# Patient Record
Sex: FEMALE | Race: WHITE | NOT HISPANIC OR LATINO | Employment: OTHER | URBAN - METROPOLITAN AREA
[De-identification: names, ages, dates, MRNs, and addresses within clinical notes are randomized per-mention and may not be internally consistent; named-entity substitution may affect disease eponyms.]

---

## 2021-11-17 ENCOUNTER — HOSPITAL ENCOUNTER (INPATIENT)
Facility: CLINIC | Age: 46
LOS: 9 days | Discharge: HOME HEALTH | DRG: 074 | End: 2021-11-26
Attending: EMERGENCY MEDICINE | Admitting: PSYCHIATRY & NEUROLOGY
Payer: COMMERCIAL

## 2021-11-17 ENCOUNTER — APPOINTMENT (OUTPATIENT)
Dept: GENERAL RADIOLOGY | Facility: CLINIC | Age: 46
DRG: 074 | End: 2021-11-17
Payer: COMMERCIAL

## 2021-11-17 ENCOUNTER — APPOINTMENT (OUTPATIENT)
Dept: MRI IMAGING | Facility: CLINIC | Age: 46
DRG: 074 | End: 2021-11-17
Attending: STUDENT IN AN ORGANIZED HEALTH CARE EDUCATION/TRAINING PROGRAM
Payer: COMMERCIAL

## 2021-11-17 DIAGNOSIS — Z11.52 ENCOUNTER FOR SCREENING LABORATORY TESTING FOR SEVERE ACUTE RESPIRATORY SYNDROME CORONAVIRUS 2 (SARS-COV-2): ICD-10-CM

## 2021-11-17 DIAGNOSIS — R11.0 NAUSEA: Primary | ICD-10-CM

## 2021-11-17 DIAGNOSIS — G89.29 OTHER CHRONIC PAIN: ICD-10-CM

## 2021-11-17 DIAGNOSIS — M62.81 GENERALIZED MUSCLE WEAKNESS: ICD-10-CM

## 2021-11-17 DIAGNOSIS — G57.71 COMPLEX REGIONAL PAIN SYNDROME TYPE 2 OF RIGHT LOWER EXTREMITY: ICD-10-CM

## 2021-11-17 DIAGNOSIS — K59.00 CONSTIPATION, UNSPECIFIED CONSTIPATION TYPE: ICD-10-CM

## 2021-11-17 DIAGNOSIS — R27.8 SENSORY ATAXIA: ICD-10-CM

## 2021-11-17 DIAGNOSIS — M62.838 MUSCLE SPASM: ICD-10-CM

## 2021-11-17 LAB
ANION GAP SERPL CALCULATED.3IONS-SCNC: 4 MMOL/L (ref 3–14)
ATRIAL RATE - MUSE: 89 BPM
BASOPHILS # BLD AUTO: 0 10E3/UL (ref 0–0.2)
BASOPHILS NFR BLD AUTO: 1 %
BUN SERPL-MCNC: 9 MG/DL (ref 7–30)
CALCIUM SERPL-MCNC: 10 MG/DL (ref 8.5–10.1)
CHLORIDE BLD-SCNC: 106 MMOL/L (ref 94–109)
CO2 SERPL-SCNC: 28 MMOL/L (ref 20–32)
CREAT SERPL-MCNC: 0.79 MG/DL (ref 0.52–1.04)
DIASTOLIC BLOOD PRESSURE - MUSE: NORMAL MMHG
EOSINOPHIL # BLD AUTO: 0 10E3/UL (ref 0–0.7)
EOSINOPHIL NFR BLD AUTO: 1 %
ERYTHROCYTE [DISTWIDTH] IN BLOOD BY AUTOMATED COUNT: 12.5 % (ref 10–15)
GFR SERPL CREATININE-BSD FRML MDRD: 89 ML/MIN/1.73M2
GLUCOSE BLD-MCNC: 114 MG/DL (ref 70–99)
HCT VFR BLD AUTO: 49.9 % (ref 35–47)
HGB BLD-MCNC: 16.9 G/DL (ref 11.7–15.7)
HOLD SPECIMEN: NORMAL
HOLD SPECIMEN: NORMAL
IMM GRANULOCYTES # BLD: 0 10E3/UL
IMM GRANULOCYTES NFR BLD: 0 %
INTERPRETATION ECG - MUSE: NORMAL
LYMPHOCYTES # BLD AUTO: 1.3 10E3/UL (ref 0.8–5.3)
LYMPHOCYTES NFR BLD AUTO: 20 %
MAGNESIUM SERPL-MCNC: 2.3 MG/DL (ref 1.6–2.3)
MCH RBC QN AUTO: 31.5 PG (ref 26.5–33)
MCHC RBC AUTO-ENTMCNC: 33.9 G/DL (ref 31.5–36.5)
MCV RBC AUTO: 93 FL (ref 78–100)
MONOCYTES # BLD AUTO: 0.3 10E3/UL (ref 0–1.3)
MONOCYTES NFR BLD AUTO: 5 %
NEUTROPHILS # BLD AUTO: 4.8 10E3/UL (ref 1.6–8.3)
NEUTROPHILS NFR BLD AUTO: 73 %
NRBC # BLD AUTO: 0 10E3/UL
NRBC BLD AUTO-RTO: 0 /100
P AXIS - MUSE: 39 DEGREES
PLATELET # BLD AUTO: 271 10E3/UL (ref 150–450)
POTASSIUM BLD-SCNC: 4.1 MMOL/L (ref 3.4–5.3)
PR INTERVAL - MUSE: 128 MS
QRS DURATION - MUSE: 74 MS
QT - MUSE: 342 MS
QTC - MUSE: 416 MS
R AXIS - MUSE: 8 DEGREES
RBC # BLD AUTO: 5.36 10E6/UL (ref 3.8–5.2)
SARS-COV-2 RNA RESP QL NAA+PROBE: NEGATIVE
SODIUM SERPL-SCNC: 138 MMOL/L (ref 133–144)
SYSTOLIC BLOOD PRESSURE - MUSE: NORMAL MMHG
T AXIS - MUSE: 55 DEGREES
TROPONIN I SERPL-MCNC: <0.015 UG/L (ref 0–0.04)
VENTRICULAR RATE- MUSE: 89 BPM
WBC # BLD AUTO: 6.5 10E3/UL (ref 4–11)

## 2021-11-17 PROCEDURE — 84484 ASSAY OF TROPONIN QUANT: CPT | Performed by: EMERGENCY MEDICINE

## 2021-11-17 PROCEDURE — 86235 NUCLEAR ANTIGEN ANTIBODY: CPT | Performed by: STUDENT IN AN ORGANIZED HEALTH CARE EDUCATION/TRAINING PROGRAM

## 2021-11-17 PROCEDURE — 36415 COLL VENOUS BLD VENIPUNCTURE: CPT | Performed by: EMERGENCY MEDICINE

## 2021-11-17 PROCEDURE — 93005 ELECTROCARDIOGRAM TRACING: CPT

## 2021-11-17 PROCEDURE — 72158 MRI LUMBAR SPINE W/O & W/DYE: CPT | Mod: 26 | Performed by: RADIOLOGY

## 2021-11-17 PROCEDURE — 84165 PROTEIN E-PHORESIS SERUM: CPT | Mod: 26 | Performed by: PATHOLOGY

## 2021-11-17 PROCEDURE — 36415 COLL VENOUS BLD VENIPUNCTURE: CPT | Performed by: STUDENT IN AN ORGANIZED HEALTH CARE EDUCATION/TRAINING PROGRAM

## 2021-11-17 PROCEDURE — 80048 BASIC METABOLIC PNL TOTAL CA: CPT | Performed by: EMERGENCY MEDICINE

## 2021-11-17 PROCEDURE — 93010 ELECTROCARDIOGRAM REPORT: CPT | Performed by: INTERNAL MEDICINE

## 2021-11-17 PROCEDURE — 93005 ELECTROCARDIOGRAM TRACING: CPT | Performed by: EMERGENCY MEDICINE

## 2021-11-17 PROCEDURE — 83735 ASSAY OF MAGNESIUM: CPT | Performed by: STUDENT IN AN ORGANIZED HEALTH CARE EDUCATION/TRAINING PROGRAM

## 2021-11-17 PROCEDURE — 85027 COMPLETE CBC AUTOMATED: CPT | Performed by: EMERGENCY MEDICINE

## 2021-11-17 PROCEDURE — 83516 IMMUNOASSAY NONANTIBODY: CPT | Performed by: STUDENT IN AN ORGANIZED HEALTH CARE EDUCATION/TRAINING PROGRAM

## 2021-11-17 PROCEDURE — U0003 INFECTIOUS AGENT DETECTION BY NUCLEIC ACID (DNA OR RNA); SEVERE ACUTE RESPIRATORY SYNDROME CORONAVIRUS 2 (SARS-COV-2) (CORONAVIRUS DISEASE [COVID-19]), AMPLIFIED PROBE TECHNIQUE, MAKING USE OF HIGH THROUGHPUT TECHNOLOGIES AS DESCRIBED BY CMS-2020-01-R: HCPCS | Performed by: EMERGENCY MEDICINE

## 2021-11-17 PROCEDURE — C9803 HOPD COVID-19 SPEC COLLECT: HCPCS | Performed by: EMERGENCY MEDICINE

## 2021-11-17 PROCEDURE — 99223 1ST HOSP IP/OBS HIGH 75: CPT | Mod: GC | Performed by: PSYCHIATRY & NEUROLOGY

## 2021-11-17 PROCEDURE — 255N000002 HC RX 255 OP 636: Performed by: PSYCHIATRY & NEUROLOGY

## 2021-11-17 PROCEDURE — 99284 EMERGENCY DEPT VISIT MOD MDM: CPT | Mod: 25 | Performed by: EMERGENCY MEDICINE

## 2021-11-17 PROCEDURE — 250N000013 HC RX MED GY IP 250 OP 250 PS 637: Performed by: STUDENT IN AN ORGANIZED HEALTH CARE EDUCATION/TRAINING PROGRAM

## 2021-11-17 PROCEDURE — 99285 EMERGENCY DEPT VISIT HI MDM: CPT | Mod: 25 | Performed by: EMERGENCY MEDICINE

## 2021-11-17 PROCEDURE — 84165 PROTEIN E-PHORESIS SERUM: CPT | Mod: TC | Performed by: STUDENT IN AN ORGANIZED HEALTH CARE EDUCATION/TRAINING PROGRAM

## 2021-11-17 PROCEDURE — 86038 ANTINUCLEAR ANTIBODIES: CPT | Performed by: STUDENT IN AN ORGANIZED HEALTH CARE EDUCATION/TRAINING PROGRAM

## 2021-11-17 PROCEDURE — 120N000002 HC R&B MED SURG/OB UMMC

## 2021-11-17 PROCEDURE — 93010 ELECTROCARDIOGRAM REPORT: CPT | Performed by: EMERGENCY MEDICINE

## 2021-11-17 PROCEDURE — 72158 MRI LUMBAR SPINE W/O & W/DYE: CPT

## 2021-11-17 PROCEDURE — 250N000013 HC RX MED GY IP 250 OP 250 PS 637: Performed by: EMERGENCY MEDICINE

## 2021-11-17 PROCEDURE — A9585 GADOBUTROL INJECTION: HCPCS | Performed by: PSYCHIATRY & NEUROLOGY

## 2021-11-17 PROCEDURE — 84155 ASSAY OF PROTEIN SERUM: CPT | Performed by: STUDENT IN AN ORGANIZED HEALTH CARE EDUCATION/TRAINING PROGRAM

## 2021-11-17 PROCEDURE — 70200 X-RAY EXAM OF EYE SOCKETS: CPT | Mod: 26 | Performed by: RADIOLOGY

## 2021-11-17 PROCEDURE — 70200 X-RAY EXAM OF EYE SOCKETS: CPT

## 2021-11-17 RX ORDER — CYCLOBENZAPRINE HCL 5 MG
10 TABLET ORAL ONCE
Status: COMPLETED | OUTPATIENT
Start: 2021-11-17 | End: 2021-11-17

## 2021-11-17 RX ORDER — CYCLOBENZAPRINE HCL 10 MG
10 TABLET ORAL 3 TIMES DAILY
Status: DISCONTINUED | OUTPATIENT
Start: 2021-11-17 | End: 2021-11-26 | Stop reason: HOSPADM

## 2021-11-17 RX ORDER — SENNOSIDES 8.6 MG
8.6 TABLET ORAL 2 TIMES DAILY PRN
Status: DISCONTINUED | OUTPATIENT
Start: 2021-11-17 | End: 2021-11-26 | Stop reason: HOSPADM

## 2021-11-17 RX ORDER — LANOLIN ALCOHOL/MO/W.PET/CERES
3 CREAM (GRAM) TOPICAL
Status: DISCONTINUED | OUTPATIENT
Start: 2021-11-17 | End: 2021-11-26 | Stop reason: HOSPADM

## 2021-11-17 RX ORDER — ACETAMINOPHEN 325 MG/1
975 TABLET ORAL EVERY 8 HOURS
Status: DISCONTINUED | OUTPATIENT
Start: 2021-11-17 | End: 2021-11-26 | Stop reason: HOSPADM

## 2021-11-17 RX ORDER — OXYCODONE HYDROCHLORIDE 5 MG/1
5 TABLET ORAL 2 TIMES DAILY PRN
Status: DISCONTINUED | OUTPATIENT
Start: 2021-11-17 | End: 2021-11-18

## 2021-11-17 RX ORDER — GADOBUTROL 604.72 MG/ML
7.5 INJECTION INTRAVENOUS ONCE
Status: COMPLETED | OUTPATIENT
Start: 2021-11-17 | End: 2021-11-17

## 2021-11-17 RX ORDER — NALOXONE HYDROCHLORIDE 0.4 MG/ML
0.2 INJECTION, SOLUTION INTRAMUSCULAR; INTRAVENOUS; SUBCUTANEOUS
Status: DISCONTINUED | OUTPATIENT
Start: 2021-11-17 | End: 2021-11-24

## 2021-11-17 RX ORDER — LORAZEPAM 1 MG/1
1 TABLET ORAL ONCE
Status: COMPLETED | OUTPATIENT
Start: 2021-11-17 | End: 2021-11-17

## 2021-11-17 RX ORDER — NALOXONE HYDROCHLORIDE 0.4 MG/ML
0.4 INJECTION, SOLUTION INTRAMUSCULAR; INTRAVENOUS; SUBCUTANEOUS
Status: DISCONTINUED | OUTPATIENT
Start: 2021-11-17 | End: 2021-11-24

## 2021-11-17 RX ORDER — LIDOCAINE 40 MG/G
CREAM TOPICAL
Status: DISCONTINUED | OUTPATIENT
Start: 2021-11-17 | End: 2021-11-24

## 2021-11-17 RX ADMIN — GADOBUTROL 7.5 ML: 604.72 INJECTION INTRAVENOUS at 21:28

## 2021-11-17 RX ADMIN — OXYCODONE HYDROCHLORIDE 5 MG: 5 TABLET ORAL at 21:57

## 2021-11-17 RX ADMIN — OXYCODONE HYDROCHLORIDE 2.5 MG: 5 TABLET ORAL at 15:22

## 2021-11-17 RX ADMIN — CYCLOBENZAPRINE 10 MG: 10 TABLET, FILM COATED ORAL at 20:07

## 2021-11-17 RX ADMIN — LORAZEPAM 1 MG: 1 TABLET ORAL at 20:07

## 2021-11-17 RX ADMIN — CYCLOBENZAPRINE HYDROCHLORIDE 10 MG: 5 TABLET, FILM COATED ORAL at 15:22

## 2021-11-17 RX ADMIN — ACETAMINOPHEN 975 MG: 325 TABLET, FILM COATED ORAL at 20:07

## 2021-11-17 ASSESSMENT — MIFFLIN-ST. JEOR: SCORE: 1383.07

## 2021-11-17 ASSESSMENT — ENCOUNTER SYMPTOMS
UNEXPECTED WEIGHT CHANGE: 1
APPETITE CHANGE: 1
NUMBNESS: 1
FATIGUE: 1
WEAKNESS: 1
LIGHT-HEADEDNESS: 1
DIZZINESS: 1
HEADACHES: 0

## 2021-11-17 ASSESSMENT — ACTIVITIES OF DAILY LIVING (ADL)
ADLS_ACUITY_SCORE: 9
ADLS_ACUITY_SCORE: 8
ADLS_ACUITY_SCORE: 13
ADLS_ACUITY_SCORE: 9
ADLS_ACUITY_SCORE: 13

## 2021-11-17 ASSESSMENT — VISUAL ACUITY: OU: NORMAL ACUITY

## 2021-11-17 NOTE — ED TRIAGE NOTES
Pt arrives with  from home. Was referred by her IM doctor to see neurology, which would take 6 weeks to get an appointment. Pt has been experiencing a sudden decompensation of extremity strength. Pt begun noticing symptoms around 10/18 which include dizziness, falling, numbness in distal extremities, lips, cheeks.     Hx of CRPS; pt was diagnosed with Covid in 03/2020, received the Moderna vaccination 3/20/21 and 4/17/2021.    Gloria Matos RN on 11/17/2021 at 7:35 AM

## 2021-11-17 NOTE — LETTER
[facility] [phone]    Name: Peggy Jessica  : 1975  MRN #: [id number]  Primary Care Provider: Anny Marie PA-C       Needs PT , going today. Please call me asap thanks!!

## 2021-11-17 NOTE — LETTER
Transition Communication Hand-off for Care Transitions to Next Level of Care Provider    Name: Peggy Jessica  : 1975  MRN #: [id number]  Primary Care Provider: Anny Marie PA-C     Primary Clinic: [address]  [place] [address]     Reason for Hospitalization:  Generalized muscle weakness [M62.81]  Admit Date/Time: 2021 11:38 AM  Discharge Date:   2021  Payor Source: Payor: MEDICA / Plan: MEDICA ESSENTIAL / Product Type: Indemnity /              Reason for Communication Hand-off Referral:   Notification of the discharge plan    Discharge Plan:       Care Management Discharge Note     Discharge Date: 2021        Discharge Disposition:  [facility] ([phone])     Discharge Services:  Short term TCU placement at [facility]     Discharge DME:  Not applicable     Discharge Transportation: Pt has requested that her  provide transport.  SW spoke with pt's floor nurse (Jaylene) who reports that pt is appropriate to be transported by car with family.   time will be established when prior auth from insurance has been obtained.     Private pay costs discussed: Not applicable     PAS Confirmation Code:  [id number]  Patient/family educated on Medicare website which has current facility and service quality ratings: yes (SW provided copy of Medicare Care Compare)     Education Provided on the Discharge Plan:  yes  Persons Notified of Discharge Plans: Pt, Dr. Sheets and 6A nursing (pt states that pt will indep update her   Patient/Family in Agreement with the Plan: yes     Handoff Referral Completed: Yes     Additional Information:  - Dr. Sheets has confirmed readiness for discharge  - Admissions (Isabel) at [facility] has confirmed acceptance for admit pending receipt of prior auth from insurance.  Per Isabel, prior auth request has been submitted  - SW will fax pt's discharge orders to [facility] when they become  available.     STEVE Adams  Social Work, 6A  Phone:  [phone]  Pager:  [phone]  11/23/2021

## 2021-11-17 NOTE — CONSULTS
[facility]  Neurology Consult Note    Patient Name:  Peggy Jessica    MRN:  [id number]   :  1975  Date of Service:  2021  Primary care provider:  Anny Marie A      History of Present Illness:   Peggy Jessica is a 46 year old woman with PMH of CRPS (right leg from stress fracture and chest from bilateral mastectomy - BRCA +) with several systemic changes to sensation and subjective autonomic dysfunction.  She presents to the ED today due to 2 to 3 weeks of progressive sensory changes mostly in the BLEs and slight involvement of the hands that have resulted in some clumsiness.  She reports some falls as well.  Neurology was consulted due to the acute on chronic concerns.    She reports having vague discrepancies in sensation ever since being diagnosed with CRPS in the right leg from stress fracture years ago.  This was mostly related to the RLE at that time.  It has since also involved her chest and upper thorax in the setting of bilateral mastectomy, this was done about 2020.  Since that time she has noticed some sensory changes, mostly sensory loss, but also burning sensation that is vaguely in the area of these CRPS locations, but can affect other limbs as well.    Today she mostly subjective concerns of numbness and tingling in her feet and BLEs up to just proximal of the knees.  It also involves her hands to the wrists.  She thinks that this is significantly changed in the last 2 to 3 weeks.  She thinks her balance has been significantly affected and she has had about 3-4 falls each day for the last couple weeks, but admits she catches herself during these events.  She attributes these almost to feeling imbalanced, and she thinks is slightly worse in the dark.  She has also felt quite clumsy with her hands, she thinks she is dropping things more often.  She has some lightheadedness when bending over to grab things that she is  dropped, but this specific sensation has not caused the fall.  Additionally, she does endorse some sensations of her hands of her fingers turning white, and then moments later they turn red and flush.  This is also happening in her knuckles of her hands.  She thinks this has been more prominent recently.    This is in contrast to September and early October when she was helping work the [employer] and she was able to carry roughly 20 pound objects from her car into the festival area.  She was doing this without complication at that time.    It should be noted that she has seen providers at [facility] for this concern, and she has actually undergone autonomic testing.  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  In the setting of this she was taking an anticholinergic medication to suppress what effect, so the only organic component to this testing was mild autonomic neuropathy, although due to the mild nature of this it is not entirely specific to an organic dysfunction.    Of note she is having poor sleep in the last 2 to 3 months.  She is also had a significantly reduced appetite over this time and only eating a few snacks throughout the day and maybe one full meal every day or two.  She has lost about 70 pounds unintentionally per her report.      ROS: A 10-point ROS was performed as per HPI.    PMH:  No past medical history on file.  No past surgical history on file.    Allergies:  No Known Allergies    Medications:    No current facility-administered medications for this encounter.  No current outpatient medications on file.    Social History:  Social History     Tobacco Use     Smoking status: Not on file     Smokeless tobacco: Not on file   Substance Use Topics     Alcohol use: Not on file       Family History:    No family history on file.      Physical Examination  Vitals: BP  (!) 127/97   Pulse 112   Temp 98.1  F (36.7  C) (Oral)   Resp 20   SpO2 97%     General: Awake, alert, interactive; Sitting in bed, NAD, cooperative  HEENT: Normocephalic, atraumatic, nares patent, poor dentition  Resp: No increased work of breathing  Cardio: RRR, S1/S2 appropriate  Abdomen: Soft, non-distended, non-tender  Extremities: Warm and well perfused, peripheral pulses present, no edema  Skin: Not jaundiced, no rash, rare scattered ecchymoses  Psych: Slightly anxious  Neuro:   Mental status: Awake, attentive, interactive; Recalls remote and recent history with accuracy; simple calculations intact  Speech/Language: Fluent speech without paraphasic errors; No dysarthria; naming, repetition, comprehension intact  Cranial nerves: Visual fields intact to confrontation, Eyes conjugate, Pupils 4mm and brisk, EOMI w/ normal and smooth pursuit, face expression symmetric, facial sensation intact and symmetric, hearing intact to conversation, shoulder shrug strong, palate rise symmetric, tongue/uvula midline.  Motor:   Bulk: Appropriate  Tone: Appropriate with some occasional paratonia  Spontaneous movements: No myoclonus or asterixis; otherwise appropriate at rest  Power: Pronator drift absent. *Impersistence noted throughout formal motor exam   Right Left   Arm abduction 5/5 5/5   Elbow Flex 5/5 5/5   Elbow Extension 5/5 5/5   Wrist Extension 5/5 5/5     5/5 5/5   Hip Flexion 5-/5 5-/5   Knee Flexion 5-/5 5-/5   Knee Extension 5/5 5/5   Dorsiflexion  5/5 5/5   Plantarflexion 5/5 5/5   Reflexes: 2+ and symmetric biceps, triceps, brachioradialis; 2 in patellar, and achilles. No crossed adductors or spread. Toes down-going  Sensory: Light touch mildly decreased in the ankles bilaterally, with slight discrepancy in the Right limbs; PP has patchy and inconsistent findings in the BLE's; Proprioception intact in all limbs, including with eyes closed; Two-point discrimination intact in the BLE's   Coordination: FNF  intact bilaterally without dysmetria; Normal heel-shin test bilaterally  Finger tapping with normal amplitude and frequency; Rolling hands normal rate and coordinated  Station/Gait: Declined gait testing due to subjective ataxia; Romberg - swaying in several directions without fall or leaning on her environment      IMAGING:  No acute neuroimaging has been done      IMPRESSION/PLAN:  Peggy Jessica is a 46 year old woman with PMH of CRPS (right leg from stress fracture and chest from bilateral mastectomy - BRCA +) with several systemic changes to sensation and subjective autonomic dysfunction.  She presents to the ED today due to 2 to 3 weeks of progressive sensory changes mostly in the BLEs and slight involvement of the hands that have resulted in some clumsiness.  She reports some falls as well.      She is having acute on chronic sensory changes that have caused imbalance and clumsiness with her feet and her hands subjectively.  She has got a previous significant neuropathy work-up both of lab studies and of autonomic testing.  These have mostly been negative to date outside of possible mild autonomic dysfunction on testing, although this is not entirely specific to an organic etiology.  She received an EMG in 2008 that was reportedly unremarkable, but the raw data for that is not currently available.  Per objective examination is significant for motor impersistence, patchy and vague pinprick changes to BLEs, mild discrepancy of light touch in the right hemibody (light touch discrepancy was in left hemibody during examination at [facility]), and her Romberg test was essentially normal (mild swaying in all directions but without fall and continuously corrected).  Due to the changing of her symptoms and serum components that have been quite unremarkable, with recent autonomic testing, an EMG is likely indicated as an outpatient.    #1 Complex regional pain syndrome  #2 Subjective sensory changes causing imbalance  and clumsiness, acute on chronic  #3 Rule out sensory ganglionopathy  - Admit to Neurology service  - MRI L-spine w/wo contrast to evaluate for nerve root enhancement  - Serum studies   - PAULINE, SSA, SSB, B6, Paraneoplastic panel (PAVAL), GQ1b, GD1b, Ganglioside Ab  - EMG tomorrow AM  - LP probably indicated tomorrow      Patient discussed with Attending Dr. Henrique Bro MD  PGY-3 Neurology Resident  Securely message with the Vocera Web Console (learn more here)  11/17/2021

## 2021-11-17 NOTE — ED NOTES
MRI called and stated patient has a history of metal in the eye and that there would need to be XRAYS to confirm that the metal has been removed.

## 2021-11-17 NOTE — ED PROVIDER NOTES
"    [place] EMERGENCY DEPARTMENT ([facility])  11/17/21  History     Chief Complaint   Patient presents with     Generalized Weakness     The history is provided by the patient and medical records.     Peggy Jessica is a 46 year old female with a past medical history significant for pyelonephritis c/b CHRISTOPHER, grand mal seizures, and complex regional pain syndrome (right leg from stress fracture and chest from mastectomy - BRCA +) who presents to the Emergency Department for evaluation of generalized weakness.  Patient reports that the complex regional pain syndrome started in her right foot after walking on an undiagnosed stress fracture for 6.5 months. She also has a history of prophylactic bilateral mastectomy since which there has been a number of sequelae and complications including suspected allergy to internal sutures, and spreading of her complex regional pain syndrome to this area.  She states that sometimes she is unable to wear clothes due to pain from CRPS. Patient was seen by Dr. Jhaveri of Internal Medicine on 11/15 at [facility] and was informed that she would not be able to schedule a neurology appointment at [facility] until the end of February.  She reports progressive worsening of numbness/tingling in her feet, hands, and face over the past 2 months.  She reports bilateral lower extremity numbness up to her knees which has been causing her difficulty walking and frequent falls, increasing over the past two weeks.  She is unable to stand for very long or walk as much as she used to.  Prior to October 3 she was walking 5 miles per day.  She reports 3-4 falls per day due to fatigue and leg weakness and feeling like her legs \"stop working and give out\".  She also feels that she is having difficulty balancing.  She reports a new sensation of ice cold water running down her skin in her bilateral upper and lower extremities.  Patient notes a couple episodes of syncope but states that her falls are " "largely due to leg weakness.  She denies unusual headaches.  Patient also notes rapid decrease in her  strength.  She can no longer knit,  her birds, or  her 12 pound dog.  She feels that her leg weakness is worse than her hand weakness.  She describes a feeling of \"television static\" in her bilateral lower extremities.  Patient states that she received a flu vaccine before her symptoms began and received her COVID vaccine in April.  Patient states that she has lost 70-80 pounds since March.  She does not have an appetite but does attempt to eat some bread and fruit each day.  She states that she does empty a large jug of water 3-4 times per day as she is very thirsty. She endorses worsening dizziness which she describes primarily as room spinning with some lightheadedness.  This is exacerbated when standing from a sitting position, although she will feel this while just sitting as well.  She denies new diet or supplements.  Patient is on PING management which includes Flexeril, oxycodone, and cannabis.    Per review of the patient's medical record, patient was seen at a [facility] internal medicine acute care clinic on 11/15/2021.  Patient was very anxious at her visit about rapidly worsening symptoms over the past 2 weeks including weight loss, leg weakness, frequent falls, and worsening pain/numbness in her extremities.  She was noted to have progressive neurologic deficit and a need for ongoing neurology evaluation was noted.  Patient was received a call from the clinic yesterday and was advised to be seen in the ED. she was informed that an appointment in the neurology clinic would not be available for at least 6 weeks.    I have reviewed the Medications, Allergies, Past Medical and Surgical History, and Social History in the Epic system.    Review of Systems   Constitutional: Positive for appetite change, fatigue and unexpected weight change.   Musculoskeletal: Positive for gait " problem.   Neurological: Positive for dizziness, syncope, weakness, light-headedness and numbness. Negative for headaches.        Positive for paresthesias in bilateral upper and lower extremities  Positive for frequent falls   All other systems reviewed and are negative.    A complete review of systems was performed with pertinent positives and negatives noted in the HPI, and all other systems negative.    Physical Exam   BP: (!) 127/97  Pulse: 112  Temp: 98.1  F (36.7  C)  Resp: 20  SpO2: 97 %      Physical Exam  General: awake, alert, NAD  Head: normal cephalic  HEENT: pupils equal, conjugate gaze intact  Neck: Supple  CV: regular rate and rhythm without murmur  Lungs: clear to auscultation  Abd: soft, non-tender, no guarding, no peritoneal signs  EXT: lower extremities without swelling or edema  Neuro: awake, answers questions appropriately.  Patient appears to have diffuse 4 out of 5 strength of her bilateral lower extremities, appears to have some weekend  strength bilaterally.  Patient has somewhat of a flat-footed gait and appears off balance.        ED Course     At 12:01 PM the patient was seen and examined by Nabil Figueroa MD in Room [facility].        Procedures              EKG Interpretation:      Interpreted by Nabil Figueroa MD  Time reviewed: 1119  Symptoms at time of EKG: Diffuse weakness, dizziness  Rhythm: normal sinus   Rate: normal  Axis: normal  Ectopy: none  Conduction: normal  ST Segments/ T Waves: No ST-T wave changes  Q Waves: none  Comparison to prior: No old EKG available    Clinical Impression: normal EKG        The medical record was reviewed and interpreted.  Current labs reviewed and interpreted.  Previous labs reviewed and interpreted.     Results for orders placed or performed during the hospital encounter of 11/17/21 (from the past 24 hour(s))   [facility] Draw    Narrative    The following orders were created for panel order [facility] Draw.  Procedure                                Abnormality         Status                     ---------                               -----------         ------                     Extra Blue Top Tube[[id number]]                              Final result               Extra Red Top Tube[[id number]]                               Final result                 Please view results for these tests on the individual orders.   CBC with platelets differential    Narrative    The following orders were created for panel order CBC with platelets differential.  Procedure                               Abnormality         Status                     ---------                               -----------         ------                     CBC with platelets and d...[[id number]]  Abnormal            Final result                 Please view results for these tests on the individual orders.   Basic metabolic panel   Result Value Ref Range    Sodium 138 133 - 144 mmol/L    Potassium 4.1 3.4 - 5.3 mmol/L    Chloride 106 94 - 109 mmol/L    Carbon Dioxide (CO2) 28 20 - 32 mmol/L    Anion Gap 4 3 - 14 mmol/L    Urea Nitrogen 9 7 - 30 mg/dL    Creatinine 0.79 0.52 - 1.04 mg/dL    Calcium 10.0 8.5 - 10.1 mg/dL    Glucose 114 (H) 70 - 99 mg/dL    GFR Estimate 89 >60 mL/min/1.73m2   Extra Blue Top Tube   Result Value Ref Range    Hold Specimen JIC    Extra Red Top Tube   Result Value Ref Range    Hold Specimen JIC    CBC with platelets and differential   Result Value Ref Range    WBC Count 6.5 4.0 - 11.0 10e3/uL    RBC Count 5.36 (H) 3.80 - 5.20 10e6/uL    Hemoglobin 16.9 (H) 11.7 - 15.7 g/dL    Hematocrit 49.9 (H) 35.0 - 47.0 %    MCV 93 78 - 100 fL    MCH 31.5 26.5 - 33.0 pg    MCHC 33.9 31.5 - 36.5 g/dL    RDW 12.5 10.0 - 15.0 %    Platelet Count 271 150 - 450 10e3/uL    % Neutrophils 73 %    % Lymphocytes 20 %    % Monocytes 5 %    % Eosinophils 1 %    % Basophils 1 %    % Immature Granulocytes 0 %    NRBCs per 100 WBC 0 <1 /100    Absolute Neutrophils 4.8 1.6 - 8.3 10e3/uL    Absolute  Lymphocytes 1.3 0.8 - 5.3 10e3/uL    Absolute Monocytes 0.3 0.0 - 1.3 10e3/uL    Absolute Eosinophils 0.0 0.0 - 0.7 10e3/uL    Absolute Basophils 0.0 0.0 - 0.2 10e3/uL    Absolute Immature Granulocytes 0.0 <=0.0 10e3/uL    Absolute NRBCs 0.0 10e3/uL   Troponin I   Result Value Ref Range    Troponin I <0.015 0.000 - 0.045 ug/L   EKG 12 lead   Result Value Ref Range    Systolic Blood Pressure  mmHg    Diastolic Blood Pressure  mmHg    Ventricular Rate 89 BPM    Atrial Rate 89 BPM    WY Interval 128 ms    QRS Duration 74 ms     ms    QTc 416 ms    P Axis 39 degrees    R AXIS 8 degrees    T Axis 55 degrees    Interpretation ECG Sinus rhythm  Normal ECG        Medications   LORazepam (ATIVAN) tablet 1 mg (has no administration in time range)   cyclobenzaprine (FLEXERIL) tablet 10 mg (10 mg Oral Given 11/17/21 1522)   oxyCODONE IR (ROXICODONE) half-tab 2.5 mg (2.5 mg Oral Given 11/17/21 1522)             Assessments & Plan (with Medical Decision Making)   Peggy is a 46-year female who presents with subacute neurologic symptoms.  Patient's medical record was reviewed.  Here she is nontoxic-appearing, mildly tachycardic, has some diffuse weakness on physical exam with an ataxic gait.    She endorses progressive weakness of her lower and now upper extremities and now with frequent falls.  Here is tachycardic.  Differential would include things such as some type of peripheral neuropathy, some type of a sending process such as Guillain-Barré, some type of polyneuropathy.     EKG troponin basic labs were obtained the patient describes more vertiginous than a presyncopal sensation.    Patient's EKG showed no sign of acute ischemia, no signs of underlying arrhythmia. Troponin negative, hemoglobin electrolytes normal; electrolytes are otherwise unremarkable aside from some mild elevated glucose.    Patient was seen and evaluated by neurology.  They recommended admission for further evaluation.  They did not have any  additional lab request from me stated they would order them as an inpatient.  Patient will get a Covid swab prior to admission.    Neurology ordered an MRI. Patient be admitted to the neurology service for further evaluation.    I have reviewed the nursing notes.    I have reviewed the findings, diagnosis, plan and need for follow up with the patient.    New Prescriptions    No medications on file       Final diagnoses:   Generalized muscle weakness       IItzel am serving as a trained medical scribe to document services personally performed by Nabil Figueroa MD, based on the provider's statements to me.      I, Nabil Figueroa MD, was physically present and have reviewed and verified the accuracy of this note documented by Itzel Boswell.     Nabil Figueroa MD  11/17/2021   [facility] EMERGENCY DEPARTMENT     Nabil Figueroa MD  11/17/21 [id number]

## 2021-11-17 NOTE — ED NOTES
"Patient asking to use the bathroom. Encouraged to provided a urine sample just in case. \"I think I would have noticed if it was a UTI!\" Patient very short and rude with staff. Sample provided and sent to lab.   "

## 2021-11-18 ENCOUNTER — APPOINTMENT (OUTPATIENT)
Dept: PHYSICAL THERAPY | Facility: CLINIC | Age: 46
DRG: 074 | End: 2021-11-18
Attending: STUDENT IN AN ORGANIZED HEALTH CARE EDUCATION/TRAINING PROGRAM
Payer: COMMERCIAL

## 2021-11-18 LAB
ALBUMIN SERPL-MCNC: 3.3 G/DL (ref 3.4–5)
ALP SERPL-CCNC: 80 U/L (ref 40–150)
ALT SERPL W P-5'-P-CCNC: 23 U/L (ref 0–50)
ANA PAT SER IF-IMP: ABNORMAL
ANA SER QL IF: POSITIVE
ANA TITR SER IF: ABNORMAL {TITER}
APPEARANCE CSF: CLEAR
AST SERPL W P-5'-P-CCNC: 11 U/L (ref 0–45)
ATRIAL RATE - MUSE: 100 BPM
BILIRUB DIRECT SERPL-MCNC: 0.1 MG/DL (ref 0–0.2)
BILIRUB SERPL-MCNC: 0.4 MG/DL (ref 0.2–1.3)
COLOR CSF: COLORLESS
DIASTOLIC BLOOD PRESSURE - MUSE: NORMAL MMHG
ENA SS-A AB SER IA-ACNC: <0.5 U/ML
ENA SS-A AB SER IA-ACNC: NEGATIVE
ENA SS-B IGG SER IA-ACNC: <0.6 U/ML
ENA SS-B IGG SER IA-ACNC: NEGATIVE
ERYTHROCYTE [DISTWIDTH] IN BLOOD BY AUTOMATED COUNT: 12.4 % (ref 10–15)
FERRITIN SERPL-MCNC: 211 NG/ML (ref 8–252)
GLUCOSE CSF-MCNC: 73 MG/DL (ref 40–70)
HCT VFR BLD AUTO: 43.2 % (ref 35–47)
HGB BLD-MCNC: 14.6 G/DL (ref 11.7–15.7)
HSV1 DNA CSF QL NAA+PROBE: NOT DETECTED
HSV2 DNA CSF QL NAA+PROBE: NOT DETECTED
INTERPRETATION ECG - MUSE: NORMAL
IRON SATN MFR SERPL: 21 % (ref 15–46)
IRON SERPL-MCNC: 76 UG/DL (ref 35–180)
MCH RBC QN AUTO: 31.4 PG (ref 26.5–33)
MCHC RBC AUTO-ENTMCNC: 33.8 G/DL (ref 31.5–36.5)
MCV RBC AUTO: 93 FL (ref 78–100)
P AXIS - MUSE: 34 DEGREES
PLATELET # BLD AUTO: 214 10E3/UL (ref 150–450)
PR INTERVAL - MUSE: 142 MS
PROT CSF-MCNC: 105 MG/DL (ref 15–60)
PROT SERPL-MCNC: 7.4 G/DL (ref 6.8–8.8)
QRS DURATION - MUSE: 72 MS
QT - MUSE: 338 MS
QTC - MUSE: 436 MS
R AXIS - MUSE: -16 DEGREES
RBC # BLD AUTO: 4.65 10E6/UL (ref 3.8–5.2)
RBC # CSF MANUAL: 6 /UL (ref 0–2)
SYSTOLIC BLOOD PRESSURE - MUSE: NORMAL MMHG
T AXIS - MUSE: 0 DEGREES
TIBC SERPL-MCNC: 361 UG/DL (ref 240–430)
TOTAL PROTEIN SERUM FOR ELP: 7.6 G/DL (ref 6.8–8.8)
TRANSFERRIN SERPL-MCNC: 270 MG/DL (ref 210–360)
TUBE # CSF: 4
VENTRICULAR RATE- MUSE: 100 BPM
WBC # BLD AUTO: 5.7 10E3/UL (ref 4–11)
WBC # CSF MANUAL: 0 /UL (ref 0–5)

## 2021-11-18 PROCEDURE — 250N000011 HC RX IP 250 OP 636: Performed by: STUDENT IN AN ORGANIZED HEALTH CARE EDUCATION/TRAINING PROGRAM

## 2021-11-18 PROCEDURE — 84999 UNLISTED CHEMISTRY PROCEDURE: CPT | Performed by: STUDENT IN AN ORGANIZED HEALTH CARE EDUCATION/TRAINING PROGRAM

## 2021-11-18 PROCEDURE — 86255 FLUORESCENT ANTIBODY SCREEN: CPT | Performed by: STUDENT IN AN ORGANIZED HEALTH CARE EDUCATION/TRAINING PROGRAM

## 2021-11-18 PROCEDURE — 250N000013 HC RX MED GY IP 250 OP 250 PS 637: Performed by: PSYCHIATRY & NEUROLOGY

## 2021-11-18 PROCEDURE — 30233S1 TRANSFUSION OF NONAUTOLOGOUS GLOBULIN INTO PERIPHERAL VEIN, PERCUTANEOUS APPROACH: ICD-10-PCS | Performed by: PSYCHIATRY & NEUROLOGY

## 2021-11-18 PROCEDURE — 120N000002 HC R&B MED SURG/OB UMMC

## 2021-11-18 PROCEDURE — 99233 SBSQ HOSP IP/OBS HIGH 50: CPT | Mod: GC | Performed by: PSYCHIATRY & NEUROLOGY

## 2021-11-18 PROCEDURE — 87529 HSV DNA AMP PROBE: CPT | Performed by: STUDENT IN AN ORGANIZED HEALTH CARE EDUCATION/TRAINING PROGRAM

## 2021-11-18 PROCEDURE — 87015 SPECIMEN INFECT AGNT CONCNTJ: CPT | Performed by: STUDENT IN AN ORGANIZED HEALTH CARE EDUCATION/TRAINING PROGRAM

## 2021-11-18 PROCEDURE — 97162 PT EVAL MOD COMPLEX 30 MIN: CPT | Mod: GP

## 2021-11-18 PROCEDURE — 82945 GLUCOSE OTHER FLUID: CPT | Performed by: STUDENT IN AN ORGANIZED HEALTH CARE EDUCATION/TRAINING PROGRAM

## 2021-11-18 PROCEDURE — 009U3ZX DRAINAGE OF SPINAL CANAL, PERCUTANEOUS APPROACH, DIAGNOSTIC: ICD-10-PCS | Performed by: PSYCHIATRY & NEUROLOGY

## 2021-11-18 PROCEDURE — 97116 GAIT TRAINING THERAPY: CPT | Mod: GP

## 2021-11-18 PROCEDURE — 250N000013 HC RX MED GY IP 250 OP 250 PS 637: Performed by: STUDENT IN AN ORGANIZED HEALTH CARE EDUCATION/TRAINING PROGRAM

## 2021-11-18 PROCEDURE — 250N000009 HC RX 250

## 2021-11-18 PROCEDURE — 80076 HEPATIC FUNCTION PANEL: CPT | Performed by: STUDENT IN AN ORGANIZED HEALTH CARE EDUCATION/TRAINING PROGRAM

## 2021-11-18 PROCEDURE — 85027 COMPLETE CBC AUTOMATED: CPT | Performed by: STUDENT IN AN ORGANIZED HEALTH CARE EDUCATION/TRAINING PROGRAM

## 2021-11-18 PROCEDURE — 83519 RIA NONANTIBODY: CPT | Performed by: STUDENT IN AN ORGANIZED HEALTH CARE EDUCATION/TRAINING PROGRAM

## 2021-11-18 PROCEDURE — 36415 COLL VENOUS BLD VENIPUNCTURE: CPT | Performed by: STUDENT IN AN ORGANIZED HEALTH CARE EDUCATION/TRAINING PROGRAM

## 2021-11-18 PROCEDURE — 83550 IRON BINDING TEST: CPT | Performed by: STUDENT IN AN ORGANIZED HEALTH CARE EDUCATION/TRAINING PROGRAM

## 2021-11-18 PROCEDURE — 82784 ASSAY IGA/IGD/IGG/IGM EACH: CPT | Performed by: STUDENT IN AN ORGANIZED HEALTH CARE EDUCATION/TRAINING PROGRAM

## 2021-11-18 PROCEDURE — 82728 ASSAY OF FERRITIN: CPT | Performed by: STUDENT IN AN ORGANIZED HEALTH CARE EDUCATION/TRAINING PROGRAM

## 2021-11-18 PROCEDURE — 84157 ASSAY OF PROTEIN OTHER: CPT | Performed by: STUDENT IN AN ORGANIZED HEALTH CARE EDUCATION/TRAINING PROGRAM

## 2021-11-18 PROCEDURE — 84466 ASSAY OF TRANSFERRIN: CPT | Performed by: STUDENT IN AN ORGANIZED HEALTH CARE EDUCATION/TRAINING PROGRAM

## 2021-11-18 PROCEDURE — 89050 BODY FLUID CELL COUNT: CPT | Performed by: STUDENT IN AN ORGANIZED HEALTH CARE EDUCATION/TRAINING PROGRAM

## 2021-11-18 RX ORDER — CYCLOBENZAPRINE HCL 10 MG
10 TABLET ORAL 3 TIMES DAILY
Status: ON HOLD | COMMUNITY
End: 2021-11-23

## 2021-11-18 RX ORDER — DIPHENHYDRAMINE HCL 25 MG
50 CAPSULE ORAL ONCE
Status: COMPLETED | OUTPATIENT
Start: 2021-11-18 | End: 2021-11-18

## 2021-11-18 RX ORDER — METHYLPREDNISOLONE SODIUM SUCCINATE 125 MG/2ML
125 INJECTION, POWDER, LYOPHILIZED, FOR SOLUTION INTRAMUSCULAR; INTRAVENOUS
Status: DISCONTINUED | OUTPATIENT
Start: 2021-11-18 | End: 2021-11-24

## 2021-11-18 RX ORDER — DIPHENHYDRAMINE HYDROCHLORIDE 50 MG/ML
50 INJECTION INTRAMUSCULAR; INTRAVENOUS
Status: DISCONTINUED | OUTPATIENT
Start: 2021-11-18 | End: 2021-11-24

## 2021-11-18 RX ORDER — ONDANSETRON 8 MG/1
8 TABLET, ORALLY DISINTEGRATING ORAL EVERY 8 HOURS PRN
Status: ON HOLD | COMMUNITY
End: 2021-11-23

## 2021-11-18 RX ORDER — HYDROXYZINE HYDROCHLORIDE 25 MG/1
25 TABLET, FILM COATED ORAL EVERY 6 HOURS PRN
Status: DISCONTINUED | OUTPATIENT
Start: 2021-11-18 | End: 2021-11-26 | Stop reason: HOSPADM

## 2021-11-18 RX ORDER — DIPHENHYDRAMINE HYDROCHLORIDE 50 MG/ML
50 INJECTION INTRAMUSCULAR; INTRAVENOUS ONCE
Status: COMPLETED | OUTPATIENT
Start: 2021-11-18 | End: 2021-11-18

## 2021-11-18 RX ORDER — AMOXICILLIN 250 MG
1 CAPSULE ORAL 2 TIMES DAILY
Status: ON HOLD | COMMUNITY
End: 2021-11-23

## 2021-11-18 RX ORDER — ACETAMINOPHEN 325 MG/1
650 TABLET ORAL
Status: DISCONTINUED | OUTPATIENT
Start: 2021-11-18 | End: 2021-11-18

## 2021-11-18 RX ORDER — OXYCODONE HYDROCHLORIDE 5 MG/1
5 TABLET ORAL 2 TIMES DAILY
Status: ON HOLD | COMMUNITY
End: 2021-11-23

## 2021-11-18 RX ORDER — LORAZEPAM 2 MG/ML
1 INJECTION INTRAMUSCULAR ONCE
Status: COMPLETED | OUTPATIENT
Start: 2021-11-18 | End: 2021-11-18

## 2021-11-18 RX ORDER — HYDROXYZINE HYDROCHLORIDE 25 MG/1
50 TABLET, FILM COATED ORAL EVERY 6 HOURS PRN
Status: DISCONTINUED | OUTPATIENT
Start: 2021-11-18 | End: 2021-11-26 | Stop reason: HOSPADM

## 2021-11-18 RX ORDER — DIPHENHYDRAMINE HCL 25 MG
50 CAPSULE ORAL
Status: DISCONTINUED | OUTPATIENT
Start: 2021-11-18 | End: 2021-11-24

## 2021-11-18 RX ORDER — MULTIPLE VITAMINS W/ MINERALS TAB 9MG-400MCG
1 TAB ORAL DAILY
Status: DISCONTINUED | OUTPATIENT
Start: 2021-11-18 | End: 2021-11-26 | Stop reason: HOSPADM

## 2021-11-18 RX ORDER — ACETAMINOPHEN 325 MG/1
650 TABLET ORAL ONCE
Status: COMPLETED | OUTPATIENT
Start: 2021-11-18 | End: 2021-11-18

## 2021-11-18 RX ORDER — OXYCODONE HYDROCHLORIDE 5 MG/1
5 TABLET ORAL
Status: DISCONTINUED | OUTPATIENT
Start: 2021-11-18 | End: 2021-11-26 | Stop reason: HOSPADM

## 2021-11-18 RX ORDER — LORAZEPAM 2 MG/ML
1 INJECTION INTRAMUSCULAR
Status: DISCONTINUED | OUTPATIENT
Start: 2021-11-18 | End: 2021-11-18

## 2021-11-18 RX ORDER — LIDOCAINE HYDROCHLORIDE 10 MG/ML
INJECTION, SOLUTION EPIDURAL; INFILTRATION; INTRACAUDAL; PERINEURAL
Status: COMPLETED
Start: 2021-11-18 | End: 2021-11-18

## 2021-11-18 RX ADMIN — OXYCODONE HYDROCHLORIDE 5 MG: 5 TABLET ORAL at 20:01

## 2021-11-18 RX ADMIN — HYDROXYZINE HYDROCHLORIDE 50 MG: 25 TABLET, FILM COATED ORAL at 16:44

## 2021-11-18 RX ADMIN — LORAZEPAM 1 MG: 2 INJECTION INTRAMUSCULAR; INTRAVENOUS at 10:28

## 2021-11-18 RX ADMIN — ACETAMINOPHEN 975 MG: 325 TABLET, FILM COATED ORAL at 11:41

## 2021-11-18 RX ADMIN — SENNOSIDES 8.6 MG: 8.6 TABLET ORAL at 07:50

## 2021-11-18 RX ADMIN — CYCLOBENZAPRINE 10 MG: 10 TABLET, FILM COATED ORAL at 20:01

## 2021-11-18 RX ADMIN — Medication 50 MG: at 17:28

## 2021-11-18 RX ADMIN — CYCLOBENZAPRINE 10 MG: 10 TABLET, FILM COATED ORAL at 07:51

## 2021-11-18 RX ADMIN — Medication 1 TABLET: at 13:54

## 2021-11-18 RX ADMIN — ACETAMINOPHEN 975 MG: 325 TABLET, FILM COATED ORAL at 03:58

## 2021-11-18 RX ADMIN — ACETAMINOPHEN 650 MG: 325 TABLET, FILM COATED ORAL at 17:28

## 2021-11-18 RX ADMIN — ACETAMINOPHEN 975 MG: 325 TABLET, FILM COATED ORAL at 20:01

## 2021-11-18 RX ADMIN — HUMAN IMMUNOGLOBULIN G 25 G: 20 LIQUID INTRAVENOUS at 18:49

## 2021-11-18 RX ADMIN — LIDOCAINE HYDROCHLORIDE: 10 INJECTION, SOLUTION EPIDURAL; INFILTRATION; INTRACAUDAL; PERINEURAL at 11:11

## 2021-11-18 RX ADMIN — CYCLOBENZAPRINE 10 MG: 10 TABLET, FILM COATED ORAL at 13:54

## 2021-11-18 RX ADMIN — OXYCODONE HYDROCHLORIDE 5 MG: 5 TABLET ORAL at 08:14

## 2021-11-18 ASSESSMENT — ACTIVITIES OF DAILY LIVING (ADL)
ADLS_ACUITY_SCORE: 13
ADLS_ACUITY_SCORE: 12
ADLS_ACUITY_SCORE: 11
ADLS_ACUITY_SCORE: 13
ADLS_ACUITY_SCORE: 12
ADLS_ACUITY_SCORE: 13
ADLS_ACUITY_SCORE: 13
ADLS_ACUITY_SCORE: 11
ADLS_ACUITY_SCORE: 11
ADLS_ACUITY_SCORE: 13
ADLS_ACUITY_SCORE: 11
ADLS_ACUITY_SCORE: 11
ADLS_ACUITY_SCORE: 13
ADLS_ACUITY_SCORE: 12
ADLS_ACUITY_SCORE: 13
ADLS_ACUITY_SCORE: 12
ADLS_ACUITY_SCORE: 11
ADLS_ACUITY_SCORE: 13
ADLS_ACUITY_SCORE: 12
ADLS_ACUITY_SCORE: 11

## 2021-11-18 NOTE — PROGRESS NOTES
"[facility]  General Neurology - Progress Note    Patient Name:  Peggy Jessica  Date of Service:  November 18, 2021    Subjective:    No acute overnight events.    Complains of mild perioral tingling and tingling of hands and feet.  No other changes or complaints.      Objective:    Vitals: /65 (BP Location: Right arm)   Pulse 112   Temp 98  F (36.7  C) (Oral)   Resp 16   Ht 1.645 m (5' 4.75\")   Wt 75.1 kg (165 lb 9.6 oz)   SpO2 99%   BMI 27.77 kg/m      General: Awake, alert, interactive; Sitting in bed, NAD, cooperative  HEENT: Normocephalic, atraumatic, nares patent, poor dentition  Resp: No increased work of breathing  Cardio: RRR  Abdomen: Soft, non-distended, non-tender  Extremities: Warm and well perfused, peripheral pulses present, no edema  Skin: Not jaundiced, no rash, rare scattered ecchymoses  Psych: Slightly anxious    Neuro:   Mental status: Awake, attentive, interactive; Recalls remote and recent history with accuracy; simple calculations intact  Speech/Language: Fluent speech without paraphasic errors; No dysarthria; naming, repetition, comprehension intact  Cranial nerves: Visual fields intact to confrontation, Eyes conjugate, Pupils 4mm and brisk, EOMI w/ normal and smooth pursuit, face expression symmetric, facial sensation intact and symmetric, hearing intact to conversation, shoulder shrug strong, palate rise symmetric, tongue/uvula midline.  Motor:   Bulk: Appropriate  Tone: Appropriate   Spontaneous movements: No myoclonus or asterixis  Power: Pronator drift absent. *Impersistence noted throughout formal motor exam    Right Left   Arm abduction 5/5 5/5   Elbow Flex 5/5 5/5   Elbow Extension 5/5 5/5   Wrist Extension 5/5 5/5     5/5 5/5   Hip Flexion 4+/5 4+/5   Knee Flexion 4+/5 4+/5   Knee Extension 5/5 5/5   Dorsiflexion  5/5 5/5   Plantarflexion 5/5 5/5   Reflexes: 2+ and symmetric biceps, triceps, brachioradialis; 1+ in left " patellar.  Absent in right patellar (ligament appears physically different than left), and achilles. No crossed adductors or spread. Toes down-going  Sensory: Light touch mildly decreased in the ankles; Pinprick patchy and inconsistent findings in the BLE's, possible R>L to mid shin and decreased on RUE compared to left; Proprioception intact in all limbs, including with eyes closed; Two-point discrimination intact in the BLE's   Coordination: FNF and heel to shin intact.  Station/Gait: Declined gait testing due to subjective ataxia; Romberg - swaying in several directions without fall or leaning on her environment.  Turns with 2-3 steps      Pertinent Investigations:    I have personally reviewed most recent and pertinent labs, tests, and radiological images.     MR Lumbar Spine - 11/18/21  PRELIMINARY READ  Impression:  1. No abnormal enhancement of the lumbar nerve roots.  2. Mild lumbar spondylosis.     Ref. Range 11/17/2021 08:10   WBC Latest Ref Range: 4.0 - 11.0 10e3/uL 6.5   Hemoglobin Latest Ref Range: 11.7 - 15.7 g/dL 16.9 (H)   Hematocrit Latest Ref Range: 35.0 - 47.0 % 49.9 (H)   Platelet Count Latest Ref Range: 150 - 450 10e3/uL 271   RBC Count Latest Ref Range: 3.80 - 5.20 10e6/uL 5.36 (H)   MCV Latest Ref Range: 78 - 100 fL 93   MCH Latest Ref Range: 26.5 - 33.0 pg 31.5   MCHC Latest Ref Range: 31.5 - 36.5 g/dL 33.9   RDW Latest Ref Range: 10.0 - 15.0 % 12.5         Assessment/Plan:  Peggy Jessica is a 46 year old woman with PMH of CRPS (right leg from stress fracture and chest from bilateral mastectomy - BRCA +) with several systemic changes to sensation and subjective autonomic dysfunction.  She presented to the ED due to 2 to 3 weeks of progressive sensory changes mostly in the BLEs and slight involvement of the hands that have resulted in some clumsiness.  She reports some falls as well.       She is having acute on chronic sensory changes that have caused imbalance and clumsiness with her  feet and her hands subjectively.  She has got a previous significant neuropathy work-up both of lab studies and of autonomic testing.  These have mostly been negative to date outside of possible mild autonomic dysfunction on testing, although this is not entirely specific to an organic etiology.  She received an EMG in 2008 that was reportedly unremarkable, but the raw data for that is not currently available.  Objective examination is significant for motor impersistence, patchy and vague pinprick changes to BLEs, mild discrepancy of light touch in the right hemibody (light touch discrepancy was in left hemibody during examination at [facility] [place]), and her Romberg test was essentially normal (mild swaying in all directions but without fall and continuously corrected).  There are some functional elements to this exam.  Pt was admitted to neurology for further work up of ganglionopathy, paraneoplastic process, CISP, etc.  Plan for lumbar puncture and EMG today.  If findings are negative, plan for discharge with supportive cares.       #1 Complex regional pain syndrome  #2 Sensory changes causing imbalance and clumsiness, acute on chronic  #3 Rule out sensory ganglionopathy  - MRI L-spine w/wo contrast    - Radiology called updated read of possible cauda equina enhancement, possible enhancement of inferior posterior paraspinal musculature.   - mild lumber spondylosis  - Serum studies (ordered)              - PAULINE, SSA, SSB, B6, Paraneoplastic panel ([facility] panel), GQ1b, GD1b, Ganglioside Ab  - EMG tomorrow AM  - LP probably indicated tomorrow  - PT/OT  - PTA Oxycodone 5 mg BID PRN, Flexeril 10 mg TID, and medical cannabis (self administered) for pain     #Elevevated Hemoglobin/Hematocrit  - Iron studies ordered      FEN: Regular Diet  Malnutrition: Patient does not meet two of the above criteria necessary for diagnosing malnutrition  PPx: SCDs  Code: FULL    Patient was seen and discussed with Dr. Duenas.    Bogdan  MD Kortney

## 2021-11-18 NOTE — H&P
[facility]  Neurology Admission Note    Patient Name:  Peggy Jessica    MRN:  [id number]   :  1975  Date of Service:  2021  Primary care provider:  Anny Marie A      History of Present Illness:   Peggy Jessica is a 46 year old woman with PMH of CRPS (right leg from stress fracture and chest from bilateral mastectomy - BRCA +) with several systemic changes to sensation and subjective autonomic dysfunction.  She presents to the ED today due to 2 to 3 weeks of progressive sensory changes mostly in the BLEs and slight involvement of the hands that have resulted in some clumsiness.  She reports some falls as well.  Neurology was consulted due to the acute on chronic concerns.    She reports having vague discrepancies in sensation ever since being diagnosed with CRPS in the right leg from stress fracture years ago.  This was mostly related to the RLE at that time.  It has since also involved her chest and upper thorax in the setting of bilateral mastectomy, this was done about 2020.  Since that time she has noticed some sensory changes, mostly sensory loss, but also burning sensation that is vaguely in the area of these CRPS locations, but can affect other limbs as well.    Today she mostly subjective concerns of numbness and tingling in her feet and BLEs up to just proximal of the knees.  It also involves her hands to the wrists.  She thinks that this is significantly changed in the last 2 to 3 weeks.  She thinks her balance has been significantly affected and she has had about 3-4 falls each day for the last couple weeks, but admits she catches herself during these events.  She attributes these almost to feeling imbalanced, and she thinks is slightly worse in the dark.  She has also felt quite clumsy with her hands, she thinks she is dropping things more often.  She has some lightheadedness when bending over to grab things that she is  dropped, but this specific sensation has not caused the fall.  Additionally, she does endorse some sensations of her hands of her fingers turning white, and then moments later they turn red and flush.  This is also happening in her knuckles of her hands.  She thinks this has been more prominent recently.    This is in contrast to September and early October when she was helping work the [employer] and she was able to carry roughly 20 pound objects from her car into the festival area.  She was doing this without complication at that time.    It should be noted that she has seen providers at [facility] [place] for this concern, and she has actually undergone autonomic testing.  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  In the setting of this she was taking an anticholinergic medication to suppress what effect, so the only organic component to this testing was mild autonomic neuropathy, although due to the mild nature of this it is not entirely specific to an organic dysfunction.    Of note she is having poor sleep in the last 2 to 3 months.  She is also had a significantly reduced appetite over this time and only eating a few snacks throughout the day and maybe one full meal every day or two.  She has lost about 70 pounds unintentionally per her report.      ROS: A 10-point ROS was performed as per HPI.    PMH:  No past medical history on file.  No past surgical history on file.    Allergies:  No Known Allergies    Medications:    No current facility-administered medications for this encounter.  No current outpatient medications on file.    Social History:  Social History     Tobacco Use    Smoking status: Not on file    Smokeless tobacco: Not on file   Substance Use Topics    Alcohol use: Not on file       Family History:    No family history on file.      Physical Examination  Vitals: BP (!)  127/97   Pulse 112   Temp 98.1  F (36.7  C) (Oral)   Resp 20   SpO2 97%     General: Awake, alert, interactive; Sitting in bed, NAD, cooperative  HEENT: Normocephalic, atraumatic, nares patent, poor dentition  Resp: No increased work of breathing  Cardio: RRR, S1/S2 appropriate  Abdomen: Soft, non-distended, non-tender  Extremities: Warm and well perfused, peripheral pulses present, no edema  Skin: Not jaundiced, no rash, rare scattered ecchymoses  Psych: Slightly anxious  Neuro:   Mental status: Awake, attentive, interactive; Recalls remote and recent history with accuracy; simple calculations intact  Speech/Language: Fluent speech without paraphasic errors; No dysarthria; naming, repetition, comprehension intact  Cranial nerves: Visual fields intact to confrontation, Eyes conjugate, Pupils 4mm and brisk, EOMI w/ normal and smooth pursuit, face expression symmetric, facial sensation intact and symmetric, hearing intact to conversation, shoulder shrug strong, palate rise symmetric, tongue/uvula midline.  Motor:   Bulk: Appropriate  Tone: Appropriate with some occasional paratonia  Spontaneous movements: No myoclonus or asterixis; otherwise appropriate at rest  Power: Pronator drift absent. *Impersistence noted throughout formal motor exam   Right Left   Arm abduction 5/5 5/5   Elbow Flex 5/5 5/5   Elbow Extension 5/5 5/5   Wrist Extension 5/5 5/5     5/5 5/5   Hip Flexion 5-/5 5-/5   Knee Flexion 5-/5 5-/5   Knee Extension 5/5 5/5   Dorsiflexion  5/5 5/5   Plantarflexion 5/5 5/5   Reflexes: 2+ and symmetric biceps, triceps, brachioradialis; 1 in patellar, and achilles. No crossed adductors or spread. Toes down-going  Sensory: Light touch mildly decreased in the ankles bilaterally, with slight discrepancy in the Right limbs; PP has patchy and inconsistent findings in the BLE's; Proprioception intact in all limbs, including with eyes closed; Two-point discrimination intact in the BLE's   Coordination: FNF with  mild to mod ataxia  Finger tapping with normal amplitude and frequency; Rolling hands normal rate and coordinated  Station/Gait: Declined gait testing due to subjective ataxia; Romberg - swaying in several directions without fall or leaning on her environment      IMAGING:  No acute neuroimaging has been done      IMPRESSION/PLAN:  Peggy Jessica is a 46 year old woman with PMH of CRPS (right leg from stress fracture and chest from bilateral mastectomy - BRCA +) with several systemic changes to sensation and subjective autonomic dysfunction.  She presents to the ED today due to 2 to 3 weeks of progressive sensory changes mostly in the BLEs and slight involvement of the hands that have resulted in some clumsiness.  She reports some falls as well.      She is having acute on chronic sensory changes that have caused imbalance and clumsiness with her feet and her hands subjectively.  She has got a previous significant neuropathy work-up both of lab studies and of autonomic testing.  These have mostly been negative to date outside of possible mild autonomic dysfunction on testing, although this is not entirely specific to an organic etiology.  She received an EMG in 2008 that was reportedly unremarkable, but the raw data for that is not currently available.  Per objective examination is significant for motor impersistence, patchy and vague pinprick changes to BLEs, mild discrepancy of light touch in the right hemibody (light touch discrepancy was in left hemibody during examination at [facility] [place]), and her Romberg test was essentially normal (mild swaying in all directions but without fall and continuously corrected).  Due to the changing of her symptoms and serum components that have been quite unremarkable, with recent autonomic testing, an EMG is likely indicated as an outpatient.    #1 Complex regional pain syndrome  #2 Sensory changes causing imbalance and clumsiness, acute on chronic  #3 Rule out sensory  ganglionopathy  - Admit to Neurology service  - MRI L-spine w/wo contrast to evaluate for nerve root enhancement  - Serum studies   - PAULINE, SSA, SSB, B6, Paraneoplastic panel ([facility] panel), GQ1b, GD1b, Ganglioside Ab  - EMG tomorrow AM  - LP probably indicated tomorrow  - PT/OT      Patient discussed with Attending Dr. Henrique Bro MD  PGY-3 Neurology Resident  Securely message with the Vocera Web Console (learn more here)  11/17/2021

## 2021-11-18 NOTE — PROGRESS NOTES
Time: 6726-7973    Reason for admission: New onset of sensory symptoms, gait instability  Mobility: Ax1, GB, walker, bed alarm on  VS: Tachycardic on RA  Pain: Chronic pain, prn oxy, scheduled tylenol  Lines/drains: RPIV SL  Skin: Intact, lotion provided  Cardiac: Tachy w/in 130 parameters, HTN w/in 180 parameters  Respiratory: WDL on RA  Neuro: A&Ox4. N/T in hands, L cheek, thighs-toes. Upper extremities 4/5, BLLE 3/5.  GI/: LBM 11/17. Voiding spontaneously.  Diet: Gluten free    Plans: LP 11/18.  to bring medical cannabis 11/18, form in room.

## 2021-11-18 NOTE — PLAN OF CARE
Status: Patient admitted on 11/17 with several systemic changes to sensation and subjective autonomic dysfunction.  She presented to the ED due to 2 to 3 weeks of progressive sensory changes mostly in the BLEs and slight involvement of the hands that have resulted in some clumsiness.  She reports some falls as well.   Vitals: VSS  IV: PIV saline locked  Labs/Electrolytes: WNL  Diet: Gluten free diet  : Voiding spontaneously  Skin: Intact  Pain: Patient c/o chronic pain relieved with Tylenol, Flexeril, and Oxycodone    Activity: Up with A1, walker, gaitbelt  Social: Cooperative with cares,  at bedisde  Plan: EMG this afternoon, MRI and LP complete, continue to monitor and follow PCO  Updates this shift: Medical cannabis lock box at bedside

## 2021-11-18 NOTE — PROGRESS NOTES
11/18/21 1020   Quick Adds   Type of Visit Initial PT Evaluation       Present no   Living Environment   People in home child(ikp), adult;spouse   Current Living Arrangements house   Home Accessibility stairs to enter home;stairs within home   Number of Stairs, Main Entrance 4  (4 going down from front porch; level entry in back)   Number of Stairs, Within Home, Primary greater than 10 stairs   Stair Railings, Within Home, Primary railing on right side (ascending)   Transportation Anticipated family or friend will provide   Living Environment Comments Patient lives in multilevel home. Bedroom and bathroom are upstairs. Patient does not have 24 hour assist available at home. Tub shower, does not own bathroom equipment. Has access to FWW via family/friend.   Self-Care   Usual Activity Tolerance good   Current Activity Tolerance fair   Equipment Currently Used at Home none   Activity/Exercise/Self-Care Comment Patient reports that she would walk 10+ miles every weekend at [facility] and was independent with all mobility at baseline. Reports that her symptoms have started within the last month and this has greatly affected her mobility status.    Disability/Function   Hearing Difficulty or Deaf no   Wear Glasses or Blind yes   Vision Management glasses   Concentrating, Remembering or Making Decisions Difficulty no   Difficulty Communicating no   Difficulty Eating/Swallowing yes   Eating/Swallowing swallowing solid food  (reports difficulty swallowing as of recently)   Walking or Climbing Stairs Difficulty no   Walking or Climbing Stairs   (ind at baseline; difficulty with stairs in the past month)   Dressing/Bathing Difficulty no   Toileting issues no   Doing Errands Independently Difficulty (such as shopping) no   Fall history within last six months yes   Number of times patient has fallen within last six months   (reports multiple falls in the last month 2/2 symptoms)   Change in  Functional Status Since Onset of Current Illness/Injury yes   General Information   Onset of Illness/Injury or Date of Surgery 11/17/21   Referring Physician Zack Bro MD   Patient/Family Therapy Goals Statement (PT) to return to PLOF   Pertinent History of Current Problem (include personal factors and/or comorbidities that impact the POC) Per Chart: Peggy Jessica is a 46 year old woman with PMH of CRPS (right leg from stress fracture and chest from bilateral mastectomy - BRCA +) with several systemic changes to sensation and subjective autonomic dysfunction.  She presents to the ED today due to 2 to 3 weeks of progressive sensory changes mostly in the BLEs and slight involvement of the hands that have resulted in some clumsiness.  She reports some falls as well.     Existing Precautions/Restrictions fall   Cognition   Orientation Status (Cognition) oriented x 4   Affect/Mental Status (Cognition) WFL   Follows Commands (Cognition) WFL   Pain Assessment   Patient Currently in Pain No   Integumentary/Edema   Integumentary/Edema no deficits were identifed   Posture    Posture Forward head position   Range of Motion (ROM)   ROM Comment grossly WFL   Strength   Strength Comments Patient presents with B LE strength deficits, R>L. R LE grossly 3+/5 and L LE grossly 4/5. Specific weakness noted in dorsiflexion and knee flexion.    Transfers   Transfer Safety Comments sit <> stand with FWW and CGA from bed and toilet   Gait/Stairs (Locomotion)   Comment (Gait/Stairs) Ambulated in room with FWW and Elizabeth. Gait instability noted, requires use of FWW for B UE support.    Balance   Balance Comments Tested static standing balance at EOB. Narrow ZULEIMA and tandem stance impaired. Mild-moderate sway and requires grabbing onto FWW to prevent LOB.    Sensory Examination   Sensory Perception Comments Patient able to identify light tough bilaterally, though reports dulled sensation bilaterally R>L. Deficits observed  from knee down.    Coordination   Coordination Comments ataxia/slowed with finger to nose test bilaterally, R>L. incoordination with heel to vang R L.    Clinical Impression   Criteria for Skilled Therapeutic Intervention yes, treatment indicated   PT Diagnosis (PT) impaired functional mobility s/p recent progressive sensory changes   Influenced by the following impairments impaired coordination, impaired balance, impaired activity tolerance, impaired strength, impaired sensation   Functional limitations due to impairments impaired transfers, impaired gait   Clinical Presentation Evolving/Changing   Clinical Presentation Rationale clinical judgement   Clinical Decision Making (Complexity) moderate complexity   Therapy Frequency (PT) 6x/week   Predicted Duration of Therapy Intervention (days/wks) 2 weeks   Planned Therapy Interventions (PT) balance training;bed mobility training;gait training;home exercise program;motor coordination training;neuromuscular re-education;patient/family education;stair training;strengthening;transfer training;progressive activity/exercise   Anticipated Equipment Needs at Discharge (PT) walker, rolling;shower chair   Risk & Benefits of therapy have been explained evaluation/treatment results reviewed;care plan/treatment goals reviewed   Clinical Impression Comments Patient presents with sensory and coordination impairments that are affecting her safety and independence with functional mobility tasks. Patient will benefit from skilled inpatient PT for progression towards PLOF.   PT Discharge Planning    PT Discharge Recommendation (DC Rec) Transitional Care Facility;home with assist;home with outpatient physical therapy   PT Rationale for DC Rec Patient currently presents below functional mobility baseline in regards to activity tolerance, need for AD, and reduced level of functional independence. Patient currently requiring use of FWW and assist of one for mobility. TCU recommended for  continued strength, balance, and mobility training to progress towards PLOF. For patient to discharge home, she would require 24 hour assist, 1A for mobility, FWW, shower chair and home vs OP PT.    PT Brief overview of current status  Ax1 with FWW   Total Evaluation Time   Total Evaluation Time (Minutes) 15

## 2021-11-18 NOTE — PROGRESS NOTES
Care Management Initial Consult    General Information  Assessment completed with: Patient (patients  was present in the room),  (Patient and )  Type of CM/SW Visit: Initial Assessment    Primary Care Provider verified and updated as needed: Yes   Readmission within the last 30 days: no previous admission in last 30 days      Reason for Consult: discharge planning  Advance Care Planning: Advance Care Planning Reviewed:  (Patient does not have a health care directive)          Communication Assessment  Patient's communication style: spoken language (English or Bilingual)    Hearing Difficulty or Deaf: no   Wear Glasses or Blind: yes    Cognitive  Cognitive/Neuro/Behavioral: WDL        Orientation: oriented x 4     Best Language: 0 - No aphasia  Speech: clear,logical,spontaneous    Living Environment:   People in home: child(kip), adult,spouse   (Peggy, Robert, Reaugustina and Ander)  Current living Arrangements: house      Able to return to prior arrangements: yes       Family/Social Support:  Care provided by: self  Provides care for: no one  Marital Status:   ,Children   (Robert)       Description of Support System: Supportive    Support Assessment: Adequate family and caregiver support    Current Resources:   Patient receiving home care services: No     Community Resources: None  Equipment currently used at home:  (Pt owns a cane and has a heightened toilet)  Supplies currently used at home:      Employment/Financial:  Employment Status: employed full-time     Employment/ Comments:  (Patient did not serve in the )  Financial Concerns: No concerns identified   Referral to Financial Counselor: No       Lifestyle & Psychosocial Needs:  Social Determinants of Health     Tobacco Use: Not on file   Alcohol Use: Not on file   Financial Resource Strain: Not on file   Food Insecurity: Not on file   Transportation Needs: Not on file   Physical Activity: Not on file   Stress: Not on file    Social Connections: Not on file   Intimate Partner Violence: Not on file   Depression: Not on file   Housing Stability: Not on file       Functional Status:  Prior to admission patient needed assistance:   Dependent ADLs::  (Indep with mobility/adl's prior to current hospitalization)  Dependent IADLs::  (Shared responsibility's with )       Mental Health Status:  Mental Health Status:  (No past/present MI)       Chemical Dependency Status:  Chemical Dependency Status:  (No past/present CD)             Values/Beliefs:  Spiritual, Cultural Beliefs, Buddhist Practices, Values that affect care: no               Additional Information:  SW met with pt and  to complete initial assessment.  Prior to hospitalization pt was living with her  (Robert), daughter (Watson age 19) and son (Ander, age 21) in a single family home.  There are 4 steps (with handrail) to enter the home.  Pt's bed and bath are on the 2nd floor of the home.   Pt estimates that there are 15 steps (with handrail) to the 2nd floor.  Pt's bath has a tub/shower combo.  Laundry is in the basement of the home (estimated 10-12 steps down with handrail).  Prior to hospitalization, pt was indep with all mobility (no assistive device, pt estimates that she has fallen 24 times in the past year) and adl's.  However pt states at times, her right arm is weak (due to history of a mastectomy) and she has trouble with upper body dressing at which time she receives assistance from her .  Pt and  share responsibility for completion of housekeeping, shopping (they go together) and household financial mgnt tasks.  Pt drives only if necessary.  Pt's  typically provides pt with transportation.  Pt was indep with medication administration.  Pt owns a cane and has a heightened toilet.  Pt was not utilizing any community resources.  Pt's primary care physician is Dr. Anny Marie who reportedly offices at [facility] in [place].  Pt  has not had add'l hospitalizations in the past 30 days.  Pt did not serve in the  and does not have a Restoration or spiritual practice that is important to her.  Pt does not have a health care directive.  Pt does not have past/present   MI/CD history.  Pt indicates that her support system includes her , adult age children and pt's mother (Hiral, resides in [place], [place]).      Disposition planning was discussed. Physical Therapy is recommending a TCU stay and pt is receptive.  SW provided pt with a Medicare Care Compare list and pt has identified the following facility preferences: [facility], [facility], [facility], [facility], [facility] and [facility].  Pt is receiving IVIG through 11/22/2021 and this is a barrier to placement.  SW referred pt to Admissions (Xenia) at  TCU but will delay referrals to community SNF's until pt is closer to ready for discharge.  SW has informed pt that pt will not be able to use medical cannibus at the accepting TCU/SNF.   Pt voices understanding.  Pt states that she received the Moderna Vaccine.    SW will follow for discharge planning.    STEVE Adams  Social Work, 6A  Phone:  [phone]  Pager:  [phone]  11/19/2021        SAMMI Song

## 2021-11-18 NOTE — PLAN OF CARE
Arrived from:  ED  Belongings/meds:  Purse remains with patient.  2 RN Skin Assessment Completed by:  Shellie FULLER RN, Emma JOSE RN  Non-intact findings documented (yes/no/NA): None. Intact.     Status: New onset sensory symptoms with gait instability.  Vitals: HTN within parameters. Tachy to 110s intermittently. OVSS, RA.  Neuros: A&Ox4. Reports numbness and tightness to face, decreased sensation to R cheek. BUE 4/5 strength with numbness to bilateral hands. BLE 3/5 strength with numbness and tingling from mid thigh to toes.   IV: PIV SL.  Labs/Electrolytes: Refused lab draws this evening.   Resp/trach: LS CTA.  Diet: Gluten free diet.  Bowel status: BS+. LBM this AM 11/17 per report.   : Voiding without difficulty.   Skin: See above.   Pain: Chronic pain at baseline. Given PRN oxy.  to bring medical cannabis pills tomorrow to be evaluated by pharmacy.  Activity: Up with 1, GB, walker.  Social: No visitors this shift.   Plan: LP tomorrow. Continue with plan of care.  Updates this shift: MRI completed.

## 2021-11-18 NOTE — PHARMACY-ADMISSION MEDICATION HISTORY
Admission Medication History Completed by Pharmacy    See [facility] Admission Navigator for allergy information, preferred outpatient pharmacy, prior to admission medications and immunization status.     Medication History Sources:     Patient, dispense report, care everywhere.    Changes made to PTA medication list (reason):    Added:   o Cyclobenzaprine 10mg tablet  o Ondansetron 8mg ODT tablet  o Oxycodone 5mg tablet  o Senna-docusate 8.6-50mg tablet    Deleted: None    Changed: None    Additional Information:    Medications verified with patient via phone.    Prior to Admission medications    Medication Sig Last Dose Taking? Auth Provider   cyclobenzaprine (FLEXERIL) 10 MG tablet Take 10 mg by mouth 3 times daily Past Week Yes Unknown, Entered By History   ondansetron (ZOFRAN-ODT) 8 MG ODT tab Take 8 mg by mouth every 8 hours as needed Past Week Yes Unknown, Entered By History   oxyCODONE (ROXICODONE) 5 MG tablet Take 5 mg by mouth 2 times daily Past Week Yes Unknown, Entered By History   senna-docusate (SENOKOT-S/PERICOLACE) 8.6-50 MG tablet Take 1 tablet by mouth 2 times daily Past Week Yes Unknown, Entered By History       Date completed: 11/18/21    Medication history completed by: Hipolito Root

## 2021-11-18 NOTE — PROGRESS NOTES
"CLINICAL NUTRITION SERVICES - ASSESSMENT NOTE     Nutrition Prescription    RECOMMENDATIONS FOR MDs/PROVIDERS TO ORDER:  - Encouragement of PO; foods brought in by family as needed   - Consider checking B12, B6, vitamin D, whole blood thiamine in the setting of symptoms in conjunction with decreased PO and significant weight loss.     Malnutrition Status:    - Severe malnutrition in the context of acute on chronic illness    Recommendations already ordered by Registered Dietitian (RD):  - Ordered MVI  - Ordered Sary Milner oral nutrition supplement between meals    Future/Additional Recommendations:  - PO/supp adequacy   - Weight trends   - Micronutrient labs     REASON FOR ASSESSMENT  Peggy Jessica is a/an 46 year old female assessed by the dietitian for Admission Nutrition Risk Screen for positive    Medical History: past medical history significant for pyelonephritis c/b CHRISTOPHER, grand mal seizures, and complex regional pain syndrome (right leg from stress fracture and chest from mastectomy - BRCA +) who presents to the Emergency Department for evaluation of generalized weakness.     NUTRITION HISTORY  From chart note:  Patient states that she has lost 70-80 pounds since March.  She does not have an appetite but does attempt to eat some bread and fruit each day. She states that she does empty a large jug of water 3-4 times per day as she is very thirsty.    New onset sensory symptoms; gait instability.     Reports difficulty swallowing recently    CURRENT NUTRITION ORDERS  Diet: Regular - changed to gluten free  Intake/Tolerance: none recorded yet.   Prefers Gluten Free diet per chart notes    LABS  Labs reviewed    MEDICATIONS  Medications reviewed    ANTHROPOMETRICS  Height: 164.5 cm (5' 4.75\") 65\"  Most Recent Weight: 75.1 kg (165 lb 9.6 oz)    IBW: 57 kg  BMI: Overweight BMI 25-29.9  Weight History:   Wt Readings from Last 11 Encounters:   11/17/21 75.1 kg (165 lb 9.6 oz)   Per chart review: 85.7 kg 7/15/21 " = 12% weight loss over ~ 4 months; 88 kg 6/18/21 = 15% weight loss over ~ 5 months. *29 lb weight loss over 5 months. Pt reports 70-80 lbs since March.     Dosing Weight: 62 kg (adjusted weight using current weight of 75 kg and IBW of 57 kg)    ASSESSED NUTRITION NEEDS  Estimated Energy Needs: 0722-8373 kcals/day (25 - 30 kcals/kg)  Justification: Maintenance  Estimated Protein Needs: 62-74+ grams protein/day (1 - 1.2+ grams of pro/kg)  Justification: Maintenance; higher end with recent weight loss/repletion  Estimated Fluid Needs: (1 mL/kcal)   Justification: Maintenance and Per provider pending fluid status    PHYSICAL FINDINGS  See malnutrition section below.  Ataxia      MALNUTRITION  % Intake: </=75% for >/= 1 month (severe)  % Weight Loss: > 10% in 6 months (severe)  Subcutaneous Fat Loss: Unable to assess  Muscle Loss: Unable to assess  Fluid Accumulation/Edema: None noted  Malnutrition Diagnosis: Severe malnutrition in the context of acute on chronic illness    NUTRITION DIAGNOSIS  Inadequate oral intake related to decreased appetite/neuro status as evidenced by reported decreased PO, significant weight loss, and severe malnutrition diagnosis.      INTERVENTIONS  Implementation  Nutrition Education: attempted. Pt with other cares/therapies. Will continue to follow for ed needs prior to discharge   Medical food supplement therapy  Multivitamin/mineral supplement therapy  Monitor PO and weights   Micronutrient checks      Goals  Patient to consume % of nutritionally adequate meal trays TID, or the equivalent with supplements/snacks.     Monitoring/Evaluation  Progress toward goals will be monitored and evaluated per protocol.     Ananth Morton RD, PEE, [facility]  Neuro ICU  Pager: [phone]

## 2021-11-18 NOTE — UTILIZATION REVIEW
Admission Status; Secondary Review Determination    Under the authority of the Utilization Management Committee, the utilization review process indicated a secondary review on the above patient. The review outcome is based on review of the medical records, discussions with staff, and applying clinical experience noted on the date of the review.    (x) Inpatient Status Appropriate - This patient's medical care is consistent with medical management for inpatient care and reasonable inpatient medical practice.    RATIONALE FOR DETERMINATION: 46-year-old female with history of complex regional pain syndrome in the right leg from a previous stress fracture in the chest from mastectomy, history of pyelonephritis complicated by acute kidney injury, grand mal seizures.  She now has developed progressive worsening paresthesias and numbness in her distal extremities as well as her face leading to progressive difficulty walking, frequent falls especially exacerbated over the past 6 7 to 10 days.  6 weeks ago patient was able to walk 5 miles a day and now she falls 3-4 times a day due to fatigue and leg weakness within her household.  This is associated with poor balance, episodes of syncope and marked weight loss during the past couple months.  Due to severity of patient's significant acute weakness patient requiring inpatient evaluation to rule out sensory ganglion neuropathy with plans for MRI, extensive serum studies including paraneoplastic panel, EMG, ENC, lumbar puncture prior to safe outpatient follow-up.    At the time of admission with the information available to the attending physician more than 2 nights Hospital complex care was anticipated, based on patient risk of adverse outcome if treated as outpatient and complex care required. Inpatient admission is appropriate based on the Medicare guidelines.    This document was produced using voice recognition software    The information on this document is developed  by the utilization review team in order for the business office to ensure compliance. This only denotes the appropriateness of proper admission status and does not reflect the quality of care rendered.    The definitions of Inpatient Status and Observation Status used in making the determination above are those provided in the CMS Coverage Manual, Chapter 1 and Chapter 6, section 70.4.    Sincerely,    Feliz Kiser MD  Utilization Review  Physician Advisor  [facility].

## 2021-11-19 ENCOUNTER — PRE VISIT (OUTPATIENT)
Dept: NEUROLOGY | Facility: CLINIC | Age: 46
End: 2021-11-19

## 2021-11-19 ENCOUNTER — APPOINTMENT (OUTPATIENT)
Dept: CT IMAGING | Facility: CLINIC | Age: 46
DRG: 074 | End: 2021-11-19
Attending: STUDENT IN AN ORGANIZED HEALTH CARE EDUCATION/TRAINING PROGRAM
Payer: COMMERCIAL

## 2021-11-19 ENCOUNTER — APPOINTMENT (OUTPATIENT)
Dept: PHYSICAL THERAPY | Facility: CLINIC | Age: 46
DRG: 074 | End: 2021-11-19
Payer: COMMERCIAL

## 2021-11-19 ENCOUNTER — APPOINTMENT (OUTPATIENT)
Dept: OCCUPATIONAL THERAPY | Facility: CLINIC | Age: 46
DRG: 074 | End: 2021-11-19
Attending: STUDENT IN AN ORGANIZED HEALTH CARE EDUCATION/TRAINING PROGRAM
Payer: COMMERCIAL

## 2021-11-19 LAB
ALBUMIN SERPL ELPH-MCNC: 4.2 G/DL (ref 3.7–5.1)
ALPHA1 GLOB SERPL ELPH-MCNC: 0.3 G/DL (ref 0.2–0.4)
ALPHA2 GLOB SERPL ELPH-MCNC: 0.9 G/DL (ref 0.5–0.9)
B-GLOBULIN SERPL ELPH-MCNC: 1 G/DL (ref 0.6–1)
GAMMA GLOB SERPL ELPH-MCNC: 1.2 G/DL (ref 0.7–1.6)
Lab: NORMAL
M PROTEIN SERPL ELPH-MCNC: 0 G/DL
MAGNESIUM SERPL-MCNC: 2.1 MG/DL (ref 1.6–2.3)
PERFORMING LABORATORY: NORMAL
POTASSIUM BLD-SCNC: 3.9 MMOL/L (ref 3.4–5.3)
PROT PATTERN SERPL ELPH-IMP: NORMAL
PROT PATTERN UR ELPH-IMP: NORMAL
SPECIMEN STATUS: NORMAL
TEST NAME: NORMAL

## 2021-11-19 PROCEDURE — 84132 ASSAY OF SERUM POTASSIUM: CPT | Performed by: PSYCHIATRY & NEUROLOGY

## 2021-11-19 PROCEDURE — 250N000011 HC RX IP 250 OP 636: Performed by: STUDENT IN AN ORGANIZED HEALTH CARE EDUCATION/TRAINING PROGRAM

## 2021-11-19 PROCEDURE — 97112 NEUROMUSCULAR REEDUCATION: CPT | Mod: GP

## 2021-11-19 PROCEDURE — 250N000013 HC RX MED GY IP 250 OP 250 PS 637: Performed by: STUDENT IN AN ORGANIZED HEALTH CARE EDUCATION/TRAINING PROGRAM

## 2021-11-19 PROCEDURE — 74177 CT ABD & PELVIS W/CONTRAST: CPT | Mod: 26 | Performed by: RADIOLOGY

## 2021-11-19 PROCEDURE — 71260 CT THORAX DX C+: CPT

## 2021-11-19 PROCEDURE — 97530 THERAPEUTIC ACTIVITIES: CPT | Mod: GO

## 2021-11-19 PROCEDURE — 36415 COLL VENOUS BLD VENIPUNCTURE: CPT | Performed by: PSYCHIATRY & NEUROLOGY

## 2021-11-19 PROCEDURE — 97116 GAIT TRAINING THERAPY: CPT | Mod: GP

## 2021-11-19 PROCEDURE — 84166 PROTEIN E-PHORESIS/URINE/CSF: CPT | Mod: 26 | Performed by: PATHOLOGY

## 2021-11-19 PROCEDURE — 83735 ASSAY OF MAGNESIUM: CPT | Performed by: PSYCHIATRY & NEUROLOGY

## 2021-11-19 PROCEDURE — 99233 SBSQ HOSP IP/OBS HIGH 50: CPT | Mod: GC | Performed by: PSYCHIATRY & NEUROLOGY

## 2021-11-19 PROCEDURE — 120N000002 HC R&B MED SURG/OB UMMC

## 2021-11-19 PROCEDURE — 250N000013 HC RX MED GY IP 250 OP 250 PS 637: Performed by: PSYCHIATRY & NEUROLOGY

## 2021-11-19 PROCEDURE — 97165 OT EVAL LOW COMPLEX 30 MIN: CPT | Mod: GO

## 2021-11-19 PROCEDURE — 84166 PROTEIN E-PHORESIS/URINE/CSF: CPT | Mod: TC | Performed by: PATHOLOGY

## 2021-11-19 PROCEDURE — 250N000011 HC RX IP 250 OP 636: Performed by: PSYCHIATRY & NEUROLOGY

## 2021-11-19 PROCEDURE — 71260 CT THORAX DX C+: CPT | Mod: 26 | Performed by: RADIOLOGY

## 2021-11-19 RX ORDER — ONDANSETRON 4 MG/1
4 TABLET, ORALLY DISINTEGRATING ORAL EVERY 6 HOURS PRN
Status: DISCONTINUED | OUTPATIENT
Start: 2021-11-19 | End: 2021-11-26 | Stop reason: HOSPADM

## 2021-11-19 RX ORDER — DIPHENHYDRAMINE HCL 25 MG
50 CAPSULE ORAL DAILY
Status: COMPLETED | OUTPATIENT
Start: 2021-11-19 | End: 2021-11-22

## 2021-11-19 RX ORDER — IOPAMIDOL 755 MG/ML
105 INJECTION, SOLUTION INTRAVASCULAR ONCE
Status: COMPLETED | OUTPATIENT
Start: 2021-11-19 | End: 2021-11-19

## 2021-11-19 RX ORDER — DIPHENHYDRAMINE HYDROCHLORIDE 50 MG/ML
50 INJECTION INTRAMUSCULAR; INTRAVENOUS DAILY
Status: COMPLETED | OUTPATIENT
Start: 2021-11-19 | End: 2021-11-22

## 2021-11-19 RX ORDER — ONDANSETRON 2 MG/ML
4 INJECTION INTRAMUSCULAR; INTRAVENOUS EVERY 6 HOURS PRN
Status: DISCONTINUED | OUTPATIENT
Start: 2021-11-19 | End: 2021-11-24

## 2021-11-19 RX ADMIN — HYDROXYZINE HYDROCHLORIDE 50 MG: 25 TABLET, FILM COATED ORAL at 14:49

## 2021-11-19 RX ADMIN — Medication 2.5 MG: at 08:13

## 2021-11-19 RX ADMIN — ACETAMINOPHEN 975 MG: 325 TABLET, FILM COATED ORAL at 12:22

## 2021-11-19 RX ADMIN — CYCLOBENZAPRINE 10 MG: 10 TABLET, FILM COATED ORAL at 20:03

## 2021-11-19 RX ADMIN — SENNOSIDES 8.6 MG: 8.6 TABLET ORAL at 08:13

## 2021-11-19 RX ADMIN — ONDANSETRON 4 MG: 4 TABLET, ORALLY DISINTEGRATING ORAL at 22:21

## 2021-11-19 RX ADMIN — CYCLOBENZAPRINE 10 MG: 10 TABLET, FILM COATED ORAL at 14:49

## 2021-11-19 RX ADMIN — HYDROXYZINE HYDROCHLORIDE 50 MG: 25 TABLET, FILM COATED ORAL at 21:22

## 2021-11-19 RX ADMIN — SENNOSIDES 8.6 MG: 8.6 TABLET ORAL at 20:05

## 2021-11-19 RX ADMIN — ONDANSETRON 4 MG: 4 TABLET, ORALLY DISINTEGRATING ORAL at 09:31

## 2021-11-19 RX ADMIN — Medication 2.5 MG: at 14:49

## 2021-11-19 RX ADMIN — ACETAMINOPHEN 975 MG: 325 TABLET, FILM COATED ORAL at 04:13

## 2021-11-19 RX ADMIN — HYDROXYZINE HYDROCHLORIDE 50 MG: 25 TABLET, FILM COATED ORAL at 08:11

## 2021-11-19 RX ADMIN — Medication 50 MG: at 17:58

## 2021-11-19 RX ADMIN — CYCLOBENZAPRINE 10 MG: 10 TABLET, FILM COATED ORAL at 08:10

## 2021-11-19 RX ADMIN — IOPAMIDOL 105 ML: 755 INJECTION, SOLUTION INTRAVENOUS at 09:57

## 2021-11-19 RX ADMIN — ACETAMINOPHEN 975 MG: 325 TABLET, FILM COATED ORAL at 20:03

## 2021-11-19 RX ADMIN — HUMAN IMMUNOGLOBULIN G 25 G: 20 LIQUID INTRAVENOUS at 18:46

## 2021-11-19 RX ADMIN — Medication 1 TABLET: at 08:10

## 2021-11-19 RX ADMIN — OXYCODONE HYDROCHLORIDE 5 MG: 5 TABLET ORAL at 20:03

## 2021-11-19 ASSESSMENT — ACTIVITIES OF DAILY LIVING (ADL)
ADLS_ACUITY_SCORE: 11
ADLS_ACUITY_SCORE: 8
ADLS_ACUITY_SCORE: 11
ADLS_ACUITY_SCORE: 8
ADLS_ACUITY_SCORE: 11
ADLS_ACUITY_SCORE: 11
ADLS_ACUITY_SCORE: 8
ADLS_ACUITY_SCORE: 11
ADLS_ACUITY_SCORE: 8
ADLS_ACUITY_SCORE: 11
ADLS_ACUITY_SCORE: 8
ADLS_ACUITY_SCORE: 11
ADLS_ACUITY_SCORE: 8
ADLS_ACUITY_SCORE: 11
ADLS_ACUITY_SCORE: 11
ADLS_ACUITY_SCORE: 8

## 2021-11-19 NOTE — PLAN OF CARE
Status: Admitted 11/17 with progressive sensory changes and weakness.  Vitals: VSS on RA  Neuros: Intact ex N/T to BLE and hands.  IV: PIV SL  Resp/trach: WNL  Diet: Gluten free diet  Bowel status: LBM 11/17  : Voiding spontaneously   Skin: Intact  Pain: Chest pain from CRPS after mastectomy. Relieved with oxycodone, tylenol, and atarax  Activity: A1, GB, walker  Social:  at bedside  Plan: Continue to monitor and follow POC.  Updates this shift: IVIG 1/5 given tonight. Tolerated well.

## 2021-11-19 NOTE — PLAN OF CARE
Status: Pt admitted 11/17 with progressive upper and lower limb numbness and weakness.  Vitals: VSS on RA  Neuros: Intact ex N/T to BLE and hands.  IV: PIV SL  Resp/trach: WNL  Diet: Gluten free diet  Bowel status: Last BM 11/17  : VDSP. Urine sample sent overnight for urine protein electrophoresis  Skin: Intact  Pain: Denied pain with scheduled tylenol overnight. Pt had previously c/o chest pain from CRPS after mastectomy.   Activity: A1, GB, and walker. Fall precautions in place, recent falls at home  Social:  visiting during the day  Plan: IVIG 2/5 scheduled this evening. Continue to monitor and follow POC.

## 2021-11-19 NOTE — PROGRESS NOTES
11/19/21 1600   Quick Adds   Type of Visit Initial Occupational Therapy Evaluation   Living Environment   People in home child(kip), adult;spouse   Current Living Arrangements house   Home Accessibility stairs to enter home;stairs within home   Number of Stairs, Main Entrance 4  (4 going down from front porch; level entry in back)   Number of Stairs, Within Home, Primary greater than 10 stairs   Transportation Anticipated family or friend will provide;car, drives self   Living Environment Comments Patient lives in a multi-level home with her  and children. Bedroom/bathroom are upstairs. No bathroom on the main floor. Does not have access to 24/7 assist at home, as  and children work during the day. Tub/shower combination present, does not own bathroom equipment. While home alone during the day, would need to safely be able to negotiate stairs and care for her dogs.    Self-Care   Usual Activity Tolerance good   Current Activity Tolerance fair   Equipment Currently Used at Home cane, straight   Activity/Exercise/Self-Care Comment Prior to ~Halloween, patient was independent with all of her ADL tasks. Was able to walk at least 5 miles and independent with her mobility. Over past month, has started to need a cane, assist with stairs, and having increased difficulty completing her daily activities.    Instrumental Activities of Daily Living (IADL)   IADL Comments Prior to ~1 month ago, patient was independent with IADL tasks. Has needed increasing assist from family over past month.    Disability/Function   Hearing Difficulty or Deaf no   Wear Glasses or Blind yes   Vision Management Glasses   Concentrating, Remembering or Making Decisions Difficulty no   Difficulty Communicating no   Difficulty Eating/Swallowing no   Eating/Swallowing Management n   Walking or Climbing Stairs Difficulty no   Mobility Management Cane in past month. Previously independent otherwise.    Dressing/Bathing Difficulty no  "  Toileting issues no   Doing Errands Independently Difficulty (such as shopping) no  (Needing increased assist over past month. )   Fall history within last six months yes   Number of times patient has fallen within last six months   (\"Dozens\")   Change in Functional Status Since Onset of Current Illness/Injury yes   General Information   Onset of Illness/Injury or Date of Surgery 11/17/21   Referring Physician Zack Bro MD   Patient/Family Therapy Goal Statement (OT) To be able to knit again.    Additional Occupational Profile Info/Pertinent History of Current Problem Per chart: \"Peggy Jessica is a 46 year old woman with PMH of CRPS (right leg from stress fracture and chest from bilateral mastectomy - BRCA +) with several systemic changes to sensation and subjective autonomic dysfunction.  She presented to the ED due to 2 to 3 weeks of progressive sensory changes mostly in the BLEs and slight involvement of the hands that have resulted in some clumsiness.  She reports some falls as well.\" \"Lumbar puncture was successfully performed yesterday morning with CSF studies ordered.  There was elevated protein in the CSF.  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  IVIG was started for treatment yesterday afternoon.\"   Performance Patterns (Routines, Roles, Habits) Patient is a mother, enjoys caring for her 3 dogs, 3 birds, and bearded dragon. 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    Existing Precautions/Restrictions fall   Left Upper Extremity (Weight-bearing Status) full weight-bearing (FWB)   Right Upper Extremity (Weight-bearing Status) full weight-bearing (FWB)   Left Lower Extremity (Weight-bearing Status) full weight-bearing (FWB)   Right Lower Extremity (Weight-bearing Status) full weight-bearing (FWB)   General Observations and Info Activity orders: Up with assist.    Cognitive " Status Examination   Orientation Status orientation to person, place and time   Cognitive Status Comments Patient endorses mild fogginess at work at times. Cognition overall intact. Will continue to monitor.    Visual Perception   Impact of Vision Impairment on Function (Vision) Denies acute changes.    Sensory   Sensory Comments Impaired light touch and sharp sensation in bilateral LE's. Light touch intact to hands but dull. Reports numbness/tingling throughout hands and fingers from wrists down. Endorses difficulties with hot/cold temperature differentiation at times.    Pain Assessment   Patient Currently in Pain Yes, see Vital Sign flowsheet  (Chest)   Range of Motion Comprehensive   Comment, General Range of Motion Shoulder flexion limited to ~110-120 degrees from previous mastectomy.    Strength Comprehensive (MMT)   Comment, General Manual Muscle Testing (MMT) Assessment Grossly 4/5 UE's.    Coordination   Coordination Comments Mild ataxia in UE's (more pronounced in RUE vs LUE). Patient reports frequently dropping items.    Transfers   Transfer Comments CGA   Balance   Balance Comments CGA for ambulation with 2WW.    Clinical Impression   Criteria for Skilled Therapeutic Interventions Met (OT) yes;meets criteria   OT Diagnosis Decreased ADL/IADL independence, activity tolerance, and coordination deficits.    OT Problem List-Impairments impacting ADL problems related to;activity tolerance impaired;balance;coordination;sensation;pain;mobility;strength   Assessment of Occupational Performance 3-5 Performance Deficits   Identified Performance Deficits Toileting, Bathing, Functional Mobility, Home Management, Leisure   Planned Therapy Interventions (OT) ADL retraining;IADL retraining;strengthening;fine motor coordination training;progressive activity/exercise;transfer training   Clinical Decision Making Complexity (OT) low complexity   Therapy Frequency (OT) 5x/week   Predicted Duration of Therapy 1-2 weeks    Anticipated Equipment Needs Upon Discharge (OT) other (see comments)  (TBD. Anticipate will benefit from shower chair, walker. )   Risk & Benefits of therapy have been explained evaluation/treatment results reviewed;care plan/treatment goals reviewed;risks/benefits reviewed;current/potential barriers reviewed;patient;participants included;participants voiced agreement with care plan   Comment-Clinical Impression Patient presenting below baseline ADL/IADL levels with significant decline in function over past ~1 month. Will benefit from skilled OT to promote return toward PLOF and advise in discharge recommendations.    OT Discharge Planning    OT Discharge Recommendation (DC Rec) Transitional Care Facility;home with home care occupational therapy;Home with assist   OT Rationale for DC Rec Patient currently below baseline ADL/IADL independence levels with decline over past ~1 month. Was previously independent with all ADL/IADL tasks, working, and able to ambulate ~5 miles a day. Now requiring 2WW and CGA, fatiguing quickly. Is home alone during the day and would need to be able to safely navigate stairs, complete ADLs, and care for her dogs. Has had many falls over past few weeks. Would benefit from ongoing therapies to promote return toward PLOF. If were to return home, would recommend 24/7 assist, walker, shower chair, and HH therapies. Will continue to monitor status with IVIG.    OT Brief overview of current status  CGA with 2WW and gait belt.    Total Evaluation Time (Minutes)   Total Evaluation Time (Minutes) 8

## 2021-11-19 NOTE — PLAN OF CARE
Status: Pt admitted 11/17 with progressive upper and lower limb numbness and weakness.  Vitals: VSS on RA   Neuros: AO x 4. N/T in hands and BLE.   IV: PIV SL   Labs/Electrolytes: WNL, Mg and K+  Resp/trach: LS clear, no SOB reported.   Diet: Gluten free, good intake. Emesis x 1, PRN Zofran given.   Bowel status: No BM this shift, BS present.   : Voiding spontaneously via toilet.   Skin: Intact.   Pain: PRN Oxy given x 2. PRN Atarax x 2. CRPS from previous mastectomy.   Activity: A1 w/ GB and walker.   Flu Shot Status (given or declined): Pt already received the flu shot.   Social: Family at bedside today.   Plan: IVIG 2/5 completed today.  Updates this shift: Chest/Abd CT completed.

## 2021-11-19 NOTE — PROGRESS NOTES
"[facility]  General Neurology - Progress Note    Patient Name:  Peggy Jessica  Date of Service:  November 18, 2021    Interval/Subjective:    No acute overnight events.    EMG completed yesterday afternoon, which did not reveal any abnormalities.    Lumbar puncture was successfully performed yesterday morning with CSF studies ordered.  There was elevated protein in the CSF.  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  IVIG was started for treatment yesterday afternoon.    Pt reports that she may feel slightly less tingling in the hands and feet this morning, but that ataxia/imbalance remains largely the same.      Objective:    Vitals: /63 (BP Location: Left arm)   Pulse 99   Temp 98  F (36.7  C) (Oral)   Resp 17   Ht 1.645 m (5' 4.75\")   Wt 75.1 kg (165 lb 9.6 oz)   SpO2 97%   BMI 27.77 kg/m      General: Awake, alert, interactive; Sitting in bed, NAD, cooperative  HEENT: Normocephalic, atraumatic, nares patent, poor dentition  Resp: No increased work of breathing  Cardio: RRR  Abdomen: Soft, non-distended, non-tender  Extremities: Warm and well perfused, peripheral pulses present, no edema  Skin: Not jaundiced, no rash, rare scattered ecchymoses  Psych: Slightly anxious    Neuro:   Mental status: Awake, attentive, interactive; Recalls remote and recent history with accuracy; simple calculations intact  Speech/Language: Fluent speech without paraphasic errors; No dysarthria; naming, repetition, comprehension intact  Cranial nerves: Visual fields intact to confrontation, Eyes conjugate, Pupils 4mm and brisk, EOMI w/ normal and smooth pursuit, face expression symmetric, facial sensation intact and symmetric, hearing intact to conversation, shoulder shrug strong, palate rise symmetric, tongue/uvula midline.  Motor:   Bulk: Appropriate  Tone: Appropriate "   Spontaneous movements: No myoclonus or asterixis  Power: Pronator drift absent. *Impersistence noted throughout formal motor exam    Right Left   Arm abduction 5/5 5/5   Elbow Flex 5/5 5/5   Elbow Extension 5/5 5/5   Wrist Extension 5/5 5/5     5/5 5/5   Hip Flexion 4+/5 4+/5   Knee Flexion 4+/5 4+/5   Knee Extension 5/5 5/5   Dorsiflexion  5/5 5/5   Plantarflexion 5/5 5/5   Reflexes: 2+ and symmetric biceps, triceps, brachioradialis; 1+ in left patellar.  Absent in right patellar (ligament appears physically different than left), and achilles. No crossed adductors or spread. Toes down-going  Sensory: Light touch mildly decreased in the ankles; Pinprick patchy and inconsistent findings in the BLE's, possible R>L to mid shin and decreased on RUE compared to left; Proprioception intact in all limbs, including with eyes closed; Two-point discrimination intact in the BLE's   Coordination: FNF and heel to shin intact.  Mild impairment on toe and heel taps bilaterally.  Station/Gait: Declined gait testing due to subjective ataxia      Pertinent Investigations:    I have personally reviewed most recent and pertinent labs, tests, and radiological images.     MR Lumbar Spine - 11/18/21  PRELIMINARY READ  Impression:  1. No abnormal enhancement of the lumbar nerve roots.  2. Mild lumbar spondylosis.    Component      Latest Ref Rng & Units 11/17/2021   PAULINE interpretation      Negative Positive (A)   PAULINE pattern 1       Dense fine speckled   PAULINE titer 1       1:160     Results for RITA SEBASTIÁN JASON (MRN [id number]) as of 11/19/2021 07:21   Ref. Range 11/18/2021 11:10   RBC CSF Latest Ref Range: 0 - 2 /uL 6 (H)   WBC CSF Latest Ref Range: 0 - 5 /uL 0   Appearance CSF Latest Ref Range: Clear  Clear   Color CSF Latest Ref Range: Colorless  Colorless   Tube Number Unknown 4   Glucose CSF Latest Ref Range: 40 - 70 mg/dL 73 (H)   Protein Total CSF Latest Ref Range: 15 - 60 mg/dL 105 (H)      Ref. Range 11/18/2021 16:23    Albumin Latest Ref Range: 3.4 - 5.0 g/dL 3.3 (L)   Protein Total Latest Ref Range: 6.8 - 8.8 g/dL 7.4   Bilirubin Total Latest Ref Range: 0.2 - 1.3 mg/dL 0.4   Alkaline Phosphatase Latest Ref Range: 40 - 150 U/L 80   ALT Latest Ref Range: 0 - 50 U/L 23   AST Latest Ref Range: 0 - 45 U/L 11   Bilirubin Direct Latest Ref Range: 0.0 - 0.2 mg/dL 0.1   Ferritin Latest Ref Range: 8 - 252 ng/mL 211   Iron Latest Ref Range: 35 - 180 ug/dL 76   Iron Binding Cap Latest Ref Range: 240 - 430 ug/dL 361   Iron Saturation Index Latest Ref Range: 15 - 46 % 21   Transferrin Latest Ref Range: 210 - 360 mg/dL 270       Lab Results   Component Value Date    WBC 5.7 11/18/2021    HGB 14.6 11/18/2021    HCT 43.2 11/18/2021     11/18/2021     11/17/2021    POTASSIUM 4.1 11/17/2021    CHLORIDE 106 11/17/2021    CO2 28 11/17/2021    BUN 9 11/17/2021    CR 0.79 11/17/2021     (H) 11/17/2021    TROPONIN <0.015 11/17/2021    AST 11 11/18/2021    ALT 23 11/18/2021    ALKPHOS 80 11/18/2021    BILITOTAL 0.4 11/18/2021       Assessment/Plan:  Peggy Jessica is a 46 year old woman with PMH of CRPS (right leg from stress fracture and chest from bilateral mastectomy - BRCA +) with several systemic changes to sensation and subjective autonomic dysfunction.  She presented to the ED due to 2 to 3 weeks of progressive sensory changes mostly in the BLEs and slight involvement of the hands that have resulted in some clumsiness.  She reports some falls as well.       She is having acute on chronic sensory changes that have caused imbalance and clumsiness with her feet and her hands subjectively.  She has got a previous significant neuropathy work-up both of lab studies and of autonomic testing.  These have mostly been negative to date outside of possible mild autonomic dysfunction on testing, although this is not entirely specific to an organic etiology.  She received an EMG in 2008 that was reportedly unremarkable (raw data  currently unavailable), however she did have an EMG yesterday (11/18) that was again unremarkable.  Objective examination is significant for motor impersistence, patchy and vague pinprick changes to BLEs, mild discrepancy of light touch in the right hemibody (light touch discrepancy was in left hemibody during examination at [facility] [place]), and her Romberg test was essentially normal (mild swaying in all directions but without fall and continuously corrected).  There are some functional elements to this exam, however there there are some objective findings including: possible mild enhancement of the cauda equina and paraspinal musculature on MRI, as well elevated protein in the CSF.  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  Other etiologies, such as a paraneoplastic etiology still need to be ruled out.   Those studies are pending.       #1 Complex regional pain syndrome  #2 Sensory changes causing imbalance and clumsiness, acute on chronic    - MRI L-spine w/wo contrast    - possible cauda equina enhancement   - possible enhancement of inferior posterior paraspinal musculature.   - mild lumber spondylosis   - no apparent enhancement of dorsal root ganglion  - Serum studies   - Positive PAULINE (1:160)   - SSA/SSB - negative   Studies still pending:            - B6, Paraneoplastic panel ([facility] panel), GQ1b, GD1b, Ganglioside Ab  - EMG completed   - unremarkable   - Lumbar Puncture complete   - Elevate Protein in CSF (105)   Studies still pending   - Oligoclonal Banding  - IVIG treatment   - Today (11/19) is Day 2 of 5   - May need longer course of outpatient treatment depending on response  - CT C/A/P for evaluation of malignancy  - PT/OT  - PTA Oxycodone 5 mg BID PRN, Flexeril 10 mg TID, and medical cannabis (self administered) for pain     #Nausea  - Zofran PRN    #Elevevated Hemoglobin/Hematocrit (resolved to high range)  Possible hemoconcentration.  Less concern  for hemochromatosis  - Iron studies WNL      FEN: Regular Diet  Malnutrition: Patient does not meet two of the above criteria necessary for diagnosing malnutrition  PPx: SCDs  Code: FULL    Patient was seen and discussed with Dr. Duenas.    Bogdan Sheets MD

## 2021-11-19 NOTE — PROCEDURES
[facility] Procedure Note          Lumbar Puncture:      Time: 8:22 AM  Performed by: Zack Bro MD  Authorized by: Zack Bro MD    Indications: abnormal neurologic exam and concern for subacute polyradiculoneuropathy    Consent given by: Patient who states understanding of the procedure being performed after discussing the risks, benefits and alternatives.    Prior to the start of the procedure and with procedural staff participation, I verbally confirmed the patient s identity using two indicators, relevant allergies, that the procedure was appropriate and matched the consent or emergent situation, and that the correct equipment/implants were available. Immediately prior to starting the procedure I conducted the Time Out with the procedural staff and re-confirmed the patient s name, procedure, and site/side. (The Joint Commission universal protocol was followed.) Yes    Under sterile conditions the patient was positioned R Lateral decubitus with knees drawn up. Betadine solution and sterile drapes were utilized.  Local anesthetic at the site: 2 ml of lidocaine 1% without epinephrine from the LP tray  A 21 G  spinal needle was inserted at the L 3-4 interspace.  Opening Pressurewas not checked.  A total of 11mL of clear and colorless spinal fluid was obtained and sent to the laboratory.   After the needle was removed, a bandaid and pressure were applied and the patient was instructed to stay horizontal until the results were back.    Complications:  None    Patient tolerance: Patient tolerated the procedure well with no immediate complications.      Zack Bro MD  PGY-3 Neurology Resident  Securely message with the Vocera Web Console (learn more here)  11/19/2021

## 2021-11-19 NOTE — TELEPHONE ENCOUNTER
Brief note:     Outcomes collected during hospital admission for CIDP monitoring. IVIG loading dose started 1 day prior to outcomes collection. See flow sheets for details.

## 2021-11-20 ENCOUNTER — APPOINTMENT (OUTPATIENT)
Dept: PHYSICAL THERAPY | Facility: CLINIC | Age: 46
DRG: 074 | End: 2021-11-20
Payer: COMMERCIAL

## 2021-11-20 LAB
ANION GAP SERPL CALCULATED.3IONS-SCNC: 3 MMOL/L (ref 3–14)
BUN SERPL-MCNC: 9 MG/DL (ref 7–30)
CALCIUM SERPL-MCNC: 9.7 MG/DL (ref 8.5–10.1)
CHLORIDE BLD-SCNC: 108 MMOL/L (ref 94–109)
CO2 SERPL-SCNC: 24 MMOL/L (ref 20–32)
CREAT SERPL-MCNC: 0.65 MG/DL (ref 0.52–1.04)
ERYTHROCYTE [DISTWIDTH] IN BLOOD BY AUTOMATED COUNT: 12.4 % (ref 10–15)
GFR SERPL CREATININE-BSD FRML MDRD: >90 ML/MIN/1.73M2
GLUCOSE BLD-MCNC: 97 MG/DL (ref 70–99)
HCT VFR BLD AUTO: 42.2 % (ref 35–47)
HGB BLD-MCNC: 14.3 G/DL (ref 11.7–15.7)
MCH RBC QN AUTO: 31.8 PG (ref 26.5–33)
MCHC RBC AUTO-ENTMCNC: 33.9 G/DL (ref 31.5–36.5)
MCV RBC AUTO: 94 FL (ref 78–100)
PLATELET # BLD AUTO: 180 10E3/UL (ref 150–450)
POTASSIUM BLD-SCNC: 4.1 MMOL/L (ref 3.4–5.3)
RBC # BLD AUTO: 4.5 10E6/UL (ref 3.8–5.2)
SODIUM SERPL-SCNC: 135 MMOL/L (ref 133–144)
WBC # BLD AUTO: 3.5 10E3/UL (ref 4–11)

## 2021-11-20 PROCEDURE — 85027 COMPLETE CBC AUTOMATED: CPT | Performed by: STUDENT IN AN ORGANIZED HEALTH CARE EDUCATION/TRAINING PROGRAM

## 2021-11-20 PROCEDURE — 250N000011 HC RX IP 250 OP 636: Performed by: STUDENT IN AN ORGANIZED HEALTH CARE EDUCATION/TRAINING PROGRAM

## 2021-11-20 PROCEDURE — 120N000002 HC R&B MED SURG/OB UMMC

## 2021-11-20 PROCEDURE — 250N000013 HC RX MED GY IP 250 OP 250 PS 637: Performed by: STUDENT IN AN ORGANIZED HEALTH CARE EDUCATION/TRAINING PROGRAM

## 2021-11-20 PROCEDURE — 97116 GAIT TRAINING THERAPY: CPT | Mod: GP | Performed by: PHYSICAL THERAPIST

## 2021-11-20 PROCEDURE — 99232 SBSQ HOSP IP/OBS MODERATE 35: CPT | Mod: GC | Performed by: PSYCHIATRY & NEUROLOGY

## 2021-11-20 PROCEDURE — 250N000013 HC RX MED GY IP 250 OP 250 PS 637: Performed by: PSYCHIATRY & NEUROLOGY

## 2021-11-20 PROCEDURE — 80048 BASIC METABOLIC PNL TOTAL CA: CPT | Performed by: STUDENT IN AN ORGANIZED HEALTH CARE EDUCATION/TRAINING PROGRAM

## 2021-11-20 PROCEDURE — 36415 COLL VENOUS BLD VENIPUNCTURE: CPT | Performed by: STUDENT IN AN ORGANIZED HEALTH CARE EDUCATION/TRAINING PROGRAM

## 2021-11-20 RX ORDER — PROCHLORPERAZINE MALEATE 10 MG
10 TABLET ORAL EVERY 6 HOURS PRN
Status: DISCONTINUED | OUTPATIENT
Start: 2021-11-20 | End: 2021-11-26 | Stop reason: HOSPADM

## 2021-11-20 RX ORDER — PROCHLORPERAZINE 25 MG
25 SUPPOSITORY, RECTAL RECTAL EVERY 12 HOURS PRN
Status: DISCONTINUED | OUTPATIENT
Start: 2021-11-20 | End: 2021-11-26 | Stop reason: HOSPADM

## 2021-11-20 RX ADMIN — Medication 1 TABLET: at 09:23

## 2021-11-20 RX ADMIN — SENNOSIDES 8.6 MG: 8.6 TABLET ORAL at 09:31

## 2021-11-20 RX ADMIN — HYDROXYZINE HYDROCHLORIDE 50 MG: 25 TABLET, FILM COATED ORAL at 03:39

## 2021-11-20 RX ADMIN — HYDROXYZINE HYDROCHLORIDE 50 MG: 25 TABLET, FILM COATED ORAL at 16:21

## 2021-11-20 RX ADMIN — HUMAN IMMUNOGLOBULIN G 25 G: 20 LIQUID INTRAVENOUS at 18:13

## 2021-11-20 RX ADMIN — ACETAMINOPHEN 975 MG: 325 TABLET, FILM COATED ORAL at 12:16

## 2021-11-20 RX ADMIN — Medication 2.5 MG: at 09:10

## 2021-11-20 RX ADMIN — PROCHLORPERAZINE EDISYLATE 10 MG: 5 INJECTION INTRAMUSCULAR; INTRAVENOUS at 16:05

## 2021-11-20 RX ADMIN — Medication 50 MG: at 17:32

## 2021-11-20 RX ADMIN — CYCLOBENZAPRINE 10 MG: 10 TABLET, FILM COATED ORAL at 09:24

## 2021-11-20 RX ADMIN — ONDANSETRON 4 MG: 4 TABLET, ORALLY DISINTEGRATING ORAL at 08:31

## 2021-11-20 RX ADMIN — HYDROXYZINE HYDROCHLORIDE 50 MG: 25 TABLET, FILM COATED ORAL at 09:32

## 2021-11-20 RX ADMIN — ACETAMINOPHEN 975 MG: 325 TABLET, FILM COATED ORAL at 20:12

## 2021-11-20 RX ADMIN — CYCLOBENZAPRINE 10 MG: 10 TABLET, FILM COATED ORAL at 20:12

## 2021-11-20 RX ADMIN — ONDANSETRON 4 MG: 4 TABLET, ORALLY DISINTEGRATING ORAL at 14:49

## 2021-11-20 RX ADMIN — ACETAMINOPHEN 975 MG: 325 TABLET, FILM COATED ORAL at 03:38

## 2021-11-20 RX ADMIN — OXYCODONE HYDROCHLORIDE 5 MG: 5 TABLET ORAL at 22:09

## 2021-11-20 RX ADMIN — HYDROXYZINE HYDROCHLORIDE 50 MG: 25 TABLET, FILM COATED ORAL at 22:09

## 2021-11-20 RX ADMIN — Medication 2.5 MG: at 16:21

## 2021-11-20 RX ADMIN — CYCLOBENZAPRINE 10 MG: 10 TABLET, FILM COATED ORAL at 13:36

## 2021-11-20 RX ADMIN — Medication 3 MG: at 23:25

## 2021-11-20 ASSESSMENT — ACTIVITIES OF DAILY LIVING (ADL)
ADLS_ACUITY_SCORE: 9
ADLS_ACUITY_SCORE: 8
ADLS_ACUITY_SCORE: 9
ADLS_ACUITY_SCORE: 8
ADLS_ACUITY_SCORE: 8
ADLS_ACUITY_SCORE: 9
ADLS_ACUITY_SCORE: 8
ADLS_ACUITY_SCORE: 9
ADLS_ACUITY_SCORE: 8
ADLS_ACUITY_SCORE: 9
ADLS_ACUITY_SCORE: 8
ADLS_ACUITY_SCORE: 9
ADLS_ACUITY_SCORE: 9
ADLS_ACUITY_SCORE: 8
ADLS_ACUITY_SCORE: 9
ADLS_ACUITY_SCORE: 8
ADLS_ACUITY_SCORE: 8
ADLS_ACUITY_SCORE: 9
ADLS_ACUITY_SCORE: 8
ADLS_ACUITY_SCORE: 8
ADLS_ACUITY_SCORE: 9

## 2021-11-20 NOTE — PLAN OF CARE
"Status: Admitted 11/17 with progressive lower limb numbness and weakness, also in hands. Hx complex regional pain syndrome  Vitals: VSS on RA  Neuros: A+O x4. 5/5 BUE, 4/5 BLE with moderate dorsi/plantar. N/T knees to toes, hands, and intermittently to face   IV: PIV SL  Resp/trach: WNL  Diet: Gluten free, pt does get nauseous (once after morning meds, next after IVIG completion). Zofran given with good results   Bowel status: No BM overnight   : Voiding   Skin: WNL  Pain: Pt has pins and needles feeling, most uncomfortable in legs, atarax helps a lot with this. Pt also has \"chest pain\" from previous mastectomy c/b complex regional pain syndrome, not cardiac related and not new  Activity: A1 GB and walker  Plan: IVIG 3/5 today, continue with POC  "

## 2021-11-20 NOTE — PLAN OF CARE
"  /76 (BP Location: Left arm)   Pulse 91   Temp 98.3  F (36.8  C) (Oral)   Resp 16   Ht 1.645 m (5' 4.75\")   Wt 75.1 kg (165 lb 9.6 oz)   SpO2 98%   BMI 27.77 kg/m      Time: 1400     Reason for admission: Generalized muscle weakness [M62.81]    Vitals: VSS on RA  Activity: up with A1, GB, walker. Within arms reach when up. Ambulated halls x2.  Pain: Moderate to severe pain - baseline CRPS chest pain s/p mastectomy; burning aching pain in hands and feet managed with atarax. Oxycodone 2.5 mg given this shift.  Neuro:  A&O x4, N/T in bilateral hands/feet and around mouth.  Generalized weakness. Mild ataxia and unsteady gait. Strengths 4/5 in BLE, 4/5 LUE, 5/5 RUE. Baseline L pupil > R.  Cardiac: WNL  Respiratory: diminished RLL  GI/: voiding without difficulty in toilet. Small BM reported this shift. Intermittent med-related nausea. Given 4 mg Zofran before AM meds and at 1445 after report of nausea.  Diet:  Gluten free. Reports loss of appetite, but adequate intake.  Lines: R PIV patent and SL  Skin: warm, dry, intact. Pale lower extremities.   Labs: WBC 3.5  Social:  Robert helpful and attentive at bedside    New changes this shift: Pain managed at goal of 5/10. Met goal of ambulating lowry.     Plan: Continue to monitor and follow POC. IVIG 3/5 this evening.      "

## 2021-11-20 NOTE — PROGRESS NOTES
"[facility] acute [facility]  General Neurology - Progress Note    Patient Name:  Peggy Jessica  Date of Service:  November 20, 2021    Interval/Subjective:    No acute overnight events.    Pt reporting decreased severity of paresthesias in hands and feet.   Denies any other changes.  No nausea or vomiting this AM.      Objective:    Vitals: /70   Pulse 92   Temp 98.3  F (36.8  C) (Oral)   Resp 16   Ht 1.645 m (5' 4.75\")   Wt 75.1 kg (165 lb 9.6 oz)   SpO2 97%   BMI 27.77 kg/m      General: Awake, alert, interactive; Sitting in bed, NAD, cooperative  HEENT: Normocephalic, atraumatic, nares patent, poor dentition  Resp: No increased work of breathing  Cardio: RRR  Abdomen: Soft, non-distended, non-tender  Extremities: Warm and well perfused, peripheral pulses present, no edema  Skin: Not jaundiced, no rash, rare scattered ecchymoses  Psych: Slightly anxious    Neuro:   Mental status: Awake, attentive, interactive; Recalls remote and recent history with accuracy; simple calculations intact  Speech/Language: Fluent speech without paraphasic errors; No dysarthria; naming, repetition, comprehension intact  Cranial nerves: Visual fields intact to confrontation, Eyes conjugate, Pupils 4mm and brisk, EOMI w/ normal and smooth pursuit, face expression symmetric, facial sensation intact and symmetric, hearing intact to conversation, shoulder shrug strong, palate rise symmetric, tongue/uvula midline.  Motor:   Bulk: Appropriate  Tone: Appropriate   Spontaneous movements: No myoclonus or asterixis  Power: Pronator drift absent. *Impersistence noted throughout formal motor exam    Right Left   Arm abduction 5/5 5/5   Elbow Flex 5/5 5/5   Elbow Extension 5/5 5/5   Wrist Extension 5/5 5/5     5/5 5/5   Hip Flexion 4+/5 4+/5   Knee Flexion 4+/5 4+/5   Knee Extension 5/5 5/5   Dorsiflexion  5/5 5/5   Plantarflexion 5/5 5/5   Reflexes: 2+ and symmetric biceps, triceps, brachioradialis; 1+ in " left patellar.  Absent in right patellar (ligament appears physically different than left), and achilles. No crossed adductors or spread. Toes down-going  Sensory: Light touch mildly decreased in the ankles; Pinprick patchy and inconsistent findings in the BLE's, possible R>L to mid shin and decreased on RUE compared to left; Proprioception intact in all limbs, including with eyes closed; Two-point discrimination intact in the BLE's   Coordination: FNF and heel to shin intact.  Mild impairment on toe and heel taps bilaterally.  Station/Gait: up with minimal assistance.  Able to walk without assistance.  Narrow base with slow, cautious steps.      Pertinent Investigations:    I have personally reviewed most recent and pertinent labs, tests, and radiological images.     MR Lumbar Spine - 11/18/21  PRELIMINARY READ  Impression:  1. No abnormal enhancement of the lumbar nerve roots.  2. Mild lumbar spondylosis.    Component      Latest Ref Rng & Units 11/17/2021   PAULINE interpretation      Negative Positive (A)   PAULINE pattern 1       Dense fine speckled   PAULINE titer 1       1:160     Results for NICA SEBASTIÁN GOMEZ (MRN [id number]) as of 11/19/2021 07:21   Ref. Range 11/18/2021 11:10   RBC CSF Latest Ref Range: 0 - 2 /uL 6 (H)   WBC CSF Latest Ref Range: 0 - 5 /uL 0   Appearance CSF Latest Ref Range: Clear  Clear   Color CSF Latest Ref Range: Colorless  Colorless   Tube Number Unknown 4   Glucose CSF Latest Ref Range: 40 - 70 mg/dL 73 (H)   Protein Total CSF Latest Ref Range: 15 - 60 mg/dL 105 (H)      Ref. Range 11/18/2021 16:23   Albumin Latest Ref Range: 3.4 - 5.0 g/dL 3.3 (L)   Protein Total Latest Ref Range: 6.8 - 8.8 g/dL 7.4   Bilirubin Total Latest Ref Range: 0.2 - 1.3 mg/dL 0.4   Alkaline Phosphatase Latest Ref Range: 40 - 150 U/L 80   ALT Latest Ref Range: 0 - 50 U/L 23   AST Latest Ref Range: 0 - 45 U/L 11   Bilirubin Direct Latest Ref Range: 0.0 - 0.2 mg/dL 0.1   Ferritin Latest Ref Range: 8 - 252 ng/mL 211    Iron Latest Ref Range: 35 - 180 ug/dL 76   Iron Binding Cap Latest Ref Range: 240 - 430 ug/dL 361   Iron Saturation Index Latest Ref Range: 15 - 46 % 21   Transferrin Latest Ref Range: 210 - 360 mg/dL 270     Component      Latest Ref Rng & Units 11/17/2021   Albumin Fraction      3.7 - 5.1 g/dL 4.2   Alpha 1 Fraction      0.2 - 0.4 g/dL 0.3   Alpha 2 Fraction      0.5 - 0.9 g/dL 0.9   Beta Fraction      0.6 - 1.0 g/dL 1.0   Gamma Fraction      0.7 - 1.6 g/dL 1.2   Monoclonal Peak      <=0.0 g/dL 0.0   ELP Interpretation:       Essentially normal electrophoretic pattern. No obvious monoclonal proteins seen. Pathologic significance requires clinical correlation.  Tahir Pool M.D., Ph.D., Pathologist.       Lab Results   Component Value Date    WBC 3.5 (L) 11/20/2021    HGB 14.3 11/20/2021    HCT 42.2 11/20/2021     11/20/2021     11/20/2021    POTASSIUM 4.1 11/20/2021    CHLORIDE 108 11/20/2021    CO2 28 11/17/2021    BUN 9 11/17/2021    CR 0.79 11/17/2021     (H) 11/17/2021    TROPONIN <0.015 11/17/2021    AST 11 11/18/2021    ALT 23 11/18/2021    ALKPHOS 80 11/18/2021    BILITOTAL 0.4 11/18/2021       Assessment/Plan:  Peggy Jessica is a 46 year old woman with PMH of CRPS (right leg from stress fracture and chest from bilateral mastectomy - BRCA +) with several systemic changes to sensation and subjective autonomic dysfunction.  She presented to the ED due to 2 to 3 weeks of progressive sensory changes mostly in the BLEs and slight involvement of the hands that have resulted in some clumsiness.  She reports some falls as well.       She is having acute on chronic sensory changes that have caused imbalance and clumsiness with her feet and her hands subjectively.  She has got a previous significant neuropathy work-up both of lab studies and of autonomic testing.  These have mostly been negative to date outside of possible mild autonomic dysfunction on testing, although this is not  entirely specific to an organic etiology.  She received an EMG in 2008 that was reportedly unremarkable (raw data currently unavailable), however she did have an EMG yesterday (11/18) that was again unremarkable.  Objective examination is significant for motor impersistence, patchy and vague pinprick changes to BLEs, mild discrepancy of light touch in the right hemibody (light touch discrepancy was in [place] [facility] [place] and her Romberg test was essentially normal (mild swaying in all directions but without fall and continuously corrected).  There are some functional elements to this exam, however there there are some objective findings including: possible mild enhancement of the cauda equina and paraspinal musculature on MRI, as well elevated protein in the CSF.  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  Other etiologies, such as a paraneoplastic etiology still need to be ruled out.   Those studies are pending.       #1 Complex regional pain syndrome  #2 Sensory changes causing imbalance and clumsiness, acute on chronic    - MRI L-spine w/wo contrast    - possible cauda equina enhancement   - possible enhancement of inferior posterior paraspinal musculature.   - mild lumber spondylosis   - no apparent enhancement of dorsal root ganglion  - Serum studies   - Positive PAULINE (1:160)   - SSA/SSB - negative   Studies still pending:            - B6, Paraneoplastic panel ([facility] panel), GQ1b, GD1b, Ganglioside Ab  - EMG completed   - unremarkable   - Lumbar Puncture complete   - Elevate Protein in CSF (105)   Studies still pending   - Oligoclonal Banding  - IVIG treatment   - Today (11/20) is Day 3 of 5   - Pre and post diphenhydramine treatment ordered   - May need longer course of outpatient treatment depending on response  - CT C/A/P for evaluation of malignancy  - PT/OT  - PTA Oxycodone 5 mg BID PRN, Flexeril 10 mg TID, and medical  cannabis (self administered) for pain     #Nausea  - Zofran PRN    #Elevevated Hemoglobin/Hematocrit (resolved to high range)  Possible hemoconcentration.  Less concern for hemochromatosis  - Iron studies WNL      FEN: Regular Diet  Malnutrition: Patient does not meet two of the above criteria necessary for diagnosing malnutrition  PPx: SCDs  Code: FULL    Patient was seen and discussed with Dr. Weathers.    Bogdan Sheets MD  Neurology Resident, PGY-2    Neurology Staff Addendum  I have seen and evaluated the patient today and discussed with the resident team and agree with the documentation.  Patient indicated she was doing slightly better this AM.  We got her up to walk. She did well, did not demonstrate gait suggestive of a sensory ataxia.  Encouraged continue ambulation under supervision.  Continue PT and continue IVIG.         RALPH WEATHERS MD

## 2021-11-21 LAB
ALB CSF/SERPL: 14.8 RATIO
ALBUMIN CSF-MCNC: 68 MG/DL
ALBUMIN SERPL-MCNC: 4602 MG/DL
ANION GAP SERPL CALCULATED.3IONS-SCNC: 5 MMOL/L (ref 3–14)
BUN SERPL-MCNC: 9 MG/DL (ref 7–30)
CALCIUM SERPL-MCNC: 9.6 MG/DL (ref 8.5–10.1)
CHLORIDE BLD-SCNC: 104 MMOL/L (ref 94–109)
CO2 SERPL-SCNC: 27 MMOL/L (ref 20–32)
CREAT SERPL-MCNC: 0.78 MG/DL (ref 0.52–1.04)
GFR SERPL CREATININE-BSD FRML MDRD: >90 ML/MIN/1.73M2
GLUCOSE BLD-MCNC: 100 MG/DL (ref 70–99)
HOLD SPECIMEN: NORMAL
IGG CSF-MCNC: 11 MG/DL
IGG SERPL-MCNC: 1531 MG/DL
IGG SYNTH RATE SER+CSF CALC-MRATE: <0 MG/D
IGG/ALB CLEAR SER+CSF-RTO: 0.49 RATIO
IGG/ALB CSF: 0.16 RATIO
MAGNESIUM SERPL-MCNC: 2.2 MG/DL (ref 1.6–2.3)
OLIGOCLONAL BANDS CSF ELPH-IMP: ABNORMAL
OLIGOCLONAL BANDS CSF ELPH-IMP: NEGATIVE
OLIGOCLONAL BANDS CSF IEF: 0 BANDS
POTASSIUM BLD-SCNC: 4 MMOL/L (ref 3.4–5.3)
SODIUM SERPL-SCNC: 136 MMOL/L (ref 133–144)

## 2021-11-21 PROCEDURE — 999N000127 HC STATISTIC PERIPHERAL IV START W US GUIDANCE

## 2021-11-21 PROCEDURE — 36415 COLL VENOUS BLD VENIPUNCTURE: CPT | Performed by: STUDENT IN AN ORGANIZED HEALTH CARE EDUCATION/TRAINING PROGRAM

## 2021-11-21 PROCEDURE — 250N000011 HC RX IP 250 OP 636: Performed by: STUDENT IN AN ORGANIZED HEALTH CARE EDUCATION/TRAINING PROGRAM

## 2021-11-21 PROCEDURE — 250N000013 HC RX MED GY IP 250 OP 250 PS 637: Performed by: STUDENT IN AN ORGANIZED HEALTH CARE EDUCATION/TRAINING PROGRAM

## 2021-11-21 PROCEDURE — 84207 ASSAY OF VITAMIN B-6: CPT | Performed by: STUDENT IN AN ORGANIZED HEALTH CARE EDUCATION/TRAINING PROGRAM

## 2021-11-21 PROCEDURE — 250N000013 HC RX MED GY IP 250 OP 250 PS 637: Performed by: PSYCHIATRY & NEUROLOGY

## 2021-11-21 PROCEDURE — 80048 BASIC METABOLIC PNL TOTAL CA: CPT | Performed by: STUDENT IN AN ORGANIZED HEALTH CARE EDUCATION/TRAINING PROGRAM

## 2021-11-21 PROCEDURE — 83735 ASSAY OF MAGNESIUM: CPT | Performed by: PSYCHIATRY & NEUROLOGY

## 2021-11-21 PROCEDURE — 120N000002 HC R&B MED SURG/OB UMMC

## 2021-11-21 PROCEDURE — 99232 SBSQ HOSP IP/OBS MODERATE 35: CPT | Mod: GC | Performed by: PSYCHIATRY & NEUROLOGY

## 2021-11-21 RX ADMIN — ACETAMINOPHEN 975 MG: 325 TABLET, FILM COATED ORAL at 03:06

## 2021-11-21 RX ADMIN — CYCLOBENZAPRINE 10 MG: 10 TABLET, FILM COATED ORAL at 13:36

## 2021-11-21 RX ADMIN — PROCHLORPERAZINE EDISYLATE 10 MG: 5 INJECTION INTRAMUSCULAR; INTRAVENOUS at 20:06

## 2021-11-21 RX ADMIN — SENNOSIDES 8.6 MG: 8.6 TABLET ORAL at 09:09

## 2021-11-21 RX ADMIN — HYDROXYZINE HYDROCHLORIDE 50 MG: 25 TABLET, FILM COATED ORAL at 17:04

## 2021-11-21 RX ADMIN — ACETAMINOPHEN 975 MG: 325 TABLET, FILM COATED ORAL at 20:02

## 2021-11-21 RX ADMIN — OXYCODONE HYDROCHLORIDE 5 MG: 5 TABLET ORAL at 21:56

## 2021-11-21 RX ADMIN — Medication 1 TABLET: at 09:09

## 2021-11-21 RX ADMIN — Medication 3 MG: at 21:56

## 2021-11-21 RX ADMIN — Medication 2.5 MG: at 08:41

## 2021-11-21 RX ADMIN — ONDANSETRON 4 MG: 4 TABLET, ORALLY DISINTEGRATING ORAL at 13:52

## 2021-11-21 RX ADMIN — Medication 2.5 MG: at 17:04

## 2021-11-21 RX ADMIN — Medication 50 MG: at 17:29

## 2021-11-21 RX ADMIN — ONDANSETRON 4 MG: 4 TABLET, ORALLY DISINTEGRATING ORAL at 07:45

## 2021-11-21 RX ADMIN — ACETAMINOPHEN 975 MG: 325 TABLET, FILM COATED ORAL at 12:26

## 2021-11-21 RX ADMIN — HUMAN IMMUNOGLOBULIN G 25 G: 20 LIQUID INTRAVENOUS at 18:06

## 2021-11-21 RX ADMIN — CYCLOBENZAPRINE 10 MG: 10 TABLET, FILM COATED ORAL at 20:03

## 2021-11-21 RX ADMIN — CYCLOBENZAPRINE 10 MG: 10 TABLET, FILM COATED ORAL at 09:09

## 2021-11-21 RX ADMIN — HYDROXYZINE HYDROCHLORIDE 50 MG: 25 TABLET, FILM COATED ORAL at 21:56

## 2021-11-21 RX ADMIN — HYDROXYZINE HYDROCHLORIDE 50 MG: 25 TABLET, FILM COATED ORAL at 03:59

## 2021-11-21 RX ADMIN — HYDROXYZINE HYDROCHLORIDE 50 MG: 25 TABLET, FILM COATED ORAL at 10:31

## 2021-11-21 ASSESSMENT — ACTIVITIES OF DAILY LIVING (ADL)
ADLS_ACUITY_SCORE: 10
ADLS_ACUITY_SCORE: 8
ADLS_ACUITY_SCORE: 10
ADLS_ACUITY_SCORE: 10
ADLS_ACUITY_SCORE: 8
ADLS_ACUITY_SCORE: 8
ADLS_ACUITY_SCORE: 10
ADLS_ACUITY_SCORE: 8
ADLS_ACUITY_SCORE: 10
ADLS_ACUITY_SCORE: 10
ADLS_ACUITY_SCORE: 8
ADLS_ACUITY_SCORE: 8
ADLS_ACUITY_SCORE: 9
ADLS_ACUITY_SCORE: 10
ADLS_ACUITY_SCORE: 10
ADLS_ACUITY_SCORE: 8
ADLS_ACUITY_SCORE: 8
ADLS_ACUITY_SCORE: 10
ADLS_ACUITY_SCORE: 8
ADLS_ACUITY_SCORE: 10
ADLS_ACUITY_SCORE: 8

## 2021-11-21 NOTE — PLAN OF CARE
Status: Admitted 11/17 with progressive lower limb numbness and weakness, also in hands. Hx complex regional pain syndrome  Vitals: VSS on RA  Neuros: A+O x4. 5/5 BUE, 4/5 BLE with moderate dorsi/plantar. N/T knees to toes, hands, and intermittently to face. Pupils equal this shift   IV: PIV SL  Resp/trach: WNL  Diet: Gluten free, intermittent nausea   Bowel status: No BM overnight   : Voiding   Skin: WNL  Pain: Pt has pins and needles feeling, most uncomfortable in legs, atarax helps a lot with this. Pt also has chest pain from previous mastectomy c/b complex regional pain syndrome, not cardiac related and not new  Activity: A1 GB and walker  Plan: IVIG 4/5 today, continue with POC

## 2021-11-21 NOTE — PLAN OF CARE
Status: Admitted 11/17 with progressive lower limb numbness and weakness, also in hands. Hx complex regional pain syndrome  Vitals: VSS on RA  Neuros: AOx4. 4/5 throughout . N/T knees to toes, hands, and intermittently to face   IV: PIV removed d/t infiltration mild edema and blanchable. Ok'd by provider for no PIV until 0600 11/21.  Labs/Electrolytes: WNL, Mg and K AM redraw  Resp/trach: WNL  Diet: Regular, gluten free. Nausea uncontrolled with Zofran, improved with IV compazine.   Bowel status: Small BM today.   : Voiding   Skin: WNL  Pain: Chest pain non-cardiac r/t regional pain syndrome, some burning in soles of feet, continued pins and needles in hands, feet, and face. Improved chest pain with medical cannabis. Gave prn oxy and atarax x2.    Activity: A1 GB and walker  Plan: 2 more doses of IVIG, continue with POC

## 2021-11-21 NOTE — PROGRESS NOTES
"[facility]  General Neurology - Progress Note    Patient Name:  Peggy Jessica  Date of Service:  November 20, 2021    Interval/Subjective:    No acute overnight events.  Reports slight improvement of neuropathic pain symptoms in the BLEs with mild improvement of gait and balance changes since admission. Denies N/V      Objective:    Vitals: /80   Pulse 95   Temp 98.2  F (36.8  C) (Oral)   Resp 16   Ht 1.645 m (5' 4.75\")   Wt 75.1 kg (165 lb 9.6 oz)   SpO2 98%   BMI 27.77 kg/m      General: Awake, alert, interactive; Sitting in bed, NAD, cooperative  HEENT: Normocephalic, atraumatic, nares patent, poor dentition  Resp: No increased work of breathing  Cardio: RRR  Abdomen: Soft, non-distended, non-tender  Extremities: Warm and well perfused, peripheral pulses present, no edema  Skin: Not jaundiced, no rash, rare scattered ecchymoses  Psych: Slightly anxious    Neuro:   Mental status: Awake, attentive, interactive; Recalls remote and recent history with accuracy; simple calculations intact  Speech/Language: Fluent speech without paraphasic errors; No dysarthria; naming, repetition, comprehension intact  Cranial nerves: Visual fields intact to confrontation, Eyes conjugate, Pupils 4mm and brisk, EOMI w/ normal and smooth pursuit, face expression symmetric, facial sensation intact and symmetric, hearing intact to conversation, shoulder shrug strong, palate rise symmetric, tongue/uvula midline.  Motor:   Bulk: Appropriate  Tone: Appropriate   Spontaneous movements: No myoclonus or asterixis  Power: Pronator drift absent. *Impersistence noted throughout formal motor exam    Right Left   Arm abduction 5/5 5/5   Elbow Flex 5/5 5/5   Elbow Extension 5/5 5/5   Wrist Extension 5/5 5/5     5/5 5/5   Hip Flexion 4+/5 4+/5   Knee Flexion 4+/5 4+/5   Knee Extension 5/5 5/5   Dorsiflexion  5/5 5/5   Plantarflexion 5/5 5/5   Reflexes: 2+ and symmetric biceps, triceps, " brachioradialis; 1+ in left patellar.  Absent in right patellar (ligament appears physically different than left), and achilles. No crossed adductors or spread. Toes down-going  Sensory: Light touch mildly decreased in the ankles; Pinprick patchy and inconsistent findings in the BLE's, possible R>L to mid shin and decreased on RUE compared to left; Proprioception intact in all limbs, including with eyes closed; Two-point discrimination intact in the BLE's   Coordination: FNF and heel to shin intact.  Mild impairment on toe and heel taps bilaterally.  Station/Gait: up with minimal assistance.  Able to walk without assistance although attempts to grasp things around her.  Narrow base with slow, cautious steps.      Pertinent Investigations:    I have personally reviewed most recent and pertinent labs, tests, and radiological images.     MR Lumbar Spine - 11/18/21  Impression:  1. Diffuse mild enhancement of the cauda equina nerve roots without  nodularity or clumping is nonspecific.  2. Probable mild enhancement of the posterior paraspinous musculature  from L4 through S1. However, artifact could have similar appearance.  3. Mild multilevel lumbar spondylosis without evidence for high-grade  spinal canal or neural foraminal stenosis.    Component      Latest Ref Rng & Units 11/17/2021   PAULINE interpretation      Negative Positive (A)   PAULINE pattern 1       Dense fine speckled   PAULINE titer 1       1:160     Results for NICA SEBASTIÁN GOMEZ (MRN [id number]) as of 11/19/2021 07:21   Ref. Range 11/18/2021 11:10   RBC CSF Latest Ref Range: 0 - 2 /uL 6 (H)   WBC CSF Latest Ref Range: 0 - 5 /uL 0   Appearance CSF Latest Ref Range: Clear  Clear   Color CSF Latest Ref Range: Colorless  Colorless   Tube Number Unknown 4   Glucose CSF Latest Ref Range: 40 - 70 mg/dL 73 (H)   Protein Total CSF Latest Ref Range: 15 - 60 mg/dL 105 (H)      Ref. Range 11/18/2021 16:23   Albumin Latest Ref Range: 3.4 - 5.0 g/dL 3.3 (L)   Protein Total  Latest Ref Range: 6.8 - 8.8 g/dL 7.4   Bilirubin Total Latest Ref Range: 0.2 - 1.3 mg/dL 0.4   Alkaline Phosphatase Latest Ref Range: 40 - 150 U/L 80   ALT Latest Ref Range: 0 - 50 U/L 23   AST Latest Ref Range: 0 - 45 U/L 11   Bilirubin Direct Latest Ref Range: 0.0 - 0.2 mg/dL 0.1   Ferritin Latest Ref Range: 8 - 252 ng/mL 211   Iron Latest Ref Range: 35 - 180 ug/dL 76   Iron Binding Cap Latest Ref Range: 240 - 430 ug/dL 361   Iron Saturation Index Latest Ref Range: 15 - 46 % 21   Transferrin Latest Ref Range: 210 - 360 mg/dL 270     Component      Latest Ref Rng & Units 11/17/2021   Albumin Fraction      3.7 - 5.1 g/dL 4.2   Alpha 1 Fraction      0.2 - 0.4 g/dL 0.3   Alpha 2 Fraction      0.5 - 0.9 g/dL 0.9   Beta Fraction      0.6 - 1.0 g/dL 1.0   Gamma Fraction      0.7 - 1.6 g/dL 1.2   Monoclonal Peak      <=0.0 g/dL 0.0   ELP Interpretation:       Essentially normal electrophoretic pattern. No obvious monoclonal proteins seen. Pathologic significance requires clinical correlation.  Tahir Pool M.D., Ph.D., Pathologist.       Lab Results   Component Value Date    WBC 3.5 (L) 11/20/2021    HGB 14.3 11/20/2021    HCT 42.2 11/20/2021     11/20/2021     11/20/2021    POTASSIUM 4.1 11/20/2021    CHLORIDE 108 11/20/2021    CO2 24 11/20/2021    BUN 9 11/20/2021    CR 0.65 11/20/2021    GLC 97 11/20/2021    TROPONIN <0.015 11/17/2021    AST 11 11/18/2021    ALT 23 11/18/2021    ALKPHOS 80 11/18/2021    BILITOTAL 0.4 11/18/2021       Assessment/Plan:  Peggy Jessica is a 46 year old woman with PMH of CRPS (right leg from stress fracture and chest from bilateral mastectomy - BRCA +) with several systemic changes to sensation and subjective autonomic dysfunction.  She presented to the ED due to 2 to 3 weeks of progressive sensory changes mostly in the BLEs and slight involvement of the hands that have resulted in some clumsiness.  She reports some falls as well.       She is having acute on  chronic sensory changes that have caused imbalance and clumsiness with her feet and her hands subjectively.  She has got a previous significant neuropathy work-up both of lab studies and of autonomic testing.  These have mostly been negative to date outside of possible mild autonomic dysfunction on testing, although this is not entirely specific to an organic etiology.  She received an EMG in 2008 that was reportedly unremarkable (raw data currently unavailable), however she did have an EMG yesterday (11/18) that was again unremarkable.  Objective examination is significant for motor impersistence, patchy and vague pinprick changes to BLEs, mild discrepancy of light touch in the right hemibody (light touch discrepancy was in left hemibody during examination at [facility]), and her Romberg test was essentially normal (mild swaying in all directions but without fall and continuously corrected).  There are some functional elements to this exam, however there there are some objective findings including: possible mild enhancement of the cauda equina and paraspinal musculature on MRI, as well elevated protein in the CSF.  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  Other etiologies, such as a paraneoplastic etiology still need to be ruled out.   Those studies are pending.       #1 Complex regional pain syndrome  #2 Sensory changes causing imbalance and clumsiness, acute on chronic    - MRI L-spine w/wo contrast    - probable cauda equina enhancement, mild but diffuse   - possible enhancement of inferior posterior paraspinal musculature.   - mild lumber spondylosis   - no apparent enhancement of dorsal root ganglia  - Serum studies   - Positive PAULINE (1:160)   - SSA/SSB - negative   Studies still pending:            - B6, Paraneoplastic panel ([facility] panel), GQ1b, GD1b, Ganglioside Ab  - EMG completed   - unremarkable  - Lumbar Puncture complete   - Elevate Protein in CSF  (105), 0 WBC   Studies still pending   - Oligoclonal Banding  - IVIG treatment   - Today (11/21) is Day 4 of 5   - Pre and post diphenhydramine treatment ordered   - May need longer course of outpatient treatment depending on response  - CT C/A/P for evaluation of malignancy in setting of possible paraneoplastic process   - Negative for malignancy  - PT/OT  - PTA Oxycodone 5 mg BID PRN, Flexeril 10 mg TID, and medical cannabis (self administered) for pain     #Nausea  - Zofran PRN    #Elevevated Hemoglobin/Hematocrit (resolved to high range)  Possible hemoconcentration.  Less concern for hemochromatosis  - Iron studies WNL      FEN: Regular Diet  Malnutrition: Patient does not meet two of the above criteria necessary for diagnosing malnutrition  PPx: SCDs  Code: FULL    Patient was seen and discussed with Dr. Weathers.      Zack Bro MD  PGY-3 Neurology Resident  Securely message with the Vocera Web Console (learn more here)  11/21/2021      Neurology Staff Addendum  I have seen and evaluated the patient today and discussed with the resident team and agree with the documentation.  On my examination today the patient was substantially better.  She stood up relatively quickly was able to ambulate to the door and into the hallway with intermittent support.    She was able to take her eyes off her feet for a short time as well.  She recognizes the improvement. Plan will be for Day 4 of IVIG today.  Will likely transfer to rehab facility after Day 5.      RALPH WEATHERS MD

## 2021-11-21 NOTE — PLAN OF CARE
"  /86 (BP Location: Right arm)   Pulse 80   Temp 98.4  F (36.9  C) (Oral)   Resp 17   Ht 1.645 m (5' 4.75\")   Wt 75.1 kg (165 lb 9.6 oz)   SpO2 97%   BMI 27.77 kg/m      Time: 1330     Reason for admission: Generalized muscle weakness [M62.81]     Vitals: VSS on RA  Activity: up with A1, GB, walker. Within arms reach when up.  Pain: PMH CRPS. Moderate pain this shift - burning, aching pain in hands and feet, pt reports that Atarax helps with this. Baseline CRPS chest pain. Oxycodone 2.5 mg given at 0840.  Neuro:  A&O x4, N/T in bilateral hands/feet.  Generalized weakness. Mild ataxia and unsteady gait. Strengths 4/5 in BLE, 5/5 BUE. Reports difficulty wiggling toes, but improvements in lower extremity strength.  Cardiac: WNL  Respiratory: WNL  GI/: voiding without difficulty in toilet. LBM 11/20/21. Intermittent med-related nausea. Given 4 mg Zofran before AM meds. Denies N/V this shift.  Diet:  Gluten free. Reports loss of appetite, but adequate intake.  Lines: R PIV patent and SL.  Skin: warm, dry, intact. Pale lower extremities.  Social:  Robert helpful and attentive at bedside     Plan: Continue to monitor and follow POC. IVIG 4/5 this evening.  "

## 2021-11-22 ENCOUNTER — TELEPHONE (OUTPATIENT)
Dept: NEUROLOGY | Facility: CLINIC | Age: 46
End: 2021-11-22

## 2021-11-22 ENCOUNTER — APPOINTMENT (OUTPATIENT)
Dept: PHYSICAL THERAPY | Facility: CLINIC | Age: 46
DRG: 074 | End: 2021-11-22
Payer: COMMERCIAL

## 2021-11-22 ENCOUNTER — APPOINTMENT (OUTPATIENT)
Dept: OCCUPATIONAL THERAPY | Facility: CLINIC | Age: 46
DRG: 074 | End: 2021-11-22
Payer: COMMERCIAL

## 2021-11-22 LAB
ANION GAP SERPL CALCULATED.3IONS-SCNC: 5 MMOL/L (ref 3–14)
BUN SERPL-MCNC: 9 MG/DL (ref 7–30)
CALCIUM SERPL-MCNC: 9.5 MG/DL (ref 8.5–10.1)
CHLORIDE BLD-SCNC: 105 MMOL/L (ref 94–109)
CO2 SERPL-SCNC: 26 MMOL/L (ref 20–32)
CREAT SERPL-MCNC: 0.67 MG/DL (ref 0.52–1.04)
ERYTHROCYTE [DISTWIDTH] IN BLOOD BY AUTOMATED COUNT: 12.2 % (ref 10–15)
GD1B GANGL IGG SER IA-ACNC: 4 IV
GD1B GANGL IGM SER IA-ACNC: 3 IV
GFR SERPL CREATININE-BSD FRML MDRD: >90 ML/MIN/1.73M2
GLUCOSE BLD-MCNC: 98 MG/DL (ref 70–99)
GM1 GANGL IGG SER IA-ACNC: 4 IV
GM1 GANGL IGM SER IA-ACNC: 4 IV
GQ1B GANGL IGG SER IA-ACNC: 3 IV
GQ1B GANGL IGM SER IA-ACNC: 3 IV
HCT VFR BLD AUTO: 40.4 % (ref 35–47)
HGB BLD-MCNC: 13.9 G/DL (ref 11.7–15.7)
MCH RBC QN AUTO: 31.7 PG (ref 26.5–33)
MCHC RBC AUTO-ENTMCNC: 34.4 G/DL (ref 31.5–36.5)
MCV RBC AUTO: 92 FL (ref 78–100)
PLATELET # BLD AUTO: 215 10E3/UL (ref 150–450)
POTASSIUM BLD-SCNC: 4 MMOL/L (ref 3.4–5.3)
RBC # BLD AUTO: 4.39 10E6/UL (ref 3.8–5.2)
SODIUM SERPL-SCNC: 136 MMOL/L (ref 133–144)
WBC # BLD AUTO: 4.2 10E3/UL (ref 4–11)

## 2021-11-22 PROCEDURE — 97530 THERAPEUTIC ACTIVITIES: CPT | Mod: GO | Performed by: OCCUPATIONAL THERAPIST

## 2021-11-22 PROCEDURE — 85014 HEMATOCRIT: CPT | Performed by: STUDENT IN AN ORGANIZED HEALTH CARE EDUCATION/TRAINING PROGRAM

## 2021-11-22 PROCEDURE — 250N000013 HC RX MED GY IP 250 OP 250 PS 637: Performed by: PSYCHIATRY & NEUROLOGY

## 2021-11-22 PROCEDURE — 999N000128 HC STATISTIC PERIPHERAL IV START W/O US GUIDANCE

## 2021-11-22 PROCEDURE — 250N000011 HC RX IP 250 OP 636: Performed by: STUDENT IN AN ORGANIZED HEALTH CARE EDUCATION/TRAINING PROGRAM

## 2021-11-22 PROCEDURE — 97116 GAIT TRAINING THERAPY: CPT | Mod: GP

## 2021-11-22 PROCEDURE — 82310 ASSAY OF CALCIUM: CPT | Performed by: STUDENT IN AN ORGANIZED HEALTH CARE EDUCATION/TRAINING PROGRAM

## 2021-11-22 PROCEDURE — 250N000013 HC RX MED GY IP 250 OP 250 PS 637: Performed by: STUDENT IN AN ORGANIZED HEALTH CARE EDUCATION/TRAINING PROGRAM

## 2021-11-22 PROCEDURE — 120N000002 HC R&B MED SURG/OB UMMC

## 2021-11-22 PROCEDURE — 36415 COLL VENOUS BLD VENIPUNCTURE: CPT | Performed by: STUDENT IN AN ORGANIZED HEALTH CARE EDUCATION/TRAINING PROGRAM

## 2021-11-22 PROCEDURE — 97530 THERAPEUTIC ACTIVITIES: CPT | Mod: GP

## 2021-11-22 PROCEDURE — 99231 SBSQ HOSP IP/OBS SF/LOW 25: CPT | Mod: GC | Performed by: PSYCHIATRY & NEUROLOGY

## 2021-11-22 RX ORDER — CARBOXYMETHYLCELLULOSE SODIUM 5 MG/ML
1 SOLUTION/ DROPS OPHTHALMIC
Status: DISCONTINUED | OUTPATIENT
Start: 2021-11-22 | End: 2021-11-26 | Stop reason: HOSPADM

## 2021-11-22 RX ADMIN — HYDROXYZINE HYDROCHLORIDE 50 MG: 25 TABLET, FILM COATED ORAL at 04:15

## 2021-11-22 RX ADMIN — Medication 50 MG: at 17:28

## 2021-11-22 RX ADMIN — Medication 3 MG: at 22:07

## 2021-11-22 RX ADMIN — HUMAN IMMUNOGLOBULIN G 25 G: 20 LIQUID INTRAVENOUS at 18:04

## 2021-11-22 RX ADMIN — ACETAMINOPHEN 975 MG: 325 TABLET, FILM COATED ORAL at 13:36

## 2021-11-22 RX ADMIN — ACETAMINOPHEN 975 MG: 325 TABLET, FILM COATED ORAL at 19:45

## 2021-11-22 RX ADMIN — HYDROXYZINE HYDROCHLORIDE 50 MG: 25 TABLET, FILM COATED ORAL at 19:49

## 2021-11-22 RX ADMIN — Medication 2.5 MG: at 08:30

## 2021-11-22 RX ADMIN — OXYCODONE HYDROCHLORIDE 5 MG: 5 TABLET ORAL at 22:07

## 2021-11-22 RX ADMIN — CYCLOBENZAPRINE 10 MG: 10 TABLET, FILM COATED ORAL at 19:45

## 2021-11-22 RX ADMIN — HYDROXYZINE HYDROCHLORIDE 50 MG: 25 TABLET, FILM COATED ORAL at 13:36

## 2021-11-22 RX ADMIN — CYCLOBENZAPRINE 10 MG: 10 TABLET, FILM COATED ORAL at 08:22

## 2021-11-22 RX ADMIN — CYCLOBENZAPRINE 10 MG: 10 TABLET, FILM COATED ORAL at 13:36

## 2021-11-22 RX ADMIN — ACETAMINOPHEN 975 MG: 325 TABLET, FILM COATED ORAL at 04:15

## 2021-11-22 RX ADMIN — Medication 2.5 MG: at 15:31

## 2021-11-22 RX ADMIN — Medication 1 TABLET: at 08:22

## 2021-11-22 ASSESSMENT — ACTIVITIES OF DAILY LIVING (ADL)
ADLS_ACUITY_SCORE: 8
ADLS_ACUITY_SCORE: 10
ADLS_ACUITY_SCORE: 8
ADLS_ACUITY_SCORE: 10
ADLS_ACUITY_SCORE: 8
ADLS_ACUITY_SCORE: 8
ADLS_ACUITY_SCORE: 10
ADLS_ACUITY_SCORE: 10
ADLS_ACUITY_SCORE: 8
ADLS_ACUITY_SCORE: 8
ADLS_ACUITY_SCORE: 10
ADLS_ACUITY_SCORE: 10
ADLS_ACUITY_SCORE: 8
ADLS_ACUITY_SCORE: 10
ADLS_ACUITY_SCORE: 8
ADLS_ACUITY_SCORE: 10

## 2021-11-22 NOTE — PLAN OF CARE
Status: Admitted 11/17 with progressive lower limb numbness and weakness, also in hands. Hx complex regional pain syndrome  Vitals: VSS on RA  Neuros: A+O x4. 5/5 BUE, 4/5 BLE with moderate dorsi/plantar. N/T knees to toes, hands, and intermittently to face, improving   IV: PIV SL  Resp/trach: WNL  Diet: Gluten free, intermittent nausea   Bowel status: No BM overnight   : Voiding   Skin: WNL  Pain: Pt has pins and needles feeling, most uncomfortable in legs, atarax helps a lot with this. Pt also has chest pain from previous mastectomy c/b complex regional pain syndrome, not cardiac related and not new  Activity: A1 GB and walker  Plan: IVIG 5/5 today, TCU at discharge, continue with POC

## 2021-11-22 NOTE — PROGRESS NOTES
"[facility]  General Neurology - Progress Note    Patient Name:  Peggy Jessica  Date of Service:  November 22, 2021    Interval/Subjective:    No acute overnight events.     Continues to have some mild nausea that improves with PRNs.  Pt reports continued subjective improvement and states that she was able to knit some yesterday evening for a short time, which she was unable to do for some weeks.  She feels her ambulation is slowly improving but does not feel safe to go home.      Objective:    Vitals: /70 (BP Location: Left arm)   Pulse 94   Temp 97.7  F (36.5  C) (Oral)   Resp 16   Ht 1.645 m (5' 4.75\")   Wt 75.1 kg (165 lb 9.6 oz)   SpO2 96%   BMI 27.77 kg/m      General: Awake, alert, interactive; Sitting in bed, NAD, cooperative  HEENT: Normocephalic, atraumatic, nares patent, poor dentition  Resp: No increased work of breathing  Cardio: RRR  Abdomen: Soft, non-distended, non-tender  Extremities: Warm and well perfused, peripheral pulses present, no edema  Skin: Not jaundiced, no rash, rare scattered ecchymoses  Psych: Slightly anxious    Neuro:   Mental status: Awake, attentive, interactive; Recalls remote and recent history with accuracy; simple calculations intact  Speech/Language: Fluent speech without paraphasic errors; No dysarthria; naming, repetition, comprehension intact  Cranial nerves: Visual fields intact to confrontation, Eyes conjugate, Pupils 4mm and brisk, EOMI w/ normal and smooth pursuit, face expression symmetric, facial sensation intact and symmetric, hearing intact to conversation, shoulder shrug strong, palate rise symmetric, tongue/uvula midline.  Motor:   Bulk: Appropriate  Tone: Appropriate   Spontaneous movements: No myoclonus or asterixis  Power: Pronator drift absent. *Impersistence noted throughout formal motor exam    Right Left   Arm abduction 5/5 5/5   Elbow Flex 5/5 5/5   Elbow Extension 5/5 5/5   Wrist Extension 5/5 5/5 "     5/5 5/5   Hip Flexion 4+/5 4+/5   Knee Flexion 4+/5 4+/5   Knee Extension 5/5 5/5   Dorsiflexion  5/5 5/5   Plantarflexion 5/5 5/5   Reflexes: 2+ and symmetric biceps, triceps, brachioradialis; 1+ in left patellar.  Absent in right patellar (ligament appears physically different than left), and achilles. No crossed adductors or spread. Toes down-going  Sensory: Light touch mildly decreased in the ankles; Pinprick patchy and inconsistent findings in the BLE's, possible R>L to mid shin and decreased on RUE compared to left; Proprioception intact in all limbs, including with eyes closed; Two-point discrimination intact in the BLE's   Coordination: FNF and heel to shin intact.  Mild impairment on toe and heel taps bilaterally.  Station/Gait: Up with minimal assistance.  Able to walk without assistance when encouraged.  Narrow base with slow, cautious steps.      Pertinent Investigations:    I have personally reviewed most recent and pertinent labs, tests, and radiological images.     MR Lumbar Spine - 11/18/21  Impression:  1. Diffuse mild enhancement of the cauda equina nerve roots without  nodularity or clumping is nonspecific.  2. Probable mild enhancement of the posterior paraspinous musculature  from L4 through S1. However, artifact could have similar appearance.  3. Mild multilevel lumbar spondylosis without evidence for high-grade  spinal canal or neural foraminal stenosis.    Component      Latest Ref Rng & Units 11/17/2021   PAULINE interpretation      Negative Positive (A)   PAULINE pattern 1       Dense fine speckled   PAULINE titer 1       1:160     Results for SEBASTIÁN YOUSIF (MRN [id number]) as of 11/19/2021 07:21   Ref. Range 11/18/2021 11:10   RBC CSF Latest Ref Range: 0 - 2 /uL 6 (H)   WBC CSF Latest Ref Range: 0 - 5 /uL 0   Appearance CSF Latest Ref Range: Clear  Clear   Color CSF Latest Ref Range: Colorless  Colorless   Tube Number Unknown 4   Glucose CSF Latest Ref Range: 40 - 70 mg/dL 73 (H)    Protein Total CSF Latest Ref Range: 15 - 60 mg/dL 105 (H)      Ref. Range 11/18/2021 16:23   Albumin Latest Ref Range: 3.4 - 5.0 g/dL 3.3 (L)   Protein Total Latest Ref Range: 6.8 - 8.8 g/dL 7.4   Bilirubin Total Latest Ref Range: 0.2 - 1.3 mg/dL 0.4   Alkaline Phosphatase Latest Ref Range: 40 - 150 U/L 80   ALT Latest Ref Range: 0 - 50 U/L 23   AST Latest Ref Range: 0 - 45 U/L 11   Bilirubin Direct Latest Ref Range: 0.0 - 0.2 mg/dL 0.1   Ferritin Latest Ref Range: 8 - 252 ng/mL 211   Iron Latest Ref Range: 35 - 180 ug/dL 76   Iron Binding Cap Latest Ref Range: 240 - 430 ug/dL 361   Iron Saturation Index Latest Ref Range: 15 - 46 % 21   Transferrin Latest Ref Range: 210 - 360 mg/dL 270     Component      Latest Ref Rng & Units 11/17/2021   Albumin Fraction      3.7 - 5.1 g/dL 4.2   Alpha 1 Fraction      0.2 - 0.4 g/dL 0.3   Alpha 2 Fraction      0.5 - 0.9 g/dL 0.9   Beta Fraction      0.6 - 1.0 g/dL 1.0   Gamma Fraction      0.7 - 1.6 g/dL 1.2   Monoclonal Peak      <=0.0 g/dL 0.0   ELP Interpretation:       Essentially normal electrophoretic pattern. No obvious monoclonal proteins seen. Pathologic significance requires clinical correlation.  Tahir Pool M.D., Ph.D., Pathologist.       Lab Results   Component Value Date    WBC 3.5 (L) 11/20/2021    HGB 14.3 11/20/2021    HCT 42.2 11/20/2021     11/20/2021     11/21/2021    POTASSIUM 4.0 11/21/2021    CHLORIDE 104 11/21/2021    CO2 27 11/21/2021    BUN 9 11/21/2021    CR 0.78 11/21/2021     (H) 11/21/2021    TROPONIN <0.015 11/17/2021    AST 11 11/18/2021    ALT 23 11/18/2021    ALKPHOS 80 11/18/2021    BILITOTAL 0.4 11/18/2021       Assessment/Plan:  Peggy Jessica is a 46 year old woman with PMH of CRPS (right leg from stress fracture and chest from bilateral mastectomy - BRCA +) with several systemic changes to sensation and subjective autonomic dysfunction.  She presented to the ED due to 2 to 3 weeks of progressive sensory  changes mostly in the BLEs and slight involvement of the hands that have resulted in some clumsiness.  She reports some falls as well.       She is having acute on chronic sensory changes that have caused imbalance and clumsiness with her feet and her hands subjectively.  She has got a previous significant neuropathy work-up both of lab studies and of autonomic testing.  These have mostly been negative to date outside of possible mild autonomic dysfunction on testing, although this is not entirely specific to an organic etiology.  She received an EMG in 2008 that was reportedly unremarkable (raw data currently unavailable), however she did have an EMG yesterday (11/18) that was again unremarkable.  Objective examination is significant for motor impersistence, patchy and vague pinprick changes to BLEs, mild discrepancy of light touch in the right hemibody (light touch discrepancy was in [place] [facility] and her Romberg test was essentially normal (mild swaying in all directions but without fall and continuously corrected).  There are some functional elements to this exam, however there there are some objective findings including: possible mild enhancement of the cauda equina and paraspinal musculature on MRI, as well elevated protein in the CSF.  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  Other etiologies, such as a paraneoplastic etiology still need to be ruled out.   Those studies are pending and will be follow up on in outpatient setting.       #1 Complex regional pain syndrome  #2 Sensory changes causing imbalance and clumsiness, acute on chronic    - MRI L-spine w/wo contrast    - probable cauda equina enhancement, mild but diffuse   - possible enhancement of inferior posterior paraspinal musculature.   - mild lumber spondylosis   - no apparent enhancement of dorsal root ganglia  - Serum studies   - Positive PAULINE (1:160)   - SSA/SSB  - negative   Studies still pending:            - B6, Paraneoplastic panel ([facility] panel), GQ1b, GD1b, Ganglioside Ab  - EMG completed   - unremarkable  - Lumbar Puncture complete   - Elevate Protein in CSF (105), 0 WBC   Studies still pending   - Oligoclonal Banding  - IVIG treatment   - Today (11/22) is Day 5 of 5   - Pre and post diphenhydramine treatment ordered   - May need longer course of outpatient treatment depending on response  - CT C/A/P for evaluation of malignancy in setting of possible paraneoplastic process   - Negative for malignancy  - PT/OT  - PTA Oxycodone 5 mg BID PRN, Flexeril 10 mg TID, and medical cannabis (self administered) for pain     #Nausea  - Zofran PRN    #Elevevated Hemoglobin/Hematocrit (resolved to high range)  Possible hemoconcentration.  Less concern for hemochromatosis  - Iron studies WNL      FEN: Regular Diet  Malnutrition: Patient does not meet two of the above criteria necessary for diagnosing malnutrition  PPx: SCDs  Code: FULL    Patient was seen and discussed with Dr. Monroe.      Bogdan Sheets MD  Neurology Resident , PGY-2

## 2021-11-22 NOTE — TELEPHONE ENCOUNTER
[facility] Call Center    Phone Message    May a detailed message be left on voicemail: yes     Reason for Call: Other: Patient returned Avis call, patient is okay with 12/15/22 at 10am. Please call patient at [phone] to confirm appointment was made.     Action Taken: Message routed to:  [facility] Neurology    Travel Screening: Not Applicable

## 2021-11-22 NOTE — PLAN OF CARE
Pt here with progressive weakness in BLE, vss, neuros include: a/o x4, BLE 4/5 with N/T in bilateral hands and BLE, ataxia. PIV SL, regular diet, voiding spont, no BM, up SBA w/GB and walker. Pt working with therapies, requesting PRN and anxiety meds, family @ bedside and supportive. Continue to care per orders.

## 2021-11-22 NOTE — PLAN OF CARE
Status: Admitted 11/17 with progressive lower limb numbness and weakness, also in hands. Hx complex regional pain syndrome  Vitals: VSS on RA  Neuros: AOx4. 4/5 throughout . N/T knees to toes, hands, and intermittently to face   IV: PIV SL  Labs/Electrolytes: WNL  Resp/trach: WNL  Diet: Regular, gluten free. Nausea controlled with IV compazine x1.   Bowel status: LBM 11/20.   : Voiding   Skin: WNL  Pain: Chest pain non-cardiac r/t regional pain syndrome, some burning in soles of feet, continued pins and needles in hands, feet, and face. Improved chest pain with medical cannabis. Gave prn oxy and atarax x2.    Activity: A1 GB and walker  Plan: One more dose of IVIG, continue with POC

## 2021-11-22 NOTE — DISCHARGE SUMMARY
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  She presents to the ED today due to 2 to 3 weeks of progressive sensory changes mostly in the BLEs and slight involvement of the hands that have resulted in some clumsiness.  She reports some falls as well.  Neurology was consulted due to the acute on chronic concerns.     She reports having vague discrepancies in sensation ever since being diagnosed with CRPS in the right leg from stress fracture years ago.  This was mostly related to the RLE at that time.  It has since also involved her chest and upper thorax in the setting of bilateral mastectomy, this was done about 2020.  Since that time she has noticed some sensory changes, mostly sensory loss, but also burning sensation that is vaguely in the area of these CRPS locations, but can affect other limbs as well.     Today she mostly subjective concerns of numbness and tingling in her feet and BLEs up to just proximal of the knees.  It also involves her hands to the wrists.  She thinks that this is  significantly changed in the last 2 to 3 weeks.  She thinks her balance has been significantly affected and she has had about 3-4 falls each day for the last couple weeks, but admits she catches herself during these events.  She attributes these almost to feeling imbalanced, and she thinks is slightly worse in the dark.  She has also felt quite clumsy with her hands, she thinks she is dropping things more often.  She has some lightheadedness when bending over to grab things that she is dropped, but this specific sensation has not caused the fall.  Additionally, she does endorse some sensations of her hands of her fingers turning white, and then moments later they turn red and flush.  This is also happening in her knuckles of her hands.  She thinks this has been more prominent recently.     This is in contrast to September and early October when she was helping work the [employer] and she was able to carry roughly 20 pound objects from her car into the festival area.  She was doing this without complication at that time.     It should be noted that she has seen providers at [facility] for this concern, and she has actually undergone autonomic testing.  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  In the setting of this she was taking an anticholinergic medication to suppress what effect, so the only organic component to this testing was mild autonomic neuropathy, although due to the mild nature of this it is not entirely specific to an organic dysfunction.     Of note she is having poor sleep in the last 2 to 3 months.  She is also had a significantly reduced appetite over this time and only eating a few snacks throughout the day and maybe one full meal every day or two.  She has lost about 70 pounds unintentionally per her report.    Hospital Course:  She is having acute on chronic  sensory changes that have caused imbalance and clumsiness with her feet and her hands subjectively.  She has got a previous significant neuropathy work-up both of lab studies and of autonomic testing.  These have mostly been negative to date outside of possible mild autonomic dysfunction on testing, although this is not entirely specific to an organic etiology.  She received an EMG in 2008 that was reportedly unremarkable (raw data currently unavailable), however she did have an EMG yesterday (11/18) that was again unremarkable.  Objective examination is significant for motor impersistence, patchy and vague pinprick changes to BLEs, mild discrepancy of light touch in the right hemibody (light touch discrepancy was in [place] [facility] [place] and her Romberg test was essentially normal (mild swaying in all directions but without fall and continuously corrected).  There are some functional elements to this exam, however there there are some objective findings including: possible mild enhancement of the cauda equina and paraspinal musculature on MRI, as well elevated protein in the CSF.  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  Other etiologies, such as a paraneoplastic etiology still need to be ruled out.   Those studies are pending and should be follow up in outpatient setting with Dr. Duenas.  Pt has subjectively improved over course of IVIG with some waxing and waning of subjective symptoms since afternoon of 11/25.  Pt was re-evaluated by PT/OT and is appropriate for discharge to home with therapies and equipment.    Pertinent Investigations:  MR Lumbar Spine - 11/18/21  Impression:  1. Diffuse mild enhancement of the cauda equina nerve roots without  nodularity or clumping is nonspecific.  2. Probable mild enhancement of the posterior paraspinous musculature  from L4 through S1. However, artifact could have similar appearance.  3. Mild  multilevel lumbar spondylosis without evidence for high-grade  spinal canal or neural foraminal stenosis.     EMG  Test Date:  2021  Patient: Sebsatián Jessica : 1975 Physician: Jon Duenas MD   Sex: Male AGE: 46 year Ref Phys: Inpatient   ID#: [id number]     Technician: WILEY Stern    Clinical Information:  46 year old woman with 6-8 weeks of worsening upper and lower limb numbness, now affecting her below knees and wrists. She also reports gait instability. Evaluate for polyneuropathy.     Techniques:  Motor and sensory conduction studies were done with surface recording electrodes. EMG was done with a concentric needle electrode.    Results:  Nerve conduction studies:  1. Right median-D2, ulnar-D5, sural, and superficial peroneal sensory responses are normal.   2. Right median-APB, ulnar-ADM, peroneal-EDB, and tibial-AH motor responses are normal.   3. Right median, ulnar and tibial minimum F wave latencies are normal.   4. Bilateral tibial H reflexes are normal.   Needle EMG of selected right lower limb muscles was performed as tabulated below. No abnormal spontaneous activity was observed in the sampled muscles. Motor unit potential morphology and recruitment patterns were normal.   Interpretation:  This is a normal study. There is no electrophysiologic evidence of a large fiber polyneuropathy affecting the right upper and lower limbs on the basis of this study.   Jon Duenas MD  Department of Neurology      Component      Latest Ref Rng & Units 2021   PAULINE interpretation      Negative Positive (A)   PAULINE pattern 1       Dense fine speckled   PAULINE titer 1       1:160      Results for SEBASTIÁN JESSICA (MRN [id number]) as of 2021 07:21    Ref. Range 2021 11:10   RBC CSF Latest Ref Range: 0 - 2 /uL 6 (H)   WBC CSF Latest Ref Range: 0 - 5 /uL 0   Appearance CSF Latest Ref Range: Clear  Clear   Color CSF Latest Ref Range: Colorless  Colorless   Tube Number Unknown 4  "  Glucose CSF Latest Ref Range: 40 - 70 mg/dL 73 (H)   Protein Total CSF Latest Ref Range: 15 - 60 mg/dL 105 (H)        Ref. Range 11/18/2021 16:23   Albumin Latest Ref Range: 3.4 - 5.0 g/dL 3.3 (L)   Protein Total Latest Ref Range: 6.8 - 8.8 g/dL 7.4   Bilirubin Total Latest Ref Range: 0.2 - 1.3 mg/dL 0.4   Alkaline Phosphatase Latest Ref Range: 40 - 150 U/L 80   ALT Latest Ref Range: 0 - 50 U/L 23   AST Latest Ref Range: 0 - 45 U/L 11   Bilirubin Direct Latest Ref Range: 0.0 - 0.2 mg/dL 0.1   Ferritin Latest Ref Range: 8 - 252 ng/mL 211   Iron Latest Ref Range: 35 - 180 ug/dL 76   Iron Binding Cap Latest Ref Range: 240 - 430 ug/dL 361   Iron Saturation Index Latest Ref Range: 15 - 46 % 21   Transferrin Latest Ref Range: 210 - 360 mg/dL 270      Component      Latest Ref Rng & Units 11/17/2021   Albumin Fraction      3.7 - 5.1 g/dL 4.2   Alpha 1 Fraction      0.2 - 0.4 g/dL 0.3   Alpha 2 Fraction      0.5 - 0.9 g/dL 0.9   Beta Fraction      0.6 - 1.0 g/dL 1.0   Gamma Fraction      0.7 - 1.6 g/dL 1.2   Monoclonal Peak      <=0.0 g/dL 0.0   ELP Interpretation:       Essentially normal electrophoretic pattern. No obvious monoclonal proteins seen. Pathologic significance requires clinical correlation.  Tahir Pool M.D., Ph.D., Pathologist.          Component      Latest Ref Rng & Units 11/17/2021   GM1 Antibody IgG      0 - 50 IV 4   GM1 Antibody IgM      0 - 50 IV 4   GD1b Antibody IgG      0 - 50 IV 4   GD1b Antibody IgM      0 - 50 IV 3   GQ1b Antibody IgG      0 - 50 IV 3   GQ1b Antibody IgM      0 - 50 IV        Consultations:    None    Recommendations and Follow-up:  - Follow up with Dr. Duenas in [facility] in 2-3 weeks  - Follow up Paraneoplastic panel w/ Dr. Duenas    Discharge physical examination:   /75 (BP Location: Right arm)   Pulse 77   Temp 97.7  F (36.5  C) (Oral)   Resp 16   Ht 1.645 m (5' 4.75\")   Wt 74.3 kg (163 lb 14.4 oz)   SpO2 98%   BMI 27.49 kg/m  "     General: Awake, alert, interactive; Sitting in bed, NAD, cooperative  HEENT: Normocephalic, atraumatic, nares patent, poor dentition  Resp: No increased work of breathing  Cardio: RRR  Abdomen: Soft, non-distended, non-tender  Extremities: Warm and well perfused, peripheral pulses present, no edema  Skin: Not jaundiced, no rash, rare scattered ecchymoses  Psych: Slightly anxious     Neuro:   Mental status: Awake, attentive, interactive; Recalls remote and recent history with accuracy; simple calculations intact  Speech/Language: Fluent speech without paraphasic errors; No dysarthria; naming, repetition, comprehension intact  Cranial nerves: Visual fields intact to confrontation, Eyes conjugate, Pupils 4mm and brisk, EOMI w/ normal and smooth pursuit, face expression symmetric, facial sensation intact and symmetric, hearing intact to conversation, shoulder shrug strong, palate rise symmetric, tongue/uvula midline.  Motor:   Bulk: Appropriate  Tone: Appropriate   Spontaneous movements: No myoclonus or asterixis  Power: Pronator drift absent. *Impersistence noted throughout formal motor exam    Right Left   Arm abduction 5/5 5/5   Elbow Flex 5/5 5/5   Elbow Extension 5/5 5/5   Wrist Extension 5/5 5/5     5/5 5/5   Hip Flexion 4+/5 4+/5   Knee Flexion 4+/5 4+/5   Knee Extension 5/5 5/5   Dorsiflexion  5/5 5/5   Plantarflexion 5/5 5/5   Reflexes: 2+ and symmetric biceps, triceps, brachioradialis; 1+ in left patellar.  Absent in right patellar (ligament appears physically different than left), and achilles. No crossed adductors or spread. Toes down-going  Sensory: Light touch mildly decreased in the ankles; Pinprick patchy and inconsistent findings in the BLE's, possible R>L to mid shin and decreased on RUE compared to left; Proprioception intact in all limbs, including with eyes closed; Two-point discrimination intact in the BLE's   Coordination: FNF and heel to shin intact.  Mild impairment on toe and heel taps  bilaterally.  Station/Gait: up with minimal assistance.  Able to walk without assistance although attempts to grasp things around her.  Narrow base with slow, cautious steps.      Discharge Medications:  Current Discharge Medication List      CONTINUE these medications which have CHANGED    Details   cyclobenzaprine (FLEXERIL) 10 MG tablet Take 1 tablet (10 mg) by mouth 3 times daily    Associated Diagnoses: Muscle spasm      ondansetron (ZOFRAN-ODT) 8 MG ODT tab Take 1 tablet (8 mg) by mouth every 8 hours as needed for nausea  Refills: 0    Associated Diagnoses: Nausea      oxyCODONE (ROXICODONE) 5 MG tablet Take 1 tablet (5 mg) by mouth 2 times daily  Refills: 0    Associated Diagnoses: Other chronic pain      senna-docusate (SENOKOT-S/PERICOLACE) 8.6-50 MG tablet Take 1 tablet by mouth 2 times daily    Associated Diagnoses: Constipation, unspecified constipation type             Discharge follow up and instructions:     Pain Management Referral      Home Care PT Referral for Hospital Discharge      Mantoux instructions    Give two-step Mantoux (PPD) Per Facility Policy Yes     Follow Up and recommended labs and tests    Follow up with Dr. Duenas at [facility] in 2-3 weeks.  No follow up labs or test are needed.     Reason for your hospital stay    You were hospitalized for evaluation and treatment of weakness / sensory ataxia.     Activity    Your activity upon discharge: activity as tolerated     Physical Therapy Adult Consult    Evaluate and treat as clinically indicated.    Reason:  Patient still below baseline independent mobility, still a high fall risk, is progressing gait with FWW. She has a flight of stairs she has to complete multiple times a day as bedroom/bathroom and kitchen/living areas are on different levels with no bathroom on kitchen level. She would be home alone during the day while spouse is at work. Would benefit from stay at [facility] for continued strengthening and mobility training      Occupational Therapy Adult Consult    Evaluate and treat as clinically indicated.    Reason:  Increase functional endurance and ADL independence at rehab.     Walker Order    DME Documentation:   Describe the reason for need to support medical necessity: gait instability.     I, the undersigned, certify that the above prescribed supplies are medically necessary for this patient and is both reasonable and necessary in reference to accepted standards of medical and necessary in reference to accepted standards of medical practice in the treatment of this patient's condition and is not prescribed as a convenience.     Diet    Follow this diet upon discharge: Orders Placed This Encounter      Snacks/Supplements Adult: Sary Milner Standard Oral Supplement; Between Meals      Combination Diet Gluten Free Diet       Patient seen and discussed with Dr. Andres Sheets MD  Neurology Resident , PGY-2  November 22, 2021       I saw and evaluated the patient with the resident and agree with the assessment and plan. I was present for minor examination.     PT re-evaluated the patient and recommended discharge home with supervision, walker and lizbeth.     Felicia Cmapos MD  Chief, Multiple Sclerosis Division  Department of Neurology  [facility] [place]

## 2021-11-22 NOTE — TELEPHONE ENCOUNTER
Left  [facility] for Peggy about scheduling a follow up with Dr. Duenas. Per Dr. Duenas, she can be seen on 12/15 at 1000.  Asked Peggy to call back the clinic to schedule.    Avis Londono RN

## 2021-11-23 LAB
ANION GAP SERPL CALCULATED.3IONS-SCNC: 5 MMOL/L (ref 3–14)
BACTERIA CSF CULT: NO GROWTH
BUN SERPL-MCNC: 9 MG/DL (ref 7–30)
CALCIUM SERPL-MCNC: 9.6 MG/DL (ref 8.5–10.1)
CHLORIDE BLD-SCNC: 104 MMOL/L (ref 94–109)
CO2 SERPL-SCNC: 26 MMOL/L (ref 20–32)
CREAT SERPL-MCNC: 0.63 MG/DL (ref 0.52–1.04)
GFR SERPL CREATININE-BSD FRML MDRD: >90 ML/MIN/1.73M2
GLUCOSE BLD-MCNC: 89 MG/DL (ref 70–99)
GRAM STAIN RESULT: NORMAL
GRAM STAIN RESULT: NORMAL
HOLD SPECIMEN: NORMAL
MAGNESIUM SERPL-MCNC: 2.1 MG/DL (ref 1.6–2.3)
POTASSIUM BLD-SCNC: 3.9 MMOL/L (ref 3.4–5.3)
SODIUM SERPL-SCNC: 135 MMOL/L (ref 133–144)

## 2021-11-23 PROCEDURE — U0003 INFECTIOUS AGENT DETECTION BY NUCLEIC ACID (DNA OR RNA); SEVERE ACUTE RESPIRATORY SYNDROME CORONAVIRUS 2 (SARS-COV-2) (CORONAVIRUS DISEASE [COVID-19]), AMPLIFIED PROBE TECHNIQUE, MAKING USE OF HIGH THROUGHPUT TECHNOLOGIES AS DESCRIBED BY CMS-2020-01-R: HCPCS | Performed by: STUDENT IN AN ORGANIZED HEALTH CARE EDUCATION/TRAINING PROGRAM

## 2021-11-23 PROCEDURE — 36415 COLL VENOUS BLD VENIPUNCTURE: CPT | Performed by: STUDENT IN AN ORGANIZED HEALTH CARE EDUCATION/TRAINING PROGRAM

## 2021-11-23 PROCEDURE — 99231 SBSQ HOSP IP/OBS SF/LOW 25: CPT | Mod: GC | Performed by: PSYCHIATRY & NEUROLOGY

## 2021-11-23 PROCEDURE — 250N000013 HC RX MED GY IP 250 OP 250 PS 637: Performed by: PSYCHIATRY & NEUROLOGY

## 2021-11-23 PROCEDURE — 250N000013 HC RX MED GY IP 250 OP 250 PS 637: Performed by: STUDENT IN AN ORGANIZED HEALTH CARE EDUCATION/TRAINING PROGRAM

## 2021-11-23 PROCEDURE — 120N000002 HC R&B MED SURG/OB UMMC

## 2021-11-23 PROCEDURE — 80048 BASIC METABOLIC PNL TOTAL CA: CPT | Performed by: STUDENT IN AN ORGANIZED HEALTH CARE EDUCATION/TRAINING PROGRAM

## 2021-11-23 PROCEDURE — 83735 ASSAY OF MAGNESIUM: CPT | Performed by: PSYCHIATRY & NEUROLOGY

## 2021-11-23 RX ORDER — OXYCODONE HYDROCHLORIDE 5 MG/1
5 TABLET ORAL 2 TIMES DAILY
Refills: 0 | Status: SHIPPED | DISCHARGE
Start: 2021-11-23 | End: 2022-03-30

## 2021-11-23 RX ORDER — AMOXICILLIN 250 MG
1 CAPSULE ORAL 2 TIMES DAILY
DISCHARGE
Start: 2021-11-23 | End: 2023-05-12

## 2021-11-23 RX ORDER — ONDANSETRON 8 MG/1
8 TABLET, ORALLY DISINTEGRATING ORAL EVERY 8 HOURS PRN
Refills: 0 | Status: ON HOLD | DISCHARGE
Start: 2021-11-23 | End: 2022-10-21

## 2021-11-23 RX ORDER — CYCLOBENZAPRINE HCL 10 MG
10 TABLET ORAL 3 TIMES DAILY
DISCHARGE
Start: 2021-11-23 | End: 2023-09-08

## 2021-11-23 RX ADMIN — Medication 1 TABLET: at 08:42

## 2021-11-23 RX ADMIN — Medication 3 MG: at 21:52

## 2021-11-23 RX ADMIN — ACETAMINOPHEN 975 MG: 325 TABLET, FILM COATED ORAL at 04:17

## 2021-11-23 RX ADMIN — CYCLOBENZAPRINE 10 MG: 10 TABLET, FILM COATED ORAL at 19:52

## 2021-11-23 RX ADMIN — SENNOSIDES 8.6 MG: 8.6 TABLET ORAL at 08:47

## 2021-11-23 RX ADMIN — ACETAMINOPHEN 975 MG: 325 TABLET, FILM COATED ORAL at 13:26

## 2021-11-23 RX ADMIN — CYCLOBENZAPRINE 10 MG: 10 TABLET, FILM COATED ORAL at 13:27

## 2021-11-23 RX ADMIN — HYDROXYZINE HYDROCHLORIDE 50 MG: 25 TABLET, FILM COATED ORAL at 15:15

## 2021-11-23 RX ADMIN — OXYCODONE HYDROCHLORIDE 5 MG: 5 TABLET ORAL at 21:52

## 2021-11-23 RX ADMIN — HYDROXYZINE HYDROCHLORIDE 50 MG: 25 TABLET, FILM COATED ORAL at 21:52

## 2021-11-23 RX ADMIN — CYCLOBENZAPRINE 10 MG: 10 TABLET, FILM COATED ORAL at 08:42

## 2021-11-23 RX ADMIN — HYDROXYZINE HYDROCHLORIDE 50 MG: 25 TABLET, FILM COATED ORAL at 08:46

## 2021-11-23 RX ADMIN — ACETAMINOPHEN 975 MG: 325 TABLET, FILM COATED ORAL at 19:52

## 2021-11-23 RX ADMIN — Medication 2.5 MG: at 08:47

## 2021-11-23 RX ADMIN — Medication 2.5 MG: at 15:15

## 2021-11-23 ASSESSMENT — ACTIVITIES OF DAILY LIVING (ADL)
ADLS_ACUITY_SCORE: 10
ADLS_ACUITY_SCORE: 6
ADLS_ACUITY_SCORE: 6
ADLS_ACUITY_SCORE: 8
ADLS_ACUITY_SCORE: 10
ADLS_ACUITY_SCORE: 6
ADLS_ACUITY_SCORE: 10
ADLS_ACUITY_SCORE: 8
ADLS_ACUITY_SCORE: 6
ADLS_ACUITY_SCORE: 10
ADLS_ACUITY_SCORE: 8
ADLS_ACUITY_SCORE: 8
ADLS_ACUITY_SCORE: 6
ADLS_ACUITY_SCORE: 6
ADLS_ACUITY_SCORE: 10
ADLS_ACUITY_SCORE: 6
ADLS_ACUITY_SCORE: 10
ADLS_ACUITY_SCORE: 6
ADLS_ACUITY_SCORE: 6
ADLS_ACUITY_SCORE: 8

## 2021-11-23 NOTE — TELEPHONE ENCOUNTER
RECORDS RECEIVED FROM: Internal   REASON FOR VISIT: hospital f/u    Date of Appt: 12/15/21   NOTES (FOR ALL VISITS) STATUS DETAILS   OFFICE NOTE from referring provider Internal SEE INPATIENT NOTES   OFFICE NOTE from other specialist Care Everywhere Dr Kristopher Lewis @ [facility]:  9/29/21   DISCHARGE SUMMARY from hospital Internal [facility]:  11/17/21-11/26/21   DISCHARGE REPORT from the ER Care Everywhere [facility]:  8/25/21   OPERATIVE REPORT N/A    MEDICATION LIST Internal    IMAGING  (FOR ALL VISITS)     EMG Internal [facility]:  11/18/21    [facility]:  11/3/21   EEG N/A    LUMBAR PUNCTURE N/A    JESSIE SCAN N/A    ULTRASOUND (CAROTID BILAT) *VASCULAR* N/A    MRI (HEAD, NECK, SPINE) Received/Internal [facility]:  MRI Lumbar Spine 11/17/21    [facility]:  MRI Brain 10/4/21   CT (HEAD, NECK, SPINE) Received [facility]:  CT Head 8/25/21      Action 11/23/21 MV 10.10am   Action Taken Imaging request faxed to [facility]    --12/8/21 MV 9.24am--  Images from [facility] resolved in PACS

## 2021-11-23 NOTE — PROGRESS NOTES
"[facility]  General Neurology - Progress Note    Patient Name:  Peggy Jessica  Date of Service:  November 22, 2021    Interval/Subjective:    No acute overnight events.     Pt reports continued subjective improvement.  She reports being able to knit and work with hand putty yesterday evening.  Reports less paresthesias in the bottoms of her feet and some improved ambulation.      Objective:    Vitals: /79 (BP Location: Right arm)   Pulse 97   Temp 98.1  F (36.7  C) (Oral)   Resp 16   Ht 1.645 m (5' 4.75\")   Wt 75.1 kg (165 lb 9.6 oz)   SpO2 97%   BMI 27.77 kg/m      General: Awake, alert, interactive; Sitting in bed, NAD, cooperative  HEENT: Normocephalic, atraumatic, nares patent, poor dentition  Resp: No increased work of breathing  Cardio: RRR  Abdomen: Soft, non-distended, non-tender  Extremities: Warm and well perfused, peripheral pulses present, no edema  Skin: Not jaundiced, no rash, rare scattered ecchymoses  Psych: Slightly anxious    Neuro:   Mental status: Awake, attentive, interactive; Recalls remote and recent history with accuracy; simple calculations intact  Speech/Language: Fluent speech without paraphasic errors; No dysarthria; naming, repetition, comprehension intact  Cranial nerves: Visual fields intact to confrontation, Eyes conjugate, Pupils 4mm and brisk, EOMI w/ normal and smooth pursuit, face expression symmetric, facial sensation intact and symmetric, hearing intact to conversation, shoulder shrug strong, palate rise symmetric, tongue/uvula midline.  Motor:   Bulk: Appropriate  Tone: Appropriate   Spontaneous movements: No myoclonus or asterixis  Power: Pronator drift absent. *Impersistence noted throughout formal motor exam    Right Left   Arm abduction 5/5 5/5   Elbow Flex 5/5 5/5   Elbow Extension 5/5 5/5   Wrist Extension 5/5 5/5     5/5 5/5   Hip Flexion 4+/5 4+/5   Knee Flexion 4+/5 4+/5   Knee Extension 5/5 5/5 "   Dorsiflexion  5/5 5/5   Plantarflexion 5/5 5/5   Reflexes: 2+ and symmetric biceps, triceps, brachioradialis; 1+ in left patellar.  Absent in right patellar (ligament appears physically different than left), and achilles. No crossed adductors or spread. Toes down-going  Sensory: Light touch mildly decreased in the ankles; Pinprick patchy and inconsistent findings in the BLE's, possible R>L to mid shin and decreased on RUE compared to left; Proprioception intact in all limbs, including with eyes closed; Two-point discrimination intact in the BLE's   Coordination: FNF and heel to shin intact.  Mild impairment on toe and heel taps bilaterally.  Station/Gait: Up with minimal assistance.  Able to walk without assistance when encouraged.  Narrow base with slow, cautious steps.      Pertinent Investigations:    I have personally reviewed most recent and pertinent labs, tests, and radiological images.     MR Lumbar Spine - 11/18/21  Impression:  1. Diffuse mild enhancement of the cauda equina nerve roots without  nodularity or clumping is nonspecific.  2. Probable mild enhancement of the posterior paraspinous musculature  from L4 through S1. However, artifact could have similar appearance.  3. Mild multilevel lumbar spondylosis without evidence for high-grade  spinal canal or neural foraminal stenosis.    Component      Latest Ref Rng & Units 11/17/2021   PAULINE interpretation      Negative Positive (A)   PAULINE pattern 1       Dense fine speckled   PAULINE titer 1       1:160     Component      Latest Ref Rng & Units 11/17/2021   GM1 Antibody IgG      0 - 50 IV 4   GM1 Antibody IgM      0 - 50 IV 4   GD1b Antibody IgG      0 - 50 IV 4   GD1b Antibody IgM      0 - 50 IV 3   GQ1b Antibody IgG      0 - 50 IV 3   GQ1b Antibody IgM      0 - 50 IV 3       Results for SEBASTIÁN YOUSIF (MRN [id number]) as of 11/19/2021 07:21   Ref. Range 11/18/2021 11:10   RBC CSF Latest Ref Range: 0 - 2 /uL 6 (H)   WBC CSF Latest Ref Range: 0 - 5  /uL 0   Appearance CSF Latest Ref Range: Clear  Clear   Color CSF Latest Ref Range: Colorless  Colorless   Tube Number Unknown 4   Glucose CSF Latest Ref Range: 40 - 70 mg/dL 73 (H)   Protein Total CSF Latest Ref Range: 15 - 60 mg/dL 105 (H)      Ref. Range 11/18/2021 16:23   Bilirubin Direct Latest Ref Range: 0.0 - 0.2 mg/dL 0.1   Ferritin Latest Ref Range: 8 - 252 ng/mL 211   Iron Latest Ref Range: 35 - 180 ug/dL 76   Iron Binding Cap Latest Ref Range: 240 - 430 ug/dL 361   Iron Saturation Index Latest Ref Range: 15 - 46 % 21   Transferrin Latest Ref Range: 210 - 360 mg/dL 270     Component      Latest Ref Rng & Units 11/17/2021   Albumin Fraction      3.7 - 5.1 g/dL 4.2   Alpha 1 Fraction      0.2 - 0.4 g/dL 0.3   Alpha 2 Fraction      0.5 - 0.9 g/dL 0.9   Beta Fraction      0.6 - 1.0 g/dL 1.0   Gamma Fraction      0.7 - 1.6 g/dL 1.2   Monoclonal Peak      <=0.0 g/dL 0.0   ELP Interpretation:       Essentially normal electrophoretic pattern. No obvious monoclonal proteins seen. Pathologic significance requires clinical correlation.  Tahir Pool M.D., Ph.D., Pathologist.         Assessment/Plan:  Peggy Jessica is a 46 year old woman with PMH of CRPS (right leg from stress fracture and chest from bilateral mastectomy - BRCA +) with several systemic changes to sensation and subjective autonomic dysfunction.  She presented to the ED due to 2 to 3 weeks of progressive sensory changes mostly in the BLEs and slight involvement of the hands that have resulted in some clumsiness.  She reports some falls as well.       She is having acute on chronic sensory changes that have caused imbalance and clumsiness with her feet and her hands subjectively.  She has got a previous significant neuropathy work-up both of lab studies and of autonomic testing.  These have mostly been negative to date outside of possible mild autonomic dysfunction on testing, although this is not entirely specific to an organic etiology.   She received an EMG in 2008 that was reportedly unremarkable (raw data currently unavailable), however she did have an EMG yesterday (11/18) that was again unremarkable.  Objective examination is significant for motor impersistence, patchy and vague pinprick changes to BLEs, mild discrepancy of light touch in the right hemibody (light touch discrepancy was in left hemibody during examination at [facility]), and her Romberg test was essentially normal (mild swaying in all directions but without fall and continuously corrected).  There are some functional elements to this exam, however there there are some objective findings including: possible mild enhancement of the cauda equina and paraspinal musculature on MRI, as well elevated protein in the CSF.  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  Other etiologies, such as a paraneoplastic etiology still need to be ruled out.   Those studies are pending and will be follow up on in outpatient setting.  Follow will be with Dr. Duenas in [facility].  Pt has subjectively improved over the course of IVIG treatment.       #1 Complex regional pain syndrome  #2 Sensory changes causing imbalance and clumsiness, acute on chronic    - MRI L-spine w/wo contrast    - probable cauda equina enhancement, mild but diffuse   - possible enhancement of inferior posterior paraspinal musculature.   - mild lumber spondylosis   - no apparent enhancement of dorsal root ganglia  - Serum studies   - Positive PAULINE (1:160)   - SSA/SSB - negative   Studies still pending:            - B6, Paraneoplastic panel ([facility] panel)  - EMG completed   - unremarkable  - Lumbar Puncture complete   - Elevate Protein in CSF (105), 0 WBC   Studies still pending   - Oligoclonal Banding  - IVIG treatment   - 5 days of treatment completed 11/22   - Pre and post diphenhydramine treatment ordered   - May need longer course of outpatient treatment depending on  response  - CT C/A/P for evaluation of malignancy in setting of possible paraneoplastic process   - Negative for malignancy  - PT/OT  - PTA Oxycodone 5 mg BID PRN, Flexeril 10 mg TID, and medical cannabis (self administered) for pain     #Nausea  - Zofran PRN    #Elevevated Hemoglobin/Hematocrit (resolved to high range)  Possible hemoconcentration.  Less concern for hemochromatosis  - Iron studies WNL      FEN: Regular Diet  Malnutrition: Patient does not meet two of the above criteria necessary for diagnosing malnutrition  PPx: SCDs  Code: FULL    Patient was seen and discussed with Dr. Monroe.      Bogdan Sheets MD  Neurology Resident , PGY-2

## 2021-11-23 NOTE — PLAN OF CARE
Status: Pt admitted 11/17 with progressive lower limb numbness and weakness, also in hands. Hx complex regional pain syndrome  Vitals: VSS on RA  Neuros: A&O x4. Strength BUE 5/5, BLE 4-5/5. Numbness and tingling to feet & hands   IV: No IV access, okayed to remove IV since Pt completed 5/5 IVIG  Labs/Electrolytes: WNL  Resp/trach: WNL  Diet: Regular, gluten free. Denied nausea.   Bowel status: LBM 11/22 per pt.   : VDSP  Skin: WNL  Pain: Pt had reported non-cardiac chest pain r/t complex regional pain syndrome (after mastectomy per report), Utilizing oxy, flexeril, and tylenol.   Activity: SBA/GB calls appropriately. Steady on feed   Plan:  will provide transportation to Short term TCU placement at [facility] once discharged.  able to be here around noon. Covid completed.

## 2021-11-23 NOTE — PLAN OF CARE
Status: Admitted 11/17 with progressive lower limb numbness and weakness, also in hands. Hx complex regional pain syndrome  Vitals: VSS on RA  Neuros: AOx4. 5/5 BUE and 4/5 BLE. N/T feet, hands, and intermittently to face   IV: PIV SL- requested no PIV since IVIG is complete, awaiting response.   Labs/Electrolytes: WNL  Resp/trach: WNL  Diet: Regular, gluten free. Denies nausea.   Bowel status: LBM 11/22 per pt.   : Voiding   Skin: WNL  Pain: Chest pain non-cardiac r/t regional pain syndrome, some burning in soles of feet, continued pins and needles in hands, feet, and face. Gave prn oxy and atarax x2.    Activity: SBA GB walker, calls appropriately  Plan: Awaiting TCU placement d/t weakness with ambulation. PT would like a shower in the AM.

## 2021-11-23 NOTE — PROGRESS NOTES
Care Management Follow Up    Length of Stay (days): 6    Expected Discharge Date: 11/24/2021     Concerns to be Addressed: discharge planning     Patient plan of care discussed at interdisciplinary rounds: Yes    Anticipated Discharge Disposition:  (Short term TCU placement)     Anticipated Discharge Services:  (Short term TCU placement)  Anticipated Discharge DME:  Not applicable    Patient/family educated on Medicare website which has current facility and service quality ratings: yes (SW provided copy of Medicare Care Compare)  Education Provided on the Discharge Plan:  yes  Patient/Family in Agreement with the Plan: yes    Referrals Placed by CM/SW: Post Acute Facilities  Private pay costs discussed: not applicable at this time    Additional Information:  SW is following pt for discharge planning.  Per Dr. Sheets, pt is medically ready for discharge.  TCU placement is being pursued.    SW received return calls from Admissions Coordinator's at the following facility's:  - [facility] ([facility]), assessed and approved pt for admit  - [facility]  ([place]), assessed and approved pt for admit  - [facility] ([place]), is full.    SW met with pt and updated regarding the above.  Pt indicates that she would like to accept the offered bed at [facility].      SW will coordinate discharge.    STEVE Adams  Social Work, 6A  Phone:  [phone]  Pager:  [phone]  11/23/2021          SAMMI Song

## 2021-11-23 NOTE — PLAN OF CARE
Shift 2300 - 0700    Status: Pt admitted 11/17 with progressive lower limb numbness and weakness, also in hands. Hx complex regional pain syndrome  Vitals: VSS on RA  Neuros: A&O x4. Strength BUE 5/5, BLE 4/5. Numbness and tingling to feet, hands, and intermittently to face   IV: No IV access, this writer was informed by evening RN that team okayed to remove IV since Pt completed 5/5 IVIG  Labs/Electrolytes: WNL  Resp/trach: WNL  Diet: Regular, gluten free. Denied nausea.   Bowel status: LBM 11/22 per pt.   : VDSP  Skin: WNL  Pain: Pt had reported non-cardiac chest pain r/t complex regional pain syndrome (after mastectomy per report), some burning in soles of feet, continued pins and needles in hands, feet, and face. Pt comfortable overnight with just the scheduled tylenol    Activity: SBA, GB, walker, calls appropriately  Plan: Awaiting TCU placement d/t weakness with ambulation. Pt would like to shower some time after breakfast today

## 2021-11-23 NOTE — PROGRESS NOTES
Care Management Follow Up    Length of Stay (days): 6    Expected Discharge Date:  11/23/2021     Concerns to be Addressed: discharge planning     Patient plan of care discussed at interdisciplinary rounds: Yes    Anticipated Discharge Disposition:  (Short term TCU placement)     Anticipated Discharge Services:  (Short term TCU placement)  Anticipated Discharge DME:  Not applicable at this time    Patient/family educated on Medicare website which has current facility and service quality ratings: yes (SW provided copy of Medicare Care Compare)  Education Provided on the Discharge Plan:  yes  Patient/Family in Agreement with the Plan: yes    Referrals Placed by CM/SW: Post Acute Facilities  Private pay costs discussed: Not applicable at this time    Additional Information:  SW is following pt for discharge planning.  Pt has completed her course of IVIG and Dr. Sheets has confirmed readiness for discharge.  OT and Physical Therapy are recommending TCU placement.  In addition to the preferred facility's that pt had provided last week, pt states that she would also like a referral to [facility].      SW contacted Admissions Coordinator's at the following facility's:  - [facility]), may have an opening today, referred pt and faxed assessment materials via mojio  - [facility] ([facility], has an opening, referred pt and faxed assessment materials via mojio   [facility] left a message for Admissions to call in regards to bed availability  - [facility], left a message for Admissions to call in regards to bed availability  - [facility], is not accepting admits for the next several months due to staffing  - [facility] (Pending sale to [facility] , has openings, referred pt and faxed assessment materials.    RADHA updated pt.    SW will continue to follow for discharge planning.    STEVE Adams  Social Work, 6A  Phone:  [phone]  Pager:   [phone]  11/23/2021          Faith Rodriguez, ALICJAW

## 2021-11-24 LAB
ANION GAP SERPL CALCULATED.3IONS-SCNC: 4 MMOL/L (ref 3–14)
BUN SERPL-MCNC: 12 MG/DL (ref 7–30)
CALCIUM SERPL-MCNC: 9.5 MG/DL (ref 8.5–10.1)
CHLORIDE BLD-SCNC: 106 MMOL/L (ref 94–109)
CO2 SERPL-SCNC: 26 MMOL/L (ref 20–32)
CREAT SERPL-MCNC: 0.64 MG/DL (ref 0.52–1.04)
ERYTHROCYTE [DISTWIDTH] IN BLOOD BY AUTOMATED COUNT: 12.2 % (ref 10–15)
GFR SERPL CREATININE-BSD FRML MDRD: >90 ML/MIN/1.73M2
GLUCOSE BLD-MCNC: 96 MG/DL (ref 70–99)
HCT VFR BLD AUTO: 43.4 % (ref 35–47)
HGB BLD-MCNC: 15.1 G/DL (ref 11.7–15.7)
LACTATE SERPL-SCNC: 1 MMOL/L (ref 0.7–2)
MAGNESIUM SERPL-MCNC: 2.3 MG/DL (ref 1.6–2.3)
MCH RBC QN AUTO: 30.9 PG (ref 26.5–33)
MCHC RBC AUTO-ENTMCNC: 34.8 G/DL (ref 31.5–36.5)
MCV RBC AUTO: 89 FL (ref 78–100)
PLATELET # BLD AUTO: 242 10E3/UL (ref 150–450)
POTASSIUM BLD-SCNC: 4.1 MMOL/L (ref 3.4–5.3)
PYRIDOXAL PHOS SERPL-SCNC: 106.7 NMOL/L
RBC # BLD AUTO: 4.88 10E6/UL (ref 3.8–5.2)
SARS-COV-2 RNA RESP QL NAA+PROBE: NEGATIVE
SODIUM SERPL-SCNC: 136 MMOL/L (ref 133–144)
WBC # BLD AUTO: 3.8 10E3/UL (ref 4–11)

## 2021-11-24 PROCEDURE — 85027 COMPLETE CBC AUTOMATED: CPT | Performed by: STUDENT IN AN ORGANIZED HEALTH CARE EDUCATION/TRAINING PROGRAM

## 2021-11-24 PROCEDURE — 83605 ASSAY OF LACTIC ACID: CPT | Performed by: PSYCHIATRY & NEUROLOGY

## 2021-11-24 PROCEDURE — 250N000013 HC RX MED GY IP 250 OP 250 PS 637: Performed by: PSYCHIATRY & NEUROLOGY

## 2021-11-24 PROCEDURE — 36415 COLL VENOUS BLD VENIPUNCTURE: CPT | Performed by: STUDENT IN AN ORGANIZED HEALTH CARE EDUCATION/TRAINING PROGRAM

## 2021-11-24 PROCEDURE — 120N000002 HC R&B MED SURG/OB UMMC

## 2021-11-24 PROCEDURE — 250N000011 HC RX IP 250 OP 636: Performed by: STUDENT IN AN ORGANIZED HEALTH CARE EDUCATION/TRAINING PROGRAM

## 2021-11-24 PROCEDURE — 83735 ASSAY OF MAGNESIUM: CPT | Performed by: PSYCHIATRY & NEUROLOGY

## 2021-11-24 PROCEDURE — 250N000013 HC RX MED GY IP 250 OP 250 PS 637: Performed by: STUDENT IN AN ORGANIZED HEALTH CARE EDUCATION/TRAINING PROGRAM

## 2021-11-24 PROCEDURE — 80048 BASIC METABOLIC PNL TOTAL CA: CPT | Performed by: STUDENT IN AN ORGANIZED HEALTH CARE EDUCATION/TRAINING PROGRAM

## 2021-11-24 PROCEDURE — 99232 SBSQ HOSP IP/OBS MODERATE 35: CPT | Mod: GC | Performed by: PSYCHIATRY & NEUROLOGY

## 2021-11-24 PROCEDURE — 36415 COLL VENOUS BLD VENIPUNCTURE: CPT | Performed by: PSYCHIATRY & NEUROLOGY

## 2021-11-24 RX ORDER — NALOXONE HYDROCHLORIDE 0.4 MG/ML
0.4 INJECTION, SOLUTION INTRAMUSCULAR; INTRAVENOUS; SUBCUTANEOUS
Status: DISCONTINUED | OUTPATIENT
Start: 2021-11-24 | End: 2021-11-26 | Stop reason: HOSPADM

## 2021-11-24 RX ORDER — NALOXONE HYDROCHLORIDE 0.4 MG/ML
0.2 INJECTION, SOLUTION INTRAMUSCULAR; INTRAVENOUS; SUBCUTANEOUS
Status: DISCONTINUED | OUTPATIENT
Start: 2021-11-24 | End: 2021-11-26 | Stop reason: HOSPADM

## 2021-11-24 RX ORDER — OXYCODONE HYDROCHLORIDE 5 MG/1
5 TABLET ORAL
Status: COMPLETED | OUTPATIENT
Start: 2021-11-24 | End: 2021-11-24

## 2021-11-24 RX ADMIN — ACETAMINOPHEN 975 MG: 325 TABLET, FILM COATED ORAL at 03:53

## 2021-11-24 RX ADMIN — CYCLOBENZAPRINE 10 MG: 10 TABLET, FILM COATED ORAL at 14:39

## 2021-11-24 RX ADMIN — ACETAMINOPHEN 975 MG: 325 TABLET, FILM COATED ORAL at 19:39

## 2021-11-24 RX ADMIN — OXYCODONE HYDROCHLORIDE 5 MG: 5 TABLET ORAL at 19:39

## 2021-11-24 RX ADMIN — CYCLOBENZAPRINE 10 MG: 10 TABLET, FILM COATED ORAL at 19:39

## 2021-11-24 RX ADMIN — CYCLOBENZAPRINE 10 MG: 10 TABLET, FILM COATED ORAL at 08:30

## 2021-11-24 RX ADMIN — Medication 2.5 MG: at 12:55

## 2021-11-24 RX ADMIN — OXYCODONE HYDROCHLORIDE 5 MG: 5 TABLET ORAL at 23:52

## 2021-11-24 RX ADMIN — HYDROXYZINE HYDROCHLORIDE 50 MG: 25 TABLET, FILM COATED ORAL at 08:30

## 2021-11-24 RX ADMIN — HYDROXYZINE HYDROCHLORIDE 50 MG: 25 TABLET, FILM COATED ORAL at 14:40

## 2021-11-24 RX ADMIN — ONDANSETRON 4 MG: 4 TABLET, ORALLY DISINTEGRATING ORAL at 22:29

## 2021-11-24 RX ADMIN — Medication 3 MG: at 22:31

## 2021-11-24 RX ADMIN — ACETAMINOPHEN 975 MG: 325 TABLET, FILM COATED ORAL at 12:08

## 2021-11-24 RX ADMIN — HYDROXYZINE HYDROCHLORIDE 50 MG: 25 TABLET, FILM COATED ORAL at 21:27

## 2021-11-24 RX ADMIN — Medication 2.5 MG: at 08:29

## 2021-11-24 RX ADMIN — Medication 1 TABLET: at 08:30

## 2021-11-24 ASSESSMENT — ACTIVITIES OF DAILY LIVING (ADL)
ADLS_ACUITY_SCORE: 7
ADLS_ACUITY_SCORE: 6
ADLS_ACUITY_SCORE: 7
ADLS_ACUITY_SCORE: 6
ADLS_ACUITY_SCORE: 7
ADLS_ACUITY_SCORE: 6
ADLS_ACUITY_SCORE: 7
ADLS_ACUITY_SCORE: 6
ADLS_ACUITY_SCORE: 7
ADLS_ACUITY_SCORE: 6
ADLS_ACUITY_SCORE: 6
ADLS_ACUITY_SCORE: 7
ADLS_ACUITY_SCORE: 6

## 2021-11-24 ASSESSMENT — MIFFLIN-ST. JEOR: SCORE: 1375.36

## 2021-11-24 NOTE — PLAN OF CARE
Status: Admitted 11/17 with progressive lower limb numbness and weakness, also in hands. Hx complex regional pain syndrome  Vitals: VSS on RA  Neuros: AOx4. 5/5 BUE and 5-/5 BLE. N/T feet, hands, and intermittently to face   IV: No PIV ok'd by provider.    Labs/Electrolytes: WNL  Resp/trach: WNL  Diet: Regular, gluten free. Denies nausea.   Bowel status: BM today per pt.   : Voiding   Skin: WNL  Pain: Chest pain non-cardiac r/t regional pain syndrome, some burning in soles of feet, continued pins and needles in hands, feet, and face. Gave prn oxy and atarax x2.    Activity: SBA walker, calls appropriately  Plan:  will provide transportation to Short term TCU placement at [facility] around 1220 11/24.

## 2021-11-24 NOTE — PROGRESS NOTES
CLINICAL NUTRITION SERVICES - REASSESSMENT NOTE     Nutrition Prescription    RECOMMENDATIONS FOR MDs/PROVIDERS TO ORDER:  - Encourage ongoing adequate PO     Malnutrition Status:    - Severe malnutrition in the context of acute on chronic illness (based on PO and weight loss PTA)     Recommendations already ordered by Registered Dietitian (RD):  - Ordered new weight     Future/Additional Recommendations:  - PO adequacy   - Weight trends     EVALUATION OF THE PROGRESS TOWARD GOALS   Diet: Gluten Free diet   Intake: 100% PO        NEW FINDINGS   Nutrition/GI: Pt tolerating diet and PO without issue. No BM recorded on flowsheets - LBM 11/23 per RN chart note.     Neuro: Complex regional pain syndrome; Sensory changes causing imbalance and clumsiness, acute on chronic. IVIG treatment - 5 days of treatment completed 11/22.             Weight trends: No new weight since admit    Labs: B6 WNL.    MALNUTRITION  % Intake: </=75% for >/= 1 month (severe)  % Weight Loss: > 10% in 6 months (severe)  Subcutaneous Fat Loss: Unable to assess  Muscle Loss: Unable to assess  Fluid Accumulation/Edema: None noted  Malnutrition Diagnosis: Severe malnutrition in the context of acute on chronic illness    Previous Goals   Patient to consume % of nutritionally adequate meal trays TID, or the equivalent with supplements/snacks.  Evaluation: Met    Previous Nutrition Diagnosis  Inadequate oral intake related to decreased appetite/neuro status as evidenced by reported decreased PO, significant weight loss, and severe malnutrition diagnosis.    Evaluation: Improving    CURRENT NUTRITION DIAGNOSIS  Predicted Inadequate oral intake related to decreased appetite/neuro status PTA as evidenced by reported decreased PO, significant weight loss PTA and potential for decreased PO intakes while hospitalized     INTERVENTIONS  Implementation  Ordered new weight to assess for changes  Continue to monitor PO adequacy and potential nutrition needs      Goals  Patient to consume % of nutritionally adequate meal trays TID, or the equivalent with supplements/snacks.    Monitoring/Evaluation  Progress toward goals will be monitored and evaluated per protocol.    Ananth Morton RD, PEE, [facility]  Neuro ICU  Pager: [phone]

## 2021-11-24 NOTE — PROVIDER NOTIFICATION
Notified on call neurology resident: Pt would like to speak to team as soon as possible regarding discharge. Also reports pain and numbness worsening in their feet, throbbing at this time.

## 2021-11-24 NOTE — PROGRESS NOTES
"[facility]  General Neurology - Progress Note    Patient Name:  Peggy Jessica  Date of Service:  November 22, 2021    Interval/Subjective:    No acute overnight events.     Pt reported continued minor improvement in the AM.    Unfortunately insurance issues have delayed discharge to TCU.  SW is working to resolve these issues but is not able to discharge today and is understandably disappointed.    This afternoon some subjective pain returned to the bottoms and tops of feet to the level of the ankle. Pt requested additional medication that may help treat these symptoms.  Also requested referral to [facility] as she would like to transfer her her to [facility].      Objective:    Vitals: /85 (BP Location: Left arm)   Pulse 98   Temp 97.8  F (36.6  C) (Oral)   Resp 16   Ht 1.645 m (5' 4.75\")   Wt 75.1 kg (165 lb 9.6 oz)   SpO2 98%   BMI 27.77 kg/m      General: Awake, alert, interactive; Sitting in bed, NAD, cooperative  HEENT: Normocephalic, atraumatic, nares patent, poor dentition  Resp: No increased work of breathing  Cardio: RRR  Abdomen: Soft, non-distended, non-tender  Extremities: Warm and well perfused, peripheral pulses present, no edema  Skin: Not jaundiced, no rash, rare scattered ecchymoses  Psych: Slightly anxious    Neuro:   Mental status: Awake, attentive, interactive; Recalls remote and recent history with accuracy; simple calculations intact  Speech/Language: Fluent speech without paraphasic errors; No dysarthria; naming, repetition, comprehension intact  Cranial nerves: Visual fields intact to confrontation, Eyes conjugate, Pupils 4mm and brisk, EOMI w/ normal and smooth pursuit, face expression symmetric, facial sensation intact and symmetric, hearing intact to conversation, shoulder shrug strong, palate rise symmetric, tongue/uvula midline.  Motor:   Bulk: Appropriate  Tone: Appropriate   Spontaneous movements: No myoclonus or " asterixis  Power: Pronator drift absent. *Impersistence noted throughout formal motor exam    Right Left   Arm abduction 5/5 5/5   Elbow Flex 5/5 5/5   Elbow Extension 5/5 5/5   Wrist Extension 5/5 5/5     5/5 5/5   Hip Flexion 4+/5 4+/5   Knee Flexion 4+/5 4+/5   Knee Extension 5/5 5/5   Dorsiflexion  5/5 5/5   Plantarflexion 5/5 5/5   Reflexes: 2+ and symmetric biceps, triceps, brachioradialis; 1+ in left patellar.  Absent in right patellar (ligament appears physically different than left), and achilles. No crossed adductors or spread. Toes down-going  Sensory: Light touch mildly decreased in the ankles; Pinprick patchy and inconsistent findings in the BLE's, possible R>L to mid shin and decreased on RUE compared to left; Proprioception intact in all limbs, including with eyes closed; Two-point discrimination intact in the BLE's   Coordination: FNF and heel to shin intact.  Mild impairment on toe and heel taps bilaterally.  Station/Gait: Up with minimal assistance.  Able to walk without assistance when encouraged.  Narrow base with slow, cautious steps.      Pertinent Investigations:    I have personally reviewed most recent and pertinent labs, tests, and radiological images.     MR Lumbar Spine - 11/18/21  Impression:  1. Diffuse mild enhancement of the cauda equina nerve roots without  nodularity or clumping is nonspecific.  2. Probable mild enhancement of the posterior paraspinous musculature  from L4 through S1. However, artifact could have similar appearance.  3. Mild multilevel lumbar spondylosis without evidence for high-grade  spinal canal or neural foraminal stenosis.    Component      Latest Ref Rng & Units 11/17/2021   PAULINE interpretation      Negative Positive (A)   PAULINE pattern 1       Dense fine speckled   PAULINE titer 1       1:160     Component      Latest Ref Rng & Units 11/17/2021   GM1 Antibody IgG      0 - 50 IV 4   GM1 Antibody IgM      0 - 50 IV 4   GD1b Antibody IgG      0 - 50 IV 4   GD1b  Antibody IgM      0 - 50 IV 3   GQ1b Antibody IgG      0 - 50 IV 3   GQ1b Antibody IgM      0 - 50 IV 3     Component      Latest Ref Rng & Units 11/21/2021   Vitamin B6      20.0 - 125.0 nmol/L 106.7       Results for SEBASTIÁN JESSICA (MRN [id number]) as of 11/19/2021 07:21   Ref. Range 11/18/2021 11:10   RBC CSF Latest Ref Range: 0 - 2 /uL 6 (H)   WBC CSF Latest Ref Range: 0 - 5 /uL 0   Appearance CSF Latest Ref Range: Clear  Clear   Color CSF Latest Ref Range: Colorless  Colorless   Tube Number Unknown 4   Glucose CSF Latest Ref Range: 40 - 70 mg/dL 73 (H)   Protein Total CSF Latest Ref Range: 15 - 60 mg/dL 105 (H)      Ref. Range 11/18/2021 16:23   Bilirubin Direct Latest Ref Range: 0.0 - 0.2 mg/dL 0.1   Ferritin Latest Ref Range: 8 - 252 ng/mL 211   Iron Latest Ref Range: 35 - 180 ug/dL 76   Iron Binding Cap Latest Ref Range: 240 - 430 ug/dL 361   Iron Saturation Index Latest Ref Range: 15 - 46 % 21   Transferrin Latest Ref Range: 210 - 360 mg/dL 270     Component      Latest Ref Rng & Units 11/17/2021   Albumin Fraction      3.7 - 5.1 g/dL 4.2   Alpha 1 Fraction      0.2 - 0.4 g/dL 0.3   Alpha 2 Fraction      0.5 - 0.9 g/dL 0.9   Beta Fraction      0.6 - 1.0 g/dL 1.0   Gamma Fraction      0.7 - 1.6 g/dL 1.2   Monoclonal Peak      <=0.0 g/dL 0.0   ELP Interpretation:       Essentially normal electrophoretic pattern. No obvious monoclonal proteins seen. Pathologic significance requires clinical correlation.  Tahir Pool M.D., Ph.D., Pathologist.         Assessment/Plan:  Sebastián Jessica is a 46 year old woman with PMH of CRPS (right leg from stress fracture and chest from bilateral mastectomy - BRCA +) with several systemic changes to sensation and subjective autonomic dysfunction.  She presented to the ED due to 2 to 3 weeks of progressive sensory changes mostly in the BLEs and slight involvement of the hands that have resulted in some clumsiness.  She reports some falls as well.       She is  having acute on chronic sensory changes that have caused imbalance and clumsiness with her feet and her hands subjectively.  She has got a previous significant neuropathy work-up both of lab studies and of autonomic testing.  These have mostly been negative to date outside of possible mild autonomic dysfunction on testing, although this is not entirely specific to an organic etiology.  She received an EMG in 2008 that was reportedly unremarkable (raw data currently unavailable), however she did have an EMG yesterday (11/18) that was again unremarkable.  Objective examination is significant for motor impersistence, patchy and vague pinprick changes to BLEs, mild discrepancy of light touch in the right hemibody (light touch discrepancy was in left hemibody during examination at [facility]), and her Romberg test was essentially normal (mild swaying in all directions but without fall and continuously corrected).  There are some functional elements to this exam, however there there are some objective findings including: possible mild enhancement of the cauda equina and paraspinal musculature on MRI, as well elevated protein in the CSF.  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  Other etiologies, such as a paraneoplastic etiology still need to be ruled out.   Those studies are pending and will be follow up on in outpatient setting.  Follow will be with Dr. Duenas [facility].  Pt has subjectively improved over the course of IVIG treatment until late this afternoon.       #1 Complex regional pain syndrome  #2 Sensory changes causing imbalance and clumsiness, acute on chronic    - MRI L-spine w/wo contrast    - probable cauda equina enhancement, mild but diffuse   - possible enhancement of inferior posterior paraspinal musculature.   - mild lumber spondylosis   - no apparent enhancement of dorsal root ganglia  - Serum studies   - Positive PAULINE (1:160)   - SSA/SSB  - negative   Studies still pending:            - Paraneoplastic panel ([facility] panel)  - EMG completed   - unremarkable  - Lumbar Puncture complete   - Elevate Protein in CSF (105), 0 WBC   Studies still pending   - Oligoclonal Banding  - IVIG treatment   - 5 days of treatment completed 11/22   - Pre and post diphenhydramine treatment ordered   - May need longer course of outpatient treatment depending on response  - CT C/A/P for evaluation of malignancy in setting of possible paraneoplastic process   - Negative for malignancy  - PT/OT  - PTA Oxycodone 5 mg BID PRN, Flexeril 10 mg TID, and medical cannabis (self administered) for pain     #Nausea  - Zofran PRN    #Elevevated Hemoglobin/Hematocrit (resolved to high range)  Possible hemoconcentration.  Less concern for hemochromatosis  - Iron studies WNL      FEN: Regular Diet  Malnutrition: Patient does not meet two of the above criteria necessary for diagnosing malnutrition  PPx: SCDs  Code: FULL    Patient was seen and discussed with Dr. Monroe.      Bogdan Sheets MD  Neurology Resident , PGY-2

## 2021-11-24 NOTE — PROGRESS NOTES
Care Management Follow Up     Length of Stay (days): 7     Expected Discharge Date: 11/24/2021     Concerns to be Addressed: discharge planning     Patient plan of care discussed at interdisciplinary rounds: Yes     Anticipated Discharge Disposition:  (Short term TCU placement)     Anticipated Discharge Services:  (Short term TCU placement)  Anticipated Discharge DME:  Not applicable     Patient/family educated on Medicare website which has current facility and service quality ratings: yes (SW provided copy of Medicare Care Compare)  Education Provided on the Discharge Plan:  yes  Patient/Family in Agreement with the Plan: yes     Referrals Placed by CM/SW: Post Acute Facilities  Private pay costs discussed: not applicable at this time     Additional Information:  SW is following pt for discharge planning.  TCU placement is recommended and per Dr. Sheets, pt remains medically ready for discharge.  SW received a 11/23/2021 5:18pm voice mail from Admissions (Isabel) for [facility] stating that they are out of network with pt's insurance and if pt admits to [facility] pt will need to pay out of network benefits.       This RADHA had confirmed on line that [facility] was in network with [employer] prior to making the referral.  RADHA phoned [employer] this am ([phone]) and spoke with Moni in benefits call reference number [id number].  Moni confimed that [facility] is in-network with pt's plan and she stated that [facility] tax ID number is [id number].  While on the phone with Moni, RADHA also asked to confirm that [facility] is in network with [employer] (RADHA has also verified this on line prior to making the referral) and Moni confirmed that they are in network with pt's plan under NPI number [id number].     RADHA phoned Admissions (Isabel) for [facility] and informed that [employer] confirmed that they are in -network.  Per  further discussion, Isabel indicates that it is their physician that would be assigned to follow that is out of network with pt's insurance.     SW updated pt who states that she does not want to pay any out of network fee's.   Pt requests placement at [facility].       RADHA received a call (12:53pm) from Admissions (Brooklyn) at  [facility] stating that per call to [employer] to obtain prior auth, she was told that they are not in network.  Brooklyn states that per call to [facility], pt's primary clinic needs to be contacted in regards to which SNF's are in network (compatible with pt's insurance).  RADHA phoned (1:53pm) [facility] ([phone]) and spoke with Jose (sp?).  Per Jose, the clinic does not have a SW or a Referral Nurse.  Jose states that she will send a message to pt's PCP and nurse and request that one of them call this SW back in regards to which SNF's are compatible with pt's insurance.  At 2:14pm, RADHA phoned [facility] in regards to difficulty in placing pt related to insurance, a recording indicates that [facility] is now closed for the Thanksgiving Day Holiday.    RADHA will continue to follow.    STEVE Adams  Social Work, 6A  Phone:  [phone]  Pager:  [phone]  11/24/2021         STEVE Adams  Social Work, 6A  Phone:  [phone]  Pager:  [phone]  11/24/2021

## 2021-11-24 NOTE — PLAN OF CARE
Status: Admitted 11/17 for progressive BLE numbness/weakness, in hands as well. IVIG throughout stay, Hx of regional pain syndrome.   Vitals: VSS on RA.   Neuros: A&Ox4. BUE 5/5, BLE 4+-5-/5. Endorses N/T to hands, feet, reported it worsened after walking throughout unit. On paging list to provider.  IV: No PIV, okay by provider.   Labs/Electrolytes: WNL.   Resp/trach: Sating mid-high 90s on RA. LS clear, equal bilaterally.   Diet: Regular diet, gluten free. Denies nausea, good intake at meals.   Bowel status: BM this AM per pt report.   : Voiding spontaneously without difficulty.   Skin: WNL.   Pain: Non-cardiac chest pain, r/t regional pain syndrome,  burning/numbness to feet. Pins and needles in hands, feet and intermittent face. PRN oxycodone x2, atarax x2, with relief. Goal of 6/10 pain.   Activity: SBA with walker, GB. Call appropriately.   Social:  Sb at bedside. Will provide transport to TCU once placement is arranged. Pt tearful regarding potentially not discharging today.  discussing with patient, team made aware. Pt would like to go home if their is any way possible.   Plan: Pending insurance approval for TCU stay. Pt reports frustration from not being accepted to [facility] d/t insurance.  to transport once arranged. Obtain beckett if pt does not discharge.

## 2021-11-24 NOTE — PROGRESS NOTES
This entry was made in error.    STEVE Adams  Social Work, 6A  Phone:  [phone]  Pager:  [phone]  11/24/2021

## 2021-11-24 NOTE — PLAN OF CARE
Status: Admitted 11/17 for progressive LLE numbness/weakness, received IVIG throughout the stay. History of regional pain syndrome.  Vitals: VSS  Neuros: A/Ox4, BUE 5/5 BLE 5-/5, N/T in feet and hands overnight  IV: No PIV, okay'd by provider  Resp/trach: RA  Diet: Regular  Bowel status: LBM 11/23  : voiding  Skin: WNL  Pain: non-cardiac chest pain intermittently, gave scheduled tylenol overnight, no PRNs required  Activity: SBA with walker  Plan: Discharge today,  is able to transport patient to TCU and will arrive at hospital around 12:20

## 2021-11-24 NOTE — PROGRESS NOTES
Care Management Follow Up    Length of Stay (days): 7    Expected Discharge Date: 11/24/2021     Concerns to be Addressed: discharge planning     Patient plan of care discussed at interdisciplinary rounds: Yes    Anticipated Discharge Disposition:  (Short term TCU placement)     Anticipated Discharge Services:  (Short term TCU placement)  Anticipated Discharge DME:  Not applicable    Patient/family educated on Medicare website which has current facility and service quality ratings: yes (SW provided copy of Medicare Care Compare)  Education Provided on the Discharge Plan:  yes  Patient/Family in Agreement with the Plan: yes    Referrals Placed by CM/SW: Post Acute Facilities  Private pay costs discussed: not applicable at this time    Additional Information:  SW is following pt for discharge planning.  TCU placement is recommended and per Dr. Sheets, pt remains medically ready for discharge.  SW received a 11/23/2021 5:18pm voice mail from Admissions (Isabel) for [facility] stating that they are out of network with pt's insurance and if pt admits to [facility] pt will need to pay out of network benefits.      This RADHA had confirmed on line that [facility] was in network with [employer] prior to making the referral.  RADHA phoned [employer] this am ([phone]) and spoke with Moni in benefits call reference number [phone].  Moni confimed that [facility] is in-network with pt's plan and she stated that [facility] tax ID number is [id number].  While on the phone with Moni, RADHA also asked to confirm that [facility] is in network with [employer] (RADHA has also verified this on line prior to making the referral) and Moni confirmed that they are in network with pt's plan under NPI number [id number].    RADHA phoned Admissions (Isabel) for [facility] and informed that [employer] confirmed that they are in -network.  Per further  discussion, Isabel indicates that it is their physician that would be assigned to follow that is out of network with pt's insurance.    SW updated pt who states that she does not want to pay any out of network fee's.   Pt requests placement at [facility].  RADHA phoned Admissions (Brooklyn) at [facility] who states that they still have bed availability and could accept pt for admit today if prior auth received. Brooklyn states that she will submit prior auth request now.     RADHA will arrange for pt to discharge to [facility] and [facility] when confirmation of prior auth from insurance is received.    STEVE Adams  Social Work, 6A  Phone:  [phone]  Pager:  [phone]  11/24/2021          SAMMI Song

## 2021-11-25 LAB
TROPONIN I SERPL HS-MCNC: 11 NG/L
TROPONIN I SERPL-MCNC: <0.015 UG/L (ref 0–0.04)

## 2021-11-25 PROCEDURE — 84484 ASSAY OF TROPONIN QUANT: CPT | Performed by: STUDENT IN AN ORGANIZED HEALTH CARE EDUCATION/TRAINING PROGRAM

## 2021-11-25 PROCEDURE — 99232 SBSQ HOSP IP/OBS MODERATE 35: CPT | Mod: GC | Performed by: PSYCHIATRY & NEUROLOGY

## 2021-11-25 PROCEDURE — 93010 ELECTROCARDIOGRAM REPORT: CPT | Performed by: INTERNAL MEDICINE

## 2021-11-25 PROCEDURE — 36415 COLL VENOUS BLD VENIPUNCTURE: CPT | Performed by: STUDENT IN AN ORGANIZED HEALTH CARE EDUCATION/TRAINING PROGRAM

## 2021-11-25 PROCEDURE — 250N000013 HC RX MED GY IP 250 OP 250 PS 637: Performed by: PSYCHIATRY & NEUROLOGY

## 2021-11-25 PROCEDURE — 250N000013 HC RX MED GY IP 250 OP 250 PS 637: Performed by: STUDENT IN AN ORGANIZED HEALTH CARE EDUCATION/TRAINING PROGRAM

## 2021-11-25 PROCEDURE — 120N000002 HC R&B MED SURG/OB UMMC

## 2021-11-25 PROCEDURE — 93005 ELECTROCARDIOGRAM TRACING: CPT

## 2021-11-25 RX ORDER — LIDOCAINE 4 G/G
1 PATCH TOPICAL
Status: DISCONTINUED | OUTPATIENT
Start: 2021-11-25 | End: 2021-11-26 | Stop reason: HOSPADM

## 2021-11-25 RX ADMIN — Medication 2.5 MG: at 07:42

## 2021-11-25 RX ADMIN — LIDOCAINE 1 PATCH: 246 PATCH TOPICAL at 00:54

## 2021-11-25 RX ADMIN — HYDROXYZINE HYDROCHLORIDE 50 MG: 25 TABLET, FILM COATED ORAL at 13:54

## 2021-11-25 RX ADMIN — ACETAMINOPHEN 975 MG: 325 TABLET, FILM COATED ORAL at 12:14

## 2021-11-25 RX ADMIN — ACETAMINOPHEN 975 MG: 325 TABLET, FILM COATED ORAL at 03:43

## 2021-11-25 RX ADMIN — Medication 2.5 MG: at 13:54

## 2021-11-25 RX ADMIN — OXYCODONE HYDROCHLORIDE 5 MG: 5 TABLET ORAL at 22:18

## 2021-11-25 RX ADMIN — Medication 1 TABLET: at 07:43

## 2021-11-25 RX ADMIN — CYCLOBENZAPRINE 10 MG: 10 TABLET, FILM COATED ORAL at 20:08

## 2021-11-25 RX ADMIN — Medication 3 MG: at 22:18

## 2021-11-25 RX ADMIN — CYCLOBENZAPRINE 10 MG: 10 TABLET, FILM COATED ORAL at 13:50

## 2021-11-25 RX ADMIN — HYDROXYZINE HYDROCHLORIDE 50 MG: 25 TABLET, FILM COATED ORAL at 22:18

## 2021-11-25 RX ADMIN — ACETAMINOPHEN 975 MG: 325 TABLET, FILM COATED ORAL at 20:08

## 2021-11-25 RX ADMIN — CYCLOBENZAPRINE 10 MG: 10 TABLET, FILM COATED ORAL at 07:43

## 2021-11-25 RX ADMIN — HYDROXYZINE HYDROCHLORIDE 50 MG: 25 TABLET, FILM COATED ORAL at 03:43

## 2021-11-25 ASSESSMENT — ACTIVITIES OF DAILY LIVING (ADL)
ADLS_ACUITY_SCORE: 6
ADLS_ACUITY_SCORE: 7
ADLS_ACUITY_SCORE: 6
ADLS_ACUITY_SCORE: 7
ADLS_ACUITY_SCORE: 6
ADLS_ACUITY_SCORE: 7
ADLS_ACUITY_SCORE: 6
ADLS_ACUITY_SCORE: 6
ADLS_ACUITY_SCORE: 7
ADLS_ACUITY_SCORE: 7
ADLS_ACUITY_SCORE: 6
ADLS_ACUITY_SCORE: 7
ADLS_ACUITY_SCORE: 6
ADLS_ACUITY_SCORE: 7
ADLS_ACUITY_SCORE: 6
ADLS_ACUITY_SCORE: 6
ADLS_ACUITY_SCORE: 7
ADLS_ACUITY_SCORE: 6
ADLS_ACUITY_SCORE: 7

## 2021-11-25 NOTE — PLAN OF CARE
Status: Admitted 11/17 with progressive lower limb numbness and weakness, also in hands. Hx complex regional pain syndrome. IVIG (5 rounds) completed while here   Vitals: VSS on RA, tachy intermittently with activity   Neuros: A+O x4. 5/5 BUE, 4-5/5 BLE. N/T knees to toes and hands  IV: No PIV   Resp/trach: WNL  Diet: Gluten free, intermittent nausea, none overnight   Bowel status: No BM overnight   : Voiding   Skin: WNL  Pain: Atarax helps with the tingling in BLE. Upon arrival, pt called complaining of chest pain non related to her CRPS. Radiating to collarbone and shoulder and was stabbing and burning. EKG, troponin WNL. Lidocaine applied to area with good results. VSS remained stable throughout. This has since resolved. Patient also complained of migraine-like HA. One time oxycodone given  Activity: A1 GB and walker  Plan: TCU pending insurance issues, continue with POC

## 2021-11-25 NOTE — PROGRESS NOTES
"[facility]  General Neurology - Progress Note    Patient Name:  Peggy Jessica  Date of Service:  November 22, 2021    Interval/Subjective:    Some acute pain of the Left chest last night, which was described as different from her previous CRPS of that same area. Trop and EKG normal, and lidocaine patch applied, which seemed to resolve the pain.       Objective:    Vitals: /73   Pulse 98   Temp 97.8  F (36.6  C) (Oral)   Resp 16   Ht 1.645 m (5' 4.75\")   Wt 74.3 kg (163 lb 14.4 oz)   SpO2 100%   BMI 27.49 kg/m      General: Awake, alert, interactive; Sitting in bed, NAD, cooperative  HEENT: Normocephalic, atraumatic, nares patent, poor dentition  Resp: No increased work of breathing  Cardio: RRR  Abdomen: Soft, non-distended, non-tender  Extremities: Warm and well perfused, peripheral pulses present, no edema  Skin: Not jaundiced, no rash, rare scattered ecchymoses  Psych: Slightly anxious  Neuro:   Mental status: Awake, attentive, interactive; Recalls remote and recent history with accuracy; simple calculations intact  Speech/Language: Fluent speech without paraphasic errors; No dysarthria; naming, repetition, comprehension intact  Cranial nerves: Visual fields intact to confrontation, Eyes conjugate, Pupils 4mm and brisk, EOMI w/ normal and smooth pursuit, face expression symmetric, facial sensation intact and symmetric, hearing intact to conversation, shoulder shrug strong, palate rise symmetric, tongue/uvula midline.  Motor:   Bulk: Appropriate  Tone: Appropriate   Spontaneous movements: No myoclonus or asterixis  Power: Pronator drift absent. Strong and symmetric in BUEs  Reflexes: 2+ and symmetric biceps, triceps, brachioradialis; 1+ in left patellar.  Absent in right patellar (ligament appears physically different than left), and achilles. No crossed adductors or spread. Toes down-going  Sensory: Light touch mildly decreased in the ankles; Pinprick " patchy and inconsistent findings in the BLE's, possible R>L to mid shin and decreased on RUE compared to left; Proprioception intact in all limbs, including with eyes closed; Two-point discrimination intact in the BLE's   Coordination: FNF and heel to shin intact.  Mild impairment on toe and heel taps bilaterally.  Station/Gait: Up with minimal assistance.  Able to walk without assistance when encouraged.  Narrow base with slow, cautious steps.      Pertinent Investigations:    I have personally reviewed most recent and pertinent labs, tests, and radiological images.     MR Lumbar Spine - 11/18/21  Impression:  1. Diffuse mild enhancement of the cauda equina nerve roots without  nodularity or clumping is nonspecific.  2. Probable mild enhancement of the posterior paraspinous musculature  from L4 through S1. However, artifact could have similar appearance.  3. Mild multilevel lumbar spondylosis without evidence for high-grade  spinal canal or neural foraminal stenosis.    Component      Latest Ref Rng & Units 11/17/2021   PAULINE interpretation      Negative Positive (A)   PAULINE pattern 1       Dense fine speckled   PAULINE titer 1       1:160     Component      Latest Ref Rng & Units 11/17/2021   GM1 Antibody IgG      0 - 50 IV 4   GM1 Antibody IgM      0 - 50 IV 4   GD1b Antibody IgG      0 - 50 IV 4   GD1b Antibody IgM      0 - 50 IV 3   GQ1b Antibody IgG      0 - 50 IV 3   GQ1b Antibody IgM      0 - 50 IV 3     Component      Latest Ref Rng & Units 11/21/2021   Vitamin B6      20.0 - 125.0 nmol/L 106.7       Results for SEBASTIÁN YOUSIF (MRN [id number]) as of 11/19/2021 07:21   Ref. Range 11/18/2021 11:10   RBC CSF Latest Ref Range: 0 - 2 /uL 6 (H)   WBC CSF Latest Ref Range: 0 - 5 /uL 0   Appearance CSF Latest Ref Range: Clear  Clear   Color CSF Latest Ref Range: Colorless  Colorless   Tube Number Unknown 4   Glucose CSF Latest Ref Range: 40 - 70 mg/dL 73 (H)   Protein Total CSF Latest Ref Range: 15 - 60 mg/dL 105  (H)      Ref. Range 11/18/2021 16:23   Bilirubin Direct Latest Ref Range: 0.0 - 0.2 mg/dL 0.1   Ferritin Latest Ref Range: 8 - 252 ng/mL 211   Iron Latest Ref Range: 35 - 180 ug/dL 76   Iron Binding Cap Latest Ref Range: 240 - 430 ug/dL 361   Iron Saturation Index Latest Ref Range: 15 - 46 % 21   Transferrin Latest Ref Range: 210 - 360 mg/dL 270     Component      Latest Ref Rng & Units 11/17/2021   Albumin Fraction      3.7 - 5.1 g/dL 4.2   Alpha 1 Fraction      0.2 - 0.4 g/dL 0.3   Alpha 2 Fraction      0.5 - 0.9 g/dL 0.9   Beta Fraction      0.6 - 1.0 g/dL 1.0   Gamma Fraction      0.7 - 1.6 g/dL 1.2   Monoclonal Peak      <=0.0 g/dL 0.0   ELP Interpretation:       Essentially normal electrophoretic pattern. No obvious monoclonal proteins seen. Pathologic significance requires clinical correlation.  Tahir Pool M.D., Ph.D., Pathologist.         Assessment/Plan:  Peggy Jessica is a 46 year old woman with PMH of CRPS (right leg from stress fracture and chest from bilateral mastectomy - BRCA +) with several systemic changes to sensation and subjective autonomic dysfunction.  She presented to the ED due to 2 to 3 weeks of progressive sensory changes mostly in the BLEs and slight involvement of the hands that have resulted in some clumsiness.  She reported some falls as well.       She is having acute on chronic sensory changes that have caused imbalance and clumsiness with her feet and her hands subjectively.  She has got a previous significant neuropathy work-up both of lab studies and of autonomic testing.  These have mostly been negative to date outside of possible mild autonomic dysfunction on testing, although this is not entirely specific to an organic etiology (likely anticholinergic medications).  She received an EMG in 2008 that was reportedly unremarkable (raw data currently unavailable), however she did have an EMG yesterday (11/18) that was again unremarkable.  Objective examination is  significant for motor impersistence, patchy and vague pinprick changes to BLEs, mild discrepancy of light touch in the right hemibody (light touch discrepancy was in left hemibody during examination at [facility]), and her Romberg test was essentially normal (mild swaying in all directions but without fall and continuously corrected).  There are some functional elements to this exam, however there there are some objective findings including: possible mild enhancement of the cauda equina and paraspinal musculature on MRI, as well elevated protein in the CSF.  The constellation of these findings are are concerning for Chronic Immune Sensory Polyneuropathy, which may only involve the sensory nerve root prior to the DRG, which complicate what can be found on EMG.  Other etiologies, such as a paraneoplastic etiology still need to be ruled out.   Those studies are pending and will be follow up on in outpatient setting.  Follow will be with Dr. Duenas in Neuromuscular Clinic.  Pt has subjectively improved over the course of IVIG treatment until late this afternoon.       #1 Complex regional pain syndrome  #2 Sensory changes causing imbalance and clumsiness, acute on chronic  #3 Likely Chronic Immune Sensory Polyneuropathy (CISP)    - MRI L-spine w/wo contrast    - probable cauda equina enhancement, mild but diffuse   - possible enhancement of inferior posterior paraspinal musculature.   - mild lumber spondylosis   - no apparent enhancement of dorsal root ganglia  - Serum studies   - Positive PAULINE (1:160)   - SSA/SSB - negative   Studies still pending:            - Paraneoplastic panel ([facility] panel)  - EMG completed   - unremarkable  - Lumbar Puncture complete   - Elevate Protein in CSF (105), 0 WBC   Studies still pending   - Oligoclonal Banding  - IVIG treatment   - 5 days of treatment completed 11/22   - Pre and post diphenhydramine treatment ordered   - May need longer course of outpatient treatment depending on  response  - CT C/A/P for evaluation of malignancy in setting of possible paraneoplastic process   - Negative for malignancy  - PT/OT  - PTA Oxycodone 5 mg BID PRN, Flexeril 10 mg TID, and medical cannabis (self administered) for pain; Lidocaine patch for local pain    #Nausea  - Zofran PRN    #Elevevated Hemoglobin/Hematocrit (resolved to high range)  Possible hemoconcentration.  Less concern for hemochromatosis  - Iron studies WNL      FEN: Regular Diet  Malnutrition: Patient does not meet two of the above criteria necessary for diagnosing malnutrition  PPx: SCDs  Code: FULL    Dispo: Pending TCU acceptance for rehab    Patient was seen and discussed with Dr. Andres Bro MD  PGY-3 Neurology Resident  Securely message with the Vocera Web Console (learn more here)  11/25/2021    I saw and evaluated the patient with the resident and agree with the assessment and plan. I was present for entire minor examination.    Felicia Campos MD  Chief, Multiple Sclerosis Division  Department of Neurology  [facility] [place]

## 2021-11-25 NOTE — PROVIDER NOTIFICATION
Pt called complaining of severe chest pain that is different from usual CRPS. Rated 9/10 and stabbing and burning with radiating to L shoulder and collarbone. VSS. Neurology team (Genie Clark MD) oaged. Troponin, EKG, and lidocaine patch applied.

## 2021-11-25 NOTE — PLAN OF CARE
Status: Admitted 11/17 for progressive BLE numbness/weakness, in hands as well. IVIG throughout stay, Hx of regional pain syndrome.   Vitals: tachy in low 100s, high 90s.   Neuros: n/t to fingers/toes. Tops of feet worsening throughout evening, neurology assessed at bedside.   IV: no PIV ok per provider  Labs/Electrolytes: WBC 3.8. Lactic triggered and still resulting.   Diet: Regular. Report of nausea later this evening, zofran given.   Bowel status: LBM 11/24  : voiding spontaneously   Skin: WNL  Pain: Non-cardiac chest pain, r/t regional pain syndrome,  burning/numbness to feet. Pins and needles in hands, feet and intermittent face. Bedtime does of oxycodone given early. Atarax given as well.   Activity: SBA + gb + walker.   Social: on phone with family throughout evening. Tearful about not leaving for TCU tonight.   Plan: Discharge to TCU pending insurance.

## 2021-11-26 ENCOUNTER — APPOINTMENT (OUTPATIENT)
Dept: PHYSICAL THERAPY | Facility: CLINIC | Age: 46
DRG: 074 | End: 2021-11-26
Payer: COMMERCIAL

## 2021-11-26 ENCOUNTER — APPOINTMENT (OUTPATIENT)
Dept: OCCUPATIONAL THERAPY | Facility: CLINIC | Age: 46
DRG: 074 | End: 2021-11-26
Payer: COMMERCIAL

## 2021-11-26 VITALS
OXYGEN SATURATION: 98 % | TEMPERATURE: 97.7 F | SYSTOLIC BLOOD PRESSURE: 110 MMHG | HEART RATE: 77 BPM | DIASTOLIC BLOOD PRESSURE: 75 MMHG | WEIGHT: 163.9 LBS | RESPIRATION RATE: 16 BRPM | BODY MASS INDEX: 27.31 KG/M2 | HEIGHT: 65 IN

## 2021-11-26 LAB
ATRIAL RATE - MUSE: 97 BPM
DIASTOLIC BLOOD PRESSURE - MUSE: NORMAL MMHG
INTERPRETATION ECG - MUSE: NORMAL
P AXIS - MUSE: 42 DEGREES
PR INTERVAL - MUSE: 152 MS
QRS DURATION - MUSE: 74 MS
QT - MUSE: 356 MS
QTC - MUSE: 452 MS
R AXIS - MUSE: -6 DEGREES
SYSTOLIC BLOOD PRESSURE - MUSE: NORMAL MMHG
T AXIS - MUSE: 27 DEGREES
VENTRICULAR RATE- MUSE: 97 BPM

## 2021-11-26 PROCEDURE — 250N000013 HC RX MED GY IP 250 OP 250 PS 637: Performed by: STUDENT IN AN ORGANIZED HEALTH CARE EDUCATION/TRAINING PROGRAM

## 2021-11-26 PROCEDURE — 99239 HOSP IP/OBS DSCHRG MGMT >30: CPT | Mod: GC | Performed by: PSYCHIATRY & NEUROLOGY

## 2021-11-26 PROCEDURE — 97530 THERAPEUTIC ACTIVITIES: CPT | Mod: GP

## 2021-11-26 PROCEDURE — 250N000013 HC RX MED GY IP 250 OP 250 PS 637: Performed by: PSYCHIATRY & NEUROLOGY

## 2021-11-26 PROCEDURE — 97535 SELF CARE MNGMENT TRAINING: CPT | Mod: GO

## 2021-11-26 PROCEDURE — 97116 GAIT TRAINING THERAPY: CPT | Mod: GP

## 2021-11-26 PROCEDURE — 250N000011 HC RX IP 250 OP 636: Performed by: STUDENT IN AN ORGANIZED HEALTH CARE EDUCATION/TRAINING PROGRAM

## 2021-11-26 RX ORDER — HYDROXYZINE HYDROCHLORIDE 25 MG/1
25 TABLET, FILM COATED ORAL EVERY 6 HOURS PRN
Qty: 60 TABLET | Refills: 0 | Status: ON HOLD | OUTPATIENT
Start: 2021-11-26 | End: 2022-02-08

## 2021-11-26 RX ADMIN — HYDROXYZINE HYDROCHLORIDE 50 MG: 25 TABLET, FILM COATED ORAL at 11:33

## 2021-11-26 RX ADMIN — HYDROXYZINE HYDROCHLORIDE 50 MG: 25 TABLET, FILM COATED ORAL at 04:36

## 2021-11-26 RX ADMIN — CYCLOBENZAPRINE 10 MG: 10 TABLET, FILM COATED ORAL at 08:02

## 2021-11-26 RX ADMIN — ONDANSETRON 4 MG: 4 TABLET, ORALLY DISINTEGRATING ORAL at 08:40

## 2021-11-26 RX ADMIN — Medication 2.5 MG: at 08:04

## 2021-11-26 RX ADMIN — SENNOSIDES 8.6 MG: 8.6 TABLET ORAL at 08:03

## 2021-11-26 RX ADMIN — ACETAMINOPHEN 975 MG: 325 TABLET, FILM COATED ORAL at 11:27

## 2021-11-26 RX ADMIN — ACETAMINOPHEN 975 MG: 325 TABLET, FILM COATED ORAL at 04:35

## 2021-11-26 RX ADMIN — Medication 1 TABLET: at 08:02

## 2021-11-26 ASSESSMENT — ACTIVITIES OF DAILY LIVING (ADL)
ADLS_ACUITY_SCORE: 6

## 2021-11-26 NOTE — PROGRESS NOTES
Brief Note:      Pt chose to discharge directly to home due to insurance delays related to TCU placement.      STEVE Adams  Social Work, 6A  Phone:  [phone]  Pager:  [phone]  11/26/2021

## 2021-11-26 NOTE — PROGRESS NOTES
"Care Management Follow Up    Length of Stay (days): 9    Expected Discharge Date: 11/25/2021     Concerns to be Addressed: discharge planning     Patient plan of care discussed at interdisciplinary rounds: Yes    Anticipated Discharge Disposition:  (Short term TCU placement)     Anticipated Discharge Services:  (Short term TCU placement)  Anticipated Discharge DME:  Not applicable    Patient/family educated on Medicare website which has current facility and service quality ratings: yes (SW provided copy of Medicare Care Compare)  Education Provided on the Discharge Plan:  yes  Patient/Family in Agreement with the Plan: yes    Referrals Placed by CM/SW: Post Acute Facilities  Private pay costs discussed: Not applicable at this time    Additional Information:  SW is following pt for discharge planning.  TCU placement is recommended and pt awaiting discharge since 11/23/2021.  [facility] and [facility] had both assessed and approved pt for admit however, both indicate that when they phoned pt's insurance ([employer]) for prior auth they were told that they were not in network.  SW had verified both on line and with [employer] [facility] that both were in network.  Both facility's had indicated that with pt's insurance, [facility] needs to be contacted in regards to in network SNF's.  On 11/24/2021, SW had referred pt to  TCU and was told by Admissions (Tika) that per contact with pt's insurance, pt needs to admit to a [facility] affiliated TCU.  This SW had phoned pt's primary clinic on 11/24 and left a message for representative to call in regards to [facility] affiliated TCU's.  Today (11/26) this SW has a 11/24 4:29pm voice mail from a a person identifying themselves as a nurse (no name given) from [facility] stating that they would \"pass on\" this SW message to the provider and \"see what we can find " "out.\"      On 11/26/2021, RADHA phoned [facility] and spoke with telehealth nurse/triage nurse, Jesus to inquire about in network TCU's. Jesus stated that he is a telehealth nurse and can only pass on this SW message to pt's clinic and request that a clinic representative call this SM.  RADHA then phoned [employer] to further investigate which facility's are in network with pt's plan and a recording indicates that the offices are closed for the Thanksgiving Holiday.    RADHA updated pt who voices frustration with delay in discharge and with her health care insurance.     SW will continue to follow.    STEVE Adams  Social Work, 6A  Phone:  [phone]  Pager:  [phone]  11/26/2021          SAMMI Song      "

## 2021-11-26 NOTE — PLAN OF CARE
Occupational Therapy Discharge Summary    Reason for therapy discharge:    Discharged to home with home therapy.    Progress towards therapy goal(s). See goals on Care Plan in [facility] electronic health record for goal details.  Goals partially met.  Barriers to achieving goals:   discharge from facility.    Therapy recommendation(s):    Continued therapy is recommended.  Rationale/Recommendations:  Recommend home with assist and agree with  PT to promote increased balance/strength. Anticipate ADL performance will continue to improve as these areas progress. Patient obtaining commode and has shower chair/walker issued from the hospital.

## 2021-11-26 NOTE — PROGRESS NOTES
Care Management Follow Up    Length of Stay (days): 9    Expected Discharge Date: 11/26   Concerns to be Addressed: discharge planning     Patient plan of care discussed at interdisciplinary rounds: Yes    Anticipated Discharge Disposition: Home  Anticipated Discharge Services:  Home care vs OP PT   Anticipated Discharge DME:      Patient/family educated on Medicare website which has current facility and service quality ratings: yes Education Provided on the Discharge Plan:  yes  Patient/Family in Agreement with the Plan: yes    Referrals Placed by CM/SW: Home Care PT  Private pay costs discussed: Not applicable    Additional Information:  Per PT rec now recommending home with home PT. If that is unavailable OP PT okay.     Spoke with pt by phone who is in agreement with home PT and no agency preference. If they cannot start for a week, she prefers OP PT referral.     Home Care Agencies  [facility]- no staffing  [facility]- don't take insurance  [facility]- don't take insurance  [facility]- can start Tues/Wed. , sent referral , they need to review. Did not hear back. Will place orders for OP PT.           Cathie Coughlin, RN, [place]  Float Care Coordinator  Covering 6A RN   Pager: [phone]    For Weekend & Holiday on call RN Care Coordinator:  (Home discharge with needs including home care, Assisted living facility returns, Durable medical equip, IV antibiotics)   [place]  Pager [phone]

## 2021-11-26 NOTE — PROGRESS NOTES
"[facility]  General Neurology - Progress Note    Patient Name:  Peggy Jessica  Date of Service:  November 26, 2021    Interval/Subjective:    No Acute events overnight.    No changes from yesterday.  Insurance continue to be barrier for discharge.  Will have PT re-evaluate pt for possible discharge home with stairs.      Objective:    Vitals: /85 (BP Location: Right arm)   Pulse 100   Temp 97.7  F (36.5  C) (Axillary)   Resp 16   Ht 1.645 m (5' 4.75\")   Wt 74.3 kg (163 lb 14.4 oz)   SpO2 96%   BMI 27.49 kg/m      General: Awake, alert, interactive; Sitting in bed, NAD, cooperative  HEENT: Normocephalic, atraumatic, nares patent, poor dentition  Resp: No increased work of breathing  Cardio: RRR  Abdomen: Soft, non-distended, non-tender  Extremities: Warm and well perfused, peripheral pulses present, no edema  Skin: Not jaundiced, no rash, rare scattered ecchymoses  Psych: Slightly anxious  Neuro:   Mental status: Awake, attentive, interactive; Recalls remote and recent history with accuracy; simple calculations intact  Speech/Language: Fluent speech without paraphasic errors; No dysarthria; naming, repetition, comprehension intact  Cranial nerves: Visual fields intact to confrontation, Eyes conjugate, Pupils 4mm and brisk, EOMI w/ normal and smooth pursuit, face expression symmetric, facial sensation intact and symmetric, hearing intact to conversation, shoulder shrug strong, palate rise symmetric, tongue/uvula midline.  Motor:   Bulk: Appropriate  Tone: Appropriate   Spontaneous movements: No myoclonus or asterixis  Power: Pronator drift absent. Strong and symmetric in BUEs  Reflexes: 2+ and symmetric biceps, triceps, brachioradialis; 1+ in left patellar.  Absent in right patellar (ligament appears physically different than left), and achilles. No crossed adductors or spread. Toes down-going  Sensory: Light touch mildly decreased in the ankles; Pinprick patchy " and inconsistent findings in the BLE's, possible R>L to mid shin and decreased on RUE compared to left; Proprioception intact in all limbs, including with eyes closed; Two-point discrimination intact in the BLE's   Coordination: FNF and heel to shin intact.  Mild impairment on toe and heel taps bilaterally.  Station/Gait: Up with minimal assistance.  Able to walk without assistance when encouraged.  Narrow base with slow, cautious steps.      Pertinent Investigations:    I have personally reviewed most recent and pertinent labs, tests, and radiological images.     MR Lumbar Spine - 11/18/21  Impression:  1. Diffuse mild enhancement of the cauda equina nerve roots without  nodularity or clumping is nonspecific.  2. Probable mild enhancement of the posterior paraspinous musculature  from L4 through S1. However, artifact could have similar appearance.  3. Mild multilevel lumbar spondylosis without evidence for high-grade  spinal canal or neural foraminal stenosis.    Component      Latest Ref Rng & Units 11/17/2021   PAULINE interpretation      Negative Positive (A)   PAULINE pattern 1       Dense fine speckled   PAULINE titer 1       1:160     Component      Latest Ref Rng & Units 11/17/2021   GM1 Antibody IgG      0 - 50 IV 4   GM1 Antibody IgM      0 - 50 IV 4   GD1b Antibody IgG      0 - 50 IV 4   GD1b Antibody IgM      0 - 50 IV 3   GQ1b Antibody IgG      0 - 50 IV 3   GQ1b Antibody IgM      0 - 50 IV 3     Component      Latest Ref Rng & Units 11/21/2021   Vitamin B6      20.0 - 125.0 nmol/L 106.7       Results for SEBASTIÁN YOUSIF (MRN [id number]) as of 11/19/2021 07:21   Ref. Range 11/18/2021 11:10   RBC CSF Latest Ref Range: 0 - 2 /uL 6 (H)   WBC CSF Latest Ref Range: 0 - 5 /uL 0   Appearance CSF Latest Ref Range: Clear  Clear   Color CSF Latest Ref Range: Colorless  Colorless   Tube Number Unknown 4   Glucose CSF Latest Ref Range: 40 - 70 mg/dL 73 (H)   Protein Total CSF Latest Ref Range: 15 - 60 mg/dL 105 (H)       Ref. Range 11/18/2021 16:23   Bilirubin Direct Latest Ref Range: 0.0 - 0.2 mg/dL 0.1   Ferritin Latest Ref Range: 8 - 252 ng/mL 211   Iron Latest Ref Range: 35 - 180 ug/dL 76   Iron Binding Cap Latest Ref Range: 240 - 430 ug/dL 361   Iron Saturation Index Latest Ref Range: 15 - 46 % 21   Transferrin Latest Ref Range: 210 - 360 mg/dL 270     Component      Latest Ref Rng & Units 11/17/2021   Albumin Fraction      3.7 - 5.1 g/dL 4.2   Alpha 1 Fraction      0.2 - 0.4 g/dL 0.3   Alpha 2 Fraction      0.5 - 0.9 g/dL 0.9   Beta Fraction      0.6 - 1.0 g/dL 1.0   Gamma Fraction      0.7 - 1.6 g/dL 1.2   Monoclonal Peak      <=0.0 g/dL 0.0   ELP Interpretation:       Essentially normal electrophoretic pattern. No obvious monoclonal proteins seen. Pathologic significance requires clinical correlation.  Tahir Pool M.D., Ph.D., Pathologist.         Assessment/Plan:  Peggy Jessica is a 46 year old woman with PMH of CRPS (right leg from stress fracture and chest from bilateral mastectomy - BRCA +) with several systemic changes to sensation and subjective autonomic dysfunction.  She presented to the ED due to 2 to 3 weeks of progressive sensory changes mostly in the BLEs and slight involvement of the hands that have resulted in some clumsiness.  She reported some falls as well.       She is having acute on chronic sensory changes that have caused imbalance and clumsiness with her feet and her hands subjectively.  She has got a previous significant neuropathy work-up both of lab studies and of autonomic testing.  These have mostly been negative to date outside of possible mild autonomic dysfunction on testing, although this is not entirely specific to an organic etiology (likely anticholinergic medications).  She received an EMG in 2008 that was reportedly unremarkable (raw data currently unavailable), however she did have an EMG yesterday (11/18) that was again unremarkable.  Objective examination is significant  for motor impersistence, patchy and vague pinprick changes to BLEs, mild discrepancy of light touch in the right hemibody (light touch discrepancy was in left hemibody during examination at [facility]), and her Romberg test was essentially normal (mild swaying in all directions but without fall and continuously corrected).  There are some functional elements to this exam, however there there are some objective findings including: possible mild enhancement of the cauda equina and paraspinal musculature on MRI, as well elevated protein in the CSF.  The constellation of these findings are are concerning for Chronic Immune Sensory Polyneuropathy, which may only involve the sensory nerve root prior to the DRG, which complicate what can be found on EMG.  Other etiologies, such as a paraneoplastic etiology still need to be ruled out.   Those studies are pending and will be follow up on in outpatient setting.  Follow will be with Dr. Duenas in Neuromuscular Clinic.  Pt has subjectively improved over the course of IVIG treatment with waxing and waning of subjective symptoms since afternoon of 11/25.       #1 Complex regional pain syndrome  #2 Sensory changes causing imbalance and clumsiness, acute on chronic  #3 Likely Chronic Immune Sensory Polyneuropathy (CISP)    - MRI L-spine w/wo contrast    - probable cauda equina enhancement, mild but diffuse   - possible enhancement of inferior posterior paraspinal musculature.   - mild lumber spondylosis   - no apparent enhancement of dorsal root ganglia  - Serum studies   - Positive PAULINE (1:160)   - SSA/SSB - negative   Studies still pending:            - Paraneoplastic panel ([facility] panel)  - EMG completed   - unremarkable  - Lumbar Puncture complete   - Elevate Protein in CSF (105), 0 WBC   Studies still pending   - Oligoclonal Banding  - IVIG treatment   - 5 days of treatment completed 11/22   - Pre and post diphenhydramine treatment ordered   - May need longer course of outpatient  treatment depending on response  - CT C/A/P for evaluation of malignancy in setting of possible paraneoplastic process   - Negative for malignancy  - PT/OT  - PTA Oxycodone 5 mg BID PRN, Flexeril 10 mg TID, and medical cannabis (self administered) for pain; Lidocaine patch for local pain    #Nausea  - Zofran PRN    #Elevevated Hemoglobin/Hematocrit (resolved to high range)  Possible hemoconcentration.  Less concern for hemochromatosis  - Iron studies WNL      FEN: Regular Diet  Malnutrition: Patient does not meet two of the above criteria necessary for diagnosing malnutrition  PPx: SCDs  Code: FULL    Dispo: Pending TCU acceptance for rehab.  Possible discharge home pending PT evaluation.    Patient was seen and discussed with Dr. Andres Sheets MD  Neurology Resident , PGY-2

## 2021-11-26 NOTE — PLAN OF CARE
Status: Admitted 11/17 with progressive lower limb numbness and weakness, also in hands. Hx complex regional pain syndrome. IVIG (5 rounds) completed while here   Vitals: VSS on RA, tachy intermittently with activity   Neuros: A+O x4. 5/5 throughout with generalized weakness. N/T hands and BLE, improving  IV: No PIV   Resp/trach: WNL  Diet: Gluten free, intermittent nausea, none overnight   Bowel status: No BM overnight   : Voiding   Skin: WNL  Pain: Atarax helps with the tingling in BLE. Oxycodone and tylenol for chest pain (non cardiac related)  Activity: SBA GB and walker   Plan: TCU pending insurance issues, continue with POC

## 2021-11-26 NOTE — PLAN OF CARE
[id number]:    Status: Admitted 11/17 for progressive BLE numbness/weakness, in hands as well. IVIG throughout stay, Hx of regional pain syndrome.   Vitals: VSS on RA. At times tachycardic this shift, asymptomatic.  Neuros: A&Ox4. BUE 5/5, BLE 4+-5-/5. Endorses N/T to hands, feet, to face at times.   IV: No PIV, okay by provider.   Labs/Electrolytes: WNL.   Resp/trach: Sating mid-high 90s on RA. LS clear, equal bilaterally.   Diet: Regular diet, gluten free. Denies nausea, good intake at meals.   Bowel status: BM this AM per pt report.   : Voiding spontaneously without difficulty.   Skin: WNL.   Pain: Non-cardiac chest pain, r/t regional pain syndrome,  burning/numbness to feet. Pins and needles in hands, feet and intermittent face, R hand numbness>Lhand. PRN oxycodone x2, atarax x1, with relief. Goal of 6/10 pain. Scheduled tylenol and flexeril to control.  Activity: SBA with walker, GB. Call appropriately. Walked unit x2 this shift.  Social: Pt had thanksgiving with family in lobby this shift.    Plan: Pending insurance approval for TCU stay.

## 2021-11-27 ENCOUNTER — NURSE TRIAGE (OUTPATIENT)
Dept: NURSING | Facility: CLINIC | Age: 46
End: 2021-11-27
Payer: COMMERCIAL

## 2021-11-27 NOTE — TELEPHONE ENCOUNTER
Patient is complaining of right side pain, with burning with urination. Patient rates pain 8/10. Tylenol taken with some relief IV IG last Monday 11/21/21. Patient denies any fever.   Triage guidelines recommend to see HCP within 4 hours ( or pcp triage). Caller verbalized and understands directives.  COVID 19 Nurse Triage Plan/Patient Instructions    Please be aware that novel coronavirus (COVID-19) may be circulating in the community. If you develop symptoms such as fever, cough, or SOB or if you have concerns about the presence of another infection including coronavirus (COVID-19), please contact your health care provider or visit [email or web address].     Disposition/Instructions    Virtual Visit with provider recommended. Reference Visit Selection Guide.    Thank you for taking steps to prevent the spread of this virus.  o Limit your contact with others.  o Wear a simple mask to cover your cough.  o Wash your hands well and often.    Resources    [facility]: About COVID-19: [email or web address]    CDC: What to Do If You're Sick: www.cdc.gov/coronavirus/2019-ncov/about/steps-when-sick.html    CDC: Ending Home Isolation: www.cdc.gov/coronavirus/2019-ncov/hcp/disposition-in-home-patients.html     CDC: Caring for Someone: www.cdc.gov/coronavirus/2019-ncov/if-you-are-sick/care-for-someone.html     [facility]: Interim Guidance for Hospital Discharge to Home: www.health.[facility].mn.us/diseases/coronavirus/hcp/hospdischarge.pdf    [facility] clinical trials (COVID-19 research studies): clinicalaffairs.[facility]/umn-clinical-trials     Below are the COVID-19 hotlines at the [facility] ([facility]). Interpreters are available.   o For health questions: Call 432-541-7060 or 1-239.709.9239 (7 a.m. to 7 p.m.)  o For questions about schools and childcare: Call 604-860-0866 or 1-804.203.3920 (7 a.m. to 7 p.m.)                     Reason for Disposition    [1] SEVERE pain with urination   (e.g., excruciating) AND [2] not improved after 2 hours of pain medicine and Sitz bath    Additional Information    Negative: Shock suspected (e.g., cold/pale/clammy skin, too weak to stand, low BP, rapid pulse)    Negative: Sounds like a life-threatening emergency to the triager    Negative: Followed a genital area injury    Negative: Taking antibiotic for urinary tract infection (UTI)    Negative: Pregnant    Negative: Postpartum (from 0 to 6 weeks after delivery)    Negative: [1] Unable to urinate (or only a few drops) > 4 hours AND [2] bladder feels very full (e.g., palpable bladder or strong urge to urinate)    Negative: Vomiting    Negative: Patient sounds very sick or weak to the triager    Protocols used: URINATION PAIN - FEMALE-A-AH

## 2021-11-28 LAB — MAYO MISC RESULT: NORMAL

## 2021-12-03 ENCOUNTER — OFFICE VISIT (OUTPATIENT)
Dept: URGENT CARE | Facility: URGENT CARE | Age: 46
End: 2021-12-03
Payer: COMMERCIAL

## 2021-12-03 VITALS
BODY MASS INDEX: 27.33 KG/M2 | WEIGHT: 163 LBS | HEART RATE: 109 BPM | DIASTOLIC BLOOD PRESSURE: 84 MMHG | TEMPERATURE: 97.8 F | OXYGEN SATURATION: 98 % | SYSTOLIC BLOOD PRESSURE: 127 MMHG

## 2021-12-03 DIAGNOSIS — R30.0 DYSURIA: Primary | ICD-10-CM

## 2021-12-03 PROBLEM — Z85.41 HX OF CERVICAL CANCER: Status: ACTIVE | Noted: 2017-11-28

## 2021-12-03 PROBLEM — G90.521 COMPLEX REGIONAL PAIN SYNDROME I OF RIGHT LOWER LIMB: Status: ACTIVE | Noted: 2021-11-04

## 2021-12-03 PROBLEM — Z15.09 BRCA GENE MUTATION POSITIVE IN FEMALE: Status: ACTIVE | Noted: 2020-10-20

## 2021-12-03 PROBLEM — Z90.13 S/P BILATERAL MASTECTOMY: Status: ACTIVE | Noted: 2020-11-04

## 2021-12-03 PROBLEM — Z15.02 BRCA GENE MUTATION POSITIVE IN FEMALE: Status: ACTIVE | Noted: 2020-10-20

## 2021-12-03 PROBLEM — Z15.01 BRCA GENE MUTATION POSITIVE IN FEMALE: Status: ACTIVE | Noted: 2020-10-20

## 2021-12-03 PROBLEM — K57.92 DIVERTICULITIS: Status: ACTIVE | Noted: 2019-10-17

## 2021-12-03 LAB
ALBUMIN UR-MCNC: NEGATIVE MG/DL
APPEARANCE UR: CLEAR
BILIRUB UR QL STRIP: NEGATIVE
CLUE CELLS: NORMAL
COLOR UR AUTO: YELLOW
GLUCOSE UR STRIP-MCNC: NEGATIVE MG/DL
HGB UR QL STRIP: NEGATIVE
KETONES UR STRIP-MCNC: NEGATIVE MG/DL
LEUKOCYTE ESTERASE UR QL STRIP: NEGATIVE
NITRATE UR QL: NEGATIVE
PH UR STRIP: 7 [PH] (ref 5–7)
SP GR UR STRIP: 1.01 (ref 1–1.03)
TRICHOMONAS, WET PREP: NORMAL
UROBILINOGEN UR STRIP-ACNC: 1 E.U./DL
WBC'S/HIGH POWER FIELD, WET PREP: NORMAL
YEAST, WET PREP: NORMAL

## 2021-12-03 PROCEDURE — 81003 URINALYSIS AUTO W/O SCOPE: CPT | Performed by: NURSE PRACTITIONER

## 2021-12-03 PROCEDURE — 87210 SMEAR WET MOUNT SALINE/INK: CPT | Performed by: NURSE PRACTITIONER

## 2021-12-03 PROCEDURE — 99203 OFFICE O/P NEW LOW 30 MIN: CPT | Performed by: NURSE PRACTITIONER

## 2021-12-03 RX ORDER — POLYETHYLENE GLYCOL 3350 17 G/17G
17 POWDER, FOR SOLUTION ORAL DAILY PRN
COMMUNITY
Start: 2021-06-10 | End: 2023-08-04

## 2021-12-03 RX ORDER — LEVETIRACETAM 500 MG/1
TABLET ORAL
Status: ON HOLD | COMMUNITY
Start: 2021-07-14 | End: 2022-02-08

## 2021-12-03 RX ORDER — MULTIPLE VITAMINS W/ MINERALS TAB 9MG-400MCG
1 TAB ORAL EVERY MORNING
COMMUNITY

## 2021-12-03 RX ORDER — PANTOPRAZOLE SODIUM 40 MG/1
40 TABLET, DELAYED RELEASE ORAL DAILY
Status: ON HOLD | COMMUNITY
Start: 2021-06-24 | End: 2022-02-08

## 2021-12-03 RX ORDER — TOPIRAMATE 50 MG/1
TABLET, FILM COATED ORAL
Status: ON HOLD | COMMUNITY
Start: 2021-08-04 | End: 2022-02-08

## 2021-12-03 RX ORDER — LORAZEPAM 1 MG/1
1-2 TABLET ORAL
Status: ON HOLD | COMMUNITY
Start: 2021-09-29 | End: 2022-02-08

## 2021-12-03 RX ORDER — AMITRIPTYLINE HYDROCHLORIDE 10 MG/1
TABLET ORAL
Status: ON HOLD | COMMUNITY
Start: 2021-01-22 | End: 2022-02-08

## 2021-12-03 RX ORDER — PROCHLORPERAZINE MALEATE 5 MG
5 TABLET ORAL
COMMUNITY
Start: 2021-06-18 | End: 2022-03-30

## 2021-12-03 RX ORDER — GABAPENTIN 100 MG/1
CAPSULE ORAL
Status: ON HOLD | COMMUNITY
Start: 2021-01-05 | End: 2022-02-08

## 2021-12-03 RX ORDER — BISACODYL 10 MG
10 SUPPOSITORY, RECTAL RECTAL PRN
Status: ON HOLD | COMMUNITY
Start: 2021-06-10 | End: 2022-10-27

## 2021-12-03 RX ORDER — ACETAMINOPHEN 325 MG/1
650 TABLET ORAL PRN
Status: ON HOLD | COMMUNITY
Start: 2020-10-24 | End: 2022-10-21

## 2021-12-03 NOTE — PROGRESS NOTES
Chief Complaint   Patient presents with     Urgent Care     UTI     c/o dysuria for 5 days         ICD-10-CM    1. Dysuria  R30.0 UA Macro with Reflex to Micro and Culture - lab collect     UA Macro with Reflex to Micro and Culture - lab collect     Wet prep - Clinic Collect     Urinalysis and wet prep are negative for a cause of dysuria.  We will have her follow-up with her [facility] specialists to see if this is part of her autoimmune disorder.      Results for orders placed or performed in visit on 12/03/21 (from the past 24 hour(s))   UA Macro with Reflex to Micro and Culture - lab collect    Specimen: Urine, Midstream   Result Value Ref Range    Color Urine Yellow Colorless, Straw, Light Yellow, Yellow    Appearance Urine Clear Clear    Glucose Urine Negative Negative mg/dL    Bilirubin Urine Negative Negative    Ketones Urine Negative Negative mg/dL    Specific Gravity Urine 1.015 1.003 - 1.035    Blood Urine Negative Negative    pH Urine 7.0 5.0 - 7.0    Protein Albumin Urine Negative Negative mg/dL    Urobilinogen Urine 1.0 0.2, 1.0 E.U./dL    Nitrite Urine Negative Negative    Leukocyte Esterase Urine Negative Negative    Narrative    Microscopic not indicated   Wet prep - Clinic Collect    Specimen: Vagina; Swab   Result Value Ref Range    Trichomonas Absent Absent    Yeast Absent Absent    Clue Cells Absent Absent    WBCs/high power field None None       Subjective     Peggy Jessica is an 46 year old female who presents to clinic today for dysuria for 5 days.  She denies any concerns about sexually transmitted diseases.  She has been going through an extensive work up at the [facility] and has been diagnosed with a rare disorder.  She denies any fever, chills, nausea or vomiting, low back pain.       Objective    /84   Pulse 109   Temp 97.8  F (36.6  C) (Tympanic)   Wt 73.9 kg (163 lb)   SpO2 98%   BMI 27.33 kg/m    Nurses notes and VS have been  reviewed.    Physical Exam       GENERAL APPEARANCE: healthy appearing, alert     ABDOMEN:  soft, nontender, no HSM or masses and bowel sounds normal, no CVA tenderness     MS: extremities normal- no gross deformities noted; normal muscle tone.     SKIN: no suspicious lesions or rashes    Patient Instructions   Follow-up with [facility] specialist you have been diagnosing your genetic disorder.      MIRA Martinez, CNP  [facility] Provider    The use of Dragon/[employer] dictation services may have been used to construct the content in this note; any grammatical or spelling errors are non-intentional. Please contact the author of this note directly if you are in need of any clarification.

## 2021-12-04 NOTE — PATIENT INSTRUCTIONS
Follow-up with [facility] specialist you have been diagnosing your genetic disorder.

## 2021-12-07 ENCOUNTER — HOSPITAL ENCOUNTER (OUTPATIENT)
Dept: PHYSICAL THERAPY | Facility: CLINIC | Age: 46
Setting detail: THERAPIES SERIES
End: 2021-12-07
Attending: PSYCHIATRY & NEUROLOGY
Payer: COMMERCIAL

## 2021-12-07 DIAGNOSIS — R11.0 NAUSEA: ICD-10-CM

## 2021-12-07 PROCEDURE — 97110 THERAPEUTIC EXERCISES: CPT | Mod: GP | Performed by: PHYSICAL THERAPIST

## 2021-12-07 PROCEDURE — 97162 PT EVAL MOD COMPLEX 30 MIN: CPT | Mod: GP | Performed by: PHYSICAL THERAPIST

## 2021-12-07 NOTE — PROGRESS NOTES
"   12/07/21 1000   Quick Adds   Quick Adds Certification   Type of Visit Initial OP PT Evaluation   General Information   Start of Care Date 12/07/21   Referring Physician Felicia Campos MD   (follow by Dr. Duenas for neurology )   Orders Evaluate and Treat as Indicated   Order Date 11/26/21   Medical Diagnosis CISP;nausea    Onset of illness/injury or Date of Surgery 11/17/21   Surgical/Medical history reviewed Yes  (CRPS, mastectomy B, normal EMG, )   Pertinent history of current problem (include personal factors and/or comorbidities that impact the POC) Patient recently inpatient 11/17/-21 to 11/26/21 d/t increased weakness and numbness in LEs with several falls. Work up revealed possible CISP dx and doing follow up with Dr. Duenas next week. Did IVIG treatments in the hospital with patient reporting improvements in her symptoms. She is now walking with a walker at home and is still falling. She feels dizziness at times during standing and looking up, tunnel vision with lightheaded feeling. She has tingling up into her calves, like \"static\". Just got a 4WW which has been helping her with her balance. She also uses her 2WW in the house. She walked quite a bit on Saturday and now notices increased fatigue in LEs with all activities. She has hx of CRPS in LE from R stress fracture and across chest d/t B mastectomy. She feels her overall mobility has declined greatly since October.   Pertinent Visual History  glasses   Prior level of function comment Was walking 5 miles/day and 12 hrs on her feet up until Oct; usually working from home on computer, she is doing short term disability right now; she has been doing some sitting exercises since leaving the hospital.    Diagnostic Tests EMG   EMG Results unremarkable  (no signs of polyneuropathy)   Current Community Support Family/friend caregiver   Patient role/Employment history Employed  ([employer])   [facility]  (lots of steps in home, " which is a large problem)   Current Assistive Devices Front Wheeled Walker;Four Wheeled Walker  (keeps FWW upstairs in home)   Patient/Family Goals Statement walk her dogs, go back to work, get back to fiber arts, less use of walker    Fall Risk Screen   Fall screen completed by PT   Have you fallen 2 or more times in the past year? Yes   Have you fallen and had an injury in the past year? No  (bruises)   Timed Up and Go score (seconds) not tested   Is patient a fall risk? Yes   Fall screen comments Fell 3 times yesterday after standing for awhile. Didnt' have walker, was grabbing something that she didn't feel was far enough away to use walker. fell all the way to the ground, thinks she overdid it a couple days ago.    Pain   Pain comments joint pain in feet and hands has been consistently, feet feel very cold and causes increased pain.    Vitals Signs   Blood Pressure 105/70   Vital Signs Comments taken manually    Cognitive Status Examination   Orientation orientation to person, place and time   Level of Consciousness alert   Follows Commands and Answers Questions 100% of the time   Personal Safety and Judgment intact   Memory intact   Integumentary   Integumentary Comments mild increase in swell in knees distally, R > L, non pitting    Posture   Posture Comments WFLs    Range of Motion (ROM)   ROM Comment PROM 90 degree R shoulder flexion, 150 L PROM, empty end feel with both d/t pain in pecs and across chest; heel cord tightness noted B    Strength   Strength Comments Jerky resistance, hip flexion 3+/5 B , knee extension B 3+/5 , knee flexors L3+/5  R 4/5, DF 3+/5, PF 3+/5 B; pain with shoulder testing in chest d/t CRPS, elbow flexion 3+/5 and elbow extension 3+/5-- movements are overall jerky; cannot stand up without her UEs on the chair from 18'' surface; 22'' surface she can with instability    Bed Mobility   Bed Mobility Comments did not assess today    Transfer Skills   Transfer Comments sit to stand with  required use of UEs on chair for balance from 18''; can perform without from 22''' or higher surface    Gait   Gait Comments ambulates with deliberate gait pattern, heel toe pattern, heavy UE support on walker (4WW), decreased step length    Gait Special Tests   Gait Special Tests 25 FOOT TIMED WALK;DYNAMIC GAIT INDEX   Gait Special Tests 25 Foot Timed Walk   Seconds 32   Steps 29 Steps   Comments used 4WW today    Balance   Balance Comments able to stand for 15 secs without any UE support, prefers to keep back of legs on surfaces for stability    Balance Special Tests   Balance Special Tests Sit to stand reps   Balance Special Tests Modified CTSIB Conditions   Condition 1, seconds 30 Seconds  (mod sway)   Condition 2, seconds 7 Seconds   Modified CTSIB Comments did not seem safe to test on uneven surface today    Balance Special Tests Sit to Stand Reps in 30 Seconds   Comments could not do without her UEs on the chair for balance and stability    Sensory Examination   Sensory Perception Comments Feet and hands, numbness/tingling. Feels like bottom of feet are cotton balls.    Planned Therapy Interventions   Planned Therapy Interventions IADL retraining;balance training;gait training;neuromuscular re-education;ROM;strengthening;stretching;transfer training   Clinical Impression   Criteria for Skilled Therapeutic Interventions Met yes, treatment indicated   PT Diagnosis gait and balance instability    Influenced by the following impairments decreased strength, impaired ROM, sensory changes, balance impairments, gait instability, decreased activity tolerance, hx of falls, pain/fatigue    Functional limitations due to impairments unable to perform duties for work, unable to easily perform iADLS and ADLs    Clinical Presentation Stable/Uncomplicated   Clinical Presentation Rationale recent hospital stay with identifying dx, PT impairments, clinical judgement    Clinical Decision Making (Complexity) Moderate complexity  "  Therapy Frequency 1 time/week   Predicted Duration of Therapy Intervention (days/wks) up to 8-12 weeks    Risk & Benefits of therapy have been explained Yes   Patient, Family & other staff in agreement with plan of care Yes   Clinical Impression Comments Patient presents with balance and gait instability as well as weakness and sensory changes in LES and UEs. Patient was walking up to 5 miles a day in October and now is using a 4WW full time and getting fatigued very easily. Patient with several falls most days and feels like legs will \"give out\". She \"jerky\" with MMT today and overall unstable. Patient will benefit from skilled PT in order to improve her mobility and function. She is following up with neurology this next week for further exploration of her symptoms. Complex hx with CRPS, fibromyalgia and HAs/migraines.    GOALS   PT Eval Goals 1;2;3;4   Goal 1   Goal Identifier 6MWT   Goal Description Peggy will perform 6MWT with least restrictive device at speed of 1.0 m/s in order to keep up with spouse and peers in the community.    Target Date 02/04/22   Goal 2   Goal Description sit to stand   Goal Progress Peggy will perform 5 sit to stands from 18'' surface without UEs in order to show improved LE strength needed for daily mobility.    Date Met 02/04/22   Goal 3   Goal Identifier CTSIB   Goal Description Peggy will perform CTSIB for 15 secs on all conditions in order to show improved balance with NBOS to improve her confidece with iADLs and ADLs.    Target Date 02/04/22   Goal 4   Goal Identifier HEP   Goal Description Patient will be independent with HEP for strengthening, balance and endurance building on daily mobility in order to improve her balance and gait to decrease falls.    Target Date 02/04/22   Total Evaluation Time   PT Eval, Moderate Complexity Minutes (75990) 35   Therapy Certification   Certification date from 12/07/21   Certification date to 03/06/22   Medical Diagnosis nausea; CISP "   Certification I certify the need for these services furnished under this plan of treatment and while under my care.  (Physician co-signature of this document indicates review and certification of the therapy plan).

## 2021-12-15 ENCOUNTER — TELEPHONE (OUTPATIENT)
Dept: NEUROLOGY | Facility: CLINIC | Age: 46
End: 2021-12-15

## 2021-12-15 ENCOUNTER — PRE VISIT (OUTPATIENT)
Dept: NEUROLOGY | Facility: CLINIC | Age: 46
End: 2021-12-15
Payer: COMMERCIAL

## 2021-12-15 ENCOUNTER — OFFICE VISIT (OUTPATIENT)
Dept: NEUROLOGY | Facility: CLINIC | Age: 46
End: 2021-12-15
Payer: COMMERCIAL

## 2021-12-15 VITALS
OXYGEN SATURATION: 98 % | RESPIRATION RATE: 16 BRPM | WEIGHT: 155 LBS | BODY MASS INDEX: 25.99 KG/M2 | HEART RATE: 120 BPM

## 2021-12-15 DIAGNOSIS — G61.81 CIDP (CHRONIC INFLAMMATORY DEMYELINATING POLYNEUROPATHY) (H): ICD-10-CM

## 2021-12-15 DIAGNOSIS — G61.81 CIDP (CHRONIC INFLAMMATORY DEMYELINATING POLYNEUROPATHY) (H): Primary | ICD-10-CM

## 2021-12-15 PROCEDURE — 99204 OFFICE O/P NEW MOD 45 MIN: CPT | Mod: GC | Performed by: PSYCHIATRY & NEUROLOGY

## 2021-12-15 RX ORDER — DIPHENHYDRAMINE HCL 25 MG
50 CAPSULE ORAL ONCE
Status: CANCELLED
Start: 2021-12-15

## 2021-12-15 RX ORDER — HEPARIN SODIUM (PORCINE) LOCK FLUSH IV SOLN 100 UNIT/ML 100 UNIT/ML
5 SOLUTION INTRAVENOUS
Status: CANCELLED | OUTPATIENT
Start: 2021-12-15

## 2021-12-15 RX ORDER — METHYLPREDNISOLONE SODIUM SUCCINATE 125 MG/2ML
125 INJECTION, POWDER, LYOPHILIZED, FOR SOLUTION INTRAMUSCULAR; INTRAVENOUS
Status: CANCELLED
Start: 2021-12-15

## 2021-12-15 RX ORDER — HEPARIN SODIUM,PORCINE 10 UNIT/ML
5 VIAL (ML) INTRAVENOUS
Status: CANCELLED | OUTPATIENT
Start: 2021-12-15

## 2021-12-15 RX ORDER — NALOXONE HYDROCHLORIDE 0.4 MG/ML
0.2 INJECTION, SOLUTION INTRAMUSCULAR; INTRAVENOUS; SUBCUTANEOUS
Status: CANCELLED | OUTPATIENT
Start: 2021-12-15

## 2021-12-15 RX ORDER — ALBUTEROL SULFATE 0.83 MG/ML
2.5 SOLUTION RESPIRATORY (INHALATION)
Status: CANCELLED | OUTPATIENT
Start: 2021-12-15

## 2021-12-15 RX ORDER — EPINEPHRINE 1 MG/ML
0.3 INJECTION, SOLUTION, CONCENTRATE INTRAVENOUS EVERY 5 MIN PRN
Status: CANCELLED | OUTPATIENT
Start: 2021-12-15

## 2021-12-15 RX ORDER — ALBUTEROL SULFATE 90 UG/1
1-2 AEROSOL, METERED RESPIRATORY (INHALATION)
Status: CANCELLED
Start: 2021-12-15

## 2021-12-15 RX ORDER — MEPERIDINE HYDROCHLORIDE 25 MG/ML
25 INJECTION INTRAMUSCULAR; INTRAVENOUS; SUBCUTANEOUS EVERY 30 MIN PRN
Status: CANCELLED | OUTPATIENT
Start: 2021-12-15

## 2021-12-15 RX ORDER — DIPHENHYDRAMINE HYDROCHLORIDE 50 MG/ML
50 INJECTION INTRAMUSCULAR; INTRAVENOUS
Status: CANCELLED
Start: 2021-12-15

## 2021-12-15 RX ORDER — ACETAMINOPHEN 325 MG/1
650 TABLET ORAL ONCE
Status: CANCELLED
Start: 2021-12-15

## 2021-12-15 ASSESSMENT — PAIN SCALES - GENERAL: PAINLEVEL: MODERATE PAIN (5)

## 2021-12-15 NOTE — TELEPHONE ENCOUNTER
Prior Authorization Infusion/Clinic Administered Request    Location: [facility]  Diagnosis and ICD:Chronic Inflammatory Demyelinating Polyneuropathy, G61.81  Drug/Therapy: IVIG 1 g/kg every 3 weeks    Previously Tried and Failed Therapies:     Date of provider note with supporting information: 12/15/21    Urgency (When is the patient scheduled?):     Would you like to include any research articles?         If yes please call [phone] for further instructions about sending that information

## 2021-12-15 NOTE — LETTER
12/15/2021       RE: Peggy Jessica  [address] [facility] [address]     Dear Colleague,    Thank you for referring your patient, Peggy Jessica, to the [facility] [place] at [facility]. Please see a copy of my visit note below.    Chief Complaint: numbness/pain in hands/feet    History of Present Illness:    Peggy Jessica is a 46 year old woman with PMH of CRPS (right leg from stress fracture and chest from bilateral mastectomy - BRCA +) who presented to the hospital in Nov 2021 with non length dependant sensory changes and ataxia with gait instability, subsequently diagnosed with CISP.    Her sensory symptoms probably began in September or October. Onset was insidious and course over the first couple months was progressive. She reported sensory changes (numbness in her lower limbs and hands. She felt clumsy and unsteady on her feet. She started to fall. By her admission she was falling 3-4 times a day. She has a pre-existing diagnosis of CRPS and some chronic sensory changes in the right leg, but these were new symptoms. Numbness and tingling spread to affect her below her knees and wrists. She was dropping things in the hands. At the same time she developed lightheadedness when bending over to grab things. She enjoys knitting, and before these symptoms started was quite prolific. At her worst (in November) she could not knit at all. Now she can knit a bit, but struggles.  Additionally, she does endorse some sensations of her hands of her fingers turning white, and then moments later they turn red and flush.  This is also happening in her knuckles of her hands.  She thinks this has been more prominent recently.    During her hospitalization she had a normal EMG. Imaging showed mild enhancement of the cauda equina and paraspinal musculature with elevated protein in the CSF. She was treated with 5 rounds of IVIG (4  weeks from last dose -11/22/21) with subjective improvement almost immediately (after the 3rd dose) for suspected CISP. She noticed that the benefit plateaued after approximately 10 days, and then after about the 3rd week she started to decline again.  Her balance is now better than it was before treatment, but not as good as it was a week or 2 ago.  Currently numbness/tingling goes to above ankle and sometimes shins. She can take some steps independently, but usually is using the walker to get around. She has fallen sometimes multiple times a day to once a week (when not using walker). In her hands notices that she is really shaking with difficult with hand ; numbness/tingling in fingers and dorsum of hand. Last 5 days has had cold hands/feet all the time. Her tunnel vision/dizziness has started again when she stands quickly, better if she moves slowly when changing position. Notices she suffers from significant muscle fatigue at the end of the day.     When she was at her best (within the last year) she felt normal (100%). At the worst of her symptoms she felt like she was a 10%. At her best things she was at 45%, but currently estimates she is about at 30%. Currently undergoing PT and doing well.     Currently taking Oxycodone, medical cannabis, flexeril, atarax (50mg, not the 25mg) for pain relief which is working well for her. No longer taking amitriptyline or gabapentin (weight gain, dizziness, vomiting), keppra, or Topamax with too many side effects.    She is also had a significantly reduced appetite over this time and only eating a few snacks throughout the day and maybe one full meal every day or two. Has cramps in her calves, more commonly at night/tired, a couple times a week. Does wake frequently during the night due to pain/discomfort, taking daytime naps, estimating 6 hours of sleep a night. She has not experienced changes in sweating, anhidrosis, and has not noticed changes in bowel or bladder  function. She denies frequent fasciculations. Speech and swallowing are normal. She denies breathing difficulties or shortness of breath while lying flat. Mood and affect are appropriate.      Prior pertinent laboratory work-up:  2021: CSF (WBC 0, glc 73, protein 105), PAULINE, (1:160) unremarkable CSF HSV, O-bands, serum electrophoresis, paraneoplastic panel, B6, ganglioside Abs, SSA/SSB,   2021: unremarkable B12, IgM, Immunofixation, HbA1c    Prior pertinent radiology work-up:  21: MR Lumbar Spine showed diffuse mild enhancement of the cauda equina nerve roots without nodularity or clumping is nonspecific.  Probable mild enhancement of the posterior paraspinous musculature from L4 through S1. However, artifact could have similar appearance. 3. Mild multilevel lumbar spondylosis without evidence for high-grade spinal canal or neural foraminal stenosis.    Prior electrophysiologic work-up:  21: Nerve conduction studies/EMG: Right upper and lower extremity normal study, including tibial H reflexes  : Autonomic testing ([facility]) : There is generalized, marked, symmetric postganglionic sudomotor dysfunction, normal cardiovagal function, and mild adrenergic dysfunction. Findings are in keeping with systemic anticholinergic medication effect. Superimposed mild autonomic neuropathy can not be excluded. Orthostatic hypotension and tachycardia are not seen. Compression of pulse pressure during Valsalva raises the question of mild hypovolemia or deconditioning.     Past Medical History:   CRPS (right leg - stress fracture; chest - mastectomy)  BRCA+   Cervical cancer  Migraines - resolved    Past Surgical History:  Bilateral mastectomy  Hysterectomy    gallbladder    Family history:    Dad - neuropathy (obeses, CKD)  Unknown largely    Social History:    She denies tobacco, alcohol, or illicit drug use. There is no known exposure to toxins or heavy metals.   Smeet work - [employer] for  [employer]    Medical Allergies:     Allergies   Allergen Reactions     Dihydroergotamine Anaphylaxis     Latex Anaphylaxis     Shellfish-Derived Products Anaphylaxis     Sumatriptan Anaphylaxis     Banana Unknown     Gabapentin Dizziness     Gluten Meal Other (See Comments)     Celiac disease     Keppra [Levetiracetam] Nausea and Vomiting     Kiwi Unknown     Levofloxacin Other (See Comments)     Arrhythmia     Metronidazole Nausea and Vomiting     Nitrofurantoin Hives     Penicillins Hives     Topiramate Visual Disturbance     Aspirin Rash     Methadone Rash     Risperidone Anxiety     Current Medications:    Current Outpatient Medications   Medication     acetaminophen (TYLENOL) 325 MG tablet     amitriptyline (ELAVIL) 10 MG tablet     bisacodyl (DULCOLAX) 10 MG suppository     cyclobenzaprine (FLEXERIL) 10 MG tablet     gabapentin (NEURONTIN) 100 MG capsule     hydrOXYzine (ATARAX) 25 MG tablet     hyoscyamine (LEVSIN/SL) 0.125 MG sublingual tablet     levETIRAcetam (KEPPRA) 500 MG tablet     LORazepam (ATIVAN) 1 MG tablet     multivitamin w/minerals (MULTI-VITAMIN) tablet     ondansetron (ZOFRAN-ODT) 8 MG ODT tab     oxyCODONE (ROXICODONE) 5 MG tablet     pantoprazole (PROTONIX) 40 MG EC tablet     polyethylene glycol (MIRALAX) 17 GM/Dose powder     prochlorperazine (COMPAZINE) 5 MG tablet     senna-docusate (SENOKOT-S/PERICOLACE) 8.6-50 MG tablet     topiramate (TOPAMAX) 50 MG tablet     No current facility-administered medications for this visit.     Review of Systems: A complete review of systems was obtained and was negative except for what was noted above.    Physical examination:    Pulse 120   Resp 16   Wt 70.3 kg (155 lb)   SpO2 98%   BMI 25.99 kg/m       Wt Readings from Last 4 Encounters:   12/15/21 70.3 kg (155 lb)   12/03/21 73.9 kg (163 lb)   11/24/21 74.3 kg (163 lb 14.4 oz)     General Appearance: NAD    Skin: There are no rashes or other skin lesions.    Musculoskeletal:  There is no scoliosis,  lordosis, kyphosis, pes cavus, or hammertoes.    Neurologic examination:    Mental status:  Patient is alert, attentive, and oriented x 3.  Language is coherent and fluent without  aphasia.  Memory, comprehension and ability to follow commands were intact.       Cranial nerves:    Extraocular movements were full. There was no face, jaw, palate or tongue weakness or atrophy. Hearing was grossly intact.  Shoulder shrug was normal.       Motor exam: No atrophy or fasciculations.  Manual muscle testing revealed MRC grade >4/5 fluctuating strength throughout including proximal and distal muscles of the arms and legs.    Complex motor skills: Mild to moderate postural hand tremor. She also has some mild ataxia with FNF.     Sensory exam revealed decreased vibratory perception in the toes (2-3 sec) bilaterally. Pin testing was patchy in the distal lower extremities, especially moving proximally   Light touch was normal (10% difference between hand/foot).  Romberg sign was absent.       Gait:  Very slow, narrow, cautious gait without steppage. Unable to heel/toe walk     Deep tendon reflexes:   Right Left   Triceps 1 1   Biceps 1 1   Brachioradialis 1 1   Knee jerk 0 0   Ankle jerk 0 0   Plantar responses were flexor bilaterally.       Neuropathy Assessments  Neurology Assessments 12/15/2021 2021   RODS CIDP/MGUSP Score 22 22   Time for 'Up and Go' test- Seconds: 17.45 34.1      Strength: 12/15/2021 2021   Right hand strength in K not tested   Left hand strength in Kg: 10 not tested     Immunotherapy 12/15/2021 2021   Time since last IVIG (days): 4 weeks -   Current treatment: none IVIG started 21     Assessment:    Peggy 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 The diagnosis is supported by her normal clinical symptoms and signs (non length dependant sensory changes, ataxia, hyporeflexia), nerve root enhancement on MRI, elevated CSF protein, and prior response to immunotherapy  (marked improvement in TUG time). Her additional laboratory work-up thus far has been unrevealing for an alternative cause of her sensory ataxia. We discussed the naturally history of CISP. In most cases it is not monophasic. She is now about 1 month out from IVIG and is worsening. We discussed the pathophysiology of CISP and that it can require recurrent treatments with immune therapy. We will get her started IVIG maintenance as below and reassess her in a couple months. Long term immunotherapy will be determined pending her clinical response.     Plan:      1. Immunotherapy: Start IVIG 1g/kg every 3 weeks  2. Physical therapy: continuing with therapy  3. Symptomatic management of pain and paresthesias: continue with current treatment regimen, defer treatment to primary/pain management team  4. INCBASE: Enrolled [id number]  4. Follow up in 6 weeks. She knows to call us sooner if needed.     Seen and discussed with Dr. Duenas. His addendum follows    Geri Sullivan MD  Neuromuscular Fellow  ---  ADDENDUM  I personally interviewed and examined the patient. I agree with the history, physical, assessment, and plan as documented above. Changes to the physical examination, assessment and plan have been incorporated into the note by myself, as to make it a single cohesive document.    Jon Duenas MD    1/4/21: I spoke with her by phone.  Unbelievably she was denied by her insurance. The reason was because she was not treated with steroids for 3 months. She has been exposed to corticosteroids in the past. These resulted in substantial weight gain, severe insomnia, emotional instability and depression. Over the last few weeks her CIDP is worsening. She is becoming more unsteady. She fell 3 times today and 3 yesterday. Numbness is worsening in her limbs. Clearly steroids are contraindicated in her. Also clearly she has had a beneficial to IVIG and she is worsening while we await IVIG approval. Will contact her insurance at  [phone] and plead with them to please approve IVIG so that we can keep her out of the hospital.       Again, thank you for allowing me to participate in the care of your patient.      Sincerely,    Jon Duenas MD

## 2021-12-15 NOTE — LETTER
2022    INSURER: Payor: MEDICA / Plan: MEDICA ESSENTIAL / Product Type: Indemnity /   Re: Prior Authorization Request  Patient: Peggy Jessica  Policy ID#:  [id number]  : 1975     To whom it may concern,     I am writing today regarding Peggy Jessica. Ms. Jessica is a patient under my care at the [facility].     Ms. Jessica has a diagnosis of CIDP. Her diagnosis is based upon clinical symptoms and signs (non-length dependent sensory symptoms, hypo/areflexia, gait ataxia), CSF protein elevation (105), and nerve root enhancement on MRI.  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 Jessica. She is unstable on her feet and is falling. She is unable to take care of basic day-to-day affairs without assistance. The condition led to her hospitalization in 2021, at which time she received IVIG. After IVIG she improved, but not unexpectedly the benefit was short lived. After 2-3 weeks the benefits that were gained from IVIG had waned.     IVIg and corticosteroids are first line treatments for CIDP.2,3 Both are proven effective therapies. The decision to use one over the other is driven in part by previously tried and failed exposures, the side effect profile, and comorbid conditions. She has been exposed to corticosteroids in the past. During previous treatment with corticosteroids she developed substantial weight gain, severe insomnia, emotional instability and depression. It would be irresponsible to subject her to these risks again. She has also received IVIG in the past (during her November hospitalization). She tolerated IVIG well and there was a clear beneficial effect. It would be irresponsible for us to deny her a first line proven-effective therapy that was previously well tolerated and effective in favor of a therapy that was previously poorly tolerated and hence has a near certain  chance for failure. Taking the latter route will delay her treatment. Delayed treatment will result in further accumulation of disability. Accumulation of disability will make her vulnerable to more falls and injury, and potentially irreversible neuropathic symptoms from untreated CIDP.     Ms. Jessica suffers from a debilitating neuropathy. Her diagnosis of the CISP variant of CIDP is well-defined. Standard of care CIDP treatment is IVIg 2gm/kg x 1 followed by 1 gm/kg every 2 weeks. As stated above corticosteroids are contra-indicated for her unique condition and background. After 3 months we will optimize IVIG as needed for her specific IVIG requirement. If there is no unequivocal benefit after 3 months of IVIG then it will be aborted. I plead with you to please approve IVIG expeditiously so that I can offer Ms. Jessica standard of care.     Thank you for your time and attention to this matter.      Please send your written decision to me at this address:  [facility] [address] [id number] [phone]  Dept: [phone]    Sincerely,      Jon Duenas MD    [facility]  Department of Neurology  Division of Neuromuscular Disorders    1. Jennifer M, Tyrell CJ, Jose Carlos JR, Teresa J, Delbert RJ, Danisha PJ. 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 Neurology. 2004 Nov 9;63(9):1662-9.  2. Joint Task Force of the EFNS and the PNS.  Federation of Neurological Societies/Peripheral Nerve Society Guideline on management of paraproteinemic demyelinating neuropathies. Report of a Joint Task Force of the  Federation of Neurological Societies and the Peripheral Nerve Society--first revision. J Pe  3. Carolyne QUISPE, Ren P, Delano V, et al. Intravenous immune globulin (10% caprylate-chromatography purified) for the treatment of of chronic inflammatory demyelinating polyradiculoneuropathy  (ICE study): a randomized placebo-controlled trial. Lancet Neurol 2008; 7: 136-144.

## 2021-12-15 NOTE — TELEPHONE ENCOUNTER
Peggy had an office visit with Dr. Duenas today and it was decided she would begin IVIG infusions.  Loading dose already given while hospitalized so Peggy will start at maintenance dosin g/kg every three weeks. Therapy plan routed to Dr. Duenas for review and signature.    Avis Londono RN

## 2021-12-15 NOTE — PROGRESS NOTES
Chief Complaint: numbness/pain in hands/feet    History of Present Illness:    Peggy Jessica is a 46 year old woman with PMH of CRPS (right leg from stress fracture and chest from bilateral mastectomy - BRCA +) who presented to the hospital in Nov 2021 with non length dependant sensory changes and ataxia with gait instability, subsequently diagnosed with CISP.    Her sensory symptoms probably began in September or October. Onset was insidious and course over the first couple months was progressive. She reported sensory changes (numbness in her lower limbs and hands. She felt clumsy and unsteady on her feet. She started to fall. By her admission she was falling 3-4 times a day. She has a pre-existing diagnosis of CRPS and some chronic sensory changes in the right leg, but these were new symptoms. Numbness and tingling spread to affect her below her knees and wrists. She was dropping things in the hands. At the same time she developed lightheadedness when bending over to grab things. She enjoys knitting, and before these symptoms started was quite prolific. At her worst (in November) she could not knit at all. Now she can knit a bit, but struggles.  Additionally, she does endorse some sensations of her hands of her fingers turning white, and then moments later they turn red and flush.  This is also happening in her knuckles of her hands.  She thinks this has been more prominent recently.    During her hospitalization she had a normal EMG. Imaging showed mild enhancement of the cauda equina and paraspinal musculature with elevated protein in the CSF. She was treated with 5 rounds of IVIG (4 weeks from last dose -11/22/21) with subjective improvement almost immediately (after the 3rd dose) for suspected CISP. She noticed that the benefit plateaued after approximately 10 days, and then after about the 3rd week she started to decline again.  Her balance is now better than it was before treatment, but not as good as  it was a week or 2 ago.  Currently numbness/tingling goes to above ankle and sometimes shins. She can take some steps independently, but usually is using the walker to get around. She has fallen sometimes multiple times a day to once a week (when not using walker). In her hands notices that she is really shaking with difficult with hand ; numbness/tingling in fingers and dorsum of hand. Last 5 days has had cold hands/feet all the time. Her tunnel vision/dizziness has started again when she stands quickly, better if she moves slowly when changing position. Notices she suffers from significant muscle fatigue at the end of the day.     When she was at her best (within the last year) she felt normal (100%). At the worst of her symptoms she felt like she was a 10%. At her best things she was at 45%, but currently estimates she is about at 30%. Currently undergoing PT and doing well.     Currently taking Oxycodone, medical cannabis, flexeril, atarax (50mg, not the 25mg) for pain relief which is working well for her. No longer taking amitriptyline or gabapentin (weight gain, dizziness, vomiting), keppra, or Topamax with too many side effects.    She is also had a significantly reduced appetite over this time and only eating a few snacks throughout the day and maybe one full meal every day or two. Has cramps in her calves, more commonly at night/tired, a couple times a week. Does wake frequently during the night due to pain/discomfort, taking daytime naps, estimating 6 hours of sleep a night. She has not experienced changes in sweating, anhidrosis, and has not noticed changes in bowel or bladder function. She denies frequent fasciculations. Speech and swallowing are normal. She denies breathing difficulties or shortness of breath while lying flat. Mood and affect are appropriate.      Prior pertinent laboratory work-up:  11/2021: CSF (WBC 0, glc 73, protein 105), PAULINE, (1:160) unremarkable CSF HSV, O-bands, serum  electrophoresis, paraneoplastic panel, B6, ganglioside Abs, SSA/SSB,   2021: unremarkable B12, IgM, Immunofixation, HbA1c    Prior pertinent radiology work-up:  21: MR Lumbar Spine showed diffuse mild enhancement of the cauda equina nerve roots without nodularity or clumping is nonspecific.  Probable mild enhancement of the posterior paraspinous musculature from L4 through S1. However, artifact could have similar appearance. 3. Mild multilevel lumbar spondylosis without evidence for high-grade spinal canal or neural foraminal stenosis.    Prior electrophysiologic work-up:  21: Nerve conduction studies/EMG: Right upper and lower extremity normal study, including tibial H reflexes  : Autonomic testing ([facility]) : There is generalized, marked, symmetric postganglionic sudomotor dysfunction, normal cardiovagal function, and mild adrenergic dysfunction. Findings are in keeping with systemic anticholinergic medication effect. Superimposed mild autonomic neuropathy can not be excluded. Orthostatic hypotension and tachycardia are not seen. Compression of pulse pressure during Valsalva raises the question of mild hypovolemia or deconditioning.     Past Medical History:   CRPS (right leg - stress fracture; chest - mastectomy)  BRCA+   Cervical cancer  Migraines - resolved    Past Surgical History:  Bilateral mastectomy  Hysterectomy    gallbladder    Family history:    Dad - neuropathy (obeses, CKD)  Unknown largely    Social History:    She denies tobacco, alcohol, or illicit drug use. There is no known exposure to toxins or heavy metals.   [employer] work - [employer] for [employer]    Medical Allergies:     Allergies   Allergen Reactions     Dihydroergotamine Anaphylaxis     Latex Anaphylaxis     Shellfish-Derived Products Anaphylaxis     Sumatriptan Anaphylaxis     Banana Unknown     Gabapentin Dizziness     Gluten Meal Other (See Comments)     Celiac disease     Keppra [Levetiracetam] Nausea and  Vomiting     Kiwi Unknown     Levofloxacin Other (See Comments)     Arrhythmia     Metronidazole Nausea and Vomiting     Nitrofurantoin Hives     Penicillins Hives     Topiramate Visual Disturbance     Aspirin Rash     Methadone Rash     Risperidone Anxiety     Current Medications:    Current Outpatient Medications   Medication     acetaminophen (TYLENOL) 325 MG tablet     amitriptyline (ELAVIL) 10 MG tablet     bisacodyl (DULCOLAX) 10 MG suppository     cyclobenzaprine (FLEXERIL) 10 MG tablet     gabapentin (NEURONTIN) 100 MG capsule     hydrOXYzine (ATARAX) 25 MG tablet     hyoscyamine (LEVSIN/SL) 0.125 MG sublingual tablet     levETIRAcetam (KEPPRA) 500 MG tablet     LORazepam (ATIVAN) 1 MG tablet     multivitamin w/minerals (MULTI-VITAMIN) tablet     ondansetron (ZOFRAN-ODT) 8 MG ODT tab     oxyCODONE (ROXICODONE) 5 MG tablet     pantoprazole (PROTONIX) 40 MG EC tablet     polyethylene glycol (MIRALAX) 17 GM/Dose powder     prochlorperazine (COMPAZINE) 5 MG tablet     senna-docusate (SENOKOT-S/PERICOLACE) 8.6-50 MG tablet     topiramate (TOPAMAX) 50 MG tablet     No current facility-administered medications for this visit.     Review of Systems: A complete review of systems was obtained and was negative except for what was noted above.    Physical examination:    Pulse 120   Resp 16   Wt 70.3 kg (155 lb)   SpO2 98%   BMI 25.99 kg/m       Wt Readings from Last 4 Encounters:   12/15/21 70.3 kg (155 lb)   12/03/21 73.9 kg (163 lb)   11/24/21 74.3 kg (163 lb 14.4 oz)     General Appearance: NAD    Skin: There are no rashes or other skin lesions.    Musculoskeletal:  There is no scoliosis, lordosis, kyphosis, pes cavus, or hammertoes.    Neurologic examination:    Mental status:  Patient is alert, attentive, and oriented x 3.  Language is coherent and fluent without  aphasia.  Memory, comprehension and ability to follow commands were intact.       Cranial nerves:    Extraocular movements were full. There was no  face, jaw, palate or tongue weakness or atrophy. Hearing was grossly intact.  Shoulder shrug was normal.       Motor exam: No atrophy or fasciculations.  Manual muscle testing revealed MRC grade >4/5 fluctuating strength throughout including proximal and distal muscles of the arms and legs.    Complex motor skills: Mild to moderate postural hand tremor. She also has some mild ataxia with FNF.     Sensory exam revealed decreased vibratory perception in the toes (2-3 sec) bilaterally. Pin testing was patchy in the distal lower extremities, especially moving proximally   Light touch was normal (10% difference between hand/foot).  Romberg sign was absent.       Gait:  Very slow, narrow, cautious gait without steppage. Unable to heel/toe walk     Deep tendon reflexes:   Right Left   Triceps 1 1   Biceps 1 1   Brachioradialis 1 1   Knee jerk 0 0   Ankle jerk 0 0   Plantar responses were flexor bilaterally.       Neuropathy Assessments  Neurology Assessments 12/15/2021 2021   RODS CIDP/MGUSP Score 22 22   Time for 'Up and Go' test- Seconds: 17.45 34.1      Strength: 12/15/2021 2021   Right hand strength in K not tested   Left hand strength in Kg: 10 not tested     Immunotherapy 12/15/2021 2021   Time since last IVIG (days): 4 weeks -   Current treatment: none IVIG started 21     Assessment:    Peggy 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 The diagnosis is supported by her normal clinical symptoms and signs (non length dependant sensory changes, ataxia, hyporeflexia), nerve root enhancement on MRI, elevated CSF protein, and prior response to immunotherapy (marked improvement in TUG time). Her additional laboratory work-up thus far has been unrevealing for an alternative cause of her sensory ataxia. We discussed the naturally history of CISP. In most cases it is not monophasic. She is now about 1 month out from IVIG and is worsening. We discussed the pathophysiology of CISP and that  it can require recurrent treatments with immune therapy. We will get her started IVIG maintenance as below and reassess her in a couple months. Long term immunotherapy will be determined pending her clinical response.     Plan:      1. Immunotherapy: Start IVIG 1g/kg every 3 weeks  2. Physical therapy: continuing with therapy  3. Symptomatic management of pain and paresthesias: continue with current treatment regimen, defer treatment to primary/pain management team  4. INCBASE: Enrolled [id number]  4. Follow up in 6 weeks. She knows to call us sooner if needed.     Seen and discussed with Dr. Duenas. His addendum follows    Geri Sullivan MD  Neuromuscular Fellow  ---  ADDENDUM  I personally interviewed and examined the patient. I agree with the history, physical, assessment, and plan as documented above. Changes to the physical examination, assessment and plan have been incorporated into the note by myself, as to make it a single cohesive document.    Jon Duenas MD    1/4/21: I spoke with her by phone.  Unbelievably she was denied by her insurance. The reason was because she was not treated with steroids for 3 months. She has been exposed to corticosteroids in the past. These resulted in substantial weight gain, severe insomnia, emotional instability and depression. Over the last few weeks her CIDP is worsening. She is becoming more unsteady. She fell 3 times today and 3 yesterday. Numbness is worsening in her limbs. Clearly steroids are contraindicated in her. Also clearly she has had a beneficial to IVIG and she is worsening while we await IVIG approval. Will contact her insurance at [phone] and plead with them to please approve IVIG so that we can keep her out of the hospital.

## 2021-12-18 ENCOUNTER — HOSPITAL ENCOUNTER (OUTPATIENT)
Dept: PHYSICAL THERAPY | Facility: CLINIC | Age: 46
Setting detail: THERAPIES SERIES
End: 2021-12-18
Attending: PSYCHIATRY & NEUROLOGY
Payer: COMMERCIAL

## 2021-12-18 PROCEDURE — 97112 NEUROMUSCULAR REEDUCATION: CPT | Mod: GP | Performed by: PHYSICAL THERAPIST

## 2021-12-18 PROCEDURE — 97110 THERAPEUTIC EXERCISES: CPT | Mod: GP | Performed by: PHYSICAL THERAPIST

## 2021-12-20 ENCOUNTER — DOCUMENTATION ONLY (OUTPATIENT)
Dept: NEUROLOGY | Facility: CLINIC | Age: 46
End: 2021-12-20
Payer: COMMERCIAL

## 2021-12-20 NOTE — PROGRESS NOTES
Disability paperwork signed by Dr. Duenas and faxed to John. Copy scanned to chart.    Avsi Londono RN

## 2021-12-20 NOTE — TELEPHONE ENCOUNTER
Prior Authorization required. This is off-label or against insurance policy. This may take 10-14 business days.

## 2021-12-22 NOTE — TELEPHONE ENCOUNTER
" Health Call Center    Phone Message    May a detailed message be left on voicemail: yes     Reason for Call: Other: pt requesting to know:  \"Did the prior auth go thru to the insurance company or not for  IVIG every 3 weeks? Please call pt asap. Pt has not heard back from clinic for over 1 week. Thank you.    Action Taken: Message routed to:  [facility] ([facility]    Travel Screening: Not Applicable                                                                      "

## 2021-12-22 NOTE — TELEPHONE ENCOUNTER
Spoke with Peggy and let her know that the PA for IVIG therapy is still pending.    Avis Londono RN

## 2021-12-29 ENCOUNTER — HOSPITAL ENCOUNTER (OUTPATIENT)
Dept: PHYSICAL THERAPY | Facility: CLINIC | Age: 46
Setting detail: THERAPIES SERIES
End: 2021-12-29
Attending: PSYCHIATRY & NEUROLOGY
Payer: COMMERCIAL

## 2021-12-29 PROCEDURE — 97116 GAIT TRAINING THERAPY: CPT | Mod: GP | Performed by: PHYSICAL THERAPIST

## 2021-12-29 PROCEDURE — 97110 THERAPEUTIC EXERCISES: CPT | Mod: GP | Performed by: PHYSICAL THERAPIST

## 2021-12-29 NOTE — TELEPHONE ENCOUNTER
Patient wanting to know what else she can do about the IVIG that was denied by the insurance team.    Insurance denying because patient didn't try 3 months of corticosteroids first and she refuses to due to mental issues/side effects (makes her angry).    Appeals insurance dept # [phone] and [phone] for [facility].

## 2022-01-04 ENCOUNTER — TELEPHONE (OUTPATIENT)
Dept: NEUROLOGY | Facility: CLINIC | Age: 47
End: 2022-01-04
Payer: COMMERCIAL

## 2022-01-04 NOTE — TELEPHONE ENCOUNTER
Spoke with Peggy who states CIDP symptoms are worsening.  Increased numbness in hands and feet and has been falling more frequently.  PA for IVIG currently pending although Peggy received letter from insurance stating she must try steroids first.  Peggy states she has adverse reactions to steroids and will not take them.  She is wondering if Dr. Duenas has any suggestions while she is waiting for IVIG approval. Routing to Dr. Duenas.      Avis Londono RN

## 2022-01-04 NOTE — TELEPHONE ENCOUNTER
[facility]    Phone Message    May a detailed message be left on voicemail: yes     Reason for Call: Pt called. She having more numbness and weakness. Been falling more. Insurance denied IVIG. What else can pt do? Please advise? [phone]    Action Taken: Message routed to:  [facility]    Travel Screening: Not Applicable

## 2022-01-05 NOTE — TELEPHONE ENCOUNTER
Message sent to infusion finance to submit Dr. Duenas's addended note to insurance ASAP.    Avis Londono RN

## 2022-01-05 NOTE — TELEPHONE ENCOUNTER
Health Call Center    Phone Message    May a detailed message be left on voicemail: yes     Reason for Call: Symptoms or Concerns     If patient has red-flag symptoms, warm transfer to triage line    Current symptom or concern: Tightness in chest    Symptoms have been present for:  2-3 day(s)    Has patient previously been seen for this? Not sure    Date: 1/3/22    Are there any new or worsening symptoms? Yes:     Please call patient at [phone] to advise.      Action Taken: Message routed to:  [facility] ([facility]): [facility]    Travel Screening: Not Applicable

## 2022-01-08 ENCOUNTER — HEALTH MAINTENANCE LETTER (OUTPATIENT)
Age: 47
End: 2022-01-08

## 2022-01-11 LAB
PERFORMING LABORATORY: NORMAL
SCANNED LAB RESULT: NORMAL
TEST NAME: NORMAL

## 2022-01-12 ENCOUNTER — TELEPHONE (OUTPATIENT)
Dept: NEUROLOGY | Facility: CLINIC | Age: 47
End: 2022-01-12

## 2022-01-12 NOTE — TELEPHONE ENCOUNTER
PA Initiation    Medication: Privigen Rx Benefit  Insurance Company: [employer] - Phone [phone] Fax [phone]  Pharmacy Filling the Rx: [facility] - [place], [place] - [facility] [address]  Filling Pharmacy Phone: [phone]  Filling Pharmacy Fax: [phone]  Start Date: 1/12/2022

## 2022-01-13 NOTE — TELEPHONE ENCOUNTER
Prior Authorization Approval    Authorization Effective Date: 1/12/2022  Authorization Expiration Date: 6/12/2022  Medication: Privigen Rx Benefit  Approved Dose/Quantity: 60g (600mL) of Privigen 20gm/200ml soln for 21DS  Reference #: [id number]   Insurance Company: [employer] - Phone [phone] Fax [phone]  Expected CoPay: $75.00     CoPay Card Available:      Foundation Assistance Needed: N/A  Which Pharmacy is filling the prescription (Not needed for infusion/clinic administered): [facility] - [place], [place] - [address]  Pharmacy Notified: Yes  Patient Notified:

## 2022-01-13 NOTE — TELEPHONE ENCOUNTER
Ryann muro [facility] called to see if this appeal/resubmission of approval has [facility]. She states that she just called Gina and  Was told it has not yet been requested.    Please call Ryann at [facility] back to verify if this is under way.

## 2022-01-18 ENCOUNTER — TELEPHONE (OUTPATIENT)
Dept: NEUROLOGY | Facility: CLINIC | Age: 47
End: 2022-01-18
Payer: COMMERCIAL

## 2022-01-18 ENCOUNTER — HOSPITAL ENCOUNTER (OUTPATIENT)
Dept: PHYSICAL THERAPY | Facility: CLINIC | Age: 47
Setting detail: THERAPIES SERIES
End: 2022-01-18
Attending: PSYCHIATRY & NEUROLOGY
Payer: COMMERCIAL

## 2022-01-18 PROCEDURE — 97110 THERAPEUTIC EXERCISES: CPT | Mod: GP | Performed by: PHYSICAL THERAPIST

## 2022-01-18 NOTE — TELEPHONE ENCOUNTER
[facility]    Phone Message    May a detailed message be left on voicemail: yes     Reason for Call: Other: Medication appeal need fax authoriztion for Privigen       Fax  [phone]    Action Taken: Message routed to:  [facility] ([facility]): Neurology    Travel Screening: Not Applicable

## 2022-01-18 NOTE — TELEPHONE ENCOUNTER
Received a message from infusion finance that Peggy is approved and can schedule infusions at this time.  Called Peggy and provided her the number for the infusion center here at [facility].    Avis Londono RN

## 2022-01-21 ENCOUNTER — INFUSION THERAPY VISIT (OUTPATIENT)
Dept: INFUSION THERAPY | Facility: CLINIC | Age: 47
End: 2022-01-21
Attending: PSYCHIATRY & NEUROLOGY
Payer: COMMERCIAL

## 2022-01-21 VITALS
WEIGHT: 165.7 LBS | TEMPERATURE: 98.2 F | DIASTOLIC BLOOD PRESSURE: 64 MMHG | HEART RATE: 93 BPM | RESPIRATION RATE: 16 BRPM | SYSTOLIC BLOOD PRESSURE: 99 MMHG | OXYGEN SATURATION: 96 % | BODY MASS INDEX: 27.79 KG/M2

## 2022-01-21 DIAGNOSIS — G61.81 CIDP (CHRONIC INFLAMMATORY DEMYELINATING POLYNEUROPATHY) (H): Primary | ICD-10-CM

## 2022-01-21 PROCEDURE — 250N000011 HC RX IP 250 OP 636: Performed by: PSYCHIATRY & NEUROLOGY

## 2022-01-21 PROCEDURE — 96365 THER/PROPH/DIAG IV INF INIT: CPT

## 2022-01-21 PROCEDURE — 96366 THER/PROPH/DIAG IV INF ADDON: CPT

## 2022-01-21 PROCEDURE — 250N000013 HC RX MED GY IP 250 OP 250 PS 637: Performed by: PSYCHIATRY & NEUROLOGY

## 2022-01-21 PROCEDURE — 999N000127 HC STATISTIC PERIPHERAL IV START W US GUIDANCE

## 2022-01-21 RX ORDER — DIPHENHYDRAMINE HCL 25 MG
50 CAPSULE ORAL ONCE
Status: COMPLETED | OUTPATIENT
Start: 2022-01-21 | End: 2022-01-21

## 2022-01-21 RX ORDER — DIPHENHYDRAMINE HCL 25 MG
50 CAPSULE ORAL ONCE
Status: CANCELLED
Start: 2022-01-21

## 2022-01-21 RX ORDER — METHYLPREDNISOLONE SODIUM SUCCINATE 125 MG/2ML
125 INJECTION, POWDER, LYOPHILIZED, FOR SOLUTION INTRAMUSCULAR; INTRAVENOUS
Status: CANCELLED
Start: 2022-01-21

## 2022-01-21 RX ORDER — DIPHENHYDRAMINE HYDROCHLORIDE 50 MG/ML
50 INJECTION INTRAMUSCULAR; INTRAVENOUS
Status: CANCELLED
Start: 2022-01-21

## 2022-01-21 RX ORDER — ALBUTEROL SULFATE 90 UG/1
1-2 AEROSOL, METERED RESPIRATORY (INHALATION)
Status: CANCELLED
Start: 2022-01-21

## 2022-01-21 RX ORDER — EPINEPHRINE 1 MG/ML
0.3 INJECTION, SOLUTION INTRAMUSCULAR; SUBCUTANEOUS EVERY 5 MIN PRN
Status: CANCELLED | OUTPATIENT
Start: 2022-01-21

## 2022-01-21 RX ORDER — ACETAMINOPHEN 325 MG/1
650 TABLET ORAL ONCE
Status: CANCELLED
Start: 2022-01-21

## 2022-01-21 RX ORDER — NALOXONE HYDROCHLORIDE 0.4 MG/ML
0.2 INJECTION, SOLUTION INTRAMUSCULAR; INTRAVENOUS; SUBCUTANEOUS
Status: CANCELLED | OUTPATIENT
Start: 2022-01-21

## 2022-01-21 RX ORDER — HEPARIN SODIUM,PORCINE 10 UNIT/ML
5 VIAL (ML) INTRAVENOUS
Status: CANCELLED | OUTPATIENT
Start: 2022-01-21

## 2022-01-21 RX ORDER — ALBUTEROL SULFATE 0.83 MG/ML
2.5 SOLUTION RESPIRATORY (INHALATION)
Status: CANCELLED | OUTPATIENT
Start: 2022-01-21

## 2022-01-21 RX ORDER — ACETAMINOPHEN 325 MG/1
650 TABLET ORAL ONCE
Status: COMPLETED | OUTPATIENT
Start: 2022-01-21 | End: 2022-01-21

## 2022-01-21 RX ORDER — HEPARIN SODIUM (PORCINE) LOCK FLUSH IV SOLN 100 UNIT/ML 100 UNIT/ML
5 SOLUTION INTRAVENOUS
Status: CANCELLED | OUTPATIENT
Start: 2022-01-21

## 2022-01-21 RX ORDER — MEPERIDINE HYDROCHLORIDE 25 MG/ML
25 INJECTION INTRAMUSCULAR; INTRAVENOUS; SUBCUTANEOUS EVERY 30 MIN PRN
Status: CANCELLED | OUTPATIENT
Start: 2022-01-21

## 2022-01-21 RX ORDER — HEPARIN SODIUM (PORCINE) LOCK FLUSH IV SOLN 100 UNIT/ML 100 UNIT/ML
5 SOLUTION INTRAVENOUS
Status: DISCONTINUED | OUTPATIENT
Start: 2022-01-21 | End: 2022-01-21 | Stop reason: HOSPADM

## 2022-01-21 RX ORDER — HEPARIN SODIUM,PORCINE 10 UNIT/ML
5 VIAL (ML) INTRAVENOUS
Status: DISCONTINUED | OUTPATIENT
Start: 2022-01-21 | End: 2022-01-21 | Stop reason: HOSPADM

## 2022-01-21 RX ADMIN — ACETAMINOPHEN 650 MG: 325 TABLET ORAL at 13:45

## 2022-01-21 RX ADMIN — HUMAN IMMUNOGLOBULIN G 55 G: 40 LIQUID INTRAVENOUS at 13:55

## 2022-01-21 RX ADMIN — DIPHENHYDRAMINE HYDROCHLORIDE 50 MG: 25 CAPSULE ORAL at 13:45

## 2022-01-21 NOTE — PROGRESS NOTES
Nursing Note  Peggy Tashaoswald Jessica presents [facility] for: [facility]  During today's [facility] appointment, orders from Dr. Duenas were completed.  Frequency: every 21 days    Progress note:  Patient identification verified by name and date of birth.  Assessment completed.  Vitals recorded in Doc Flowsheets.  Patient was provided with education regarding medication/procedure and possible side effects.  Patient verbalized understanding.     present during visit today: Not Applicable.    Treatment Conditions: ~~~ NOTE: If the patient answers yes to any of the questions below, hold the infusion and contact ordering provider or on-call provider.    1. Have you recently had an elevated temperature, fever, chills, productive cough, coughing for 3 weeks or longer or hemoptysis, abnormal vital signs, night sweats,  chest pain or have you noticed a decrease in your appetite, unexplained weight loss or fatigue? No  2. Do you have any open wounds or new incisions? No  3. Do you have any recent or upcoming hospitalizations, surgeries or dental procedures? No  4. Do you currently have or recently have had any signs of illness or infection or are you on any antibiotics? No  5. Have you had any new, sudden or worsening abdominal pain? No  6. Have you or anyone in your household received a live vaccination in the past 4 weeks? Please note:  No live vaccines while on biologic/chemotherapy until 6 months after the last treatment.  Patient can receive the flu vaccine (shot only) and the pneumovax.  It is optimal for the patient to get these vaccines mid cycle, but they can be given at any time as long as it is not on the day of the infusion. No  7. Have you recently been diagnosed with any new nervous system diseases (ie. Multiple sclerosis, Guillain [place], seizures, neurological changes) or cancer diagnosis? No  8. Are you on any form of radiation or  chemotherapy? No  9. Are you pregnant or breast feeding or do you have plans of pregnancy in the future? No  10. Have you been having any signs of worsening depression or suicidal ideations?  (benlysta only) No  11. Have there been any other new onset medical symptoms? No      Premedications: administered per order.    Infusion length and rate:  infusion starts at 37.5 ml/hr, then increased by 37.5 ml/hr every 15 minutes to final rate of 300 ml/hr.    Labs: were not ordered for this appointment.    Vascular access: peripheral IV was placed by vascular access nurse.    Is the next appt scheduled? Yes; sent to scheduling  Asymptomatic COVID test completed? no    Post Infusion Assessment:  Patient tolerated infusion without incident.     Discharge Plan:   Follow up plan of care with: ongoing infusions at [facility].  Discharge instructions were reviewed with patient.  Patient/representative verbalized understanding of discharge instructions and all questions answered.  Patient discharged from [facility] in stable condition.    Germaine Kahn RN       Administrations This Visit     acetaminophen (TYLENOL) tablet 650 mg     Admin Date  01/21/2022 Action  Given Dose  650 mg Route  Oral Administered By  Germaine Kahn RN          diphenhydrAMINE (BENADRYL) capsule 50 mg     Admin Date  01/21/2022 Action  Given Dose  50 mg Route  Oral Administered By  Germaine Kahn RN          immune globulin - sucrose free 10 % injection 55 g (privigen)     Admin Date  01/21/2022 Action  New Bag Dose  55 g Route  Intravenous Administered By  Germaine Kahn RN

## 2022-01-21 NOTE — PATIENT INSTRUCTIONS
Dear Peggy Jessica    Thank you for choosing [facility] ([facility]) for your infusion.  The following information is a summary of our appointment as well as important reminders.      We look forward in seeing you on your next appointment here at [facility] ([facility]).  Please don t hesitate to call us at [phone] to reschedule any of your appointments or to speak with one of the [facility] registered nurses.  It was a pleasure taking care of you today.    Sincerely,    [facility]  [facility]  [address] [place] [address]  [id number]  Phone:  [phone]

## 2022-01-21 NOTE — LETTER
1/21/2022         RE: Peggy Jessica  [address] [facility] [address]        Dear Colleague,    Thank you for referring your patient, Peggy Jessica, to the [facility]. Please see a copy of my visit note below.    Nursing Note  Peggy Jessica presents [facility] for: IVIG  During today's [facility] appointment, orders from Dr. Duenas were completed.  Frequency: every 21 days    Progress note:  Patient identification verified by name and date of birth.  Assessment completed.  Vitals recorded in Doc Flowsheets.  Patient was provided with education regarding medication/procedure and possible side effects.  Patient verbalized understanding.     present during visit today: Not Applicable.    Treatment Conditions: ~~~ NOTE: If the patient answers yes to any of the questions below, hold the infusion and contact ordering provider or on-call provider.    1. Have you recently had an elevated temperature, fever, chills, productive cough, coughing for 3 weeks or longer or hemoptysis, abnormal vital signs, night sweats,  chest pain or have you noticed a decrease in your appetite, unexplained weight loss or fatigue? No  2. Do you have any open wounds or new incisions? No  3. Do you have any recent or upcoming hospitalizations, surgeries or dental procedures? No  4. Do you currently have or recently have had any signs of illness or infection or are you on any antibiotics? No  5. Have you had any new, sudden or worsening abdominal pain? No  6. Have you or anyone in your household received a live vaccination in the past 4 weeks? Please note:  No live vaccines while on biologic/chemotherapy until 6 months after the last treatment.  Patient can receive the flu vaccine (shot only) and the pneumovax.  It is optimal for the patient to get these vaccines mid cycle, but they can be given  at any time as long as it is not on the day of the infusion. No  7. Have you recently been diagnosed with any new nervous system diseases (ie. Multiple sclerosis, Guillain Reeder, seizures, neurological changes) or cancer diagnosis? No  8. Are you on any form of radiation or chemotherapy? No  9. Are you pregnant or breast feeding or do you have plans of pregnancy in the future? No  10. Have you been having any signs of worsening depression or suicidal ideations?  (benlysta only) No  11. Have there been any other new onset medical symptoms? No      Premedications: administered per order.    Infusion length and rate:  infusion starts at 37.5 ml/hr, then increased by 37.5 ml/hr every 15 minutes to final rate of 300 ml/hr.    Labs: were not ordered for this appointment.    Vascular access: peripheral IV was placed by vascular access nurse.    Is the next appt scheduled? Yes; sent to scheduling  Asymptomatic COVID test completed? no    Post Infusion Assessment:  Patient tolerated infusion without incident.     Discharge Plan:   Follow up plan of care with: ongoing infusions at [facility].  Discharge instructions were reviewed with patient.  Patient/representative verbalized understanding of discharge instructions and all questions answered.  Patient discharged from [facility] in stable condition.    Germaine Kahn RN       Administrations This Visit     acetaminophen (TYLENOL) tablet 650 mg     Admin Date  01/21/2022 Action  Given Dose  650 mg Route  Oral Administered By  Germaine Kahn RN          diphenhydrAMINE (BENADRYL) capsule 50 mg     Admin Date  01/21/2022 Action  Given Dose  50 mg Route  Oral Administered By  Germaine Kahn RN          immune globulin - sucrose free 10 % injection 55 g (privigen)     Admin Date  01/21/2022 Action  New Bag Dose  55 g Route  Intravenous Administered By  Germaine Kahn RN                    Again, thank you for  allowing me to participate in the care of your patient.      Sincerely,    [facility]

## 2022-01-25 ENCOUNTER — TELEPHONE (OUTPATIENT)
Dept: NEUROLOGY | Facility: CLINIC | Age: 47
End: 2022-01-25
Payer: COMMERCIAL

## 2022-01-25 ENCOUNTER — HOSPITAL ENCOUNTER (OUTPATIENT)
Dept: PHYSICAL THERAPY | Facility: CLINIC | Age: 47
Setting detail: THERAPIES SERIES
End: 2022-01-25
Attending: PSYCHIATRY & NEUROLOGY
Payer: COMMERCIAL

## 2022-01-25 PROCEDURE — 97112 NEUROMUSCULAR REEDUCATION: CPT | Mod: GP | Performed by: PHYSICAL THERAPIST

## 2022-01-25 PROCEDURE — 97110 THERAPEUTIC EXERCISES: CPT | Mod: GP | Performed by: PHYSICAL THERAPIST

## 2022-01-31 ENCOUNTER — MEDICAL CORRESPONDENCE (OUTPATIENT)
Dept: HEALTH INFORMATION MANAGEMENT | Facility: CLINIC | Age: 47
End: 2022-01-31

## 2022-01-31 ENCOUNTER — TRANSFERRED RECORDS (OUTPATIENT)
Dept: HEALTH INFORMATION MANAGEMENT | Facility: CLINIC | Age: 47
End: 2022-01-31

## 2022-01-31 ENCOUNTER — TELEPHONE (OUTPATIENT)
Dept: NEUROLOGY | Facility: CLINIC | Age: 47
End: 2022-01-31

## 2022-01-31 ENCOUNTER — OFFICE VISIT (OUTPATIENT)
Dept: NEUROLOGY | Facility: CLINIC | Age: 47
End: 2022-01-31
Payer: COMMERCIAL

## 2022-01-31 VITALS
WEIGHT: 164.7 LBS | DIASTOLIC BLOOD PRESSURE: 89 MMHG | HEART RATE: 111 BPM | BODY MASS INDEX: 27.62 KG/M2 | SYSTOLIC BLOOD PRESSURE: 119 MMHG | OXYGEN SATURATION: 97 % | RESPIRATION RATE: 16 BRPM

## 2022-01-31 DIAGNOSIS — G90.521 COMPLEX REGIONAL PAIN SYNDROME TYPE 1 OF RIGHT LOWER EXTREMITY: Primary | ICD-10-CM

## 2022-01-31 PROCEDURE — 99215 OFFICE O/P EST HI 40 MIN: CPT | Performed by: PSYCHIATRY & NEUROLOGY

## 2022-01-31 RX ORDER — EPINEPHRINE 1 MG/ML
0.3 INJECTION, SOLUTION, CONCENTRATE INTRAVENOUS EVERY 5 MIN PRN
Status: CANCELLED | OUTPATIENT
Start: 2022-01-31

## 2022-01-31 RX ORDER — METHYLPREDNISOLONE SODIUM SUCCINATE 125 MG/2ML
125 INJECTION, POWDER, LYOPHILIZED, FOR SOLUTION INTRAMUSCULAR; INTRAVENOUS
Status: CANCELLED
Start: 2022-01-31

## 2022-01-31 RX ORDER — DIPHENHYDRAMINE HCL 25 MG
50 CAPSULE ORAL ONCE
Status: CANCELLED
Start: 2022-01-31

## 2022-01-31 RX ORDER — ACETAMINOPHEN 325 MG/1
650 TABLET ORAL ONCE
Status: CANCELLED
Start: 2022-01-31

## 2022-01-31 RX ORDER — HEPARIN SODIUM (PORCINE) LOCK FLUSH IV SOLN 100 UNIT/ML 100 UNIT/ML
5 SOLUTION INTRAVENOUS
Status: CANCELLED | OUTPATIENT
Start: 2022-01-31

## 2022-01-31 RX ORDER — HEPARIN SODIUM,PORCINE 10 UNIT/ML
5 VIAL (ML) INTRAVENOUS
Status: CANCELLED | OUTPATIENT
Start: 2022-01-31

## 2022-01-31 RX ORDER — ALBUTEROL SULFATE 90 UG/1
1-2 AEROSOL, METERED RESPIRATORY (INHALATION)
Status: CANCELLED
Start: 2022-01-31

## 2022-01-31 RX ORDER — ALBUTEROL SULFATE 0.83 MG/ML
2.5 SOLUTION RESPIRATORY (INHALATION)
Status: CANCELLED | OUTPATIENT
Start: 2022-01-31

## 2022-01-31 RX ORDER — NALOXONE HYDROCHLORIDE 0.4 MG/ML
0.2 INJECTION, SOLUTION INTRAMUSCULAR; INTRAVENOUS; SUBCUTANEOUS
Status: CANCELLED | OUTPATIENT
Start: 2022-01-31

## 2022-01-31 RX ORDER — DIPHENHYDRAMINE HYDROCHLORIDE 50 MG/ML
50 INJECTION INTRAMUSCULAR; INTRAVENOUS
Status: CANCELLED
Start: 2022-01-31

## 2022-01-31 RX ORDER — MEPERIDINE HYDROCHLORIDE 25 MG/ML
25 INJECTION INTRAMUSCULAR; INTRAVENOUS; SUBCUTANEOUS EVERY 30 MIN PRN
Status: CANCELLED | OUTPATIENT
Start: 2022-01-31

## 2022-01-31 ASSESSMENT — PAIN SCALES - GENERAL: PAINLEVEL: SEVERE PAIN (7)

## 2022-01-31 NOTE — TELEPHONE ENCOUNTER
Per Dr. Duenas, Peggy should receive an additional IVIG dose ASAP (dosing is 1 gm/kg).  [facility].      Avis Londono RN

## 2022-01-31 NOTE — PROGRESS NOTES
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 Her symptoms that led to the CISP diagnosis began in September 2021.  Onset was insidious and course over the first couple months was progressive. She reported sensory changes (numbness in her lower limbs and hands. She felt clumsy and unsteady on her feet. She started to fall. By her admission she was falling 3-4 times a day. Numbness and tingling spread to affect her below her knees and wrists. She was dropping things in the hands. Tasks like knitting (she had been quite good at this) became difficult. In 11/21 she could not knit at all. During her hospitalization she had a normal EMG. Imaging showed mild enhancement of the cauda equina and paraspinal musculature with elevated protein in the CSF. She was treated with 5 rounds of IVIG (4 weeks from last dose -11/22/21) with subjective improvement after the 3rd dose. Her balance was much better. She could walk unassisted, although her walking was not normal. She could knit. Numbness was improved. Tremor was better. After couple weeks the benefit started to wear off.     Interval history:   I last saw her 12/15/21. Unfortunately there was a major delay from her insurance with getting IVIG started. After the dose in the hospital in 11/21 she did not get it again until mid 1/22. During that time she worsened. She has worsened almost to where she was at before her hospitalization in the fall 2021. She needs a walker for ambulation. She is falling. Her hands are clumsy. She can not knit. Day to day hand tasks are very challenging or impossible. She has numbness below mid thigh and elbows. After IVIg numbness was only to her wrists and ankles. Her PT has been paused during her current decline.     Prior pertinent laboratory work-up:  11/2021: CSF (WBC 0, glc 73, protein  105), PAULINE, (1:160) unremarkable CSF HSV, O-bands, serum electrophoresis, paraneoplastic panel, B6, ganglioside Abs, SSA/SSB,   2021: unremarkable B12, IgM, Immunofixation, HbA1c    Prior pertinent radiology work-up:  21: MR Lumbar Spine showed diffuse mild enhancement of the cauda equina nerve roots without nodularity or clumping is nonspecific.  Probable mild enhancement of the posterior paraspinous musculature from L4 through S1. However, artifact could have similar appearance. 3. Mild multilevel lumbar spondylosis without evidence for high-grade spinal canal or neural foraminal stenosis.    Prior electrophysiologic work-up:  21: Nerve conduction studies/EMG: Right upper and lower extremity normal study, including tibial H reflexes  : [place] [place] There is generalized, marked, symmetric postganglionic sudomotor dysfunction, normal cardiovagal function, and mild adrenergic dysfunction. Findings are in keeping with systemic anticholinergic medication effect. Superimposed mild autonomic neuropathy can not be excluded. Orthostatic hypotension and tachycardia are not seen. Compression of pulse pressure during Valsalva raises the question of mild hypovolemia or deconditioning.     Past Medical History:   CRPS (right leg - stress fracture; chest - mastectomy)  BRCA+   Cervical cancer  Migraines - resolved    Past Surgical History:  Bilateral mastectomy  Hysterectomy    gallbladder    Family history:    Dad - neuropathy (obeses, CKD)  Unknown largely    Social History:    She denies tobacco, alcohol, or illicit drug use. There is no known exposure to toxins or heavy metals.   Desk work - corporate collections for [employer]    Medical Allergies:     Allergies   Allergen Reactions     Dihydroergotamine Anaphylaxis     Latex Anaphylaxis     Shellfish-Derived Products Anaphylaxis     Sumatriptan Anaphylaxis     Banana Unknown     Gabapentin Dizziness     Gluten Meal Other (See Comments)      Celiac disease     Keppra [Levetiracetam] Nausea and Vomiting     Kiwi Unknown     Levofloxacin Other (See Comments)     Arrhythmia     Metronidazole Nausea and Vomiting     Nitrofurantoin Hives     Penicillins Hives     Topiramate Visual Disturbance     Aspirin Rash     Methadone Rash     Risperidone Anxiety     Current Medications:    Current Outpatient Medications   Medication     acetaminophen (TYLENOL) 325 MG tablet     amitriptyline (ELAVIL) 10 MG tablet     bisacodyl (DULCOLAX) 10 MG suppository     cyclobenzaprine (FLEXERIL) 10 MG tablet     gabapentin (NEURONTIN) 100 MG capsule     hydrOXYzine (ATARAX) 25 MG tablet     hyoscyamine (LEVSIN/SL) 0.125 MG sublingual tablet     levETIRAcetam (KEPPRA) 500 MG tablet     LORazepam (ATIVAN) 1 MG tablet     multivitamin w/minerals (MULTI-VITAMIN) tablet     ondansetron (ZOFRAN-ODT) 8 MG ODT tab     oxyCODONE (ROXICODONE) 5 MG tablet     pantoprazole (PROTONIX) 40 MG EC tablet     polyethylene glycol (MIRALAX) 17 GM/Dose powder     prochlorperazine (COMPAZINE) 5 MG tablet     senna-docusate (SENOKOT-S/PERICOLACE) 8.6-50 MG tablet     topiramate (TOPAMAX) 50 MG tablet     No current facility-administered medications for this visit.     Review of Systems: A complete review of systems was obtained and was negative except for what was noted above.    Physical examination:    /89   Pulse 111   Resp 16   Wt 74.7 kg (164 lb 11.2 oz)   SpO2 97%   BMI 27.62 kg/m       Wt Readings from Last 4 Encounters:   01/31/22 74.7 kg (164 lb 11.2 oz)   01/21/22 75.2 kg (165 lb 11.2 oz)   12/15/21 70.3 kg (155 lb)   12/03/21 73.9 kg (163 lb)     General Appearance: NAD    Skin: There are no rashes or other skin lesions.    Neurologic examination:    Mental status:  Patient is alert, attentive, and oriented x 3.  Language is coherent and fluent without  aphasia.  Memory, comprehension and ability to follow commands were intact.       Cranial nerves:    Extraocular movements were  full. There was no face, jaw, palate or tongue weakness or atrophy. Hearing was grossly intact.  Shoulder shrug was normal.       Motor exam: No atrophy or fasciculations.  Impersistent activation but power is at least 4/5 and probably full in proximal and distal muscles of the arms and legs.    Complex motor skills: Mild to moderate postural hand tremor. She also has some mild to moderate ataxia with FNF.     Sensory exam: Decreased vibratory perception in the toes (2-3 sec) bilaterally. Vibration also reduced in fingers (about 10-12 seconds).  Pin testing patchy in the distal lower extremities, especially moving proximally       Gait:  Very slow, narrow, cautious gait without steppage. Unable to heel/toe walk     Deep tendon reflexes:   Right Left   Triceps 1 1   Biceps 1 1   Brachioradialis 1 1   Knee jerk 0 0   Ankle jerk 0 0      Neuropathy Assessments  Neurology Assessments 2022 12/15/2021 2021   RODS CIDP/MGUSP Score 17 22 22   Time for 'Up and Go' test- Seconds: 30.6 17.45 34.1      Strength: 2022 12/15/2021 2021   Right hand strength in K 12 not tested   Left hand strength in K 10 not tested     Immunotherapy 2022 12/15/2021 2021   Time since last IVIG (days): 2 weeks 4 weeks -   Current treatment: IVIG 1 gm/kg q 3 weeks none IVIG started 21     Assessment:    Peggy 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 The diagnosis is supported by her normal clinical symptoms and signs (non length dependant sensory changes, ataxia, hyporeflexia), nerve root enhancement on MRI, and elevated CSF protein. She had an unequivocal beneficial response to IVIG when administered in  (marked improvement in TUG time) - but unfortunately because her insurance delayed approval by over 2 months we have lost all those benefits and now basically starting over with her treatment. We discussed the immunotherapy plan as below. She is in agreement.     Plan:      1.  Immunotherapy:    - IVIG: Last dose (which was the first since her hospitalization in November 2021) 1 gm/kg 1/21/22. Will give extra one time loading of 1 gm/kg now and then increase maintenance from 1g/kg every 3 weeks to 1 gm/kg q 2 weeks. Pending clinical course will optimize as able.   - Corticosteroids: Contraindicated. She has had steroid exposure in the past (for a different indication) and she developed substantial weight gain, severe insomnia, emotional instability and depression.    - Other IST: None now, but pending IVIG course may need to find an IVIG sparing medication.   2. Physical therapy: continuing with therapy  3. Symptomatic management of pain and paresthesias: She would like [place] [facility] [place] [facility] Pain management preceded CISP diagnosis, where she has had chronic pain management from CRPS  4. INCBASE: Enrolled [id number]  5. Workability: At this point she is not suitable to return to work. I will see her back in 2 months and will reassess.   6. Follow up in 8 weeks. Sooner if needed.     ---  ADDENDUM:  1/31/22: Extra IVIG dose scheduled for 2/1/22.  2/7/22: I spoke with her by phone. Unbelievably her insurance has still has not given us a decision on her IVIG. They approved at q 3 weeks, but not the loading dose and not the q 2 week maintenance as we wanted. As a result she never got the dose on 2/1/22, and not suprisingly she has declined. Weakness in her upper and lower limbs is worse. She is falling more often. I do not think she is safe at home any longer, and see no other choice but to bring her into the hospital for IVIG. This is very unfortunate as she has responded to IVIG in the past, but because her insurance refuses to allow us to treat her with an FDA approved, evidence based, FIRST LINE  therapy for CIDP we will have no choice but to admit her for treatment. I have discussed the plan with Peggy. She is in agreement.     3/10/22: Patient called  requesting a port. Will try to hold off on it for now. If we establish that IVIG is clearly beneficial and we have continued IV access issues that it may be reasonable.     3/21/22: Note from Peggy. She does not feel any improvements yet. Comments that fatigue remains substantial. If IVIG is to be helpful I would expect it to be within the forest few months. Still might be too soon to determine. Will encouarge her to continue the plan as above.

## 2022-01-31 NOTE — LETTER
1/31/2022       RE: Peggy Jessica  [address] [facility] [address]     Dear Colleague,    Thank you for referring your patient, Peggy Jessica, to the [facility] [place] at [facility]. Please see a copy of my visit note below.    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 Her symptoms that led to the CISP diagnosis began in September 2021.  Onset was insidious and course over the first couple months was progressive. She reported sensory changes (numbness in her lower limbs and hands. She felt clumsy and unsteady on her feet. She started to fall. By her admission she was falling 3-4 times a day. Numbness and tingling spread to affect her below her knees and wrists. She was dropping things in the hands. Tasks like knitting (she had been quite good at this) became difficult. In 11/21 she could not knit at all. During her hospitalization she had a normal EMG. Imaging showed mild enhancement of the cauda equina and paraspinal musculature with elevated protein in the CSF. She was treated with 5 rounds of IVIG (4 weeks from last dose -11/22/21) with subjective improvement after the 3rd dose. Her balance was much better. She could walk unassisted, although her walking was not normal. She could knit. Numbness was improved. Tremor was better. After couple weeks the benefit started to wear off.     Interval history:   I last saw her 12/15/21. Unfortunately there was a major delay from her insurance with getting IVIG started. After the dose in the hospital in 11/21 she did not get it again until mid 1/22. During that time she worsened. She has worsened almost to where she was at before her hospitalization in the fall 2021. She needs a walker for ambulation. She is falling.  Her hands are clumsy. She can not knit. Day to day hand tasks are very challenging or impossible. She has numbness below mid thigh and elbows. After IVIg numbness was only to her wrists and ankles. Her PT has been paused during her current decline.     Prior pertinent laboratory work-up:  2021: CSF (WBC 0, glc 73, protein 105), PAULINE, (1:160) unremarkable CSF HSV, O-bands, serum electrophoresis, paraneoplastic panel, B6, ganglioside Abs, SSA/SSB,   2021: unremarkable B12, IgM, Immunofixation, HbA1c    Prior pertinent radiology work-up:  21: MR Lumbar Spine showed diffuse mild enhancement of the cauda equina nerve roots without nodularity or clumping is nonspecific.  Probable mild enhancement of the posterior paraspinous musculature from L4 through S1. However, artifact could have similar appearance. 3. Mild multilevel lumbar spondylosis without evidence for high-grade spinal canal or neural foraminal stenosis.    Prior electrophysiologic work-up:  21: Nerve conduction studies/EMG: Right upper and lower extremity normal study, including tibial H reflexes  : Autonomic testing ([facility]) : There is generalized, marked, symmetric postganglionic sudomotor dysfunction, normal cardiovagal function, and mild adrenergic dysfunction. Findings are in keeping with systemic anticholinergic medication effect. Superimposed mild autonomic neuropathy can not be excluded. Orthostatic hypotension and tachycardia are not seen. Compression of pulse pressure during Valsalva raises the question of mild hypovolemia or deconditioning.     Past Medical History:   CRPS (right leg - stress fracture; chest - mastectomy)  BRCA+   Cervical cancer  Migraines - resolved    Past Surgical History:  Bilateral mastectomy  Hysterectomy    gallbladder    Family history:    Dad - neuropathy (obeses, CKD)  Unknown largely    Social History:    She denies tobacco, alcohol, or illicit drug use. There is no known exposure to toxins or  heavy metals.   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 [employer]    Medical Allergies:     Allergies   Allergen Reactions     Dihydroergotamine Anaphylaxis     Latex Anaphylaxis     Shellfish-Derived Products Anaphylaxis     Sumatriptan Anaphylaxis     Banana Unknown     Gabapentin Dizziness     Gluten Meal Other (See Comments)     Celiac disease     Keppra [Levetiracetam] Nausea and Vomiting     Kiwi Unknown     Levofloxacin Other (See Comments)     Arrhythmia     Metronidazole Nausea and Vomiting     Nitrofurantoin Hives     Penicillins Hives     Topiramate Visual Disturbance     Aspirin Rash     Methadone Rash     Risperidone Anxiety     Current Medications:    Current Outpatient Medications   Medication     acetaminophen (TYLENOL) 325 MG tablet     amitriptyline (ELAVIL) 10 MG tablet     bisacodyl (DULCOLAX) 10 MG suppository     cyclobenzaprine (FLEXERIL) 10 MG tablet     gabapentin (NEURONTIN) 100 MG capsule     hydrOXYzine (ATARAX) 25 MG tablet     hyoscyamine (LEVSIN/SL) 0.125 MG sublingual tablet     levETIRAcetam (KEPPRA) 500 MG tablet     LORazepam (ATIVAN) 1 MG tablet     multivitamin w/minerals (MULTI-VITAMIN) tablet     ondansetron (ZOFRAN-ODT) 8 MG ODT tab     oxyCODONE (ROXICODONE) 5 MG tablet     pantoprazole (PROTONIX) 40 MG EC tablet     polyethylene glycol (MIRALAX) 17 GM/Dose powder     prochlorperazine (COMPAZINE) 5 MG tablet     senna-docusate (SENOKOT-S/PERICOLACE) 8.6-50 MG tablet     topiramate (TOPAMAX) 50 MG tablet     No current facility-administered medications for this visit.     Review of Systems: A complete review of systems was obtained and was negative except for what was noted above.    Physical examination:    /89   Pulse 111   Resp 16   Wt 74.7 kg (164 lb 11.2 oz)   SpO2 97%   BMI 27.62 kg/m       Wt Readings from Last 4 Encounters:   01/31/22 74.7 kg (164 lb 11.2 oz)   01/21/22 75.2 kg (165 lb 11.2 oz)   12/15/21 70.3 kg (155 lb)   12/03/21 73.9 kg (163 lb)     General  Appearance: NAD    Skin: There are no rashes or other skin lesions.    Neurologic examination:    Mental status:  Patient is alert, attentive, and oriented x 3.  Language is coherent and fluent without  aphasia.  Memory, comprehension and ability to follow commands were intact.       Cranial nerves:    Extraocular movements were full. There was no face, jaw, palate or tongue weakness or atrophy. Hearing was grossly intact.  Shoulder shrug was normal.       Motor exam: No atrophy or fasciculations.  Impersistent activation but power is at least 4/5 and probably full in proximal and distal muscles of the arms and legs.    Complex motor skills: Mild to moderate postural hand tremor. She also has some mild to moderate ataxia with FNF.     Sensory exam: Decreased vibratory perception in the toes (2-3 sec) bilaterally. Vibration also reduced in fingers (about 10-12 seconds).  Pin testing patchy in the distal lower extremities, especially moving proximally       Gait:  Very slow, narrow, cautious gait without steppage. Unable to heel/toe walk     Deep tendon reflexes:   Right Left   Triceps 1 1   Biceps 1 1   Brachioradialis 1 1   Knee jerk 0 0   Ankle jerk 0 0      Neuropathy Assessments  Neurology Assessments 2022 12/15/2021 2021   RODS CIDP/MGUSP Score 17 22 22   Time for 'Up and Go' test- Seconds: 30.6 17.45 34.1      Strength: 2022 12/15/2021 2021   Right hand strength in K 12 not tested   Left hand strength in K 10 not tested     Immunotherapy 2022 12/15/2021 2021   Time since last IVIG (days): 2 weeks 4 weeks -   Current treatment: IVIG 1 gm/kg q 3 weeks none IVIG started 21     Assessment:    Peggy 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 The diagnosis is supported by her normal clinical symptoms and signs (non length dependant sensory changes, ataxia, hyporeflexia), nerve root enhancement on MRI, and elevated CSF protein. She had an unequivocal beneficial  response to IVIG when administered in 11/21 (marked improvement in TUG time) - but unfortunately because her insurance delayed approval by over 2 months we have lost all those benefits and now basically starting over with her treatment. We discussed the immunotherapy plan as below. She is in agreement.     Plan:      1. Immunotherapy:    - IVIG: Last dose (which was the first since her hospitalization in November 2021) 1 gm/kg 1/21/22. Will give extra one time loading of 1 gm/kg now and then increase maintenance from 1g/kg every 3 weeks to 1 gm/kg q 2 weeks. Pending clinical course will optimize as able.   - Corticosteroids: Contraindicated. She has had steroid exposure in the past (for a different indication) and she developed substantial weight gain, severe insomnia, emotional instability and depression.    - Other IST: None now, but pending IVIG course may need to find an IVIG sparing medication.   2. Physical therapy: continuing with therapy  3. Symptomatic management of pain and paresthesias: She would like to transition her pain management from [facility] [place]. Pain management preceded CISP diagnosis, where she has had chronic pain management from CRPS  4. INCBASE: Enrolled [id number]  5. Workability: At this point she is not suitable to return to work. I will see her back in 2 months and will reassess.   6. Follow up in 8 weeks. Sooner if needed.     ---  ADDENDUM:  1/31/22: Extra IVIG dose scheduled for 2/1/22.  2/7/22: I spoke with her by phone. Unbelievably her insurance has still has not given us a decision on her IVIG. They approved at q 3 weeks, but not the loading dose and not the q 2 week maintenance as we wanted. As a result she never got the dose on 2/1/22, and not suprisingly she has declined. Weakness in her upper and lower limbs is worse. She is falling more often. I do not think she is safe at home any longer, and see no other choice but to bring her into the hospital for IVIG. This is very  unfortunate as she has responded to IVIG in the past, but because her insurance refuses to allow us to treat her with an FDA approved, evidence based, FIRST LINE  therapy for CIDP we will have no choice but to admit her for treatment. I have discussed the plan with Peggy. She is in agreement.       Sincerely,    Jon Duenas MD

## 2022-02-01 ENCOUNTER — INFUSION THERAPY VISIT (OUTPATIENT)
Dept: INFUSION THERAPY | Facility: CLINIC | Age: 47
End: 2022-02-01
Attending: PHYSICAL MEDICINE & REHABILITATION
Payer: COMMERCIAL

## 2022-02-01 VITALS
DIASTOLIC BLOOD PRESSURE: 79 MMHG | HEART RATE: 82 BPM | OXYGEN SATURATION: 98 % | BODY MASS INDEX: 27.4 KG/M2 | TEMPERATURE: 97.9 F | SYSTOLIC BLOOD PRESSURE: 109 MMHG | RESPIRATION RATE: 16 BRPM | WEIGHT: 163.4 LBS

## 2022-02-01 DIAGNOSIS — G61.81 CIDP (CHRONIC INFLAMMATORY DEMYELINATING POLYNEUROPATHY) (H): Primary | ICD-10-CM

## 2022-02-01 PROCEDURE — 999N000128 HC STATISTIC PERIPHERAL IV START W/O US GUIDANCE

## 2022-02-01 RX ORDER — NALOXONE HYDROCHLORIDE 0.4 MG/ML
0.2 INJECTION, SOLUTION INTRAMUSCULAR; INTRAVENOUS; SUBCUTANEOUS
Status: CANCELLED | OUTPATIENT
Start: 2022-02-01

## 2022-02-01 RX ORDER — MEPERIDINE HYDROCHLORIDE 25 MG/ML
25 INJECTION INTRAMUSCULAR; INTRAVENOUS; SUBCUTANEOUS EVERY 30 MIN PRN
Status: CANCELLED | OUTPATIENT
Start: 2022-02-01

## 2022-02-01 RX ORDER — ACETAMINOPHEN 325 MG/1
650 TABLET ORAL ONCE
Status: DISCONTINUED | OUTPATIENT
Start: 2022-02-01 | End: 2022-02-01

## 2022-02-01 RX ORDER — ALBUTEROL SULFATE 0.83 MG/ML
2.5 SOLUTION RESPIRATORY (INHALATION)
Status: CANCELLED | OUTPATIENT
Start: 2022-02-01

## 2022-02-01 RX ORDER — DIPHENHYDRAMINE HCL 25 MG
50 CAPSULE ORAL ONCE
Status: CANCELLED
Start: 2022-02-01

## 2022-02-01 RX ORDER — EPINEPHRINE 1 MG/ML
0.3 INJECTION, SOLUTION INTRAMUSCULAR; SUBCUTANEOUS EVERY 5 MIN PRN
Status: CANCELLED | OUTPATIENT
Start: 2022-02-01

## 2022-02-01 RX ORDER — HEPARIN SODIUM,PORCINE 10 UNIT/ML
5 VIAL (ML) INTRAVENOUS
Status: CANCELLED | OUTPATIENT
Start: 2022-02-01

## 2022-02-01 RX ORDER — ALBUTEROL SULFATE 90 UG/1
1-2 AEROSOL, METERED RESPIRATORY (INHALATION)
Status: CANCELLED
Start: 2022-02-01

## 2022-02-01 RX ORDER — DIPHENHYDRAMINE HYDROCHLORIDE 50 MG/ML
50 INJECTION INTRAMUSCULAR; INTRAVENOUS
Status: CANCELLED
Start: 2022-02-01

## 2022-02-01 RX ORDER — METHYLPREDNISOLONE SODIUM SUCCINATE 125 MG/2ML
125 INJECTION, POWDER, LYOPHILIZED, FOR SOLUTION INTRAMUSCULAR; INTRAVENOUS
Status: CANCELLED
Start: 2022-02-01

## 2022-02-01 RX ORDER — ACETAMINOPHEN 325 MG/1
650 TABLET ORAL ONCE
Status: CANCELLED
Start: 2022-02-01

## 2022-02-01 RX ORDER — HEPARIN SODIUM (PORCINE) LOCK FLUSH IV SOLN 100 UNIT/ML 100 UNIT/ML
5 SOLUTION INTRAVENOUS
Status: CANCELLED | OUTPATIENT
Start: 2022-02-01

## 2022-02-01 RX ORDER — DIPHENHYDRAMINE HCL 25 MG
50 CAPSULE ORAL ONCE
Status: DISCONTINUED | OUTPATIENT
Start: 2022-02-01 | End: 2022-02-01

## 2022-02-01 NOTE — PATIENT INSTRUCTIONS
Dear Peggy Jessica    Thank you for choosing [facility] ([facility]) for your infusion.  The following information is a summary of our appointment as well as important reminders.      We look forward in seeing you on your next appointment here at [facility] ([facility]).  Please don t hesitate to call us at [phone] to reschedule any of your appointments or to speak with one of the [facility] registered nurses.  It was a pleasure taking care of you today.    Sincerely,    [facility]  [address] [place] [address]  [id number]  Phone:  [phone]

## 2022-02-01 NOTE — LETTER
2/1/2022         RE: Peggy Jessica  [address] [facility] [address]        Dear Colleague,    Thank you for referring your patient, Peggy Jessica, to the [facility]. Please see a copy of my visit note below.    Nursing Note  Peggy Jessica [facility] for:   Chief Complaint   Patient presents with     Infusion     IVIG       Progress note:  Patient identification verified by name and date of birth.  Assessment completed.  Vitals recorded in Doc Flowsheets.  Patient was provided with education regarding medication/procedure and possible side effects.  Patient verbalized understanding.    Treatment Conditions: Patient denies fever, chills, signs of infection, recent illness, antibiotics use, productive cough or elevated temperature.    Pharmacy was contacted due to medications not being approved by pharmacist. They said that they were working with infusion pharmacy finance because the infusion is not approved as it has only been 10 days (not 21 days as listed on the PA) since the last infusion. Dr Duenas was notified by pharmacy and by myself. I discussed this with the care coordinator that works with Dr Duenas. She said that they are working with the insurance on another urgent PA but that the patient can be discharged from [facility] now. She called the patient too as the patient had concerns about worsening symptoms and if waiting was okay per Dr Duenas. The goal is to get her in for the IVIG as soon as they have obtained authorization.     Premedications: not administered due to issue with insurance    Vascular access: peripheral IV was placed by vascular access nurse.    Is the next appt scheduled? Yes but patient will communicate with neurology team to try to get next dose of IVIG sooner than 2/11/22  Asymptomatic COVID test completed? no    Discharge Plan:   Follow up plan of care with: ordering  provider as scheduled.  Reviewed instructions of how to schedule another appointment.   Patient/representative verbalized understanding of discharge instructions and all questions answered.  [facility] discharged [facility] in stable condition.    Juana Nunez RN      /79 (BP Location: Left arm, Patient Position: Chair)   Pulse 82   Temp 97.9  F (36.6  C) (Oral)   Resp 16   Wt 74.1 kg (163 lb 6.4 oz)   SpO2 98%   BMI 27.40 kg/m              Again, thank you for allowing me to participate in the care of your patient.      Sincerely,    [facility]

## 2022-02-01 NOTE — PROGRESS NOTES
Nursing Note  Peggy Jessica presents today [facility] for:   Chief Complaint   Patient presents with     Infusion     IVIG       Progress note:  Patient identification verified by name and date of birth.  Assessment completed.  Vitals recorded in Doc Flowsheets.  Patient was provided with education regarding medication/procedure and possible side effects.  Patient verbalized understanding.    Treatment Conditions: Patient denies fever, chills, signs of infection, recent illness, antibiotics use, productive cough or elevated temperature.    Pharmacy was contacted due to medications not being approved by pharmacist. They said that they were working with infusion pharmacy finance because the infusion is not approved as it has only been 10 days (not 21 days as listed on the PA) since the last infusion. Dr Duenas was notified by pharmacy and by myself. I discussed this with the care coordinator that works with Dr Duenas. She said that they are working with the insurance on another urgent PA but that the patient can be discharged from [facility] now. She called the patient too as the patient had concerns about worsening symptoms and if waiting was okay per Dr Duenas. The goal is to get her in for the IVIG as soon as they have obtained authorization.     Premedications: not administered due to issue with insurance    Vascular access: peripheral IV was placed by vascular access nurse.    Is the next appt scheduled? Yes but patient will communicate with neurology team to try to get next dose of IVIG sooner than 2/11/22  Asymptomatic COVID test completed? no    Discharge Plan:   Follow up plan of care with: ordering provider as scheduled.  Reviewed instructions of how to schedule another appointment.   Patient/representative verbalized understanding of discharge instructions and all questions answered.  [facility] discharged [facility] in stable condition.    Juana  Evelin Nunez RN      /79 (BP Location: Left arm, Patient Position: Chair)   Pulse 82   Temp 97.9  F (36.6  C) (Oral)   Resp 16   Wt 74.1 kg (163 lb 6.4 oz)   SpO2 98%   BMI 27.40 kg/m

## 2022-02-02 ENCOUNTER — OFFICE VISIT (OUTPATIENT)
Dept: URGENT CARE | Facility: URGENT CARE | Age: 47
End: 2022-02-02
Payer: COMMERCIAL

## 2022-02-02 ENCOUNTER — NURSE TRIAGE (OUTPATIENT)
Dept: NURSING | Facility: CLINIC | Age: 47
End: 2022-02-02
Payer: COMMERCIAL

## 2022-02-02 VITALS
BODY MASS INDEX: 28.05 KG/M2 | WEIGHT: 164.3 LBS | SYSTOLIC BLOOD PRESSURE: 117 MMHG | OXYGEN SATURATION: 98 % | HEIGHT: 64 IN | HEART RATE: 94 BPM | TEMPERATURE: 97.5 F | DIASTOLIC BLOOD PRESSURE: 88 MMHG

## 2022-02-02 DIAGNOSIS — T82.9XXA COMPLICATION OF INTRAVENOUS CATHETER SITE: Primary | ICD-10-CM

## 2022-02-02 PROCEDURE — 99213 OFFICE O/P EST LOW 20 MIN: CPT | Performed by: FAMILY MEDICINE

## 2022-02-02 ASSESSMENT — MIFFLIN-ST. JEOR: SCORE: 1370.02

## 2022-02-02 NOTE — TELEPHONE ENCOUNTER
Infusion center yesterday supposed to get IVIG. Given two sticks before they got an IV going. The first one burst, it's red, swelling goes into hand with pain, numbness and tingling, right arm. I connected with the page  to be connected with the neurology clinic. I told the patient to go to urgent care if they can't get her into the clinic.  Shauna Hernández RN  [facility]      Reason for Disposition    [1] Skin redness AND [2] extends > 1 inch (2.5 cm) from IV site    Additional Information    Negative: Severe difficulty breathing (e.g., struggling for each breath, speaks in single words)    Negative: Shock suspected (e.g., cold/pale/clammy skin, too weak to stand, low BP, rapid pulse)    Negative: Sounds like a life-threatening emergency to the triager    Negative: IV not running or running slowly    Negative: [1] Difficulty breathing AND [2] not severe    Negative: Arm is swollen, new onset (or leg swelling if IV in lower extremity)    Negative: Fever > 100.4 F (38.0 C)    Negative: Patient sounds very sick or weak to the triager    Negative: Pus or cloudy fluid from IV site    Negative: [1] Red streak at IV site AND [2] palpable cord    Negative: [1] Red streak at IV site AND [2] longer than 1 inch (2.5 cm)    Protocols used: IV SITE (SKIN) SYMPTOMS-A-AH

## 2022-02-02 NOTE — PATIENT INSTRUCTIONS
Over-the-counter medications, only as directed.    Cool versus warm compresses, only as discussed.    Elevate and monitor the affected extremity.    Follow-up if worsening redness, swelling, or pain involving the affected area.

## 2022-02-02 NOTE — PROGRESS NOTES
Assessment & Plan     Peggy was seen today for swelling.    Diagnoses and all orders for this visit:    Complication of intravenous catheter site.  Suspect resolving hematoma.  Doubt cellulitis at this time.  No current concern for DVT.    Discussed risks and benefits of treatment strategies.    Patient Instructions     Over-the-counter medications, only as directed.    Cool versus warm compresses, only as discussed.    Elevate and monitor the affected extremity.    Follow-up if worsening redness, swelling, or pain involving the affected area.     Return for Follow up, as noted in Patient Instructions.    Maria Teresa Delcid MD  [facility] ZEKE Woods is a 46 year old female who presents to clinic today for the following health issues:  Chief Complaint   Patient presents with     Swelling     Swelling on right hand from IV insertion yestersday.     HPI - IV Site Concern    The patient has a history of CIDP (chronic inflammatory demyelinating polyneuropathy), for which she receives an infusion of IVIG every 21 days.  Patient last received IVIG yesterday, at which time an IV catheter was placed in the dorsum of her right forearm.  Patient presents for evaluation, as she had pain involving her IV site from yesterday (4-5/10 severity), starting last evening.  Her right hand was swollen this morning (resolving/resolved), at which time she removed her ring. Patient still has bruising involving the IV catheter site from yesterday.  She also has resolving bruising involving a previous IV site.  Patient reports chronic weakness involving her extremities (stable/unchanged), which she attributes to her CIDP.      Patient denies fever, chills, nausea, vomiting, change in her chronic chest tightness, or shortness of breath.    Treatments Tried: Patient has taken Tylenol, with some improvement in her pain.    Concern: Patient would like to rule out cellulitis, which she states  "she has had in the past.    Review of Systems      Objective    /88 (BP Location: Left arm, Patient Position: Sitting, Cuff Size: Adult Regular)   Pulse 94   Temp 97.5  F (36.4  C) (Tympanic)   Ht 1.633 m (5' 4.3\")   Wt 74.5 kg (164 lb 4.8 oz)   SpO2 98%   BMI 27.94 kg/m    Physical Exam   GENERAL: healthy, alert and no distress  NECK: no adenopathy, no asymmetry, masses, or scars and thyroid normal to palpation  RESP: lungs clear to auscultation - no rales, rhonchi or wheezes  CV: regular rate and rhythm, normal S1 S2, no S3 or S4, no murmur, click or rub  MS/SKIN: Full range of motion x4 extremities.   strength is 3/5 bilaterally, which the patient states is a chronic finding.  There is a half dollar sized area of subtle erythema and moderate tenderness noted involving the dorsum of the mid right forearm, where the IV catheter was placed yesterday.  Surrounding this area, there is a 3 x 5.5 cm area of yellowish-purple ecchymosis.  No drainage, bleeding, or significant edema noted.  There is a 2.5 x 5 cm area of resolving, yellowish ecchymosis noted involving the proximal aspect of the medial right forearm, just beneath the antecubital fossa.  This area is not tender to palpation, without erythema or edema noted.    EKG and Chest X-ray:  Discussed with and declined by patient.  "

## 2022-02-05 ENCOUNTER — TELEPHONE (OUTPATIENT)
Dept: NEUROLOGY | Facility: CLINIC | Age: 47
End: 2022-02-05
Payer: COMMERCIAL

## 2022-02-05 NOTE — TELEPHONE ENCOUNTER
I was paged with a call from Peggy, who states that her symptoms are worsening - she is having more trouble walking and with coordination, can't feel her feet, legs feel weaker and clumsier.  Using walker.  Hand feel numb but motor function is OK.  No symptoms from the neck up.  She has CISP, is on IVIG, was supposed to get a dose on 2/1/22 but that did not happen due to insurance denial, which apparently has been an ongoing issue.  Last saw Dr. Duenas on 1/31/22 - his notes state that steroids are contraindicated in her, and that alternative immunotherapy may be needed depending on clinical response to IVIG.    I told her I would get a message about her worsening symptoms to Dr. Duenas.  Did not recommend other action at present.

## 2022-02-07 ENCOUNTER — HOSPITAL ENCOUNTER (OUTPATIENT)
Age: 47
End: 2022-02-07
Attending: PSYCHIATRY & NEUROLOGY | Admitting: PSYCHIATRY & NEUROLOGY
Payer: COMMERCIAL

## 2022-02-07 NOTE — TELEPHONE ENCOUNTER
Patient calling again asking for a return call. She cannot walk today. Please call to discuss further.

## 2022-02-07 NOTE — TELEPHONE ENCOUNTER
Per Dr. Duenas, Peggy should be directly admitted for IVIG infusions.  Bed placement for admission on 2/8 at 0800 requested ([id number]).  Called patient placement who stated that the charge nurse on 6A will contact Peggy on specific admission time. Called Peggy and told her not to come to the hospital until she has heard from charge nurse.      Avis Londono RN

## 2022-02-08 ENCOUNTER — HOSPITAL ENCOUNTER (OUTPATIENT)
Facility: CLINIC | Age: 47
Setting detail: OBSERVATION
Discharge: HOME | End: 2022-02-10
Attending: PSYCHIATRY & NEUROLOGY | Admitting: PSYCHIATRY & NEUROLOGY
Payer: COMMERCIAL

## 2022-02-08 DIAGNOSIS — G61.81 CIDP (CHRONIC INFLAMMATORY DEMYELINATING POLYNEUROPATHY) (H): Primary | ICD-10-CM

## 2022-02-08 LAB — SARS-COV-2 RNA RESP QL NAA+PROBE: NEGATIVE

## 2022-02-08 PROCEDURE — 250N000011 HC RX IP 250 OP 636: Performed by: STUDENT IN AN ORGANIZED HEALTH CARE EDUCATION/TRAINING PROGRAM

## 2022-02-08 PROCEDURE — 250N000013 HC RX MED GY IP 250 OP 250 PS 637: Performed by: PSYCHIATRY & NEUROLOGY

## 2022-02-08 PROCEDURE — 250N000013 HC RX MED GY IP 250 OP 250 PS 637: Performed by: STUDENT IN AN ORGANIZED HEALTH CARE EDUCATION/TRAINING PROGRAM

## 2022-02-08 PROCEDURE — 999N000248 HC STATISTIC IV INSERT WITH US BY RN

## 2022-02-08 PROCEDURE — 96374 THER/PROPH/DIAG INJ IV PUSH: CPT

## 2022-02-08 PROCEDURE — U0005 INFEC AGEN DETEC AMPLI PROBE: HCPCS | Performed by: PSYCHIATRY & NEUROLOGY

## 2022-02-08 PROCEDURE — 120N000002 HC R&B MED SURG/OB UMMC

## 2022-02-08 RX ORDER — DIPHENHYDRAMINE HYDROCHLORIDE 50 MG/ML
50 INJECTION INTRAMUSCULAR; INTRAVENOUS
Status: DISCONTINUED | OUTPATIENT
Start: 2022-02-08 | End: 2022-02-10 | Stop reason: HOSPADM

## 2022-02-08 RX ORDER — DIPHENHYDRAMINE HYDROCHLORIDE 50 MG/ML
50 INJECTION INTRAMUSCULAR; INTRAVENOUS
Status: DISCONTINUED | OUTPATIENT
Start: 2022-02-08 | End: 2022-02-08

## 2022-02-08 RX ORDER — AMOXICILLIN 250 MG
1 CAPSULE ORAL 2 TIMES DAILY
Status: DISCONTINUED | OUTPATIENT
Start: 2022-02-08 | End: 2022-02-10 | Stop reason: HOSPADM

## 2022-02-08 RX ORDER — ACETAMINOPHEN 325 MG/1
650 TABLET ORAL EVERY 6 HOURS PRN
Status: DISCONTINUED | OUTPATIENT
Start: 2022-02-08 | End: 2022-02-10 | Stop reason: HOSPADM

## 2022-02-08 RX ORDER — DIPHENHYDRAMINE HCL 25 MG
50 CAPSULE ORAL ONCE
Status: COMPLETED | OUTPATIENT
Start: 2022-02-08 | End: 2022-02-08

## 2022-02-08 RX ORDER — DIPHENHYDRAMINE HYDROCHLORIDE 50 MG/ML
50 INJECTION INTRAMUSCULAR; INTRAVENOUS ONCE
Status: COMPLETED | OUTPATIENT
Start: 2022-02-08 | End: 2022-02-08

## 2022-02-08 RX ORDER — ACETAMINOPHEN 325 MG/1
650 TABLET ORAL ONCE
Status: DISCONTINUED | OUTPATIENT
Start: 2022-02-08 | End: 2022-02-08

## 2022-02-08 RX ORDER — CYCLOBENZAPRINE HCL 10 MG
10 TABLET ORAL 3 TIMES DAILY
Status: DISCONTINUED | OUTPATIENT
Start: 2022-02-08 | End: 2022-02-10 | Stop reason: HOSPADM

## 2022-02-08 RX ORDER — OXYCODONE HYDROCHLORIDE 5 MG/1
5 TABLET ORAL AT BEDTIME
Status: DISCONTINUED | OUTPATIENT
Start: 2022-02-09 | End: 2022-02-10 | Stop reason: HOSPADM

## 2022-02-08 RX ORDER — NALOXONE HYDROCHLORIDE 0.4 MG/ML
0.2 INJECTION, SOLUTION INTRAMUSCULAR; INTRAVENOUS; SUBCUTANEOUS
Status: DISCONTINUED | OUTPATIENT
Start: 2022-02-08 | End: 2022-02-10 | Stop reason: HOSPADM

## 2022-02-08 RX ORDER — LIDOCAINE 40 MG/G
CREAM TOPICAL
Status: DISCONTINUED | OUTPATIENT
Start: 2022-02-08 | End: 2022-02-10 | Stop reason: HOSPADM

## 2022-02-08 RX ORDER — ONDANSETRON 4 MG/1
8 TABLET, ORALLY DISINTEGRATING ORAL EVERY 8 HOURS PRN
Status: DISCONTINUED | OUTPATIENT
Start: 2022-02-08 | End: 2022-02-10 | Stop reason: HOSPADM

## 2022-02-08 RX ORDER — NALOXONE HYDROCHLORIDE 0.4 MG/ML
0.4 INJECTION, SOLUTION INTRAMUSCULAR; INTRAVENOUS; SUBCUTANEOUS
Status: DISCONTINUED | OUTPATIENT
Start: 2022-02-08 | End: 2022-02-10 | Stop reason: HOSPADM

## 2022-02-08 RX ORDER — OXYCODONE HYDROCHLORIDE 5 MG/1
5 TABLET ORAL 2 TIMES DAILY
Status: DISCONTINUED | OUTPATIENT
Start: 2022-02-08 | End: 2022-02-08

## 2022-02-08 RX ORDER — DIPHENHYDRAMINE HCL 25 MG
50 CAPSULE ORAL
Status: DISCONTINUED | OUTPATIENT
Start: 2022-02-08 | End: 2022-02-08

## 2022-02-08 RX ORDER — ACETAMINOPHEN 325 MG/1
650 TABLET ORAL ONCE
Status: COMPLETED | OUTPATIENT
Start: 2022-02-08 | End: 2022-02-08

## 2022-02-08 RX ORDER — ACETAMINOPHEN 325 MG/1
650 TABLET ORAL
Status: DISCONTINUED | OUTPATIENT
Start: 2022-02-08 | End: 2022-02-08

## 2022-02-08 RX ADMIN — ACETAMINOPHEN 650 MG: 325 TABLET, FILM COATED ORAL at 21:33

## 2022-02-08 RX ADMIN — SENNOSIDES AND DOCUSATE SODIUM 1 TABLET: 50; 8.6 TABLET ORAL at 20:16

## 2022-02-08 RX ADMIN — HUMAN IMMUNOGLOBULIN G 36.6 G: 40 LIQUID INTRAVENOUS at 21:57

## 2022-02-08 RX ADMIN — DIPHENHYDRAMINE HYDROCHLORIDE 50 MG: 25 CAPSULE ORAL at 21:33

## 2022-02-08 RX ADMIN — ONDANSETRON 8 MG: 4 TABLET, ORALLY DISINTEGRATING ORAL at 23:23

## 2022-02-08 RX ADMIN — CYCLOBENZAPRINE 10 MG: 10 TABLET, FILM COATED ORAL at 20:16

## 2022-02-08 RX ADMIN — OXYCODONE HYDROCHLORIDE 5 MG: 5 TABLET ORAL at 20:16

## 2022-02-08 ASSESSMENT — ACTIVITIES OF DAILY LIVING (ADL)
ADLS_ACUITY_SCORE: 5

## 2022-02-08 ASSESSMENT — MIFFLIN-ST. JEOR: SCORE: 1388.4

## 2022-02-09 ENCOUNTER — APPOINTMENT (OUTPATIENT)
Dept: PHYSICAL THERAPY | Facility: CLINIC | Age: 47
End: 2022-02-09
Attending: STUDENT IN AN ORGANIZED HEALTH CARE EDUCATION/TRAINING PROGRAM
Payer: COMMERCIAL

## 2022-02-09 ENCOUNTER — APPOINTMENT (OUTPATIENT)
Dept: OCCUPATIONAL THERAPY | Facility: CLINIC | Age: 47
End: 2022-02-09
Attending: STUDENT IN AN ORGANIZED HEALTH CARE EDUCATION/TRAINING PROGRAM
Payer: COMMERCIAL

## 2022-02-09 LAB
ANION GAP SERPL CALCULATED.3IONS-SCNC: 4 MMOL/L (ref 3–14)
BUN SERPL-MCNC: 12 MG/DL (ref 7–30)
CALCIUM SERPL-MCNC: 8.8 MG/DL (ref 8.5–10.1)
CHLORIDE BLD-SCNC: 107 MMOL/L (ref 94–109)
CO2 SERPL-SCNC: 28 MMOL/L (ref 20–32)
CREAT SERPL-MCNC: 0.72 MG/DL (ref 0.52–1.04)
ERYTHROCYTE [DISTWIDTH] IN BLOOD BY AUTOMATED COUNT: 12.4 % (ref 10–15)
GFR SERPL CREATININE-BSD FRML MDRD: >90 ML/MIN/1.73M2
GLUCOSE BLD-MCNC: 92 MG/DL (ref 70–99)
HCT VFR BLD AUTO: 37.8 % (ref 35–47)
HGB BLD-MCNC: 12.6 G/DL (ref 11.7–15.7)
MCH RBC QN AUTO: 30.3 PG (ref 26.5–33)
MCHC RBC AUTO-ENTMCNC: 33.3 G/DL (ref 31.5–36.5)
MCV RBC AUTO: 91 FL (ref 78–100)
PLATELET # BLD AUTO: 193 10E3/UL (ref 150–450)
POTASSIUM BLD-SCNC: 4 MMOL/L (ref 3.4–5.3)
RBC # BLD AUTO: 4.16 10E6/UL (ref 3.8–5.2)
SODIUM SERPL-SCNC: 139 MMOL/L (ref 133–144)
WBC # BLD AUTO: 2.8 10E3/UL (ref 4–11)

## 2022-02-09 PROCEDURE — 250N000011 HC RX IP 250 OP 636: Performed by: STUDENT IN AN ORGANIZED HEALTH CARE EDUCATION/TRAINING PROGRAM

## 2022-02-09 PROCEDURE — 250N000013 HC RX MED GY IP 250 OP 250 PS 637: Performed by: PSYCHIATRY & NEUROLOGY

## 2022-02-09 PROCEDURE — 97530 THERAPEUTIC ACTIVITIES: CPT | Mod: GO | Performed by: OCCUPATIONAL THERAPIST

## 2022-02-09 PROCEDURE — 36415 COLL VENOUS BLD VENIPUNCTURE: CPT | Performed by: STUDENT IN AN ORGANIZED HEALTH CARE EDUCATION/TRAINING PROGRAM

## 2022-02-09 PROCEDURE — 85027 COMPLETE CBC AUTOMATED: CPT | Performed by: STUDENT IN AN ORGANIZED HEALTH CARE EDUCATION/TRAINING PROGRAM

## 2022-02-09 PROCEDURE — 97530 THERAPEUTIC ACTIVITIES: CPT | Mod: GP

## 2022-02-09 PROCEDURE — 99215 OFFICE O/P EST HI 40 MIN: CPT | Mod: GC | Performed by: PSYCHIATRY & NEUROLOGY

## 2022-02-09 PROCEDURE — 97165 OT EVAL LOW COMPLEX 30 MIN: CPT | Mod: GO | Performed by: OCCUPATIONAL THERAPIST

## 2022-02-09 PROCEDURE — 96376 TX/PRO/DX INJ SAME DRUG ADON: CPT

## 2022-02-09 PROCEDURE — 250N000013 HC RX MED GY IP 250 OP 250 PS 637: Performed by: STUDENT IN AN ORGANIZED HEALTH CARE EDUCATION/TRAINING PROGRAM

## 2022-02-09 PROCEDURE — 97116 GAIT TRAINING THERAPY: CPT | Mod: GP

## 2022-02-09 PROCEDURE — G0378 HOSPITAL OBSERVATION PER HR: HCPCS

## 2022-02-09 PROCEDURE — 97161 PT EVAL LOW COMPLEX 20 MIN: CPT | Mod: GP

## 2022-02-09 PROCEDURE — 97535 SELF CARE MNGMENT TRAINING: CPT | Mod: GO | Performed by: OCCUPATIONAL THERAPIST

## 2022-02-09 PROCEDURE — 80048 BASIC METABOLIC PNL TOTAL CA: CPT | Performed by: STUDENT IN AN ORGANIZED HEALTH CARE EDUCATION/TRAINING PROGRAM

## 2022-02-09 RX ORDER — ACETAMINOPHEN 325 MG/1
650 TABLET ORAL EVERY 24 HOURS
Status: COMPLETED | OUTPATIENT
Start: 2022-02-09 | End: 2022-02-10

## 2022-02-09 RX ORDER — DIPHENHYDRAMINE HYDROCHLORIDE 50 MG/ML
50 INJECTION INTRAMUSCULAR; INTRAVENOUS EVERY 24 HOURS
Status: COMPLETED | OUTPATIENT
Start: 2022-02-09 | End: 2022-02-10

## 2022-02-09 RX ORDER — DIPHENHYDRAMINE HCL 25 MG
50 CAPSULE ORAL EVERY 24 HOURS
Status: COMPLETED | OUTPATIENT
Start: 2022-02-09 | End: 2022-02-10

## 2022-02-09 RX ADMIN — ACETAMINOPHEN 650 MG: 325 TABLET, FILM COATED ORAL at 07:59

## 2022-02-09 RX ADMIN — CYCLOBENZAPRINE 10 MG: 10 TABLET, FILM COATED ORAL at 14:27

## 2022-02-09 RX ADMIN — DIPHENHYDRAMINE HYDROCHLORIDE 50 MG: 25 CAPSULE ORAL at 14:27

## 2022-02-09 RX ADMIN — OXYCODONE HYDROCHLORIDE 5 MG: 5 TABLET ORAL at 20:49

## 2022-02-09 RX ADMIN — HUMAN IMMUNOGLOBULIN G 35 G: 20 LIQUID INTRAVENOUS at 15:49

## 2022-02-09 RX ADMIN — ACETAMINOPHEN 650 MG: 325 TABLET, FILM COATED ORAL at 14:27

## 2022-02-09 RX ADMIN — CYCLOBENZAPRINE 10 MG: 10 TABLET, FILM COATED ORAL at 07:53

## 2022-02-09 RX ADMIN — Medication 2.5 MG: at 14:27

## 2022-02-09 RX ADMIN — SENNOSIDES AND DOCUSATE SODIUM 1 TABLET: 50; 8.6 TABLET ORAL at 19:52

## 2022-02-09 RX ADMIN — SENNOSIDES AND DOCUSATE SODIUM 1 TABLET: 50; 8.6 TABLET ORAL at 07:53

## 2022-02-09 RX ADMIN — CYCLOBENZAPRINE 10 MG: 10 TABLET, FILM COATED ORAL at 19:52

## 2022-02-09 RX ADMIN — Medication 2.5 MG: at 07:53

## 2022-02-09 ASSESSMENT — ACTIVITIES OF DAILY LIVING (ADL)
ADLS_ACUITY_SCORE: 9

## 2022-02-09 ASSESSMENT — COLUMBIA-SUICIDE SEVERITY RATING SCALE - C-SSRS
4. HAVE YOU HAD THESE THOUGHTS AND HAD SOME INTENTION OF ACTING ON THEM?: NO
6. HAVE YOU EVER DONE ANYTHING, STARTED TO DO ANYTHING, OR PREPARED TO DO ANYTHING TO END YOUR LIFE?: NO
1. IN THE PAST MONTH, HAVE YOU WISHED YOU WERE DEAD OR WISHED YOU COULD GO TO SLEEP AND NOT WAKE UP?: NO
2. HAVE YOU ACTUALLY HAD ANY THOUGHTS OF KILLING YOURSELF IN THE PAST MONTH?: NO
3. HAVE YOU BEEN THINKING ABOUT HOW YOU MIGHT KILL YOURSELF?: NO
5. HAVE YOU STARTED TO WORK OUT OR WORKED OUT THE DETAILS OF HOW TO KILL YOURSELF? DO YOU INTEND TO CARRY OUT THIS PLAN?: NO

## 2022-02-09 NOTE — PROGRESS NOTES
02/09/22 1400   Quick Adds   Type of Visit Initial Occupational Therapy Evaluation       Present no   Living Environment   People in home child(kip), adult;spouse   Current Living Arrangements house   Home Accessibility stairs to enter home;stairs within home   Number of Stairs, Main Entrance 4   Stair Railings, Main Entrance railings on both sides of stairs   Number of Stairs, Within Home, Primary greater than 10 stairs  (16 stairs to second floor)   Stair Railings, Within Home, Primary railings on both sides of stairs   Transportation Anticipated family or friend will provide   Living Environment Comments Pt lives a 2-level home with spouse and 2 adult children who assist with IADLs when not working, bathroom on second floor with tub/shower combo   Self-Care   Usual Activity Tolerance good   Current Activity Tolerance moderate   Regular Exercise Yes  (Pt walked 3 miles a day)   Activity/Exercise Type walking   Exercise Amount/Frequency 30 mins   Equipment Currently Used at Home shower chair;walker, rolling;wheelchair, manual   Activity/Exercise/Self-Care Comment Pt mod I with ADL completion.  supervision during showering 2/2 dizziness and fatigue   Instrumental Activities of Daily Living (IADL)   Previous Responsibilities housekeeping;laundry;shopping;medication management;work   IADL Comments Pts spouse and adult children asssit with IADL completion when not working. Pt modifies IADLs   Disability/Function   Hearing Difficulty or Deaf no   Wear Glasses or Blind yes   Vision Management glasses   Concentrating, Remembering or Making Decisions Difficulty no   Difficulty Communicating no   Difficulty Eating/Swallowing no   Walking or Climbing Stairs Difficulty yes   Walking or Climbing Stairs ambulation difficulty, requires equipment;stair climbing difficulty, assistance 1 person   Mobility Management rolling walker, w/c for long distances   Dressing/Bathing Difficulty yes  "  Dressing/Bathing bathing difficulty, requires equipment   Dressing/Bathing Management shower chair   Toileting issues no   Doing Errands Independently Difficulty (such as shopping) no   Fall history within last six months yes  (12+ falls)   Number of times patient has fallen within last six months 12   Change in Functional Status Since Onset of Current Illness/Injury yes   General Information   Onset of Illness/Injury or Date of Surgery 02/08/22   Referring Physician Shobha Oakley MD   Patient/Family Therapy Goal Statement (OT) To return home   Additional Occupational Profile Info/Pertinent History of Current Problem Per neurology note \"Peggy Jessica is a 46 year old female w/ PMHx chronic immune sensory polyradiculopathy, complex regional pain syndrome (RLE 2/2 stress fracture; chest 2/2 bilateral mastectomy), BRCA+ mutation, cervical cancer, and migraines who presents on 2/8/2021 as a direct admission per the request of her primary Neurologist, Dr Duenas (neuromuscular specialist) for IVIG in light of recently worsening recurrent CISP\"   Existing Precautions/Restrictions fall   Left Upper Extremity (Weight-bearing Status) full weight-bearing (FWB)   Right Upper Extremity (Weight-bearing Status) full weight-bearing (FWB)   Left Lower Extremity (Weight-bearing Status) full weight-bearing (FWB)   Right Lower Extremity (Weight-bearing Status) full weight-bearing (FWB)   General Observations and Info Activity: Up with assist   Cognitive Status Examination   Orientation Status orientation to person, place and time   Affect/Mental Status (Cognitive) WNL   Follows Commands WNL   Cognitive Status Comments Pt is alert, oriented and appropriate in conversation   Visual Perception   Visual Impairment/Limitations corrective lenses for distance   Impact of Vision Impairment on Function (Vision) Pt reports baseline corrective lens for distance, however it is currently worse. Will continue to monitor   Sensory   Sensory " Quick Adds Other (describe)  (Pt reports BUE and BLE numbness/tingling)   Pain Assessment   Patient Currently in Pain No   Integumentary/Edema   Integumentary/Edema no deficits were identifed   Range of Motion Comprehensive   General Range of Motion no range of motion deficits identified   Strength Comprehensive (MMT)   General Manual Muscle Testing (MMT) Assessment other (see comments)  (Pts  strength limited 2/2 fatigue and numbness/tingling )   Muscle Tone Assessment   Muscle Tone Quick Adds No deficits were identified   Coordination   Upper Extremity Coordination No deficits were identified   Fine Motor Coordination Pt limited by BUE numbness tingling and fatigue   Bed Mobility   Bed Mobility sit-supine;supine-sit   Supine-Sit [place] (Bed Mobility) supervision   Sit-Supine [place] (Bed Mobility) supervision   Transfers   Transfers sit-stand transfer;toilet transfer   Sit-Stand Transfer   Sit-Stand [place] (Transfers) contact guard   Assistive Device (Sit-Stand Transfers) walker, 4-wheeled   Toilet Transfer   Type (Toilet Transfer) sit-stand;stand-sit   [place] Level (Toilet Transfer) contact guard   Bathing Assessment   [place] Level (Bathing) supervision   Assistive Devices (Bathing) shower chair   Upper Body Dressing Assessment   [place] Level (Upper Body Dressing) minimum assist (75% patient effort)  (zippers and buttons)   Lower Body Dressing Assessment   [place] Level (Lower Body Dressing) independent   Grooming Assessment   [place] Level (Grooming) independent   Eating/Self Feeding   [place] Level (Feeding) modified independence   Clinical Impression   Criteria for Skilled Therapeutic Interventions Met (OT) yes;meets criteria;skilled treatment is necessary;no problems identified which require skilled intervention   OT Diagnosis decreased activity tolerance and independence with ADLs   OT Problem List-Impairments impacting ADL problems related to;activity  tolerance impaired;sensation;strength   Assessment of Occupational Performance 3-5 Performance Deficits   Identified Performance Deficits bathing, dressing, functional mobility, vision, fine motor coordination   Planned Therapy Interventions (OT) ADL retraining;IADL retraining;strengthening;visual perception;progressive activity/exercise   Clinical Decision Making Complexity (OT) low complexity   Therapy Frequency (OT) 6x/week   Predicted Duration of Therapy 1 week   Anticipated Equipment Needs Upon Discharge (OT) other (see comments)  (TBD)   Risk & Benefits of therapy have been explained evaluation/treatment results reviewed;care plan/treatment goals reviewed;risks/benefits reviewed;current/potential barriers reviewed;participants voiced agreement with care plan;participants included;patient   OT Discharge Planning    OT Discharge Recommendation (DC Rec) home with outpatient occupational therapy   OT Rationale for DC Rec Pt is below baseline and would benefit from continued skilled therapy to increase activity tolerance and independence with ADL and IADL completion. Pt is motivated, has a good support system, and has assist from family at home to support participation in ADLs and IADLs.   OT Brief overview of current status  SBA   Total Evaluation Time (Minutes)   Total Evaluation Time (Minutes) 15

## 2022-02-09 NOTE — H&P
"[facility]: [place]  Neurology History and Physical    Patient Name:  Peggy Jessica  MRN:  [id number]    :  1975  Date of Admission:  2022  Date of Service:  2022  Primary care provider:  Mindy, [facility]      Chief Complaint:  Worsening Chronic Immune Sensory Polyradiculopathy    History of Present Illness:   Peggy Jessica is a 46 year old female w/ PMHx chronic immune sensory polyradiculopathy, complex regional pain syndrome (RLE 2/2 stress fracture; chest 2/2 bilateral mastectomy), BRCA+ mutation, cervical cancer, and migraines who presents on 2021 as a direct admission per the request of her primary Neurologist, Dr Duenas (neuromuscular specialist) for IVIG in light of recently worsening recurrent CISP.    Pt reports that her CISP symptoms began in 2021. Prior to this, she was asymptomatic save for her complex regional pain syndrome. She describes her current symptoms as similar to those previously felt, reporting she has been having weakness of the hands, difficulty walking, eye fatigue leading to difficulty reading, sensory changes to roughly the knees and mid-forearm in a stocking-like distribution bilaterally as well as around her eyes and mouth, lack of appetite, new aversion for foods she previously enjoyed, and dizziness described as \"close to what I imagine seasickness to be\" or as if the earth were moving under her feet. Pt reports that she was seen for this by multiple providers and told that \"it was all in my head\" after until her symptoms progressed to the point of multiple falls per day leading to a hospitalization from  - 2021. At that time, pt'x exam was notable for significant motor impenitence, patchy and vague pinprick changes in the BLE, mild discrepancy of light touch in R hemibody, and normal Romberg testing (did have mild swaying in all directions but continuously corrected and did not fall). During " "the hospitalization, completed an EMG that returned normal, but then spinal imaging exhibited mild enhancement of the cauda equina and paraspinal muscles. Subsequent LP noted elevated protein in CSF. She was subsequently treated with IVIG for CISP and had notable improvement. Neurology follow-up was established with Dr Duenas in the [facility] who diagnosed her with recurrent CISP after her symptoms started to return after a couple of weeks. Initial plan was for the pt to repeat IVIG every 21 days, but was recently having trouble with insurance and so was unable to complete her planned infusion. As she has continued to worsen since then and is now having difficulties with ambulation despite the use of her walker (has required this since Nov 2021), it was recommended that she come into the hospital for 3d of IVIG    Speaking with the pt today, she reports the symptoms described above and affirms that her current complaints are similar to those felt back in Sept/Nov 2021 prior to her IVIG infusion. She denies any SOB, trouble swallowing, or bladder/bowel functioning. She is hopeful that IVIG will help as it did before.      ROS: A comprehensive ROS was performed and pertinent findings were included in HPI.     PMH:  No past medical history on file.  No past surgical history on file.    Medications   I have personally reviewed the patient's medication list.     Allergies  I have personally reviewed the patient's allergy list.     Social History:  - Denies tobacco use  - Occasional EtOH use  - Medical marijuana use. Denies other illicit substances     Family History:    Non-contributory    Physical Examination:   Constitutional  Vitals: /74 (BP Location: Left arm)   Pulse 82   Temp 98.8  F (37.1  C) (Oral)   Resp 16   Ht 1.651 m (5' 5\")   Wt 75.3 kg (165 lb 14.4 oz)   SpO2 97%   BMI 27.61 kg/m    General: Sitting in chair, NAD  Head: NC/AT  Eyes: no icterus, op pink and moist  Cardiac: Extremities " warm, no edema.   Respiratory: Non-labored on RA  GI: S/NT/ND  Skin: No rash or lesion on exposed skin  Psych: Mood pleasant, affect congruent  Neuro:  Mental status: Awake, alert, attentive, oriented to self, time, place, and circumstance. Language is fluent and coherent with intact comprehension of complex commands, naming and repetition. Intact Luria testing  Cranial nerves: VFF, PERRL, conjugate gaze, EOMI (endorses diplopia at extremes of vision in all directions) w/o nystagmus, facial sensation intact, face symmetric, tongue/uvula midline, no dysarthria. Shoulder shrug is strong but has 3/5 give-way weakness with bilateral sternocleidomastoid testing  Motor: Normal bulk and tone. No abnormal movements. 3+/5 strength with head flexion/extension complicated by notable give-way weakness     R  L  Shoulder Abd  3/5  3/5  Shoulder Add  3/5  3/5  Elbow Flex  3/5  3/5  Elbow Ext  3-/5  3-/5  Wrist Flex  4+/5  3/5  Wrist Ext  3-/5  3-/5  Finger Ab  3/5  3/5  Finger Flex  3/5  3/5  Distal Finger Flex 3/5  3/5  Opposition  3/5  3/5  Hip Flex  3/5  3/5  Hip Abd  4+/5  4+/5  Hip Add  3/5  3/5  Knee Flex  3/5  3/5  Knee Ext  3/5  3/5  Dorsiflexion  3/5  3/5  Plantarflexion  3/5  3/5  **All motor testing was complicated by marked give-way weakness**      Reflexes: 2+ in bilateral biceps, brachioradialis, knees, and ankles. Neg Babinski bilaterally. Neg clonus  Sensory: Subjective decrease to light touch in glove-distribution to mid-forearms bilaterally but was able to discern difference between dull/sharp touch throughout BUE. Subjective decrease to light touch in stocking-distribution just below the knee on the R and just above the knee on the L. Intact pinprick in BLE. Intact vibration in BUE. Decreased vibration to the level of the knees bilaterally  Coordination: FNF and HS without ataxia or dysmetria. Neg Romberg but pt did sway in all directions though never fell and was constantly correcting herself  Gait: Walks with  use of walker looking at her feet. Stance is narrow. Steps very gingerly in short, shuffling steps    Investigations:    I have personally reviewed pertinent labs, tests, and radiology images. Discussion of notable findings is included under Impression.     MRI L-Spine (11/17/2021)  Impression:  1. Diffuse mild enhancement of the cauda equina nerve roots without  nodularity or clumping is nonspecific.  2. Probable mild enhancement of the posterior paraspinous musculature  from L4 through S1. However, artifact could have similar appearance.  3. Mild multilevel lumbar spondylosis without evidence for high-grade  spinal canal or neural foraminal stenosis.    EMG (11/18/2021)  Interpretation:  This is a normal study. There is no electrophysiologic evidence of a large fiber polyneuropathy affecting the right upper and lower limbs on the basis of this study.     Did the patient transfer from an outside hospital?   No    Assessment:  Peggy Jessica is a 46 year old female w/ PMHx chronic immune sensory polyradiculopathy, complex regional pain syndrome (RLE 2/2 stress fracture; chest 2/2 bilateral mastectomy), BRCA+ mutation, cervical cancer, and migraines who presents on 2/8/2021 as a direct admission per the request of her primary Neurologist, Dr Duenas (neuromuscular specialist) for IVIG in light of recently worsening recurrent CISP. While there are some concerning signs for a functional component on above exam, pt has had more objective findings suggestive of her diagnosis in the past including enhancement of the paraspinous muscles and cauda equina nerve roots and elevated CSF protein during past Nov 2021 hospitalization    #Recurrent Chronic Immune Sensory Polyradiculopathy  - IVIG 2g/kg divided over 3d   - premedicate with Benadryl 50mg + Tylenol 650mg prior to infusion  - PT/OT    #Complex Regional Pain Syndrome  #Migraine  - PTA Flexeril 10mg TID  - PTA Oxycodone 2.5mg BID + 5mg at bedtime  - Tylenol 650mg Q6h  prn  - PTA Senna 1 tab BID  - PT Zofran Q8h prn    FEN: Regular Diet  Malnutrition: Patient does not meet two of the above criteria necessary for diagnosing malnutrition  PPx: Ambulatory  Code: FULL (confirmed with pt)    Patient was discussed with Dr. Stevens.    Shobha Oakley MD  Neurology PGY2

## 2022-02-09 NOTE — PHARMACY
Medical Cannabis: Registration in the [facility] Medical Cannabis Registry is confirmed.         Medical Cannabis visually inspected and does NOT appear obviously adulterated.       Zenon Larson, PharmD, BCPS  February 9, 2022

## 2022-02-09 NOTE — PLAN OF CARE
Shift 0700 -1900    Status: Pt admitted for IVIG. Placed on OBS status today  PMHx: Chronic Immune Sensory Polyradiculopathy (CISP), Complex Regional Pain Syndrome, Migraine, Vertigo, cervical cancer  Vitals: VSS on RA  Neuros: A&O x4. Strengths 4/5 throughout. N/T to hands/feet. Intermittent blurry vision  IV: PIV infusing IVIG  Resp/trach: LS clear  Diet: Gluten free, regular. Fair appetite   Bowel status: Last BM 02/8  : Voiding w/o difficulty  Skin: Intact  Pain: Chronic pain managed with schedule oxycodone, flexeril, and prn tylenol. Pt authorized for self-directed medical cannabis use  Activity: Up with A1, GB, and personal walker. Stay within arms reach, has fallen 15+ times in last 6 months.  Social: Pts  visited this evening, supportive/helpful  Plan: 2/3 IVIG completed today. Possible discharge home after 3rd dose. Cont with current POC    New this shift: Now on Obs status, handout given. IVIG 2/3 completed this afternoon without any complications

## 2022-02-09 NOTE — PROGRESS NOTES
02/09/22 0900   Quick Adds   Type of Visit Initial PT Evaluation   Living Environment   People in home child(kip), adult;spouse   Current Living Arrangements house   Home Accessibility stairs to enter home;stairs within home   Number of Stairs, Main Entrance 4   Number of Stairs, Within Home, Primary greater than 10 stairs  (16 in total, pt can stay on one level)   Transportation Anticipated family or friend will provide   Living Environment Comments Pt lives with spouse at home, adult children assist when not working, has friends that are able to assist as well   Self-Care   Usual Activity Tolerance good   Current Activity Tolerance moderate   Regular Exercise Other (see comments)  (Has outpatient PT)   Equipment Currently Used at Home shower chair;walker, rolling;wheelchair, manual   Activity/Exercise/Self-Care Comment Per pt: Previously Mod-IND with home mobility, using 4WW to ambulate, has w/c for energy conservation,  and adult children assist with errands and house work   Disability/Function   Hearing Difficulty or Deaf no   Wear Glasses or Blind yes   Vision Management glasses   Concentrating, Remembering or Making Decisions Difficulty no   Difficulty Communicating no   Difficulty Eating/Swallowing no   Walking or Climbing Stairs Difficulty yes   Walking or Climbing Stairs ambulation difficulty, requires equipment;stair climbing difficulty, assistance 1 person   Mobility Management rolling walker, w/c for long distances   Dressing/Bathing Difficulty no   Toileting issues no   Doing Errands Independently Difficulty (such as shopping) no   Fall history within last six months yes   Number of times patient has fallen within last six months 15   Change in Functional Status Since Onset of Current Illness/Injury yes   General Information   Onset of Illness/Injury or Date of Surgery 02/08/22   Referring Physician Jon Duenas MD   Patient/Family Therapy Goals Statement (PT) Go home   Pertinent History  of Current Problem (include personal factors and/or comorbidities that impact the POC) Peggy Jessica is a 46 year old female w/ PMHx chronic immune sensory polyradiculopathy, who presents on 2/8/2021 as a direct admission per the request of her primary Neurologist, Dr Duenas (neuromuscular specialist) for IVIG in light of recently worsening recurrent CISP. Pt has had more objective findings suggestive of her diagnosis in the past including enhancement of the paraspinous muscles and cauda equina nerve roots and elevated CSF protein during past Nov 2021 hospitalization   Existing Precautions/Restrictions fall   Weight-Bearing Status - LUE full weight-bearing   Weight-Bearing Status - RUE full weight-bearing   Weight-Bearing Status - LLE full weight-bearing   Weight-Bearing Status - RLE full weight-bearing   General Observations Activity: Ambulation, Up with assist   Cognition   Orientation Status (Cognition) oriented x 4   Affect/Mental Status (Cognition) WNL   Follows Commands (Cognition) WNL   Pain Assessment   Patient Currently in Pain No   Integumentary/Edema   Integumentary/Edema no deficits were identifed   Posture    Posture Forward head position;Protracted shoulders   Range of Motion (ROM)   ROM Quick Adds ROM WFL   Strength   Strength Comments Global 3+/5 in B LE   Bed Mobility   Comment (Bed Mobility) Pt IND with sitting EOB   Transfers   Transfer Safety Comments Pt SBA with STS from EOB using locked 4WW   Gait/Stairs (Locomotion)   Assistive Device (Gait) walker, 4-wheeled   Comment (Gait/Stairs) CGA, 4WW, slowed gait, step through patten with pt focus on gait   Balance   Balance Comments Per pt, feet feel hard to find in space at times, feet drag and cause falls   Sensory Examination   Sensory Perception Comments Diminished light touch through out LE's/lower legs   Coordination   Coordination Comments Normal LE shin, and UE finder to nose screen, dizziness with head movement and positional changes,  reports blurred vission   Muscle Tone   Muscle Tone no deficits were identified   Clinical Impression   Criteria for Skilled Therapeutic Intervention yes, treatment indicated   PT Diagnosis (PT) Impaired functional mobility   Influenced by the following impairments Decreased strength,decreased balance, decreased endurance   Functional limitations due to impairments Inability to complete functional mobility at baseline level of functioning   Clinical Presentation Stable/Uncomplicated   Clinical Presentation Rationale Medically stable, clinical judgment, discharge plans in place   Clinical Decision Making (Complexity) low complexity   Therapy Frequency (PT) 5x/week   Predicted Duration of Therapy Intervention (days/wks) 1 week   Planned Therapy Interventions (PT) balance training;bed mobility training;gait training;neuromuscular re-education;patient/family education;ROM (range of motion);stair training;strengthening;stretching;transfer training   Risk & Benefits of therapy have been explained evaluation/treatment results reviewed;care plan/treatment goals reviewed;risks/benefits reviewed;current/potential barriers reviewed;participants voiced agreement with care plan;participants included   Clinical Impression Comments Pt would benefit from IP PT to progress strength, IND with functional mobility, and to reduce risk of falls at home.    PT Discharge Planning    PT Discharge Recommendation (DC Rec) home with assist;home with outpatient physical therapy   PT Rationale for DC Rec Pt has OP PT services and assist from family at home most hours of the day   PT Brief overview of current status  CGA with gait, SBA with transfers   Total Evaluation Time   Total Evaluation Time (Minutes) 10

## 2022-02-09 NOTE — PLAN OF CARE
Status: Admitted today for IVIG, hx CIDP  Vitals: VSS on RA  Neuros: A&Ox4, strengths 4/5 throughout, GW, N/T to hands/feet, intermittent blurred vision  IV: PIV infusing IVIG  Resp/trach: Clear lungs on RA  Diet: Regular, thins  Bowel status: LBM 2/8  : Voiding  Skin: Intact  Pain: Chronic pain controlled with schedule oxycodone/flexeril   Activity: Up with assist of 1, Gb/walker. (w/ in arms reach, has fallen 15+ times in last 6 months)  Social: Pts  here this evening, supportive/helpful  Plan: Complete 3 IVIG treatments, discharge home  Updates this shift: IVIG started @ 2200, continue POC      Arrived from: Home  Belongings/meds: Medical cannabis remains with patient at bedside in lock box. Clothing, phone, phone , ipad   2 RN Skin Assessment Completed by: Generalized bruising  Non-intact findings documented (yes/no/NA): Yes

## 2022-02-09 NOTE — PROGRESS NOTES
"[facility]  Neurology Consultation - Progress Note    Patient Name:  Peggy Jessica  Date of Service:  February 9, 2022    Subjective:    No acute events overnight. Pt feels slightly stronger, but overall is unchanged from yesterday. Also endorsing mild vertigo and HA. She has had this same HA before with prior doses of IVIG but denies having vertigo previously. Is not serious enough that she feels she needs medications. Has no other complaints    Day 2/3 IVIG    Objective:    Vitals: BP 97/57 (BP Location: Left arm)   Pulse 80   Temp (!) 96.4  F (35.8  C) (Oral)   Resp 16   Ht 1.651 m (5' 5\")   Wt 75.3 kg (165 lb 14.4 oz)   SpO2 96%   BMI 27.61 kg/m    General: Sitting in chair, NAD  Head: NC/AT  Eyes: no icterus, op pink and moist  Cardiac: Extremities warm, no edema.   Respiratory: Non-labored on RA  GI: S/NT/ND  Skin: No rash or lesion on exposed skin  Psych: Mood pleasant, affect congruent  Neuro:  Mental status: Awake, alert, attentive, oriented to self, time, place, and circumstance. Language is fluent and coherent with intact comprehension of complex commands, naming and repetition. Intact Luria testing  Cranial nerves: VFF, PERRL, conjugate gaze, EOMI (endorses diplopia at extremes of vision in all directions), facial sensation intact, face symmetric, tongue/uvula midline, no dysarthria. HINTS Exam: no nystagmus, no skew, no corrective saccades with head thrust  Motor: Normal bulk and tone. No abnormal movements.                                    R                    L  Shoulder Abd             4/5                 4/5  Shoulder Add             4/5                 4/5  Elbow Flex                 3+/5               3+/5  Elbow Ext                  3-/5                3-/5  Wrist Flex                  4+/5               3/5  Wrist Ext                   3-/5                3-/5  Finger Ab                  3/5                 3/5  Finger Flex                3/5  "                3/5  Distal Finger Flex       3/5                 3/5  Opposition                3/5                 3/5  Hip Flex                    4/5                 4/5  Hip Abd                     4+/5               4+/5  Hip Add                     3/5                 3/5  Knee Flex                  3+/5                 3+/5  Knee Ext                   3/5                 3/5  Dorsiflexion               3/5                 3/5  Plantarflexion            3/5                 3/5  **Give-way weakness now mostly present in distal extremities                          Reflexes: 1+ in bilateral biceps, brachioradialis, R knees, and ankles. Absent in L Knee. Neg Babinski bilaterally. Neg clonus  Sensory: Subjective decrease to light touch in glove-distribution to mid-forearms bilaterally but was able to discern difference between dull/sharp touch throughout BUE. Subjective decrease to light touch in stocking-distribution just below the knee on the R and just above the knee on the L. Intact pinprick in BLE. Globally reduced vibration in BUE and BLE (~8s)  Coordination: FNF and HS without ataxia or dysmetria. Neg Romberg but pt did sway in all directions though never fell and was constantly correcting herself  Gait: Walks with use of walker looking at her feet. Stance is narrow. Steps very gingerly in short, shuffling steps    Pertinent Investigations:    I have personally reviewed most recent and pertinent labs, tests, and radiological images.     MRI L-Spine (11/17/2021)  Impression:  1. Diffuse mild enhancement of the cauda equina nerve roots without  nodularity or clumping is nonspecific.  2. Probable mild enhancement of the posterior paraspinous musculature  from L4 through S1. However, artifact could have similar appearance.  3. Mild multilevel lumbar spondylosis without evidence for high-grade  spinal canal or neural foraminal stenosis.     EMG (11/18/2021)  Interpretation:  This is a normal study. There is no  electrophysiologic evidence of a large fiber polyneuropathy affecting the right upper and lower limbs on the basis of this study.     Assessment  Peggy Jessica is a 46 year old female w/ PMHx chronic immune sensory polyradiculopathy, complex regional pain syndrome (RLE 2/2 stress fracture; chest 2/2 bilateral mastectomy), BRCA+ mutation, cervical cancer, and migraines who presents on 2/8/2021 as a direct admission per the request of her primary Neurologist, Dr Duenas (neuromuscular specialist) for IVIG in light of recently worsening recurrent CISP. While there are some concerning signs for a functional component on above exam, pt has had more objective findings suggestive of her diagnosis in the past including enhancement of the paraspinous muscles and cauda equina nerve roots and elevated CSF protein during past Nov 2021 hospitalization     #Recurrent Chronic Immune Sensory Polyradiculopathy  - IVIG 2g/kg divided over 3d (ends 2/10)             - premedicate with Benadryl 50mg + Tylenol 650mg prior to infusion  - PT/OT    #Vertigo  Low suspicion for new onset central/peripheral process. Mostly likely a side-effect of IVIG, will continue to monitor     #Complex Regional Pain Syndrome  #Migraine  - PTA Flexeril 10mg TID  - PTA Oxycodone 2.5mg BID + 5mg at bedtime  - Tylenol 650mg Q6h prn  - PTA Senna 1 tab BID  - PT Zofran Q8h prn  - PTA Medical Cannabis     FEN: Regular Diet  Malnutrition: Patient does not meet two of the above criteria necessary for diagnosing malnutrition  PPx: Ambulatory  Code: FULL (confirmed with pt)    Thank you for involving Neurology in the care of Peggy Jessica.  Please do not hesitate to call with questions/concerns (consult pager [phone]).      Patient was seen and discussed with Dr. Simmons.    Shobha Oakley MD  Neurology PGY2

## 2022-02-09 NOTE — PLAN OF CARE
Admitted for worsening reoccurring CISP and IVIG therapy. First round of IVIG completed at 1am today. Neuros: 4/5 throughout, numbness to forehead, N/T BUE hands, tingling to BLE feet, intermittent dizzy and blurry/double vision. VSS on RA, soft BP 90/50s, daria in 50s intermittent. PIV SL. Regular diet. Up 1/gb/walker. Voids without difficulty. Tylenol and medical cannabis for pain. Patient self administers medical cannabis. Plan second IVIG infusion today. Continue to monitor.

## 2022-02-09 NOTE — UTILIZATION REVIEW
"  Admission Status; Secondary Review Determination         Under the authority of the Utilization Management Committee, the utilization review process indicated a secondary review on the above patient.  The review outcome is based on review of the medical records, discussions with staff, and applying clinical experience noted on the date of the review.        (x) Observation Status Appropriate - This patient does not meet hospital inpatient criteria and is placed in observation status. If this patient's primary payer is Medicare and was admitted as an inpatient, Condition Code 44 should be used and patient status changed to \"observation\".     RATIONALE FOR DETERMINATION   The patient is a 46-year-old female with chronic immune sensory polyradiculopathy.  She is admitted on 2/8/2022.  She was seen by her primary neurologist who recommended a course of IVIG in light of recent worsening of this condition.  Based on information in the history and physical, it appears that there will not be a thorough reassessment of the condition and that a 3-day course of IVIG is the significant aspect of this admission.  The [employer] paging text was sent to RIO Anton to discuss this case who apparently is rounding on the patient later today.  Recommendation is to maintain observation status at this time based on current information in the chart.      The severity of illness, intensity of service provided, expected LOS and risk for adverse outcome make the care complex, high risk and appropriate for hospital admission.        The information on this document is developed by the utilization review team in order for the business office to ensure compliance.  This only denotes the appropriateness of proper admission status and does not reflect the quality of care rendered.         The definitions of Inpatient Status and Observation Status used in making the determination above are those provided in the CMS Coverage Manual, Chapter 1 " and Chapter 6, section 70.4.      Sincerely,     Terence Alonso MD  Physician Advisor  Utilization Review/ Case Management  [facility].

## 2022-02-10 ENCOUNTER — APPOINTMENT (OUTPATIENT)
Dept: PHYSICAL THERAPY | Facility: CLINIC | Age: 47
End: 2022-02-10
Attending: PSYCHIATRY & NEUROLOGY
Payer: COMMERCIAL

## 2022-02-10 ENCOUNTER — TELEPHONE (OUTPATIENT)
Dept: NEUROLOGY | Facility: CLINIC | Age: 47
End: 2022-02-10
Payer: COMMERCIAL

## 2022-02-10 VITALS
DIASTOLIC BLOOD PRESSURE: 71 MMHG | BODY MASS INDEX: 27.64 KG/M2 | TEMPERATURE: 98 F | HEIGHT: 65 IN | SYSTOLIC BLOOD PRESSURE: 106 MMHG | HEART RATE: 84 BPM | RESPIRATION RATE: 18 BRPM | OXYGEN SATURATION: 94 % | WEIGHT: 165.9 LBS

## 2022-02-10 PROCEDURE — 250N000013 HC RX MED GY IP 250 OP 250 PS 637: Performed by: PSYCHIATRY & NEUROLOGY

## 2022-02-10 PROCEDURE — G0378 HOSPITAL OBSERVATION PER HR: HCPCS

## 2022-02-10 PROCEDURE — 97116 GAIT TRAINING THERAPY: CPT | Mod: GP

## 2022-02-10 PROCEDURE — 250N000013 HC RX MED GY IP 250 OP 250 PS 637: Performed by: STUDENT IN AN ORGANIZED HEALTH CARE EDUCATION/TRAINING PROGRAM

## 2022-02-10 PROCEDURE — 96376 TX/PRO/DX INJ SAME DRUG ADON: CPT

## 2022-02-10 PROCEDURE — 250N000011 HC RX IP 250 OP 636: Performed by: STUDENT IN AN ORGANIZED HEALTH CARE EDUCATION/TRAINING PROGRAM

## 2022-02-10 RX ADMIN — ACETAMINOPHEN 650 MG: 325 TABLET, FILM COATED ORAL at 07:52

## 2022-02-10 RX ADMIN — ACETAMINOPHEN 650 MG: 325 TABLET, FILM COATED ORAL at 13:49

## 2022-02-10 RX ADMIN — Medication 2.5 MG: at 07:52

## 2022-02-10 RX ADMIN — DIPHENHYDRAMINE HYDROCHLORIDE 50 MG: 25 CAPSULE ORAL at 13:49

## 2022-02-10 RX ADMIN — Medication 2.5 MG: at 13:49

## 2022-02-10 RX ADMIN — CYCLOBENZAPRINE 10 MG: 10 TABLET, FILM COATED ORAL at 07:52

## 2022-02-10 RX ADMIN — CYCLOBENZAPRINE 10 MG: 10 TABLET, FILM COATED ORAL at 13:49

## 2022-02-10 RX ADMIN — SENNOSIDES AND DOCUSATE SODIUM 1 TABLET: 50; 8.6 TABLET ORAL at 07:52

## 2022-02-10 RX ADMIN — HUMAN IMMUNOGLOBULIN G 35 G: 20 LIQUID INTRAVENOUS at 14:33

## 2022-02-10 NOTE — PLAN OF CARE
PT: Patient appropriate for discharge to home due to current level of safety with functional mobility, ambulation, and completion of stairs. Patient has a safe discharge plan to home with assist from family members and Outpatient PT services as needed.    Physical Therapy Discharge Summary    Reason for therapy discharge:    All goals and outcomes met, no further needs identified.    Progress towards therapy goal(s). See goals on Care Plan in [facility] electronic health record for goal details.  Goals met    Therapy recommendation(s):    Continued therapy is recommended.  Rationale/Recommendations:  Patient has current plans for outpatient services and would benefit from continued therapy in order to ensure safe functional mobility .

## 2022-02-10 NOTE — TELEPHONE ENCOUNTER
----- Message from Jon Duenas MD sent at 2/10/2022  4:23 PM CST -----  Regarding: RE: IVIG  Plan is to continue IVIG 1 gm/kg q 2 weeks. She should schedule the next infusion no longer than 2 weeks from her last first infusion when she was in the hospital.     Thanks everyone for your help with this.     ----- Message -----  From: Mary Gutierrez RN  Sent: 2/10/2022   4:06 PM CST  To: Jon Duenas MD, Rosa Avalos RN  Subject: IVIG                                             Hi Dr. Duenas,    Pt was recently in the hospital and received 3 acute doses of IVIG equaling ~1.5 g/kg.  Her IVIG plan is for maintenance dosing is 1g/kg every 14 days.    When should schedule her next course of IVIG.    Please advise.    Thank you,    Mary Gutierrez RN   [facility]  [phone]

## 2022-02-10 NOTE — PLAN OF CARE
Admitted for worsening reoccurring CISP and IVIG therapy. Second round of IVIG completed at yesterday at 8pm. Neuros: 4/5 throughout, numbness to forehead, N/T BUE hands, tingling to BLE feet, intermittent dizzy and blurry/double vision. VSS on RA, daria in 50s intermittent. PIV SL. Glutin free diet. Up 1/gb/walker. Voids without difficulty. Tylenol/oxy/flexeril and medical cannabis for pain. Patient self administers medical cannabis. Plan third IVIG infusion today then discharge home. Continue to monitor.

## 2022-02-10 NOTE — PLAN OF CARE
Status: Pt admitted for IVIG therapy. On OBS status  PMHx: Chronic Immune Sensory Polyradiculopathy (CISP), Complex Regional Pain Syndrome, Migraine, Vertigo, cervical cancer  Vitals: VSS on RA  Neuros: A&O x4. Strengths 4/5 throughout. N/T to hands/feet. Intermittent blurry vision  IV: PIV SL  Resp/trach: LS clear  Diet: Gluten free, regular. Fair appetite            Bowel status: Last BM 02/8  : VDSP  Skin: Intact  Pain: Chronic pain managed with schedule oxycodone, flexeril, and prn tylenol. Pt authorized for self-directed medical cannabis use  Activity: Up with A1, GB, and personal walker. Stay within arms reach, has fallen 15+ times in last 6 months.  Plan: 3/3 IVIG infusing currently and then will discharge to home

## 2022-02-11 NOTE — PLAN OF CARE
Occupational Therapy Discharge Summary    Reason for therapy discharge:    Discharged to home with outpatient therapy. And family to assist prn    Progress towards therapy goal(s). See goals on Care Plan in [facility] electronic health record for goal details.  Goals partially met.  Barriers to achieving goals:   discharge from facility.    Therapy recommendation(s):    Continued therapy is recommended.  Rationale/Recommendations:  continued OT at OP to increase ind in I/ADLS and work towards unmet goals, per chart review family can assist prn w/ I/ADLS.

## 2022-02-11 NOTE — PROGRESS NOTES
Discharge time/date: 1900 02/10/22  Walked or Wheelchair: Wheelchair  PIV removed: Yes  Reviewed AVS with patient: Yes   Medication due times added to AVS in EPIC: Yes  Verbalized understanding of discharge with teachback: Yes  Medications retrieved from pharmacy: No meds to retrieve  Supplies sent home: No supplies to send  Belongings from security with patient: No belongings with security

## 2022-02-16 ENCOUNTER — TRANSCRIBE ORDERS (OUTPATIENT)
Dept: OTHER | Age: 47
End: 2022-02-16

## 2022-02-16 DIAGNOSIS — Z15.01 BRCA2 POSITIVE: ICD-10-CM

## 2022-02-16 DIAGNOSIS — Z15.09 BRCA2 POSITIVE: ICD-10-CM

## 2022-02-16 DIAGNOSIS — N64.4 BREAST PAIN: Primary | ICD-10-CM

## 2022-02-17 ENCOUNTER — PATIENT OUTREACH (OUTPATIENT)
Dept: ONCOLOGY | Facility: CLINIC | Age: 47
End: 2022-02-17
Payer: COMMERCIAL

## 2022-02-17 NOTE — PROGRESS NOTES
"New Patient Oncology Nurse Navigator Note     Referring provider: Akanksha Bloom MD     Referring Clinic/Organization: [facility]     Referred to (specialty:) Breast Provider Referral      Date Referral Received: February 17, 2022     Evaluation for:  Benign breast condition - Referral to breast care for evaluation of concerns and establishment of care related to BRCA gene mutation.       Clinical History (per Nurse review of records provided):      She had genetic testing 22 years ago when she was pregnant with her first child.  Ex  had spina bifida and testing was recommended.  Patient does not have source documents but believes that was performed at [facility].  Peggy's aunt also had breast cancer at age 20.   SOURCE DOCUMENTS NEEDED    Patient states genetic testing was also repeated in 2004 in [place], [place].  She does not recall the provider or health system.  She additionally obtained her own historical testing results in 2016 when her mother was diagnosed with breast cancer and genetic testing information was needed.  Peggy states she does not have these available after a difficult divorce and move.  SOURCE DOCUMENTS NEEDED    She had hysterectomy at age 29 due to due to PCOS and a cervical cancer diagnosis. She underwent a total abdominal hysterectomy with a left oophorectomy. Her right ovary remained however.  Patient states the hysterectomy was in 2004.  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      THERE IS NO MENTION OF BRCA IN [facility] NOTE.  UNUSUAL SHE WOULD NOT REFERENCE PREVIOUS TESTING RESULTS.   Chart review shows discrepancy of BRCA status.   \"Patient states she is positive for BRCA 1&2, testing done in [place], [place]\" (Per [facility] KEELEY SALAZAR 9/3/2020 NOTE) Another note, \"She also states she " "has tested positive for BRCA 1 when she was pregnant\" (4/3/2015 Akram note).     2015 4/22/2015 - Peggy met with Dr. Andrew Pacheco, plastic surgeon regarding abdominal pannus and prophylactic mastectomy.  No other note from this provider and no mastectomy until 2020. Per patient her  at the time was opposed to mastectomy and reconstruction.    2020 2/28/2020 -   A. Fallopian tube, right, ligation - Benign fallopian tube with narrow lumen.   B. Ovary, right, oophorectomy - Benign ovary with corpus luteum. Fragments of fallopian tube with fimbriated end. Negative for dysplasia. Small paratubal cysts.     Bilateral Mastectomy 10/23/2020 performed by Dr. Edmund Khoury  A. Breast, left, simple mastectomy - Benign breast tissue with proliferative fibrocystic changes, including apocrine ductal metaplastic changes, usual ductal hyperplasia (UDH), and adenomatoid  changes. Multiple (3) intraparenchymal benign reactive lymph nodes are identified (0/3).  Negative for malignancy.     B. Breast, right, simple mastectomy - Benign breast tissue with proliferative fibrocystic changes, including apocrine ductal metaplastic changes, sclerosing adenosis with intraductal microcalcifications, and adenomatoid changes. Negative for malignancy.     She had ongoing post-operative pain with initial concerns for infection in December of 2020, treated with doxycycline. She was most recently seen in the [facility] on 1/4/2021 with diffuse anterior chest wall tenderness with localization to the sternum with CT demonstrating small laminar fluid collections in the mastectomy bed. Overall low concern at the time for postoperative infection. She has had ultrasound-guided aspiration of bilateral seromas on 11/12/2020 and right breast seroma on 12/3/2020.    Patient did not have reconstruction and has been hesitant to return to surgeon to discuss after post op seromas.  On 11/15/21 Donita Jhaveri of [facility] documented mass of left axilla with pain, and mass of right chest wall with pain on exam.  Ultrasounds of each area were considered for future but did not take place per patient.  Dr. Bloom did document on 1/31/22 Peggy was going to have an US done but she ended up in hospital with an unrelated illness.   On 1/31/22 Merle noted no axillary lymphadenopathy and no skin changes on exam.      Of note: Patient has a diagnosis of chronic immune sensory poly radiculopathy (CISP) for which she is being followed by Neurology.  Her symptoms that led to the CISP diagnosis began in September 2021. Onset was insidious and course over the first couple months was progressive. She reported sensory changes (numbness in her lower limbs and hands. She felt clumsy and unsteady on her feet. She started to fall. This progressing to falling 3-4 times a day. Numbness and tingling spread to affect her below her knees and wrists. She was dropping things in the hands. Tasks like knitting (she had been quite good at this) became difficult. In 11/21 she could not knit at all.  Initial doses of IVIG (most recent 2/10/22) have not helped.  She sees [facility]in Umana [facility].  Dr. Umana also documents chronic immune sensory poly radiculopathy (CISP)Type I of right lower limb.      Peggy does feel seromas exist in both mastectomy sites currently.  She states, \"both parents were allergic to internal sutures\" and attributes the use of internal sutures to the development of CRPS.      Records Location: Care Everywhere, Media and See Bookmarked material     Records Needed:   Breast imaging for past 5 years  Genetic counseling notes from 2000, and 2004 (we already have the 2008, so we need PRIOR   Genetic testing report - inconsistency in documentation of BRCA 1, BRCA 2, BRCA 1 AND 2 and no source document available currently.                     "

## 2022-02-21 NOTE — PROGRESS NOTES
Telephoned and left voice message for patient requesting call back to assist in determined PMH and schedule consult as ordered by Dr. Bloom. Genny Juárez RN

## 2022-02-22 ENCOUNTER — DOCUMENTATION ONLY (OUTPATIENT)
Dept: ONCOLOGY | Facility: CLINIC | Age: 47
End: 2022-02-22
Payer: COMMERCIAL

## 2022-02-23 ENCOUNTER — PATIENT OUTREACH (OUTPATIENT)
Dept: PLASTIC SURGERY | Facility: CLINIC | Age: 47
End: 2022-02-23
Payer: COMMERCIAL

## 2022-02-23 NOTE — PROGRESS NOTES
Action February 22, 2022 7:03 PM ABT   Action Taken Records from [facility] received and sent to HIM for upload

## 2022-02-23 NOTE — TELEPHONE ENCOUNTER
Left message for patient to return call to [phone].    Needs apt to discuss breast reconstruction.

## 2022-02-23 NOTE — PROGRESS NOTES
Peggy returned call and gave an overview of her genetic counseling history (see my 2/17 documentation).  Patient is NOT interested in any further genetic counseling or genetic testing with new panels available.  Her treatment decisions are made and neither of her two children would be interested in additional results per patient. Genny Juárez RN

## 2022-02-24 ENCOUNTER — INFUSION THERAPY VISIT (OUTPATIENT)
Dept: INFUSION THERAPY | Facility: CLINIC | Age: 47
End: 2022-02-24
Attending: PSYCHIATRY & NEUROLOGY
Payer: COMMERCIAL

## 2022-02-24 VITALS
DIASTOLIC BLOOD PRESSURE: 88 MMHG | OXYGEN SATURATION: 98 % | TEMPERATURE: 98.5 F | WEIGHT: 167.5 LBS | RESPIRATION RATE: 18 BRPM | SYSTOLIC BLOOD PRESSURE: 128 MMHG | HEART RATE: 81 BPM | BODY MASS INDEX: 27.87 KG/M2

## 2022-02-24 DIAGNOSIS — G61.81 CIDP (CHRONIC INFLAMMATORY DEMYELINATING POLYNEUROPATHY) (H): Primary | ICD-10-CM

## 2022-02-24 PROCEDURE — 250N000013 HC RX MED GY IP 250 OP 250 PS 637: Performed by: PSYCHIATRY & NEUROLOGY

## 2022-02-24 PROCEDURE — 96365 THER/PROPH/DIAG IV INF INIT: CPT

## 2022-02-24 PROCEDURE — 96366 THER/PROPH/DIAG IV INF ADDON: CPT

## 2022-02-24 PROCEDURE — 999N000127 HC STATISTIC PERIPHERAL IV START W US GUIDANCE

## 2022-02-24 PROCEDURE — 250N000011 HC RX IP 250 OP 636: Performed by: PSYCHIATRY & NEUROLOGY

## 2022-02-24 RX ORDER — METHYLPREDNISOLONE SODIUM SUCCINATE 125 MG/2ML
125 INJECTION, POWDER, LYOPHILIZED, FOR SOLUTION INTRAMUSCULAR; INTRAVENOUS
Status: CANCELLED
Start: 2022-02-24

## 2022-02-24 RX ORDER — EPINEPHRINE 1 MG/ML
0.3 INJECTION, SOLUTION INTRAMUSCULAR; SUBCUTANEOUS EVERY 5 MIN PRN
Status: CANCELLED | OUTPATIENT
Start: 2022-02-24

## 2022-02-24 RX ORDER — HEPARIN SODIUM,PORCINE 10 UNIT/ML
5 VIAL (ML) INTRAVENOUS
Status: CANCELLED | OUTPATIENT
Start: 2022-02-24

## 2022-02-24 RX ORDER — HEPARIN SODIUM (PORCINE) LOCK FLUSH IV SOLN 100 UNIT/ML 100 UNIT/ML
5 SOLUTION INTRAVENOUS
Status: CANCELLED | OUTPATIENT
Start: 2022-02-24

## 2022-02-24 RX ORDER — ALBUTEROL SULFATE 0.83 MG/ML
2.5 SOLUTION RESPIRATORY (INHALATION)
Status: CANCELLED | OUTPATIENT
Start: 2022-02-24

## 2022-02-24 RX ORDER — DIPHENHYDRAMINE HCL 25 MG
50 CAPSULE ORAL ONCE
Status: COMPLETED | OUTPATIENT
Start: 2022-02-24 | End: 2022-02-24

## 2022-02-24 RX ORDER — ACETAMINOPHEN 325 MG/1
650 TABLET ORAL ONCE
Status: COMPLETED | OUTPATIENT
Start: 2022-02-24 | End: 2022-02-24

## 2022-02-24 RX ORDER — ALBUTEROL SULFATE 90 UG/1
1-2 AEROSOL, METERED RESPIRATORY (INHALATION)
Status: CANCELLED
Start: 2022-02-24

## 2022-02-24 RX ORDER — MEPERIDINE HYDROCHLORIDE 25 MG/ML
25 INJECTION INTRAMUSCULAR; INTRAVENOUS; SUBCUTANEOUS EVERY 30 MIN PRN
Status: CANCELLED | OUTPATIENT
Start: 2022-02-24

## 2022-02-24 RX ORDER — NALOXONE HYDROCHLORIDE 0.4 MG/ML
0.2 INJECTION, SOLUTION INTRAMUSCULAR; INTRAVENOUS; SUBCUTANEOUS
Status: CANCELLED | OUTPATIENT
Start: 2022-02-24

## 2022-02-24 RX ORDER — DIPHENHYDRAMINE HYDROCHLORIDE 50 MG/ML
50 INJECTION INTRAMUSCULAR; INTRAVENOUS
Status: CANCELLED
Start: 2022-02-24

## 2022-02-24 RX ORDER — DIPHENHYDRAMINE HCL 25 MG
50 CAPSULE ORAL ONCE
Status: CANCELLED
Start: 2022-02-24

## 2022-02-24 RX ORDER — ACETAMINOPHEN 325 MG/1
650 TABLET ORAL ONCE
Status: CANCELLED
Start: 2022-02-24

## 2022-02-24 RX ADMIN — ACETAMINOPHEN 650 MG: 325 TABLET ORAL at 10:35

## 2022-02-24 RX ADMIN — HUMAN IMMUNOGLOBULIN G 55 G: 40 LIQUID INTRAVENOUS at 11:12

## 2022-02-24 RX ADMIN — DIPHENHYDRAMINE HYDROCHLORIDE 50 MG: 25 CAPSULE ORAL at 10:35

## 2022-02-24 NOTE — PATIENT INSTRUCTIONS
Dear Peggy Jessica    Thank you for choosing [facility] ([facility]) for your infusion.  The following information is a summary of our appointment as well as important reminders.        We look forward in seeing you on your next appointment here at [facility] ([facility]).  Please don t hesitate to call us at [phone] to reschedule any of your appointments or to speak with one of the [facility] registered nurses.  It was a pleasure taking care of you today.    Sincerely,    [facility]  [address] [place] [address]  [address]  Phone:  [phone]

## 2022-02-24 NOTE — PROGRESS NOTES
Nursing Note  Peggy Jessica presents today to [facility] for: IVIG  During today's [facility] appointment, orders from Dr.Allen Webb were completed.  Frequency: every 2 weeks    Progress note:  Patient identification verified by name and date of birth.  Assessment completed.  Vitals recorded in Doc Flowsheets.  Patient was provided with education regarding medication/procedure and possible side effects.  Patient verbalized understanding.     present during visit today: Not Applicable.    Treatment Conditions: Patient denies fever, chills, signs of infection, recent illness, antibiotics use, productive cough or elevated temperature.    Premedications: administered per order.    Drug Waste Record: No    Infusion length and rate:  infusion starts at 38 ml/hr, then increased by 38 ml/hr every 15 minutes to final rate of 300 ml/hr.    Labs: were not ordered for this appointment.    Vascular access: peripheral IV was placed by vascular access nurse.    Is the next appt scheduled? yes  Asymptomatic COVID test completed? no    Post Infusion Assessment:  Patient tolerated infusion without incident.  No evidence of extravasations.  Access discontinued per protocol.     Discharge Plan:   Follow up plan of care with: ongoing infusions at [facility]. and ordering provider as scheduled.  Discharge instructions were reviewed with patient.  Patient/representative verbalized understanding of discharge instructions and all questions answered.  Patient discharged from [facility] in stable condition.    Moni Hollis RN    Administrations This Visit     acetaminophen (TYLENOL) tablet 650 mg     Admin Date  02/24/2022 Action  Given Dose  650 mg Route  Oral Administered By  Moni Hollis RN          diphenhydrAMINE (BENADRYL) capsule 50 mg     Admin Date  02/24/2022 Action  Given Dose  50 mg  Route  Oral Administered By  Moni Hollis, RN          immune globulin (PRIVIGEN) - sucrose free 10 % injection 55 g     Admin Date  02/24/2022 Action  New Bag Dose  55 g Route  Intravenous Administered By  Moni Hollis, RN                /83 (BP Location: Left arm)   Pulse 93   Temp 98.5  F (36.9  C) (Oral)   Resp 18   Wt 76 kg (167 lb 8 oz)   SpO2 98%   BMI 27.87 kg/m

## 2022-02-24 NOTE — LETTER
2/24/2022         RE: Peggy Jessica  [address] [facility] [address]        Dear Colleague,    Thank you for referring your patient, Peggy Jessica, to the [facility] TREATMENT [facility]. Please see a copy of my visit note below.    Nursing Note  [facility]eggy [facility]: [facility]  During [facility]'s [facility] appointment, orders from Dr.Allen Webb were completed.  Frequency: every 2 weeks    Progress note:  Patient identification verified by name and date of birth.  Assessment completed.  Vitals recorded in Doc Flowsheets.  Patient was provided with education regarding medication/procedure and possible side effects.  Patient verbalized understanding.     present during visit today: Not Applicable.    Treatment Conditions: Patient denies fever, chills, signs of infection, recent illness, antibiotics use, productive cough or elevated temperature.    Premedications: administered per order.    Drug Waste Record: No    Infusion length and rate:  infusion starts at 38 ml/hr, then increased by 38 ml/hr every 15 minutes to final rate of 300 ml/hr.    Labs: were not ordered for this appointment.    Vascular access: peripheral IV was placed by vascular access nurse.    Is the next appt scheduled? yes  Asymptomatic COVID test completed? no    Post Infusion Assessment:  Patient tolerated infusion without incident.  No evidence of extravasations.  Access discontinued per protocol.     Discharge Plan:   Follow up plan of care with: ongoing infusions at [facility]. and ordering provider as scheduled.  Discharge instructions were reviewed with patient.  Patient/representative verbalized understanding of discharge instructions and all questions answered.  Patient discharged from [facility] in stable condition.    Moni Hollis  RN    Administrations This Visit     acetaminophen (TYLENOL) tablet 650 mg     Admin Date  02/24/2022 Action  Given Dose  650 mg Route  Oral Administered By  Moni Hollis RN          diphenhydrAMINE (BENADRYL) capsule 50 mg     Admin Date  02/24/2022 Action  Given Dose  50 mg Route  Oral Administered By  Moni Hollis RN          immune globulin (PRIVIGEN) - sucrose free 10 % injection 55 g     Admin Date  02/24/2022 Action  New Bag Dose  55 g Route  Intravenous Administered By  Moni Hollis RN                /83 (BP Location: Left arm)   Pulse 93   Temp 98.5  F (36.9  C) (Oral)   Resp 18   Wt 76 kg (167 lb 8 oz)   SpO2 98%   BMI 27.87 kg/m            Again, thank you for allowing me to participate in the care of your patient.      Sincerely,    [facility]

## 2022-03-01 ENCOUNTER — THERAPY VISIT (OUTPATIENT)
Dept: OCCUPATIONAL THERAPY | Facility: CLINIC | Age: 47
End: 2022-03-01
Payer: COMMERCIAL

## 2022-03-01 DIAGNOSIS — G61.81 CHRONIC INFLAMMATORY DEMYELINATING POLYNEUROPATHY (H): Primary | ICD-10-CM

## 2022-03-01 PROCEDURE — 97167 OT EVAL HIGH COMPLEX 60 MIN: CPT | Mod: GO

## 2022-03-01 PROCEDURE — 97110 THERAPEUTIC EXERCISES: CPT | Mod: GO

## 2022-03-01 ASSESSMENT — ACTIVITIES OF DAILY LIVING (ADL)
IADL_QUICK_ADDS: MEAL PLANNING/PREPARATION;HOME/FINANCIAL/MANAGEMENT;COMMUNICATION/COMPUTER USE;COMMUNITY MOBILITY;CARE OF OTHERS

## 2022-03-01 NOTE — PROGRESS NOTES
03/01/22 0800   Quick Adds   Type of Visit Initial Outpatient Occupational Therapy Evaluation       Present No   General Information   Start Of Care Date 03/01/22   Referring Physician Shobha Cruz MD   Orders Evaluate and treat as indicated   Other Orders PT   Orders Date 02/10/22   Medical Diagnosis CIDP (chronic inflammatory demyelinating polyneuropathy) (H) G61.81   Onset of Illness/Injury or Date of Surgery 09/10/21   Precautions/Limitations Fall precautions   Special Instructions Dx with Chronic immune sensory polyradiculopathy (CISP) leading to continue sensory/motor deficits   Surgical/Medical History Reviewed Yes   Additional Occupational Profile Info/Pertinent History of Current Problem Client is a 47 y/o mother and wife, has two children living at home currently and lots of social support. Client is mom to child with ASD and is currently experiencing reduced role fulfillment related to chronic fatigue and weakness associated with CIDP diagnosis. Challenges with consistent treatments and insurance coverage. Client is on ST disability from work, seeking LT disability with many accommodations required to return making her feel it will not be successful or that accommodations will be met by employer. 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 Client currently receives IVIG infusion therapy for management of chronic condition. Client has complex medical history positive for chronic immune sensory polyradiculopathy, complex regional pain syndrome (RLE 2/2 stress fracture; chest 2/2 bilateral mastectomy), BRCA+ mutation, cervical cancer, and migraines.    Comments/Observations Mid October I noticed that along with my hands and feet being numb my fingertips were numb, cold,  hands would turn blue, reduced coordination, unable to hold a key or utensils. Started Jan last year planning to knit a shawl a month. I have CRPS in my chest and knitting is meditative, helps lessen the pain. All of a sudden I couldn't knit, even with modifying to a bigger needle. I was getting hand cramps (gestures to R CMC) and numb (gestures through radial side forearm).    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, 8am-5pm))   Community/Avocational Activities Knitting, weaving, dying yarn, spinning; jewelery making and piano/string instruments since sh was 4 (not as important to get back to)   Current Living Environment House  (2 stories + basement)   Number of Stairs to Enter Home 4 with railing, nonslip composite   Number of Stairs Within Home 1 flight up and down, wide stairs with railing  (help up and down the stairs at times from )   Primary Bathroom Location/Comments Upstairs   Primary Bathroom Set Up/Equipment Tub/Shower combo;Shower/tub chair;Hand held shower  (Commissioned custom grab bars, unable to do toilet mods)   Additional Bathroom Location/Comments Commode downstairs   Prior Level - Transfers Independent   Prior Level - Ambulation Independent   Prior Level - ADLS Independent   Prior Responsibilities - IADL Meal Preparation;Housekeeping;Laundry;Shopping;Medication management;Finances;Driving;Work   Current Assistive Devices - Mobility Walker;Manual wheelchair  (4WW, manual W/C from friend use in house and community)   Current Assistive Devices - ADL Feeding equipment  (Built up handle)   Role/Living Environment Comments 2 children at home, 19 and 22   Pain   Patient currently in pain Yes   Pain location CRPS post masectomy, LLE   Pain rating 6.5/10   Pain description Throbbing;Burning    Pain comments CRPS, appt with someone in plastics to address, has pain management through [facility] switching to [facility] system   Fall Risk Screen   Fall screen completed by OT   Have you fallen 2 or more times in the past year? Yes   Have you fallen and had an injury in the past year? No   Timed Up and Go score (seconds) DNT   Is patient a fall risk? Yes   Fall screen comments Frequent falls at home and in the community   Abuse Screen (yes response referral indicated)   Feels Unsafe at Home or Work/School no   Feels Threatened by Someone no   Does Anyone Try to Keep You From Having Contact with Others or Doing Things Outside Your Home? no   Physical Signs of Abuse Present no   Functional Scales   Scales and Outcomes Modified Fatigue Impact Scale   Modified Fatigue Impact Scale Interpretation Higher scores indicate a greater impact of fatigue on patient activities   Cognitive Status Examination   Orientation Orientation to person, place and time   Level of Consciousness Alert   Follows Commands and Answers Questions 100% of the time   Personal Safety and Judgment Intact   Memory Intact   Memory Comments Concerns related to drug side effects, reports no significant impact on daily functioning   Attention No deficits were identified   Organization/Problem Solving No deficits were identified   Executive Function No deficits were identified   Cognitive Comment Some concerns related to side effects from drugs (forgetful), no major concerns, client may benfit from future screening   Visual Perception   Visual Perception Wears glasses  (bifocals)   Visual Perception Comments Eye appointment upcoming   Sensation   Sensation Comments Impaired   Range of Motion (ROM)   ROM Comments WFL, limited by pain, reduced strength at times per pt report in BUE/BLE   Strength   Strength Comments Inconsistent presentation day to day, declines towards evening daily, overall reduced    Hand Strength   Hand Dominance Right   Left Hand  (pounds)  "0 pounds   Right Hand  (pounds) 0 pounds   Left Lateral Pinch (pounds) 0 pounds   Right Lateral Pinch (pounds) 0 pounds   Left Three Point Pinch (pounds) 0 pounds   Right Three Point Pinch (pounds) 0 pounds   Hand Strength Comments Client is able to squeeze fingers with some strength, BNL and reduced below functional threshold, severely limited participation in daily tasks, including self-cares, work, and leisure   Coordination   Right Hand, Nine Hole Peg Test (seconds) 45.49\"   Left Hand, Box and Blocks Test (cubes transferred in 1 minute) 41.52\"   Coordination Comments BNL BUE   Balance   Balance Comments Impaired with fatigue and decreased muscle activation/coordination   Functional Mobility   Ambulation 4WW or Manual W/C   Transfer Skill   Level of Amelia: Transfers minimum assist (75% patients effort)   Bathing   Level of Amelia - Bathing stand-by assist   Physical Assist/Nonphysical Assist - Bathing supervision   Assistive Device shower chair   Upper Body Dressing   Level of Amelia: Dress Upper Body minimum assist (75% patients effort)   Upper Body Dressing Comments CRPS impacts ROM, chooses larger clothes without zippers/buttons d/t incoordination and pain   Lower Body Dressing   Level of Amelia: Dress Lower Body minimum assist (75% patients effort)   Lower Body Dressing Comments Some days unable to do shoes/socks, chooses larger clothes without zippers/buttons d/t incoordination and pain   Toileting   Level of Amelia: Toilet independent   Toileting Comments Sometimes needs help transfering off toilet, worse at end of day   Grooming   Level of Amelia: Grooming independent   Assistive Device large  grooming tools   Eating/Self-Feeding   Level of Amelia: Eating minimum assist (75% patients effort)   Assistive Device large  utensils   Eating/Self Feeding Comments Assistance for cutting   Activity Tolerance   Activity Tolerance Impaired, pain, lack of " strength, fatigue; See MFIS score above   Instrumental Activities of Daily Living Assessment   IADL Quick Adds Meal Planning/Preparation;Home/Financial/Management;Communication/Computer Use;Community Mobility;Care of Others   Meal Planning/Preparation Unable to tolerate activity, would like to improve participation, likes to bake and care for family, decreased role fulfillment   Home/Financial Management Shared responsibility, no concerns d/t autopay   Communication/Computer Use Reduced tolerance for fine motor activities, work and tasks require extended computer time and currently unable to meet those demands   Community Mobility Unable to drive at this time   Care of Others Reduced role fulfillment with fatigue, decreased strength and activity tolerance   Planned Therapy Interventions   Planned Therapy Interventions ADL training;IADL training;Community/work reintegration;Coordination training;Neuromuscular re-education;Orthotic fitting/training;ROM;Self care/Home management;Strengthening;Stretching;Therapeutic activities;Transfer training;Wheelchair management   Intervention Comments Client is using an inherited wheelchair at this time, unclear if fit is appropriate as did not present today with DME   Adult OT Eval Goals   OT Eval Goals (Adult) 1;2;3;4    OT Goal 1   Goal Identifier Fatigue management   Goal Description Client will verbalize and demonstrate 3 energy conservation/work simplification and fatigue management strategies for improved independence with ADLs at home through demonstration of decreased score on MFIS by 10 points or more (68/84 at eval).   Target Date 05/24/22    OT Goal 2   Goal Identifier HEP   Goal Description Client will demonstrate independence with HEP and report consistent engagement 5/7 days for two weeks to increase pain management and strengthening in combination with infusion therapies to achieve measurable /pinch strength for increased participation in daily tasks   Target  Date 05/24/22    OT Goal 3   Goal Identifier AE   Goal Description Client will successfully verbalize and/or demonstrate understanding of 3-5 compensatory strategies or equipment and will report increased participation in relevant I/ADL activity with application by 25% self-percieved.   Target Date 05/24/22   OT Goal 4   Goal Identifier Standing tolerance   Goal Description Client will demonstrate increased tolerance for standing activity to 10 min or more to increase participation and independence with I/ADL tasks   Target Date 05/24/22   Clinical Impression   Criteria for Skilled Therapeutic Interventions Met Yes, treatment indicated   OT Diagnosis Impaired I/ADLs   Influenced by the following impairments strength, fine/gross motor coordination, balance, activity tolerance, pain management, reaction time   Assessment of Occupational Performance 5 or more Performance Deficits   Identified Performance Deficits UB/LB dressing, showering, self-feeding, driving, leisure participation, work participation   Clinical Decision Making (Complexity) High complexity   Therapy Frequency up to 12 weeks   Predicted Duration of Therapy Intervention (days/wks) x1/week   Risks and Benefits of Treatment have been explained. Yes   Patient, Family & other staff in agreement with plan of care Yes   Clinical Impression Comments Client benefits from skilled OT services to increase safety/independence and efficiency of I/ADL tasks to increase engagement, role fulfillment, and quality of life   Education Assessment   Barriers To Learning No Barriers   Preferred Learning Style Listening;Reading;Demonstration;Pictures/video   Total Evaluation Time   OT Jackie, High Complexity Minutes (31472) 02

## 2022-03-02 ENCOUNTER — TELEPHONE (OUTPATIENT)
Dept: NEUROLOGY | Facility: CLINIC | Age: 47
End: 2022-03-02

## 2022-03-02 NOTE — TELEPHONE ENCOUNTER
PA Initiation    Medication: Privigen 75gm for 14ds Rx Benefit  Insurance Company: [employer] - Phone [phone] Fax [phone]  Pharmacy Filling the Rx: [facility] - [place], [place] - [facility] [address]  Filling Pharmacy Phone: [phone]  Filling Pharmacy Fax: [phone]  Start Date: 3/2/2022

## 2022-03-03 NOTE — TELEPHONE ENCOUNTER
PRIOR AUTHORIZATION DENIED    Medication: Privigen 75gm for 14ds Rx Benefit    Denial Date: 3/3/2022    Denial Rational: FDA Labeling supports dosing only every 3 weeks    Appeal Information:see below

## 2022-03-10 ENCOUNTER — INFUSION THERAPY VISIT (OUTPATIENT)
Dept: INFUSION THERAPY | Facility: CLINIC | Age: 47
End: 2022-03-10
Attending: PSYCHIATRY & NEUROLOGY
Payer: COMMERCIAL

## 2022-03-10 VITALS
SYSTOLIC BLOOD PRESSURE: 122 MMHG | OXYGEN SATURATION: 97 % | DIASTOLIC BLOOD PRESSURE: 66 MMHG | HEART RATE: 82 BPM | WEIGHT: 166.23 LBS | TEMPERATURE: 98.4 F | BODY MASS INDEX: 27.66 KG/M2

## 2022-03-10 DIAGNOSIS — G61.81 CIDP (CHRONIC INFLAMMATORY DEMYELINATING POLYNEUROPATHY) (H): Primary | ICD-10-CM

## 2022-03-10 PROCEDURE — 250N000011 HC RX IP 250 OP 636: Performed by: PSYCHIATRY & NEUROLOGY

## 2022-03-10 PROCEDURE — 96366 THER/PROPH/DIAG IV INF ADDON: CPT

## 2022-03-10 PROCEDURE — 250N000013 HC RX MED GY IP 250 OP 250 PS 637: Performed by: PSYCHIATRY & NEUROLOGY

## 2022-03-10 PROCEDURE — 999N000127 HC STATISTIC PERIPHERAL IV START W US GUIDANCE

## 2022-03-10 PROCEDURE — 999N000040 HC STATISTIC CONSULT NO CHARGE VASC ACCESS

## 2022-03-10 PROCEDURE — 96365 THER/PROPH/DIAG IV INF INIT: CPT

## 2022-03-10 RX ORDER — ALBUTEROL SULFATE 0.83 MG/ML
2.5 SOLUTION RESPIRATORY (INHALATION)
Status: CANCELLED | OUTPATIENT
Start: 2022-03-10

## 2022-03-10 RX ORDER — DIPHENHYDRAMINE HYDROCHLORIDE 50 MG/ML
50 INJECTION INTRAMUSCULAR; INTRAVENOUS
Status: CANCELLED
Start: 2022-03-10

## 2022-03-10 RX ORDER — ALBUTEROL SULFATE 90 UG/1
1-2 AEROSOL, METERED RESPIRATORY (INHALATION)
Status: CANCELLED
Start: 2022-03-10

## 2022-03-10 RX ORDER — EPINEPHRINE 1 MG/ML
0.3 INJECTION, SOLUTION, CONCENTRATE INTRAVENOUS EVERY 5 MIN PRN
Status: CANCELLED | OUTPATIENT
Start: 2022-03-10

## 2022-03-10 RX ORDER — DIPHENHYDRAMINE HCL 25 MG
50 CAPSULE ORAL ONCE
Status: CANCELLED
Start: 2022-03-10

## 2022-03-10 RX ORDER — ACETAMINOPHEN 325 MG/1
650 TABLET ORAL ONCE
Status: COMPLETED | OUTPATIENT
Start: 2022-03-10 | End: 2022-03-10

## 2022-03-10 RX ORDER — METHYLPREDNISOLONE SODIUM SUCCINATE 125 MG/2ML
125 INJECTION, POWDER, LYOPHILIZED, FOR SOLUTION INTRAMUSCULAR; INTRAVENOUS
Status: CANCELLED
Start: 2022-03-10

## 2022-03-10 RX ORDER — HEPARIN SODIUM,PORCINE 10 UNIT/ML
5 VIAL (ML) INTRAVENOUS
Status: CANCELLED | OUTPATIENT
Start: 2022-03-10

## 2022-03-10 RX ORDER — DIPHENHYDRAMINE HCL 25 MG
50 CAPSULE ORAL ONCE
Status: COMPLETED | OUTPATIENT
Start: 2022-03-10 | End: 2022-03-10

## 2022-03-10 RX ORDER — NALOXONE HYDROCHLORIDE 0.4 MG/ML
0.2 INJECTION, SOLUTION INTRAMUSCULAR; INTRAVENOUS; SUBCUTANEOUS
Status: CANCELLED | OUTPATIENT
Start: 2022-03-10

## 2022-03-10 RX ORDER — ACETAMINOPHEN 325 MG/1
650 TABLET ORAL ONCE
Status: CANCELLED
Start: 2022-03-10

## 2022-03-10 RX ORDER — HEPARIN SODIUM (PORCINE) LOCK FLUSH IV SOLN 100 UNIT/ML 100 UNIT/ML
5 SOLUTION INTRAVENOUS
Status: CANCELLED | OUTPATIENT
Start: 2022-03-10

## 2022-03-10 RX ORDER — MEPERIDINE HYDROCHLORIDE 25 MG/ML
25 INJECTION INTRAMUSCULAR; INTRAVENOUS; SUBCUTANEOUS EVERY 30 MIN PRN
Status: CANCELLED | OUTPATIENT
Start: 2022-03-10

## 2022-03-10 RX ADMIN — HUMAN IMMUNOGLOBULIN G 55 G: 20 LIQUID INTRAVENOUS at 09:45

## 2022-03-10 RX ADMIN — DIPHENHYDRAMINE HYDROCHLORIDE 50 MG: 25 CAPSULE ORAL at 08:24

## 2022-03-10 RX ADMIN — ACETAMINOPHEN 650 MG: 325 TABLET, FILM COATED ORAL at 08:25

## 2022-03-10 ASSESSMENT — PAIN SCALES - GENERAL: PAINLEVEL: MILD PAIN (2)

## 2022-03-10 NOTE — PROGRESS NOTES
Infusion Nursing Note:  Peggy Jessica presents today for IVIG.    Patient seen by provider today: No   present during visit today: Not Applicable.    Note: Stated she fell early this am and hit the back of her head.  No LOC or alteration, no bleeding, no bump palpated.  Patient does state this area is tender to the touch, however.      Intravenous Access:  Peripheral IV placed by Vascular Access.  After premeds given, infusion started at 38ml/hr and increased by 38 ml/hr q 15 minutes to final rate of 304.    Treatment Conditions:  Denies reactions, fever, ill, recent major or local infection, on antibiotics, productive cough, recent surgery or elevated temperature.      Post Infusion Assessment:  Patient tolerated infusion poorly due to : nausea and headache   Patient used her own supply of zofran po at 1109 for nausea.  Seaters message sent to Dr. Duenas to add zofran IV to premeds.  Blood return noted pre and post infusion.  Site patent and intact, free from redness, edema or discomfort.  No evidence of extravasations.  Access discontinued per protocol.  Biologic Infusion Post Education: Call the triage nurse at your clinic or seek medical attention if you have chills and/or temperature greater than or equal to 100.5, uncontrolled nausea/vomiting, diarrhea, constipation, dizziness, shortness of breath, chest pain, heart palpitations, weakness or any other new or concerning symptoms, questions or concerns.  You cannot have any live virus vaccines prior to or during treatment or up to 6 months post infusion.  If you have an upcoming surgery, medical procedure or dental procedure during treatment, this should be discussed with your ordering physician and your surgeon/dentist.  If you are having any concerning symptom, if you are unsure if you should get your next infusion or wish to speak to a provider before your next infusion, please call your care coordinator or triage nurse at your clinic to  notify them so we can adequately serve you.       Discharge Plan:   Discharge instructions reviewed with: Patient.  Patient discharged in stable condition accompanied by: self.  Departure Mode: Ambulatory, with walker.   is picking up.      Georgia Matamoros

## 2022-03-11 RX ORDER — ONDANSETRON 2 MG/ML
4 INJECTION INTRAMUSCULAR; INTRAVENOUS ONCE
Status: CANCELLED
Start: 2022-03-11 | End: 2022-03-11

## 2022-03-15 ENCOUNTER — THERAPY VISIT (OUTPATIENT)
Dept: PHYSICAL THERAPY | Facility: CLINIC | Age: 47
End: 2022-03-15
Attending: STUDENT IN AN ORGANIZED HEALTH CARE EDUCATION/TRAINING PROGRAM
Payer: COMMERCIAL

## 2022-03-15 ENCOUNTER — HOME INFUSION (PRE-WILLOW HOME INFUSION) (OUTPATIENT)
Dept: PHARMACY | Facility: CLINIC | Age: 47
End: 2022-03-15

## 2022-03-15 ENCOUNTER — OFFICE VISIT (OUTPATIENT)
Dept: PLASTIC SURGERY | Facility: CLINIC | Age: 47
End: 2022-03-15
Attending: PLASTIC SURGERY
Payer: COMMERCIAL

## 2022-03-15 ENCOUNTER — THERAPY VISIT (OUTPATIENT)
Dept: OCCUPATIONAL THERAPY | Facility: CLINIC | Age: 47
End: 2022-03-15
Payer: COMMERCIAL

## 2022-03-15 VITALS
SYSTOLIC BLOOD PRESSURE: 125 MMHG | TEMPERATURE: 98.6 F | DIASTOLIC BLOOD PRESSURE: 89 MMHG | WEIGHT: 166 LBS | HEART RATE: 108 BPM | OXYGEN SATURATION: 97 % | BODY MASS INDEX: 27.62 KG/M2

## 2022-03-15 DIAGNOSIS — N64.4 BREAST PAIN: ICD-10-CM

## 2022-03-15 DIAGNOSIS — Z15.01 BRCA2 POSITIVE: ICD-10-CM

## 2022-03-15 DIAGNOSIS — G61.81 CHRONIC INFLAMMATORY DEMYELINATING POLYNEUROPATHY (H): Primary | ICD-10-CM

## 2022-03-15 DIAGNOSIS — Z90.13 S/P BILATERAL MASTECTOMY: Primary | ICD-10-CM

## 2022-03-15 DIAGNOSIS — G61.81 CIDP (CHRONIC INFLAMMATORY DEMYELINATING POLYNEUROPATHY) (H): ICD-10-CM

## 2022-03-15 DIAGNOSIS — Z15.09 BRCA2 POSITIVE: ICD-10-CM

## 2022-03-15 PROCEDURE — 99204 OFFICE O/P NEW MOD 45 MIN: CPT | Performed by: PLASTIC SURGERY

## 2022-03-15 PROCEDURE — G0463 HOSPITAL OUTPT CLINIC VISIT: HCPCS

## 2022-03-15 PROCEDURE — 97162 PT EVAL MOD COMPLEX 30 MIN: CPT | Mod: GP | Performed by: PHYSICAL THERAPIST

## 2022-03-15 PROCEDURE — 97535 SELF CARE MNGMENT TRAINING: CPT | Mod: GO

## 2022-03-15 RX ORDER — CEFAZOLIN SODIUM 2 G/50ML
2 SOLUTION INTRAVENOUS
Status: CANCELLED | OUTPATIENT
Start: 2022-03-15

## 2022-03-15 RX ORDER — CEFAZOLIN SODIUM 2 G/50ML
2 SOLUTION INTRAVENOUS SEE ADMIN INSTRUCTIONS
Status: CANCELLED | OUTPATIENT
Start: 2022-03-15

## 2022-03-15 ASSESSMENT — PAIN SCALES - GENERAL: PAINLEVEL: SEVERE PAIN (6)

## 2022-03-15 NOTE — PROGRESS NOTES
NEW PATIENT VISIT NOTE    PRESENTING COMPLAINT:  Issues status post mastectomy.    HISTORY OF PRESENTING COMPLAINT:  Ms. Jessica is 46 years old.  She underwent a bilateral prophylactic mastectomy back in  at [facility] for BRCA1 and BRCA2 gene mutation.  Previously, the patient has already had oophorectomies. Patient was not interested in reconstruction.  Postoperatively, she had issues with seromas that ultimately healed. However, the patient is very concerned about the appearance of her chest wall.  She has, according to her, excess skin as well as rolls on the lateral aspect as well as a sunken-in appearance that she does not like and it emotionally affects her.  She would like to be flat chested, no reconstruction, without this excess tissue.  Additionally, she has been suffering from a lot of chest pain, especially in the lateral aspects in the skin and this has been followed up by Pain Service as well as Neurology and has been diagnosed with CRPS.  She is on chronic pain medication for this, along with CRPS in her foot from an ankle fracture as well as some form of demyelinating neuropathy for which she is being followed by Neurology.  This pain has been ever since her mastectomy and has not abated and has remained stable at about 5 to 6/10.  She is also hoping that this surgery would help with that pain.    PAST MEDICAL HISTORY:  CIDP, CRPS.        PAST SURGICAL HISTORY:  , laparoscopic cholecystectomy, cervical cancer requiring hysterectomy and salpingo-oophorectomy and mastectomy.    MEDICATIONS:  IVIG every other week, oxycodone daily and Flexeril.     ALLERGIES:  Nil.    SOCIAL HISTORY:  Quit smoking 3-1/2 years ago.  She used to smoke a half a pack to a pack a day for 15 years.  Does not drink anymore.  Lives in [place].  Does work for [employer].    REVIEW OF SYSTEMS:  Denies chest pain, shortness of breath, MI, CVA, DVT and PE.    PHYSICAL EXAMINATION:  Vital signs stable.  She is afebrile, in  no obvious distress.  She is 5 feet 5 inches, 160 pounds, BMI 28 kg/m2.  On examination of her chest, she has well-healed mastectomy scars with a relatively appropriate looking outcome.  She definitely has a visible inframammary fold with some excess skin in the lower aspect of the chest and there is definitely some standing cutaneous cone fullness in the lateral aspect of the chest consistent with her body habitus.  There is no pathology.  There is no swelling, no bruising, no hematoma, no infection.  She is very sensitive to me touching any parts of that area.    ASSESSMENT AND PLAN:  Based on the above findings, a diagnosis of status post mastectomy with excess left over skin with a history of neuropathy and chronic pain issues was made.  I had a torsten detailed discussion with the patient about her pathophysiology in the presence of my nurse, Garima Senior, who was present from beginning to end.  I had a torsten conversation about expectations that the patient had in terms of having a flat chest and that we can potentially help with that by removing some of this excess tissue.  However, this would necessitate lengthening of the scars and possible joining of the scars in the central portion of the chest.  She was okay with the scarring.  I had a torsten conversation that I cannot guarantee any of her pain will improve with the surgery.  If anything, it may worsen it as we will be re-stimulating the area.  Advised the patient to follow up with Neurology as well as Pain Service to have a clearance to proceed and to help a perioperative pain management plan to help with her perioperative pain issues.  All risks, benefits, and alternatives of the potential procedure including pain, infection, bleeding, scarring, asymmetry, seromas, hematomas, wound breakdown, wound dehiscence, prominent scar, hypertrophic scar, keloid scar, continued pain in the chest area, high likelihood of a seroma given her history, injury to deeper  structures, numbness, DVT, PE, MI, CVA, pneumonia, renal failure and death.  She understood everything and wants to proceed.  We will start the process of planning the procedure.  However, I will have to wait for the green light given by her neurologist and pain physicians.  All questions were answered.  She was happy with the visit.  I look forward to helping her out in the near future.    Total time spent with chart review, visit itself and post-visit paperwork was 45 minutes.

## 2022-03-15 NOTE — NURSING NOTE
"Oncology Rooming Note    March 15, 2022 8:05 AM   Peggy Jessica is a 46 year old female who presents for:    Chief Complaint   Patient presents with     Oncology Clinic Visit     s/p mastectomy; post-op seromas and nerve pain     Initial Vitals: /89   Pulse 108   Temp 98.6  F (37  C) (Oral)   Wt 75.3 kg (166 lb)   SpO2 97%   BMI 27.62 kg/m   Estimated body mass index is 27.62 kg/m  as calculated from the following:    Height as of 2/8/22: 1.651 m (5' 5\").    Weight as of this encounter: 75.3 kg (166 lb). Body surface area is 1.86 meters squared.  Severe Pain (6) Comment: Data Unavailable   No LMP recorded.  Allergies reviewed: Yes  Medications reviewed: Yes    Medications: Medication refills not needed today.  Pharmacy name entered into [facility]: [id number] - [place], [place] - [address] [facility] [address]    Clinical concerns: none       Sima Clinton CMA            "

## 2022-03-15 NOTE — LETTER
3/15/2022     RE: Peggy Jessica  [address] [facility] [address]    Dear Colleague,    Thank you for referring your patient, Peggy Jessica, to the [facility]. Please see a copy of my visit note below.    NEW PATIENT VISIT NOTE    PRESENTING COMPLAINT:  Issues status post mastectomy.    HISTORY OF PRESENTING COMPLAINT:  Ms. Jessica is 46 years old.  She underwent a bilateral prophylactic [place] [facility] [place]1 and BRCA2 gene mutation.  Previously, the patient has already had oophorectomies. Patient was not interested in reconstruction.  Postoperatively, she had issues with seromas that ultimately healed. However, the patient is very concerned about the appearance of her chest wall.  She has, according to her, excess skin as well as rolls on the lateral aspect as well as a sunken-in appearance that she does not like and it emotionally affects her.  She would like to be flat chested, no reconstruction, without this excess tissue.  Additionally, she has been suffering from a lot of chest pain, especially in the lateral aspects in the skin and this has been followed up by Pain Service as well as Neurology and has been diagnosed with CRPS.  She is on chronic pain medication for this, along with CRPS in her foot from an ankle fracture as well as some form of demyelinating neuropathy for which she is being followed by Neurology.  This pain has been ever since her mastectomy and has not abated and has remained stable at about 5 to 6/10.  She is also hoping that this surgery would help with that pain.    PAST MEDICAL HISTORY:  CIDP, CRPS.        PAST SURGICAL HISTORY:  , laparoscopic cholecystectomy, cervical cancer requiring hysterectomy and salpingo-oophorectomy and mastectomy.    MEDICATIONS:  IVIG every other week, oxycodone daily and Flexeril.     ALLERGIES:  Nil.    SOCIAL HISTORY:  Quit smoking 3-1/2 years ago.  She used to smoke a  half a pack to a pack a day for 15 years.  Does not drink anymore.  Lives in [place].  Does work for [employer].    REVIEW OF SYSTEMS:  Denies chest pain, shortness of breath, MI, CVA, DVT and PE.    PHYSICAL EXAMINATION:  Vital signs stable.  She is afebrile, in no obvious distress.  She is 5 feet 5 inches, 160 pounds, BMI 28 kg/m2.  On examination of her chest, she has well-healed mastectomy scars with a relatively appropriate looking outcome.  She definitely has a visible inframammary fold with some excess skin in the lower aspect of the chest and there is definitely some standing cutaneous cone fullness in the lateral aspect of the chest consistent with her body habitus.  There is no pathology.  There is no swelling, no bruising, no hematoma, no infection.  She is very sensitive to me touching any parts of that area.    ASSESSMENT AND PLAN:  Based on the above findings, a diagnosis of status post mastectomy with excess left over skin with a history of neuropathy and chronic pain issues was made.  I had a torsten detailed discussion with the patient about her pathophysiology in the presence of my nurse, Garima Senior, who was present from beginning to end.  I had a torsten conversation about expectations that the patient had in terms of having a flat chest and that we can potentially help with that by removing some of this excess tissue.  However, this would necessitate lengthening of the scars and possible joining of the scars in the central portion of the chest.  She was okay with the scarring.  I had a torsten conversation that I cannot guarantee any of her pain will improve with the surgery.  If anything, it may worsen it as we will be re-stimulating the area.  Advised the patient to follow up with Neurology as well as Pain Service to have a clearance to proceed and to help a perioperative pain management plan to help with her perioperative pain issues.  All risks, benefits, and alternatives of the potential  procedure including pain, infection, bleeding, scarring, asymmetry, seromas, hematomas, wound breakdown, wound dehiscence, prominent scar, hypertrophic scar, keloid scar, continued pain in the chest area, high likelihood of a seroma given her history, injury to deeper structures, numbness, DVT, PE, MI, CVA, pneumonia, renal failure and death.  She understood everything and wants to proceed.  We will start the process of planning the procedure.  However, I will have to wait for the green light given by her neurologist and pain physicians.  All questions were answered.  She was happy with the visit.  I look forward to helping her out in the near future.    Total time spent with chart review, visit itself and post-visit paperwork was 45 minutes.            Again, thank you for allowing me to participate in the care of your patient.        Sincerely,        VIANNEY Magaña MD

## 2022-03-16 ENCOUNTER — CARE COORDINATION (OUTPATIENT)
Dept: PHARMACY | Facility: CLINIC | Age: 47
End: 2022-03-16
Payer: COMMERCIAL

## 2022-03-16 ENCOUNTER — HOME INFUSION (PRE-WILLOW HOME INFUSION) (OUTPATIENT)
Dept: PHARMACY | Facility: CLINIC | Age: 47
End: 2022-03-16

## 2022-03-16 NOTE — PROGRESS NOTES
This is a recent snapshot of the patient's [facility] medical record.  For current drug dose and complete information and questions, call [phone] or In Basket pool, [facility] home infusion ([id number])  CSN Number:  [id number]

## 2022-03-16 NOTE — PROGRESS NOTES
Referred to [facility]    Peggy Jessica, 1975    Privigen 55gms IV q2 weeks   Start of Care Date: 03/24/2022 (Confirmed with patient)  [facility] per pt request  Skilled Nursing will be provided by: [facility]  @ [phone]    Ana Maria Sheridan RN

## 2022-03-17 ENCOUNTER — OFFICE VISIT (OUTPATIENT)
Dept: ANESTHESIOLOGY | Facility: CLINIC | Age: 47
End: 2022-03-17
Attending: PSYCHIATRY & NEUROLOGY
Payer: COMMERCIAL

## 2022-03-17 VITALS — HEART RATE: 104 BPM | OXYGEN SATURATION: 99 % | SYSTOLIC BLOOD PRESSURE: 124 MMHG | DIASTOLIC BLOOD PRESSURE: 90 MMHG

## 2022-03-17 DIAGNOSIS — G90.521 COMPLEX REGIONAL PAIN SYNDROME TYPE 1 OF RIGHT LOWER EXTREMITY: Primary | ICD-10-CM

## 2022-03-17 PROCEDURE — 99204 OFFICE O/P NEW MOD 45 MIN: CPT | Performed by: ANESTHESIOLOGY

## 2022-03-17 ASSESSMENT — PAIN SCALES - GENERAL: PAINLEVEL: SEVERE PAIN (7)

## 2022-03-17 NOTE — NURSING NOTE
Patient presents with:  Consult: UMP NEW, RM 8, patient reports, 7/10 pain chest, arms, legs      Severe Pain (7)     Pain Medications     Analgesics Other Refills Start End     acetaminophen (TYLENOL) 325 MG tablet     10/24/2020     Sig - Route: Take 650 mg by mouth - Oral    Class: Historical    Opioid Agonists Refills Start End     oxyCODONE (ROXICODONE) 5 MG tablet    0 11/23/2021     Sig - Route: Take 1 tablet (5 mg) by mouth 2 times daily - Oral    Patient taking differently: Take 5 mg by mouth 2 times daily Takes 2.5mg in the AM, 2.5mg in PM, and 5mg at night       Class: Transitional    Earliest Fill Date: 11/23/2021          What medications are you using for pain?   Oxycodone, Flexeril, senokot, Medical cannabis,     (New patients only) Have you been seen by another pain clinic/ provider?     Patient has been seeing a different pain clinic and would like to establish care here.      Kulwant Damon, EMT

## 2022-03-17 NOTE — LETTER
3/17/2022       RE: Peggy Jessica  [address] [facility] [address]     Dear Colleague,    Thank you for referring your patient, Peggy Jessica, to the [facility] [place] at [facility]. Please see a copy of my visit note below.    [facility] : Consultation Note    Patient: Peggy Jessica Age: 46 year old   MRN: [id number] Referred by:  Henrique     Date of Visit: March 17, 2022    Reason for consultation:    Peggy Jessica is a 46 year old female who is seen in consultation today at the request of her provider,Dr. Duenas for a comprehensive evaluation and management of pain.  Primary Care Provider [address], [facility].      Chief complaints: Bilateral lower extremity neuropathic pain: History of complex regional pain syndrome    History of Present illness:     Peggy Jessica is a 46 year old female with pain history as described below:     Location: lower extremity (right greater than the left)  Laterality: Bilateral  Quality: Numbness and aching pain  Radiation: None  Duration: Since 2019  Aggravating factors include: None  Relieving factors include: None  Any bowel or bladder incontinence: None  Other associated symptoms: Tingling and numbness in upper and lower extremity    The patient has a medical history significant for complex regional pain syndrome and chronic inflammatory demyelinating.  Polyneuropathy (diagnosed by her neurologist), bilateral prophylactic mastectomy for BRCA positivity.  She states that her symptoms started in 2019 from right leg stress fracture.  At the time she was seeing at [facility] [place] clinic and tried multiple medications.  She was offered a procedure for her CRPS symptoms but she could not tolerate positioning for the procedure.  Detailed records are not available.  She states  that at one point she was offered DRG stimulation versus spinal cord dorsal column stimulation.  However, she developed  fall and unsteady gait and did not proceed for the procedure.  She states that she was falling 3-4 times a day.  Then she developed numbness and tingling which spread from her lower extremity below the knees.  She also developed numbness in her both hands.  The symptoms are greater in the right than the left.  She is unable to perform knitting which she has been quite good at prior to the development of symptoms.  She had normal EMG.  MRI of the lumbosacral spine showed mild enhancement of the cauda equina.  Lumbar puncture showed elevated protein in CSF.  She then saw her neurologist Dr. Henrique Webb at [facility] and started IVIG therapy.  She has been taking IVIG every 2 weeks.  She states that it gives her 8 hours of relief from weakness and unsteady gait.  IVIG therapy does not affect her pain and numbness symptoms.    For pain management, she tried multiple medications without good pain relief.  She tried gabapentin, pregabalin, duloxetine, amitriptyline with minimal relief of her symptoms.  She has been using medical cannabis which helps her.  She requests for recertification.  Not interested in interventional pain procedure such as lumbar sympathetic block for CRPS-like symptoms.  She also requests for opioids.  She has been getting oxycodone 5 mg twice daily from her physician at [facility].  She states that she would like to consolidate her care at the [facility].  Therefore she needs someone to take over the opioid prescriptions.    Trials of therapies  including     PT: Yes: Helpful  TENs Unit: No  Manual Medicine/Chiropractic: No  Surgeries:No  Acupuncture: Interested  Other Integrative therapies: No  Behavioral interventions: No  Previous medication treatments included: Pregabalin, gabapentin, amitriptyline, Cymbalta, Vicodin  Previous pain interventions: None    [place] Prescription  Monitoring Program:   Reviewed. No concerns         PHQ-2 ( 1999 Pfizer) 1/31/2022 12/15/2021   Q1: Little interest or pleasure in doing things 0 0   Q2: Feeling down, depressed or hopeless 0 0   PHQ-2 Score 0 0        No flowsheet data found.       Past Medical History:  Complex regional pain syndrome  CISP    Past Surgical History:  No past surgical history on file.    Medications:  Current Outpatient Medications   Medication Sig Dispense Refill     acetaminophen (TYLENOL) 325 MG tablet Take 650 mg by mouth       bisacodyl (DULCOLAX) 10 MG suppository Place 10 mg rectally       cyclobenzaprine (FLEXERIL) 10 MG tablet Take 1 tablet (10 mg) by mouth 3 times daily       medical cannabis (Patient's own supply) 1 Dose See Admin Instructions (The purpose of this order is to document that the patient reports taking medical cannabis.  This is not a prescription, and is not used to certify that the patient has a qualifying medical condition.)  Per pt: 5-10 mg TID PRN       multivitamin w/minerals (MULTI-VITAMIN) tablet Take 1 tablet by mouth Meg[employer] Womens Brand       ondansetron (ZOFRAN-ODT) 8 MG ODT tab Take 1 tablet (8 mg) by mouth every 8 hours as needed for nausea  0     oxyCODONE (ROXICODONE) 5 MG tablet Take 1 tablet (5 mg) by mouth 2 times daily (Patient taking differently: Take 5 mg by mouth 2 times daily Takes 2.5mg in the AM, 2.5mg in PM, and 5mg at night)  0     polyethylene glycol (MIRALAX) 17 GM/Dose powder        senna-docusate (SENOKOT-S/PERICOLACE) 8.6-50 MG tablet Take 1 tablet by mouth 2 times daily       prochlorperazine (COMPAZINE) 5 MG tablet Take 5 mg by mouth (Patient not taking: Reported on 3/10/2022)           Medications related to Pain Management:   Medications related to Pain Management (From now, onward)    None          Allergies:       Allergies   Allergen Reactions     Dihydroergotamine Anaphylaxis     Latex Anaphylaxis     Shellfish-Derived Products Anaphylaxis     Sumatriptan Anaphylaxis      Banana Unknown     Gabapentin Dizziness     Gluten Meal Other (See Comments)     Celiac disease     Keppra [Levetiracetam] Nausea and Vomiting     Kiwi Unknown     Levofloxacin Other (See Comments)     Arrhythmia     Metronidazole Nausea and Vomiting     Nitrofurantoin Hives     Penicillins Hives     Topiramate Visual Disturbance     Aspirin Rash     Methadone Rash     Risperidone Anxiety       Social History:    Social History     Socioeconomic History     Marital status:      Spouse name: Not on file     Number of children: Not on file     Years of education: Not on file     Highest education level: Not on file   Occupational History     Not on file   Tobacco Use     Smoking status: Former Smoker     Types: Cigarettes     Smokeless tobacco: Never Used   Substance and Sexual Activity     Alcohol use: Not Currently     Drug use: Yes     Types: Marijuana     Comment: Medical Canib[employer] patient     Sexual activity: Not on file   Other Topics Concern     Not on file   Social History Narrative     Not on file     Social Determinants of Health     Financial Resource Strain: Not on file   Food Insecurity: Not on file   Transportation Needs: Not on file   Physical Activity: Not on file   Stress: Not on file   Social Connections: Not on file   Intimate Partner Violence: Not on file   Housing Stability: Not on file     Social History     Social History Narrative     Not on file         Family history:  No family history on file.      Physical Exam:  Vitals:    03/17/22 0802   BP: (!) 124/90   Pulse: 104   SpO2: 99%       General: Awake in no apparent distress.   Eyes: Sclerae are anicteric. PERRLA, EOMI   Neck: supple, no masses.   Lungs: unlabored.   Heart: regular rate and rhythm   Abdomen: soft non tender.  Extremities: Pulses are well palpable, no peripheral edema.   Musculoskeletal: Subjective weakness, unable to walk, range of motion of the lumbar spine is normal in all directions.  No tenderness noted in the  lumbar thoracic spine.  Neurologic exam: Sensation to light touch diminished in the bilateral leg and foot.  Decreased proprioception.  Psychiatric; Normal affect.   Skin: Warm and Dry.       LABORATORY VALUES:   Recent Labs   Lab Test 02/09/22  0625 11/24/21  0618    136   POTASSIUM 4.0 4.1   CHLORIDE 107 106   CO2 28 26   ANIONGAP 4 4   GLC 92 96   BUN 12 12   CR 0.72 0.64   ESTEBAN 8.8 9.5       CBC RESULTS:   Recent Labs   Lab Test 02/09/22  0625   WBC 2.8*   RBC 4.16   HGB 12.6   HCT 37.8   MCV 91   MCH 30.3   MCHC 33.3   RDW 12.4          Most Recent 3 INR's:No lab results found.      Diagnostic tests:    MRI of cervical/lumbar/thoracic spine showing: Diffuse mild enhancement of the cauda equina nerve root without nodularity or clumping.  Mild lumbar spondylosis without neuroforaminal stenosis or spinal canal stenosis.    ASSESSMENT/PLAN:                             ASSESSMENT:    Neuropathic pain in the upper and lower extremity  History of complex regional pain syndrome   Chronic inflammatory demyelinating polyneuropathy status post IVIG therapy    PLAN:    - Medications.     Oxycodone 5 mg twice daily.  Patient states that she refilled her this months prescription and is good until April.  Recommended her to establish care with a primary care physician who may take over the opioids.    I will recertify her for medical cannabis. Questions were answered regarding the use of medical cannabis in chronic and intractable conditions.  [place] has considered use of medical cannabis for intractable pain conditions. We discussed possible risks associated with medical cannabis use, including evidence for worsening paranoia, mood disturbance, and suicidality. Smokable forms of cannabis remain illegal with concurrent opioid therapy.      After the patient is certified, they go to a marijuana dispensary for management of the treatment.  They must pay a yearly fee for membership.  This fee ranges between  $50-$200.  They then meet with a pharmacist and discuss their condition, other medical history, and medications.  The pharmacist then prescribes the medical marijuana.  Doses are managed at the dispensary. Currently, monthly prescriptions are costing up to several hundred dollars, with many patients landing in the $100-400 range. The prices have dropped with time.  This is not covered by insurance, and is an out of pocket expense.       To learn more about [place] medical cannabis program, please visit [email or web address]      - Interventional procedures:    None at this time.    - Labs and imaging: None needed for pain management.   - Rehab continue physical therapy  - Psychology: Referral placed for pain psychology.  - Integrated medicine: Referral placed for acupuncture therapy  - Disposition: Follow-up in 1 year for medical cannabis recertification or sooner as needed.         Assessment will be ongoing with changes in treatment as indicated.  Benefits/risks/alternatives to treatment have been reviewed and the patient has been instructed to contact this office if they have any questions or concerns.  This plan of care has been discussed with the patient and the patient is in agreement.     Adriana Nuno MD, PHD      TOTAL TIME: I spent 45 minutes including greater than 50% face-to-face time counseling her about her diagnosis and treatment options.

## 2022-03-17 NOTE — PATIENT INSTRUCTIONS
Referrals:     Acupuncture Referral.  -Please call your insurance provider to find out about acupuncture coverage, being that not all policies cover acupuncture services.      Pain Psychology Referral placed-  Please contact their scheduling office at [phone] to schedule, if you have not heard from them within 2 business days.     Pain psychology Therapies Requested- Biofeedback, Mindfulness training, CBT, Coping and relaxation therapy.         Treatment planning:    Recertification for medical cannabis.    Establish care with PCP. [phone].      Recommended Follow up:      Follow up with NP in 1 year for recert of medical cannabis.      Please call [phone], option #1 to schedule your clinic appointment if you don't already have an appointment scheduled.        To speak with a nurse, schedule/reschedule/cancel a clinic appointment, or request a medication refill call: [phone], option #1.    You can also reach us by [employer]: [email or web address]

## 2022-03-17 NOTE — PROGRESS NOTES
[facility] : Consultation Note    Patient: Peggy Jessica Age: 46 year old   MRN: [id number] Referred by:  Henrique     Date of Visit: March 17, 2022    Reason for consultation:    Peggy Jessica is a 46 year old female who is seen in consultation today at the request of her provider,Dr. Duenas for a comprehensive evaluation and management of pain.  Primary Care Provider s Service, [facility].      Chief complaints: Bilateral lower extremity neuropathic pain: History of complex regional pain syndrome    History of Present illness:     Peggy Jessica is a 46 year old female with pain history as described below:     Location: lower extremity (right greater than the left)  Laterality: Bilateral  Quality: Numbness and aching pain  Radiation: None  Duration: Since 2019  Aggravating factors include: None  Relieving factors include: None  Any bowel or bladder incontinence: None  Other associated symptoms: Tingling and numbness in upper and lower extremity    The patient has a medical history significant for complex regional pain syndrome and chronic inflammatory demyelinating.  Polyneuropathy (diagnosed by her neurologist), bilateral prophylactic mastectomy for BRCA positivity.  She states that her symptoms started in 2019 from right leg stress fracture.  At the time she [place] [facility] [place] [facility] [place] tried multiple medications.  She was offered a procedure for her CRPS symptoms but she could not tolerate positioning for the procedure.  Detailed records are not available.  She states that at one point she was offered DRG stimulation versus spinal cord dorsal column stimulation.  However, she developed  fall and unsteady gait and did not proceed for the procedure.  She states that she was falling 3-4 times a day.  Then she developed numbness and tingling which spread from her lower extremity below the knees.  She also developed  numbness in her both hands.  The symptoms are greater in the right than the left.  She is unable to perform knitting which she has been quite good at prior to the development of symptoms.  She had normal EMG.  MRI of the lumbosacral spine showed mild enhancement of the cauda equina.  Lumbar puncture showed elevated protein in CSF.  She then saw her neurologist Dr. Henrique Webb at [facility] and started IVIG therapy.  She has been taking IVIG every 2 weeks.  She states that it gives her 8 hours of relief from weakness and unsteady gait.  IVIG therapy does not affect her pain and numbness symptoms.    For pain management, she tried multiple medications without good pain relief.  She tried gabapentin, pregabalin, duloxetine, amitriptyline with minimal relief of her symptoms.  She has been using medical cannabis which helps her.  She requests for recertification.  Not interested in interventional pain procedure such as lumbar sympathetic block for CRPS-like symptoms.  She also requests for opioids.  She has been getting oxycodone 5 mg twice daily [place] [facility]  She states that she would like to consolidate her care at the [place].  Therefore she needs someone to take over the opioid prescriptions.    Trials of therapies  including     PT: Yes: Helpful  TENs Unit: No  Manual Medicine/Chiropractic: No  Surgeries:No  Acupuncture: Interested  Other Integrative therapies: No  Behavioral interventions: No  Previous medication treatments included: Pregabalin, gabapentin, amitriptyline, Cymbalta, Vicodin  Previous pain interventions: None    [place] Prescription Monitoring Program:   Reviewed. No concerns         PHQ-2 ( 1999 Pfizer) 1/31/2022 12/15/2021   Q1: Little interest or pleasure in doing things 0 0   Q2: Feeling down, depressed or hopeless 0 0   PHQ-2 Score 0 0        No flowsheet data found.       Past Medical History:  Complex regional pain syndrome  CISP    Past Surgical History:  No past surgical  history on file.    Medications:  Current Outpatient Medications   Medication Sig Dispense Refill     acetaminophen (TYLENOL) 325 MG tablet Take 650 mg by mouth       bisacodyl (DULCOLAX) 10 MG suppository Place 10 mg rectally       cyclobenzaprine (FLEXERIL) 10 MG tablet Take 1 tablet (10 mg) by mouth 3 times daily       medical cannabis (Patient's own supply) 1 Dose See Admin Instructions (The purpose of this order is to document that the patient reports taking medical cannabis.  This is not a prescription, and is not used to certify that the patient has a qualifying medical condition.)  Per pt: 5-10 mg TID PRN       multivitamin w/minerals (MULTI-VITAMIN) tablet Take 1 tablet by mouth Megafood Womens Brand       ondansetron (ZOFRAN-ODT) 8 MG ODT tab Take 1 tablet (8 mg) by mouth every 8 hours as needed for nausea  0     oxyCODONE (ROXICODONE) 5 MG tablet Take 1 tablet (5 mg) by mouth 2 times daily (Patient taking differently: Take 5 mg by mouth 2 times daily Takes 2.5mg in the AM, 2.5mg in PM, and 5mg at night)  0     polyethylene glycol (MIRALAX) 17 GM/Dose powder        senna-docusate (SENOKOT-S/PERICOLACE) 8.6-50 MG tablet Take 1 tablet by mouth 2 times daily       prochlorperazine (COMPAZINE) 5 MG tablet Take 5 mg by mouth (Patient not taking: Reported on 3/10/2022)           Medications related to Pain Management:   Medications related to Pain Management (From now, onward)    None          Allergies:       Allergies   Allergen Reactions     Dihydroergotamine Anaphylaxis     Latex Anaphylaxis     Shellfish-Derived Products Anaphylaxis     Sumatriptan Anaphylaxis     Banana Unknown     Gabapentin Dizziness     Gluten Meal Other (See Comments)     Celiac disease     Keppra [Levetiracetam] Nausea and Vomiting     Kiwi Unknown     Levofloxacin Other (See Comments)     Arrhythmia     Metronidazole Nausea and Vomiting     Nitrofurantoin Hives     Penicillins Hives     Topiramate Visual Disturbance     Aspirin Rash      Methadone Rash     Risperidone Anxiety       Social History:    Social History     Socioeconomic History     Marital status:      Spouse name: Not on file     Number of children: Not on file     Years of education: Not on file     Highest education level: Not on file   Occupational History     Not on file   Tobacco Use     Smoking status: Former Smoker     Types: Cigarettes     Smokeless tobacco: Never Used   Substance and Sexual Activity     Alcohol use: Not Currently     Drug use: Yes     Types: Marijuana     Comment: Medical Canibis patient     Sexual activity: Not on file   Other Topics Concern     Not on file   Social History Narrative     Not on file     Social Determinants of Health     Financial Resource Strain: Not on file   Food Insecurity: Not on file   Transportation Needs: Not on file   Physical Activity: Not on file   Stress: Not on file   Social Connections: Not on file   Intimate Partner Violence: Not on file   Housing Stability: Not on file     Social History     Social History Narrative     Not on file         Family history:  No family history on file.      Physical Exam:  Vitals:    03/17/22 0802   BP: (!) 124/90   Pulse: 104   SpO2: 99%       General: Awake in no apparent distress.   Eyes: Sclerae are anicteric. PERRLA, EOMI   Neck: supple, no masses.   Lungs: unlabored.   Heart: regular rate and rhythm   Abdomen: soft non tender.  Extremities: Pulses are well palpable, no peripheral edema.   Musculoskeletal: Subjective weakness, unable to walk, range of motion of the lumbar spine is normal in all directions.  No tenderness noted in the lumbar thoracic spine.  Neurologic exam: Sensation to light touch diminished in the bilateral leg and foot.  Decreased proprioception.  Psychiatric; Normal affect.   Skin: Warm and Dry.       LABORATORY VALUES:   Recent Labs   Lab Test 02/09/22  0625 11/24/21  0618    136   POTASSIUM 4.0 4.1   CHLORIDE 107 106   CO2 28 26   ANIONGAP 4 4   GLC 92  96   BUN 12 12   CR 0.72 0.64   ESTEBAN 8.8 9.5       CBC RESULTS:   Recent Labs   Lab Test 02/09/22  0625   WBC 2.8*   RBC 4.16   HGB 12.6   HCT 37.8   MCV 91   MCH 30.3   MCHC 33.3   RDW 12.4          Most Recent 3 INR's:No lab results found.      Diagnostic tests:    MRI of cervical/lumbar/thoracic spine showing: Diffuse mild enhancement of the cauda equina nerve root without nodularity or clumping.  Mild lumbar spondylosis without neuroforaminal stenosis or spinal canal stenosis.    ASSESSMENT/PLAN:                             ASSESSMENT:    Neuropathic pain in the upper and lower extremity  History of complex regional pain syndrome   Chronic inflammatory demyelinating polyneuropathy status post IVIG therapy    PLAN:    - Medications.     Oxycodone 5 mg twice daily.  Patient states that she refilled her this months prescription and is good until April.  Recommended her to establish care with a primary care physician who may take over the opioids.    I will recertify her for medical cannabis. Questions were answered regarding the use of medical cannabis in chronic and intractable conditions.  [place] has considered use of medical cannabis for intractable pain conditions. We discussed possible risks associated with medical cannabis use, including evidence for worsening paranoia, mood disturbance, and suicidality. Smokable forms of cannabis remain illegal with concurrent opioid therapy.      After the patient is certified, they go to a marijuana dispensary for management of the treatment.  They must pay a yearly fee for membership.  This fee ranges between $50-$200.  They then meet with a pharmacist and discuss their condition, other medical history, and medications.  The pharmacist then prescribes the medical marijuana.  Doses are managed at the dispensary. Currently, monthly prescriptions are costing up to several hundred dollars, with many patients landing in the $100-400 range. The prices have dropped with  time.  This is not covered by insurance, and is an out of pocket expense.       To learn more about [place], please visit [email or web address]      - Interventional procedures:    None at this time.    - Labs and imaging: None needed for pain management.   - Rehab continue physical therapy  - Psychology: Referral placed for pain psychology.  - Integrated medicine: Referral placed for acupuncture therapy  - Disposition: Follow-up in 1 year for medical cannabis recertification or sooner as needed.         Assessment will be ongoing with changes in treatment as indicated.  Benefits/risks/alternatives to treatment have been reviewed and the patient has been instructed to contact this office if they have any questions or concerns.  This plan of care has been discussed with the patient and the patient is in agreement.     Adriana Nuno MD, PHD      TOTAL TIME: I spent 45 minutes including greater than 50% face-to-face time counseling her about her diagnosis and treatment options.

## 2022-03-18 ENCOUNTER — TELEPHONE (OUTPATIENT)
Dept: ANESTHESIOLOGY | Facility: CLINIC | Age: 47
End: 2022-03-18
Payer: COMMERCIAL

## 2022-03-18 NOTE — CONFIDENTIAL NOTE
RN called patient, per staff message received from , to obtain patient's medical cannabis certification number for recertification purposes. Left voicemail and call back number to discuss.       Ashlyn Coulter RN

## 2022-03-18 NOTE — PROGRESS NOTES
03/15/22 0700   Quick Adds   Type of Visit Initial OP PT Evaluation   General Information   Start of Care Date 03/15/22   Referring Physician Shobha Cruz MD   Orders Evaluate and Treat as Indicated   Order Date 02/10/22   Medical Diagnosis CIDP   Onset of illness/injury or Date of Surgery 02/08/22   Surgical/Medical history reviewed Yes   Pertinent history of current problem (include personal factors and/or comorbidities that impact the POC) Patient reports unable to walk more than 1/2 block. Fatigue limiting factor- mental and physical; needing at least 2-3 naps (lasting ~1 hour each). Patient has lost ~100 pounds since initial symptoms March 2020. Patient reports frequent falls- 2 to 3 times a day (lower legs give out underneath her)   Pertinent Visual History  Wears glasses   Prior level of function comment Independent- active, hiking   Previous/Current Treatment Medication(s)  (IVIG)   Improvement after medication No   Patient role/Employment history Disabled   [facility]   Home/Community Accessibility Comments Has 1 flight of stairs up to second level were bedrooms-bathrooms located-completes 2-4 times a day.  Has multiple animals at home including dogs and parrots   Current Assistive Devices Four Wheeled Walker;Manual Wheel Chair   ADL Devices Commode;Shower/Tub Chair   Assistive Devices Comments Uses four-wheel walker at all time.  Manual wheelchair is a loaner-difficult to use due to pulls to left side, unable to use herself   Fall Risk Screen   Fall screen completed by PT   Have you fallen 2 or more times in the past year? Yes   Is patient a fall risk? Yes   Fall screen comments Patient reports 2-3 falls/day. 25 foot walk test: 25.41 seconds with 4WW   Functional Scales   Functional Scales and Outcomes FACIT 4   Pain   Patient currently in pain Yes   Pain comments Sensory neuropathy in bilateral legs and hands   Cognitive Status Examination   Orientation orientation to person,  place and time   Level of Consciousness alert   Follows Commands and Answers Questions 100% of the time;able to follow multistep instructions   Personal Safety and Judgment intact   Memory intact   Strength   Strength Comments Difficult to fully assess, but bilateral knee extensors 3+/5 with tremors present on sustained contractions   Transfer Skills   Transfer Comments Stand pivot transfer with four-wheel walker modified independent   Gait   Gait Comments Patient ambulates with 4 wheeled walker modified independent-slow belén, increased upper extremity support on walker, decreased heel strike/pushoff with mild knee flexion in stance phase of gait   Gait Special Tests   Gait Special Tests 25 FOOT TIMED WALK   Gait Special Tests 25 Foot Timed Walk   Seconds 25.41   Comments 4WW   Sensory Examination   Sensory Perception Comments Patient reports variable sensory deficits-feels like static throughout legs (up to hip level) and hands   Coordination   Coordination Comments Impaired BLE ZACH   Planned Therapy Interventions   Planned Therapy Interventions balance training;gait training;neuromuscular re-education;ROM;strengthening;stretching;transfer training;manual therapy;wheelchair management/propulsion training   Clinical Impression   Criteria for Skilled Therapeutic Interventions Met yes, treatment indicated   PT Diagnosis Impaired function, deconditioning   Influenced by the following impairments Coordination, balance, strength, range of motion, pain, sensation   Functional limitations due to impairments Decreased participation in daily activities-ADLs and IADLs.  Cardiovascular deconditioning.  Impaired gait-household and community mobility.  Increase fall risk   Clinical Presentation Evolving/Changing   Clinical Presentation Rationale Personal factors, body systems involved   Clinical Decision Making (Complexity) Moderate complexity   Therapy Frequency 1 time/week   Predicted Duration of Therapy Intervention  (days/wks) 4-6 weeks   Risk & Benefits of therapy have been explained Yes   Patient, Family & other staff in agreement with plan of care Yes   Clinical Impression Comments Patient is a 46-year-old female who presents outpatient physical therapy with progressive decline in function beginning in 2019.  Patient currently on IVIG, but reports limited improvement in overall function-hospitalization in February 2022.  On exam patient demonstrates above listed deficits which are impacting her overall participation in daily activities and places her at an increased risk for falls.  Patient would benefit from skilled PT services for functional mobility upgrading and balance retraining in order to decrease fall risk.  Patient has ongoing occupational therapy visits as well and would benefit from comprehensive plan of care for improvement in function   GOALS   PT Eval Goals 1;2;3;4   Goal 1   Goal Identifier FACIT   Goal Description Patient will score at least 20 on the FACIT to decrease fatigue levels and improve activity participation   Target Date 06/12/22   Goal 2   Goal Identifier Gait speed   Goal Description Patient will ambulate at least 0.6 m/s for safe household ambulator status with least restrictive device   Target Date 06/12/22   Goal 3   Goal Identifier [id number]   Goal Description Patient will participate in the 6-minute walk test and demonstrated improvement of at least 70 m from baseline score, with least restrictive device, to improve community mobility safety   Target Date 06/12/22   Goal 4   Goal Identifier HEP   Goal Description Patient will be independent home exercise program for maintenance therapy gains at discharge   Target Date 06/12/22   Total Evaluation Time   PT Eval, Moderate Complexity Minutes ([id number]) 40     Maegan Hollis PT, DPT  Physical Therapist  [facility] [address]  [address]  [address]  [email or web address]  Appointments: [phone]

## 2022-03-18 NOTE — PROGRESS NOTES
"Rehabilitation Medicine Wheelchair Face to Face     Diagnosis: CIDP.   Currently has limited mobility due to pain, weakness, sensation, balance impairments.  Current transfer status: Stand pivot transfer with 4 wheeled walker modified independent.  Distance patient currently able to walk using 4 wheeled walker at significantly decreased belén (25 feet in over 25 seconds), supervision to contact-guard assist level.   Therapy team has ruled out the following less costly options:   Cane due to inadequate support due to strength and balance concerns   Walker due to very limited gait distance with high levels of fatigue and postexertional malaise, nonfunctional gait speed  Patient will use wheelchair to complete Mobility Related ADL's in the home including toileting, dressing, self-cares, meal prep.   Without a wheelchair patient will be unable to safely move about their home.  This equipment will allow them to be out of bed, participate in home activities with their family, and it will be part of a fall prevention plan for safe mobility.   Patient's home will accommodate use of wheelchair.  Patient has a family member willing and able to assist as necessary with wheelchair.     Arm and leg strength: Per OT note-patient unable to register bilateral  or pinch strength (0 pounds).  Bilateral knee extension 3+/5, bilateral hip flexion 4/5, bilateral knee flexion 4/5, bilateral ankle dorsiflexion 3 -/5    Gait/balance/coordination: Patient ambulates 25 feet with 4 wheeled walker significantly decreased belén.  Impaired balance with static dynamic standing    Length of need: 12 months    Current height:5'5\"  Current weight:166lbs  :1975    Jon Duenas MD NPI#: [id number]    I CERTIFY THE NEED FOR THESE SERVICES FURNISHED UNDER        THIS PLAN OF TREATMENT AND WHILE UNDER MY CARE     (Physician attestation of this document indicates review and certification of the therapy plan).                "

## 2022-03-21 ENCOUNTER — TELEPHONE (OUTPATIENT)
Dept: ANESTHESIOLOGY | Facility: CLINIC | Age: 47
End: 2022-03-21
Payer: COMMERCIAL

## 2022-03-21 NOTE — TELEPHONE ENCOUNTER
[facility] Call Center    Phone Message    May a detailed message be left on voicemail: no    Reason for Call: Other: Pt called the clinic back and stated her medical cannabis number is: [id number]     Action Taken: Message routed to:  [facility] ([facility] pain

## 2022-03-21 NOTE — CONFIDENTIAL NOTE
RN called and spoke with patient. Patient unable to find her medical cannabis certification number at this time. Writer advised patient contact the [place] Dept of Health Med Cannabis to find out this information. This is needed for recert, per . Patient will contact clinic after she finds out this information.       Ashlyn Coulter RN

## 2022-03-21 NOTE — CONFIDENTIAL NOTE
Separate message sent to  to update on medical cannabis certification number.      Ashlyn Coulter RN

## 2022-03-22 ENCOUNTER — THERAPY VISIT (OUTPATIENT)
Dept: PHYSICAL THERAPY | Facility: CLINIC | Age: 47
End: 2022-03-22
Payer: COMMERCIAL

## 2022-03-22 ENCOUNTER — THERAPY VISIT (OUTPATIENT)
Dept: OCCUPATIONAL THERAPY | Facility: CLINIC | Age: 47
End: 2022-03-22
Payer: COMMERCIAL

## 2022-03-22 DIAGNOSIS — G61.81 CIDP (CHRONIC INFLAMMATORY DEMYELINATING POLYNEUROPATHY) (H): Primary | ICD-10-CM

## 2022-03-22 DIAGNOSIS — G61.81 CHRONIC INFLAMMATORY DEMYELINATING POLYNEUROPATHY (H): Primary | ICD-10-CM

## 2022-03-22 PROCEDURE — 97535 SELF CARE MNGMENT TRAINING: CPT | Mod: GO

## 2022-03-22 PROCEDURE — 97110 THERAPEUTIC EXERCISES: CPT | Mod: GP | Performed by: PHYSICAL THERAPIST

## 2022-03-22 RX ORDER — ONDANSETRON 8 MG/1
8 TABLET, ORALLY DISINTEGRATING ORAL SEE ADMIN INSTRUCTIONS
Qty: 10 TABLET | Refills: 3 | Status: SHIPPED | OUTPATIENT
Start: 2022-03-22 | End: 2022-03-30

## 2022-03-22 NOTE — PROGRESS NOTES
"Measurements for wheelchair  Hip to knee:  18\"  Knee to heel:  17\"  Hip width: 17\"    Height: 5'5\"  Weight: 166lbs    Special requests/instructions: 18\"x18\" manual wheelchair    Maegan Hollis PT, DPT  Physical Therapist  [facility] [address]  [address] [place] [address]  [email or web address]  Appointments: [phone]      "

## 2022-03-22 NOTE — PROGRESS NOTES
"Rehabilitation Medicine Wheelchair Face to Face      Diagnosis: CIDP.   Currently has limited mobility due to pain, weakness, sensation, balance impairments.  Current transfer status: Stand pivot transfer with 4 wheeled walker modified independent.  Distance patient currently able to walk using 4 wheeled walker at significantly decreased belén (25 feet in over 25 seconds), supervision to contact-guard assist level.   Therapy team has ruled out the following less costly options:              Cane due to inadequate support due to strength and balance concerns              Walker due to very limited gait distance with high levels of fatigue and postexertional malaise, nonfunctional gait speed  Patient will use wheelchair to complete Mobility Related ADL's in the home including toileting, dressing, self-cares, meal prep.   Without a wheelchair patient will be unable to safely move about their home.  This equipment will allow them to be out of bed, participate in home activities with their family, and it will be part of a fall prevention plan for safe mobility.   Patient's home will accommodate use of wheelchair.  Patient has a family member willing and able to assist as necessary with wheelchair.      Arm and leg strength: Per OT note-patient unable to register bilateral  or pinch strength (0 pounds).  Bilateral knee extension 3+/5, bilateral hip flexion 4/5, bilateral knee flexion 4/5, bilateral ankle dorsiflexion 3 -/5     Gait/balance/coordination: Patient ambulates 25 feet with 4 wheeled walker significantly decreased belén.  Impaired balance with static dynamic standing     Length of need: 12 months     Current height:5'5\"  Current weight:166lbs  :1975     Jon Duenas MD NPI#: [id number]     I CERTIFY THE NEED FOR THESE SERVICES FURNISHED UNDER                   THIS PLAN OF TREATMENT AND WHILE UNDER MY CARE     (Physician attestation of this document indicates review and certification of the " therapy plan).

## 2022-03-22 NOTE — DISCHARGE INSTRUCTIONS
1. Look into building ramps for entry/exit into home    [facility]     [address] [place] [address]    P: [phone]  F: [phone]    Website: [email or web address]  Email: [email or web address]    Specialize in:   - Stair-lifts  - Home elevators  - Wheelchair platform lifts  - Roll-in showers  - Walk-in tubs  - Ceiling lifts  - Ramps & more      [employer]    [address] [place] [address]    P: [phone]  TF: [phone]    Website: [email or web address]  Email: [email or web address]     Specialize in:   - Overhead ceiling lifts  - Elevators  - Wheelchair lifts  - Stair lifts   - Vertical platform lifts  - Ramps  - Roll-in and walk-in tubs/showers       [facility] (3 local locations)    [address], [address]  [place] [address]  P: [phone]    [address] [place] [address]  P: [phone]    [address] [facility], [address]  [place] [address]  P: [phone]    Website: [email or web address]  Email: [email or web address]   Toll free: [phone]    Specialize in:   - Stair lifts  - Elevators  - MAGAN elevators       [facility]    [facility]:   [address] [place] [address]  P: [phone]    [facility]:   [address] [place] [address]  P: [phone]    Website: [email or web address]  Email: [email or web address]    Specialize in:   - Lift and sling sales and support  - Mobile and overhead lifts       [employer]:     Glenn Barboza: [phone]  Sandeep Lobo: [phone]  TF: [phone]    Website: [email or web address]  Email: [email or web address]    Specialize in:   - Event rentals   - Modular ramps, portable ramps  - Inclined stair lifts, overhead lifts, pool lifts  - Vertical and inclined platform lifts  - Roll-in showers   - Grab bars  - Automatic door openers       [facility]    [address] [place] [address]  [place] [address]      P: [phone]  F: [phone]    Website: [email or web address]  Email: [email or web address]     Specialize in:   - How-to guide to build ramps for single-family homes

## 2022-03-23 ENCOUNTER — HOME INFUSION (PRE-WILLOW HOME INFUSION) (OUTPATIENT)
Dept: PHARMACY | Facility: CLINIC | Age: 47
End: 2022-03-23
Payer: COMMERCIAL

## 2022-03-24 ENCOUNTER — HOME INFUSION (PRE-WILLOW HOME INFUSION) (OUTPATIENT)
Dept: PHARMACY | Facility: CLINIC | Age: 47
End: 2022-03-24
Payer: COMMERCIAL

## 2022-03-28 ENCOUNTER — OFFICE VISIT (OUTPATIENT)
Dept: NEUROLOGY | Facility: CLINIC | Age: 47
End: 2022-03-28
Payer: COMMERCIAL

## 2022-03-28 VITALS
BODY MASS INDEX: 26.96 KG/M2 | WEIGHT: 162 LBS | SYSTOLIC BLOOD PRESSURE: 120 MMHG | OXYGEN SATURATION: 97 % | RESPIRATION RATE: 16 BRPM | DIASTOLIC BLOOD PRESSURE: 87 MMHG | HEART RATE: 98 BPM

## 2022-03-28 DIAGNOSIS — G61.81 CIDP (CHRONIC INFLAMMATORY DEMYELINATING POLYNEUROPATHY) (H): Primary | ICD-10-CM

## 2022-03-28 PROCEDURE — 99214 OFFICE O/P EST MOD 30 MIN: CPT | Performed by: PSYCHIATRY & NEUROLOGY

## 2022-03-28 NOTE — LETTER
3/28/2022       RE: Peggy Jessica  [address] [facility] [address]     Dear Colleague,    Thank you for referring your patient, Peggy Jessica, to the [facility] [place] at [facility]. Please see a copy of my visit note below.    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 Her symptoms that led to the CISP diagnosis began in September 2021.  Onset was insidious and course over the first couple months was progressive. She reported sensory changes (numbness in her lower limbs and hands. She felt clumsy and unsteady on her feet. She started to fall. By her admission she was falling 3-4 times a day. Numbness and tingling spread to affect her below her knees and wrists. She was dropping things in the hands. Tasks like knitting (she had been quite good at this) became difficult. In 11/21 she could not knit at all. During her hospitalization she had a normal EMG. Imaging showed mild enhancement of the cauda equina and paraspinal musculature with elevated protein in the CSF. She was treated with 5 rounds of IVIG (4 weeks from last dose -11/22/21) with subjective improvement after the 3rd dose. Her balance was much better. She could walk unassisted, although her walking was not normal. She could knit. Numbness was improved. Tremor was better. After couple weeks the benefit started to wear off. In 12/21 and 1/22 insurance issues disrupted IVIG. She worsened. In 2/22 she was transitioned to q 2 week IVIG 1 gm/kg.     Interval history:   I last saw her 1/3/22. After that visit she received an extra IVIG dose in 2/22 and then was finally transitioned to IVIG q 2 weeks. She has had 3 or 4 IVIG cycles at q 2 weeks. She has not noticed any changes in numbness. Balance remains  "still quite poor, but she thinks it is a bit better. She still uses a walker at home and sometimes a wheelchair when outside. Her hands remains clumsy. No worse, but she still cannot do thinks like knitting. She is still up to mid thigh and elbows. After IVIG she notices that she as a few good days and then she worsens for the rest of the cycle. The benefit is mainly in energy and \"focus\".  She is not sure if her balance is better during that time. As far as tolerability, she does report headaches and nausea with IVIG which usually improves over a day or two.     Prior pertinent laboratory work-up:  11/2021: CSF (WBC 0, glc 73, protein 105), PAULINE, (1:160) unremarkable CSF HSV, O-bands, serum electrophoresis, paraneoplastic panel, B6, ganglioside Abs, SSA/SSB,   9/2021: unremarkable B12, IgM, Immunofixation, HbA1c    Prior pertinent radiology work-up:  11/18/21: MR Lumbar Spine showed diffuse mild enhancement of the cauda equina nerve roots without nodularity or clumping is nonspecific.  Probable mild enhancement of the posterior paraspinous musculature from L4 through S1. However, artifact could have similar appearance. 3. Mild multilevel lumbar spondylosis without evidence for high-grade spinal canal or neural foraminal stenosis.    Prior electrophysiologic work-up:  11/18/21: Nerve conduction studies/EMG: Right upper and lower extremity normal study, including tibial H reflexes  11/21: Autonomic testing ([facility] [place] [facility]) : There is generalized, marked, symmetric postganglionic sudomotor dysfunction, normal cardiovagal function, and mild adrenergic dysfunction. Findings are in keeping with systemic anticholinergic medication effect. Superimposed mild autonomic neuropathy can not be excluded. Orthostatic hypotension and tachycardia are not seen. Compression of pulse pressure during Valsalva raises the question of mild hypovolemia or deconditioning.     Past Medical History:   CRPS (right leg - stress fracture; chest - " mastectomy)  BRCA+   Cervical cancer  Migraines - resolved    Past Surgical History:  Bilateral mastectomy  Hysterectomy    gallbladder    Family history:    Dad - neuropathy (obeses, CKD)  Unknown largely    Social History:    She denies tobacco, alcohol, or illicit drug use. There is no known exposure to toxins or heavy metals.   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 [employer]    Medical Allergies:     Allergies   Allergen Reactions     Dihydroergotamine Anaphylaxis     Latex Anaphylaxis     Shellfish-Derived Products Anaphylaxis     Sumatriptan Anaphylaxis     Banana Unknown     Gabapentin Dizziness     Gluten Meal Other (See Comments)     Celiac disease     Keppra [Levetiracetam] Nausea and Vomiting     Kiwi Unknown     Levofloxacin Other (See Comments)     Arrhythmia     Metronidazole Nausea and Vomiting     Nitrofurantoin Hives     Penicillins Hives     Topiramate Visual Disturbance     Aspirin Rash     Methadone Rash     Risperidone Anxiety     Current Medications:    Current Outpatient Medications   Medication     acetaminophen (TYLENOL) 325 MG tablet     bisacodyl (DULCOLAX) 10 MG suppository     cyclobenzaprine (FLEXERIL) 10 MG tablet     medical cannabis (Patient's own supply)     multivitamin w/minerals (MULTI-VITAMIN) tablet     ondansetron (ZOFRAN-ODT) 8 MG ODT tab     ondansetron (ZOFRAN-ODT) 8 MG ODT tab     oxyCODONE (ROXICODONE) 5 MG tablet     polyethylene glycol (MIRALAX) 17 GM/Dose powder     senna-docusate (SENOKOT-S/PERICOLACE) 8.6-50 MG tablet     prochlorperazine (COMPAZINE) 5 MG tablet     No current facility-administered medications for this visit.     Review of Systems: A complete review of systems was obtained and was negative except for what was noted above.    Physical examination:    /87   Pulse 98   Resp 16   Wt 73.5 kg (162 lb)   SpO2 97%   BMI 26.96 kg/m       Wt Readings from Last 4 Encounters:   22 73.5 kg (162 lb)   03/15/22 75.3 kg (166 lb)   03/10/22 75.4  kg (166 lb 3.6 oz)   22 76 kg (167 lb 8 oz)     General Appearance: NAD    Skin: There are no rashes or other skin lesions.    Neurologic examination:    Mental status:  Patient is alert, attentive, and oriented x 3.  Language is coherent and fluent without  aphasia.  Memory, comprehension and ability to follow commands were intact.       Cranial nerves:    Extraocular movements were full. There was no face, jaw, palate or tongue weakness or atrophy. Hearing was grossly intact.  Shoulder shrug was normal.       Motor exam: No atrophy or fasciculations.  Impersistent activation but power is at least 4/5 and probably full in proximal and distal muscles of the arms and legs.    Complex motor skills: Mild to moderate postural hand tremor. She also has some mild ataxia with FNF.     Sensory exam: Decreased vibratory perception in the toes > ankles and fingers. Pin testing patchy in the distal lower extremities.     Gait:  Very slow, narrow, cautious gait without steppage. Unable to heel/toe walk     Deep tendon reflexes:   Right Left   Triceps 1 1   Biceps 1 1   Brachioradialis 1 1   Knee jerk 0 0   Ankle jerk 0 0      Neuropathy Assessments  Neurology Assessments 3/28/2022 2022 12/15/2021 2021   RODS CIDP/MGUSP Score 18 17 22 22   Time for 'Up and Go' test- Seconds: - 30.6 17.45 34.1      Strength: 3/28/2022 2022 12/15/2021 2021   Right hand strength in K 5 12 not tested   Left hand strength in K 4 10 not tested     Immunotherapy 3/28/2022 2022 12/15/2021 2021   Time since last IVIG (days): 1 2 weeks 4 weeks -   Current treatment: IVIG 1 gm/kg  2 weeks IVIG 1 gm/kg q 3 weeks none IVIG started 21     Assessment:    Peggy 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 The diagnosis is supported by her normal clinical symptoms and signs (non length dependant sensory changes, ataxia, hyporeflexia), nerve root enhancement on MRI, and elevated CSF protein. Although  she initially had an unequivocal beneficial response to IVIG (11/21), her treatment response over the last several months has been disappointing. She has only been at q 2 week IVIG dosing for 6-8 weeks, and so it is possible that more benefit is forthcoming. The other possibility is that her disorder has run a monophasic course, and is no longer immunologically active. We discussed this in some detail today. If there is no longer any immunological activity then the focus of management is supportive. If it remains immunologically active then there may be a role for other immunosuppressing therapy. We agreed to give IVIG another 1-2 months. If there is still no benefit then I anticipate discontinuing IVIG. If she worsens after IVIG discontinuation then we will discuss the role of other immunosuppressing therapies. If she does not worsen then we may conclude that the inflammatory portion of her neuropathy has passed, and the goal of therapy will be supportive.     Plan:      1. Immunotherapy:    - IVIG: Continue 1 gm/kg q 2 weeks for now. If no improvement in next 1-2 months will discontinue. Pending clinical course will consider role of immunosuppressing therapies.   - Corticosteroids: Contraindicated. She has had steroid exposure in the past (for a different indication) and she developed substantial weight gain, severe insomnia, emotional instability and depression.    - Other IST: None now, but as above pending IVIG course may need to find an IVIG sparing medication vs supportive management alone.   2. Diagnostic testing: None now but may repeat NCS at next visit to assess for interval changes. This may be helpful data to collect in this unique circumstance. May also be of interest to take another look at interval changes with the contrast LS MRI as well.   3. Physical therapy: continuing physical therapy  4. Symptomatic management of pain and paresthesias: She was able to see pain management. Appreciate collateral  care. Will defer pain management ot their expertise.   5. Referral for PCP provided  6. INCBASE: Enrolled [id number]  7. Follow up in 8 weeks. Sooner if needed.     ---       Sincerely,  Jon Duenas MD

## 2022-03-28 NOTE — PROGRESS NOTES
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 Her symptoms that led to the CISP diagnosis began in September 2021.  Onset was insidious and course over the first couple months was progressive. She reported sensory changes (numbness in her lower limbs and hands. She felt clumsy and unsteady on her feet. She started to fall. By her admission she was falling 3-4 times a day. Numbness and tingling spread to affect her below her knees and wrists. She was dropping things in the hands. Tasks like knitting (she had been quite good at this) became difficult. In 11/21 she could not knit at all. During her hospitalization she had a normal EMG. Imaging showed mild enhancement of the cauda equina and paraspinal musculature with elevated protein in the CSF. She was treated with 5 rounds of IVIG (4 weeks from last dose -11/22/21) with subjective improvement after the 3rd dose. Her balance was much better. She could walk unassisted, although her walking was not normal. She could knit. Numbness was improved. Tremor was better. After couple weeks the benefit started to wear off. In 12/21 and 1/22 insurance issues disrupted IVIG. She worsened. In 2/22 she was transitioned to q 2 week IVIG 1 gm/kg.     Interval history:   I last saw her 1/3/22. After that visit she received an extra IVIG dose in 2/22 and then was finally transitioned to IVIG q 2 weeks. She has had 3 or 4 IVIG cycles at q 2 weeks. She has not noticed any changes in numbness. Balance remains still quite poor, but she thinks it is a bit better. She still uses a walker at home and sometimes a wheelchair when outside. Her hands remains clumsy. No worse, but she still cannot do thinks like knitting. She is still up to mid thigh and elbows. After IVIG she notices that she as a few good days and then she worsens for the  "rest of the cycle. The benefit is mainly in energy and \"focus\".  She is not sure if her balance is better during that time. As far as tolerability, she does report headaches and nausea with IVIG which usually improves over a day or two.     Prior pertinent laboratory work-up:  2021: CSF (WBC 0, glc 73, protein 105), PAULINE, (1:160) unremarkable CSF HSV, O-bands, serum electrophoresis, paraneoplastic panel, B6, ganglioside Abs, SSA/SSB   2021: unremarkable B12, IgM, Immunofixation, HbA1c    Prior pertinent radiology work-up:  21: MR Lumbar Spine showed diffuse mild enhancement of the cauda equina nerve roots without nodularity or clumping is nonspecific.  Probable mild enhancement of the posterior paraspinous musculature from L4 through S1. However, artifact could have similar appearance. 3. Mild multilevel lumbar spondylosis without evidence for high-grade spinal canal or neural foraminal stenosis.    Prior electrophysiologic work-up:  21: Nerve conduction studies/EMG: Right upper and lower extremity normal study, including tibial H reflexes  : Autonomic testing ([facility] [place]) : There is generalized, marked, symmetric postganglionic sudomotor dysfunction, normal cardiovagal function, and mild adrenergic dysfunction. Findings are in keeping with systemic anticholinergic medication effect. Superimposed mild autonomic neuropathy can not be excluded. Orthostatic hypotension and tachycardia are not seen. Compression of pulse pressure during Valsalva raises the question of mild hypovolemia or deconditioning.     Past Medical History:   CRPS (right leg - stress fracture; chest - mastectomy)  BRCA+   Cervical cancer  Migraines - resolved    Past Surgical History:  Bilateral mastectomy  Hysterectomy    gallbladder    Family history:    Dad - neuropathy (obeses, CKD)  Unknown largely    Social History:    She denies tobacco, alcohol, or illicit drug use. There is no known exposure to toxins or heavy " metals.   [employer] work - corporate collections for [employer]    Medical Allergies:     Allergies   Allergen Reactions     Dihydroergotamine Anaphylaxis     Latex Anaphylaxis     Shellfish-Derived Products Anaphylaxis     Sumatriptan Anaphylaxis     Banana Unknown     Gabapentin Dizziness     Gluten Meal Other (See Comments)     Celiac disease     Keppra [Levetiracetam] Nausea and Vomiting     Kiwi Unknown     Levofloxacin Other (See Comments)     Arrhythmia     Metronidazole Nausea and Vomiting     Nitrofurantoin Hives     Penicillins Hives     Topiramate Visual Disturbance     Aspirin Rash     Methadone Rash     Risperidone Anxiety     Current Medications:    Current Outpatient Medications   Medication     acetaminophen (TYLENOL) 325 MG tablet     bisacodyl (DULCOLAX) 10 MG suppository     cyclobenzaprine (FLEXERIL) 10 MG tablet     medical cannabis (Patient's own supply)     multivitamin w/minerals (MULTI-VITAMIN) tablet     ondansetron (ZOFRAN-ODT) 8 MG ODT tab     ondansetron (ZOFRAN-ODT) 8 MG ODT tab     oxyCODONE (ROXICODONE) 5 MG tablet     polyethylene glycol (MIRALAX) 17 GM/Dose powder     senna-docusate (SENOKOT-S/PERICOLACE) 8.6-50 MG tablet     prochlorperazine (COMPAZINE) 5 MG tablet     No current facility-administered medications for this visit.     Review of Systems: A complete review of systems was obtained and was negative except for what was noted above.    Physical examination:    /87   Pulse 98   Resp 16   Wt 73.5 kg (162 lb)   SpO2 97%   BMI 26.96 kg/m       Wt Readings from Last 4 Encounters:   03/28/22 73.5 kg (162 lb)   03/15/22 75.3 kg (166 lb)   03/10/22 75.4 kg (166 lb 3.6 oz)   02/24/22 76 kg (167 lb 8 oz)     General Appearance: NAD    Skin: There are no rashes or other skin lesions.    Neurologic examination:    Mental status:  Patient is alert, attentive, and oriented x 3.  Language is coherent and fluent without  aphasia.  Memory, comprehension and ability to follow commands were  intact.       Cranial nerves:    Extraocular movements were full. There was no face, jaw, palate or tongue weakness or atrophy. Hearing was grossly intact.  Shoulder shrug was normal.       Motor exam: No atrophy or fasciculations.  Impersistent activation but power is at least 4/5 and probably full in proximal and distal muscles of the arms and legs.    Complex motor skills: Mild to moderate postural hand tremor. She also has some mild ataxia with FNF.     Sensory exam: Decreased vibratory perception in the toes > ankles and fingers. Pin testing patchy in the distal lower extremities.     Gait:  Very slow, narrow, cautious gait without steppage. Unable to heel/toe walk     Deep tendon reflexes:   Right Left   Triceps 1 1   Biceps 1 1   Brachioradialis 1 1   Knee jerk 0 0   Ankle jerk 0 0      Neuropathy Assessments  Neurology Assessments 3/28/2022 2022 12/15/2021 2021   RODS CIDP/MGUSP Score 18 17 22 22   Time for 'Up and Go' test- Seconds: - 30.6 17.45 34.1      Strength: 3/28/2022 2022 12/15/2021 2021   Right hand strength in K 5 12 not tested   Left hand strength in K 4 10 not tested     Immunotherapy 3/28/2022 2022 12/15/2021 2021   Time since last IVIG (days): 1 2 weeks 4 weeks -   Current treatment: IVIG 1 gm/kg  2 weeks IVIG 1 gm/kg q 3 weeks none IVIG started 21     Assessment:    Peggy 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 The diagnosis is supported by her normal clinical symptoms and signs (non length dependant sensory changes, ataxia, hyporeflexia), nerve root enhancement on MRI, and elevated CSF protein. Although she initially had an unequivocal beneficial response to IVIG (), her treatment response over the last several months has been disappointing. She has only been at q 2 week IVIG dosing for 6-8 weeks, and so it is possible that more benefit is forthcoming. The other possibility is that her disorder has run a monophasic course, and  is no longer immunologically active. We discussed this in some detail today. If there is no longer any immunological activity then the focus of management is supportive. If it remains immunologically active then there may be a role for other immunosuppressing therapy. We agreed to give IVIG another 1-2 months. If there is still no benefit then I anticipate discontinuing IVIG. If she worsens after IVIG discontinuation then we will discuss the role of other immunosuppressing therapies. If she does not worsen then we may conclude that the inflammatory portion of her neuropathy has passed, and the goal of therapy will be supportive.     Plan:      1. Immunotherapy:    - IVIG: Continue 1 gm/kg q 2 weeks for now. If no improvement in next 1-2 months will discontinue. Pending clinical course will consider role of immunosuppressing therapies.   - Corticosteroids: Contraindicated. She has had steroid exposure in the past (for a different indication) and she developed substantial weight gain, severe insomnia, emotional instability and depression.    - Other IST: None now, but as above pending IVIG course may need to find an IVIG sparing medication vs supportive management alone.   2. Diagnostic testing: None now but may repeat NCS at next visit to assess for interval changes. This may be helpful data to collect in this unique circumstance. May also be of interest to take another look at interval changes with the contrast LS MRI as well.   3. Physical therapy: continuing physical therapy  4. Symptomatic management of pain and paresthesias: She was able to see pain management. Appreciate collateral care. Will defer pain management ot their expertise.   5. Referral for PCP provided  6. INCBASE: Enrolled [id number]  7. Follow up in 8 weeks. Sooner if needed.     ---  4/12/22: Pain persists, and IVIG appears not to be helping at this point. Will check for FGFR3, TS-HDS and CASPR2 antibodies as well as cryo, B1, Hu, alpha  vika, ACE.     4/13/22: Note from Peggy. She reports that she is feeling more numb today. Severe pain persists. She also feels weak and is having more trouble walking. In addition to labs above I am going to bring her back for repeat NCS/EMG to see if there have been any interval changes from the 11/21 study. If she is weak from a neuromuscular disease with lower motor neuron involvement something should now be apparent on EMG. I am also going to take another look at her spine with MRI. Particular interest in if the nerve root enhancement persists and if it is widespread.      4/18/22: Note from Peggy. She reports that her face is now numb too. Will add MRI brain to her imaging.     4/21/22: Peggy was unable to get IVIG due to inability to start an IV. At this point we will discontinue IVIG. There is no clear benefit at this point, and access is becoming a recurring issue. Will await labs from [facility] and repeat imaging before deciding if immunotherapy escalation is reasonable.     4/30/22: MRI brain and C/T/LS spine showed no CN or root enhancement. There has been resolution of cauda equina nerve root enhancement that was previously noted. This finding together with the recent NCS and lack of response to immunotherapy over the last several months argue against an active immune disorder. Will shift management to supportive care. She had a pre-existing diagnosis of CRPS. Appreciate collateral care of pain management.

## 2022-03-29 ENCOUNTER — HOME INFUSION (PRE-WILLOW HOME INFUSION) (OUTPATIENT)
Dept: PHARMACY | Facility: CLINIC | Age: 47
End: 2022-03-29

## 2022-03-30 ENCOUNTER — OFFICE VISIT (OUTPATIENT)
Dept: FAMILY MEDICINE | Facility: CLINIC | Age: 47
End: 2022-03-30
Payer: COMMERCIAL

## 2022-03-30 ENCOUNTER — LAB (OUTPATIENT)
Dept: LAB | Facility: CLINIC | Age: 47
End: 2022-03-30

## 2022-03-30 ENCOUNTER — MYC MEDICAL ADVICE (OUTPATIENT)
Dept: FAMILY MEDICINE | Facility: CLINIC | Age: 47
End: 2022-03-30
Payer: COMMERCIAL

## 2022-03-30 VITALS
DIASTOLIC BLOOD PRESSURE: 85 MMHG | BODY MASS INDEX: 26.96 KG/M2 | HEART RATE: 117 BPM | OXYGEN SATURATION: 97 % | TEMPERATURE: 98 F | SYSTOLIC BLOOD PRESSURE: 103 MMHG | WEIGHT: 162 LBS

## 2022-03-30 DIAGNOSIS — G89.29 OTHER CHRONIC PAIN: ICD-10-CM

## 2022-03-30 DIAGNOSIS — G61.81 CIDP (CHRONIC INFLAMMATORY DEMYELINATING POLYNEUROPATHY) (H): ICD-10-CM

## 2022-03-30 DIAGNOSIS — Z00.00 HEALTHCARE MAINTENANCE: ICD-10-CM

## 2022-03-30 DIAGNOSIS — G61.81 CIDP (CHRONIC INFLAMMATORY DEMYELINATING POLYNEUROPATHY) (H): Primary | ICD-10-CM

## 2022-03-30 LAB
ALBUMIN SERPL-MCNC: 3.5 G/DL (ref 3.4–5)
ALP SERPL-CCNC: 79 U/L (ref 40–150)
ALT SERPL W P-5'-P-CCNC: 23 U/L (ref 0–50)
AMPHETAMINES UR QL SCN: ABNORMAL
ANION GAP SERPL CALCULATED.3IONS-SCNC: 7 MMOL/L (ref 3–14)
AST SERPL W P-5'-P-CCNC: 24 U/L (ref 0–45)
BARBITURATES UR QL: ABNORMAL
BASOPHILS # BLD AUTO: 0 10E3/UL (ref 0–0.2)
BASOPHILS NFR BLD AUTO: 1 %
BENZODIAZ UR QL: ABNORMAL
BILIRUB SERPL-MCNC: 0.5 MG/DL (ref 0.2–1.3)
BUN SERPL-MCNC: 8 MG/DL (ref 7–30)
CALCIUM SERPL-MCNC: 9.3 MG/DL (ref 8.5–10.1)
CANNABINOIDS UR QL SCN: ABNORMAL
CHLORIDE BLD-SCNC: 104 MMOL/L (ref 94–109)
CHOLEST SERPL-MCNC: 229 MG/DL
CO2 SERPL-SCNC: 28 MMOL/L (ref 20–32)
COCAINE UR QL: ABNORMAL
CREAT SERPL-MCNC: 0.81 MG/DL (ref 0.52–1.04)
EOSINOPHIL # BLD AUTO: 0 10E3/UL (ref 0–0.7)
EOSINOPHIL NFR BLD AUTO: 1 %
ERYTHROCYTE [DISTWIDTH] IN BLOOD BY AUTOMATED COUNT: 13.8 % (ref 10–15)
ETHANOL UR QL SCN: ABNORMAL
FASTING STATUS PATIENT QL REPORTED: NO
GFR SERPL CREATININE-BSD FRML MDRD: 90 ML/MIN/1.73M2
GLUCOSE BLD-MCNC: 98 MG/DL (ref 70–99)
HCT VFR BLD AUTO: 41.8 % (ref 35–47)
HDLC SERPL-MCNC: 56 MG/DL
HGB BLD-MCNC: 14.2 G/DL (ref 11.7–15.7)
IMM GRANULOCYTES # BLD: 0 10E3/UL
IMM GRANULOCYTES NFR BLD: 0 %
LDLC SERPL CALC-MCNC: 147 MG/DL
LYMPHOCYTES # BLD AUTO: 1.2 10E3/UL (ref 0.8–5.3)
LYMPHOCYTES NFR BLD AUTO: 29 %
MCH RBC QN AUTO: 30 PG (ref 26.5–33)
MCHC RBC AUTO-ENTMCNC: 34 G/DL (ref 31.5–36.5)
MCV RBC AUTO: 88 FL (ref 78–100)
MONOCYTES # BLD AUTO: 0.3 10E3/UL (ref 0–1.3)
MONOCYTES NFR BLD AUTO: 8 %
NEUTROPHILS # BLD AUTO: 2.6 10E3/UL (ref 1.6–8.3)
NEUTROPHILS NFR BLD AUTO: 61 %
NONHDLC SERPL-MCNC: 173 MG/DL
NRBC # BLD AUTO: 0 10E3/UL
NRBC BLD AUTO-RTO: 0 /100
OPIATES UR QL SCN: ABNORMAL
PCP UR QL SCN: ABNORMAL
PLATELET # BLD AUTO: 240 10E3/UL (ref 150–450)
POTASSIUM BLD-SCNC: 4.1 MMOL/L (ref 3.4–5.3)
PROT SERPL-MCNC: 9.6 G/DL (ref 6.8–8.8)
RBC # BLD AUTO: 4.73 10E6/UL (ref 3.8–5.2)
SODIUM SERPL-SCNC: 139 MMOL/L (ref 133–144)
TRIGL SERPL-MCNC: 131 MG/DL
WBC # BLD AUTO: 4.2 10E3/UL (ref 4–11)

## 2022-03-30 PROCEDURE — 99000 SPECIMEN HANDLING OFFICE-LAB: CPT | Performed by: PATHOLOGY

## 2022-03-30 PROCEDURE — 80365 DRUG SCREENING OXYCODONE: CPT | Mod: 90 | Performed by: PATHOLOGY

## 2022-03-30 PROCEDURE — 99204 OFFICE O/P NEW MOD 45 MIN: CPT | Performed by: NURSE PRACTITIONER

## 2022-03-30 PROCEDURE — 85025 COMPLETE CBC W/AUTO DIFF WBC: CPT | Performed by: PATHOLOGY

## 2022-03-30 PROCEDURE — 80061 LIPID PANEL: CPT | Performed by: PATHOLOGY

## 2022-03-30 PROCEDURE — 80307 DRUG TEST PRSMV CHEM ANLYZR: CPT | Mod: 90 | Performed by: PATHOLOGY

## 2022-03-30 PROCEDURE — 36415 COLL VENOUS BLD VENIPUNCTURE: CPT | Performed by: PATHOLOGY

## 2022-03-30 PROCEDURE — 80053 COMPREHEN METABOLIC PANEL: CPT | Performed by: PATHOLOGY

## 2022-03-30 RX ORDER — OXYCODONE HYDROCHLORIDE 5 MG/1
5 TABLET ORAL 2 TIMES DAILY
Qty: 60 TABLET | Refills: 0 | Status: SHIPPED | OUTPATIENT
Start: 2022-03-30 | End: 2022-03-30

## 2022-03-30 RX ORDER — OXYCODONE HYDROCHLORIDE 5 MG/1
5 TABLET ORAL 2 TIMES DAILY
Qty: 60 TABLET | Refills: 0 | Status: SHIPPED | OUTPATIENT
Start: 2022-04-01 | End: 2022-03-31

## 2022-03-30 ASSESSMENT — ANXIETY QUESTIONNAIRES
7. FEELING AFRAID AS IF SOMETHING AWFUL MIGHT HAPPEN: NOT AT ALL
GAD7 TOTAL SCORE: 2
2. NOT BEING ABLE TO STOP OR CONTROL WORRYING: NOT AT ALL
5. BEING SO RESTLESS THAT IT IS HARD TO SIT STILL: NOT AT ALL
7. FEELING AFRAID AS IF SOMETHING AWFUL MIGHT HAPPEN: NOT AT ALL
3. WORRYING TOO MUCH ABOUT DIFFERENT THINGS: NOT AT ALL
4. TROUBLE RELAXING: SEVERAL DAYS
1. FEELING NERVOUS, ANXIOUS, OR ON EDGE: NOT AT ALL
GAD7 TOTAL SCORE: 2
6. BECOMING EASILY ANNOYED OR IRRITABLE: SEVERAL DAYS

## 2022-03-30 NOTE — PROGRESS NOTES
Today's Date: Mar 30, 2022     Patient Peggy Jessica 1975 presents to the clinic today to address   Chief Complaint   Patient presents with     Establish Care     medication refill             SUBJECTIVE     History of Present Illness:    46 year old female w/ complex PMHx of chronic immune sensory polyradiculopathy (CISP)/Chronic inflammatory demyelinating polyneuropathy (CIDP), complex regional pain syndrome (RLE 2/2 stress fracture; chest 2/2 bilateral mastectomy), BRCA+ mutation, cervical cancer, and migraines presents with partner to establish care. Patient follows Neurology every 2 weeks for CISP where she receives IVIG infusions through [facility] infusion. CISP symptoms began in September 2021 with chronic sensory changes (numbness/tinging to feet/wrists), dropping items, and frequent falls. She was hospitalized in 11/21 where she received 5 rounds of IVIG which lead to subjective improvement in her symptoms. Patient reports that some days are better than others, where she may feel strong enough to use a walker. Most days, she uses a wheelchair.    Patient has been dealing with CRPS since 2019 when she had a leg stress fracture. Her pain regimen was previously managed at [facility], however, she would like to transition to [facility] for all of her healthcare needs. Patient has previously tried gabapentin, pregabalin, duloxetine, amitriptyline without relief. Patient reports that the chronic pain is primarily in her bilateral legs and chest (past s/p mastectomy 2020- reports internal seroma that moves around). Patient established with Dr. Nuno of Chronic Pain team who recertified her medical marijuana card, however Dr. Nuno prefers that primary care manage opioid regimen as he is in clinic infrequently.  Patient reports that IVIG has coincidentally improved her CRPS (pain previously as high as a 9/10 pre IVIG, now done to around 6/10 post IVIG). No other acute complaints/symptoms/concerns at time of  exam.    Patient's OBGYN health is managed by [facility]. Currently on 3 year regime for Paps (hx hysterectomy in 2004.)       Review of Systems   Constitutional, HEENT, cardiovascular, pulmonary, gi and gu systems are negative, except as otherwise noted.      Allergies   Allergen Reactions     Dihydroergotamine Anaphylaxis     Latex Anaphylaxis     Shellfish-Derived Products Anaphylaxis     Sumatriptan Anaphylaxis     Banana Unknown     Gabapentin Dizziness     Gluten Meal Other (See Comments)     Celiac disease     Keppra [Levetiracetam] Nausea and Vomiting     Kiwi Unknown     Levofloxacin Other (See Comments)     Arrhythmia     Metronidazole Nausea and Vomiting     Nitrofurantoin Hives     Penicillins Hives     Topiramate Visual Disturbance     Aspirin Rash     Methadone Rash     Risperidone Anxiety        Current Outpatient Medications   Medication     acetaminophen (TYLENOL) 325 MG tablet     bisacodyl (DULCOLAX) 10 MG suppository     cyclobenzaprine (FLEXERIL) 10 MG tablet     medical cannabis (Patient's own supply)     multivitamin w/minerals (MULTI-VITAMIN) tablet     ondansetron (ZOFRAN-ODT) 8 MG ODT tab     [START ON 4/1/2022] oxyCODONE (ROXICODONE) 5 MG tablet     polyethylene glycol (MIRALAX) 17 GM/Dose powder     senna-docusate (SENOKOT-S/PERICOLACE) 8.6-50 MG tablet     No current facility-administered medications for this visit.         No past medical history on file.     No family history on file.     Social History     Tobacco Use     Smoking status: Former Smoker     Types: Cigarettes     Smokeless tobacco: Never Used   Substance Use Topics     Alcohol use: Not Currently     Drug use: Yes     Types: Marijuana     Comment: Medical [facility] patient        History   Sexual Activity     Sexual activity: Not on file        No flowsheet data found.     Immunization History   Administered Date(s) Administered     COVID-19,PF,Moderna 03/20/2021, 04/17/2021        Health Maintenance components reviewed  - Flu- did not receive.   Covid-19 vaccine status is UTD (did not receive Booster.)  Colonoscopy- 2020- Removed 1 polyp. Repeat in 5 years.  Eye Exam- Annnually  Dental Exam- Overdue         OBJECTIVE     /85 (BP Location: Left arm, Patient Position: Sitting, Cuff Size: Adult Regular)   Pulse 117   Temp 98  F (36.7  C) (Oral)   Wt 73.5 kg (162 lb)   SpO2 97%   BMI 26.96 kg/m       Labs:  Lab Results   Component Value Date    WBC 2.8 (L) 02/09/2022    HGB 12.6 02/09/2022    HCT 37.8 02/09/2022     02/09/2022    ALT 23 11/18/2021    AST 11 11/18/2021     02/09/2022    BUN 12 02/09/2022    CO2 28 02/09/2022        Physical Exam  Constitutional:       General: She is awake. She is not in acute distress.     Appearance: Normal appearance. She is not ill-appearing, toxic-appearing or diaphoretic.      Comments: In wheelchar.   HENT:      Right Ear: Tympanic membrane normal.      Left Ear: Tympanic membrane normal.      Nose: Nose normal.      Mouth/Throat:      Dentition: Dental caries present.   Eyes:      Extraocular Movements: Extraocular movements intact.      Pupils: Pupils are equal, round, and reactive to light.   Cardiovascular:      Rate and Rhythm: Regular rhythm. Tachycardia present.      Heart sounds: Normal heart sounds.   Pulmonary:      Effort: Pulmonary effort is normal. No respiratory distress.      Breath sounds: Normal breath sounds.   Abdominal:      General: Bowel sounds are normal.      Palpations: Abdomen is soft.   Musculoskeletal:      Cervical back: Neck supple.   Neurological:      Mental Status: She is alert and oriented to person, place, and time.   Psychiatric:         Behavior: Behavior is cooperative.         Thought Content: Thought content normal.         Judgment: Judgment normal.               ASSESSMENT/PLAN     1. Other chronic pain  ADDENDA 3/31/22:  Initial Drug Panel negative for opiates. Urine Qual oxycodone negative. Awaiting for urine oxycodone  confirmation result. Attempted to call patient today to discuss urine results (Voicemail was left.) Will discontinue prescription until urine oxycodone confirmatory test results are completed.     Controlled substance agreement signed. Will collect UDS today. Checked - will continue oxycodone 5mg BID. Discussed milnacipran as a possible future alternative for CRPS.   - Drug abuse screen 8 urine (UR); Future      2. CIDP (chronic inflammatory demyelinating polyneuropathy) (H)  Pt has history of low WBC- check CBC to monitor. Defer to neuro for IVIG/symptom management.  - CBC with platelets differential; Future    3. Healthcare maintenance    - Comprehensive metabolic panel; Future  - Lipid panel reflex to direct LDL Fasting; Future  - Dental Referral      Follow-Up:  - Follow up in 2 month(s), or if symptoms worsen or fail to improve.     Options for treatment and follow-up care were reviewed with the patient. Patient engaged in the decision making process and verbalized understanding of the options discussed and agreed with the final plan.  AVS printed and given to patient.      MIRA Addison [facility]  [address] [place] [address] [id number] [phone]

## 2022-03-30 NOTE — PATIENT INSTRUCTIONS
Patient Education     Chronic Pain  Pain serves an important role. It lets you know something is wrong that needs your attention. When the body heals, pain normally goes away.   When pain lasts longer than 3 months, it's called chronic pain. This is pain that is present even after the body has healed. Chronic pain can cause mood problems and get in the way of your relationships and your daily life.   A number of conditions can cause chronic pain. Some of the more common include:    Previous surgery    An old injury    Infection    Diseases such as diabetes    Nerve damage    Back injury    Arthritis    Migraine or other headaches    Fibromyalgia    Cancer  Depression and stress can make chronic pain symptoms worse. In some cases, a cause for the pain can't be found.    Treatment  Treatment can greatly reduce pain. In many cases, pain can become less severe, occur less often, and interfere less with your daily life. Chronic pain is often treated with a combination of medicines, therapies, and lifestyle changes. You will work closely with your healthcare provider to find a treatment plan that works best for you.     Ask your healthcare provider for a referral to a pain management specialty center. These can provide the most recent and proven pain management strategies, along with emotional support and comprehensive services.    Several different types of medicines may be prescribed for chronic pain. Work with your healthcare provider to develop a medicine plan that helps manage your pain.    Physical therapy can help reduce certain types of chronic pain.    Occupational therapy teaches you how to do routine tasks of daily living in ways that lessen your discomfort.    Counseling can help you cope better with stress and pain.    Other therapies such as meditation, yoga, biofeedback, massage, and acupuncture can also help manage chronic pain.    Changing certain habits can help reduce chronic pain:  ? Eat  healthy  ? Develop an exercise routine  ? Get enough sleep   ? Stop smoking and limiting alcohol use  ? Lose excess weight  Follow-up care  Follow up with your healthcare provider, or as advised. Let your healthcare provider know if your current treatment plan is working or if changes are needed.   To learn more  For more information, contact:    American Headache and Migraine Association, americanheadachesociety.org/ 914.387.7229    American Chronic Pain Association, theacpa.org [phone]  Danny last reviewed this educational content on 10/1/2019    8061-1657 [employer]. All rights reserved. This information is not intended as a substitute for professional medical care. Always follow your healthcare professional's instructions.         Patient Education     Patient Education    Milnacipran HCl Oral tablet    Milnacipran HCl Oral tablet, Milnacipran HCl Oral tablet, Milnacipran HCl Oral tablet  Milnacipran HCl Oral tablet  What is this medicine?  MILNACIPRAN (mil NA si pran) is used to treat the pain caused by fibromyalgia.  This medicine may be used for other purposes; ask your health care provider or pharmacist if you have questions.  What should I tell my health care provider before I take this medicine?  They need to know if you have any of these conditions:    bleeding disorders    glaucoma    heart disease    high blood pressure    if you often drink alcohol    irregular heart beat    kidney disease    liver disease    jaymie or bipolar disorder    prostate disease    seizures    suicidal thoughts, plans, or attempt; a previous suicide attempt by you or a family member    taken an MAOIs like Carbex, Eldepryl, Marplan, Nardil, and Parnate within 14 days    an unusual or allergic reaction to milnacipran, levomilnacipran, other medicines, foods, or preservatives    pregnant or trying to get pregnant    breast-feeding  How should I use this medicine?  Take this medicine by mouth with a glass of  water. You can take it with or without food. If it upsets your stomach, take it with food. Follow the directions on the prescription label. Do not take your medicine more often than directed. Do not stop taking this medicine suddenly except upon the advice of your doctor. Stopping this medicine too quickly may cause serious side effects or your condition may worsen.  A special MedGuide will be given to you by the pharmacist with each prescription and refill. Be sure to read this information carefully each time.  Talk to your pediatrician regarding the use of this medicine in children. Special care may be needed.  Overdosage: If you think you have taken too much of this medicine contact a poison control center or emergency room at once.  NOTE: This medicine is only for you. Do not share this medicine with others.  What if I miss a dose?  If you miss a dose, skip that dose and take the next dose at your regular time. Do not take double or extra doses.  What may interact with this medicine?  Do not take this medicine with any of the following medications:    linezolid    medicines called MAO Inhibitors like Nardil, Parnate, Marplan, Eldepryl    methylene blue    tryptophan  This medicine may also interact with the following medications:    amphetamine or dextroamphetamine    aspirin and aspirin-like medicines    clonidine    digoxin    epinephrine    furazolidone    lithium    medicines for blood pressure    medicines for migraine headache like almotriptan, eletriptan, frovatriptan, naratriptan, rizatriptan, sumatriptan, zolmitriptan    medicines that treat or prevent blood clots like warfarin, enoxaparin, and dalteparin    NSAIDS, medicines for pain and inflammation, like ibuprofen or naproxen    other medicines for depression, anxiety, or psychotic disturbances    procarbazine    [place]'s wort, Kemp perforatum    tramadol  This list may not describe all possible interactions. Give your health care provider a  list of all the medicines, herbs, non-prescription drugs, or dietary supplements you use. Also tell them if you smoke, drink alcohol, or use illegal drugs. Some items may interact with your medicine.  What should I watch for while using this medicine?  Tell your doctor if your symptoms do not get better or if they get worse. Visit your doctor or health care professional for regular checks on your progress.  Patients and their families should watch out for new or worsening thoughts of suicide or depression. Also watch out for sudden changes in feelings such as feeling anxious, agitated, panicky, irritable, hostile, aggressive, impulsive, severely restless, overly excited and hyperactive, or not being able to sleep. If this happens, especially at the beginning of treatment or after a change in dose, call your health care professional.  This medicine can cause an increase in blood pressure. Check with your doctor for instructions on monitoring your blood pressure while taking this medicine.  You may get drowsy or dizzy. Do not drive, use machinery, or do anything that needs mental alertness until you know how this medicine affects you. Do not stand or sit up quickly, especially if you are an older patient. This reduces the risk of dizzy or fainting spells. Alcohol may interfere with the effect of this medicine. Avoid alcoholic drinks.  Your mouth may get dry. Chewing sugarless gum, sucking hard candy and drinking plenty of water will help. Contact your doctor if the problem does not go away or is severe.  What side effects may I notice from receiving this medicine?  Side effects that you should report to your doctor or health care professional as soon as possible:    allergic reactions like skin rash, itching or hives, swelling of the face, lips, or tongue    chest pain, fast or irregular heartbeat    confusion    fever or chills, sore throat    hallucination, loss of contact with reality    increase in blood  pressure    redness, blistering, peeling or loosening of the skin, including inside the mouth    seizures    suicidal thoughts or other mood changes    talking fast and having excited feelings or actions that are out of control    trouble passing urine or change in the amount of urine    unusual bleeding or bruising    unusually weak or tired    yellowing of the eyes, skin  Side effects that usually do not require medical attention (report to your doctor or health care professional if they continue or are bothersome):    blurred vision    change in sex drive or performance    constipation    dry mouth    flushing    headache    increased sweating    nausea    trouble sleeping  This list may not describe all possible side effects. Call your doctor for medical advice about side effects. You may report side effects to FDA at 3-452-FDA-9108.  Where should I keep my medicine?  Keep out of the reach of children.  Store at room temperature between 15 and 30 degrees C (59 and 86 degrees F). Throw away any unused medicine after the expiration date.  NOTE:This sheet is a summary. It may not cover all possible information. If you have questions about this medicine, talk to your doctor, pharmacist, or health care provider. Copyright  2016 [employer]

## 2022-03-30 NOTE — LETTER
Opioid / Opioid Plus Controlled Substance Agreement    This is an agreement between you and your provider about the safe and appropriate use of controlled substance/opioids prescribed by your care team. Controlled substances are medicines that can cause physical and mental dependence (abuse).    There are strict laws about having and using these medicines. We here at [facility] are committing to working with you in your efforts to get better. To support you in this work, we ll help you schedule regular office appointments for medicine refills. If we must cancel or change your appointment for any reason, we ll make sure you have enough medicine to last until your next appointment.     As a Provider, I will:    Listen carefully to your concerns and treat you with respect.     Recommend a treatment plan that I believe is in your best interest. This plan may involve therapies other than opioid pain medication.     Talk with you often about the possible benefits, and the risk of harm of any medicine that we prescribe for you.     Provide a plan on how to taper (discontinue or go off) using this medicine if the decision is made to stop its use.    As a Patient, I understand that opioid(s):     Are a controlled substance prescribed by my care team to help me function or work and manage my condition(s).     Are strong medicines and can cause serious side effects such as:    Drowsiness, which can seriously affect my driving ability    A lower breathing rate, enough to cause death    Harm to my thinking ability     Depression     Abuse of and addiction to this medicine    Need to be taken exactly as prescribed. Combining opioids with certain medicines or chemicals (such as illegal drugs, sedatives, sleeping pills, and benzodiazepines) can be dangerous or even fatal. If I stop opioids suddenly, I may have severe withdrawal symptoms.    Do not work for all types of pain nor for all patients. If they re not helpful, I may  be asked to stop them.        The risks, benefits and side effects of these medicine(s) were explained to me. I agree that:  1. I will take part in other treatments as advised by my care team. This may be psychiatry or counseling, physical therapy, behavioral therapy, group treatment or a referral to a specialist.     2. I will keep all my appointments. I understand that this is part of the monitoring of opioids. My care team may require an office visit for EVERY opioid/controlled substance refill. If I miss appointments or don t follow instructions, my care team may stop my medicine.    3. I will take my medicines as prescribed. I will not change the dose or schedule unless my care team tells me to. There will be no refills if I run out early.     4. I may be asked to come to the clinic and complete a urine drug test or complete a pill count at any time. If I don t give a urine sample or participate in a pill count, the care team may stop my medicine.    5. I will only receive prescriptions from this clinic for chronic pain. If I am treated by another provider for acute pain issues, I will tell them that I am taking opioid pain medication for chronic pain and that I have a treatment agreement with this provider. I will inform my [facility] care team within one business day if I am given a prescription for any pain medication by another healthcare provider. My [facility] care team can contact other providers and pharmacists about my use of any medicines.    6. It is up to me to make sure that I don t run out of my medicines on weekends or holidays. If my care team is willing to refill my opioid prescription without a visit, I must request refills only during office hours. Refills may take up to 3 business days to process. I will use one pharmacy to fill all my opioid and other controlled substance prescriptions. I will notify the clinic about any changes to my insurance or medication  availability.    7. I am responsible for my prescriptions. If the medicine/prescription is lost, stolen or destroyed, it will not be replaced. I also agree not to share controlled substance medicines with anyone.    8. I am aware I should not use any illegal or recreational drugs. I agree not to drink alcohol unless my care team says I can.       9. If I enroll in the [place] [facility] Cann[facility] program, I will tell my care team prior to my next refill.     10. I will tell my care team right away if I become pregnant, have a new medical problem treated outside of my regular clinic, or have a change in my medications.    11. I understand that this medicine can affect my thinking, judgment and reaction time. Alcohol and drugs affect the brain and body, which can affect the safety of my driving. Being under the influence of alcohol or drugs can affect my decision-making, behaviors, personal safety, and the safety of others. Driving while impaired (DWI) can occur if a person is driving, operating, or in physical control of a car, motorcycle, boat, snowmobile, ATV, motorbike, off-road vehicle, or any other motor vehicle ([place] Statute 169A.20). I understand the risk if I choose to drive or operate any vehicle or machinery.    I understand that if I do not follow any of the conditions above, my prescriptions or treatment may be stopped or changed.          Opioids  What You Need to Know    What are opioids?   Opioids are pain medicines that must be prescribed by a doctor. They are also known as narcotics.     Examples are:   1. morphine (MS Contin, Delfina)  2. oxycodone (Oxycontin)  3. oxycodone and acetaminophen (Percocet)  4. hydrocodone and acetaminophen (Vicodin, Norco)   5. fentanyl patch (Duragesic)   6. hydromorphone (Dilaudid)   7. methadone  8. codeine (Tylenol #3)     What do opioids do well?   Opioids are best for severe short-term pain such as after a surgery or injury. They may work well for cancer pain. They may  help some people with long-lasting (chronic) pain.     What do opioids NOT do well?   Opioids never get rid of pain entirely, and they don t work well for most patients with chronic pain. Opioids don t reduce swelling, one of the causes of pain.                                    Other ways to manage chronic pain and improve function include:       Treat the health problem that may be causing pain    Anti-inflammation medicines, which reduce swelling and tenderness, such as ibuprofen (Advil, Motrin) or naproxen (Aleve)    Acetaminophen (Tylenol)    Antidepressants and anti-seizure medicines, especially for nerve pain    Topical treatments such as patches or creams    Injections or nerve blocks    Chiropractic or osteopathic treatment    Acupuncture, massage, deep breathing, meditation, visual imagery, aromatherapy    Use heat or ice at the pain site    Physical therapy     Exercise    Stop smoking    Take part in therapy       Risks and side effects     Talk to your doctor before you start or decide to keep taking opioids. Possible side effects include:      Lowering your breathing rate enough to cause death    Overdose, including death, especially if taking higher than prescribed doses    Worse depression symptoms; less pleasure in things you usually enjoy    Feeling tired or sluggish    Slower thoughts or cloudy thinking    Being more sensitive to pain over time; pain is harder to control    Trouble sleeping or restless sleep    Changes in hormone levels (for example, less testosterone)    Changes in sex drive or ability to have sex    Constipation    Unsafe driving    Itching and sweating    Dizziness    Nausea, throwing up and dry mouth    What else should I know about opioids?    Opioids may lead to dependence, tolerance, or addiction.      Dependence means that if you stop or reduce the medicine too quickly, you will have withdrawal symptoms. These include loose poop (diarrhea), jitters, flu-like symptoms,  nervousness and tremors. Dependence is not the same as addiction.                       Tolerance means needing higher doses over time to get the same effect. This may increase the chance of serious side effects.      Addiction is when people improperly use a substance that harms their body, their mind or their relations with others. Use of opiates can cause a relapse of addiction if you have a history of drug or alcohol abuse.      People who have used opioids for a long time may have a lower quality of life, worse depression, higher levels of pain and more visits to doctors.    You can overdose on opioids. Take these steps to lower your risk of overdose:    1. Recognize the signs:  Signs of overdose include decrease or loss of consciousness (blackout), slowed breathing, trouble waking up and blue lips. If someone is worried about overdose, they should call 911.    2. Talk to your doctor about Narcan (naloxone).   If you are at risk for overdose, you may be given a prescription for Narcan. This medicine very quickly reverses the effects of opioids.   If you overdose, a friend or family member can give you Narcan while waiting for the ambulance. They need to know the signs of overdose and how to give Narcan.     3. Don't use alcohol or street drugs.   Taking them with opioids can cause death.    4. Do not take any of these medicines unless your doctor says it s OK. Taking these with opioids can cause death:    Benzodiazepines, such as lorazepam (Ativan), alprazolam (Xanax) or diazepam (Valium)    Muscle relaxers, such as cyclobenzaprine (Flexeril)    Sleeping pills like zolpidem (Ambien)     Other opioids      How to keep you and other people safe while taking opioids:    1. Never share your opioids with others.  Opioid medicines are regulated by the Drug Enforcement Agency (ELIZ). Selling or sharing medications is a criminal act.    2. Be sure to store opioids in a secure place, locked up if possible. Young children  can easily swallow them and overdose.    3. When you are traveling with your medicines, keep them in the original bottles. If you use a pill box, be sure you also carry a copy of your medicine list from your clinic or pharmacy.    4. Safe disposal of opioids    Most pharmacies have places to get rid of medicine, called disposal kiosks. Medicine disposal options are also available in every [facility]. Search your county and  medication disposal  to find more options. You can find more details at:  [email or web address]     I agree that my provider, clinic care team, and pharmacy may work with any city, state or federal law enforcement agency that investigates the misuse, sale, or other diversion of my controlled medicine. I will allow my provider to discuss my care with, or share a copy of, this agreement with any other treating provider, pharmacy or emergency room where I receive care.    I have read this agreement and have asked questions about anything I did not understand.    _______________________________________________________  Patient Signature - Peggy Jessica _____________________                   Date     _______________________________________________________  Provider Signature - MIRA Addison CNP   _____________________                   Date     _______________________________________________________  Witness Signature (required if provider not present while patient signing)   _____________________                   Date

## 2022-03-30 NOTE — ADDENDUM NOTE
Addended by: YOVANY CHO on: 3/30/2022 01:21 PM     Modules accepted: Orders     UROLOGY DAILY PROGRESS NOTE:     Subjective:    Patient feels well.  Aden catheter removed this AM.    Objective:    NAD, awake and alert  Respirations nonlabored  Abdomen soft, nontender, nondistended    MEDICATIONS  (STANDING):  abacavir 300 milliGRAM(s) Oral every 12 hours  ascorbic acid 500 milliGRAM(s) Oral daily  cyanocobalamin 1000 MICROGram(s) Oral daily  Doravirine 100mg tablets (pifeltro) 100 milliGRAM(s) 100 milliGRAM(s) Oral daily  finasteride 5 milliGRAM(s) Oral daily  furosemide    Tablet 40 milliGRAM(s) Oral daily  heparin   Injectable 5000 Unit(s) SubCutaneous every 8 hours  hydrALAZINE 10 milliGRAM(s) Oral three times a day  lactobacillus acidophilus and bulgaricus Chewable 2 Tablet(s) Chew three times a day with meals  lactobacillus acidophilus and bulgaricus Chewable      lamiVUDine- milliGRAM(s) Oral daily  multivitamin 1 Tablet(s) Oral daily  mupirocin 2% Nasal 1 Application(s) Nasal two times a day  tamsulosin 0.4 milliGRAM(s) Oral at bedtime    MEDICATIONS  (PRN):      Vital Signs Last 24 Hrs  T(C): 36.7 (26 Aug 2020 04:19), Max: 37.1 (25 Aug 2020 11:16)  T(F): 98 (26 Aug 2020 04:19), Max: 98.8 (25 Aug 2020 11:16)  HR: 93 (26 Aug 2020 04:19) (75 - 94)  BP: 107/55 (26 Aug 2020 04:19) (97/59 - 177/65)  BP(mean): --  RR: 18 (26 Aug 2020 04:19) (16 - 18)  SpO2: 95% (26 Aug 2020 04:19) (95% - 100%)    I&O's Detail    25 Aug 2020 07:01  -  26 Aug 2020 07:00  --------------------------------------------------------  IN:    Oral Fluid: 980 mL  Total IN: 980 mL    OUT:    Indwelling Catheter - Urethral: 850 mL    Voided: 600 mL  Total OUT: 1450 mL    Total NET: -470 mL      26 Aug 2020 07:01  -  26 Aug 2020 10:42  --------------------------------------------------------  IN:  Total IN: 0 mL    OUT:    Indwelling Catheter - Urethral: 400 mL  Total OUT: 400 mL    Total NET: -400 mL          Daily Height in cm: 190 (25 Aug 2020 11:16)    Daily Weight in k.1 (26 Aug 2020 04:19)    LABS:                        11.3   10.20 )-----------( 280      ( 26 Aug 2020 07:21 )             34.8         138  |  96  |  41<H>  ----------------------------<  81  4.2   |  26  |  1.64<H>    Ca    10.7<H>      26 Aug 2020 07:20  Phos  4.1       Mg     2.4         TPro  7.2  /  Alb  3.6  /  TBili  0.2  /  DBili  x   /  AST  51<H>  /  ALT  45  /  AlkPhos  126<H>      PT/INR - ( 25 Aug 2020 05:20 )   PT: 11.7 sec;   INR: 0.98 ratio         PTT - ( 25 Aug 2020 05:20 )  PTT:30.9 sec

## 2022-03-31 ENCOUNTER — TELEPHONE (OUTPATIENT)
Dept: FAMILY MEDICINE | Facility: CLINIC | Age: 47
End: 2022-03-31
Payer: COMMERCIAL

## 2022-03-31 ENCOUNTER — HOME INFUSION (PRE-WILLOW HOME INFUSION) (OUTPATIENT)
Dept: PHARMACY | Facility: CLINIC | Age: 47
End: 2022-03-31
Payer: COMMERCIAL

## 2022-03-31 LAB — OXYCODONE UR QL: NORMAL

## 2022-03-31 RX ORDER — OXYCODONE HYDROCHLORIDE 5 MG/1
5 TABLET ORAL 2 TIMES DAILY
Qty: 14 TABLET | Refills: 0 | Status: SHIPPED | OUTPATIENT
Start: 2022-04-01 | End: 2022-03-31

## 2022-03-31 ASSESSMENT — ANXIETY QUESTIONNAIRES: GAD7 TOTAL SCORE: 2

## 2022-03-31 NOTE — RESULT ENCOUNTER NOTE
Grayson Perdomo,    Any word on the status of the urine, the confirmation test is the important one to obtain.    ThanksNeftali

## 2022-03-31 NOTE — TELEPHONE ENCOUNTER
Patient returned provider's call. Provider unavailable at the time. Please return patient call when able.     Nita Grubbs, CMA

## 2022-03-31 NOTE — TELEPHONE ENCOUNTER
"Telephone encounter (8 minutes): Discussed recent urine drug screen and lack of oxycodone/opiates. Patient stated, \"I'm not sure why that happened, I took my last dose on Monday night.\" Patient had 28-day script of oxycodone filled on 3/2/22, which in theory should have lasted her through 3/30/22. When asked about this, pt did admit to sometimes taking her oxycodone TID on \"bad days.\"  Advised patient that we will wait to see if the urine confirmation comes back negative, in the meantime, I informed her that I do not feel comfortable refilling her script until then. She will likely need to find a pain specialist that is comfortable prescribing opioids.     All questions/concerns addressed. Patient stated understanding/agreement to plan of care.    MIRA Addison, CNP  [employer]    "

## 2022-03-31 NOTE — RESULT ENCOUNTER NOTE
Grayson Blount,    Can you check with [facility] lab to see if they can run the urine confirmation test for oxycodone?

## 2022-04-01 ENCOUNTER — VIRTUAL VISIT (OUTPATIENT)
Dept: FAMILY MEDICINE | Facility: CLINIC | Age: 47
End: 2022-04-01
Payer: COMMERCIAL

## 2022-04-01 DIAGNOSIS — G89.29 OTHER CHRONIC PAIN: Primary | ICD-10-CM

## 2022-04-01 RX ORDER — OXYCODONE HYDROCHLORIDE 5 MG/1
TABLET ORAL 2 TIMES DAILY
Status: ON HOLD | COMMUNITY
Start: 2022-03-31 | End: 2022-10-21

## 2022-04-01 NOTE — PROGRESS NOTES
Peggy is a 46 year old who is being evaluated via a billable telephone visit.      What phone number would you like to be contacted at? [phone]  How would you like to obtain your AVS? Asha        Edwin Woods is a 46 year old who presents for the following health issues : discuss urine results.    HPI     46 year old female presents to discuss urine results from this past Wednesday, 3/30/22. Patient established for PCP and management of her opioid regimen on 3/30/22 (pt was on oxycodone 5mg BID.) Her urine drug panel was negative for opiates(cutoff of 300ng/mL.) Her qual oxycodone urine test was negative (cutoff of 100ng/mL.) Patient admitted yesterday in a telephone call of taking her last dose of oxycodone on Monday, 3/28/22 after she received a 28 day script on 3/2/22. Pt admited that on bad days, she will take more than prescribed. Patient unsure of why she has tested negative, she did some reading last night and found that patients who take IVIG therapy (as she does for her CISP) metabolize oxycodone faster. The results of her oxycodone confirmation test are still pending at time of exam. Patient denies withdrawal symptoms at time of exam. Denies other acute concerns/symptoms.         Objective           Vitals:  No vitals were obtained today due to virtual visit.    Physical Exam   healthy, alert and no distress  PSYCH: Alert and oriented times 3; coherent speech, normal   rate and volume, able to articulate logical thoughts, able   to abstract reason, no tangential thoughts, no hallucinations   or delusions  Her affect is normal  RESP: No cough, no audible wheezing, able to talk in full sentences  Remainder of exam unable to be completed due to telephone visits    Recent Results (from the past 168 hour(s))   Comprehensive metabolic panel    Collection Time: 03/30/22  9:28 AM   Result Value Ref Range    Sodium 139 133 - 144 mmol/L    Potassium 4.1 3.4 - 5.3 mmol/L    Chloride 104 94 - 109  mmol/L    Carbon Dioxide (CO2) 28 20 - 32 mmol/L    Anion Gap 7 3 - 14 mmol/L    Urea Nitrogen 8 7 - 30 mg/dL    Creatinine 0.81 0.52 - 1.04 mg/dL    Calcium 9.3 8.5 - 10.1 mg/dL    Glucose 98 70 - 99 mg/dL    Alkaline Phosphatase 79 40 - 150 U/L    AST 24 0 - 45 U/L    ALT 23 0 - 50 U/L    Protein Total 9.6 (H) 6.8 - 8.8 g/dL    Albumin 3.5 3.4 - 5.0 g/dL    Bilirubin Total 0.5 0.2 - 1.3 mg/dL    GFR Estimate 90 >60 mL/min/1.73m2   Lipid panel reflex to direct LDL Fasting    Collection Time: 03/30/22  9:28 AM   Result Value Ref Range    Cholesterol 229 (H) <200 mg/dL    Triglycerides 131 <150 mg/dL    Direct Measure HDL 56 >=50 mg/dL    LDL Cholesterol Calculated 147 (H) <=100 mg/dL    Non HDL Cholesterol 173 (H) <130 mg/dL    Patient Fasting > 8hrs? No    CBC with platelets and differential    Collection Time: 03/30/22  9:28 AM   Result Value Ref Range    WBC Count 4.2 4.0 - 11.0 10e3/uL    RBC Count 4.73 3.80 - 5.20 10e6/uL    Hemoglobin 14.2 11.7 - 15.7 g/dL    Hematocrit 41.8 35.0 - 47.0 %    MCV 88 78 - 100 fL    MCH 30.0 26.5 - 33.0 pg    MCHC 34.0 31.5 - 36.5 g/dL    RDW 13.8 10.0 - 15.0 %    Platelet Count 240 150 - 450 10e3/uL    % Neutrophils 61 %    % Lymphocytes 29 %    % Monocytes 8 %    % Eosinophils 1 %    % Basophils 1 %    % Immature Granulocytes 0 %    NRBCs per 100 WBC 0 <1 /100    Absolute Neutrophils 2.6 1.6 - 8.3 10e3/uL    Absolute Lymphocytes 1.2 0.8 - 5.3 10e3/uL    Absolute Monocytes 0.3 0.0 - 1.3 10e3/uL    Absolute Eosinophils 0.0 0.0 - 0.7 10e3/uL    Absolute Basophils 0.0 0.0 - 0.2 10e3/uL    Absolute Immature Granulocytes 0.0 <=0.4 10e3/uL    Absolute NRBCs 0.0 10e3/uL   Drug abuse screen 8 urine (UR)    Collection Time: 03/30/22  9:39 AM   Result Value Ref Range    Amphetamines Urine Screen Negative Screen Negative    Barbiturates Urine Screen Negative Screen Negative    Benzodiazepines Urine Screen Negative Screen Negative    Cannabinoids Urine Screen Positive (A) Screen Negative     Cocaine Urine Screen Negative Screen Negative    Ethanol Urine Screen Negative Screen Negative    Opiates Urine Screen Negative Screen Negative    PCP Urine Screen Negative Screen Negative   Oxycodone Qual Urine    Collection Time: 03/30/22  9:39 AM   Result Value Ref Range    Oxycodone Urine Screen Negative Screen Negative         Assessment/Plan  1. Other chronic pain  Pt denies withdrawal symptoms. Urine confirmation test unavailable at time of telephone encounter.  I informed the patient that I am more than happy to manage her primary care needs, but she will need a pain management provider moving forward, especially since she mentioned her interest in a DRG implant. Initiated a referral to [facility]. Advised patient that if her confirmation test comes back positive, will provide a bridge rx.  - Pain Management Referral; Future    All questions/concerns addressed. Patient stated understanding/agreement to plan of care.    MIRA Addison, CNP  [facility] [employer]          Phone call duration: 5 minutes

## 2022-04-05 ENCOUNTER — HOME INFUSION (PRE-WILLOW HOME INFUSION) (OUTPATIENT)
Dept: PHARMACY | Facility: CLINIC | Age: 47
End: 2022-04-05

## 2022-04-05 ENCOUNTER — THERAPY VISIT (OUTPATIENT)
Dept: OCCUPATIONAL THERAPY | Facility: CLINIC | Age: 47
End: 2022-04-05
Payer: COMMERCIAL

## 2022-04-05 DIAGNOSIS — G61.81 CHRONIC INFLAMMATORY DEMYELINATING POLYNEUROPATHY (H): Primary | ICD-10-CM

## 2022-04-05 PROCEDURE — 97535 SELF CARE MNGMENT TRAINING: CPT | Mod: GO

## 2022-04-05 NOTE — PROGRESS NOTES
This is a recent snapshot of the patient's [facility] medical record.  For current drug dose and complete information and questions, call [phone] or In Basket pool, fv home infusion ([id number])  CSN Number:  [id number]

## 2022-04-06 NOTE — PROGRESS NOTES
This is a recent snapshot of the patient's [facility] medical record.  For current drug dose and complete information and questions, call [phone] or In Basket pool, fv home infusion ([id number])  CSN Number:  [id number]

## 2022-04-07 ENCOUNTER — DOCUMENTATION ONLY (OUTPATIENT)
Dept: PHARMACY | Facility: CLINIC | Age: 47
End: 2022-04-07
Payer: COMMERCIAL

## 2022-04-07 ENCOUNTER — HOME INFUSION (PRE-WILLOW HOME INFUSION) (OUTPATIENT)
Dept: PHARMACY | Facility: CLINIC | Age: 47
End: 2022-04-07

## 2022-04-07 NOTE — PROGRESS NOTES
Skilled Nurse visit in the  [facility] to administer privigen 55g in 550ml diluent and 250ml NS fluid bolus following privigen.  No recent elevated temperature, fever, chills, productive cough, coughing for 3 weeks or longer or hemoptysis, abnormal vital signs, night sweats, chest pain. No  decrease in your appetite, unexplained weight loss or fatigue.  No other new onset medical symptoms.  Current weight 158lb.  Peripheral IV right Lower Forearm, 3 attempts Pre medicated with tylenol 650mg PO, bendadryl 50mg PO and 4mg zofran IV push. Infusion completed without  complication or reaction. Pt reports therapy is partially effective in managing symptoms related to therapy.    Ceci Lake RN, BSN  [facility]  [email or web address]  [phone]

## 2022-04-11 LAB
OXYCODONE/CREAT UR: NEGATIVE NG/ML
OXYMORPHONE/CREAT UR: 38 NG/ML

## 2022-04-12 NOTE — PROGRESS NOTES
"Rehabilitation Medicine Wheelchair Face to Face      Diagnosis: CIDP.   Currently has limited mobility due to pain, weakness, sensation, balance impairments.  Current transfer status: Stand pivot transfer with 4 wheeled walker modified independent.  Distance patient currently able to walk using 4 wheeled walker at significantly decreased belén (25 feet in over 25 seconds), supervision to contact-guard assist level.   Therapy team has ruled out the following less costly options:              Cane due to inadequate support due to strength and balance concerns              Walker due to very limited gait distance with high levels of fatigue and postexertional malaise, nonfunctional gait speed  Patient will use wheelchair to complete Mobility Related ADL's in the home including toileting, dressing, self-cares, meal prep.   Without a wheelchair patient will be unable to safely move about their home.  This equipment will allow them to be out of bed, participate in home activities with their family, and it will be part of a fall prevention plan for safe mobility.   Patient's home will accommodate use of wheelchair.  Patient has a family member willing and able to assist as necessary with wheelchair.      Arm and leg strength: Per OT note-patient unable to register bilateral  or pinch strength (0 pounds).  Bilateral knee extension 3+/5, bilateral hip flexion 4/5, bilateral knee flexion 4/5, bilateral ankle dorsiflexion 3 -/5     Gait/balance/coordination: Patient ambulates 25 feet with 4 wheeled walker significantly decreased belén.  Impaired balance with static dynamic standing     Length of need: Lifetime ([place] 22)     Current height:5'5\"  Current weight:166lbs  :1975     Jon Duenas MD NPI#: [id number]     I CERTIFY THE NEED FOR THESE SERVICES FURNISHED UNDER                   THIS PLAN OF TREATMENT AND WHILE UNDER MY CARE     (Physician attestation of this document indicates review and certification " of the therapy plan).

## 2022-04-12 NOTE — PROGRESS NOTES
This is a recent snapshot of the patient's [facility] medical record.  For current drug dose and complete information and questions, call [phone] or In Basket pool, fv home infusion ([id number])  CSN Number:  [id number]

## 2022-04-12 NOTE — PROGRESS NOTES
"Rehabilitation Medicine Wheelchair Face to Face      Diagnosis: CIDP.   Currently has limited mobility due to pain, weakness, sensation, balance impairments.  Current transfer status: Stand pivot transfer with 4 wheeled walker modified independent.  Distance patient currently able to walk using 4 wheeled walker at significantly decreased belén (25 feet in over 25 seconds), supervision to contact-guard assist level.   Therapy team has ruled out the following less costly options:              Cane due to inadequate support due to strength and balance concerns              Walker due to very limited gait distance with high levels of fatigue and postexertional malaise, nonfunctional gait speed  Patient will use wheelchair to complete Mobility Related ADL's in the home including toileting, dressing, self-cares, meal prep.   Without a wheelchair patient will be unable to safely move about their home.  This equipment will allow them to be out of bed, participate in home activities with their family, and it will be part of a fall prevention plan for safe mobility.   Patient's home will accommodate use of wheelchair.  Patient has a family member willing and able to assist as necessary with wheelchair.      Arm and leg strength: Per OT note-patient unable to register bilateral  or pinch strength (0 pounds).  Bilateral knee extension 3+/5, bilateral hip flexion 4/5, bilateral knee flexion 4/5, bilateral ankle dorsiflexion 3 -/5     Gait/balance/coordination: Patient ambulates 25 feet with 4 wheeled walker significantly decreased belén.  Impaired balance with static dynamic standing     Length of need: Lifetime (CA 22)     Current height:5'5\"  Current weight:166lbs  :1975     Jon Duenas MD NPI#: [id number]     I CERTIFY THE NEED FOR THESE SERVICES FURNISHED UNDER                   THIS PLAN OF TREATMENT AND WHILE UNDER MY CARE     (Physician attestation of this document indicates review and certification " of the therapy plan).

## 2022-04-13 ENCOUNTER — OFFICE VISIT (OUTPATIENT)
Dept: NEUROLOGY | Facility: CLINIC | Age: 47
End: 2022-04-13

## 2022-04-13 ENCOUNTER — LAB (OUTPATIENT)
Dept: LAB | Facility: CLINIC | Age: 47
End: 2022-04-13
Payer: COMMERCIAL

## 2022-04-13 DIAGNOSIS — G61.81 CIDP (CHRONIC INFLAMMATORY DEMYELINATING POLYNEUROPATHY) (H): ICD-10-CM

## 2022-04-13 DIAGNOSIS — G61.81 CIDP (CHRONIC INFLAMMATORY DEMYELINATING POLYNEUROPATHY) (H): Primary | ICD-10-CM

## 2022-04-13 PROCEDURE — 82595 ASSAY OF CRYOGLOBULIN: CPT | Mod: 90 | Performed by: PATHOLOGY

## 2022-04-13 PROCEDURE — 95885 MUSC TST DONE W/NERV TST LIM: CPT | Performed by: PSYCHIATRY & NEUROLOGY

## 2022-04-13 PROCEDURE — 36415 COLL VENOUS BLD VENIPUNCTURE: CPT | Performed by: PATHOLOGY

## 2022-04-13 PROCEDURE — 99000 SPECIMEN HANDLING OFFICE-LAB: CPT | Performed by: PATHOLOGY

## 2022-04-13 PROCEDURE — 86255 FLUORESCENT ANTIBODY SCREEN: CPT | Mod: 90 | Performed by: PATHOLOGY

## 2022-04-13 PROCEDURE — 84425 ASSAY OF VITAMIN B-1: CPT | Mod: 90 | Performed by: PATHOLOGY

## 2022-04-13 PROCEDURE — 83520 IMMUNOASSAY QUANT NOS NONAB: CPT | Mod: 90 | Performed by: PATHOLOGY

## 2022-04-13 PROCEDURE — 82164 ANGIOTENSIN I ENZYME TEST: CPT | Mod: 90 | Performed by: PATHOLOGY

## 2022-04-13 PROCEDURE — 84182 PROTEIN WESTERN BLOT TEST: CPT | Performed by: PATHOLOGY

## 2022-04-13 PROCEDURE — 95910 NRV CNDJ TEST 7-8 STUDIES: CPT | Performed by: PSYCHIATRY & NEUROLOGY

## 2022-04-13 PROCEDURE — 82657 ENZYME CELL ACTIVITY: CPT | Mod: 90 | Performed by: PATHOLOGY

## 2022-04-13 NOTE — PROGRESS NOTES
[facility]  Electrodiagnostic Laboratory                 Department of Neurology                                                                                                         Test Date:  2022    Patient: Peggy Jessica : 1975 Physician: Jon Duenas MD   Sex: Female AGE: 46 year Ref Phys:    ID#: [id number]   Technician: WILEY Stern     Clinical Information:  46 year old woman with about 5-6 months of upper and lower limb numbness. MRI shows nerve root enhancement and CSF protein elevated. She carries a diagnosis of CISP. Over the last 2 months she reports worsening pain, numbness and gait instability despite IVIG. This study is to assess for interval changes compared to the 21 study.     Techniques:  Motor and sensory conduction studies were done with surface recording electrodes. EMG was done with a concentric needle electrode.      Results:  Nerve conduction studies:  1. Right median-D2, ulnar-D5, radial and sural sensory responses are normal.   2. Right median-APB, ulnar-ADM, peroneal-EDB, and tibial-AH motor responses are normal.   3. Right median, ulnar and tibial minimum F wave latencies are normal.   4. Bilateral tibial H reflexes are normal.      Needle EMG of selected right lower limb muscles was performed as tabulated below. No abnormal spontaneous activity was observed in the sampled muscles. Motor unit potential morphology and recruitment patterns were normal.      Interpretation:  This is a normal study. There is no electrophysiologic evidence of a large fiber polyneuropathy affecting the right upper and lower limbs on the basis of this study. These findings are essentially unchanged compared to those obtained 21.     Jon Duenas MD  Department of Neurology        Nerve Conduction Studies  Motor Sites      Latency Amplitude Neg. Amp Diff Segment Distance Velocity Neg. Dur Neg Area Diff Temperature Comment   Site (ms) Norm  (mV) Norm %  cm m/s Norm ms %  C    Right Fibular (EDB) Motor   Ankle 5.1  < 6.0 3.6  > 2.5  Ankle-EDB 8   8.5  24.2    Bel Fib Head 12.9 - 3.1 - -13.9 Bel Fib Head-Ankle 30.5 39  > 38 8.5 -9.7 24.2    Pop Fossa 15.2 - 3.0 - -3.2 Pop Fossa-Bel Fib Head 10 43  > 38 8.4 -0.67 24.2    Right Median (APB) Motor   Wrist 3.4  < 4.2 11.3  > 5.0  Wrist-APB 8   5.9  33.4    Elbow 7.4 - 10.8 - -4.4 Elbow-Wrist 21.5 54  > 48 6.4 -3.6 33.4    Right Tibial (AHB) Motor   Ankle 5.7  < 6.5 8.6  > 4.4  Ankle-AHB 8   8.3  31.8    Knee 14.3 - 7.4 - -14.0 Knee-Ankle 40 47  > 38 9.8 -7.3 31.8    Right Ulnar (ADM) Motor   Wrist 2.7  < 3.5 10.7  > 5.0  Wrist-ADM 8   7.6  33.4    Bel Elbow 6.3 - 9.5 - -11.2 Bel Elbow-Wrist 18.5 51  > 48 7.3 -3.2 33.3    Abv Elbow 7.8 - 9.2 - -3.2 Abv Elbow-Bel Elbow 9 60  > 48 7.8 2.0 33.3      F-Wave Sites      Min F-Lat Max-Min F-Lat Mean F-Lat   Site (ms) ms ms   Right Median F-Wave   Wrist 25.6 4.5 27.5   Right Tibial F-Wave   Ankle 51.4 12.6 -   Right Ulnar F-Wave   Wrist 26.1 0.70 26.4     Sensory Sites      Onset Lat Peak Lat Amp (O-P) Amp (P-P) Segment Distance Velocity Temperature   Site ms ms  V Norm  V  cm m/s Norm  C   Right Median Sensory   Wrist-Dig II 2.7 3.5 26  > 10 29 Wrist-Dig II 14 52  > 48 33.5   Right Radial Sensory   Forearm-Wrist 1.60 2.3 18  > 15 27 Forearm-Wrist 10 63 - 33.4   Right Sural Sensory   Calf-Lat Mall 2.3 3.2 10  > 5 22 Calf-Lat Mall 14 61  > 38    Right Ulnar Sensory   Wrist-Dig V 2.5 3.6 29  > 8 54 Wrist-Dig V 12.5 50  > 48 33.4     H-Reflex Results      M-Lat H Lat H-M Lat   Site (ms) (ms) Norm (ms)   Left Tibial H-Reflex   Pop Fossa 5.3 33.0 - 32.7   Right Tibial H-Reflex   Pop Fossa 5.6 31.7 - 26.1       Electromyography     Side Muscle Ins Act Fibs/PSW Fasc HF Amp Dur Poly Recrt Int Pat   Right Vastus Lat Nml None Nml 0 Nml Nml 0 Nml Nml   Right Gastroc Nml None Nml 0 Nml Nml 0 Nml Nml         NCS Waveforms:    Motor                F-Wave           Sensory                   H-Reflex

## 2022-04-13 NOTE — LETTER
2022       RE: Peggy Jessica  [address] [facility] [address]     Dear Colleague,    Thank you for referring your patient, Peggy Jessica, to the [facility] EMG [facility] [place] at [facility]. Please see a copy of my visit note below.                        [facility]  Electrodiagnostic Laboratory                 Department of Neurology                                                                                                         Test Date:  2022    Patient: Peggy Jessica : 1975 Physician: Jon Duenas MD   Sex: Female AGE: 46 year Ref Phys:    ID#: [id number]   Technician: WILEY Stern     Clinical Information:  46 year old woman with about 5-6 months of upper and lower limb numbness. MRI shows nerve root enhancement and CSF protein elevated. She carries a diagnosis of CISP. Over the last 2 months she reports worsening pain, numbness and gait instability despite IVIG. This study is to assess for interval changes compared to the 21 study.     Techniques:  Motor and sensory conduction studies were done with surface recording electrodes. EMG was done with a concentric needle electrode.      Results:  Nerve conduction studies:  1. Right median-D2, ulnar-D5, radial and sural sensory responses are normal.   2. Right median-APB, ulnar-ADM, peroneal-EDB, and tibial-AH motor responses are normal.   3. Right median, ulnar and tibial minimum F wave latencies are normal.   4. Bilateral tibial H reflexes are normal.      Needle EMG of selected right lower limb muscles was performed as tabulated below. No abnormal spontaneous activity was observed in the sampled muscles. Motor unit potential morphology and recruitment patterns were normal.      Interpretation:  This is a normal study. There is no electrophysiologic evidence of a large fiber polyneuropathy affecting the right upper and lower limbs  on the basis of this study. These findings are essentially unchanged compared to those obtained 11/18/21.     Jon Duenas MD  Department of Neurology        Nerve Conduction Studies  Motor Sites      Latency Amplitude Neg. Amp Diff Segment Distance Velocity Neg. Dur Neg Area Diff Temperature Comment   Site (ms) Norm (mV) Norm %  cm m/s Norm ms %  C    Right Fibular (EDB) Motor   Ankle 5.1  < 6.0 3.6  > 2.5  Ankle-EDB 8   8.5  24.2    Bel Fib Head 12.9 - 3.1 - -13.9 Bel Fib Head-Ankle 30.5 39  > 38 8.5 -9.7 24.2    Pop Fossa 15.2 - 3.0 - -3.2 Pop Fossa-Bel Fib Head 10 43  > 38 8.4 -0.67 24.2    Right Median (APB) Motor   Wrist 3.4  < 4.2 11.3  > 5.0  Wrist-APB 8   5.9  33.4    Elbow 7.4 - 10.8 - -4.4 Elbow-Wrist 21.5 54  > 48 6.4 -3.6 33.4    Right Tibial (AHB) Motor   Ankle 5.7  < 6.5 8.6  > 4.4  Ankle-AHB 8   8.3  31.8    Knee 14.3 - 7.4 - -14.0 Knee-Ankle 40 47  > 38 9.8 -7.3 31.8    Right Ulnar (ADM) Motor   Wrist 2.7  < 3.5 10.7  > 5.0  Wrist-ADM 8   7.6  33.4    Bel Elbow 6.3 - 9.5 - -11.2 Bel Elbow-Wrist 18.5 51  > 48 7.3 -3.2 33.3    Abv Elbow 7.8 - 9.2 - -3.2 Abv Elbow-Bel Elbow 9 60  > 48 7.8 2.0 33.3      F-Wave Sites      Min F-Lat Max-Min F-Lat Mean F-Lat   Site (ms) ms ms   Right Median F-Wave   Wrist 25.6 4.5 27.5   Right Tibial F-Wave   Ankle 51.4 12.6 -   Right Ulnar F-Wave   Wrist 26.1 0.70 26.4     Sensory Sites      Onset Lat Peak Lat Amp (O-P) Amp (P-P) Segment Distance Velocity Temperature   Site ms ms  V Norm  V  cm m/s Norm  C   Right Median Sensory   Wrist-Dig II 2.7 3.5 26  > 10 29 Wrist-Dig II 14 52  > 48 33.5   Right Radial Sensory   Forearm-Wrist 1.60 2.3 18  > 15 27 Forearm-Wrist 10 63 - 33.4   Right Sural Sensory   Calf-Lat Mall 2.3 3.2 10  > 5 22 Calf-Lat Mall 14 61  > 38    Right Ulnar Sensory   Wrist-Dig V 2.5 3.6 29  > 8 54 Wrist-Dig V 12.5 50  > 48 33.4     H-Reflex Results      M-Lat H Lat H-M Lat   Site (ms) (ms) Norm (ms)   Left Tibial H-Reflex   Pop Fossa 5.3 33.0 - 32.7    Right Tibial H-Reflex   Pop Fossa 5.6 31.7 - 26.1       Electromyography     Side Muscle Ins Act Fibs/PSW Fasc HF Amp Dur Poly Recrt Int Pat   Right Vastus Lat Nml None Nml 0 Nml Nml 0 Nml Nml   Right Gastroc Nml None Nml 0 Nml Nml 0 Nml Nml         NCS Waveforms:    Motor                F-Wave           Sensory                  H-Reflex                 Jon Duenas MD

## 2022-04-15 ENCOUNTER — TELEPHONE (OUTPATIENT)
Dept: OCCUPATIONAL THERAPY | Facility: CLINIC | Age: 47
End: 2022-04-15
Payer: COMMERCIAL

## 2022-04-15 LAB
ACE SERPL-CCNC: 32 U/L
PARANEOPLASTIC AB SER QL IF: NORMAL

## 2022-04-18 LAB — SCANNED LAB RESULT: NORMAL

## 2022-04-19 ENCOUNTER — HOME INFUSION (PRE-WILLOW HOME INFUSION) (OUTPATIENT)
Dept: PHARMACY | Facility: CLINIC | Age: 47
End: 2022-04-19

## 2022-04-19 ENCOUNTER — THERAPY VISIT (OUTPATIENT)
Dept: OCCUPATIONAL THERAPY | Facility: CLINIC | Age: 47
End: 2022-04-19
Payer: COMMERCIAL

## 2022-04-19 DIAGNOSIS — G61.81 CHRONIC INFLAMMATORY DEMYELINATING POLYNEUROPATHY (H): Primary | ICD-10-CM

## 2022-04-19 LAB
CRYOGLOB SER QL: NEGATIVE
VIT B1 PYROPHOSHATE BLD-SCNC: 103 NMOL/L

## 2022-04-19 PROCEDURE — 97535 SELF CARE MNGMENT TRAINING: CPT | Mod: GO

## 2022-04-20 NOTE — PROGRESS NOTES
This is a recent snapshot of the patient's [facility] medical record.  For current drug dose and complete information and questions, call [phone] or In Basket pool, [facility]v home infusion ([id number])  CSN Number:  [id number]

## 2022-04-21 ENCOUNTER — HOME INFUSION (PRE-WILLOW HOME INFUSION) (OUTPATIENT)
Dept: PHARMACY | Facility: CLINIC | Age: 47
End: 2022-04-21

## 2022-04-28 ENCOUNTER — HOME INFUSION (PRE-WILLOW HOME INFUSION) (OUTPATIENT)
Dept: PHARMACY | Facility: CLINIC | Age: 47
End: 2022-04-28
Payer: COMMERCIAL

## 2022-04-29 LAB — SCANNED LAB RESULT: NORMAL

## 2022-04-30 ENCOUNTER — ANCILLARY PROCEDURE (OUTPATIENT)
Dept: MRI IMAGING | Facility: CLINIC | Age: 47
End: 2022-04-30
Attending: PSYCHIATRY & NEUROLOGY
Payer: COMMERCIAL

## 2022-04-30 DIAGNOSIS — G61.81 CIDP (CHRONIC INFLAMMATORY DEMYELINATING POLYNEUROPATHY) (H): ICD-10-CM

## 2022-04-30 PROCEDURE — 72156 MRI NECK SPINE W/O & W/DYE: CPT | Mod: GC | Performed by: RADIOLOGY

## 2022-05-02 ENCOUNTER — MYC MEDICAL ADVICE (OUTPATIENT)
Dept: FAMILY MEDICINE | Facility: CLINIC | Age: 47
End: 2022-05-02
Payer: COMMERCIAL

## 2022-05-03 ENCOUNTER — HOME INFUSION (PRE-WILLOW HOME INFUSION) (OUTPATIENT)
Dept: PHARMACY | Facility: CLINIC | Age: 47
End: 2022-05-03
Payer: COMMERCIAL

## 2022-05-03 NOTE — PROGRESS NOTES
This is a recent snapshot of the patient's [facility] medical record.  For current drug dose and complete information and questions, call [phone] or In Basket pool, fv home infusion ([id number])  CSN Number:  [id number]

## 2022-05-04 ENCOUNTER — HOME INFUSION (PRE-WILLOW HOME INFUSION) (OUTPATIENT)
Dept: PHARMACY | Facility: CLINIC | Age: 47
End: 2022-05-04
Payer: COMMERCIAL

## 2022-05-04 NOTE — PROGRESS NOTES
This is a recent snapshot of the patient's [facility] medical record.  For current drug dose and complete information and questions, call [phone] or In Basket pool, fv home infusion ([id number])  CSN Number:  [id number]

## 2022-05-06 NOTE — PROGRESS NOTES
This is a recent snapshot of the patient's [facility] medical record.  For current drug dose and complete information and questions, call [phone] or In Basket pool, fv home infusion ([id number])  CSN Number:  [id number]

## 2022-05-12 LAB
Lab: NORMAL
PERFORMING LABORATORY: NORMAL
SPECIMEN STATUS: NORMAL
TEST NAME: NORMAL

## 2022-05-18 NOTE — PROGRESS NOTES
This is a recent snapshot of the patient's [facility] medical record.  For current drug dose and complete information and questions, call [phone] or In Basket pool, fv home infusion ([id number])  CSN Number:  [id number]

## 2022-05-19 ENCOUNTER — HOSPITAL ENCOUNTER (OUTPATIENT)
Dept: OCCUPATIONAL THERAPY | Facility: CLINIC | Age: 47
Setting detail: THERAPIES SERIES
Discharge: HOME | End: 2022-05-19
Attending: PSYCHIATRY & NEUROLOGY
Payer: COMMERCIAL

## 2022-05-19 PROCEDURE — 97542 WHEELCHAIR MNGMENT TRAINING: CPT | Mod: GO | Performed by: OCCUPATIONAL THERAPIST

## 2022-05-23 NOTE — TELEPHONE ENCOUNTER
----- Message from MRIA Lowe CNP sent at 5/23/2022  8:08 AM CDT -----  Regarding: Follow-Up on Wednesday  Hi all,    If this patient does not have new symptoms/concerns- my appointment with her on Wednesday can be video/telephone (she is wheelchair bound.) Can someone check to see if she would like to do our appointment virtually?    ThanksNeftali

## 2022-05-23 NOTE — TELEPHONE ENCOUNTER
Called Pt to ask per Andrew if virtual appointment for in clinic visit on 5/25/2022 was needed.    Pt okay with keeping in person appointment.     Moira ROSARIO CMA

## 2022-05-24 ENCOUNTER — THERAPY VISIT (OUTPATIENT)
Dept: PHYSICAL THERAPY | Facility: CLINIC | Age: 47
End: 2022-05-24
Payer: COMMERCIAL

## 2022-05-24 DIAGNOSIS — G61.81 CIDP (CHRONIC INFLAMMATORY DEMYELINATING POLYNEUROPATHY) (H): Primary | ICD-10-CM

## 2022-05-24 PROCEDURE — 97535 SELF CARE MNGMENT TRAINING: CPT | Mod: GP | Performed by: PHYSICAL THERAPIST

## 2022-05-24 PROCEDURE — 97110 THERAPEUTIC EXERCISES: CPT | Mod: GP | Performed by: PHYSICAL THERAPIST

## 2022-05-25 ENCOUNTER — OFFICE VISIT (OUTPATIENT)
Dept: NEUROLOGY | Facility: CLINIC | Age: 47
End: 2022-05-25
Payer: COMMERCIAL

## 2022-05-25 ENCOUNTER — OFFICE VISIT (OUTPATIENT)
Dept: FAMILY MEDICINE | Facility: CLINIC | Age: 47
End: 2022-05-25
Payer: COMMERCIAL

## 2022-05-25 VITALS
HEIGHT: 64 IN | WEIGHT: 165 LBS | TEMPERATURE: 98 F | HEART RATE: 102 BPM | OXYGEN SATURATION: 97 % | SYSTOLIC BLOOD PRESSURE: 116 MMHG | DIASTOLIC BLOOD PRESSURE: 80 MMHG | BODY MASS INDEX: 28.17 KG/M2

## 2022-05-25 VITALS
DIASTOLIC BLOOD PRESSURE: 80 MMHG | SYSTOLIC BLOOD PRESSURE: 114 MMHG | HEART RATE: 106 BPM | OXYGEN SATURATION: 97 % | WEIGHT: 165 LBS | BODY MASS INDEX: 27.46 KG/M2

## 2022-05-25 DIAGNOSIS — R77.9 ELEVATED BLOOD PROTEIN: ICD-10-CM

## 2022-05-25 DIAGNOSIS — N94.10 DYSPAREUNIA IN FEMALE: Primary | ICD-10-CM

## 2022-05-25 DIAGNOSIS — G61.9 INFLAMMATORY NEUROPATHY (H): Primary | ICD-10-CM

## 2022-05-25 PROCEDURE — 99213 OFFICE O/P EST LOW 20 MIN: CPT | Performed by: NURSE PRACTITIONER

## 2022-05-25 PROCEDURE — 99214 OFFICE O/P EST MOD 30 MIN: CPT | Performed by: PSYCHIATRY & NEUROLOGY

## 2022-05-25 RX ORDER — CYCLOBENZAPRINE HCL 10 MG
1 TABLET ORAL EVERY 8 HOURS
COMMUNITY
Start: 2022-04-22 | End: 2022-06-09

## 2022-05-25 RX ORDER — SENNOSIDES A AND B 8.6 MG/1
2 TABLET, FILM COATED ORAL EVERY 24 HOURS
COMMUNITY
Start: 2022-04-22 | End: 2022-06-09
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
## 2022-05-25 NOTE — LETTER
5/25/2022       RE: Peggy Jessica  [address] [facility] [address]     Dear Colleague,    Thank you for referring your patient, Peggy Jessica, to the [facility] NEUROLOGY CLINIC [place] [facility]. Please see a copy of my visit note below.    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 Her symptoms that led to the CISP diagnosis began in September 2021.  Onset was insidious and course over the first couple months was progressive. She reported sensory changes (numbness in her lower limbs and hands. She felt clumsy and unsteady on her feet. She started to fall. By her admission she was falling 3-4 times a day. Numbness and tingling spread to affect her below her knees and wrists. She was dropping things in the hands. Tasks like knitting (she had been quite good at this) became difficult. In 11/21 she could not knit at all. During her hospitalization she had a normal EMG. Imaging showed mild enhancement of the cauda equina and paraspinal musculature with elevated protein in the CSF. She was treated with 5 rounds of IVIG (4 weeks from last dose -11/22/21) with subjective improvement after the 3rd dose. Her balance was much better. She could walk unassisted, although her walking was not normal. She could knit. Numbness was improved. Tremor was better. After couple weeks the benefit started to wear off. In 12/21 and 1/22 insurance issues disrupted IVIG. She worsened. In 2/22 she was transitioned to q 2 week IVIG 1 gm/kg. Over the subsequent months IVIG was not helpful (and poorly tolerated) and so in 4/22 it was stopped.     Interval history:   I last saw her 3/28/22. Unfortunately the beneficial response that we first appreciated with IVIG did not persist.Through the spring 2022  pain remained a major issue. She also reported difficulty with mobility and widespread subjective weakness. She returned for NCS which were again normal and repeat MRI showed resolution of the previously observed root enhancement. IV access also became a growing problem. For all these reasons it seemed IVIG was stopped 4/2022 and the plan of care shifted to supportive care. She attends today's visit in a wheelchair. She is walking some at home, and working with PT. At home she has a walker which she uses when needed. She reports numbness between knees and forearm. Pain has been improved from a few months ago. She has found CBD THC helpful. She also finds if her daily routine is consistent this helps as well.     Prior pertinent laboratory work-up:  9/2021: unremarkable B12, IgM, Immunofixation, HbA1c  11/2021: CSF (WBC 0, glc 73, protein 105), PAULINE, (1:160) unremarkable CSF HSV, O-bands, serum electrophoresis, paraneoplastic panel, B6, ganglioside Abs, SSA/SSB   4/13/22: Negative FGFR3, TS-HDS, , /186, Contactin 1    Prior pertinent radiology work-up:  11/18/21: MR Lumbar Spine showed diffuse mild enhancement of the cauda equina nerve roots without nodularity or clumping is nonspecific.  Probable mild enhancement of the posterior paraspinous musculature from L4 through S1. However, artifact could have similar appearance. 3. Mild multilevel lumbar spondylosis without evidence for high-grade spinal canal or neural foraminal stenosis.  4/30/22: MRI brain and C/T/LS spine showed no CN or root enhancement. There has been resolution of cauda equina nerve root enhancement that was previously noted.    Prior electrophysiologic work-up:  11/18/21: Nerve conduction studies/EMG: Right upper and lower extremity normal study, including tibial H reflexes  11/21: Autonomic testing ([facility]) : There is generalized, marked, symmetric postganglionic sudomotor dysfunction, normal cardiovagal function, and mild adrenergic  dysfunction. Findings are in keeping with systemic anticholinergic medication effect. Superimposed mild autonomic neuropathy can not be excluded. Orthostatic hypotension and tachycardia are not seen. Compression of pulse pressure during Valsalva raises the question of mild hypovolemia or deconditioning.   22: NCS were again normal . There is no electrophysiologic evidence of a large fiber polyneuropathy affecting the right upper and lower limbs on the basis of this study. These findings are essentially unchanged compared to those obtained 21.     Past Medical History:   CRPS (right leg - stress fracture; chest - mastectomy)  BRCA+   Cervical cancer  Migraines - resolved    Past Surgical History:  Bilateral mastectomy  Hysterectomy    gallbladder    Family history:    Dad - neuropathy (obeses, CKD)  Unknown largely    Social History:    She denies tobacco, alcohol, or illicit drug use. There is no known exposure to toxins or heavy metals.   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 [employer]    Medical Allergies:     Allergies   Allergen Reactions     Dihydroergotamine Anaphylaxis     Latex Anaphylaxis     Shellfish-Derived Products Anaphylaxis     Sumatriptan Anaphylaxis     Banana Unknown     Gabapentin Dizziness     Gluten Meal Other (See Comments)     Celiac disease     Keppra [Levetiracetam] Nausea and Vomiting     Kiwi Unknown     Levofloxacin Other (See Comments)     Arrhythmia     Metronidazole Nausea and Vomiting     Nitrofurantoin Hives     Penicillins Hives     Pregabalin      Topiramate Visual Disturbance     Aspirin Rash     Methadone Rash     Risperidone Anxiety     Current Medications:    Current Outpatient Medications   Medication     acetaminophen (TYLENOL) 325 MG tablet     bisacodyl (DULCOLAX) 10 MG suppository     cyclobenzaprine (FLEXERIL) 10 MG tablet     cyclobenzaprine (FLEXERIL) 10 MG tablet     diazepam (VALIUM) 5 MG tablet     medical cannabis (Patient's own supply)      multivitamin w/minerals (THERA-VIT-M) tablet     ondansetron (ZOFRAN-ODT) 8 MG ODT tab     oxyCODONE (ROXICODONE) 5 MG tablet     polyethylene glycol (MIRALAX) 17 GM/Dose powder     senna (SENOKOT) 8.6 MG tablet     senna-docusate (SENOKOT-S/PERICOLACE) 8.6-50 MG tablet     No current facility-administered medications for this visit.     Review of Systems: A complete review of systems was obtained and was negative except for what was noted above.    Physical examination:    /80   Pulse 106   Wt 74.8 kg (165 lb)   SpO2 97%   BMI 27.46 kg/m       Wt Readings from Last 4 Encounters:   05/25/22 74.8 kg (165 lb)   03/30/22 73.5 kg (162 lb)   03/28/22 73.5 kg (162 lb)   03/15/22 75.3 kg (166 lb)     General Appearance: NAD    Neurologic examination:    Mental status:  Patient is alert, attentive, and oriented x 3.  Language is coherent and fluent without  aphasia.  Memory, comprehension and ability to follow commands were intact.       Cranial nerves:    Extraocular movements were full. There was no face, jaw, palate or tongue weakness or atrophy. Hearing was grossly intact.  Shoulder shrug was normal.       Motor exam: No atrophy or fasciculations.  Impersistent activation but power is at least 4/5 and probably full in proximal and distal muscles of the arms and legs.    Complex motor skills: Mild to moderate postural hand tremor. She also has some mild ataxia with FNF.   Sensation  Normal Abnormal Score    Grade 0 1 2 3 4    Pinprick Arms index finger index finger Ulnar styloid Medial humerus epicondyle Acromioclavicular 1    Legs hallux hallux MedMalleolus Patella Anterior superior iliac spine 2   Light touch Arms index finger index finger Ulnar styloid Medial humerus epicondyle Acromioclavicular 0    Legs hallux hallux MedMalleolus Patella Anterior superior iliac spine 0   Vibration  Normal   Arm Leg   <40 >6.5 <40 >4.5   41-85 >6.0 41-60 >4.0   >85 >5.5 61-85 >3.5     >85 >3.0    Arms At index finger index  finger Ulnar styloid Medial humerus epicondyle Acromioclavicular 1    Legs hallux hallux MedMalleolus Patella Anterior superior iliac spine 2   Joint position Arms DIP index finger DIP index finger Wrist elbow shoulder 0    Legs DIP joint hallux DIP joint hallux ankle knee Hip 0   Two-point discrimination Index finger Index finger Index finger Normal age (value): 20-39 (4.0), 40-49 (4.5), 50-59 (5.0), 60-69 (6.0), 70-79 (7.0), > 80 (8.5) 0   Final score      Gait:  Slow deliberate steps but she can rise from a seated position and transfer    Deep tendon reflexes:   Right Left   Triceps 2 2   Biceps 1 1   Brachioradialis 2 2   Knee jerk 1 1   Ankle jerk 1 1      Neuropathy Assessments  Neurology Assessments 2022 3/28/2022 2022 12/15/2021 2021   RODS CIDP/MGUSP Score 18 18 17 22 22   Time for 'Up and Go' test- Seconds: - - 30.6 17.45 34.1      Strength: 2022 3/28/2022 2022 12/15/2021 2021   Right hand strength in K 4 5 12 not tested   Left hand strength in K 4 4 10 not tested     Immunotherapy 2022 3/28/2022 2022 12/15/2021 2021   Time since last IVIG (days): - 1 2 weeks 4 weeks -   Current treatment: none IVIG 1 gm/kg  2 weeks IVIG 1 gm/kg q 3 weeks none IVIG started 21     Pain level today: 6/10.   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 The diagnosis is supported by clinical (non length dependant sensory changes, ataxia, hyporeflexia), nerve root enhancement on MRI, and elevated CSF protein. Although she initially had an unequivocal beneficial response to IVIG (), her treatment response through  waned.We also appreciated resolution of MRI enhancement and stable (normal NCS). Today I find that certain clincial features have also improved (reflexes) while there remains functional elements on her examination. Taken together, the  diagnosis of CISP remains plausible but evidence in favor of an active immune mediated process is minimal at this point. Will transition our care approach to supportive.     Plan:      1. Immunotherapy:    - IVIG: Discontinued 4/22  - No indication for other immunotherapies at this time  2. Physical therapy: continuing physical therapy  3. Symptomatic management of pain and paresthesias:  Appreciate collateral care with pain management  4. Follow up PCP for general health maintenance  5. Handicap parking form completed  6. INCBASE: Enrolled [id number]  7. Follow up in 4 months. Sooner if needed.     Sincerely,    Jon Duenas MD

## 2022-05-25 NOTE — PROGRESS NOTES
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 Her symptoms that led to the CISP diagnosis began in September 2021.  Onset was insidious and course over the first couple months was progressive. She reported sensory changes (numbness in her lower limbs and hands. She felt clumsy and unsteady on her feet. She started to fall. By her admission she was falling 3-4 times a day. Numbness and tingling spread to affect her below her knees and wrists. She was dropping things in the hands. Tasks like knitting (she had been quite good at this) became difficult. In 11/21 she could not knit at all. During her hospitalization she had a normal EMG. Imaging showed mild enhancement of the cauda equina and paraspinal musculature with elevated protein in the CSF. She was treated with 5 rounds of IVIG (4 weeks from last dose -11/22/21) with subjective improvement after the 3rd dose. Her balance was much better. She could walk unassisted, although her walking was not normal. She could knit. Numbness was improved. Tremor was better. After couple weeks the benefit started to wear off. In 12/21 and 1/22 insurance issues disrupted IVIG. She worsened. In 2/22 she was transitioned to q 2 week IVIG 1 gm/kg. Over the subsequent months IVIG was not helpful (and poorly tolerated) and so in 4/22 it was stopped.     Interval history:   I last saw her 3/28/22. Unfortunately the beneficial response that we first appreciated with IVIG did not persist.Through the spring 2022 pain remained a major issue. She also reported difficulty with mobility and widespread subjective weakness. She returned for NCS which were again normal and repeat MRI showed resolution of the previously observed root enhancement. IV access also became a growing problem. For all these reasons it seemed IVIG was stopped 4/2022  and the plan of care shifted to supportive care. She attends today's visit in a wheelchair. She is walking some at home, and working with PT. At home she has a walker which she uses when needed. She reports numbness between knees and forearm. Pain has been improved from a few months ago. She has found CBD THC helpful. She also finds if her daily routine is consistent this helps as well.     Prior pertinent laboratory work-up:  9/2021: unremarkable B12, IgM, Immunofixation, HbA1c  11/2021: CSF (WBC 0, glc 73, protein 105), PAULINE, (1:160) unremarkable CSF HSV, O-bands, serum electrophoresis, paraneoplastic panel, B6, ganglioside Abs, SSA/SSB   4/13/22: Negative FGFR3, TS-HDS, , /186, Contactin 1    Prior pertinent radiology work-up:  11/18/21: MR Lumbar Spine showed diffuse mild enhancement of the cauda equina nerve roots without nodularity or clumping is nonspecific.  Probable mild enhancement of the posterior paraspinous musculature from L4 through S1. However, artifact could have similar appearance. 3. Mild multilevel lumbar spondylosis without evidence for high-grade spinal canal or neural foraminal stenosis.  4/30/22: MRI brain and C/T/LS spine showed no CN or root enhancement. There has been resolution of cauda equina nerve root enhancement that was previously noted.    Prior electrophysiologic work-up:  11/18/21: Nerve conduction studies/EMG: Right upper and lower extremity normal study, including tibial H reflexes  11/21: Autonomic testing ([facility]) : There is generalized, marked, symmetric postganglionic sudomotor dysfunction, normal cardiovagal function, and mild adrenergic dysfunction. Findings are in keeping with systemic anticholinergic medication effect. Superimposed mild autonomic neuropathy can not be excluded. Orthostatic hypotension and tachycardia are not seen. Compression of pulse pressure during Valsalva raises the question of mild hypovolemia or deconditioning.   4/13/22: NCS were again  normal . There is no electrophysiologic evidence of a large fiber polyneuropathy affecting the right upper and lower limbs on the basis of this study. These findings are essentially unchanged compared to those obtained 21.     Past Medical History:   CRPS (right leg - stress fracture; chest - mastectomy)  BRCA+   Cervical cancer  Migraines - resolved    Past Surgical History:  Bilateral mastectomy  Hysterectomy    gallbladder    Family history:    Dad - neuropathy (obeses, CKD)  Unknown largely    Social History:    She denies tobacco, alcohol, or illicit drug use. There is no known exposure to toxins or heavy metals.   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 [employer]    Medical Allergies:     Allergies   Allergen Reactions     Dihydroergotamine Anaphylaxis     Latex Anaphylaxis     Shellfish-Derived Products Anaphylaxis     Sumatriptan Anaphylaxis     Banana Unknown     Gabapentin Dizziness     Gluten Meal Other (See Comments)     Celiac disease     Keppra [Levetiracetam] Nausea and Vomiting     Kiwi Unknown     Levofloxacin Other (See Comments)     Arrhythmia     Metronidazole Nausea and Vomiting     Nitrofurantoin Hives     Penicillins Hives     Pregabalin      Topiramate Visual Disturbance     Aspirin Rash     Methadone Rash     Risperidone Anxiety     Current Medications:    Current Outpatient Medications   Medication     acetaminophen (TYLENOL) 325 MG tablet     bisacodyl (DULCOLAX) 10 MG suppository     cyclobenzaprine (FLEXERIL) 10 MG tablet     cyclobenzaprine (FLEXERIL) 10 MG tablet     diazepam (VALIUM) 5 MG tablet     medical cannabis (Patient's own supply)     multivitamin w/minerals (THERA-VIT-M) tablet     ondansetron (ZOFRAN-ODT) 8 MG ODT tab     oxyCODONE (ROXICODONE) 5 MG tablet     polyethylene glycol (MIRALAX) 17 GM/Dose powder     senna (SENOKOT) 8.6 MG tablet     senna-docusate (SENOKOT-S/PERICOLACE) 8.6-50 MG tablet     No current facility-administered medications for this visit.      Review of Systems: A complete review of systems was obtained and was negative except for what was noted above.    Physical examination:    /80   Pulse 106   Wt 74.8 kg (165 lb)   SpO2 97%   BMI 27.46 kg/m       Wt Readings from Last 4 Encounters:   05/25/22 74.8 kg (165 lb)   03/30/22 73.5 kg (162 lb)   03/28/22 73.5 kg (162 lb)   03/15/22 75.3 kg (166 lb)     General Appearance: NAD    Neurologic examination:    Mental status:  Patient is alert, attentive, and oriented x 3.  Language is coherent and fluent without  aphasia.  Memory, comprehension and ability to follow commands were intact.       Cranial nerves:    Extraocular movements were full. There was no face, jaw, palate or tongue weakness or atrophy. Hearing was grossly intact.  Shoulder shrug was normal.       Motor exam: No atrophy or fasciculations.  Impersistent activation but power is at least 4/5 and probably full in proximal and distal muscles of the arms and legs.    Complex motor skills: Mild to moderate postural hand tremor. She also has some mild ataxia with FNF.   Sensation  Normal Abnormal Score    Grade 0 1 2 3 4    Pinprick Arms index finger index finger Ulnar styloid Medial humerus epicondyle Acromioclavicular 1    Legs hallux hallux MedMalleolus Patella Anterior superior iliac spine 2   Light touch Arms index finger index finger Ulnar styloid Medial humerus epicondyle Acromioclavicular 0    Legs hallux hallux MedMalleolus Patella Anterior superior iliac spine 0   Vibration  Normal   Arm Leg   <40 >6.5 <40 >4.5   41-85 >6.0 41-60 >4.0   >85 >5.5 61-85 >3.5     >85 >3.0    Arms At index finger index finger Ulnar styloid Medial humerus epicondyle Acromioclavicular 1    Legs hallux hallux MedMalleolus Patella Anterior superior iliac spine 2   Joint position Arms DIP index finger DIP index finger Wrist elbow shoulder 0    Legs DIP joint hallux DIP joint hallux ankle knee Hip 0   Two-point discrimination Index finger Index finger  Index finger Normal age (value): 20-39 (4.0), 40-49 (4.5), 50-59 (5.0), 60-69 (6.0), 70-79 (7.0), > 80 (8.5) 0   Final score      Gait:  Slow deliberate steps but she can rise from a seated position and transfer    Deep tendon reflexes:   Right Left   Triceps 2 2   Biceps 1 1   Brachioradialis 2 2   Knee jerk 1 1   Ankle jerk 1 1      Neuropathy Assessments  Neurology Assessments 2022 3/28/2022 2022 12/15/2021 2021   RODS CIDP/MGUSP Score 18 18 17 22 22   Time for 'Up and Go' test- Seconds: - - 30.6 17.45 34.1      Strength: 2022 3/28/2022 2022 12/15/2021 2021   Right hand strength in K 4 5 12 not tested   Left hand strength in K 4 4 10 not tested     Immunotherapy 2022 3/28/2022 2022 12/15/2021 2021   Time since last IVIG (days): - 1 2 weeks 4 weeks -   Current treatment: none IVIG 1 gm/kg  2 weeks IVIG 1 gm/kg q 3 weeks none IVIG started 21     Pain level today: 6/10.   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 The diagnosis is supported by clinical (non length dependant sensory changes, ataxia, hyporeflexia), nerve root enhancement on MRI, and elevated CSF protein. Although she initially had an unequivocal beneficial response to IVIG (), her treatment response through  waned.We also appreciated resolution of MRI enhancement and stable (normal NCS). Today I find that certain clincial features have also improved (reflexes) while there remains functional elements on her examination. Taken together, the diagnosis of CISP remains plausible but evidence in favor of an active immune mediated process is minimal at this point. Will transition our care approach to supportive.     Plan:      1. Immunotherapy:    - IVIG: Discontinued 4/22  - No indication for other immunotherapies at this time  2. Physical therapy: continuing physical  therapy  3. Symptomatic management of pain and paresthesias:  Appreciate collateral care with pain management  4. Follow up PCP for general health maintenance  5. Handicap parking form completed  6. INCBASE: Enrolled [id number]  7. Follow up in 4 months. Sooner if needed.   ---  6/8/22: Notes of last few days noted. She reports more falls and yesterday went to ER and found to have fractured rib. Hard to explain this on a neurologic basis. Neuroaxis looks good on MRI, and NCS show no peripheral nerve injury that would explain her degree of disability. The case for CISP was previously made, but improvement in MRI and exam (DTR) and absence of marked ataxia make that an unsatisfactory explanation. She also has had no improvement with IVIG - arguing against an active immune mediated condition. I would like her to continue with PT for gait and appreciate pain management's collateral care. A multidisciplinary pain clinic with a focus on pain, mobility and the mental aspect of chronic pain may be particularly helpful to her. I will follow up with her on this. I will also want to see her back in clinic within the next few weeks or sooner for interval assessment.

## 2022-05-25 NOTE — NURSING NOTE
Chief Complaint   Patient presents with     RECHECK     Pain while having sex, burning sensation, 8/10 pain

## 2022-05-26 ENCOUNTER — LAB (OUTPATIENT)
Dept: LAB | Facility: CLINIC | Age: 47
End: 2022-05-26
Payer: COMMERCIAL

## 2022-05-26 DIAGNOSIS — Z20.822 CLOSE EXPOSURE TO 2019 NOVEL CORONAVIRUS: ICD-10-CM

## 2022-05-26 LAB — SARS-COV-2 RNA RESP QL NAA+PROBE: NEGATIVE

## 2022-05-26 PROCEDURE — U0005 INFEC AGEN DETEC AMPLI PROBE: HCPCS | Mod: 90 | Performed by: PATHOLOGY

## 2022-05-26 PROCEDURE — U0003 INFECTIOUS AGENT DETECTION BY NUCLEIC ACID (DNA OR RNA); SEVERE ACUTE RESPIRATORY SYNDROME CORONAVIRUS 2 (SARS-COV-2) (CORONAVIRUS DISEASE [COVID-19]), AMPLIFIED PROBE TECHNIQUE, MAKING USE OF HIGH THROUGHPUT TECHNOLOGIES AS DESCRIBED BY CMS-2020-01-R: HCPCS | Mod: 90 | Performed by: PATHOLOGY

## 2022-05-26 PROCEDURE — 99000 SPECIMEN HANDLING OFFICE-LAB: CPT | Performed by: PATHOLOGY

## 2022-05-31 ENCOUNTER — THERAPY VISIT (OUTPATIENT)
Dept: PHYSICAL THERAPY | Facility: CLINIC | Age: 47
End: 2022-05-31
Payer: COMMERCIAL

## 2022-05-31 ENCOUNTER — MYC MEDICAL ADVICE (OUTPATIENT)
Dept: FAMILY MEDICINE | Facility: CLINIC | Age: 47
End: 2022-05-31

## 2022-05-31 DIAGNOSIS — R20.9 BILATERAL COLD FEET: Primary | ICD-10-CM

## 2022-05-31 DIAGNOSIS — R26.81 GAIT INSTABILITY: Primary | ICD-10-CM

## 2022-05-31 DIAGNOSIS — G61.81 CIDP (CHRONIC INFLAMMATORY DEMYELINATING POLYNEUROPATHY) (H): Primary | ICD-10-CM

## 2022-05-31 PROCEDURE — 97110 THERAPEUTIC EXERCISES: CPT | Mod: GP | Performed by: PHYSICAL THERAPIST

## 2022-05-31 NOTE — DISCHARGE INSTRUCTIONS
[facility]: [phone]. Call and set up an appointment if you don't hear anything later this week  I'll ask for an orthotics referral and a pool therapy referral  Make more appointments with OT and PT    Maegan Hollis PT, DPT  Physical Therapist  [facility] [address]  [address] [place] [address]  [email or web address]  Appointments: [phone]

## 2022-06-06 ENCOUNTER — APPOINTMENT (OUTPATIENT)
Dept: GENERAL RADIOLOGY | Facility: CLINIC | Age: 47
End: 2022-06-06
Attending: EMERGENCY MEDICINE
Payer: COMMERCIAL

## 2022-06-06 ENCOUNTER — APPOINTMENT (OUTPATIENT)
Dept: CT IMAGING | Facility: CLINIC | Age: 47
End: 2022-06-06
Attending: EMERGENCY MEDICINE
Payer: COMMERCIAL

## 2022-06-06 ENCOUNTER — HOSPITAL ENCOUNTER (EMERGENCY)
Facility: CLINIC | Age: 47
Discharge: HOME | End: 2022-06-07
Attending: EMERGENCY MEDICINE | Admitting: EMERGENCY MEDICINE
Payer: COMMERCIAL
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
## 2022-06-06 DIAGNOSIS — S22.31XA CLOSED FRACTURE OF ONE RIB OF RIGHT SIDE, INITIAL ENCOUNTER: ICD-10-CM

## 2022-06-06 DIAGNOSIS — M25.551 HIP PAIN, RIGHT: ICD-10-CM

## 2022-06-06 LAB
ANION GAP SERPL CALCULATED.3IONS-SCNC: 4 MMOL/L (ref 3–14)
BASOPHILS # BLD AUTO: 0 10E3/UL (ref 0–0.2)
BASOPHILS NFR BLD AUTO: 0 %
BUN SERPL-MCNC: 5 MG/DL (ref 7–30)
CALCIUM SERPL-MCNC: 9 MG/DL (ref 8.5–10.1)
CHLORIDE BLD-SCNC: 108 MMOL/L (ref 94–109)
CO2 SERPL-SCNC: 28 MMOL/L (ref 20–32)
CREAT SERPL-MCNC: 0.85 MG/DL (ref 0.52–1.04)
EOSINOPHIL # BLD AUTO: 0 10E3/UL (ref 0–0.7)
EOSINOPHIL NFR BLD AUTO: 1 %
ERYTHROCYTE [DISTWIDTH] IN BLOOD BY AUTOMATED COUNT: 12.9 % (ref 10–15)
GFR SERPL CREATININE-BSD FRML MDRD: 84 ML/MIN/1.73M2
GLUCOSE BLD-MCNC: 123 MG/DL (ref 70–99)
HCT VFR BLD AUTO: 40.9 % (ref 35–47)
HGB BLD-MCNC: 14.1 G/DL (ref 11.7–15.7)
IMM GRANULOCYTES # BLD: 0 10E3/UL
IMM GRANULOCYTES NFR BLD: 0 %
LYMPHOCYTES # BLD AUTO: 1.5 10E3/UL (ref 0.8–5.3)
LYMPHOCYTES NFR BLD AUTO: 20 %
MCH RBC QN AUTO: 31.9 PG (ref 26.5–33)
MCHC RBC AUTO-ENTMCNC: 34.5 G/DL (ref 31.5–36.5)
MCV RBC AUTO: 93 FL (ref 78–100)
MONOCYTES # BLD AUTO: 0.4 10E3/UL (ref 0–1.3)
MONOCYTES NFR BLD AUTO: 6 %
NEUTROPHILS # BLD AUTO: 5.6 10E3/UL (ref 1.6–8.3)
NEUTROPHILS NFR BLD AUTO: 73 %
NRBC # BLD AUTO: 0 10E3/UL
NRBC BLD AUTO-RTO: 0 /100
PLATELET # BLD AUTO: 221 10E3/UL (ref 150–450)
POTASSIUM BLD-SCNC: 4.2 MMOL/L (ref 3.4–5.3)
RBC # BLD AUTO: 4.42 10E6/UL (ref 3.8–5.2)
SODIUM SERPL-SCNC: 140 MMOL/L (ref 133–144)
WBC # BLD AUTO: 7.6 10E3/UL (ref 4–11)

## 2022-06-06 PROCEDURE — 96374 THER/PROPH/DIAG INJ IV PUSH: CPT | Performed by: EMERGENCY MEDICINE

## 2022-06-06 PROCEDURE — 73110 X-RAY EXAM OF WRIST: CPT | Mod: RT

## 2022-06-06 PROCEDURE — 73552 X-RAY EXAM OF FEMUR 2/>: CPT | Mod: 26 | Performed by: RADIOLOGY

## 2022-06-06 PROCEDURE — 73020 X-RAY EXAM OF SHOULDER: CPT | Mod: 26 | Performed by: RADIOLOGY

## 2022-06-06 PROCEDURE — 250N000011 HC RX IP 250 OP 636: Performed by: EMERGENCY MEDICINE

## 2022-06-06 PROCEDURE — 73020 X-RAY EXAM OF SHOULDER: CPT | Mod: RT

## 2022-06-06 PROCEDURE — 71045 X-RAY EXAM CHEST 1 VIEW: CPT | Mod: 26 | Performed by: RADIOLOGY

## 2022-06-06 PROCEDURE — 73590 X-RAY EXAM OF LOWER LEG: CPT | Mod: 26 | Performed by: RADIOLOGY

## 2022-06-06 PROCEDURE — 73630 X-RAY EXAM OF FOOT: CPT | Mod: RT

## 2022-06-06 PROCEDURE — 82310 ASSAY OF CALCIUM: CPT | Performed by: EMERGENCY MEDICINE

## 2022-06-06 PROCEDURE — 99285 EMERGENCY DEPT VISIT HI MDM: CPT | Performed by: EMERGENCY MEDICINE

## 2022-06-06 PROCEDURE — 70450 CT HEAD/BRAIN W/O DYE: CPT

## 2022-06-06 PROCEDURE — 70450 CT HEAD/BRAIN W/O DYE: CPT | Mod: 26 | Performed by: RADIOLOGY

## 2022-06-06 PROCEDURE — 72125 CT NECK SPINE W/O DYE: CPT | Mod: 26 | Performed by: RADIOLOGY

## 2022-06-06 PROCEDURE — 73630 X-RAY EXAM OF FOOT: CPT | Mod: 26 | Performed by: RADIOLOGY

## 2022-06-06 PROCEDURE — 71045 X-RAY EXAM CHEST 1 VIEW: CPT

## 2022-06-06 PROCEDURE — 72125 CT NECK SPINE W/O DYE: CPT

## 2022-06-06 PROCEDURE — 73110 X-RAY EXAM OF WRIST: CPT | Mod: 26 | Performed by: RADIOLOGY

## 2022-06-06 PROCEDURE — 36415 COLL VENOUS BLD VENIPUNCTURE: CPT | Performed by: EMERGENCY MEDICINE

## 2022-06-06 PROCEDURE — 73590 X-RAY EXAM OF LOWER LEG: CPT | Mod: RT

## 2022-06-06 PROCEDURE — 73552 X-RAY EXAM OF FEMUR 2/>: CPT | Mod: RT

## 2022-06-06 PROCEDURE — 99285 EMERGENCY DEPT VISIT HI MDM: CPT | Mod: 25 | Performed by: EMERGENCY MEDICINE

## 2022-06-06 PROCEDURE — 85004 AUTOMATED DIFF WBC COUNT: CPT | Performed by: EMERGENCY MEDICINE

## 2022-06-06 RX ORDER — HYDROMORPHONE HYDROCHLORIDE 1 MG/ML
0.5 INJECTION, SOLUTION INTRAMUSCULAR; INTRAVENOUS; SUBCUTANEOUS ONCE
Status: COMPLETED | OUTPATIENT
Start: 2022-06-06 | End: 2022-06-06

## 2022-06-06 RX ADMIN — HYDROMORPHONE HYDROCHLORIDE 0.5 MG: 1 INJECTION, SOLUTION INTRAMUSCULAR; INTRAVENOUS; SUBCUTANEOUS at 22:07

## 2022-06-06 ASSESSMENT — ENCOUNTER SYMPTOMS
HEADACHES: 1
DIZZINESS: 1

## 2022-06-07 ENCOUNTER — APPOINTMENT (OUTPATIENT)
Dept: GENERAL RADIOLOGY | Facility: CLINIC | Age: 47
End: 2022-06-07
Attending: EMERGENCY MEDICINE
Payer: COMMERCIAL

## 2022-06-07 ENCOUNTER — APPOINTMENT (OUTPATIENT)
Dept: MRI IMAGING | Facility: CLINIC | Age: 47
End: 2022-06-07
Attending: EMERGENCY MEDICINE
Payer: COMMERCIAL

## 2022-06-07 VITALS
HEART RATE: 86 BPM | SYSTOLIC BLOOD PRESSURE: 103 MMHG | OXYGEN SATURATION: 94 % | BODY MASS INDEX: 28.54 KG/M2 | DIASTOLIC BLOOD PRESSURE: 76 MMHG | HEIGHT: 65 IN | TEMPERATURE: 98 F | WEIGHT: 171.3 LBS | RESPIRATION RATE: 9 BRPM

## 2022-06-07 PROCEDURE — 96376 TX/PRO/DX INJ SAME DRUG ADON: CPT | Performed by: EMERGENCY MEDICINE

## 2022-06-07 PROCEDURE — 72170 X-RAY EXAM OF PELVIS: CPT | Mod: 26 | Performed by: RADIOLOGY

## 2022-06-07 PROCEDURE — 250N000011 HC RX IP 250 OP 636: Performed by: EMERGENCY MEDICINE

## 2022-06-07 PROCEDURE — 250N000013 HC RX MED GY IP 250 OP 250 PS 637: Performed by: EMERGENCY MEDICINE

## 2022-06-07 PROCEDURE — 73721 MRI JNT OF LWR EXTRE W/O DYE: CPT | Mod: 26 | Performed by: RADIOLOGY

## 2022-06-07 PROCEDURE — 72170 X-RAY EXAM OF PELVIS: CPT

## 2022-06-07 PROCEDURE — 73721 MRI JNT OF LWR EXTRE W/O DYE: CPT | Mod: RT

## 2022-06-07 RX ORDER — LORAZEPAM 0.5 MG/1
2 TABLET ORAL
Status: COMPLETED | OUTPATIENT
Start: 2022-06-07 | End: 2022-06-07

## 2022-06-07 RX ORDER — HYDROMORPHONE HYDROCHLORIDE 1 MG/ML
0.5 INJECTION, SOLUTION INTRAMUSCULAR; INTRAVENOUS; SUBCUTANEOUS ONCE
Status: COMPLETED | OUTPATIENT
Start: 2022-06-07 | End: 2022-06-07

## 2022-06-07 RX ADMIN — LORAZEPAM 2 MG: 0.5 TABLET ORAL at 02:49

## 2022-06-07 RX ADMIN — HYDROMORPHONE HYDROCHLORIDE 0.5 MG: 1 INJECTION, SOLUTION INTRAMUSCULAR; INTRAVENOUS; SUBCUTANEOUS at 02:49

## 2022-06-07 NOTE — ED NOTES
The patient was accepted at shift change signout pending the MRI of the hip.  Although there was no fracture seen in the hip, there was superficial soft tissue stranding involving the subcutaneous fat of the right posterior laterally, possibly representing a contusion.  Additionally, there is a lower right rib fracture.  This was discussed with her.  She does have tenderness in the right lower chest wall.  No shortness of breath.  She states she was not really having much pain there, though with palpation it is a little bit tender.  Chest x-ray had been done and did not show any concerning abnormality.  She states she does think she can go home, will be able to transfer okay.  She does have pain meds at home.  She is encouraged to perform deep breathing exercises several times a day.  She is encouraged to follow-up with her primary care provider this week.  She is encouraged to return to the ER with any new or worsening symptoms, any other concerns.  She verbalizes understanding and is agreeable to the plan.    Dictation Disclaimer: Some of this Note has been completed with voice-recognition dictation software. Although errors are generally corrected real-time, there is the potential for a rare error to be present in the completed chart.       Geri Veliz MD  06/07/22 [id number]

## 2022-06-07 NOTE — ED PROVIDER NOTES
ED Provider Note  [facility]      History     Chief Complaint   Patient presents with     Dislocation     Hip dislocation right     HPI  Peggy Jessica is a 47 year old female with a PMH of CIDP, cervical cancer, CRPS of RLE, celiac disease, chronic daily headache, fibromyalgia and seizure who presents to the ED today reporting right hip dislocation.  Patient reports that she has CIDP and that her neurologist is Dr. Duenas.  Patient states she was in for a new wheelchair fitting today and was given a loaner.  She states she was at home in her wheelchair when she hit a lip in the floor, causing her to fall out of her wheelchair.  She states that she hit her head and thinks that she had a syncopal episode.  She endorses a headache here.  She also states that she landed hard on her right hip and right foot, noting pain in both areas.  She states she is unable to bear weight on her right leg.  She is normally able to stand and pivot with assistance.  She was able to get up off of the ground and onto the sofa, but could not get off of the sofa.  She also notes that she is been more dizzy than normal over the past few days.  Patient denies a history of hip or knee replacements, use of blood thinners or history of blood clots    Past Medical History  Past Medical History:   Diagnosis Date     BRCA positive      Cervical cancer (H) 2002     CIDP (chronic inflammatory demyelinating polyneuropathy) (H)      Complex regional pain syndrome type 1 of right lower extremity      Past Surgical History:   Procedure Laterality Date     c section      2002     CHOLECYSTECTOMY  1997     HYSTERECTOMY  2004     MASTECTOMY Bilateral 2020     oopharectomy Bilateral 2019     acetaminophen (TYLENOL) 325 MG tablet  bisacodyl (DULCOLAX) 10 MG suppository  cyclobenzaprine (FLEXERIL) 10 MG tablet  cyclobenzaprine (FLEXERIL) 10 MG tablet  medical cannabis (Patient's own supply)  multivitamin w/minerals  "(THERA-VIT-M) tablet  ondansetron (ZOFRAN-ODT) 8 MG ODT tab  oxyCODONE (ROXICODONE) 5 MG tablet  polyethylene glycol (MIRALAX) 17 GM/Dose powder  senna (SENOKOT) 8.6 MG tablet  senna-docusate (SENOKOT-S/PERICOLACE) 8.6-50 MG tablet      Allergies   Allergen Reactions     Dihydroergotamine Anaphylaxis     Latex Anaphylaxis     Shellfish-Derived Products Anaphylaxis     Sumatriptan Anaphylaxis     Banana Unknown     Gabapentin Dizziness     Gluten Meal Other (See Comments)     Celiac disease     Keppra [Levetiracetam] Nausea and Vomiting     Kiwi Unknown     Levofloxacin Other (See Comments)     Arrhythmia     Metronidazole Nausea and Vomiting     Nitrofurantoin Hives     Penicillins Hives     Pregabalin      Topiramate Visual Disturbance     Aspirin Rash     Methadone Rash     Risperidone Anxiety     Family History  Family History   Problem Relation Age of Onset     Kidney Disease Father      Social History   Social History     Tobacco Use     Smoking status: Former Smoker     Types: Cigarettes     Smokeless tobacco: Never Used   Vaping Use     Vaping Use: Never used   Substance Use Topics     Alcohol use: Not Currently     Drug use: Yes     Types: Marijuana     Comment: Medical Canib[facility] patient      Past medical history, past surgical history, medications, allergies, family history, and social history were reviewed with the patient. No additional pertinent items.       Review of Systems   Musculoskeletal:        (Positive for right hip and right foot pain)   Neurological: Positive for dizziness, syncope and headaches.   All other systems reviewed and are negative.    A complete review of systems was performed with pertinent positives and negatives noted in the HPI, and all other systems negative.    Physical Exam   BP: 118/78  Pulse: 81  Temp: 98  F (36.7  C)  Resp: 18  Height: 164.5 cm (5' 4.75\")  Weight: 77.7 kg (171 lb 4.8 oz)  SpO2: 98 %       Physical Exam  Gen:A&Ox3, no acute distress  HEENT:PERRL, no facial " tenderness or wounds, right parietal scalp hematoma, no scalp wounds, TMs clear bilaterally, no skull base fracture signs.   Neck: mild mid cervical midline and low cervical perispinal tenderness  Back: no CVA tenderness, no midline bony tenderness  CV:RRR without murmurs  PULM:Clear to auscultation bilaterally, no chest wall crepitus.  Abd:soft, nontender, nondistended. Bowel sounds present and normal. Pelvis stable.   UE: + radial pulse and normal perfusion. Right shoulder pain and right wrist pain without deformities, no clavicle deformity  LE: + DP and PT pulses. Right hip pain without leg shortening or rotation. Bony tenderness of right ankle without deformity.   Neuro:CN II-XII intact, strength 4/5 UE and 1/5 distal LE/2/5 proximal LE (baseline)  Skin: no rashes or ecchymoses  ED Course     9:40 PM  The patient was seen and examined by Elke Beatty MD in Room ED01.     Procedures       Results for orders placed or performed during the hospital encounter of 06/06/22   Head CT w/o contrast     Status: None    Narrative    EXAM: CT CERVICAL SPINE W/O CONTRAST, CT HEAD W/O CONTRAST  LOCATION: [facility]  DATE/TIME: 6/6/2022 10:18 PM    INDICATION: Head and Neck trauma, dangerous injury mechanism (Age 16-64y)  COMPARISON: Brain and cervical spine MRI 04/30/2022  TECHNIQUE:   1) Routine CT Head without IV contrast. Multiplanar reformats. Dose reduction techniques were used.  2) Routine CT Cervical Spine without IV contrast. Multiplanar reformats. Dose reduction techniques were used.    FINDINGS:   HEAD CT:   INTRACRANIAL CONTENTS: No intracranial hemorrhage, extraaxial collection, or mass effect.  No CT evidence of acute infarct. Normal parenchymal attenuation. Normal ventricles and sulci.     VISUALIZED ORBITS/SINUSES/MASTOIDS: No intraorbital abnormality. No paranasal sinus mucosal disease. No middle ear or mastoid effusion.    BONES/SOFT TISSUES: No acute  abnormality.    CERVICAL SPINE CT:   VERTEBRA: Normal vertebral body heights and alignment. No fracture or posttraumatic subluxation.     CANAL/FORAMINA: No canal or neural foraminal stenosis.    PARASPINAL: No extraspinal abnormality. Visualized lung fields are clear.      Impression    IMPRESSION:  HEAD CT:  1.  Normal head CT.    CERVICAL SPINE CT:  1.  No CT evidence for acute fracture or post traumatic subluxation.   XR Chest Port 1 View     Status: None    Narrative    EXAM: XR CHEST PORT 1 VIEW  LOCATION: [facility]  DATE/TIME: 6/6/2022 11:30 PM    INDICATION: Right-sided chest pain after fall.  COMPARISON: None.      Impression    IMPRESSION: Normal heart size and pulmonary vascularity. No pneumothorax. Minimal linear atelectasis left lung base. Surgical clips right upper quadrant. No overt osseous abnormality.   XR Wrist Right G/E 3 Views     Status: None    Narrative    EXAM: XR WRIST RIGHT G/E 3 VIEWS  LOCATION: [facility]  DATE/TIME: 6/6/2022 11:35 PM    INDICATION: Right wrist pain after fall  COMPARISON: None.      Impression    IMPRESSION: No fracture or dislocation. Minimal degenerative changes right first CMC and STT articulations.   XR Femur Right 2 Views     Status: None    Narrative    EXAM: XR FEMUR RIGHT 2 VIEW  LOCATION: [facility]  DATE/TIME: 6/6/2022 11:55 PM    INDICATION: Right hip and leg pain after fall  COMPARISON: None.      Impression    IMPRESSION: No acute fracture or dislocation. Minimal degenerative changes right hip. Minimal degenerative changes right patellofemoral articulation. No significant right knee effusion.   XR Tibia & Fibula Right 2 Views     Status: None    Narrative    EXAM: XR TIBIA and FIBULA RT 2 VW  LOCATION: [facility]  DATE/TIME: 6/6/2022 11:45 PM    INDICATION: Right lower leg  pain after fall.  COMPARISON: None.      Impression    IMPRESSION: Normal tibia and fibula.   Foot  XR, G/E 3 views, right     Status: None    Narrative    EXAM: XR FOOT RIGHT G/E 3 VIEWS  LOCATION: [facility]  DATE/TIME: 6/6/2022 11:40 PM    INDICATION: Right foot pain after fall  COMPARISON: None.      Impression    IMPRESSION: No fracture or dislocation. Minimal Achilles and plantar calcaneal heel spurs.   Cervical spine CT w/o contrast     Status: None    Narrative    EXAM: CT CERVICAL SPINE W/O CONTRAST, CT HEAD W/O CONTRAST  LOCATION: [facility]  DATE/TIME: 6/6/2022 10:18 PM    INDICATION: Head and Neck trauma, dangerous injury mechanism (Age 16-64y)  COMPARISON: Brain and cervical spine MRI 04/30/2022  TECHNIQUE:   1) Routine CT Head without IV contrast. Multiplanar reformats. Dose reduction techniques were used.  2) Routine CT Cervical Spine without IV contrast. Multiplanar reformats. Dose reduction techniques were used.    FINDINGS:   HEAD CT:   INTRACRANIAL CONTENTS: No intracranial hemorrhage, extraaxial collection, or mass effect.  No CT evidence of acute infarct. Normal parenchymal attenuation. Normal ventricles and sulci.     VISUALIZED ORBITS/SINUSES/MASTOIDS: No intraorbital abnormality. No paranasal sinus mucosal disease. No middle ear or mastoid effusion.    BONES/SOFT TISSUES: No acute abnormality.    CERVICAL SPINE CT:   VERTEBRA: Normal vertebral body heights and alignment. No fracture or posttraumatic subluxation.     CANAL/FORAMINA: No canal or neural foraminal stenosis.    PARASPINAL: No extraspinal abnormality. Visualized lung fields are clear.      Impression    IMPRESSION:  HEAD CT:  1.  Normal head CT.    CERVICAL SPINE CT:  1.  No CT evidence for acute fracture or post traumatic subluxation.   XR Shoulder Right 1 View     Status: None    Narrative    EXAM: XR SHOULDER RIGHT 1 VIEW  LOCATION:   [facility]  DATE/TIME: 6/6/2022 11:40 PM    INDICATION: Right shoulder pain after fall.  COMPARISON: None.      Impression    IMPRESSION: Normal joint spaces and alignment. No fracture.    MR Hip Right w/o Contrast     Status: None    Narrative    FINAL REPORT     OVER READ BY DR. MISHEL LOFTON AT 0702 HOURS ON 6/7/2022    MRI OF THE RIGHT HIP 6/7/2022 IS REVIEWED.    REPORT:  The right hip is negative for fracture or avascular necrosis. No significant marrow signal abnormality or focal bone lesion. No significant effusion. There is some mild thinning of the articular cartilage anteriorly. No evidence for a discrete or   detached labral tear.    No significant gluteal tendon tendinopathy or trochanteric bursitis. Minimal hamstring tendon tendinopathy without tearing. No evidence for iliopsoas tendon tendinopathy or bursitis.    BONES: The visualized portion of the right hemipelvis is negative for fracture or marrow signal abnormality.    SOFT TISSUES: There is minimal edema within the subcutaneous soft tissues lateral to the right hip.      Impression    CONCLUSION:  1.  I agree with original report.  2.  The right hip is negative for fracture or avascular necrosis.  3.  Minimal nonspecific edema within the subcutaneous soft tissues lateral to the right hip could represent contusion. No evidence for organized fluid collection.  4.  Minimal right-sided hamstring tendinopathy without tearing.    Final Interpretation Dictated By: Mishel Lofton MD  Date: 6/7/2022       PRELIMINARY REPORT    EXAM: MR HIP RIGHT W/O CONTRAST  LOCATION: [facility]  DATE/TIME: 6/7/2022 3:26 AM    INDICATION: Hip trauma, fracture suspected, neg x-ray. Pain.  COMPARISON:   1. Portable x-ray pelvis single view 6/7/2022 at 0157 hours.  2. X-ray right femur 2 views (4 films) 6/7/2022 at 0022 hours.  TECHNIQUE: Unenhanced.    FINDINGS:    RIGHT HIP:   -Labrum:  No evidence of a detached labral tear. No paralabral cyst.   -Cartilage: Normal.  -Joint space: No effusion or synovitis.   -Joint capsule/ligaments: Intact joint capsule. Ligamentum teres is intact.  -Morphology: Alpha Angle is normal. No bony morphologic changes associated with femoroacetabular impingement.    MUSCLES AND TENDONS:   -Gluteal: No tendon tear or tendinopathy. No trochanteric bursitis.  -Proximal hamstring: No tendon tear or tendinopathy.   -Iliopsoas: No tendon tear or tendinopathy. No bursitis.  -Rectus femoris origin: No tear or tendinopathy.    BONES:   -No fracture or concerning marrow replacing lesion.   -SI joints are normal. Visualized lumbar spine is normal.    SOFT TISSUES:   -Superficial soft tissue edema in the subcutaneous fat posterolaterally may be related to contusion.    INTRA-PELVIC CONTENTS:  -Visualized portions are normal.    IMPRESSION:  1.  No right hip fracture.    2.  Superficial soft tissue stranding involving the subcutaneous fat on the right posterolaterally, possibly representing contusion.          Preliminary Interpretation Dictated By: Cynthia Wan MD  Date: 6/7/2022 6:08 AM     XR Pelvis Port 1/2 Views     Status: None    Narrative    EXAM: XR PELVIS PORT 1/2 VIEWS  LOCATION: [facility]  DATE/TIME: 6/7/2022 1:52 AM    INDICATION: Fall with injury. Pelvic pain.  COMPARISON: X-ray right femur 2 views (4 films) 6/7/2022 at 0022 hours.      Impression    IMPRESSION: Anatomic alignment of the bones and joints of the pelvis. No fracture or dislocation. Each femoral head is located in the acetabulum. No x-ray evidence of avascular necrosis.   Basic metabolic panel     Status: Abnormal   Result Value Ref Range    Sodium 140 133 - 144 mmol/L    Potassium 4.2 3.4 - 5.3 mmol/L    Chloride 108 94 - 109 mmol/L    Carbon Dioxide (CO2) 28 20 - 32 mmol/L    Anion Gap 4 3 - 14 mmol/L    Urea Nitrogen 5 (L) 7 - 30 mg/dL    Creatinine 0.85  0.52 - 1.04 mg/dL    Calcium 9.0 8.5 - 10.1 mg/dL    Glucose 123 (H) 70 - 99 mg/dL    GFR Estimate 84 >60 mL/min/1.73m2   CBC with platelets and differential     Status: None   Result Value Ref Range    WBC Count 7.6 4.0 - 11.0 10e3/uL    RBC Count 4.42 3.80 - 5.20 10e6/uL    Hemoglobin 14.1 11.7 - 15.7 g/dL    Hematocrit 40.9 35.0 - 47.0 %    MCV 93 78 - 100 fL    MCH 31.9 26.5 - 33.0 pg    MCHC 34.5 31.5 - 36.5 g/dL    RDW 12.9 10.0 - 15.0 %    Platelet Count 221 150 - 450 10e3/uL    % Neutrophils 73 %    % Lymphocytes 20 %    % Monocytes 6 %    % Eosinophils 1 %    % Basophils 0 %    % Immature Granulocytes 0 %    NRBCs per 100 WBC 0 <1 /100    Absolute Neutrophils 5.6 1.6 - 8.3 10e3/uL    Absolute Lymphocytes 1.5 0.8 - 5.3 10e3/uL    Absolute Monocytes 0.4 0.0 - 1.3 10e3/uL    Absolute Eosinophils 0.0 0.0 - 0.7 10e3/uL    Absolute Basophils 0.0 0.0 - 0.2 10e3/uL    Absolute Immature Granulocytes 0.0 <=0.4 10e3/uL    Absolute NRBCs 0.0 10e3/uL   CBC with platelets differential     Status: None    Narrative    The following orders were created for panel order CBC with platelets differential.  Procedure                               Abnormality         Status                     ---------                               -----------         ------                     CBC with platelets and d...[[id number]]                      Final result                 Please view results for these tests on the individual orders.     Medications   HYDROmorphone (PF) (DILAUDID) injection 0.5 mg (0.5 mg Intravenous Given 6/6/22 2207)   LORazepam (ATIVAN) tablet 2 mg (2 mg Oral Given 6/7/22 0249)   HYDROmorphone (PF) (DILAUDID) injection 0.5 mg (0.5 mg Intravenous Given 6/7/22 0249)        Assessments & Plan (with Medical Decision Making)   46 yo F presenting with fall out of wheelchair when the wheel caught on a lip of tile.  + head injury with LOC and significant left UE and LE pain with area of maximal pain in the right hip.    Vitals stable.   No exam findings of chest or abdominal trauma.   IV access obtained and lab testing done. Treated with IV dilaudid for pain.   Placed in cervical collar on arrival.   CT head and cervical spine without traumatic injuries noted. Pt was able to be cleared from the cervical collar.   Xrays of the right shoulder and wrist negative for fractures.   Xray of the left leg (femur, tib/fb and foot) negative for fractures.   CXR without clavicle or rib injury, clear lungs.   XR pelvis negative for acetabular or rami injuries.   On re-evaluation pt continues to have significant discomfort at the right hip. Will get MRI to assess for occult hip fracture.   Signed out to Dr. Veliz at 3am, pending MRI. Anticipating discharge vs. ED obs for PT/OT evaluation and pain management if MRI negative.     I have reviewed the nursing notes. I have reviewed the findings, diagnosis, plan and need for follow up with the patient.    Discharge Medication List as of 6/7/2022  4:33 AM          Final diagnoses:   Fall, initial encounter   Closed fracture of one rib of right side, initial encounter   Hip pain, right   I, Lacho Chong, am serving as a trained medical scribe to document services personally performed by Elke Beatty MD, based on the provider's statements to me.     I, Elke Beatty MD, was physically present and have reviewed and verified the accuracy of this note documented by Lacho Chong.      --  Elke Beatty MD  [facility] EMERGENCY DEPARTMENT  6/6/2022     Elke Beatty MD  06/08/22 1052

## 2022-06-07 NOTE — ED TRIAGE NOTES
Pt fell, tripped over chair, flew across room at least 4 ft.  Pt states she hit her right hip, right foot, and head.  She believes she lost consciousness.  Uses a WC at home at times, hx of  Chronic [place] per patient.

## 2022-06-07 NOTE — DISCHARGE INSTRUCTIONS
Continue with your home pain meds. Do deep breathing exercises at least 3 times daily, as discussed. Follow up with your clinic doctor this week.  Return with any new or worsening symptoms or any other concerns.

## 2022-06-08 ENCOUNTER — TELEPHONE (OUTPATIENT)
Dept: FAMILY MEDICINE | Facility: CLINIC | Age: 47
End: 2022-06-08
Payer: COMMERCIAL

## 2022-06-08 ENCOUNTER — NURSE TRIAGE (OUTPATIENT)
Dept: FAMILY MEDICINE | Facility: OTHER | Age: 47
End: 2022-06-08
Payer: COMMERCIAL

## 2022-06-08 NOTE — TELEPHONE ENCOUNTER
Attempted to call patient regarding medication request. She did not answer. I LVM for her to give us a call back.   Avis ROSARIO, EMT 8:48 AM 6/8/2022

## 2022-06-08 NOTE — TELEPHONE ENCOUNTER
Spoke with patient.  Today she has only fallen twice. Fell then when getting up fell again.  Yesterday fell 7 times.  Was seen in ED on 06/06/2022 for falls    Landed really hard again on right hip.  Rates pain 9/10.  Pain is spreading to butt and back. Due to timing unable to get her into clinic, does have appointment for tomorrow am.  Advised if symptoms worsened or she felt she wanted to be seen tonight yet, she needs to be evaluated in ED    Patient declined going to ED and will keep appointment for tomorrow.    Sonny Vazquez, ALICJAN, RN, PHN  [facility] ~ Registered Nurse  Clinic Triage ~ [place] [facility] [place]  June 8, 2022      Reason for Disposition    Patient wants to be seen    Additional Information    Negative: Major bleeding (actively dripping or spurting) that can't be stopped    Negative: Bullet, stabbed by knife or other serious penetrating wound    Negative: Looks like a dislocated joint (crooked or deformed)    Negative: Can't stand (bear weight) or walk    Negative: Serious injury with multiple fractures (broken bones)    Negative: Sounds like a life-threatening emergency to the triager    Negative: Wound looks infected    Negative: Puncture wound of hip area    Negative: SEVERE pain (e.g., excruciating)    Negative: Skin is split open or gaping (length > 1/2 inch or 12 mm)    Negative: Bleeding won't stop after 10 minutes of direct pressure (using correct technique)    Negative: Dirt in the wound and not removed after 15 minutes of scrubbing    Negative: Sounds like a serious injury to the triager    Negative: Looks infected (e.g., spreading redness, pus, red streak)    Negative: HIV positive or severe immunodeficiency (severely weak immune system) and DIRTY cut or scrape    Negative: No prior tetanus shots (or is not fully vaccinated) and any wound (e.g., cut or scrape)    Protocols used: HIP INJURY-A-OH

## 2022-06-09 ENCOUNTER — OFFICE VISIT (OUTPATIENT)
Dept: FAMILY MEDICINE | Facility: CLINIC | Age: 47
End: 2022-06-09
Payer: COMMERCIAL

## 2022-06-09 ENCOUNTER — TELEPHONE (OUTPATIENT)
Dept: FAMILY MEDICINE | Facility: CLINIC | Age: 47
End: 2022-06-09

## 2022-06-09 VITALS
HEART RATE: 112 BPM | TEMPERATURE: 98.6 F | DIASTOLIC BLOOD PRESSURE: 89 MMHG | OXYGEN SATURATION: 97 % | SYSTOLIC BLOOD PRESSURE: 119 MMHG

## 2022-06-09 DIAGNOSIS — S79.911A INJURY OF RIGHT HIP, INITIAL ENCOUNTER: Primary | ICD-10-CM

## 2022-06-09 RX ORDER — LORAZEPAM 1 MG/1
TABLET ORAL
Qty: 2 TABLET | Refills: 0 | Status: ON HOLD | OUTPATIENT
Start: 2022-06-09 | End: 2022-10-21

## 2022-06-09 NOTE — TELEPHONE ENCOUNTER
This is a patient at [facility] pain clinic. She receives her chronic pain medications from the pain clinic.    Pt has had multiple falls at home and experiencing acute (on chronic pain). I am completing imaging for the new right hip pain.     These are my questions for the pain clinic. She is very worried about violating her pain contract.    1. What happens if the patient needs acute pain relief? Who is supposed to prescribe short term pain meds for her acute pain (PCP or pain clinic?)    2. This is a very complex patient with A LOT of allergies. She is allergic to NSAIDS (including ibuprofen and gabapentin). I am running out of options for pain management that I can give her for this acute pain. Can you please ask if they would be willing to assist with acute pain management?    Thank you,    Janina Soria, NP

## 2022-06-09 NOTE — NURSING NOTE
ROOM:1    Preferred Name: Peggy     47 year old  Chief Complaint   Patient presents with     Hospital F/U       Blood pressure 119/89, pulse 112, temperature 98.6  F (37  C), temperature source Oral, SpO2 97 %. There is no height or weight on file to calculate BMI.      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)     Complex regional pain syndrome i of right lower limb     Fibromyalgia syndrome     Diverticulitis     Chronic daily headache     Celiac disease     BRCA gene mutation positive in female     Autoimmune disorder (H)     CIDP (chronic inflammatory demyelinating polyneuropathy) (H)       Wt Readings from Last 2 Encounters:   06/06/22 77.7 kg (171 lb 4.8 oz)   05/25/22 74.8 kg (165 lb)     BP Readings from Last 3 Encounters:   06/09/22 119/89   06/07/22 103/76   05/25/22 116/80       Allergies   Allergen Reactions     Dihydroergotamine Anaphylaxis     Latex Anaphylaxis     Shellfish-Derived Products Anaphylaxis     Sumatriptan Anaphylaxis     Banana Unknown     Gabapentin Dizziness     Gluten Meal Other (See Comments)     Celiac disease     Keppra [Levetiracetam] Nausea and Vomiting     Kiwi Unknown     Levofloxacin Other (See Comments)     Arrhythmia     Metronidazole Nausea and Vomiting     Nitrofurantoin Hives     Penicillins Hives     Pregabalin      Topiramate Visual Disturbance     Aspirin Rash     Methadone Rash     Risperidone Anxiety       Current Outpatient Medications   Medication     acetaminophen (TYLENOL) 325 MG tablet     bisacodyl (DULCOLAX) 10 MG suppository     cyclobenzaprine (FLEXERIL) 10 MG tablet     medical cannabis (Patient's own supply)     multivitamin w/minerals (THERA-VIT-M) tablet     ondansetron (ZOFRAN-ODT) 8 MG ODT tab     oxyCODONE (ROXICODONE) 5 MG tablet     polyethylene glycol (MIRALAX) 17 GM/Dose powder      senna-docusate (SENOKOT-S/PERICOLACE) 8.6-50 MG tablet     cyclobenzaprine (FLEXERIL) 10 MG tablet     senna (SENOKOT) 8.6 MG tablet     No current facility-administered medications for this visit.       Social History     Tobacco Use     Smoking status: Former Smoker     Types: Cigarettes     Smokeless tobacco: Never Used   Vaping Use     Vaping Use: Never used   Substance Use Topics     Alcohol use: Not Currently     Drug use: Yes     Types: Marijuana     Comment: Medical [facility] patient       Honoring Choices - Health Care Directive Guide offered to patient at time of visit.    Health Maintenance Due   Topic Date Due     PREVENTIVE CARE VISIT  Never done     ADVANCE CARE PLANNING  Never done     COLORECTAL CANCER SCREENING  Never done     HIV SCREENING  Never done     HEPATITIS C SCREENING  Never done     PAP  11/28/2020     COVID-19 Vaccine (3 - Booster for Moderna series) 09/17/2021       Immunization History   Administered Date(s) Administered     COVID-19,PF,Moderna 03/20/2021, 04/17/2021       No results found for: PAP      Recent Labs   Lab Test 06/06/22  2213 03/30/22  0928 11/19/21  1137 11/18/21  1623   LDL  --  147*  --   --    HDL  --  56  --   --    TRIG  --  131  --   --    ALT  --  23  --  23   CR 0.85 0.81   < >  --    GFRESTIMATED 84 90   < >  --    ALBUMIN  --  3.5  --  3.3*   POTASSIUM 4.2 4.1   < >  --     < > = values in this interval not displayed.       PHQ-2 ( 1999 Pfizer) 6/9/2022 4/1/2022   Q1: Little interest or pleasure in doing things 0 0   Q2: Feeling down, depressed or hopeless 1 1   PHQ-2 Score 1 1       No flowsheet data found.    LINO-7 SCORE 3/30/2022   Total Score 2 (minimal anxiety)   Total Score 2       No flowsheet data found.      Nita Grubbs, LULY  June 9, 2022 9:12 AM

## 2022-06-10 ENCOUNTER — ANCILLARY PROCEDURE (OUTPATIENT)
Dept: MRI IMAGING | Facility: CLINIC | Age: 47
End: 2022-06-10
Payer: COMMERCIAL

## 2022-06-10 DIAGNOSIS — S79.911A INJURY OF RIGHT HIP, INITIAL ENCOUNTER: ICD-10-CM

## 2022-06-10 PROCEDURE — 73721 MRI JNT OF LWR EXTRE W/O DYE: CPT | Mod: RT | Performed by: RADIOLOGY

## 2022-06-10 RX ORDER — OXYCODONE AND ACETAMINOPHEN 5; 325 MG/1; MG/1
1 TABLET ORAL EVERY 6 HOURS PRN
Qty: 12 TABLET | Refills: 0 | Status: SHIPPED | OUTPATIENT
Start: 2022-06-10 | End: 2022-06-13
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
## 2022-06-22 ENCOUNTER — TELEPHONE (OUTPATIENT)
Dept: NURSING | Facility: CLINIC | Age: 47
End: 2022-06-22

## 2022-06-22 ENCOUNTER — OFFICE VISIT (OUTPATIENT)
Dept: NEUROLOGY | Facility: CLINIC | Age: 47
End: 2022-06-22
Payer: COMMERCIAL

## 2022-06-22 VITALS — SYSTOLIC BLOOD PRESSURE: 117 MMHG | DIASTOLIC BLOOD PRESSURE: 82 MMHG | OXYGEN SATURATION: 97 % | HEART RATE: 99 BPM

## 2022-06-22 DIAGNOSIS — G62.9 NEUROPATHY: Primary | ICD-10-CM

## 2022-06-22 PROCEDURE — 99214 OFFICE O/P EST MOD 30 MIN: CPT | Performed by: PSYCHIATRY & NEUROLOGY
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
## 2022-06-22 NOTE — TELEPHONE ENCOUNTER
Telephone call    Patient calling to get a covid test scheduled she is unable to get anymore covid vaccines until they find out if it was the flu vaccine or the covid vaccine she had a reaction to so she needs a covid test for event she is going to and she is calling to be scheduled for a PCR test.    Nanette Glover RN   [facility] Nurse Advisor  7:53 AM 6/22/2022    COVID 19 Nurse Triage Plan/Patient Instructions    Please be aware that novel coronavirus (COVID-19) may be circulating in the community. If you develop symptoms such as fever, cough, or SOB or if you have concerns about the presence of another infection including coronavirus (COVID-19), please contact your health care provider or visit [email or web address].     Disposition/Instructions    Home care recommended. Follow home care protocol based instructions.    Thank you for taking steps to prevent the spread of this virus.  o Limit your contact with others.  o Wear a simple mask to cover your cough.  o Wash your hands well and often.    Resources    [facility]: About COVID-19: [email or web address]    CDC: What to Do If You're Sick: www.cdc.gov/coronavirus/2019-ncov/about/steps-when-sick.html    CDC: Ending Home Isolation: www.cdc.gov/coronavirus/2019-ncov/hcp/disposition-in-home-patients.html     CDC: Caring for Someone: www.cdc.gov/coronavirus/2019-ncov/if-you-are-sick/care-for-someone.html     [facility]: Interim Guidance for Hospital Discharge to Home: www.health.[facility].[place].us/diseases/coronavirus/hcp/hospdischarge.pdf    [facility] clinical trials (COVID-19 research studies): [email or web address]/umn-clinical-trials     Below are the COVID-19 hotlines at the [place] Department of Health ([facility]). Interpreters are available.   o For health questions: Call 024-110-8697 or 1-264.296.4331 (7 a.m. to 7 p.m.)  o For questions about schools and childcare: Call 124-312-0201 or 1-156.594.1198 (7 a.m. to 7 p.m.)

## 2022-06-22 NOTE — PROGRESS NOTES
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 Her symptoms that led to the CISP diagnosis began in September 2021.  Onset was insidious and course over the first couple months was progressive. She reported sensory changes (numbness in her lower limbs and hands. She felt clumsy and unsteady on her feet. She started to fall. By her admission she was falling 3-4 times a day. Numbness and tingling spread to affect her below her knees and wrists. She was dropping things in the hands. Tasks like knitting (she had been quite good at this) became difficult. In 11/21 she could not knit at all. During her hospitalization she had a normal EMG. Imaging showed mild enhancement of the cauda equina and paraspinal musculature with elevated protein in the CSF. She was treated with 5 rounds of IVIG (4 weeks from last dose -11/22/21) with subjective improvement after the 3rd dose. Her balance was much better. She could walk unassisted, although her walking was not normal. She could knit. Numbness was improved. Tremor was better. After couple weeks the benefit started to wear off. In 12/21 and 1/22 insurance issues disrupted IVIG. She worsened. In 2/22 she was transitioned to q 2 week IVIG 1 gm/kg. Over the subsequent months IVIG was not helpful (and poorly tolerated) and so in 4/22 it was stopped.     Interval history:   I last saw her 5/25/22. She unfortunately had an accident a few weeks ago. She was rolling her wheelchair into her kitchen and hit something and stopped abruptly. She says she was propelled forward out of her chair. She went to the ER and found to have a fractured rib and bruised hip. As far as other symptoms she continues to report widespread numbness in her arms, legs and face. Leg strength is still ok, maybe even better. She is able to walk at home. She can  walk at home but prefers to use a wheel chair. She uses a wheelchair when outside. Gait fluctuates. She reports that sometimes she can walk almost normally. This usually happens in the morning. Pain continues to be a major issue. Pain affects her hands and feet. She has not heard from PT.     Prior pertinent laboratory work-up:  9/2021: unremarkable B12, IgM, Immunofixation, HbA1c  11/2021: CSF (WBC 0, glc 73, protein 105), PAULINE, (1:160) unremarkable CSF HSV, O-bands, serum electrophoresis, paraneoplastic panel, B6, ganglioside Abs, SSA/SSB   4/13/22: Negative FGFR3, TS-HDS, , /186, Contactin 1    Prior pertinent radiology work-up:  11/18/21: MR Lumbar Spine showed diffuse mild enhancement of the cauda equina nerve roots without nodularity or clumping is nonspecific.  Probable mild enhancement of the posterior paraspinous musculature from L4 through S1. However, artifact could have similar appearance.  Mild multilevel lumbar spondylosis without evidence for high-grade spinal canal or neural foraminal stenosis.  4/30/22: MRI brain and C/T/LS spine showed no CN or root enhancement. There has been resolution of cauda equina nerve root enhancement that was previously noted.    Prior electrophysiologic work-up:  11/18/21: Nerve conduction studies/EMG: Right upper and lower extremity normal study, including tibial H reflexes  11/21: Autonomic testing ([facility]) : There is generalized, marked, symmetric postganglionic sudomotor dysfunction, normal cardiovagal function, and mild adrenergic dysfunction. Findings are in keeping with systemic anticholinergic medication effect. Superimposed mild autonomic neuropathy can not be excluded. Orthostatic hypotension and tachycardia are not seen. Compression of pulse pressure during Valsalva raises the question of mild hypovolemia or deconditioning.   4/13/22: NCS were again normal . There is no electrophysiologic evidence of a large fiber polyneuropathy affecting the right upper  and lower limbs on the basis of this study. These findings are essentially unchanged compared to those obtained 21.     Past Medical History:   CRPS (right leg - stress fracture; chest - mastectomy)  BRCA+   Cervical cancer  Migraines - resolved    Past Surgical History:  Bilateral mastectomy  Hysterectomy    gallbladder    Family history:    Dad - neuropathy (obeses, CKD)  Unknown largely    Social History:    She denies tobacco, alcohol, or illicit drug use. There is no known exposure to toxins or heavy metals.   Desk work - corporate collections for [employer]    Medical Allergies:     Allergies   Allergen Reactions     Dihydroergotamine Anaphylaxis     Latex Anaphylaxis     Shellfish-Derived Products Anaphylaxis     Sumatriptan Anaphylaxis     Banana Unknown     Gabapentin Dizziness     Gluten Meal Other (See Comments)     Celiac disease     Keppra [Levetiracetam] Nausea and Vomiting     Kiwi Unknown     Levofloxacin Other (See Comments)     Arrhythmia     Metronidazole Nausea and Vomiting     Nitrofurantoin Hives     Penicillins Hives     Pregabalin      Topiramate Visual Disturbance     Aspirin Rash     Methadone Rash     Risperidone Anxiety     Current Medications:    Current Outpatient Medications   Medication     acetaminophen (TYLENOL) 325 MG tablet     bisacodyl (DULCOLAX) 10 MG suppository     cyclobenzaprine (FLEXERIL) 10 MG tablet     LORazepam (ATIVAN) 1 MG tablet     medical cannabis (Patient's own supply)     multivitamin w/minerals (THERA-VIT-M) tablet     ondansetron (ZOFRAN-ODT) 8 MG ODT tab     oxyCODONE (ROXICODONE) 5 MG tablet     polyethylene glycol (MIRALAX) 17 GM/Dose powder     senna-docusate (SENOKOT-S/PERICOLACE) 8.6-50 MG tablet     No current facility-administered medications for this visit.     Review of Systems: A complete review of systems was obtained and was negative except for what was noted above.    Physical examination:    /82   Pulse 99   SpO2 97%      Wt  Readings from Last 4 Encounters:   22 77.7 kg (171 lb 4.8 oz)   22 74.8 kg (165 lb)   22 74.8 kg (165 lb)   22 73.5 kg (162 lb)     General Appearance: NAD    Neurologic examination:    Mental status:  Patient is alert, attentive, and oriented x 3.  Language is coherent and fluent without  aphasia.  Memory, comprehension and ability to follow commands were intact.       Cranial nerves:    Extraocular movements were full. There was no face, jaw, palate or tongue weakness or atrophy. Hearing was grossly intact.  Shoulder shrug was normal.       Motor exam: No atrophy or fasciculations.  Impersistent activation but power is at least 4/5 and probably full in proximal and distal muscles of the arms and legs.    Complex motor skills: No tremor or ataxia today    Sensation: Vibration reduced to 3-4 seconds in toes and ankle. Mildly reduced fingers. She reports inability to feel pin throughout arms, legs and face    Gait:  She can rise easily from a chair and step away from her chair on one foot. Takes cautious deliberate steps.     Deep tendon reflexes:   Right Left   Triceps 2 2   Biceps 1 1   Brachioradialis 2 2   Knee jerk 1 1   Ankle jerk 1 1      Neuropathy Assessments  Neurology Assessments 2022 2022 3/28/2022 2022 12/15/2021 2021   RODS CIDP/MGUSP Score 21 18 18 17 22 22   Time for 'Up and Go' test- Seconds: 32.7 - - 30.6 17.45 34.1      Strength: 2022 2022 3/28/2022 2022 12/15/2021 2021   Right hand strength in K 5 4 5 12 not tested   Left hand strength in K 5 4 4 10 not tested     Immunotherapy 2022 2022 3/28/2022 2022 12/15/2021 2021   Time since last IVIG (days): - - 1 2 weeks 4 weeks -   Current treatment: none none IVIG 1 gm/kg  2 weeks IVIG 1 gm/kg q 3 weeks none IVIG started 21     Assessment:    Peggy Jessica is a 47 year old woman with chronic pain. She carries a long standing diagnosis of CRPS,  and then in late 2021 developed gait instability, ataxia and hyporeflexia. A diagnosis of CISP was raised based upon these symptoms, and supportied by nerve root enhancement on MRI and elevated CSF protein. IVIG was initially helpful, although the benefit was not sustained. She continues to have widespread pain which contributes to many functional limitations. Her neurologic examination and NCS have been stable, or in some cases improved (DTR have returned). The nerve root enhancement previously observed on MRI has resolved and MRI of her neuro axis otherwise shows no explanation for her disability. While it is possible she has some residual nerve injuries from CISP, the data at this point argues against an active inflammatory problem - and I can not explain the discordant degree of disability on the basis of a neurologic deficit. I understand that this is indeed very frustrating. Management is supportive. I appreciate the collateral care from pain management, to whom I will defer her complex pain situation.     Plan:      1. Immunotherapy: No indication for other immunotherapies at this time  2. Physical therapy: She is still waiting to hear from the PT department. Will see if they will reach out to her to get started.   3. Symptomatic management of pain and paresthesias:  Appreciate collateral care with pain management  4. Follow up PCP for general health maintenance  5. INCBASE: Enrolled [id number]  6. Follow up in 3 months. Sooner if needed.   ---

## 2022-06-22 NOTE — PROGRESS NOTES
REQUISITION AND JUSTIFICATION FOR DURABLE MEDICAL EQUIPMENT    Patient Name:  Peggy Jessica  MR #:  [id number]  :  1975  Age/Gender:  47 year old female  Visit Date:  Peggy Jessica seen for seating and wheeled mobility evaluation by Radha Starr OTR/L,ATP and ATP from [facility] on 22.    CLINICAL CRITERIA FOR MOBILITY ASSISTIVE EQUIPMENT  Coverage Criteria Per Local Coverage Determination    A) Peggy has mobility limitations due to CIDP, fibromyalgia, CRPS in R foot and chest from preventative masectomy that significantly impairs her ability to participate in all of her mobility-related activities of daily living (MRADL). Specifically affected are toileting (being able to get there in time to prevent accidents), dressing, and bathing (getting into the bathroom of designated place). Current equipment used is Borrowed chair from friend. This patient needs the new equipment requested to be able to increase safe and independent participation in all MRADLS and IADLS. Please see additional documentation in the seating and wheeled mobility report for details.   Peggy had a successful clinical trial here, and also a successful trial at home with the recommended equipment. Peggy is very willing and physically / cognitively able to use the recommended equipment to assist her with mobility-related activities of daily living and general mobility.    B) Peggy's mobility limitation cannot be sufficiently and safely resolved by the use of an appropriately fitted cane or walker because she requries assistance for safe mobility with wlaker, 25 ft in 25 seconds with decreased belén and impaired balance. Strength of legs is knee ext. 3+/5 hip flexion 4/5 knees 4/5 DF 3-/5 for one maximal repetition. Fatigue also impacts this patient's ability to ambulate, regardless of the gait aid.    C) Peggy does not have sufficient upper extremity function to self-propel an optimally-configured manual wheelchair in  her home to perform MRADLs during a typical day due to limitations in strength, endurance, range of motion, and coordination. Distance and time to propel a light weight manual wheelchair WNL, pain and challenges with full day and longer distance propulsion.  Strength of arms is 4/5.  D)  Peggy is not able to use a POV/scooter because it will not fit in her home environment. Peggy is unable to safely transfer to and from a POV, unable to operate the tiller steering system, and unable to maintain postural stability and position while operating the POV. Peggy needs more appropriate seating and positioning than any scooter seat provides.  E) The need for this equipment is LIFETIME.   RECOMMENDATIONS FOR MOBILITY BASE, SEATING SYSTEM AND COMPONENTS  Ti lite Aero T ultra lightweight manual wheelchair - this ultra light weight manual wheelchair is appropriate and necessary for her to be able to assist and complete all of her MRADLs within her residence. She has mobility limitations impacting her ability to ambulate independently or with any ambulation aid. She has had a thorough clinical evaluation, and this manual wheelchair is the best option for this patient.  Any less costly wheelchair option would be unable to accommodate anterior-posterior axle adjustments to maximize propulsion mechanics required for shoulder preservation in full-time, active manual wheelchair propeller.      Smart Drive MX2+ power assist system- is an add-on power drive that can be installed onto any manual wheelchair. When this device is used, the manual wheelchair can be operated in a way similar to, but not identical to a power wheelchair reducing the effort needed to propel the chair. The device consists of a drive unit with a battery. The total unit weighs 12.5 lbs and can be completely removed from the wheelchair so the chair can be used without the power add-on unit when desired. With a SmartDrive installed, a single 'push' or tap of a  button will begin moving the wheelchair forward. Once she gets the wheelchair moving at the desired speed, the SmartDrive will keep it moving at that speed without additional pushes. This power assist dramatically reduces energy required by her in traveling over any distance. This is important for her because she already has chest and shoudler injuries and degeneration from prior surgeries and pushing of borrowed chair. The SmartDrive has improved ergonomics and increased functionality, she will achieve the maximum degree of independence and be able to gain more independent freedom on ramp into home, over thresholds, on carpet, on inclines and other obstacles with greater ease, and will be able to travel further with more endurance to function throughout the day at the level needed to maintain the active lifestyle.   This power assist device provides a more functional and less expensive option (versus a power wheelchair) for Peggy who still has some ability in pushing a manual wheelchair. Without the added assistance of the SmartDrive, will not be able to perform her MRADLs within the home unless in the more restrictive and expensive power wheelchair. The exclusive use of the power chair may cause a her to lose strength and range by not depending on this arm movement for wheeled mobility. The Smart drive can easily be removed from chair when needed for transport for medical appointments, work, family events, in the family vehicle without requiring an fully equipped van. This can enhance her ability to maintain paid employment, volunteer work and other endeavors to be a productive member of the family and society.    Push tracker E2- buttons to assist with on/off features.    Bag for smart drive- it allows a place for the bag to be stored when not in use and prevent damage.    Custom frame depth- frame 1 inch shorter.    Push Handles- two handles mounted to the backrest frame to allow the wheelchair to be pushed by  a caregiver if the wheelchair user is unable to move the wheelchair    Litespeed billet soft roll casters - for smooth ride not to jar/shake her    Tilite slipstream single sided forks- increased maneuverability and decreases size of front forks.    Primo express tires - for maintenance free mobility    Rear anti-tip tubes - for safety to prevent w/c tipping rearward    Pelvic positioning strap - to assist maintaining pelvis position for functional activities    PSP matrx cushion with solid seat insert - this pressure distribution and positioning seat cushion will optimally  distribute seating pressures to prevent pressure ulcers, but also provide a stable base of support for her to use during MRADLs.  Insert provides a solid base to place cushion on and prevent swaying of seat with use.    Acta back Solid curved and padded back support - firm and contoured back support is needed to support Peggy's thoracolumbar area in an upright and midline position, with appropriate support pads as needed. This back support is essential to provide sufficient posterior support to maximize her postural alignment and minimize her tendency to develop scoliosis and other secondary complications.    This equipment is reasonable and necessary with reference to accepted standards of medical practice and treatment of this patient's condition and is not being recommended as a convenience item. Without this recommended equipment, she is highly likely to sustain injuries from falls, develop pressure sores or postural compensation, and/or be bed confined, which those costs far exceed the cost of the requested equipment.    Electronically signed by:  Radha MARSH, ATP      Occupational Therapist, Assistive   [phone]      fax: [phone]      [email or web address]  [facility], [address]  [address]  [place]   [address]  June 22, 2022    I have  read and concur with the above recommendations.    Physician Printed Name __________________________________________    Physician SIgnature  _____________________________________________    Date of SIgnature ______________________________    Physician Phone  ______________________________

## 2022-06-23 ENCOUNTER — LAB (OUTPATIENT)
Dept: LAB | Facility: CLINIC | Age: 47
End: 2022-06-23
Payer: COMMERCIAL

## 2022-06-23 ENCOUNTER — MYC MEDICAL ADVICE (OUTPATIENT)
Dept: FAMILY MEDICINE | Facility: CLINIC | Age: 47
End: 2022-06-23

## 2022-06-23 DIAGNOSIS — S79.911A INJURY OF RIGHT HIP, INITIAL ENCOUNTER: Primary | ICD-10-CM

## 2022-06-23 DIAGNOSIS — Z20.822 ENCOUNTER FOR LABORATORY TESTING FOR COVID-19 VIRUS: ICD-10-CM

## 2022-06-23 LAB — SARS-COV-2 RNA RESP QL NAA+PROBE: NEGATIVE

## 2022-06-23 PROCEDURE — U0005 INFEC AGEN DETEC AMPLI PROBE: HCPCS | Mod: 90 | Performed by: PATHOLOGY

## 2022-06-23 PROCEDURE — U0003 INFECTIOUS AGENT DETECTION BY NUCLEIC ACID (DNA OR RNA); SEVERE ACUTE RESPIRATORY SYNDROME CORONAVIRUS 2 (SARS-COV-2) (CORONAVIRUS DISEASE [COVID-19]), AMPLIFIED PROBE TECHNIQUE, MAKING USE OF HIGH THROUGHPUT TECHNOLOGIES AS DESCRIBED BY CMS-2020-01-R: HCPCS | Mod: 90 | Performed by: PATHOLOGY

## 2022-06-23 PROCEDURE — 99000 SPECIMEN HANDLING OFFICE-LAB: CPT | Performed by: PATHOLOGY

## 2022-06-27 DIAGNOSIS — G61.81 CIDP (CHRONIC INFLAMMATORY DEMYELINATING POLYNEUROPATHY) (H): Primary | ICD-10-CM

## 2022-06-28 DIAGNOSIS — S79.911A INJURY OF RIGHT HIP, INITIAL ENCOUNTER: Primary | ICD-10-CM

## 2022-06-28 NOTE — TELEPHONE ENCOUNTER
Hi All-    Can someone provide me with the number for a ? I would like to call and consult with one.    Thanks!  Neftali

## 2022-06-29 ENCOUNTER — TELEPHONE (OUTPATIENT)
Dept: OTHER | Age: 47
End: 2022-06-29

## 2022-06-29 ENCOUNTER — TELEPHONE (OUTPATIENT)
Dept: FAMILY MEDICINE | Facility: CLINIC | Age: 47
End: 2022-06-29

## 2022-06-29 DIAGNOSIS — S79.911A INJURY OF RIGHT HIP, INITIAL ENCOUNTER: Primary | ICD-10-CM

## 2022-06-29 DIAGNOSIS — M25.559 HIP PAIN: Primary | ICD-10-CM

## 2022-06-29 NOTE — TELEPHONE ENCOUNTER
Action June 29, 2022 12:34 PM MT   Action Taken CSS sent a req to [facility] [phone] and [facility] [phone].       Action June 30, 2022 8:35 AM MT   Action Taken CSS recvd and resolved imgs to PACS and NM faxed back stating no longer have imgs.        DIAGNOSIS: Injury of right hip/MRI/Andrew Peck, APRN CNP in [facility]/orthocn   APPOINTMENT DATE: 06/30/2022   NOTES STATUS DETAILS   OFFICE NOTE from referring provider Ortho     OFFICE NOTE from other specialist [facility] 06/09/2022 - Janina Soria CP - [facility]  03/07/2013 - Faith Flores MD - [facility]   DISCHARGE REPORT from the ER [facility] 06/06/2022 - [facility] ED   05/07/2017 - [facility] ED   MEDICATION LIST Internal/Care Everywhere    EMG (for Spine) Internal 04/13/2022   LABS     CBC/DIFF Internal Most Recent: 06/06/2022   MRI PACS 06/10/2022 - RT Hip  06/06/2022 - RT Hip  04/30/2022, 11/17/2021, 12/02/2019,  - L Spine    [facility]:  11/01/2019 - Pelvis     CT SCAN PACS [facility]:   06/09/2021, 02/16/2021, 05/04/2020, 02/21/2020, 12/02/2019 - Abd/Pel     XRAYS (IMAGES & REPORTS) PACS 06/06/2022 - Pelvis Port RT  06/06/2022 - RT Femur  06/06/2022 - RT Tib/Fib     TUMOR     PATHOLOGY  Slides & report Care Everywhere 02/28/2020 -   Specimen: RT Ovray Oophorectomy/ RT Fallopian Tube Ligation   Accession #: [id number]

## 2022-06-29 NOTE — TELEPHONE ENCOUNTER
Spoke with Dr. Becker's  Ayse to see if we could move this patient's appointment up.  He was confirmed that this appointment can be moved up to tomorrow so the patient was scheduled for 8:45 AM tomorrow morning.  Confirm this appointment date and time with the patient the patient is very grateful this appointment time thanked the writer for the call.    Bhavesh Hollins, EMT on 6/29/2022 at 9:14 AM

## 2022-06-29 NOTE — TELEPHONE ENCOUNTER
[facility] called to leave provider a message to  know that the referral can't be processed because the payer is out of network.

## 2022-06-29 NOTE — TELEPHONE ENCOUNTER
Hello,  I'm with ortho con.  Pt has an appt on 7/7 with Dr. Becker and her primary is asking to move the appt up.  Pt has fallen a few times since scheduling.  Pt is in a wheelchair and has CIDP which is making it diffucult to get around with the hip issue.  Could you please take a look?  Pt can be reached at .

## 2022-06-30 ENCOUNTER — ANCILLARY PROCEDURE (OUTPATIENT)
Dept: GENERAL RADIOLOGY | Facility: CLINIC | Age: 47
End: 2022-06-30
Attending: ORTHOPAEDIC SURGERY
Payer: COMMERCIAL

## 2022-06-30 ENCOUNTER — OFFICE VISIT (OUTPATIENT)
Dept: ORTHOPEDICS | Facility: CLINIC | Age: 47
End: 2022-06-30
Attending: NURSE PRACTITIONER
Payer: COMMERCIAL

## 2022-06-30 ENCOUNTER — PRE VISIT (OUTPATIENT)
Dept: ORTHOPEDICS | Facility: CLINIC | Age: 47
End: 2022-06-30

## 2022-06-30 ENCOUNTER — OFFICE VISIT (OUTPATIENT)
Dept: PHYSICAL MEDICINE AND REHAB | Facility: CLINIC | Age: 47
End: 2022-06-30
Attending: ORTHOPAEDIC SURGERY
Payer: COMMERCIAL

## 2022-06-30 VITALS — HEART RATE: 109 BPM | OXYGEN SATURATION: 98 % | SYSTOLIC BLOOD PRESSURE: 122 MMHG | DIASTOLIC BLOOD PRESSURE: 83 MMHG

## 2022-06-30 VITALS — WEIGHT: 160 LBS | HEIGHT: 65 IN | BODY MASS INDEX: 26.66 KG/M2

## 2022-06-30 DIAGNOSIS — S79.911A INJURY OF RIGHT HIP, INITIAL ENCOUNTER: ICD-10-CM

## 2022-06-30 DIAGNOSIS — G89.29 OTHER CHRONIC PAIN: Primary | ICD-10-CM

## 2022-06-30 DIAGNOSIS — R26.9 ABNORMAL GAIT: ICD-10-CM

## 2022-06-30 DIAGNOSIS — M25.559 HIP PAIN: ICD-10-CM

## 2022-06-30 DIAGNOSIS — G90.521 COMPLEX REGIONAL PAIN SYNDROME I OF RIGHT LOWER LIMB: ICD-10-CM
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
## 2022-06-30 PROCEDURE — 99207 PR SATISFY VISIT NUMBER: CPT | Performed by: ORTHOPAEDIC SURGERY

## 2022-06-30 PROCEDURE — 99205 OFFICE O/P NEW HI 60 MIN: CPT | Performed by: PHYSICAL MEDICINE & REHABILITATION

## 2022-06-30 PROCEDURE — 73501 X-RAY EXAM HIP UNI 1 VIEW: CPT | Mod: RT | Performed by: RADIOLOGY

## 2022-06-30 RX ORDER — OXYCODONE AND ACETAMINOPHEN 5; 325 MG/1; MG/1
TABLET ORAL
Status: ON HOLD | COMMUNITY
Start: 2022-06-24 | End: 2022-10-21

## 2022-06-30 ASSESSMENT — PAIN SCALES - GENERAL: PAINLEVEL: SEVERE PAIN (7)

## 2022-06-30 NOTE — LETTER
6/30/2022         RE: Peggy Jessica  [address] [facility] [address] [facility] [address]        Dear Colleague,    Thank you for referring your patient, Peggy Jessica, to the [facility] [place]. Please see a copy of my visit note below.    Patient seen today in PM&R. Please see PM&R note dated today.         Again, thank you for allowing me to participate in the care of your patient.        Sincerely,        Jason Becker MD

## 2022-06-30 NOTE — LETTER
6/30/2022       RE: Peggy Jessica  [address] [address]     Dear Colleague,    Thank you for referring your patient, Peggy Jessica, to the [facility] PHYSICAL MEDICINE AND REHABILITATION CLINIC [place] [facility]. Please see a copy of my visit note below.    PM&R Clinic:   New Patient Consult Note    Patient seen for impaired balance and function in the context of CISP/CIDP. Patient is accompanied by her .      HISTORY OF PRESENT ILLNESS:  Peggy Jessica is a 47 year old female who presents with a chief complaint of falling multiple times a day while using a loaner wheelchair. Pt has a pmhx of CISP, CRPS (due to stress fracture in R leg), and bilateral mastectomy (BRCA+). Patient became symptomatic due to CISP back in Sept 2021 w/ insidious onset. Pt endorsed numbness and tingling of lower limbs and hands, feeling clumsy and unsteady. Patient has been followed by neurologist, Dr. Jon Duenas, for management of her symptoms. She had begun 3 months of IVIG with some response but due to difficulties with insurance, treatment was interrupted in Dec 2021 - Jan 2021 and she was unable to gain placement in a TCU. Upon restarting IVIG she no longer felt any improvement in her symptoms and was unable to tolerate infusions. IVIG treatment was then discontinued in April 2022. Previous EMG studies in Nov 2021 and repeat in April 2022 have returned normal and stable.     Pt reports that despite following precautions recommended by providers she continues to have 3-4 falls a day, often injuring herself. She recently had a bad fall on June 6th where she lost consciousness after hitting her head, right hip, and right foot. Patient subsequently visited [facility] due to concern of concussion, broken rib, and dislocated hip.    In addition to being closely followed by Neurology, pt is followed by [facility] for management of  her pain and opioid prescriptions. She recently met with orthopedics and reported that they stated surgery was not indicated. Pt continues to endorse numbness in lower legs and hands, pain in extremities when she feels cold, difficulty adjusting visually to dark environments, and random onset of dizziness. Pt's  notes that she has lost weight recently, which she attributes to her lack of appetite. Pt is hoping to address her constant falls. She's currently on temporary disability and has applied for long-term disability due to inability to return to work due to her pain, fatigue and ongoing symptoms.     PRIOR INJURIES/TREATMENT:     ED Vist (6/6): Patient fell out of wheelchair injuring head, R foot, and R hip    Physical Therapy: Last PT appt was on 5/31 with Angélica Hollis PT at [facility] [place]     - Current Pain Medications -   Current Outpatient Medications   Medication     acetaminophen (TYLENOL) 325 MG tablet     bisacodyl (DULCOLAX) 10 MG suppository     cyclobenzaprine (FLEXERIL) 10 MG tablet     LORazepam (ATIVAN) 1 MG tablet     medical cannabis (Patient's own supply)     multivitamin w/minerals (THERA-VIT-M) tablet     ondansetron (ZOFRAN-ODT) 8 MG ODT tab     oxyCODONE (ROXICODONE) 5 MG tablet     oxyCODONE-acetaminophen (PERCOCET) 5-325 MG tablet     polyethylene glycol (MIRALAX) 17 GM/Dose powder     senna-docusate (SENOKOT-S/PERICOLACE) 8.6-50 MG tablet     No current facility-administered medications for this visit.       Medical History:  She  has a past medical history of BRCA positive, Cervical cancer (H) (2002), CIDP (chronic inflammatory demyelinating polyneuropathy) (H), and Complex regional pain syndrome type 1 of right lower extremity.     She  has a past surgical history that includes Hysterectomy (2004); Mastectomy (Bilateral, 2020); c section; Cholecystectomy (1997); and oopharectomy (Bilateral, 2019).      Family History  her family history includes Kidney Disease in her father.      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 No bathrooms on ground level and patient currently sleeps no couch. Few stairs required to enter home. Both  and daughter have full time jobs outside of the home.        Current Medications:   She has a current medication list which includes the following prescription(s): acetaminophen, bisacodyl, cyclobenzaprine, lorazepam, medical cannabis, multivitamin w/minerals, ondansetron, oxycodone, oxycodone-acetaminophen, polyethylene glycol, and senna-docusate.     Allergies:   Allergies   Allergen Reactions     Dihydroergotamine Anaphylaxis     Latex Anaphylaxis     Shellfish-Derived Products Anaphylaxis     Sumatriptan Anaphylaxis     Banana Unknown     Gabapentin Dizziness     Gluten Meal Other (See Comments)     Celiac disease     Keppra [Levetiracetam] Nausea and Vomiting     Kiwi Unknown     Levofloxacin Other (See Comments)     Arrhythmia     Metronidazole Nausea and Vomiting     Nitrofurantoin Hives     Penicillins Hives     Pregabalin      Topiramate Visual Disturbance     Aspirin Rash     Methadone Rash     Risperidone Anxiety       PHYSICAL EXAMINATION:  VITALS: /83   Pulse 109   SpO2 98%     General: pt present in loaner wheelchair, appears fatigued and overhwelmed, became tearful during visit. 5-6 cm bruise noted on R thigh  HEENT: hearing appears intact bilaterally, no lesion/bruising to head or face, poor dentition 2/2 prior physical trauma  Pulmonary: Breathing comfortably on room air  Extremities: warm, well perfused, no edema in bilateral lower extremities, no tenderness in calves.   MSK: Limited ROM in RUE 2/2 mastectomy, full ROM in LUE. Normal flexion of LLE at hip, limited in RLE at hip. Tenderness to deep palpation of R greater trochanter and posterior portion of R  iliac crest. 5/5 strength and full ROM w/ knee flexion  Coordination:  Pt slowed alternating movements diminished in hands bilaterally as well with toe tapping slowed, bilaterally   Reflexes: Diminished-Absent patellar and achilles reflexes, bilaterally   Gait: patient unsteady when walking and standing,  providing assistance. Able to stand on toes for 2-3 seconds    ASSESSMENT & PLAN:  Peggy Jessica is a pleasant 47 year old female who presents with CISP and subsequent dizziness, poor gait, and decreased sensation in her lower limbs and hands. Pt's main concern with visit is her constant falls and how to decrease them. Recommend that she continues to work with her neurologist to address decreased sensation, dizziness, and ongoing visual concerns. Patient also states that she'll follow-up with her optometrist due to her decreased vision. Recommend that patient continues working with [facility] and work towards decreasing or discontinuing opioid use as able due to complications associated with long-term use. Order placed for patient to continue physical therapy to continue improving gate and posture. Recommend that along with PT, patient continues to use the least restrictive assistive devices (e.g., wheelchair, walker, cane, additional home lighting) as she progresses towards improved gait and indepence with ADL.    Ready to learn, no apparent learning barriers.  Education provided on treatment plan according to patient's preferred learning style.  Patient verbalizes understanding.   __________________________________    Patient care, history, assessment and plan was performed and discussed with staff physician, Dr. Bingham.     Colleen Echavarria, MS4  [employer] [facility]        I, Stephan Bingham MD, was present with the medical/NAVI student who participated in the service and in the documentation of the note.  I have verified the history and personally performed the  physical exam and medical decision making.  I agree with the assessment and plan of care as documented in the note.      60 minutes>50% CC        Stephan Bingham MD

## 2022-06-30 NOTE — LETTER
Date:July 17, 2022      Provider requested that no letter be sent. Do not send.       [facility]

## 2022-06-30 NOTE — TELEPHONE ENCOUNTER
Grayson Cummins,    Is there another Home Health company that we can try for this patient?    Thanks!  Neftali

## 2022-06-30 NOTE — PROGRESS NOTES
PM&R Clinic:   New Patient Consult Note    Patient seen for impaired balance and function in the context of CISP/CIDP. Patient is accompanied by her .      HISTORY OF PRESENT ILLNESS:  Peggy Jessica is a 47 year old female who presents with a chief complaint of falling multiple times a day while using a loaner wheelchair. Pt has a pmhx of CISP, CRPS (due to stress fracture in R leg), and bilateral mastectomy (BRCA+). Patient became symptomatic due to CISP back in Sept 2021 w/ insidious onset. Pt endorsed numbness and tingling of lower limbs and hands, feeling clumsy and unsteady. Patient has been followed by neurologist, Dr. Jon Duenas, for management of her symptoms. She had begun 3 months of IVIG with some response but due to difficulties with insurance, treatment was interrupted in Dec 2021 - Jan 2021 and she was unable to gain placement in a TCU. Upon restarting IVIG she no longer felt any improvement in her symptoms and was unable to tolerate infusions. IVIG treatment was then discontinued in April 2022. Previous EMG studies in Nov 2021 and repeat in April 2022 have returned normal and stable.     Pt reports that despite following precautions recommended by providers she continues to have 3-4 falls a day, often injuring herself. She recently had a bad fall on June 6th where she lost consciousness after hitting her head, right hip, and right foot. Patient subsequently visited [facility] due to concern of concussion, broken rib, and dislocated hip.    In addition to being closely followed by Neurology, pt is followed by  Pain Clinic for management of her pain and opioid prescriptions. She recently met with orthopedics and reported that they stated surgery was not indicated. Pt continues to endorse numbness in lower legs and hands, pain in extremities when she feels cold, difficulty adjusting visually to dark environments, and random onset of dizziness. Pt's  notes that she has lost weight  recently, which she attributes to her lack of appetite. Pt is hoping to address her constant falls. She's currently on temporary disability and has applied for long-term disability due to inability to return to work due to her pain, fatigue and ongoing symptoms.     PRIOR INJURIES/TREATMENT:     ED Vist (6/6): Patient fell out of wheelchair injuring head, R foot, and R hip    Physical Therapy: Last PT appt was on 5/31 with Angélica Hollis PT at [facility] [place]     - Current Pain Medications -   Current Outpatient Medications   Medication     acetaminophen (TYLENOL) 325 MG tablet     bisacodyl (DULCOLAX) 10 MG suppository     cyclobenzaprine (FLEXERIL) 10 MG tablet     LORazepam (ATIVAN) 1 MG tablet     medical cannabis (Patient's own supply)     multivitamin w/minerals (THERA-VIT-M) tablet     ondansetron (ZOFRAN-ODT) 8 MG ODT tab     oxyCODONE (ROXICODONE) 5 MG tablet     oxyCODONE-acetaminophen (PERCOCET) 5-325 MG tablet     polyethylene glycol (MIRALAX) 17 GM/Dose powder     senna-docusate (SENOKOT-S/PERICOLACE) 8.6-50 MG tablet     No current facility-administered medications for this visit.       Medical History:  She  has a past medical history of BRCA positive, Cervical cancer (H) (2002), CIDP (chronic inflammatory demyelinating polyneuropathy) (H), and Complex regional pain syndrome type 1 of right lower extremity.     She  has a past surgical history that includes Hysterectomy (2004); Mastectomy (Bilateral, 2020); c section; Cholecystectomy (1997); and oopharectomy (Bilateral, 2019).      Family History  her family history includes Kidney Disease in her father.     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 No bathrooms on ground level and patient currently sleeps no couch. Few stairs required to enter home. Both  and daughter have  full time jobs outside of the home.        Current Medications:   She has a current medication list which includes the following prescription(s): acetaminophen, bisacodyl, cyclobenzaprine, lorazepam, medical cannabis, multivitamin w/minerals, ondansetron, oxycodone, oxycodone-acetaminophen, polyethylene glycol, and senna-docusate.     Allergies:   Allergies   Allergen Reactions     Dihydroergotamine Anaphylaxis     Latex Anaphylaxis     Shellfish-Derived Products Anaphylaxis     Sumatriptan Anaphylaxis     Banana Unknown     Gabapentin Dizziness     Gluten Meal Other (See Comments)     Celiac disease     Keppra [Levetiracetam] Nausea and Vomiting     Kiwi Unknown     Levofloxacin Other (See Comments)     Arrhythmia     Metronidazole Nausea and Vomiting     Nitrofurantoin Hives     Penicillins Hives     Pregabalin      Topiramate Visual Disturbance     Aspirin Rash     Methadone Rash     Risperidone Anxiety       PHYSICAL EXAMINATION:  VITALS: /83   Pulse 109   SpO2 98%     General: pt present in loaner wheelchair, appears fatigued and overhwelmed, became tearful during visit. 5-6 cm bruise noted on R thigh  HEENT: hearing appears intact bilaterally, no lesion/bruising to head or face, poor dentition 2/2 prior physical trauma  Pulmonary: Breathing comfortably on room air  Extremities: warm, well perfused, no edema in bilateral lower extremities, no tenderness in calves.   MSK: Limited ROM in RUE 2/2 mastectomy, full ROM in LUE. Normal flexion of LLE at hip, limited in RLE at hip. Tenderness to deep palpation of R greater trochanter and posterior portion of R iliac crest. 5/5 strength and full ROM w/ knee flexion  Coordination:  Pt slowed alternating movements diminished in hands bilaterally as well with toe tapping slowed, bilaterally   Reflexes: Diminished-Absent patellar and achilles reflexes, bilaterally   Gait: patient unsteady when walking and standing,  providing assistance. Able to stand on toes  for 2-3 seconds    ASSESSMENT & PLAN:  Peggy Jessica is a pleasant 47 year old female who presents with CISP and subsequent dizziness, poor gait, and decreased sensation in her lower limbs and hands. Pt's main concern with visit is her constant falls and how to decrease them. Recommend that she continues to work with her neurologist to address decreased sensation, dizziness, and ongoing visual concerns. Patient also states that she'll follow-up with her optometrist due to her decreased vision. Recommend that patient continues working with [facility] and work towards decreasing or discontinuing opioid use as able due to complications associated with long-term use. Order placed for patient to continue physical therapy to continue improving gate and posture. Recommend that along with PT, patient continues to use the least restrictive assistive devices (e.g., wheelchair, walker, cane, additional home lighting) as she progresses towards improved gait and indepence with ADL.    Ready to learn, no apparent learning barriers.  Education provided on treatment plan according to patient's preferred learning style.  Patient verbalizes understanding.   __________________________________    Patient care, history, assessment and plan was performed and discussed with staff physician, Dr. Bingham.     Colleen Echavarria, MS4  [employer] [facility]        I, Stephan Bingham MD, was present with the medical/NAVI student who participated in the service and in the documentation of the note.  I have verified the history and personally performed the physical exam and medical decision making.  I agree with the assessment and plan of care as documented in the note.      60 minutes>50% CC    Stephan Bingham MD

## 2022-06-30 NOTE — NURSING NOTE
"Reason For Visit:   Chief Complaint   Patient presents with     Consult     CIDP, falling a lot. Tries to walk at home, but pretty much in wheel chair most of the time. 6/6/22 flew out of loaner wheelchair. Concussed, broken rib, dislocated right hip. CIDP diagnosed in Nov 2021. Started in March of 2021. CRPS left foot in remission.       Ht 1.645 m (5' 4.75\")   Wt 72.6 kg (160 lb)   BMI 26.83 kg/m           Hilda Mcgowan ATC    "

## 2022-07-11 ENCOUNTER — MEDICAL CORRESPONDENCE (OUTPATIENT)
Dept: HEALTH INFORMATION MANAGEMENT | Facility: CLINIC | Age: 47
End: 2022-07-11

## 2022-07-11 ENCOUNTER — OFFICE VISIT (OUTPATIENT)
Dept: URGENT CARE | Facility: URGENT CARE | Age: 47
End: 2022-07-11
Payer: COMMERCIAL

## 2022-07-11 VITALS
OXYGEN SATURATION: 97 % | DIASTOLIC BLOOD PRESSURE: 86 MMHG | HEART RATE: 102 BPM | TEMPERATURE: 98.1 F | WEIGHT: 160 LBS | SYSTOLIC BLOOD PRESSURE: 118 MMHG | BODY MASS INDEX: 26.83 KG/M2

## 2022-07-11 DIAGNOSIS — R07.81 RIB PAIN: ICD-10-CM

## 2022-07-11 DIAGNOSIS — R06.02 SOB (SHORTNESS OF BREATH): Primary | ICD-10-CM

## 2022-07-11 PROCEDURE — 99207 PR NO CHARGE LOS: CPT | Performed by: PHYSICIAN ASSISTANT

## 2022-07-11 NOTE — PROGRESS NOTES
SUBJECTIVE:   Peggy Jessica is a 47 year old female presenting with a chief complaint of   Chief Complaint   Patient presents with     Urgent Care     Rib Pain     C/O rib pain for 1 month       She is an established patient of [facility].  Patient presents with rib pain and SOB.  After being kneed in ribs during a concer on 7/8.  Patient has a very complicated history and is already on oxycodone.  She states is not touching her pain.          Review of Systems    Past Medical History:   Diagnosis Date     BRCA positive      Cervical cancer (H) 2002     CIDP (chronic inflammatory demyelinating polyneuropathy) (H)      Complex regional pain syndrome type 1 of right lower extremity      Family History   Problem Relation Age of Onset     Kidney Disease Father      Current Outpatient Medications   Medication Sig Dispense Refill     acetaminophen (TYLENOL) 325 MG tablet Take 650 mg by mouth       bisacodyl (DULCOLAX) 10 MG suppository Place 10 mg rectally       cyclobenzaprine (FLEXERIL) 10 MG tablet Take 1 tablet (10 mg) by mouth 3 times daily       LORazepam (ATIVAN) 1 MG tablet TAKE 1-2 TABLETS (1-2 MG) BY MOUTH ONE TIME AS NEEDED (30 MINUTES PRIOR TO MRI). 2 tablet 0     medical cannabis (Patient's own supply) 1 Dose See Admin Instructions (The purpose of this order is to document that the patient reports taking medical cannabis.  This is not a prescription, and is not used to certify that the patient has a qualifying medical condition.)  Per pt: 5-10 mg TID PRN       multivitamin w/minerals (THERA-VIT-M) tablet Take 1 tablet by mouth [employer]       ondansetron (ZOFRAN-ODT) 8 MG ODT tab Take 1 tablet (8 mg) by mouth every 8 hours as needed for nausea  0     oxyCODONE (ROXICODONE) 5 MG tablet 2 times daily       oxyCODONE-acetaminophen (PERCOCET) 5-325 MG tablet TAKE 1 TO 2 TABLETS BY MOUTH EVERY 4 HOURS AS NEEDED       polyethylene glycol (MIRALAX) 17 GM/Dose powder        senna-docusate  (SENOKOT-S/PERICOLACE) 8.6-50 MG tablet Take 1 tablet by mouth 2 times daily       Social History     Tobacco Use     Smoking status: Former Smoker     Types: Cigarettes     Smokeless tobacco: Never Used   Substance Use Topics     Alcohol use: Not Currently       OBJECTIVE  /86   Pulse 102   Temp 98.1  F (36.7  C) (Tympanic)   Wt 72.6 kg (160 lb)   SpO2 97%   BMI 26.83 kg/m      Physical Exam     Patient sent to ED for evaluation.  Concerns of PE, rib fracture and pain control.  Patient left in stable condition.

## 2022-07-12 ENCOUNTER — APPOINTMENT (OUTPATIENT)
Dept: CT IMAGING | Facility: CLINIC | Age: 47
End: 2022-07-12
Attending: EMERGENCY MEDICINE
Payer: COMMERCIAL

## 2022-07-12 ENCOUNTER — HOSPITAL ENCOUNTER (EMERGENCY)
Facility: CLINIC | Age: 47
Discharge: HOME | End: 2022-07-12
Attending: EMERGENCY MEDICINE | Admitting: EMERGENCY MEDICINE
Payer: COMMERCIAL

## 2022-07-12 ENCOUNTER — TELEPHONE (OUTPATIENT)
Dept: FAMILY MEDICINE | Facility: CLINIC | Age: 47
End: 2022-07-12

## 2022-07-12 VITALS
BODY MASS INDEX: 27.31 KG/M2 | TEMPERATURE: 98 F | SYSTOLIC BLOOD PRESSURE: 142 MMHG | HEART RATE: 112 BPM | RESPIRATION RATE: 16 BRPM | DIASTOLIC BLOOD PRESSURE: 100 MMHG | OXYGEN SATURATION: 98 % | WEIGHT: 160 LBS | HEIGHT: 64 IN

## 2022-07-12 DIAGNOSIS — G61.81 CIDP (CHRONIC INFLAMMATORY DEMYELINATING POLYNEUROPATHY) (H): Primary | ICD-10-CM

## 2022-07-12 DIAGNOSIS — S20.211A CONTUSION OF RIGHT CHEST WALL, INITIAL ENCOUNTER: ICD-10-CM

## 2022-07-12 PROCEDURE — 99284 EMERGENCY DEPT VISIT MOD MDM: CPT | Mod: 25

## 2022-07-12 PROCEDURE — 99282 EMERGENCY DEPT VISIT SF MDM: CPT | Performed by: EMERGENCY MEDICINE

## 2022-07-12 PROCEDURE — 71250 CT THORAX DX C-: CPT | Mod: 26 | Performed by: RADIOLOGY

## 2022-07-12 PROCEDURE — 71250 CT THORAX DX C-: CPT

## 2022-07-12 ASSESSMENT — ENCOUNTER SYMPTOMS
BACK PAIN: 1
SHORTNESS OF BREATH: 0
GASTROINTESTINAL NEGATIVE: 1
CHEST TIGHTNESS: 1
CONSTITUTIONAL NEGATIVE: 1

## 2022-07-12 NOTE — TELEPHONE ENCOUNTER
Nurse with [employer] called to say patient is no longer working with her neurologist Dr Blanton at the [facility]. And asking if you can send another referral but to [facility] neurologist. Also patient has pending orders, for wheelchair, she's wondering if you she can send you forms from [facility] for you to fill out if you can.       DESIRE Garzon  with [employer] [phone]
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
## 2022-07-13 ENCOUNTER — TELEPHONE (OUTPATIENT)
Dept: NEUROLOGY | Facility: CLINIC | Age: 47
End: 2022-07-13

## 2022-07-13 NOTE — TELEPHONE ENCOUNTER
Forms were received from [facility] for patients wheelchair. Forms were signed by Dr. Duenas and faxed.     Called Cristopher at [facility] who confirmed receipt of the fax and that there is nothing else needed from Dr. Duenas.

## 2022-07-14 NOTE — TELEPHONE ENCOUNTER
Spoke with, RN Case manager on 7/14/22 8:50 AM. All questions/concerns addressed.    Initiated Telephone referral placed with Amira of [facility].     MIRA Addison, CNP  [facility] [employer]

## 2022-07-20 ENCOUNTER — APPOINTMENT (OUTPATIENT)
Dept: CT IMAGING | Facility: CLINIC | Age: 47
End: 2022-07-20
Attending: NURSE PRACTITIONER
Payer: COMMERCIAL

## 2022-07-20 ENCOUNTER — LAB (OUTPATIENT)
Dept: LAB | Facility: CLINIC | Age: 47
End: 2022-07-20
Payer: COMMERCIAL

## 2022-07-20 ENCOUNTER — NURSE TRIAGE (OUTPATIENT)
Dept: NURSING | Facility: CLINIC | Age: 47
End: 2022-07-20

## 2022-07-20 ENCOUNTER — HOSPITAL ENCOUNTER (EMERGENCY)
Facility: CLINIC | Age: 47
Discharge: HOME | End: 2022-07-20
Attending: EMERGENCY MEDICINE | Admitting: EMERGENCY MEDICINE
Payer: COMMERCIAL

## 2022-07-20 ENCOUNTER — OFFICE VISIT (OUTPATIENT)
Dept: FAMILY MEDICINE | Facility: CLINIC | Age: 47
End: 2022-07-20
Payer: COMMERCIAL

## 2022-07-20 VITALS
HEART RATE: 85 BPM | RESPIRATION RATE: 17 BRPM | SYSTOLIC BLOOD PRESSURE: 118 MMHG | WEIGHT: 160 LBS | OXYGEN SATURATION: 97 % | BODY MASS INDEX: 27.31 KG/M2 | HEIGHT: 64 IN | DIASTOLIC BLOOD PRESSURE: 86 MMHG | TEMPERATURE: 98.2 F

## 2022-07-20 VITALS
HEART RATE: 89 BPM | TEMPERATURE: 97.9 F | RESPIRATION RATE: 18 BRPM | SYSTOLIC BLOOD PRESSURE: 113 MMHG | OXYGEN SATURATION: 99 % | DIASTOLIC BLOOD PRESSURE: 96 MMHG

## 2022-07-20 DIAGNOSIS — E53.8 FOLIC ACID DEFICIENCY: ICD-10-CM

## 2022-07-20 DIAGNOSIS — M79.89 SWELLING OF BOTH UPPER EXTREMITIES: ICD-10-CM

## 2022-07-20 DIAGNOSIS — M54.9 DORSALGIA: ICD-10-CM

## 2022-07-20 DIAGNOSIS — E04.1 THYROID NODULE: Primary | ICD-10-CM
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
## 2022-07-20 DIAGNOSIS — E04.1 THYROID NODULE: ICD-10-CM

## 2022-07-20 DIAGNOSIS — M25.551 HIP PAIN, RIGHT: ICD-10-CM

## 2022-07-20 DIAGNOSIS — R13.10 DIFFICULTY SWALLOWING SOLIDS: ICD-10-CM

## 2022-07-20 LAB
ALBUMIN SERPL-MCNC: 3.3 G/DL (ref 3.4–5)
ALP SERPL-CCNC: 101 U/L (ref 40–150)
ALT SERPL W P-5'-P-CCNC: 19 U/L (ref 0–50)
ANION GAP SERPL CALCULATED.3IONS-SCNC: 4 MMOL/L (ref 3–14)
AST SERPL W P-5'-P-CCNC: 21 U/L (ref 0–45)
BILIRUB SERPL-MCNC: 0.3 MG/DL (ref 0.2–1.3)
BUN SERPL-MCNC: 5 MG/DL (ref 7–30)
CALCIUM SERPL-MCNC: 9.3 MG/DL (ref 8.5–10.1)
CHLORIDE BLD-SCNC: 106 MMOL/L (ref 94–109)
CO2 SERPL-SCNC: 30 MMOL/L (ref 20–32)
CREAT SERPL-MCNC: 0.76 MG/DL (ref 0.52–1.04)
ERYTHROCYTE [DISTWIDTH] IN BLOOD BY AUTOMATED COUNT: 12.1 % (ref 10–15)
FOLATE SERPL-MCNC: 3.4 NG/ML (ref 4.6–34.8)
GFR SERPL CREATININE-BSD FRML MDRD: >90 ML/MIN/1.73M2
GLUCOSE BLD-MCNC: 94 MG/DL (ref 70–99)
HCT VFR BLD AUTO: 41.5 % (ref 35–47)
HGB BLD-MCNC: 13.6 G/DL (ref 11.7–15.7)
MCH RBC QN AUTO: 31 PG (ref 26.5–33)
MCHC RBC AUTO-ENTMCNC: 32.8 G/DL (ref 31.5–36.5)
MCV RBC AUTO: 95 FL (ref 78–100)
PLATELET # BLD AUTO: 272 10E3/UL (ref 150–450)
POTASSIUM BLD-SCNC: 4.6 MMOL/L (ref 3.4–5.3)
PROT SERPL-MCNC: 7.5 G/DL (ref 6.8–8.8)
RBC # BLD AUTO: 4.39 10E6/UL (ref 3.8–5.2)
SODIUM SERPL-SCNC: 140 MMOL/L (ref 133–144)
TSH SERPL DL<=0.005 MIU/L-ACNC: 1.38 MU/L (ref 0.4–4)
VIT B12 SERPL-MCNC: 406 PG/ML (ref 193–986)
WBC # BLD AUTO: 5.5 10E3/UL (ref 4–11)

## 2022-07-20 PROCEDURE — 84443 ASSAY THYROID STIM HORMONE: CPT | Performed by: PATHOLOGY

## 2022-07-20 PROCEDURE — 72131 CT LUMBAR SPINE W/O DYE: CPT | Mod: 26 | Performed by: RADIOLOGY

## 2022-07-20 PROCEDURE — 80053 COMPREHEN METABOLIC PANEL: CPT | Performed by: PATHOLOGY

## 2022-07-20 PROCEDURE — 82746 ASSAY OF FOLIC ACID SERUM: CPT | Mod: 90 | Performed by: PATHOLOGY

## 2022-07-20 PROCEDURE — 82607 VITAMIN B-12: CPT | Performed by: PATHOLOGY

## 2022-07-20 PROCEDURE — 96374 THER/PROPH/DIAG INJ IV PUSH: CPT

## 2022-07-20 PROCEDURE — 250N000013 HC RX MED GY IP 250 OP 250 PS 637: Performed by: EMERGENCY MEDICINE

## 2022-07-20 PROCEDURE — 73700 CT LOWER EXTREMITY W/O DYE: CPT | Mod: 26 | Performed by: RADIOLOGY

## 2022-07-20 PROCEDURE — 99000 SPECIMEN HANDLING OFFICE-LAB: CPT | Performed by: PATHOLOGY

## 2022-07-20 PROCEDURE — 36415 COLL VENOUS BLD VENIPUNCTURE: CPT | Performed by: PATHOLOGY

## 2022-07-20 PROCEDURE — 85027 COMPLETE CBC AUTOMATED: CPT | Performed by: PATHOLOGY

## 2022-07-20 PROCEDURE — 99284 EMERGENCY DEPT VISIT MOD MDM: CPT | Mod: 25

## 2022-07-20 PROCEDURE — 72131 CT LUMBAR SPINE W/O DYE: CPT

## 2022-07-20 PROCEDURE — 250N000011 HC RX IP 250 OP 636: Performed by: NURSE PRACTITIONER

## 2022-07-20 PROCEDURE — 99284 EMERGENCY DEPT VISIT MOD MDM: CPT | Performed by: EMERGENCY MEDICINE

## 2022-07-20 PROCEDURE — 99214 OFFICE O/P EST MOD 30 MIN: CPT | Performed by: NURSE PRACTITIONER

## 2022-07-20 PROCEDURE — 250N000013 HC RX MED GY IP 250 OP 250 PS 637: Performed by: NURSE PRACTITIONER

## 2022-07-20 PROCEDURE — 73700 CT LOWER EXTREMITY W/O DYE: CPT | Mod: RT

## 2022-07-20 RX ORDER — OXYCODONE HYDROCHLORIDE 5 MG/1
5 TABLET ORAL ONCE
Status: COMPLETED | OUTPATIENT
Start: 2022-07-20 | End: 2022-07-20

## 2022-07-20 RX ORDER — ONDANSETRON 2 MG/ML
8 INJECTION INTRAMUSCULAR; INTRAVENOUS ONCE
Status: COMPLETED | OUTPATIENT
Start: 2022-07-20 | End: 2022-07-20

## 2022-07-20 RX ORDER — OXYCODONE HYDROCHLORIDE 5 MG/1
5 TABLET ORAL EVERY 6 HOURS PRN
Qty: 12 TABLET | Refills: 0 | Status: SHIPPED | OUTPATIENT
Start: 2022-07-20 | End: 2022-07-23

## 2022-07-20 RX ORDER — ONDANSETRON 4 MG/1
4 TABLET, FILM COATED ORAL EVERY 8 HOURS PRN
Qty: 15 TABLET | Refills: 0 | Status: ON HOLD | OUTPATIENT
Start: 2022-07-20 | End: 2022-10-21

## 2022-07-20 RX ADMIN — ONDANSETRON 8 MG: 2 INJECTION INTRAMUSCULAR; INTRAVENOUS at 18:56

## 2022-07-20 RX ADMIN — OXYCODONE HYDROCHLORIDE 5 MG: 5 TABLET ORAL at 21:30

## 2022-07-20 RX ADMIN — OXYCODONE HYDROCHLORIDE 5 MG: 5 TABLET ORAL at 18:56

## 2022-07-20 ASSESSMENT — ENCOUNTER SYMPTOMS
CONSTIPATION: 1
FATIGUE: 1
COUGH: 0
PARESTHESIAS: 1
SORE THROAT: 1
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
## 2022-07-20 NOTE — LETTER
Patient:  Peggy Jessica  :   1975  MRN:     [id number]        Ms. Peggy Jessica  [address] [address]        2022    Re: Peggy Jessica (1975)    To Whom This May Concern:    Peggy Jessica (1975) is a patient of the [facility] system. I became her primary care provider on 2022. She was diagnosed with Chronic Immune Sensory Polyradiculopathy (CISP) in the Fall of  due to gradual sensory changes and increased falls. She has been under the care of Dr. Jon Duenas of our Neurology team. Her CISP was previously treated with IVIG infusions, however, in the spring of 2022, the IVIG were less effective and poorly tolerated and were subsequently discontinued. She has had an increase in falls recently and requested a referral to the [facility] for further evaluation.    If you have any other questions, please do not hesitate to contact my office.    Sincerely,       MIRA Addison, CNP  [facility] [employer]

## 2022-07-20 NOTE — LETTER
Patient:  Peggy Jessica  :   1975  MRN:     [id number]        Ms. Peggy Jessica  [address] [address]        2022      Re: Peggy Jessica (1975)    To Whom This May Concern:    Peggy Jessica (1975) is a patient of the [facility] system. I became her primary care provider on 2022. She was diagnosed with Chronic Immune Sensory Polyradiculopathy (CISP) in the Fall of  due to gradual sensory changes and increased falls. She has been under the care of Dr. Jon Duenas of our Neurology team. Her CISP was previously treated with IVIG infusions, however, in the spring of 2022, the IVIG infusions were less effective and were subsequently discontinued. She has had an increase in falls recently and requested a referral to the [facility] for further evaluation.    If you have any other questions, please do not hesitate to contact my office.    Sincerely,       MIRA Addison, CNP  [facility] [employer]

## 2022-07-20 NOTE — ED TRIAGE NOTES
ED Triage Provider Note  [facility]  Encounter Date: Jul 20, 2022    History:  Chief Complaint   Patient presents with     Back Pain     Peggy Jessica is a 47 year old female with a PMH significant for CIDP, fibromyalgia, migraine,  bilateral mastectomy for BRCA mutation, who presents to the ED after a fall at home. Patient reports she was having difficulty walking, went to a doctors appt, when got home went to clean up dog urine in the house and fell while using walker. Fell onto right hip, back, leg, arm. Very uncomfortable while sitting. Uncertain if hit head. Right arm sore no specific points of acute pain. Most pain is in right low back and right hip. Unable to put weight on right leg due to shooting pain down leg and up back. History of right hip dislocation, right hip labral tare, and right sided rib fracture in early June 2022. Following with [facility] monitoring for avascular necrosis of right hip (based on previous history of avascular necrosis).     Review of Systems:  Musc: positive for right low back pain, right hip pain, right leg pain, right arm discomfort  Resp: negative for shortness of breath     Exam:  BP (!) 113/96   Pulse 89   Temp 97.9  F (36.6  C) (Oral)   Resp 18   SpO2 99%   General: No acute distress. Appears stated age.   Cardio: Regular rate, extremities well perfused  Resp: Normal work of breathing, grossly normal respiratory rate  Neuro: Alert. CN II-XII grossly intact. Grossly intact strength.   Musc: limited ROM right hip, uncertain if due to pain    Procedure note:      Peripheral Venous Access   Performed by MIRA Thakkar CNP  Patient Identity confirmed: Yes  Risks, benefits and alternatives were discussed  Consent given by: Patient  Indication for Exam: Vascular Access   Vein/Location: right AC  Catheter Size: 20g  Number of Attempts: 1  Successful Catheter Insertion: yes  Complications: none    Medical Decision  Making:  Patient arriving to the ED with problem as above. A medical screening exam was performed. IV placed, CT lumbar/hip orders initiated from Triage. The patient is appropriate to wait in triage.      MIRA Thakkar CNP on 7/20/2022 at 4:43 PM

## 2022-07-20 NOTE — TELEPHONE ENCOUNTER
Covid screening completed already    Fell today at noon on to right side and butt.    Cannot put weight on right side, hard to move leg or sit.    Per protocol needs to call 911.  She only agrees to get a ride to the ER    Gwen Hua RN  [facility] Nurse Advisor      Reason for Disposition    Weakness (i.e., paralysis, loss of muscle strength) of the leg(s) or foot and sudden onset after back injury    Additional Information    Negative: Dangerous mechanism of injury (e.g., MVA, contact sports, trampoline, diving, fall > 10 feet or 3 meters) (Exception: back pain began > 1 hour after injury)    Protocols used: BACK INJURY-A-OH

## 2022-07-20 NOTE — NURSING NOTE
Chief Complaint   Patient presents with     nodule on thyroid     Found in recent CT scan     having trouble walking     Pharyngitis     Swelling     Fingers and legs

## 2022-07-20 NOTE — ED TRIAGE NOTES
While the patient was at home she slipped in water while using her walker; patient landed on her right side, with the majority of the fall landing on her back. The patient c/o right wrist, elbow, shoulder and right sided back pain. Patient is able to walk or sit due to the pain      Triage Assessment     Row Name 07/20/22 1553       Triage Assessment (Adult)    Airway WDL WDL       Respiratory WDL    Respiratory WDL WDL       Skin Circulation/Temperature WDL    Skin Circulation/Temperature WDL WDL       Cardiac WDL    Cardiac WDL WDL       Peripheral/Neurovascular WDL    Peripheral Neurovascular WDL WDL       Cognitive/Neuro/Behavioral WDL    Cognitive/Neuro/Behavioral WDL WDL

## 2022-07-21 ENCOUNTER — TELEPHONE (OUTPATIENT)
Dept: GASTROENTEROLOGY | Facility: CLINIC | Age: 47
End: 2022-07-21

## 2022-07-21 ENCOUNTER — TELEPHONE (OUTPATIENT)
Dept: PHYSICAL THERAPY | Facility: CLINIC | Age: 47
End: 2022-07-21

## 2022-07-21 ENCOUNTER — PATIENT OUTREACH (OUTPATIENT)
Dept: CARE COORDINATION | Facility: CLINIC | Age: 47
End: 2022-07-21

## 2022-07-21 ENCOUNTER — TELEPHONE (OUTPATIENT)
Dept: FAMILY MEDICINE | Facility: CLINIC | Age: 47
End: 2022-07-21

## 2022-07-21 RX ORDER — HYDROCODONE BITARTRATE AND ACETAMINOPHEN 5; 325 MG/1; MG/1
1 TABLET ORAL EVERY 6 HOURS PRN
Qty: 12 TABLET | Refills: 0 | Status: SHIPPED | OUTPATIENT
Start: 2022-07-21 | End: 2022-07-21

## 2022-07-21 RX ORDER — LANOLIN ALCOHOL/MO/W.PET/CERES
400 CREAM (GRAM) TOPICAL DAILY
Qty: 90 TABLET | Refills: 0 | Status: SHIPPED | OUTPATIENT
Start: 2022-07-21

## 2022-07-21 NOTE — ED PROVIDER NOTES
Received call from patient's significant other that insurance will not allow a refill of oxycodone as she takes oxycodone 5 mg twice daily chronically.  She does not have anything for breakthrough pain.  Previously prescribed oxycodone prescription was returned and she was given a total of 8 tabs of Norco to use for breakthrough pain and instructed to not take in combination with home oxycodone as this may cause sedation.  Significant other voiced understanding.     Moni Pathak MD  07/21/22 6018

## 2022-07-21 NOTE — RESULT ENCOUNTER NOTE
Grayson Cummins,    Unfortunately, Peggy had another fall. I'll see if one of the Medical assistants can call [facility] to discuss referral for home health safety assessment.    Thanks,  Neftali

## 2022-07-21 NOTE — TELEPHONE ENCOUNTER
Pre assessment questions completed for upcoming EGD procedure scheduled on 7.28.22    Discussed at home rapid antigen COVID test 1-2 days prior to procedure.    Reviewed procedural arrival time 0830 and facility location [facility].    Designated  policy reviewed. Instructed to have someone stay 24 hours post procedure.     Anticoagulation/blood thinners? No    Electronic implanted devices? No    Reviewed EGD prep instructions with patient.     Patient verbalized understanding and had no questions or concerns at this time.    Isabel Mondragon RN

## 2022-07-21 NOTE — TELEPHONE ENCOUNTER
Attempted to call the home health care and they did not answer. I LVM with information and our number to give us a call back.   Avis ROSARIO EMT 9:09 AM 7/21/2022

## 2022-07-21 NOTE — ED PROVIDER NOTES
History     Chief Complaint   Patient presents with     Back Pain     HPI  Peggy Jessica is a 47 year old female with a past medical history of fibromyalgia, BRCA mutation status post bilateral mastectomy, cervical cancer who presents to the emergency department with a chief complaint of back pain and right hip pain.  The patient reports that she fell at home due to some difficulty walking.  She was using a walker at the time.  She landed on her right hip/back/leg/arm.  Patient uncertain if she hit her head.  Worst pain is in right lower back and hip.  Pain worsens with weightbearing on the affected leg.  Patient does have a history of right hip dislocation, labral tear, right-sided rib fracture in June 2022.  The patient is following with [facility] orthopedics monitoring for avascular necrosis of the right hip as she has a previous history of avascular necrosis.  She has been able to ambulate with some assistance, she does feel that if her work-up today is negative, she would be safe to discharge home if she has adequate resources/assistance at home to manage her ADLs.    I have reviewed the Medications, Allergies, Past Medical and Surgical History, and Social History in the [facility] system.    Past Medical History:   Diagnosis Date     BRCA positive      Cervical cancer (H) 2002     CIDP (chronic inflammatory demyelinating polyneuropathy) (H)      Complex regional pain syndrome type 1 of right lower extremity      Past Surgical History:   Procedure Laterality Date     c section      2002     CHOLECYSTECTOMY  1997     HYSTERECTOMY  2004     MASTECTOMY Bilateral 2020     oopharectomy Bilateral 2019     No current facility-administered medications for this encounter.     Current Outpatient Medications   Medication     acetaminophen (TYLENOL) 325 MG tablet     bisacodyl (DULCOLAX) 10 MG suppository     cyclobenzaprine (FLEXERIL) 10 MG tablet     LORazepam (ATIVAN) 1 MG tablet     medical cannabis (Patient's own  supply)     multivitamin w/minerals (THERA-VIT-M) tablet     ondansetron (ZOFRAN-ODT) 8 MG ODT tab     oxyCODONE (ROXICODONE) 5 MG tablet     oxyCODONE-acetaminophen (PERCOCET) 5-325 MG tablet     polyethylene glycol (MIRALAX) 17 GM/Dose powder     senna-docusate (SENOKOT-S/PERICOLACE) 8.6-50 MG tablet     Allergies   Allergen Reactions     Dihydroergotamine Anaphylaxis     Latex Anaphylaxis     Shellfish-Derived Products Anaphylaxis     Sumatriptan Anaphylaxis     Banana Unknown     Gabapentin Dizziness     Gluten Meal Other (See Comments)     Celiac disease     Keppra [Levetiracetam] Nausea and Vomiting     Kiwi Unknown     Levofloxacin Other (See Comments)     Arrhythmia     Metronidazole Nausea and Vomiting     Nitrofurantoin Hives     Penicillins Hives     Pregabalin      Topiramate Visual Disturbance     Aspirin Rash     Methadone Rash     Risperidone Anxiety     Past medical history, past surgical history, medications, and allergies were reviewed with the patient. Additional pertinent items: None    Social History     Socioeconomic History     Marital status:      Spouse name: Not on file     Number of children: 2     Years of education: Not on file     Highest education level: Not on file   Occupational History     Not on file   Tobacco Use     Smoking status: Former Smoker     Types: Cigarettes     Smokeless tobacco: Never Used   Vaping Use     Vaping Use: Never used   Substance and Sexual Activity     Alcohol use: Not Currently     Drug use: Yes     Types: Marijuana     Comment: Medical Can[facility]is patient     Sexual activity: Not Currently     Partners: Male   Other Topics Concern     Not on file   Social History Narrative     Not on file     Social Determinants of Health     Financial Resource Strain: Not on file   Food Insecurity: Not on file   Transportation Needs: Not on file   Physical Activity: Not on file   Stress: Not on file   Social Connections: Not on file   Intimate Partner Violence: Not on  file   Housing Stability: Not on file     Social history was reviewed with the patient. Additional pertinent items: None    Review of Systems  General: No fevers or chills  Skin: No rash or diaphoresis  Eyes: No eye redness or discharge  Ears/Nose/Throat: No rhinorrhea or nasal congestion  Respiratory: No cough or SOB  Cardiovascular: No chest pain or palpitations  Gastrointestinal: No nausea, vomiting, or diarrhea  Genitourinary: No urinary frequency, hematuria, or dysuria  Musculoskeletal: See HPI  Neurologic: No numbness or weakness  Psychiatric: No depression or SI  Hematologic/Lymphatic/Immunologic: No leg swelling, no easy bruising/bleeding/no blood thinners  Endocrine: No polyuria/polydypsia    A complete review of systems was performed with pertinent positives and negatives noted in the HPI, and all other systems negative.    Physical Exam   BP: (!) 113/96  Pulse: 89  Temp: 97.9  F (36.6  C)  Resp: 18  SpO2: 99 %      General: Well nourished, well developed, NAD  HEENT: EOMI, anicteric. NCAT, MMM  Neck: no jugular venous distension, supple, nl ROM  Cardiac: Regular rate, extremities appear well-perfused  Pulm: Airway patent, nonlabored breathing, normal respiratory rate  Skin: Warm and dry to the touch.  No rash  Extremities: No LE edema, no cyanosis, w/w/p, limited range of motion right hip due to pain, distally neurovascularly intact, tenderness over right lumbar back and lateral right hip, no focal areas of tenderness over bilateral arms or left leg  Neuro: A&Ox3, no gross focal deficits    ED Course        Procedures                          Labs Ordered and Resulted from Time of ED Arrival to Time of ED Departure - No data to display         Results for orders placed or performed during the hospital encounter of 07/20/22 (from the past 24 hour(s))   Lumbar spine CT w/o contrast    Narrative    CT LUMBAR SPINE W/O CONTRAST 7/20/2022 6:01 PM    History: fall onto right side, right sided low back pain  radiating  down right leg and up into right back, recent right side rib fx June.    Comparison: MRI 4/30/2022.    Technique: Using multidetector thin collimation helical acquisition  technique, axial, coronal and sagittal CT images through the lumbar  spine were obtained without intravenous contrast.     Findings: There are 5 lumbar type vertebrae. Regarding alignment, the  lumbar spine alignment appears preserved. There is mild disc space  narrowing at L5-S1 with intradiscal gas. Mild straightening of the  expected lumbar lordosis. No definite lesions are noted within the  vertebra. No evidence for acute fracture. Findings on a level by level  basis are as follows:    L1-L2: No spinal canal or neuroforaminal stenosis.    L2-L3: No spinal canal or neuroforaminal stenosis.    L3-L4: Circumferential disc bulge . No significant spinal canal or  neural foraminal stenosis.    L4-L5: Circumferential disc bulge facet arthropathy bilaterally.  Ligamentum flavum thickening. Mild bilateral neural foraminal  stenosis. Mild spinal canal stenosis.    L5-S1: Posterior disc osteophyte complex and bilateral facet  arthropathy. Mild bilateral neural foraminal stenosis. No significant  spinal canal stenosis.    The visualized adjacent paraspinous tissues are grossly within normal  limits.      Impression    Impression:    1. No evidence for acute fracture or traumatic subluxation of the  lumbar spine.  2. Mild multilevel degenerative changes of the lumbar spine, most  pronounced at L5-S1. No high-grade spinal canal or neural foraminal  stenosis at any level.    I have personally reviewed the examination and initial interpretation  and I agree with the findings.    VANDANA LOWRY MD         SYSTEM ID:  [id number]   CT Hip Right w/o Contrast    Narrative    EXAM: CT HIP RIGHT W/O CONTRAST  LOCATION: [facility]  DATE/TIME: 7/20/2022 5:48 PM    INDICATION: Fall onto right side, right sided  low back and hip pain radiating down right leg and up into right back.  COMPARISON: None.  TECHNIQUE: Noncontrast. Axial, sagittal and coronal thin-section reconstruction. Dose reduction techniques were used.  Initially the left hip was inadvertently scanned. The patient then returned for imaging of the right hip.    FINDINGS:     BONES:  -No fracture. Mild degenerative changes in the right hip. Mild degenerative changes in the left hip. No evidence of AVN. Advanced degenerative disc disease changes at the L5-S1 interspace.    SOFT TISSUES:  -No muscle atrophy. No soft tissue edema. No inguinal adenopathy. Visualized pelvic intraperitoneal contents normal.      Impression    IMPRESSION:  1.  No evidence of acute injury.    2.  Mild degenerative changes in both hips.    3.  Advanced multilevel degenerative disc disease changes at the L5-S1 interspace.         Labs, vital signs, and imaging studies were reviewed by me.    Medications   ondansetron (ZOFRAN) injection 8 mg (8 mg Intravenous Given 7/20/22 1856)   oxyCODONE (ROXICODONE) tablet 5 mg (5 mg Oral Given 7/20/22 1856)       Assessments & Plan (with Medical Decision Making)   Peggy Jessica is a 47 year old female who presents to the emergency department with right hip pain and back pain after a fall.  Differential diagnosis includes muscle strain, contusion, vertebral fracture, hip fracture, pelvic fracture, avascular necrosis, radiculopathy.  CTs ordered to further evaluate the patient.    Medications were given for symptomatic relief in the emergency department.  Patient states that these did help.    CT L spine shows NAD.     CT R hip shows no acute traumatic injury    I have reviewed the nursing notes.    I have reviewed the findings, diagnosis, plan and need for follow up with the patient.    Patient to be discharged home. Advised to follow up with PCP within 1 week. To return to ER immediately with any new/worsening symptoms. Plan of care discussed  with patient who expresses understanding and agrees with plan of care.    Prescription for medications for symptomatic relief at home were provided to the patient.    New Prescriptions    ONDANSETRON (ZOFRAN) 4 MG TABLET    Take 1 tablet (4 mg) by mouth every 8 hours as needed    OXYCODONE (ROXICODONE) 5 MG TABLET    Take 1 tablet (5 mg) by mouth every 6 hours as needed for pain       Final diagnoses:   Fall, initial encounter   Hip pain, right     Smita Perla MD  7/20/2022   [facility] EMERGENCY DEPARTMENT     Smita Perla MD  07/20/22 5783

## 2022-07-21 NOTE — PROGRESS NOTES
Social Work Telephone Message Note  [facility]     Patient Name:  Peggy Jessica  /Age:  1975 (47 year old)    Referral Source: Andrew Peck CNP  Reason for Referral:  Attaining physical therapy     contacted Patient via telephone on 22. Sw was consulted to connect with patient regarding safety in home and additional support.    Called and spoke with Peggy and she shared that she continually has falls and has been wanting home care services for quite sometime and nothing has started.  Writer explained the insurance requirements for home bound status.  Peggy stated that she isn't able to restart out patient PT or OT without completing a home safety eval.   Writer explained to Peggy that I would contact previous out patient therapy team for better understanding of this. Patient also expressed frustrations with getting a wheelchair, which has taken several months    Spoke with Maegan Hollis DPT whom shared that this isn't required and wasn't sure where patient came to this understanding.  Also informed her that the patient still doesn't have her wheelchair.  Maegan stated that she would notify her  to reach out to patient and get her scheduled for future appointments, as well as, look into status of wheelchair.     Reconnected with Peggy to inform her of plan to move forward with PT appointments and to anticipate a call to schedule.  Also discussed with patient options of hiring in services to assist her throughout the day.  Peggy shared that she doesn't feel safe at home due to frequent falls. Discussed the importance of being cognizant to her abilities and making wise choices throughout her day in order for her to remain safe.  Also dicussed that falling maybe something that happens, the key thing is what can she do to less those falls.  Writer discussed options for  to support her emotionally.  Peggy shared that she has [place] provider [place]  [facility] [place] that wasn't a positive experience, so not interested in doing that again.    Sw encouraged her to connect again with additional concerns.  Sw will continue to assist as needed.          ANGI Gautam, [facility]    [facility]  [phone]  [email or web address]

## 2022-07-21 NOTE — TELEPHONE ENCOUNTER
PT was contacted by Ghazala from social work regarding therapy needs for Peggy.    I reached out to Peggy via telephone to assess current status and needs. Patient reports she was told numerous times that she could not return to outpatient therapies until her home assessment was completed. Educated patient since she did not qualify for home care, she should return to outpatient PT and OT visits. Patient is still waiting from wheelchair from [facility] (paperwork completed, but awaiting chair)    Patient continues to ask why she cannot receive more help as she is having a lot of difficulty at home. Followed back up with Ghazala from social work regarding assist options.    I ended conversation with recommendations for continued therapy for safety functional mobility and decrease fall risk.     Maegan Hollis PT, DPT  Physical Therapist  [facility] - [address]  [address] [place] [address]  [email or web address]  Appointments: [phone]

## 2022-07-21 NOTE — TELEPHONE ENCOUNTER
Screening Questions    BlueKIND OF PREP RedLOCATION [review exclusion criteria] GreenSEDATION TYPE      1. Are you active on mychart? Y    2. What insurance is in the chart? MEDICA ESSENTIAL      3.   Ordering/Referring Provide: Andrew Peck APRN CNP    4. BMI   (If greater than 40 review exclusion criteria [PAC APPT IF [MAC] @ [facility])  27.5  [If yes, BMI OVER 40-EXTENDED PREP]      **(Sedation review/consideration needed)**  Do you have a legal guardian or Medical Power of    and/or are you able to give consent for your medical care?     Y    5. Have you had a positive covid test in the last 90 days?   N - N    6.  Are you currently on dialysis?   N [ If yes, G-PREP & HOSPITAL setting ONLY]     7.  Do you have chronic kidney disease?  N [ If yes, G-PREP ]    8.   Do you have a diagnosis of diabetes?   N   [ If yes, G-PREP ]    9.  On a regular basis do you go 3-5 days between bowel movements?      [ If yes, EXTENDED PREP]    10.  Are you taking any prescription pain medications on a routine schedule?    Y - Y [ If yes, EXTENDED PREP] [If yes, MAC]      11.   Do you have any chemical dependencies such as alcohol, street drugs, or methadone?    Y CANNIBIS PRESCRIBED [If yes, MAC]    12.   Do you have any history of post-traumatic stress syndrome, severe anxiety or history of psychosis?    Y  [If yes, MAC]    13.  [FEMALES] Are you currently pregnant? N    If yes, how many weeks?       Respiratory/Heart Screening:  [If yes to any of the following HOSPITAL setting only]     14. Do you have Pulmonary Hypertension [Lungs]?   N       15. Do you have UNCONTROLLED asthma?   N     16.  Do you use daily home oxygen?  N      17. Do you have mod to severe Obstructive Sleep Apnea?         (OKAY @ [facility]  [facility]  SH  PH  RI  MG - if pt is not on OXYGEN)  N      18.   Have you had a heart or lung transplant?   N      19.   Have you had a stroke or Transient ischemic attack (TIA - aka  mini stroke ) within 6 months?   (If yes, please review exclusion criteria)  N     20.   In the past 6 months, have you had any heart related issues including cardiomyopathy or heart attack?   N           If yes, did it require cardiac stenting or other implantable device?   N      21.   Do you have any implantable devices in your body (pacemaker, defib, LVAD)? (If yes, please review exclusion criteria)  N     22. Do you take nitroglycerin?   N           If yes, how often? N  (if yes, HOSPITAL setting ONLY)    23.  Are you currently taking any blood thinners?    [If yes, INFORM patient to follw up w/ ORDERING PROVIDER FOR BRIDGING INSTRUCTIONS]     N    24.   Do you transfer independently?                (If NO, please HOSPITAL setting ONLY)  N    25.   Preferred LOCAL Pharmacy for Pre Prescription:         [id number] - [place], [place] - [address] AT [address] - [address] [place] [address] [phone]    Scheduling Details  (Please ask for phone number if not scheduled by patient)      Caller : SEBASTIÁN   Date of Procedure: 7/28  Surgeon: LALITO   Location: U         Sedation Type: MAC  l   Conscious Sedation- Needs  for 6 hours after the procedure  MAC/General-Needs  for 24 hours after procedure    Y 7/27 @ 7:15  :[Pre-op Required] at [facility]  SH  MG and OR for MAC sedation   (advise patient they will need a pre-op WITH IN 30 DAYS of procedure date)     Type of Procedure Scheduled:   Upper Endoscopy [EGD]    Which Colonoscopy Prep was Sent?:    -     [facility]OR[facility]S CF PATIENTS & [facility]'S PATIENTS NEEDS EXTENDED PREP       Informed patient they will need an adult  Y  Cannot take any type of public or medical transportation alone    Pre-Pro[facility] or Externally: Y  **INFORMED OF HOME TESTING & LAB OPTION**        Confirmed Nurse will call to complete assessment Y    Additional comments: Y

## 2022-07-21 NOTE — DISCHARGE INSTRUCTIONS
TODAY'S VISIT:  You were seen today for hip pain after fall   -   - If you had any labs or imaging/radiology tests performed today, you should also discuss these tests with your usual provider.     FOLLOW-UP:  Please make an appointment to follow up with:  - Your Primary Care Provider. If you do not have a PCP, please call the Primary Care Center (phone: [phone] for an appointment    - Have your provider review the results from today's visit with you again to make sure no further follow-up or additional testing is needed based on those results.       RETURN TO THE EMERGENCY DEPARTMENT  Return to the Emergency Department at any time for any new or worsening symptoms or any concerns.

## 2022-07-21 NOTE — CONFIDENTIAL NOTE
Telephone conversation (13 minutes):  Called and spoke with RADHA Gautam regarding concerns of patient's safety at home given numerous falls and difficulty getting a Home Health RN assessment completed. Ghazala explained that she will call patient today to investigate further.     MIRA Addison, CNP  [facility] [employer]

## 2022-07-25 ENCOUNTER — TELEPHONE (OUTPATIENT)
Dept: NURSING | Facility: CLINIC | Age: 47
End: 2022-07-25

## 2022-07-25 NOTE — TELEPHONE ENCOUNTER
Pt calling    At home test turned completely pink and she is unsure if it is accurate     Advised to take another test to see if it does the same thing     She is wondering what she should do if it comes back positive as she has CIDP, advised to call back or contact PCP/neurologist     Pt is agreeable and verbalizes understanding       Lynn Sy RN [facility] July 25, 2022 12:05 PM

## 2022-07-27 ENCOUNTER — PRE VISIT (OUTPATIENT)
Dept: SURGERY | Facility: CLINIC | Age: 47
End: 2022-07-27

## 2022-07-27 ENCOUNTER — VIRTUAL VISIT (OUTPATIENT)
Dept: SURGERY | Facility: CLINIC | Age: 47
End: 2022-07-27
Payer: COMMERCIAL

## 2022-07-27 ENCOUNTER — ANCILLARY PROCEDURE (OUTPATIENT)
Dept: ULTRASOUND IMAGING | Facility: CLINIC | Age: 47
End: 2022-07-27
Attending: NURSE PRACTITIONER
Payer: COMMERCIAL

## 2022-07-27 ENCOUNTER — ANESTHESIA EVENT (OUTPATIENT)
Dept: GASTROENTEROLOGY | Facility: CLINIC | Age: 47
End: 2022-07-27
Payer: COMMERCIAL

## 2022-07-27 DIAGNOSIS — E04.1 THYROID NODULE: ICD-10-CM

## 2022-07-27 DIAGNOSIS — Z01.818 PRE-OP EVALUATION: Primary | ICD-10-CM

## 2022-07-27 PROCEDURE — 76536 US EXAM OF HEAD AND NECK: CPT | Mod: GC | Performed by: RADIOLOGY

## 2022-07-27 PROCEDURE — 99214 OFFICE O/P EST MOD 30 MIN: CPT | Mod: 95 | Performed by: PHYSICIAN ASSISTANT

## 2022-07-27 RX ORDER — IBUPROFEN 200 MG
200 TABLET ORAL EVERY 4 HOURS PRN
Status: ON HOLD | COMMUNITY
End: 2022-10-21

## 2022-07-27 ASSESSMENT — LIFESTYLE VARIABLES: TOBACCO_USE: 1

## 2022-07-27 ASSESSMENT — ENCOUNTER SYMPTOMS: SEIZURES: 1

## 2022-07-27 ASSESSMENT — PAIN SCALES - GENERAL: PAINLEVEL: SEVERE PAIN (7)

## 2022-07-27 NOTE — PATIENT INSTRUCTIONS
Name:  Peggy Jessica   MRN:  [id number]   :  1975   Today's Date:  2022     GI Lab procedures:    A representative from the GI Lab will contact you regarding arrival date and time      You were seen today in the PAC Clinic   (Pre operative Anesthesia Assessment Center)  [address]  [address]   phone [phone]    You had a pre operative assessment done:    Anesthesia recommendations for medications:    Hold aspirin for 2 days before procedure  Hold multivitamins for 7 days before procedure   Hold herbal medications and supplements for 7 days before procedure  Hold ibuprofen for 2 days before procedure   Hold Naproxen for 2 days before procedure     Hold medical cannabis for 24 hours before procedure        Please hold the following medications the day of procedure:  Miralax          Please take these medications the day of procedure:  All other usual am prescription medications               For questions or appointments, call:  [facility]: [phone], option 2  Monday through Friday, 8 a.m. to 4:30 p.m.  (If it is after hours please reach out to the clinic or provider you were scheduled with.)

## 2022-07-27 NOTE — PROGRESS NOTES
Peggy is a 47 year old who is being evaluated via a billable video visit.      How would you like to obtain your AVS? MyChart    HPI       Review of Systems         Objective    Vitals - Patient Reported  Pain Score: Severe Pain (7)        Physical Exam       MARCELO Pak LPN      .  ..

## 2022-07-27 NOTE — H&P
Pre-Operative H & P     CC:  Preoperative exam to assess for increased cardiopulmonary risk while undergoing surgery and anesthesia.    Date of Encounter: 7/27/2022  Primary Care Physician:  [facility]     Reason for visit:   Encounter Diagnosis   Name Primary?     Pre-op evaluation Yes       HPI  Peggy Jessica is a 47 year old female who presents for pre-operative H & P in preparation for  Procedure Information     Case: [id number] Date/Time: 07/28/22 1000    Procedure: ESOPHAGOGASTRODUODENOSCOPY (EGD) (N/A Esophagus) - Difficulty swallowing solids [R13.10]    Anesthesia type: Monitor Anesthesia Care    Diagnosis: Difficulty swallowing solids [R13.10]    Pre-op diagnosis: Difficulty swallowing solids [R13.10]    Location:  GI 04 /  GI    Providers: Zack Maddox MD          Patient is being evaluated for comorbid conditions of CIDP, BRCA positive, thyroid nodule    Ms. Jessica has a history of CIDP. She recently has noticed worsening of swallowing solid foods. She was seen by her PCP for further evaluation and was referred to Dr. Jose for EGD, She is now scheduled for the above procedure under MAC.    History is obtained from the patient and chart review    Hx of abnormal bleeding or anti-platelet use: denies    Menstrual history: No LMP recorded. Patient has had a hysterectomy.     Past Medical History  Past Medical History:   Diagnosis Date     BRCA positive      Cervical cancer (H) 2002     CIDP (chronic inflammatory demyelinating polyneuropathy) (H)      Complex regional pain syndrome type 1 of right lower extremity        Past Surgical History  Past Surgical History:   Procedure Laterality Date     c section      2002     CHOLECYSTECTOMY  1997     COLONOSCOPY       HYSTERECTOMY  2004     MASTECTOMY Bilateral 2020     oopharectomy Bilateral 2019       Prior to Admission Medications  Current Outpatient Medications   Medication Sig Dispense Refill     acetaminophen (TYLENOL) 325  MG tablet Take 650 mg by mouth as needed       cyclobenzaprine (FLEXERIL) 10 MG tablet Take 1 tablet (10 mg) by mouth 3 times daily       ibuprofen (ADVIL/MOTRIN) 200 MG tablet Take 200 mg by mouth every 4 hours as needed for mild pain       medical cannabis (Patient's own supply) 1 Dose See Admin Instructions (The purpose of this order is to document that the patient reports taking medical cannabis.  This is not a prescription, and is not used to certify that the patient has a qualifying medical condition.)  Per pt: 5-10 mg TID PRN       multivitamin w/minerals (THERA-VIT-M) tablet Take 1 tablet by mouth every morning Meg[employer] Wom[employer]       ondansetron (ZOFRAN) 4 MG tablet Take 1 tablet (4 mg) by mouth every 8 hours as needed 15 tablet 0     oxyCODONE (ROXICODONE) 5 MG tablet 2 times daily       senna-docusate (SENOKOT-S/PERICOLACE) 8.6-50 MG tablet Take 1 tablet by mouth 2 times daily       bisacodyl (DULCOLAX) 10 MG suppository Place 10 mg rectally as needed       folic acid (FOLVITE) 400 MCG tablet Take 1 tablet (400 mcg) by mouth daily (Patient taking differently: Take 400 mcg by mouth every morning Have not started yet) 90 tablet 0     LORazepam (ATIVAN) 1 MG tablet TAKE 1-2 TABLETS (1-2 MG) BY MOUTH ONE TIME AS NEEDED (30 MINUTES PRIOR TO MRI). (Patient not taking: No sig reported) 2 tablet 0     ondansetron (ZOFRAN-ODT) 8 MG ODT tab Take 1 tablet (8 mg) by mouth every 8 hours as needed for nausea (Patient taking differently: Take 8 mg by mouth every 8 hours as needed for nausea)  0     oxyCODONE-acetaminophen (PERCOCET) 5-325 MG tablet TAKE 1 TO 2 TABLETS BY MOUTH EVERY 4 HOURS AS NEEDED (Patient not taking: No sig reported)       polyethylene glycol (MIRALAX) 17 GM/Dose powder Take by mouth as needed         Allergies  Allergies   Allergen Reactions     Dihydroergotamine Anaphylaxis     Latex Anaphylaxis     Shellfish-Derived Products Anaphylaxis     Sumatriptan Anaphylaxis     Banana Unknown      "Gabapentin Dizziness     Gluten Meal Other (See Comments)     Celiac disease     Keppra [Levetiracetam] Nausea and Vomiting     Kiwi Unknown     Levofloxacin Other (See Comments)     Arrhythmia     Metronidazole Nausea and Vomiting     Nitrofurantoin Hives     Penicillins Hives     Pregabalin      Topiramate Visual Disturbance     Aspirin Rash     Methadone Rash     Risperidone Anxiety       Social History  Social History     Socioeconomic History     Marital status:      Spouse name: Not on file     Number of children: 2     Years of education: Not on file     Highest education level: Not on file   Occupational History     Not on file   Tobacco Use     Smoking status: Former Smoker     Types: Cigarettes     Smokeless tobacco: Never Used   Vaping Use     Vaping Use: Never used   Substance and Sexual Activity     Alcohol use: Not Currently     Drug use: Yes     Types: Marijuana     Comment: Medical [employer] patient     Sexual activity: Not Currently     Partners: Male   Other Topics Concern     Not on file   Social History Narrative     Not on file     Social Determinants of Health     Financial Resource Strain: Not on file   Food Insecurity: Not on file   Transportation Needs: Not on file   Physical Activity: Not on file   Stress: Not on file   Social Connections: Not on file   Intimate Partner Violence: Not on file   Housing Stability: Not on file       Family History  Family History   Problem Relation Age of Onset     Kidney Disease Father        Review of Systems  The complete review of systems is negative other than noted in the HPI or here.   Anesthesia Evaluation   Pt has had prior anesthetic.     History of anesthetic complications   \"felt totally awake and could feel colonoscopy\".    ROS/MED HX  ENT/Pulmonary:     (+) tobacco use, Past use,     Neurologic: Comment: CIDP- stopped IVIG in April.   Complex regional pain syndrome    (+) seizures,     Cardiovascular:     (+) -----Previous cardiac testing "   Echo: Date: Results:    Stress Test: Date: Results:    ECG Reviewed: Date: 11/2021 Results:  NSR  Cath: Date: Results:   (-) taking anticoagulants/antiplatelets   METS/Exercise Tolerance: 1 - Eating, dressing Comment: Using wheelchair, cannot walk one block without stopping due to legs uncoordinated and falling   Hematologic:  - neg hematologic  ROS  (-) history of blood clots and history of blood transfusion   Musculoskeletal:       GI/Hepatic: Comment: Difficulty swallowing   (-) GERD   Renal/Genitourinary:  - neg Renal ROS     Endo:  - neg endo ROS  (-) chronic steroid usage   Psychiatric/Substance Use:  - neg psychiatric ROS     Infectious Disease:  - neg infectious disease ROS     Malignancy:       Other:            Virtual visit -  No vitals were obtained    Physical Exam  Constitutional: Awake, alert, cooperative, no apparent distress, and appears stated age.  HENT: Normocephalic  Respiratory: non labored breathing   Neurologic: Awake, alert, oriented to name, place and time.   Neuropsychiatric: Calm, cooperative. Normal affect.      Prior Labs/Diagnostic Studies   All labs and imaging personally reviewed     EKG/ stress test - if available please see in ROS above   No results found.  No flowsheet data found.      The patient's records and results personally reviewed by this provider.     Outside records reviewed from: Care Everywhere      Assessment      Peggy Jessica is a 47 year old female seen as a PAC referral for risk assessment and optimization for anesthesia.    Plan/Recommendations  Pt will be optimized for the proposed procedure.  See below for details on the assessment, risk, and preoperative recommendations    NEUROLOGY  -complex regional pain syndrome, follows with neurology  -CIDP s/p IVIG. IVIG discontinued 4/2022 as no longer effective and poorly tolerated. Follows with neurology and most recently seen 6/22/22 with following recommendations:   Plan:      1. Immunotherapy: No  "indication for other immunotherapies at this time  2. Physical therapy: She is still waiting to hear from the PT department. Will see if they will reach out to her to get started.   3. Symptomatic management of pain and paresthesias:  Appreciate collateral care with pain management  4. Follow up PCP for general health maintenance  5. INCBASE: Enrolled [id number]  6. Follow up in 3 months. Sooner if needed.     -Post Op delirium risk factors:  No risk identified    ENT  Mallampati: Unable to assess  TM: Unable to assess    CARDIAC  - No history of CAD, Hypertension and Afib   -patient denies cardiac history or symptoms  - METS (Metabolic Equivalents) Patient reports she typically uses a wheelchair due to legs feeling uncoordinated and recurrent falls  Patient CANNOT perform 4 METS exercise without symptoms            Total Score: 1    Functional Capacity: Unable to complete 4 METS      RCRI-Very low risk: Class 1 0.4% complication rate            Total Score: 0        PULMONARY  RYLEE Low Risk  *This score is based on incomplete data *           Total Score: 1*    RYLEE: Over 50 ys old        Criteria with incomplete data:    RYLEE: Observed stopped breathing      - Denies asthma or inhaler use  - Tobacco History      History   Smoking Status     Former Smoker     Types: Cigarettes   Smokeless Tobacco     Never Used       GI  -EGD scheduled as above for difficulty swallowing  PONV High Risk  Total Score: 3           1 AN PONV: Pt is Female    1 AN PONV: Patient is not a current smoker    1 AN PONV: Intended Post Op Opioids        /RENAL  - Baseline Creatinine  WNL    ENDOCRINE    - BMI: Estimated body mass index is 27.46 kg/m  as calculated from the following:    Height as of 7/20/22: 1.626 m (5' 4\").    Weight as of 7/20/22: 72.6 kg (160 lb).  Overweight (BMI 25.0-29.9)  - No history of Diabetes Mellitus    HEME  VTE Low Risk 0.26%            Total Score: 0      - No history of abnormal bleeding or antiplatelet " use.    PSYCH  - anxiety. Recent home break in- reports she has support and will be seeing therapist within the next week.     Patient was discussed with Dr Castillo    The patient is optimized for their procedure. AVS with information on surgery time/arrival time, meds and NPO status given by nursing staff. No further diagnostic testing indicated.    Please refer to the physical examination documented by the anesthesiologist in the anesthesia record on the day of surgery.    Video-Visit Details    Type of service:  Video Visit    Patient verbally consented to video service today: YES    Video Start Time: 0744  Video End Time (time video stopped): 0759    Originating Location (pt. Location): Home    Distant Location (provider location):  home    Mode of Communication:  Video Conference via [employer]  On the day of service:     Prep time: 17 minutes  Visit time: 15 minutes  Documentation time: 7 minutes  ------------------------------------------  Total time: 39 minutes      Cassi Disla PA-C  Preoperative Assessment Center  [facility]  [facility]  Phone: [phone]  Fax: [phone]

## 2022-07-28 ENCOUNTER — ANESTHESIA (OUTPATIENT)
Dept: GASTROENTEROLOGY | Facility: CLINIC | Age: 47
End: 2022-07-28
Payer: COMMERCIAL

## 2022-07-28 ENCOUNTER — HOSPITAL ENCOUNTER (OUTPATIENT)
Facility: CLINIC | Age: 47
Discharge: HOME | End: 2022-07-28
Attending: INTERNAL MEDICINE | Admitting: INTERNAL MEDICINE
Payer: COMMERCIAL

## 2022-07-28 VITALS
HEART RATE: 89 BPM | DIASTOLIC BLOOD PRESSURE: 70 MMHG | WEIGHT: 155.65 LBS | TEMPERATURE: 97.8 F | HEIGHT: 65 IN | BODY MASS INDEX: 25.93 KG/M2 | SYSTOLIC BLOOD PRESSURE: 112 MMHG | OXYGEN SATURATION: 95 % | RESPIRATION RATE: 17 BRPM

## 2022-07-28 LAB — UPPER GI ENDOSCOPY: NORMAL

## 2022-07-28 PROCEDURE — 258N000003 HC RX IP 258 OP 636: Performed by: NURSE ANESTHETIST, CERTIFIED REGISTERED

## 2022-07-28 PROCEDURE — 370N000017 HC ANESTHESIA TECHNICAL FEE, PER MIN: Performed by: INTERNAL MEDICINE

## 2022-07-28 PROCEDURE — 250N000009 HC RX 250: Performed by: NURSE ANESTHETIST, CERTIFIED REGISTERED

## 2022-07-28 PROCEDURE — 250N000011 HC RX IP 250 OP 636: Performed by: NURSE ANESTHETIST, CERTIFIED REGISTERED

## 2022-07-28 PROCEDURE — 43235 EGD DIAGNOSTIC BRUSH WASH: CPT | Performed by: INTERNAL MEDICINE

## 2022-07-28 RX ORDER — OXYCODONE HYDROCHLORIDE 5 MG/1
5 TABLET ORAL EVERY 4 HOURS PRN
Status: DISCONTINUED | OUTPATIENT
Start: 2022-07-28 | End: 2022-07-28 | Stop reason: HOSPADM

## 2022-07-28 RX ORDER — PROPOFOL 10 MG/ML
INJECTION, EMULSION INTRAVENOUS CONTINUOUS PRN
Status: DISCONTINUED | OUTPATIENT
Start: 2022-07-28 | End: 2022-07-28

## 2022-07-28 RX ORDER — LIDOCAINE 40 MG/G
CREAM TOPICAL
Status: DISCONTINUED | OUTPATIENT
Start: 2022-07-28 | End: 2022-07-28 | Stop reason: HOSPADM

## 2022-07-28 RX ORDER — ONDANSETRON 4 MG/1
4 TABLET, ORALLY DISINTEGRATING ORAL EVERY 30 MIN PRN
Status: DISCONTINUED | OUTPATIENT
Start: 2022-07-28 | End: 2022-07-28 | Stop reason: HOSPADM

## 2022-07-28 RX ORDER — SODIUM CHLORIDE, SODIUM LACTATE, POTASSIUM CHLORIDE, CALCIUM CHLORIDE 600; 310; 30; 20 MG/100ML; MG/100ML; MG/100ML; MG/100ML
INJECTION, SOLUTION INTRAVENOUS CONTINUOUS PRN
Status: DISCONTINUED | OUTPATIENT
Start: 2022-07-28 | End: 2022-07-28

## 2022-07-28 RX ORDER — PROCHLORPERAZINE MALEATE 10 MG
10 TABLET ORAL EVERY 6 HOURS PRN
Status: DISCONTINUED | OUTPATIENT
Start: 2022-07-28 | End: 2022-07-28 | Stop reason: HOSPADM

## 2022-07-28 RX ORDER — FENTANYL CITRATE 50 UG/ML
25 INJECTION, SOLUTION INTRAMUSCULAR; INTRAVENOUS EVERY 5 MIN PRN
Status: DISCONTINUED | OUTPATIENT
Start: 2022-07-28 | End: 2022-07-28 | Stop reason: HOSPADM

## 2022-07-28 RX ORDER — ONDANSETRON 2 MG/ML
4 INJECTION INTRAMUSCULAR; INTRAVENOUS EVERY 30 MIN PRN
Status: DISCONTINUED | OUTPATIENT
Start: 2022-07-28 | End: 2022-07-28 | Stop reason: HOSPADM

## 2022-07-28 RX ORDER — HYDROMORPHONE HYDROCHLORIDE 1 MG/ML
0.2 INJECTION, SOLUTION INTRAMUSCULAR; INTRAVENOUS; SUBCUTANEOUS EVERY 5 MIN PRN
Status: DISCONTINUED | OUTPATIENT
Start: 2022-07-28 | End: 2022-07-28 | Stop reason: HOSPADM

## 2022-07-28 RX ORDER — ONDANSETRON 2 MG/ML
4 INJECTION INTRAMUSCULAR; INTRAVENOUS
Status: DISCONTINUED | OUTPATIENT
Start: 2022-07-28 | End: 2022-07-28 | Stop reason: HOSPADM

## 2022-07-28 RX ORDER — ONDANSETRON 2 MG/ML
4 INJECTION INTRAMUSCULAR; INTRAVENOUS EVERY 6 HOURS PRN
Status: DISCONTINUED | OUTPATIENT
Start: 2022-07-28 | End: 2022-07-28 | Stop reason: HOSPADM

## 2022-07-28 RX ORDER — FLUMAZENIL 0.1 MG/ML
0.2 INJECTION, SOLUTION INTRAVENOUS
Status: DISCONTINUED | OUTPATIENT
Start: 2022-07-28 | End: 2022-07-28 | Stop reason: HOSPADM

## 2022-07-28 RX ORDER — SODIUM CHLORIDE, SODIUM LACTATE, POTASSIUM CHLORIDE, CALCIUM CHLORIDE 600; 310; 30; 20 MG/100ML; MG/100ML; MG/100ML; MG/100ML
INJECTION, SOLUTION INTRAVENOUS CONTINUOUS
Status: DISCONTINUED | OUTPATIENT
Start: 2022-07-28 | End: 2022-07-28 | Stop reason: HOSPADM

## 2022-07-28 RX ORDER — FENTANYL CITRATE 50 UG/ML
25 INJECTION, SOLUTION INTRAMUSCULAR; INTRAVENOUS
Status: DISCONTINUED | OUTPATIENT
Start: 2022-07-28 | End: 2022-07-28 | Stop reason: HOSPADM

## 2022-07-28 RX ORDER — MEPERIDINE HYDROCHLORIDE 25 MG/ML
12.5 INJECTION INTRAMUSCULAR; INTRAVENOUS; SUBCUTANEOUS
Status: DISCONTINUED | OUTPATIENT
Start: 2022-07-28 | End: 2022-07-28 | Stop reason: HOSPADM

## 2022-07-28 RX ORDER — LIDOCAINE HYDROCHLORIDE 10 MG/ML
INJECTION, SOLUTION INFILTRATION; PERINEURAL PRN
Status: DISCONTINUED | OUTPATIENT
Start: 2022-07-28 | End: 2022-07-28

## 2022-07-28 RX ORDER — NALOXONE HYDROCHLORIDE 0.4 MG/ML
0.2 INJECTION, SOLUTION INTRAMUSCULAR; INTRAVENOUS; SUBCUTANEOUS
Status: DISCONTINUED | OUTPATIENT
Start: 2022-07-28 | End: 2022-07-28 | Stop reason: HOSPADM

## 2022-07-28 RX ORDER — NALOXONE HYDROCHLORIDE 0.4 MG/ML
0.4 INJECTION, SOLUTION INTRAMUSCULAR; INTRAVENOUS; SUBCUTANEOUS
Status: DISCONTINUED | OUTPATIENT
Start: 2022-07-28 | End: 2022-07-28 | Stop reason: HOSPADM

## 2022-07-28 RX ORDER — PROPOFOL 10 MG/ML
INJECTION, EMULSION INTRAVENOUS PRN
Status: DISCONTINUED | OUTPATIENT
Start: 2022-07-28 | End: 2022-07-28

## 2022-07-28 RX ORDER — ONDANSETRON 4 MG/1
4 TABLET, ORALLY DISINTEGRATING ORAL EVERY 6 HOURS PRN
Status: DISCONTINUED | OUTPATIENT
Start: 2022-07-28 | End: 2022-07-28 | Stop reason: HOSPADM

## 2022-07-28 RX ADMIN — PROPOFOL 10 MG: 10 INJECTION, EMULSION INTRAVENOUS at 10:59

## 2022-07-28 RX ADMIN — PROPOFOL 150 MCG/KG/MIN: 10 INJECTION, EMULSION INTRAVENOUS at 10:57

## 2022-07-28 RX ADMIN — LIDOCAINE HYDROCHLORIDE 80 MG: 10 INJECTION, SOLUTION INFILTRATION; PERINEURAL at 10:57

## 2022-07-28 RX ADMIN — SODIUM CHLORIDE, POTASSIUM CHLORIDE, SODIUM LACTATE AND CALCIUM CHLORIDE: 600; 310; 30; 20 INJECTION, SOLUTION INTRAVENOUS at 10:52

## 2022-07-28 RX ADMIN — PROPOFOL 20 MG: 10 INJECTION, EMULSION INTRAVENOUS at 11:01

## 2022-07-28 RX ADMIN — PROPOFOL 10 MG: 10 INJECTION, EMULSION INTRAVENOUS at 11:00

## 2022-07-28 RX ADMIN — MIDAZOLAM 2 MG: 1 INJECTION INTRAMUSCULAR; INTRAVENOUS at 10:52

## 2022-07-28 RX ADMIN — PROPOFOL 60 MG: 10 INJECTION, EMULSION INTRAVENOUS at 10:57

## 2022-07-28 RX ADMIN — MIDAZOLAM 2 MG: 1 INJECTION INTRAMUSCULAR; INTRAVENOUS at 11:02

## 2022-07-28 ASSESSMENT — LIFESTYLE VARIABLES: TOBACCO_USE: 1

## 2022-07-28 ASSESSMENT — ENCOUNTER SYMPTOMS: SEIZURES: 1

## 2022-07-28 NOTE — ANESTHESIA CARE TRANSFER NOTE
Patient: Peggy Jessica    Procedure: Procedure(s):  ESOPHAGOGASTRODUODENOSCOPY (EGD)       Diagnosis: Difficulty swallowing solids [R13.10]  Diagnosis Additional Information: No value filed.    Anesthesia Type:   MAC     Note:    Oropharynx: oropharynx clear of all foreign objects and spontaneously breathing  Level of Consciousness: drowsy  Oxygen Supplementation: room air    Independent Airway: airway patency satisfactory and stable  Dentition: dentition unchanged  Vital Signs Stable: post-procedure vital signs reviewed and stable  Report to RN Given: handoff report given  Patient transferred to: Phase II    Handoff Report: Identifed the Patient, Identified the Reponsible Provider, Reviewed the pertinent medical history, Discussed the surgical course, Reviewed Intra-OP anesthesia mangement and issues during anesthesia, Set expectations for post-procedure period and Allowed opportunity for questions and acknowledgement of understanding      Vitals:  Vitals Value Taken Time   BP     Temp     Pulse     Resp     SpO2 99 % 07/28/22 1117   Vitals shown include unvalidated device data.    Electronically Signed By: MIRA Rodriguez CRNA  July 28, 2022  11:19 AM

## 2022-07-28 NOTE — OR NURSING
Pt underwent EGD under MAC. No interventions or specimens collected. Pt transferred to recovery and report given to 3C RN.       Itzel Olivarez RN

## 2022-07-28 NOTE — ANESTHESIA PREPROCEDURE EVALUATION
Anesthesia Pre-Procedure Evaluation    Patient: Peggy Jessica   MRN: [id number] : 1975        Procedure : Procedure(s):  ESOPHAGOGASTRODUODENOSCOPY (EGD)          Past Medical History:   Diagnosis Date     BRCA positive      Cervical cancer (H)      CIDP (chronic inflammatory demyelinating polyneuropathy) (H)      Complex regional pain syndrome type 1 of right lower extremity       Past Surgical History:   Procedure Laterality Date     c section           CHOLECYSTECTOMY       COLONOSCOPY       HYSTERECTOMY  2004     MASTECTOMY Bilateral 2020     oopharectomy Bilateral 2019      Allergies   Allergen Reactions     Dihydroergotamine Anaphylaxis     Latex Anaphylaxis     Shellfish-Derived Products Anaphylaxis     Sumatriptan Anaphylaxis     Banana Unknown     Gabapentin Dizziness     Gluten Meal Other (See Comments)     Celiac disease     Keppra [Levetiracetam] Nausea and Vomiting     Kiwi Unknown     Levofloxacin Other (See Comments)     Arrhythmia     Metronidazole Nausea and Vomiting     Nitrofurantoin Hives     Penicillins Hives     Pregabalin      Topiramate Visual Disturbance     Aspirin Rash     Methadone Rash     Risperidone Anxiety      Social History     Tobacco Use     Smoking status: Former Smoker     Types: Cigarettes     Smokeless tobacco: Never Used   Substance Use Topics     Alcohol use: Not Currently      Wt Readings from Last 1 Encounters:   22 70.6 kg (155 lb 10.3 oz)        Anesthesia Evaluation   Pt has had prior anesthetic.     History of anesthetic complications  - PONV.      ROS/MED HX  ENT/Pulmonary:     (+) tobacco use, Past use,     Neurologic:     (+) seizures, last seizure: 10 yrs ago, Spinal cord injury,     Cardiovascular:       METS/Exercise Tolerance:     Hematologic:       Musculoskeletal:       GI/Hepatic:     (+) GERD, Asymptomatic on medication,     Renal/Genitourinary:       Endo:       Psychiatric/Substance Use:     (+) Recreational drug usage:  Cannabis.    Infectious Disease:       Malignancy:       Other:            Physical Exam    Airway        Mallampati: II    Neck ROM: limited     Respiratory Devices and Support         Dental     Comment: Poor dentition    (+) other, chipped and missing      Cardiovascular   cardiovascular exam normal          Pulmonary   pulmonary exam normal                OUTSIDE LABS:  CBC:   Lab Results   Component Value Date    WBC 5.5 07/20/2022    WBC 7.6 06/06/2022    HGB 13.6 07/20/2022    HGB 14.1 06/06/2022    HCT 41.5 07/20/2022    HCT 40.9 06/06/2022     07/20/2022     06/06/2022     BMP:   Lab Results   Component Value Date     07/20/2022     06/06/2022    POTASSIUM 4.6 07/20/2022    POTASSIUM 4.2 06/06/2022    CHLORIDE 106 07/20/2022    CHLORIDE 108 06/06/2022    CO2 30 07/20/2022    CO2 28 06/06/2022    BUN 5 (L) 07/20/2022    BUN 5 (L) 06/06/2022    CR 0.76 07/20/2022    CR 0.85 06/06/2022    GLC 94 07/20/2022     (H) 06/06/2022     COAGS: No results found for: PTT, INR, FIBR  POC: No results found for: BGM, HCG, HCGS  HEPATIC:   Lab Results   Component Value Date    ALBUMIN 3.3 (L) 07/20/2022    PROTTOTAL 7.5 07/20/2022    ALT 19 07/20/2022    AST 21 07/20/2022    ALKPHOS 101 07/20/2022    BILITOTAL 0.3 07/20/2022     OTHER:   Lab Results   Component Value Date    LACT 1.0 11/24/2021    ESTEBAN 9.3 07/20/2022    MAG 2.3 11/24/2021    TSH 1.38 07/20/2022       Anesthesia Plan    ASA Status:  2   NPO Status:  NPO Appropriate    Anesthesia Type: MAC.     - Reason for MAC: straight local not clinically adequate   Induction: Intravenous, Propofol.   Maintenance: TIVA.        Consents    Anesthesia Plan(s) and associated risks, benefits, and realistic alternatives discussed. Questions answered and patient/representative(s) expressed understanding.    - Discussed:     - Discussed with:  Patient         Postoperative Care    Pain management: IV analgesics.   PONV prophylaxis: Ondansetron (or  other 5HT-3), Dexamethasone or Solumedrol     Comments:                Jorge Ashby MD

## 2022-07-28 NOTE — ANESTHESIA POSTPROCEDURE EVALUATION
Patient: Pegyg Jessica    Procedure: Procedure(s):  ESOPHAGOGASTRODUODENOSCOPY (EGD)       Anesthesia Type:  MAC    Note:  Disposition: Outpatient   Postop Pain Control: Uneventful            Sign Out: Well controlled pain   PONV: No   Neuro/Psych: Uneventful            Sign Out: Acceptable/Baseline neuro status   Airway/Respiratory: Uneventful            Sign Out: Acceptable/Baseline resp. status   CV/Hemodynamics: Uneventful            Sign Out: Acceptable CV status; No obvious hypovolemia; No obvious fluid overload   Other NRE: NONE   DID A NON-ROUTINE EVENT OCCUR? No           Last vitals:  Vitals Value Taken Time   /70 07/28/22 1130   Temp 36.6  C (97.8  F) 07/28/22 1120   Pulse 89 07/28/22 1130   Resp 17 07/28/22 1130   SpO2 95 % 07/28/22 1130       Electronically Signed By: Jorge Ashby MD  July 28, 2022  11:50 AM

## 2022-07-29 NOTE — PROGRESS NOTES
"[facility]    Outpatient Occupational Therapy Discharge Note  Patient: Peggy Jessica  : 1975    Beginning/End Dates of Reporting Period:  22 to 22    Referring Provider: Shobha Cruz MD     Therapy Diagnosis: Impaired I/ADLs    Client Self Report: Made beer bread the other night, delegated help getting the items together and getting in/out of the oven, co-managing tasks has been very helpful to improve participation with cooking; IVIG therapy going through end of May, may discontinue therapies as client feels they may not be helping, will continue to discuss with care providers; Application for handicap placard for car and metro mobility got lost \"at city level\" and need to redo and resubmit, states she can complete on her own and submit through PCP    Goals:     Goal Identifier Fatigue management   Goal Description Client will verbalize and demonstrate 3 energy conservation/work simplification and fatigue management strategies for improved independence with ADLs at home through demonstration of decreased score on MFIS by 10 points or more (68/84 at al).   Target Date 22   Date Met      Progress (detail required for progress note): Goal progressing     Goal Identifier HEP   Goal Description Client will demonstrate independence with HEP and report consistent engagement 5/7 days for two weeks to increase pain management and strengthening in combination with infusion therapies to achieve measurable /pinch strength for increased participation in daily tasks   Target Date 22   Date Met      Progress (detail required for progress note): Goal progressing     Goal Identifier AE   Goal Description Client will successfully verbalize and/or demonstrate understanding of 3-5 compensatory strategies or equipment and will report increased participation in relevant I/ADL activity " with application by 25% self-percieved.   Target Date 05/24/22   Date Met      Progress (detail required for progress note): Goal progressing     Goal Identifier Standing tolerance   Goal Description Client will demonstrate increased tolerance for standing activity to 10 min or more to increase participation and independence with I/ADL tasks   Target Date 05/24/22   Date Met      Progress (detail required for progress note): Unable to address goal today with client weakness/resistance     Plan:  Discharge from therapy.    Discharge:    Reason for Discharge: Patient has failed to schedule further appointments.    Equipment Issued: N/A    Discharge Plan: Client lost to follow up.

## 2022-08-01 DIAGNOSIS — G61.81 CIDP (CHRONIC INFLAMMATORY DEMYELINATING POLYNEUROPATHY) (H): Primary | ICD-10-CM

## 2022-08-18 ENCOUNTER — OFFICE VISIT (OUTPATIENT)
Dept: OBGYN | Facility: CLINIC | Age: 47
End: 2022-08-18
Attending: NURSE PRACTITIONER
Payer: COMMERCIAL

## 2022-08-18 VITALS
HEART RATE: 103 BPM | HEIGHT: 65 IN | BODY MASS INDEX: 25.9 KG/M2 | SYSTOLIC BLOOD PRESSURE: 121 MMHG | DIASTOLIC BLOOD PRESSURE: 88 MMHG

## 2022-08-18 DIAGNOSIS — N95.2 VAGINAL ATROPHY: ICD-10-CM

## 2022-08-18 DIAGNOSIS — N94.10 DYSPAREUNIA IN FEMALE: Primary | ICD-10-CM

## 2022-08-18 PROCEDURE — G0463 HOSPITAL OUTPT CLINIC VISIT: HCPCS

## 2022-08-18 PROCEDURE — 99203 OFFICE O/P NEW LOW 30 MIN: CPT | Mod: GC | Performed by: OBSTETRICS & GYNECOLOGY

## 2022-08-18 RX ORDER — ESTRADIOL 0.1 MG/G
2 CREAM VAGINAL
Qty: 42.5 G | Refills: 3 | Status: SHIPPED | OUTPATIENT
Start: 2022-08-18 | End: 2023-01-27
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
## 2022-08-26 ENCOUNTER — THERAPY VISIT (OUTPATIENT)
Dept: PHYSICAL THERAPY | Facility: CLINIC | Age: 47
End: 2022-08-26
Payer: COMMERCIAL

## 2022-08-26 DIAGNOSIS — G62.9 NEUROPATHY: ICD-10-CM

## 2022-08-26 PROCEDURE — 97164 PT RE-EVAL EST PLAN CARE: CPT | Mod: GP | Performed by: PHYSICAL THERAPIST

## 2022-08-26 PROCEDURE — 97110 THERAPEUTIC EXERCISES: CPT | Mod: 59 | Performed by: PHYSICAL THERAPIST

## 2022-08-26 NOTE — PROGRESS NOTES
08/26/22 0700   Quick Adds   Type of Visit OP PT Re-evaluation   General Information   Start of Care Date 03/15/22   Referring Physician Shobha Cruz MD   Orders Evaluate and Treat as Indicated   Additional Orders OT   Order Date 02/10/22   Medical Diagnosis CIDP   Onset of illness/injury or Date of Surgery 02/08/22   Precautions/Limitations fall precautions   Surgical/Medical history reviewed Yes   Pertinent history of current problem (include personal factors and/or comorbidities that impact the POC) Patient going to [facility] on October 10th for comprehesive assessment for 5-7 days. Patient reports falling 1 time a day (was previously 3-4 times a day). Patient reports R side gives out first and has led to multiple injuries. Patient reports most energy in the morning- can move around without the walker in the morning, but by midday is wiped out and requires a walker or the wheelchair. Patient reports main factor is fatigue that is whole body. Patient also reports swelling in BLE as the day goes on- patient does have a zero gravity chair. Patient is still waiting on her manual w/c through [facility]- possibly next week.   Pertinent Visual History  Wears glasses   Prior level of function comment Independent- active, hiking   Previous/Current Treatment Medication(s)  (IVIG)   Improvement after medication No   Patient role/Employment history Disabled   [facility]   Home/Community Accessibility Comments Has 15+ stairs (multiple different levels)- has chairs at the different levels. Usually scoots down and occasionally goes up walking with HR.  Has multiple animals at home including dogs and parrots   Current Assistive Devices Four Wheeled Walker;Manual Wheel Chair;Standard Cane   ADL Devices Commode;Shower/Tub Chair   General Information Comments Patient planning on moving to [place] at end of October   Fall Risk Screen   Fall screen completed by PT   Have you fallen 2 or more times in the past  year? Yes   Have you fallen and had an injury in the past year? Yes   Is patient a fall risk? Yes;Department fall risk interventions implemented   Cognitive Status Examination   Orientation orientation to person, place and time   Level of Consciousness alert   Follows Commands and Answers Questions 100% of the time;able to follow multistep instructions   Personal Safety and Judgment intact   Memory intact   Posture   Posture Forward head position;Protracted shoulders   Range of Motion (ROM)   ROM Comment B ankle DF limited to neutral,R hamstring tightness   Strength   Strength Comments R hip flexion 3-/5, R knee flexion/extension 3+/5, R ankle DF/PF 3+/5 (tremors throughout). L hip flexion 3+/5, L knee flexion/extension 4-/5, L ankle DF/PF 4/5   Bed Mobility   Bed Mobility Comments Modified independent   Transfer Skills   Transfer Comments 1/2 Stand pivot transfer wheelchair to bed modified independent   Locomotion   Wheel Chair Mobility Comments Modified independent for bilateral upper extremity wheelchair propulsion in clinic environment.  Patient reports difficulty maneuvering outside due to old wheelchair that veers towards left side   Gait   Gait Comments Patient ambulates with 4 wheeled walker x30 feet with contact-guard assist for safety-slow belén, flexed forward posture with increased upper extremity support on walker, rigid gait pattern for lower extremity stability   Balance   Balance Comments Intact sitting balance.  Supervision for unsupported standing balance, contact-guard for walking balance   Sensory Examination   Sensory Perception Comments Patient reports significant fluctuations in bilateral lower extremity numbness/tingling-varies from feet all the way up to level of hips   Coordination   Coordination Comments Impaired BLE ZACH.  Tremors and incoordination demonstrated with MMT testing, functionally no tremors present with walking   Muscle Tone   Muscle Tone Comments Low tone proximally    Planned Therapy Interventions   Planned Therapy Interventions balance training;gait training;neuromuscular re-education;ROM;strengthening;stretching;transfer training;manual therapy;wheelchair management/propulsion training   Clinical Impression   Criteria for Skilled Therapeutic Interventions Met yes, treatment indicated   PT Diagnosis Impaired function, deconditioning   Influenced by the following impairments Posture, strength, range of motion, pain, coordination, balance, tone   Functional limitations due to impairments Decreased participation in daily activities-ADLs, IADLs.  Deconditioning.  Increased fall risk.  Impaired gait-household and community   Clinical Presentation Evolving/Changing   Clinical Presentation Rationale Personal factors, body systems involved   Clinical Decision Making (Complexity) Moderate complexity   Therapy Frequency 1 time/week   Predicted Duration of Therapy Intervention (days/wks) 6-8 weeks   Risk & Benefits of therapy have been explained Yes   Patient, Family & other staff in agreement with plan of care Yes   Clinical Impression Comments Patient is a 47-year-old female who presents to outpatient physical therapy with ongoing bilateral lower extremity weakness and sensation deficits.  Patient diagnosed with CIDP and began IVIG with little success.  Patient currently not on treatment efforts, but does have follow-up with [facility] scheduled in October 2022.  On exam patient demonstrates above listed impairments which is impacting overall safety and performance of all ADLs and IADLs.  Patient with frequent fall risk history and injuries; a significant fall risk and would benefit from skilled PT services for functional mobility upgrading, energy conservation strategies and fall risk reduction education.  Patient reports moving to [place] at the end of October which may impact overall plan of care, but would most likely benefit from ongoing therapy services after move   Education  Assessment   Preferred Learning Style Demonstration;Pictures/video   Barriers to Learning Emotional;Physical   GOALS   PT Eval Goals 1;2;3;4   Goal 1   Goal Identifier FACIT   Goal Description Patient will score at least 20 on the FACIT to decrease fatigue levels and improve activity participation   Target Date 11/23/22   Goal 2   Goal Identifier Gait speed/performance   Goal Description Patient will ambulate at least 0.6 m/s for safe household ambulator status with least restrictive device at modified independent level   Target Date 11/23/22   Goal 3   Goal Identifier Balance   Goal Description Patient will perform mCTSIB and score at least 30 seconds on condition 2 and 15 seconds on condition 5 to improve overall sensory integration for balance stability   Target Date 11/23/22   Goal 4   Goal Identifier HEP   Goal Description Patient will be independent home exercise program for maintenance of therapy gains at discharge   Target Date 11/23/22   Total Evaluation Time   PT Jackie, Re-eval Minutes ([id number]) 45     Maegan Hollis PT, DPT  Physical Therapist  [facility] [address]  [address] [place] [address]  [email or web address]  Appointments: [phone]

## 2022-08-26 NOTE — DISCHARGE INSTRUCTIONS
Call [facility] to make an appointment to have them look at your AFOs  Continue stretching program daily- holding each 30 seconds  Try treadling daily for a cardio program: start without resistance. Listen to your body. When it gets tired then stop (don't get to the breaking point)  Exercises will be on www.ptrx.org. Use ID number: [id number]   It's all about energy conservation!  Use your walker all the time (not just when you're tired)  Use your wheelchair when you are tired (or start using before you get tired or with every other activity)    Maegan Hollis PT, DPT  Physical Therapist  [facility] [address]  [address] [place] [address]  [email or web address]  Appointments: [phone]

## 2022-08-27 NOTE — DISCHARGE SUMMARY
[facility]  NEUROLOGY DISCHARGE SUMMARY    Patient Name:  Peggy Jessica  MRN:  [id number]      :  1975      Date of Admission:  2022  Date of Discharge::  02/10/22  Admitting Physician:  Era Stevens MD  Discharge Physician:  Manohar Simmons MD  Primary Care Provider:   Service, [facility]  Discharge Disposition:   Discharged to home    Admission Diagnoses  1. 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 Complex Regional Pain Syndrome of RLE 2/2 stress fracture and chest 2/2 bilateral mastectomy  3. BRCA+ mutation  4. Cervical Cancer  5. Migraine    Discharge Diagnoses  1. 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 Complex Regional Pain Syndrome of RLE 2/2 stress fracture and chest 2/2 bilateral mastectomy  3. BRCA+ mutation  4. Cervical Cancer  5. Migraine       Brief History of Present Illness   Peggy Jessica is a 46 year old female w/ PMHx chronic immune sensory polyradiculopathy, complex regional pain syndrome (RLE 2/2 stress fracture; chest 2/2 bilateral mastectomy), BRCA+ mutation, cervical cancer, and migraines who presents on 2021 as a direct admission per the request of her primary Neurologist, Dr Duenas (neuromuscular specialist) for IVIG in light of recently worsening recurrent CISP.     Pt reports that her CISP symptoms began in 2021. Prior to this, she was asymptomatic save for her complex regional pain syndrome. She describes her current symptoms as similar to those previously felt, reporting she has been having weakness of the hands, difficulty walking, eye fatigue leading to difficulty reading, sensory changes to roughly the knees and mid-forearm in a stocking-like distribution bilaterally as well as around her eyes and mouth, lack of appetite, new aversion for foods she previously enjoyed, and dizziness  "described as \"close to what I imagine seasickness to be\" or as if the earth were moving under her feet. Pt reports that she was seen for this by multiple providers and told that \"it was all in my head\" after until her symptoms progressed to the point of multiple falls per day leading to a hospitalization from 11/17 - 11/26/2021. At that time, pt'x exam was notable for significant motor impenitence, patchy and vague pinprick changes in the BLE, mild discrepancy of light touch in R hemibody, and normal Romberg testing (did have mild swaying in all directions but continuously corrected and did not fall). During the hospitalization, completed an EMG that returned normal, but then spinal imaging exhibited mild enhancement of the cauda equina and paraspinal muscles. Subsequent LP noted elevated protein in CSF. She was subsequently treated with IVIG for CISP and had notable improvement. Neurology follow-up was established with Dr Duenas in the Neuromuscular Clinic who diagnosed her with recurrent CISP after her symptoms started to return after a couple of weeks. Initial plan was for the pt to repeat IVIG every 21 days, but was recently having trouble with insurance and so was unable to complete her planned infusion. As she has continued to worsen since then and is now having difficulties with ambulation despite the use of her walker (has required this since Nov 2021), it was recommended that she come into the hospital for 3d of IVIG     Speaking with the pt today, she reports the symptoms described above and affirms that her current complaints are similar to those felt back in Sept/Nov 2021 prior to her IVIG infusion. She denies any SOB, trouble swallowing, or bladder/bowel functioning. She is hopeful that IVIG will help as it did before.    Please see H&P dated 2/8/2022 for complete HPI.       Hospital Course by Problem     #Recurrent Chronic Immune Sensory Polyradiculopathy  Pt presents to hospital for scheduled admission " in order to obtain 3d of IVIG for worsening symptoms of recurrent CISP. Pt tolerated in the treatment in the hospital well, only having a mild HA and vertigo. Pt reports that her sensation has mildly improved after completion of her course (2/8 - 2/10/22) and is able to ambulate near to her baseline with the use of a walker. Is expected that the pt should continue to improve in the following days/weeks. Plan for outpatient PT/OT to help with recovery as well as follow-up with pt's primary neurologist, Dr Duenas, for continued monitoring and to facilitate ordering of IVIG Q21d as pt will need in the future  - Follow-up with Dr Duenas in the Neuromuscular Clinic as previously scheduled    - IVIG Q21d is already ordered by Dr Duenas. In process of clearing with insurance  - Outpatient PT/OT referrals provided on discharge     #Complex Regional Pain Syndrome  #Migraine  - PTA Flexeril 10mg TID  - PTA Oxycodone 2.5mg BID + 5mg at bedtime  - PTA Tylenol 650mg Q6h prn  - PTA Senna 1 tab BID  - PT Zofran Q8h prn  - PTA Medical Cannabis     Pertinent Investigations     None     Consultations     None     Physical Examination     Vitals:  B/P: 107/84, T: 96.2, P: 71, R: 16  General: Sitting in chair, NAD  Head: NC/AT  Eyes: no icterus, op pink and moist  Cardiac: Extremities warm, no edema.   Respiratory: Non-labored on RA  GI: S/NT/ND  Skin: No rash or lesion on exposed skin  Psych: Mood pleasant, affect congruent  Neuro:  Mental status: Awake, alert, attentive, oriented to self, time, place, and circumstance. Language is fluent and coherent with intact comprehension of complex commands, naming and repetition. Intact Luria testing  Cranial nerves: VFF, PERRL, conjugate gaze, EOMI (endorses diplopia at extremes of vision in all directions), facial sensation intact, face symmetric, tongue/uvula midline, no dysarthria. HINTS Exam: no nystagmus, no skew, no corrective saccades with head thrust  Motor: Normal bulk and tone. No  abnormal movements.                                       R                    L  Shoulder Abd             4/5                 4/5  Shoulder Add             4/5                 4/5  Elbow Flex                 4/5                 4/5  Elbow Ext                   3+/5              3+/5               4/5          4/5  Hip Flex                      4+/5               4+/5  Knee Flex                   3+/5               3+/5  Knee Ext                     3+/5               3+/5  Dorsiflexion                 3+/5               3+/5  Plantarflexion              3+/5                3+/5  **Give-way weakness now mostly present in distal extremities but improved from prior                          Reflexes: 1+ in bilateral biceps, brachioradialis, R knees, and ankles. Absent in L Knee. Neg Babinski bilaterally. Neg clonus  Sensory: Improved sensation to bilateral feet, now described as a tingling rather than previous numbness. Subjective improved sensation in BLE with return to normal sensation now at level of distal 1/3 of shin (prior had been mid-shin). Subjective decreased sensation in BUE remains in glove-distribution to mid forearm. Globally reduced vibration in BUE and BLE (~8s)  Coordination: FNF and HS without ataxia or dysmetria. Neg Romberg but pt did sway in all directions though never fell and was constantly correcting herself  Gait: Walks with use of walker no longer looking at feet. Stance is narrow. Steps very gingerly in short steps but able to take more fluid steps compared to admission. No sign of foot drag     Discharge Plan     Discharge Medications  Current Discharge Medication List      CONTINUE these medications which have NOT CHANGED    Details   acetaminophen (TYLENOL) 325 MG tablet Take 650 mg by mouth      bisacodyl (DULCOLAX) 10 MG suppository Place 10 mg rectally      cyclobenzaprine (FLEXERIL) 10 MG tablet Take 1 tablet (10 mg) by mouth 3 times daily    Associated Diagnoses: Muscle spasm       multivitamin w/minerals (MULTI-VITAMIN) tablet Take 1 tablet by mouth [employer]      ondansetron (ZOFRAN-ODT) 8 MG ODT tab Take 1 tablet (8 mg) by mouth every 8 hours as needed for nausea  Refills: 0    Associated Diagnoses: Nausea      oxyCODONE (ROXICODONE) 5 MG tablet Take 1 tablet (5 mg) by mouth 2 times daily  Refills: 0    Associated Diagnoses: Other chronic pain      polyethylene glycol (MIRALAX) 17 GM/Dose powder       prochlorperazine (COMPAZINE) 5 MG tablet Take 5 mg by mouth      senna-docusate (SENOKOT-S/PERICOLACE) 8.6-50 MG tablet Take 1 tablet by mouth 2 times daily    Associated Diagnoses: Constipation, unspecified constipation type             Discharge Recommendations     Physical Therapy Referral      Occupational Therapy Referral      Reason for your hospital stay    You were hospitalized to receive IVIG treatment for your previously diagnosed CISP     Activity    Your activity upon discharge: activity as tolerated     Follow Up ([facility])    - I have changed NONE of your medications  - Follow up with primary care provider, [facility], within 7 days for hospital follow- up.  No follow up labs or test are needed.    - Follow-up with Dr Duenas in the Neuromuscular Clinic as previously scheduled and to obtain order for IVIG Q 21 days    Appointments on [place] and/or [facility] (with [facility] or [facility] provider or service). Call [phone] if you haven't heard regarding these appointments within 7 days of discharge.     Diet    Follow this diet upon discharge: Orders Placed This Encounter      Combination Diet Gluten Free Diet, Regular Diet       The patient was seen and discussed with the attending physician, Dr. Simmons.     Shobha Oakley MD  Neurology PGY-2  02/10/2022 1:17 PM     no congestion/no difficulty breathing/no difficulty swallowing/no nausea/no shortness of breath/no throat itching/no vomiting/no wheezing

## 2022-09-06 ENCOUNTER — TELEPHONE (OUTPATIENT)
Dept: OBGYN | Facility: CLINIC | Age: 47
End: 2022-09-06

## 2022-09-06 NOTE — TELEPHONE ENCOUNTER
----- Message from Faith Fragoso RN sent at 9/6/2022 10:22 AM CDT -----  Regarding: was prescribed estrogen cream and is an emotional wreck on it  Patient would like to know what to do and what else she can take.     [facility]  Neurocritical Care Consult Note    Michaelreggie Massimo Patient Status:  Inpatient    1973 MRN [id number]   [facility] 6NE-A Attending Paul Cruz MD   Hosp Day # 1 PCP Rashida Tomlinson DO       Reason for Medications Prior to Admission:  valproic acid 250 MG Oral Cap Take by mouth 4 (four) times daily.  Disp:  Rfl:     SERTRALINE  MG Oral Tab TAKE 1 TABLET(100 MG) BY MOUTH DAILY Disp: 30 tablet Rfl: 0 Past Week at Unknown time   [facility] Disp: 1 Box Rfl: 0    acetaminophen 500 MG Oral Tab Take 1,000 mg by mouth 2 (two) times daily. Disp:  Rfl:  Taking   acetaminophen 325 MG Oral Tab Take 650 mg by mouth every 6 (six) hours as needed for Pain.  Disp:  Rfl:  Taking   Thiamine HCl 100 MG Oral BOLUS FROM BAG FOR REFRACTORY STATUS EPILEPTICUS 14.52 mg 0.2 mg/kg Intravenous PRN   Midazolam HCl (VERSED) 250 mg in sodium chloride 0.9% 250 mL infusion 0.2-2 mg/kg/hr Intravenous Continuous   Piperacillin Sod-Tazobactam So (ZOSYN) 3.375 g in dextrose 5 Oral Q15 Min PRN   Or      Glucose-Vitamin C (DEX-4) chewable tab 4 tablet 4 tablet Oral Q15 Min PRN   Or      dextrose 50 % injection 50 mL 50 mL Intravenous Q15 Min PRN   Or      glucose (DEX4) oral liquid 30 g 30 g Oral Q15 Min PRN   Or      Glucose-Vit 25.83 kg/m².     Intake/Output:    Intake/Output Summary (Last 24 hours) at 8/29/2019 1351  Last data filed at 8/29/2019 1222  Gross per 24 hour   Intake 2136 ml   Output 2155 ml   Net -19 ml       Physical Examination:   General- intubated and sedated  CV- inc keppra to 1.5g bid and vpa to 750mg q 8, cont high dose propofol and versed gtt with goal burst suppression or at least seizure cessation x 24hrs before weaning sedation. Cont vEEG, ciwa, mvi, thiamine, folate.   Cardiac:  Hypotension- likely 2/2 high d

## 2022-09-06 NOTE — TELEPHONE ENCOUNTER
----- Message from Faith Fragoso RN sent at 9/6/2022 10:22 AM CDT -----  Regarding: was prescribed estrogen cream and is an emotional wreck on it  Patient would like to know what to do and what else she can take.

## 2022-09-06 NOTE — TELEPHONE ENCOUNTER
Called and spoke with the patient to inquire as to how I can help.     Patient stated that starting on Sunday she cannot stop crying or yelling at her . The only thing that is different is her using the estrogen cream that was prescribed to her. Patient stated she stopped using the cream on Sunday when her mood changed. Peggy also stated that she thinks she has tried this before and the same thing happened with her mood    Spoke with Dr. Ford and she stated that the estrogen cream should not have caused her mood changes, but it does have a short half life and should be out of her system on the next day or two.    Relayed to the patient what Dr. Ford had said and the patient stated it has to be the cream because that is the only thing that is different. I did let her know that it should be out of her system in the next day or two.    Patient verbalized understanding and all questions were answered.

## 2022-10-05 ENCOUNTER — OFFICE VISIT (OUTPATIENT)
Dept: NEUROLOGY | Facility: CLINIC | Age: 47
End: 2022-10-05
Payer: COMMERCIAL

## 2022-10-05 VITALS
SYSTOLIC BLOOD PRESSURE: 127 MMHG | WEIGHT: 177.7 LBS | BODY MASS INDEX: 29.57 KG/M2 | DIASTOLIC BLOOD PRESSURE: 94 MMHG | HEART RATE: 105 BPM | OXYGEN SATURATION: 99 %

## 2022-10-05 DIAGNOSIS — G61.9 INFLAMMATORY NEUROPATHY (H): Primary | ICD-10-CM

## 2022-10-05 PROCEDURE — 99215 OFFICE O/P EST HI 40 MIN: CPT | Performed by: PSYCHIATRY & NEUROLOGY

## 2022-10-05 ASSESSMENT — PAIN SCALES - GENERAL: PAINLEVEL: SEVERE PAIN (6)

## 2022-10-05 NOTE — NURSING NOTE
Chief Complaint   Patient presents with     RECHECK     4 month follow up     Filled out application for handicap parking today in clinic, and Dr. Duenas signed his portion and gave paperwork to patient to turn into [employer].    10/5/2022    Luciano Hollis, EMT

## 2022-10-05 NOTE — LETTER
"10/5/2022       RE: Peggy Jessica  [address] [address]     Dear Colleague,    Thank you for referring your patient, Peggy Jessica, to the [facility] NEUROLOGY [facility] [place] at [facility]. Please see a copy of my visit note below.    Interval history:   I last saw her 6/22/22. Since then she was seen by Dr. Raman at [place]. CSF protein was repeated. It was now essentially normal. MRI spine was repeated. It again showed that the prior enhancement had resolved. NCS were performed, and showed similar results to our prior studies. She also had a large serologic work up. I was also able to review Dr. Raman's thoughtful clinical assessment. As far as her symptoms, Peggy reports that her gait limitations are similar to as before. In the morning she is able to walk 10 or 15 feet. In the afternoon she has more trouble walking, and essentially does none. She spends most of the time in her wheelchair. Some hand functions have improved. She is able to knit some. She was not able to do this a few months ago. Writing is also easier. Like her gait, she feels that by the afternoon all these things are more difficult because she feels \"worn out\". She still feels numb all over her body. Pain also remains a major issue. She reports pain all over, but especially in her hands, legs, and feet. Fatigue remains a major issue. She has not been doing any physical therapy for a month.     Prior pertinent laboratory work-up:  9/2021: unremarkable B12, IgM, Immunofixation, HbA1c  11/2021: CSF (WBC 0, glc 73, protein 105), PAULINE, (1:160) unremarkable CSF HSV, O-bands, serum electrophoresis, paraneoplastic panel, B6, ganglioside Abs, SSA/SSB   4/13/22: Negative FGFR3, TS-HDS, , /186, Contactin 1  9/13/22: Negatove ssa, ssb, sm, rnp, vazquez axonal neuropathy paraneoplastic panel  9/14/22: Fat pad biopsy showed no amyloid  9/26/22: CSF protein " 38    Prior pertinent radiology work-up:  11/18/21: MR Lumbar Spine showed diffuse mild enhancement of the cauda equina nerve roots without nodularity or clumping is nonspecific.  Probable mild enhancement of the posterior paraspinous musculature from L4 through S1. However, artifact could have similar appearance.  Mild multilevel lumbar spondylosis without evidence for high-grade spinal canal or neural foraminal stenosis.  4/30/22: MRI brain and C/T/LS spine showed no CN or root enhancement. There has been resolution of cauda equina nerve root enhancement that was previously noted.  9/29/22: MRI spine showed no abnormal nodularity, clumping, or abnormal enhancement of the roots of the cauda equina.    Prior electrophysiologic work-up:  11/18/21: Nerve conduction studies/EMG: Right upper and lower extremity normal study, including tibial H reflexes  11/21: Autonomic testing ([facility]) : There is generalized, marked, symmetric postganglionic sudomotor dysfunction, normal cardiovagal function, and mild adrenergic dysfunction. Findings are in keeping with systemic anticholinergic medication effect. Superimposed mild autonomic neuropathy can not be excluded. Orthostatic hypotension and tachycardia are not seen. Compression of pulse pressure during Valsalva raises the question of mild hypovolemia or deconditioning.   4/13/22: NCS were again normal . There is no electrophysiologic evidence of a large fiber polyneuropathy affecting the right upper and lower limbs on the basis of this study. These findings are essentially unchanged compared to those obtained 11/18/21.   9/12/12: NCS at [facility] showed studies showed unobtainable medial plantar responses bilaterally but otherwise normal motor and sensory responses and right-sided blink reflexes.     Past Medical History:   CRPS (right leg - stress fracture; chest - mastectomy)  BRCA+   Cervical cancer  Migraines - resolved    Past Surgical History:  Bilateral  mastectomy  Hysterectomy    gallbladder    Family history:    Dad - neuropathy (obeses, CKD)  Unknown largely    Social History:    She denies tobacco, alcohol, or illicit drug use. There is no known exposure to toxins or heavy metals.   [employer] work [employer] for [employer]    Medical Allergies:     Allergies   Allergen Reactions     Dihydroergotamine Anaphylaxis     Latex Anaphylaxis     Shellfish-Derived Products Anaphylaxis     Sumatriptan Anaphylaxis     Banana Unknown     Gabapentin Dizziness     Gluten Meal Other (See Comments)     Celiac disease     Keppra [Levetiracetam] Nausea and Vomiting     Kiwi Unknown     Levofloxacin Other (See Comments)     Arrhythmia     Metronidazole Nausea and Vomiting     Nitrofurantoin Hives     Penicillins Hives     Pregabalin      Topiramate Visual Disturbance     Aspirin Rash     Methadone Rash     Risperidone Anxiety     Current Medications:    Current Outpatient Medications   Medication     acetaminophen (TYLENOL) 325 MG tablet     bisacodyl (DULCOLAX) 10 MG suppository     cyclobenzaprine (FLEXERIL) 10 MG tablet     folic acid (FOLVITE) 400 MCG tablet     ibuprofen (ADVIL/MOTRIN) 200 MG tablet     LORazepam (ATIVAN) 1 MG tablet     medical cannabis (Patient's own supply)     multivitamin w/minerals (THERA-VIT-M) tablet     ondansetron (ZOFRAN) 4 MG tablet     ondansetron (ZOFRAN-ODT) 8 MG ODT tab     oxyCODONE (ROXICODONE) 5 MG tablet     polyethylene glycol (MIRALAX) 17 GM/Dose powder     senna-docusate (SENOKOT-S/PERICOLACE) 8.6-50 MG tablet     estradiol (ESTRACE) 0.1 MG/GM vaginal cream     oxyCODONE-acetaminophen (PERCOCET) 5-325 MG tablet     No current facility-administered medications for this visit.     Review of Systems: A complete review of systems was obtained and was negative except for what was noted above.    Physical examination:    BP (!) 127/94 (Patient Position: Sitting, Cuff Size: Adult Regular)   Pulse 105   Wt 80.6 kg (177 lb 11.2 oz)    SpO2 99%   BMI 29.57 kg/m      General Appearance: NAD    Neurologic examination:    Mental status:  Patient is alert, attentive, and oriented x 3.  Language is coherent and fluent without  aphasia.  Memory, comprehension and ability to follow commands were intact.       Cranial nerves:    Extraocular movements were full. There was no face, jaw, palate or tongue weakness or atrophy. Hearing was grossly intact.  Shoulder shrug was normal.       Motor exam: No atrophy or fasciculations.  Impersistent activation but as previously noted power is at least 4/5 and probably full in proximal and distal muscles of the arms and legs.    Complex motor skills: No tremor or ataxia. No pseudoathetosis    Sensation: Vibration and pin testing is patchy in the arms and legs. Some degree of vibration is perceived in the hands ands feet, as well as more proximal areas. Pin testing is more difficult to interpret.     Gait: She is able to rise independantly. Upright she is wobbly and takes slow very deliberate steps. Upon request to turn she fluidily turns and moves back into the wheelchair.     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 The clinical and laboratory evidence for this diagnosis was strong: non-length dependant sensory symptoms, ataxia, reduced DTR, nerve root enhancement and unequivocal CSF albuminocytological dissociation. She also had a convincing response to IVIG in late 2021 and early 2022. In the spring 2022 the benefit of IVIG was less convincing, and she developed tolerability issues. Her condition also became layered with functional elements that could not be explained by the neuropathy.  It became suspected that, although she may have some residual deficits from the inflammatory neuropathy, CISP was no longer immunologically active.  IVIG was subsequently suspended. I appreciate that thoughtful work up that she has recently been through at [facility] by Dr. Raman. Notably, MRI shows resolved root enhancement and CSF protein has normalized. NCS still show no marked sensory neuropathy or neuronopathy. These findings all reaffirm the immunologically inactive nature of her suspected CISP. Her neurologic examination is improved from our initial meeting a year ago, but has functional elements. I reviewed this data with Peggy. I reinforced the importance of supportive care at this stage through a combination of physical therapy, pain management, and mental health support. Understandably, this has been a struggle. I am happy to help her through this in whatever way I can.     Plan:      1. Immunotherapy: No indication for immunotherapy at this time  2. Physical therapy: She recently took a month hiatus from PT. I encouraged her to return to PT and OT. This is a very important part of her recovery. She is going to PT and OT tomorrow at [facility]. She inquires about pool therapy. I am supportive of this. Will see what the [facility] PT plan is after tomorrow.   3. Symptomatic management of pain and paresthesias:  She follows at [facility] pain clinic, treated with oxycodone, cannabis and flexeril. Appreciate collateral care with pain management. I will defer pain management to the pain clinic ecpertise.   4. Encouraged her to follow up with PCP for general health maintenance  5. Home services: She has a wheelchair. Requesting handicap parking. I am happy to help her with this. I completed paper work today.   6. Emotional health: She has a mental health provider. I encouraged her to continue to work with her therapist to optimize mental health.   7. INCBASE: Withdrawn [id number]  8. Follow up in 6 months.   ---        Sincerely,    Jon Duenas MD

## 2022-10-05 NOTE — PROGRESS NOTES
"Interval history:   I last saw her 6/22/22. Since then she was seen by Dr. Raman at [facility]. CSF protein was repeated. It was now essentially normal. MRI spine was repeated. It again showed that the prior enhancement had resolved. NCS were performed, and showed similar results to our prior studies. She also had a large serologic work up. I was also able to review Dr. Raman's thoughtful clinical assessment. As far as her symptoms, Peggy reports that her gait limitations are similar to as before. In the morning she is able to walk 10 or 15 feet. In the afternoon she has more trouble walking, and essentially does none. She spends most of the time in her wheelchair. Some hand functions have improved. She is able to knit some. She was not able to do this a few months ago. Writing is also easier. Like her gait, she feels that by the afternoon all these things are more difficult because she feels \"worn out\". She still feels numb all over her body. Pain also remains a major issue. She reports pain all over, but especially in her hands, legs, and feet. Fatigue remains a major issue. She has not been doing any physical therapy for a month.     Prior pertinent laboratory work-up:  9/2021: unremarkable B12, IgM, Immunofixation, HbA1c  11/2021: CSF (WBC 0, glc 73, protein 105), PAULINE, (1:160) unremarkable CSF HSV, O-bands, serum electrophoresis, paraneoplastic panel, B6, ganglioside Abs, SSA/SSB   4/13/22: Negative FGFR3, TS-HDS, , /186, Contactin 1  9/13/22: Negatove ssa, ssb, sm, rnp, Diller axonal neuropathy paraneoplastic panel  9/14/22: Fat pad biopsy showed no amyloid  9/26/22: CSF protein 38    Prior pertinent radiology work-up:  11/18/21: MR Lumbar Spine showed diffuse mild enhancement of the cauda equina nerve roots without nodularity or clumping is nonspecific.  Probable mild enhancement of the posterior paraspinous musculature from L4 through S1. However, artifact could have similar appearance.  Mild multilevel " lumbar spondylosis without evidence for high-grade spinal canal or neural foraminal stenosis.  22: MRI brain and C/T/LS spine showed no CN or root enhancement. There has been resolution of cauda equina nerve root enhancement that was previously noted.  22: MRI spine showed no abnormal nodularity, clumping, or abnormal enhancement of the roots of the cauda equina.    Prior electrophysiologic work-up:  21: Nerve conduction studies/EMG: Right upper and lower extremity normal study, including tibial H reflexes  : [place] [place] There is generalized, marked, symmetric postganglionic sudomotor dysfunction, normal cardiovagal function, and mild adrenergic dysfunction. Findings are in keeping with systemic anticholinergic medication effect. Superimposed mild autonomic neuropathy can not be excluded. Orthostatic hypotension and tachycardia are not seen. Compression of pulse pressure during Valsalva raises the question of mild hypovolemia or deconditioning.   22: NCS were again normal . There is no electrophysiologic evidence of a large fiber polyneuropathy affecting the right upper and lower limbs on the basis of this study. These findings are essentially unchanged compared to those obtained 21.   12: NCS at [facility] showed studies showed unobtainable medial plantar responses bilaterally but otherwise normal motor and sensory responses and right-sided blink reflexes.     Past Medical History:   CRPS (right leg - stress fracture; chest - mastectomy)  BRCA+   Cervical cancer  Migraines - resolved    Past Surgical History:  Bilateral mastectomy  Hysterectomy    gallbladder    Family history:    Dad - neuropathy (obeses, CKD)  Unknown largely    Social History:    She denies tobacco, alcohol, or illicit drug use. There is no known exposure to toxins or heavy metals.   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 [employer]    Medical Allergies:     Allergies   Allergen Reactions      Dihydroergotamine Anaphylaxis     Latex Anaphylaxis     Shellfish-Derived Products Anaphylaxis     Sumatriptan Anaphylaxis     Banana Unknown     Gabapentin Dizziness     Gluten Meal Other (See Comments)     Celiac disease     Keppra [Levetiracetam] Nausea and Vomiting     Kiwi Unknown     Levofloxacin Other (See Comments)     Arrhythmia     Metronidazole Nausea and Vomiting     Nitrofurantoin Hives     Penicillins Hives     Pregabalin      Topiramate Visual Disturbance     Aspirin Rash     Methadone Rash     Risperidone Anxiety     Current Medications:    Current Outpatient Medications   Medication     acetaminophen (TYLENOL) 325 MG tablet     bisacodyl (DULCOLAX) 10 MG suppository     cyclobenzaprine (FLEXERIL) 10 MG tablet     folic acid (FOLVITE) 400 MCG tablet     ibuprofen (ADVIL/MOTRIN) 200 MG tablet     LORazepam (ATIVAN) 1 MG tablet     medical cannabis (Patient's own supply)     multivitamin w/minerals (THERA-VIT-M) tablet     ondansetron (ZOFRAN) 4 MG tablet     ondansetron (ZOFRAN-ODT) 8 MG ODT tab     oxyCODONE (ROXICODONE) 5 MG tablet     polyethylene glycol (MIRALAX) 17 GM/Dose powder     senna-docusate (SENOKOT-S/PERICOLACE) 8.6-50 MG tablet     estradiol (ESTRACE) 0.1 MG/GM vaginal cream     oxyCODONE-acetaminophen (PERCOCET) 5-325 MG tablet     No current facility-administered medications for this visit.     Review of Systems: A complete review of systems was obtained and was negative except for what was noted above.    Physical examination:    BP (!) 127/94 (Patient Position: Sitting, Cuff Size: Adult Regular)   Pulse 105   Wt 80.6 kg (177 lb 11.2 oz)   SpO2 99%   BMI 29.57 kg/m      General Appearance: NAD    Neurologic examination:    Mental status:  Patient is alert, attentive, and oriented x 3.  Language is coherent and fluent without  aphasia.  Memory, comprehension and ability to follow commands were intact.       Cranial nerves:    Extraocular movements were full. There was no face,  jaw, palate or tongue weakness or atrophy. Hearing was grossly intact.  Shoulder shrug was normal.       Motor exam: No atrophy or fasciculations.  Impersistent activation but as previously noted power is at least 4/5 and probably full in proximal and distal muscles of the arms and legs.    Complex motor skills: No tremor or ataxia. No pseudoathetosis    Sensation: Vibration and pin testing is patchy in the arms and legs. Some degree of vibration is perceived in the hands ands feet, as well as more proximal areas. Pin testing is more difficult to interpret.     Gait: She is able to rise independantly. Upright she is wobbly and takes slow very deliberate steps. Upon request to turn she fluidily turns and moves back into the wheelchair.     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 The clinical and laboratory evidence for this diagnosis was strong: non-length dependant sensory symptoms, ataxia, reduced DTR, nerve root enhancement and unequivocal CSF albuminocytological dissociation. She also had a convincing response to IVIG in late 2021 and early 2022. In the spring 2022 the benefit of IVIG was less convincing, and she developed tolerability issues. Her condition also became layered with functional elements that could not be explained by the neuropathy.  It became suspected that, although she may have some residual deficits from the inflammatory neuropathy, CISP was no longer immunologically active. IVIG was subsequently suspended. I appreciate that thoughtful work up that she has recently been through at [facility] by Dr. Raman. Notably, MRI shows resolved root enhancement and CSF protein has normalized. NCS still show no marked sensory neuropathy or neuronopathy. These findings all reaffirm the immunologically inactive nature of her suspected  CISP. Her neurologic examination is improved from our initial meeting a year ago, but has functional elements. I reviewed this data with Peggy. I reinforced the importance of supportive care at this stage through a combination of physical therapy, pain management, and mental health support. Understandably, this has been a struggle. I am happy to help her through this in whatever way I can.     Plan:      1. Immunotherapy: No indication for immunotherapy at this time  2. Physical therapy: She recently took a month hiatus from PT. I encouraged her to return to PT and OT. This is a very important part of her recovery. She is going to PT and OT tomorrow at [place]. She inquires about pool therapy. I am supportive of this. Will see what the [facility] PT plan is after tomorrow.   3. Symptomatic management of pain and paresthesias:  She follows at [facility] pain clinic, treated with oxycodone, cannabis and flexeril. Appreciate collateral care with pain management. I will defer pain management to the pain clinic ecpertise.   4. Encouraged her to follow up with PCP for general health maintenance  5. Home services: She has a wheelchair. Requesting handicap parking. I am happy to help her with this. I completed paper work today.   6. Emotional health: She has a mental health provider. I encouraged her to continue to work with her therapist to optimize mental health.   7. INCBASE: Withdrawn [id number]  8. Follow up in 6 months.   ---

## 2022-10-14 ENCOUNTER — TRANSCRIBE ORDERS (OUTPATIENT)
Dept: OTHER | Age: 47
End: 2022-10-14

## 2022-10-14 DIAGNOSIS — G90.50 COMPLEX REGIONAL PAIN SYNDROME I: ICD-10-CM

## 2022-10-14 DIAGNOSIS — G90.529 CRPS (COMPLEX REGIONAL PAIN SYNDROME), LOWER LIMB: Primary | ICD-10-CM

## 2022-10-17 ENCOUNTER — APPOINTMENT (OUTPATIENT)
Dept: CT IMAGING | Facility: CLINIC | Age: 47
End: 2022-10-17
Attending: EMERGENCY MEDICINE
Payer: COMMERCIAL

## 2022-10-17 ENCOUNTER — APPOINTMENT (OUTPATIENT)
Dept: GENERAL RADIOLOGY | Facility: CLINIC | Age: 47
End: 2022-10-17
Attending: EMERGENCY MEDICINE
Payer: COMMERCIAL

## 2022-10-17 ENCOUNTER — APPOINTMENT (OUTPATIENT)
Dept: PHYSICAL THERAPY | Facility: CLINIC | Age: 47
End: 2022-10-17
Attending: EMERGENCY MEDICINE
Payer: COMMERCIAL

## 2022-10-17 ENCOUNTER — HOSPITAL ENCOUNTER (OUTPATIENT)
Facility: CLINIC | Age: 47
Setting detail: OBSERVATION
Discharge: NURSING, REHAB OR LONG-TERM CARE | End: 2022-10-21
Attending: EMERGENCY MEDICINE | Admitting: EMERGENCY MEDICINE
Payer: COMMERCIAL
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
## 2022-10-17 DIAGNOSIS — R11.0 NAUSEA: Primary | ICD-10-CM
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
## 2022-10-17 DIAGNOSIS — S62.344D CLOSED NONDISPLACED FRACTURE OF BASE OF FOURTH METACARPAL BONE OF RIGHT HAND WITH ROUTINE HEALING, SUBSEQUENT ENCOUNTER: ICD-10-CM

## 2022-10-17 DIAGNOSIS — E53.8 FOLIC ACID DEFICIENCY: ICD-10-CM

## 2022-10-17 DIAGNOSIS — G90.521 COMPLEX REGIONAL PAIN SYNDROME I OF RIGHT LOWER LIMB: ICD-10-CM

## 2022-10-17 LAB
ALBUMIN SERPL BCG-MCNC: 4.1 G/DL (ref 3.5–5.2)
ALBUMIN UR-MCNC: NEGATIVE MG/DL
ALP SERPL-CCNC: 95 U/L (ref 35–104)
ALT SERPL W P-5'-P-CCNC: 47 U/L (ref 10–35)
ANION GAP SERPL CALCULATED.3IONS-SCNC: 10 MMOL/L (ref 7–15)
APPEARANCE UR: CLEAR
AST SERPL W P-5'-P-CCNC: 44 U/L (ref 10–35)
ATRIAL RATE - MUSE: 74 BPM
BASOPHILS # BLD AUTO: 0 10E3/UL (ref 0–0.2)
BASOPHILS NFR BLD AUTO: 0 %
BILIRUB SERPL-MCNC: 0.3 MG/DL
BILIRUB UR QL STRIP: NEGATIVE
BUN SERPL-MCNC: 10.7 MG/DL (ref 6–20)
CALCIUM SERPL-MCNC: 9.7 MG/DL (ref 8.6–10)
CHLORIDE SERPL-SCNC: 106 MMOL/L (ref 98–107)
COLOR UR AUTO: ABNORMAL
CREAT SERPL-MCNC: 0.75 MG/DL (ref 0.51–0.95)
DEPRECATED HCO3 PLAS-SCNC: 23 MMOL/L (ref 22–29)
DIASTOLIC BLOOD PRESSURE - MUSE: NORMAL MMHG
EOSINOPHIL # BLD AUTO: 0 10E3/UL (ref 0–0.7)
EOSINOPHIL NFR BLD AUTO: 0 %
ERYTHROCYTE [DISTWIDTH] IN BLOOD BY AUTOMATED COUNT: 14.9 % (ref 10–15)
GFR SERPL CREATININE-BSD FRML MDRD: >90 ML/MIN/1.73M2
GLUCOSE SERPL-MCNC: 112 MG/DL (ref 70–99)
GLUCOSE UR STRIP-MCNC: NEGATIVE MG/DL
HCT VFR BLD AUTO: 40 % (ref 35–47)
HGB BLD-MCNC: 13.3 G/DL (ref 11.7–15.7)
HGB UR QL STRIP: NEGATIVE
HOLD SPECIMEN: NORMAL
IMM GRANULOCYTES # BLD: 0 10E3/UL
IMM GRANULOCYTES NFR BLD: 0 %
INTERPRETATION ECG - MUSE: NORMAL
KETONES UR STRIP-MCNC: 10 MG/DL
LEUKOCYTE ESTERASE UR QL STRIP: NEGATIVE
LYMPHOCYTES # BLD AUTO: 1 10E3/UL (ref 0.8–5.3)
LYMPHOCYTES NFR BLD AUTO: 16 %
MAGNESIUM SERPL-MCNC: 1.9 MG/DL (ref 1.7–2.3)
MCH RBC QN AUTO: 31.1 PG (ref 26.5–33)
MCHC RBC AUTO-ENTMCNC: 33.3 G/DL (ref 31.5–36.5)
MCV RBC AUTO: 94 FL (ref 78–100)
MONOCYTES # BLD AUTO: 0.3 10E3/UL (ref 0–1.3)
MONOCYTES NFR BLD AUTO: 4 %
MUCOUS THREADS #/AREA URNS LPF: PRESENT /LPF
NEUTROPHILS # BLD AUTO: 4.8 10E3/UL (ref 1.6–8.3)
NEUTROPHILS NFR BLD AUTO: 80 %
NITRATE UR QL: NEGATIVE
NRBC # BLD AUTO: 0 10E3/UL
NRBC BLD AUTO-RTO: 0 /100
P AXIS - MUSE: 45 DEGREES
PH UR STRIP: 6.5 [PH] (ref 5–7)
PLATELET # BLD AUTO: 274 10E3/UL (ref 150–450)
POTASSIUM SERPL-SCNC: 4.3 MMOL/L (ref 3.4–5.3)
PR INTERVAL - MUSE: 152 MS
PROT SERPL-MCNC: 7.2 G/DL (ref 6.4–8.3)
QRS DURATION - MUSE: 74 MS
QT - MUSE: 388 MS
QTC - MUSE: 430 MS
R AXIS - MUSE: -3 DEGREES
RBC # BLD AUTO: 4.27 10E6/UL (ref 3.8–5.2)
RBC URINE: 1 /HPF
SARS-COV-2 RNA RESP QL NAA+PROBE: NEGATIVE
SODIUM SERPL-SCNC: 139 MMOL/L (ref 136–145)
SP GR UR STRIP: 1.03 (ref 1–1.03)
SYSTOLIC BLOOD PRESSURE - MUSE: NORMAL MMHG
T AXIS - MUSE: 24 DEGREES
UROBILINOGEN UR STRIP-MCNC: NORMAL MG/DL
VENTRICULAR RATE- MUSE: 74 BPM
WBC # BLD AUTO: 6.1 10E3/UL (ref 4–11)
WBC URINE: 1 /HPF

## 2022-10-17 PROCEDURE — 97530 THERAPEUTIC ACTIVITIES: CPT | Mod: GP

## 2022-10-17 PROCEDURE — 82040 ASSAY OF SERUM ALBUMIN: CPT | Performed by: EMERGENCY MEDICINE

## 2022-10-17 PROCEDURE — 999N000127 HC STATISTIC PERIPHERAL IV START W US GUIDANCE

## 2022-10-17 PROCEDURE — 73030 X-RAY EXAM OF SHOULDER: CPT | Mod: RT

## 2022-10-17 PROCEDURE — 250N000013 HC RX MED GY IP 250 OP 250 PS 637

## 2022-10-17 PROCEDURE — 73502 X-RAY EXAM HIP UNI 2-3 VIEWS: CPT | Mod: 26 | Performed by: RADIOLOGY

## 2022-10-17 PROCEDURE — 96375 TX/PRO/DX INJ NEW DRUG ADDON: CPT

## 2022-10-17 PROCEDURE — 97161 PT EVAL LOW COMPLEX 20 MIN: CPT | Mod: GP

## 2022-10-17 PROCEDURE — 73110 X-RAY EXAM OF WRIST: CPT | Mod: RT

## 2022-10-17 PROCEDURE — 81001 URINALYSIS AUTO W/SCOPE: CPT | Performed by: EMERGENCY MEDICINE

## 2022-10-17 PROCEDURE — 250N000011 HC RX IP 250 OP 636: Performed by: NURSE PRACTITIONER

## 2022-10-17 PROCEDURE — 73070 X-RAY EXAM OF ELBOW: CPT | Mod: 26 | Performed by: RADIOLOGY

## 2022-10-17 PROCEDURE — 70450 CT HEAD/BRAIN W/O DYE: CPT | Mod: 26 | Performed by: RADIOLOGY

## 2022-10-17 PROCEDURE — 93005 ELECTROCARDIOGRAM TRACING: CPT

## 2022-10-17 PROCEDURE — U0003 INFECTIOUS AGENT DETECTION BY NUCLEIC ACID (DNA OR RNA); SEVERE ACUTE RESPIRATORY SYNDROME CORONAVIRUS 2 (SARS-COV-2) (CORONAVIRUS DISEASE [COVID-19]), AMPLIFIED PROBE TECHNIQUE, MAKING USE OF HIGH THROUGHPUT TECHNOLOGIES AS DESCRIBED BY CMS-2020-01-R: HCPCS

## 2022-10-17 PROCEDURE — 73030 X-RAY EXAM OF SHOULDER: CPT | Mod: 26 | Performed by: RADIOLOGY

## 2022-10-17 PROCEDURE — 258N000003 HC RX IP 258 OP 636: Performed by: EMERGENCY MEDICINE

## 2022-10-17 PROCEDURE — 80053 COMPREHEN METABOLIC PANEL: CPT | Performed by: EMERGENCY MEDICINE

## 2022-10-17 PROCEDURE — 250N000011 HC RX IP 250 OP 636

## 2022-10-17 PROCEDURE — 29125 APPL SHORT ARM SPLINT STATIC: CPT | Mod: RT

## 2022-10-17 PROCEDURE — 72132 CT LUMBAR SPINE W/DYE: CPT

## 2022-10-17 PROCEDURE — 73502 X-RAY EXAM HIP UNI 2-3 VIEWS: CPT

## 2022-10-17 PROCEDURE — G0378 HOSPITAL OBSERVATION PER HR: HCPCS

## 2022-10-17 PROCEDURE — 96361 HYDRATE IV INFUSION ADD-ON: CPT

## 2022-10-17 PROCEDURE — 96374 THER/PROPH/DIAG INJ IV PUSH: CPT

## 2022-10-17 PROCEDURE — 72129 CT CHEST SPINE W/DYE: CPT

## 2022-10-17 PROCEDURE — 250N000011 HC RX IP 250 OP 636: Performed by: EMERGENCY MEDICINE

## 2022-10-17 PROCEDURE — 73070 X-RAY EXAM OF ELBOW: CPT | Mod: RT

## 2022-10-17 PROCEDURE — 99219 PR INITIAL OBSERVATION CARE,LEVEL II: CPT

## 2022-10-17 PROCEDURE — 74177 CT ABD & PELVIS W/CONTRAST: CPT

## 2022-10-17 PROCEDURE — 73110 X-RAY EXAM OF WRIST: CPT | Mod: 26 | Performed by: RADIOLOGY

## 2022-10-17 PROCEDURE — 250N000009 HC RX 250: Performed by: EMERGENCY MEDICINE

## 2022-10-17 PROCEDURE — 74177 CT ABD & PELVIS W/CONTRAST: CPT | Mod: 26 | Performed by: RADIOLOGY

## 2022-10-17 PROCEDURE — 96376 TX/PRO/DX INJ SAME DRUG ADON: CPT | Mod: 59

## 2022-10-17 PROCEDURE — 70450 CT HEAD/BRAIN W/O DYE: CPT

## 2022-10-17 PROCEDURE — 85014 HEMATOCRIT: CPT | Performed by: EMERGENCY MEDICINE

## 2022-10-17 PROCEDURE — 72129 CT CHEST SPINE W/DYE: CPT | Mod: 26 | Performed by: RADIOLOGY

## 2022-10-17 PROCEDURE — C9803 HOPD COVID-19 SPEC COLLECT: HCPCS

## 2022-10-17 PROCEDURE — 72125 CT NECK SPINE W/O DYE: CPT

## 2022-10-17 PROCEDURE — 99285 EMERGENCY DEPT VISIT HI MDM: CPT | Mod: 25

## 2022-10-17 PROCEDURE — 36415 COLL VENOUS BLD VENIPUNCTURE: CPT | Performed by: EMERGENCY MEDICINE

## 2022-10-17 PROCEDURE — 72125 CT NECK SPINE W/O DYE: CPT | Mod: 26 | Performed by: RADIOLOGY

## 2022-10-17 PROCEDURE — 71260 CT THORAX DX C+: CPT | Mod: 26 | Performed by: RADIOLOGY

## 2022-10-17 PROCEDURE — 26600 TREAT METACARPAL FRACTURE: CPT | Mod: RT | Performed by: EMERGENCY MEDICINE

## 2022-10-17 PROCEDURE — 83735 ASSAY OF MAGNESIUM: CPT | Performed by: EMERGENCY MEDICINE

## 2022-10-17 PROCEDURE — 250N000013 HC RX MED GY IP 250 OP 250 PS 637: Performed by: EMERGENCY MEDICINE

## 2022-10-17 PROCEDURE — 72132 CT LUMBAR SPINE W/DYE: CPT | Mod: 26 | Performed by: RADIOLOGY

## 2022-10-17 RX ORDER — AMOXICILLIN 250 MG
1 CAPSULE ORAL
Status: DISCONTINUED | OUTPATIENT
Start: 2022-10-17 | End: 2022-10-21 | Stop reason: HOSPADM

## 2022-10-17 RX ORDER — PROCHLORPERAZINE MALEATE 10 MG
10 TABLET ORAL EVERY 6 HOURS PRN
Status: DISCONTINUED | OUTPATIENT
Start: 2022-10-17 | End: 2022-10-21 | Stop reason: HOSPADM

## 2022-10-17 RX ORDER — IOPAMIDOL 755 MG/ML
104 INJECTION, SOLUTION INTRAVASCULAR ONCE
Status: COMPLETED | OUTPATIENT
Start: 2022-10-17 | End: 2022-10-17

## 2022-10-17 RX ORDER — CYCLOBENZAPRINE HCL 5 MG
10 TABLET ORAL 3 TIMES DAILY
Status: DISCONTINUED | OUTPATIENT
Start: 2022-10-17 | End: 2022-10-21 | Stop reason: HOSPADM

## 2022-10-17 RX ORDER — AMOXICILLIN 250 MG
1 CAPSULE ORAL
Status: DISCONTINUED | OUTPATIENT
Start: 2022-10-17 | End: 2022-10-18

## 2022-10-17 RX ORDER — POLYETHYLENE GLYCOL 3350 17 G/17G
17 POWDER, FOR SOLUTION ORAL DAILY PRN
Status: DISCONTINUED | OUTPATIENT
Start: 2022-10-17 | End: 2022-10-21 | Stop reason: HOSPADM

## 2022-10-17 RX ORDER — MORPHINE SULFATE 4 MG/ML
4 INJECTION, SOLUTION INTRAMUSCULAR; INTRAVENOUS
Status: COMPLETED | OUTPATIENT
Start: 2022-10-17 | End: 2022-10-17

## 2022-10-17 RX ORDER — LIDOCAINE 4 G/G
3 PATCH TOPICAL
Status: DISCONTINUED | OUTPATIENT
Start: 2022-10-17 | End: 2022-10-21 | Stop reason: HOSPADM

## 2022-10-17 RX ORDER — PROCHLORPERAZINE 25 MG
25 SUPPOSITORY, RECTAL RECTAL EVERY 12 HOURS PRN
Status: DISCONTINUED | OUTPATIENT
Start: 2022-10-17 | End: 2022-10-21 | Stop reason: HOSPADM

## 2022-10-17 RX ORDER — OXYCODONE HYDROCHLORIDE 5 MG/1
5 TABLET ORAL ONCE
Status: COMPLETED | OUTPATIENT
Start: 2022-10-17 | End: 2022-10-17

## 2022-10-17 RX ORDER — ONDANSETRON 8 MG/1
8 TABLET, ORALLY DISINTEGRATING ORAL EVERY 8 HOURS PRN
Status: CANCELLED | OUTPATIENT
Start: 2022-10-17

## 2022-10-17 RX ORDER — ACETAMINOPHEN 325 MG/1
975 TABLET ORAL EVERY 6 HOURS PRN
Status: DISCONTINUED | OUTPATIENT
Start: 2022-10-17 | End: 2022-10-18

## 2022-10-17 RX ORDER — ACETAMINOPHEN 325 MG/1
975 TABLET ORAL EVERY 8 HOURS PRN
Status: DISCONTINUED | OUTPATIENT
Start: 2022-10-17 | End: 2022-10-21 | Stop reason: HOSPADM

## 2022-10-17 RX ORDER — POLYETHYLENE GLYCOL 3350 17 G/17G
17 POWDER, FOR SOLUTION ORAL DAILY PRN
Status: DISCONTINUED | OUTPATIENT
Start: 2022-10-17 | End: 2022-10-17

## 2022-10-17 RX ORDER — ONDANSETRON 4 MG/1
4 TABLET, FILM COATED ORAL EVERY 8 HOURS PRN
Status: DISCONTINUED | OUTPATIENT
Start: 2022-10-17 | End: 2022-10-20

## 2022-10-17 RX ORDER — IBUPROFEN 600 MG/1
600 TABLET, FILM COATED ORAL EVERY 6 HOURS PRN
Status: DISCONTINUED | OUTPATIENT
Start: 2022-10-17 | End: 2022-10-18

## 2022-10-17 RX ORDER — OXYCODONE HYDROCHLORIDE 5 MG/1
5 TABLET ORAL 2 TIMES DAILY
Status: CANCELLED | OUTPATIENT
Start: 2022-10-17

## 2022-10-17 RX ORDER — SODIUM CHLORIDE 9 MG/ML
INJECTION, SOLUTION INTRAVENOUS CONTINUOUS
Status: DISCONTINUED | OUTPATIENT
Start: 2022-10-17 | End: 2022-10-21 | Stop reason: HOSPADM

## 2022-10-17 RX ORDER — CYCLOBENZAPRINE HCL 5 MG
10 TABLET ORAL 3 TIMES DAILY
Status: CANCELLED | OUTPATIENT
Start: 2022-10-17

## 2022-10-17 RX ORDER — OXYCODONE HYDROCHLORIDE 5 MG/1
5 TABLET ORAL EVERY 8 HOURS PRN
Status: DISCONTINUED | OUTPATIENT
Start: 2022-10-17 | End: 2022-10-21 | Stop reason: HOSPADM

## 2022-10-17 RX ORDER — ONDANSETRON 2 MG/ML
4 INJECTION INTRAMUSCULAR; INTRAVENOUS EVERY 30 MIN PRN
Status: DISCONTINUED | OUTPATIENT
Start: 2022-10-17 | End: 2022-10-17

## 2022-10-17 RX ORDER — ONDANSETRON 4 MG/1
4 TABLET, ORALLY DISINTEGRATING ORAL EVERY 6 HOURS PRN
Status: CANCELLED | OUTPATIENT
Start: 2022-10-17

## 2022-10-17 RX ORDER — IBUPROFEN 600 MG/1
600 TABLET, FILM COATED ORAL EVERY 6 HOURS PRN
Status: DISCONTINUED | OUTPATIENT
Start: 2022-10-17 | End: 2022-10-21 | Stop reason: HOSPADM

## 2022-10-17 RX ORDER — ONDANSETRON 2 MG/ML
4 INJECTION INTRAMUSCULAR; INTRAVENOUS EVERY 6 HOURS PRN
Status: DISCONTINUED | OUTPATIENT
Start: 2022-10-17 | End: 2022-10-21 | Stop reason: HOSPADM

## 2022-10-17 RX ADMIN — ONDANSETRON 4 MG: 2 INJECTION INTRAMUSCULAR; INTRAVENOUS at 11:40

## 2022-10-17 RX ADMIN — ONDANSETRON 4 MG: 2 INJECTION INTRAMUSCULAR; INTRAVENOUS at 20:32

## 2022-10-17 RX ADMIN — HYDROMORPHONE HYDROCHLORIDE 1 MG: 1 INJECTION, SOLUTION INTRAMUSCULAR; INTRAVENOUS; SUBCUTANEOUS at 11:19

## 2022-10-17 RX ADMIN — PROCHLORPERAZINE EDISYLATE 10 MG: 5 INJECTION INTRAMUSCULAR; INTRAVENOUS at 21:18

## 2022-10-17 RX ADMIN — MORPHINE SULFATE 4 MG: 4 INJECTION INTRAVENOUS at 12:31

## 2022-10-17 RX ADMIN — SODIUM CHLORIDE: 9 INJECTION, SOLUTION INTRAVENOUS at 20:38

## 2022-10-17 RX ADMIN — SODIUM CHLORIDE, PRESERVATIVE FREE 71 ML: 5 INJECTION INTRAVENOUS at 12:03

## 2022-10-17 RX ADMIN — OXYCODONE HYDROCHLORIDE 5 MG: 5 TABLET ORAL at 17:28

## 2022-10-17 RX ADMIN — LIDOCAINE PATCH 4% 3 PATCH: 40 PATCH TOPICAL at 20:40

## 2022-10-17 RX ADMIN — IOPAMIDOL 104 ML: 755 INJECTION, SOLUTION INTRAVENOUS at 12:03

## 2022-10-17 RX ADMIN — CYCLOBENZAPRINE 10 MG: 5 TABLET, FILM COATED ORAL at 21:35

## 2022-10-17 RX ADMIN — SODIUM CHLORIDE 1000 ML: 9 INJECTION, SOLUTION INTRAVENOUS at 11:21

## 2022-10-17 ASSESSMENT — ACTIVITIES OF DAILY LIVING (ADL)
ADLS_ACUITY_SCORE: 35
ADLS_ACUITY_SCORE: 31
DEPENDENT_IADLS:: CLEANING;COOKING;LAUNDRY;SHOPPING;MEAL PREPARATION;TRANSPORTATION
ADLS_ACUITY_SCORE: 43

## 2022-10-17 NOTE — ED NOTES
Bed: ED07  Expected date: 10/17/22  Expected time: 9:28 AM  Means of arrival: Ambulance  Comments:  Alon Sheffield    46 y/o Female    Fall    Triaged:  YELLOW

## 2022-10-17 NOTE — H&P
[facility]    History and Physical - ED Observation        Date of Admission:  10/17/2022    Assessment & Plan      Peggy Jessica is a 47 year old female admitted on 10/17/2022. She has PMH of chronic inflammatory demyelinating polyneuropathy, frequent falls, complex regional pain syndrome who presents to the ED after a mechanical fall.     ##Mechanical Fall vs Syncope  ##hx frequent falls  ## nondisplaced fracture at the fourth metacarpal base  ##hx demyelinating polyneuropathy and chronic pain   Patient presents to the ED today after a fall out of bed. She reported to the ED doctor that she fell out of bed and landed on her right side. During my assessment, she tells me that she was feeding the dogs and felt lightheaded and passed out. She reports that she LOC for a few minutes she thinks. The fall was unwitnessed but she thinks she landed on her right side because she has pain along her entire right side. She denies anyy tongue biting, drooling, or loss of bowel or bladder control. She reports she has had many syncopal episodes in the past. Patient and  report she laid on the ground for about 25 minutes. Patient reported to PT in the ED that she had 6 falls yesterday and has been falling frequently at home, about 2-3 times a day. She denies any chest pain, SOB, nausea (aside from chronic nausea), vomiting, dizziness, lightheadedness, fevers, or recent illness. She uses a walker, cane, wheelchair, and other assistive devices as needed. She lives in a home with 16 steps up to the bathroom. She verbalizes need for higher level of care and assistance as she is unable to get around safety at home. In the ED, her vital signs are stable. Labwork unremarkable with the exception of ALT 47, AST 44. EKG sinus rhythm. X-ray of right pelvis and right hip shows 1. No acute osseous abnormality. 2. No substantial degenerative change. X ray right shoulder shows 1. No  acute osseous abnormality. 2. No substantial degenerative change. X ray of right elbow shows 1. No acute osseous abnormality. 2. No substantial degenerative change. X ray of the right wrist shows Question subtle contour irregularity (possible nondisplaced fracture) at the fourth metacarpal base, of uncertain if this is related to overlying tubing density. Please correlate clinically for focal tenderness. This was splinted in the ED and recommended for follow up imaging in 1 week for assessment of need for ongoing splint. CT head shows No acute intracranial pathology. CT cervical spine shows 1. No acute fracture or traumatic subluxation. 2. Mild cervical spondylosis, greatest at C5-6. CT chest abdomen pelvis Shows No acute traumatic findings in the chest, abdomen or pelvis. CT thoracic spine shows 1. No acute fracture or traumatic subluxation of the thoracic or lumbar spine. 2. Mild lumbar spondylosis with central disc protrusions at L4-5 and L5-S1, resulting in mild spinal canal stenoses at these levels. No significant neural foraminal stenosis at any level. CT lumbar shows 1. No acute fracture or traumatic subluxation of the thoracic or lumbar spine. 2. Mild lumbar spondylosis with central disc protrusions at L4-5 and L5-S1, resulting in mild spinal canal stenoses at these levels. No significant neural foraminal stenosis at any level. UA negative. Patient was evaluated by PT in ED who recommended TCU. SW aware and consulted for placement. Plan: admission to ED observation for TCU placement and pain control. Upon admission to ED observation, patient resting comfortably in bed and verbalizes that she needs TCU placement.   -Echocardiogram  -PT recommending TCU, will continue to follow  -SW for placement  -Po Tylenol and Ibuprofen for pain  -5mg oxycodone q8h prn   -Continue PTA Flexiril for pain  -PRN lidocaine patches  -PRN miralax and senna as needed  -Follow up with PCP in 1 week for re imaging and assessment on  "need for splint  -PRN Zofran for chronic nausea       Diet:   Regular Diet   DVT Prophylaxis: Pneumatic Compression Devices  Kwan Catheter: Not present  Central Lines: None  Cardiac Monitoring: None  Code Status:   Full Code     Clinically Significant Risk Factors Present on Admission     Disposition Plan    Discharge pending TCU placement    The patient's care was discussed with the Attending Physician, Dr. Morin, Bedside Nurse, Patient, Patient's Family and PT Team. And ED admitting Physician Dr. Perla.     MIRA Rucker CNP  ED Observation   [facility]  Securely message with the Vocera Web Console (learn more here)  Text page via [facility] Paging/Directory         ______________________________________________________________________    Chief Complaint   Recurrent Falls    History is obtained from the patient    History of Present Illness   Per ED note, \"Peggy Jessica is a 47 year old female with a past medical history of chronic inflammatory demyelinating polyneuropathy, complex regional pain syndrome who presents to the emergency department with a chief complaint of fall.  The patient was brought in by ambulance from home.  She reports that last night she got out of bed and then woke up on the floor, she does not remember the details of the fall.  The patient believes that she fell onto her right side, but is unsure what she landed on, she thinks she may have landed on her dog and/or the dog's dish. Her  thinks she was on the floor for about 20-25 minutes. He notes that after the fall, he behavior was abnormal, with him at one point coming back from feeding one of their dogs and finding her wearing a coat in the house, at which point, she was confused about where he had been and why she was wearing a coat. This is not typical for he to be confused like this. She now reports right hip pain and right mid back pain as well as pain to her " "right arm.  The patient takes oxycodone twice per day for pain control at baseline, she last took this last night.  The patient was given 50 mcg of fentanyl IV x2 in route by EMS.  Last dose was given at 9:08 AM.  Blood sugar 114 per EMS.  Vital signs were stable in route. The pt states she cannot feel anything below her BL knees at baseline. She states she has not been doing well over the past week, ever since she had a sweat test at [place] last Tuesday. Since then, she has had nausea and vomiting and poor PO intake. She last ate yesterday AM and last drank around 2 PM yesterday.      She presents to the ER today accompanied by her .      I have reviewed the Medications, Allergies, Past Medical and Surgical History, and Social History in the Epic system.\"    Review of Systems      A complete review of systems was performed with pertinent positives and negatives noted in the HPI, and all other systems negative.    Past Medical History    I have reviewed this patient's medical history and updated it with pertinent information if needed.   Past Medical History:   Diagnosis Date     BRCA positive      CIDP (chronic inflammatory demyelinating polyneuropathy) (H)      ASHKAN III with severe dysplasia 2002     Complex regional pain syndrome type 1 of right lower extremity        Past Surgical History   I have reviewed this patient's surgical history and updated it with pertinent information if needed.  Past Surgical History:   Procedure Laterality Date     BILATERAL OOPHORECTOMY Bilateral 2019     c section      2002     CHOLECYSTECTOMY  1997     COLONOSCOPY       ESOPHAGOSCOPY, GASTROSCOPY, DUODENOSCOPY (EGD), COMBINED N/A 07/28/2022    Procedure: ESOPHAGOGASTRODUODENOSCOPY (EGD);  Surgeon: Zack Maddox MD;  Location:  GI     HYSTERECTOMY  2004     MASTECTOMY Bilateral 2020       Social History   I have reviewed this patient's social history and updated it with pertinent information if needed.  Social " History     Tobacco Use     Smoking status: Former     Types: Cigarettes     Smokeless tobacco: Never   Vaping Use     Vaping Use: Never used   Substance Use Topics     Alcohol use: Not Currently     Drug use: Yes     Types: Marijuana     Comment: Medical Canib[employer] patient       Family History   I have reviewed this patient's family history and updated it with pertinent information if needed.  Family History   Problem Relation Age of Onset     Kidney Disease Father        Prior to Admission Medications   Prior to Admission Medications   Prescriptions Last Dose Informant Patient Reported? Taking?   LORazepam (ATIVAN) 1 MG tablet   No No   Sig: TAKE 1-2 TABLETS (1-2 MG) BY MOUTH ONE TIME AS NEEDED (30 MINUTES PRIOR TO MRI).   acetaminophen (TYLENOL) 325 MG tablet   Yes No   Sig: Take 650 mg by mouth as needed   bisacodyl (DULCOLAX) 10 MG suppository   Yes No   Sig: Place 10 mg rectally as needed   cyclobenzaprine (FLEXERIL) 10 MG tablet   No No   Sig: Take 1 tablet (10 mg) by mouth 3 times daily   estradiol (ESTRACE) 0.1 MG/GM vaginal cream   No No   Sig: Place 2 g vaginally twice a week   Patient not taking: Reported on 10/5/2022   folic acid (FOLVITE) 400 MCG tablet   No No   Sig: Take 1 tablet (400 mcg) by mouth daily   Patient taking differently: Take 400 mcg by mouth every morning Have not started yet   ibuprofen (ADVIL/MOTRIN) 200 MG tablet   Yes No   Sig: Take 200 mg by mouth every 4 hours as needed for mild pain   medical cannabis (Patient's own supply)   Yes No   Si Dose See Admin Instructions (The purpose of this order is to document that the patient reports taking medical cannabis.  This is not a prescription, and is not used to certify that the patient has a qualifying medical condition.)  Per pt: 5-10 mg TID PRN   multivitamin w/minerals (THERA-VIT-M) tablet   Yes No   Sig: Take 1 tablet by mouth every morning Meg[employer] W[employer]   ondansetron (ZOFRAN) 4 MG tablet   No No   Sig: Take 1 tablet (4 mg)  by mouth every 8 hours as needed   ondansetron (ZOFRAN-ODT) 8 MG ODT tab   No No   Sig: Take 1 tablet (8 mg) by mouth every 8 hours as needed for nausea   Patient taking differently: Take 8 mg by mouth every 8 hours as needed for nausea   oxyCODONE (ROXICODONE) 5 MG tablet   Yes No   Si times daily   oxyCODONE-acetaminophen (PERCOCET) 5-325 MG tablet   Yes No   Sig: TAKE 1 TO 2 TABLETS BY MOUTH EVERY 4 HOURS AS NEEDED   Patient not taking: Reported on 10/5/2022   polyethylene glycol (MIRALAX) 17 GM/Dose powder   Yes No   Sig: Take by mouth as needed   senna-docusate (SENOKOT-S/PERICOLACE) 8.6-50 MG tablet   No No   Sig: Take 1 tablet by mouth 2 times daily      Facility-Administered Medications: None     Allergies   Allergies   Allergen Reactions     Dihydroergotamine Anaphylaxis     Latex Anaphylaxis     Shellfish-Derived Products Anaphylaxis     Sumatriptan Anaphylaxis     Banana Unknown     Gabapentin Dizziness     Gluten Meal Other (See Comments)     Celiac disease     Keppra [Levetiracetam] Nausea and Vomiting     Kiwi Unknown     Levofloxacin Other (See Comments)     Arrhythmia     Metronidazole Nausea and Vomiting     Nitrofurantoin Hives     Penicillins Hives     Pregabalin      Topiramate Visual Disturbance     Aspirin Rash     Methadone Rash     Risperidone Anxiety       Physical Exam   Vital Signs: Temp: 97  F (36.1  C) Temp src: Oral BP: (!) 135/90 Pulse: 81   Resp: 20 SpO2: 95 % O2 Device: None (Room air)    Weight: 170 lbs 0 oz    General: Well nourished, well developed, NAD  HEENT: EOMI, anicteric. MMM, tenderness along the occipital scalp, no swelling or lacerations noted  Neck: no jugular venous distension, supple, nl ROM, no midline tenderness or step-off  Cardiac: Regular rate and rhythm. No murmurs, rubs, or gallops. Normal S1, S2.  Intact peripheral pulses.  Right-sided chest wall tenderness is present  Pulm: CTAB, no stridor, wheezes, rales, rhonchi  Abd: Soft, tenderness to palpation along  the right side of the abdomen without rebound or guarding, nondistended.  No masses palpated.  Back: Tenderness to palpation along thoracic and lumbar spine  Skin: Warm and dry to the touch.  No rash, no laceration or abrasion noted, mild ecchymosis to left fifth toe (patient notes that this was from a prior injury and has been improving)  Extremities: No LE edema, no cyanosis, w/w/p, tenderness overlying right hip and right shoulder/elbow/wrist, arms are distally neurovascularly intact  Neuro: A&Ox3,  sensation to bilateral lower extremities is impaired below the knees (this is baseline for patient per her report)    Patient is tender to palpation along the entire right side of the body. No redness or warmth noted to any joints. No open wounds.     Data   Data reviewed today: I reviewed all medications, new labs and imaging results over the last 24 hours.      Recent Labs   Lab 10/17/22  0955   WBC 6.1   HGB 13.3   MCV 94         POTASSIUM 4.3   CHLORIDE 106   CO2 23   BUN 10.7   CR 0.75   ANIONGAP 10   ESTEBAN 9.7   *   ALBUMIN 4.1   PROTTOTAL 7.2   BILITOTAL 0.3   ALKPHOS 95   ALT 47*   AST 44*     Most Recent 3 CBC's:Recent Labs   Lab Test 10/17/22  0955 07/20/22  0959 06/06/22  2213   WBC 6.1 5.5 7.6   HGB 13.3 13.6 14.1   MCV 94 95 93    272 221     Most Recent 3 BMP's:Recent Labs   Lab Test 10/17/22  0955 07/20/22  0959 06/06/22  2213    140 140   POTASSIUM 4.3 4.6 4.2   CHLORIDE 106 106 108   CO2 23 30 28   BUN 10.7 5* 5*   CR 0.75 0.76 0.85   ANIONGAP 10 4 4   ESTEBAN 9.7 9.3 9.0   * 94 123*     Most Recent 2 LFT's:Recent Labs   Lab Test 10/17/22  0955 07/20/22  0959   AST 44* 21   ALT 47* 19   ALKPHOS 95 101   BILITOTAL 0.3 0.3     Most Recent 3 INR's:No lab results found.  Anticoagulation Dose History    There is no flowsheet data to display.         Most Recent 3 Creatinines:Recent Labs   Lab Test 10/17/22  0955 07/20/22  0959 06/06/22  0636   CR 0.75 0.76 0.85     Most  Recent 3 Hemoglobins:Recent Labs   Lab Test 10/17/22  0955 07/20/22  0959 06/06/22  2213   HGB 13.3 13.6 14.1     Most Recent 3 Troponin's:Recent Labs   Lab Test 11/25/21  0015 11/17/21  0810   TROPONIN <0.015 <0.015     Most Recent 3 BNP's:No lab results found.  Most Recent D-dimer:No lab results found.  Most Recent Cholesterol Panel:Recent Labs   Lab Test 03/30/22  0928   CHOL 229*   *   HDL 56   TRIG 131     Most Recent 6 Bacteria Isolates From Any Culture (See EPIC Reports for Culture Details):No lab results found.  Most Recent TSH and T4:Recent Labs   Lab Test 07/20/22  0959   TSH 1.38     Most Recent Hemoglobin A1c:No lab results found.  Most Recent 6 glucoses:Recent Labs   Lab Test 10/17/22  0955 07/20/22  0959 06/06/22 2213 03/30/22  0928 02/09/22  0625 11/24/21  0618   * 94 123* 98 92 96     Most Recent Urinalysis:Recent Labs   Lab Test 10/17/22  1250 12/03/21  1543   COLOR Light Yellow Yellow   APPEARANCE Clear Clear   URINEGLC Negative Negative   URINEBILI Negative Negative   URINEKETONE 10* Negative   SG 1.034 1.015   UBLD Negative Negative   URINEPH 6.5 7.0   PROTEIN Negative Negative   UROBILINOGEN  --  1.0   NITRITE Negative Negative   LEUKEST Negative Negative   RBCU 1  --    WBCU 1  --      Most Recent ABG:No lab results found.  6.1    \    13.3    /    274   N 80    L N/A    139    106    10.7 /   ------------------------------------ 112 (H)   ALT 47 (H)   AST 44 (H)   AP 95   ALB 4.1   Ca 9.7  4.3    23    0.75 \    % RETIC N/A    LDH N/A  Troponin N/A    BNP N/A    CK N/A  INR N/A   PTT N/A    D-dimer N/A    Fibrinogen N/A    Antithrombin N/A  Ferritin N/A  CRP N/A    IL-6 N/A  Recent Results (from the past 24 hour(s))   XR Pelvis w Hip Right 1 View    Narrative    2 views pelvis/right hip radiographs 10/17/2022 11:09 AM    History: fall, right hip pain    Comparison: Radiographs 6/30/2022, CT 7/20/2022, MR 6/10/2022    Findings:    AP view pelvis, frog leg lateral views of the  right hip were obtained.      No acute osseous abnormality.      No substantial degenerative change of the either hip.     Others:    Degenerative changes of the pubic symphysis.    Pelvic phlebolith.       Impression    Impression:  1. No acute osseous abnormality.  2. No substantial degenerative change.    [place]         SYSTEM ID:  [id number]   XR Shoulder Right G/E 3 Views    Narrative    4 views right shoulder radiographs 10/17/2022 11:12 AM    History: fall, pain    Comparison: 6/6/2022    Findings:    AP, 2 Grashey, transscapular Y, axillary  views of the right shoulder  were obtained. Transscapular Y view is suboptimally profiled.    No acute osseous abnormality. Glenohumeral and acromioclavicular  joints are congruent.    No substantial degenerative changes of the acromioclavicular joint. No  substantial degenerative change of the glenohumeral joint.    Soft tissue is unremarkable. The visualized lung is clear.      Impression    Impression:  1. No acute osseous abnormality.  2. No substantial degenerative change.    [place]         SYSTEM ID:  [id number]   XR Wrist Right G/E 3 Views    Narrative    3 views right wrist radiographs 10/17/2022 11:16 AM    History: fall    Comparison: 6/6/2022    Findings:    PA, oblique and lateral view(s) of the right wrist were obtained.     Question subtle contour irregularity at the fourth metacarpal base, of  uncertain if this is related to overlying tubing density.    No substantial degenerative change. Presumed bone island of the  capitate.    Soft tissue is unremarkable.      Impression    Impression: Question subtle contour irregularity (possible  nondisplaced fracture) at the fourth metacarpal base, of uncertain if  this is related to overlying tubing density. Please correlate  clinically for focal tenderness.    [place]         SYSTEM ID:  [id number]   XR Elbow Right 2 Views    Narrative    2 views right elbow radiographs 10/17/2022 11:15  AM    History: fall    Comparison: None available.    Findings:    AP and lateral views of the right elbow were obtained.     No acute osseous abnormality.  No joint effusion.    No substantial degenerative change. Tiny triceps tendon insertion  enthesophyte.    Soft tissue is unremarkable.      Impression    Impression:  1. No acute osseous abnormality.  2. No substantial degenerative change.    SAMINA MCKINLEY         SYSTEM ID:  [id number]   CT Head w/o Contrast    Narrative    CT HEAD W/O CONTRAST 10/17/2022 12:39 PM    History: fall, ams     Comparison: Head CT 6/6/2022    Technique: Using multidetector thin collimation helical acquisition  technique, axial, coronal and sagittal CT images from the skull base  to the vertex were obtained without intravenous contrast.   (topogram) image(s) also obtained and reviewed.    Findings: There is no intracranial hemorrhage, mass effect, or midline  shift. Gray/white matter differentiation in both cerebral hemispheres  is preserved. Ventricles are proportionate to the cerebral sulci. The  basal cisterns are clear.    The bony calvaria and the bones of the skull base are normal. Minimal  mucosal thickening in the left sphenoid locule. Remainder of paranasal  sinuses and mastoid air cells are clear. Orbits are unremarkable.      Impression    Impression:  No acute intracranial pathology.     I have personally reviewed the examination and initial interpretation  and I agree with the findings.    JE HUTTON MD         SYSTEM ID:  [id number]   CT Cervical Spine w/o Contrast    Narrative    EXAM: CT CERVICAL SPINE W/O CONTRAST  10/17/2022 12:40 PM     HISTORY:  Neck pain; Trauma; Mild/moderate trauma; None of the  following: Spondyloarthropathy, cervical x-ray with negative result,  questionable finding, or inadequate coverage       COMPARISON:  CT cervical spine 6/6/2022.    TECHNIQUE: Using multidetector thin collimation helical acquisition  technique, axial, coronal  and sagittal CT images through the cervical  spine were obtained without intravenous contrast.    FINDINGS:  Straightening of the cervical lordosis, possibly positional. Normal  cervical spine alignment. No acute fracture or traumatic subluxation.  No prevertebral soft tissue swelling. Mild atlantodental arthropathy.    The findings on a level by level basis are as follows:    C2-3: No spinal canal or neural foraminal stenosis    C3-4: Mild bilateral uncinate spurring. No foraminal or canal  stenosis.     C4-5: No spinal canal or neural foraminal stenosis    C5-6: Bilateral uncinate spurring. Mild left neural foraminal  narrowing. No right neural foraminal stenosis. No spinal canal  stenosis.    C6-7: Mild left uncinate spurring. No foraminal or canal stenosis.    C7-T1: No spinal canal or neural foraminal stenosis     No abnormality of the paraspinous soft tissues.      Impression    IMPRESSION:  1. No acute fracture or traumatic subluxation.  2. Mild cervical spondylosis, greatest at C5-6.    I have personally reviewed the examination and initial interpretation  and I agree with the findings.    JE HUTTON MD         SYSTEM ID:  [id number]   CT Chest/Abdomen/Pelvis w Contrast    Narrative    EXAMINATION: CT CHEST/ABDOMEN/PELVIS W CONTRAST, 10/17/2022 12:53 PM    TECHNIQUE:  Helical CT images from the thoracic inlet through the  symphysis pubis were obtained with IV contrast. Contrast dose:  iopamidol (ISOVUE-370) solution 104 mL    COMPARISON: Chest CT 7/12/2022. CT chest, abdomen and pelvis  11/19/2021.    HISTORY: fall, right sided pain    FINDINGS:  CHEST:  LUNGS: Central tracheobronchial tree is patent. No pneumothorax or  pleural effusion. No focal airspace opacity. No suspicious pulmonary  nodule. Mild dependent atelectasis. Fibroatelectatic changes greatest  in the left lung base.    MEDIASTINUM: Heart size is within normal limits. No pericardial  effusion. Ascending aorta and main pulmonary artery  diameters are  within normal limits. Normal appearance and configuration of the great  vessels off of the aortic arch. No suspicious mediastinal, hilar, or  axillary lymph nodes.     Visualized thyroid and esophagus are unremarkable.    ABDOMEN/PELVIS:  LIVER: Stable appearance of multiple hepatic cysts. Several  subcentimeter hypodense hepatic lesions are too small to accurately  characterize. No hepatic mass.    BILIARY: Cholecystectomy with mild reservoir effect and trace  intrahepatic biliary dilatation.    PANCREAS: No focal pancreatic lesion. The main pancreatic duct is not  dilated.    SPLEEN: Within normal limits.    ADRENAL GLANDS: No focal adrenal nodule.    URINARY TRACT: No suspicious renal lesion. No hydronephrosis or  hydroureter. No renal stone. Urinary bladder is unremarkable.    REPRODUCTIVE ORGANS: Within normal limits.    STOMACH: Within normal limits.    BOWEL: Normal caliber large and small bowel. No abnormal bowel wall  thickening or enhancement. Appendix is unremarkable.    PERITONEUM/FLUID: No ascites or pelvic free fluid.    VESSELS: No aneurysmal dilatation of the abdominal aorta.  The portal,  splenic, and superior mesenteric veins are patent.  The origins of the  celiac and superior mesenteric arteries are patent.    LYMPH NODES: No lymphadenopathy.    BONES/SOFT TISSUES: No aggressive osseous lesions. No definite  displaced rib fractures.      Impression    IMPRESSION: No acute traumatic findings in the chest, abdomen or  pelvis.     I have personally reviewed the examination and initial interpretation  and I agree with the findings.    CANDI ROB MD         SYSTEM ID:  [id number]   CT Thoracic Spine w Contrast    Narrative    Thoracic and Lumbar spine CT without contrast    History: fall, back pain and TTP.    Comparison: MRI 4/30/2022    Technique: Axial, coronal, and sagittal multiplanar reconstructions of  the thoracic and lumbar spine obtained from acquisition of CT  chest  abdomen and pelvis with contrast which was obtained in the setting of  trauma.     Findings:  Thoracic spine: Rightward curvature. There is no acute fracture or  subluxation. There is no prevertebral edema. There is no spinal canal  or foraminal stenosis at any level. No soft tissue abnormality in the  visualized paraspinous tissues anteriorly.    Lumbar Spine: There is no acute fracture or subluxation. There are 5  type lumbar vertebra, used for the purposes of this dictation . Vacuum  disc phenomenon L5-S1.    On a level by level basis, the findings are as follows:  T12-L1:  No significant neural foraminal or spinal canal stenosis.  L1-2:  No significant neural foraminal or spinal canal stenosis.  L2-3:  No significant neural foraminal or spinal canal stenosis.  L3-4:  No significant neural foraminal or spinal canal stenosis.  L4-5:  Disc bulge with superimposed central protrusion. Mild bilateral  facet arthropathy. No significant neural foraminal stenosis. Mild  spinal canal stenosis.  L5-S1: Circumferential disc bulge with superimposed central  protrusion. No significant neural foraminal stenosis. Mild spinal  canal stenosis.    Multiple hepatic cysts. Cholecystectomy. Visualized appendix is  normal. See dedicated report from concurrently acquired CT chest  abdomen and pelvis for further detail.      Impression    Impression:   1. No acute fracture or traumatic subluxation of the thoracic or  lumbar spine.  2. Mild lumbar spondylosis with central disc protrusions at L4-5 and  L5-S1, resulting in mild spinal canal stenoses at these levels. No  significant neural foraminal stenosis at any level.    I have personally reviewed the examination and initial interpretation  and I agree with the findings.    PRECIOUS HARRIS MD         SYSTEM ID:  [id number]   CT Lumbar Spine w Contrast    Narrative    Thoracic and Lumbar spine CT without contrast    History: fall, back pain and TTP.    Comparison: MRI  4/30/2022    Technique: Axial, coronal, and sagittal multiplanar reconstructions of  the thoracic and lumbar spine obtained from acquisition of CT chest  abdomen and pelvis with contrast which was obtained in the setting of  trauma.     Findings:  Thoracic spine: Rightward curvature. There is no acute fracture or  subluxation. There is no prevertebral edema. There is no spinal canal  or foraminal stenosis at any level. No soft tissue abnormality in the  visualized paraspinous tissues anteriorly.    Lumbar Spine: There is no acute fracture or subluxation. There are 5  type lumbar vertebra, used for the purposes of this dictation . Vacuum  disc phenomenon L5-S1.    On a level by level basis, the findings are as follows:  T12-L1:  No significant neural foraminal or spinal canal stenosis.  L1-2:  No significant neural foraminal or spinal canal stenosis.  L2-3:  No significant neural foraminal or spinal canal stenosis.  L3-4:  No significant neural foraminal or spinal canal stenosis.  L4-5:  Disc bulge with superimposed central protrusion. Mild bilateral  facet arthropathy. No significant neural foraminal stenosis. Mild  spinal canal stenosis.  L5-S1: Circumferential disc bulge with superimposed central  protrusion. No significant neural foraminal stenosis. Mild spinal  canal stenosis.    Multiple hepatic cysts. Cholecystectomy. Visualized appendix is  normal. See dedicated report from concurrently acquired CT chest  abdomen and pelvis for further detail.      Impression    Impression:   1. No acute fracture or traumatic subluxation of the thoracic or  lumbar spine.  2. Mild lumbar spondylosis with central disc protrusions at L4-5 and  L5-S1, resulting in mild spinal canal stenoses at these levels. No  significant neural foraminal stenosis at any level.    I have personally reviewed the examination and initial interpretation  and I agree with the findings.    PRECIOUS HARRIS MD         SYSTEM ID:  [id number]

## 2022-10-17 NOTE — ED TRIAGE NOTES
47 yr old female hx of chronic inflammatory demyelinating polyneuropathy BIBA from home. Last night got out of bed and then woke up on floor, thinks she fell on the right side but is unsure of what she hit or fell on. Reporting right hip pain and right mid back pain. #20 PIV right hand. Takes oxy twice a day for pain, last took last night. Given 50 mcg fentanyl  X 2 en route, for total of 100 mg. Last dose given at 0908. VSS en route. No EKG en route. .     Triage Assessment     Row Name 10/17/22 0982       Triage Assessment (Adult)    Airway WDL WDL       Respiratory WDL    Respiratory WDL WDL       Skin Circulation/Temperature WDL    Skin Circulation/Temperature WDL WDL       Cardiac WDL    Cardiac WDL WDL       Peripheral/Neurovascular WDL    Peripheral Neurovascular WDL WDL       Cognitive/Neuro/Behavioral WDL    Cognitive/Neuro/Behavioral WDL WDL

## 2022-10-17 NOTE — ED PROVIDER NOTES
History     Chief Complaint   Patient presents with     Fall     HPI  Peggy Jessica is a 47 year old female with a past medical history of chronic inflammatory demyelinating polyneuropathy, complex regional pain syndrome who presents to the emergency department with a chief complaint of fall.  The patient was brought in by ambulance from home.  She reports that last night she got out of bed and then woke up on the floor, she does not remember the details of the fall.  The patient believes that she fell onto her right side, but is unsure what she landed on, she thinks she may have landed on her dog and/or the dog's dish. Her  thinks she was on the floor for about 20-25 minutes. He notes that after the fall, he behavior was abnormal, with him at one point coming back from feeding one of their dogs and finding her wearing a coat in the house, at which point, she was confused about where he had been and why she was wearing a coat. This is not typical for he to be confused like this. She now reports right hip pain and right mid back pain as well as pain to her right arm.  The patient takes oxycodone twice per day for pain control at baseline, she last took this last night.  The patient was given 50 mcg of fentanyl IV x2 in route by EMS.  Last dose was given at 9:08 AM.  Blood sugar 114 per EMS.  Vital signs were stable in route. The pt states she cannot feel anything below her BL knees at baseline. She states she has not been doing well over the past week, ever since she had a sweat test at [place] last Tuesday. Since then, she has had nausea and vomiting and poor PO intake. She last ate yesterday AM and last drank around 2 PM yesterday.     She presents to the ER today accompanied by her .     I have reviewed the Medications, Allergies, Past Medical and Surgical History, and Social History in the [facility] system.    Past Medical History:   Diagnosis Date     BRCA positive      CIDP (chronic inflammatory  demyelinating polyneuropathy) (H)      ASHKAN III with severe dysplasia 2002     Complex regional pain syndrome type 1 of right lower extremity      Past Surgical History:   Procedure Laterality Date     BILATERAL OOPHORECTOMY Bilateral 2019     c section      2002     CHOLECYSTECTOMY  1997     COLONOSCOPY       ESOPHAGOSCOPY, GASTROSCOPY, DUODENOSCOPY (EGD), COMBINED N/A 07/28/2022    Procedure: ESOPHAGOGASTRODUODENOSCOPY (EGD);  Surgeon: Zack Maddox MD;  Location:  GI     HYSTERECTOMY  2004     MASTECTOMY Bilateral 2020     No current facility-administered medications for this encounter.     Current Outpatient Medications   Medication     acetaminophen (TYLENOL) 325 MG tablet     bisacodyl (DULCOLAX) 10 MG suppository     cyclobenzaprine (FLEXERIL) 10 MG tablet     estradiol (ESTRACE) 0.1 MG/GM vaginal cream     folic acid (FOLVITE) 400 MCG tablet     ibuprofen (ADVIL/MOTRIN) 200 MG tablet     LORazepam (ATIVAN) 1 MG tablet     medical cannabis (Patient's own supply)     multivitamin w/minerals (THERA-VIT-M) tablet     ondansetron (ZOFRAN) 4 MG tablet     ondansetron (ZOFRAN-ODT) 8 MG ODT tab     oxyCODONE (ROXICODONE) 5 MG tablet     oxyCODONE-acetaminophen (PERCOCET) 5-325 MG tablet     polyethylene glycol (MIRALAX) 17 GM/Dose powder     senna-docusate (SENOKOT-S/PERICOLACE) 8.6-50 MG tablet     Allergies   Allergen Reactions     Dihydroergotamine Anaphylaxis     Latex Anaphylaxis     Shellfish-Derived Products Anaphylaxis     Sumatriptan Anaphylaxis     Banana Unknown     Gabapentin Dizziness     Gluten Meal Other (See Comments)     Celiac disease     Keppra [Levetiracetam] Nausea and Vomiting     Kiwi Unknown     Levofloxacin Other (See Comments)     Arrhythmia     Metronidazole Nausea and Vomiting     Nitrofurantoin Hives     Penicillins Hives     Pregabalin      Topiramate Visual Disturbance     Aspirin Rash     Methadone Rash     Risperidone Anxiety     Past medical history, past surgical  history, medications, and allergies were reviewed with the patient. Additional pertinent items: None    Social History     Socioeconomic History     Marital status:      Spouse name: Not on file     Number of children: 2     Years of education: Not on file     Highest education level: Not on file   Occupational History     Not on file   Tobacco Use     Smoking status: Former     Types: Cigarettes     Smokeless tobacco: Never   Vaping Use     Vaping Use: Never used   Substance and Sexual Activity     Alcohol use: Not Currently     Drug use: Yes     Types: Marijuana     Comment: Medical [facility] patient     Sexual activity: Not Currently     Partners: Male   Other Topics Concern     Not on file   Social History Narrative     Not on file     Social Determinants of Health     Financial Resource Strain: Not on file   Food Insecurity: Not on file   Transportation Needs: Not on file   Physical Activity: Not on file   Stress: Not on file   Social Connections: Not on file   Intimate Partner Violence: Not on file   Housing Stability: Not on file     Social history was reviewed with the patient. Additional pertinent items: None    Review of Systems  General: No fevers or chills  Skin: No rash or diaphoresis  Eyes: No eye redness or discharge  Ears/Nose/Throat: No rhinorrhea or nasal congestion  Respiratory: No cough or SOB  Cardiovascular: No chest pain or palpitations, positive for LOC  Gastrointestinal: See HPI  Genitourinary: No urinary frequency, hematuria, or dysuria  Musculoskeletal: See HPI  Neurologic: No numbness or weakness  Hematologic/Lymphatic/Immunologic: No leg swelling, no easy bruising/bleeding  Endocrine: No polyuria/polydypsia    A complete review of systems was performed with pertinent positives and negatives noted in the HPI, and all other systems negative.    Physical Exam   BP: (!) 135/90  Pulse: 81  Temp: 97  F (36.1  C)  Resp: 20  Weight: 77.1 kg (170 lb)  SpO2: 95 %      General: Well nourished,  well developed, NAD  HEENT: EOMI, anicteric. MMM, tenderness along the occipital scalp, no swelling or lacerations noted  Neck: no jugular venous distension, supple, nl ROM, no midline tenderness or step-off  Cardiac: Regular rate and rhythm. No murmurs, rubs, or gallops. Normal S1, S2.  Intact peripheral pulses.  Right-sided chest wall tenderness is present  Pulm: CTAB, no stridor, wheezes, rales, rhonchi  Abd: Soft, tenderness to palpation along the right side of the abdomen without rebound or guarding, nondistended.  No masses palpated.  Back: Tenderness to palpation along thoracic and lumbar spine  Skin: Warm and dry to the touch.  No rash, no laceration or abrasion noted, mild ecchymosis to left fifth toe (patient notes that this was from a prior injury and has been improving)  Extremities: No LE edema, no cyanosis, w/w/p, tenderness overlying right hip and right shoulder/elbow/wrist, arms are distally neurovascularly intact  Neuro: A&Ox3,  sensation to bilateral lower extremities is impaired below the knees (this is baseline for patient per her report)    ED Course        [facility]    Splint Application    Date/Time: 10/17/2022 3:47 PM  Performed by: Smita Perla MD  Authorized by: Smita Perla MD     Risks, benefits and alternatives discussed.      PRE-PROCEDURE DETAILS     Sensation:  Normal    Skin color:  Normal    PROCEDURE DETAILS     Laterality:  Right    Location:  Hand    Hand:  R hand    Splint type:  Ulnar gutter    Supplies:  Ortho-Glass, cotton padding and elastic bandage    POST PROCEDURE DETAILS     Pain:  Unchanged    Sensation:  Normal    Skin color:  Normal      PROCEDURE    Patient Tolerance:  Patient tolerated the procedure well with no immediate complications                 EKG Interpretation:      Interpreted by Smita Perla MD  Time reviewed: 1039  Symptoms at time of EKG: Right-sided chest pain  Rhythm: normal sinus    Rate: normal, 74 bpm  Axis: NORMAL  Ectopy: none  Conduction: normal  ST Segments/ T Waves: No ST-T wave changes  Q Waves: none  Comparison to prior: Unchanged from 11/25/2021    Clinical Impression: normal EKG                    Labs Ordered and Resulted from Time of ED Arrival to Time of ED Departure   COMPREHENSIVE METABOLIC PANEL - Abnormal       Result Value    Sodium 139      Potassium 4.3      Chloride 106      Carbon Dioxide (CO2) 23      Anion Gap 10      Urea Nitrogen 10.7      Creatinine 0.75      Calcium 9.7      Glucose 112 (*)     Alkaline Phosphatase 95      AST 44 (*)     ALT 47 (*)     Protein Total 7.2      Albumin 4.1      Bilirubin Total 0.3      GFR Estimate >90     ROUTINE UA WITH MICROSCOPIC REFLEX TO CULTURE - Abnormal    Color Urine Light Yellow      Appearance Urine Clear      Glucose Urine Negative      Bilirubin Urine Negative      Ketones Urine 10 (*)     Specific Gravity Urine 1.034      Blood Urine Negative      pH Urine 6.5      Protein Albumin Urine Negative      Urobilinogen Urine Normal      Nitrite Urine Negative      Leukocyte Esterase Urine Negative      Mucus Urine Present (*)     RBC Urine 1      WBC Urine 1     MAGNESIUM - Normal    Magnesium 1.9     CBC WITH PLATELETS AND DIFFERENTIAL    WBC Count 6.1      RBC Count 4.27      Hemoglobin 13.3      Hematocrit 40.0      MCV 94      MCH 31.1      MCHC 33.3      RDW 14.9      Platelet Count 274      % Neutrophils 80      % Lymphocytes 16      % Monocytes 4      % Eosinophils 0      % Basophils 0      % Immature Granulocytes 0      NRBCs per 100 WBC 0      Absolute Neutrophils 4.8      Absolute Lymphocytes 1.0      Absolute Monocytes 0.3      Absolute Eosinophils 0.0      Absolute Basophils 0.0      Absolute Immature Granulocytes 0.0      Absolute NRBCs 0.0              Results for orders placed or performed during the hospital encounter of 10/17/22 (from the past 24 hour(s))   [place] Draw    Narrative    The following orders  were created for panel order [place] Draw.  Procedure                               Abnormality         Status                     ---------                               -----------         ------                     Extra Blue Top Tube[[id number]]                              Final result               Extra Red Top Tube[[id number]]                               Final result               Extra Green Top (Lithium...[[id number]]                      Final result               Extra Purple Top Tube[[id number]]                            Final result                 Please view results for these tests on the individual orders.   Extra Blue Top Tube   Result Value Ref Range    Hold Specimen JIC    Extra Red Top Tube   Result Value Ref Range    Hold Specimen JIC    Extra Green Top (Lithium Heparin) Tube   Result Value Ref Range    Hold Specimen JIC    Extra Purple Top Tube   Result Value Ref Range    Hold Specimen JIC    CBC with platelets differential    Narrative    The following orders were created for panel order CBC with platelets differential.  Procedure                               Abnormality         Status                     ---------                               -----------         ------                     CBC with platelets and d...[[id number]]                      Final result                 Please view results for these tests on the individual orders.   Comprehensive metabolic panel   Result Value Ref Range    Sodium 139 136 - 145 mmol/L    Potassium 4.3 3.4 - 5.3 mmol/L    Chloride 106 98 - 107 mmol/L    Carbon Dioxide (CO2) 23 22 - 29 mmol/L    Anion Gap 10 7 - 15 mmol/L    Urea Nitrogen 10.7 6.0 - 20.0 mg/dL    Creatinine 0.75 0.51 - 0.95 mg/dL    Calcium 9.7 8.6 - 10.0 mg/dL    Glucose 112 (H) 70 - 99 mg/dL    Alkaline Phosphatase 95 35 - 104 U/L    AST 44 (H) 10 - 35 U/L    ALT 47 (H) 10 - 35 U/L    Protein Total 7.2 6.4 - 8.3 g/dL    Albumin 4.1 3.5 - 5.2 g/dL    Bilirubin Total 0.3 <=1.2 mg/dL     GFR Estimate >90 >60 mL/min/1.73m2   Magnesium   Result Value Ref Range    Magnesium 1.9 1.7 - 2.3 mg/dL   CBC with platelets and differential   Result Value Ref Range    WBC Count 6.1 4.0 - 11.0 10e3/uL    RBC Count 4.27 3.80 - 5.20 10e6/uL    Hemoglobin 13.3 11.7 - 15.7 g/dL    Hematocrit 40.0 35.0 - 47.0 %    MCV 94 78 - 100 fL    MCH 31.1 26.5 - 33.0 pg    MCHC 33.3 31.5 - 36.5 g/dL    RDW 14.9 10.0 - 15.0 %    Platelet Count 274 150 - 450 10e3/uL    % Neutrophils 80 %    % Lymphocytes 16 %    % Monocytes 4 %    % Eosinophils 0 %    % Basophils 0 %    % Immature Granulocytes 0 %    NRBCs per 100 WBC 0 <1 /100    Absolute Neutrophils 4.8 1.6 - 8.3 10e3/uL    Absolute Lymphocytes 1.0 0.8 - 5.3 10e3/uL    Absolute Monocytes 0.3 0.0 - 1.3 10e3/uL    Absolute Eosinophils 0.0 0.0 - 0.7 10e3/uL    Absolute Basophils 0.0 0.0 - 0.2 10e3/uL    Absolute Immature Granulocytes 0.0 <=0.4 10e3/uL    Absolute NRBCs 0.0 10e3/uL   EKG 12-lead, tracing only   Result Value Ref Range    Systolic Blood Pressure  mmHg    Diastolic Blood Pressure  mmHg    Ventricular Rate 74 BPM    Atrial Rate 74 BPM    OR Interval 152 ms    QRS Duration 74 ms     ms    QTc 430 ms    P Axis 45 degrees    R AXIS -3 degrees    T Axis 24 degrees    Interpretation ECG       Sinus rhythm  Cannot rule out Anterior infarct , age undetermined  Abnormal ECG  Unconfirmed report - interpretation of this ECG is computer generated - see medical record for final interpretation    Confirmed by - EMERGENCY ROOM, PHYSICIAN ([id number]),  SEBASTIÁN DOMINGUEZ ([id number]) on 10/17/2022 1:24:51 PM     XR Pelvis w Hip Right 1 View    Narrative    2 views pelvis/right hip radiographs 10/17/2022 11:09 AM    History: fall, right hip pain    Comparison: Radiographs 6/30/2022, CT 7/20/2022, MR 6/10/2022    Findings:    AP view pelvis, frog leg lateral views of the right hip were obtained.      No acute osseous abnormality.      No substantial degenerative change of the either hip.      Others:    Degenerative changes of the pubic symphysis.    Pelvic phlebolith.       Impression    Impression:  1. No acute osseous abnormality.  2. No substantial degenerative change.    [employer]         SYSTEM ID:  [id number]   XR Shoulder Right G/E 3 Views    Narrative    4 views right shoulder radiographs 10/17/2022 11:12 AM    History: fall, pain    Comparison: 6/6/2022    Findings:    AP, 2 Grashey, transscapular Y, axillary  views of the right shoulder  were obtained. Transscapular Y view is suboptimally profiled.    No acute osseous abnormality. Glenohumeral and acromioclavicular  joints are congruent.    No substantial degenerative changes of the acromioclavicular joint. No  substantial degenerative change of the glenohumeral joint.    Soft tissue is unremarkable. The visualized lung is clear.      Impression    Impression:  1. No acute osseous abnormality.  2. No substantial degenerative change.    [employer]         SYSTEM ID:  [id number]   XR Wrist Right G/E 3 Views    Narrative    3 views right wrist radiographs 10/17/2022 11:16 AM    History: fall    Comparison: 6/6/2022    Findings:    PA, oblique and lateral view(s) of the right wrist were obtained.     Question subtle contour irregularity at the fourth metacarpal base, of  uncertain if this is related to overlying tubing density.    No substantial degenerative change. Presumed bone island of the  capitate.    Soft tissue is unremarkable.      Impression    Impression: Question subtle contour irregularity (possible  nondisplaced fracture) at the fourth metacarpal base, of uncertain if  this is related to overlying tubing density. Please correlate  clinically for focal tenderness.    [employer]         SYSTEM ID:  [id number]   XR Elbow Right 2 Views    Narrative    2 views right elbow radiographs 10/17/2022 11:15 AM    History: fall    Comparison: None available.    Findings:    AP and lateral views of the right elbow were obtained.      No acute osseous abnormality.  No joint effusion.    No substantial degenerative change. Tiny triceps tendon insertion  enthesophyte.    Soft tissue is unremarkable.      Impression    Impression:  1. No acute osseous abnormality.  2. No substantial degenerative change.    SAMINA MCKINLEY         SYSTEM ID:  [id number]   CT Head w/o Contrast    Narrative    CT HEAD W/O CONTRAST 10/17/2022 12:39 PM    History: fall, ams     Comparison: Head CT 6/6/2022    Technique: Using multidetector thin collimation helical acquisition  technique, axial, coronal and sagittal CT images from the skull base  to the vertex were obtained without intravenous contrast.   (topogram) image(s) also obtained and reviewed.    Findings: There is no intracranial hemorrhage, mass effect, or midline  shift. Gray/white matter differentiation in both cerebral hemispheres  is preserved. Ventricles are proportionate to the cerebral sulci. The  basal cisterns are clear.    The bony calvaria and the bones of the skull base are normal. Minimal  mucosal thickening in the left sphenoid locule. Remainder of paranasal  sinuses and mastoid air cells are clear. Orbits are unremarkable.      Impression    Impression:  No acute intracranial pathology.     I have personally reviewed the examination and initial interpretation  and I agree with the findings.    JE HUTTON MD         SYSTEM ID:  [id number]   CT Cervical Spine w/o Contrast    Narrative    EXAM: CT CERVICAL SPINE W/O CONTRAST  10/17/2022 12:40 PM     HISTORY:  Neck pain; Trauma; Mild/moderate trauma; None of the  following: Spondyloarthropathy, cervical x-ray with negative result,  questionable finding, or inadequate coverage       COMPARISON:  CT cervical spine 6/6/2022.    TECHNIQUE: Using multidetector thin collimation helical acquisition  technique, axial, coronal and sagittal CT images through the cervical  spine were obtained without intravenous contrast.    FINDINGS:  Straightening  of the cervical lordosis, possibly positional. Normal  cervical spine alignment. No acute fracture or traumatic subluxation.  No prevertebral soft tissue swelling. Mild atlantodental arthropathy.    The findings on a level by level basis are as follows:    C2-3: No spinal canal or neural foraminal stenosis    C3-4: Mild bilateral uncinate spurring. No foraminal or canal  stenosis.     C4-5: No spinal canal or neural foraminal stenosis    C5-6: Bilateral uncinate spurring. Mild left neural foraminal  narrowing. No right neural foraminal stenosis. No spinal canal  stenosis.    C6-7: Mild left uncinate spurring. No foraminal or canal stenosis.    C7-T1: No spinal canal or neural foraminal stenosis     No abnormality of the paraspinous soft tissues.      Impression    IMPRESSION:  1. No acute fracture or traumatic subluxation.  2. Mild cervical spondylosis, greatest at C5-6.    I have personally reviewed the examination and initial interpretation  and I agree with the findings.    JE HUTTON MD         SYSTEM ID:  [id number]   UA with Microscopic reflex to Culture    Specimen: Urine, Midstream   Result Value Ref Range    Color Urine Light Yellow Colorless, Straw, Light Yellow, Yellow    Appearance Urine Clear Clear    Glucose Urine Negative Negative mg/dL    Bilirubin Urine Negative Negative    Ketones Urine 10 (A) Negative mg/dL    Specific Gravity Urine 1.034 1.003 - 1.035    Blood Urine Negative Negative    pH Urine 6.5 5.0 - 7.0    Protein Albumin Urine Negative Negative mg/dL    Urobilinogen Urine Normal Normal, 2.0 mg/dL    Nitrite Urine Negative Negative    Leukocyte Esterase Urine Negative Negative    Mucus Urine Present (A) None Seen /LPF    RBC Urine 1 <=2 /HPF    WBC Urine 1 <=5 /HPF    Narrative    Urine Culture not indicated   CT Chest/Abdomen/Pelvis w Contrast    Narrative    EXAMINATION: CT CHEST/ABDOMEN/PELVIS W CONTRAST, 10/17/2022 12:53 PM    TECHNIQUE:  Helical CT images from the thoracic inlet  through the  symphysis pubis were obtained with IV contrast. Contrast dose:  iopamidol (ISOVUE-370) solution 104 mL    COMPARISON: Chest CT 7/12/2022. CT chest, abdomen and pelvis  11/19/2021.    HISTORY: fall, right sided pain    FINDINGS:  CHEST:  LUNGS: Central tracheobronchial tree is patent. No pneumothorax or  pleural effusion. No focal airspace opacity. No suspicious pulmonary  nodule. Mild dependent atelectasis. Fibroatelectatic changes greatest  in the left lung base.    MEDIASTINUM: Heart size is within normal limits. No pericardial  effusion. Ascending aorta and main pulmonary artery diameters are  within normal limits. Normal appearance and configuration of the great  vessels off of the aortic arch. No suspicious mediastinal, hilar, or  axillary lymph nodes.     Visualized thyroid and esophagus are unremarkable.    ABDOMEN/PELVIS:  LIVER: Stable appearance of multiple hepatic cysts. Several  subcentimeter hypodense hepatic lesions are too small to accurately  characterize. No hepatic mass.    BILIARY: Cholecystectomy with mild reservoir effect and trace  intrahepatic biliary dilatation.    PANCREAS: No focal pancreatic lesion. The main pancreatic duct is not  dilated.    SPLEEN: Within normal limits.    ADRENAL GLANDS: No focal adrenal nodule.    URINARY TRACT: No suspicious renal lesion. No hydronephrosis or  hydroureter. No renal stone. Urinary bladder is unremarkable.    REPRODUCTIVE ORGANS: Within normal limits.    STOMACH: Within normal limits.    BOWEL: Normal caliber large and small bowel. No abnormal bowel wall  thickening or enhancement. Appendix is unremarkable.    PERITONEUM/FLUID: No ascites or pelvic free fluid.    VESSELS: No aneurysmal dilatation of the abdominal aorta.  The portal,  splenic, and superior mesenteric veins are patent.  The origins of the  celiac and superior mesenteric arteries are patent.    LYMPH NODES: No lymphadenopathy.    BONES/SOFT TISSUES: No aggressive osseous  lesions. No definite  displaced rib fractures.      Impression    IMPRESSION: No acute traumatic findings in the chest, abdomen or  pelvis.     I have personally reviewed the examination and initial interpretation  and I agree with the findings.    CANDI ROB MD         SYSTEM ID:  [id number]   CT Thoracic Spine w Contrast    Narrative    Thoracic and Lumbar spine CT without contrast    History: fall, back pain and TTP.    Comparison: MRI 4/30/2022    Technique: Axial, coronal, and sagittal multiplanar reconstructions of  the thoracic and lumbar spine obtained from acquisition of CT chest  abdomen and pelvis with contrast which was obtained in the setting of  trauma.     Findings:  Thoracic spine: Rightward curvature. There is no acute fracture or  subluxation. There is no prevertebral edema. There is no spinal canal  or foraminal stenosis at any level. No soft tissue abnormality in the  visualized paraspinous tissues anteriorly.    Lumbar Spine: There is no acute fracture or subluxation. There are 5  type lumbar vertebra, used for the purposes of this dictation . Vacuum  disc phenomenon L5-S1.    On a level by level basis, the findings are as follows:  T12-L1:  No significant neural foraminal or spinal canal stenosis.  L1-2:  No significant neural foraminal or spinal canal stenosis.  L2-3:  No significant neural foraminal or spinal canal stenosis.  L3-4:  No significant neural foraminal or spinal canal stenosis.  L4-5:  Disc bulge with superimposed central protrusion. Mild bilateral  facet arthropathy. No significant neural foraminal stenosis. Mild  spinal canal stenosis.  L5-S1: Circumferential disc bulge with superimposed central  protrusion. No significant neural foraminal stenosis. Mild spinal  canal stenosis.    Multiple hepatic cysts. Cholecystectomy. Visualized appendix is  normal. See dedicated report from concurrently acquired CT chest  abdomen and pelvis for further detail.      Impression     Impression:   1. No acute fracture or traumatic subluxation of the thoracic or  lumbar spine.  2. Mild lumbar spondylosis with central disc protrusions at L4-5 and  L5-S1, resulting in mild spinal canal stenoses at these levels. No  significant neural foraminal stenosis at any level.    I have personally reviewed the examination and initial interpretation  and I agree with the findings.    PRECIOUS HARRIS MD         SYSTEM ID:  [id number]   CT Lumbar Spine w Contrast    Narrative    Thoracic and Lumbar spine CT without contrast    History: fall, back pain and TTP.    Comparison: MRI 4/30/2022    Technique: Axial, coronal, and sagittal multiplanar reconstructions of  the thoracic and lumbar spine obtained from acquisition of CT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

## 2022-10-17 NOTE — PROGRESS NOTES
ED OBS PT Eval     10/17/22 1600   Appointment Info   Signing Clinician's Name / Credentials (PT) Larry Barnes, PT, DPT   Quick Adds   Quick Adds Certification   Living Environment   People in Home significant other   Current Living Arrangements house   Home Accessibility stairs to enter home;stairs within home   Number of Stairs, Main Entrance 4   Stair Railings, Main Entrance railings on both sides of stairs   Number of Stairs, Within Home, Primary other (see comments)  (flight up and down)   Transportation Anticipated family or friend will provide   Living Environment Comments Patient lives in home with SO with ~4 YUNI. Once in home, bathrooms are only available upstairs and downstairs, each requiring a flight of stairs to access.   Self-Care   Usual Activity Tolerance moderate   Current Activity Tolerance poor   Regular Exercise No   Equipment Currently Used at Home crutches;cane, straight;walker, rolling;wheelchair, manual   Fall history within last six months yes   Number of times patient has fallen within last six months   (many)   Activity/Exercise/Self-Care Comment Patient reports many recent falls with up to 6 yesterday before admission. Patient had been working with OP rehab services on pain management.   General Information   Onset of Illness/Injury or Date of Surgery 10/17/22   Referring Physician Smita Perla MD   Weight-Bearing Status - RUE nonweight-bearing  (not specified)   Cognition   Affect/Mental Status (Cognition) WFL   Posture    Posture Forward head position;Protracted shoulders   Range of Motion (ROM)   Range of Motion ROM is WFL   Strength (Manual Muscle Testing)   Strength (Manual Muscle Testing) Deficits observed during functional mobility   Strength Comments limited by pain and deconditioning   Bed Mobility   Bed Mobility supine-sit;sit-supine   Supine-Sit [place] (Bed Mobility) modified independence   Sit-Supine [place] (Bed Mobility) minimum assist (75% patient effort)    Transfers   Transfers sit-stand transfer   Sit-Stand Transfer   Sit-Stand [place] (Transfers) modified independence   Assistive Device (Sit-Stand Transfers) cane, straight   Gait/Stairs (Locomotion)   [place] Level (Gait) minimum assist (75% patient effort)   Assistive Device (Gait) cane, straight   Comment, (Gait/Stairs) very slow gait speed, intermittent knee buckling, SBA for support throughout and min A to prevent fall with knee buckle   Balance   Balance Comments IND sitting balance   Sensory Examination   Sensory Perception Comments baseline LE sensory deficits, unchanged   Clinical Impression   Criteria for Skilled Therapeutic Intervention Yes, treatment indicated   PT Diagnosis (PT) impaired functional mobility   Influenced by the following impairments decreased activity tolerance, pain, functional weakness   Functional limitations due to impairments bed mobility, gait, transfers, stairs   Clinical Presentation (PT Evaluation Complexity) Stable/Uncomplicated   Clinical Presentation Rationale clinical judgement   Clinical Decision Making (Complexity) low complexity   Planned Therapy Interventions (PT) balance training;bed mobility training;gait training;stair training;stretching;transfer training;postural re-education   Risk & Benefits of therapy have been explained evaluation/treatment results reviewed;care plan/treatment goals reviewed;risks/benefits reviewed;current/potential barriers reviewed;participants voiced agreement with care plan;participants included;patient;spouse/significant other   PT Total Evaluation Time   PT Eval, Low Complexity Minutes (97815) 15   Therapy Certification   Start of care date 10/17/22   Certification date from 10/17/22   Certification date to 10/31/22   Medical Diagnosis R 4th metacarpal fx   Physical Therapy Goals   PT Frequency 3x/week   PT Predicted Duration/Target Date for Goal Attainment 10/31/22   PT Goals Bed Mobility;Transfers;Gait;Stairs;Wheelchair  Mobility   PT: Bed Mobility Modified independent;Supervision/stand-by assist   PT: Transfers Modified independent;Sit to/from stand;Assistive device   PT: Gait Modified independent;Assistive device;25 feet   PT: Stairs Modified independent;Assistive device;Greater than 10 stairs   PT: Wheelchair Mobility manual wheelchair;150 feet   Interventions   Interventions Quick Adds Gait Training;Therapeutic Activity   PT Discharge Planning   PT Plan gait, stairs   PT Discharge Recommendation (DC Rec) Transitional Care Facility   PT Rationale for DC Rec Patient with many falls stating that she fell x6 the day of admission. At this time recommend TCU to progress mobility for eventual safe return home.   PT Brief overview of current status Ax2 for ambulation/transfers with RN staff   Total Session Time   Total Session Time (sum of timed and untimed services) 15       Larry Barnes, PT, DPT  Pager #[phone]      [facility]  OUTPATIENT PHYSICAL THERAPY EVALUATION  PLAN OF TREATMENT FOR OUTPATIENT REHABILITATION  (COMPLETE FOR INITIAL CLAIMS ONLY)  Patient's Last Name, First Name, M.I.  YOB: 1975  Peggy Jessica                        Provider's Name  [facility] Medical Record No.  [id number]                             Onset Date:  10/17/22   Start of Care Date:  10/17/22   Type:     _X_PT   ___OT   ___SLP Medical Diagnosis:  R 4th metacarpal fx              PT Diagnosis:  impaired functional mobility Visits from SOC:  1     See note for plan of treatment, functional goals and certification details    I CERTIFY THE NEED FOR THESE SERVICES FURNISHED UNDER        THIS PLAN OF TREATMENT AND WHILE UNDER MY CARE     (Physician co-signature of this document indicates review and certification of the therapy plan).

## 2022-10-18 ENCOUNTER — APPOINTMENT (OUTPATIENT)
Dept: CARDIOLOGY | Facility: CLINIC | Age: 47
End: 2022-10-18
Payer: COMMERCIAL

## 2022-10-18 LAB — LVEF ECHO: NORMAL

## 2022-10-18 PROCEDURE — G0378 HOSPITAL OBSERVATION PER HR: HCPCS

## 2022-10-18 PROCEDURE — 999N000248 HC STATISTIC IV INSERT WITH US BY RN

## 2022-10-18 PROCEDURE — 96361 HYDRATE IV INFUSION ADD-ON: CPT

## 2022-10-18 PROCEDURE — 250N000013 HC RX MED GY IP 250 OP 250 PS 637

## 2022-10-18 PROCEDURE — 93306 TTE W/DOPPLER COMPLETE: CPT

## 2022-10-18 PROCEDURE — 99224 PR SUBSEQUENT OBSERVATION CARE,LEVEL I: CPT

## 2022-10-18 PROCEDURE — 93306 TTE W/DOPPLER COMPLETE: CPT | Mod: 26 | Performed by: INTERNAL MEDICINE

## 2022-10-18 RX ORDER — NALOXONE HYDROCHLORIDE 0.4 MG/ML
0.4 INJECTION, SOLUTION INTRAMUSCULAR; INTRAVENOUS; SUBCUTANEOUS
Status: DISCONTINUED | OUTPATIENT
Start: 2022-10-18 | End: 2022-10-21 | Stop reason: HOSPADM

## 2022-10-18 RX ORDER — NALOXONE HYDROCHLORIDE 0.4 MG/ML
0.2 INJECTION, SOLUTION INTRAMUSCULAR; INTRAVENOUS; SUBCUTANEOUS
Status: DISCONTINUED | OUTPATIENT
Start: 2022-10-18 | End: 2022-10-21 | Stop reason: HOSPADM

## 2022-10-18 RX ADMIN — IBUPROFEN 600 MG: 600 TABLET ORAL at 10:18

## 2022-10-18 RX ADMIN — ACETAMINOPHEN 975 MG: 325 TABLET, FILM COATED ORAL at 08:07

## 2022-10-18 RX ADMIN — OXYCODONE HYDROCHLORIDE 5 MG: 5 TABLET ORAL at 22:25

## 2022-10-18 RX ADMIN — LIDOCAINE PATCH 4% 3 PATCH: 40 PATCH TOPICAL at 19:49

## 2022-10-18 RX ADMIN — CYCLOBENZAPRINE 10 MG: 5 TABLET, FILM COATED ORAL at 19:50

## 2022-10-18 RX ADMIN — CYCLOBENZAPRINE 10 MG: 5 TABLET, FILM COATED ORAL at 14:25

## 2022-10-18 RX ADMIN — OXYCODONE HYDROCHLORIDE 5 MG: 5 TABLET ORAL at 08:07

## 2022-10-18 RX ADMIN — OXYCODONE HYDROCHLORIDE 5 MG: 5 TABLET ORAL at 14:25

## 2022-10-18 RX ADMIN — ACETAMINOPHEN 975 MG: 325 TABLET, FILM COATED ORAL at 22:25

## 2022-10-18 RX ADMIN — ACETAMINOPHEN 975 MG: 325 TABLET, FILM COATED ORAL at 14:25

## 2022-10-18 RX ADMIN — CYCLOBENZAPRINE 10 MG: 5 TABLET, FILM COATED ORAL at 08:07

## 2022-10-18 ASSESSMENT — ACTIVITIES OF DAILY LIVING (ADL)
ADLS_ACUITY_SCORE: 43

## 2022-10-18 ASSESSMENT — ENCOUNTER SYMPTOMS
DIETARY ISSUES: ADEQUATE INTAKE
NO PATIENT REPORTED PAIN: 1
ACTIVITY IMPAIRMENT: IMPAIRED DUE TO WEAKNESS

## 2022-10-18 NOTE — PLAN OF CARE
Goal Outcome Evaluation:  - Diagnostic tests and consults completed and resulted: Not met   - Vital signs normal or at patient baseline: Met   - Tolerating oral intake to maintain hydration: Not met   - Adequate pain control on oral analgesics: Met   - Safe disposition plan has been identified: Not met
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
## 2022-10-18 NOTE — PLAN OF CARE
Goal Outcome Evaluation:/78 (BP Location: Right arm)   Pulse 85   Temp 98.1  F (36.7  C) (Oral)   Resp 20   Wt 77.1 kg (170 lb)   SpO2 97%   BMI 28.29 kg/m         -diagnostic tests and consults completed and resulted :Not met   -vital signs normal or at patient baseline :Met   -tolerating oral intake to maintain hydration :Not met   -adequate pain control on oral analgesics :Met   -safe disposition plan has been identified:Not met

## 2022-10-18 NOTE — PROGRESS NOTES
[facility]    Medicine Progress Note - ED Observation     Date of Admission:  10/17/2022    Assessment & Plan            Peggy Jessica is a 47 year old female admitted on 10/17/2022. She has PMH of chronic inflammatory demyelinating polyneuropathy, frequent falls, complex regional pain syndrome who presents to the ED after a mechanical fall.      ##Mechanical Fall vs Syncope  ##hx frequent falls  ## nondisplaced fracture at the fourth metacarpal base  ##hx demyelinating polyneuropathy and chronic pain   Patient presents to the ED today after a fall out of bed. She reported to the ED doctor that she fell out of bed and landed on her right side. During my assessment, she tells me that she was feeding the dogs and felt lightheaded and passed out. She reports that she LOC for a few minutes she thinks. The fall was unwitnessed but she thinks she landed on her right side because she has pain along her entire right side. She denies anyy tongue biting, drooling, or loss of bowel or bladder control. She reports she has had many syncopal episodes in the past. Patient and  report she laid on the ground for about 25 minutes. Patient reported to PT in the ED that she had 6 falls yesterday and has been falling frequently at home, about 2-3 times a day. She denies any chest pain, SOB, nausea (aside from chronic nausea), vomiting, dizziness, lightheadedness, fevers, or recent illness. She uses a walker, cane, wheelchair, and other assistive devices as needed. She lives in a home with 16 steps up to the bathroom. She verbalizes need for higher level of care and assistance as she is unable to get around safety at home. In the ED, her vital signs are stable. Labwork unremarkable with the exception of ALT 47, AST 44. EKG sinus rhythm. X-ray of right pelvis and right hip shows 1. No acute osseous abnormality. 2. No substantial degenerative change. X ray right shoulder shows 1.  No acute osseous abnormality. 2. No substantial degenerative change. X ray of right elbow shows 1. No acute osseous abnormality. 2. No substantial degenerative change. X ray of the right wrist shows Question subtle contour irregularity (possible nondisplaced fracture) at the fourth metacarpal base, of uncertain if this is related to overlying tubing density. Please correlate clinically for focal tenderness. This was splinted in the ED and recommended for follow up imaging in 1 week for assessment of need for ongoing splint. CT head shows No acute intracranial pathology. CT cervical spine shows 1. No acute fracture or traumatic subluxation. 2. Mild cervical spondylosis, greatest at C5-6. CT chest abdomen pelvis Shows No acute traumatic findings in the chest, abdomen or pelvis. CT thoracic spine shows 1. No acute fracture or traumatic subluxation of the thoracic or lumbar spine. 2. Mild lumbar spondylosis with central disc protrusions at L4-5 and L5-S1, resulting in mild spinal canal stenoses at these levels. No significant neural foraminal stenosis at any level. CT lumbar shows 1. No acute fracture or traumatic subluxation of the thoracic or lumbar spine. 2. Mild lumbar spondylosis with central disc protrusions at L4-5 and L5-S1, resulting in mild spinal canal stenoses at these levels. No significant neural foraminal stenosis at any level. UA negative. Patient was evaluated by PT in ED who recommended TCU. SW aware and consulted for placement. Plan: admission to ED observation for TCU placement and pain control. Upon admission to ED observation, patient resting comfortably in bed and verbalizes that she needs TCU placement. Today, VSS.NO events overnight. Pain well controlled on current regimen. ECHO shows: Global and regional left ventricular function is normal with an EF of 60-65%. Global right ventricular function is normal. No significant valvular abnormalities present. The inferior vena cava is normal. No  pericardial effusion is present. SW consult for TCU placement.   -PT recommending TCU, will continue to follow  -SW for placement  -Po Tylenol and Ibuprofen for pain  -5mg oxycodone q8h prn   -Continue PTA Flexiril for pain  -PRN lidocaine patches  -PRN miralax and senna as needed  -Follow up with PCP in 1 week for re imaging and assessment on need for splint  -PRN Zofran for chronic nausea       Diet: Regular Diet Adult    DVT Prophylaxis: Ambulate every shift  Kwan Catheter: Not present  Central Lines: None  Cardiac Monitoring: ACTIVE order. Indication: Syncope- low cardiac risk (24 hours)  Code Status: Full Code      Disposition Plan    Discharge pending TCU placement    The patient's care was discussed with the Attending Physician, Dr. Morin, Bedside Nurse, Patient, Patient's Family and SW.    MIRA Rucker CNP  ED Observation   [facility]  Securely message with the Vocera Web Console (learn more here)  Text page via [employer] Paging/Directory         Clinically Significant Risk Factors Present on Admission   _______________________________________________________________    Interval History   NO events overnight. Pain well controlled on current regimen. Normal ECHO this morning. SW consult for TCU placement.     Data reviewed today: I reviewed all medications, new labs and imaging results over the last 24 hours.     Physical Exam   Vital Signs: Temp: 98  F (36.7  C) Temp src: Oral BP: 107/79 Pulse: 78   Resp: 20 SpO2: 98 % O2 Device: None (Room air)    Weight: 170 lbs 0 oz      General: Well nourished, well developed, NAD  HEENT: EOMI, no swelling or lacerations noted  Neck: no jugular venous distension, supple, nl ROM, no midline tenderness or step-off  Cardiac: Regular rate and rhythm. No murmurs, rubs, or gallops. Normal S1, S2.  Intact peripheral pulses.   Pulm: CTAB, no stridor, wheezes, rales, rhonchi  Abd: Soft, tenderness to palpation along the  right side of the abdomen without rebound or guarding, nondistended.  No masses palpated.  Back: Tenderness to palpation along thoracic and lumbar spine (patient reports chronic back pain)  Skin: Warm and dry to the touch.  No rash, no laceration or abrasion noted, mild ecchymosis to left fifth toe (patient notes that this was from a prior injury and has been improving)  Extremities: No LE edema, no cyanosis, w/w/p, tenderness overlying right hip and right shoulder/elbow/wrist, arms are distally neurovascularly intact  Neuro: A&Ox3,  sensation to bilateral lower extremities is impaired below the knees (this is baseline for patient per her report)    Patient is tender to palpation along the entire right side of the body. No redness or warmth noted to any joints. No open wounds.     Data   Recent Labs   Lab 10/17/22  0955   WBC 6.1   HGB 13.3   MCV 94         POTASSIUM 4.3   CHLORIDE 106   CO2 23   BUN 10.7   CR 0.75   ANIONGAP 10   ESTEBAN 9.7   *   ALBUMIN 4.1   PROTTOTAL 7.2   BILITOTAL 0.3   ALKPHOS 95   ALT 47*   AST 44*     Recent Results (from the past 24 hour(s))   XR Pelvis w Hip Right 1 View    Narrative    2 views pelvis/right hip radiographs 10/17/2022 11:09 AM    History: fall, right hip pain    Comparison: Radiographs 6/30/2022, CT 7/20/2022, MR 6/10/2022    Findings:    AP view pelvis, frog leg lateral views of the right hip were obtained.      No acute osseous abnormality.      No substantial degenerative change of the either hip.     Others:    Degenerative changes of the pubic symphysis.    Pelvic phlebolith.       Impression    Impression:  1. No acute osseous abnormality.  2. No substantial degenerative change.    SAMINA ELÍAS         SYSTEM ID:  [id number]   XR Shoulder Right G/E 3 Views    Narrative    4 views right shoulder radiographs 10/17/2022 11:12 AM    History: fall, pain    Comparison: 6/6/2022    Findings:    AP, 2 Grashey, transscapular Y, axillary  views of the right  shoulder  were obtained. Transscapular Y view is suboptimally profiled.    No acute osseous abnormality. Glenohumeral and acromioclavicular  joints are congruent.    No substantial degenerative changes of the acromioclavicular joint. No  substantial degenerative change of the glenohumeral joint.    Soft tissue is unremarkable. The visualized lung is clear.      Impression    Impression:  1. No acute osseous abnormality.  2. No substantial degenerative change.    Socratic         SYSTEM ID:  [id number]   XR Wrist Right G/E 3 Views    Narrative    3 views right wrist radiographs 10/17/2022 11:16 AM    History: fall    Comparison: 6/6/2022    Findings:    PA, oblique and lateral view(s) of the right wrist were obtained.     Question subtle contour irregularity at the fourth metacarpal base, of  uncertain if this is related to overlying tubing density.    No substantial degenerative change. Presumed bone island of the  capitate.    Soft tissue is unremarkable.      Impression    Impression: Question subtle contour irregularity (possible  nondisplaced fracture) at the fourth metacarpal base, of uncertain if  this is related to overlying tubing density. Please correlate  clinically for focal tenderness.    Socratic         SYSTEM ID:  [id number]   XR Elbow Right 2 Views    Narrative    2 views right elbow radiographs 10/17/2022 11:15 AM    History: fall    Comparison: None available.    Findings:    AP and lateral views of the right elbow were obtained.     No acute osseous abnormality.  No joint effusion.    No substantial degenerative change. Tiny triceps tendon insertion  enthesophyte.    Soft tissue is unremarkable.      Impression    Impression:  1. No acute osseous abnormality.  2. No substantial degenerative change.    Socratic         SYSTEM ID:  [id number]   CT Head w/o Contrast    Narrative    CT HEAD W/O CONTRAST 10/17/2022 12:39 PM    History: fall, ams     Comparison: Head CT  6/6/2022    Technique: Using multidetector thin collimation helical acquisition  technique, axial, coronal and sagittal CT images from the skull base  to the vertex were obtained without intravenous contrast.   (topogram) image(s) also obtained and reviewed.    Findings: There is no intracranial hemorrhage, mass effect, or midline  shift. Gray/white matter differentiation in both cerebral hemispheres  is preserved. Ventricles are proportionate to the cerebral sulci. The  basal cisterns are clear.    The bony calvaria and the bones of the skull base are normal. Minimal  mucosal thickening in the left sphenoid locule. Remainder of paranasal  sinuses and mastoid air cells are clear. Orbits are unremarkable.      Impression    Impression:  No acute intracranial pathology.     I have personally reviewed the examination and initial interpretation  and I agree with the findings.    JE HUTTON MD         SYSTEM ID:  [id number]   CT Cervical Spine w/o Contrast    Narrative    EXAM: CT CERVICAL SPINE W/O CONTRAST  10/17/2022 12:40 PM     HISTORY:  Neck pain; Trauma; Mild/moderate trauma; None of the  following: Spondyloarthropathy, cervical x-ray with negative result,  questionable finding, or inadequate coverage       COMPARISON:  CT cervical spine 6/6/2022.    TECHNIQUE: Using multidetector thin collimation helical acquisition  technique, axial, coronal and sagittal CT images through the cervical  spine were obtained without intravenous contrast.    FINDINGS:  Straightening of the cervical lordosis, possibly positional. Normal  cervical spine alignment. No acute fracture or traumatic subluxation.  No prevertebral soft tissue swelling. Mild atlantodental arthropathy.    The findings on a level by level basis are as follows:    C2-3: No spinal canal or neural foraminal stenosis    C3-4: Mild bilateral uncinate spurring. No foraminal or canal  stenosis.     C4-5: No spinal canal or neural foraminal stenosis    C5-6:  Bilateral uncinate spurring. Mild left neural foraminal  narrowing. No right neural foraminal stenosis. No spinal canal  stenosis.    C6-7: Mild left uncinate spurring. No foraminal or canal stenosis.    C7-T1: No spinal canal or neural foraminal stenosis     No abnormality of the paraspinous soft tissues.      Impression    IMPRESSION:  1. No acute fracture or traumatic subluxation.  2. Mild cervical spondylosis, greatest at C5-6.    I have personally reviewed the examination and initial interpretation  and I agree with the findings.    JE HUTTON MD         SYSTEM ID:  [id number]   CT Chest/Abdomen/Pelvis w Contrast    Narrative    EXAMINATION: CT CHEST/ABDOMEN/PELVIS W CONTRAST, 10/17/2022 12:53 PM    TECHNIQUE:  Helical CT images from the thoracic inlet through the  symphysis pubis were obtained with IV contrast. Contrast dose:  iopamidol (ISOVUE-370) solution 104 mL    COMPARISON: Chest CT 7/12/2022. CT chest, abdomen and pelvis  11/19/2021.    HISTORY: fall, right sided pain    FINDINGS:  CHEST:  LUNGS: Central tracheobronchial tree is patent. No pneumothorax or  pleural effusion. No focal airspace opacity. No suspicious pulmonary  nodule. Mild dependent atelectasis. Fibroatelectatic changes greatest  in the left lung base.    MEDIASTINUM: Heart size is within normal limits. No pericardial  effusion. Ascending aorta and main pulmonary artery diameters are  within normal limits. Normal appearance and configuration of the great  vessels off of the aortic arch. No suspicious mediastinal, hilar, or  axillary lymph nodes.     Visualized thyroid and esophagus are unremarkable.    ABDOMEN/PELVIS:  LIVER: Stable appearance of multiple hepatic cysts. Several  subcentimeter hypodense hepatic lesions are too small to accurately  characterize. No hepatic mass.    BILIARY: Cholecystectomy with mild reservoir effect and trace  intrahepatic biliary dilatation.    PANCREAS: No focal pancreatic lesion. The main pancreatic  duct is not  dilated.    SPLEEN: Within normal limits.    ADRENAL GLANDS: No focal adrenal nodule.    URINARY TRACT: No suspicious renal lesion. No hydronephrosis or  hydroureter. No renal stone. Urinary bladder is unremarkable.    REPRODUCTIVE ORGANS: Within normal limits.    STOMACH: Within normal limits.    BOWEL: Normal caliber large and small bowel. No abnormal bowel wall  thickening or enhancement. Appendix is unremarkable.    PERITONEUM/FLUID: No ascites or pelvic free fluid.    VESSELS: No aneurysmal dilatation of the abdominal aorta.  The portal,  splenic, and superior mesenteric veins are patent.  The origins of the  celiac and superior mesenteric arteries are patent.    LYMPH NODES: No lymphadenopathy.    BONES/SOFT TISSUES: No aggressive osseous lesions. No definite  displaced rib fractures.      Impression    IMPRESSION: No acute traumatic findings in the chest, abdomen or  pelvis.     I have personally reviewed the examination and initial interpretation  and I agree with the findings.    CANDI ROB MD         SYSTEM ID:  [id number]   CT Thoracic Spine w Contrast    Narrative    Thoracic and Lumbar spine CT without contrast    History: fall, back pain and TTP.    Comparison: MRI 4/30/2022    Technique: Axial, coronal, and sagittal multiplanar reconstructions of  the thoracic and lumbar spine obtained from acquisition of CT chest  abdomen and pelvis with contrast which was obtained in the setting of  trauma.     Findings:  Thoracic spine: Rightward curvature. There is no acute fracture or  subluxation. There is no prevertebral edema. There is no spinal canal  or foraminal stenosis at any level. No soft tissue abnormality in the  visualized paraspinous tissues anteriorly.    Lumbar Spine: There is no acute fracture or subluxation. There are 5  type lumbar vertebra, used for the purposes of this dictation . Vacuum  disc phenomenon L5-S1.    On a level by level basis, the findings are as  follows:  T12-L1:  No significant neural foraminal or spinal canal stenosis.  L1-2:  No significant neural foraminal or spinal canal stenosis.  L2-3:  No significant neural foraminal or spinal canal stenosis.  L3-4:  No significant neural foraminal or spinal canal stenosis.  L4-5:  Disc bulge with superimposed central protrusion. Mild bilateral  facet arthropathy. No significant neural foraminal stenosis. Mild  spinal canal stenosis.  L5-S1: Circumferential disc bulge with superimposed central  protrusion. No significant neural foraminal stenosis. Mild spinal  canal stenosis.    Multiple hepatic cysts. Cholecystectomy. Visualized appendix is  normal. See dedicated report from concurrently acquired CT chest  abdomen and pelvis for further detail.      Impression    Impression:   1. No acute fracture or traumatic subluxation of the thoracic or  lumbar spine.  2. Mild lumbar spondylosis with central disc protrusions at L4-5 and  L5-S1, resulting in mild spinal canal stenoses at these levels. No  significant neural foraminal stenosis at any level.    I have personally reviewed the examination and initial interpretation  and I agree with the findings.    PRECIOUS HARRIS MD         SYSTEM ID:  [id number]   CT Lumbar Spine w Contrast    Narrative    Thoracic and Lumbar spine CT without contrast    History: fall, back pain and TTP.    Comparison: MRI 4/30/2022    Technique: Axial, coronal, and sagittal multiplanar reconstructions of  the thoracic and lumbar spine obtained from acquisition of CT chest  abdomen and pelvis with contrast which was obtained in the setting of  trauma.     Findings:  Thoracic spine: Rightward curvature. There is no acute fracture or  subluxation. There is no prevertebral edema. There is no spinal canal  or foraminal stenosis at any level. No soft tissue abnormality in the  visualized paraspinous tissues anteriorly.    Lumbar Spine: There is no acute fracture or subluxation. There are 5  type lumbar  vertebra, used for the purposes of this dictation . Vacuum  disc phenomenon L5-S1.    On a level by level basis, the findings are as follows:  T12-L1:  No significant neural foraminal or spinal canal stenosis.  L1-2:  No significant neural foraminal or spinal canal stenosis.  L2-3:  No significant neural foraminal or spinal canal stenosis.  L3-4:  No significant neural foraminal or spinal canal stenosis.  L4-5:  Disc bulge with superimposed central protrusion. Mild bilateral  facet arthropathy. No significant neural foraminal stenosis. Mild  spinal canal stenosis.  L5-S1: Circumferential disc bulge with superimposed central  protrusion. No significant neural foraminal stenosis. Mild spinal  canal stenosis.    Multiple hepatic cysts. Cholecystectomy. Visualized appendix is  normal. See dedicated report from concurrently acquired CT chest  abdomen and pelvis for further detail.      Impression    Impression:   1. No acute fracture or traumatic subluxation of the thoracic or  lumbar spine.  2. Mild lumbar spondylosis with central disc protrusions at L4-5 and  L5-S1, resulting in mild spinal canal stenoses at these levels. No  significant neural foraminal stenosis at any level.    I have personally reviewed the examination and initial interpretation  and I agree with the findings.    PRECIOUS HARRIS MD         SYSTEM ID:  [id number]     Medications     sodium chloride 125 mL/hr at 10/17/22 2038       cyclobenzaprine  10 mg Oral TID     lidocaine  3 patch Transdermal Q24h    And     lidocaine   Transdermal Q8H SHERMAN

## 2022-10-18 NOTE — PROGRESS NOTES
Care Management Follow Up    Length of Stay (days): 0    Expected Discharge Date: 10/18/2022     Concerns to be Addressed: discharge planning     Patient plan of care discussed at interdisciplinary rounds: Yes    Anticipated Discharge Disposition: Transitional Care     Anticipated Discharge Services:    Anticipated Discharge DME:      Patient/family educated on Medicare website which has current facility and service quality ratings: yes  Education Provided on the Discharge Plan:  Yes  Patient/Family in Agreement with the Plan: yes    Referrals Placed by CM/SW: Post Acute Facilities    Referrals have been made to the following facilities and their status is as follows:    [facility]   [address] [place]  [address]  Ph: [phone]  Adm: [phone]  Fax: [phone]  - Writer spoke with admissions. SNF still reviewing.   - Writer spoke with admissions. SNF has clinically accepted pt. Pt is 5th on the waitlist for admission, which may mean pt will not be accepted until next week. SNF will continue to follow pt for admission. Pt may need to discharge to another SNF if another SNF is able to accept pt sooner.      [facility] [address] [place] [address]  Ph: [phone]  Adm: [phone]  Fax: [phone]  - Writer spoke with Norma in admissions. SNF to review.     [facility]  [address] [place] [address]  Ph: [phone]  Adm: [phone]  Fax: [phone]  - Writer left VM with SNF admissions.      [facility]  [address] [address]   P: [phone]  P: [phone] - Admissions  F: [phone]  - Writer spoke with admissions. SNF has received the referral and will review.      [facility]  [address] [place]  [address]  Ph: [phone]  Adm: [phone]  Fax: [phone]  - Writer left VM with SNF admissions.      [facility]  [place]  [address] [place]  [address]  Ph: [phone]  Adm: [phone]  Fax: [phone]  - Writer left VM with SNF admissions.     [facility]  [address] [place] [address]  Ph: [phone]  Adm: [phone]   Fax: [phone]  - Writer left VM with SNF admissions.      The following facilities have either declined pt or lack bed availability:    [facility] in [address] [address]  Ph: [phone]  Adm: [phone]  Fax: [phone]  - Writer spoke with Marj in admissions. SNF has no bed availability this week. Writer has ceased following this referral.    [facility]   [address] [place]  [address]  Ph: [phone]  Adm: [phone]  Fax: [phone]  - Writer spoke with Monserrat in admissions. SNF has no bed availability this week. Writer has ceased following this referral.     [facility]  [address] [address]  Ph: [phone]  Adm: [phone]  Fax: [phone]  - Writer spoke with Shan in admissions. SNF reports they are full until next week. Writer has ceased following this referral.    [facility]  [address] [place] [address]  Ph: [phone]  Adm: [phone] or [phone]  Fax: [phone]  - Writer left VM with SNF admissions.   - Writer spoke with Isabel in central admissions. Isabel reports pt's insurance is out of network per their notes and they are unable to accommodate a celiac diet. Writer explained that per previous documentation, that a previous SW had contacted pt's insurance and verified that they were in network with pt's insurance. Writer explained that pt was willing to have spouse/friend/family bring in food so she wouldn't have to be served a celiac diet by the SNF. Writer has ceased following this referral.    Private pay costs discussed: Not  applicable    Additional Information:  Chart reviewed. Case discussed with bedside RN and medical provider.    Writer met with pt to discuss her questions and concerns with writer. Pt reports that she has celiacs disease but she does not want that to be a reason why she is declined from TCU. Pt reports she has worked out a plan with family/spouse/friends to deliver/bring in food that would meet her dietary requirements.    Pt reports that she has a service dog in training and wants to know if she could have him visit for an hour or so. Writer went over the policy and that pt could have her service animal with her. Writer reminded pt of the policy and expectations.    Pt reports she has a court date next week, where she anticipates that she will be called as a witness in a trial. Pt wants to know if she can attend the court hearing if she is needed as a witness. Writer reassured pt that she could likely attend court hearing if needed and that writer would defer to the accepting TCU for their policy. Writer reassured pt that if she were still here at the time of the court date that the SW could help arrange a virtual visit. Writer encouraged the pt to reach out to the prosecutors office for clarification if she could serve as witness virtually.    Writer updated pt on her acceptance to  TCU and how pt is on the wait list, but other options will still need to be looked into due to how long the wait list is. Pt was agreeable for the other referrals to be continued.      ________________    ANGI Miller, [facility]  ED/Observation   [facility]  Phone: [phone]  Pager: [phone]  Fax: [phone]    On-call pager, [phone], 4:00pm to midnight

## 2022-10-18 NOTE — PROGRESS NOTES
Peggy Jessica is a 47 year old female patient.  1. Fall in home, initial encounter      Past Medical History:   Diagnosis Date     BRCA positive      CIDP (chronic inflammatory demyelinating polyneuropathy) (H)      ASHKAN III with severe dysplasia 2002     Complex regional pain syndrome type 1 of right lower extremity      No current outpatient medications on file.     Allergies   Allergen Reactions     Dihydroergotamine Anaphylaxis     Latex Anaphylaxis     Shellfish-Derived Products Anaphylaxis     Sumatriptan Anaphylaxis     Banana Unknown     Gabapentin Dizziness     Gluten Meal Other (See Comments)     Celiac disease     Keppra [Levetiracetam] Nausea and Vomiting     Kiwi Unknown     Levofloxacin Other (See Comments)     Arrhythmia     Metronidazole Nausea and Vomiting     Nitrofurantoin Hives     Penicillins Hives     Pregabalin      Topiramate Visual Disturbance     Aspirin Rash     Methadone Rash     Risperidone Anxiety     Active Problems:    * No active hospital problems. *    Blood pressure 107/79, pulse 78, temperature 98  F (36.7  C), temperature source Oral, resp. rate 20, weight 77.1 kg (170 lb), SpO2 98 %, not currently breastfeeding.    Subjective:  Symptoms:  Stable.    Diet:  Adequate intake.    Activity level: Impaired due to weakness.    Pain:  She reports no pain.      Objective:  General Appearance:  Comfortable.    Vital signs: (most recent): Blood pressure 107/79, pulse 78, temperature 98  F (36.7  C), temperature source Oral, resp. rate 20, weight 77.1 kg (170 lb), SpO2 98 %, not currently breastfeeding.  Vital signs are normal.    Output: Producing urine.    HEENT: Normal HEENT exam.    Lungs:  Normal effort and normal respiratory rate.  Breath sounds clear to auscultation.    Heart: Normal rate.  Regular rhythm.  S1 normal and S2 normal.    Abdomen: Abdomen is soft.  Bowel sounds are normal.   There is no abdominal tenderness.     Extremities: Normal range of motion.    Pulses:  Distal pulses are intact.    Neurological: Patient is alert.  Patient has abnormal coordination.    Pupils:  Pupils are equal, round, and reactive to light.    Skin:  Warm.      Assessment:    Condition: In stable condition.  Unchanged.   (47 yof with chronic inflammatory demyelinating diesease with falls, PT input appreciated, awaiting TCU placement.).     The pt was seen and examined by myself. The case was reviewed and the plan was discussed with the NAVI.    Camila Morin MD, MD  10/18/2022

## 2022-10-19 ENCOUNTER — APPOINTMENT (OUTPATIENT)
Dept: PHYSICAL THERAPY | Facility: CLINIC | Age: 47
End: 2022-10-19
Payer: COMMERCIAL

## 2022-10-19 PROCEDURE — 250N000013 HC RX MED GY IP 250 OP 250 PS 637

## 2022-10-19 PROCEDURE — 97530 THERAPEUTIC ACTIVITIES: CPT | Mod: GP

## 2022-10-19 PROCEDURE — G0378 HOSPITAL OBSERVATION PER HR: HCPCS

## 2022-10-19 PROCEDURE — 99224 PR SUBSEQUENT OBSERVATION CARE,LEVEL I: CPT

## 2022-10-19 RX ADMIN — CYCLOBENZAPRINE 10 MG: 5 TABLET, FILM COATED ORAL at 14:28

## 2022-10-19 RX ADMIN — LIDOCAINE PATCH 4% 3 PATCH: 40 PATCH TOPICAL at 20:01

## 2022-10-19 RX ADMIN — OXYCODONE HYDROCHLORIDE 5 MG: 5 TABLET ORAL at 06:27

## 2022-10-19 RX ADMIN — IBUPROFEN 600 MG: 600 TABLET ORAL at 09:26

## 2022-10-19 RX ADMIN — ACETAMINOPHEN 975 MG: 325 TABLET, FILM COATED ORAL at 06:27

## 2022-10-19 RX ADMIN — ACETAMINOPHEN 975 MG: 325 TABLET, FILM COATED ORAL at 18:47

## 2022-10-19 RX ADMIN — OXYCODONE HYDROCHLORIDE 5 MG: 5 TABLET ORAL at 18:47

## 2022-10-19 RX ADMIN — CYCLOBENZAPRINE 10 MG: 5 TABLET, FILM COATED ORAL at 19:59

## 2022-10-19 RX ADMIN — CYCLOBENZAPRINE 10 MG: 5 TABLET, FILM COATED ORAL at 09:08

## 2022-10-19 ASSESSMENT — ACTIVITIES OF DAILY LIVING (ADL)
ADLS_ACUITY_SCORE: 36

## 2022-10-19 NOTE — PROGRESS NOTES
Goal Outcome Evaluation:  - Diagnostic tests and consults completed and resulted: Met  - Vital signs normal or at patient baseline: Met  - Tolerating oral intake to maintain hydration: Met  - Adequate pain control on oral analgesics: Met   - Safe disposition plan has been identified: Not met

## 2022-10-19 NOTE — PLAN OF CARE
Goal Outcome Evaluation:  - Diagnostic tests and consults completed and resulted: In progress  - Vital signs normal or at patient baseline: Not met, BP soft 86/61 overnight. Provider updated.  - Tolerating oral intake to maintain hydration: Met  - Adequate pain control on oral analgesics: Met   - Safe disposition plan has been identified: Not met

## 2022-10-19 NOTE — PLAN OF CARE
Goal Outcome Evaluation:  - Diagnostic tests and consults completed and resulted: In progress  - Vital signs normal or at patient baseline: Met   - Tolerating oral intake to maintain hydration: Met  - Adequate pain control on oral analgesics: Met   - Safe disposition plan has been identified: Not met

## 2022-10-19 NOTE — PROGRESS NOTES
[facility]    Medicine Progress Note - ED Observation   Date of Admission:  10/17/2022    Assessment & Plan     Peggy Jessica is a 47 year old female admitted on 10/17/2022. She has PMH of chronic inflammatory demyelinating polyneuropathy, frequent falls, complex regional pain syndrome who presents to the ED after a mechanical fall.      ##Mechanical Fall vs Syncope  ##hx frequent falls  ## nondisplaced fracture at the fourth metacarpal base  ##hx demyelinating polyneuropathy and chronic pain   Patient presents to the ED today after a fall out of bed. She reported to the ED doctor that she fell out of bed and landed on her right side. During my assessment, she tells me that she was feeding the dogs and felt lightheaded and passed out. She reports that she LOC for a few minutes she thinks. The fall was unwitnessed but she thinks she landed on her right side because she has pain along her entire right side. She denies anyy tongue biting, drooling, or loss of bowel or bladder control. She reports she has had many syncopal episodes in the past. Patient and  report she laid on the ground for about 25 minutes. Patient reported to PT in the ED that she had 6 falls yesterday and has been falling frequently at home, about 2-3 times a day. She denies any chest pain, SOB, nausea (aside from chronic nausea), vomiting, dizziness, lightheadedness, fevers, or recent illness. She uses a walker, cane, wheelchair, and other assistive devices as needed. She lives in a home with 16 steps up to the bathroom. She verbalizes need for higher level of care and assistance as she is unable to get around safety at home. In the ED, her vital signs are stable. Labwork unremarkable with the exception of ALT 47, AST 44. EKG sinus rhythm. X-ray of right pelvis and right hip shows 1. No acute osseous abnormality. 2. No substantial degenerative change. X ray right shoulder shows 1. No acute  osseous abnormality. 2. No substantial degenerative change. X ray of right elbow shows 1. No acute osseous abnormality. 2. No substantial degenerative change. X ray of the right wrist shows Question subtle contour irregularity (possible nondisplaced fracture) at the fourth metacarpal base, of uncertain if this is related to overlying tubing density. Please correlate clinically for focal tenderness. This was splinted in the ED and recommended for follow up imaging in 1 week for assessment of need for ongoing splint. CT head shows No acute intracranial pathology. CT cervical spine shows 1. No acute fracture or traumatic subluxation. 2. Mild cervical spondylosis, greatest at C5-6. CT chest abdomen pelvis Shows No acute traumatic findings in the chest, abdomen or pelvis. CT thoracic spine shows 1. No acute fracture or traumatic subluxation of the thoracic or lumbar spine. 2. Mild lumbar spondylosis with central disc protrusions at L4-5 and L5-S1, resulting in mild spinal canal stenoses at these levels. No significant neural foraminal stenosis at any level. CT lumbar shows 1. No acute fracture or traumatic subluxation of the thoracic or lumbar spine. 2. Mild lumbar spondylosis with central disc protrusions at L4-5 and L5-S1, resulting in mild spinal canal stenoses at these levels. No significant neural foraminal stenosis at any level. UA negative. Patient was evaluated by PT in ED who recommended TCU. SW aware and consulted for placement. Plan: admission to ED observation for TCU placement and pain control. Upon admission to ED observation, patient resting comfortably in bed and verbalizes that she needs TCU placement. Today, VSS.NO events overnight. Pain well controlled on current regimen. ECHO shows: Global and regional left ventricular function is normal with an EF of 60-65%. Global right ventricular function is normal. No significant valvular abnormalities present. The inferior vena cava is normal. No pericardial  effusion is present. SW consult for TCU placement.   -PT recommending TCU, will continue to follow  -SW for placement  -Po Tylenol and Ibuprofen for pain  -5mg oxycodone q8h prn   -Continue PTA Flexiril for pain  -PRN lidocaine patches  -PRN miralax and senna as needed  -Follow up with PCP in 1 week for re imaging and assessment on need for splint  -PRN Zofran for chronic nausea  -Ambulate with nursing 4-5 times daily  -PCD  -Consider adding DVT prophylaxis if patient unable to ambulate frequently or prolonged hospital stay       Diet: Regular Diet Adult    DVT Prophylaxis: Pneumatic Compression Devices and Ambulate every shift  Kwan Catheter: Not present  Central Lines: None  Cardiac Monitoring: ACTIVE order. Indication: Syncope- low cardiac risk (24 hours)  Code Status: Full Code      Disposition Plan     Discharge pending TCU placement     The patient's care was discussed with the Attending Physician, Dr. Barrios, Bedside Nurse, Care Coordinator/ and Patient.    MIRA Rucker CNP  ED Observation  [facility]  Securely message with the Vocera Web Console (learn more here)  Text page via [facility] Paging/Directory         Clinically Significant Risk Factors Present on Admission     ______________________________________________________________________    Interval History   No events overnight. Plan is to continue to work on TCU placement    Data reviewed today: I reviewed all medications, new labs and imaging results over the last 24 hours.     Physical Exam   Vital Signs: Temp: 97.7  F (36.5  C) Temp src: Oral BP: 101/73 Pulse: 87   Resp: 15 SpO2: 96 % O2 Device: None (Room air)    Weight: 170 lbs 0 oz    General: Well nourished, well developed, NAD  HEENT: EOMI, no swelling or lacerations noted  Neck: no jugular venous distension, supple, nl ROM, no midline tenderness or step-off  Cardiac: Regular rate and rhythm. No murmurs, rubs, or gallops.  Normal S1, S2.  Intact peripheral pulses.   Pulm: CTAB, no stridor, wheezes, rales, rhonchi  Abd: Soft, tenderness to palpation along the right side of the abdomen without rebound or guarding, nondistended.  No masses palpated.  Back: Tenderness to palpation along thoracic and lumbar spine (patient reports chronic back pain)  Skin: Warm and dry to the touch.  No rash, no laceration or abrasion noted, mild ecchymosis to left fifth toe (patient notes that this was from a prior injury and has been improving)  Extremities: No LE edema, no cyanosis, w/w/p, tenderness overlying right hip and right shoulder/elbow/wrist, arms are distally neurovascularly intact. Right arm is in splint. Cap refill <3 seconds in all fingers. Some swelling noted to right fingers. Sensation intact.   Neuro: A&Ox3,  sensation to bilateral lower extremities is impaired below the knees (this is baseline for patient per her report)         Data   Recent Labs   Lab 10/17/22  0955   WBC 6.1   HGB 13.3   MCV 94         POTASSIUM 4.3   CHLORIDE 106   CO2 23   BUN 10.7   CR 0.75   ANIONGAP 10   ESTEBAN 9.7   *   ALBUMIN 4.1   PROTTOTAL 7.2   BILITOTAL 0.3   ALKPHOS 95   ALT 47*   AST 44*     No results found for this or any previous visit (from the past 24 hour(s)).  Medications     sodium chloride Stopped (10/18/22 1218)       cyclobenzaprine  10 mg Oral TID     lidocaine  3 patch Transdermal Q24h    And     lidocaine   Transdermal Q8H SHERMAN

## 2022-10-19 NOTE — UTILIZATION REVIEW
Concurrent stay review; Secondary Review Determination    [facility]        Under the authority of the Utilization Management Committee, the utilization review process indicated a secondary review on the above patient.  The review outcome is based on review of the medical records, discussions with staff, and applying clinical experience noted on the date of the review.        (x) Observation/outpatient Status Appropriate - Concurrent stay review     The patient is a 47-year-old female admitted on 10/17/2022.  She apparently fell out of bed and landed on her right side on the day of admission.  She does have demyelinating polyneuropathy and chronic pain.  She has weakness and overall function has been worsening.  Evaluation in the ED including CT of her cervical spine and CT of her chest and abdomen did not show any acute traumas.  She was admitted to observation for TCU placement and pain control.  Echocardiogram shows normal ejection fraction of 60 to 65% PT recommended TCU.  Beyond imaging, no other labs other than urinalysis have been ordered.  Based on current diagnosis and plan, recommend utilization review longterm status pending TCU placement.      RATIONALE FOR DETERMINATION:     Patient delayed discharge is related to disposition, there is no medical necessity for inpatient admission at the time of this review. If there is a change in patient status, please resend for review.    The information on this document is developed by the utilization review team in order for the business office to ensure compliance.  This only denotes the appropriateness of proper admission status and does not reflect the quality of care rendered.       The definitions of Inpatient Status and Observation Status used in making the determination above are those provided in the CMS Coverage Manual, Chapter 1 and Chapter 6, section 70.4.       Sincerely,    Bhavesh Alonso MD

## 2022-10-20 PROCEDURE — 250N000011 HC RX IP 250 OP 636: Performed by: PHYSICIAN ASSISTANT

## 2022-10-20 PROCEDURE — 99224 PR SUBSEQUENT OBSERVATION CARE,LEVEL I: CPT | Performed by: PHYSICIAN ASSISTANT

## 2022-10-20 PROCEDURE — G0378 HOSPITAL OBSERVATION PER HR: HCPCS

## 2022-10-20 PROCEDURE — 999N000128 HC STATISTIC PERIPHERAL IV START W/O US GUIDANCE

## 2022-10-20 PROCEDURE — 250N000013 HC RX MED GY IP 250 OP 250 PS 637

## 2022-10-20 PROCEDURE — 96375 TX/PRO/DX INJ NEW DRUG ADDON: CPT

## 2022-10-20 RX ORDER — LORAZEPAM 2 MG/ML
1 INJECTION INTRAMUSCULAR ONCE
Status: COMPLETED | OUTPATIENT
Start: 2022-10-20 | End: 2022-10-20

## 2022-10-20 RX ORDER — ONDANSETRON 4 MG/1
4 TABLET, ORALLY DISINTEGRATING ORAL EVERY 6 HOURS PRN
Status: DISCONTINUED | OUTPATIENT
Start: 2022-10-20 | End: 2022-10-21

## 2022-10-20 RX ORDER — ONDANSETRON 4 MG/1
4 TABLET, FILM COATED ORAL EVERY 6 HOURS PRN
Status: DISCONTINUED | OUTPATIENT
Start: 2022-10-20 | End: 2022-10-20

## 2022-10-20 RX ADMIN — IBUPROFEN 600 MG: 600 TABLET ORAL at 21:43

## 2022-10-20 RX ADMIN — OXYCODONE HYDROCHLORIDE 5 MG: 5 TABLET ORAL at 16:36

## 2022-10-20 RX ADMIN — ACETAMINOPHEN 975 MG: 325 TABLET, FILM COATED ORAL at 16:36

## 2022-10-20 RX ADMIN — CYCLOBENZAPRINE 10 MG: 5 TABLET, FILM COATED ORAL at 14:07

## 2022-10-20 RX ADMIN — OXYCODONE HYDROCHLORIDE 5 MG: 5 TABLET ORAL at 08:31

## 2022-10-20 RX ADMIN — IBUPROFEN 600 MG: 600 TABLET ORAL at 09:34

## 2022-10-20 RX ADMIN — ACETAMINOPHEN 975 MG: 325 TABLET, FILM COATED ORAL at 08:31

## 2022-10-20 RX ADMIN — CYCLOBENZAPRINE 10 MG: 5 TABLET, FILM COATED ORAL at 21:43

## 2022-10-20 RX ADMIN — ONDANSETRON 4 MG: 4 TABLET, ORALLY DISINTEGRATING ORAL at 17:34

## 2022-10-20 RX ADMIN — LORAZEPAM 1 MG: 2 INJECTION INTRAMUSCULAR; INTRAVENOUS at 21:00

## 2022-10-20 RX ADMIN — CYCLOBENZAPRINE 10 MG: 5 TABLET, FILM COATED ORAL at 08:31

## 2022-10-20 ASSESSMENT — ACTIVITIES OF DAILY LIVING (ADL)
ADLS_ACUITY_SCORE: 36

## 2022-10-20 NOTE — PROGRESS NOTES
[facility]    Medicine Progress Note - ED Observation Service    Date of Admission:  10/17/2022    Assessment & Plan   Peggy Jessica is a 47 year old female admitted on 10/17/2022. She has PMH of chronic inflammatory demyelinating polyneuropathy, frequent falls, complex regional pain syndrome who presents to the ED after a mechanical fall.      #Ground level fall  #Frequent falls  #Nondisplaced fracture at the fourth metacarpal base  #Demyelinating polyneuropathy   #Complex regional pain syndrome  Patient presents to the ED today after a fall out of bed. She reported to the ED doctor that she fell out of bed and landed on her right side. Stated she was feeding the dogs and felt lightheaded and passed out. She reports that she LOC for a few minutes she thinks. The fall was unwitnessed but she thinks she landed on her right side because she has pain along her entire right side. She denies any tongue biting, drooling, or loss of bowel or bladder control. She reports she has had many syncopal episodes in the past. Patient and  report she laid on the ground for about 25 minutes. Patient reported to PT in the ED that she had 6 falls yesterday and has been falling frequently at home, about 2-3 times a day. She denies any chest pain, SOB, nausea (aside from chronic nausea), vomiting, dizziness, lightheadedness, fevers, or recent illness. She uses a walker, cane, wheelchair, and other assistive devices as needed. She lives in a home with 16 steps up to the bathroom. She verbalizes need for higher level of care and assistance as she is unable to get around safety at home. In the ED, her vital signs were stable. Labwork unremarkable with the exception of ALT 47, AST 44. EKG sinus rhythm. Numerous imaging including right pelvis/hip xray, right shoulder xray, right elbow xray, CT head, CT cervical spine, CT chest/abdomen/pelvis, CT thoracic spine, CT lumbar spine  "showing no fractures. Xray of the right wrist did show a possible nondisplaced fracture of the 4th metacarpal base which was splinted in the ED and recommended for follow up imaging in 1 week. UA was negative. Patient was evaluated by PT in ED who recommended TCU. SW aware and consulted for placement. Echo was done 10/18/22 and showed LVEF of 60-65% with no RWMA or any significant valvular disease. Patient is currently awaiting TCU placement.  -PT recommending TCU  -SW consult for TCU placement  -PO Tylenol and Ibuprofen for pain  -Oxycodone 5 mg q8h prn   -Continue PTA Flexiril for pain  -PRN lidocaine patches  -PRN miralax and senna as needed  -Follow up with PCP in 1 week for re imaging and assessment on need for splint  -PRN Zofran for chronic nausea     Diet: Regular Diet Adult    DVT Prophylaxis: Pneumatic Compression Devices  Kwan Catheter: Not present  Central Lines: None  Cardiac Monitoring: ACTIVE order. Indication: Syncope- low cardiac risk (24 hours)  Code Status: Full Code      Disposition Plan      Expected Discharge Date: 10/21/2022    Discharge Delays: Insurance Authorization needed  Placement - TCU  Destination: nursing home          The patient's care was discussed with the Attending Physician, Dr. Dunn, Bedside Nurse, Care Coordinator/ and Patient.    Lin Chavez PA-C  ED Observation Service  [facility]  Securely message with the Vocera Web Console (learn more here)  Text page via [facility] Paging/Directory   _____________________________________________________________    Interval History   Patient states her right hip has had \"twinges\" of pain. No chest pain, dyspnea, abdominal pain, vomiting. Chronic nausea, unchanged.    Data reviewed today: I reviewed all medications, new labs and imaging results over the last 24 hours.    Physical Exam   Vital Signs: Temp: 98  F (36.7  C) Temp src: Oral BP: 120/86 Pulse: 85   Resp: 16 SpO2: 97 " % O2 Device: None (Room air)    Weight: 170 lbs 0 oz  Exam:  Constitutional: alert, no distress, and cooperative  Head: normocephalic, atraumatic  Neck: no asymmetry, masses, or scars  ENT: throat normal without erythema or exudate  Cardiovascular: RRR  Respiratory: diminished, respirations unlabored  Gastrointestinal: (+) BS, non tender, soft  Musculoskeletal: normal muscle tone, no pitting edema. Some right hip/groin pain with palpation.  Skin: no suspicious lesions or rashes  Neurologic: oriented x 3, moves all extremities, no slurred speech  Psychiatric: normal affect and mood    Data   Recent Labs   Lab 10/17/22  0955   WBC 6.1   HGB 13.3   MCV 94         POTASSIUM 4.3   CHLORIDE 106   CO2 23   BUN 10.7   CR 0.75   ANIONGAP 10   ESTEBAN 9.7   *   ALBUMIN 4.1   PROTTOTAL 7.2   BILITOTAL 0.3   ALKPHOS 95   ALT 47*   AST 44*     No results found for this or any previous visit (from the past 24 hour(s)).

## 2022-10-20 NOTE — PROGRESS NOTES
Care Management Follow Up    Length of Stay (days): 0    Expected Discharge Date: 10/21/2022     Concerns to be Addressed: discharge planning     Patient plan of care discussed at interdisciplinary rounds: Yes    Anticipated Discharge Disposition: Transitional Care     Anticipated Discharge Services:    Anticipated Discharge DME:      Patient/family educated on Medicare website which has current facility and service quality ratings: yes  Education Provided on the Discharge Plan:    Patient/Family in Agreement with the Plan: yes    Referrals Placed by CM/SW: Post Acute Facilities  Private pay costs discussed: Not at this time    Additional Information:    SW met with pt and provided update on referrals . SW agreed to make additional referrals as follows:    [facility]  [address] [place] [address]  Ph: [phone]   Adm: [phone]  Fax: [phone]  2207 Facesheet Referral sent via [facility] and [address] [address]  Ph/Adm: [phone]  Fax: [phone]  2212 Initial SNF Referral sent via [facility]  [address] [place]  [address]  Ph: [phone]  Adm: [phone]  Fax: [phone]  2218 Initial SNF Referral sent via [facility]   [address] [place] [address]  Ph: [phone]  Adm: [phone]   Fax: [phone]  2217 Initial SNF Referral sent via Epic [facility] [place] [address] [place] [address]  Ph: [phone]  Adm: [phone]  Fax: [phone]    [facility]  [address] [place] [address]  Ph: [phone]  Adm: [phone]  Fax: [phone]  221 Initial SNF Referral sent via [facility]  [address] [facility] [address]  Ph: [phone]  Adm: [phone]   Fax: [phone]  2210 Initial SNF Referral sent via [address] [place] [address]  Ph: [phone]   Adm: [phone]  Fax: [phone]  2210 Initial SNF Referral sent via [facility]  [address] [place]  [address]  Ph: [phone]  Adm: [phone]  Fax: [phone]  2210 Initial SNF Referral sent via [facility]  [address] [place] [address]  Ph: [phone]  Adm: [phone]  Fax: [phone]  2210 Initial SNF Referral sent via [facility]  [address] [place]  [address]  Ph: [phone]  Adm: [phone]  Fax: [phone]    [facility]  [address] [place] [address]  Ph: [phone]   Adm: [phone]  Fax: [phone]  2210 Initial SNF Referral sent via [facility]  [address] [place]  [address]  Ph: [phone]  Adm: [phone]  Fax: [phone]  2216 Initial SNF Referral sent via Epic     Tentatively accepted:     [facility] Ph: [phone]  Adm: [phone]  Fax: [phone]  - 10/18 SNF has clinically accepted pt. Pt is 5th on the waitlist for admission, which may mean pt will not be accepted until next week. SNF will continue to follow pt for admission. Pt may need to discharge to another SNF if another SNF is able to accept pt sooner.      Pending Referrals:    [facility]   [address] [place] [address] [place] [address]  Ph: [phone]  Adm: [phone]  Fax: [phone] SW called Admissions ([phone]) and left a voicemail for Maria Teresa asking for a call back regarding the referral status  -10/19 Initial SNF Referral sent via [facility]     [facility] [address] [place] [address]  Ph: [phone]  Adm: [phone]  Fax: [phone] RADHA called Admissions ([phone]) and spoke with Tricia asking for an update on the referral status.       [facility]   [address] [place]  [address]  Ph: [phone]  Adm: [phone]  Fax: [phone] SW called Admissions ([phone]) and left a voicemail asking for a call back regarding the referral status  -10/19 Initial SNF Referral sent via [facility]  [address] [facility] [address] [place] [address]  Ph: [phone]  Adm: [phone]  Please call prior to sending referral  F: [phone] SW called Admissions ([phone]) and left a voicemail for Anny asking for a call back regarding the referral status  -10/19 Initial SNF Referral sent via [facility] at [address] [address]  Ph: [phone]  Adm: [phone]  Fax: [phone] SW called Admissions ([phone]) and spoke with Luzma asking for an update on the referral status. Referral hasn't been reviewed yet but she will call back with update    [place] [id number] [facility] [address]   P: [phone]  P: [phone] - Admissions  F: [phone] RADHA called Admissions ([phone]) and spoke with Sherrell asking for an update on the referral status. Referral hasn't been reviewed yet but she will call back with update  -1251 Sherrell LVM that they could meet her dietary needs and asked for a call back ([phone])  1305 SW returned call and LVM  1531 SW spoke with Sherrell and she is still reviewing referrals. Asked that SW call back in the morning.         The following facilities have either declined pt or lack bed availability:        [facility]  Adm: [phone] -1055 SW spoke with Kurtis in Admissions who reported no beds available   -10/18 VM left with SNF admissions.    [facility]  Adm: [phone]   -1046  Declined-unable to meet pt's needs    [facility]    Adm: [phone]  -1014 Declined- Admissions on hold until next week.     [facility]  Adm: [phone]  -0948 Declined-Bed Not Available. No longer following this referral.     [facility]  Adm: [phone]  -0358 Declined-Facility full, pt is fall risk. No longer following this referral.      [facility]  Adm: [phone]  -10/19 Bed not available.  No longer following this referral.    [facility]  Adm: [phone]  -10/19 Declined- Cannot meet pt's needs  - 10/18 VM left with SNF admissions. No longer following this referral.     [facility]   Adm: [phone]  -2455 Declined-Cannot accommodate diet at this time.   -2010 Initial SNF Referral sent via Epic. No longer following this referral.     [facility]  Adm: [phone]  -10/20 SNF has no appropriate bed available. No longer following this referral.    [facility] [place]   Adm: [phone]  - SNF has no bed availability this week. No longer following this referral.     [facility]   Adm: [phone]  -  SNF has no bed availability this week. No longer following this referral.     [facility]  Adm: [phone]  -SNF reports they are full until next week. No longer following this referral.     [facility]  Adm: [phone] or [phone]   - Pt's insurance is out of network; they are unable to accommodate a celiac diet.  No longer following this referral.    SW will continue to follow as needed.    ANGI Corbin, LGSW  ED/OBS   [facility]  Phone: [phone]  Pager: [phone]  Fax: [phone]     On-call pager, [phone], 4:00 pm to midnight

## 2022-10-20 NOTE — PLAN OF CARE
Goal Outcome Evaluation:  - Diagnostic tests and consults completed and resulted: Met  - Vital signs normal or at patient baseline:   Met  - Tolerating oral intake to maintain hydration: Met  - Adequate pain control on oral analgesics: in progress, improving  - Safe disposition plan has been identified: Not met

## 2022-10-20 NOTE — PLAN OF CARE
Goal Outcome Evaluation:  - Diagnostic tests and consults completed and resulted: Met  - Vital signs normal or at patient baseline: Met  - Tolerating oral intake to maintain hydration: Met  - Adequate pain control on oral analgesics: Met  -Safe disposition plan has been identified: Not met   /86 (BP Location: Left arm, Patient Position: Semi-Andrews's, Cuff Size: Adult Regular)   Pulse 85   Temp 98  F (36.7  C) (Oral)   Resp 16   Wt 77.1 kg (170 lb)   SpO2 97%   BMI 28.29 kg/m

## 2022-10-20 NOTE — PROGRESS NOTES
Goal Outcome Evaluation:  - Diagnostic tests and consults completed and resulted: Met  - Vital signs normal or at patient baseline: Met  - Tolerating oral intake to maintain hydration: Met  - Adequate pain control on oral analgesics: Met  -Safe disposition plan has been identified: Not met

## 2022-10-20 NOTE — PROGRESS NOTES
Care Management Follow Up    Length of Stay (days): 0    Expected Discharge Date: 10/20/2022     Concerns to be Addressed: discharge planning     Patient plan of care discussed at interdisciplinary rounds: Yes    Anticipated Discharge Disposition: Transitional Care     Anticipated Discharge Services:    Anticipated Discharge DME:      Patient/family educated on Medicare website which has current facility and service quality ratings: yes  Education Provided on the Discharge Plan:    Patient/Family in Agreement with the Plan: yes    Referrals Placed by CM/SW: Post Acute Facilities-See below for details  Private pay costs discussed: insurance costs out of pocket expenses, co-pays and deductibles    Additional Information:  1730 SW met with patient and received a list of referrals for additional referrals:    [facility]   [address] [place] [address]  Ph: [phone]  Adm: [phone]  Fax: [phone]  -2010 Initial SNF Referral sent via [facility]  [address] [place] [address]  Ph: [phone]  Adm: [phone]  Fax: [phone]  -2010 Initial SNF Referral sent via [facility]  [address] [place] [address]  Ph: [phone]  Adm: [phone]  Fax: [phone]  -2010 Initial SNF Referral sent via [facility]   [address] [place] [address]  Ph: [phone]  Adm: [phone]  Fax: [phone]  -2010 Initial SNF Referral sent via [facility]   [address] [place] [address]  Ph: [phone]  Adm: [phone]  Fax: [phone]  -2010 Initial SNF Referral sent via [facility] [place]   [address] [place]  [address]  Ph: [phone]  Adm: [phone]  Fax: [phone]  -2010 Initial SNF Referral sent via [facility]  [place]  [address] [place] [address]  Ph: [phone]  Adm: [phone]  Fax: [phone]  -0940 Initial SNF Referral sent via Epic    [facility]  [address] [facility] [address]  [place] [address]  Ph: [phone]  Adm: [phone]  Please call prior to sending referral  F: [phone]  -2010 Initial SNF Referral sent via [facility]    [facility]   [address] [place] [address]  Ph: [phone]  Adm: [phone]  Fax: [phone] -2011 Facesheet Referral sent via Epic      Tentatively accepted:    [facility] Ph: [phone]  Adm: [phone]  Fax: [phone]  - 10/18 SNF has clinically accepted pt. Pt is 5th on the waitlist for admission, which may mean pt will not be accepted until next week. SNF will continue to follow pt for admission. Pt may need to discharge to another SNF if another SNF is able to accept pt sooner.     Pending Referrals:     [facility]  [address] [place] [address]  Ph: [phone]  Adm: [phone]  Fax: [phone]  - 10/18 SNF has received the referral and will review.    [facility]  [address] [place] [address]  Ph: [phone]  Adm: [phone]  Fax: [phone]  -10/18 VM left with SNF admissions.     [facility]  [address] [facility] [address]   P: [phone]  P: [phone] - Admissions  F: [phone]  - 10/18 SNF has received the referral and will review.      [facility]  [address] [place]  [address]  Ph: [phone]  Adm: [phone]  Fax: [phone]  - 10/18 VM left with SNF admissions.     [facility]  [address] [place]  [address]  Ph: [phone]  Adm: [phone]  Fax: [phone]  - 10/18 VM left with SNF admissions.      [facility]  [address]  [place] [address]  Ph: [phone]  Adm: [phone]   Fax: [phone]  - 10/18 VM left with SNF admissions.      The following facilities have either declined pt or lack bed availability:     [facility] [place]   Adm: [phone]  - SNF has no bed availability this week. No longer following this referral.     [facility]   Adm: [phone]  -  SNF has no bed availability this week. No longer following this referral.     [facility]  Adm: [phone]  -SNF reports they are full until next week. No longer following this referral.     [facility]  Adm: [phone] or [phone]   - Pt's insurance is out of network; they are unable to accommodate a celiac diet.  No longer following this referral.    SW will continue to follow as needed.    ANGI Corbin, [facility]  ED/OBS   [facility]  Phone: [phone]  Pager: [phone]  Fax: [phone]     On-call pager, [phone], 4:00 pm to midnight

## 2022-10-21 ENCOUNTER — HOSPITAL ENCOUNTER (INPATIENT)
Facility: SKILLED NURSING FACILITY | Age: 47
LOS: 9 days | Discharge: HOME | DRG: 073 | End: 2022-10-30
Attending: INTERNAL MEDICINE | Admitting: INTERNAL MEDICINE
Payer: COMMERCIAL

## 2022-10-21 VITALS
BODY MASS INDEX: 28.29 KG/M2 | TEMPERATURE: 98.1 F | SYSTOLIC BLOOD PRESSURE: 109 MMHG | RESPIRATION RATE: 16 BRPM | DIASTOLIC BLOOD PRESSURE: 88 MMHG | WEIGHT: 170 LBS | OXYGEN SATURATION: 100 % | HEART RATE: 88 BPM

## 2022-10-21 DIAGNOSIS — R11.0 NAUSEA: ICD-10-CM

## 2022-10-21 DIAGNOSIS — R53.81 PHYSICAL DECONDITIONING: ICD-10-CM

## 2022-10-21 DIAGNOSIS — S62.344D CLOSED NONDISPLACED FRACTURE OF BASE OF FOURTH METACARPAL BONE OF RIGHT HAND WITH ROUTINE HEALING, SUBSEQUENT ENCOUNTER: ICD-10-CM

## 2022-10-21 DIAGNOSIS — G89.4 CHRONIC PAIN SYNDROME: Primary | ICD-10-CM

## 2022-10-21 LAB — SARS-COV-2 RNA RESP QL NAA+PROBE: NEGATIVE

## 2022-10-21 PROCEDURE — 250N000011 HC RX IP 250 OP 636: Performed by: PHYSICIAN ASSISTANT

## 2022-10-21 PROCEDURE — 250N000011 HC RX IP 250 OP 636: Performed by: INTERNAL MEDICINE

## 2022-10-21 PROCEDURE — 250N000011 HC RX IP 250 OP 636

## 2022-10-21 PROCEDURE — G0378 HOSPITAL OBSERVATION PER HR: HCPCS

## 2022-10-21 PROCEDURE — 96376 TX/PRO/DX INJ SAME DRUG ADON: CPT

## 2022-10-21 PROCEDURE — U0003 INFECTIOUS AGENT DETECTION BY NUCLEIC ACID (DNA OR RNA); SEVERE ACUTE RESPIRATORY SYNDROME CORONAVIRUS 2 (SARS-COV-2) (CORONAVIRUS DISEASE [COVID-19]), AMPLIFIED PROBE TECHNIQUE, MAKING USE OF HIGH THROUGHPUT TECHNOLOGIES AS DESCRIBED BY CMS-2020-01-R: HCPCS | Performed by: PHYSICIAN ASSISTANT

## 2022-10-21 PROCEDURE — 250N000013 HC RX MED GY IP 250 OP 250 PS 637

## 2022-10-21 PROCEDURE — 96375 TX/PRO/DX INJ NEW DRUG ADDON: CPT

## 2022-10-21 PROCEDURE — 250N000013 HC RX MED GY IP 250 OP 250 PS 637: Performed by: INTERNAL MEDICINE

## 2022-10-21 PROCEDURE — 99217 PR OBSERVATION CARE DISCHARGE: CPT | Performed by: INTERNAL MEDICINE

## 2022-10-21 PROCEDURE — 120N000009 HC R&B SNF

## 2022-10-21 RX ORDER — NALOXONE HYDROCHLORIDE 0.4 MG/ML
0.4 INJECTION, SOLUTION INTRAMUSCULAR; INTRAVENOUS; SUBCUTANEOUS
Status: DISCONTINUED | OUTPATIENT
Start: 2022-10-21 | End: 2022-10-30 | Stop reason: HOSPADM

## 2022-10-21 RX ORDER — LIDOCAINE 4 G/G
3 PATCH TOPICAL EVERY 24 HOURS
Refills: 0
Start: 2022-10-21 | End: 2024-01-01

## 2022-10-21 RX ORDER — ACETAMINOPHEN 325 MG/1
975 TABLET ORAL EVERY 8 HOURS
Status: DISCONTINUED | OUTPATIENT
Start: 2022-10-21 | End: 2022-10-30 | Stop reason: HOSPADM

## 2022-10-21 RX ORDER — METOCLOPRAMIDE HYDROCHLORIDE 5 MG/ML
10 INJECTION INTRAMUSCULAR; INTRAVENOUS EVERY 6 HOURS PRN
Status: DISCONTINUED | OUTPATIENT
Start: 2022-10-21 | End: 2022-10-21 | Stop reason: HOSPADM

## 2022-10-21 RX ORDER — ONDANSETRON 4 MG/1
4 TABLET, ORALLY DISINTEGRATING ORAL EVERY 6 HOURS
Status: DISCONTINUED | OUTPATIENT
Start: 2022-10-21 | End: 2022-10-21 | Stop reason: HOSPADM

## 2022-10-21 RX ORDER — CALCIUM CARBONATE 500 MG/1
1000 TABLET, CHEWABLE ORAL 4 TIMES DAILY PRN
Status: DISCONTINUED | OUTPATIENT
Start: 2022-10-21 | End: 2022-10-30 | Stop reason: HOSPADM

## 2022-10-21 RX ORDER — ACETAMINOPHEN 325 MG/1
650 TABLET ORAL EVERY 4 HOURS PRN
Status: DISCONTINUED | OUTPATIENT
Start: 2022-10-21 | End: 2022-10-30 | Stop reason: HOSPADM

## 2022-10-21 RX ORDER — CYCLOBENZAPRINE HCL 10 MG
10 TABLET ORAL 3 TIMES DAILY
Status: DISCONTINUED | OUTPATIENT
Start: 2022-10-21 | End: 2022-10-30 | Stop reason: HOSPADM

## 2022-10-21 RX ORDER — ONDANSETRON 4 MG/1
4 TABLET, FILM COATED ORAL EVERY 6 HOURS PRN
Qty: 15 TABLET | Refills: 0 | Status: ON HOLD
Start: 2022-10-21 | End: 2022-10-27

## 2022-10-21 RX ORDER — ESTRADIOL 0.1 MG/G
2 CREAM VAGINAL
Status: DISCONTINUED | OUTPATIENT
Start: 2022-10-24 | End: 2022-10-21

## 2022-10-21 RX ORDER — METOCLOPRAMIDE HYDROCHLORIDE 5 MG/ML
10 INJECTION INTRAMUSCULAR; INTRAVENOUS EVERY 6 HOURS
Status: DISCONTINUED | OUTPATIENT
Start: 2022-10-21 | End: 2022-10-21

## 2022-10-21 RX ORDER — ACETAMINOPHEN 325 MG/1
975 TABLET ORAL EVERY 8 HOURS
Start: 2022-10-21 | End: 2023-05-12

## 2022-10-21 RX ORDER — NALOXONE HYDROCHLORIDE 0.4 MG/ML
0.2 INJECTION, SOLUTION INTRAMUSCULAR; INTRAVENOUS; SUBCUTANEOUS
Status: DISCONTINUED | OUTPATIENT
Start: 2022-10-21 | End: 2022-10-30 | Stop reason: HOSPADM

## 2022-10-21 RX ORDER — IBUPROFEN 200 MG
600 TABLET ORAL EVERY 6 HOURS PRN
Start: 2022-10-21 | End: 2023-03-08 | Stop reason: SINTOL

## 2022-10-21 RX ORDER — MULTIPLE VITAMINS W/ MINERALS TAB 9MG-400MCG
1 TAB ORAL EVERY MORNING
Status: DISCONTINUED | OUTPATIENT
Start: 2022-10-22 | End: 2022-10-29

## 2022-10-21 RX ORDER — ONDANSETRON 4 MG/1
4 TABLET, FILM COATED ORAL EVERY 6 HOURS PRN
Status: DISCONTINUED | OUTPATIENT
Start: 2022-10-21 | End: 2022-10-30 | Stop reason: HOSPADM

## 2022-10-21 RX ORDER — LIDOCAINE 4 G/G
3 PATCH TOPICAL
Status: DISCONTINUED | OUTPATIENT
Start: 2022-10-21 | End: 2022-10-30 | Stop reason: HOSPADM

## 2022-10-21 RX ORDER — IBUPROFEN 600 MG/1
600 TABLET, FILM COATED ORAL EVERY 6 HOURS PRN
Status: DISCONTINUED | OUTPATIENT
Start: 2022-10-21 | End: 2022-10-30 | Stop reason: HOSPADM

## 2022-10-21 RX ORDER — AMOXICILLIN 250 MG
1 CAPSULE ORAL 2 TIMES DAILY
Status: DISCONTINUED | OUTPATIENT
Start: 2022-10-21 | End: 2022-10-30 | Stop reason: HOSPADM

## 2022-10-21 RX ORDER — OXYCODONE HYDROCHLORIDE 5 MG/1
5 TABLET ORAL EVERY 8 HOURS PRN
Qty: 12 TABLET | Refills: 0 | Status: ON HOLD | OUTPATIENT
Start: 2022-10-21 | End: 2022-10-27

## 2022-10-21 RX ORDER — POLYETHYLENE GLYCOL 3350 17 G/17G
17 POWDER, FOR SOLUTION ORAL DAILY PRN
Status: DISCONTINUED | OUTPATIENT
Start: 2022-10-21 | End: 2022-10-30 | Stop reason: HOSPADM

## 2022-10-21 RX ORDER — OXYCODONE HYDROCHLORIDE 5 MG/1
5 TABLET ORAL EVERY 8 HOURS PRN
Status: DISCONTINUED | OUTPATIENT
Start: 2022-10-21 | End: 2022-10-30 | Stop reason: HOSPADM

## 2022-10-21 RX ADMIN — ACETAMINOPHEN 975 MG: 325 TABLET, FILM COATED ORAL at 14:40

## 2022-10-21 RX ADMIN — ONDANSETRON 4 MG: 4 TABLET, ORALLY DISINTEGRATING ORAL at 12:29

## 2022-10-21 RX ADMIN — SENNOSIDES AND DOCUSATE SODIUM 1 TABLET: 50; 8.6 TABLET ORAL at 21:33

## 2022-10-21 RX ADMIN — OXYCODONE HYDROCHLORIDE 5 MG: 5 TABLET ORAL at 06:29

## 2022-10-21 RX ADMIN — ONDANSETRON 4 MG: 2 INJECTION INTRAMUSCULAR; INTRAVENOUS at 06:18

## 2022-10-21 RX ADMIN — METOCLOPRAMIDE 10 MG: 5 INJECTION, SOLUTION INTRAMUSCULAR; INTRAVENOUS at 10:15

## 2022-10-21 RX ADMIN — OXYCODONE HYDROCHLORIDE 5 MG: 5 TABLET ORAL at 21:32

## 2022-10-21 RX ADMIN — CYCLOBENZAPRINE 10 MG: 5 TABLET, FILM COATED ORAL at 10:14

## 2022-10-21 RX ADMIN — OXYCODONE HYDROCHLORIDE 5 MG: 5 TABLET ORAL at 14:40

## 2022-10-21 RX ADMIN — CYCLOBENZAPRINE 10 MG: 5 TABLET, FILM COATED ORAL at 14:10

## 2022-10-21 RX ADMIN — LIDOCAINE PATCH 4% 3 PATCH: 40 PATCH TOPICAL at 21:33

## 2022-10-21 RX ADMIN — CYCLOBENZAPRINE 10 MG: 10 TABLET, FILM COATED ORAL at 21:33

## 2022-10-21 RX ADMIN — ONDANSETRON HYDROCHLORIDE 4 MG: 4 TABLET, FILM COATED ORAL at 21:42

## 2022-10-21 RX ADMIN — ACETAMINOPHEN 975 MG: 325 TABLET, FILM COATED ORAL at 21:32

## 2022-10-21 ASSESSMENT — ACTIVITIES OF DAILY LIVING (ADL)
ADLS_ACUITY_SCORE: 36
ADLS_ACUITY_SCORE: 36
ADLS_ACUITY_SCORE: 41
ADLS_ACUITY_SCORE: 36
ADLS_ACUITY_SCORE: 35
ADLS_ACUITY_SCORE: 36
ADLS_ACUITY_SCORE: 37
ADLS_ACUITY_SCORE: 36
ADLS_ACUITY_SCORE: 41
ADLS_ACUITY_SCORE: 36

## 2022-10-21 NOTE — PLAN OF CARE
Goal Outcome Evaluation:    - Diagnostic tests and consults completed and resulted: Met    - Vital signs normal or at patient baseline: Met    - Tolerating oral intake to maintain hydration: In process. Pt reports intermittent nausea, poor po intake improving. Prn Reglan IV and scheduled Zofran ODT given.     - Adequate pain control on oral analgesics: Met, pt c/o wrist pain from the splint requiring ice, elevation, and loosening of the wraps.     -Safe disposition plan has been identified: In process

## 2022-10-21 NOTE — PROGRESS NOTES
Care Management Discharge Note    Discharge Date: 10/21/2022       Discharge Disposition: Skilled Nursing Facility    [facility]  [address] [place] [address]  [address]  P: [phone]  F: [phone]    Discharge Services:  (N/A)    Discharge DME:  (N/A)    Discharge Transportation: agency - [employer] (P: [phone])      Private pay costs discussed: Not applicable    PAS Confirmation Code:  ([id number])  Patient/family educated on Medicare website which has current facility and service quality ratings: yes    Education Provided on the Discharge Plan: Yes  Persons Notified of Discharge Plans: Pt; SNF; PA; RN; Charge RN  Patient/Family in Agreement with the Plan: yes    Handoff Referral Completed: No    Additional Information:  Chart reviewed. Case discussed with bedside RN and medical provider. Requested Lin place order for new COVID swab per request of SNF.     Writer discussed discharge plan with pt. Pt is agreeable to discharge plan. Writer scheduled a 1600 w/c p/u via [employer] (P: [phone]).    Referrals have been made to the following facilities and their status is as follows:    [facility]   Ph: [phone]  Adm: [phone]  Fax: [phone]  - 10/18 SNF has clinically accepted pt. Pt is 5th on the waitlist for admission, which may mean pt will not be accepted until next week. SNF will continue to follow pt for admission. Pt may need to discharge to another SNF if another SNF is able to accept pt sooner.   - Writer spoke with Germaine. They are submitting insurance auth. Germaine requested a new COVID swab be completed. Writer updated Germaine on pt's COVID vaccination status.   - Writer received confirmation that they received insurance auth. A number was not provided to writer for a RN-to-RN report. Germaine confirmed they are able to pull orders from epic.     The following facilities have either declined pt or lack bed availability:      [place]  [facility]  Adm: [phone]  -1055 SW spoke with Kurtis in Admissions who reported no beds available   -10/18 VM left with SNF admissions.     [facility]  Adm: [phone]   -1046  Declined-unable to meet pt's needs     [facility]   Adm: [phone]  -1014 Declined- Admissions on hold until next week.      [facility]  Adm: [phone]  -0948 Declined-Bed Not Available. No longer following this referral.     [facility]  Adm: [phone]  -0858 Declined-Facility full, pt is fall risk. No longer following this referral.       [facility]  Adm: [phone]  -10/19 Bed not available.  No longer following this referral.     [facility]  Adm: [phone]  -10/19 Declined- Cannot meet pt's needs  - 10/18 VM left with SNF admissions. No longer following this referral.      [facility]   Adm: [phone]  -0842 Declined-Cannot accommodate diet at this time.   -2010 Initial SNF Referral sent via Epic. No longer following this referral.     [facility]  Adm: [phone]  -10/20 SNF has no appropriate bed available. No longer following this referral.     [facility] [place]   Adm: [phone]  - SNF has no bed availability this week. No longer following this referral.     [facility]   Adm: [phone]  -  SNF has no bed availability this week. No longer following this referral.     [facility]  Adm: [phone]  -SNF reports they are full until next week. No longer following this referral.     [facility]  Adm: [phone] or [phone]   - Pt's insurance is out of network; they are unable to accommodate a celiac diet.  No longer following this referral.    [facility]  [address] [place] [address]  Ph: [phone]   Adm:  [phone]  Fax: [phone]  - [facility] does not have an appropriate bed for pt. Writer has ceased following this referral.  ________________    ANGI Miller, [facility]  ED/Observation   [facility]  Phone: [phone]  Pager: [phone]  Fax: [phone]    On-call pager, [phone], 4:00pm to midnight

## 2022-10-21 NOTE — PLAN OF CARE
Goal Outcome Evaluation:    - Diagnostic tests and consults completed and resulted: Met    - Vital signs normal or at patient baseline: Met    - Tolerating oral intake to maintain hydration: Not met. Pt experienced one episode of emesis.     - Adequate pain control on oral analgesics: Met, pt c/o wrist pain from the splint requiring ice, elevation, and loosening of the wraps.     -Safe disposition plan has been identified: Not met

## 2022-10-21 NOTE — PLAN OF CARE
Goal Outcome Evaluation:    - Diagnostic tests and consults completed and resulted: Met    - Vital signs normal or at patient baseline: Met    - Tolerating oral intake to maintain hydration: Not met. Pt reports intermittent nausea.     - Adequate pain control on oral analgesics: Met, pt c/o wrist pain from the splint requiring ice, elevation, and loosening of the wraps.     -Safe disposition plan has been identified: Not met

## 2022-10-21 NOTE — PROGRESS NOTES
Patient interviewed and examined. Labs, imaging nad chart notes reviewed.  Peggy Jessica has a history of CIDP and complex regional pain syndrome. She presented to the ED on 10/17 after a fall (?trip) getting out of bed the previous night. She had been feeling poorly for a few days after getting some tests at [facility]. She is on a complex medical regimen at home for pain. She was exhibiting odd behavior at home. On presentation to the ED she complained of pain in her right hip, mid back and right arm. She had normal vital signs on arrival. EKG had NSR with no acute changes. Electrolytes were normal. Hepatocellular enzymes were mildly elevated. CBC was normal. UA was normal.  XR of the pelvis and right hip was normal. XR of the right shoulder had degenerative changes but no traumatic injuries.XR of the right wrist had questionable non displaced fracture of the 4th metacarpal base. XR of the right elbow was negative. CT of the head had no traumatic injuries. CT of the cervical spine had mild degenerative changes with no acute traumatic injuries. CT of the chest, abdomen and pelvis had stable hepatic cysts and no acute traumatic injuries. CT of the thoracic and lumbar spine had no acute traumatic injuries. A splint was placed omn the right wrist. She was admitted to ED observation.     She was given IV hydration. Echocardiogram was normal. She had no events on telemetry monitoring. She was given pain control. She was evaluated by physical therapy and felt to be in need of TCU placement due to weakness and frequent falls and inability to manage steps and other barriers in her home. She has been using a combination of wheelchair and walkers at home.  Arrangements have been made for admission to [facility] today.    Past Medical History:   Diagnosis Date     BRCA positive      CIDP (chronic inflammatory demyelinating polyneuropathy) (H)      ASHKAN III with severe dysplasia 2002     Complex regional pain syndrome type 1 of  right lower extremity        Past Surgical History:   Procedure Laterality Date     BILATERAL OOPHORECTOMY Bilateral 2019     c section      2002     CHOLECYSTECTOMY  1997     COLONOSCOPY       ESOPHAGOSCOPY, GASTROSCOPY, DUODENOSCOPY (EGD), COMBINED N/A 07/28/2022    Procedure: ESOPHAGOGASTRODUODENOSCOPY (EGD);  Surgeon: Zack Maddox MD;  Location: [facility]     HYSTERECTOMY  2004     MASTECTOMY Bilateral 2020       Family History   Problem Relation Age of Onset     Kidney Disease Father        Social History     Tobacco Use     Smoking status: Former     Types: Cigarettes     Smokeless tobacco: Never   Substance Use Topics     Alcohol use: Not Currently     Physical Exam:  /88 (BP Location: Left arm)   Pulse 88   Temp 98.1  F (36.7  C) (Oral)   Resp 16   Wt 77.1 kg (170 lb)   SpO2 100%   BMI 28.29 kg/m    HEENT: PERRLA. EOMI.  Neck: No JVD.  Lungs: Clear.   Cardiac: RRR. Normal S1 and S2. No JVD.  Abdomen: Soft.  Extrem: Right wrist in splint. Finger movement, sensation and capillary refill normal. LE no deformity. Tender lateral right hip.  Neuro: Generalized weakness in LEs. CN intact.  Psych: Normal mood and affect.    Results for orders placed or performed during the hospital encounter of 10/17/22 (from the past 48 hour(s))   Asymptomatic COVID-19 Virus (Coronavirus) by PCR Nasopharyngeal    Specimen: Nasopharyngeal; Swab   Result Value Ref Range    SARS CoV2 PCR Negative Negative    Narrative    Testing was performed using the Xpert Xpress SARS-CoV-2 Assay on the  Cepheid Gene-Xpert Instrument Systems. Additional information about  this Emergency Use Authorization (EUA) assay can be found via the Lab  Guide. This test should be ordered for the detection of SARS-CoV-2 in  individuals who meet SARS-CoV-2 clinical and/or epidemiological  criteria. Test performance is unknown in asymptomatic patients. This  test is for in vitro diagnostic use under the FDA EUA for  laboratories certified under  CLIA to perform high complexity testing.  This test has not been FDA cleared or approved. A negative result  does not rule out the presence of PCR inhibitors in the specimen or  target RNA in concentration below the limit of detection for the  assay. The possibility of a false negative should be considered if  the patient's recent exposure or clinical presentation suggests  COVID-19. This test was validated by the [facility]. This laboratory is certified under  the Clinical Laboratory Improvement Amendments of 1988 (CLIA-88) as  qualified to perform high complexity laboratory testing.       Impression:  Young woman with CIDP and complex regional pain syndrome and frequent falls presented to the ED 10/17 after a fall on her right side from standing home. Full trauma evaluation had no acute traumatic injuries other than questionable non displaced fracture of the right 4th metacarpal base. She has had progressive difficulty managing at home due to weakness and frequent falls. Arrangements have been made for admission to [facility].    Kennedy Collins MD

## 2022-10-21 NOTE — PLAN OF CARE
Goal Outcome Evaluation:    - Diagnostic tests and consults completed and resulted: Met    - Vital signs normal or at patient baseline: Met    - Tolerating oral intake to maintain hydration: Not met. Pt experienced one episode of emesis treated with Ativan     - Adequate pain control on oral analgesics: Met, pt c/o wrist pain from the splint requiring ice, elevation, and loosening of the wraps.     -Safe disposition plan has been identified: Not met

## 2022-10-21 NOTE — PLAN OF CARE
Goal Outcome Evaluation:    - Diagnostic tests and consults completed and resulted: Met    - Vital signs normal or at patient baseline: Met    - Tolerating oral intake to maintain hydration: Met. Pt reports intermittent nausea, poor po intake improving. Prn Reglan IV and scheduled Zofran ODT given. Patient was able to tolerate jello and apple juice.     - Adequate pain control on oral analgesics: Met, pt c/o wrist pain from the splint requiring ice, elevation, and loosening of the wraps.     -Safe disposition plan has been identified: Met, discharging today to [facility], ride arranged for 4 pm.     [facility] here, patient in w/c and ready to discharge. PIV removed, patient packed up /c all belongings. Packet printed and Rx for Oxycodone sent in sealed packet.

## 2022-10-21 NOTE — DISCHARGE SUMMARY
[facility]  ED Observation Discharge Summary      Date of Admission:  10/17/2022  Date of Discharge:  10/21/2022  Discharging Provider: Lin Chavez PA-C  Discharge Service: ED Observation Service    Discharge Diagnoses   Ground level fall  Frequent falls  Non displaced fracture at fourth metacarpal base  Demyelinating polyneuropathy  Complex regional pain syndrome  Chronic nausea    Follow-ups Needed After Discharge   Follow up with TCU provider within 1 week  Follow up with orthopedics early next week for right 4th metacarpal fracture    Unresulted Labs Ordered in the Past 30 Days of this Admission     No orders found from 9/17/2022 to 10/18/2022.        Discharge Disposition   Discharged to rehabilitation facility  Condition at discharge: Stable  Patient ready to discharge to a skilled nursing facility as soon as possible in order to create capacity for patients related to the COVID-19 pandemic.    Hospital Course   Peggy Jessica is a 47 year old female admitted on 10/17/2022. She has PMH of chronic inflammatory demyelinating polyneuropathy, frequent falls, complex regional pain syndrome who presents to the ED after a mechanical fall.      #Ground level fall  #Frequent falls  #Nondisplaced fracture at the fourth metacarpal base  #Demyelinating polyneuropathy   #Complex regional pain syndrome  Patient presents to the ED today after a fall out of bed. She reported to the ED doctor that she fell out of bed and landed on her right side. Stated she was feeding the dogs and felt lightheaded and passed out. She reports that she LOC for a few minutes she thinks. The fall was unwitnessed but she thinks she landed on her right side because she has pain along her entire right side. She denies any tongue biting, drooling, or loss of bowel or bladder control. She reports she has had many syncopal episodes in the past. Patient and  report she laid on the ground  for about 25 minutes. Patient reported to PT in the ED that she had 6 falls yesterday and has been falling frequently at home, about 2-3 times a day. She denies any chest pain, SOB, nausea (aside from chronic nausea), vomiting, dizziness, lightheadedness, fevers, or recent illness. She uses a walker, cane, wheelchair, and other assistive devices as needed. She lives in a home with 16 steps up to the bathroom. She verbalizes need for higher level of care and assistance as she is unable to get around safety at home. In the ED, her vital signs were stable. Labwork unremarkable with the exception of ALT 47, AST 44. EKG sinus rhythm. Numerous imaging including right pelvis/hip xray, right shoulder xray, right elbow xray, CT head, CT cervical spine, CT chest/abdomen/pelvis, CT thoracic spine, CT lumbar spine showing no fractures. Xray of the right wrist did show a possible nondisplaced fracture of the 4th metacarpal base which was splinted in the ED and recommended for follow up imaging in 1 week. UA was negative. Patient was evaluated by PT in ED who recommended TCU. SW aware and consulted for placement. Echo was done 10/18/22 and showed LVEF of 60-65% with no RWMA or any significant valvular disease. Patient has been accepted to TCU today.  -Continue PT/OT  -PO Tylenol and Ibuprofen for pain  -Oxycodone 5 mg q8h prn   -Continue PTA Flexiril for pain  -PRN lidocaine patches  -PRN miralax and senna as needed  -Follow up with orthopedics early next week for re imaging and assessment on need for splint, referral placed  -PRN Zofran for chronic nausea    Consultations This Hospital Stay   PHYSICAL THERAPY ADULT IP CONSULT  OCCUPATIONAL THERAPY ADULT IP CONSULT  CARE MANAGEMENT / SOCIAL WORK IP CONSULT  NURSING TO CONSULT FOR VASCULAR ACCESS CARE IP CONSULT  NURSING TO CONSULT FOR VASCULAR ACCESS CARE IP CONSULT  NURSING TO CONSULT FOR VASCULAR ACCESS CARE IP CONSULT  PHYSICAL THERAPY ADULT IP CONSULT  OCCUPATIONAL THERAPY  ADULT IP CONSULT    Code Status   Full Code    Time Spent on this Encounter   I, Lin Chavez PA-C, personally saw the patient today and spent greater than 30 minutes discharging this patient.     Lin Chavez PA-C  [facility] [address] [address]  Phone: [phone]  Fax: [phone]  ______________________________________________________________________    Physical Exam   Vital Signs: Temp: 98.1  F (36.7  C) Temp src: Oral BP: 109/88 Pulse: 88   Resp: 16 SpO2: 100 % O2 Device: None (Room air)    Weight: 170 lbs 0 oz  Exam:  Constitutional: alert, no distress, and cooperative  Head: normocephalic, atraumatic  Neck: no asymmetry, masses, or scars  ENT: throat normal without erythema or exudate  Cardiovascular: RRR  Respiratory: diminished, respirations unlabored  Gastrointestinal: (+) BS, non tender, soft  Musculoskeletal: normal muscle tone, no pitting edema, splint to right wrist is in place  Skin: no suspicious lesions or rashes  Neurologic: oriented x 3, moves all extremities, no slurred speech  Psychiatric: normal affect and mood       Primary Care Physician   Andrew Peck    Discharge Orders      Orthopedic  Referral      General info for SNF    Length of Stay Estimate: Short Term Care: Estimated # of Days <30  Condition at Discharge: Stable  Level of care:skilled   Rehabilitation Potential: Good  Admission H&P remains valid and up-to-date: Yes  Recent Chemotherapy: N/A  Use Nursing Home Standing Orders: Yes     Mantoux instructions    Give two-step Mantoux (PPD) Per Facility Policy Yes     Reason for your hospital stay    You were hospitalized after a fall at home. You had an ultrasound of your heart (echocardiogram) which was normal. You had extensive imaging done, which revealed a right 4th metacarpal fracture in your hand. You were placed in a splint and should follow up with orthopedics next week. PT has  evaluated you and recommend TCU.     Activity - Up with nursing assistance     Follow Up and recommended labs and tests    Follow up with TCU provider within 1 week  Follow up with orthopedics early next week for your right 4th metacarpal fracture     Full Code     Physical Therapy Adult Consult    Evaluate and treat as clinically indicated.    Reason: frequent falls     Occupational Therapy Adult Consult    Evaluate and treat as clinically indicated.    Reason: frequent falls, right 4th metacarpal fracture     Fall precautions     Diet    Follow this diet upon discharge:Regular Diet Adult       Significant Results and Procedures   Results for orders placed or performed during the hospital encounter of 10/17/22   CT Head w/o Contrast    Narrative    CT HEAD W/O CONTRAST 10/17/2022 12:39 PM    History: fall, ams     Comparison: Head CT 6/6/2022    Technique: Using multidetector thin collimation helical acquisition  technique, axial, coronal and sagittal CT images from the skull base  to the vertex were obtained without intravenous contrast.   (topogram) image(s) also obtained and reviewed.    Findings: There is no intracranial hemorrhage, mass effect, or midline  shift. Gray/white matter differentiation in both cerebral hemispheres  is preserved. Ventricles are proportionate to the cerebral sulci. The  basal cisterns are clear.    The bony calvaria and the bones of the skull base are normal. Minimal  mucosal thickening in the left sphenoid locule. Remainder of paranasal  sinuses and mastoid air cells are clear. Orbits are unremarkable.      Impression    Impression:  No acute intracranial pathology.     I have personally reviewed the examination and initial interpretation  and I agree with the findings.    JE HUTTON MD         SYSTEM ID:  [id number]   CT Cervical Spine w/o Contrast    Narrative    EXAM: CT CERVICAL SPINE W/O CONTRAST  10/17/2022 12:40 PM     HISTORY:  Neck pain; Trauma; Mild/moderate trauma;  None of the  following: Spondyloarthropathy, cervical x-ray with negative result,  questionable finding, or inadequate coverage       COMPARISON:  CT cervical spine 6/6/2022.    TECHNIQUE: Using multidetector thin collimation helical acquisition  technique, axial, coronal and sagittal CT images through the cervical  spine were obtained without intravenous contrast.    FINDINGS:  Straightening of the cervical lordosis, possibly positional. Normal  cervical spine alignment. No acute fracture or traumatic subluxation.  No prevertebral soft tissue swelling. Mild atlantodental arthropathy.    The findings on a level by level basis are as follows:    C2-3: No spinal canal or neural foraminal stenosis    C3-4: Mild bilateral uncinate spurring. No foraminal or canal  stenosis.     C4-5: No spinal canal or neural foraminal stenosis    C5-6: Bilateral uncinate spurring. Mild left neural foraminal  narrowing. No right neural foraminal stenosis. No spinal canal  stenosis.    C6-7: Mild left uncinate spurring. No foraminal or canal stenosis.    C7-T1: No spinal canal or neural foraminal stenosis     No abnormality of the paraspinous soft tissues.      Impression    IMPRESSION:  1. No acute fracture or traumatic subluxation.  2. Mild cervical spondylosis, greatest at C5-6.    I have personally reviewed the examination and initial interpretation  and I agree with the findings.    JE HUTTON MD         SYSTEM ID:  [id number]   CT Chest/Abdomen/Pelvis w Contrast    Narrative    EXAMINATION: CT CHEST/ABDOMEN/PELVIS W CONTRAST, 10/17/2022 12:53 PM    TECHNIQUE:  Helical CT images from the thoracic inlet through the  symphysis pubis were obtained with IV contrast. Contrast dose:  iopamidol (ISOVUE-370) solution 104 mL    COMPARISON: Chest CT 7/12/2022. CT chest, abdomen and pelvis  11/19/2021.    HISTORY: fall, right sided pain    FINDINGS:  CHEST:  LUNGS: Central tracheobronchial tree is patent. No pneumothorax or  pleural  effusion. No focal airspace opacity. No suspicious pulmonary  nodule. Mild dependent atelectasis. Fibroatelectatic changes greatest  in the left lung base.    MEDIASTINUM: Heart size is within normal limits. No pericardial  effusion. Ascending aorta and main pulmonary artery diameters are  within normal limits. Normal appearance and configuration of the great  vessels off of the aortic arch. No suspicious mediastinal, hilar, or  axillary lymph nodes.     Visualized thyroid and esophagus are unremarkable.    ABDOMEN/PELVIS:  LIVER: Stable appearance of multiple hepatic cysts. Several  subcentimeter hypodense hepatic lesions are too small to accurately  characterize. No hepatic mass.    BILIARY: Cholecystectomy with mild reservoir effect and trace  intrahepatic biliary dilatation.    PANCREAS: No focal pancreatic lesion. The main pancreatic duct is not  dilated.    SPLEEN: Within normal limits.    ADRENAL GLANDS: No focal adrenal nodule.    URINARY TRACT: No suspicious renal lesion. No hydronephrosis or  hydroureter. No renal stone. Urinary bladder is unremarkable.    REPRODUCTIVE ORGANS: Within normal limits.    STOMACH: Within normal limits.    BOWEL: Normal caliber large and small bowel. No abnormal bowel wall  thickening or enhancement. Appendix is unremarkable.    PERITONEUM/FLUID: No ascites or pelvic free fluid.    VESSELS: No aneurysmal dilatation of the abdominal aorta.  The portal,  splenic, and superior mesenteric veins are patent.  The origins of the  celiac and superior mesenteric arteries are patent.    LYMPH NODES: No lymphadenopathy.    BONES/SOFT TISSUES: No aggressive osseous lesions. No definite  displaced rib fractures.      Impression    IMPRESSION: No acute traumatic findings in the chest, abdomen or  pelvis.     I have personally reviewed the examination and initial interpretation  and I agree with the findings.    CANDI ROB MD         SYSTEM ID:  [id number]   XR Shoulder Right G/E 3  Views    Narrative    4 views right shoulder radiographs 10/17/2022 11:12 AM    History: fall, pain    Comparison: 6/6/2022    Findings:    AP, 2 Grashey, transscapular Y, axillary  views of the right shoulder  were obtained. Transscapular Y view is suboptimally profiled.    No acute osseous abnormality. Glenohumeral and acromioclavicular  joints are congruent.    No substantial degenerative changes of the acromioclavicular joint. No  substantial degenerative change of the glenohumeral joint.    Soft tissue is unremarkable. The visualized lung is clear.      Impression    Impression:  1. No acute osseous abnormality.  2. No substantial degenerative change.    [facility]         SYSTEM ID:  [id number]   XR Wrist Right G/E 3 Views    Narrative    3 views right wrist radiographs 10/17/2022 11:16 AM    History: fall    Comparison: 6/6/2022    Findings:    PA, oblique and lateral view(s) of the right wrist were obtained.     Question subtle contour irregularity at the fourth metacarpal base, of  uncertain if this is related to overlying tubing density.    No substantial degenerative change. Presumed bone island of the  capitate.    Soft tissue is unremarkable.      Impression    Impression: Question subtle contour irregularity (possible  nondisplaced fracture) at the fourth metacarpal base, of uncertain if  this is related to overlying tubing density. Please correlate  clinically for focal tenderness.    [facility]         SYSTEM ID:  [id number]   XR Pelvis w Hip Right 1 View    Narrative    2 views pelvis/right hip radiographs 10/17/2022 11:09 AM    History: fall, right hip pain    Comparison: Radiographs 6/30/2022, CT 7/20/2022, MR 6/10/2022    Findings:    AP view pelvis, frog leg lateral views of the right hip were obtained.      No acute osseous abnormality.      No substantial degenerative change of the either hip.     Others:    Degenerative changes of the pubic symphysis.    Pelvic phlebolith.        Impression    Impression:  1. No acute osseous abnormality.  2. No substantial degenerative change.    [employer]         SYSTEM ID:  [id number]   XR Elbow Right 2 Views    Narrative    2 views right elbow radiographs 10/17/2022 11:15 AM    History: fall    Comparison: None available.    Findings:    AP and lateral views of the right elbow were obtained.     No acute osseous abnormality.  No joint effusion.    No substantial degenerative change. Tiny triceps tendon insertion  enthesophyte.    Soft tissue is unremarkable.      Impression    Impression:  1. No acute osseous abnormality.  2. No substantial degenerative change.    [employer]         SYSTEM ID:  [id number]   CT Thoracic Spine w Contrast    Narrative    Thoracic and Lumbar spine CT without contrast    History: fall, back pain and TTP.    Comparison: MRI 4/30/2022    Technique: Axial, coronal, and sagittal multiplanar reconstructions of  the thoracic and lumbar spine obtained from acquisition of CT chest  abdomen and pelvis with contrast which was obtained in the setting of  trauma.     Findings:  Thoracic spine: Rightward curvature. There is no acute fracture or  subluxation. There is no prevertebral edema. There is no spinal canal  or foraminal stenosis at any level. No soft tissue abnormality in the  visualized paraspinous tissues anteriorly.    Lumbar Spine: There is no acute fracture or subluxation. There are 5  type lumbar vertebra, used for the purposes of this dictation . Vacuum  disc phenomenon L5-S1.    On a level by level basis, the findings are as follows:  T12-L1:  No significant neural foraminal or spinal canal stenosis.  L1-2:  No significant neural foraminal or spinal canal stenosis.  L2-3:  No significant neural foraminal or spinal canal stenosis.  L3-4:  No significant neural foraminal or spinal canal stenosis.  L4-5:  Disc bulge with superimposed central protrusion. Mild bilateral  facet arthropathy. No significant neural  foraminal stenosis. Mild  spinal canal stenosis.  L5-S1: Circumferential disc bulge with superimposed central  protrusion. No significant neural foraminal stenosis. Mild spinal  canal stenosis.    Multiple hepatic cysts. Cholecystectomy. Visualized appendix is  normal. See dedicated report from concurrently acquired CT chest  abdomen and pelvis for further detail.      Impression    Impression:   1. No acute fracture or traumatic subluxation of the thoracic or  lumbar spine.  2. Mild lumbar spondylosis with central disc protrusions at L4-5 and  L5-S1, resulting in mild spinal canal stenoses at these levels. No  significant neural foraminal stenosis at any level.    I have personally reviewed the examination and initial interpretation  and I agree with the findings.    PRECIOUS HARRIS MD         SYSTEM ID:  [id number]   CT Lumbar Spine w Contrast    Narrative    Thoracic and Lumbar spine CT without contrast    History: fall, back pain and TTP.    Comparison: MRI 4/30/2022    Technique: Axial, coronal, and sagittal multiplanar reconstructions of  the thoracic and lumbar spine obtained from acquisition of CT chest  abdomen and pelvis with contrast which was obtained in the setting of  trauma.     Findings:  Thoracic spine: Rightward curvature. There is no acute fracture or  subluxation. There is no prevertebral edema. There is no spinal canal  or foraminal stenosis at any level. No soft tissue abnormality in the  visualized paraspinous tissues anteriorly.    Lumbar Spine: There is no acute fracture or subluxation. There are 5  type lumbar vertebra, used for the purposes of this dictation . Vacuum  disc phenomenon L5-S1.    On a level by level basis, the findings are as follows:  T12-L1:  No significant neural foraminal or spinal canal stenosis.  L1-2:  No significant neural foraminal or spinal canal stenosis.  L2-3:  No significant neural foraminal or spinal canal stenosis.  L3-4:  No significant neural foraminal or  spinal canal stenosis.  L4-5:  Disc bulge with superimposed central protrusion. Mild bilateral  facet arthropathy. No significant neural foraminal stenosis. Mild  spinal canal stenosis.  L5-S1: Circumferential disc bulge with superimposed central  protrusion. No significant neural foraminal stenosis. Mild spinal  canal stenosis.    Multiple hepatic cysts. Cholecystectomy. Visualized appendix is  normal. See dedicated report from concurrently acquired CT chest  abdomen and pelvis for further detail.      Impression    Impression:   1. No acute fracture or traumatic subluxation of the thoracic or  lumbar spine.  2. Mild lumbar spondylosis with central disc protrusions at L4-5 and  L5-S1, resulting in mild spinal canal stenoses at these levels. No  significant neural foraminal stenosis at any level.    I have personally reviewed the examination and initial interpretation  and I agree with the findings.    PRECIOUS HARRIS MD         SYSTEM ID:  [id number]   Echocardiogram Complete     Value    LVEF  60-65%    Narrative    [id number]  [id number]  [id number]  [id number]^JUNIE^ROMMEL     [facility],[place]  Echocardiography Laboratory  [address] [place] [address]     Name: SEBASTIÁN YOUSIF  MRN: [id number]  : 1975  Study Date: 10/18/2022 07:29 AM  Age: 47 yrs  Gender: Female  Patient [facility]  Reason For Study: Syncope  Ordering Physician: ROMMEL ZAMAN  Performed By: Aquiles Hayes RDCS     BSA: 1.8 m2  Height: 65 in  Weight: 170 lb  HR: 77  BP: 115/94 mmHg  ______________________________________________________________________________  Procedure  Complete Portable Echo Adult. Echocardiogram with two-dimensional, color and  spectral Doppler performed.  ______________________________________________________________________________  Interpretation Summary  Global and regional left ventricular function is normal with an EF of 60-65%.  Global right  ventricular function is normal.  No significant valvular abnormalities present.  The inferior vena cava is normal.  No pericardial effusion is present.  ______________________________________________________________________________  Left Ventricle  Left ventricular size is normal. Left ventricular wall thickness is normal.  Global and regional left ventricular function is normal with an EF of 60-65%.  Left ventricular diastolic function is normal. No regional wall motion  abnormalities are seen.     Right Ventricle  The right ventricle is normal size. Global right ventricular function is  normal.     Atria  Both atria appear normal.     Mitral Valve  The mitral valve is normal.     Aortic Valve  Aortic valve is normal in structure and function.     Tricuspid Valve  The tricuspid valve is normal. The peak velocity of the tricuspid regurgitant  jet is not obtainable. Pulmonary artery systolic pressure cannot be assessed.     Pulmonic Valve  The pulmonic valve is normal.     Vessels  The aorta root is normal. The inferior vena cava is normal.     Pericardium  No pericardial effusion is present.     Compared to Previous Study  Previous study not available for comparison.  ______________________________________________________________________________  MMode/2D Measurements & Calculations     IVSd: 0.76 cm  LVIDd: 4.7 cm  LVIDs: 2.7 cm  LVPWd: 0.82 cm  FS: 43.4 %  LV mass(C)d: 121.1 grams  LV mass(C)dI: 65.6 grams/m2  asc Aorta Diam: 2.8 cm  LVOT diam: 2.3 cm  LVOT area: 4.0 cm2  LA Volume Index (BP): 19.6 ml/m2  RWT: 0.35  TAPSE: 2.5 cm     Doppler Measurements & Calculations  MV E max elsy: 60.7 cm/sec  MV A max elsy: 99.7 cm/sec  MV E/A: 0.61  MV dec slope: 454.6 cm/sec2  MV dec time: 0.13 sec  PA acc time: 0.11 sec  E/E' av.2  Lateral E/e': 6.1  Medial E/e': 10.3     ______________________________________________________________________________  Report approved by: Michael Galan 10/18/2022 08:09 AM                Discharge Medications   Current Discharge Medication List      START taking these medications    Details   Lidocaine (LIDOCARE) 4 % Patch Place 3 patches onto the skin every 24 hours To prevent lidocaine toxicity, patient should be patch free for 12 hrs daily.  Refills: 0    Associated Diagnoses: Complex regional pain syndrome i of right lower limb         CONTINUE these medications which have CHANGED    Details   acetaminophen (TYLENOL) 325 MG tablet Take 3 tablets (975 mg) by mouth every 8 hours    Associated Diagnoses: Closed nondisplaced fracture of base of fourth metacarpal bone of right hand with routine healing, subsequent encounter      ibuprofen (ADVIL/MOTRIN) 200 MG tablet Take 3 tablets (600 mg) by mouth every 6 hours as needed for mild pain    Associated Diagnoses: Closed nondisplaced fracture of base of fourth metacarpal bone of right hand with routine healing, subsequent encounter      ondansetron (ZOFRAN) 4 MG tablet Take 1 tablet (4 mg) by mouth every 6 hours as needed  Qty: 15 tablet, Refills: 0    Associated Diagnoses: Nausea      oxyCODONE (ROXICODONE) 5 MG tablet Take 1 tablet (5 mg) by mouth every 8 hours as needed for severe pain  Qty: 12 tablet, Refills: 0    Associated Diagnoses: Closed nondisplaced fracture of base of fourth metacarpal bone of right hand with routine healing, subsequent encounter         CONTINUE these medications which have NOT CHANGED    Details   cyclobenzaprine (FLEXERIL) 10 MG tablet Take 1 tablet (10 mg) by mouth 3 times daily    Associated Diagnoses: Muscle spasm      medical cannabis (Patient's own supply) 1 Dose See Admin Instructions (The purpose of this order is to document that the patient reports taking medical cannabis.  This is not a prescription, and is not used to certify that the patient has a qualifying medical condition.)  Per pt: 5-10 mg TID PRN      polyethylene glycol (MIRALAX) 17 GM/Dose powder Take by mouth as needed      bisacodyl (DULCOLAX)  10 MG suppository Place 10 mg rectally as needed      estradiol (ESTRACE) 0.1 MG/GM vaginal cream Place 2 g vaginally twice a week  Qty: 42.5 g, Refills: 3    Associated Diagnoses: Vaginal atrophy      folic acid (FOLVITE) 400 MCG tablet Take 1 tablet (400 mcg) by mouth daily  Qty: 90 tablet, Refills: 0    Associated Diagnoses: Folic acid deficiency      multivitamin w/minerals (THERA-VIT-M) tablet Take 1 tablet by mouth every morning [employer] W[employer]      senna-docusate (SENOKOT-S/PERICOLACE) 8.6-50 MG tablet Take 1 tablet by mouth 2 times daily    Associated Diagnoses: Constipation, unspecified constipation type         STOP taking these medications       LORazepam (ATIVAN) 1 MG tablet Comments:   Reason for Stopping:         ondansetron (ZOFRAN-ODT) 8 MG ODT tab Comments:   Reason for Stopping:         oxyCODONE-acetaminophen (PERCOCET) 5-325 MG tablet Comments:   Reason for Stopping:             Allergies   Allergies   Allergen Reactions     Dihydroergotamine Anaphylaxis     Latex Anaphylaxis     Shellfish-Derived Products Anaphylaxis     Sumatriptan Anaphylaxis     Banana Unknown     Gabapentin Dizziness     Gluten Meal Other (See Comments)     Celiac disease     Keppra [Levetiracetam] Nausea and Vomiting     Kiwi Unknown     Levofloxacin Other (See Comments)     Arrhythmia     Metronidazole Nausea and Vomiting     Nitrofurantoin Hives     Penicillins Hives     Pregabalin      Topiramate Visual Disturbance     Aspirin Rash     Methadone Rash     Risperidone Anxiety

## 2022-10-21 NOTE — PLAN OF CARE
Patient is a 47 year old female  admitted to room 411.  Patient is alert and oriented X 4. See Epic for VS and assessment.  Patient is able to transfer assist x 1 walker /wheelchair , gait belt. Patient was settled into their room, shown call light, tv, mealtimes etc. Oriented to unit. Will continue monitoring pain level and VS. Notifying MD with any concerns.  Follow MD orders for cares and medications.    Level of Schooling: Bachelor's degree  Ethnicity:  Marital Status:  Dentures: No  Hearing Aid: No  Smoker:  Yes, quit 4 years ago  Glasses:Yes  Occupation: Disabled   Falls : frequent, 2-3 x/day prior to admission    Stairs prior function: Unable   Prior device use: cane, standard and rolling walker, wheelchair, shower chair, commode , tracking poles at home   Advanced Care Directive Referral to Social Work? No, patient already working on her ACP , declined more information.

## 2022-10-22 ENCOUNTER — APPOINTMENT (OUTPATIENT)
Dept: OCCUPATIONAL THERAPY | Facility: SKILLED NURSING FACILITY | Age: 47
DRG: 073 | End: 2022-10-22
Attending: INTERNAL MEDICINE
Payer: COMMERCIAL

## 2022-10-22 LAB
HOLD SPECIMEN: NORMAL
HOLD SPECIMEN: NORMAL

## 2022-10-22 PROCEDURE — 36415 COLL VENOUS BLD VENIPUNCTURE: CPT | Performed by: INTERNAL MEDICINE

## 2022-10-22 PROCEDURE — 99304 1ST NF CARE SF/LOW MDM 25: CPT | Performed by: INTERNAL MEDICINE

## 2022-10-22 PROCEDURE — 250N000013 HC RX MED GY IP 250 OP 250 PS 637: Performed by: INTERNAL MEDICINE

## 2022-10-22 PROCEDURE — 97165 OT EVAL LOW COMPLEX 30 MIN: CPT | Mod: GO

## 2022-10-22 PROCEDURE — 250N000011 HC RX IP 250 OP 636: Performed by: INTERNAL MEDICINE

## 2022-10-22 PROCEDURE — 120N000009 HC R&B SNF

## 2022-10-22 PROCEDURE — 86481 TB AG RESPONSE T-CELL SUSP: CPT | Performed by: INTERNAL MEDICINE

## 2022-10-22 PROCEDURE — 97535 SELF CARE MNGMENT TRAINING: CPT | Mod: GO

## 2022-10-22 RX ADMIN — ACETAMINOPHEN 650 MG: 325 TABLET, FILM COATED ORAL at 20:50

## 2022-10-22 RX ADMIN — SENNOSIDES AND DOCUSATE SODIUM 1 TABLET: 50; 8.6 TABLET ORAL at 09:09

## 2022-10-22 RX ADMIN — ACETAMINOPHEN 975 MG: 325 TABLET, FILM COATED ORAL at 13:54

## 2022-10-22 RX ADMIN — ONDANSETRON HYDROCHLORIDE 4 MG: 4 TABLET, FILM COATED ORAL at 09:09

## 2022-10-22 RX ADMIN — Medication 1 TABLET: at 09:09

## 2022-10-22 RX ADMIN — CYCLOBENZAPRINE 10 MG: 10 TABLET, FILM COATED ORAL at 13:54

## 2022-10-22 RX ADMIN — OXYCODONE HYDROCHLORIDE 5 MG: 5 TABLET ORAL at 22:47

## 2022-10-22 RX ADMIN — CYCLOBENZAPRINE 10 MG: 10 TABLET, FILM COATED ORAL at 09:09

## 2022-10-22 RX ADMIN — ACETAMINOPHEN 975 MG: 325 TABLET, FILM COATED ORAL at 06:16

## 2022-10-22 RX ADMIN — OXYCODONE HYDROCHLORIDE 5 MG: 5 TABLET ORAL at 15:11

## 2022-10-22 RX ADMIN — ONDANSETRON HYDROCHLORIDE 4 MG: 4 TABLET, FILM COATED ORAL at 15:10

## 2022-10-22 RX ADMIN — OXYCODONE HYDROCHLORIDE 5 MG: 5 TABLET ORAL at 06:16

## 2022-10-22 RX ADMIN — ACETAMINOPHEN 975 MG: 325 TABLET, FILM COATED ORAL at 22:47

## 2022-10-22 RX ADMIN — ONDANSETRON HYDROCHLORIDE 4 MG: 4 TABLET, FILM COATED ORAL at 20:50

## 2022-10-22 RX ADMIN — LIDOCAINE PATCH 4% 3 PATCH: 40 PATCH TOPICAL at 20:51

## 2022-10-22 RX ADMIN — SENNOSIDES AND DOCUSATE SODIUM 1 TABLET: 50; 8.6 TABLET ORAL at 20:50

## 2022-10-22 RX ADMIN — CYCLOBENZAPRINE 10 MG: 10 TABLET, FILM COATED ORAL at 20:50

## 2022-10-22 ASSESSMENT — ACTIVITIES OF DAILY LIVING (ADL)
ADLS_ACUITY_SCORE: 41

## 2022-10-22 NOTE — PLAN OF CARE
0700H-1500H  Patient is more settled today, seen by provider, Quantiferon TB Gold plus test done as patient refused mantoux test ,result pending. With psychology consult placed also. Family came in and visited around lunch time. Zofran x 1 given for nausea, said she would not want to take multivitamins next time as it triggers her nausea too. Lidocaine patch off at 0900H, scheduled Tyelol and Flexeril given, no request for Oxycodone PRN. To continue POC.     Patient's most recent vital signs are:     Vital signs:  BP: 124/88  Temp: 96.5  HR: 88  RR: 18  SpO2: 97 %     Patient does not have new respiratory symptoms.  Patient does not have new sore throat.  Patient does not have a fever greater than 99.5.        1500H-2300H  No acute issues during this shift. Remains comfortable, appetite improved,  brings food and snacks for patient also. V/S stable.To continue POC.     Patient's most recent vital signs are:     Vital signs:  BP: 98/67  Temp: 96.8  HR: 99  RR: 18  SpO2: 93 %     Patient does not have new respiratory symptoms.  Patient does not have new sore throat.  Patient does not have a fever greater than 99.5.                                Mercedes Flap Text: The defect edges were debeveled with a #15 scalpel blade.  Given the location of the defect, shape of the defect and the proximity to free margins a Mercedes flap was deemed most appropriate.  Using a sterile surgical marker, an appropriate advancement flap was drawn incorporating the defect and placing the expected incisions within the relaxed skin tension lines where possible. The area thus outlined was incised deep to adipose tissue with a #15 scalpel blade.  The skin margins were undermined to an appropriate distance in all directions utilizing iris scissors.

## 2022-10-22 NOTE — PLAN OF CARE
Goal Outcome Evaluation:       Pt had a quiet night. Alert and oriented x 4. Able to make needs known. No complain of chest pain, SOB, cough, N/V, fever and chills. Complain of pain of 8/10. Pain effectively managed with scheduled Tylenol and PRN oxycodone. No acute issue noted this shift. Will continue with POC.      Patient's most recent vital signs are:     Vital signs:  BP: 116/84  Temp: 96.9  HR: 100  RR: 16  SpO2: 99 %     Patient does not have new respiratory symptoms.  Patient does not have new sore throat.  Patient does not have a fever greater than 99.5.

## 2022-10-22 NOTE — H&P
Patient Name: Peggy Jessica MRN# [id number]   Age: 47 year old YOB: 1975     Date of Admission: 10/21/2022    Hospital Discharge Team: observation unit [facility]  Primary Care Provider: Andrew Peck           Assessment and Plan:         Peggy Jessica is a 47 year old female admitted on 10/17/2022. She has PMH of chronic inflammatory demyelinating polyneuropathy, frequent falls, complex regional pain syndrome who presents to the ED after a mechanical fall.   She was [facility] for Rehab        # Deconditioning ; PT and OT   #Ground level fall  #Frequent falls  #Nondisplaced fracture at the fourth metacarpal base  #Demyelinating polyneuropathy   #Complex regional pain syndrome  Patient presents to the ED  after a fall out of bed. She reported to the ED doctor that she fell out of bed and landed on her right side. Stated she was feeding the dogs and felt lightheaded and passed out. She reports that she LOC for a few minutes she thinks. The fall was unwitnessed but she thinks she landed on her right side because she has pain along her entire right side. She denies any tongue biting, drooling, or loss of bowel or bladder control. She reports she has had many syncopal episodes in the past. Patient and  report she laid on the ground for about 25 minutes. Patient reported to PT in the ED that she had 6 falls yesterday and has been falling frequently at home, about 2-3 times a day. She denies any chest pain, SOB, nausea (aside from chronic nausea), vomiting, dizziness, lightheadedness, fevers, or recent illness.   She uses a walker, cane, wheelchair, and other assistive devices as needed. She lives in a home with 16 steps up to the bathroom. She verbalizes need for higher level of care and assistance as she is unable to get around safety at home. In the ED, her vital signs were stable. Labwork unremarkable with the exception of ALT 47, AST 44.   EKG sinus rhythm.    Numerous imaging including right pelvis/hip xray, right shoulder xray, right elbow xray, CT head, CT cervical spine, CT chest/abdomen/pelvis, CT thoracic spine, CT lumbar spine showing no fractures.   Xray of the right wrist did show a possible nondisplaced fracture of the 4th metacarpal base which was splinted in the ED and recommended for follow up imaging in 1 week.   UA was negative. Patient was evaluated by PT in ED who recommended TCU  . Echo was done 10/18/22 and showed LVEF of 60-65% with no RWMA or any significant valvular disease   PT/OT  -PO Tylenol and Ibuprofen for pain  -Oxycodone 5 mg q8h prn   -Continue PTA Flexiril for pain  -PRN lidocaine patches  -PRN miralax and senna as needed  -Follow up with orthopedics early next week for re imaging and assessment on need for splint, referral placed  -PRN Zofran for chronic nausea       Depressed mood   Health psychology consult    CODE: full   Diet: regular     DVT PPX: PCD    Indication for Psychotropic Medications: oxycodone andFflexril for pain and spasms    Immunization status : declines pneumonia vaccine due to possible relation with CIDP   Declined PPD so ordered gold interferon      Disposition: Home with            Chief Complaint:     Here to get stronger        HPI:   Peggy Jessica is admitted to TCU for rehabilitation after being hospitalized after fall .  Currently, patient feels well . No chest pain , no Shortness of breath , no nausea, no abdominal pain , no burning with micturition . No fever , no chills,   Per nursing she has been feelig depressed and requested health psychology            Review of Systems:   A 10 point ROS was performed and negative unless otherwise noted in HPI.          Past Medical History:   Reviewed  Past Medical History:   Diagnosis Date     BRCA positive      CIDP (chronic inflammatory demyelinating polyneuropathy) (H)      ASHKAN III with severe dysplasia 2002     Complex regional pain syndrome type 1 of  right lower extremity             Past Surgical History:   Reviewed   Past Surgical History:   Procedure Laterality Date     BILATERAL OOPHORECTOMY Bilateral 2019     c section      2002     CHOLECYSTECTOMY  1997     COLONOSCOPY       ESOPHAGOSCOPY, GASTROSCOPY, DUODENOSCOPY (EGD), COMBINED N/A 07/28/2022    Procedure: ESOPHAGOGASTRODUODENOSCOPY (EGD);  Surgeon: Zack Maddox MD;  Location:  GI     HYSTERECTOMY  2004     MASTECTOMY Bilateral 2020            Social History:   Reviewed   Social History     Socioeconomic History     Marital status:      Spouse name: Not on file     Number of children: 2     Years of education: Not on file     Highest education level: Not on file   Occupational History     Not on file   Tobacco Use     Smoking status: Former     Types: Cigarettes     Smokeless tobacco: Never   Vaping Use     Vaping Use: Never used   Substance and Sexual Activity     Alcohol use: Not Currently     Drug use: Yes     Types: Marijuana     Comment: Medical Canibis patient     Sexual activity: Not Currently     Partners: Male   Other Topics Concern     Not on file   Social History Narrative     Not on file     Social Determinants of Health     Financial Resource Strain: Not on file   Food Insecurity: Not on file   Transportation Needs: Not on file   Physical Activity: Not on file   Stress: Not on file   Social Connections: Not on file   Intimate Partner Violence: Not on file   Housing Stability: Not on file            Family History:   Reviewed   Family History   Problem Relation Age of Onset     Kidney Disease Father             Allergies:      Allergies   Allergen Reactions     Dihydroergotamine Anaphylaxis     Latex Anaphylaxis     Shellfish-Derived Products Anaphylaxis     Sumatriptan Anaphylaxis     Banana Unknown     Gabapentin Dizziness     Gluten Meal Other (See Comments)     Celiac disease     Keppra [Levetiracetam] Nausea and Vomiting     Kiwi Unknown     Levofloxacin Other (See  Comments)     Arrhythmia     Metronidazole Nausea and Vomiting     Nitrofurantoin Hives     Penicillins Hives     Pregabalin      Topiramate Visual Disturbance     Aspirin Rash     Methadone Rash     Morphine Hives     She got hives around are when morphine given but resolved after few minutes per patient      Risperidone Anxiety            Medications:     Medications Prior to Admission   Medication Sig Dispense Refill Last Dose     acetaminophen (TYLENOL) 325 MG tablet Take 3 tablets (975 mg) by mouth every 8 hours        bisacodyl (DULCOLAX) 10 MG suppository Place 10 mg rectally as needed        cyclobenzaprine (FLEXERIL) 10 MG tablet Take 1 tablet (10 mg) by mouth 3 times daily        estradiol (ESTRACE) 0.1 MG/GM vaginal cream Place 2 g vaginally twice a week (Patient not taking: No sig reported) 42.5 g 3      folic acid (FOLVITE) 400 MCG tablet Take 1 tablet (400 mcg) by mouth daily (Patient taking differently: Take 400 mcg by mouth every morning Have not started yet) 90 tablet 0      ibuprofen (ADVIL/MOTRIN) 200 MG tablet Take 3 tablets (600 mg) by mouth every 6 hours as needed for mild pain        Lidocaine (LIDOCARE) 4 % Patch Place 3 patches onto the skin every 24 hours To prevent lidocaine toxicity, patient should be patch free for 12 hrs daily.  0      medical cannabis (Patient's own supply) 1 Dose See Admin Instructions (The purpose of this order is to document that the patient reports taking medical cannabis.  This is not a prescription, and is not used to certify that the patient has a qualifying medical condition.)  Per pt: 5-10 mg TID PRN        multivitamin w/minerals (THERA-VIT-M) tablet Take 1 tablet by mouth every morning Megafood Womens Brand        ondansetron (ZOFRAN) 4 MG tablet Take 1 tablet (4 mg) by mouth every 6 hours as needed 15 tablet 0      oxyCODONE (ROXICODONE) 5 MG tablet Take 1 tablet (5 mg) by mouth every 8 hours as needed for severe pain 12 tablet 0      polyethylene glycol  (MIRALAX) 17 GM/Dose powder Take by mouth as needed        senna-docusate (SENOKOT-S/PERICOLACE) 8.6-50 MG tablet Take 1 tablet by mouth 2 times daily                Physical Exam:   Blood pressure 116/84, pulse 100, temperature 96.9  F (36.1  C), temperature source Oral, resp. rate 16, weight 76.1 kg (167 lb 12.8 oz), SpO2 99 %, not currently breastfeeding.  GENERAL: Alert and oriented x 3. Sitting in wheel chair  Right arm in splint  HEENT: Anicteric sclera. PERRL. Mucous membranes moist and without lesions.   CV: RRR. S1, S2. No murmurs appreciated.   RESPIRATORY: Effort normal on room air. Lungs CTAB with no wheezing, rales, rhonchi.   GI: Abdomen soft and non distended, bowel sounds present. No tenderness, rebound, guarding.   MUSCULOSKELETAL: No joint swelling or tenderness. Moves all extremities.   NEUROLOGICAL: alert and oriented . 2-12 intact . No assymmetry . No pronator drift . Tongue midline . Power 3-4 in all 4 ext   EXTREMITIES: No peripheral edema. Intact bilateral pedal pulses.   SKIN: No jaundice. No rashes.     Lines/Tubes/Drains:             Data:   reviewed the following studies:  Cbc and bmp   Unresulted Labs Ordered in the Past 30 Days of this Admission     No orders found for last 31 day(s).

## 2022-10-22 NOTE — PROGRESS NOTES
"   10/22/22 0716   Appointment Info   Signing Clinician's Name / Credentials (OT) KIMBERLEY Bertrand   Rehab Comments (OT) OT INITIAL EVAL, CERTIFICATION   Living Environment   People in Home spouse   Current Living Arrangements house   Home Accessibility stairs to enter home;stairs within home   Number of Stairs, Main Entrance 4   Number of Stairs, Within Home, Primary   (flight up to bedroom, bathroom (bathroom also has a step up), flight down to bathroom, commode on the main floor)   Transportation Anticipated family or friend will provide   Living Environment Comments Pt lived in 2 story house.  4 steps to enter. Flight up to bedroom, bathroom (bathroom also has a step up), flight down to bathroom, commode on the main floor.  Once pt comes down to main floor she does not go upstairs until night time.  She has a tub/shower combo with shower chair.   Self-Care   Usual Activity Tolerance fair   Current Activity Tolerance fair   Equipment Currently Used at Home other (see comments)  (cane, standard and rolling walker, wheelchair , shower chair, commode, tracking poles)   Fall history within last six months yes   Number of times patient has fallen within last six months   (frequent falls)   Activity/Exercise/Self-Care Comment Patient reports many recent falls with up to 6 yesterday before admission. Patient had been working with OP rehab services on pain management.  Pt tends to start out stronger with more energy in AM, was amb without device but as the day goes on she was increasingly using her reacher and w/c.   Instrumental Activities of Daily Living (IADL)   IADL Comments Pt likes to bake.  She has support of her H for IADL.   General Information   Onset of Illness/Injury or Date of Surgery 10/17/22   Referring Physician Clarita Rausch MD   Patient/Family Therapy Goal Statement (OT) Pt reports she has been told she may not get \"better\" but she is interested in adaptation/compensatory strategies.   Additional " Occupational Profile Info/Pertinent History of Current Problem Peggy Jessica is a 47 year old female admitted on 10/17/2022. She has PMH of chronic inflammatory demyelinating polyneuropathy, frequent falls, complex regional pain syndrome who presents to the ED after a mechanical fall with subsequent R Non displaced fracture at fourth metacarpal base.   Existing Precautions/Restrictions fall  (RUE fx with distal/hand splint/batting/ace-like cast.  Pt uncertain if she will get a traditional cast.)   Limitations/Impairments sensory  (pt describes h/o sensation changes head to toe including CSS)   Cognitive Status Examination   Orientation Status orientation to person, place and time   Cognitive Status Comments WNLs   Visual Perception   Visual Impairment/Limitations corrective lenses full-time   Sensory   Sensory Comments Pt c/o h/o sensory changes and describes her nervous system as being impaired, eg she reports that when she fractured her R hand, her pain was referred to her back.  She reports it feels like she is walking on wadded up batting material.   Pain Assessment   Patient Currently in Pain   (no c/o during OT eval)   Range of Motion Comprehensive   General Range of Motion upper extremity range of motion deficits identified   Comment, General Range of Motion RUE sh flex to 90 degrees and hand/forearm are immoblized with splint/batting/ace wrap (she is not sure if she will get a cast).  LUE AROM WFLs though weakness noted in pt's lumbricals.   Strength Comprehensive (MMT)   General Manual Muscle Testing (MMT) Assessment   (Pt is R handed.)   Comment, General Manual Muscle Testing (MMT) Assessment 4/5 LUE and weakness in lumbricals, pt is a candidate for exercise and should consider splinting if weakness progresses.  RUE limited to distal finger mvmt as immobilized with splint-like cast.  Pt reports h/o jerky mvmts, eg she has thrown a remote before.   Coordination   Coordination Comments Gross L hand  use WNLs, plan to con't to monitor and challenge with activity.   Bed Mobility   Comment (Bed Mobility) SBA   Transfers   Transfer Comments CGA bed, toilet (handicap height) and w/c.   Balance   Balance Comments Pt moving slowly and using 2WW for ambulation.  H/o R side giving out unexpectedly.   Activities of Daily Living   BADL Assessment/Intervention upper body dressing;lower body dressing;grooming   Upper Body Dressing Assessment/Training   Comment, (Upper Body Dressing) OT: Set-up tshirt   Lower Body Dressing Assessment/Training   Comment, (Lower Body Dressing) OT: SBA pants   Grooming Assessment/Training   Comment, (Grooming) OT: Set-up   Clinical Impression   Criteria for Skilled Therapeutic Interventions Met (OT) Yes, treatment indicated   OT Diagnosis Decrease ADL, Decrease IADL, transfers   OT Problem List-Impairments impacting ADL problems related to;activity tolerance impaired;balance;coordination;mobility;motor control;range of motion (ROM);strength;pain;sensory feedback   Therapy Certification   Medical Diagnosis Peggy Jessica is a 47 year old female admitted on 10/17/2022. She has PMH of chronic inflammatory demyelinating polyneuropathy, frequent falls, complex regional pain syndrome who presents to the ED after a mechanical fall with subsequent R Non displaced fracture at fourth metacarpal base.   OT Goals   Therapy Frequency (OT) 6 times/wk   OT Predicted Duration/Target Date for Goal Attainment 10/29/22   OT Goals Upper Body Dressing;Lower Body Dressing;Upper Body Bathing;Lower Body Bathing;Transfers;Toilet Transfer/Toileting;Meal Preparation;Home Management;OT Goal 1   OT: Upper Body Dressing Modified independent;including set-up/clothing retrieval   OT: Lower Body Dressing Modified independent;including set-up/clothing retrieval   OT: Upper Body Bathing Modified independent   OT: Lower Body Bathing Supervision/stand-by assist  (with shower chair)   OT: Transfer Supervision/stand-by  assist  (shower transfer)   OT: Toilet Transfer/Toileting Modified independent   OT: Meal Preparation Supervision/stand-by assist;from wheelchair   OT: Home Management Supervision/stand-by assist;from wheelchair   OT: Goal 1 Pt will utilize AE training/compensatory strategies to complete ADL/IADL and transfer routines to ensure safe IND.   Self-Care/Home Management   Self-Care/Home Mgmt/ADL, Compensatory, Meal Prep Minutes (77448) 15   Symptoms Noted During/After Treatment (Meal Preparation/Planning Training) fatigue  (h/o intermittent dizziness)   Treatment Detail/Skilled Intervention Toileting.  Th provided set-up and cleared pt path for CGA with mob using 2WW.  Pt able to do her own naeem cares and stood at the sink to wash with CGA.  Pt reports she is becoming more realistic and will increase the use of her walker.   OT Discharge Planning   OT Plan OT: h/o frequent Falls, intermittent dizziness, sensation changes, L hand/forearm casted (splint with batting and ace wrap - she does not know if she will get a cast). Shower, use her w/c to get to shower bench, wrap R hand/forearm.  Bathing gg code.   OT Discharge Recommendation (DC Rec) home with assist;home with home care occupational therapy   OT Rationale for DC Rec Pt would benefit from skilled OT in her home setting as she would like to be as IND yet safe as possible.  She enjoys baking but H has to take things in/out of the oven.  She reports that she does not shower if someone is not in the house.   OT Brief overview of current status Pt pleasant and cooperative at OT [facility].  She is a good candidate for OT s/p h/o chronic inflammatory demyelinating polyneuropathy, frequent falls, complex regional pain syndrome who presents to the ED after a mechanical fall with subsequent R Non displaced fracture at fourth metacarpal base.  She has been informed by providers that she may not get better and is interested in adaptations and compensatory strategies to  increase/maintain her IND.  See OT POC.   Total Session Time   Timed Code Treatment Minutes 15   Total Session Time (sum of timed and untimed services) 15   Post Acute Settings Only   What unit is patient on? TCU   Upper Body Dressing   Describe Performance OT Set-up   Lower Body Dressing (Pants/Undergarments)   Describe Performance OT Set-up   Lower Body Dressing (Putting On/Taking-Off Footwear)   Describe Performance OT Set-up   Toileting Hygiene   Describe Performance OT: Mod I   Transfers: Toilet transfers   Describe Performance OT: CGA with 2WW, handicap height toilet and grab bar   Hygiene/Grooming   Describe Performance OT: set-up   Oral Hygiene   Describe Performance OT: set-up
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
## 2022-10-22 NOTE — PROGRESS NOTES
"   10/22/22 0716   Appointment Info   Signing Clinician's Name / Credentials (OT) KIMBERLEY Bertrand   Rehab Comments (OT) OT INITIAL EVAL, CERTIFICATION   Living Environment   People in Home spouse   Current Living Arrangements house   Home Accessibility stairs to enter home;stairs within home   Number of Stairs, Main Entrance 4   Number of Stairs, Within Home, Primary   (flight up to bedroom, bathroom (bathroom also has a step up), flight down to bathroom, commode on the main floor)   Transportation Anticipated family or friend will provide   Living Environment Comments Pt lived in 2 story house.  4 steps to enter. Flight up to bedroom, bathroom (bathroom also has a step up), flight down to bathroom, commode on the main floor.  Once pt comes down to main floor she does not go upstairs until night time.  She has a tub/shower combo with shower chair.   Self-Care   Usual Activity Tolerance fair   Current Activity Tolerance fair   Equipment Currently Used at Home other (see comments)  (cane, standard and rolling walker, wheelchair , shower chair, commode, tracking poles)   Fall history within last six months yes   Number of times patient has fallen within last six months   (frequent falls)   Activity/Exercise/Self-Care Comment Patient reports many recent falls with up to 6 yesterday before admission. Patient had been working with OP rehab services on pain management.  Pt tends to start out stronger with more energy in AM, was amb without device but as the day goes on she was increasingly using her reacher and w/c.   Instrumental Activities of Daily Living (IADL)   IADL Comments Pt likes to bake.  She has support of her H for IADL.   General Information   Onset of Illness/Injury or Date of Surgery 10/17/22   Referring Physician Clarita Rausch MD   Patient/Family Therapy Goal Statement (OT) Pt reports she has been told she may not get \"better\" but she is interested in adaptation/compensatory strategies.   Additional " Occupational Profile Info/Pertinent History of Current Problem Peggy Jessica is a 47 year old female admitted on 10/17/2022. She has PMH of chronic inflammatory demyelinating polyneuropathy, frequent falls, complex regional pain syndrome who presents to the ED after a mechanical fall with subsequent R Non displaced fracture at fourth metacarpal base.   Existing Precautions/Restrictions fall  (RUE fx with distal/hand splint/batting/ace-like cast.  Pt uncertain if she will get a traditional cast.)   Limitations/Impairments sensory  (pt describes h/o sensation changes head to toe including CSS)   Cognitive Status Examination   Orientation Status orientation to person, place and time   Cognitive Status Comments WNLs   Visual Perception   Visual Impairment/Limitations corrective lenses full-time   Sensory   Sensory Comments Pt c/o h/o sensory changes and describes her nervous system as being impaired, eg she reports that when she fractured her R hand, her pain was referred to her back.  She reports it feels like she is walking on wadded up batting material.   Pain Assessment   Patient Currently in Pain   (no c/o during OT eval)   Range of Motion Comprehensive   General Range of Motion upper extremity range of motion deficits identified   Comment, General Range of Motion RUE sh flex to 90 degrees and hand/forearm are immoblized with splint/batting/ace wrap (she is not sure if she will get a cast).  LUE AROM WFLs though weakness noted in pt's lumbricals.   Strength Comprehensive (MMT)   General Manual Muscle Testing (MMT) Assessment   (Pt is R handed.)   Comment, General Manual Muscle Testing (MMT) Assessment 4/5 LUE and weakness in lumbricals, pt is a candidate for exercise and should consider splinting if weakness progresses.  RUE limited to distal finger mvmt as immobilized with splint-like cast.  Pt reports h/o jerky mvmts, eg she has thrown a remote before.   Coordination   Coordination Comments Gross L hand  use WNLs, plan to con't to monitor and challenge with activity.   Bed Mobility   Comment (Bed Mobility) SBA   Transfers   Transfer Comments CGA bed, toilet (handicap height) and w/c.   Balance   Balance Comments Pt moving slowly and using 2WW for ambulation.  H/o R side giving out unexpectedly.   Activities of Daily Living   BADL Assessment/Intervention upper body dressing;lower body dressing;grooming   Upper Body Dressing Assessment/Training   Comment, (Upper Body Dressing) OT: Set-up tshirt   Lower Body Dressing Assessment/Training   Comment, (Lower Body Dressing) OT: SBA pants   Grooming Assessment/Training   Comment, (Grooming) OT: Set-up   Clinical Impression   Criteria for Skilled Therapeutic Interventions Met (OT) Yes, treatment indicated   OT Diagnosis Decrease ADL, Decrease IADL, transfers   OT Problem List-Impairments impacting ADL problems related to;activity tolerance impaired;balance;coordination;mobility;motor control;range of motion (ROM);strength;pain;sensory feedback   Therapy Certification   Medical Diagnosis Peggy Jessica is a 47 year old female admitted on 10/17/2022. She has PMH of chronic inflammatory demyelinating polyneuropathy, frequent falls, complex regional pain syndrome who presents to the ED after a mechanical fall with subsequent R Non displaced fracture at fourth metacarpal base.   OT Goals   Therapy Frequency (OT) 6 times/wk   OT Predicted Duration/Target Date for Goal Attainment 10/29/22   OT Goals Upper Body Dressing;Lower Body Dressing;Upper Body Bathing;Lower Body Bathing;Transfers;Toilet Transfer/Toileting;Meal Preparation;Home Management;OT Goal 1   OT: Upper Body Dressing Modified independent;including set-up/clothing retrieval   OT: Lower Body Dressing Modified independent;including set-up/clothing retrieval   OT: Upper Body Bathing Modified independent   OT: Lower Body Bathing Supervision/stand-by assist  (with shower chair)   OT: Transfer Supervision/stand-by  assist  (shower transfer)   OT: Toilet Transfer/Toileting Modified independent   OT: Meal Preparation Supervision/stand-by assist;from wheelchair   OT: Home Management Supervision/stand-by assist;from wheelchair   OT: Goal 1 Pt will utilize AE training/compensatory strategies to complete ADL/IADL and transfer routines to ensure safe IND.   Self-Care/Home Management   Self-Care/Home Mgmt/ADL, Compensatory, Meal Prep Minutes (38579) 15   Symptoms Noted During/After Treatment (Meal Preparation/Planning Training) fatigue  (h/o intermittent dizziness)   Treatment Detail/Skilled Intervention Toileting.  Th provided set-up and cleared pt path for CGA with mob using 2WW.  Pt able to do her own naeem cares and stood at the sink to wash with CGA.  Pt reports she is becoming more realistic and will increase the use of her walker.   OT Discharge Planning   OT Plan OT: h/o frequent Falls, intermittent dizziness, sensation changes, L hand/forearm casted (splint with batting and ace wrap - she does not know if she will get a cast). Shower, use her w/c to get to shower bench, wrap R hand/forearm.  Bathing gg code.   OT Discharge Recommendation (DC Rec) home with assist;home with home care occupational therapy   OT Rationale for DC Rec Pt would benefit from skilled OT in her home setting as she would like to be as IND yet safe as possible.  She enjoys baking but H has to take things in/out of the oven.  She reports that she does not shower if someone is not in the house.   OT Brief overview of current status Pt pleasant and cooperative at OT [facility].  She is a good candidate for OT s/p h/o chronic inflammatory demyelinating polyneuropathy, frequent falls, complex regional pain syndrome who presents to the ED after a mechanical fall with subsequent R Non displaced fracture at fourth metacarpal base.  She has been informed by providers that she may not get better and is interested in adaptations and compensatory strategies to  increase/maintain her IND.  See OT POC.   Total Session Time   Timed Code Treatment Minutes 15   Total Session Time (sum of timed and untimed services) 15   Post Acute Settings Only   What unit is patient on? TCU   Upper Body Dressing   Describe Performance OT Set-up   Lower Body Dressing (Pants/Undergarments)   Describe Performance OT Set-up   Lower Body Dressing (Putting On/Taking-Off Footwear)   Describe Performance OT Set-up   Toileting Hygiene   Describe Performance OT: Mod I   Transfers: Toilet transfers   Describe Performance OT: CGA with 2WW, handicap height toilet and grab bar   Hygiene/Grooming   Describe Performance OT: set-up   Oral Hygiene   Describe Performance OT: set-up   Additional goal added to improve pt's L hand positioning and strength for functional activity.

## 2022-10-23 ENCOUNTER — APPOINTMENT (OUTPATIENT)
Dept: PHYSICAL THERAPY | Facility: SKILLED NURSING FACILITY | Age: 47
DRG: 073 | End: 2022-10-23
Attending: INTERNAL MEDICINE
Payer: COMMERCIAL

## 2022-10-23 PROCEDURE — 120N000009 HC R&B SNF

## 2022-10-23 PROCEDURE — 250N000011 HC RX IP 250 OP 636: Performed by: INTERNAL MEDICINE

## 2022-10-23 PROCEDURE — 250N000013 HC RX MED GY IP 250 OP 250 PS 637: Performed by: INTERNAL MEDICINE

## 2022-10-23 PROCEDURE — 97530 THERAPEUTIC ACTIVITIES: CPT | Mod: GP | Performed by: STUDENT IN AN ORGANIZED HEALTH CARE EDUCATION/TRAINING PROGRAM

## 2022-10-23 PROCEDURE — 97161 PT EVAL LOW COMPLEX 20 MIN: CPT | Mod: GP | Performed by: STUDENT IN AN ORGANIZED HEALTH CARE EDUCATION/TRAINING PROGRAM

## 2022-10-23 RX ORDER — DIPHENHYDRAMINE HCL 25 MG
25 CAPSULE ORAL EVERY 6 HOURS PRN
Status: DISPENSED | OUTPATIENT
Start: 2022-10-23 | End: 2022-10-26

## 2022-10-23 RX ADMIN — ACETAMINOPHEN 975 MG: 325 TABLET, FILM COATED ORAL at 22:08

## 2022-10-23 RX ADMIN — SENNOSIDES AND DOCUSATE SODIUM 1 TABLET: 50; 8.6 TABLET ORAL at 08:45

## 2022-10-23 RX ADMIN — CYCLOBENZAPRINE 10 MG: 10 TABLET, FILM COATED ORAL at 21:09

## 2022-10-23 RX ADMIN — SENNOSIDES AND DOCUSATE SODIUM 1 TABLET: 50; 8.6 TABLET ORAL at 21:09

## 2022-10-23 RX ADMIN — OXYCODONE HYDROCHLORIDE 5 MG: 5 TABLET ORAL at 13:45

## 2022-10-23 RX ADMIN — ONDANSETRON HYDROCHLORIDE 4 MG: 4 TABLET, FILM COATED ORAL at 13:39

## 2022-10-23 RX ADMIN — ACETAMINOPHEN 975 MG: 325 TABLET, FILM COATED ORAL at 13:39

## 2022-10-23 RX ADMIN — OXYCODONE HYDROCHLORIDE 5 MG: 5 TABLET ORAL at 06:22

## 2022-10-23 RX ADMIN — ACETAMINOPHEN 975 MG: 325 TABLET, FILM COATED ORAL at 06:21

## 2022-10-23 RX ADMIN — CYCLOBENZAPRINE 10 MG: 10 TABLET, FILM COATED ORAL at 08:45

## 2022-10-23 RX ADMIN — CYCLOBENZAPRINE 10 MG: 10 TABLET, FILM COATED ORAL at 13:38

## 2022-10-23 RX ADMIN — OXYCODONE HYDROCHLORIDE 5 MG: 5 TABLET ORAL at 22:08

## 2022-10-23 RX ADMIN — LIDOCAINE PATCH 4% 3 PATCH: 40 PATCH TOPICAL at 21:10

## 2022-10-23 RX ADMIN — DIPHENHYDRAMINE HYDROCHLORIDE 25 MG: 25 CAPSULE ORAL at 21:16

## 2022-10-23 RX ADMIN — ONDANSETRON HYDROCHLORIDE 4 MG: 4 TABLET, FILM COATED ORAL at 21:09

## 2022-10-23 ASSESSMENT — ACTIVITIES OF DAILY LIVING (ADL)
ADLS_ACUITY_SCORE: 41
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
## 2022-10-23 NOTE — PLAN OF CARE
"Goal Outcome Evaluation:  Discharge Planner Post-Acute Rehab PT:     Recert date= 11/4    Discharge Plan: home with increased support, home PT for safety eval in home setting in context of significant h/o falls. Anticipate up to 7 day LOS.    Precautions: falls, R hand fracture, orthostatic    Current Status:  Bed Mobility: SBA  Transfer: CGA with PFWW  Gait: CGA 30' with PFWW - recommend WC based with nsg  Stairs: NT  Balance: good sitting, needs UE support & good lighting in standing    Assessment:  PLOF pt was mod I with wc and FWW but with significant falls history. Pt reports up to 3-4 falls daily- sometimes her \"legs just leave my body\", other times orthostatic episodes. Pt has been working on developing compensatory strategies, has a service dog in training that can sense low BP, learning \"brace\" command when pt senses a fall occurring. Recent fall resulting in metacarpal fracture is impacting ADLs and transfers. Use of PFWW helpful for latter issue. Pt/family to benefit from skilled PT services to assist with DME/A.D planning, safety strategies to minimize falls.     Other Barriers to Discharge (DME, Family Training, etc):   Stairs to enter home - family looking into ramp  Fatigue in setting of CIDP  Platform attachment for FWW    Pt purchasing B aircast for med/let ankle stability,  Bike shorts and spanx shirt for compression in context h/o of orthostatic hypotension  (Pt states d/t painful neuropathy she cannot tolerate compression socks)                            "

## 2022-10-23 NOTE — PHARMACY-TCU REVIEW OF H&P
I have reviewed this patient's TCU admission History & Physical for medication related changes/recommendations identified by the admitting provider.  I am confirming that there are no recommendations requiring changes to medication orders are indicated at this time based on the provider recommendations in the H&P.    Celia Boyle, PharmD   Phone #[phone]

## 2022-10-23 NOTE — PHARMACY-MEDICATION REGIMEN REVIEW
Pharmacy Medication Regimen Review  Peggy Jessica is a 47 year old female who is currently in the Transitional Care Unit.    Assessment: All medications have an appropriate indications, durations and no unnecessary use was found    Patient had folic acid 400 mcg daily pta (per med history on previous hospitalization may have never started and was not started at other hosptial). Could consider adding if appropriate.     Plan:   Continue current medication regimen.    Attending provider will be sent this note for review.  If there are any emergent issues noted above, pharmacist will contact provider directly by phone.      Pharmacy will periodically review the resident's medication regimen for any PRN medications not administered in > 72 hours and discontinue them. The pharmacist will discuss gradual dose reductions of psychopharmacologic medications with interdisciplinary team on a regular basis.    Please contact pharmacy if the above does not answer specific medication questions/concerns.  Celia Boyle PharmD   Phone #[phone]    Background:  A pharmacist has reviewed all medications and pertinent medical history today.  Medications were reviewed for appropriate use and any irregularities found are listed with recommendations.      Current Facility-Administered Medications:      acetaminophen (TYLENOL) tablet 650 mg, 650 mg, Oral, Q4H PRN, Clarita Rausch MD, 650 mg at 10/22/22 2050     acetaminophen (TYLENOL) tablet 975 mg, 975 mg, Oral, Q8H, Clarita Rausch MD, 975 mg at 10/23/22 0621     calcium carbonate (TUMS) chewable tablet 1,000 mg, 1,000 mg, Oral, 4x Daily PRN, Clarita Rausch MD     cyclobenzaprine (FLEXERIL) tablet 10 mg, 10 mg, Oral, TID, Clarita Rausch MD, 10 mg at 10/23/22 0845     ibuprofen (ADVIL/MOTRIN) tablet 600 mg, 600 mg, Oral, Q6H PRN, Clarita Rausch MD     Lidocaine (LIDOCARE) 4 % Patch 3 patch, 3 patch, Transdermal, Q24h, 3 patch at 10/22/22 2051 **AND** lidocaine patch in PLACE, ,  Transdermal, Q8H SHERMAN, Clarita Rausch MD     multivitamin w/minerals (THERA-VIT-M) tablet 1 tablet, 1 tablet, Oral, QAM, Clarita Rausch MD, 1 tablet at 10/22/22 0909     naloxone (NARCAN) injection 0.2 mg, 0.2 mg, Intravenous, Q2 Min PRN **OR** naloxone (NARCAN) injection 0.4 mg, 0.4 mg, Intravenous, Q2 Min PRN **OR** naloxone (NARCAN) injection 0.2 mg, 0.2 mg, Intramuscular, Q2 Min PRN **OR** naloxone (NARCAN) injection 0.4 mg, 0.4 mg, Intramuscular, Q2 Min PRN, Clarita Rausch MD     Nurse may request from Pharmacy a change of form of medication (e.g. Liquid to tablet)., , Does not apply, Continuous PRN, Clarita Rausch MD     ondansetron (ZOFRAN) tablet 4 mg, 4 mg, Oral, Q6H PRN, Clarita Rausch MD, 4 mg at 10/22/22 2050     oxyCODONE (ROXICODONE) tablet 5 mg, 5 mg, Oral, Q8H PRN, Clarita Rausch MD, 5 mg at 10/23/22 0622     polyethylene glycol (MIRALAX) powder 17 g, 17 g, Oral, Daily PRN, Clarita Rausch MD     senna-docusate (SENOKOT-S/PERICOLACE) 8.6-50 MG per tablet 1 tablet, 1 tablet, Oral, BID, Clarita Rausch MD, 1 tablet at 10/23/22 0845  No current outpatient prescriptions on file.

## 2022-10-24 ENCOUNTER — APPOINTMENT (OUTPATIENT)
Dept: PHYSICAL THERAPY | Facility: SKILLED NURSING FACILITY | Age: 47
DRG: 073 | End: 2022-10-24
Attending: INTERNAL MEDICINE
Payer: COMMERCIAL

## 2022-10-24 ENCOUNTER — APPOINTMENT (OUTPATIENT)
Dept: OCCUPATIONAL THERAPY | Facility: SKILLED NURSING FACILITY | Age: 47
DRG: 073 | End: 2022-10-24
Attending: INTERNAL MEDICINE
Payer: COMMERCIAL

## 2022-10-24 ENCOUNTER — TELEPHONE (OUTPATIENT)
Dept: ORTHOPEDICS | Facility: CLINIC | Age: 47
End: 2022-10-24

## 2022-10-24 LAB
ANION GAP SERPL CALCULATED.3IONS-SCNC: 6 MMOL/L (ref 3–14)
BUN SERPL-MCNC: 11 MG/DL (ref 7–30)
CALCIUM SERPL-MCNC: 9.9 MG/DL (ref 8.5–10.1)
CHLORIDE BLD-SCNC: 106 MMOL/L (ref 94–109)
CO2 SERPL-SCNC: 29 MMOL/L (ref 20–32)
CREAT SERPL-MCNC: 0.84 MG/DL (ref 0.52–1.04)
ERYTHROCYTE [DISTWIDTH] IN BLOOD BY AUTOMATED COUNT: 14.6 % (ref 10–15)
GAMMA INTERFERON BACKGROUND BLD IA-ACNC: 0.03 IU/ML
GFR SERPL CREATININE-BSD FRML MDRD: 85 ML/MIN/1.73M2
GLUCOSE BLD-MCNC: 96 MG/DL (ref 70–99)
HCT VFR BLD AUTO: 40.4 % (ref 35–47)
HGB BLD-MCNC: 14 G/DL (ref 11.7–15.7)
M TB IFN-G BLD-IMP: NEGATIVE
M TB IFN-G CD4+ BCKGRND COR BLD-ACNC: 9.97 IU/ML
MCH RBC QN AUTO: 31.7 PG (ref 26.5–33)
MCHC RBC AUTO-ENTMCNC: 34.7 G/DL (ref 31.5–36.5)
MCV RBC AUTO: 92 FL (ref 78–100)
MITOGEN IGNF BCKGRD COR BLD-ACNC: 0 IU/ML
MITOGEN IGNF BCKGRD COR BLD-ACNC: 0.02 IU/ML
PLATELET # BLD AUTO: 297 10E3/UL (ref 150–450)
POTASSIUM BLD-SCNC: 3.8 MMOL/L (ref 3.4–5.3)
QUANTIFERON MITOGEN: 10 IU/ML
QUANTIFERON NIL TUBE: 0.03 IU/ML
QUANTIFERON TB1 TUBE: 0.05 IU/ML
QUANTIFERON TB2 TUBE: 0.03
RBC # BLD AUTO: 4.41 10E6/UL (ref 3.8–5.2)
SODIUM SERPL-SCNC: 141 MMOL/L (ref 133–144)
WBC # BLD AUTO: 5.3 10E3/UL (ref 4–11)

## 2022-10-24 PROCEDURE — 250N000013 HC RX MED GY IP 250 OP 250 PS 637: Performed by: INTERNAL MEDICINE

## 2022-10-24 PROCEDURE — 97535 SELF CARE MNGMENT TRAINING: CPT | Mod: GO

## 2022-10-24 PROCEDURE — 85027 COMPLETE CBC AUTOMATED: CPT | Performed by: INTERNAL MEDICINE

## 2022-10-24 PROCEDURE — 97110 THERAPEUTIC EXERCISES: CPT | Mod: GP | Performed by: PHYSICAL THERAPIST

## 2022-10-24 PROCEDURE — 97530 THERAPEUTIC ACTIVITIES: CPT | Mod: GP | Performed by: PHYSICAL THERAPIST

## 2022-10-24 PROCEDURE — 80048 BASIC METABOLIC PNL TOTAL CA: CPT | Performed by: INTERNAL MEDICINE

## 2022-10-24 PROCEDURE — 99308 SBSQ NF CARE LOW MDM 20: CPT | Performed by: INTERNAL MEDICINE

## 2022-10-24 PROCEDURE — 36415 COLL VENOUS BLD VENIPUNCTURE: CPT | Performed by: INTERNAL MEDICINE

## 2022-10-24 PROCEDURE — 250N000011 HC RX IP 250 OP 636: Performed by: INTERNAL MEDICINE

## 2022-10-24 PROCEDURE — 120N000009 HC R&B SNF

## 2022-10-24 RX ADMIN — OXYCODONE HYDROCHLORIDE 5 MG: 5 TABLET ORAL at 06:03

## 2022-10-24 RX ADMIN — CYCLOBENZAPRINE 10 MG: 10 TABLET, FILM COATED ORAL at 08:26

## 2022-10-24 RX ADMIN — SENNOSIDES AND DOCUSATE SODIUM 1 TABLET: 50; 8.6 TABLET ORAL at 21:09

## 2022-10-24 RX ADMIN — ACETAMINOPHEN 975 MG: 325 TABLET, FILM COATED ORAL at 06:02

## 2022-10-24 RX ADMIN — ACETAMINOPHEN 975 MG: 325 TABLET, FILM COATED ORAL at 15:08

## 2022-10-24 RX ADMIN — Medication 5 MG: at 22:04

## 2022-10-24 RX ADMIN — ACETAMINOPHEN 975 MG: 325 TABLET, FILM COATED ORAL at 22:04

## 2022-10-24 RX ADMIN — ONDANSETRON HYDROCHLORIDE 4 MG: 4 TABLET, FILM COATED ORAL at 08:27

## 2022-10-24 RX ADMIN — OXYCODONE HYDROCHLORIDE 5 MG: 5 TABLET ORAL at 22:04

## 2022-10-24 RX ADMIN — CYCLOBENZAPRINE 10 MG: 10 TABLET, FILM COATED ORAL at 15:08

## 2022-10-24 RX ADMIN — CYCLOBENZAPRINE 10 MG: 10 TABLET, FILM COATED ORAL at 21:09

## 2022-10-24 RX ADMIN — LIDOCAINE PATCH 4% 3 PATCH: 40 PATCH TOPICAL at 21:09

## 2022-10-24 RX ADMIN — SENNOSIDES AND DOCUSATE SODIUM 1 TABLET: 50; 8.6 TABLET ORAL at 08:27

## 2022-10-24 RX ADMIN — OXYCODONE HYDROCHLORIDE 5 MG: 5 TABLET ORAL at 15:08

## 2022-10-24 ASSESSMENT — ACTIVITIES OF DAILY LIVING (ADL)
ADLS_ACUITY_SCORE: 41

## 2022-10-24 NOTE — PLAN OF CARE
"0700H-2300H    2100H: Peggy Jessica RM: [phone]  Hx: Fracture of 4th Metacarpal Base  W/ itching under Rt. Lower Forearm Splint , started last night but feels more \"intense\" per patient. Would want to try Benadryl. Thanks!  Eva PHIPPS   TCU [phone]    Text paged provider for the above complaint,with order for  Benadryl PRN q6hrs given at 2116H, verbalized with some relief. Zofran PRN and Oxycodone being given per request, nausea and pain managed with improvement in appetite and movement. Wheelchair based independent per therapy, remains assist x1 with transfers walker/ gait belt use. Requesting Melatonin at bedtime ( note left to provider)  and follow up appointment with ortho ( note left to [facility]). To continue POC.     Patient's most recent vital signs are:     Vital signs:  BP: 129/95  Temp: 96.5  HR: 99  RR: 16  SpO2: 97 %     Patient does not have new respiratory symptoms.  Patient does not have new sore throat.  Patient does not have a fever greater than 99.5.                            "

## 2022-10-24 NOTE — PLAN OF CARE
0700H- 2300H  No recent changes during this shift. Remains her baseline A/O x 4, afebrile, pain managed with Oxycodone every 8hrs PRN that patient's request regularly on schedule. Intermittent nausea , Zofran given as requested. Had shower with OT, visited by son . New order placed by provider for Melatonin at bedtime.To continue POC.     Patient's most recent vital signs are:     Vital signs:  BP: 114/85  Temp: 96.5  HR: 88  RR: 16  SpO2: 97 %     Patient does not have new respiratory symptoms.  Patient does not have new sore throat.  Patient does not have a fever greater than 99.5.

## 2022-10-24 NOTE — PLAN OF CARE
Problem: Fall Injury Risk  Goal: Absence of Fall and Fall-Related Injury  Description: Provide a safe, barrier-free environment that encourages independent activity.  Keep care area uncluttered and well-lighted.  Determine need for increased observation or monitoring.  10/24/2022 1253 by Pebbles Solitario RN  Outcome: Progressing  10/24/2022 1252 by Pebbles Solitario RN  Outcome: Progressing     Problem: Nausea and Vomiting  Goal: Nausea and Vomiting Relief  Description: Adjust position to prevent aspiration.    Identify and address underlying cause.    Administer antiemetic agent per prescribed regimen.    Assess fluid status and ability to take oral fluids; if unable to provide or achieve oral intake, provide intravenous fluid therapy and electrolyte replacement.    10/24/2022 1253 by Pebbles Solitario RN  Outcome: Progressing  10/24/2022 1252 by Pebbles Solitario RN  Outcome: Progressing     Problem: Pain Chronic (Persistent)  Goal: Optimal Pain Control and Function  Description: Evaluate pain level, effect of treatment and patient response at regular intervals.    Minimize pain stimuli; coordinate care and adjust environment (e.g., light, noise, unnecessary movement); promote sleep/rest.    Match pharmacologic analgesia to severity and type of pain mechanism (e.g., neuropathic, muscle, inflammatory); consider multimodal approach (e.g., nonopioid, opioid, adjuvant).    Provide medication at regular intervals; titrate to patient response.    Manage breakthrough pain with additional doses; consider rotation or switching medication.    10/24/2022 1253 by Pebbles Solitario RN  Outcome: Progressing  10/24/2022 1252 by Pebbles Solitario RN  Outcome: Progressing     Problem:   Problem: Mobility Impairment  Goal: Optimal Mobility  Description: Assess mobility skills (e.g., bed, transfers, ambulation, gait, stair climbing, wheelchair) and factors influencing mobility, such as balance, safety, range of  motion, strength, muscle tone, cognition and sensory processing.    Instruct in transfer and mobility techniques supporting highest level of independence while ensuring safety.    Consider any contraindications or precautions to individualize treatment plan (e.g., joint or ligament instability, weightbearing restrictions).    Encourage early mobilization and performance of daily activities, if able, while providing level of assistance needed for safety.  10/24/2022 1253 by Pebbles Solitario RN  Outcome: Progressing  10/24/2022 1252 by Pebbles Solitario RN  Outcome: Progressing     Problem: Orthopaedic Fracture  Goal: Fracture Stability  Description: Identify and address modifiable risk factors which may inhibit bone healing (e.g., comorbidity, medication, poor nutrition).    Maintain stabilization, device position and integrity to promote reduction and alignment of fracture (e.g., continuous line of pull, counter traction, weights hanging free).    Minimize unnecessary movement and disruption of alignment; provide firm bed support.    Maintain weightbearing and activity restrictions until bone healing is established.  Outcome: Progressing   Depressive Symptoms  Goal: Improved or Stable Mood  Description: Provide opportunity for patient and family/caregiver to express feelings, stressors and self-perception (e.g., verbalization, journaling).    Convey caring approach, empathy and potential for change; hope is key for recovery.    Utilize existing coping strategies and assist in developing new strategies, such as relaxation, music and problem-solving; assess for ineffective strategies (e.g., substance use, isolation).  10/24/2022 1253 by Pebbles Solitario RN  Outcome: Progressing  10/24/2022 1252 by Pebbles Solitario RN  Outcome: Progressing   Goal Outcome Evaluation: Progressing

## 2022-10-24 NOTE — TELEPHONE ENCOUNTER
Call center called back line for fracture spot.       Orthopedic/Sports Medicine Fracture Triage    Incoming call/or message from call center member.    Fracture type: Hand.    The patient is in a  splint.    Date of injury 10/17/22.    Triaged by: Dr. Montez.    Determined to be managed Non operatively.    Needs to be seen within 1 week.    Additional Comments/information:     Shanti Stauffer LPN

## 2022-10-24 NOTE — PROGRESS NOTES
[facility] Transitional Care    Medicine Progress Note - Hospitalist Service    Date of Admission:  10/21/2022    Assessment & Plan   Peggy Jessica is a 47 year old female admitted on 10/17/2022. She has PMH of chronic inflammatory demyelinating polyneuropathy, frequent falls, complex regional pain syndrome who presents to the ED after a mechanical fall.   She was discharged to [facility] on 10/21 for Rehab         # Deconditioning ; PT and OT   #Ground level fall  #Frequent falls  #Nondisplaced fracture at the fourth metacarpal base 10/17  #Demyelinating polyneuropathy syndrome   #Complex regional pain syndrome  Patient presented to the ED  after a fall out of bed. She reported to the ED doctor that she fell out of bed and landed on her right side. Stated she was feeding the dogs and felt lightheaded and passed out. She reports that she LOC for a few minutes she thinks. The fall was unwitnessed but she thinks she landed on her right side because she has pain along her entire right side. She denies any tongue biting, drooling, or loss of bowel or bladder control. She reports she has had many syncopal episodes in the past. Patient and  report she laid on the ground for about 25 minutes. Patient reported to PT in the ED that she had 6 falls yesterday and has been falling frequently at home, about 2-3 times a day. She denies any chest pain, SOB, nausea (aside from chronic nausea), vomiting, dizziness, lightheadedness, fevers, or recent illness.   She uses a walker, cane, wheelchair, and other assistive devices as needed. She lives in a home with 16 steps up to the bathroom. She verbalizes need for higher level of care and assistance as she is unable to get around safety at home. In the ED, her vital signs were stable. Labwork unremarkable with the exception of ALT 47, AST 44.   EKG sinus rhythm.   Numerous imaging including right pelvis/hip xray, right shoulder xray, right elbow xray, CT head, CT  cervical spine, CT chest/abdomen/pelvis, CT thoracic spine, CT lumbar spine showing no fractures.   Xray of the right wrist did show a possible nondisplaced fracture of the 4th metacarpal base which was splinted in the ED and recommended for follow up imaging/ follow up  in 1 week.   Ortho  referral was made     UA was negative. Patient was evaluated by PT in ED who recommended TCU  . Echo was done 10/18/22 and showed LVEF of 60-65% with no RWMA or any significant valvular disease   PT/OT  -PO Tylenol and Ibuprofen for pain  -Oxycodone 5 mg q8h prn   -Continue PTA Flexiril for pain  -PRN lidocaine patches  -PRN miralax and senna as needed  -Follow up with orthopedics early next week for re imaging and assessment on need for splint, referral placed  -PRN Zofran for chronic nausea        Depressed mood   Health psychology consult     Insomnia ; melatonin started per request   Severe malnutrition in the context of acute on chronic disease.      Diet: Regular Diet Adult    DVT Prophylaxis: Pneumatic Compression Devices  Kwan Catheter: Not present  Central Lines: None  Cardiac Monitoring: None  Code Status: Full Code      Disposition Plan     Expected Discharge Date: 10/27/2022                The patient's care was discussed with the Bedside Nurse, Care Coordinator/ and Patient.    Clarita Rausch MD  Hospitalist Service  [facility] Transitional [facility]  Securely message with the Vocera Web Console (learn more here)  Text page via [employer] Paging/Directory         Clinically Significant Risk Factors                               ______________________________________________________________________    Interval History   No new symptoms reported per nursing staff .  Last night notes reviewed .  No chest pain or Shortness of breath reported.  No vomiting   No difficulty with voiding   Passing gas .    4 system ROS reviewed .    Data reviewed today: I reviewed all medications, new labs and imaging  results over the last 24 hours.    Physical Exam   Vital Signs: Temp: (!) 96.1  F (35.6  C) Temp src: Oral BP: 110/69 Pulse: 81   Resp: 16 SpO2: 94 % O2 Device: None (Room air)    Weight: 167 lbs 12.8 oz  Constitutional: awake, alert, cooperative, no apparent distress, and appears stated age  Eyes: Lids and lashes normal, pupils equal, round and reactive to light, extra ocular muscles intact, sclera clear, conjunctiva normal  Hematologic / Lymphatic: no cervical lymphadenopathy  Respiratory: No increased work of breathing, good air exchange, clear to auscultation bilaterally, no crackles or wheezing  Cardiovascular: Normal apical impulse, regular rate and rhythm, normal S1 and S2, no S3 or S4, and no murmur noted  GI: No scars, normal bowel sounds, soft, non-distended, non-tender, no masses palpated,   Skin: no jaundice  Neurologic: Awake, alert, oriented to name, place and time.  Cranial nerves II-XII are grossly intact. normal.  Neuropsychiatric: General: normal, calm and normal eye contact    Data   Recent Labs   Lab 10/24/22  0600 10/17/22  0955   WBC 5.3 6.1   HGB 14.0 13.3   MCV 92 94    274    139   POTASSIUM 3.8 4.3   CHLORIDE 106 106   CO2 29 23   BUN 11 10.7   CR 0.84 0.75   ANIONGAP 6 10   ESTEBAN 9.9 9.7   GLC 96 112*   ALBUMIN  --  4.1   PROTTOTAL  --  7.2   BILITOTAL  --  0.3   ALKPHOS  --  95   ALT  --  47*   AST  --  44*

## 2022-10-24 NOTE — PROGRESS NOTES
MDS Pain Assessment    The following is the pain interview as conducted by the TCU RN caring for the patient on October / 26 / 2022. This assessment is required by the [facility] for all patients in [place] SNF (Skilled Nursing Facilities).       1. Have you had pain or hurting at any time in the last 5 days? Yes    2. How much of the time have you experienced pain or hurting over the last 5 days? occasionally    3. Over the past 5 days, has pain made it hard for you to sleep at night? No    4. Over the past 5 days, have you limited your day-to-day activities because of pain? No    5. Pain intensity (Numeric scale used first-if patient unable to answer, verbal scale to be used.)    Numeric Scale: Please rate your worst pain over the last 5 days on a zero to ten scale, with zero being no pain and ten being the worst pain you can imagine.     7    Verbal Scale: USED ONLY if unable to give numeric, otherwise N/A  Please rate the intensity of your worst pain over the last 5 days.     not applicable

## 2022-10-24 NOTE — PROGRESS NOTES
"CLINICAL NUTRITION SERVICES - ASSESSMENT NOTE     Nutrition Prescription    RECOMMENDATIONS FOR MDs/PROVIDERS TO ORDER:  -None at this time.     Malnutrition Status:    Severe malnutrition in the context of acute on chronic disease.     Recommendations already ordered by Registered Dietitian (RD):  -Encouraged oral intakes.     Future/Additional Recommendations:  -Monitor PO intake and weights.      REASON FOR ASSESSMENT  Peggy Jessica is a/an 47 year old female assessed by the dietitian for Admission Nutrition Risk Screen for positive- 2-13 lb wt gain.     PMH  Peggy Jessica is a 47 year old female admitted on 10/17/2022. She has PMH of chronic inflammatory demyelinating polyneuropathy, frequent falls, complex regional pain syndrome who presents to the ED after a mechanical fall.   [facility]     NUTRITION HISTORY  Earlier this month patient was seen at [facility]. Per MD note: \"In Feb 2021, she developed abrupt loss of appetite and began losing weight. This was followed by altered sense of taste such that she is exteremly sensitive to certain taste (such as salt) and she developed food aversions to foods normally enjoyed due to sensitivity to the taste (such as dupree, [place]anny [employer] apples). This was followed by significant fatigue and difficulty with concentration and memory.\"     Since then, patient has reported poor appetite and chronic nausea. Per chart review, her appetite appears to have improved since admission.     Visited at length with patient at bedside. She reports that her  cooks and that her biggest barrier to eating has been changes in her taste. She reports that due to CSIP her taste buds are more sensitive and inconsistent, foods that normally she would like will begin to taste off or food will be too strong (I.e. smoked meats taste too much like charcoal). Some foods that work for her are: blue cheese, chocolate, tangy and tart foods, " "humus, cheese, fruits and vegetables, peanut butter and jelly. Patient reports a lot of trial and error in figuring out what foods work for her. Writer empathized with patient, validated that it is hard to feel out of control. Writer encouraged patient to continue to be intuitive and gentle on herself. We discussed starting a food diary to attempt to find patterns given her taste changes, sources of B6 given her neuropathy, and tips for increasing protein and calories, especially when her appetite is low (I.e. protein shakes and smoothies).     Patient does not tolerate ONS.     Patient reports a variable weight history. Her lowest weight was 89 lb 7 years ago, then 120 lbs where she stayed for a while, she then gained 140 lbs and lost 100 lbs. She reports her UBW as 160 lbs. She noted that she would like to lose weight, writer encouraged weight maintenance to promote protein/muscle synthesis.     CURRENT NUTRITION ORDERS  Diet: Regular  Intake/Tolerance: 100% per I/Os     LABS  Labs reviewed, unremarkable     MEDICATIONS  Thera-Vit-M daily   Senna BID     ANTHROPOMETRICS  Height: 165.1 cm (5' 5\")   Most Recent Weight: 76.1 kg (167 lb 12.8 oz)    IBW: 57 kg  %IBW: 134%  BMI: Overweight BMI 25-29.9  Weight History:   Wt Readings from Last 10 Encounters:   10/21/22 76.1 kg (167 lb 12.8 oz)   10/17/22 77.1 kg (170 lb)   10/05/22 80.6 kg (177 lb 11.2 oz)   07/28/22 70.6 kg (155 lb 10.3 oz)   07/20/22 72.6 kg (160 lb)   07/12/22 72.6 kg (160 lb)   07/11/22 72.6 kg (160 lb)   06/30/22 72.6 kg (160 lb)   06/06/22 77.7 kg (171 lb 4.8 oz)   05/25/22 74.8 kg (165 lb)   5.7% wt loss in ~2.5 weeks. Patient had experienced prior 12.4% wt gain in two months between July and October.       Dosing Weight: 62 kg    ASSESSED NUTRITION NEEDS  Estimated Energy Needs: 0805-0843 kcals/day (25 - 30 kcals/kg)  Justification: Maintenance  Estimated Protein Needs: 50-62+ grams protein/day (0.8 - 1+ grams of pro/kg)  Justification: " Maintenance/Repletion  Estimated Fluid Needs: 1 mL/kcal   Justification: Maintenance    PHYSICAL FINDINGS  See malnutrition section below.    MALNUTRITION  % Intake: </=75% for >/= 1 month (severe)  % Weight Loss: > 2% in 1 week (severe)  Subcutaneous Fat Loss: None observed  Muscle Loss: Temporal:  severe, Scapular bone:  moderate, Thoracic region (clavicle, acromium bone, deltoid, trapezius, pectoral):  moderate, Upper arm (bicep, tricep):  moderate and Posterior calf:  moderate  Fluid Accumulation/Edema: None noted  Malnutrition Diagnosis: Severe malnutrition in the context of acute on chronic disease.     NUTRITION DIAGNOSIS  Inadequate oral intake related to changes in taste as evidenced by patient report and significant 5.7% wt loss in 2.5 weeks.     INTERVENTIONS  Implementation  Nutrition Education: Educated on the utility of a food diary to attempt to find patterns in taste changes, sources of B6 given her neuropathy, and tips for increasing protein and calories, especially when her appetite is low (I.e. protein shakes and smoothies).     Goals  Patient to consume % of nutritionally adequate meal trays TID, or the equivalent with supplements/snacks.     Monitoring/Evaluation  Progress toward goals will be monitored and evaluated per protocol.    Lucretia Granger, MPH, RD  TCU RD pager: [phone]

## 2022-10-25 ENCOUNTER — APPOINTMENT (OUTPATIENT)
Dept: PHYSICAL THERAPY | Facility: SKILLED NURSING FACILITY | Age: 47
DRG: 073 | End: 2022-10-25
Attending: INTERNAL MEDICINE
Payer: COMMERCIAL

## 2022-10-25 ENCOUNTER — APPOINTMENT (OUTPATIENT)
Dept: OCCUPATIONAL THERAPY | Facility: SKILLED NURSING FACILITY | Age: 47
DRG: 073 | End: 2022-10-25
Attending: INTERNAL MEDICINE
Payer: COMMERCIAL

## 2022-10-25 PROCEDURE — 97535 SELF CARE MNGMENT TRAINING: CPT | Mod: GO

## 2022-10-25 PROCEDURE — 97110 THERAPEUTIC EXERCISES: CPT | Mod: GP

## 2022-10-25 PROCEDURE — 120N000009 HC R&B SNF

## 2022-10-25 PROCEDURE — 250N000013 HC RX MED GY IP 250 OP 250 PS 637: Performed by: INTERNAL MEDICINE

## 2022-10-25 RX ADMIN — CYCLOBENZAPRINE 10 MG: 10 TABLET, FILM COATED ORAL at 20:41

## 2022-10-25 RX ADMIN — SENNOSIDES AND DOCUSATE SODIUM 1 TABLET: 50; 8.6 TABLET ORAL at 08:37

## 2022-10-25 RX ADMIN — Medication 5 MG: at 22:06

## 2022-10-25 RX ADMIN — ACETAMINOPHEN 975 MG: 325 TABLET, FILM COATED ORAL at 22:06

## 2022-10-25 RX ADMIN — CYCLOBENZAPRINE 10 MG: 10 TABLET, FILM COATED ORAL at 08:36

## 2022-10-25 RX ADMIN — ACETAMINOPHEN 975 MG: 325 TABLET, FILM COATED ORAL at 05:41

## 2022-10-25 RX ADMIN — OXYCODONE HYDROCHLORIDE 5 MG: 5 TABLET ORAL at 22:06

## 2022-10-25 RX ADMIN — CYCLOBENZAPRINE 10 MG: 10 TABLET, FILM COATED ORAL at 14:41

## 2022-10-25 RX ADMIN — OXYCODONE HYDROCHLORIDE 5 MG: 5 TABLET ORAL at 07:05

## 2022-10-25 RX ADMIN — SENNOSIDES AND DOCUSATE SODIUM 1 TABLET: 50; 8.6 TABLET ORAL at 20:41

## 2022-10-25 RX ADMIN — ACETAMINOPHEN 975 MG: 325 TABLET, FILM COATED ORAL at 14:41

## 2022-10-25 RX ADMIN — OXYCODONE HYDROCHLORIDE 5 MG: 5 TABLET ORAL at 14:42

## 2022-10-25 RX ADMIN — LIDOCAINE PATCH 4% 3 PATCH: 40 PATCH TOPICAL at 20:41

## 2022-10-25 ASSESSMENT — ACTIVITIES OF DAILY LIVING (ADL)
ADLS_ACUITY_SCORE: 41

## 2022-10-25 NOTE — PROGRESS NOTES
This is a recent snapshot of the patient's [facility] medical record.  For current drug dose and complete information and questions, call [phone] or In Basket pool, [facility] ([id number])  CSN Number:  [id number]

## 2022-10-25 NOTE — PLAN OF CARE
"Met with patient. Offered COVID booster and flu vaccine. Pt declined due to \"vaccine damage\" and was advised by her primary that she should not get either vaccines due to to possible relation with CIDP.    "

## 2022-10-25 NOTE — PROGRESS NOTES
SPIRITUAL HEALTH SERVICES  SPIRITUAL ASSESSMENT Progress Note  [facility] TCU R 411 10/25/22    REFERRAL SOURCE: Self Referral    Pt informed Unit  she did not have any spiritual needs today. Pt was not interested in SHS.    PLAN: No follow up necessary.    Valentin Otero MA, MPA  Associate   Pager: [phone]

## 2022-10-25 NOTE — CARE PLAN
Patient AO, needs A1 SBA W/C based, independent while on the w/c. Patient continent of B&B, used toilet X1 during the night. Patient denied CP, SOB, fever or chills.Pain managed with scheduled tylenol. Appeared to be sleeping most of the time during safety checks. NO new concern noted.      Patient's most recent vital signs are:     Vital signs:  BP: 114/85  Temp: 96.5  HR: 88  RR: 16  SpO2: 97 %     Patient does not have new respiratory symptoms.  Patient does not have new sore throat.  Patient does not have a fever greater than 99.5.

## 2022-10-25 NOTE — PLAN OF CARE
VS: VSS   O2: Stable on RA.   Output: Voiding in toilet.   Last BM: 10/23 per pt.   Activity: Up with walker, gait belt and assist of one to WC and toilet. Pivot transfer.   Skin: Intact.    Pain: Managed with Oxycodone.   Neuro: Baseline numbness of extremities. Right hand fingers warm.   Dressing: Right lower arm splint with ace wrap.  C/D/I.   Diet: Gluten free.   LDA: None   Equipment: WC, I.S. gait belt.

## 2022-10-25 NOTE — PROGRESS NOTES
10/25/22 1100   Name of Certified Therapeutic Rec Specialist   Name of Certified Therapeutic Rec Specialist ISHA Weldon   Appointment Type   Type of Therapeutic Rec Session Therapeutic Rec Assessment   General Information   Patient Profile Review See Profile for full history and prior level of function   Daily Contact with Relatives or Friends Phone call;Visit   Pets Dog;Other (see comments)  (4 dogs, 2 birds)   Community Involvement   Community Involvement Disabled;Volunteer   Spiritual Practice Not connected/interested   Sees [facility]? No   Outings Other (comments)  (art [employer], Clipboards)   Hobbies/Interests   Games Other (comments)  (D&D, Role play games)   Craft/Art Crocheting;Knitting;Painting;Other (comments)  (weaving, spinning)   Music   Music Preferences Other (comments)  (alternative)   Listens to Recorded Music Yes   Brought Own Equipment Yes   Media   Computer Has own at home;Will use tablet/phone   Writing Writing   Reading Books;Has own reading materials   Reading Preferences Fiction   Sports / Physical Activities   Outdoor Activities Indoor gardening   Sports Fan Baseball;Basketball;Football;Other (see comments)  ([place])   Impression   Open to Socializing with Others Independent   Barriers to Leisure No barriers / independent   Patient, family and / or staff in agreement with Plan of Care Yes   Treatment Plan   Interested in Unit [place]? No   Type of Intervention Independent with activity   Equipment and Supplies While on Unit None needed   Assessment Assessment completed, pt was oriented to role of rec therapy and provided with leisure resource list. pt has her own leisure materials from home,no needs at this time. will provide materials as requested.

## 2022-10-26 ENCOUNTER — APPOINTMENT (OUTPATIENT)
Dept: OCCUPATIONAL THERAPY | Facility: SKILLED NURSING FACILITY | Age: 47
DRG: 073 | End: 2022-10-26
Attending: INTERNAL MEDICINE
Payer: COMMERCIAL

## 2022-10-26 ENCOUNTER — APPOINTMENT (OUTPATIENT)
Dept: PHYSICAL THERAPY | Facility: SKILLED NURSING FACILITY | Age: 47
DRG: 073 | End: 2022-10-26
Attending: INTERNAL MEDICINE
Payer: COMMERCIAL

## 2022-10-26 PROCEDURE — 250N000013 HC RX MED GY IP 250 OP 250 PS 637: Performed by: INTERNAL MEDICINE

## 2022-10-26 PROCEDURE — 120N000009 HC R&B SNF

## 2022-10-26 PROCEDURE — 250N000011 HC RX IP 250 OP 636: Performed by: INTERNAL MEDICINE

## 2022-10-26 PROCEDURE — 97530 THERAPEUTIC ACTIVITIES: CPT | Mod: GP

## 2022-10-26 PROCEDURE — 97530 THERAPEUTIC ACTIVITIES: CPT | Mod: GO

## 2022-10-26 PROCEDURE — 99308 SBSQ NF CARE LOW MDM 20: CPT | Performed by: INTERNAL MEDICINE

## 2022-10-26 RX ADMIN — SENNOSIDES AND DOCUSATE SODIUM 1 TABLET: 50; 8.6 TABLET ORAL at 20:46

## 2022-10-26 RX ADMIN — SENNOSIDES AND DOCUSATE SODIUM 1 TABLET: 50; 8.6 TABLET ORAL at 08:53

## 2022-10-26 RX ADMIN — OXYCODONE HYDROCHLORIDE 5 MG: 5 TABLET ORAL at 14:45

## 2022-10-26 RX ADMIN — Medication 5 MG: at 22:04

## 2022-10-26 RX ADMIN — ONDANSETRON HYDROCHLORIDE 4 MG: 4 TABLET, FILM COATED ORAL at 17:04

## 2022-10-26 RX ADMIN — OXYCODONE HYDROCHLORIDE 5 MG: 5 TABLET ORAL at 06:09

## 2022-10-26 RX ADMIN — ACETAMINOPHEN 975 MG: 325 TABLET, FILM COATED ORAL at 13:38

## 2022-10-26 RX ADMIN — CYCLOBENZAPRINE 10 MG: 10 TABLET, FILM COATED ORAL at 13:37

## 2022-10-26 RX ADMIN — LIDOCAINE PATCH 4% 3 PATCH: 40 PATCH TOPICAL at 20:47

## 2022-10-26 RX ADMIN — CYCLOBENZAPRINE 10 MG: 10 TABLET, FILM COATED ORAL at 08:53

## 2022-10-26 RX ADMIN — CYCLOBENZAPRINE 10 MG: 10 TABLET, FILM COATED ORAL at 20:46

## 2022-10-26 RX ADMIN — OXYCODONE HYDROCHLORIDE 5 MG: 5 TABLET ORAL at 22:04

## 2022-10-26 RX ADMIN — ACETAMINOPHEN 975 MG: 325 TABLET, FILM COATED ORAL at 06:09

## 2022-10-26 RX ADMIN — ACETAMINOPHEN 975 MG: 325 TABLET, FILM COATED ORAL at 22:03

## 2022-10-26 ASSESSMENT — ACTIVITIES OF DAILY LIVING (ADL)
ADLS_ACUITY_SCORE: 41
ADLS_ACUITY_SCORE: 41
ADLS_ACUITY_SCORE: 42
ADLS_ACUITY_SCORE: 42
ADLS_ACUITY_SCORE: 41
ADLS_ACUITY_SCORE: 42
ADLS_ACUITY_SCORE: 41

## 2022-10-26 NOTE — PROGRESS NOTES
Patient AO, needs A1 SBA W/C based, independent while on the w/c. Patient continent of B&B, used toilet X1 during the night. Patient denied CP, SOB, fever or chills.Verbalized pain on bilateral upper extremities and baseline numbness and tingling, prn oxycodone given with scheduled tylenol at 0600. Appeared to be sleeping most of the time during safety checks. NO new concern noted.      Patient's most recent vital signs are:     Vital signs:  BP: 126/81  Temp: 96.1  HR: 87  RR: 16  SpO2: 95 %     Patient does not have new respiratory symptoms.  Patient does not have new sore throat.  Patient does not have a fever greater than 99.5.

## 2022-10-26 NOTE — PROGRESS NOTES
SW met with pt.  Pt is getting everything ready to go home.  Pt listed all equipment they are getting.  SW gave pt information on w/c lift.  Pt was excited to see that.  SW also asked about court date.  Pt said offender was probably going to go to [facility] for 10 years with no court date.  If there is a court date it will be end of Nov. SW listened and offered support and validated feelings.  Still looking at discharge on the 30th.     STEVE Mckay   [facility], Transitional Care Unit   Social Work   [facility] [place] [address], [address]th [place] [address]  (PH) [phone]

## 2022-10-26 NOTE — PLAN OF CARE
No acute issues during this shift. Intermittent  nausea and pain managed with PRN meds, effective ( Zofran, Oxycodone), lidocaine patches x 3 applied to right hip .Wheelchair based independent, assist x 1 with transfers . Right arm splint intact, no complaint of itching under splint .To continue POC.     Patient's most recent vital signs are:     Vital signs:  BP: 126/81  Temp: 96.1  HR: 87  RR: 16  SpO2: 95 %     Patient does not have new respiratory symptoms.  Patient does not have new sore throat.  Patient does not have a fever greater than 99.5.

## 2022-10-26 NOTE — PLAN OF CARE
Patient is alert and oriented x4. Able to make needs known to staff. Patient is continent of both bowel and bladder. Denied chest pain, SOB, and N&V. Pain managed by PRN oxycodone. No concerns this shift. Provider placed order to allow PAOLA tonight. PM nurse updated. Will continue with POC.    Patient's most recent vital signs are:     Vital signs:  BP: 136/70  Temp: 95.9  HR: 75  RR: 16  SpO2: 98 %     Patient does not have new respiratory symptoms.  Patient does not have new sore throat.  Patient does not have a fever greater than 99.5.

## 2022-10-26 NOTE — PROGRESS NOTES
[facility] Transitional Care    Medicine Progress Note - Hospitalist Service    Date of Admission:  10/21/2022    Assessment & Plan      Peggy Jessica is a 47 year old female admitted on 10/17/2022. She has PMH of chronic inflammatory demyelinating polyneuropathy, frequent falls, complex regional pain syndrome who presents to the ED after a mechanical fall.   She was discharged to [facility] on 10/21 for Rehab         # Deconditioning ; PT and OT   #Ground level fall  #Frequent falls  #Nondisplaced fracture at the fourth metacarpal base 10/17  #Demyelinating polyneuropathy syndrome   #Complex regional pain syndrome  Patient presented to the ED  after a fall out of bed. She reported to the ED doctor that she fell out of bed and landed on her right side. Stated she was feeding the dogs and felt lightheaded and passed out. She reports that she LOC for a few minutes she thinks. The fall was unwitnessed but she thinks she landed on her right side because she has pain along her entire right side. She denies any tongue biting, drooling, or loss of bowel or bladder control. She reports she has had many syncopal episodes in the past. Patient and  report she laid on the ground for about 25 minutes. Patient reported to PT in the ED that she had 6 falls day prior  and has been falling frequently at home, about 2-3 times a day. She denies any chest pain, SOB, nausea (aside from chronic nausea), vomiting, dizziness, lightheadedness, fevers, or recent illness.   She uses a walker, cane, wheelchair, and other assistive devices as needed. She lives in a home with 16 steps up to the bathroom. She verbalizes need for higher level of care and assistance as she is unable to get around safety at home. In the ED, her vital signs were stable. Labwork unremarkable with the exception of ALT 47, AST 44.   EKG sinus rhythm.   Numerous imaging including right pelvis/hip xray, right shoulder xray, right elbow xray, CT  head, CT cervical spine, CT chest/abdomen/pelvis, CT thoracic spine, CT lumbar spine showing no fractures.     Xray of the right wrist did show a possible nondisplaced fracture of the 4th metacarpal base which was splinted in the ED and recommended for follow up imaging/ follow up  in 1 week.   Ortho  referral was made   - she has appointment to follow up  With orthopedic surgery ,        In terms of her CISP ( chronic inflammatory demyelinating polyradiculoneuropathy)   diagnosed in 2021 , has had Nausea, gait instability , sensory symptoms ataxia , residual inflammatory neuropathy , please see  extensive note  by Dr Jon Duenas  form neurology 10/5/22   - followed by neurology at [facility] and also was seen at [place] and had very extensive work up,  symptom have been getting worse, has follow up  appointment at [place]       UA was negative. Patient was evaluated by PT in ED who recommended TCU  . Echo was done 10/18/22 and showed LVEF of 60-65% with no RWMA or any significant valvular disease   PT/OT  -PO Tylenol and Ibuprofen for pain  -Oxycodone 5 mg q8h prn   -Continue PTA Flexiril for pain  -PRN lidocaine patches  -PRN miralax and senna as needed  -Follow up with orthopedics early next week for re imaging and assessment on need for splint, referral placed  -PRN Zofran for chronic nausea        Depressed mood   Health psychology consult      History breast cancer BRCA1 and 2 mutation   - status post  Bilateral mastectomy 10/2020     Celiac disease    - follows strict GF diet,  Having pharmacist check medications        Insomnia ; melatonin started per request            Diet: Regular Diet Adult    DVT Prophylaxis: mechanical   Kwan Catheter: Not present  Central Lines: None  Cardiac Monitoring: None  Code Status: Full Code      Disposition Plan           The patient's care was discussed with DESIRE .    Carlita Woodward MD  Hospitalist Service  [facility]  Securely message with the  [employer] Web Console (learn more here)  Text page via [employer] Paging/Directory         Clinically Significant Risk Factors                        # Severe Malnutrition: based on nutrition assessment        ______________________________________________________________________    Interval History      Saw patient  After physical therapy  Sitting in wheelchair , her legs feel tired. Ongoing nausea that was bit better today  No chest pain  No shortness of breath        Data reviewed today: I reviewed all medications, new labs and imaging results over the last 24 hours.    Physical Exam   Vital Signs: Temp: (!) 95.9  F (35.5  C) Temp src: Oral BP: 136/70 Pulse: 75   Resp: 16 SpO2: 98 % O2 Device: None (Room air)    Weight: 167 lbs 12.8 oz     General appearence: awake alert  In  no apparent distress    HEENT: EOMI, PEARLA, sclera nonicteric,  moist,  mucus membranes,   NECK : supple  RESPIRATORY: lungs clear to auscultation bilateral,    CARDIOVASCULAR:S1 S2 regular rate and rhythm, no rubs gallops or murmurs appreciated  GASTROINTESTINAL:soft, non-distended , non-tender , + bowel sounds,   SKIN: warm and dry, no mottling noted   NEUROLOGIC; awake alert and oriented,  EXTREMITIES: no clubbing, cyanosis or edema , moves all extremity, good pedal pulses   MUSCULOSKELETAL:  Right arm in splint     Data   Recent Labs   Lab 10/24/22  0600   WBC 5.3   HGB 14.0   MCV 92         POTASSIUM 3.8   CHLORIDE 106   CO2 29   BUN 11   CR 0.84   ANIONGAP 6   ESTEBAN 9.9   GLC 96     No results found for this or any previous visit (from the past 24 hour(s)).

## 2022-10-27 ENCOUNTER — APPOINTMENT (OUTPATIENT)
Dept: OCCUPATIONAL THERAPY | Facility: SKILLED NURSING FACILITY | Age: 47
DRG: 073 | End: 2022-10-27
Attending: INTERNAL MEDICINE
Payer: COMMERCIAL

## 2022-10-27 ENCOUNTER — APPOINTMENT (OUTPATIENT)
Dept: PHYSICAL THERAPY | Facility: SKILLED NURSING FACILITY | Age: 47
DRG: 073 | End: 2022-10-27
Attending: INTERNAL MEDICINE
Payer: COMMERCIAL

## 2022-10-27 LAB — SARS-COV-2 RNA RESP QL NAA+PROBE: NEGATIVE

## 2022-10-27 PROCEDURE — U0003 INFECTIOUS AGENT DETECTION BY NUCLEIC ACID (DNA OR RNA); SEVERE ACUTE RESPIRATORY SYNDROME CORONAVIRUS 2 (SARS-COV-2) (CORONAVIRUS DISEASE [COVID-19]), AMPLIFIED PROBE TECHNIQUE, MAKING USE OF HIGH THROUGHPUT TECHNOLOGIES AS DESCRIBED BY CMS-2020-01-R: HCPCS | Performed by: INTERNAL MEDICINE

## 2022-10-27 PROCEDURE — 97530 THERAPEUTIC ACTIVITIES: CPT | Mod: GP

## 2022-10-27 PROCEDURE — 120N000009 HC R&B SNF

## 2022-10-27 PROCEDURE — 250N000013 HC RX MED GY IP 250 OP 250 PS 637: Performed by: INTERNAL MEDICINE

## 2022-10-27 PROCEDURE — 97530 THERAPEUTIC ACTIVITIES: CPT | Mod: GO

## 2022-10-27 RX ORDER — ONDANSETRON 4 MG/1
4 TABLET, FILM COATED ORAL EVERY 6 HOURS PRN
Qty: 15 TABLET | Refills: 0 | Status: SHIPPED | OUTPATIENT
Start: 2022-10-27 | End: 2022-12-07

## 2022-10-27 RX ORDER — OXYCODONE HYDROCHLORIDE 5 MG/1
5 TABLET ORAL EVERY 8 HOURS PRN
Qty: 12 TABLET | Refills: 0 | Status: SHIPPED | OUTPATIENT
Start: 2022-10-27 | End: 2022-12-07

## 2022-10-27 RX ADMIN — CYCLOBENZAPRINE 10 MG: 10 TABLET, FILM COATED ORAL at 13:30

## 2022-10-27 RX ADMIN — OXYCODONE HYDROCHLORIDE 5 MG: 5 TABLET ORAL at 22:09

## 2022-10-27 RX ADMIN — SENNOSIDES AND DOCUSATE SODIUM 1 TABLET: 50; 8.6 TABLET ORAL at 09:15

## 2022-10-27 RX ADMIN — OXYCODONE HYDROCHLORIDE 5 MG: 5 TABLET ORAL at 06:24

## 2022-10-27 RX ADMIN — ACETAMINOPHEN 975 MG: 325 TABLET, FILM COATED ORAL at 22:09

## 2022-10-27 RX ADMIN — ACETAMINOPHEN 975 MG: 325 TABLET, FILM COATED ORAL at 06:24

## 2022-10-27 RX ADMIN — CYCLOBENZAPRINE 10 MG: 10 TABLET, FILM COATED ORAL at 09:15

## 2022-10-27 RX ADMIN — ACETAMINOPHEN 975 MG: 325 TABLET, FILM COATED ORAL at 13:30

## 2022-10-27 RX ADMIN — Medication 5 MG: at 22:09

## 2022-10-27 RX ADMIN — LIDOCAINE PATCH 4% 3 PATCH: 40 PATCH TOPICAL at 20:41

## 2022-10-27 RX ADMIN — OXYCODONE HYDROCHLORIDE 5 MG: 5 TABLET ORAL at 13:30

## 2022-10-27 RX ADMIN — CYCLOBENZAPRINE 10 MG: 10 TABLET, FILM COATED ORAL at 20:41

## 2022-10-27 RX ADMIN — SENNOSIDES AND DOCUSATE SODIUM 1 TABLET: 50; 8.6 TABLET ORAL at 20:41

## 2022-10-27 ASSESSMENT — ACTIVITIES OF DAILY LIVING (ADL)
ADLS_ACUITY_SCORE: 42

## 2022-10-27 NOTE — PLAN OF CARE
Occupational Therapy weekly progress note   10/27/22 0823   Appointment Info   Signing Clinician's Name / Credentials (OT) Naina Panda OTR/L CBIS   Rehab Comments (OT) wkly update note based on last treatment session 10/26   OT Goals   Therapy Frequency (OT) 6 times/wk   OT Predicted Duration/Target Date for Goal Attainment 10/28/22   OT: Upper Body Dressing Goal Met   OT: Lower Body Dressing Goal Met   OT: Upper Body Bathing Goal Met   OT: Lower Body Bathing Supervision/stand-by assist   OT: Toilet Transfer/Toileting Goal Met   OT: Meal Preparation Goal Met   OT: Home Management Supervision/stand-by assist;from wheelchair   OT: Goal 1 goal met   OT: Goal 2 Pt will demo L hand HEP to improve positioning and strength for ADL/IADL.   Self-Care/Home Management   Treatment Detail/Skilled Intervention OT: pt verbalized preference to learn compensatory strategies for increasing independence with kitchen and meal prep tasks. This writer provided education re: various strategies for use of dycem (issued dycem to pt), and other compensatory tools such as rocker knife, adaptive cutting board, and other strategies to secure items when preparing or cutting. Pt verbalizes concern re: being able to help do dishes, feed the dog, wash floors, water plants. However pt currently has been able to use compensation strategies for these activities with exception to helping with dishes. Pt notes she is planning to move into a w/c accessible home in which she hopes to be able to help more around the home   Therapeutic Activities   Treatment Detail/Skilled Intervention REINALDO: Focus on continued education/discussion of compensatory strategies within kitchen. Pt purchased adaptive cutting board from [employer]. Pt wants increased IND with dishwashing yet unable to come up with compensatory strategies that will be realistic d/t kitchen not w/c accessible. May trial stool to transfer to/from w/c to sit on for dishes. Facilitated lumbrical  strengthening exercises at tabletop for L hand including: wade exercise and clothespins. Writer provided cues for proper technique. Reviewed toilet transfer and bed  <> w/c transfer as pt is Mod I- yet discussed continuing to call for help when fatigued.   OT Discharge Planning   OT Plan OT: h/o frequent Falls, intermittent dizziness, sensation changes, L hand/forearm casted (splint with batting and ace wrap - she does not know if she will get a cast). Cont to assess for adaptations/compensatory strategies for ADL/IADL- **pt would like to practice using adaptive equipment in the kitchen and to problem solve adaptations for IADLs, EC/WS, L hand HEP (lumbrical weakness)   OT Discharge Recommendation (DC Rec) home with assist;home with home care occupational therapy   OT Rationale for DC Rec Pt would benefit from skilled OT in her home setting as she would like to be as IND yet safe as possible.  She enjoys baking but H has to take things in/out of the oven.  She reports that she does not shower if someone is not in the house.   OT Brief overview of current status oT: pt modified indep w/ basic adls, mobility from w/c including transfers,OTR reviewed and updated goals and POC, antic 2 sessions more of OT to met all goals as indicated above, pt demo good skills w/ researching AE options to use for compensation strategies w/ IADLs and adls. pt plans to d/c home w/ supportive spouse.   Post Acute Settings Only   What unit is patient on? TCU

## 2022-10-27 NOTE — PLAN OF CARE
Status at Admission - 10/22/22 Current Status - 10/27   Bed Mobility   Independent   Independent   Transfer   CGA with platform walker   SBA - CGA with platform walker   Gait   ~30' with platform walker, CGA   unchanged   Stairs   unable   10 stairs, CGA, lalito HR, step 2 pattern with tactile and auditory feedback from patient using foot on back of step   Wheelchair Propulsion   Independent   Independent         Assessment of Progress Since Last Update: Patient has shown significant improvement with ability to perform stairs. Pt was unable at admission and has progressed to 10 steps with CGA   Barriers to Progress: Patient medical status impacts her functional mobility. Her neurological conditions are impacting her ability to make functional improvements   Proposed Solutions to Improve Barriers: Pt has purchased ankle braces to assist with her stability   Justification for Continued Therapy Services: Patient will benefit from compensatory training for neurologic deficits to decrease her fall risk

## 2022-10-27 NOTE — PLAN OF CARE
Pt is A&OX4, calm, & cooperative with care. Denied CP, SOB, & n/v. SBA with transfer to w/c & independent when on w/c. Continent for both B&B. Takes med whole with thin liquid. Managed with scheduled tylenol & PRN oxy. Pt slept well throughout the night. Able to make needs known & call light within reach. Will continue with plan of care.    Patient's most recent vital signs are:     Vital signs:  BP: 125/87  Temp: 96.3  HR: 85  RR: 16  SpO2: 92 %     Patient does not have new respiratory symptoms.  Patient does not have new sore throat.  Patient does not have a fever greater than 99.5.

## 2022-10-27 NOTE — PLAN OF CARE
Goal Outcome Evaluation:  Patient was out for dinner with  to celebrate wedding anniversary, left around 1756H . Zofran PRN x 1 given at 1700H per patient's request. Returned at around 2030H, all due meds given, Oxycone PRN given X 1  per request at bedtime.   V/S stable , no labs done today. To continue POC.       Patient's most recent vital signs are:     Vital signs:  BP: 125/87  Temp: 96.3  HR: 85  RR: 16  SpO2: 92 %     Patient does not have new respiratory symptoms.  Patient does not have new sore throat.  Patient does not have a fever greater than 99.5.

## 2022-10-27 NOTE — PLAN OF CARE
Goal Outcome Evaluation:  Pt was on her wheelchair at the start of the shift.Patient was wearing splint on her R hand. VSS on RA. Visited by a friend. Denies chest pain and SOB. PRN Oxycodone given before therapy. Covid swab done result came in negative. Call light with in reach. Continue with current plan of care.      Patient's most recent vital signs are:     Vital signs:  BP: 126/80  Temp: 96  HR: 74  RR: 18  SpO2: 98 %     Patient does not have new respiratory symptoms.  Patient does not have new sore throat.  Patient does not have a fever greater than 99.5.

## 2022-10-27 NOTE — PHARMACY-CONSULT NOTE
Consult placed to find which medications are gluten-free as it was discovered patient has celiac's disease.    Probably are gluten-free (as of 10/27/2022)     1.Tylenol 325mg brand name   2.Cyclobenzaprine 10mg Cipla brand   3. Ibuprofen 600mg Major brand   4. Thera M Major brand MVI & Rugby high potency MVI which are BOTH in pyxis   5. Ondansetron 4mg  [facility]-Pkg    6. Oxycodone 5mg [facility]-Pkg   7. Generic senokot [facility]-Pkg    Probably is gluten free but could find no specific information   1. Generic Miralax Major brand    Unable to find information as to gluten-free status   1. Melatonin GenDose brand (SEE BELOW)   2. Generic Tums Hospak (not using recently)    10/27/2022 14:30  Received informantion that MELATONIN 5mg IS GLUTEN-FREE

## 2022-10-28 ENCOUNTER — APPOINTMENT (OUTPATIENT)
Dept: PHYSICAL THERAPY | Facility: SKILLED NURSING FACILITY | Age: 47
DRG: 073 | End: 2022-10-28
Attending: INTERNAL MEDICINE
Payer: COMMERCIAL

## 2022-10-28 ENCOUNTER — APPOINTMENT (OUTPATIENT)
Dept: OCCUPATIONAL THERAPY | Facility: SKILLED NURSING FACILITY | Age: 47
DRG: 073 | End: 2022-10-28
Attending: INTERNAL MEDICINE
Payer: COMMERCIAL

## 2022-10-28 PROCEDURE — 250N000011 HC RX IP 250 OP 636: Performed by: INTERNAL MEDICINE

## 2022-10-28 PROCEDURE — 250N000013 HC RX MED GY IP 250 OP 250 PS 637: Performed by: INTERNAL MEDICINE

## 2022-10-28 PROCEDURE — 97530 THERAPEUTIC ACTIVITIES: CPT | Mod: GP | Performed by: PHYSICAL THERAPIST

## 2022-10-28 PROCEDURE — 97110 THERAPEUTIC EXERCISES: CPT | Mod: GO

## 2022-10-28 PROCEDURE — 120N000009 HC R&B SNF

## 2022-10-28 RX ADMIN — OXYCODONE HYDROCHLORIDE 5 MG: 5 TABLET ORAL at 14:20

## 2022-10-28 RX ADMIN — CYCLOBENZAPRINE 10 MG: 10 TABLET, FILM COATED ORAL at 20:53

## 2022-10-28 RX ADMIN — SENNOSIDES AND DOCUSATE SODIUM 1 TABLET: 50; 8.6 TABLET ORAL at 07:50

## 2022-10-28 RX ADMIN — ACETAMINOPHEN 975 MG: 325 TABLET, FILM COATED ORAL at 14:19

## 2022-10-28 RX ADMIN — ACETAMINOPHEN 975 MG: 325 TABLET, FILM COATED ORAL at 22:07

## 2022-10-28 RX ADMIN — IBUPROFEN 600 MG: 600 TABLET, FILM COATED ORAL at 16:14

## 2022-10-28 RX ADMIN — OXYCODONE HYDROCHLORIDE 5 MG: 5 TABLET ORAL at 22:32

## 2022-10-28 RX ADMIN — Medication 5 MG: at 22:07

## 2022-10-28 RX ADMIN — ACETAMINOPHEN 975 MG: 325 TABLET, FILM COATED ORAL at 06:37

## 2022-10-28 RX ADMIN — LIDOCAINE PATCH 4% 3 PATCH: 40 PATCH TOPICAL at 20:53

## 2022-10-28 RX ADMIN — SENNOSIDES AND DOCUSATE SODIUM 1 TABLET: 50; 8.6 TABLET ORAL at 20:53

## 2022-10-28 RX ADMIN — CYCLOBENZAPRINE 10 MG: 10 TABLET, FILM COATED ORAL at 14:20

## 2022-10-28 RX ADMIN — OXYCODONE HYDROCHLORIDE 5 MG: 5 TABLET ORAL at 06:37

## 2022-10-28 RX ADMIN — ONDANSETRON HYDROCHLORIDE 4 MG: 4 TABLET, FILM COATED ORAL at 12:42

## 2022-10-28 RX ADMIN — CYCLOBENZAPRINE 10 MG: 10 TABLET, FILM COATED ORAL at 07:49

## 2022-10-28 ASSESSMENT — ACTIVITIES OF DAILY LIVING (ADL)
ADLS_ACUITY_SCORE: 42

## 2022-10-28 NOTE — DISCHARGE INSTRUCTIONS
Primary care appointment   Date: 11/7/2022  Time: 10:40am   Provider: Cisco Bautista  Address: [address] [place] [id number]  Phone: [phone]

## 2022-10-28 NOTE — PLAN OF CARE
No acute issues during this shift. Denied nausea during this shift, said she ordered supper from [employer] dash.Pain managed with lidocaine patches x 3 applied to right hip .Wheelchair based independent, assist x 1 with transfers . Right arm splint intact, no complaint of itching under splint .To continue POC.   Plan: discharge to home on 10/30    Patient's most recent vital signs are:     Vital signs:  BP: 126/80  Temp: 96  HR: 74  RR: 18  SpO2: 98 %     Patient does not have new respiratory symptoms.  Patient does not have new sore throat.  Patient does not have a fever greater than 99.5.

## 2022-10-28 NOTE — TELEPHONE ENCOUNTER
DIAGNOSIS: Closed nondisplaced fracture of base of fourth metacarpal bone of right hand wit...   APPOINTMENT DATE: 11.10.22   NOTES STATUS DETAILS   OFFICE NOTE from referring provider Internal 10.21.22 [facility]   DISCHARGE REPORT from the ER Internal 10.24.22 [facility]  [facility]     MEDICATION LIST Internal    XRAYS (IMAGES & REPORTS) PACS 10.17.22 R wrist   6.6.22 R wrist  4.25.16 R hand-[facility]         Action October 28, 2022 3:24 PM BH   Action Taken Faxed request xray images to [facility]. Fax [phone]  [facility] dont have pt in their system.      Action November 3, 2022 2:18 PM BH   Action Taken Called [facility] emergency to push images. But went straight to . I just lvm.      Action 11.8.22 8:58 AM BH   Action Taken Called [facility] again and got a reach of them. They couldn't fint pt in system because of different last name. We were able to confirm the last name and she will push images over asap. Peggy Conrad.     9:01 AM --4.25.16 images pushed from [place]([facility]). Resolved in pacs.

## 2022-10-28 NOTE — PLAN OF CARE
Pt is A&OX4, calm, & cooperative with care. Denied CP, SOB, & n/v. SBA with transfer to w/c & independent when on w/c. Continent for both B&B. Takes med whole with thin liquid. Managed with scheduled tylenol & PRN oxy. Pt slept well throughout the night. Able to make needs known & call light within reach. Will continue with POC.    Patient's most recent vital signs are:     Vital signs:  BP: 126/80  Temp: 96  HR: 74  RR: 18  SpO2: 98 %     Patient does not have new respiratory symptoms.  Patient does not have new sore throat.  Patient does not have a fever greater than 99.5.

## 2022-10-28 NOTE — PLAN OF CARE
Patient is alert and oriented x 4. Pt denied chest pain, SOB. Denied nausea, headaches and dizziness.Pt c/o of pain on Bi lat LE's after PT, offered  Ibuprofen at 1615, and heat pack, effective.Applied lidocaine patches x 3 to right hip . Writer noted light red rash on right hip area due to lidocaine patches from last applied site . Wheelchair based independent, assist x 1 with transfers . Right arm splint intact, no complaint of itching under splint . Continent of B&B. Appetite is good. Pt family visited during this shift.  Pt able to make needs known.Call light with in reach.To continue POC.       Patient's most recent vital signs are:     Vital signs:  BP: 122/87  Temp: 96.5  HR: 90  RR: 18  SpO2: 96 %     Patient does not have new respiratory symptoms.  Patient does not have new sore throat.  Patient does not have a fever greater than 99.5.

## 2022-10-28 NOTE — PROGRESS NOTES
SW met with pt.  Pt thanked SW so much for info on w/c that goes up the stairs. Pt said she got her life back now.  It's coming tomorrow. Pt can go to her friends houses now. SW asked how pt is getting home. is coming to get pt.  We agreed 10 am sounded good.     Discharge Plan     Discharge Date: 10/30/22   Discharge Disposition:  Home      .     Discharge Services:  out Pt  PT/OT     Discharge Supplies: All DME supplied by PT/OT    Discharge Transportation:        STEVE Mckay   [facility], Transitional Care Unit   Social Work   [facility] [place] [address]  (PH) [phone]

## 2022-10-28 NOTE — PROGRESS NOTES
10/28/22 0846   Appointment Info   Signing Clinician's Name / Credentials (OT) KIMBERLEY Bertrand   Therapeutic Procedures/Exercise   Therapeutic Procedure: strength, endurance, ROM, flexibillity minutes (76104) 45   Treatment Detail/Skilled Intervention OT:  provided picture/written handouts on foam gripper exercise and yellow tband.  LUE only until RUE is healed.  Pt followed directions well and assisted to modify 1-handed ex prn.   Pt able to verb/demo ex, and verbally lists the AE she and OT have identified over this past week to provide compensatory and adapted skills to aid in her IND ADL/IADL.  Goals met.  See discharge note to follow.   OT Discharge Planning   OT Plan OT: Last session today, see discharge note to follow.   OT Discharge Recommendation (DC Rec) home with assist;home with outpatient occupational therapy   OT Rationale for DC Rec Pt would benefit from in-home OT to assess and modify her environment.  However, outpt OT has been set up for pt.   instructs pt to be active in setting her outpt therapy goals, identifying weaknesses/difficulties she encounters with home set-up and cares/IADLs/mobility, and bring this information to outpt OT.   OT Brief overview of current status Goals met, see discharge note to follow.  Pt will return home with support of family and friends.   Total Session Time   Timed Code Treatment Minutes 45   Total Session Time (sum of timed and untimed services) 45   Post Acute Settings Only   What unit is patient on? TCU   Hygiene/Grooming   Describe Performance OT Mod I from w/c   Oral Hygiene   Describe Performance OT Mod I from w/c     Occupational Therapy Discharge Summary    Reason for therapy discharge:    Pt planning to discharge home on Sunday with family and friend support.    Progress towards therapy goal(s). See goals on [facility] Plan in [facility] electronic health record for goal details.  Goals met  Pt is mod I with ADL and toilet, bed and w/c transfers, as well as  gr/hyg and dressing.  She has supervision with shower transfer and set-up, as well as assist with rinsing to save energy.  She is completing most tasks 1-handed with RUE use as a gross assist only due to fx. Pt identifies the AE she has for bathing, toileting and mobility at home.  She added several items that she and H have picked out to purchase for kitchen tasks, eg adapted cutting board and knife.  Pt able to verb/demo modified LUE HEP to improve her strength as able and her positioning with hand activity due to lumbrical weakness.  Pt can add RUE exercise when appropriate s/p fx is healed.  Pt verbalizes awareness of her condition, that fact that her weakness will be progressive and she is aware of her performance being better in AM when she is more rested.  She is working on energy conservation and imposing rests into her schedule.  She also reports that she and her H are identifying ways to help pt maintain IND at home, eg they will kennel the dogs while H is gone to decrease exertion of pt.  Good progress.    Therapy recommendation(s):    Pt has been set-up for outpt OT (home OT would also be a good fit for pt to assess her setting and make recommendations/train pt for IND and safety in her home environment).  Th instructed pt to identify what is difficult for her at home based on her performance or the environment and she should assist with outpt goal setting with her OT.

## 2022-10-28 NOTE — PROGRESS NOTES
Chart check   Vital stable, no new labs     prestriate pharmacist checking on her medications   Carlita Woodward MD

## 2022-10-28 NOTE — PLAN OF CARE
Goal Outcome Evaluation:    Pt is A&OX4, calm, & cooperative with care. Denied CP, SOB, & n/v. SBA with transfer to w/c & independent when on w/c. Continent for both B&B. Takes med whole with thin liquid. Managed with scheduled tylenol & PRN oxy. Pt complain of nausea was given prn Zofran. Pt refused multivitamin said it makes her vomit.  Able to make needs known & call light within reach. Will continue with POC.     Patient's most recent vital signs are:      Vital signs:  BP: 110/77  Temp: 95.9  HR: 83  RR: 18  SpO2: 95%      Patient does not have new respiratory symptoms.  Patient does not have new sore throat.  Patient does not have a fever greater than 99.5

## 2022-10-29 ENCOUNTER — APPOINTMENT (OUTPATIENT)
Dept: PHYSICAL THERAPY | Facility: SKILLED NURSING FACILITY | Age: 47
DRG: 073 | End: 2022-10-29
Attending: INTERNAL MEDICINE
Payer: COMMERCIAL

## 2022-10-29 PROCEDURE — 97530 THERAPEUTIC ACTIVITIES: CPT | Mod: GP | Performed by: PHYSICAL THERAPIST

## 2022-10-29 PROCEDURE — 99307 SBSQ NF CARE SF MDM 10: CPT | Performed by: INTERNAL MEDICINE

## 2022-10-29 PROCEDURE — 120N000009 HC R&B SNF

## 2022-10-29 PROCEDURE — 250N000013 HC RX MED GY IP 250 OP 250 PS 637: Performed by: INTERNAL MEDICINE

## 2022-10-29 RX ADMIN — Medication 5 MG: at 21:51

## 2022-10-29 RX ADMIN — OXYCODONE HYDROCHLORIDE 5 MG: 5 TABLET ORAL at 14:29

## 2022-10-29 RX ADMIN — CYCLOBENZAPRINE 10 MG: 10 TABLET, FILM COATED ORAL at 09:17

## 2022-10-29 RX ADMIN — ACETAMINOPHEN 975 MG: 325 TABLET, FILM COATED ORAL at 06:20

## 2022-10-29 RX ADMIN — CYCLOBENZAPRINE 10 MG: 10 TABLET, FILM COATED ORAL at 20:26

## 2022-10-29 RX ADMIN — ACETAMINOPHEN 975 MG: 325 TABLET, FILM COATED ORAL at 14:30

## 2022-10-29 RX ADMIN — CYCLOBENZAPRINE 10 MG: 10 TABLET, FILM COATED ORAL at 14:29

## 2022-10-29 RX ADMIN — SENNOSIDES AND DOCUSATE SODIUM 1 TABLET: 50; 8.6 TABLET ORAL at 20:26

## 2022-10-29 RX ADMIN — ACETAMINOPHEN 975 MG: 325 TABLET, FILM COATED ORAL at 21:51

## 2022-10-29 RX ADMIN — OXYCODONE HYDROCHLORIDE 5 MG: 5 TABLET ORAL at 06:20

## 2022-10-29 RX ADMIN — LIDOCAINE PATCH 4% 3 PATCH: 40 PATCH TOPICAL at 20:26

## 2022-10-29 RX ADMIN — OXYCODONE HYDROCHLORIDE 5 MG: 5 TABLET ORAL at 22:40

## 2022-10-29 RX ADMIN — SENNOSIDES AND DOCUSATE SODIUM 1 TABLET: 50; 8.6 TABLET ORAL at 09:17

## 2022-10-29 ASSESSMENT — ACTIVITIES OF DAILY LIVING (ADL)
ADLS_ACUITY_SCORE: 42

## 2022-10-29 NOTE — PLAN OF CARE
"Patient is alert and oriented x 4. Pt denied chest pain, SOB. Denied nausea, headaches and dizziness.Applied lidocaine patches x 3 to right hip .  Wheelchair based independent, assist x 1 with transfers . Right arm splint intact, no complaint of itching under splint . Continent of B&B. Appetite is good.Pt able to make needs known. Pt refused bed alarm. Pt stated \" I will call for help before getting out of bed.\" Call light with in reach.To continue POC.    Patient's most recent vital signs are:     Vital signs:  BP: 110/77  Temp: 96.3  HR: 110  RR: 18  SpO2: 95 %     Patient does not have new respiratory symptoms.  Patient does not have new sore throat.  Patient does not have a fever greater than 99.5.             "

## 2022-10-29 NOTE — DISCHARGE SUMMARY
[facility]  Hospitalist Discharge Summary      Date of Admission:  10/21/2022  Date of Discharge:  10/30/22   Discharging Provider: Carlita Woodward MD  Discharge Service: Hospitalist Service    Discharge Diagnoses   Deconditioning    Ground level fall  Frequent falls  Nondisplaced fracture at the fourth metacarpal base 10/17  Demyelinating polyneuropathy syndrome   Complex regional pain syndrome  Depressed mood   History breast cancer BRCA1 and 2 mutation   Celiac disease       Insomnia ;  Follow-ups Needed After Discharge   Follow-up Appointments     Adult [facility] Follow-up and recommended labs and tests      Follow up with primary care provider, Andrew Peck, within 7 days for   hospital follow- up.  {  Orthopedic surgery  as planned  Neurology ( [facility]/ [place] as planned )   Appointments on [place] and/or [facility] (with [facility] or [facility]   provider or service). Call [phone] if you haven't heard regarding   these appointments within 7 days of discharge.             Unresulted Labs Ordered in the Past 30 Days of this Admission     No orders found from 9/21/2022 to 10/22/2022.          Discharge Disposition   Discharged to home  Condition at discharge: Stable      Hospital Course    Peggy Jessica is a 47 year old female admitted on 10/17/2022. She has PMH of chronic inflammatory demyelinating polyneuropathy, frequent falls, complex regional pain syndrome who presents to the ED after a mechanical fall. Patient presented to the ED  after a fall out of bed. She reported to the ED doctor that she fell out of bed and landed on her right side. Stated she was feeding the dogs and felt lightheaded and passed out. She reports that she LOC for a few minutes she thinks. The fall was unwitnessed but she thinks she landed on her right side because she has pain along her entire right side. She denies any tongue biting, drooling, or loss of bowel or bladder control. She reports she has had  many syncopal episodes in the past. Patient and  report she laid on the ground for about 25 minutes. Patient reported to PT in the ED that she had 6 falls day prior  and has been falling frequently at home, about 2-3 times a day. She denies any chest pain, SOB, nausea (aside from chronic nausea), vomiting, dizziness, lightheadedness, fevers, or recent illness.   She uses a walker, cane, wheelchair, and other assistive devices as needed. She lives in a home with 16 steps up to the bathroom.   [facility] for Re[facility]         # Deconditioning    #Ground level fall  #Frequent falls  #Nondisplaced fracture at the fourth metacarpal base 10/17  #Demyelinating polyneuropathy syndrome   #Complex regional pain syndrome   Numerous imaging including right pelvis/hip xray, right shoulder xray, right elbow xray, CT head, CT cervical spine, CT chest/abdomen/pelvis, CT thoracic spine, CT lumbar spine showing no fractures.   -UA was negative. Patient was evaluated by PT in ED who recommended TCU  - Echo was done 10/18/22 and showed LVEF of 60-65% with no RWMA or any significant valvular disease      Xray of the right wrist did show a possible nondisplaced fracture of the 4th metacarpal base which was splinted in the ED and recommended for follow up imaging/ follow up  in 1 week.   Ortho  referral was made   - she has appointment to follow up  With orthopedic surgery ,         In terms of her CISP ( chronic inflammatory demyelinating polyradiculoneuropathy)   diagnosed in 2021 , has had Nausea, gait instability , sensory symptoms ataxia , residual inflammatory neuropathy , please see  extensive note  by Dr Jon Duenas  form neurology 10/5/22   - followed by neurology at [facility] and also was seen at [facility] and had very extensive work up,  symptom have been getting worse, has follow up  appointment at [facility]             Depressed mood   - out patient  Follow up          History breast cancer  BRCA1 and 2 mutation   - status post  Bilateral mastectomy 10/2020      Celiac disease    - follows strict GF diet,  discussed with  Patient  To look through everything at home  Including medications to makes sure  she galeano snot get any cross contaminatinon as can play into her nauseas ,          Insomnia ; melatonin started per request                   Consultations This Hospital Stay   PHYSICAL THERAPY ADULT IP CONSULT  OCCUPATIONAL THERAPY ADULT IP CONSULT  PSYCHOLOGY ADULT IP CONSULT  MEDICATION HISTORY IP PHARMACY CONSULT    Code Status   Full Code    Time Spent on this Encounter   I, Carlita Woodward MD, personally saw the patient today and spent greater than 30 minutes discharging this patient.       Carlita Woodward MD  [facility]ARE UN[facility] [address] [address]  Phone: [phone]  ______________________________________________________________________    Physical Exam   Vital Signs: Temp: (!) 96.3  F (35.7  C) Temp src: Oral BP: 110/77 Pulse: 110   Resp: 18 SpO2: 95 % O2 Device: None (Room air)    Weight: 167 lbs 12.8 oz   General appearence: awake alert  In  no apparent distress    HEENT: EOMI, PEARLA, sclera nonicteric,  moist,  mucus membranes,   NECK : supple  RESPIRATORY: lungs clear to auscultation bilateral,    CARDIOVASCULAR:S1 S2 regular rate and rhythm, no rubs gallops or murmurs appreciated  GASTROINTESTINAL:soft, non-distended , non-tender , + bowel sounds,   SKIN: warm and dry, no mottling noted   NEUROLOGIC; awake alert and oriented,  EXTREMITIES: no clubbing, cyanosis or edema , moves all extremity, good pedal pulses   MUSCULOSKELETAL:  Right arm in splint          Primary Care Physician   Andrew Peck    Discharge Orders      Physical Therapy Referral      Occupational Therapy Referral      Activity    Your activity upon discharge: activity as tolerated     Reason for your hospital stay    Weakness, fall     Adult [facility]  Follow-up and recommended labs and tests    Follow up with primary care provider, Andrew Peck, within 7 days for hospital follow- up.  {  Orthopedic surgery  as planned  Neurology ( [facility]/ [place] as planned )   Appointments on [place] and/or [facility] (with [facility] or [facility] provider or service). Call [phone] if you haven't heard regarding these appointments within 7 days of discharge.     Diet    Follow this diet upon discharge: GF diet     Most Recent 3 CBC's:Recent Labs   Lab Test 10/24/22  0600 10/17/22  0955 07/20/22  0959   WBC 5.3 6.1 5.5   HGB 14.0 13.3 13.6   MCV 92 94 95    274 272     Most Recent 3 BMP's:Recent Labs   Lab Test 10/24/22  0600 10/17/22  0955 07/20/22  0959    139 140   POTASSIUM 3.8 4.3 4.6   CHLORIDE 106 106 106   CO2 29 23 30   BUN 11 10.7 5*   CR 0.84 0.75 0.76   ANIONGAP 6 10 4   ESTEBAN 9.9 9.7 9.3   GLC 96 112* 94     Most Recent 2 LFT's:Recent Labs   Lab Test 10/17/22  0955 07/20/22  0959   AST 44* 21   ALT 47* 19   ALKPHOS 95 101   BILITOTAL 0.3 0.3     Most Recent 3 INR's:No lab results found.    Significant Results and Procedures   ,   Results for orders placed or performed during the hospital encounter of 10/17/22   CT Head w/o Contrast    Narrative    CT HEAD W/O CONTRAST 10/17/2022 12:39 PM    History: fall, ams     Comparison: Head CT 6/6/2022    Technique: Using multidetector thin collimation helical acquisition  technique, axial, coronal and sagittal CT images from the skull base  to the vertex were obtained without intravenous contrast.   (topogram) image(s) also obtained and reviewed.    Findings: There is no intracranial hemorrhage, mass effect, or midline  shift. Gray/white matter differentiation in both cerebral hemispheres  is preserved. Ventricles are proportionate to the cerebral sulci. The  basal cisterns are clear.    The bony calvaria and the bones of the skull base are normal. Minimal  mucosal thickening in the left sphenoid locule.  Remainder of paranasal  sinuses and mastoid air cells are clear. Orbits are unremarkable.      Impression    Impression:  No acute intracranial pathology.     I have personally reviewed the examination and initial interpretation  and I agree with the findings.    JE HUTTON MD         SYSTEM ID:  [id number]   CT Cervical Spine w/o Contrast    Narrative    EXAM: CT CERVICAL SPINE W/O CONTRAST  10/17/2022 12:40 PM     HISTORY:  Neck pain; Trauma; Mild/moderate trauma; None of the  following: Spondyloarthropathy, cervical x-ray with negative result,  questionable finding, or inadequate coverage       COMPARISON:  CT cervical spine 6/6/2022.    TECHNIQUE: Using multidetector thin collimation helical acquisition  technique, axial, coronal and sagittal CT images through the cervical  spine were obtained without intravenous contrast.    FINDINGS:  Straightening of the cervical lordosis, possibly positional. Normal  cervical spine alignment. No acute fracture or traumatic subluxation.  No prevertebral soft tissue swelling. Mild atlantodental arthropathy.    The findings on a level by level basis are as follows:    C2-3: No spinal canal or neural foraminal stenosis    C3-4: Mild bilateral uncinate spurring. No foraminal or canal  stenosis.     C4-5: No spinal canal or neural foraminal stenosis    C5-6: Bilateral uncinate spurring. Mild left neural foraminal  narrowing. No right neural foraminal stenosis. No spinal canal  stenosis.    C6-7: Mild left uncinate spurring. No foraminal or canal stenosis.    C7-T1: No spinal canal or neural foraminal stenosis     No abnormality of the paraspinous soft tissues.      Impression    IMPRESSION:  1. No acute fracture or traumatic subluxation.  2. Mild cervical spondylosis, greatest at C5-6.    I have personally reviewed the examination and initial interpretation  and I agree with the findings.    JE HUTTON MD         SYSTEM ID:  [id number]   CT Chest/Abdomen/Pelvis w Contrast     Narrative    EXAMINATION: CT CHEST/ABDOMEN/PELVIS W CONTRAST, 10/17/2022 12:53 PM    TECHNIQUE:  Helical CT images from the thoracic inlet through the  symphysis pubis were obtained with IV contrast. Contrast dose:  iopamidol (ISOVUE-370) solution 104 mL    COMPARISON: Chest CT 7/12/2022. CT chest, abdomen and pelvis  11/19/2021.    HISTORY: fall, right sided pain    FINDINGS:  CHEST:  LUNGS: Central tracheobronchial tree is patent. No pneumothorax or  pleural effusion. No focal airspace opacity. No suspicious pulmonary  nodule. Mild dependent atelectasis. Fibroatelectatic changes greatest  in the left lung base.    MEDIASTINUM: Heart size is within normal limits. No pericardial  effusion. Ascending aorta and main pulmonary artery diameters are  within normal limits. Normal appearance and configuration of the great  vessels off of the aortic arch. No suspicious mediastinal, hilar, or  axillary lymph nodes.     Visualized thyroid and esophagus are unremarkable.    ABDOMEN/PELVIS:  LIVER: Stable appearance of multiple hepatic cysts. Several  subcentimeter hypodense hepatic lesions are too small to accurately  characterize. No hepatic mass.    BILIARY: Cholecystectomy with mild reservoir effect and trace  intrahepatic biliary dilatation.    PANCREAS: No focal pancreatic lesion. The main pancreatic duct is not  dilated.    SPLEEN: Within normal limits.    ADRENAL GLANDS: No focal adrenal nodule.    URINARY TRACT: No suspicious renal lesion. No hydronephrosis or  hydroureter. No renal stone. Urinary bladder is unremarkable.    REPRODUCTIVE ORGANS: Within normal limits.    STOMACH: Within normal limits.    BOWEL: Normal caliber large and small bowel. No abnormal bowel wall  thickening or enhancement. Appendix is unremarkable.    PERITONEUM/FLUID: No ascites or pelvic free fluid.    VESSELS: No aneurysmal dilatation of the abdominal aorta.  The portal,  splenic, and superior mesenteric veins are patent.  The origins of  the  celiac and superior mesenteric arteries are patent.    LYMPH NODES: No lymphadenopathy.    BONES/SOFT TISSUES: No aggressive osseous lesions. No definite  displaced rib fractures.      Impression    IMPRESSION: No acute traumatic findings in the chest, abdomen or  pelvis.     I have personally reviewed the examination and initial interpretation  and I agree with the findings.    CANDI ROB MD         SYSTEM ID:  [id number]   XR Shoulder Right G/E 3 Views    Narrative    4 views right shoulder radiographs 10/17/2022 11:12 AM    History: fall, pain    Comparison: 6/6/2022    Findings:    AP, 2 Grashey, transscapular Y, axillary  views of the right shoulder  were obtained. Transscapular Y view is suboptimally profiled.    No acute osseous abnormality. Glenohumeral and acromioclavicular  joints are congruent.    No substantial degenerative changes of the acromioclavicular joint. No  substantial degenerative change of the glenohumeral joint.    Soft tissue is unremarkable. The visualized lung is clear.      Impression    Impression:  1. No acute osseous abnormality.  2. No substantial degenerative change.    [facility] RECUPYL         SYSTEM ID:  [id number]   XR Wrist Right G/E 3 Views    Narrative    3 views right wrist radiographs 10/17/2022 11:16 AM    History: fall    Comparison: 6/6/2022    Findings:    PA, oblique and lateral view(s) of the right wrist were obtained.     Question subtle contour irregularity at the fourth metacarpal base, of  uncertain if this is related to overlying tubing density.    No substantial degenerative change. Presumed bone island of the  capitate.    Soft tissue is unremarkable.      Impression    Impression: Question subtle contour irregularity (possible  nondisplaced fracture) at the fourth metacarpal base, of uncertain if  this is related to overlying tubing density. Please correlate  clinically for focal tenderness.    [employer]         SYSTEM ID:  [id number]   XR  Pelvis w Hip Right 1 View    Narrative    2 views pelvis/right hip radiographs 10/17/2022 11:09 AM    History: fall, right hip pain    Comparison: Radiographs 6/30/2022, CT 7/20/2022, MR 6/10/2022    Findings:    AP view pelvis, frog leg lateral views of the right hip were obtained.      No acute osseous abnormality.      No substantial degenerative change of the either hip.     Others:    Degenerative changes of the pubic symphysis.    Pelvic phlebolith.       Impression    Impression:  1. No acute osseous abnormality.  2. No substantial degenerative change.    [employer]         SYSTEM ID:  [id number]   XR Elbow Right 2 Views    Narrative    2 views right elbow radiographs 10/17/2022 11:15 AM    History: fall    Comparison: None available.    Findings:    AP and lateral views of the right elbow were obtained.     No acute osseous abnormality.  No joint effusion.    No substantial degenerative change. Tiny triceps tendon insertion  enthesophyte.    Soft tissue is unremarkable.      Impression    Impression:  1. No acute osseous abnormality.  2. No substantial degenerative change.    [employer]         SYSTEM ID:  [id number]   CT Thoracic Spine w Contrast    Narrative    Thoracic and Lumbar spine CT without contrast    History: fall, back pain and TTP.    Comparison: MRI 4/30/2022    Technique: Axial, coronal, and sagittal multiplanar reconstructions of  the thoracic and lumbar spine obtained from acquisition of CT chest  abdomen and pelvis with contrast which was obtained in the setting of  trauma.     Findings:  Thoracic spine: Rightward curvature. There is no acute fracture or  subluxation. There is no prevertebral edema. There is no spinal canal  or foraminal stenosis at any level. No soft tissue abnormality in the  visualized paraspinous tissues anteriorly.    Lumbar Spine: There is no acute fracture or subluxation. There are 5  type lumbar vertebra, used for the purposes of this dictation .  Vacuum  disc phenomenon L5-S1.    On a level by level basis, the findings are as follows:  T12-L1:  No significant neural foraminal or spinal canal stenosis.  L1-2:  No significant neural foraminal or spinal canal stenosis.  L2-3:  No significant neural foraminal or spinal canal stenosis.  L3-4:  No significant neural foraminal or spinal canal stenosis.  L4-5:  Disc bulge with superimposed central protrusion. Mild bilateral  facet arthropathy. No significant neural foraminal stenosis. Mild  spinal canal stenosis.  L5-S1: Circumferential disc bulge with superimposed central  protrusion. No significant neural foraminal stenosis. Mild spinal  canal stenosis.    Multiple hepatic cysts. Cholecystectomy. Visualized appendix is  normal. See dedicated report from concurrently acquired CT chest  abdomen and pelvis for further detail.      Impression    Impression:   1. No acute fracture or traumatic subluxation of the thoracic or  lumbar spine.  2. Mild lumbar spondylosis with central disc protrusions at L4-5 and  L5-S1, resulting in mild spinal canal stenoses at these levels. No  significant neural foraminal stenosis at any level.    I have personally reviewed the examination and initial interpretation  and I agree with the findings.    PRECIOUS HARRIS MD         SYSTEM ID:  [id number]   CT Lumbar Spine w Contrast    Narrative    Thoracic and Lumbar spine CT without contrast    History: fall, back pain and TTP.    Comparison: MRI 4/30/2022    Technique: Axial, coronal, and sagittal multiplanar reconstructions of  the thoracic and lumbar spine obtained from acquisition of CT chest  abdomen and pelvis with contrast which was obtained in the setting of  trauma.     Findings:  Thoracic spine: Rightward curvature. There is no acute fracture or  subluxation. There is no prevertebral edema. There is no spinal canal  or foraminal stenosis at any level. No soft tissue abnormality in the  visualized paraspinous tissues  anteriorly.    Lumbar Spine: There is no acute fracture or subluxation. There are 5  type lumbar vertebra, used for the purposes of this dictation . Vacuum  disc phenomenon L5-S1.    On a level by level basis, the findings are as follows:  T12-L1:  No significant neural foraminal or spinal canal stenosis.  L1-2:  No significant neural foraminal or spinal canal stenosis.  L2-3:  No significant neural foraminal or spinal canal stenosis.  L3-4:  No significant neural foraminal or spinal canal stenosis.  L4-5:  Disc bulge with superimposed central protrusion. Mild bilateral  facet arthropathy. No significant neural foraminal stenosis. Mild  spinal canal stenosis.  L5-S1: Circumferential disc bulge with superimposed central  protrusion. No significant neural foraminal stenosis. Mild spinal  canal stenosis.    Multiple hepatic cysts. Cholecystectomy. Visualized appendix is  normal. See dedicated report from concurrently acquired CT chest  abdomen and pelvis for further detail.      Impression    Impression:   1. No acute fracture or traumatic subluxation of the thoracic or  lumbar spine.  2. Mild lumbar spondylosis with central disc protrusions at L4-5 and  L5-S1, resulting in mild spinal canal stenoses at these levels. No  significant neural foraminal stenosis at any level.    I have personally reviewed the examination and initial interpretation  and I agree with the findings.    PRECIOUS HARRIS MD         SYSTEM ID:  [id number]   Echocardiogram Complete     Value    LVEF  60-65%    Narrative    [id number]  [id number]  [id number]  [id number]^JUNIE^ROMMEL     [facility]  Echocardiography Laboratory  [address] [place] [id number]     Name: SEBASTIÁN YOUSIF  MRN: [id number]  : 1975  Study Date: 10/18/2022 07:29 AM  Age: 47 yrs  Gender: Female  Patient Location: [facility]  Reason For Study: Syncope  Ordering Physician: ROMMEL ZAMAN  Performed By: Aquiles Hayes  RDCS     BSA: 1.8 m2  Height: 65 in  Weight: 170 lb  HR: 77  BP: 115/94 mmHg  ______________________________________________________________________________  Procedure  Complete Portable Echo Adult. Echocardiogram with two-dimensional, color and  spectral Doppler performed.  ______________________________________________________________________________  Interpretation Summary  Global and regional left ventricular function is normal with an EF of 60-65%.  Global right ventricular function is normal.  No significant valvular abnormalities present.  The inferior vena cava is normal.  No pericardial effusion is present.  ______________________________________________________________________________  Left Ventricle  Left ventricular size is normal. Left ventricular wall thickness is normal.  Global and regional left ventricular function is normal with an EF of 60-65%.  Left ventricular diastolic function is normal. No regional wall motion  abnormalities are seen.     Right Ventricle  The right ventricle is normal size. Global right ventricular function is  normal.     Atria  Both atria appear normal.     Mitral Valve  The mitral valve is normal.     Aortic Valve  Aortic valve is normal in structure and function.     Tricuspid Valve  The tricuspid valve is normal. The peak velocity of the tricuspid regurgitant  jet is not obtainable. Pulmonary artery systolic pressure cannot be assessed.     Pulmonic Valve  The pulmonic valve is normal.     Vessels  The aorta root is normal. The inferior vena cava is normal.     Pericardium  No pericardial effusion is present.     Compared to Previous Study  Previous study not available for comparison.  ______________________________________________________________________________  MMode/2D Measurements & Calculations     IVSd: 0.76 cm  LVIDd: 4.7 cm  LVIDs: 2.7 cm  LVPWd: 0.82 cm  FS: 43.4 %  LV mass(C)d: 121.1 grams  LV mass(C)dI: 65.6 grams/m2  asc Aorta Diam: 2.8 cm  LVOT diam: 2.3  cm  LVOT area: 4.0 cm2  LA Volume Index (BP): 19.6 ml/m2  RWT: 0.35  TAPSE: 2.5 cm     Doppler Measurements & Calculations  MV E max elsy: 60.7 cm/sec  MV A max elsy: 99.7 cm/sec  MV E/A: 0.61  MV dec slope: 454.6 cm/sec2  MV dec time: 0.13 sec  PA acc time: 0.11 sec  E/E' av.2  Lateral E/e': 6.1  Medial E/e': 10.3     ______________________________________________________________________________  Report approved by: Michael Galan 10/18/2022 08:09 AM               Discharge Medications   Current Discharge Medication List      START taking these medications    Details   naloxone (NARCAN) 4 MG/0.1ML nasal spray Spray 1 spray (4 mg) into one nostril alternating nostrils as needed for opioid reversal every 2-3 minutes until assistance arrives  Qty: 0.2 mL, Refills: 0    Associated Diagnoses: Chronic pain syndrome         CONTINUE these medications which have CHANGED    Details   ondansetron (ZOFRAN) 4 MG tablet Take 1 tablet (4 mg) by mouth every 6 hours as needed  Qty: 15 tablet, Refills: 0    Associated Diagnoses: Nausea      oxyCODONE (ROXICODONE) 5 MG tablet Take 1 tablet (5 mg) by mouth every 8 hours as needed for severe pain  Qty: 12 tablet, Refills: 0    Associated Diagnoses: Closed nondisplaced fracture of base of fourth metacarpal bone of right hand with routine healing, subsequent encounter         CONTINUE these medications which have NOT CHANGED    Details   acetaminophen (TYLENOL) 325 MG tablet Take 3 tablets (975 mg) by mouth every 8 hours    Associated Diagnoses: Closed nondisplaced fracture of base of fourth metacarpal bone of right hand with routine healing, subsequent encounter      cyclobenzaprine (FLEXERIL) 10 MG tablet Take 1 tablet (10 mg) by mouth 3 times daily    Associated Diagnoses: Muscle spasm      estradiol (ESTRACE) 0.1 MG/GM vaginal cream Place 2 g vaginally twice a week  Qty: 42.5 g, Refills: 3    Associated Diagnoses: Vaginal atrophy      folic acid (FOLVITE) 400 MCG tablet Take 1  tablet (400 mcg) by mouth daily  Qty: 90 tablet, Refills: 0    Associated Diagnoses: Folic acid deficiency      ibuprofen (ADVIL/MOTRIN) 200 MG tablet Take 3 tablets (600 mg) by mouth every 6 hours as needed for mild pain    Associated Diagnoses: Closed nondisplaced fracture of base of fourth metacarpal bone of right hand with routine healing, subsequent encounter      Lidocaine (LIDOCARE) 4 % Patch Place 3 patches onto the skin every 24 hours To prevent lidocaine toxicity, patient should be patch free for 12 hrs daily.  Refills: 0    Associated Diagnoses: Complex regional pain syndrome i of right lower limb      medical cannabis (Patient's own supply) 1 Dose See Admin Instructions (The purpose of this order is to document that the patient reports taking medical cannabis.  This is not a prescription, and is not used to certify that the patient has a qualifying medical condition.)  Per pt: 5-10 mg TID PRN      multivitamin w/minerals (THERA-VIT-M) tablet Take 1 tablet by mouth every morning Megafood Womens Brand      polyethylene glycol (MIRALAX) 17 GM/Dose powder Take by mouth as needed      senna-docusate (SENOKOT-S/PERICOLACE) 8.6-50 MG tablet Take 1 tablet by mouth 2 times daily    Associated Diagnoses: Constipation, unspecified constipation type         STOP taking these medications       bisacodyl (DULCOLAX) 10 MG suppository Comments:   Reason for Stopping:             Allergies   Allergies   Allergen Reactions     Dihydroergotamine Anaphylaxis     Latex Anaphylaxis     Shellfish-Derived Products Anaphylaxis     Sumatriptan Anaphylaxis     Banana Unknown     Gabapentin Dizziness     Gluten Meal Other (See Comments)     Celiac disease     Keppra [Levetiracetam] Nausea and Vomiting     Kiwi Unknown     Levofloxacin Other (See Comments)     Arrhythmia     Metronidazole Nausea and Vomiting     Nitrofurantoin Hives     Penicillins Hives     Pregabalin      Topiramate Visual Disturbance     Aspirin Rash      Methadone Rash     Morphine Hives     She got hives around are when morphine given but resolved after few minutes per patient      Risperidone Anxiety

## 2022-10-29 NOTE — PROGRESS NOTES
[facility]    Medicine Progress Note - Hospitalist Service    Date of Admission:  10/21/2022    Assessment & Plan      Peggy Jessica is a 47 year old female admitted on 10/17/2022. She has PMH of chronic inflammatory demyelinating polyneuropathy, frequent falls, complex regional pain syndrome who presents to the ED after a mechanical fall.   She was discharged to [facility] on 10/21 for Rehab         # Deconditioning ; PT and OT   #Ground level fall  #Frequent falls  #Nondisplaced fracture at the fourth metacarpal base 10/17  #Demyelinating polyneuropathy syndrome   #Complex regional pain syndrome  Patient presented to the ED  after a fall out of bed. She reported to the ED doctor that she fell out of bed and landed on her right side. Stated she was feeding the dogs and felt lightheaded and passed out. She reports that she LOC for a few minutes she thinks. The fall was unwitnessed but she thinks she landed on her right side because she has pain along her entire right side. She denies any tongue biting, drooling, or loss of bowel or bladder control. She reports she has had many syncopal episodes in the past. Patient and  report she laid on the ground for about 25 minutes. Patient reported to PT in the ED that she had 6 falls day prior  and has been falling frequently at home, about 2-3 times a day. She denies any chest pain, SOB, nausea (aside from chronic nausea), vomiting, dizziness, lightheadedness, fevers, or recent illness.   She uses a walker, cane, wheelchair, and other assistive devices as needed. She lives in a home with 16 steps up to the bathroom. She verbalizes need for higher level of care and assistance as she is unable to get around safety at home. In the ED, her vital signs were stable. Labwork unremarkable with the exception of ALT 47, AST 44.   EKG sinus rhythm.   Numerous imaging including right pelvis/hip xray, right shoulder xray, right elbow xray, CT  head, CT cervical spine, CT chest/abdomen/pelvis, CT thoracic spine, CT lumbar spine showing no fractures.     Xray of the right wrist did show a possible nondisplaced fracture of the 4th metacarpal base which was splinted in the ED and recommended for follow up imaging/ follow up  in 1 week.   Ortho  referral was made   - she has appointment to follow up  With orthopedic surgery ,        In terms of her CISP ( chronic inflammatory demyelinating polyradiculoneuropathy)   diagnosed in 2021 , has had Nausea, gait instability , sensory symptoms ataxia , residual inflammatory neuropathy , please see  extensive note  by Dr Jon Duenas  form neurology 10/5/22   - followed by neurology at [facility] and also was seen at [facility] and had very extensive work up,  symptom have been getting worse, has follow up  appointment at [facility]       UA was negative. Patient was evaluated by PT in ED who recommended TCU  . Echo was done 10/18/22 and showed LVEF of 60-65% with no RWMA or any significant valvular disease   PT/OT  -PO Tylenol and Ibuprofen for pain  -Oxycodone 5 mg q8h prn   -Continue PTA Flexiril for pain  -PRN lidocaine patches  -PRN miralax and senna as needed  -Follow up with orthopedics early next week for re imaging and assessment on need for splint, referral placed  -PRN Zofran for chronic nausea        Depressed mood   Health psychology consult      History breast cancer BRCA1 and 2 mutation   - status post  Bilateral mastectomy 10/2020     Celiac disease    - follows strict GF diet,  Having pharmacist check medications        Insomnia ; melatonin started per request            Diet: Gluten Free Diet  Diet    DVT Prophylaxis: mechanical   Kwan Catheter: Not present  Central Lines: None  Cardiac Monitoring: None  Code Status: Full Code      Disposition Plan           The patient's care was discussed with DESIRE .    Carlita Woodward MD  Hospitalist Service  [facility]  Securely message with the  [employer] Web Console (learn more here)  Text page via [facility] Paging/Directory         Clinically Significant Risk Factors                        # Severe Malnutrition: based on nutrition assessment        ______________________________________________________________________    Interval History    Feeling better, still with occasional nausea,  visiting , no chest pain  No shortness of breath      Data reviewed today: I reviewed all medications, new labs and imaging results over the last 24 hours.    Physical Exam   Vital Signs: Temp: (!) 96.3  F (35.7  C) Temp src: Oral BP: 110/77 Pulse: 110   Resp: 18 SpO2: 95 % O2 Device: None (Room air)    Weight: 167 lbs 12.8 oz     General appearence: awake alert  In  no apparent distress    HEENT: EOMI, PEARLA, sclera nonicteric,  moist,  mucus membranes,   NECK : supple  RESPIRATORY: lungs clear to auscultation bilateral,    CARDIOVASCULAR:S1 S2 regular rate and rhythm, no rubs gallops or murmurs appreciated  GASTROINTESTINAL:soft, non-distended , non-tender , + bowel sounds,   SKIN: warm and dry, no mottling noted   NEUROLOGIC; awake alert and oriented,  EXTREMITIES: no clubbing, cyanosis or edema , moves all extremity, good pedal pulses   MUSCULOSKELETAL:  Right arm in splint     Data   Recent Labs   Lab 10/24/22  0600   WBC 5.3   HGB 14.0   MCV 92         POTASSIUM 3.8   CHLORIDE 106   CO2 29   BUN 11   CR 0.84   ANIONGAP 6   ESTEBAN 9.9   GLC 96     No results found for this or any previous visit (from the past 24 hour(s)).

## 2022-10-29 NOTE — PLAN OF CARE
Goal Outcome Evaluation:      Plan of Care Reviewed With: patient    Overall Patient Progress: no change    R shoulder pain comfortably managed with PRN Oxycodone 5 mg tab x 1 and scheduled Tylenol 975 mg tab. Refused bed alarm. No attempts made to get OOB without calling. Pt has no c/o SOB and no s/s of respiratory issue noted at RA. Appear to be sleeping/resting between cares/ meds. Continue with plan of care.    Patient's most recent vital signs are:     Vital signs:  BP: 122/87  Temp: 96.5  HR: 90  RR: 18  SpO2: 96 %     Patient does not have new respiratory symptoms.  Patient does not have new sore throat.  Patient does not have a fever greater than 99.5.

## 2022-10-29 NOTE — PLAN OF CARE
The patient is alert and oriented x 3. VSS. Able to make needs known. Appetite is good. Oxycodone for pain control. Brace on the RLE intact. Independent with w/c mobility. Continent of bowel and bladder. Patient is looking forward to discharging tomorrow. Continue to monitor for comfort.        Patient's most recent vital signs are:     Vital signs:  BP: 110/77  Temp: 96.3  HR: 110  RR: 18  SpO2: 95 %     Patient does not have new respiratory symptoms.  Patient does not have new sore throat.  Patient does not have a fever greater than 99.5.

## 2022-10-29 NOTE — PLAN OF CARE
Physical Therapy Discharge Summary    Reason for therapy discharge:    Discharged to home with outpatient therapy.    Progress towards therapy goal(s). See goals on Care Plan in [facility] electronic health record for goal details.  Goals met    Therapy recommendation(s):    Continued therapy is recommended.  Rationale/Recommendations:  Patient would benefit from outpatient physical therapy to address functional mobility, endurance, pacing strategies and compensation strategies in the context of ongoing neurological conditions.

## 2022-10-30 VITALS
HEART RATE: 89 BPM | SYSTOLIC BLOOD PRESSURE: 114 MMHG | RESPIRATION RATE: 17 BRPM | WEIGHT: 167.8 LBS | BODY MASS INDEX: 27.92 KG/M2 | OXYGEN SATURATION: 97 % | TEMPERATURE: 95.9 F | DIASTOLIC BLOOD PRESSURE: 81 MMHG

## 2022-10-30 PROCEDURE — 250N000011 HC RX IP 250 OP 636: Performed by: INTERNAL MEDICINE

## 2022-10-30 PROCEDURE — 99316 NF DSCHRG MGMT 30 MIN+: CPT | Performed by: INTERNAL MEDICINE

## 2022-10-30 PROCEDURE — 250N000013 HC RX MED GY IP 250 OP 250 PS 637: Performed by: INTERNAL MEDICINE

## 2022-10-30 RX ADMIN — ACETAMINOPHEN 975 MG: 325 TABLET, FILM COATED ORAL at 06:16

## 2022-10-30 RX ADMIN — SENNOSIDES AND DOCUSATE SODIUM 1 TABLET: 50; 8.6 TABLET ORAL at 08:26

## 2022-10-30 RX ADMIN — ONDANSETRON HYDROCHLORIDE 4 MG: 4 TABLET, FILM COATED ORAL at 09:26

## 2022-10-30 RX ADMIN — CYCLOBENZAPRINE 10 MG: 10 TABLET, FILM COATED ORAL at 08:26

## 2022-10-30 RX ADMIN — OXYCODONE HYDROCHLORIDE 5 MG: 5 TABLET ORAL at 06:16

## 2022-10-30 ASSESSMENT — ACTIVITIES OF DAILY LIVING (ADL)
ADLS_ACUITY_SCORE: 42

## 2022-10-30 NOTE — PLAN OF CARE
Goal Outcome Evaluation:      Plan of Care Reviewed With: patient    Overall Patient Progress: improving    R shoulder pain comfortably managed with PRN Oxycodone 5 mg tab x 1 and scheduled Tylenol 975 mg tab. Refused bed alarm. No attempts made to get OOB without calling. Pt has no c/o SOB and no s/s of respiratory issue noted at RA. For discharge today at 10 am per pt. Appear to be sleeping/resting between cares/ meds. Continue with plan of care.    Patient's most recent vital signs are:     Vital signs:  BP: 110/77  Temp: 96.3  HR: 110  RR: 18  SpO2: 95 %     Patient does not have new respiratory symptoms.  Patient does not have new sore throat.  Patient does not have a fever greater than 99.5.

## 2022-10-30 NOTE — PLAN OF CARE
The patient is alert and oriented  3. VSS. Able to make needs known. Independent with wheel chair mobility. Discharge instruction and hard copy Oxycodone prescription given to the patient with her spouse present. Patient verbalized understanding of discharge instructions. Patient took all her belongings and transported by spouse.

## 2022-11-01 ENCOUNTER — HOSPITAL ENCOUNTER (OUTPATIENT)
Dept: OCCUPATIONAL THERAPY | Facility: CLINIC | Age: 47
Setting detail: THERAPIES SERIES
Discharge: HOME | End: 2022-11-01
Attending: INTERNAL MEDICINE
Payer: COMMERCIAL

## 2022-11-01 DIAGNOSIS — M79.641 RIGHT HAND PAIN: Primary | ICD-10-CM

## 2022-11-01 DIAGNOSIS — R53.81 PHYSICAL DECONDITIONING: ICD-10-CM

## 2022-11-01 PROCEDURE — 97166 OT EVAL MOD COMPLEX 45 MIN: CPT | Mod: GO

## 2022-11-01 ASSESSMENT — ACTIVITIES OF DAILY LIVING (ADL): IADL_QUICK_ADDS: MEAL PLANNING/PREPARATION;HOME/FINANCIAL/MANAGEMENT

## 2022-11-01 NOTE — PROGRESS NOTES
11/01/22 0800   Quick Adds   Quick Adds Certification   Type of Visit Initial Outpatient Occupational Therapy Evaluation   General Information   Start Of Care Date 11/01/22   Referring Physician Carlita Woodward MD   Orders Evaluate and treat as indicated   Orders Date 10/27/22   Medical Diagnosis Physical deconditioning   Onset of Illness/Injury or Date of Surgery 10/27/22   Precautions/Limitations Other (see comments)  (R metacarpal fracture)   Surgical/Medical History Reviewed Yes   Additional Occupational Profile Info/Pertinent History of Current Problem Pt is a 47 year old female who was referred to outpatient occupational therapy to address physical deconditioning and maximize functional independence. Per records, pt was  admitted to ED 10/17/2022 due to mechanical fall and subsequent R Non displaced fracture at fourth metacarpal base. The fall was unwitnessed and she states she fell out of bed. She has past medical history significant for chronic inflammatory demyelinating polyneuropathy (diagnosd 2021), frequent falls, complex regional pain syndrome She participated in [facility] 10/21/2022 and discharged home 10/30/2022. She hs previously recieved IVIG infusion therapy for management of chronic condition. She lives with her supportive  in a multi-level home, who provides assist as needed.   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 She enjoys baking and has a goal of improving upper extremity strength, distal hand control, and maximize participation in desired activities   Role/Living Environment   Current Community Support Family/friend caregiver   Patient role/Employment history Disabled   Current Living Environment House   Number of Stairs Within Home 16  (Primary bathroom is upstairs; pt able to navigate stairs with spv and has chair lift if needed)   Primary Bathroom Location/Comments Second floor  "  Primary Bathroom Set Up/Equipment Tub/Shower combo   Additional Bathroom Set Up/Equipment Shower/tub chair   Home/Community Accessibility Comments Pt states she has modified her home as able to improve accessibility. She functions on first floor and uses commode when downstairs. She describes environmental barriers in kitchen such as counter height when she is in her wheelchair, impacting engaging in kitche tasks  She states she anticipates moving to a new house with accessibility as a priority.   Prior Level - Transfers Independent;Assistive equipment   Prior Level - Ambulation Assistive equipment   Prior Level - ADLS Assistive equipment   Prior Responsibilities - IADL Meal Preparation;Housekeeping;Laundry;Medication management;Finances;Driving   Current Assistive Devices - ADL Reacher;Long shoe horn;Dressing stick  (purchasing rocker knife, adaptive cutting board)   Patient/family Goals Statement improve upper extremity strength, distal hand control, and optimize functional independence   Pain   Patient currently in pain Yes   Pain comments Pt describes generalized pain which fluccuates in severity. She describes lower extremity as pain feeling \"ice cold\". Pt has CRPS and has pain management   Fall Risk Screen   Fall screen completed by OT   Have you fallen 2 or more times in the past year? Yes   Have you fallen and had an injury in the past year? Yes   Fall screen comments Per report, she has experienced multple falls with 6 broken bones. Pt has physical therapy evaluation 11/2/2022. Pt benefits from education on strategies to reduce risk of falls   Cognitive Status Examination   Orientation Orientation to person, place and time   Level of Consciousness Alert   Follows Commands and Answers Questions 100% of the time   Personal Safety and Judgment Intact   Memory Intact   Sensation   Sensation Comments Pt describes sensory changes which are described as numbness/tingling/prickly and pain in BUE/BLE; fluccuates " in severity and intensity   Range of Motion (ROM)   ROM Comments Bilateral upper extremity ROM WFL; limited range of motion for right wrist digits due to fracture and cast at this time   Strength   Strength Comments MMT:  Right shoulder flexion: 3+/5 with giveway weakness; Left shoulder flexion: 3+/5 with giveway weakness; Right soulder abduction 3+/5 with onset of ataxic movement; Left shoulder abduction: 3+/5 with onset of ataxic movement; Right biceps: 4/5, Left biceps: 4/5; Right triceps: 4/5, Left triceps: 4-/5; Pt reports strength and overall upper extremity endurance fluccuates day to day. She feels stronger in the morning. She describe fatigue when self-propelling wheelchair and engaging in sustained UE activity   Hand Strength   Hand Dominance Right   Left Hand  (pounds) 12 pounds  (Decreased left  strength as measure by dynamometer (norm: 43-68 lb)   Right Hand  (pounds)   (right  strength deferred due to healing fracture)   Left Lateral Pinch (pounds) 1 pounds  (Decreased left  strength as measured by pinch guage (norm: 13-19 lb)   Right Lateral Pinch (pounds)   (right pinch strength deferred due to healing fracture)   Hand Strength Comments Pt reports decreased hand strength, impacting functional independence and engaging in preferred activities (Knitting, stiching weaving, spinning; used to be a jewelery maker   Coordination   Upper Extremity Coordination Right UE impaired;Left UE impaired   Gross Motor Coordination Pt describes spontaneous ataxic upper extremity movements, impacting reaching and distal hand control during daily activities. Pt describes incoordination resulting in dropping and breaking items   Fine Motor Coordination Pt reports fine motor incoordination impacts participation in desired activities (ie: knitting, stiching, weaving, spinning; used to be a jewelery maker   Left Hand, Nine Hole Peg Test (seconds) 35 seconds  (BNL (norm: 18 seconds))   Right Hand, Nine  Hole Peg Test (seconds) deferred due to fracture   Left Hand, Box and Blocks Test (cubes transferred in 1 minute) 35  (BNL (norm: 78))   Right Hand, Box and Blocks Test (cubes transferred in 1 minute)   (deferred due to fracture)   Functional Limitations Impaired ability to perform bilateral tasks;Reach to targets impaired;Decreased speed;Fine motor ADL performance impaired;Object transport impaired   Coordination Comments Pt describes ataxic movements of UE, resulting in breaking items   Balance   Balance Comments impaired due to motor/sensory changes including decreased muscle activitation and sensory input   Functional Mobility   Ambulation Pt reports ambulating with FWW and spv-CGA with  within household, recommended only 100ft since being discharged from hospital   Wheelchair Pt able to self-propel wheelchair in home   Transfer Skill   Level of [place]: Transfers stand-by assist   Toilet Transfer   Toilet Transfer Comments Pt uses commode downstairs   Tub/Shower Transfer   Tub/Shower Transfer Transfer Skill: Tub/Shower Transfers   Tub/Shower Transfer Comments Pt performs Mod I or supervision from  if available   Bathing   Level of [place] - Bathing minimum assist (75% patients effort)   Bathing Comments Pt receives assist for bathing as needed; perfoms in morning when she has the most energy; reports decreased UE activit tolerance with showering tasks   Upper Body Dressing   Level of [place]: Dress Upper Body independent   Lower Body Dressing   Level of [place]: Dress Lower Body minimum assist (75% patients effort)   Assistive Device dressing stick;long-handled shoe horn   Lower Body Dressing Comments Pt reports difficulty pulling pants over hips at this time due to R fracture   Toileting   Level of [place]: Toilet   (Mod I)   Assistive Device standard walker   Grooming   Grooming Comments Pt performing g/h in sitting - pt describes incoordination with makeup application,  nail cutting   Eating/Self-Feeding   Level of [place]: Eating independent   Eating/Self Feeding Comments assist for cutting   Activity Tolerance   Activity Tolerance Pt describes generalized fatigue and decreased energy levels at end of the day. She reports decreased upper body strength and activity tolerance when self-propelling wheelchair and engaging in sustained UE activity(ie: crafts, reading, baking).   Instrumental Activities of Daily Living Assessment   IADL Quick Adds Meal Planning/Preparation;Home/Financial/Management   Meal Planning/Preparation Pt enjoys cooking/baking and performs within limits of energy levels and symptom severity. She states she has purchased adaptive cutting board and interested in rocker knife.   Home/Financial Management Pt receives assist from  for laundry as it is in the basement. Pt states she performs light cleaning such as sweeping and using light weight vacuum   Planned Therapy Interventions   Planned Therapy Interventions ADL training;IADL training;Self care/Home management;Strengthening;Therapeutic activities;Coordination training   Intervention Comments Pt benefits from skilled outpatient occupational therapy for education on upper extemity home exercise program for increased strength and functional activity tolerance, strategies to improve distal hand control and optimize fine motor coordination, education on energy conservation, falls prevention strategies, activity modifications and adaptive strategies as needed, and improve safety and independence in ADL/IADL's.   Adult OT Eval Goals   OT Eval Goals (Adult) 1;2;3;4    OT Goal 1   Goal Identifier Falls prevention   Goal Description The patient will verbalize understanding of fall prevention/be educated on strategies to reduce risk of falling in the home and in the community.   Target Date 12/01/22    OT Goal 2   Goal Identifier Energy Conservation/Fatigue Management   Goal Description Patient will verbalize  or demonstrate understanding of two or three work simplification and energy conservation techniques and fatigue management strategies during self-care activities and IADL's tasks though demonstration of WNL score on FACIT.   Target Date 01/03/23    OT Goal 3   Goal Identifier Fine Motor/Distal Hand control   Goal Description Pt will verbalize and/or demonstrate understanding how to improve distal hand control and reduce impact of ataxic upper extremity movement on function to facilitate improved participation in leisure tasks (ie: knitting, sewing, stitching)   Target Date 01/03/23   OT Goal 4   Goal Identifier HEP   Goal Description Client will demonstrate independence with HEP and report consistent engagement 5/7 days to improve functional upper extremity strength and endurance for for increased participation in daily tasks (self-propelling wheelchair, UB ADL's, leisure tasks)   Target Date 01/03/23   OT Goal 5   Goal Identifier  Strength   Goal Description Pt will demonstrate improved L hand  strength by 5# each for improved ADL/IADL completion. (e.g. grasping items, opening containers, holding/carrying objects, leisure tasks).   Goal Progress pt has initiated use of theraputty and block at home   Target Date 01/03/23    OT Goal 6   Goal Identifier Adaptive Strategies/Compensatory Strategies   Goal Description Client will successfully verbalize and/or demonstrate understanding of 3-5 compensatory strategies or adaptive equipment prn and will report increased participation in preferred  I/ADL. .   Target Date 01/03/23   Clinical Impression   Criteria for Skilled Therapeutic Interventions Met Yes, treatment indicated   OT Diagnosis impaired independence, ease, and efficiency with ADL/IADL's and leisure tasks   Influenced by the following impairments decreased muscle strength, fine/gross motor coordination, decreased functional activity tolerance, pain, sensory changes   Assessment of Occupational  Performance 3-5 Performance Deficits   Identified Performance Deficits leisure participation, meal prep, self-care routine (dressing, grooming, showering)   Clinical Decision Making (Complexity) Moderate complexity   Therapy Frequency weekly   Predicted Duration of Therapy Intervention (days/wks) weekly   Risks and Benefits of Treatment have been explained. Yes   Patient, Family & other staff in agreement with plan of care Yes   Clinical Impression Comments Pt is a 47 year old female who was referred to outpatient occupational therapy to address physical deconditioning and maximize safety and independence in daily activity folllowing recent hospital admission secondary to fall. She has past medical history significant for chronic inflammatory demyelinating polyneuropathy (diagnosd 2021) and  complex regional pain syndrome. Pt presents with generalized decreased UB/LB strength, decreased functional activity tolerance, sensory deficits, pain, and decreased fine motor and gross motor incoordination, all of which impacts independence and safety in ADL/IADL and leisure activities.   Education Assessment   Barriers To Learning No Barriers   Preferred Learning Style Listening;Reading;Demonstration   Therapy Certification   Certification date from 11/01/22   Certification date to 02/01/23   Certification I certify the need for these services furnished under this plan of treatment and while under my care.  (Physician co-signature of this document indicates review and certification of the therapy plan)   Total Evaluation Time   OT Jackie Moderate Complexity Minutes ([id number]) 45

## 2022-11-01 NOTE — PROGRESS NOTES
[facility]          OUTPATIENT OCCUPATIONAL THERAPY  EVALUATION  PLAN OF TREATMENT FOR OUTPATIENT REHABILITATION  (COMPLETE FOR INITIAL CLAIMS ONLY)  Patient's Last Name, First Name, M.I.  YOB: 1975  Peggy Jessica                        Provider's Name  [facility] Medical Record No.  [id number]                               Onset Date:     10/27/22   Start of Care Date:     11/01/22   Type:     ___PT   _X_OT   ___SLP Medical Diagnosis:     Physical deconditioning                          OT Diagnosis:     impaired independence, ease, and efficiency with ADL/IADL's and leisure tasks Visits from SOC:  1   _________________________________________________________________________________  Plan of Treatment/Functional Goals:  ADL training, IADL training, Self care/Home management, Strengthening, Therapeutic activities, Coordination training     Pt benefits from skilled outpatient occupational therapy for education on upper extemity home exercise program for increased strength and functional activity tolerance, strategies to improve distal hand control and optimize fine motor coordination, education on energy conservation, falls prevention strategies, activity modifications and adaptive strategies as needed, and improve safety and independence in ADL/IADL's.              Goals  Goal Identifier: Falls prevention  Goal Description: The patient will verbalize understanding of fall prevention/be educated on strategies to reduce risk of falling in the home and in the community.  Target Date: 12/01/22     Goal Identifier: Energy Conservation/Fatigue Management  Goal Description: Patient will verbalize or demonstrate understanding of two or three work simplification and energy conservation techniques and fatigue management strategies during self-care activities and IADL's  tasks though demonstration of WNL score on FACIT.  Target Date: 01/03/23     Goal Identifier: Fine Motor/Distal Hand control  Goal Description: Pt will verbalize and/or demonstrate understanding how to improve distal hand control and reduce impact of ataxic upper extremity movement on function to facilitate improved participation in leisure tasks (ie: knitting, sewing, stitching)  Target Date: 01/03/23     Goal Identifier: HEP  Goal Description: Client will demonstrate independence with HEP and report consistent engagement 5/7 days to improve functional upper extremity strength and endurance for for increased participation in daily tasks (self-propelling wheelchair, UB ADL's, leisure tasks)  Target Date: 01/03/23     Goal Identifier:  Strength  Goal Description: Pt will demonstrate improved L hand  strength by 5# each for improved ADL/IADL completion. (e.g. grasping items, opening containers, holding/carrying objects, leisure tasks).  Target Date: 01/03/23     Goal Identifier: Adaptive Strategies/Compensatory Strategies  Goal Description: Client will successfully verbalize and/or demonstrate understanding of 3-5 compensatory strategies or adaptive equipment prn and will report increased participation in preferred  I/ADL. .  Target Date: 01/03/23                                  Therapy Frequency: weekly     Predicted Duration of Therapy Intervention (days/wks): weekly  ANIRUDH Stern          I CERTIFY THE NEED FOR THESE SERVICES FURNISHED UNDER        THIS PLAN OF TREATMENT AND WHILE UNDER MY CARE     (Physician co-signature of this document indicates review and certification of the therapy plan).                 ,    Certification date from: 11/01/22, Certification date to: 02/01/23               Referring Physician: Carlita Woodward MD     Initial Assessment        See Epic Evaluation      Start Of Care Date: 11/01/22

## 2022-11-02 ENCOUNTER — HOSPITAL ENCOUNTER (OUTPATIENT)
Dept: PHYSICAL THERAPY | Facility: CLINIC | Age: 47
Setting detail: THERAPIES SERIES
Discharge: HOME | End: 2022-11-02
Attending: INTERNAL MEDICINE
Payer: COMMERCIAL

## 2022-11-02 DIAGNOSIS — R53.81 PHYSICAL DECONDITIONING: ICD-10-CM

## 2022-11-02 PROCEDURE — 97162 PT EVAL MOD COMPLEX 30 MIN: CPT | Mod: GP

## 2022-11-04 NOTE — PROGRESS NOTES
11/02/22 0700   Quick Adds   Type of Visit Initial OP PT Evaluation   General Information   Start of Care Date 11/02/22   Referring Physician Carlita Woodward MD   Orders Evaluate and Treat as Indicated   Order Date 10/27/22   Medical Diagnosis Physical Deconditioning   Onset of illness/injury or Date of Surgery 10/17/22  (R 4th metacarpal fracture)   Precautions/Limitations other (see comments)  (R 4th metacarpal fracture)   Surgical/Medical history reviewed Yes  (CIDP, history of breast cancer, B mastectomy 2020, complex regional pain syndrome, R 4th metacarpal fracture 10/17/22, Celiac disease)   Pertinent history of current problem (include personal factors and/or comorbidities that impact the POC) Patient is a 47 year old female that presented to the ED on 10/17 after sustaining a fall on to her right side when feeding her dogs. She was found to have a right 4th metacarpal fracture and had a TCU stay from10/21-10/30. Has pain in her R hip - was using lidocaine patches but they bothered her skin. Loss of sensation from shins distally on BLE and numbness in hands. She reports she was told not to use her right hand, but has to propel her MWC so cannot help it. She cannot function when the lights are off - feels like she is free falling. She currently only walks from her bedroom to the bathroom using a 4WW with her  present. Her  is home this week, but then goes back to work. She occasionally has numbness/tingling in her face and arms as well that gets worse with fatigue. Takes flexeril and ibuprofen for hip pain, but ibuprofen makes her sick if she is in the car so couldn't take today. She uses a mirror for 1 step in/out of bathroom. She uses compression tank tops and ordered bike shorts, which has helped with dizziness. She feels like the room spins when she stands up. She reports that she has had inner ear issues before, but this is not the same and keeps getting tested for BPPV, but that isn't  what it is.   Pertinent Visual History  Glasses all the time - progressive bifocals   Prior level of function comment Mod I with 4WW, although frequent falls   Diagnostic Tests X-ray   X-ray Results Results   X-ray results nondisplaced fracture of the 4th metacarpal base (right)   Previous/Current Treatment Physical Therapy   Current Community Support Family/friend caregiver  (, relying on friends)   Patient role/Employment history Disabled   Living environment House/[facility]   Home/Community Accessibility Comments Reports having a wheelchair stair lift to assist with getting her MWC into the house; has 16 stairs to upper level where bathroom is located but only navigates once a day   Current Assistive Devices Standard Cane;Four Wheeled Walker;Front Wheeled Walker;Walking Poles;Gait Belt;Manual Wheel Chair   ADL Devices Shower/Tub Chair;Commode  (commode on main level)   Assistive Devices Comments Primarily using MWC   Patient/Family Goals Statement Improve mobility   Fall Risk Screen   Fall screen completed by PT   Have you fallen 2 or more times in the past year? Yes  (2-3 falls per day)   Have you fallen and had an injury in the past year? Yes   Timed Up and Go score (seconds) 87 sec   Is patient a fall risk? Yes;Department fall risk interventions implemented   Pain   Pain comments R hip pain 7/10, worse standing. Widespread pain   Vitals Signs   Heart Rate 86   SpO2 99   Blood Pressure 133/94   Vital Signs Comments lg cuff, left arm   Cognitive Status Examination   Orientation orientation to person, place and time   Level of Consciousness alert   Follows Commands and Answers Questions 100% of the time;able to follow multistep instructions   Personal Safety and Judgment intact   Memory impaired  (when fatigued)   Posture   Posture Protracted shoulders   Range of Motion (ROM)   ROM Comment WFL BUE with exception of R wrist/hand (in a cast); WFL BLE via observation during functional movement   Strength    Strength Comments NT formally, but impaired strength in BLE as observed with ambulation, will assess next session   Transfer Skills   Transfer Comments MWC<>mat transfer with 2WW+platform attachment on right and SBA. Forward flexed and crouched posture.   Locomotion   Wheel Chair Mobility Comments Independent MW propulsion   Gait   Gait Comments Pt ambulated 10 ft with 2WW+platform attachment and CGA. She demonstrated a forward flexed posture with short step length and foot clearance bilaterally. Lacks TKE at heel strike, displaying bilateral knees flexed throughout gait cycle. Downward gaze at feet throughout.   Balance   Balance Comments Static standing with wide ZULEIMA and 2WW+platform attachment, SBA.   Balance Special Tests   Balance Special Tests Timed up and go   Balance Special Tests Timed Up and Go   Seconds 87 Seconds   Comments 2WW+platform attachment, CGA   Sensory Examination   Sensory Perception Comments Impaired distal to shin BLE, will further assess next session   Modality Interventions   Planned Modality Interventions Comments PRN   Planned Therapy Interventions   Planned Therapy Interventions balance training;gait training;neuromuscular re-education;orthotic fitting/training;ROM;strengthening;stretching;transfer training;manual therapy   Clinical Impression   Criteria for Skilled Therapeutic Interventions Met yes, treatment indicated   PT Diagnosis Force Production Deficit, Sensory Detection Deficit   Influenced by the following impairments impaired strength, impaired sensation, widespread pain, impaired balance, dizziness   Functional limitations due to impairments Impaired functional mobility and ability to perform ADLs, frequent falls   Clinical Presentation Evolving/Changing   Clinical Presentation Rationale Frequent falls/injuries, increased dizziness   Clinical Decision Making (Complexity) Moderate complexity   Therapy Frequency 1 time/week   Predicted Duration of Therapy Intervention  (days/wks) 90 days   Risk & Benefits of therapy have been explained Yes   Patient, Family & other staff in agreement with plan of care Yes   Clinical Impression Comments Pt presents with frequent falls and increased dizziness that is impacting her functional mobility and ability to perform ADLs/IADLs. Pt would benefit from skilled PT to improve mobilty and reduce the risk of falls.   Education Assessment   Preferred Learning Style Listening   Barriers to Learning No barriers   GOALS   PT Eval Goals 1;2;3;4   Goal 1   Goal Identifier TUG   Goal Description Patient will perform TUG test in < 60 sec to demonstrate 30% improvement and a decreased risk for falls.   Goal Progress Eval: 87 sec with platform walker   Target Date 01/30/23   Goal 2   Goal Identifier LE strength   Goal Description Pt will improve 30 sec STS baseline reps by 20% to demonstrate increased LE strength and endurance.   Target Date 01/30/23   Goal 3   Goal Identifier HEP   Goal Description Pt will independent with performing home exercise program at least 5x/week for improved activity tolerance.   Target Date 01/30/23   Total Evaluation Time   PT Eval, Moderate Complexity Minutes (27706) 40

## 2022-11-07 ENCOUNTER — OFFICE VISIT (OUTPATIENT)
Dept: FAMILY MEDICINE | Facility: CLINIC | Age: 47
End: 2022-11-07
Payer: COMMERCIAL

## 2022-11-07 VITALS
TEMPERATURE: 98.1 F | RESPIRATION RATE: 16 BRPM | OXYGEN SATURATION: 99 % | DIASTOLIC BLOOD PRESSURE: 90 MMHG | HEART RATE: 94 BPM | SYSTOLIC BLOOD PRESSURE: 123 MMHG

## 2022-11-07 DIAGNOSIS — G61.81 CIDP (CHRONIC INFLAMMATORY DEMYELINATING POLYNEUROPATHY) (H): ICD-10-CM

## 2022-11-07 DIAGNOSIS — Z09 HOSPITAL DISCHARGE FOLLOW-UP: Primary | ICD-10-CM

## 2022-11-07 DIAGNOSIS — R74.8 ELEVATED LIVER ENZYMES: ICD-10-CM

## 2022-11-07 DIAGNOSIS — S79.911D INJURY OF RIGHT HIP, SUBSEQUENT ENCOUNTER: ICD-10-CM

## 2022-11-07 PROCEDURE — 99214 OFFICE O/P EST MOD 30 MIN: CPT | Performed by: NURSE PRACTITIONER

## 2022-11-07 RX ORDER — BUPRENORPHINE 5 UG/H
1 PATCH TRANSDERMAL
COMMUNITY
End: 2023-05-12

## 2022-11-07 NOTE — PROGRESS NOTES
"Today's Date: Nov 7, 2022     Patient Peggy Jessica 1975 presents to the clinic today to address   Chief Complaint   Patient presents with     Hospital F/U     Hand   Right hip, painful all the time, when she puts weight on it the pain shoots up and down the leg             SUBJECTIVE     History of Present Illness:    47 year old female with complex PMH of chronic immune sensory polyradiculopathy (CISP)/Chronic inflammatory demyelinating polyneuropathy (CIDP), complex regional pain syndrome (RLE 2/2 stress fracture; chest 2/2 bilateral mastectomy- currently under care of pain management), BRCA+ mutation, cervical cancer, and migraines presents for hospital follow-up.        Per chart review, patient went to the ER on 10/17/22 after sustaining an unwitnessed fall out of bed and landing on her right side. She reported feeling lightheaded with LOC for a few minutes. Patient has been falling frequently at home, approximately 2-3 times per day. Patient underwent numerous imaging including right pelvis/hip xray, right shoulder xray, right elbow xray, CT head, CT cervical spine, CT chest/abdomen/pelvis, CT thoracic spine, CT lumbar spine showing no fractures. Xray of the right wrist showed a possible nondisplaced fracture of the 4th metacarpal base which was splinted in the ED with recommended for follow up imaging in 1 week. Her lab-work was unremarkable besides a mild elevation of AST/ALT. Her UA was negative and ECHO on 10/18/22 demonstrated EF 60-65% with no wall abnormalities or valvulopathy. PT advised TCU where she was admitted on 10/21/22 and discharged on 10/30/22.     Today, patient denies acute concerns/symptoms, however, she did express frustration with her current state of health. Patient feels that her CIDP symptoms, especially neuropathy in her bilateral lower extremities has been worsening. Per patient, \"I feel like we should try IV IG again.\" She has been under the care of Dr. Duenas of [facility] " "[facility]urology and Dr. Raman of [facility]. She reports being told she has central sensitization syndrome. She endorses Right hip pain since her fall. Per the patient, her hip \"keeps popping out\" which has been painful. She was using lidocaine patches but she had to stop due to a skin rash. She has been going to PT for her CIDP recovery.    Possible nondisplaced fracture of the 4th metacarpal base- she has a repeat X-ray ordered and follow-up with Ortho surgery on 11/10. She reports that overall the pain and swelling have improved, though sometimes the pain is aggravated by using wheelchair.     Insomnia- Patient was started on melatonin during her TCU stay for insomnia. She reports that she only needed the melatonin today since she was having difficulty sleeping while in the hospital. She denies depressive symptoms today, stating \"my health is depressing but I'm doing OK.\" She feels that she is coping well with her familial support (Partner, Sb.) She and her partner are planning on moving to [place] to be closer to her parents by the end of this calendar year. No other acute concerns/symptoms at time of exam.      Review of Systems   Constitutional, HEENT, cardiovascular, pulmonary, gi and gu systems are negative, except as otherwise noted.        Allergies   Allergen Reactions     Dihydroergotamine Anaphylaxis     Latex Anaphylaxis     Shellfish-Derived Products Anaphylaxis     Sumatriptan Anaphylaxis     Banana Unknown     Gabapentin Dizziness     Gluten Meal Other (See Comments)     Celiac disease     Keppra [Levetiracetam] Nausea and Vomiting     Kiwi Unknown     Levofloxacin Other (See Comments)     Arrhythmia     Metronidazole Nausea and Vomiting     Nitrofurantoin Hives     Penicillins Hives     Pregabalin      Topiramate Visual Disturbance     Aspirin Rash     Methadone Rash     Morphine Hives     She got hives around are when morphine given but resolved after few minutes per patient      " Risperidone Anxiety        Current Outpatient Medications   Medication Instructions     acetaminophen (TYLENOL) 975 mg, Oral, EVERY 8 HOURS     buprenorphine (BUTRANS) 5 MCG/HR WK patch 1 patch, Transdermal, EVERY 7 DAYS     cyclobenzaprine (FLEXERIL) 10 mg, Oral, 3 TIMES DAILY     estradiol (ESTRACE) 2 g, Vaginal, TWICE WEEKLY     folic acid (FOLVITE) 400 mcg, Oral, DAILY     ibuprofen (ADVIL/MOTRIN) 600 mg, Oral, EVERY 6 HOURS PRN     Lidocaine (LIDOCARE) 4 % Patch 3 patches, Transdermal, EVERY 24 HOURS, To prevent lidocaine toxicity, patient should be patch free for 12 hrs daily.     medical cannabis (Patient's own supply) 1 Dose, SEE ADMIN INSTRUCTIONS, (The purpose of this order is to document that the patient reports taking medical cannabis.  This is not a prescription, and is not used to certify that the patient has a qualifying medical condition.)<BR>Per pt: 5-10 mg TID PRN      multivitamin w/minerals (THERA-VIT-M) tablet 1 tablet, Oral, EVERY MORNING, [employer]     naloxone (NARCAN) 4 mg, Alternating Nostrils, PRN, every 2-3 minutes until assistance arrives     ondansetron (ZOFRAN) 4 mg, Oral, EVERY 6 HOURS PRN     oxyCODONE (ROXICODONE) 5 mg, Oral, EVERY 8 HOURS PRN     polyethylene glycol (MIRALAX) 17 GM/Dose powder Oral, PRN     senna-docusate (SENOKOT-S/PERICOLACE) 8.6-50 MG tablet 1 tablet, Oral, 2 TIMES DAILY       Past Medical History:   Diagnosis Date     BRCA positive      CIDP (chronic inflammatory demyelinating polyneuropathy) (H)      ASHKAN III with severe dysplasia 2002     Complex regional pain syndrome type 1 of right lower extremity         Family History   Problem Relation Age of Onset     Kidney Disease Father         Social History     Tobacco Use     Smoking status: Former     Types: Cigarettes     Smokeless tobacco: Never   Vaping Use     Vaping Use: Never used   Substance Use Topics     Alcohol use: Not Currently     Drug use: Yes     Types: Marijuana     Comment: Medical  Dominique patient        History   Sexual Activity     Sexual activity: Not Currently     Partners: Male        No flowsheet data found.     Immunization History   Administered Date(s) Administered     COVID-19,JONATHAN,Moderna 03/20/2021, 04/17/2021                 OBJECTIVE     BP (!) 123/90 (BP Location: Right arm, Patient Position: Sitting, Cuff Size: Adult Regular)   Pulse 94   Temp 98.1  F (36.7  C) (Oral)   Resp 16   SpO2 99%      Labs:  Lab Results   Component Value Date    WBC 5.3 10/24/2022    HGB 14.0 10/24/2022    HCT 40.4 10/24/2022     10/24/2022    CHOL 229 (H) 03/30/2022    TRIG 131 03/30/2022    HDL 56 03/30/2022    ALT 47 (H) 10/17/2022    AST 44 (H) 10/17/2022     10/24/2022    BUN 11 10/24/2022    CO2 29 10/24/2022    TSH 1.38 07/20/2022        Physical Exam  Exam conducted with a chaperone present.   Constitutional:       General: She is not in acute distress.     Appearance: She is not ill-appearing.      Comments: R hand in splint, uses wheelchair for ambulation   Eyes:      Extraocular Movements: Extraocular movements intact.   Cardiovascular:      Rate and Rhythm: Normal rate and regular rhythm.      Heart sounds: Normal heart sounds. No murmur heard.    No friction rub. No gallop.   Pulmonary:      Effort: Pulmonary effort is normal. No respiratory distress.      Breath sounds: Normal breath sounds. No wheezing, rhonchi or rales.   Musculoskeletal:      Cervical back: Neck supple.      Right hip: Tenderness present. Decreased range of motion. Decreased strength.      Left hip: Decreased range of motion. Normal strength.      Comments: R hip tender at iliac spine with decreased strength(3/5) with flexion. Pain with internal rotation.   Skin:     General: Skin is warm and dry.   Neurological:      General: No focal deficit present.      Mental Status: She is alert and oriented to person, place, and time.   Psychiatric:         Thought Content: Thought content normal.          Judgment: Judgment normal.          Itzel Nazario, DNP student served as chaperon.         ASSESSMENT/PLAN       1. Hospital discharge follow-up  47-year-old female with PMH significant for CIDP/CISP/CRPS and frequent falls. S/p hospitalization and TCU admission for fall sustained on 10/17/2022. Patient scheduled to follow-up with Orthopedics on 11/10 due to possible nondisplaced fracture of the 4th metacarpal base. She is currently utilizing supportive OT/PT for her CIDP symptoms. She was started on melatonin for insomnia while hospitalized but denies depressive symptoms today.    2. Injury of right hip, subsequent encounter  Refer to PT given decreased R hip strength/ROM. Advised intermittent ice for symptom relief.  - Physical Therapy Referral; Future  - PLATFORM ATTACHMENT, WALKER    3. Elevated liver enzymes  In the absence of RUQ pain, chills/fevers, brown urine, advised repeat hepatic panel in 4-6 weeks.  - Hepatic panel; Future    4. CIDP (chronic inflammatory demyelinating polyneuropathy) (H)  Patient frustrated about not restarting IV IG. Discussed SNRI such as duloxetine, effexor, or milnacipran given central sensitization syndrome diagnosis, however, patient declined rx. She reports not tolerating duloxetine (dizziness), effexor not being covered by insurance, and is hesitant to try a new SNRI. Offered referral to another neurologist, patient declined. She reports that her pain management provider is looking into ketamine therapy for her symptoms.      Follow-Up:  - Follow up in 3 month(s), or if symptoms worsen or fail to improve.     Options for treatment and follow-up care were reviewed with the patient. Patient engaged in the decision making process and verbalized understanding of the options discussed and agreed with the final plan.  AVS printed and given to patient.    MIRA Addison CNP    [facility]  [address] [place] [address],  [place] [address]  [phone]

## 2022-11-07 NOTE — NURSING NOTE
Chief Complaint   Patient presents with     Hospital F/U     Hand   Right hip, painful all the time, when she puts weight on it the pain shoots up and down the leg     Blood pressure (!) 123/90, pulse 94, temperature 98.1  F (36.7  C), temperature source Oral, resp. rate 16, SpO2 99 %, not currently breastfeeding.    Avis Hargrove

## 2022-11-10 ENCOUNTER — PRE VISIT (OUTPATIENT)
Dept: ORTHOPEDICS | Facility: CLINIC | Age: 47
End: 2022-11-10

## 2022-11-17 DIAGNOSIS — M79.641 RIGHT HAND PAIN: Primary | ICD-10-CM

## 2022-11-20 ENCOUNTER — HEALTH MAINTENANCE LETTER (OUTPATIENT)
Age: 47
End: 2022-11-20

## 2022-11-21 ENCOUNTER — ANCILLARY PROCEDURE (OUTPATIENT)
Dept: GENERAL RADIOLOGY | Facility: CLINIC | Age: 47
End: 2022-11-21
Attending: FAMILY MEDICINE
Payer: COMMERCIAL

## 2022-11-21 ENCOUNTER — OFFICE VISIT (OUTPATIENT)
Dept: ORTHOPEDICS | Facility: CLINIC | Age: 47
End: 2022-11-21
Payer: COMMERCIAL

## 2022-11-21 DIAGNOSIS — S62.344D CLOSED NONDISPLACED FRACTURE OF BASE OF FOURTH METACARPAL BONE OF RIGHT HAND WITH ROUTINE HEALING, SUBSEQUENT ENCOUNTER: ICD-10-CM

## 2022-11-21 PROCEDURE — 73110 X-RAY EXAM OF WRIST: CPT | Mod: RT | Performed by: RADIOLOGY

## 2022-11-21 PROCEDURE — 73130 X-RAY EXAM OF HAND: CPT | Mod: RT | Performed by: RADIOLOGY

## 2022-11-21 PROCEDURE — 99213 OFFICE O/P EST LOW 20 MIN: CPT | Performed by: FAMILY MEDICINE

## 2022-11-21 NOTE — LETTER
11/21/2022      RE: Peggy Jessica  [address] [facility] [address]     Dear Colleague,    Thank you for referring your patient, Peggy Jessica, to the [facility]. Please see a copy of my visit note below.      [facility]  SPORTS & ORTHOPEDIC CLINIC VISIT     Nov 21, 2022        ASSESSMENT & PLAN    47-year-old with history of neuropathy 4+ weeks status post right hand injury and suspected fourth metacarpal base fracture.  Has improved after being in splint.  Still has some pain localizing to the base of fourth metacarpal though no obvious fracture is evident on x-rays today.    Reviewed imaging and assessment with patient in detail  Okay to continue out of splint for the time being.  We discussed possible use of OTC wrist brace as needed for comfort  She will follow-up in 2 to 3 weeks if she is still having a localized tenderness over the base of the fourth metacarpal    Matt Montez MD  [facility]    -----  Chief Complaint   Patient presents with     Right Hand - Pain       SUBJECTIVE  Peggy Jessica is a/an 47 year old female who is seen as an ER referral for evaluation of  Right hand pain.     The patient is seen by themselves.  The patient is Right handed    Onset: 10/16/22. Patient describes injury as fainting while feeding the dogs and fell on hand.   Location of Pain: right 4th metacarpal   Worsened by: Soreness, pushing herself around too much   Better with: Splint, and Ibuprofen   Treatments tried: ibuprofen and casting/splinting/bracing  Associated symptoms: swelling    Orthopedic/Surgical history: NO  Social History/Occupation: No       REVIEW OF SYSTEMS:    Do you have fever, chills, weight loss? No    Do you have any vision problems? No    Do you have any chest pain or edema? No    Do you have any shortness of breath or wheezing?  No    Do you have stomach  problems? No    Do you have any numbness or focal weakness? No    Do you have diabetes? No    Do you have problems with bleeding or clotting? No    Do you have an rashes or other skin lesions? No    OBJECTIVE:  There were no vitals taken for this visit.     General: Alert, pleasant, no distress  Right hand: warm, well perfused, SILT throughout     Inspection: No swelling or ecchymosis     ROM: At baseline.     Strength: Strength at baseline without pain     Palpation: Has some tenderness diffusely over the dorsal aspect of the hand.  More point tenderness over the fourth metacarpal base     Special Tests: None      RADIOLOGY:    3 view xrays of right hand and wrist performed and reviewed independently demonstrating no obvious fracture of the base of the fourth metacarpal. See EMR for formal radiology report.         Again, thank you for allowing me to participate in the care of your patient.      Sincerely,    Matt Montez MD

## 2022-11-21 NOTE — PROGRESS NOTES
[facility]  SPORTS & ORTHOPEDIC CLINIC VISIT     Nov 21, 2022        ASSESSMENT & PLAN    47-year-old with history of neuropathy 4+ weeks status post right hand injury and suspected fourth metacarpal base fracture.  Has improved after being in splint.  Still has some pain localizing to the base of fourth metacarpal though no obvious fracture is evident on x-rays today.    Reviewed imaging and assessment with patient in detail  Okay to continue out of splint for the time being.  We discussed possible use of OTC wrist brace as needed for comfort  She will follow-up in 2 to 3 weeks if she is still having a localized tenderness over the base of the fourth metacarpal    Matt Montez MD  [facility] SPORTS MEDICINE [facility]    -----  Chief Complaint   Patient presents with     Right Hand - Pain       SUBJECTIVE  Peggy Jessica is a/an 47 year old female who is seen as an ER referral for evaluation of  Right hand pain.     The patient is seen by themselves.  The patient is Right handed    Onset: 10/16/22. Patient describes injury as fainting while feeding the dogs and fell on hand.   Location of Pain: right 4th metacarpal   Worsened by: Soreness, pushing herself around too much   Better with: Splint, and Ibuprofen   Treatments tried: ibuprofen and casting/splinting/bracing  Associated symptoms: swelling    Orthopedic/Surgical history: NO  Social History/Occupation: No       REVIEW OF SYSTEMS:    Do you have fever, chills, weight loss? No    Do you have any vision problems? No    Do you have any chest pain or edema? No    Do you have any shortness of breath or wheezing?  No    Do you have stomach problems? No    Do you have any numbness or focal weakness? No    Do you have diabetes? No    Do you have problems with bleeding or clotting? No    Do you have an rashes or other skin lesions? No    OBJECTIVE:  There were no vitals taken for this visit.     General: Alert, pleasant,  no distress  Right hand: warm, well perfused, SILT throughout     Inspection: No swelling or ecchymosis     ROM: At baseline.     Strength: Strength at baseline without pain     Palpation: Has some tenderness diffusely over the dorsal aspect of the hand.  More point tenderness over the fourth metacarpal base     Special Tests: None      RADIOLOGY:    3 view xrays of right hand and wrist performed and reviewed independently demonstrating no obvious fracture of the base of the fourth metacarpal. See EMR for formal radiology report.

## 2022-11-22 NOTE — PROGRESS NOTES
This is a recent snapshot of the patient's [facility] medical record.  For current drug dose and complete information and questions, call [phone] or In Basket pool, [facility] home infusion ([id number])  CSN Number:  [id number]

## 2022-11-23 NOTE — PROGRESS NOTES
"   10/22/22 0716   Appointment Info   Signing Clinician's Name / Credentials (OT) KIMBERLEY Bertrand   Rehab Comments (OT) OT INITIAL EVAL, CERTIFICATION   Living Environment   People in Home spouse   Current Living Arrangements house   Home Accessibility stairs to enter home;stairs within home   Number of Stairs, Main Entrance 4   Number of Stairs, Within Home, Primary   (flight up to bedroom, bathroom (bathroom also has a step up), flight down to bathroom, commode on the main floor)   Transportation Anticipated family or friend will provide   Living Environment Comments Pt lived in 2 story house.  4 steps to enter. Flight up to bedroom, bathroom (bathroom also has a step up), flight down to bathroom, commode on the main floor.  Once pt comes down to main floor she does not go upstairs until night time.  She has a tub/shower combo with shower chair.   Self-Care   Usual Activity Tolerance fair   Current Activity Tolerance fair   Equipment Currently Used at Home other (see comments)  (cane, standard and rolling walker, wheelchair , shower chair, commode, tracking poles)   Fall history within last six months yes   Number of times patient has fallen within last six months   (frequent falls)   Activity/Exercise/Self-Care Comment Patient reports many recent falls with up to 6 yesterday before admission. Patient had been working with OP rehab services on pain management.  Pt tends to start out stronger with more energy in AM, was amb without device but as the day goes on she was increasingly using her reacher and w/c.   Instrumental Activities of Daily Living (IADL)   IADL Comments Pt likes to bake.  She has support of her H for IADL.   General Information   Onset of Illness/Injury or Date of Surgery 10/17/22   Referring Physician Clarita Rausch MD   Patient/Family Therapy Goal Statement (OT) Pt reports she has been told she may not get \"better\" but she is interested in adaptation/compensatory strategies.   Additional " Occupational Profile Info/Pertinent History of Current Problem Peggy Jessica is a 47 year old female admitted on 10/17/2022. She has PMH of chronic inflammatory demyelinating polyneuropathy, frequent falls, complex regional pain syndrome who presents to the ED after a mechanical fall with subsequent R Non displaced fracture at fourth metacarpal base.   Existing Precautions/Restrictions fall  (RUE fx with distal/hand splint/batting/ace-like cast.  Pt uncertain if she will get a traditional cast.)   Limitations/Impairments sensory  (pt describes h/o sensation changes head to toe including CSS)   Cognitive Status Examination   Orientation Status orientation to person, place and time   Cognitive Status Comments WNLs   Visual Perception   Visual Impairment/Limitations corrective lenses full-time   Sensory   Sensory Comments Pt c/o h/o sensory changes and describes her nervous system as being impaired, eg she reports that when she fractured her R hand, her pain was referred to her back.  She reports it feels like she is walking on wadded up batting material.   Pain Assessment   Patient Currently in Pain   (no c/o during OT eval)   Range of Motion Comprehensive   General Range of Motion upper extremity range of motion deficits identified   Comment, General Range of Motion RUE sh flex to 90 degrees and hand/forearm are immoblized with splint/batting/ace wrap (she is not sure if she will get a cast).  LUE AROM WFLs though weakness noted in pt's lumbricals.   Strength Comprehensive (MMT)   General Manual Muscle Testing (MMT) Assessment   (Pt is R handed.)   Comment, General Manual Muscle Testing (MMT) Assessment 4/5 LUE and weakness in lumbricals, pt is a candidate for exercise and should consider splinting if weakness progresses.  RUE limited to distal finger mvmt as immobilized with splint-like cast.  Pt reports h/o jerky mvmts, eg she has thrown a remote before.   Coordination   Coordination Comments Gross L hand  use WNLs, plan to con't to monitor and challenge with activity.   Bed Mobility   Comment (Bed Mobility) SBA   Transfers   Transfer Comments CGA bed, toilet (handicap height) and w/c.   Balance   Balance Comments Pt moving slowly and using 2WW for ambulation.  H/o R side giving out unexpectedly.   Activities of Daily Living   BADL Assessment/Intervention upper body dressing;lower body dressing;grooming   Upper Body Dressing Assessment/Training   Comment, (Upper Body Dressing) OT: Set-up tshirt   Lower Body Dressing Assessment/Training   Comment, (Lower Body Dressing) OT: SBA pants   Grooming Assessment/Training   Comment, (Grooming) OT: Set-up   Clinical Impression   Criteria for Skilled Therapeutic Interventions Met (OT) Yes, treatment indicated   OT Diagnosis Decrease ADL, Decrease IADL, transfers   OT Problem List-Impairments impacting ADL problems related to;activity tolerance impaired;balance;coordination;mobility;motor control;range of motion (ROM);strength;pain;sensory feedback   Therapy Certification   Start of Care Date 10/22/22   Certification date from 10/22/22   Certification date to 11/20/22   Medical Diagnosis Peggy Jessica is a 47 year old female admitted on 10/17/2022. She has PMH of chronic inflammatory demyelinating polyneuropathy, frequent falls, complex regional pain syndrome who presents to the ED after a mechanical fall with subsequent R Non displaced fracture at fourth metacarpal base.   OT Goals   Therapy Frequency (OT) 6 times/wk   OT Predicted Duration/Target Date for Goal Attainment 10/29/22   OT Goals Upper Body Dressing;Lower Body Dressing;Upper Body Bathing;Lower Body Bathing;Transfers;Toilet Transfer/Toileting;Meal Preparation;Home Management;OT Goal 1   OT: Upper Body Dressing Modified independent;including set-up/clothing retrieval   OT: Lower Body Dressing Modified independent;including set-up/clothing retrieval   OT: Upper Body Bathing Modified independent   OT: Lower Body  Bathing Supervision/stand-by assist  (with shower chair)   OT: Transfer Supervision/stand-by assist  (shower transfer)   OT: Toilet Transfer/Toileting Modified independent   OT: Meal Preparation Supervision/stand-by assist;from wheelchair   OT: Home Management Supervision/stand-by assist;from wheelchair   OT: Goal 1 Pt will utilize AE training/compensatory strategies to complete ADL/IADL and transfer routines to ensure safe IND.   OT: Goal 2 Pt will demo L hand HEP to improve positioning and strength for ADL/IADL.   Self-Care/Home Management   Self-Care/Home Mgmt/ADL, Compensatory, Meal Prep Minutes (88055) 15   Symptoms Noted During/After Treatment (Meal Preparation/Planning Training) fatigue  (h/o intermittent dizziness)   Treatment Detail/Skilled Intervention Toileting.  Th provided set-up and cleared pt path for CGA with mob using 2WW.  Pt able to do her own naeem cares and stood at the sink to wash with CGA.  Pt reports she is becoming more realistic and will increase the use of her walker.   OT Discharge Planning   OT Plan OT: h/o frequent Falls, intermittent dizziness, sensation changes, L hand/forearm casted (splint with batting and ace wrap - she does not know if she will get a cast). Shower, use her w/c to get to shower bench, wrap R hand/forearm.  Bathing gg code.   OT Discharge Recommendation (DC Rec) home with assist;home with home care occupational therapy   OT Rationale for DC Rec Pt would benefit from skilled OT in her home setting as she would like to be as IND yet safe as possible.  She enjoys baking but H has to take things in/out of the oven.  She reports that she does not shower if someone is not in the house.   OT Brief overview of current status Pt pleasant and cooperative at OT [facility].  She is a good candidate for OT s/p h/o chronic inflammatory demyelinating polyneuropathy, frequent falls, complex regional pain syndrome who presents to the ED after a mechanical fall with subsequent R Non  displaced fracture at fourth metacarpal base.  She has been informed by providers that she may not get better and is interested in adaptations and compensatory strategies to increase/maintain her IND.  See OT POC.   Total Session Time   Timed Code Treatment Minutes 15   Total Session Time (sum of timed and untimed services) 15   Post Acute Settings Only   What unit is patient on? TCU   Upper Body Dressing   Describe Performance OT Set-up   Lower Body Dressing (Pants/Undergarments)   Describe Performance OT Set-up   Lower Body Dressing (Putting On/Taking-Off Footwear)   Describe Performance OT Set-up   Toileting Hygiene   Describe Performance OT: Mod I   Transfers: Toilet transfers   Describe Performance OT: CGA with 2WW, handicap height toilet and grab bar   Hygiene/Grooming   Describe Performance OT: set-up   Oral Hygiene   Describe Performance OT: set-up

## 2022-11-30 ENCOUNTER — TELEPHONE (OUTPATIENT)
Dept: PHYSICAL THERAPY | Facility: CLINIC | Age: 47
End: 2022-11-30

## 2022-12-05 ENCOUNTER — HOSPITAL ENCOUNTER (EMERGENCY)
Facility: CLINIC | Age: 47
Discharge: HOME | End: 2022-12-06
Attending: EMERGENCY MEDICINE
Payer: COMMERCIAL

## 2022-12-05 ENCOUNTER — NURSE TRIAGE (OUTPATIENT)
Dept: NURSING | Facility: CLINIC | Age: 47
End: 2022-12-05

## 2022-12-05 DIAGNOSIS — R11.2 NAUSEA AND VOMITING, UNSPECIFIED VOMITING TYPE: ICD-10-CM

## 2022-12-05 DIAGNOSIS — R10.84 ABDOMINAL PAIN, GENERALIZED: ICD-10-CM

## 2022-12-05 LAB
ALBUMIN SERPL BCG-MCNC: 4.8 G/DL (ref 3.5–5.2)
ALP SERPL-CCNC: 111 U/L (ref 35–104)
ALT SERPL W P-5'-P-CCNC: 24 U/L (ref 10–35)
ANION GAP SERPL CALCULATED.3IONS-SCNC: 16 MMOL/L (ref 7–15)
AST SERPL W P-5'-P-CCNC: 32 U/L (ref 10–35)
BASOPHILS # BLD AUTO: 0 10E3/UL (ref 0–0.2)
BASOPHILS NFR BLD AUTO: 0 %
BILIRUB SERPL-MCNC: 0.4 MG/DL
BUN SERPL-MCNC: 11.9 MG/DL (ref 6–20)
CALCIUM SERPL-MCNC: 10.5 MG/DL (ref 8.6–10)
CHLORIDE SERPL-SCNC: 102 MMOL/L (ref 98–107)
CREAT SERPL-MCNC: 0.78 MG/DL (ref 0.51–0.95)
DEPRECATED HCO3 PLAS-SCNC: 23 MMOL/L (ref 22–29)
EOSINOPHIL # BLD AUTO: 0 10E3/UL (ref 0–0.7)
EOSINOPHIL NFR BLD AUTO: 0 %
ERYTHROCYTE [DISTWIDTH] IN BLOOD BY AUTOMATED COUNT: 13.4 % (ref 10–15)
GFR SERPL CREATININE-BSD FRML MDRD: >90 ML/MIN/1.73M2
GLUCOSE SERPL-MCNC: 109 MG/DL (ref 70–99)
HCT VFR BLD AUTO: 49.1 % (ref 35–47)
HGB BLD-MCNC: 16.1 G/DL (ref 11.7–15.7)
IMM GRANULOCYTES # BLD: 0 10E3/UL
IMM GRANULOCYTES NFR BLD: 0 %
LIPASE SERPL-CCNC: 22 U/L (ref 13–60)
LYMPHOCYTES # BLD AUTO: 1.6 10E3/UL (ref 0.8–5.3)
LYMPHOCYTES NFR BLD AUTO: 23 %
MCH RBC QN AUTO: 30.9 PG (ref 26.5–33)
MCHC RBC AUTO-ENTMCNC: 32.8 G/DL (ref 31.5–36.5)
MCV RBC AUTO: 94 FL (ref 78–100)
MONOCYTES # BLD AUTO: 0.3 10E3/UL (ref 0–1.3)
MONOCYTES NFR BLD AUTO: 4 %
NEUTROPHILS # BLD AUTO: 5 10E3/UL (ref 1.6–8.3)
NEUTROPHILS NFR BLD AUTO: 73 %
NRBC # BLD AUTO: 0 10E3/UL
NRBC BLD AUTO-RTO: 0 /100
PLATELET # BLD AUTO: 345 10E3/UL (ref 150–450)
POTASSIUM SERPL-SCNC: 4.2 MMOL/L (ref 3.4–5.3)
PROT SERPL-MCNC: 8.8 G/DL (ref 6.4–8.3)
RBC # BLD AUTO: 5.21 10E6/UL (ref 3.8–5.2)
SODIUM SERPL-SCNC: 141 MMOL/L (ref 136–145)
WBC # BLD AUTO: 6.9 10E3/UL (ref 4–11)

## 2022-12-05 PROCEDURE — 250N000011 HC RX IP 250 OP 636: Performed by: EMERGENCY MEDICINE

## 2022-12-05 PROCEDURE — 85025 COMPLETE CBC W/AUTO DIFF WBC: CPT | Performed by: EMERGENCY MEDICINE

## 2022-12-05 PROCEDURE — 96361 HYDRATE IV INFUSION ADD-ON: CPT | Performed by: EMERGENCY MEDICINE

## 2022-12-05 PROCEDURE — 83690 ASSAY OF LIPASE: CPT | Performed by: EMERGENCY MEDICINE

## 2022-12-05 PROCEDURE — 258N000003 HC RX IP 258 OP 636: Performed by: EMERGENCY MEDICINE

## 2022-12-05 PROCEDURE — 96375 TX/PRO/DX INJ NEW DRUG ADDON: CPT | Performed by: EMERGENCY MEDICINE

## 2022-12-05 PROCEDURE — 96374 THER/PROPH/DIAG INJ IV PUSH: CPT | Mod: 59 | Performed by: EMERGENCY MEDICINE

## 2022-12-05 PROCEDURE — 80053 COMPREHEN METABOLIC PANEL: CPT | Performed by: EMERGENCY MEDICINE

## 2022-12-05 PROCEDURE — 99285 EMERGENCY DEPT VISIT HI MDM: CPT | Mod: 25 | Performed by: EMERGENCY MEDICINE

## 2022-12-05 PROCEDURE — 36415 COLL VENOUS BLD VENIPUNCTURE: CPT | Performed by: EMERGENCY MEDICINE

## 2022-12-05 PROCEDURE — 99285 EMERGENCY DEPT VISIT HI MDM: CPT | Performed by: EMERGENCY MEDICINE

## 2022-12-05 RX ORDER — METOCLOPRAMIDE HYDROCHLORIDE 5 MG/ML
5 INJECTION INTRAMUSCULAR; INTRAVENOUS ONCE
Status: COMPLETED | OUTPATIENT
Start: 2022-12-05 | End: 2022-12-05

## 2022-12-05 RX ORDER — SODIUM CHLORIDE 9 MG/ML
INJECTION, SOLUTION INTRAVENOUS CONTINUOUS
Status: DISCONTINUED | OUTPATIENT
Start: 2022-12-05 | End: 2022-12-05

## 2022-12-05 RX ORDER — IOPAMIDOL 755 MG/ML
92 INJECTION, SOLUTION INTRAVASCULAR ONCE
Status: COMPLETED | OUTPATIENT
Start: 2022-12-06 | End: 2022-12-06

## 2022-12-05 RX ORDER — HYDROMORPHONE HYDROCHLORIDE 1 MG/ML
0.5 INJECTION, SOLUTION INTRAMUSCULAR; INTRAVENOUS; SUBCUTANEOUS ONCE
Status: COMPLETED | OUTPATIENT
Start: 2022-12-05 | End: 2022-12-05

## 2022-12-05 RX ADMIN — METOCLOPRAMIDE 5 MG: 5 INJECTION, SOLUTION INTRAMUSCULAR; INTRAVENOUS at 20:40

## 2022-12-05 RX ADMIN — SODIUM CHLORIDE 1000 ML: 9 INJECTION, SOLUTION INTRAVENOUS at 20:39

## 2022-12-05 RX ADMIN — HYDROMORPHONE HYDROCHLORIDE 0.5 MG: 1 INJECTION, SOLUTION INTRAMUSCULAR; INTRAVENOUS; SUBCUTANEOUS at 20:40

## 2022-12-05 ASSESSMENT — ACTIVITIES OF DAILY LIVING (ADL)
ADLS_ACUITY_SCORE: 35
ADLS_ACUITY_SCORE: 35

## 2022-12-05 NOTE — TELEPHONE ENCOUNTER
Nurse Triage SBAR    Is this a 2nd Level Triage? NO    Situation: Diarrhea, vomiting, nausea    Background: Patient states that she has been vomiting and having diarrhea all day. She reports the the last few episodes she had black flecks in the diarrhea. She states that she is too weak to standup and is home alone.     Assessment: Severe weakness r/t diarrhea and vomiting    Protocol Recommended Disposition:   Call  Now    Recommendation:     Patient states that her  is on his way home to take her to the ED. She states that she will call 911 if she gets worse. Writer offered to call 911 for the patient and she refused.      N/A     RENETTA CROWELL RN      Does the patient meet one of the following criteria for ADS visit consideration? 16+ years old, with an [facility] PCP     TIP  Providers, please consider if this condition is appropriate for management at one of our Acute and Diagnostic Services sites.     If patient is a good candidate, please use dotphrase <dot>triageresponse and select Refer to ADS to document.    Reason for Disposition    Shock suspected (e.g., cold/pale/clammy skin, too weak to stand, low BP, rapid pulse)    Protocols used: DIARRHEA-A-OH

## 2022-12-05 NOTE — ED PROVIDER NOTES
ED Provider Note  [facility]      History     Chief Complaint   Patient presents with     Abdominal Pain     Nausea & Vomiting     Diarrhea     HPI  Peggy Jessica is a 47 year old female with a PMH of seizure, chronic migraine, CIDP, fibromyalgia, CRPS of RLE, celiac disease and diverticulitis who presents to the ED today reporting vomiting, diarrhea and abdominal pain 1d.  Patient states that this feels similar to a prior episode of diverticulitis, however she has also had gastroparesis in the past.  Of note, she also reports that she does consume marijuana regularly but has not in the past few days.  No known sick contacts.  No fevers or chills.  No respiratory symptoms.  She has a history of multiple abdominal surgeries as well.  She denies any urinary symptoms.    Past Medical History  Past Medical History:   Diagnosis Date     BRCA positive      CIDP (chronic inflammatory demyelinating polyneuropathy) (H)      ASHKAN III with severe dysplasia 2002     Complex regional pain syndrome type 1 of right lower extremity      Past Surgical History:   Procedure Laterality Date     BILATERAL OOPHORECTOMY Bilateral 2019     c section      2002     CHOLECYSTECTOMY  1997     COLONOSCOPY       ESOPHAGOSCOPY, GASTROSCOPY, DUODENOSCOPY (EGD), COMBINED N/A 07/28/2022    Procedure: ESOPHAGOGASTRODUODENOSCOPY (EGD);  Surgeon: Zack Maddox MD;  Location: [facility]     HYSTERECTOMY  2004     MASTECTOMY Bilateral 2020     dicyclomine (BENTYL) 20 MG tablet  metoclopramide (REGLAN) 5 MG tablet  acetaminophen (TYLENOL) 325 MG tablet  buprenorphine (BUTRANS) 5 MCG/HR WK patch  cyclobenzaprine (FLEXERIL) 10 MG tablet  estradiol (ESTRACE) 0.1 MG/GM vaginal cream  folic acid (FOLVITE) 400 MCG tablet  ibuprofen (ADVIL/MOTRIN) 200 MG tablet  Lidocaine (LIDOCARE) 4 % Patch  medical cannabis (Patient's own supply)  multivitamin w/minerals (THERA-VIT-M) tablet  naloxone (NARCAN) 4 MG/0.1ML nasal  "spray  ondansetron (ZOFRAN) 4 MG tablet  oxyCODONE (ROXICODONE) 5 MG tablet  polyethylene glycol (MIRALAX) 17 GM/Dose powder  senna-docusate (SENOKOT-S/PERICOLACE) 8.6-50 MG tablet      Allergies   Allergen Reactions     Dihydroergotamine Anaphylaxis     Latex Anaphylaxis     Shellfish-Derived Products Anaphylaxis     Sumatriptan Anaphylaxis     Banana Unknown     Gabapentin Dizziness     Gluten Meal Other (See Comments)     Celiac disease     Keppra [Levetiracetam] Nausea and Vomiting     Kiwi Unknown     Levofloxacin Other (See Comments)     Arrhythmia     Metronidazole Nausea and Vomiting     Nitrofurantoin Hives     Penicillins Hives     Pregabalin      Topiramate Visual Disturbance     Aspirin Rash     Methadone Rash     Morphine Hives     She got hives around are when morphine given but resolved after few minutes per patient      Risperidone Anxiety     Family History  Family History   Problem Relation Age of Onset     Kidney Disease Father      Social History   Social History     Tobacco Use     Smoking status: Former     Types: Cigarettes     Smokeless tobacco: Never   Vaping Use     Vaping Use: Never used   Substance Use Topics     Alcohol use: Not Currently     Drug use: Yes     Types: Marijuana     Comment: Medical [facility] patient      Past medical history, past surgical history, medications, allergies, family history, and social history were reviewed with the patient. No additional pertinent items.       Review of Systems  A complete review of systems was performed with pertinent positives and negatives noted in the HPI, and all other systems negative.    Physical Exam   BP: (!) 143/96  Pulse: 107  Temp: 98  F (36.7  C)  Resp: 16  Height: 165.1 cm (5' 5\")  Weight: 68 kg (150 lb)  SpO2: 97 %  Physical Exam  Constitutional:       General: She is awake. She is in acute distress.      Appearance: Normal appearance. She is well-developed. She is not ill-appearing or toxic-appearing.      Comments: Uses a " wheelchair   HENT:      Head: Atraumatic.      Mouth/Throat:      Pharynx: No oropharyngeal exudate.   Eyes:      General: No scleral icterus.     Pupils: Pupils are equal, round, and reactive to light.   Cardiovascular:      Rate and Rhythm: Normal rate and regular rhythm.      Heart sounds: Normal heart sounds, S1 normal and S2 normal.   Pulmonary:      Effort: No respiratory distress.      Breath sounds: Normal breath sounds.   Abdominal:      General: Bowel sounds are normal.      Palpations: Abdomen is soft.      Tenderness: There is generalized abdominal tenderness.   Musculoskeletal:         General: No tenderness.   Skin:     General: Skin is warm.      Findings: No rash.   Neurological:      Mental Status: She is alert and oriented to person, place, and time.      Comments: In wheelchair   Psychiatric:         Attention and Perception: Attention normal.         Mood and Affect: Mood normal.         Speech: Speech normal.         Behavior: Behavior normal. Behavior is cooperative.         ED Course      Procedures                     Results for orders placed or performed during the hospital encounter of 12/05/22   CT Abdomen Pelvis w Contrast     Status: None    Narrative    EXAM: CT ABDOMEN PELVIS W CONTRAST  LOCATION: [facility]  DATE/TIME: 12/6/2022 12:23 AM    INDICATION: Right lower quadrant abdominal pain with nausea and vomiting.  COMPARISON: 11/19/2021  TECHNIQUE: CT scan of the abdomen and pelvis was performed following injection of IV contrast. Multiplanar reformats were obtained. Dose reduction techniques were used.  CONTRAST: 92 ml isovue 370     FINDINGS:   LOWER CHEST: Linear atelectasis or scarring at the lung bases.    HEPATOBILIARY: Right hepatic lobe cyst measuring 3.4 cm. Left hepatic lobe cyst measuring 2.3 cm. Few additional subcentimeter low-attenuation lesions, too small to characterize, but unchanged since prior. Status post  cholecystectomy. No biliary   dilatation.    PANCREAS: Normal.    SPLEEN: Normal.    ADRENAL GLANDS: Normal.    KIDNEYS/BLADDER: Normal.    BOWEL: Large amount of stool throughout the colon. No obstruction or inflammatory change. Normal appendix. No evidence of acute appendicitis.    LYMPH NODES: No lymphadenopathy.    VASCULATURE: Mild atherosclerotic calcifications.    PELVIC ORGANS: Status post hysterectomy.    MUSCULOSKELETAL: Multilevel degenerative changes of the spine.      Impression    IMPRESSION:   1.  No acute findings or inflammatory changes in the abdomen or pelvis. No acute appendicitis.   Comprehensive metabolic panel     Status: Abnormal   Result Value Ref Range    Sodium 141 136 - 145 mmol/L    Potassium 4.2 3.4 - 5.3 mmol/L    Chloride 102 98 - 107 mmol/L    Carbon Dioxide (CO2) 23 22 - 29 mmol/L    Anion Gap 16 (H) 7 - 15 mmol/L    Urea Nitrogen 11.9 6.0 - 20.0 mg/dL    Creatinine 0.78 0.51 - 0.95 mg/dL    Calcium 10.5 (H) 8.6 - 10.0 mg/dL    Glucose 109 (H) 70 - 99 mg/dL    Alkaline Phosphatase 111 (H) 35 - 104 U/L    AST 32 10 - 35 U/L    ALT 24 10 - 35 U/L    Protein Total 8.8 (H) 6.4 - 8.3 g/dL    Albumin 4.8 3.5 - 5.2 g/dL    Bilirubin Total 0.4 <=1.2 mg/dL    GFR Estimate >90 >60 mL/min/1.73m2   Lipase     Status: Normal   Result Value Ref Range    Lipase 22 13 - 60 U/L   UA with Microscopic reflex to Culture     Status: Abnormal    Specimen: Urine, NOS   Result Value Ref Range    Color Urine Yellow Colorless, Straw, Light Yellow, Yellow    Appearance Urine Clear Clear    Glucose Urine Negative Negative mg/dL    Bilirubin Urine Negative Negative    Ketones Urine 60 (A) Negative mg/dL    Specific Gravity Urine 1.021 1.003 - 1.035    Blood Urine Negative Negative    pH Urine 5.5 5.0 - 7.0    Protein Albumin Urine 10 (A) Negative mg/dL    Urobilinogen Urine Normal Normal, 2.0 mg/dL    Nitrite Urine Negative Negative    Leukocyte Esterase Urine Negative Negative    Mucus Urine Present (A) None  Seen /LPF    RBC Urine 2 <=2 /HPF    WBC Urine 1 <=5 /HPF    Squamous Epithelials Urine <1 <=1 /HPF    Narrative    Urine Culture not indicated   CBC with platelets and differential     Status: Abnormal   Result Value Ref Range    WBC Count 6.9 4.0 - 11.0 10e3/uL    RBC Count 5.21 (H) 3.80 - 5.20 10e6/uL    Hemoglobin 16.1 (H) 11.7 - 15.7 g/dL    Hematocrit 49.1 (H) 35.0 - 47.0 %    MCV 94 78 - 100 fL    MCH 30.9 26.5 - 33.0 pg    MCHC 32.8 31.5 - 36.5 g/dL    RDW 13.4 10.0 - 15.0 %    Platelet Count 345 150 - 450 10e3/uL    % Neutrophils 73 %    % Lymphocytes 23 %    % Monocytes 4 %    % Eosinophils 0 %    % Basophils 0 %    % Immature Granulocytes 0 %    NRBCs per 100 WBC 0 <1 /100    Absolute Neutrophils 5.0 1.6 - 8.3 10e3/uL    Absolute Lymphocytes 1.6 0.8 - 5.3 10e3/uL    Absolute Monocytes 0.3 0.0 - 1.3 10e3/uL    Absolute Eosinophils 0.0 0.0 - 0.7 10e3/uL    Absolute Basophils 0.0 0.0 - 0.2 10e3/uL    Absolute Immature Granulocytes 0.0 <=0.4 10e3/uL    Absolute NRBCs 0.0 10e3/uL   CBC with platelets differential     Status: Abnormal    Narrative    The following orders were created for panel order CBC with platelets differential.  Procedure                               Abnormality         Status                     ---------                               -----------         ------                     CBC with platelets and d...[[id number]]  Abnormal            Final result                 Please view results for these tests on the individual orders.     Medications   dicyclomine (BENTYL) capsule 20 mg (has no administration in time range)   metoclopramide (REGLAN) injection 5 mg (5 mg Intravenous Given 12/5/22 2040)   HYDROmorphone (PF) (DILAUDID) injection 0.5 mg (0.5 mg Intravenous Given 12/5/22 2040)   0.9% sodium chloride BOLUS (0 mLs Intravenous Stopped 12/6/22 0036)   iopamidol (ISOVUE-370) solution 92 mL (92 mLs Intravenous Given 12/6/22 0010)   sodium chloride 0.9 % bag 500mL for CT scan flush use (73  mLs Intravenous Given 12/6/22 0010)   metoclopramide (REGLAN) injection 5 mg (5 mg Intravenous Given 12/6/22 0114)        Assessments & Plan (with Medical Decision Making)   Peggy Jessica is a 47 year old female with a PMH of seizure, chronic migraine, CIDP, fibromyalgia, CRPS of RLE, celiac disease and diverticulitis who presents to the ED today reporting vomiting, diarrhea and abdominal pain 1d.  Based on history and exam, I am most concerned for recurrence of gastroparesis versus diverticulitis.  Could certainly also have a bowel obstruction versus other intra-abdominal infection as well.  We will send screening labs, obtain CT, treat symptomatically in the meantime as well.    I have reviewed the nursing notes. I have reviewed the findings, diagnosis, plan and need for follow up with the patient.    Patient got some relief from Reglan, was able to tolerate ice chips.  CT scan with no evidence of acute changes.  Labs similarly unrevealing and reassuring.  Suspect patient has had a bout of gastroenteritis versus intestinal dysmotility.  She does use cannabis products so cannabis hyperemesis is certainly possible as well.  On updating the patient, she reports that she feels that the nausea is starting to worsen again, will give an additional dose of Reglan since it worked well here and a prescription for the same as well as a prescription for Bentyl.  We will also give a referral to GI.  Return precautions given.  Okay to discharge.    New Prescriptions    DICYCLOMINE (BENTYL) 20 MG TABLET    Take 1 tablet (20 mg) by mouth 4 times daily as needed (abdominal pain)    METOCLOPRAMIDE (REGLAN) 5 MG TABLET    Take 1 tablet (5 mg) by mouth 4 times daily as needed       Final diagnoses:   Nausea and vomiting, unspecified vomiting type   Abdominal pain, generalized       --  Neftali Avalos MD PhD  [facility] EMERGENCY DEPARTMENT  12/5/2022     Andrew Avalos MD  12/06/22 1517

## 2022-12-06 ENCOUNTER — APPOINTMENT (OUTPATIENT)
Dept: CT IMAGING | Facility: CLINIC | Age: 47
End: 2022-12-06
Attending: EMERGENCY MEDICINE
Payer: COMMERCIAL

## 2022-12-06 VITALS
TEMPERATURE: 98 F | RESPIRATION RATE: 16 BRPM | SYSTOLIC BLOOD PRESSURE: 126 MMHG | OXYGEN SATURATION: 98 % | HEART RATE: 94 BPM | WEIGHT: 150 LBS | DIASTOLIC BLOOD PRESSURE: 100 MMHG | HEIGHT: 65 IN | BODY MASS INDEX: 24.99 KG/M2

## 2022-12-06 LAB
ALBUMIN UR-MCNC: 10 MG/DL
APPEARANCE UR: CLEAR
BILIRUB UR QL STRIP: NEGATIVE
COLOR UR AUTO: YELLOW
GLUCOSE UR STRIP-MCNC: NEGATIVE MG/DL
HGB UR QL STRIP: NEGATIVE
KETONES UR STRIP-MCNC: 60 MG/DL
LEUKOCYTE ESTERASE UR QL STRIP: NEGATIVE
MUCOUS THREADS #/AREA URNS LPF: PRESENT /LPF
NITRATE UR QL: NEGATIVE
PH UR STRIP: 5.5 [PH] (ref 5–7)
RBC URINE: 2 /HPF
SP GR UR STRIP: 1.02 (ref 1–1.03)
SQUAMOUS EPITHELIAL: <1 /HPF
UROBILINOGEN UR STRIP-MCNC: NORMAL MG/DL
WBC URINE: 1 /HPF

## 2022-12-06 PROCEDURE — 81001 URINALYSIS AUTO W/SCOPE: CPT | Performed by: EMERGENCY MEDICINE

## 2022-12-06 PROCEDURE — 250N000013 HC RX MED GY IP 250 OP 250 PS 637: Performed by: EMERGENCY MEDICINE

## 2022-12-06 PROCEDURE — 74177 CT ABD & PELVIS W/CONTRAST: CPT

## 2022-12-06 PROCEDURE — 74177 CT ABD & PELVIS W/CONTRAST: CPT | Mod: 26 | Performed by: RADIOLOGY

## 2022-12-06 PROCEDURE — 96376 TX/PRO/DX INJ SAME DRUG ADON: CPT | Performed by: EMERGENCY MEDICINE

## 2022-12-06 PROCEDURE — 250N000009 HC RX 250: Performed by: EMERGENCY MEDICINE

## 2022-12-06 PROCEDURE — 250N000011 HC RX IP 250 OP 636: Performed by: EMERGENCY MEDICINE

## 2022-12-06 RX ORDER — DICYCLOMINE HYDROCHLORIDE 10 MG/1
20 CAPSULE ORAL ONCE
Status: COMPLETED | OUTPATIENT
Start: 2022-12-06 | End: 2022-12-06

## 2022-12-06 RX ORDER — DICYCLOMINE HCL 20 MG
20 TABLET ORAL 4 TIMES DAILY PRN
Qty: 40 TABLET | Refills: 0 | Status: SHIPPED | OUTPATIENT
Start: 2022-12-06 | End: 2022-12-16

## 2022-12-06 RX ORDER — METOCLOPRAMIDE HYDROCHLORIDE 5 MG/ML
5 INJECTION INTRAMUSCULAR; INTRAVENOUS ONCE
Status: COMPLETED | OUTPATIENT
Start: 2022-12-06 | End: 2022-12-06

## 2022-12-06 RX ORDER — METOCLOPRAMIDE 5 MG/1
5 TABLET ORAL 4 TIMES DAILY PRN
Qty: 30 TABLET | Refills: 0 | Status: SHIPPED | OUTPATIENT
Start: 2022-12-06 | End: 2022-12-12

## 2022-12-06 RX ADMIN — METOCLOPRAMIDE 5 MG: 5 INJECTION, SOLUTION INTRAMUSCULAR; INTRAVENOUS at 01:14

## 2022-12-06 RX ADMIN — SODIUM CHLORIDE, PRESERVATIVE FREE 73 ML: 5 INJECTION INTRAVENOUS at 00:10

## 2022-12-06 RX ADMIN — DICYCLOMINE HYDROCHLORIDE 20 MG: 10 CAPSULE ORAL at 01:59

## 2022-12-06 RX ADMIN — IOPAMIDOL 92 ML: 755 INJECTION, SOLUTION INTRAVENOUS at 00:10

## 2022-12-06 ASSESSMENT — ACTIVITIES OF DAILY LIVING (ADL): ADLS_ACUITY_SCORE: 35

## 2022-12-06 NOTE — ED TRIAGE NOTES
Patient complaining of abdominal pain, nausea, and diarrhea that started yesterday. Patient also states she fell twice today, but reports no injuries.

## 2022-12-07 ENCOUNTER — OFFICE VISIT (OUTPATIENT)
Dept: FAMILY MEDICINE | Facility: CLINIC | Age: 47
End: 2022-12-07
Payer: COMMERCIAL

## 2022-12-07 VITALS
RESPIRATION RATE: 17 BRPM | DIASTOLIC BLOOD PRESSURE: 87 MMHG | TEMPERATURE: 98.3 F | SYSTOLIC BLOOD PRESSURE: 159 MMHG | OXYGEN SATURATION: 98 % | HEART RATE: 111 BPM

## 2022-12-07 DIAGNOSIS — R19.7 VOMITING AND DIARRHEA: ICD-10-CM

## 2022-12-07 DIAGNOSIS — Z09 HOSPITAL DISCHARGE FOLLOW-UP: Primary | ICD-10-CM

## 2022-12-07 DIAGNOSIS — R11.10 VOMITING AND DIARRHEA: ICD-10-CM

## 2022-12-07 PROCEDURE — 99214 OFFICE O/P EST MOD 30 MIN: CPT | Performed by: NURSE PRACTITIONER

## 2022-12-07 RX ORDER — DIPHENHYDRAMINE HCL 25 MG
12.5 TABLET ORAL EVERY 6 HOURS PRN
Qty: 30 TABLET | Refills: 0 | Status: SHIPPED | OUTPATIENT
Start: 2022-12-07

## 2022-12-07 RX ORDER — ONDANSETRON 4 MG/1
4 TABLET, FILM COATED ORAL EVERY 6 HOURS PRN
Qty: 30 TABLET | Refills: 0 | Status: ON HOLD | OUTPATIENT
Start: 2022-12-07 | End: 2023-07-21

## 2022-12-07 ASSESSMENT — ENCOUNTER SYMPTOMS
DIARRHEA: 1
NAUSEA: 1
FEVER: 0
ABDOMINAL PAIN: 1
VOMITING: 1

## 2022-12-07 ASSESSMENT — PAIN SCALES - GENERAL: PAINLEVEL: SEVERE PAIN (7)

## 2022-12-07 NOTE — PROGRESS NOTES
"Today's Date: Dec 7, 2022     Patient Peggy Jessica 1975 presents to the clinic today to address   Chief Complaint   Patient presents with     Abdominal Pain     Cant keep any food down, horrible abdominal cramping  Is worse since Monday              SUBJECTIVE     History of Present Illness:    47-year-old female presents for ER follow-up. Patient has complex PMH including chronic immune sensory polyradiculopathy (CISP)/Chronic inflammatory demyelinating polyneuropathy (CIDP), complex regional pain syndrome (RLE 2/2 stress fracture; chest 2/2 bilateral mastectomy- currently under care of pain management), BRCA+ mutation, cervical cancer, diverticulitis, and migraines.     Patient went to ER on Monday 12/5/22 with complaints of abdominal pain, nausea, vomiting, and diarrhea. The symptoms started on Saturday while she was in [place], [place] (where she and her  are moving next month.) She only ate out once and denies eating any raw fish/uncooked food while in [place]. During her ER stay she had labs and a CT abdomen/pelvis done which was negative for acute findings/inflammatory changes/appendicitis. Her labs did not have any revealing findings. She had symptomatic relief with Reglan and was discharge with a rx for Reglan and Bentyl. She was also referred to GI.     Patient reports today that she did not get home the ER until early yesterday morning and on Tuesday basically \"slept the day away.\" She endorses taking the Reglan as scheduled and reports that her nausea/vomiting/abdominal pain have worsened today (she reports taking Reglan at 9am and 3pm today.) She endorses diarrhea with intermittent soft stools. She has struggled to keep food and fluids down. She denies URI symptoms, fevers, or other known sick contacts. Her GI referral from Monday was unfortunately declined. She has a tentative appointment with Dr. Dawn in [place] on 12/14/22. She denies cannabis use prior to symptom onset on " Saturday.      Review of Systems   Constitutional: Negative for fever.   HENT: Negative for congestion.    Gastrointestinal: Positive for abdominal pain, diarrhea, nausea and vomiting.       Constitutional, HEENT, cardiovascular, pulmonary, gi and gu systems are negative, except as otherwise noted.      Allergies   Allergen Reactions     Dihydroergotamine Anaphylaxis     Latex Anaphylaxis     Shellfish-Derived Products Anaphylaxis     Sumatriptan Anaphylaxis     Banana Unknown     Gabapentin Dizziness     Gluten Meal Other (See Comments)     Celiac disease     Keppra [Levetiracetam] Nausea and Vomiting     Kiwi Unknown     Levofloxacin Other (See Comments)     Arrhythmia     Metronidazole Nausea and Vomiting     Nitrofurantoin Hives     Penicillins Hives     Pregabalin      Topiramate Visual Disturbance     Aspirin Rash     Methadone Rash     Morphine Hives     She got hives around are when morphine given but resolved after few minutes per patient      Risperidone Anxiety        Current Outpatient Medications   Medication Instructions     acetaminophen (TYLENOL) 975 mg, Oral, EVERY 8 HOURS     buprenorphine (BUTRANS) 5 MCG/HR WK patch 1 patch, Transdermal, EVERY 7 DAYS     cyclobenzaprine (FLEXERIL) 10 mg, Oral, 3 TIMES DAILY     dicyclomine (BENTYL) 20 mg, Oral, 4 TIMES DAILY PRN     estradiol (ESTRACE) 2 g, Vaginal, TWICE WEEKLY     folic acid (FOLVITE) 400 mcg, Oral, DAILY     ibuprofen (ADVIL/MOTRIN) 600 mg, Oral, EVERY 6 HOURS PRN     Lidocaine (LIDOCARE) 4 % Patch 3 patches, Transdermal, EVERY 24 HOURS, To prevent lidocaine toxicity, patient should be patch free for 12 hrs daily.     medical cannabis (Patient's own supply) 1 Dose, SEE ADMIN INSTRUCTIONS, (The purpose of this order is to document that the patient reports taking medical cannabis.  This is not a prescription, and is not used to certify that the patient has a qualifying medical condition.)<BR>Per pt: 5-10 mg TID PRN      metoclopramide (REGLAN) 5  mg, Oral, 4 TIMES DAILY PRN     multivitamin w/minerals (THERA-VIT-M) tablet 1 tablet, Oral, EVERY MORNING, Meg[employer] Womens Brand     naloxone (NARCAN) 4 mg, Alternating Nostrils, PRN, every 2-3 minutes until assistance arrives     ondansetron (ZOFRAN) 4 mg, Oral, EVERY 6 HOURS PRN     oxyCODONE (ROXICODONE) 5 mg, Oral, EVERY 8 HOURS PRN     polyethylene glycol (MIRALAX) 17 GM/Dose powder Oral, PRN     senna-docusate (SENOKOT-S/PERICOLACE) 8.6-50 MG tablet 1 tablet, Oral, 2 TIMES DAILY       Past Medical History:   Diagnosis Date     BRCA positive      CIDP (chronic inflammatory demyelinating polyneuropathy) (H)      ASHKAN III with severe dysplasia 2002     Complex regional pain syndrome type 1 of right lower extremity         Family History   Problem Relation Age of Onset     Kidney Disease Father         Social History     Tobacco Use     Smoking status: Former     Types: Cigarettes     Smokeless tobacco: Never   Vaping Use     Vaping Use: Never used   Substance Use Topics     Alcohol use: Not Currently     Drug use: Yes     Types: Marijuana     Comment: Medical [facility] patient        History   Sexual Activity     Sexual activity: Not Currently     Partners: Male        No flowsheet data found.     Immunization History   Administered Date(s) Administered     COVID-19 Vaccine 18+ (Moderna) 03/20/2021, 04/17/2021                 OBJECTIVE     BP (!) 159/87 (BP Location: Right arm, Patient Position: Sitting, Cuff Size: Adult Regular)   Pulse 111   Temp 98.3  F (36.8  C) (Oral)   Resp 17   SpO2 98%      Labs:  Lab Results   Component Value Date    WBC 6.9 12/05/2022    HGB 16.1 (H) 12/05/2022    HCT 49.1 (H) 12/05/2022     12/05/2022    CHOL 229 (H) 03/30/2022    TRIG 131 03/30/2022    HDL 56 03/30/2022    ALT 24 12/05/2022    AST 32 12/05/2022     12/05/2022    BUN 11.9 12/05/2022    CO2 23 12/05/2022    TSH 1.38 07/20/2022        Physical Exam  Constitutional:       General: She is in acute distress.       Appearance: She is not toxic-appearing or diaphoretic.      Comments: In wheelchair   Eyes:      Extraocular Movements: Extraocular movements intact.      Pupils: Pupils are equal, round, and reactive to light.   Cardiovascular:      Rate and Rhythm: Regular rhythm. Tachycardia present.      Heart sounds: Normal heart sounds. No murmur heard.    No friction rub. No gallop.   Pulmonary:      Effort: Pulmonary effort is normal. No respiratory distress.      Breath sounds: Normal breath sounds. No wheezing, rhonchi or rales.   Abdominal:      General: Bowel sounds are decreased. There is no distension.      Palpations: Abdomen is soft.      Tenderness: There is generalized abdominal tenderness.   Musculoskeletal:      Cervical back: Neck supple.   Lymphadenopathy:      Cervical: No cervical adenopathy.   Skin:     Findings: No lesion.   Neurological:      Mental Status: She is alert. Mental status is at baseline.   Psychiatric:         Behavior: Behavior is cooperative.         Thought Content: Thought content normal.         Judgment: Judgment normal.          Recent Results (from the past 50 hour(s))   Comprehensive metabolic panel    Collection Time: 12/05/22  8:32 PM   Result Value Ref Range    Sodium 141 136 - 145 mmol/L    Potassium 4.2 3.4 - 5.3 mmol/L    Chloride 102 98 - 107 mmol/L    Carbon Dioxide (CO2) 23 22 - 29 mmol/L    Anion Gap 16 (H) 7 - 15 mmol/L    Urea Nitrogen 11.9 6.0 - 20.0 mg/dL    Creatinine 0.78 0.51 - 0.95 mg/dL    Calcium 10.5 (H) 8.6 - 10.0 mg/dL    Glucose 109 (H) 70 - 99 mg/dL    Alkaline Phosphatase 111 (H) 35 - 104 U/L    AST 32 10 - 35 U/L    ALT 24 10 - 35 U/L    Protein Total 8.8 (H) 6.4 - 8.3 g/dL    Albumin 4.8 3.5 - 5.2 g/dL    Bilirubin Total 0.4 <=1.2 mg/dL    GFR Estimate >90 >60 mL/min/1.73m2   Lipase    Collection Time: 12/05/22  8:32 PM   Result Value Ref Range    Lipase 22 13 - 60 U/L   CBC with platelets and differential    Collection Time: 12/05/22  8:32 PM   Result  Value Ref Range    WBC Count 6.9 4.0 - 11.0 10e3/uL    RBC Count 5.21 (H) 3.80 - 5.20 10e6/uL    Hemoglobin 16.1 (H) 11.7 - 15.7 g/dL    Hematocrit 49.1 (H) 35.0 - 47.0 %    MCV 94 78 - 100 fL    MCH 30.9 26.5 - 33.0 pg    MCHC 32.8 31.5 - 36.5 g/dL    RDW 13.4 10.0 - 15.0 %    Platelet Count 345 150 - 450 10e3/uL    % Neutrophils 73 %    % Lymphocytes 23 %    % Monocytes 4 %    % Eosinophils 0 %    % Basophils 0 %    % Immature Granulocytes 0 %    NRBCs per 100 WBC 0 <1 /100    Absolute Neutrophils 5.0 1.6 - 8.3 10e3/uL    Absolute Lymphocytes 1.6 0.8 - 5.3 10e3/uL    Absolute Monocytes 0.3 0.0 - 1.3 10e3/uL    Absolute Eosinophils 0.0 0.0 - 0.7 10e3/uL    Absolute Basophils 0.0 0.0 - 0.2 10e3/uL    Absolute Immature Granulocytes 0.0 <=0.4 10e3/uL    Absolute NRBCs 0.0 10e3/uL   UA with Microscopic reflex to Culture    Collection Time: 12/06/22 12:03 AM    Specimen: Urine, NOS   Result Value Ref Range    Color Urine Yellow Colorless, Straw, Light Yellow, Yellow    Appearance Urine Clear Clear    Glucose Urine Negative Negative mg/dL    Bilirubin Urine Negative Negative    Ketones Urine 60 (A) Negative mg/dL    Specific Gravity Urine 1.021 1.003 - 1.035    Blood Urine Negative Negative    pH Urine 5.5 5.0 - 7.0    Protein Albumin Urine 10 (A) Negative mg/dL    Urobilinogen Urine Normal Normal, 2.0 mg/dL    Nitrite Urine Negative Negative    Leukocyte Esterase Urine Negative Negative    Mucus Urine Present (A) None Seen /LPF    RBC Urine 2 <=2 /HPF    WBC Urine 1 <=5 /HPF    Squamous Epithelials Urine <1 <=1 /HPF     CT Abdomen Pelvis w Contrast     Status: None     Narrative     EXAM: CT ABDOMEN PELVIS W CONTRAST  LOCATION: [facility]  DATE/TIME: 12/6/2022 12:23 AM     INDICATION: Right lower quadrant abdominal pain with nausea and vomiting.  COMPARISON: 11/19/2021  TECHNIQUE: CT scan of the abdomen and pelvis was performed following injection of IV contrast. Multiplanar  reformats were obtained. Dose reduction techniques were used.  CONTRAST: 92 ml isovue 370      FINDINGS:   LOWER CHEST: Linear atelectasis or scarring at the lung bases.     HEPATOBILIARY: Right hepatic lobe cyst measuring 3.4 cm. Left hepatic lobe cyst measuring 2.3 cm. Few additional subcentimeter low-attenuation lesions, too small to characterize, but unchanged since prior. Status post cholecystectomy. No biliary   dilatation.     PANCREAS: Normal.     SPLEEN: Normal.     ADRENAL GLANDS: Normal.     KIDNEYS/BLADDER: Normal.     BOWEL: Large amount of stool throughout the colon. No obstruction or inflammatory change. Normal appendix. No evidence of acute appendicitis.     LYMPH NODES: No lymphadenopathy.     VASCULATURE: Mild atherosclerotic calcifications.     PELVIC ORGANS: Status post hysterectomy.     MUSCULOSKELETAL: Multilevel degenerative changes of the spine.        Impression     IMPRESSION:   1.  No acute findings or inflammatory changes in the abdomen or pelvis. No acute appendicitis.              ASSESSMENT/PLAN     1. Hospital discharge follow-up  Patient tachycardic and in visible discomfort. Her bowel sounds are hypoactive, however, her abdomen is soft and not distended. In the absence of fevers, URI symptoms, abnormal CT- concern for gastroparesis. Will try to manage outpatient by augmenting supportive regimen and trying to get patient an EGD (referral placed for EGD procedure. Instructed patient to keep appt on 12/14 with Dr. Dawn.) Advised the patient that Reglan is not desirable long-term given it's side effects, however, she can temporarily increase Reglan to 10mg/dose ( max of 40mg/24hr) Instructed patient to rehydrate (she can) utilize ice pops for hydration) and blend all food/ slowly reintroduce solids.  Refill placed for zofran. Patient can also use 12.5mg benadryl dose at night (pt reports that benadryl makes her drowsy.)    Infectious colitis is less likely given absence of  fevers/chills, however, will check stool studies given complaints of diarrhea mixed with soft BMs. Ct did demonstrate large amount of stool throughout the colon.    Instructed the patient that if her nausea/vomiting is refractory and she is unable to hydrate, then she must go to the ER for IV fluids or if she develops intractable pain, distended abdomen, fevers, etc.    - ondansetron (ZOFRAN) 4 MG tablet; Take 1 tablet (4 mg) by mouth every 6 hours as needed for nausea or vomiting  Dispense: 30 tablet; Refill: 0  - Adult GI  Referral - Procedure Only; Future    2. Vomiting and diarrhea  As noted above.  - ondansetron (ZOFRAN) 4 MG tablet; Take 1 tablet (4 mg) by mouth every 6 hours as needed for nausea or vomiting  Dispense: 30 tablet; Refill: 0  - Adult GI  Referral - Procedure Only; Future  - C. difficile Toxin B PCR with reflex to C. difficile Antigen and Toxins A/B EIA; Future  - Ova And Parasite - Stool; Future  - Enteric Bacteria and Virus Panel by [facility] Stool; Future  - Cryptosporidium/Giardia Immunoassay; Future  - diphenhydrAMINE (BENADRYL) 25 MG tablet; Take 0.5 tablets (12.5 mg) by mouth every 6 hours as needed for allergies or other (nausea)  Dispense: 30 tablet; Refill: 0            Follow-Up:  - Follow up in 2 week(s), or if symptoms worsen or fail to improve.     Options for treatment and follow-up care were reviewed with the patient. Patient engaged in the decision making process and verbalized understanding of the options discussed and agreed with the final plan.  AVS printed and given to patient.    MIRA Addison [facility]  Nurse Practitioners [address] [address] [phone]

## 2022-12-07 NOTE — NURSING NOTE
Chief Complaint   Patient presents with     Abdominal Pain     Cant keep any food down, horrible abdominal cramping  Is worse since Monday      Blood pressure (!) 159/87, pulse 111, temperature 98.3  F (36.8  C), temperature source Oral, resp. rate 17, SpO2 98 %, not currently breastfeeding.    Avis Hargrove

## 2022-12-09 NOTE — PROGRESS NOTES
This is a recent snapshot of the patient's [facility] medical record.  For current drug dose and complete information and questions, call [phone] or In Basket pool, [facility] ([id number])  CSN Number:  [id number]

## 2022-12-12 ENCOUNTER — TELEPHONE (OUTPATIENT)
Dept: FAMILY MEDICINE | Facility: CLINIC | Age: 47
End: 2022-12-12

## 2022-12-12 DIAGNOSIS — R19.7 VOMITING AND DIARRHEA: Primary | ICD-10-CM

## 2022-12-12 DIAGNOSIS — R11.10 VOMITING AND DIARRHEA: Primary | ICD-10-CM

## 2022-12-12 RX ORDER — METOCLOPRAMIDE 5 MG/1
10 TABLET ORAL 4 TIMES DAILY PRN
Qty: 60 TABLET | Refills: 0 | Status: SHIPPED | OUTPATIENT
Start: 2022-12-12 | End: 2023-01-19 | Stop reason: SINTOL

## 2022-12-12 NOTE — TELEPHONE ENCOUNTER
[facility] Call Center    Phone Message    May a detailed message be left on voicemail: yes     Reason for Call: Other: Patient called stating her doubling metoclopramide (REGLAN) 5 MG tablet to 10 mg has worked, but she is now out and needing a refill and dosage change. Please call to discus, thank you.     Action Taken: Message routed to:  [facility] ([facility] pcc    Travel Screening: Not Applicable

## 2022-12-18 ENCOUNTER — OFFICE VISIT (OUTPATIENT)
Dept: URGENT CARE | Facility: URGENT CARE | Age: 47
End: 2022-12-18
Payer: COMMERCIAL

## 2022-12-18 ENCOUNTER — ANCILLARY PROCEDURE (OUTPATIENT)
Dept: GENERAL RADIOLOGY | Facility: CLINIC | Age: 47
End: 2022-12-18
Attending: PHYSICIAN ASSISTANT
Payer: COMMERCIAL

## 2022-12-18 VITALS
SYSTOLIC BLOOD PRESSURE: 119 MMHG | DIASTOLIC BLOOD PRESSURE: 85 MMHG | OXYGEN SATURATION: 98 % | HEART RATE: 95 BPM | TEMPERATURE: 97.6 F

## 2022-12-18 DIAGNOSIS — S69.91XA HAND INJURY, RIGHT, INITIAL ENCOUNTER: ICD-10-CM

## 2022-12-18 DIAGNOSIS — S69.91XA HAND INJURY, RIGHT, INITIAL ENCOUNTER: Primary | ICD-10-CM

## 2022-12-18 PROCEDURE — 73130 X-RAY EXAM OF HAND: CPT | Mod: TC | Performed by: RADIOLOGY

## 2022-12-18 PROCEDURE — 99214 OFFICE O/P EST MOD 30 MIN: CPT | Performed by: PHYSICIAN ASSISTANT

## 2022-12-18 RX ORDER — DICYCLOMINE HYDROCHLORIDE 10 MG/1
20 CAPSULE ORAL
COMMUNITY

## 2022-12-18 NOTE — PROGRESS NOTES
URGENT CARE VISIT:    SUBJECTIVE:   Chief Complaint   Patient presents with     Urgent Care     Hand Injury     Right hand injury       Peggy Jessica is a 47 year old female who presents with a chief complaint of right hand pain and bruising.  Symptoms began 1 day(s) ago, are moderate and sudden onset  She hit hand on trash can.  Pain exacerbated by movement. Relieved by rest.  She treated it initially with no therapy. This is the first time this type of injury has occurred to this patient.     PMH:   Past Medical History:   Diagnosis Date     BRCA positive      CIDP (chronic inflammatory demyelinating polyneuropathy) (H)      ASHKAN III with severe dysplasia 2002     Complex regional pain syndrome type 1 of right lower extremity      Allergies: Dihydroergotamine, Latex, Shellfish-derived products, Sumatriptan, Banana, Gabapentin, Gluten meal, Keppra [levetiracetam], Kiwi, Levofloxacin, Metronidazole, Nitrofurantoin, Penicillins, Pregabalin, Topiramate, Aspirin, Methadone, Morphine, and Risperidone   Medications:   Current Outpatient Medications   Medication Sig Dispense Refill     dicyclomine (BENTYL) 20 MG tablet Take 20 mg by mouth every 6 hours       acetaminophen (TYLENOL) 325 MG tablet Take 3 tablets (975 mg) by mouth every 8 hours       buprenorphine (BUTRANS) 5 MCG/HR WK patch Place 1 patch onto the skin every 7 days       cyclobenzaprine (FLEXERIL) 10 MG tablet Take 1 tablet (10 mg) by mouth 3 times daily       diphenhydrAMINE (BENADRYL) 25 MG tablet Take 0.5 tablets (12.5 mg) by mouth every 6 hours as needed for allergies or other (nausea) 30 tablet 0     estradiol (ESTRACE) 0.1 MG/GM vaginal cream Place 2 g vaginally twice a week (Patient not taking: Reported on 11/7/2022) 42.5 g 3     folic acid (FOLVITE) 400 MCG tablet Take 1 tablet (400 mcg) by mouth daily 90 tablet 0     ibuprofen (ADVIL/MOTRIN) 200 MG tablet Take 3 tablets (600 mg) by mouth every 6 hours as needed for mild pain       Lidocaine  (LIDOCARE) 4 % Patch Place 3 patches onto the skin every 24 hours To prevent lidocaine toxicity, patient should be patch free for 12 hrs daily.  0     medical cannabis (Patient's own supply) 1 Dose See Admin Instructions (The purpose of this order is to document that the patient reports taking medical cannabis.  This is not a prescription, and is not used to certify that the patient has a qualifying medical condition.)  Per pt: 5-10 mg TID PRN       metoclopramide (REGLAN) 5 MG tablet Take 2 tablets (10 mg) by mouth 4 times daily as needed (nausea/vomiting) 60 tablet 0     multivitamin w/minerals (THERA-VIT-M) tablet Take 1 tablet by mouth every morning Megafood Womens Brand       naloxone (NARCAN) 4 MG/0.1ML nasal spray Spray 1 spray (4 mg) into one nostril alternating nostrils as needed for opioid reversal every 2-3 minutes until assistance arrives 0.2 mL 0     ondansetron (ZOFRAN) 4 MG tablet Take 1 tablet (4 mg) by mouth every 6 hours as needed for nausea or vomiting 30 tablet 0     polyethylene glycol (MIRALAX) 17 GM/Dose powder Take by mouth as needed       senna-docusate (SENOKOT-S/PERICOLACE) 8.6-50 MG tablet Take 1 tablet by mouth 2 times daily       Social History:   Social History     Tobacco Use     Smoking status: Former     Types: Cigarettes     Smokeless tobacco: Never   Substance Use Topics     Alcohol use: Not Currently       ROS:  Review of systems negative except as stated above.    OBJECTIVE:  /85   Pulse 95   Temp 97.6  F (36.4  C) (Temporal)   SpO2 98%   GENERAL APPEARANCE: healthy, alert and no distress  MUSCULOSKELETAL: moderate localized TTP over distal right 2nd, 3rd, and 4th metacarpals. FROM. Pain with movement of fingers.  EXTREMITIES: peripheral pulses normal  SKIN: moderate ecchymosis and edema over distal right 2nd, 3rd, and 4th metacarpals  NEURO: sensation intact     IMAGING:  Results for orders placed or performed in visit on 12/18/22   XR Hand Right G/E 3 Views      Status: None    Narrative    EXAM: XR HAND RIGHT G/E 3 VIEWS  LOCATION: [facility]  DATE/TIME: 12/18/2022 2:04 PM    INDICATION: Right hand injury and pain.  COMPARISON: None.      Impression    IMPRESSION: Normal joint spaces and alignment. No fracture.       ASSESSMENT:    ICD-10-CM    1. Hand injury, right, initial encounter  S69.91XA XR Hand Right G/E 3 Views          PLAN:  30 minutes spent on the date of the encounter doing chart review, review of outside records, review of test results, interpretation of tests, patient visit and documentation   Patient Instructions   Patient was educated on the natural course of injury.  No acute fracture or dislocation seen on x-ray. A radiologist will read your x-ray.  If there is a discrepancy in the interpretation of the x-ray you will be notified. Conservative measures discussed including rest, ice, compression, elevation, and over-the-counter analgesics (Tylenol or Ibuprofen) as needed. See your primary care provider if symptoms worsen or do not improve in 7 days. Seek emergency care if you develop severe pain/swelling, inability to move extremity, skin paleness, or weakness.     Patient verbalized understanding and is agreeable to plan. The patient was discharged ambulatory and in stable condition.    Alix Urban PA-C on 12/18/2022 at 2:28 PM

## 2022-12-18 NOTE — PATIENT INSTRUCTIONS
Patient was educated on the natural course of injury.  No acute fracture or dislocation seen on x-ray. A radiologist will read your x-ray.  If there is a discrepancy in the interpretation of the x-ray you will be notified. Conservative measures discussed including rest, ice, compression, elevation, and over-the-counter analgesics (Tylenol or Ibuprofen) as needed. See your primary care provider if symptoms worsen or do not improve in 7 days. Seek emergency care if you develop severe pain/swelling, inability to move extremity, skin paleness, or weakness.

## 2022-12-25 ENCOUNTER — NURSE TRIAGE (OUTPATIENT)
Dept: NURSING | Facility: CLINIC | Age: 47
End: 2022-12-25

## 2023-01-08 PROCEDURE — 99231 SBSQ HOSP IP/OBS SF/LOW 25: CPT | Performed by: INTERNAL MEDICINE

## 2023-01-19 ENCOUNTER — OFFICE VISIT (OUTPATIENT)
Dept: FAMILY MEDICINE | Facility: CLINIC | Age: 48
End: 2023-01-19
Payer: COMMERCIAL

## 2023-01-19 VITALS
HEIGHT: 65 IN | SYSTOLIC BLOOD PRESSURE: 129 MMHG | TEMPERATURE: 98.1 F | HEART RATE: 98 BPM | OXYGEN SATURATION: 97 % | BODY MASS INDEX: 27.82 KG/M2 | DIASTOLIC BLOOD PRESSURE: 95 MMHG | WEIGHT: 167 LBS

## 2023-01-19 DIAGNOSIS — R11.10 VOMITING AND DIARRHEA: Primary | ICD-10-CM

## 2023-01-19 DIAGNOSIS — R19.7 VOMITING AND DIARRHEA: Primary | ICD-10-CM

## 2023-01-19 DIAGNOSIS — G61.81 CIDP (CHRONIC INFLAMMATORY DEMYELINATING POLYNEUROPATHY) (H): ICD-10-CM

## 2023-01-19 DIAGNOSIS — M20.60 DEFORMITY OF TOE, UNSPECIFIED LATERALITY: ICD-10-CM

## 2023-01-19 DIAGNOSIS — R09.89 PROLONGED CAPILLARY REFILL TIME: ICD-10-CM

## 2023-01-19 LAB
ALBUMIN SERPL BCG-MCNC: 4.5 G/DL (ref 3.5–5.2)
ALP SERPL-CCNC: 126 U/L (ref 35–104)
ALT SERPL W P-5'-P-CCNC: 19 U/L (ref 10–35)
ANION GAP SERPL CALCULATED.3IONS-SCNC: 18 MMOL/L (ref 7–15)
AST SERPL W P-5'-P-CCNC: 30 U/L (ref 10–35)
BILIRUB SERPL-MCNC: 0.4 MG/DL
BUN SERPL-MCNC: 6 MG/DL (ref 6–20)
CALCIUM SERPL-MCNC: 10.1 MG/DL (ref 8.6–10)
CHLORIDE SERPL-SCNC: 99 MMOL/L (ref 98–107)
CREAT SERPL-MCNC: 0.89 MG/DL (ref 0.51–0.95)
DEPRECATED HCO3 PLAS-SCNC: 21 MMOL/L (ref 22–29)
ERYTHROCYTE [DISTWIDTH] IN BLOOD BY AUTOMATED COUNT: 13.9 % (ref 10–15)
FERRITIN SERPL-MCNC: 285 NG/ML (ref 6–175)
GFR SERPL CREATININE-BSD FRML MDRD: 80 ML/MIN/1.73M2
GLUCOSE SERPL-MCNC: 106 MG/DL (ref 70–99)
HCT VFR BLD AUTO: 48 % (ref 35–47)
HGB BLD-MCNC: 15.5 G/DL (ref 11.7–15.7)
IRON BINDING CAPACITY (ROCHE): 364 UG/DL (ref 240–430)
IRON SATN MFR SERPL: 24 % (ref 15–46)
IRON SERPL-MCNC: 86 UG/DL (ref 37–145)
MAGNESIUM SERPL-MCNC: 2.3 MG/DL (ref 1.7–2.3)
MCH RBC QN AUTO: 31.2 PG (ref 26.5–33)
MCHC RBC AUTO-ENTMCNC: 32.3 G/DL (ref 31.5–36.5)
MCV RBC AUTO: 97 FL (ref 78–100)
PLATELET # BLD AUTO: 387 10E3/UL (ref 150–450)
POTASSIUM SERPL-SCNC: 4.5 MMOL/L (ref 3.4–5.3)
PROT SERPL-MCNC: 8.4 G/DL (ref 6.4–8.3)
RBC # BLD AUTO: 4.97 10E6/UL (ref 3.8–5.2)
SODIUM SERPL-SCNC: 138 MMOL/L (ref 136–145)
WBC # BLD AUTO: 6.4 10E3/UL (ref 4–11)

## 2023-01-19 PROCEDURE — 82728 ASSAY OF FERRITIN: CPT | Performed by: NURSE PRACTITIONER

## 2023-01-19 PROCEDURE — 85027 COMPLETE CBC AUTOMATED: CPT | Performed by: NURSE PRACTITIONER

## 2023-01-19 PROCEDURE — 83550 IRON BINDING TEST: CPT | Performed by: NURSE PRACTITIONER

## 2023-01-19 PROCEDURE — 80053 COMPREHEN METABOLIC PANEL: CPT | Performed by: NURSE PRACTITIONER

## 2023-01-19 PROCEDURE — 83735 ASSAY OF MAGNESIUM: CPT | Performed by: NURSE PRACTITIONER

## 2023-01-19 RX ORDER — HYDROXYZINE HYDROCHLORIDE 25 MG/1
25 TABLET, FILM COATED ORAL
COMMUNITY
Start: 2023-01-10 | End: 2023-07-11

## 2023-01-19 RX ORDER — SCOLOPAMINE TRANSDERMAL SYSTEM 1 MG/1
1 PATCH, EXTENDED RELEASE TRANSDERMAL
COMMUNITY
Start: 2023-01-10 | End: 2023-09-01

## 2023-01-19 RX ORDER — ONDANSETRON 8 MG/1
TABLET, FILM COATED ORAL
COMMUNITY
Start: 2023-01-18 | End: 2023-05-05

## 2023-01-19 ASSESSMENT — ENCOUNTER SYMPTOMS
ABDOMINAL PAIN: 1
DIARRHEA: 0
HEMATOCHEZIA: 0
NAUSEA: 1
VOMITING: 1
FEVER: 0
FATIGUE: 1

## 2023-01-19 NOTE — NURSING NOTE
"ROOM:1  YOVANY CHO    Preferred Name: Peggy COLLINS AGREED:               DECLINED:          Flu    47 year old  Chief Complaint   Patient presents with     still having symptoms      Nausea, dizziness, hungry but unable to eat, vomiting, having trouble with motor concentration        Blood pressure (!) 129/95, pulse 98, temperature 98.1  F (36.7  C), temperature source Oral, height 1.651 m (5' 5\"), SpO2 97 %, not currently breastfeeding. Body mass index is 24.96 kg/m .  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)     Complex regional pain syndrome i of right lower limb     Fibromyalgia syndrome     Diverticulitis     Chronic daily headache     Celiac disease     BRCA gene mutation positive in female     Autoimmune disorder (H)     CIDP (chronic inflammatory demyelinating polyneuropathy) (H)     Physical deconditioning       Wt Readings from Last 2 Encounters:   12/05/22 68 kg (150 lb)   10/21/22 76.1 kg (167 lb 12.8 oz)     BP Readings from Last 3 Encounters:   01/19/23 (!) 129/95   12/18/22 119/85   12/07/22 (!) 159/87       Allergies   Allergen Reactions     Dihydroergotamine Anaphylaxis     Latex Anaphylaxis     Shellfish-Derived Products Anaphylaxis     Sumatriptan Anaphylaxis     Banana Unknown     Gabapentin Dizziness     Gluten Meal Other (See Comments)     Celiac disease     Keppra [Levetiracetam] Nausea and Vomiting     Kiwi Unknown     Levofloxacin Other (See Comments)     Arrhythmia     Metronidazole Nausea and Vomiting     Nitrofurantoin Hives     Penicillins Hives     Pregabalin      Topiramate Visual Disturbance     Aspirin Rash     Methadone Rash     Morphine Hives     She got hives around are when morphine given but resolved after few minutes per patient      Risperidone Anxiety       Current " Outpatient Medications   Medication     acetaminophen (TYLENOL) 325 MG tablet     buprenorphine (BUTRANS) 5 MCG/HR WK patch     cyclobenzaprine (FLEXERIL) 10 MG tablet     dicyclomine (BENTYL) 20 MG tablet     diphenhydrAMINE (BENADRYL) 25 MG tablet     folic acid (FOLVITE) 400 MCG tablet     hydrOXYzine (ATARAX) 25 MG tablet     ibuprofen (ADVIL/MOTRIN) 200 MG tablet     Lidocaine (LIDOCARE) 4 % Patch     medical cannabis (Patient's own supply)     metoclopramide (REGLAN) 5 MG tablet     multivitamin w/minerals (THERA-VIT-M) tablet     naloxone (NARCAN) 4 MG/0.1ML nasal spray     ondansetron (ZOFRAN) 4 MG tablet     ondansetron (ZOFRAN) 8 MG tablet     polyethylene glycol (MIRALAX) 17 GM/Dose powder     scopolamine (TRANSDERM) 1 MG/3DAYS 72 hr patch     senna-docusate (SENOKOT-S/PERICOLACE) 8.6-50 MG tablet     estradiol (ESTRACE) 0.1 MG/GM vaginal cream     No current facility-administered medications for this visit.       Social History     Tobacco Use     Smoking status: Former     Types: Cigarettes     Smokeless tobacco: Never   Vaping Use     Vaping Use: Never used   Substance Use Topics     Alcohol use: Not Currently     Drug use: Yes     Types: Marijuana     Comment: Medical [facility] patient       Honoring Choices - Health Care Directive Guide offered to patient at time of visit.    Health Maintenance Due   Topic Date Due     YEARLY PREVENTIVE VISIT  Never done     ADVANCE CARE PLANNING  Never done     HEPATITIS B IMMUNIZATION (1 of 3 - 3-dose series) Never done     COLORECTAL CANCER SCREENING  Never done     HIV SCREENING  Never done     HEPATITIS C SCREENING  Never done     PAP  11/28/2020     COVID-19 Vaccine (3 - Booster for Moderna series) 06/12/2021     INFLUENZA VACCINE (1) 09/01/2022     PHQ-2 (once per calendar year)  01/01/2023       Immunization History   Administered Date(s) Administered     COVID-19 Vaccine 18+ (Moderna) 03/20/2021, 04/17/2021       No results found for: PAP    Recent Labs   Lab  Test 12/05/22  2032 10/24/22  0600 10/17/22  0955 07/20/22  0959 06/06/22 2213 03/30/22  0928   LDL  --   --   --   --   --  147*   HDL  --   --   --   --   --  56   TRIG  --   --   --   --   --  131   ALT 24  --  47* 19  --  23   CR 0.78 0.84 0.75 0.76   < > 0.81   GFRESTIMATED >90 85 >90 >90   < > 90   ALBUMIN 4.8  --  4.1 3.3*  --  3.5   POTASSIUM 4.2 3.8 4.3 4.6   < > 4.1   TSH  --   --   --  1.38  --   --     < > = values in this interval not displayed.       PHQ-2 ( 1999 Pfizer) 12/7/2022 11/7/2022   Q1: Little interest or pleasure in doing things 0 0   Q2: Feeling down, depressed or hopeless 0 0   PHQ-2 Score 0 0       No flowsheet data found.    LINO-7 SCORE 3/30/2022   Total Score 2 (minimal anxiety)   Total Score 2       No flowsheet data found.    Wily Sandoval    January 19, 2023 12:54 PM

## 2023-01-19 NOTE — PROGRESS NOTES
"tToday's Date: Jan 19, 2023     Patient Peggy Jessica 1975 presents to the clinic today to address : persistent GI symptoms.         SUBJECTIVE     History of Present Illness:    47-year-old female presents with partner to discuss a few concerns. Patient has complex PMH including chronic immune sensory polyradiculopathy (CISP)/Chronic inflammatory demyelinating polyneuropathy (CIDP), complex regional pain syndrome (RLE 2/2 stress fracture; chest 2/2 bilateral mastectomy- currently under care of pain management), BRCA+ mutation, cervical cancer, diverticulitis, and migraines.    Nausea w/ Vomiting- Patient was last seen by me on 12/7/22 after an ED visit on 12/5/22 for nausea, vomiting, ab pain, and diarrhea. She had a negative CT scan for acute findings/inflammatory changes/appendicitis and was discharged with Reglan/bentyl. Today, she reports that her symptoms of nausea with vomiting have been waxing/waning over the past month. She reports last being able to keep food down last week in [place] while [place] her family. She has been following Dr. Mason, GI. She had a virtual visit with a GI provider yesterday and reports that she is scheduled for an upper endoscopy tomorrow with Dr. Mason. (Access to Dr. Mason visit notes were not available at time of exam.) She can tolerate thin liquids but struggles to keep thick liquids and solids. She has been managing her symptoms with metoclopramide and ondansetron.       CIDP symptoms-  Previously on IVIG. Patient follows Dr. Duenas of [facility] and had a work-up with Dr. Raman of [facility]. She reports continued gait difficulties as well \"trouble with my hands\" that have been gradually worsening since her workup at [place] in the Fall of 2022. She was seeing PT and OT, her last PT appt was 11/02/22. She wants to go back on IVIG. She would like to see a different neurologist.      Toe curling- Patient reports that for the past 3 nights, the feet in her toes have " "been curling to the point where she has to \"force them\" to straighten. She reports an increase in facial tics (but those are not new.) She endorses feeling restless and endorses toe-tapping. She reports that her feet are cold and \"the person who did my EMG at [place] documented that my feet were abnormally cold but didn't do anything about it.\" She endorses fatigue, feeling run down. She denies CP or SOB. No other acute concerns/symptoms at time of exam.          Review of Systems   Constitutional: Positive for fatigue. Negative for fever.   Gastrointestinal: Positive for abdominal pain (\"Cramps\"), nausea and vomiting. Negative for diarrhea and hematochezia.   Constitutional, HEENT, cardiovascular, pulmonary, gi and gu systems are negative, except as otherwise noted.      Allergies   Allergen Reactions     Dihydroergotamine Anaphylaxis     Latex Anaphylaxis     Shellfish-Derived Products Anaphylaxis     Sumatriptan Anaphylaxis     Banana Unknown     Gabapentin Dizziness     Gluten Meal Other (See Comments)     Celiac disease     Keppra [Levetiracetam] Nausea and Vomiting     Kiwi Unknown     Levofloxacin Other (See Comments)     Arrhythmia     Metronidazole Nausea and Vomiting     Nitrofurantoin Hives     Penicillins Hives     Pregabalin      Topiramate Visual Disturbance     Aspirin Rash     Methadone Rash     Morphine Hives     She got hives around are when morphine given but resolved after few minutes per patient      Risperidone Anxiety        Current Outpatient Medications   Medication Instructions     acetaminophen (TYLENOL) 975 mg, Oral, EVERY 8 HOURS     buprenorphine (BUTRANS) 5 MCG/HR WK patch 1 patch, Transdermal, EVERY 7 DAYS     cyclobenzaprine (FLEXERIL) 10 mg, Oral, 3 TIMES DAILY     dicyclomine (BENTYL) 20 mg, Oral, EVERY 6 HOURS     diphenhydrAMINE (BENADRYL) 12.5 mg, Oral, EVERY 6 HOURS PRN     estradiol (ESTRACE) 2 g, Vaginal, TWICE WEEKLY     folic acid (FOLVITE) 400 mcg, Oral, DAILY     ibuprofen " "(ADVIL/MOTRIN) 600 mg, Oral, EVERY 6 HOURS PRN     Lidocaine (LIDOCARE) 4 % Patch 3 patches, Transdermal, EVERY 24 HOURS, To prevent lidocaine toxicity, patient should be patch free for 12 hrs daily.     medical cannabis (Patient's own supply) 1 Dose, SEE ADMIN INSTRUCTIONS, (The purpose of this order is to document that the patient reports taking medical cannabis.  This is not a prescription, and is not used to certify that the patient has a qualifying medical condition.)<BR>Per pt: 5-10 mg TID PRN      metoclopramide (REGLAN) 10 mg, Oral, 4 TIMES DAILY PRN     multivitamin w/minerals (THERA-VIT-M) tablet 1 tablet, Oral, EVERY MORNING, [employer]     naloxone (NARCAN) 4 mg, Alternating Nostrils, PRN, every 2-3 minutes until assistance arrives     ondansetron (ZOFRAN) 4 mg, Oral, EVERY 6 HOURS PRN     polyethylene glycol (MIRALAX) 17 GM/Dose powder Oral, PRN     senna-docusate (SENOKOT-S/PERICOLACE) 8.6-50 MG tablet 1 tablet, Oral, 2 TIMES DAILY       Past Medical History:   Diagnosis Date     BRCA positive      CIDP (chronic inflammatory demyelinating polyneuropathy) (H)      ASHKAN III with severe dysplasia 2002     Complex regional pain syndrome type 1 of right lower extremity         Family History   Problem Relation Age of Onset     Kidney Disease Father         Social History     Tobacco Use     Smoking status: Former     Types: Cigarettes     Smokeless tobacco: Never   Vaping Use     Vaping Use: Never used   Substance Use Topics     Alcohol use: Not Currently     Drug use: Yes     Types: Marijuana     Comment: Medical Canib[facility] patient        History   Sexual Activity     Sexual activity: Not Currently     Partners: Male        No flowsheet data found.     Immunization History   Administered Date(s) Administered     COVID-19 Vaccine 18+ (Moderna) 03/20/2021, 04/17/2021                 OBJECTIVE     BP (!) 129/95   Pulse 98   Temp 98.1  F (36.7  C) (Oral)   Ht 1.651 m (5' 5\")   Wt 75.8 kg (167 lb)   " SpO2 97%   BMI 27.79 kg/m        Labs:  Lab Results   Component Value Date    WBC 6.9 12/05/2022    HGB 16.1 (H) 12/05/2022    HCT 49.1 (H) 12/05/2022     12/05/2022    CHOL 229 (H) 03/30/2022    TRIG 131 03/30/2022    HDL 56 03/30/2022    ALT 24 12/05/2022    AST 32 12/05/2022     12/05/2022    BUN 11.9 12/05/2022    CO2 23 12/05/2022    TSH 1.38 07/20/2022        Physical Exam  Constitutional:       General: She is not in acute distress.     Appearance: She is not ill-appearing.      Comments: In wheelchair   HENT:      Head: Normocephalic.   Eyes:      Extraocular Movements: Extraocular movements intact.      Pupils: Pupils are equal, round, and reactive to light.   Cardiovascular:      Rate and Rhythm: Normal rate and regular rhythm.      Pulses:           Dorsalis pedis pulses are 1+ on the right side and 1+ on the left side.      Heart sounds: Normal heart sounds. No murmur heard.  Pulmonary:      Effort: Pulmonary effort is normal. No respiratory distress.      Breath sounds: Normal breath sounds. No wheezing, rhonchi or rales.   Abdominal:      General: Bowel sounds are normal.      Palpations: Abdomen is soft.      Tenderness: There is abdominal tenderness (LLQ).   Musculoskeletal:      Cervical back: Neck supple.      Right lower leg: No edema.      Left lower leg: No edema.      Right foot: No deformity.      Left foot: No deformity.   Feet:      Comments: Bilateral feet cold to touch.  Skin:     Capillary Refill: Capillary refill takes more than 3 seconds.   Neurological:      Mental Status: She is alert.      Cranial Nerves: Cranial nerves 2-12 are intact. No cranial nerve deficit or facial asymmetry.      Motor: Weakness and tremor present.      Comments: BUE tremor with pulling/pushing.  BUE strength 3/5.  BLE strength 3/5.       Psychiatric:         Thought Content: Thought content normal.         Judgment: Judgment normal.            9/13/22 Labs from [place]  RF <15   Antinuclear Ab,  HEp-2 Substrate, S <1:80 (negative)             ASSESSMENT/PLAN     1. Vomiting and diarrhea  VSS, patient in NAD throughout exam. Patient had LLQ tenderness today, however, she is afebrile and currently denies diarrhea/blood in stool. Patient's weight is up 17lbs since 12/5/22 ED visit which is reassuring. Agree with Upper endoscopy tomorrow. Will discontinue metoclopramide due to complaints of restlessness/toe tapping which could represent budding akathisia. We had patient sign JENNIFFER so we can obtain records from Dr. Mason's office.    2. Deformity of toe, unspecified laterality  Bilateral toes without gross deformities noted today. Will check CMP to rule out electrolyte abnormality (especially Ca+ considering facial tics) and a CBC/Fe panel to rule out SUKHI. Patient had negative RF/PAULINE in September of 2022.  - Comprehensive metabolic panel  - CBC with platelets  - Iron and iron binding capacity  - Ferritin  - Magnesium    3. Prolonged capillary refill time  Patient's bilateral feet are cold with cap refills >3 seconds to great big toes. Patient is a former smoker. Will check arterial US. Disposition pending results.  - US Lower Extremity Arterial Duplex Bilateral; Future    4. CIDP (chronic inflammatory demyelinating polyneuropathy) (H)  VSS, CN II-XII intact today. Patient reports gradually worsening symptoms since her workup at [place] in the Fall of 2022. Patient would like to go back on IVIG, [facility] per patient request. Go to ER for any facial droop, slurred speech, worsening unilateral weakness, AMS, etc.  - Adult Neurology  Referral; Future          Follow-Up:  - Follow up in 3 month(s), or sooner if symptoms worsen or fail to improve. Disposition pending lab/imaging results.    Options for treatment and follow-up care were reviewed with the patient. Patient engaged in the decision making process and verbalized understanding of the options discussed and agreed with the  final plan.  AVS printed and given to patient.    MIRA Addison [facility]  [facility]  [address] [address] [phone]

## 2023-01-20 PROCEDURE — 88305 TISSUE EXAM BY PATHOLOGIST: CPT | Performed by: PATHOLOGY

## 2023-01-23 ENCOUNTER — ANCILLARY PROCEDURE (OUTPATIENT)
Dept: ULTRASOUND IMAGING | Facility: CLINIC | Age: 48
End: 2023-01-23
Attending: NURSE PRACTITIONER
Payer: COMMERCIAL

## 2023-01-23 ENCOUNTER — LAB REQUISITION (OUTPATIENT)
Dept: LAB | Facility: CLINIC | Age: 48
End: 2023-01-23

## 2023-01-23 DIAGNOSIS — K31.89 OTHER DISEASES OF STOMACH AND DUODENUM: ICD-10-CM

## 2023-01-23 DIAGNOSIS — R09.89 PROLONGED CAPILLARY REFILL TIME: ICD-10-CM

## 2023-01-23 DIAGNOSIS — R11.2 NAUSEA WITH VOMITING, UNSPECIFIED: ICD-10-CM

## 2023-01-23 DIAGNOSIS — K30 FUNCTIONAL DYSPEPSIA: ICD-10-CM

## 2023-01-23 PROCEDURE — 93925 LOWER EXTREMITY STUDY: CPT | Mod: GC | Performed by: RADIOLOGY

## 2023-01-24 LAB
PATH REPORT.COMMENTS IMP SPEC: NORMAL
PATH REPORT.COMMENTS IMP SPEC: NORMAL
PATH REPORT.FINAL DX SPEC: NORMAL
PATH REPORT.GROSS SPEC: NORMAL
PATH REPORT.MICROSCOPIC SPEC OTHER STN: NORMAL
PATH REPORT.RELEVANT HX SPEC: NORMAL
PHOTO IMAGE: NORMAL

## 2023-01-24 NOTE — RESULT ENCOUNTER NOTE
Grayson Colby,    Brent MAO- this patient has a complex medical history. I've decided to refer her to internal medicine for further management of her primary care.    Thanks,  Neftali

## 2023-01-26 ENCOUNTER — TRANSFERRED RECORDS (OUTPATIENT)
Dept: HEALTH INFORMATION MANAGEMENT | Facility: CLINIC | Age: 48
End: 2023-01-26
Payer: COMMERCIAL

## 2023-01-27 ENCOUNTER — APPOINTMENT (OUTPATIENT)
Dept: MRI IMAGING | Facility: CLINIC | Age: 48
End: 2023-01-27
Attending: NURSE PRACTITIONER
Payer: COMMERCIAL

## 2023-01-27 ENCOUNTER — HOSPITAL ENCOUNTER (OUTPATIENT)
Facility: CLINIC | Age: 48
Setting detail: OBSERVATION
Discharge: HOME | End: 2023-02-09
Attending: EMERGENCY MEDICINE | Admitting: NURSE PRACTITIONER
Payer: COMMERCIAL

## 2023-01-27 DIAGNOSIS — R29.6 MULTIPLE FALLS: ICD-10-CM

## 2023-01-27 DIAGNOSIS — K90.0 CELIAC DISEASE: Primary | ICD-10-CM

## 2023-01-27 DIAGNOSIS — K21.00 GASTROESOPHAGEAL REFLUX DISEASE WITH ESOPHAGITIS, UNSPECIFIED WHETHER HEMORRHAGE: ICD-10-CM

## 2023-01-27 DIAGNOSIS — Z11.52 ENCOUNTER FOR SCREENING LABORATORY TESTING FOR SEVERE ACUTE RESPIRATORY SYNDROME CORONAVIRUS 2 (SARS-COV-2): ICD-10-CM

## 2023-01-27 DIAGNOSIS — R20.2 PARESTHESIAS: ICD-10-CM

## 2023-01-27 DIAGNOSIS — F32.A DEPRESSION, UNSPECIFIED DEPRESSION TYPE: ICD-10-CM

## 2023-01-27 DIAGNOSIS — G61.81 CIDP (CHRONIC INFLAMMATORY DEMYELINATING POLYNEUROPATHY) (H): ICD-10-CM

## 2023-01-27 DIAGNOSIS — R53.81 PHYSICAL DECONDITIONING: ICD-10-CM

## 2023-01-27 LAB
ALBUMIN SERPL BCG-MCNC: 4.3 G/DL (ref 3.5–5.2)
ALBUMIN UR-MCNC: NEGATIVE MG/DL
ALP SERPL-CCNC: 115 U/L (ref 35–104)
ALT SERPL W P-5'-P-CCNC: 17 U/L (ref 10–35)
ANION GAP SERPL CALCULATED.3IONS-SCNC: 12 MMOL/L (ref 7–15)
APPEARANCE UR: CLEAR
APTT PPP: 28 SECONDS (ref 22–38)
AST SERPL W P-5'-P-CCNC: 28 U/L (ref 10–35)
BASOPHILS # BLD AUTO: 0 10E3/UL (ref 0–0.2)
BASOPHILS NFR BLD AUTO: 0 %
BILIRUB SERPL-MCNC: 0.4 MG/DL
BILIRUB UR QL STRIP: NEGATIVE
BUN SERPL-MCNC: 9.4 MG/DL (ref 6–20)
CALCIUM SERPL-MCNC: 9.7 MG/DL (ref 8.6–10)
CHLORIDE SERPL-SCNC: 101 MMOL/L (ref 98–107)
COLOR UR AUTO: ABNORMAL
CREAT SERPL-MCNC: 0.93 MG/DL (ref 0.51–0.95)
DEPRECATED HCO3 PLAS-SCNC: 25 MMOL/L (ref 22–29)
EOSINOPHIL # BLD AUTO: 0 10E3/UL (ref 0–0.7)
EOSINOPHIL NFR BLD AUTO: 1 %
ERYTHROCYTE [DISTWIDTH] IN BLOOD BY AUTOMATED COUNT: 14 % (ref 10–15)
GFR SERPL CREATININE-BSD FRML MDRD: 75 ML/MIN/1.73M2
GLUCOSE SERPL-MCNC: 112 MG/DL (ref 70–99)
GLUCOSE UR STRIP-MCNC: NEGATIVE MG/DL
HCT VFR BLD AUTO: 46.9 % (ref 35–47)
HGB BLD-MCNC: 15.7 G/DL (ref 11.7–15.7)
HGB UR QL STRIP: NEGATIVE
HOLD SPECIMEN: NORMAL
IMM GRANULOCYTES # BLD: 0 10E3/UL
IMM GRANULOCYTES NFR BLD: 0 %
KETONES UR STRIP-MCNC: NEGATIVE MG/DL
LEUKOCYTE ESTERASE UR QL STRIP: NEGATIVE
LIPASE SERPL-CCNC: 23 U/L (ref 13–60)
LYMPHOCYTES # BLD AUTO: 1.4 10E3/UL (ref 0.8–5.3)
LYMPHOCYTES NFR BLD AUTO: 21 %
MAGNESIUM SERPL-MCNC: 2.1 MG/DL (ref 1.7–2.3)
MCH RBC QN AUTO: 31.9 PG (ref 26.5–33)
MCHC RBC AUTO-ENTMCNC: 33.5 G/DL (ref 31.5–36.5)
MCV RBC AUTO: 95 FL (ref 78–100)
MONOCYTES # BLD AUTO: 0.3 10E3/UL (ref 0–1.3)
MONOCYTES NFR BLD AUTO: 5 %
MUCOUS THREADS #/AREA URNS LPF: PRESENT /LPF
NEUTROPHILS # BLD AUTO: 4.7 10E3/UL (ref 1.6–8.3)
NEUTROPHILS NFR BLD AUTO: 73 %
NITRATE UR QL: NEGATIVE
NRBC # BLD AUTO: 0 10E3/UL
NRBC BLD AUTO-RTO: 0 /100
PH UR STRIP: 5.5 [PH] (ref 5–7)
PLATELET # BLD AUTO: 317 10E3/UL (ref 150–450)
POTASSIUM SERPL-SCNC: 4.1 MMOL/L (ref 3.4–5.3)
PROT SERPL-MCNC: 8 G/DL (ref 6.4–8.3)
RBC # BLD AUTO: 4.92 10E6/UL (ref 3.8–5.2)
RBC URINE: 0 /HPF
SARS-COV-2 RNA RESP QL NAA+PROBE: NEGATIVE
SODIUM SERPL-SCNC: 138 MMOL/L (ref 136–145)
SP GR UR STRIP: 1.01 (ref 1–1.03)
SQUAMOUS EPITHELIAL: 1 /HPF
UROBILINOGEN UR STRIP-MCNC: NORMAL MG/DL
VIT B12 SERPL-MCNC: 582 PG/ML (ref 232–1245)
WBC # BLD AUTO: 6.5 10E3/UL (ref 4–11)
WBC URINE: 0 /HPF

## 2023-01-27 PROCEDURE — G0378 HOSPITAL OBSERVATION PER HR: HCPCS

## 2023-01-27 PROCEDURE — 72158 MRI LUMBAR SPINE W/O & W/DYE: CPT | Mod: 26 | Performed by: RADIOLOGY

## 2023-01-27 PROCEDURE — 81001 URINALYSIS AUTO W/SCOPE: CPT | Performed by: EMERGENCY MEDICINE

## 2023-01-27 PROCEDURE — 36415 COLL VENOUS BLD VENIPUNCTURE: CPT | Performed by: EMERGENCY MEDICINE

## 2023-01-27 PROCEDURE — 96361 HYDRATE IV INFUSION ADD-ON: CPT

## 2023-01-27 PROCEDURE — U0005 INFEC AGEN DETEC AMPLI PROBE: HCPCS | Performed by: EMERGENCY MEDICINE

## 2023-01-27 PROCEDURE — 258N000003 HC RX IP 258 OP 636: Performed by: EMERGENCY MEDICINE

## 2023-01-27 PROCEDURE — A9585 GADOBUTROL INJECTION: HCPCS | Performed by: NURSE PRACTITIONER

## 2023-01-27 PROCEDURE — 99222 1ST HOSP IP/OBS MODERATE 55: CPT | Mod: GC | Performed by: PSYCHIATRY & NEUROLOGY

## 2023-01-27 PROCEDURE — 258N000003 HC RX IP 258 OP 636: Performed by: NURSE PRACTITIONER

## 2023-01-27 PROCEDURE — 250N000011 HC RX IP 250 OP 636: Performed by: NURSE PRACTITIONER

## 2023-01-27 PROCEDURE — 36415 COLL VENOUS BLD VENIPUNCTURE: CPT | Performed by: NURSE PRACTITIONER

## 2023-01-27 PROCEDURE — 82607 VITAMIN B-12: CPT | Performed by: EMERGENCY MEDICINE

## 2023-01-27 PROCEDURE — 83690 ASSAY OF LIPASE: CPT | Performed by: EMERGENCY MEDICINE

## 2023-01-27 PROCEDURE — 96375 TX/PRO/DX INJ NEW DRUG ADDON: CPT | Mod: 59

## 2023-01-27 PROCEDURE — 96376 TX/PRO/DX INJ SAME DRUG ADON: CPT | Mod: 59

## 2023-01-27 PROCEDURE — 85025 COMPLETE CBC W/AUTO DIFF WBC: CPT | Performed by: EMERGENCY MEDICINE

## 2023-01-27 PROCEDURE — 84425 ASSAY OF VITAMIN B-1: CPT | Performed by: EMERGENCY MEDICINE

## 2023-01-27 PROCEDURE — 250N000013 HC RX MED GY IP 250 OP 250 PS 637: Performed by: EMERGENCY MEDICINE

## 2023-01-27 PROCEDURE — 99285 EMERGENCY DEPT VISIT HI MDM: CPT | Mod: CS | Performed by: EMERGENCY MEDICINE

## 2023-01-27 PROCEDURE — 250N000013 HC RX MED GY IP 250 OP 250 PS 637: Performed by: NURSE PRACTITIONER

## 2023-01-27 PROCEDURE — 99221 1ST HOSP IP/OBS SF/LOW 40: CPT | Performed by: NURSE PRACTITIONER

## 2023-01-27 PROCEDURE — 80053 COMPREHEN METABOLIC PANEL: CPT | Performed by: EMERGENCY MEDICINE

## 2023-01-27 PROCEDURE — 85730 THROMBOPLASTIN TIME PARTIAL: CPT | Performed by: NURSE PRACTITIONER

## 2023-01-27 PROCEDURE — 250N000011 HC RX IP 250 OP 636: Performed by: EMERGENCY MEDICINE

## 2023-01-27 PROCEDURE — 83735 ASSAY OF MAGNESIUM: CPT | Performed by: EMERGENCY MEDICINE

## 2023-01-27 PROCEDURE — 99285 EMERGENCY DEPT VISIT HI MDM: CPT | Mod: CS,25

## 2023-01-27 PROCEDURE — 96374 THER/PROPH/DIAG INJ IV PUSH: CPT | Mod: 59

## 2023-01-27 PROCEDURE — C9803 HOPD COVID-19 SPEC COLLECT: HCPCS

## 2023-01-27 PROCEDURE — 255N000002 HC RX 255 OP 636: Performed by: NURSE PRACTITIONER

## 2023-01-27 PROCEDURE — 72158 MRI LUMBAR SPINE W/O & W/DYE: CPT

## 2023-01-27 PROCEDURE — 84207 ASSAY OF VITAMIN B-6: CPT | Performed by: EMERGENCY MEDICINE

## 2023-01-27 RX ORDER — CYCLOBENZAPRINE HCL 5 MG
10 TABLET ORAL 3 TIMES DAILY
Status: DISCONTINUED | OUTPATIENT
Start: 2023-01-27 | End: 2023-02-09 | Stop reason: HOSPADM

## 2023-01-27 RX ORDER — IBUPROFEN 600 MG/1
600 TABLET, FILM COATED ORAL EVERY 6 HOURS PRN
Status: DISCONTINUED | OUTPATIENT
Start: 2023-01-27 | End: 2023-02-09 | Stop reason: HOSPADM

## 2023-01-27 RX ORDER — BISACODYL 5 MG
5 TABLET, DELAYED RELEASE (ENTERIC COATED) ORAL DAILY PRN
Status: DISCONTINUED | OUTPATIENT
Start: 2023-01-27 | End: 2023-02-08

## 2023-01-27 RX ORDER — ACETAMINOPHEN 325 MG/1
650 TABLET ORAL EVERY 6 HOURS PRN
Status: DISCONTINUED | OUTPATIENT
Start: 2023-01-27 | End: 2023-02-09 | Stop reason: HOSPADM

## 2023-01-27 RX ORDER — MULTIPLE VITAMINS W/ MINERALS TAB 9MG-400MCG
1 TAB ORAL EVERY MORNING
Status: DISCONTINUED | OUTPATIENT
Start: 2023-01-28 | End: 2023-02-09 | Stop reason: HOSPADM

## 2023-01-27 RX ORDER — ONDANSETRON 2 MG/ML
4 INJECTION INTRAMUSCULAR; INTRAVENOUS ONCE
Status: COMPLETED | OUTPATIENT
Start: 2023-01-27 | End: 2023-01-27

## 2023-01-27 RX ORDER — HYDROXYZINE HYDROCHLORIDE 25 MG/1
25 TABLET, FILM COATED ORAL 3 TIMES DAILY PRN
Status: DISCONTINUED | OUTPATIENT
Start: 2023-01-27 | End: 2023-02-09 | Stop reason: HOSPADM

## 2023-01-27 RX ORDER — LANOLIN ALCOHOL/MO/W.PET/CERES
6 CREAM (GRAM) TOPICAL
Status: DISCONTINUED | OUTPATIENT
Start: 2023-01-27 | End: 2023-02-09 | Stop reason: HOSPADM

## 2023-01-27 RX ORDER — ACETAMINOPHEN 325 MG/1
650 TABLET ORAL ONCE
Status: COMPLETED | OUTPATIENT
Start: 2023-01-27 | End: 2023-01-27

## 2023-01-27 RX ORDER — SODIUM CHLORIDE 9 MG/ML
INJECTION, SOLUTION INTRAVENOUS CONTINUOUS
Status: DISCONTINUED | OUTPATIENT
Start: 2023-01-27 | End: 2023-01-28

## 2023-01-27 RX ORDER — DIPHENHYDRAMINE HCL 25 MG
12.5 TABLET ORAL EVERY 6 HOURS PRN
Status: DISCONTINUED | OUTPATIENT
Start: 2023-01-27 | End: 2023-01-29

## 2023-01-27 RX ORDER — DICYCLOMINE HCL 20 MG
20 TABLET ORAL EVERY 6 HOURS
Status: DISCONTINUED | OUTPATIENT
Start: 2023-01-27 | End: 2023-02-09 | Stop reason: HOSPADM

## 2023-01-27 RX ORDER — ONDANSETRON 2 MG/ML
8 INJECTION INTRAMUSCULAR; INTRAVENOUS ONCE
Status: COMPLETED | OUTPATIENT
Start: 2023-01-27 | End: 2023-01-27

## 2023-01-27 RX ORDER — LORAZEPAM 2 MG/ML
0.5 INJECTION INTRAMUSCULAR ONCE
Status: COMPLETED | OUTPATIENT
Start: 2023-01-27 | End: 2023-01-27

## 2023-01-27 RX ORDER — SODIUM CHLORIDE 9 MG/ML
INJECTION, SOLUTION INTRAVENOUS CONTINUOUS
Status: DISCONTINUED | OUTPATIENT
Start: 2023-01-27 | End: 2023-01-27

## 2023-01-27 RX ORDER — BISACODYL 5 MG
10 TABLET, DELAYED RELEASE (ENTERIC COATED) ORAL DAILY PRN
Status: DISCONTINUED | OUTPATIENT
Start: 2023-01-27 | End: 2023-02-08

## 2023-01-27 RX ORDER — BISACODYL 10 MG
10 SUPPOSITORY, RECTAL RECTAL DAILY PRN
Status: DISCONTINUED | OUTPATIENT
Start: 2023-01-27 | End: 2023-02-03

## 2023-01-27 RX ORDER — GADOBUTROL 604.72 MG/ML
7.5 INJECTION INTRAVENOUS ONCE
Status: COMPLETED | OUTPATIENT
Start: 2023-01-27 | End: 2023-01-27

## 2023-01-27 RX ORDER — ONDANSETRON 4 MG/1
4 TABLET, ORALLY DISINTEGRATING ORAL EVERY 6 HOURS PRN
Status: DISCONTINUED | OUTPATIENT
Start: 2023-01-27 | End: 2023-01-29

## 2023-01-27 RX ORDER — ONDANSETRON 2 MG/ML
4 INJECTION INTRAMUSCULAR; INTRAVENOUS EVERY 6 HOURS PRN
Status: DISCONTINUED | OUTPATIENT
Start: 2023-01-27 | End: 2023-01-29

## 2023-01-27 RX ORDER — LANOLIN ALCOHOL/MO/W.PET/CERES
400 CREAM (GRAM) TOPICAL DAILY
Status: DISCONTINUED | OUTPATIENT
Start: 2023-01-28 | End: 2023-02-09 | Stop reason: HOSPADM

## 2023-01-27 RX ORDER — LORAZEPAM 2 MG/ML
1 INJECTION INTRAMUSCULAR
Status: COMPLETED | OUTPATIENT
Start: 2023-01-27 | End: 2023-01-27

## 2023-01-27 RX ORDER — OXYCODONE HYDROCHLORIDE 10 MG/1
10 TABLET ORAL EVERY 6 HOURS PRN
Status: DISCONTINUED | OUTPATIENT
Start: 2023-01-27 | End: 2023-02-09 | Stop reason: HOSPADM

## 2023-01-27 RX ADMIN — LORAZEPAM 0.5 MG: 2 INJECTION INTRAMUSCULAR; INTRAVENOUS at 16:09

## 2023-01-27 RX ADMIN — ONDANSETRON 4 MG: 2 INJECTION INTRAMUSCULAR; INTRAVENOUS at 18:49

## 2023-01-27 RX ADMIN — GADOBUTROL 7.5 ML: 604.72 INJECTION INTRAVENOUS at 21:55

## 2023-01-27 RX ADMIN — SODIUM CHLORIDE 1000 ML: 9 INJECTION, SOLUTION INTRAVENOUS at 08:39

## 2023-01-27 RX ADMIN — ONDANSETRON 8 MG: 2 INJECTION INTRAMUSCULAR; INTRAVENOUS at 12:45

## 2023-01-27 RX ADMIN — ACETAMINOPHEN 650 MG: 325 TABLET ORAL at 11:59

## 2023-01-27 RX ADMIN — PROCHLORPERAZINE EDISYLATE 10 MG: 5 INJECTION INTRAMUSCULAR; INTRAVENOUS at 15:43

## 2023-01-27 RX ADMIN — OXYCODONE HYDROCHLORIDE 10 MG: 10 TABLET ORAL at 22:24

## 2023-01-27 RX ADMIN — LORAZEPAM 1 MG: 2 INJECTION INTRAMUSCULAR; INTRAVENOUS at 20:41

## 2023-01-27 RX ADMIN — SODIUM CHLORIDE: 9 INJECTION, SOLUTION INTRAVENOUS at 22:25

## 2023-01-27 RX ADMIN — DICYCLOMINE HYDROCHLORIDE 20 MG: 20 TABLET ORAL at 22:25

## 2023-01-27 RX ADMIN — CYCLOBENZAPRINE HYDROCHLORIDE 10 MG: 5 TABLET, FILM COATED ORAL at 22:25

## 2023-01-27 ASSESSMENT — ACTIVITIES OF DAILY LIVING (ADL)
ADLS_ACUITY_SCORE: 37
ADLS_ACUITY_SCORE: 35
ADLS_ACUITY_SCORE: 35
ADLS_ACUITY_SCORE: 37
ADLS_ACUITY_SCORE: 35
ADLS_ACUITY_SCORE: 37
ADLS_ACUITY_SCORE: 37
ADLS_ACUITY_SCORE: 35

## 2023-01-27 NOTE — ED TRIAGE NOTES
Coming in with increasing pain in arms, hands and legs. States mobility has decreased because of the pain. Also, unable to eat or drink as she use to. Directed to ED.     Triage Assessment     Row Name 01/27/23 0733       Triage Assessment (Adult)    Airway WDL WDL       Respiratory WDL    Respiratory WDL WDL       Skin Circulation/Temperature WDL    Skin Circulation/Temperature WDL WDL

## 2023-01-27 NOTE — ED PROVIDER NOTES
[place] EMERGENCY DEPARTMENT ([facility])  January 27, 2023     History     Chief Complaint   Patient presents with     Arm Pain     Leg and hand     HPI  Peggy Jessica is a 47 year old female with a complex past medical history including chronic immune sensory polyradiculopathy (CISP)/Chronic inflammatory demyelinating polyneuropathy (CIDP), complex regional pain syndrome (RLE 2/2 stress fracture; chest 2/2 bilateral mastectomy- currently under care of pain management), BRCA+ mutation, cervical cancer, diverticulitis, and migraines who presents to the ED progressively feeling worse for the last week.  She states that for the last month or so she has had intermittent nausea and vomiting.  She is being worked up for gastroparesis.  She states that around January 7th she had a mild head cold.  She states that over the last week her CIDP neurologic symptoms have progressively worsened.  She states that she is having increased difficulty with body position sense, to the point where she has fallen a total of 7 times over the last 2 days.  She states she can stand transfer, but beyond that is unsafe being upright.  This is much worse than previous.  She does not report any injuries with these falls.  She does not report any blurred vision or slurred speech.  She states that she chronically has some numbness distally in the hands and feet, but this has progressed now to both the mid thigh and mid upper arm.  No current fever, stuffy nose, sore throat, cough, shortness of breath, abdominal pain, diarrhea, dysuria.    Past Medical History  Past Medical History:   Diagnosis Date     BRCA positive      CIDP (chronic inflammatory demyelinating polyneuropathy) (H)      ASHKAN III with severe dysplasia 2002     Complex regional pain syndrome type 1 of right lower extremity      Past Surgical History:   Procedure Laterality Date     BILATERAL OOPHORECTOMY Bilateral 2019     c section      2002      CHOLECYSTECTOMY  1997     COLONOSCOPY       ESOPHAGOSCOPY, GASTROSCOPY, DUODENOSCOPY (EGD), COMBINED N/A 07/28/2022    Procedure: ESOPHAGOGASTRODUODENOSCOPY (EGD);  Surgeon: Zack Maddox MD;  Location:  GI     HYSTERECTOMY  2004     MASTECTOMY Bilateral 2020     acetaminophen (TYLENOL) 325 MG tablet  buprenorphine (BUTRANS) 5 MCG/HR WK patch  cyclobenzaprine (FLEXERIL) 10 MG tablet  dicyclomine (BENTYL) 20 MG tablet  diphenhydrAMINE (BENADRYL) 25 MG tablet  estradiol (ESTRACE) 0.1 MG/GM vaginal cream  folic acid (FOLVITE) 400 MCG tablet  hydrOXYzine (ATARAX) 25 MG tablet  ibuprofen (ADVIL/MOTRIN) 200 MG tablet  Lidocaine (LIDOCARE) 4 % Patch  medical cannabis (Patient's own supply)  multivitamin w/minerals (THERA-VIT-M) tablet  naloxone (NARCAN) 4 MG/0.1ML nasal spray  ondansetron (ZOFRAN) 4 MG tablet  ondansetron (ZOFRAN) 8 MG tablet  polyethylene glycol (MIRALAX) 17 GM/Dose powder  scopolamine (TRANSDERM) 1 MG/3DAYS 72 hr patch  senna-docusate (SENOKOT-S/PERICOLACE) 8.6-50 MG tablet      Allergies   Allergen Reactions     Dihydroergotamine Anaphylaxis     Latex Anaphylaxis     Shellfish-Derived Products Anaphylaxis     Sumatriptan Anaphylaxis     Banana Unknown     Gabapentin Dizziness     Gluten Meal Other (See Comments)     Celiac disease     Keppra [Levetiracetam] Nausea and Vomiting     Kiwi Unknown     Levofloxacin Other (See Comments)     Arrhythmia     Metronidazole Nausea and Vomiting     Nitrofurantoin Hives     Penicillins Hives     Pregabalin      Reglan [Metoclopramide]      Topiramate Visual Disturbance     Aspirin Rash     Methadone Rash     Morphine Hives     She got hives around are when morphine given but resolved after few minutes per patient      Risperidone Anxiety     Past medical history, past surgical history, medications, and allergies were reviewed with the patient. Additional pertinent items: none.    Family History  Family History   Problem Relation Age of Onset     Kidney  "Disease Father      Family history was reviewed with the patient. Additional pertinent items: None    Social History  Social History     Tobacco Use     Smoking status: Former     Types: Cigarettes     Smokeless tobacco: Never   Vaping Use     Vaping Use: Never used   Substance Use Topics     Alcohol use: Not Currently     Drug use: Yes     Types: Marijuana     Comment: Medical [facility] patient      Social history was reviewed with the patient. Additional pertinent items: None     A medically appropriate review of systems was performed with pertinent positives and negatives noted in the HPI, and all other systems negative.    Physical Exam   BP: (!) 129/94  Pulse: 102  Temp: 96.9  F (36.1  C)  Resp: 16  Height: 165.1 cm (5' 5\")  Weight: 72.6 kg (160 lb)  SpO2: 97 %  Physical Exam  Constitutional:       General: She is not in acute distress.     Appearance: She is not diaphoretic.   HENT:      Head: Atraumatic.   Eyes:      General: No scleral icterus.  Neck:      Comments: No midline C/T/L spine tenderness  Cardiovascular:      Heart sounds: Normal heart sounds.   Pulmonary:      Effort: No respiratory distress.      Breath sounds: Normal breath sounds.   Abdominal:      Palpations: Abdomen is soft.      Tenderness: There is no abdominal tenderness.   Musculoskeletal:         General: No tenderness.   Skin:     General: Skin is warm.      Findings: No rash.   Neurological:      Mental Status: She is oriented to person, place, and time.      Cranial Nerves: No cranial nerve deficit.      Motor: No weakness.      Coordination: Coordination abnormal (some dysmetria with finger-nose-finger).      Comments: Decreased (though equal bilaterally) sensation in the upper and lower extremities           ED Course, Procedures, & Data      Procedures                     Results for orders placed or performed during the hospital encounter of 01/27/23   Comprehensive metabolic panel     Status: Abnormal   Result Value Ref Range    " Sodium 138 136 - 145 mmol/L    Potassium 4.1 3.4 - 5.3 mmol/L    Chloride 101 98 - 107 mmol/L    Carbon Dioxide (CO2) 25 22 - 29 mmol/L    Anion Gap 12 7 - 15 mmol/L    Urea Nitrogen 9.4 6.0 - 20.0 mg/dL    Creatinine 0.93 0.51 - 0.95 mg/dL    Calcium 9.7 8.6 - 10.0 mg/dL    Glucose 112 (H) 70 - 99 mg/dL    Alkaline Phosphatase 115 (H) 35 - 104 U/L    AST 28 10 - 35 U/L    ALT 17 10 - 35 U/L    Protein Total 8.0 6.4 - 8.3 g/dL    Albumin 4.3 3.5 - 5.2 g/dL    Bilirubin Total 0.4 <=1.2 mg/dL    GFR Estimate 75 >60 mL/min/1.73m2   Lipase     Status: Normal   Result Value Ref Range    Lipase 23 13 - 60 U/L   Magnesium     Status: Normal   Result Value Ref Range    Magnesium 2.1 1.7 - 2.3 mg/dL   UA with Microscopic     Status: Abnormal   Result Value Ref Range    Color Urine Light Yellow Colorless, Straw, Light Yellow, Yellow    Appearance Urine Clear Clear    Glucose Urine Negative Negative mg/dL    Bilirubin Urine Negative Negative    Ketones Urine Negative Negative mg/dL    Specific Gravity Urine 1.013 1.003 - 1.035    Blood Urine Negative Negative    pH Urine 5.5 5.0 - 7.0    Protein Albumin Urine Negative Negative mg/dL    Urobilinogen Urine Normal Normal, 2.0 mg/dL    Nitrite Urine Negative Negative    Leukocyte Esterase Urine Negative Negative    Mucus Urine Present (A) None Seen /LPF    RBC Urine 0 <=2 /HPF    WBC Urine 0 <=5 /HPF    Squamous Epithelials Urine 1 <=1 /HPF   Asymptomatic COVID-19 Virus (Coronavirus) by PCR Nasopharyngeal     Status: Normal    Specimen: Nasopharyngeal; Swab   Result Value Ref Range    SARS CoV2 PCR Negative Negative    Narrative    Testing was performed using the Xpert Xpress SARS-CoV-2 Assay on the Cepheid Gene-Xpert Instrument Systems. Additional information about this Emergency Use Authorization (EUA) assay can be found via the Lab Guide. This test should be ordered for the detection of SARS-CoV-2 in individuals who meet SARS-CoV-2 clinical and/or epidemiological criteria as  well as from individuals without symptoms or other reasons to suspect COVID-19. Test performance for asymptomatic patients has only been established in anterior nasal swab specimens. This test is for in vitro diagnostic use under the FDA EUA for laboratories certified under CLIA to perform high complexity testing. This test has not been FDA cleared or approved. A negative result does not rule out the presence of PCR inhibitors in the specimen or target RNA concentration below the limit of detection for the assay. The possibility of a false negative should be considered if the patient's recent exposure or clinical presentation suggests COVID-19. This test was validated by [facility]. These Laboratories are certified under the Clinical Laboratory Improvement Amendments (CLIA) as qualified to perform high complexity testing.     CBC with platelets and differential     Status: None   Result Value Ref Range    WBC Count 6.5 4.0 - 11.0 10e3/uL    RBC Count 4.92 3.80 - 5.20 10e6/uL    Hemoglobin 15.7 11.7 - 15.7 g/dL    Hematocrit 46.9 35.0 - 47.0 %    MCV 95 78 - 100 fL    MCH 31.9 26.5 - 33.0 pg    MCHC 33.5 31.5 - 36.5 g/dL    RDW 14.0 10.0 - 15.0 %    Platelet Count 317 150 - 450 10e3/uL    % Neutrophils 73 %    % Lymphocytes 21 %    % Monocytes 5 %    % Eosinophils 1 %    % Basophils 0 %    % Immature Granulocytes 0 %    NRBCs per 100 WBC 0 <1 /100    Absolute Neutrophils 4.7 1.6 - 8.3 10e3/uL    Absolute Lymphocytes 1.4 0.8 - 5.3 10e3/uL    Absolute Monocytes 0.3 0.0 - 1.3 10e3/uL    Absolute Eosinophils 0.0 0.0 - 0.7 10e3/uL    Absolute Basophils 0.0 0.0 - 0.2 10e3/uL    Absolute Immature Granulocytes 0.0 <=0.4 10e3/uL    Absolute NRBCs 0.0 10e3/uL   Extra Tube ([place] Draw)     Status: None    Narrative    The following orders were created for panel order Extra Tube ([place] Draw).  Procedure                               Abnormality         Status                     ---------                                -----------         ------                     Extra Blue Top Tube[[id number]]                              Final result                 Please view results for these tests on the individual orders.   Extra Blue Top Tube     Status: None   Result Value Ref Range    Hold Specimen [facility]    Vitamin B12     Status: Normal   Result Value Ref Range    Vitamin B12 582 232 - 1,245 pg/mL   Extra Tube     Status: None    Narrative    The following orders were created for panel order Extra Tube.  Procedure                               Abnormality         Status                     ---------                               -----------         ------                     Extra Green Top (Lithium...[[id number]]                      Final result                 Please view results for these tests on the individual orders.   Extra Green Top (Lithium Heparin) Tube     Status: None   Result Value Ref Range    Hold Specimen [facility]    Extra Tube     Status: None (In process)    Narrative    The following orders were created for panel order Extra Tube.  Procedure                               Abnormality         Status                     ---------                               -----------         ------                     Extra Blue Top Tube[[id number]]                              In process                 Extra Red Top Tube[[id number]]                                                          Extra Green Top (Lithium...[[id number]]                                                 Extra Purple Top Tube[[id number]]                            In process                   Please view results for these tests on the individual orders.   CBC with platelets differential     Status: None    Narrative    The following orders were created for panel order CBC with platelets differential.  Procedure                               Abnormality         Status                     ---------                               -----------         ------                      CBC with platelets and d...[[id number]]                      Final result                 Please view results for these tests on the individual orders.     Medications   LORazepam (ATIVAN) injection 0.5 mg (has no administration in time range)   0.9% sodium chloride BOLUS (0 mLs Intravenous Stopped 1/27/23 1034)   acetaminophen (TYLENOL) tablet 650 mg (650 mg Oral Given 1/27/23 1159)   ondansetron (ZOFRAN) injection 8 mg (8 mg Intravenous Given 1/27/23 1245)     Labs Ordered and Resulted from Time of ED Arrival to Time of ED Departure   COMPREHENSIVE METABOLIC PANEL - Abnormal       Result Value    Sodium 138      Potassium 4.1      Chloride 101      Carbon Dioxide (CO2) 25      Anion Gap 12      Urea Nitrogen 9.4      Creatinine 0.93      Calcium 9.7      Glucose 112 (*)     Alkaline Phosphatase 115 (*)     AST 28      ALT 17      Protein Total 8.0      Albumin 4.3      Bilirubin Total 0.4      GFR Estimate 75     ROUTINE UA WITH MICROSCOPIC - Abnormal    Color Urine Light Yellow      Appearance Urine Clear      Glucose Urine Negative      Bilirubin Urine Negative      Ketones Urine Negative      Specific Gravity Urine 1.013      Blood Urine Negative      pH Urine 5.5      Protein Albumin Urine Negative      Urobilinogen Urine Normal      Nitrite Urine Negative      Leukocyte Esterase Urine Negative      Mucus Urine Present (*)     RBC Urine 0      WBC Urine 0      Squamous Epithelials Urine 1     LIPASE - Normal    Lipase 23     MAGNESIUM - Normal    Magnesium 2.1     COVID-19 VIRUS (CORONAVIRUS) BY PCR - Normal    SARS CoV2 PCR Negative     VITAMIN B12 - Normal    Vitamin B12 582     CBC WITH PLATELETS AND DIFFERENTIAL    WBC Count 6.5      RBC Count 4.92      Hemoglobin 15.7      Hematocrit 46.9      MCV 95      MCH 31.9      MCHC 33.5      RDW 14.0      Platelet Count 317      % Neutrophils 73      % Lymphocytes 21      % Monocytes 5      % Eosinophils 1      % Basophils 0      % Immature  Granulocytes 0      NRBCs per 100 WBC 0      Absolute Neutrophils 4.7      Absolute Lymphocytes 1.4      Absolute Monocytes 0.3      Absolute Eosinophils 0.0      Absolute Basophils 0.0      Absolute Immature Granulocytes 0.0      Absolute NRBCs 0.0     VITAMIN B6   VITAMIN B1 WHOLE BLOOD     MR Lumbar Spine w/o & w Contrast    (Results Pending)          Medical Decision Making  The patient's presentation is strongly suggestive of a chronic illness severe exacerbation, progression, or side effect of treatment.    The patient's evaluation involved:  review of external note(s) from 3+ sources (previous notes)  ordering and/or review of 3+ test(s) in this encounter (see separate area of note for details)  review of 3+ test result(s) ordered prior to this encounter (previous reports)  discussion of management or test interpretation with another health professional (neurology evaluation)    The patient's management involved a decision regarding hospitalization.      Assessment & Plan    The patient denies any injuries from her falls.  I am quite concerned about the fact that she has been having increasing difficulty with safely moving around, transferring, etc.  She is fallen multiple times over the last few days.  She is at increased paresthesias.  She believes that her CIDP is flaring.  Neurology was consulted, and are recommending MRI.  These results are pending.  Labs are ordered, not overly revealing at this time.  She will be signed out pending the MRI results.  If no acute findings, I still do think she should be admitted for further evaluation given the repeated falls.    Dictation Disclaimer: Some of this Note has been completed with voice-recognition dictation software. Although errors are generally corrected real-time, there is the potential for a rare error to be present in the completed chart.      I have reviewed the nursing notes. I have reviewed the findings, diagnosis, plan and need for follow up with  the patient.    New Prescriptions    No medications on file       Final diagnoses:   Paresthesias   Multiple falls   CIDP (chronic inflammatory demyelinating polyneuropathy) (H)     I, Jelnea Bradford, am serving as a trained medical scribe to document services personally performed by Geri Veliz MD, based on the provider's statements to me.   Geri PERSON MD, was physically present and have reviewed and verified the accuracy of this note documented by Jelena Bradford.    Geri Veliz MD  [facility] EMERGENCY DEPARTMENT  1/27/2023     Geri Veliz MD  01/27/23 4072

## 2023-01-27 NOTE — CONSULTS
[facility]  Neurology Consultation    Patient Name:  Peggy Jessica  MRN:  [id number]    :  1975  Date of Service:  2023  Primary care provider:  Andrew Peck          Chief Complaint:  Progressive worsening of neurologic symptoms     History of Present Illness:   Peggy Jessica is a 47 year old female with a complex past medical history including chronic immune sensory polyradiculopathy (CISP)/Chronic inflammatory demyelinating polyneuropathy (CIDP), complex regional pain syndrome (RLE 2/2 stress fracture; chest 2/2 bilateral mastectomy- currently under care of pain management), BRCA+ mutation, cervical cancer, diverticulitis, and migraines who presents to the ED progressive worsening of CIDP neurologic symptoms    for the last week.      On talking to Peggy, she stated that since one month she has been having increasing difficulty getting the food down. She has nausea and vomiting and basically not able to eat much. She has Poor PO for 1 month-depends mostly on popsicles and liquids and jello. Nausea has been off and on since end of Dec (controleld initally with reglan-but stopped due to concerns for tardive dyskinesia.  She saw doctors for that and is scheduled for gastric emptying study.     Over 1 week back, she travelled to [place] and had flu with fever and cough for about 18 hours, but this resolved. And now for the past 5 days her CIDP neurologic symptoms progressively worsening. She is coming in with increasing pain in arms, hands and legs. States mobility has decreased because of the pain, such that she is having to use her wheel chair more. She also has coldness senation ascending up the legs to the lower thighs. She is not able to walk with walker as much, that was able to do and restricted to the wheelchair more than before, able to take about 5-6 steps but falling upon trying to walk. She got her wheelchair back in summer  2022.  She use to have distal neuropathy on baseline but now it has spread proximally, she states. She can't feel her body and feet touching the ground she describes. She states that she is having increased difficulty with body position sense, to the point where she has fallen a total of 7 times over the last 2 days. She does not report any blurred vision or slurred speech, but she has dipolia the last week in all directions (horizontal diplopia) . No current fever, stuffy nose, sore throat, cough, shortness of breath, abdominal pain, diarrhea, dysuria    She was diagnosed with CIDP in November, 2021 and followed Dr Duenas most recently seen as of October, 2022. She was being treated with IVIG earlier until MY 2022. But recently her disease has been diagnosed as inactive, so she is not currently warranted to be on any treatment. Currently, she follows Dr Raman at [facility], who also diagnosed her for functional movement disorder and agreed with inactivity of her disease currently. Initial LP protien was 105 in 11/18/202, Most recent LP revealed 9/26/22: CSF protein 38. Initially on 11/18/21: MR Lumbar Spine showed diffuse mild enhancement of the cauda equina nerve roots, while 9/29/22: MRI spine showed no abnormal nodularity, clumping, or abnormal enhancement of the roots of the cauda equina. And then  9/12/12: NCS at [facility] showed studies showed unobtainable medial plantar responses bilaterally but otherwise normal motor and sensory responses and right-sided blink reflexes    ROS  A comprehensive ROS was performed and pertinent findings were included in HPI.     PMH  Past Medical History:   Diagnosis Date     BRCA positive      CIDP (chronic inflammatory demyelinating polyneuropathy) (H)      ASHKAN III with severe dysplasia 2002     Complex regional pain syndrome type 1 of right lower extremity      Past Surgical History:   Procedure Laterality Date     BILATERAL OOPHORECTOMY Bilateral 2019     c section      2002      "CHOLECYSTECTOMY  1997     COLONOSCOPY       ESOPHAGOSCOPY, GASTROSCOPY, DUODENOSCOPY (EGD), COMBINED N/A 07/28/2022    Procedure: ESOPHAGOGASTRODUODENOSCOPY (EGD);  Surgeon: Zack Maddox MD;  Location:  GI     HYSTERECTOMY  2004     MASTECTOMY Bilateral 2020       Medications   I have personally reviewed the patient's medication list.     Allergies  I have personally reviewed the patient's allergy list.     Physical Examination   Vitals: /83 (BP Location: Left arm)   Pulse 112   Temp (!) 96.1  F (35.6  C) (Oral)   Resp 24   Ht 1.651 m (5' 5\")   Wt 72.6 kg (160 lb)   SpO2 98%   BMI 26.63 kg/m    General:  patient sitting in wheelchair from home, right arm hangs at rest, without any acute distress    HEENT:  normocephalic/atraumatic  Cardio: warm without obvious edema  Pulmonary:  no respiratory distress  Abdomen:  soft, non-tender, non-distended  Integumentary:  no edema  MSK:  intact, warm/dry      Neurologic  Mental Status:  alert, oriented to self, month, year, situtation, follows commands, speech clear and fluent, naming and repetition normal, states president accurately, able to state days of week backwards, calculates 1.50 in quarters, follows complex commands  Cranial Nerves:  visual fields intact, PERRL, EOMI with normal smooth pursuit and reports diplopia in all movements (more trouble with dipolia, horizontal and off set slightly, diplopia in the last week )  facial sensation intact and symmetric, facial movements symmetric, hearing not formally tested but intact to conversation, palate elevation symmetric and uvula midline, no dysarthria, shoulder shrug strong bilaterally, tongue protrusion midline  Motor:  normal muscle tone and bulk, no abnormal movements, able to move all limbs spontaneously:  Shoulder abduction 4- bilaterally   Elbow flexion R: 5  L :5 with some shaking  Elbow extneions R 5 L 5 with some shaking  Wrist extension: L:4 R: 5 (with shaking   Wrist flexion L 4- R: " 5 w/giveway  Knee flexion : R: 5 with give, L: 5 with give way  Knee extension R: 5 with give L 5 with giveway   Hip flexion R:4 with give way and  L: 4 with giveway  Dorsiflexion R: 4- L 4-  Plantar flexion: 5/5 b/l  Sensory:   Very reduced pinprick sensation of the dorsum of hand, feels the pinprick as a brush on the forearm not sharp  Sharp sensation over the biceps  Tracking up sensation becomes sharp just proximal to the elbow on the right  Tracking up sensation becomes sharp just at the elbow on the left   Sharp sensation on the chest   No sesnation to sharp until just distal to patella b/l  Not able to appreciate proprioception in the big toe b/l,  Inconsistent in the right middle finger distal join and inconsistent on the right hand (more consistent with finger vs distal joint)  Coordination: No dysmetria on FNF, ZACH intact without tremor.   Reflexes:  BR +2 and Biceps +2, patella +2 on the right and leg, Achilles trace, and no clonus, no asterixis       Investigations   I have personally reviewed pertinent labs, tests, and radiological imaging. Discussion of notable findings is included under Impression.      Initial LP protien was 105 in 11/18/202, Most recent LP revealed 9/26/22: CSF protein 38. Initially on 11/18/21: MR Lumbar Spine showed diffuse mild enhancement of the cauda equina nerve roots, while 9/29/22: MRI spine showed no abnormal nodularity, clumping, or abnormal enhancement of the roots of the cauda equina. And then  9/12/12: NCS at [place] showed studies showed unobtainable medial plantar responses bilaterally but otherwise normal motor and sensory responses and right-sided blink reflexes      Impression    Peggy Jessica is a 47 year old female with a complex past medical history including chronic immune sensory polyradiculopathy as well as complex regional pain syndrome, who presents to the ED today for progressive worsening of neurologic symptoms for the last week, in the setting of 1  "month history of nausea and poor oral intake as well as recent illness for 1 day, about 10 days ago. She describes specific worsening in her ability to walk and more use of her wheelchair, progressive sensory changes ascending up her legs, and difficulty with fine motor movements.  Her Neurological exam is significant for decreased pinprick below knee and elbow b/l, loss of proprioception in her extremities including her great toe as well as fingers, muscle strength of mostly 4/5 with impersistent activation. When compared to Dr. Duenas's most recent examination from 10/5, there is relative similarity on muscle testing and reflexes, and additionally she describes that at that time she \"In the morning she is able to walk 10 or 15 feet. In the afternoon she has more trouble walking, and essentially does none. She spends most of the time in her wheelchair.\"   Overall, her exam doesn't appear objectively far different from the Dr Duenas exam on 10/5/55, nor does her history. However she has recently had worsened PO intake the last 1 month, reporting 20# weight loss and 100# weight loss the last year, in addition to nausea, delayed gastric emptying and having persistent falls. Recently, she has not been on immunotherapy because her CISP was deemed inactive by Joesph Raman and Henrique based on objective Mri and CSF workup revealing such. In order to have further objective data to tease out the nature of her symptoms of new weakness and progressive neuropathy being related to nutritional deficiency, deconditioning in the setting of poor intake versus a CISP reactivation, we would like objective data with MRI lumbar spine and CSF. If MRI lumbar wwo shows nerve root enhancement this would indicate active disease and can consider IVIG treatment, however if this is un revealing, then would like to obtain CSF for evaluation of protein level to suggest active disease prior to treatment.         #Subjective concern for CIDP flare  #Poor " PO intake last 1 month  #20 pound weight loss last 1 month  #History of CISP, dx in 2021      Recommendations  -MRI lumbar spine wwo. If reveal nerve root enhancement can consider treatment with IVIG and consideration of CISP reactivation. If negative, will need CSF evaluation for elevated protein to suggest CISP reactivation prior to consideration of IVIG treatment   -B1, B6, B12 labs  -Basic workup: CBC, CMP, Mg, Pi    Thank you for involving Neurology in the care of Peggy Jessica.  Please do not hesitate to call with questions/concerns (consult pager [phone]).      Patient was seen and discussed with Dr. Stevens.  Andrés Gallegos MD  Neurology PGY1        Sandy Jean DO, MBA  Neurology Resident, PGY-3  ASCOM: [id number]

## 2023-01-28 ENCOUNTER — APPOINTMENT (OUTPATIENT)
Dept: GENERAL RADIOLOGY | Facility: CLINIC | Age: 48
End: 2023-01-28
Attending: PHYSICIAN ASSISTANT
Payer: COMMERCIAL

## 2023-01-28 ENCOUNTER — APPOINTMENT (OUTPATIENT)
Dept: PHYSICAL THERAPY | Facility: CLINIC | Age: 48
End: 2023-01-28
Attending: NURSE PRACTITIONER
Payer: COMMERCIAL

## 2023-01-28 LAB
ALBUMIN SERPL BCG-MCNC: 3.6 G/DL (ref 3.5–5.2)
ALP SERPL-CCNC: 91 U/L (ref 35–104)
ALT SERPL W P-5'-P-CCNC: 14 U/L (ref 10–35)
ANION GAP SERPL CALCULATED.3IONS-SCNC: 9 MMOL/L (ref 7–15)
APPEARANCE CSF: CLEAR
AST SERPL W P-5'-P-CCNC: 23 U/L (ref 10–35)
BASOPHILS # BLD AUTO: 0 10E3/UL (ref 0–0.2)
BASOPHILS NFR BLD AUTO: 1 %
BILIRUB SERPL-MCNC: 0.3 MG/DL
BUN SERPL-MCNC: 10.3 MG/DL (ref 6–20)
CALCIUM SERPL-MCNC: 9.4 MG/DL (ref 8.6–10)
CHLORIDE SERPL-SCNC: 105 MMOL/L (ref 98–107)
COLOR CSF: COLORLESS
CORTIS SERPL-MCNC: 4 UG/DL
CREAT SERPL-MCNC: 0.89 MG/DL (ref 0.51–0.95)
DEPRECATED HCO3 PLAS-SCNC: 26 MMOL/L (ref 22–29)
EOSINOPHIL # BLD AUTO: 0.1 10E3/UL (ref 0–0.7)
EOSINOPHIL NFR BLD AUTO: 1 %
ERYTHROCYTE [DISTWIDTH] IN BLOOD BY AUTOMATED COUNT: 13.9 % (ref 10–15)
GFR SERPL CREATININE-BSD FRML MDRD: 80 ML/MIN/1.73M2
GLUCOSE CSF-MCNC: 54 MG/DL (ref 40–70)
GLUCOSE SERPL-MCNC: 83 MG/DL (ref 70–99)
HCT VFR BLD AUTO: 41.9 % (ref 35–47)
HGB BLD-MCNC: 13.4 G/DL (ref 11.7–15.7)
IMM GRANULOCYTES # BLD: 0 10E3/UL
IMM GRANULOCYTES NFR BLD: 0 %
INR PPP: 0.97 (ref 0.85–1.15)
LYMPHOCYTES # BLD AUTO: 1.6 10E3/UL (ref 0.8–5.3)
LYMPHOCYTES NFR BLD AUTO: 39 %
MAGNESIUM SERPL-MCNC: 1.9 MG/DL (ref 1.7–2.3)
MCH RBC QN AUTO: 31.4 PG (ref 26.5–33)
MCHC RBC AUTO-ENTMCNC: 32 G/DL (ref 31.5–36.5)
MCV RBC AUTO: 98 FL (ref 78–100)
MONOCYTES # BLD AUTO: 0.3 10E3/UL (ref 0–1.3)
MONOCYTES NFR BLD AUTO: 8 %
NEUTROPHILS # BLD AUTO: 2.2 10E3/UL (ref 1.6–8.3)
NEUTROPHILS NFR BLD AUTO: 51 %
NRBC # BLD AUTO: 0 10E3/UL
NRBC BLD AUTO-RTO: 0 /100
PHOSPHATE SERPL-MCNC: 3.7 MG/DL (ref 2.5–4.5)
PLATELET # BLD AUTO: 243 10E3/UL (ref 150–450)
POTASSIUM SERPL-SCNC: 4.3 MMOL/L (ref 3.4–5.3)
PROT CSF-MCNC: 35.2 MG/DL (ref 15–45)
PROT SERPL-MCNC: 6.6 G/DL (ref 6.4–8.3)
RBC # BLD AUTO: 4.27 10E6/UL (ref 3.8–5.2)
RBC # CSF MANUAL: 2 /UL (ref 0–2)
SODIUM SERPL-SCNC: 140 MMOL/L (ref 136–145)
TSH SERPL DL<=0.005 MIU/L-ACNC: 3.12 UIU/ML (ref 0.3–4.2)
TUBE # CSF: 4
WBC # BLD AUTO: 4.2 10E3/UL (ref 4–11)
WBC # CSF MANUAL: 1 /UL (ref 0–5)

## 2023-01-28 PROCEDURE — 82945 GLUCOSE OTHER FLUID: CPT

## 2023-01-28 PROCEDURE — 80053 COMPREHEN METABOLIC PANEL: CPT | Performed by: NURSE PRACTITIONER

## 2023-01-28 PROCEDURE — 84443 ASSAY THYROID STIM HORMONE: CPT | Performed by: NURSE PRACTITIONER

## 2023-01-28 PROCEDURE — G0378 HOSPITAL OBSERVATION PER HR: HCPCS

## 2023-01-28 PROCEDURE — 99231 SBSQ HOSP IP/OBS SF/LOW 25: CPT | Performed by: PHYSICIAN ASSISTANT

## 2023-01-28 PROCEDURE — 36415 COLL VENOUS BLD VENIPUNCTURE: CPT

## 2023-01-28 PROCEDURE — 97530 THERAPEUTIC ACTIVITIES: CPT | Mod: GP

## 2023-01-28 PROCEDURE — 82533 TOTAL CORTISOL: CPT | Performed by: NURSE PRACTITIONER

## 2023-01-28 PROCEDURE — 87205 SMEAR GRAM STAIN: CPT

## 2023-01-28 PROCEDURE — 83735 ASSAY OF MAGNESIUM: CPT | Performed by: NURSE PRACTITIONER

## 2023-01-28 PROCEDURE — 97162 PT EVAL MOD COMPLEX 30 MIN: CPT | Mod: GP

## 2023-01-28 PROCEDURE — 85025 COMPLETE CBC W/AUTO DIFF WBC: CPT | Performed by: NURSE PRACTITIONER

## 2023-01-28 PROCEDURE — 36415 COLL VENOUS BLD VENIPUNCTURE: CPT | Performed by: NURSE PRACTITIONER

## 2023-01-28 PROCEDURE — 250N000009 HC RX 250: Performed by: FAMILY MEDICINE

## 2023-01-28 PROCEDURE — 84100 ASSAY OF PHOSPHORUS: CPT | Performed by: NURSE PRACTITIONER

## 2023-01-28 PROCEDURE — 99233 SBSQ HOSP IP/OBS HIGH 50: CPT | Mod: GC | Performed by: PSYCHIATRY & NEUROLOGY

## 2023-01-28 PROCEDURE — 250N000013 HC RX MED GY IP 250 OP 250 PS 637: Performed by: NURSE PRACTITIONER

## 2023-01-28 PROCEDURE — 83916 OLIGOCLONAL BANDS: CPT

## 2023-01-28 PROCEDURE — 62328 DX LMBR SPI PNXR W/FLUOR/CT: CPT

## 2023-01-28 PROCEDURE — 84157 ASSAY OF PROTEIN OTHER: CPT

## 2023-01-28 PROCEDURE — 89050 BODY FLUID CELL COUNT: CPT

## 2023-01-28 PROCEDURE — 96361 HYDRATE IV INFUSION ADD-ON: CPT

## 2023-01-28 PROCEDURE — 258N000003 HC RX IP 258 OP 636: Performed by: PHYSICIAN ASSISTANT

## 2023-01-28 PROCEDURE — 82784 ASSAY IGA/IGD/IGG/IGM EACH: CPT

## 2023-01-28 PROCEDURE — 85610 PROTHROMBIN TIME: CPT | Performed by: NURSE PRACTITIONER

## 2023-01-28 PROCEDURE — 99207 XR LUMBAR PUNCTURE SPINAL TAP DIAGNOSTIC: CPT | Performed by: RADIOLOGY

## 2023-01-28 PROCEDURE — 250N000011 HC RX IP 250 OP 636: Performed by: NURSE PRACTITIONER

## 2023-01-28 RX ORDER — SODIUM CHLORIDE 9 MG/ML
INJECTION, SOLUTION INTRAVENOUS CONTINUOUS
Status: DISCONTINUED | OUTPATIENT
Start: 2023-01-28 | End: 2023-02-09 | Stop reason: HOSPADM

## 2023-01-28 RX ORDER — LIDOCAINE HYDROCHLORIDE 10 MG/ML
1 INJECTION, SOLUTION EPIDURAL; INFILTRATION; INTRACAUDAL; PERINEURAL ONCE
Status: COMPLETED | OUTPATIENT
Start: 2023-01-28 | End: 2023-01-28

## 2023-01-28 RX ADMIN — OXYCODONE HYDROCHLORIDE 10 MG: 10 TABLET ORAL at 03:25

## 2023-01-28 RX ADMIN — HYDROXYZINE HYDROCHLORIDE 25 MG: 25 TABLET ORAL at 10:08

## 2023-01-28 RX ADMIN — LIDOCAINE HYDROCHLORIDE 10 ML: 10 INJECTION, SOLUTION EPIDURAL; INFILTRATION; INTRACAUDAL; PERINEURAL at 12:11

## 2023-01-28 RX ADMIN — DICYCLOMINE HYDROCHLORIDE 20 MG: 20 TABLET ORAL at 10:08

## 2023-01-28 RX ADMIN — OXYCODONE HYDROCHLORIDE 10 MG: 10 TABLET ORAL at 19:45

## 2023-01-28 RX ADMIN — ONDANSETRON 4 MG: 2 INJECTION INTRAMUSCULAR; INTRAVENOUS at 16:33

## 2023-01-28 RX ADMIN — DICYCLOMINE HYDROCHLORIDE 20 MG: 20 TABLET ORAL at 21:35

## 2023-01-28 RX ADMIN — CYCLOBENZAPRINE HYDROCHLORIDE 10 MG: 5 TABLET, FILM COATED ORAL at 19:34

## 2023-01-28 RX ADMIN — SODIUM CHLORIDE: 9 INJECTION, SOLUTION INTRAVENOUS at 18:05

## 2023-01-28 RX ADMIN — CYCLOBENZAPRINE HYDROCHLORIDE 10 MG: 5 TABLET, FILM COATED ORAL at 16:33

## 2023-01-28 RX ADMIN — DICYCLOMINE HYDROCHLORIDE 20 MG: 20 TABLET ORAL at 03:25

## 2023-01-28 RX ADMIN — OXYCODONE HYDROCHLORIDE 10 MG: 10 TABLET ORAL at 10:08

## 2023-01-28 RX ADMIN — DICYCLOMINE HYDROCHLORIDE 20 MG: 20 TABLET ORAL at 17:57

## 2023-01-28 ASSESSMENT — ACTIVITIES OF DAILY LIVING (ADL)
ADLS_ACUITY_SCORE: 37
ADLS_ACUITY_SCORE: 35
ADLS_ACUITY_SCORE: 35
ADLS_ACUITY_SCORE: 37
ADLS_ACUITY_SCORE: 35
ADLS_ACUITY_SCORE: 37

## 2023-01-28 NOTE — PROGRESS NOTES
"S: Patient history of chronic immune sensory polyradiculopathy with chronic inflammatory demyelinating polyneuropathy diagnosed in 2021 complex regional syndrome presented to the ER with pain loss of proprioception with falls.  Patient evaluated mid ED observation seen by neurology at this point patient reported had an MRI scan lumbar spine last evening results are pending at this point but pending this being within normal limits neurology is recommended LP per interventional radiology to look for CSF protein levels to direct whether or not IVIG treatment is indicated.  Patient currently here complains of generalized pain because her buprenorphine patch is off she typically wears it every 7 days and exchanges at that and take it off before the MRI last evening.  No other major complaints noted this point.  O:/67 (BP Location: Left arm, Patient Position: Supine)   Pulse 84   Temp 97.7  F (36.5  C) (Oral)   Resp 17   Ht 1.651 m (5' 5\")   Wt 72.6 kg (160 lb)   SpO2 95%   BMI 26.63 kg/m    General patient though is not writhing pain alert and orient x3.  Seen by PT currently at this point.  Her exam does not reveal any respiratory distress at this point abdominal guarding otherwise neurologically intact without focal findings  A: CIS P with loss of proprioception pain with falls  P: We will continue to work with neurology regarding their recommendations according care with interventional etiology for lumbar puncture for CSF evaluation and based on the results then further directing care whether or not patient would then meet inpatient pending these results we will discuss with neurology.  We will also prescribe patient's buprenorphine patch again which she wears for 7 days as it was taken off yesterday.    "

## 2023-01-28 NOTE — H&P
[facility] ED Observation Admission Note    Chief Complaint   Patient presents with     Arm Pain     Leg and hand     Assessment/Plan:  Peggy Jessica is a 47 year old female with a medical history of chronic immune sensory polyradiculopathy (CISP)/Chronic inflammatory demyelinating polyneuropathy (CIDP) diagnosed in 2021, complex regional pain syndrome (RLE 2/2 stress fracture; chest 2/2 bilateral mastectomy- currently under care of pain management), BRCA+ mutation, cervical cancer, celiac disease, diverticulitis, migraines, and depressed mood who presents to the ED progressive worsening of CIDP neurologic symptoms including loss of proprioception and x7 falls over the last 2 days.     #. Loss of proprioception with falls  #. Chronic immune sensory polyradiculopathy  The patient presents with safety concerning symptoms as she has fallen 7 times in the past 2 days when attempted to move around. CBC and CMP are unremarkable. Neurology was consulted whom recommending MRI and lumbar puncture. The patient requested lumbar puncture performed by IR as she had difficulties in the past. The patient is being admitted to ED observation unit due to needing IR consult for lumbar puncture. Per neurology if MRI shows nerve root enhancement can consider treatment with IVIG, and if CSF reveals elevated protein, they will consider IVIG treatment for CISP reactivation.  -MRI - Ativan ordered for anxiety   -Lumbar puncture with IR  -B1, B6 and B12 are pending   -Morning labs: CMP, CBC, Mg, phos, TSH and cortisol   -IR consult    #. Nausea and vomiting   #. Poor oral intake   #. Weight loss   #. History of celiac disease   The patient reports undergoing GI work up for possible gastroparesis.   -NPO after midnight for lumbar puncture   -Normal saline at 75ml/hr     #. Complex regional pain syndrome   The patient is on Buprenorphine patch at home, which is now on hold for MRI. Plan is to give her Oxycodone till we resume back  Buprenorphine patch.  -Continue with home Flexeril   -Oxycodone 10mg as needed q 6 hours   -Continue with home Tylenol and Ibuprofen as needed     #. Depressed mood  -Continue with home Hydroxyzine as needed    Consults: Neurology and IR  FEN: IVF  DVT prophylaxis: Anticipating a short admission, and SCDs ordered. If the patient ends up hospital admission she will need chemoprophylaxis for DVT.   Code Status: Full Code  Disposition: PT will assess the patient for safe disposition     HPI:  Peggy Jessica is a 47 year old female with a medical history of chronic immune sensory polyradiculopathy (CISP)/Chronic inflammatory demyelinating polyneuropathy (CIDP) diagnosed in 2021, complex regional pain syndrome (RLE 2/2 stress fracture; chest 2/2 bilateral mastectomy- currently under care of pain management), BRCA+ mutation, cervical cancer, celiac disease, diverticulitis, migraines, and depressed mood who presents to the ED progressive worsening of CIDP neurologic symptoms including loss of proprioception and x7 falls over the last 2 days.     The states that for the last month or so she has had intermittent nausea and vomiting.  She is being worked up for gastroparesis.  She states that around January 7th she had a mild head cold.  She states that over the last week her CIDP neurologic symptoms have progressively worsened.  She states that she is having increased balance loss, not feeling her body position to the point where she has fallen a total of 7 times over the past 2 days.  She states she can stand transfer, but beyond that is unsafe being upright.  This is much worse than previous.  She does not report any injuries with these falls.  She does not report any blurred vision or slurred speech.  She states that she chronically has some numbness distally in the hands and feet, but this has progressed now to both the mid thigh and mid upper arm.  No current fever, stuffy nose, sore throat, cough, shortness of  breath, abdominal pain, diarrhea, dysuria.    The patient also reports having nausea, vomiting and decreased oral intake for over a month. She was seen by GI doctor whom recommended Reglan, due to symptoms of tardive dyskinesia she stopped taking Reglan. She is scheduled for gastric emptying study. Last bowel movement was on Wednesday 1/25/23.     In the ED the patient was stable.     History:    Past Medical History:   Diagnosis Date     BRCA positive      CIDP (chronic inflammatory demyelinating polyneuropathy) (H)      ASHKAN III with severe dysplasia 2002     Complex regional pain syndrome type 1 of right lower extremity      Past Surgical History:   Procedure Laterality Date     BILATERAL OOPHORECTOMY Bilateral 2019     c section      2002     CHOLECYSTECTOMY  1997     COLONOSCOPY       ESOPHAGOSCOPY, GASTROSCOPY, DUODENOSCOPY (EGD), COMBINED N/A 07/28/2022    Procedure: ESOPHAGOGASTRODUODENOSCOPY (EGD);  Surgeon: Zack Maddox MD;  Location: [facility]     HYSTERECTOMY  2004     MASTECTOMY Bilateral 2020     Family History   Problem Relation Age of Onset     Kidney Disease Father      Social History     Socioeconomic History     Marital status:      Spouse name: Not on file     Number of children: 2     Years of education: Not on file     Highest education level: Not on file   Occupational History     Not on file   Tobacco Use     Smoking status: Former     Types: Cigarettes     Smokeless tobacco: Never   Vaping Use     Vaping Use: Never used   Substance and Sexual Activity     Alcohol use: Not Currently     Drug use: Yes     Types: Marijuana     Comment: Medical Canibis patient     Sexual activity: Not Currently     Partners: Male   Other Topics Concern     Not on file   Social History Narrative     Not on file     Social Determinants of Health     Financial Resource Strain: Not on file   Food Insecurity: Not on file   Transportation Needs: Not on file   Physical Activity: Not on file   Stress: Not  on file   Social Connections: Not on file   Intimate Partner Violence: Not on file   Housing Stability: Not on file       No current facility-administered medications on file prior to encounter.  acetaminophen (TYLENOL) 325 MG tablet, Take 3 tablets (975 mg) by mouth every 8 hours  buprenorphine (BUTRANS) 5 MCG/HR WK patch, Place 1 patch onto the skin every 7 days  cyclobenzaprine (FLEXERIL) 10 MG tablet, Take 1 tablet (10 mg) by mouth 3 times daily  dicyclomine (BENTYL) 20 MG tablet, Take 20 mg by mouth every 6 hours  diphenhydrAMINE (BENADRYL) 25 MG tablet, Take 0.5 tablets (12.5 mg) by mouth every 6 hours as needed for allergies or other (nausea)  folic acid (FOLVITE) 400 MCG tablet, Take 1 tablet (400 mcg) by mouth daily  hydrOXYzine (ATARAX) 25 MG tablet,   ibuprofen (ADVIL/MOTRIN) 200 MG tablet, Take 3 tablets (600 mg) by mouth every 6 hours as needed for mild pain  Lidocaine (LIDOCARE) 4 % Patch, Place 3 patches onto the skin every 24 hours To prevent lidocaine toxicity, patient should be patch free for 12 hrs daily.  medical cannabis (Patient's own supply), 1 Dose See Admin Instructions (The purpose of this order is to document that the patient reports taking medical cannabis.  This is not a prescription, and is not used to certify that the patient has a qualifying medical condition.)  Per pt: 5-10 mg TID PRN  multivitamin w/minerals (THERA-VIT-M) tablet, Take 1 tablet by mouth every morning [employer] Womens Brand  naloxone (NARCAN) 4 MG/0.1ML nasal spray, Spray 1 spray (4 mg) into one nostril alternating nostrils as needed for opioid reversal every 2-3 minutes until assistance arrives  ondansetron (ZOFRAN) 4 MG tablet, Take 1 tablet (4 mg) by mouth every 6 hours as needed for nausea or vomiting  ondansetron (ZOFRAN) 8 MG tablet,   polyethylene glycol (MIRALAX) 17 GM/Dose powder, Take by mouth as needed  scopolamine (TRANSDERM) 1 MG/3DAYS 72 hr patch,   senna-docusate (SENOKOT-S/PERICOLACE) 8.6-50 MG tablet,  Take 1 tablet by mouth 2 times daily        Data:    Results for orders placed or performed during the hospital encounter of 01/27/23   Comprehensive metabolic panel     Status: Abnormal   Result Value Ref Range    Sodium 138 136 - 145 mmol/L    Potassium 4.1 3.4 - 5.3 mmol/L    Chloride 101 98 - 107 mmol/L    Carbon Dioxide (CO2) 25 22 - 29 mmol/L    Anion Gap 12 7 - 15 mmol/L    Urea Nitrogen 9.4 6.0 - 20.0 mg/dL    Creatinine 0.93 0.51 - 0.95 mg/dL    Calcium 9.7 8.6 - 10.0 mg/dL    Glucose 112 (H) 70 - 99 mg/dL    Alkaline Phosphatase 115 (H) 35 - 104 U/L    AST 28 10 - 35 U/L    ALT 17 10 - 35 U/L    Protein Total 8.0 6.4 - 8.3 g/dL    Albumin 4.3 3.5 - 5.2 g/dL    Bilirubin Total 0.4 <=1.2 mg/dL    GFR Estimate 75 >60 mL/min/1.73m2   Lipase     Status: Normal   Result Value Ref Range    Lipase 23 13 - 60 U/L   Magnesium     Status: Normal   Result Value Ref Range    Magnesium 2.1 1.7 - 2.3 mg/dL   UA with Microscopic     Status: Abnormal   Result Value Ref Range    Color Urine Light Yellow Colorless, Straw, Light Yellow, Yellow    Appearance Urine Clear Clear    Glucose Urine Negative Negative mg/dL    Bilirubin Urine Negative Negative    Ketones Urine Negative Negative mg/dL    Specific Gravity Urine 1.013 1.003 - 1.035    Blood Urine Negative Negative    pH Urine 5.5 5.0 - 7.0    Protein Albumin Urine Negative Negative mg/dL    Urobilinogen Urine Normal Normal, 2.0 mg/dL    Nitrite Urine Negative Negative    Leukocyte Esterase Urine Negative Negative    Mucus Urine Present (A) None Seen /LPF    RBC Urine 0 <=2 /HPF    WBC Urine 0 <=5 /HPF    Squamous Epithelials Urine 1 <=1 /HPF   Asymptomatic COVID-19 Virus (Coronavirus) by PCR Nasopharyngeal     Status: Normal    Specimen: Nasopharyngeal; Swab   Result Value Ref Range    SARS CoV2 PCR Negative Negative    Narrative    Testing was performed using the Xpert Xpress SARS-CoV-2 Assay on the Cepheid Gene-Xpert Instrument Systems. Additional information about  this Emergency Use Authorization (EUA) assay can be found via the Lab Guide. This test should be ordered for the detection of SARS-CoV-2 in individuals who meet SARS-CoV-2 clinical and/or epidemiological criteria as well as from individuals without symptoms or other reasons to suspect COVID-19. Test performance for asymptomatic patients has only been established in anterior nasal swab specimens. This test is for in vitro diagnostic use under the FDA EUA for laboratories certified under CLIA to perform high complexity testing. This test has not been FDA cleared or approved. A negative result does not rule out the presence of PCR inhibitors in the specimen or target RNA concentration below the limit of detection for the assay. The possibility of a false negative should be considered if the patient's recent exposure or clinical presentation suggests COVID-19. This test was validated by [facility]. These Laboratories are certified under the Clinical Laboratory Improvement Amendments (CLIA) as qualified to perform high complexity testing.     CBC with platelets and differential     Status: None   Result Value Ref Range    WBC Count 6.5 4.0 - 11.0 10e3/uL    RBC Count 4.92 3.80 - 5.20 10e6/uL    Hemoglobin 15.7 11.7 - 15.7 g/dL    Hematocrit 46.9 35.0 - 47.0 %    MCV 95 78 - 100 fL    MCH 31.9 26.5 - 33.0 pg    MCHC 33.5 31.5 - 36.5 g/dL    RDW 14.0 10.0 - 15.0 %    Platelet Count 317 150 - 450 10e3/uL    % Neutrophils 73 %    % Lymphocytes 21 %    % Monocytes 5 %    % Eosinophils 1 %    % Basophils 0 %    % Immature Granulocytes 0 %    NRBCs per 100 WBC 0 <1 /100    Absolute Neutrophils 4.7 1.6 - 8.3 10e3/uL    Absolute Lymphocytes 1.4 0.8 - 5.3 10e3/uL    Absolute Monocytes 0.3 0.0 - 1.3 10e3/uL    Absolute Eosinophils 0.0 0.0 - 0.7 10e3/uL    Absolute Basophils 0.0 0.0 - 0.2 10e3/uL    Absolute Immature Granulocytes 0.0 <=0.4 10e3/uL    Absolute NRBCs 0.0 10e3/uL   Extra Tube ([place] Draw)      Status: None    Narrative    The following orders were created for panel order Extra Tube ([place] Draw).  Procedure                               Abnormality         Status                     ---------                               -----------         ------                     Extra Blue Top Tube[[id number]]                              Final result                 Please view results for these tests on the individual orders.   Extra Blue Top Tube     Status: None   Result Value Ref Range    Hold Specimen JIC    Vitamin B12     Status: Normal   Result Value Ref Range    Vitamin B12 582 232 - 1,245 pg/mL   Extra Tube     Status: None    Narrative    The following orders were created for panel order Extra Tube.  Procedure                               Abnormality         Status                     ---------                               -----------         ------                     Extra Green Top (Lithium...[[id number]]                      Final result                 Please view results for these tests on the individual orders.   Extra Green Top (Lithium Heparin) Tube     Status: None   Result Value Ref Range    Hold Specimen JIC    Extra Tube     Status: None    Narrative    The following orders were created for panel order Extra Tube.  Procedure                               Abnormality         Status                     ---------                               -----------         ------                     Extra Blue Top Tube[[id number]]                              Final result               Extra Red Top Tube[[id number]]                               Final result               Extra Green Top (Lithium...[[id number]]                      Final result               Extra Purple Top Tube[[id number]]                            Final result                 Please view results for these tests on the individual orders.   Extra Blue Top Tube     Status: None   Result Value Ref Range    Hold Specimen JIC    Extra Red Top  Tube     Status: None   Result Value Ref Range    Hold Specimen JIC    Extra Green Top (Lithium Heparin) Tube     Status: None   Result Value Ref Range    Hold Specimen JIC    Extra Purple Top Tube     Status: None   Result Value Ref Range    Hold Specimen JIC    CBC with platelets differential     Status: None    Narrative    The following orders were created for panel order CBC with platelets differential.  Procedure                               Abnormality         Status                     ---------                               -----------         ------                     CBC with platelets and d...[[id number]]                      Final result                 Please view results for these tests on the individual orders.        ROS:    Review Of Systems  A medically appropriate review of systems was performed with pertinent positives and negatives noted in the HPI, and all other systems negative.    Exam:    Vitals:  B/P: 114/83, T: 96.1, P: 112, R: 24    Constitutional:       General: She is not in acute distress.     Appearance: She is not diaphoretic.   HENT:      Head: Atraumatic.   Eyes:      General: No scleral icterus.  Neck:      Comments: No midline C/T/L spine tenderness  Cardiovascular:      Heart sounds: Normal heart sounds.   Pulmonary:      Effort: No respiratory distress.      Breath sounds: Normal breath sounds.   Abdominal:      Palpations: Abdomen is soft.      Tenderness: There is no abdominal tenderness.   Musculoskeletal:         General: No tenderness.   Skin:     General: Skin is warm.      Findings: No rash.   Neurological:      Mental Status: She is oriented to person, place, and time.      Cranial Nerves: No cranial nerve deficit.      Motor: No weakness.      Coordination: Coordination abnormal (some dysmetria with finger-nose-finger).      Comments: Decreased (though equal bilaterally) sensation in the upper and lower extremities     Signed:  MIRA Mckenzie CNP (ED  Observation Moonlighter)  ED Observation Unit   Phone: [phone]    January 27, 2023 at 7:48 PM

## 2023-01-28 NOTE — CONSULTS
"[facility]  Neurology Consultation - Progress Note    Patient Name:  Peggy Jessica  Date of Service:  January 28, 2023    Subjective:    No acute events overnight. Jeromy says she couldn't sleep because of the noise around in the ED. Feels tired this morning. MRI was done yesterday and she was about to lumbar puncture.     Objective:    Vitals: /87   Pulse 100   Temp 97  F (36.1  C) (Oral)   Resp 16   Ht 1.651 m (5' 5\")   Wt 72.6 kg (160 lb)   SpO2 97%   BMI 26.63 kg/m    General: Lying in bed, NAD  Head: Atraumatic, normocephalic   Cardiac: no lower extremity edema  Neurologic  Mental Status:  alert, oriented to self, month, year, situtation, follows commands, speech clear and fluent, naming and repetition normal, states president accurately, able to state days of week backwards, calculates 1.50 in quarters, follows complex commands  Cranial Nerves:  visual fields intact, PERRL, EOMI with normal smooth pursuit and reports diplopia in all movements (more trouble with dipolia, horizontal and off set slightly, diplopia in the last week )  facial sensation intact and symmetric, facial movements symmetric, hearing not formally tested but intact to conversation, palate elevation symmetric and uvula midline, no dysarthria, shoulder shrug strong bilaterally, tongue protrusion midline  Motor:  normal muscle tone and bulk, no abnormal movements, able to move all limbs spontaneously:  Shoulder abduction 4- bilaterally   Elbow flexion R: 5  L :5 with some shaking  Elbow extneions R 5 L 5 with some shaking  Wrist extension: L:4 R: 5 (with shaking   Wrist flexion L 4- R: 5 w/giveway  Knee flexion : R: 5 with give, L: 5 with give way  Knee extension R: 5 with give L 5 with giveway   Hip flexion R:4 with give way and  L: 4 with giveway  Dorsiflexion R: 4- L 4-  Plantar flexion: 5/5 b/l  Sensory:   Very reduced pinprick sensation of the dorsum of hand, feels the pinprick as " a brush on the forearm not sharp  Sharp sensation over the biceps  Tracking up sensation becomes sharp just proximal to the elbow on the right  Tracking up sensation becomes sharp just at the elbow on the left   Sharp sensation on the chest   No sesnation to sharp until just distal to patella b/l  Not able to appreciate proprioception in the big toe b/l,  Inconsistent in the right middle finger distal join and inconsistent on the right hand (more consistent with finger vs distal joint)  Coordination: No dysmetria on FNF, ZACH intact without tremor.   Reflexes:  BR +2 and Biceps +2, patella +2 on the right and leg, Achilles trace, and no clonus, no asterixis    Pertinent Investigations:    I have personally reviewed most recent and pertinent labs, tests, and radiological images.    Initial LP protien was 105 in 11/18/202, Most recent LP revealed 9/26/22: CSF protein 38. Initially on 11/18/21: MR Lumbar Spine showed diffuse mild enhancement of the cauda equina nerve roots, while 9/29/22: MRI spine showed no abnormal nodularity, clumping, or abnormal enhancement of the roots of the cauda equina. And then  9/12/12: NCS at [place] showed studies showed unobtainable medial plantar responses bilaterally but otherwise normal motor and sensory responses and right-sided blink reflexes     Assessment  Peggy Jessica is a 47 year old female with a complex past medical history including chronic immune sensory polyradiculopathy as well as complex regional pain syndrome, who presents to the ED today for progressive worsening of neurologic symptoms for the last week, in the setting of 1 month history of nausea and poor oral intake as well as recent illness for 1 day, about 10 days ago. She describes specific worsening in her ability to walk and more use of her wheelchair, progressive sensory changes ascending up her legs, and difficulty with fine motor movements.  Her Neurological exam is significant for decreased pinprick below  "knee and elbow b/l, loss of proprioception in her extremities including her great toe as well as fingers, muscle strength of mostly 4/5 with impersistent activation. When compared to Dr. Duenas's most recent examination from 10/5, there is relative similarity on muscle testing and reflexes, and additionally she describes that at that time she \"In the morning she is able to walk 10 or 15 feet. In the afternoon she has more trouble walking, and essentially does none. She spends most of the time in her wheelchair.\"   Overall, her exam doesn't appear objectively far different from the Dr Duenas exam on 10/5/55, nor does her history. However she has recently had worsened PO intake the last 1 month, reporting 20# weight loss and 100# weight loss the last year, in addition to nausea, delayed gastric emptying and having persistent falls. Recently, she has not been on immunotherapy because her CISP was deemed inactive by Joesph Raman and Henrique based on objective Mri and CSF workup revealing such. In order to have further objective data to tease out the nature of her symptoms of new weakness and progressive neuropathy being related to nutritional deficiency, deconditioning in the setting of poor intake versus a CISP reactivation. Basic work up did not reveal anything concerning. B12 is wnl. B1, B6 levels are pending.  We obtained MRI and CSF studies for further evaluation.  MRI lumbar spine did not show and  nerve root enhancement and CSF studies also didn't reveal elevated protein/cells to suggest active flair of CIDP. We do not recommend IVIG or another treatment at this point without any objective data of cIDP flair. Unfortunately, there are no good treatment modalities to help her with sensory symptoms that she is presenting with. We strongly encourage [facility]hab at [facility].         #Subjective concern for CIDP flare  #Poor PO intake last 1 month  #20 pound weight loss last 1 month  #History of CISP, dx in " 2021        Recommendations  -Rehab at [facility]  -Outpatient follow up with general neurology (Referal placed)  -Extensiver autoimmune/Rheumatological work up has been done previously at [facility]. We don't recommend any more work up.   -Neurology will sign off now.         Thank you for involving Neurology in the care of Peggy Jessica.  Please do not hesitate to call with questions/concerns (consult pager [phone]).      Patient was seen and discussed with Dr. Rodney Gallegos MD  Neurology PGY1

## 2023-01-28 NOTE — PROGRESS NOTES
"[facility]  Neurology  - Progress Note    Patient Name:  Peggy Jessica  Date of Service:  January 28, 2023    Subjective:    No acute events overnight. Jeromy says she couldn't sleep because of the noise around in the ED. Feels tired this morning. MRI was done yesterday and she was about to lumbar puncture.     Objective:    Vitals: /87   Pulse 100   Temp 97  F (36.1  C) (Oral)   Resp 16   Ht 1.651 m (5' 5\")   Wt 72.6 kg (160 lb)   SpO2 97%   BMI 26.63 kg/m    General: Lying in bed, NAD  Head: Atraumatic, normocephalic   Cardiac: no lower extremity edema  Neurologic  Mental Status:  alert, oriented to self, month, year, situtation, follows commands, speech clear and fluent, naming and repetition normal, states president accurately, able to state days of week backwards, calculates 1.50 in quarters, follows complex commands  Cranial Nerves:  visual fields intact, PERRL, EOMI with normal smooth pursuit and reports diplopia in all movements (more trouble with dipolia, horizontal and off set slightly, diplopia in the last week )  facial sensation intact and symmetric, facial movements symmetric, hearing not formally tested but intact to conversation, palate elevation symmetric and uvula midline, no dysarthria, shoulder shrug strong bilaterally, tongue protrusion midline  Motor:  normal muscle tone and bulk, no abnormal movements, able to move all limbs spontaneously:  Shoulder abduction 4- bilaterally   Elbow flexion R: 5  L :5 with some shaking  Elbow extneions R 5 L 5 with some shaking  Wrist extension: L:4 R: 5 (with shaking   Wrist flexion L 4- R: 5 w/giveway  Knee flexion : R: 5 with give, L: 5 with give way  Knee extension R: 5 with give L 5 with giveway   Hip flexion R:4 with give way and  L: 4 with giveway  Dorsiflexion R: 4- L 4-  Plantar flexion: 5/5 b/l  Sensory:   Very reduced pinprick sensation of the dorsum of hand, feels the pinprick as a brush on " the forearm not sharp  Sharp sensation over the biceps  Tracking up sensation becomes sharp just proximal to the elbow on the right  Tracking up sensation becomes sharp just at the elbow on the left   Sharp sensation on the chest   No sesnation to sharp until just distal to patella b/l  Not able to appreciate proprioception in the big toe b/l,  Inconsistent in the right middle finger distal join and inconsistent on the right hand (more consistent with finger vs distal joint)  Coordination: No dysmetria on FNF, ZACH intact without tremor.   Reflexes:  BR +2 and Biceps +2, patella +2 on the right and leg, Achilles trace, and no clonus, no asterixis    Pertinent Investigations:    I have personally reviewed most recent and pertinent labs, tests, and radiological images.    Initial LP protien was 105 in 11/18/202, Most recent LP revealed 9/26/22: CSF protein 38. Initially on 11/18/21: MR Lumbar Spine showed diffuse mild enhancement of the cauda equina nerve roots, while 9/29/22: MRI spine showed no abnormal nodularity, clumping, or abnormal enhancement of the roots of the cauda equina. And then  9/12/12: NCS at [place] showed studies showed unobtainable medial plantar responses bilaterally but otherwise normal motor and sensory responses and right-sided blink reflexes     Assessment  Peggy Jessica is a 47 year old female with a complex past medical history including chronic immune sensory polyradiculopathy as well as complex regional pain syndrome, who presents to the ED today for progressive worsening of neurologic symptoms for the last week, in the setting of 1 month history of nausea and poor oral intake as well as recent illness for 1 day, about 10 days ago. She describes specific worsening in her ability to walk and more use of her wheelchair, progressive sensory changes ascending up her legs, and difficulty with fine motor movements.  Her Neurological exam is significant for decreased pinprick below knee and  "elbow b/l, loss of proprioception in her extremities including her great toe as well as fingers, muscle strength of mostly 4/5 with impersistent activation. When compared to Dr. Duenas's most recent examination from 10/5, there is relative similarity on muscle testing and reflexes, and additionally she describes that at that time she \"In the morning she is able to walk 10 or 15 feet. In the afternoon she has more trouble walking, and essentially does none. She spends most of the time in her wheelchair.\"   Overall, her exam doesn't appear objectively far different from the Dr Duenas exam on 10/5/55, nor does her history. However she has recently had worsened PO intake the last 1 month, reporting 20# weight loss and 100# weight loss the last year, in addition to nausea, delayed gastric emptying and having persistent falls. Recently, she has not been on immunotherapy because her CISP was deemed inactive by Joesph Raman and Henrique based on objective Mri and CSF workup revealing such. In order to have further objective data to tease out the nature of her symptoms of new weakness and progressive neuropathy being related to nutritional deficiency, deconditioning in the setting of poor intake versus a CISP reactivation. Basic work up did not reveal anything concerning. B12 is wnl. B1, B6 levels are pending.  We obtained MRI and CSF studies for further evaluation.  MRI lumbar spine did not show and  nerve root enhancement and CSF studies also didn't reveal elevated protein/cells to suggest active flair of CIDP. We do not recommend IVIG or another treatment at this point without any objective data of cIDP flair. Unfortunately, there are no good treatment modalities to help her with sensory symptoms that she is presenting with. We strongly encourage rehab at [facility].         #Subjective concern for CIDP flare  #Poor PO intake last 1 month  #20 pound weight loss last 1 month  #History of CISP, dx in 2021        Recommendations  -Rehab at " TCU  -Outpatient follow up with general neurology (Referal placed)  -Extensiver autoimmune/Rheumatological work up has been done previously at [place]. We don't recommend any more work up.   -Neurology will sign off now.         Thank you for involving Neurology in the care of Peggy Jessica.  Please do not hesitate to call with questions/concerns (consult pager [phone]).      Patient was seen and discussed with Dr. Rodney Gallegos MD  Neurology PGY1

## 2023-01-28 NOTE — PROGRESS NOTES
Arrived from: ED   Belongings/meds: stayed with pt. No meds   2 RN Skin Assessment Completed by:  Pt refused due to nausea and pain  Non-intact findings documented (yes/no/NA): NA

## 2023-01-28 NOTE — PROGRESS NOTES
Care Management Follow Up    Length of Stay (days): 0    Expected Discharge Date: 01/29/2023     Concerns to be Addressed:  Discharge Planning     Patient plan of care discussed at interdisciplinary rounds: Yes    Anticipated Discharge Disposition:  Skilled Nursing Facility     Anticipated Discharge Services:  None  Anticipated Discharge DME:  None    Patient/family educated on Medicare website which has current facility and service quality ratings:  Yes  Education Provided on the Discharge Plan: N/A   Patient/Family in Agreement with the Plan:  Yes    Referrals Placed by CM/SW: Skilled Nursing Facilities    [facility]  [address] [place] [address]  P: [phone]  -Writer preemptively faxed referral packet for SNF to review.    [facility], [address] [place]  [address]  P: [phone]  P: [phone] - Admissions  F: [phone]  -Writer preemptively faxed referral packet for SNF to review.  -SW called and left VM left admission to review pt's referral.     [facility]  [address]  [address]  P: [phone]  P: [phone] - Admissions  F: [phone]  -Writer preemptively faxed referral packet for SNF to review.  -SW called and left VM with Maria G in admission to review pt's referral.     [facility]  [address]  [place]  [address]  P: [phone]  P: [phone] - Admissions  F: [phone]  -Writer preemptively faxed referral packet for SNF to review.  -SW called and left VM with admission to review pt's referral.     [facility]  [address] [place]  [address]  P: [phone]  -Writer preemptively faxed referral packet for SNF to review.  -SW called and left VM for admission to review pt's referral.       Private pay costs discussed: Not applicable    Additional Information:  1044  Chart reviewed. PT approached SW and report pt will most likely need TCU placement.  Pt lives at home with her spouse, who works all day. Pt's daughter just moved out. Pt does not have any 24/7 supports while at home. Pt reporting have fallen 40x. SW will meet with pt and will go over TCU placement process and provide TCU list to pt.    0256  SW met with pt and spouse at bedside to discuss TCU options. Pt was sleeping. SW spoke with pt's spouse, Robert. Robert shared that pt has been to [facility] and she likes it there. SW explained the assistance to help pt find TCU placement and it's process. SW can send referral to [facility], but would like at least 3-4 other facilities in case they do not have open beds. Robert acknowledge understanding and would prefer TCU to be in the [place] area.     1543  SW print out Medicare.gov list and dropped shared with pt. Pt went through list and picked out several facilities. {t shared the last time it was hard to find a TCU placement was due to her insurance and her choice of food. Pt only eats gluten free food. Pt is able to order food or have her spouse bring her food if need.     1834  SW submit referrals to the TCU facilities pt decided on:  [facility]  [facility], [facility]  [facility]    SW met with pt at bedside and provide update referrals has been sent. SW will follow up with pt in the next few days.      available and will continue to follow for discharge planning and supports as needed.     _______________________    SAMMI Koo, LSW  ED/OBS   [facility]  Phone: [phone]  Pager: [phone]  Fax: [phone]     On-call pager, [phone], 4:00 pm to midnight

## 2023-01-28 NOTE — PROGRESS NOTES
Evaluation completed in ED.     Shelley Clark, PT. Pager [phone]      01/28/23 4476   Appointment Info   Signing Clinician's Name / Credentials (PT) Shelley Clark, PT, DPT   Rehab Comments (PT) OBS, Assess for OT needs   Quick Adds   Quick Adds Certification       Present no   Living Environment   People in Home spouse   Current Living Arrangements house   Home Accessibility stairs to enter home;stairs within home   Number of Stairs, Main Entrance 4   Stair Railings, Main Entrance railings on both sides of stairs;railings safe and in good condition   Number of Stairs, Within Home, Primary   (19)   Stair Railings, Within Home, Primary railings on both sides of stairs;railings safe and in good condition   Transportation Anticipated family or friend will provide   Living Environment Comments Pt reports she lives in a two-level home with her spouse who works during the day. Unable to have 24/7 assistance. Must negotiate 19 steps with B handrails to access bedroom and bathroom. 4 YUNI with handrails.  provides transportation.   Self-Care   Usual Activity Tolerance fair   Current Activity Tolerance fair   Regular Exercise No   Equipment Currently Used at Home cane, straight;walker, standard;commode chair;shower chair;wheelchair, manual   Fall history within last six months yes   Number of times patient has fallen within last six months   (~40 per pt)   Activity/Exercise/Self-Care Comment Pt reports previously being IND with ADL's and mobility. Recently became dependent on  for ADL's. Has been transferring from bed <> w/c and completing w/c mobility around home. Reports ~40 falls in last 6 months. No regular exercise.   General Information   Onset of Illness/Injury or Date of Surgery 01/28/23   Referring Physician Zhanna Clark, MIRA RACHEL   Patient/Family Therapy Goals Statement (PT) none stated   Pertinent History of Current Problem (include personal factors and/or comorbidities  "that impact the POC) per EMR: \"Peggy Jessica is a 47 year old female with a medical history of chronic immune sensory polyradiculopathy (CISP)/Chronic inflammatory demyelinating polyneuropathy (CIDP) diagnosed in 2021, complex regional pain syndrome (RLE 2/2 stress fracture; chest 2/2 bilateral mastectomy- currently under care of pain management), BRCA+ mutation, cervical cancer, celiac disease, diverticulitis, migraines, and depressed mood who presents to the ED progressive worsening of CIDP neurologic symptoms including loss of proprioception and x7 falls over the last 2 days. \"   Existing Precautions/Restrictions fall   Weight-Bearing Status - LUE full weight-bearing   Weight-Bearing Status - RUE full weight-bearing   Weight-Bearing Status - LLE full weight-bearing   Weight-Bearing Status - RLE full weight-bearing   Cognition   Orientation Status (Cognition) oriented x 4   Pain Assessment   Patient Currently in Pain Yes, see Vital Sign flowsheet   Integumentary/Edema   Integumentary/Edema no deficits were identifed   Posture    Posture Forward head position;Protracted shoulders   Range of Motion (ROM)   Range of Motion ROM is WFL   Strength (Manual Muscle Testing)   Strength (Manual Muscle Testing) Deficits observed during functional mobility   Strength Comments grossly 2+/5 in B LE; generalized weakness   Bed Mobility   Comment, (Bed Mobility) supine > sit with SBA/CGA   Transfers   Comment, (Transfers) unable to assess 2/2 safety concerns; will assess at later time   Gait/Stairs (Locomotion)   Comment, (Gait/Stairs) gait impaired; will assess at later time   Balance   Balance Comments fair sitting balance   Sensory Examination   Sensory Perception other (describe)   Sensory Perception Comments reports numbness to B LE and B UE   Coordination   Coordination other (see comments)   Coordination Comments decreased coordination noted with dysmetria   Muscle Tone   Muscle Tone no deficits were identified "   Clinical Impression   Criteria for Skilled Therapeutic Intervention Yes, treatment indicated   PT Diagnosis (PT) impaired functional mobility; at risk for falls; difficulty walking   Influenced by the following impairments decreased balance, strength, and endurance; increased pain   Functional limitations due to impairments difficulty with bed mobility, transfers, walking, and stairs   Clinical Presentation (PT Evaluation Complexity) Evolving/Changing   Clinical Presentation Rationale per clinical judgment   Clinical Decision Making (Complexity) moderate complexity   Planned Therapy Interventions (PT) balance training;bed mobility training;gait training;home exercise program;motor coordination training;neuromuscular re-education;patient/family education;postural re-education;ROM (range of motion);stair training;strengthening;stretching;transfer training;progressive activity/exercise;risk factor education;home program guidelines   Anticipated Equipment Needs at Discharge (PT)   (tbd)   Risk & Benefits of therapy have been explained evaluation/treatment results reviewed;care plan/treatment goals reviewed;risks/benefits reviewed;current/potential barriers reviewed;participants voiced agreement with care plan;participants included;patient   PT Total Evaluation Time   PT Eval, Moderate Complexity Minutes (14754) 5   Therapy Certification   Start of care date 01/28/23   Certification date from 01/28/23   Certification date to 02/27/23   Medical Diagnosis frequent falls   Physical Therapy Goals   PT Frequency 5x/week   PT Predicted Duration/Target Date for Goal Attainment 02/11/23   PT: Bed Mobility Independent;Supine to/from sit;Rolling;Bridging  (with HOB flat)   PT: Transfers Modified independent;Sit to/from stand;Bed to/from chair  (with LRAD)   PT: Gait Modified independent;50 feet  (with LRAD)   PT: Stairs Supervision/stand-by assist;Greater than 10 stairs;Rail on both sides   PT Discharge Planning   PT Plan  progress mobility as able   PT Discharge Recommendation (DC Rec) Transitional Care Facility   PT Rationale for DC Rec Pt is demonstrating functional mobility below baseline. History of frequent falls. At risk for readmission 2/2 falls. Unsafe to d/c home at this time due to limited support. Recommend TCU to improve safety and IND prior to returning home. Will assess and update as appropriate.   PT Brief overview of current status Ax1 for bed mobility     [facility]  OUTPATIENT PHYSICAL THERAPY EVALUATION  PLAN OF TREATMENT FOR OUTPATIENT REHABILITATION  (COMPLETE FOR INITIAL CLAIMS ONLY)  Patient's Last Name, First Name, M.I.  YOB: 1975  Peggy Jessica                        Provider's Name  [facility] Medical Record No.  [id number]                             Onset Date:  01/28/23   Start of Care Date:  01/28/23   Type:     _X_PT   ___OT   ___SLP Medical Diagnosis:  frequent falls              PT Diagnosis:  impaired functional mobility; at risk for falls; difficulty walking Visits from SOC:  1     See note for plan of treatment, functional goals and certification details    I CERTIFY THE NEED FOR THESE SERVICES FURNISHED UNDER        THIS PLAN OF TREATMENT AND WHILE UNDER MY CARE     (Physician co-signature of this document indicates review and certification of the therapy plan).

## 2023-01-28 NOTE — PROGRESS NOTES
ED OBSERVATION PROGRESS NOTE:  S:Peggy Jessica is a 47 year old female with a medical history of chronic immune sensory polyradiculopathy (CISP)/Chronic inflammatory demyelinating polyneuropathy (CIDP) diagnosed in 2021, complex regional pain syndrome (RLE 2/2 stress fracture; chest 2/2 bilateral mastectomy- currently under care of pain management), BRCA+ mutation, cervical cancer, celiac disease, diverticulitis, migraines, and depressed mood who presents to the ED progressive worsening of CIDP neurologic symptoms including loss of proprioception and x7 falls over the last 2 days.     Chief Complaint   Patient presents with     Arm Pain     Leg and hand     1. Paresthesias    2. Multiple falls    3. 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       ALLERGIES:    Allergies   Allergen Reactions     Dihydroergotamine Anaphylaxis     Latex Anaphylaxis     Shellfish-Derived Products Anaphylaxis     Sumatriptan Anaphylaxis     Banana Unknown     Gabapentin Dizziness     Gluten Meal Other (See Comments)     Celiac disease     Keppra [Levetiracetam] Nausea and Vomiting     Kiwi Unknown     Levofloxacin Other (See Comments)     Arrhythmia     Metronidazole Nausea and Vomiting     Nitrofurantoin Hives     Penicillins Hives     Pregabalin      Reglan [Metoclopramide]      Topiramate Visual Disturbance     Aspirin  "Rash     Methadone Rash     Morphine Hives     She got hives around are when morphine given but resolved after few minutes per patient      Risperidone Anxiety       O:/87 (BP Location: Left arm, Patient Position: Supine)   Pulse 94   Temp 98.8  F (37.1  C) (Axillary)   Resp 17   Ht 1.651 m (5' 5\")   Wt 72.6 kg (160 lb)   SpO2 97%   BMI 26.63 kg/m    Physical Exam   Constitutional: Pt is oriented to person, place, and time.Pt appears well-developed and well-nourished.   HENT:   Head: Normocephalic and atraumatic.   Eyes: Conjunctivae are normal. Pupils are equal, round, and reactive to light.   Neck: Normal range of motion. Neck supple.   Cardiovascular: Normal rate, regular rhythm, normal heart sounds and intact distal pulses.    Pulmonary/Chest: Effort normal and breath sounds normal. No respiratory distress. Pt has no wheezes. Pt has no rales  Abdominal: Soft. Bowel sounds are normal. Pt exhibits no distension and no mass. No tenderness. Pt has no rebound and no guarding.   Musculoskeletal: Normal range of motion. Pt exhibits no edema.   Neurological: Pt is alert and oriented to person, place, and time. Normal reflexes.   Skin: Skin is warm and dry. No rash noted.   Psychiatric: Pt has a normal mood and affect. Behavior is normal. Judgment and thought content normal.       Assessment/Plan:  Peggy Jessica is a 47 year old female with a medical history of chronic immune sensory polyradiculopathy (CISP)/Chronic inflammatory demyelinating polyneuropathy (CIDP) diagnosed in 2021, complex regional pain syndrome (RLE 2/2 stress fracture; chest 2/2 bilateral mastectomy- currently under care of pain management), BRCA+ mutation, cervical cancer, celiac disease, diverticulitis, migraines, and depressed mood who presents to the ED progressive worsening of CIDP neurologic symptoms including loss of proprioception and x7 falls over the last 2 days.      #. Loss of proprioception with falls  #. Chronic " immune sensory polyradiculopathy  The patient presents with safety concerning symptoms as she has fallen 7 times in the past 2 days when attempted to move around. CBC and CMP are unremarkable. Neurology was consulted and recommended MRI and lumbar puncture. The patient requested lumbar puncture performed by IR as she had difficulties in the past. Neuroradiology was consulted and performed successful LP. MRI lumbar spine did not show any nerve root enhancement and CSF studies didn't reveal elevated protein/cells to suggest active flair of CIDP, therefore no recommendation for IVIG treatment was made by neurology. Patient was by PT given recent frequent fall. They recommended TCU. Appreciate SW assistance with safe disposition.   -VS per routine  -SW to assist with safe dispostion     #. Nausea and vomiting   #. Poor oral intake   #. Weight loss   #. History of celiac disease   The patient reports undergoing GI work up for possible gastroparesis.   -ADAT   -Normal saline at 75ml/hr      #. Complex regional pain syndrome   The patient is on Buprenorphine patch at home, which is now on hold for MRI. Plan is to give her Oxycodone till we resume back Buprenorphine patch.  -Continue with home Flexeril   -Oxycodone 10mg as needed q 6 hours   -Continue with home Tylenol and Ibuprofen as needed      #. Depressed mood  -Continue with home Hydroxyzine as needed     Consults: Neurology    FEN: IVF  DVT prophylaxis: Anticipating a short admission, and SCDs ordered. If the patient ends up hospital admission she will need chemoprophylaxis for DVT.   Code Status: Full Code  Disposition: PT will assess the patient for safe disposition     Signed:  Idalmis Roblero PA-C  January 28, 2023 at 5:18 PM

## 2023-01-29 PROCEDURE — 250N000011 HC RX IP 250 OP 636: Performed by: PHYSICIAN ASSISTANT

## 2023-01-29 PROCEDURE — 250N000011 HC RX IP 250 OP 636: Performed by: NURSE PRACTITIONER

## 2023-01-29 PROCEDURE — 99231 SBSQ HOSP IP/OBS SF/LOW 25: CPT | Performed by: FAMILY MEDICINE

## 2023-01-29 PROCEDURE — 258N000003 HC RX IP 258 OP 636: Performed by: PHYSICIAN ASSISTANT

## 2023-01-29 PROCEDURE — G0378 HOSPITAL OBSERVATION PER HR: HCPCS

## 2023-01-29 PROCEDURE — 96376 TX/PRO/DX INJ SAME DRUG ADON: CPT

## 2023-01-29 PROCEDURE — 250N000009 HC RX 250: Performed by: PHYSICIAN ASSISTANT

## 2023-01-29 PROCEDURE — 250N000013 HC RX MED GY IP 250 OP 250 PS 637: Performed by: NURSE PRACTITIONER

## 2023-01-29 RX ORDER — SCOLOPAMINE TRANSDERMAL SYSTEM 1 MG/1
1 PATCH, EXTENDED RELEASE TRANSDERMAL
Status: DISCONTINUED | OUTPATIENT
Start: 2023-01-29 | End: 2023-02-09 | Stop reason: HOSPADM

## 2023-01-29 RX ORDER — PROCHLORPERAZINE 25 MG
25 SUPPOSITORY, RECTAL RECTAL EVERY 12 HOURS PRN
Status: DISCONTINUED | OUTPATIENT
Start: 2023-01-29 | End: 2023-01-29

## 2023-01-29 RX ORDER — BUPRENORPHINE 5 UG/H
1 PATCH TRANSDERMAL WEEKLY
Status: DISCONTINUED | OUTPATIENT
Start: 2023-01-30 | End: 2023-02-09 | Stop reason: HOSPADM

## 2023-01-29 RX ORDER — ONDANSETRON 4 MG/1
4 TABLET, ORALLY DISINTEGRATING ORAL EVERY 6 HOURS PRN
Status: DISCONTINUED | OUTPATIENT
Start: 2023-01-29 | End: 2023-02-09 | Stop reason: HOSPADM

## 2023-01-29 RX ORDER — DIPHENHYDRAMINE HCL 12.5 MG/5ML
12.5 SOLUTION ORAL EVERY 6 HOURS PRN
Status: DISCONTINUED | OUTPATIENT
Start: 2023-01-29 | End: 2023-02-09 | Stop reason: HOSPADM

## 2023-01-29 RX ORDER — ONDANSETRON 2 MG/ML
8 INJECTION INTRAMUSCULAR; INTRAVENOUS EVERY 6 HOURS PRN
Status: DISCONTINUED | OUTPATIENT
Start: 2023-01-29 | End: 2023-02-09 | Stop reason: HOSPADM

## 2023-01-29 RX ORDER — PROCHLORPERAZINE MALEATE 10 MG
10 TABLET ORAL EVERY 6 HOURS PRN
Status: DISCONTINUED | OUTPATIENT
Start: 2023-01-29 | End: 2023-01-29

## 2023-01-29 RX ADMIN — DICYCLOMINE HYDROCHLORIDE 20 MG: 20 TABLET ORAL at 22:08

## 2023-01-29 RX ADMIN — ONDANSETRON 4 MG: 2 INJECTION INTRAMUSCULAR; INTRAVENOUS at 11:05

## 2023-01-29 RX ADMIN — DICYCLOMINE HYDROCHLORIDE 20 MG: 20 TABLET ORAL at 04:36

## 2023-01-29 RX ADMIN — OXYCODONE HYDROCHLORIDE 10 MG: 10 TABLET ORAL at 20:35

## 2023-01-29 RX ADMIN — Medication 400 MCG: at 08:59

## 2023-01-29 RX ADMIN — HYDROXYZINE HYDROCHLORIDE 25 MG: 25 TABLET ORAL at 16:40

## 2023-01-29 RX ADMIN — SCOPALAMINE 1 PATCH: 1 PATCH, EXTENDED RELEASE TRANSDERMAL at 20:33

## 2023-01-29 RX ADMIN — CYCLOBENZAPRINE HYDROCHLORIDE 10 MG: 5 TABLET, FILM COATED ORAL at 19:52

## 2023-01-29 RX ADMIN — CYCLOBENZAPRINE HYDROCHLORIDE 10 MG: 5 TABLET, FILM COATED ORAL at 08:59

## 2023-01-29 RX ADMIN — OXYCODONE HYDROCHLORIDE 10 MG: 10 TABLET ORAL at 04:56

## 2023-01-29 RX ADMIN — ONDANSETRON 4 MG: 2 INJECTION INTRAMUSCULAR; INTRAVENOUS at 04:56

## 2023-01-29 RX ADMIN — ACETAMINOPHEN 650 MG: 325 TABLET, FILM COATED ORAL at 04:55

## 2023-01-29 RX ADMIN — DICYCLOMINE HYDROCHLORIDE 20 MG: 20 TABLET ORAL at 09:06

## 2023-01-29 RX ADMIN — DICYCLOMINE HYDROCHLORIDE 20 MG: 20 TABLET ORAL at 16:30

## 2023-01-29 RX ADMIN — ONDANSETRON HYDROCHLORIDE 8 MG: 2 INJECTION, SOLUTION INTRAMUSCULAR; INTRAVENOUS at 16:40

## 2023-01-29 RX ADMIN — OXYCODONE HYDROCHLORIDE 10 MG: 10 TABLET ORAL at 13:17

## 2023-01-29 RX ADMIN — SODIUM CHLORIDE: 9 INJECTION, SOLUTION INTRAVENOUS at 07:05

## 2023-01-29 RX ADMIN — ACETAMINOPHEN 650 MG: 325 TABLET, FILM COATED ORAL at 19:51

## 2023-01-29 RX ADMIN — ONDANSETRON HYDROCHLORIDE 8 MG: 2 INJECTION, SOLUTION INTRAMUSCULAR; INTRAVENOUS at 22:08

## 2023-01-29 RX ADMIN — CYCLOBENZAPRINE HYDROCHLORIDE 10 MG: 5 TABLET, FILM COATED ORAL at 13:17

## 2023-01-29 ASSESSMENT — ACTIVITIES OF DAILY LIVING (ADL)
ADLS_ACUITY_SCORE: 35

## 2023-01-29 NOTE — PLAN OF CARE
Status: Admitted for Paresthesias. Hx of CISP/CIDP, celiac disease, cervical cancer, diverticulitis  Vitals: VSS on RA  Neuros: A&Ox4  IV: PIV infusing NS @ 75 mL/hr.  Resp/trach: WNL  Diet: Regular diet, poor intake.   Bowel status: BS+  : Voiding spontaneously, bedside commode in room   Pain: Partially managed with PRN medications  Activity: SBA, not OOB this shift  Plan: Pain management and to continue with current POC.

## 2023-01-29 NOTE — PROGRESS NOTES
"S:Patient with hx of CISP admit to obs with coordination care with neuro for sensory proprioceptive issues and pain with fall issues. Patient eval by neuro with LP done yesterday and reviewed without concern of csf cisp involvement. Therefore no IVIG needed. Recommend to TCU for PT/OT eval and care. Patient currently resting.had received current medications  O:/76 (BP Location: Right arm)   Pulse 98   Temp 98.2  F (36.8  C) (Oral)   Resp 16   Ht 1.651 m (5' 5\")   Wt 72.6 kg (160 lb)   SpO2 92%   BMI 26.63 kg/m    Patient resting in no distress at this point.    Results for orders placed or performed during the hospital encounter of 01/27/23   MR Lumbar Spine w/o & w Contrast     Status: None    Narrative    EXAM: MR LUMBAR SPINE W/O & W CONTRAST  1/27/2023 9:54 PM     HISTORY:  increasing paresthesias, concern for CIDP flare/relapse -  eval for nerve root enhancement       COMPARISON:  Lumbar MR 4/30/2022    TECHNIQUE: Sagittal T1-weighted and T2-weighted and axial T2-weighted  images of the lumbar spine were obtained without intravenous contrast.  Post intravenous contrast using gadolinium axial and sagittal  T1-weighted images were obtained with fat saturation.    CONTRAST: 7.5mL Gadavist.    FINDINGS:  There are 5 lumbar type vertebrae identified through the whole spine   on 4/30/2022 lumbar MR. Conus tip at approximately L1. Normal  lumbar vertebral alignment. Disc space narrowing at L4-5 and L5-S1  with some loss of the normal T2 intradiscal signal. Normal marrow  signal. Normal cauda equina.    On a level by level basis, the findings are as follows:    L1-2: No spinal canal or neural foraminal stenosis.    L2-3: No spinal canal or neural foraminal stenosis.    L3-4: No spinal canal or neural foraminal stenosis.    L4-5: Central disc protrusion. Bilateral facet hypertrophy. Mild  spinal canal narrowing. No neural foraminal narrowing.    L5-S1: Disc osteophyte complex and superimposed central " protrusion. No  significant spinal canal narrowing. Mild bilateral neuroforaminal  narrowing.    The visualized paraspinous soft tissues are within normal limits.       Impression    IMPRESSION:  1.  No abnormal lumbar or cauda equina enhancement identified.  2.  Stable lumbar spondylosis without high-grade spinal canal or  foraminal narrowing.    I have personally reviewed the examination and initial interpretation  and I agree with the findings.    PRECIOUS HARRIS MD         SYSTEM ID:  [id number]   Comprehensive metabolic panel     Status: Abnormal   Result Value Ref Range    Sodium 138 136 - 145 mmol/L    Potassium 4.1 3.4 - 5.3 mmol/L    Chloride 101 98 - 107 mmol/L    Carbon Dioxide (CO2) 25 22 - 29 mmol/L    Anion Gap 12 7 - 15 mmol/L    Urea Nitrogen 9.4 6.0 - 20.0 mg/dL    Creatinine 0.93 0.51 - 0.95 mg/dL    Calcium 9.7 8.6 - 10.0 mg/dL    Glucose 112 (H) 70 - 99 mg/dL    Alkaline Phosphatase 115 (H) 35 - 104 U/L    AST 28 10 - 35 U/L    ALT 17 10 - 35 U/L    Protein Total 8.0 6.4 - 8.3 g/dL    Albumin 4.3 3.5 - 5.2 g/dL    Bilirubin Total 0.4 <=1.2 mg/dL    GFR Estimate 75 >60 mL/min/1.73m2   Lipase     Status: Normal   Result Value Ref Range    Lipase 23 13 - 60 U/L   Magnesium     Status: Normal   Result Value Ref Range    Magnesium 2.1 1.7 - 2.3 mg/dL   UA with Microscopic     Status: Abnormal   Result Value Ref Range    Color Urine Light Yellow Colorless, Straw, Light Yellow, Yellow    Appearance Urine Clear Clear    Glucose Urine Negative Negative mg/dL    Bilirubin Urine Negative Negative    Ketones Urine Negative Negative mg/dL    Specific Gravity Urine 1.013 1.003 - 1.035    Blood Urine Negative Negative    pH Urine 5.5 5.0 - 7.0    Protein Albumin Urine Negative Negative mg/dL    Urobilinogen Urine Normal Normal, 2.0 mg/dL    Nitrite Urine Negative Negative    Leukocyte Esterase Urine Negative Negative    Mucus Urine Present (A) None Seen /LPF    RBC Urine 0 <=2 /HPF    WBC Urine 0 <=5 /HPF     Squamous Epithelials Urine 1 <=1 /HPF   Asymptomatic COVID-19 Virus (Coronavirus) by PCR Nasopharyngeal     Status: Normal    Specimen: Nasopharyngeal; Swab   Result Value Ref Range    SARS CoV2 PCR Negative Negative    Narrative    Testing was performed using the Xpert Xpress SARS-CoV-2 Assay on the Cepheid Gene-Xpert Instrument Systems. Additional information about this Emergency Use Authorization (EUA) assay can be found via the Lab Guide. This test should be ordered for the detection of SARS-CoV-2 in individuals who meet SARS-CoV-2 clinical and/or epidemiological criteria as well as from individuals without symptoms or other reasons to suspect COVID-19. Test performance for asymptomatic patients has only been established in anterior nasal swab specimens. This test is for in vitro diagnostic use under the FDA EUA for laboratories certified under CLIA to perform high complexity testing. This test has not been FDA cleared or approved. A negative result does not rule out the presence of PCR inhibitors in the specimen or target RNA concentration below the limit of detection for the assay. The possibility of a false negative should be considered if the patient's recent exposure or clinical presentation suggests COVID-19. This test was validated by [facility] [employer]. These Laboratories are certified under the Clinical Laboratory Improvement Amendments (CLIA) as qualified to perform high complexity testing.     CBC with platelets and differential     Status: None   Result Value Ref Range    WBC Count 6.5 4.0 - 11.0 10e3/uL    RBC Count 4.92 3.80 - 5.20 10e6/uL    Hemoglobin 15.7 11.7 - 15.7 g/dL    Hematocrit 46.9 35.0 - 47.0 %    MCV 95 78 - 100 fL    MCH 31.9 26.5 - 33.0 pg    MCHC 33.5 31.5 - 36.5 g/dL    RDW 14.0 10.0 - 15.0 %    Platelet Count 317 150 - 450 10e3/uL    % Neutrophils 73 %    % Lymphocytes 21 %    % Monocytes 5 %    % Eosinophils 1 %    % Basophils 0 %    % Immature Granulocytes 0 %     NRBCs per 100 WBC 0 <1 /100    Absolute Neutrophils 4.7 1.6 - 8.3 10e3/uL    Absolute Lymphocytes 1.4 0.8 - 5.3 10e3/uL    Absolute Monocytes 0.3 0.0 - 1.3 10e3/uL    Absolute Eosinophils 0.0 0.0 - 0.7 10e3/uL    Absolute Basophils 0.0 0.0 - 0.2 10e3/uL    Absolute Immature Granulocytes 0.0 <=0.4 10e3/uL    Absolute NRBCs 0.0 10e3/uL   Extra Tube ([place] Draw)     Status: None    Narrative    The following orders were created for panel order Extra Tube ([place] Draw).  Procedure                               Abnormality         Status                     ---------                               -----------         ------                     Extra Blue Top Tube[[id number]]                              Final result                 Please view results for these tests on the individual orders.   Extra Blue Top Tube     Status: None   Result Value Ref Range    Hold Specimen JI    Vitamin B12     Status: Normal   Result Value Ref Range    Vitamin B12 582 232 - 1,245 pg/mL   Extra Tube     Status: None    Narrative    The following orders were created for panel order Extra Tube.  Procedure                               Abnormality         Status                     ---------                               -----------         ------                     Extra Green Top (Lithium...[[id number]]                      Final result                 Please view results for these tests on the individual orders.   Extra Green Top (Lithium Heparin) Tube     Status: None   Result Value Ref Range    Hold Specimen JIC    Extra Tube     Status: None    Narrative    The following orders were created for panel order Extra Tube.  Procedure                               Abnormality         Status                     ---------                               -----------         ------                     Extra Blue Top Tube[[id number]]                              Final result               Extra Red Top Tube[[id number]]                                Final result               Extra Green Top (Lithium...[[id number]]                      Final result               Extra Purple Top Tube[[id number]]                            Final result                 Please view results for these tests on the individual orders.   Extra Blue Top Tube     Status: None   Result Value Ref Range    Hold Specimen JIC    Extra Red Top Tube     Status: None   Result Value Ref Range    Hold Specimen JIC    Extra Green Top (Lithium Heparin) Tube     Status: None   Result Value Ref Range    Hold Specimen JIC    Extra Purple Top Tube     Status: None   Result Value Ref Range    Hold Specimen JIC    Partial thromboplastin time     Status: Normal   Result Value Ref Range    aPTT 28 22 - 38 Seconds   Extra Tube     Status: None    Narrative    The following orders were created for panel order Extra Tube.  Procedure                               Abnormality         Status                     ---------                               -----------         ------                     Extra Green Top (Lithium...[[id number]]                      Final result                 Please view results for these tests on the individual orders.   Extra Green Top (Lithium Heparin) Tube     Status: None   Result Value Ref Range    Hold Specimen JIC    Comprehensive metabolic panel     Status: Normal   Result Value Ref Range    Sodium 140 136 - 145 mmol/L    Potassium 4.3 3.4 - 5.3 mmol/L    Chloride 105 98 - 107 mmol/L    Carbon Dioxide (CO2) 26 22 - 29 mmol/L    Anion Gap 9 7 - 15 mmol/L    Urea Nitrogen 10.3 6.0 - 20.0 mg/dL    Creatinine 0.89 0.51 - 0.95 mg/dL    Calcium 9.4 8.6 - 10.0 mg/dL    Glucose 83 70 - 99 mg/dL    Alkaline Phosphatase 91 35 - 104 U/L    AST 23 10 - 35 U/L    ALT 14 10 - 35 U/L    Protein Total 6.6 6.4 - 8.3 g/dL    Albumin 3.6 3.5 - 5.2 g/dL    Bilirubin Total 0.3 <=1.2 mg/dL    GFR Estimate 80 >60 mL/min/1.73m2   INR     Status: Normal   Result Value Ref Range    INR 0.97 0.85 - 1.15    Magnesium     Status: Normal   Result Value Ref Range    Magnesium 1.9 1.7 - 2.3 mg/dL   Phosphorus     Status: Normal   Result Value Ref Range    Phosphorus 3.7 2.5 - 4.5 mg/dL   TSH with free T4 reflex     Status: Normal   Result Value Ref Range    TSH 3.12 0.30 - 4.20 uIU/mL   Cortisol     Status: Normal   Result Value Ref Range    Cortisol 4.0   ug/dL   CBC with platelets and differential     Status: None   Result Value Ref Range    WBC Count 4.2 4.0 - 11.0 10e3/uL    RBC Count 4.27 3.80 - 5.20 10e6/uL    Hemoglobin 13.4 11.7 - 15.7 g/dL    Hematocrit 41.9 35.0 - 47.0 %    MCV 98 78 - 100 fL    MCH 31.4 26.5 - 33.0 pg    MCHC 32.0 31.5 - 36.5 g/dL    RDW 13.9 10.0 - 15.0 %    Platelet Count 243 150 - 450 10e3/uL    % Neutrophils 51 %    % Lymphocytes 39 %    % Monocytes 8 %    % Eosinophils 1 %    % Basophils 1 %    % Immature Granulocytes 0 %    NRBCs per 100 WBC 0 <1 /100    Absolute Neutrophils 2.2 1.6 - 8.3 10e3/uL    Absolute Lymphocytes 1.6 0.8 - 5.3 10e3/uL    Absolute Monocytes 0.3 0.0 - 1.3 10e3/uL    Absolute Eosinophils 0.1 0.0 - 0.7 10e3/uL    Absolute Basophils 0.0 0.0 - 0.2 10e3/uL    Absolute Immature Granulocytes 0.0 <=0.4 10e3/uL    Absolute NRBCs 0.0 10e3/uL   Glucose CSF:     Status: Normal   Result Value Ref Range    Glucose CSF 54 40 - 70 mg/dL    Narrative    CSF glucose concentrations are about 60 percent of normal plasma glucose.   Protein total CSF:     Status: Normal   Result Value Ref Range    Protein total CSF 35.2 15.0 - 45.0 mg/dL   Oligoclonal banding CSF and Blood     Status: None (In process)    Narrative    The following orders were created for panel order Oligoclonal banding CSF and Blood.  Procedure                               Abnormality         Status                     ---------                               -----------         ------                     Oligoclonal Banding:[[id number]]                             In process                 Serum Collection  (Accomp...[[id number]]                      In process                   Please view results for these tests on the individual orders.   Cell Count CSF     Status: None   Result Value Ref Range    Tube Number 4     Color Colorless Colorless    Clarity Clear Clear    Total Nucleated Cells 1 0 - 5 /uL    RBC Count 2 0 - 2 /uL    Narrative    Too few cells to do differential.   Cerebrospinal fluid Aerobic Bacterial Culture Routine     Status: None (Preliminary result)    Specimen: Lumbar Puncture; Cerebrospinal fluid   Result Value Ref Range    Culture No growth, less than 1 day     Gram Stain Result No organisms seen     Gram Stain Result 2+ WBC seen     Narrative    Gram Stain quantification of host cells and microbiological organisms was done on a cytocentrifuged preparation.     CBC with platelets differential     Status: None    Narrative    The following orders were created for panel order CBC with platelets differential.  Procedure                               Abnormality         Status                     ---------                               -----------         ------                     CBC with platelets and d...[[id number]]                      Final result                 Please view results for these tests on the individual orders.   CBC with platelets differential     Status: None    Narrative    The following orders were created for panel order CBC with platelets differential.  Procedure                               Abnormality         Status                     ---------                               -----------         ------                     CBC with platelets and d...[[id number]]                      Final result                 Please view results for these tests on the individual orders.   CSF Cell Count with Differential:     Status: None    Narrative    The following orders were created for panel order CSF Cell Count with Differential:.  Procedure                               Abnormality          Status                     ---------                               -----------         ------                     Cell Count CSF[[id number]]                                   Final result                 Please view results for these tests on the individual orders.       A:CISP with proprioceptive issues with fall risk. Neg CSF eval.  P:TCU for ongoing PT/OT eval and tx for proprioceptive issues per neuro recommendations.

## 2023-01-29 NOTE — PROGRESS NOTES
Care Management Follow Up    Length of Stay (days): 0    Expected Discharge Date: 01/30/2023     Concerns to be Addressed: Discharge Planning    Patient plan of care discussed at interdisciplinary rounds: Yes    Anticipated Discharge Disposition: Skilled Nurse Facility     Referrals have been made to the following facilities and their status is as follows:    [facility]  [address] [place] [address]  P: [phone]  -Writer preemptively faxed referral packet for SNF to review.  1/29 - request for further work ups to find out pt's diagnosis in order to decide on admission.      [facility] [place]  [address]  P: [phone]  P: [phone] - Admissions  F: [phone]  -Writer preemptively faxed referral packet for SNF to review.  -SW called and left VM left admission to review pt's referral.      [facility]  [address]  [place]  [address]  P: [phone]  P: [phone] - Admissions  F: [phone]  -Writer preemptively faxed referral packet for SNF to review.  -SW called and left VM with admission to review pt's referral.      [address]  [address]  P: [phone]  -Writer preemptively faxed referral packet for SNF to review.  -SW called and left VM for admission to review pt's referral    [facility]INED  [facility]  [address] [place] [address]  [address]  P: [phone]  P: [phone] - Admissions  F: [phone]  -Writer preemptively faxed referral packet for SNF to review.  -SW called and left VM with Maria G in admission to review pt's referral.   1/29 - SW receive message via Epic. DECLINED- No beds.      Anticipated Discharge Services:  None  Anticipated Discharge DME:  None    Patient/family educated on Medicare website which has current facility and service quality ratings:  Yes  Education Provided on the Discharge Plan:  N/A  Patient/Family in  Agreement with the Plan:  Yes    Referrals Placed by CM/SW: Skilled Nursing Facilities     Private pay costs discussed: Not applicable    Additional Information:  3275  SW received message in [employer] from [facility]. Declined pt due to no beds.     [id number]  RADHA received phone call from Adam with  TCU. After reviewing pt's referral base on pt's diagnosis there appears to be no reason for what is causing pt to fall, no underlying changes from previous diagnosis. Appears pt has a neurological diagnosis that maybe causing her to move and fall and not related to a physical limitation. Adam suggest the best route is to properly find correct diagnosis for pt and investigate more into pt's situation. Maybe a PMR consults may be needed. Adam might even think Acute rehab may be more reasonable in pt's case.     RADHA relayed message to Idalmis AYON, whom suggest to have Jason call her back to further explain other consults they will need to see before deciding to accept pt or not.     Adam took down [facility] [employer] contact information and will call her.      RADHA update TCU list above.      available and will continue to follow for discharge planning and supports as needed.     _______________________    SAMMI Koo, LSW  ED/OBS   [facility]  Phone: [phone]  Pager: [phone]  Fax: [phone]     On-call pager, [phone], 4:00 pm to midnight

## 2023-01-29 NOTE — UTILIZATION REVIEW
Concurrent stay review; Secondary Review Determination    [facility]        Under the authority of the Utilization Management Committee, the utilization review process indicated a secondary review on the above patient.  The review outcome is based on review of the medical records, discussions with staff, and applying clinical experience noted on the date of the review.        (x) Observation/outpatient Status Appropriate - Concurrent stay review       RATIONALE FOR DETERMINATION:     Patient delayed discharge is related to disposition, there is no medical necessity for inpatient admission at the time of this review. If there is a change in patient status, please resend for review.    Peggy Jessica is a 47 year old female with a history of CISP admit to obs with coordination care with neuro for sensory proprioceptive issues and pain with fall issues. Patient eval by neuro with LP done yesterday and reviewed without concern of csf cisp involvement. Therefore no IVIG needed. Recommend to TCU for PT/OT eval and care.       The information on this document is developed by the utilization review team in order for the business office to ensure compliance.  This only denotes the appropriateness of proper admission status and does not reflect the quality of care rendered.       The definitions of Inpatient Status and Observation Status used in making the determination above are those provided in the CMS Coverage Manual, Chapter 1 and Chapter 6, section 70.4.       Sincerely,    Jaja Hines MD

## 2023-01-29 NOTE — PROGRESS NOTES
Observation goals  PER UNIT ROUTINE        Comments: Safety placement - Waiting for TCU     The Pt is alert and oriented x 4. She is 1 assist from bed to bedside commode.Spontaneously voiding.No BM overnight. She reported of nausea and Zofran administered. Tylenol given for headache and Oxy 10 mg given for pain in BUE and bilateral lower ext. PT recommend TCU to improve safety and IND prior to returning home. Sleeping between care.

## 2023-01-30 ENCOUNTER — APPOINTMENT (OUTPATIENT)
Dept: PHYSICAL THERAPY | Facility: CLINIC | Age: 48
End: 2023-01-30
Payer: COMMERCIAL

## 2023-01-30 PROCEDURE — 97530 THERAPEUTIC ACTIVITIES: CPT | Mod: GP

## 2023-01-30 PROCEDURE — 250N000013 HC RX MED GY IP 250 OP 250 PS 637: Performed by: NURSE PRACTITIONER

## 2023-01-30 PROCEDURE — 258N000003 HC RX IP 258 OP 636: Performed by: PHYSICIAN ASSISTANT

## 2023-01-30 PROCEDURE — 97110 THERAPEUTIC EXERCISES: CPT | Mod: GP

## 2023-01-30 PROCEDURE — 999N000127 HC STATISTIC PERIPHERAL IV START W US GUIDANCE

## 2023-01-30 PROCEDURE — G0378 HOSPITAL OBSERVATION PER HR: HCPCS

## 2023-01-30 PROCEDURE — 250N000011 HC RX IP 250 OP 636: Performed by: PHYSICIAN ASSISTANT

## 2023-01-30 PROCEDURE — 96376 TX/PRO/DX INJ SAME DRUG ADON: CPT

## 2023-01-30 PROCEDURE — 99231 SBSQ HOSP IP/OBS SF/LOW 25: CPT | Performed by: INTERNAL MEDICINE

## 2023-01-30 PROCEDURE — 99253 IP/OBS CNSLTJ NEW/EST LOW 45: CPT | Performed by: PSYCHIATRY & NEUROLOGY

## 2023-01-30 RX ORDER — SERTRALINE HYDROCHLORIDE 25 MG/1
25 TABLET, FILM COATED ORAL DAILY
Status: DISCONTINUED | OUTPATIENT
Start: 2023-01-30 | End: 2023-01-31

## 2023-01-30 RX ADMIN — OXYCODONE HYDROCHLORIDE 10 MG: 10 TABLET ORAL at 08:42

## 2023-01-30 RX ADMIN — SODIUM CHLORIDE: 9 INJECTION, SOLUTION INTRAVENOUS at 09:36

## 2023-01-30 RX ADMIN — DICYCLOMINE HYDROCHLORIDE 20 MG: 20 TABLET ORAL at 10:03

## 2023-01-30 RX ADMIN — ONDANSETRON HYDROCHLORIDE 8 MG: 2 INJECTION, SOLUTION INTRAMUSCULAR; INTRAVENOUS at 22:24

## 2023-01-30 RX ADMIN — SERTRALINE HYDROCHLORIDE 25 MG: 25 TABLET ORAL at 17:42

## 2023-01-30 RX ADMIN — DICYCLOMINE HYDROCHLORIDE 20 MG: 20 TABLET ORAL at 16:26

## 2023-01-30 RX ADMIN — CYCLOBENZAPRINE HYDROCHLORIDE 10 MG: 5 TABLET, FILM COATED ORAL at 19:55

## 2023-01-30 RX ADMIN — ONDANSETRON HYDROCHLORIDE 8 MG: 2 INJECTION, SOLUTION INTRAMUSCULAR; INTRAVENOUS at 15:37

## 2023-01-30 RX ADMIN — DICYCLOMINE HYDROCHLORIDE 20 MG: 20 TABLET ORAL at 03:15

## 2023-01-30 RX ADMIN — ACETAMINOPHEN 650 MG: 325 TABLET, FILM COATED ORAL at 15:55

## 2023-01-30 RX ADMIN — ACETAMINOPHEN 650 MG: 325 TABLET, FILM COATED ORAL at 22:18

## 2023-01-30 RX ADMIN — OXYCODONE HYDROCHLORIDE 10 MG: 10 TABLET ORAL at 15:55

## 2023-01-30 RX ADMIN — OXYCODONE HYDROCHLORIDE 10 MG: 10 TABLET ORAL at 22:18

## 2023-01-30 RX ADMIN — ONDANSETRON HYDROCHLORIDE 8 MG: 2 INJECTION, SOLUTION INTRAMUSCULAR; INTRAVENOUS at 08:41

## 2023-01-30 RX ADMIN — Medication 400 MCG: at 08:42

## 2023-01-30 RX ADMIN — CYCLOBENZAPRINE HYDROCHLORIDE 10 MG: 5 TABLET, FILM COATED ORAL at 14:14

## 2023-01-30 RX ADMIN — CYCLOBENZAPRINE HYDROCHLORIDE 10 MG: 5 TABLET, FILM COATED ORAL at 08:42

## 2023-01-30 RX ADMIN — DICYCLOMINE HYDROCHLORIDE 20 MG: 20 TABLET ORAL at 22:19

## 2023-01-30 RX ADMIN — BUPRENORPHINE 1 PATCH: 5 PATCH TRANSDERMAL at 17:03

## 2023-01-30 ASSESSMENT — ACTIVITIES OF DAILY LIVING (ADL)
ADLS_ACUITY_SCORE: 35

## 2023-01-30 ASSESSMENT — ENCOUNTER SYMPTOMS
PAIN SEVERITY NOW: MILD
SUBJECTIVE PATIENT PAIN CONTROL: WELL CONTROLLED
SUBJECTIVE PAIN PROGRESSION: UNCHANGED
DIETARY ISSUES: ADEQUATE INTAKE
ACTIVITY IMPAIRMENT: NORMAL

## 2023-01-30 NOTE — PLAN OF CARE
"Goal Outcome Evaluation:  BP (!) 117/91 (BP Location: Left arm)   Pulse 93   Temp 98  F (36.7  C) (Oral)   Resp 18   Ht 1.651 m (5' 5\")   Wt 72.6 kg (160 lb)   SpO2 98%   BMI 26.63 kg/m    Safety placement - Waiting for TCU Not met  "

## 2023-01-30 NOTE — PROGRESS NOTES
[facility]    Medicine Progress Note - Emergency Department Observation Unit  Date of Admission:  1/27/2023    Assessment & Plan   Peggy Jessica is a 47 year old female with a medical history of chronic immune sensory polyradiculopathy (CISP)/Chronic inflammatory demyelinating polyneuropathy (CIDP) diagnosed in 2021, complex regional pain syndrome (RLE 2/2 stress fracture; chest 2/2 bilateral mastectomy- currently under care of pain management), BRCA+ mutation, cervical cancer, celiac disease, diverticulitis, migraines, and depressed mood who presents to the ED progressive worsening of CIDP neurologic symptoms including loss of proprioception and x7 falls over the last 2 days.      #. Loss of proprioception with falls  #. Chronic immune sensory polyradiculopathy  The patient presents with safety concerning symptoms as she has fallen 7 times in the past 2 days when attempted to move around. CBC and CMP are unremarkable. Neurology was consulted and recommended MRI and lumbar puncture. The patient requested lumbar puncture performed by IR as she had difficulties in the past. Neuroradiology was consulted and performed successful LP. MRI lumbar spine did not show any nerve root enhancement and CSF studies didn't reveal elevated protein/cells to suggest active flair of CIDP, therefore no recommendation for IVIG treatment was made by neurology. Patient was by PT given recent frequent fall. They recommended TCU. [facility] requesting psychiartry assessment of functional component of patient's symptoms  for evaluation of ARU admission.  Appreciate SW assistance with safe disposition.   -VS per routine  -SW to assist with safe disposition     #. Nausea and vomiting   #. Poor oral intake   #. Weight loss   #. History of celiac disease   The patient reports undergoing GI work up for possible gastroparesis for the last month. Needs to complete a gastric emptying study.  Reports constipation, but is passing gas.   -ADAT   -Normal saline at 75ml/hr      #. Complex regional pain syndrome   The patient is on Buprenorphine patch at home, which is now on hold for MRI. Plan is to give her Oxycodone till Patient's  brought in Buprenorphine patch  -Continue with home Flexeril   -Oxycodone 10mg as needed q 6 hours   -Continue with home Tylenol and Ibuprofen as needed      #. Depressed mood  Patient was seen by psychiatry.  Recommended to start Zoloft 25 mg per day linked to 50 mg per day in 5 days.And go up to 100 mg per day after several weeks on 50 mg. Warned of transient GI symptoms.   - Start Zoloft 25 mg daily  - To continue with psychotherapy   -Continue with home Hydroxyzine as needed        Diet: Regular Diet Adult    DVT Prophylaxis: Low Risk/Ambulatory with no VTE prophylaxis indicated and Ambulate every shift  Kwan Catheter: Not present  Lines: None     Cardiac Monitoring: None  Code Status: Full Code         Disposition Plan         The patient's care was discussed with the Attending Physician, Dr. Morin, Bedside Nurse and Patient.    [facility]  Hospitalist Service  [facility]  Securely message with [employer] (more info)  Text page via [employer] Paging/Directory   ______________________________________________________________________    Interval History   No events overnight     Physical Exam   Vital Signs: Temp: 98  F (36.7  C) Temp src: Oral BP: (!) 114/90 Pulse: 100   Resp: 18 SpO2: 95 % O2 Device: None (Room air)    Weight: 160 lbs 0 oz  Constitutional: Pt is oriented to person, place, and time.Pt appears well-developed and well-nourished.   HENT:   Head: Normocephalic and atraumatic.   Eyes: Conjunctivae are normal. Pupils are equal, round, and reactive to light.   Neck: Normal range of motion. Neck supple.   Cardiovascular: Normal rate, regular rhythm, normal heart sounds and intact distal pulses.     Pulmonary/Chest: Effort normal and breath sounds normal. No respiratory distress. Pt has no wheezes. Pt has no rales  Abdominal: Soft. Bowel sounds are normal. Pt exhibits no distension and no mass. No tenderness. Pt has no rebound and no guarding.   Musculoskeletal: Normal range of motion. Pt exhibits no edema.   Neurological: Pt is alert and oriented to person, place, and time. Normal reflexes.   Skin: Skin is warm and dry. No rash noted.     Medical Decision Making       30 MINUTES SPENT BY ME on the date of service doing chart review, history, exam, documentation & further activities per the note.      Data   ------------------------- PAST 24 HR DATA REVIEWED -----------------------------------------------

## 2023-01-30 NOTE — PLAN OF CARE
"Goal Outcome Evaluation:  - Safety placement - Waiting for TCU: Not met    VSS on RA, c/o nausea without emesis managed with IV Zofran 8mg, scopolamine patch, and seabands. Voiding spontaneously and tolerating small sips of clear liquids and ice chips.       /81 (BP Location: Left arm, Patient Position: Semi-Andrews's, Cuff Size: Adult Regular)   Pulse 96   Temp 98  F (36.7  C) (Oral)   Resp 16   Ht 1.651 m (5' 5\")   Wt 72.6 kg (160 lb)   SpO2 95%   BMI 26.63 kg/m      "

## 2023-01-30 NOTE — PROGRESS NOTES
"Peggy Jessica is a 47 year old female patient.  1. Paresthesias    2. Multiple falls    3. CIDP (chronic inflammatory demyelinating polyneuropathy) (H)      Past Medical History:   Diagnosis Date     BRCA positive      CIDP (chronic inflammatory demyelinating polyneuropathy) (H)      ASHKAN III with severe dysplasia 2002     Complex regional pain syndrome type 1 of right lower extremity      No current outpatient medications on file.     Allergies   Allergen Reactions     Dihydroergotamine Anaphylaxis     Latex Anaphylaxis     Shellfish-Derived Products Anaphylaxis     Sumatriptan Anaphylaxis     Banana Unknown     Gabapentin Dizziness     Gluten Meal Other (See Comments)     Celiac disease     Keppra [Levetiracetam] Nausea and Vomiting     Kiwi Unknown     Levofloxacin Other (See Comments)     Arrhythmia     Metronidazole Nausea and Vomiting     Nitrofurantoin Hives     Penicillins Hives     Pregabalin      Reglan [Metoclopramide]      Topiramate Visual Disturbance     Aspirin Rash     Methadone Rash     Morphine Hives     She got hives around are when morphine given but resolved after few minutes per patient      Risperidone Anxiety     Active Problems:    * No active hospital problems. *    Blood pressure 111/73, pulse 97, temperature 97  F (36.1  C), temperature source Oral, resp. rate 16, height 1.651 m (5' 5\"), weight 72.6 kg (160 lb), SpO2 97 %, not currently breastfeeding.    Subjective:  Symptoms:  Stable.    Diet:  Adequate intake.    Activity level: Normal.    Pain:  She complains of pain that is mild.  She reports pain is unchanged.  Pain is well controlled.      Objective:  General Appearance:  Comfortable.    Vital signs: (most recent): Blood pressure 111/73, pulse 97, temperature 97  F (36.1  C), temperature source Oral, resp. rate 16, height 1.651 m (5' 5\"), weight 72.6 kg (160 lb), SpO2 97 %, not currently breastfeeding.  Vital signs are normal.    Output: Producing urine.    HEENT: Normal HEENT " exam.    Lungs:  Normal effort and normal respiratory rate.  Breath sounds clear to auscultation.    Heart: Normal rate.  Regular rhythm.  S1 normal and S2 normal.    Abdomen: Abdomen is soft.  Bowel sounds are normal.   There is no abdominal tenderness.     Extremities: Normal range of motion.    Pulses: Distal pulses are intact.    Neurological: Patient is alert.    Pupils:  Pupils are equal, round, and reactive to light.    Skin:  Warm.      Assessment:    Condition: In stable condition.  Unchanged.   (47 yof with CIDP/CISP presents with fall x 7. Neuro input appreciated. MR and LP done. Awaiting TCU placement.    Chronic pain-stable, ?gastroparesis. Awaiting Psych input.).     The pt was seen and examined by myself. The case was reviewed and the plan was discussed with the NAVI.      Camila Morin MD, MD  1/30/2023

## 2023-01-30 NOTE — PROGRESS NOTES
ED OBSERVATION PROGRESS NOTE:  S: Peggy Jessica is a 47 year old female with a medical history of chronic immune sensory polyradiculopathy (CISP)/Chronic inflammatory demyelinating polyneuropathy (CIDP) diagnosed in 2021, complex regional pain syndrome (RLE 2/2 stress fracture; chest 2/2 bilateral mastectomy- currently under care of pain management), BRCA+ mutation, cervical cancer, celiac disease, diverticulitis, migraines, and depressed mood who presents to the ED progressive worsening of CIDP neurologic symptoms including loss of proprioception and x7 falls over the last 2 days.     Chief Complaint   Patient presents with     Arm Pain     Leg and hand     1. Paresthesias    2. Multiple falls    3. 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       ALLERGIES:    Allergies   Allergen Reactions     Dihydroergotamine Anaphylaxis     Latex Anaphylaxis     Shellfish-Derived Products Anaphylaxis     Sumatriptan Anaphylaxis     Banana Unknown     Gabapentin Dizziness     Gluten Meal Other (See Comments)     Celiac disease     Keppra [Levetiracetam] Nausea and Vomiting     Kiwi Unknown     Levofloxacin Other (See Comments)     Arrhythmia     Metronidazole Nausea and Vomiting     Nitrofurantoin Hives     Penicillins Hives     Pregabalin      Reglan [Metoclopramide]      Topiramate Visual Disturbance     Aspirin  "Rash     Methadone Rash     Morphine Hives     She got hives around are when morphine given but resolved after few minutes per patient      Risperidone Anxiety       O:/81 (BP Location: Left arm, Patient Position: Semi-Andrews's, Cuff Size: Adult Regular)   Pulse 96   Temp 98  F (36.7  C) (Oral)   Resp 16   Ht 1.651 m (5' 5\")   Wt 72.6 kg (160 lb)   SpO2 95%   BMI 26.63 kg/m    Physical Exam   Constitutional: Pt is oriented to person, place, and time.Pt appears well-developed and well-nourished.   HENT:   Head: Normocephalic and atraumatic.   Eyes: Conjunctivae are normal. Pupils are equal, round, and reactive to light.   Neck: Normal range of motion. Neck supple.   Cardiovascular: Normal rate, regular rhythm, normal heart sounds and intact distal pulses.    Pulmonary/Chest: Effort normal and breath sounds normal. No respiratory distress. Pt has no wheezes. Pt has no rales  Abdominal: Soft. Bowel sounds are normal. Pt exhibits no distension and no mass. No tenderness. Pt has no rebound and no guarding.   Musculoskeletal: Normal range of motion. Pt exhibits no edema.   Neurological: Pt is alert and oriented to person, place, and time. Normal reflexes.   Skin: Skin is warm and dry. No rash noted.   Psychiatric: Pt has a normal mood and affect. Behavior is normal. Judgment and thought content normal.       Assessment/Plan:  Peggy Jessica is a 47 year old female with a medical history of chronic immune sensory polyradiculopathy (CISP)/Chronic inflammatory demyelinating polyneuropathy (CIDP) diagnosed in 2021, complex regional pain syndrome (RLE 2/2 stress fracture; chest 2/2 bilateral mastectomy- currently under care of pain management), BRCA+ mutation, cervical cancer, celiac disease, diverticulitis, migraines, and depressed mood who presents to the ED progressive worsening of CIDP neurologic symptoms including loss of proprioception and x7 falls over the last 2 days.      #. Loss " of proprioception with falls  #. Chronic immune sensory polyradiculopathy  The patient presents with safety concerning symptoms as she has fallen 7 times in the past 2 days when attempted to move around. CBC and CMP are unremarkable. Neurology was consulted and recommended MRI and lumbar puncture. The patient requested lumbar puncture performed by IR as she had difficulties in the past. Neuroradiology was consulted and performed successful LP. MRI lumbar spine did not show any nerve root enhancement and CSF studies didn't reveal elevated protein/cells to suggest active flair of CIDP, therefore no recommendation for IVIG treatment was made by neurology. Patient was by PT given recent frequent fall. They recommended TCU. [facility] requesting psychiartry assessment of functional component of patient's symptoms  for evaluation of ARU admission.  Appreciate SW assistance with safe disposition.   -VS per routine  -SW to assist with safe disposition  -Psychiatry consult to assess functional component of her symptoms      #. Nausea and vomiting   #. Poor oral intake   #. Weight loss   #. History of celiac disease   The patient reports undergoing GI work up for possible gastroparesis.   -ADAT   -Normal saline at 75ml/hr      #. Complex regional pain syndrome   The patient is on Buprenorphine patch at home, which is now on hold for MRI. Plan is to give her Oxycodone till we resume back Buprenorphine patch.  -Continue with home Flexeril   -Oxycodone 10mg as needed q 6 hours   -Continue with home Tylenol and Ibuprofen as needed      #. Depressed mood  -Continue with home Hydroxyzine as needed     Consults: Neurology    FEN: IVF  DVT prophylaxis: Anticipating a short admission, and SCDs ordered. If the patient ends up hospital admission she will need chemoprophylaxis for DVT.   Code Status: Full Code  Disposition: PT will assess the patient for safe disposition     Signed:  Idalmis Roblero PA-C  January 29, 2023 at 6:00 PM
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
## 2023-01-30 NOTE — PROGRESS NOTES
Care Management Follow Up    Length of Stay (days): 0    Expected Discharge Date: 01/31/2023     Concerns to be Addressed:       Patient plan of care discussed at interdisciplinary rounds: Yes    Anticipated Discharge Disposition:       Anticipated Discharge Services:    Anticipated Discharge DME:      Patient/family educated on Medicare website which has current facility and service quality ratings:    Education Provided on the Discharge Plan:    Patient/Family in Agreement with the Plan:      Referrals Placed by CM/SW:    Private pay costs discussed: Not applicable    Additional Information:    Pending referrals    [address] [address]  P: [phone]  -Writer preemptively faxed referral packet for SNF to review.  1/29 - request for further work ups to find out pt's diagnosis in order to decide on admission.      [address] [place]  [address]  P: [phone]  P: [phone] - Admissions  F: [phone]  -1/29 Initial SNF Referral sent via DailyLook to SNF for review.  VM left with Admissions ([phone]).  VM left with Admissions ([phone]).     [facility]  [address]  [place]  [address]  P: [phone]  P: [phone] - Admissions  F: [phone]  -1/29 Initial SNF Referral sent via DailyLook to SNF for review.  VM left with Admissions ([phone]).  VM left with Admissions ([phone]).     [address] [address]  Ph: [phone]  Adm: [phone]  Fax: [phone]  -1/29 Initial SNF Referral sent via DailyLook to SNF for review.  VM left with Admissions ([phone]).  VM left with Admissions ([phone]).     The following facilities have either declined pt or lack bed availability:    [facility]  P: [phone] - Admissions  -1/29 Initial SNF Referral sent via DailyLook to SNF for review.  VM left with  Admissions  1/29 Declined via Epic.No beds.     SW will continue to follow as needed.    ANGI Corbin, [facility]  ED/OBS   [facility]  Phone: [phone]  Pager: [phone]  Fax: [phone]     On-call pager, [phone], 4:00 pm to midnight

## 2023-01-31 LAB
ANION GAP SERPL CALCULATED.3IONS-SCNC: 8 MMOL/L (ref 7–15)
BUN SERPL-MCNC: 3.3 MG/DL (ref 6–20)
CALCIUM SERPL-MCNC: 7.9 MG/DL (ref 8.6–10)
CHLORIDE SERPL-SCNC: 109 MMOL/L (ref 98–107)
CREAT SERPL-MCNC: 0.62 MG/DL (ref 0.51–0.95)
DEPRECATED HCO3 PLAS-SCNC: 24 MMOL/L (ref 22–29)
ERYTHROCYTE [DISTWIDTH] IN BLOOD BY AUTOMATED COUNT: 14 % (ref 10–15)
GFR SERPL CREATININE-BSD FRML MDRD: >90 ML/MIN/1.73M2
GLUCOSE SERPL-MCNC: 89 MG/DL (ref 70–99)
HCT VFR BLD AUTO: 34.4 % (ref 35–47)
HGB BLD-MCNC: 11.4 G/DL (ref 11.7–15.7)
MCH RBC QN AUTO: 31.8 PG (ref 26.5–33)
MCHC RBC AUTO-ENTMCNC: 33.1 G/DL (ref 31.5–36.5)
MCV RBC AUTO: 96 FL (ref 78–100)
PLATELET # BLD AUTO: 203 10E3/UL (ref 150–450)
POTASSIUM SERPL-SCNC: 3.2 MMOL/L (ref 3.4–5.3)
PYRIDOXAL PHOS SERPL-SCNC: 16 NMOL/L
RBC # BLD AUTO: 3.59 10E6/UL (ref 3.8–5.2)
SODIUM SERPL-SCNC: 141 MMOL/L (ref 136–145)
WBC # BLD AUTO: 4.4 10E3/UL (ref 4–11)

## 2023-01-31 PROCEDURE — 85027 COMPLETE CBC AUTOMATED: CPT | Performed by: NURSE PRACTITIONER

## 2023-01-31 PROCEDURE — 250N000013 HC RX MED GY IP 250 OP 250 PS 637: Performed by: NURSE PRACTITIONER

## 2023-01-31 PROCEDURE — 80048 BASIC METABOLIC PNL TOTAL CA: CPT | Performed by: NURSE PRACTITIONER

## 2023-01-31 PROCEDURE — 99231 SBSQ HOSP IP/OBS SF/LOW 25: CPT | Performed by: INTERNAL MEDICINE

## 2023-01-31 PROCEDURE — G0378 HOSPITAL OBSERVATION PER HR: HCPCS

## 2023-01-31 PROCEDURE — 36415 COLL VENOUS BLD VENIPUNCTURE: CPT | Performed by: NURSE PRACTITIONER

## 2023-01-31 PROCEDURE — 250N000011 HC RX IP 250 OP 636: Performed by: PHYSICIAN ASSISTANT

## 2023-01-31 PROCEDURE — 96361 HYDRATE IV INFUSION ADD-ON: CPT

## 2023-01-31 PROCEDURE — 96376 TX/PRO/DX INJ SAME DRUG ADON: CPT

## 2023-01-31 PROCEDURE — 258N000003 HC RX IP 258 OP 636: Performed by: PHYSICIAN ASSISTANT

## 2023-01-31 PROCEDURE — 250N000013 HC RX MED GY IP 250 OP 250 PS 637: Performed by: PHYSICIAN ASSISTANT

## 2023-01-31 RX ORDER — PANTOPRAZOLE SODIUM 40 MG/1
40 TABLET, DELAYED RELEASE ORAL
Status: DISCONTINUED | OUTPATIENT
Start: 2023-02-01 | End: 2023-02-09 | Stop reason: HOSPADM

## 2023-01-31 RX ORDER — SERTRALINE HYDROCHLORIDE 25 MG/1
25 TABLET, FILM COATED ORAL DAILY
Status: COMPLETED | OUTPATIENT
Start: 2023-01-31 | End: 2023-02-03

## 2023-01-31 RX ORDER — POTASSIUM CHLORIDE 750 MG/1
40 TABLET, EXTENDED RELEASE ORAL ONCE
Status: COMPLETED | OUTPATIENT
Start: 2023-01-31 | End: 2023-01-31

## 2023-01-31 RX ADMIN — CYCLOBENZAPRINE HYDROCHLORIDE 10 MG: 5 TABLET, FILM COATED ORAL at 15:10

## 2023-01-31 RX ADMIN — OXYCODONE HYDROCHLORIDE 10 MG: 10 TABLET ORAL at 09:45

## 2023-01-31 RX ADMIN — DICYCLOMINE HYDROCHLORIDE 20 MG: 20 TABLET ORAL at 09:40

## 2023-01-31 RX ADMIN — DICYCLOMINE HYDROCHLORIDE 20 MG: 20 TABLET ORAL at 15:49

## 2023-01-31 RX ADMIN — POTASSIUM CHLORIDE 40 MEQ: 750 TABLET, EXTENDED RELEASE ORAL at 21:08

## 2023-01-31 RX ADMIN — ONDANSETRON HYDROCHLORIDE 8 MG: 2 INJECTION, SOLUTION INTRAMUSCULAR; INTRAVENOUS at 17:51

## 2023-01-31 RX ADMIN — SODIUM CHLORIDE: 9 INJECTION, SOLUTION INTRAVENOUS at 01:26

## 2023-01-31 RX ADMIN — SERTRALINE HYDROCHLORIDE 25 MG: 25 TABLET ORAL at 09:40

## 2023-01-31 RX ADMIN — DICYCLOMINE HYDROCHLORIDE 20 MG: 20 TABLET ORAL at 04:14

## 2023-01-31 RX ADMIN — CYCLOBENZAPRINE HYDROCHLORIDE 10 MG: 5 TABLET, FILM COATED ORAL at 20:49

## 2023-01-31 RX ADMIN — ACETAMINOPHEN 650 MG: 325 TABLET, FILM COATED ORAL at 22:29

## 2023-01-31 RX ADMIN — OXYCODONE HYDROCHLORIDE 10 MG: 10 TABLET ORAL at 22:29

## 2023-01-31 RX ADMIN — OXYCODONE HYDROCHLORIDE 10 MG: 10 TABLET ORAL at 15:49

## 2023-01-31 RX ADMIN — SODIUM CHLORIDE: 9 INJECTION, SOLUTION INTRAVENOUS at 14:08

## 2023-01-31 RX ADMIN — DICYCLOMINE HYDROCHLORIDE 20 MG: 20 TABLET ORAL at 22:29

## 2023-01-31 RX ADMIN — ONDANSETRON HYDROCHLORIDE 8 MG: 2 INJECTION, SOLUTION INTRAMUSCULAR; INTRAVENOUS at 10:36

## 2023-01-31 RX ADMIN — Medication 400 MCG: at 09:40

## 2023-01-31 RX ADMIN — HYDROXYZINE HYDROCHLORIDE 25 MG: 25 TABLET ORAL at 15:13

## 2023-01-31 RX ADMIN — ACETAMINOPHEN 650 MG: 325 TABLET, FILM COATED ORAL at 09:45

## 2023-01-31 RX ADMIN — DIPHENHYDRAMINE HCL 12.5 MG: 12.5 SOLUTION ORAL at 20:44

## 2023-01-31 RX ADMIN — CYCLOBENZAPRINE HYDROCHLORIDE 10 MG: 5 TABLET, FILM COATED ORAL at 09:40

## 2023-01-31 ASSESSMENT — ACTIVITIES OF DAILY LIVING (ADL)
ADLS_ACUITY_SCORE: 35
ADLS_ACUITY_SCORE: 36
ADLS_ACUITY_SCORE: 36
ADLS_ACUITY_SCORE: 35
ADLS_ACUITY_SCORE: 36
ADLS_ACUITY_SCORE: 35

## 2023-01-31 ASSESSMENT — ENCOUNTER SYMPTOMS
NO PATIENT REPORTED PAIN: 1
ACTIVITY IMPAIRMENT: IMPAIRED DUE TO WEAKNESS
DIETARY ISSUES: ADEQUATE INTAKE

## 2023-01-31 NOTE — PLAN OF CARE
"Goal Outcome Evaluation:/82 (BP Location: Left arm, Patient Position: Sitting, Cuff Size: Adult Regular)   Pulse 84   Temp 98  F (36.7  C) (Oral)   Resp 18   Ht 1.651 m (5' 5\")   Wt 72.6 kg (160 lb)   SpO2 98%   BMI 26.63 kg/m          -Safety placement - Waiting for TCU:Not met                  "

## 2023-01-31 NOTE — PROGRESS NOTES
Care Management Follow Up    Length of Stay (days): 0    Expected Discharge Date: 02/01/2023     Concerns to be Addressed: discharge planning     Patient plan of care discussed at interdisciplinary rounds: No    Anticipated Discharge Disposition: Skilled Nursing Facility     Anticipated Discharge Services:  (Deferred)  Anticipated Discharge DME:  (Deferred)    Patient/family educated on Medicare website which has current facility and service quality ratings: yes  Education Provided on the Discharge Plan:  Yes  Patient/Family in Agreement with the Plan: yes    Referrals Placed by CM/SW: Post Acute Facilities    Referrals have been made to the following facilities and their status is as follows:    [facility]   [address]  [place]  [address]  P: [phone]  P: [phone] - Admissions  F: [phone]  - Writer preemptively faxed referral packet for SNF to review.    [facility]   [address] [place]  [address]  P: [phone]  P: [phone] - Admissions  F: [phone]  - Writer preemptively faxed referral packet for SNF to review.    [facility]  P: [phone]  P: [phone] - Admissions  F: [phone]  - Writer preemptively faxed referral packet for SNF to review.    [facility]  [address] [place]  [address]  P: [phone]  P: [phone] - Admissions  F: [phone]  - Writer preemptively faxed referral packet for SNF to review.    [facility]  [address] [place]  [address]  P: [phone]  P: [phone] - Admissions  F: [phone]  - Writer preemptively faxed referral packet for SNF to review.    [facility]  P: [phone]  F: [phone]  - Writer preemptively faxed referral packet for SNF to review.    [facility]  [address]  [place], [place]  [address]  P: [phone]  P: [phone] - Admissions  F:  [phone]  - Writer preemptively faxed referral packet for SNF to review.    [facility]  [address] [place]  [address]  P: [phone]  F: [phone]  1/29 - request for further work ups to find out pt's diagnosis in order to decide on admission.   - Writer spoke with Xenia in admissions. SNF to review.   - Writer receives message from Xenia, pt is not appropriate for ARU. They also have concerns that pt is around her baseline from the previous time she was discharged. They will keep following pt but they feel pt should be placed at a community TCU.     [facility]  [address]  [place]  [address]  P: [phone]  P: [phone] - Admissions  F: [phone]  - Writer left VM with SNF admissions.     The following facilities have either declined pt or lack bed availability:    [facility]  [address] [place]  [address]  P: [phone]  P: [phone] - Admissions  F: [phone]  -1/29: Lack bed availability. Writer has ceased following this referral.    [address] [place]  [address]  P: [phone]  P: [phone] - Admissions  F: [phone]  - 1/31: Spoke with Marj in admissions. SNF does not have bed availability until next week. Writer has ceased following this referral.    [address] [address]  Ph: [phone]  Adm: [phone]  Fax: [phone]  - Writer left VM with SNF admissions.   - Writer received VM from admissions that they do not have bed availability, but they also do not believe they could meet pt's needs. Writer has ceased following this referral.    Private pay costs discussed: Not applicable    Additional Information:  Chart reviewed. Case discussed with bedside RN and medical provider. Pt is in a very unfortunate situation, where she is close to her baseline mobility when she was discharge from the [facility]  TCU back in 10/2022, however, these falls have led to pt not being able to be successful in her own home and poses various safety concerns for her. Placement during 10/2022 was complicated by 1) Many SNFs were unable to meet her level of care needs; 2) SNFs that may have been able to meet her level of care needs, were unable to meet her dietary needs (celiac diet).       ________________    ANGI Miller, [facility]  ED/Observation   [facility]  Phone: [phone]  Pager: [phone]  Fax: [phone]    On-call pager, [phone], 4:00pm to midnight

## 2023-01-31 NOTE — PROGRESS NOTES
[facility]    Medicine Progress Note - Emergency Department Observation Unit  Date of Admission:  1/27/2023    Assessment & Plan   Peggy Jessica is a 47 year old female with a medical history of chronic immune sensory polyradiculopathy (CISP)/Chronic inflammatory demyelinating polyneuropathy (CIDP) diagnosed in 2021, complex regional pain syndrome (RLE 2/2 stress fracture; chest 2/2 bilateral mastectomy- currently under care of pain management), BRCA+ mutation, cervical cancer, celiac disease, diverticulitis, migraines, and depressed mood who presents to the ED progressive worsening of CIDP neurologic symptoms including loss of proprioception and x7 falls over the last 2 days.      #. Loss of proprioception with falls  #. Chronic immune sensory polyradiculopathy  The patient presents with safety concerning symptoms as she has fallen 7 times in the past 2 days when attempted to move around. CBC and CMP are unremarkable. Neurology was consulted and recommended MRI and lumbar puncture. The patient requested lumbar puncture performed by IR as she had difficulties in the past. Neuroradiology was consulted and performed successful LP. MRI lumbar spine did not show any nerve root enhancement and CSF studies didn't reveal elevated protein/cells to suggest active flair of CIDP, therefore no recommendation for IVIG treatment was made by neurology. Patient was by PT given recent frequent fall. They recommended TCU. [facility] requesting psychiartry assessment of functional component of patient's symptoms  for evaluation of ARU admission.  Appreciate SW assistance with safe disposition.   -VS per routine  -SW to assist with safe disposition     #. Nausea and vomiting   #. Poor oral intake   #. Weight loss   #. History of celiac disease   The patient reports undergoing GI work up for possible gastroparesis for the last month. Needs to complete a gastric emptying study.  Reports constipation, but is passing gas.   -ADAT   -Normal saline at 75ml/hr      #. Complex regional pain syndrome   The patient is on Buprenorphine patch at home, which is now on hold for MRI. Plan is to give her Oxycodone till Patient's  brought in Buprenorphine patch  -Continue with home Flexeril   -Oxycodone 10mg as needed q 6 hours   -Continue with home Tylenol and Ibuprofen as needed      #. Depressed mood  Patient was seen by psychiatry.  Recommended to start Zoloft 25 mg per day linked to 50 mg per day in 5 days.And go up to 100 mg per day after several weeks on 50 mg. Warned of transient GI symptoms.   - Started Zoloft 25 mg daily (Started 1/30/23)  - To continue with psychotherapy   -Continue with home Hydroxyzine as needed            Diet: Regular Diet Adult    DVT Prophylaxis: Low Risk/Ambulatory with no VTE prophylaxis indicated and Ambulate every shift  Kwan Catheter: Not present  Lines: None     Cardiac Monitoring: None  Code Status: Full Code               Disposition Plan      The patient's care was discussed with the Attending Physician, Dr. Morin, Bedside Nurse and Patient.    MIRA Todd CNP  ______________________________________________________________________    Interval History   No events overnight     Physical Exam   Vital Signs: Temp: 97.8  F (36.6  C) Temp src: Oral BP: (!) 124/90 Pulse: 82   Resp: 18 SpO2: 96 % O2 Device: None (Room air)    Weight: 160 lbs 0 oz    Constitutional: Pt is oriented to person, place, and time.Pt appears well-developed and well-nourished.   HENT:   Head: Normocephalic and atraumatic.   Eyes: Conjunctivae are normal. Pupils are equal, round, and reactive to light.   Neck: Normal range of motion. Neck supple.   Cardiovascular: Normal rate, regular rhythm, normal heart sounds and intact distal pulses.    Pulmonary/Chest: Effort normal and breath sounds normal. No respiratory distress. Pt has no wheezes. Pt has no rales  Abdominal: Soft. Bowel  sounds are normal. Pt exhibits no distension and no mass. No tenderness. Pt has no rebound and no guarding.   Musculoskeletal: Normal range of motion. Pt exhibits no edema.   Neurological: Pt is alert and oriented to person, place, and time. Normal reflexes.   Skin: Skin is warm and dry. No rash noted.     Medical Decision Making     30 MINUTES SPENT BY ME on the date of service doing chart review, history, exam, documentation & further activities per the note.      Data   ------------------------- PAST 24 HR DATA REVIEWED -----------------------------------------------

## 2023-01-31 NOTE — PLAN OF CARE
"Goal Outcome Evaluation:/71 (BP Location: Left arm, Patient Position: Supine)   Pulse 82   Temp 97.6  F (36.4  C) (Oral)   Resp 16   Ht 1.651 m (5' 5\")   Wt 72.6 kg (160 lb)   SpO2 96%   BMI 26.63 kg/m      Safety placement - Waiting for TCU:Not met                "

## 2023-01-31 NOTE — PROGRESS NOTES
"Peggy Jessica is a 47 year old female patient.  1. Paresthesias    2. Multiple falls    3. CIDP (chronic inflammatory demyelinating polyneuropathy) (H)      Past Medical History:   Diagnosis Date     BRCA positive      CIDP (chronic inflammatory demyelinating polyneuropathy) (H)      ASHKAN III with severe dysplasia 2002     Complex regional pain syndrome type 1 of right lower extremity      No current outpatient medications on file.     Allergies   Allergen Reactions     Dihydroergotamine Anaphylaxis     Latex Anaphylaxis     Shellfish-Derived Products Anaphylaxis     Sumatriptan Anaphylaxis     Banana Unknown     Gabapentin Dizziness     Gluten Meal Other (See Comments)     Celiac disease     Keppra [Levetiracetam] Nausea and Vomiting     Kiwi Unknown     Levofloxacin Other (See Comments)     Arrhythmia     Metronidazole Nausea and Vomiting     Nitrofurantoin Hives     Penicillins Hives     Pregabalin      Reglan [Metoclopramide]      Topiramate Visual Disturbance     Aspirin Rash     Methadone Rash     Morphine Hives     She got hives around are when morphine given but resolved after few minutes per patient      Risperidone Anxiety     Active Problems:    * No active hospital problems. *    Blood pressure 100/71, pulse 82, temperature 97.6  F (36.4  C), temperature source Oral, resp. rate 16, height 1.651 m (5' 5\"), weight 72.6 kg (160 lb), SpO2 96 %, not currently breastfeeding.    Subjective:  Symptoms:  Stable.    Diet:  Adequate intake.    Activity level: Impaired due to weakness.    Pain:  She reports no pain.      Objective:  General Appearance:  Comfortable.    Vital signs: (most recent): Blood pressure 100/71, pulse 82, temperature 97.6  F (36.4  C), temperature source Oral, resp. rate 16, height 1.651 m (5' 5\"), weight 72.6 kg (160 lb), SpO2 96 %, not currently breastfeeding.  Vital signs are normal.    Output: Producing urine.    HEENT: Normal HEENT exam.    Lungs:  Normal effort and normal " respiratory rate.  Breath sounds clear to auscultation.    Heart: Normal rate.  Regular rhythm.  S1 normal and S2 normal.    Abdomen: Abdomen is soft.  Bowel sounds are normal.   There is no abdominal tenderness.     Extremities: Normal range of motion.    Pulses: Distal pulses are intact.    Neurological: Patient is alert.  (Unsteady gait).    Pupils:  Pupils are equal, round, and reactive to light.    Skin:  Warm.      Assessment:    Condition: In stable condition.  Unchanged.   (47 yof with CISP/CIDAP presents with fall x 7, awaiting TCU placement. Possible gastroparesis stable.).     The pt was seen and examined by myself. The case was reviewed and the plan was discussed with the NAVI.    Camila Morin MD, MD  1/31/2023

## 2023-01-31 NOTE — PLAN OF CARE
"/82 (BP Location: Left arm, Patient Position: Sitting, Cuff Size: Adult Regular)   Pulse 84   Temp 98  F (36.7  C) (Oral)   Resp 18   Ht 1.651 m (5' 5\")   Wt 72.6 kg (160 lb)   SpO2 98%   BMI 26.63 kg/m     Safety placement - Waiting for TCU Not met  "

## 2023-02-01 LAB
ALB CSF/SERPL: 6.1 RATIO
ALBUMIN CSF-MCNC: 20 MG/DL
ALBUMIN SERPL-MCNC: 3274 MG/DL
IGG CSF-MCNC: 3.4 MG/DL
IGG SERPL-MCNC: 1132 MG/DL
IGG SYNTH RATE SER+CSF CALC-MRATE: <0 MG/D
IGG/ALB CLEAR SER+CSF-RTO: 0.49 RATIO
IGG/ALB CSF: 0.17 RATIO
OLIGOCLONAL BANDS CSF ELPH-IMP: ABNORMAL
OLIGOCLONAL BANDS CSF ELPH-IMP: NEGATIVE
OLIGOCLONAL BANDS CSF IEF: 0 BANDS
POTASSIUM SERPL-SCNC: 3.7 MMOL/L (ref 3.4–5.3)

## 2023-02-01 PROCEDURE — 250N000013 HC RX MED GY IP 250 OP 250 PS 637: Performed by: NURSE PRACTITIONER

## 2023-02-01 PROCEDURE — 258N000003 HC RX IP 258 OP 636: Performed by: PHYSICIAN ASSISTANT

## 2023-02-01 PROCEDURE — 96376 TX/PRO/DX INJ SAME DRUG ADON: CPT

## 2023-02-01 PROCEDURE — 84132 ASSAY OF SERUM POTASSIUM: CPT | Performed by: NURSE PRACTITIONER

## 2023-02-01 PROCEDURE — 250N000009 HC RX 250: Performed by: PHYSICIAN ASSISTANT

## 2023-02-01 PROCEDURE — 250N000011 HC RX IP 250 OP 636: Performed by: PHYSICIAN ASSISTANT

## 2023-02-01 PROCEDURE — G0378 HOSPITAL OBSERVATION PER HR: HCPCS

## 2023-02-01 PROCEDURE — 99231 SBSQ HOSP IP/OBS SF/LOW 25: CPT | Performed by: INTERNAL MEDICINE

## 2023-02-01 PROCEDURE — 96361 HYDRATE IV INFUSION ADD-ON: CPT

## 2023-02-01 PROCEDURE — 36415 COLL VENOUS BLD VENIPUNCTURE: CPT | Performed by: NURSE PRACTITIONER

## 2023-02-01 RX ADMIN — ONDANSETRON HYDROCHLORIDE 8 MG: 2 INJECTION, SOLUTION INTRAMUSCULAR; INTRAVENOUS at 08:17

## 2023-02-01 RX ADMIN — SODIUM CHLORIDE: 9 INJECTION, SOLUTION INTRAVENOUS at 01:55

## 2023-02-01 RX ADMIN — CYCLOBENZAPRINE HYDROCHLORIDE 10 MG: 5 TABLET, FILM COATED ORAL at 21:06

## 2023-02-01 RX ADMIN — PANTOPRAZOLE SODIUM 40 MG: 40 TABLET, DELAYED RELEASE ORAL at 08:18

## 2023-02-01 RX ADMIN — DICYCLOMINE HYDROCHLORIDE 20 MG: 20 TABLET ORAL at 21:06

## 2023-02-01 RX ADMIN — SERTRALINE HYDROCHLORIDE 25 MG: 25 TABLET ORAL at 08:18

## 2023-02-01 RX ADMIN — ONDANSETRON HYDROCHLORIDE 8 MG: 2 INJECTION, SOLUTION INTRAMUSCULAR; INTRAVENOUS at 18:34

## 2023-02-01 RX ADMIN — DICYCLOMINE HYDROCHLORIDE 20 MG: 20 TABLET ORAL at 12:13

## 2023-02-01 RX ADMIN — OXYCODONE HYDROCHLORIDE 10 MG: 10 TABLET ORAL at 15:52

## 2023-02-01 RX ADMIN — DICYCLOMINE HYDROCHLORIDE 20 MG: 20 TABLET ORAL at 04:29

## 2023-02-01 RX ADMIN — CYCLOBENZAPRINE HYDROCHLORIDE 10 MG: 5 TABLET, FILM COATED ORAL at 15:52

## 2023-02-01 RX ADMIN — DICYCLOMINE HYDROCHLORIDE 20 MG: 20 TABLET ORAL at 15:52

## 2023-02-01 RX ADMIN — ACETAMINOPHEN 650 MG: 325 TABLET, FILM COATED ORAL at 08:18

## 2023-02-01 RX ADMIN — Medication 400 MCG: at 08:18

## 2023-02-01 RX ADMIN — OXYCODONE HYDROCHLORIDE 10 MG: 10 TABLET ORAL at 08:18

## 2023-02-01 RX ADMIN — SODIUM CHLORIDE: 9 INJECTION, SOLUTION INTRAVENOUS at 18:34

## 2023-02-01 RX ADMIN — CYCLOBENZAPRINE HYDROCHLORIDE 10 MG: 5 TABLET, FILM COATED ORAL at 08:18

## 2023-02-01 RX ADMIN — SCOPALAMINE 1 PATCH: 1 PATCH, EXTENDED RELEASE TRANSDERMAL at 21:06

## 2023-02-01 ASSESSMENT — ACTIVITIES OF DAILY LIVING (ADL)
ADLS_ACUITY_SCORE: 35

## 2023-02-01 ASSESSMENT — ENCOUNTER SYMPTOMS
DIETARY ISSUES: POOR INTAKE
ACTIVITY IMPAIRMENT: NORMAL
NO PATIENT REPORTED PAIN: 1
WEAKNESS: 1

## 2023-02-01 NOTE — PROGRESS NOTES
"Peggy Jessica is a 47 year old female patient.  1. Paresthesias    2. Multiple falls    3. CIDP (chronic inflammatory demyelinating polyneuropathy) (H)      Past Medical History:   Diagnosis Date     BRCA positive      CIDP (chronic inflammatory demyelinating polyneuropathy) (H)      ASHKAN III with severe dysplasia 2002     Complex regional pain syndrome type 1 of right lower extremity      No current outpatient medications on file.     Allergies   Allergen Reactions     Dihydroergotamine Anaphylaxis     Latex Anaphylaxis     Shellfish-Derived Products Anaphylaxis     Sumatriptan Anaphylaxis     Banana Unknown     Gabapentin Dizziness     Gluten Meal Other (See Comments)     Celiac disease     Keppra [Levetiracetam] Nausea and Vomiting     Kiwi Unknown     Levofloxacin Other (See Comments)     Arrhythmia     Metronidazole Nausea and Vomiting     Nitrofurantoin Hives     Penicillins Hives     Pregabalin      Reglan [Metoclopramide]      Topiramate Visual Disturbance     Aspirin Rash     Methadone Rash     Morphine Hives     She got hives around are when morphine given but resolved after few minutes per patient      Risperidone Anxiety     Active Problems:    * No active hospital problems. *    Blood pressure 102/69, pulse 90, temperature 97.7  F (36.5  C), temperature source Oral, resp. rate 18, height 1.651 m (5' 5\"), weight 72.6 kg (160 lb), SpO2 94 %, not currently breastfeeding.    Subjective:  Symptoms:  Stable.  She reports weakness.    Diet:  Poor intake.    Activity level: Normal.    Pain:  She reports no pain.      Objective:  General Appearance:  Comfortable.    Vital signs: (most recent): Blood pressure 102/69, pulse 90, temperature 97.7  F (36.5  C), temperature source Oral, resp. rate 18, height 1.651 m (5' 5\"), weight 72.6 kg (160 lb), SpO2 94 %, not currently breastfeeding.  Vital signs are normal.    Output: Producing urine.    HEENT: Normal HEENT exam.    Lungs:  Normal effort and normal " respiratory rate.  Breath sounds clear to auscultation.    Heart: Normal rate.  Regular rhythm.  S1 normal and S2 normal.    Abdomen: Abdomen is soft.  Bowel sounds are normal.   There is no abdominal tenderness.     Extremities: Normal range of motion.    Pulses: Distal pulses are intact.    Neurological: Patient is alert.    Pupils:  Pupils are equal, round, and reactive to light.    Skin:  Warm.      Assessment:    Condition: In stable condition.  Unchanged.   (47 yof with CIDP presents with fall x 7, awaiting TCU. No appetitie this am, felt to have gastroparesis.).     The pt was seen and examined by myself. The case was reviewed and the plan was discussed with the NAVI.    Camila Morin MD, MD  2/1/2023

## 2023-02-01 NOTE — PROGRESS NOTES
Plan of care/explanation of wait times has been explained/understood. Pt affect remains calm/conversant, respirations regular/non labored/skin warm and dry.   Watching movies on cell phone [facility]    Medicine Progress Note - ED Observation Service    Date of Admission:  1/27/2023    Assessment & Plan   Peggy Jessica is a 47 year old female with a medical history of chronic immune sensory polyradiculopathy (CISP)/Chronic inflammatory demyelinating polyneuropathy (CIDP) diagnosed in 2021, complex regional pain syndrome (RLE 2/2 stress fracture; chest 2/2 bilateral mastectomy- currently under care of pain management), BRCA+ mutation, cervical cancer, celiac disease, diverticulitis, migraines, and depressed mood who presents to the ED progressive worsening of CIDP neurologic symptoms including loss of proprioception and x7 falls over the last 2 days.      #Loss of proprioception with falls  #Chronic immune sensory polyradiculopathy  The patient presents with safety concerning symptoms as she has fallen 7 times in the past 2 days when attempted to move around. CBC and CMP are unremarkable. Neurology was consulted and recommended MRI and lumbar puncture. The patient requested lumbar puncture performed by IR as she had difficulties in the past. Neuroradiology was consulted and performed successful LP. MRI lumbar spine did not show any nerve root enhancement and CSF studies didn't reveal elevated protein/cells to suggest active flair of CIDP, therefore no recommendation for IVIG treatment was made by neurology. Patient was evaluated by PT given her history of frequent falls. They recommended TCU. [facility] requested psychiartry assessment of functional component of patient's symptoms  for evaluation of ARU admission. Patient was seen by psychiatry on 1/30 who felt anxiety may be contributing to some of her physical symptoms and recommended starting Zoloft and Hydroxyzine. SW working on TCU placement.  -Continue PT  -SW to assist with safe disposition     #Chronic nausea  #Weight loss   #History of celiac disease   The patient reports undergoing GI  "work up for possible gastroparesis for the last month with plan for outpatient gastric emptying study.  -ADAT   -Normal saline at 75ml/hr      #Complex regional pain syndrome   -Continue PTA Butrans patch  -Continue PTA Flexeril   -Oxycodone 10mg as needed q 6 hours   -Continue with home Tylenol and Ibuprofen as needed      #Unspecified depressive disorder  #Panic disorder  #Generalized anxiety disorder  Patient was seen by psychiatry on 1/30/23. Recommended to start Zoloft 25 mg daily, after 5 days going up to 50 mg daily. She can go up to 100 mg daily after several weeks on 50 mg. May have transient GI symptoms.  -Started Zoloft 25 mg daily on 1/30/23  -Continue outpatient psychotherapy   -Continue with PTA Hydroxyzine as needed      Diet: Regular Diet Adult    DVT Prophylaxis: Low Risk/Ambulatory with no VTE prophylaxis indicated  Kwan Catheter: Not present  Lines: None     Cardiac Monitoring: None  Code Status: Full Code      Clinically Significant Risk Factors Present on Admission        # Hypokalemia: Lowest K = 3.2 mmol/L in last 2 days, will replace as needed                # Overweight: Estimated body mass index is 26.63 kg/m  as calculated from the following:    Height as of this encounter: 1.651 m (5' 5\").    Weight as of this encounter: 72.6 kg (160 lb).           Disposition Plan     Expected Discharge Date: 02/01/2023,  3:00 PM  Discharge Delays: *Medically Ready for Discharge  Placement - TCU  Destination: home          The patient's care was discussed with the Attending Physician, Dr. Morin, Bedside Nurse, Care Coordinator/ and Patient.    Lin Chavez PA-C  ED Observation Service  [facility]  Securely message with [employer] (more info)  Text page via [facility] Paging/Directory   ______________________________________________________________________    Interval History   Patient reports her nausea was better overnight with Benadryl. No " chest pain, dyspnea, abdominal pain, vomiting.    Physical Exam   Vital Signs: Temp: 97.7  F (36.5  C) Temp src: Oral BP: 102/69 Pulse: 90   Resp: 18 SpO2: 94 % O2 Device: None (Room air)    Weight: 160 lbs 0 oz  Exam:  Constitutional: alert, no distress, and cooperative  Head: normocephalic, atraumatic  Neck: no asymmetry, masses, or scars  ENT: throat normal without erythema or exudate  Cardiovascular: RRR  Respiratory: diminished, respirations unlabored  Gastrointestinal: (+) BS, non tender, soft  Musculoskeletal: normal muscle tone, no pitting edema  Skin: no suspicious lesions or rashes  Neurologic: oriented x 3, moves all extremities, no slurred speech  Psychiatric: normal affect and mood    Medical Decision Making             Data     I have personally reviewed the following data over the past 24 hrs:    4.4  \   11.4 (L)   / 203     141 109 (H) 3.3 (L) /  89   3.7 24 0.62 \       Imaging results reviewed over the past 24 hrs:   No results found for this or any previous visit (from the past 24 hour(s)).

## 2023-02-01 NOTE — PLAN OF CARE
"Blood pressure (!) 121/92, pulse 88, temperature 98  F (36.7  C), resp. rate 16, height 1.651 m (5' 5\"), weight 72.6 kg (160 lb), SpO2 96 %, not currently breastfeeding.    Alert and oriented by 4. Able to make needs known. Continent of bowel and bladder. Regular diet. NACL 75 ml/hr-- R PIV. TCU placement recommended. Requires an assist of two for all transfers-- and uses bedside commode.    Intermittently nausea-- PRN zofran administered x 1    Comes into the ED d/t having 7 falls within the last two days, bilateral lower extremity weakness.      OUTPATIENT GOAL    Safety placement - Waiting for TCU: Not met   "

## 2023-02-01 NOTE — PLAN OF CARE
"Observation Goal:  - Safety placement - Waiting for TCU    BP 97/64 (BP Location: Left arm)   Pulse 92   Temp 97.9  F (36.6  C) (Oral)   Resp 18   Ht 1.651 m (5' 5\")   Wt 72.6 kg (160 lb)   SpO2 93%   BMI 26.63 kg/m     "

## 2023-02-01 NOTE — CARE PLAN
The patient has an appointment @0730 2/2/23 at [facility] as an outpatient for a Nuclear Medicine Gastric Empty test. She has taken Bentyl and Oxycodone in the am of 2/1/23. Both meds need to be held for 48hrs prior to gastric emptying test for correct results. The [facility] nurse named Katie, on OBS bed 9-1 was notified and instructed to let provider know as well that she'll have to reschedule as an outpatient when the meds have been held for 48hrs.

## 2023-02-01 NOTE — PLAN OF CARE
"Observation Goal:  - Safety placement - Waiting for TCU    BP (!) 116/93   Pulse 91   Temp 98.2  F (36.8  C)   Resp 18   Ht 1.651 m (5' 5\")   Wt 72.6 kg (160 lb)   SpO2 98%   BMI 26.63 kg/m     "

## 2023-02-01 NOTE — PLAN OF CARE
"Observation Goal:  - Safety placement - Waiting for TCU    BP (!) 116/90 (BP Location: Left arm)   Pulse 88   Temp 97.8  F (36.6  C) (Oral)   Resp 18   Ht 1.651 m (5' 5\")   Wt 72.6 kg (160 lb)   SpO2 95%   BMI 26.63 kg/m     "

## 2023-02-02 LAB
BACTERIA CSF CULT: NO GROWTH
GRAM STAIN RESULT: NORMAL
GRAM STAIN RESULT: NORMAL
HOLD SPECIMEN: NORMAL
POTASSIUM SERPL-SCNC: 3.9 MMOL/L (ref 3.4–5.3)

## 2023-02-02 PROCEDURE — 96361 HYDRATE IV INFUSION ADD-ON: CPT

## 2023-02-02 PROCEDURE — G0378 HOSPITAL OBSERVATION PER HR: HCPCS

## 2023-02-02 PROCEDURE — 96376 TX/PRO/DX INJ SAME DRUG ADON: CPT

## 2023-02-02 PROCEDURE — 250N000013 HC RX MED GY IP 250 OP 250 PS 637: Performed by: NURSE PRACTITIONER

## 2023-02-02 PROCEDURE — 258N000003 HC RX IP 258 OP 636: Performed by: PHYSICIAN ASSISTANT

## 2023-02-02 PROCEDURE — 84132 ASSAY OF SERUM POTASSIUM: CPT | Performed by: PHYSICIAN ASSISTANT

## 2023-02-02 PROCEDURE — 250N000011 HC RX IP 250 OP 636: Performed by: PHYSICIAN ASSISTANT

## 2023-02-02 PROCEDURE — 99231 SBSQ HOSP IP/OBS SF/LOW 25: CPT | Performed by: PHYSICIAN ASSISTANT

## 2023-02-02 PROCEDURE — 36415 COLL VENOUS BLD VENIPUNCTURE: CPT | Performed by: PHYSICIAN ASSISTANT

## 2023-02-02 RX ORDER — NALOXONE HYDROCHLORIDE 0.4 MG/ML
0.4 INJECTION, SOLUTION INTRAMUSCULAR; INTRAVENOUS; SUBCUTANEOUS
Status: DISCONTINUED | OUTPATIENT
Start: 2023-02-02 | End: 2023-02-09 | Stop reason: HOSPADM

## 2023-02-02 RX ORDER — NALOXONE HYDROCHLORIDE 0.4 MG/ML
0.2 INJECTION, SOLUTION INTRAMUSCULAR; INTRAVENOUS; SUBCUTANEOUS
Status: DISCONTINUED | OUTPATIENT
Start: 2023-02-02 | End: 2023-02-09 | Stop reason: HOSPADM

## 2023-02-02 RX ORDER — AMOXICILLIN 250 MG
1 CAPSULE ORAL AT BEDTIME
Status: DISCONTINUED | OUTPATIENT
Start: 2023-02-02 | End: 2023-02-09 | Stop reason: HOSPADM

## 2023-02-02 RX ADMIN — DICYCLOMINE HYDROCHLORIDE 20 MG: 20 TABLET ORAL at 23:00

## 2023-02-02 RX ADMIN — ACETAMINOPHEN 650 MG: 325 TABLET, FILM COATED ORAL at 14:25

## 2023-02-02 RX ADMIN — CYCLOBENZAPRINE HYDROCHLORIDE 10 MG: 5 TABLET, FILM COATED ORAL at 13:13

## 2023-02-02 RX ADMIN — PANTOPRAZOLE SODIUM 40 MG: 40 TABLET, DELAYED RELEASE ORAL at 08:10

## 2023-02-02 RX ADMIN — ONDANSETRON 4 MG: 4 TABLET, ORALLY DISINTEGRATING ORAL at 14:25

## 2023-02-02 RX ADMIN — ACETAMINOPHEN 650 MG: 325 TABLET, FILM COATED ORAL at 20:59

## 2023-02-02 RX ADMIN — SODIUM CHLORIDE: 9 INJECTION, SOLUTION INTRAVENOUS at 08:09

## 2023-02-02 RX ADMIN — SENNOSIDES AND DOCUSATE SODIUM 1 TABLET: 50; 8.6 TABLET ORAL at 22:45

## 2023-02-02 RX ADMIN — SERTRALINE HYDROCHLORIDE 25 MG: 25 TABLET ORAL at 08:11

## 2023-02-02 RX ADMIN — CYCLOBENZAPRINE HYDROCHLORIDE 10 MG: 5 TABLET, FILM COATED ORAL at 20:01

## 2023-02-02 RX ADMIN — CYCLOBENZAPRINE HYDROCHLORIDE 10 MG: 5 TABLET, FILM COATED ORAL at 08:10

## 2023-02-02 RX ADMIN — DICYCLOMINE HYDROCHLORIDE 20 MG: 20 TABLET ORAL at 04:15

## 2023-02-02 RX ADMIN — OXYCODONE HYDROCHLORIDE 10 MG: 10 TABLET ORAL at 14:25

## 2023-02-02 RX ADMIN — DICYCLOMINE HYDROCHLORIDE 20 MG: 20 TABLET ORAL at 09:47

## 2023-02-02 RX ADMIN — OXYCODONE HYDROCHLORIDE 10 MG: 10 TABLET ORAL at 20:59

## 2023-02-02 RX ADMIN — DICYCLOMINE HYDROCHLORIDE 20 MG: 20 TABLET ORAL at 19:00

## 2023-02-02 RX ADMIN — ACETAMINOPHEN 650 MG: 325 TABLET, FILM COATED ORAL at 08:21

## 2023-02-02 RX ADMIN — Medication 400 MCG: at 08:10

## 2023-02-02 RX ADMIN — ONDANSETRON HYDROCHLORIDE 8 MG: 2 INJECTION, SOLUTION INTRAMUSCULAR; INTRAVENOUS at 08:10

## 2023-02-02 RX ADMIN — ONDANSETRON HYDROCHLORIDE 8 MG: 2 INJECTION, SOLUTION INTRAMUSCULAR; INTRAVENOUS at 22:47

## 2023-02-02 RX ADMIN — OXYCODONE HYDROCHLORIDE 10 MG: 10 TABLET ORAL at 08:21

## 2023-02-02 ASSESSMENT — ACTIVITIES OF DAILY LIVING (ADL)
ADLS_ACUITY_SCORE: 35

## 2023-02-02 NOTE — PLAN OF CARE
"Goal Outcome Evaluation:BP (!) 130/98 (BP Location: Right arm)   Pulse 89   Temp 98.4  F (36.9  C)   Resp 18   Ht 1.651 m (5' 5\")   Wt 72.6 kg (160 lb)   SpO2 96%   BMI 26.63 kg/m        Safety placement - Waiting for TCU: Not met               "

## 2023-02-02 NOTE — PROGRESS NOTES
[facility]    Medicine Progress Note - ED Observation Service    Date of Admission:  1/27/2023    Assessment & Plan   Peggy Jessica is a 47 year old female with a medical history of chronic immune sensory polyradiculopathy (CISP)/Chronic inflammatory demyelinating polyneuropathy (CIDP) diagnosed in 2021, complex regional pain syndrome (RLE 2/2 stress fracture; chest 2/2 bilateral mastectomy- currently under care of pain management), BRCA+ mutation, cervical cancer, celiac disease, diverticulitis, migraines, and depressed mood who presents to the ED progressive worsening of CIDP neurologic symptoms including loss of proprioception and x7 falls over the last 2 days.      #Loss of proprioception with falls  #Chronic immune sensory polyradiculopathy  The patient presents with safety concerning symptoms as she has fallen 7 times in the past 2 days when attempted to move around. CBC and CMP are unremarkable. Neurology was consulted and recommended MRI and lumbar puncture. The patient requested lumbar puncture performed by IR as she had difficulties in the past. Neuroradiology was consulted and performed successful LP. MRI lumbar spine did not show any nerve root enhancement and CSF studies didn't reveal elevated protein/cells to suggest active flair of CIDP, therefore no recommendation for IVIG treatment was made by neurology. Patient was evaluated by PT given her history of frequent falls. They recommended TCU. [facility] requested psychiartry assessment of functional component of patient's symptoms  for evaluation of ARU admission. Patient was seen by psychiatry on 1/30 who felt anxiety may be contributing to some of her physical symptoms and recommended starting Zoloft and Hydroxyzine. SW working on TCU placement.  -Continue PT  -SW to assist with safe disposition     #Chronic nausea  #Weight loss   #History of celiac disease   The patient reports undergoing GI  "work up for possible gastroparesis for the last month with plan for outpatient gastric emptying study.  -ADAT   -Normal saline at 75ml/hr      #Complex regional pain syndrome   -Continue PTA Butrans patch  -Continue PTA Flexeril   -Oxycodone 10mg as needed q 6 hours   -Continue with home Tylenol and Ibuprofen as needed      #Unspecified depressive disorder  #Panic disorder  #Generalized anxiety disorder  Patient was seen by psychiatry on 1/30/23. Recommended to start Zoloft 25 mg daily, after 5 days going up to 50 mg daily. She can go up to 100 mg daily after several weeks on 50 mg. May have transient GI symptoms.  -Started Zoloft 25 mg daily on 1/30/23  -Continue outpatient psychotherapy   -Continue with PTA Hydroxyzine as needed      Diet: Regular Diet Adult    DVT Prophylaxis: Pneumatic Compression Devices  Kwan Catheter: Not present  Lines: None     Cardiac Monitoring: None  Code Status: Full Code      Clinically Significant Risk Factors Present on Admission        # Hypokalemia: Lowest K = 3.2 mmol/L in last 2 days, will replace as needed                # Overweight: Estimated body mass index is 26.63 kg/m  as calculated from the following:    Height as of this encounter: 1.651 m (5' 5\").    Weight as of this encounter: 72.6 kg (160 lb).           Disposition Plan      Expected Discharge Date: 02/02/2023,  3:00 PM  Discharge Delays: *Medically Ready for Discharge  Placement - TCU  Destination: home          The patient's care was discussed with the Attending Physician, Dr. Solitario, Bedside Nurse, Care Coordinator/ and Patient.    Lin Chavez PA-C  ED Observation Service  [facility]  Securely message with [employer] (more info)  Text page via [facility] Paging/Directory   ______________________________________________________________________    Interval History   Patient was able to eat some mashed potatoes last night. Didn't sleep very well. No chest " pain, dyspnea, abdominal pain.    Physical Exam   Vital Signs: Temp: 97.8  F (36.6  C) Temp src: Oral BP: (!) 142/95 Pulse: 89   Resp: 18 SpO2: 98 % O2 Device: None (Room air)    Weight: 160 lbs 0 oz  Constitutional: alert, no distress, and cooperative  Head: normocephalic, atraumatic  Neck: no asymmetry, masses, or scars  ENT: throat normal without erythema or exudate  Cardiovascular: RRR  Respiratory: diminished, respirations unlabored  Gastrointestinal: (+) BS, non tender, soft  Musculoskeletal: normal muscle tone, no pitting edema  Skin: no suspicious lesions or rashes  Neurologic: oriented x 3, moves all extremities, no slurred speech  Psychiatric: normal affect and mood    Medical Decision Making             Data     I have personally reviewed the following data over the past 24 hrs:    N/A  \   N/A   / N/A     N/A N/A N/A /  N/A   3.9 N/A N/A \       Imaging results reviewed over the past 24 hrs:   No results found for this or any previous visit (from the past 24 hour(s)).

## 2023-02-02 NOTE — PLAN OF CARE
Goal Outcome Evaluation: In Progress  Shift:   VS: Temp: 97.8  F (36.6  C) Temp src: Oral BP: (!) 142/95 Pulse: 89   Resp: 18 SpO2: 98 % O2 Device: None (Room air)    Pain: Given oxycodone and tylenol for abdominal pain  Neuro: A&OX4, calls appropriately  Cardiac:   Denies chest pain/palpitations  Respiratory: RA, denies SOB  GI/Diet/Appetite: Poor oral intake, intermittent nauseas. Given zofran IV x2. No BM since admission, but pt refused abdomen discomfort and has +ve gas. Refused stool softeners.   :  Adequate UO  LDA's: PIV with NS at 75cc/hr  Skin: No new deficit noted  Activity: Up with SBA to commode  Tests/Procedures:   Pertinent Labs/Lab Collection:      Plan: Continue with cares and update team with any changes.

## 2023-02-02 NOTE — PROGRESS NOTES
Care Management Follow Up    Length of Stay (days): 0    Expected Discharge Date: 02/02/2023     Concerns to be Addressed: discharge planning     Patient plan of care discussed at interdisciplinary rounds: Yes    Anticipated Discharge Disposition: Skilled Nursing Facility     Anticipated Discharge Services:  (Deferred)  Anticipated Discharge DME:  (Deferred)    Patient/family educated on Medicare website which has current facility and service quality ratings: yes  Education Provided on the Discharge Plan:    Patient/Family in Agreement with the Plan: yes    Referrals Placed by CM/SW: Post Acute Facilities     Referrals have been made to the following facilities and their status is as follows:    [facility]  [address]  [address]  P: [phone]  P: [phone] - Admissions  F: [phone]  - Writer preemptively faxed referral packet for SNF to review.    [facility]  [address] [place]  [address]  P: [phone]  P: [phone] - Admissions  F: [phone]  - Writer preemptively faxed referral packet for SNF to review.    Nathaniel at [facility]  [address] [place]  [address]  P: [phone]  P: [phone] - Admissions  F: [phone]  - Writer preemptively faxed referral packet for SNF to review.      [facility]  P: [phone]  P: [phone] - Admissions  F: [phone]  - Writer preemptively faxed referral packet for SNF to review.  2/2 - SW left VM with admission follow up with SNF referral.      Nathaniel at [facility]  [address] [place]  [address]  P: [phone]  P: [phone] - Admissions  F: [phone]  - Writer preemptively faxed referral packet for SNF to review.  2/2 - SW left VM with Luzma in admission follow up with SNF referral.      [facility]  [address] [place]  [address]  P: [phone]  P: [phone] - Admissions  F: [phone]  - Writer preemptively faxed  referral packet for SNF to review.  2/2 - SW left VM with admission follow up with SNF referral.     [facility]  [address] [place]  [address]  P: [phone]  F: [phone]  1/29 - request for further work ups to find out pt's diagnosis in order to decide on admission.   - Writer spoke with Xenia in admissions. SNF to review.   - Writer receives message from Xenia, pt is not appropriate for ARU. They also have concerns that pt is around her baseline from the previous time she was discharged. They will keep following pt but they feel pt should be placed at a community TCU.      [facility]  [address] [place]  [address]  P: [phone]  P: [phone] - Admissions  F: [phone]  - Writer left VM with SNF admissions.   2/2 - SW left VM with admission follow up with SNF referral.      The following facilities have either declined pt or lack bed availability:    [facility]  [address] [place], [place]  [address]  P: [phone]  P: [phone] - Admissions  F: [phone]  - Writer preemptively faxed referral packet for SNF to review.  2/2 - SW spoke with Geri in admission Pt declines due to no bed available. Writer has ceased following this referral.      [facility]  P: [phone]  F: [phone]  - Writer preemptively faxed referral packet for SNF to review.  2/2 - SW checked in [employer]. Facility report pt declined due to no bed available. Writer has ceased following this referral.    [facility]   [address] [place]  [address]  P: [phone]  P: [phone] - Admissions  F: [phone]  - Writer preemptively faxed referral packet for SNF to review.  2/2 - SW checked in [employer]. Facility report pt declined due to no bed available. Writer has ceased following this referral.    [facility]   [address] [place]  [address]  P: [phone]  P:  [phone] - Admissions  F: [phone]  - Writer preemptively faxed referral packet for SNF to review.  2/2 - RADHA called admission and spoke with Geri who report pt declined due to no bed available. Writer has ceased following this referral.       [facility]  [address] [place]  [address]  P: [phone]  P: [phone] - Admissions  F: [phone]  -1/29: Lack bed availability. Writer has ceased following this referral.     [facility], [address] [place]  [address]  P: [phone]  P: [phone] - Admissions  F: [phone]  - 1/31: Spoke with Marj in admissions. SNF does not have bed availability until next week. Writer has ceased following this referral.     [address] [address]  Ph: [phone]  Adm: [phone]  Fax: [phone]  - Writer left VM with SNF admissions.   - Writer received VM from admissions that they do not have bed availability, but they also do not believe they could meet pt's needs. Writer has ceased following this referral.  Private pay costs discussed: Not applicable    Additional Information:  Chart reviewed. Case discussed with bedside RN and medical provider. RADHA called and follow up with SNF facilities.     RADHA called SNF facilities to follow up with SNF referrals submitted. RADHA update list as necessary.     RADHA met with pt and her spouse at bedside to provide updates. RADHA report that about seven SNF has declined mostly due to no bed available. RADHA discussed [facility] and community based service waivers with pt. RADHA print out [facility] contact information. Pt share she is familiar with waiver services and has begun the process to get an assessment appointment in with [facility]. Pt along with her spouse appears frustrate due to pt being here over a week now. Pt's spouse suggest if no SNF can be found then for pt to discharge home. Pt asked  if can she discharge home if she has an adult with her 24/7 at home to support until she gets approved for waiver services. SW suggest pt to propose plan with medical team. Pt gave the ok for SW to send out additional SNF referrals.     SW submitted additional SNF referrals to:   [facility]  [facility]     available and will continue to follow for discharge planning and supports as needed.     _______________________    SAMMI Koo, LSW  ED/OBS   [facility]  Phone: [phone]  Pager: [phone]  Fax: [phone]     On-call pager, [phone], 4:00 pm to midnight

## 2023-02-03 ENCOUNTER — APPOINTMENT (OUTPATIENT)
Dept: GENERAL RADIOLOGY | Facility: CLINIC | Age: 48
End: 2023-02-03
Attending: NURSE PRACTITIONER
Payer: COMMERCIAL

## 2023-02-03 LAB
HOLD SPECIMEN: NORMAL
POTASSIUM SERPL-SCNC: 3.7 MMOL/L (ref 3.4–5.3)
VIT B1 PYROPHOSHATE BLD-SCNC: 95 NMOL/L

## 2023-02-03 PROCEDURE — 250N000011 HC RX IP 250 OP 636: Performed by: PHYSICIAN ASSISTANT

## 2023-02-03 PROCEDURE — 250N000013 HC RX MED GY IP 250 OP 250 PS 637: Performed by: NURSE PRACTITIONER

## 2023-02-03 PROCEDURE — 258N000003 HC RX IP 258 OP 636: Performed by: PHYSICIAN ASSISTANT

## 2023-02-03 PROCEDURE — 74018 RADEX ABDOMEN 1 VIEW: CPT | Mod: 26 | Performed by: RADIOLOGY

## 2023-02-03 PROCEDURE — 99231 SBSQ HOSP IP/OBS SF/LOW 25: CPT | Performed by: NURSE PRACTITIONER

## 2023-02-03 PROCEDURE — G0378 HOSPITAL OBSERVATION PER HR: HCPCS

## 2023-02-03 PROCEDURE — 99222 1ST HOSP IP/OBS MODERATE 55: CPT | Performed by: PHYSICIAN ASSISTANT

## 2023-02-03 PROCEDURE — 74018 RADEX ABDOMEN 1 VIEW: CPT

## 2023-02-03 PROCEDURE — 36415 COLL VENOUS BLD VENIPUNCTURE: CPT | Performed by: FAMILY MEDICINE

## 2023-02-03 PROCEDURE — 999N000248 HC STATISTIC IV INSERT WITH US BY RN

## 2023-02-03 PROCEDURE — 96361 HYDRATE IV INFUSION ADD-ON: CPT

## 2023-02-03 PROCEDURE — 84132 ASSAY OF SERUM POTASSIUM: CPT | Performed by: FAMILY MEDICINE

## 2023-02-03 RX ORDER — POLYETHYLENE GLYCOL 3350 17 G/17G
17 POWDER, FOR SOLUTION ORAL 2 TIMES DAILY
Status: DISCONTINUED | OUTPATIENT
Start: 2023-02-03 | End: 2023-02-09 | Stop reason: HOSPADM

## 2023-02-03 RX ORDER — SENNOSIDES 8.6 MG
8.6 TABLET ORAL 2 TIMES DAILY PRN
Status: DISCONTINUED | OUTPATIENT
Start: 2023-02-03 | End: 2023-02-09 | Stop reason: HOSPADM

## 2023-02-03 RX ORDER — BISACODYL 10 MG
10 SUPPOSITORY, RECTAL RECTAL DAILY
Status: DISCONTINUED | OUTPATIENT
Start: 2023-02-03 | End: 2023-02-09 | Stop reason: HOSPADM

## 2023-02-03 RX ADMIN — SODIUM CHLORIDE: 9 INJECTION, SOLUTION INTRAVENOUS at 13:36

## 2023-02-03 RX ADMIN — ACETAMINOPHEN 650 MG: 325 TABLET, FILM COATED ORAL at 15:50

## 2023-02-03 RX ADMIN — Medication 400 MCG: at 08:42

## 2023-02-03 RX ADMIN — PANTOPRAZOLE SODIUM 40 MG: 40 TABLET, DELAYED RELEASE ORAL at 08:42

## 2023-02-03 RX ADMIN — Medication 10 MG: at 18:09

## 2023-02-03 RX ADMIN — DICYCLOMINE HYDROCHLORIDE 20 MG: 20 TABLET ORAL at 22:19

## 2023-02-03 RX ADMIN — CYCLOBENZAPRINE HYDROCHLORIDE 10 MG: 5 TABLET, FILM COATED ORAL at 14:13

## 2023-02-03 RX ADMIN — DICYCLOMINE HYDROCHLORIDE 20 MG: 20 TABLET ORAL at 10:35

## 2023-02-03 RX ADMIN — OXYCODONE HYDROCHLORIDE 10 MG: 10 TABLET ORAL at 22:19

## 2023-02-03 RX ADMIN — DICYCLOMINE HYDROCHLORIDE 20 MG: 20 TABLET ORAL at 15:50

## 2023-02-03 RX ADMIN — DICYCLOMINE HYDROCHLORIDE 20 MG: 20 TABLET ORAL at 05:23

## 2023-02-03 RX ADMIN — ONDANSETRON 4 MG: 4 TABLET, ORALLY DISINTEGRATING ORAL at 08:58

## 2023-02-03 RX ADMIN — CYCLOBENZAPRINE HYDROCHLORIDE 10 MG: 5 TABLET, FILM COATED ORAL at 21:13

## 2023-02-03 RX ADMIN — SERTRALINE HYDROCHLORIDE 25 MG: 25 TABLET ORAL at 08:42

## 2023-02-03 RX ADMIN — OXYCODONE HYDROCHLORIDE 10 MG: 10 TABLET ORAL at 08:48

## 2023-02-03 RX ADMIN — OXYCODONE HYDROCHLORIDE 10 MG: 10 TABLET ORAL at 15:51

## 2023-02-03 RX ADMIN — POLYETHYLENE GLYCOL 3350, SODIUM SULFATE ANHYDROUS, SODIUM BICARBONATE, SODIUM CHLORIDE, POTASSIUM CHLORIDE 4000 ML: 236; 22.74; 6.74; 5.86; 2.97 POWDER, FOR SOLUTION ORAL at 15:51

## 2023-02-03 RX ADMIN — CYCLOBENZAPRINE HYDROCHLORIDE 10 MG: 5 TABLET, FILM COATED ORAL at 08:42

## 2023-02-03 RX ADMIN — ACETAMINOPHEN 650 MG: 325 TABLET, FILM COATED ORAL at 08:48

## 2023-02-03 ASSESSMENT — ACTIVITIES OF DAILY LIVING (ADL)
ADLS_ACUITY_SCORE: 36
ADLS_ACUITY_SCORE: 36
ADLS_ACUITY_SCORE: 35
ADLS_ACUITY_SCORE: 36
ADLS_ACUITY_SCORE: 36
ADLS_ACUITY_SCORE: 35
ADLS_ACUITY_SCORE: 36
ADLS_ACUITY_SCORE: 36
ADLS_ACUITY_SCORE: 35
ADLS_ACUITY_SCORE: 35
ADLS_ACUITY_SCORE: 36
ADLS_ACUITY_SCORE: 36

## 2023-02-03 NOTE — PROGRESS NOTES
"47 year old female with a history of chronic immune sensory polyradiculopathy (CISP) / chronic inflammatory demyelinating polyneuropathy (CIDP) diagnosed in 2021, complex regional pain syndrome, BRCA+ mutation, s/p bilateral mastectomy, cervical cancer, celiac disease, history of diverticulitis, migraines, and depression who presented to the ED on 1/27/23 with multiple falls and worsening symptoms of CIDP. GI consulted for \"nausea, weight loss, dehydration, no BM x 1 week\".    She is currently waiting for tcu placement with the help of SW.      She has not had a BM for 8-9 days but is passing gas.  Will get xray to evaluated for amount of stool and determine if further treatment is needed for possible constipation after imaging. Her abdomen is soft and she denies abdominal pain but says her stomach gets like she is \"pregnant\" after eating.     She will remain on ed obs until placement is found.   "

## 2023-02-03 NOTE — PLAN OF CARE
Outpatient/Observation goals to be met before discharge home:  Goal Outcome Evaluation:  1. Safety placement - Waiting for TCU - In process, pending referrals

## 2023-02-03 NOTE — PROGRESS NOTES
Patient reports she continues to have nausea and has not had a bowel movement since admission. Still reporting dizziness and has continued to feel weak. GI consulted to address dizziness and nausea. Patient reports feeling abilities less than baseline. Is tearful while discussing her concerns. Spiritual health consult placed.

## 2023-02-03 NOTE — PROGRESS NOTES
"SPIRITUAL HEALTH SERVICES Progress Note  [facility] Obs    Saw pt Peggy Gutierrezs per-staff request     Patient/Family Understanding of Illness and Goals of Care - Pt shares she is having   \"gastric issues so I think I'll still be her awhile.    Distress and Loss -   Pt shared distresses about the break-in last summer and issues with her 20 yr old dtr.  Pt is stressed by her health issues and that she can't do the things she would like to do       i.e. art projects, travel, getting out of the house.    Strengths, Coping, and Resources -         Pt has goals for her future, along with hopes and dreams which is a sign of resliency.        Pt has a trauma therapist that she sees twice a month.            Meaning, Beliefs, and Spirituality -          At first pt declined to see me, stating \"Atheist or rather I believe there are many gods.\"         Pt believes in the power of art to transform    Plan of Care   I affirmed her strength, resiliency, courage for therapy and sense of artistry that will guide her through the quagmire that she is in.    remains available per pt/family/staff request      Rev Sheree Garcia MDiv, [facility]  Staff   Pager [phone]  * [facility] remains available 24/7 for emergent requests/referrals, either by having the switchboard page the on-call  or by entering an ASAP/STAT consult in Epic (this will also page the on-call ).*     "

## 2023-02-03 NOTE — PROGRESS NOTES
Care Management Follow Up    Length of Stay (days): 0    Expected Discharge Date: 02/03/2023     Concerns to be Addressed: discharge planning     Patient plan of care discussed at interdisciplinary rounds: Yes    Anticipated Discharge Disposition: Skilled Nursing Facility     Anticipated Discharge Services:  (Deferred)  Anticipated Discharge DME:  (Deferred)    Patient/family educated on Medicare website which has current facility and service quality ratings: yes  Education Provided on the Discharge Plan:    Patient/Family in Agreement with the Plan: yes    Referrals Placed by CM/SW: Post Acute Facilities  Referrals have been made to the following facilities and their status is as follows:    [facility]  [address] [place] [address]  [address]  P: [phone]  P: [phone] - Admissions  F: [phone]  - Writer preemptively faxed referral packet for SNF to review.    [facility]  [address] [facility] [address]   P: [phone]  P: [phone] - Admissions  F: [phone]  - Writer preemptively faxed referral packet for SNF to review.    [facility].   P: [phone] - [place] Liaison (Richelle)  F: [phone]   2/3 - SW called and spoke with Liaison. Richelle will review pt's referral and will call SW back.      Nathaniel at [facility]  [address] [place]  [address]  P: [phone]  P: [phone] - Admissions  F: [phone]  - Writer preemptively faxed referral packet for SNF to review.  2/2 - SW left VM with Luzma in admission follow up with SNF referral.   2/3 -  SW left VM with Luzma in admission.      [facility]  [address] [place]  [address]  P: [phone]  P: [phone] - Admissions  F: [phone]  - Writer preemptively faxed referral packet for SNF to review.  2/2 - SW left VM with admission follow up with SNF referral.   2/3 - SW left VM with admission.       [facility]  [address] [place]  [address]  P: [phone]  F: [phone]  1/29 - request for further work ups to find out pt's diagnosis in order to decide on admission.   - Writer spoke with Xenia in admissions. SNF to review.   - Writer receives message from Xenia, pt is not appropriate for ARU. They also have concerns that pt is around her baseline from the previous time she was discharged. They will keep following pt but they feel pt should be placed at a community TCU.      [facility]  [address] [place]  [address]  P: [phone]  P: [phone] - Admissions  F: [phone]  - Writer left VM with SNF admissions.   2/2 - SW left VM with admission follow up with SNF referral.   2/3 - SW left VM with admission follow up with SNF referral.     The following facilities have either declined pt or lack bed availability:    [facility]  [address] [place]  [address]  P: [phone]  P: [phone] - Admissions  F: [phone]  - Writer preemptively faxed referral packet for SNF to review.  2/3 - SW receive message via [employer]. TCU decline. No beds available. Writer has ceased following this referral.    [facility]  P: [phone]  P: [phone] - Admissions  F: [phone]  - Writer preemptively faxed referral packet for SNF to review.  2/2 - SW left VM with admission follow up with SNF referral.   2/3 - SW received VM from admission. Declined can not meet pt's needs of assist of 2 with frequent falls. Writer has ceased following this referral.    [facility]  [address] [facility] [address]  [place]  [address]  P: [phone]  P: [phone] - Admissions  F: [phone]  - Writer preemptively faxed referral packet for SNF to review.  2/3 - SW receive message via [employer]. TCU decline. No beds available. Writer has ceased following this referral.     [facility]  [address] [place]  [address]  P: [phone]  P:  [phone] - Admissions  F: [phone]  - Writer preemptively faxed referral packet for SNF to review.  2/3 - SW receive message via [employer]. TCU decline. No beds available. Writer has ceased following this referral.     [facility]  [address]  [place], [place]  [address]  P: [phone]  P: [phone] - Admissions  F: [phone]  - Writer preemptively faxed referral packet for SNF to review.  2/2 - SW spoke with Geri in admission Pt declines due to no bed available. Writer has ceased following this referral.     [facility]  P: [phone]  F: [phone]  - Writer preemptively faxed referral packet for SNF to review.  2/2 - SW checked in [employer]. Facility report pt declined due to no bed available. Writer has ceased following this referral.     [facility]   [address] [place]  [address]  P: [phone]  P: [phone] - Admissions  F: [phone]  - Writer preemptively faxed referral packet for SNF to review.  2/2 - SW checked in [employer]. Facility report pt declined due to no bed available. Writer has ceased following this referral.     [facility]   [address] [facility] [place]  [address]  P: [phone]  P: [phone] - Admissions  F: [phone]  - Writer preemptively faxed referral packet for SNF to review.  2/2 - SW called admission and spoke with Geri who report pt declined due to no bed available. Writer has ceased following this referral.        [facility]  [address] [place]  [address]  P: [phone]  P: [phone] - Admissions  F: [phone]  -1/29: Lack bed availability. Writer has ceased following this referral.     [facility] [address] [place]  [address]  P: [phone]  P: [phone] - Admissions  F: [phone]  - 1/31: Spoke with Marj in admissions. SNF does not have bed availability until next week. Writer  has ceased following this referral.     [address] [address]  Ph: [phone]  Adm: [phone]  Fax: [phone]  - Writer left VM with SNF admissions.   - Writer received VM from admissions that they do not have bed availability, but they also do not believe they could meet pt's needs. Writer has ceased following this referral.  Private pay costs discussed: Not applicable    Additional Information:  Chart reviewed. Case discussed with bedside RN and medical provider.     1128  SW met with pt. Pt express needing help with her MA application. Pt had started the process and received letters via mail. She then end up in ED and now can not follow up. SW will refer pt to financial counselor to help look into pt's MA status at the [facility].     SW emailed Financial counselor referral for MA assistance for pt.     1320  SW called TCU facilities follow up on pt's referral.     SW call Richelle liaison for [facility] (P: [phone]) Richelle will review referral and call SW back with updates on bed availability.     1509  SW received VM from Sheree in admission with Susan. They can not meet pt's needs of assist of 2 with frequent falls.    SW submit additional TCU referrals to:   [facility]  [facility]    [address]  [facility]  SW receive message via [employer]. TCU decline. No beds available.      available and will continue to follow for discharge planning and supports as needed.   _______________________    SAMMI Koo, LSW  ED/OBS   [facility]  Phone: [phone]  Pager: [phone]  Fax: [phone]     On-call pager, [phone], 4:00 pm to midnight

## 2023-02-03 NOTE — PROGRESS NOTES
"Goal Outcome Evaluation: In Progress  Shift: 5320-1722  VS: BP (!) 121/98 (BP Location: Right arm)   Pulse 90   Temp 98.2  F (36.8  C) (Oral)   Resp 18   Ht 1.651 m (5' 5\")   Wt 72.6 kg (160 lb)   SpO2 96%   BMI 26.63 kg/m     Pain: Given oxycodone and tylenol for BLE pain  Neuro: A&OX4, calls appropriately  Cardiac:   Denies chest pain/palpitations  Respiratory: RA, denies SOB  GI/Diet/Appetite: Poor oral intake, intermittent nauseas. Oral zofran x 1. No BM since admission, abd xray showed significant stool burden, starting go litely this afternoon  :  Adequate UOP  LDA's: PIV with NS at 75cc/hr  Skin: No new deficit noted  Activity: Up with SBA to commode  Tests/Procedures:   Pertinent Labs/Lab Collection:      Plan: Continue with cares and update team with any changes.      "

## 2023-02-03 NOTE — PROGRESS NOTES
[facility]    Medicine Progress Note - ED Observation Service    Date of Admission:  1/27/2023    Assessment & Plan      Peggy Jessica is a 47 year old female with a medical history of chronic immune sensory polyradiculopathy (CISP)/Chronic inflammatory demyelinating polyneuropathy (CIDP) diagnosed in 2021, complex regional pain syndrome (RLE 2/2 stress fracture; chest 2/2 bilateral mastectomy- currently under care of pain management), BRCA+ mutation, cervical cancer, celiac disease, diverticulitis, migraines, and depressed mood who presents to the ED progressive worsening of CIDP neurologic symptoms including loss of proprioception and x7 falls over the last 2 days.      #Loss of proprioception with falls  #Chronic immune sensory polyradiculopathy  The patient presents with safety concerning symptoms as she has fallen 7 times in the past 2 days when attempted to move around. CBC and CMP are unremarkable. Neurology was consulted and recommended MRI and lumbar puncture. The patient requested lumbar puncture performed by IR as she had difficulties in the past. Neuroradiology was consulted and performed successful LP. MRI lumbar spine did not show any nerve root enhancement and CSF studies didn't reveal elevated protein/cells to suggest active flair of CIDP, therefore no recommendation for IVIG treatment was made by neurology. Patient was evaluated by PT given her history of frequent falls. They recommended TCU. [facility] requested psychiartry assessment of functional component of patient's symptoms  for evaluation of ARU admission. Patient was seen by psychiatry on 1/30 who felt anxiety may be contributing to some of her physical symptoms and recommended starting Zoloft and Hydroxyzine. SW working on TCU placement.  -Continue PT  -SW to assist with safe disposition     #Chronic nausea  #Weight loss   #History of celiac disease   The patient reports undergoing GI  "work up for possible gastroparesis for the last month with plan for outpatient gastric emptying study.  -ADAT   -Normal saline at 75ml/hr   -Abdominal Xray per GI to rule out illus/obstruction - this was negative for ilius or obstruction  -start bowel regiment, GoLytely, Miralax BID (can titrate up to TID if needed), Senna BID, pink lady enema daily (can stop if having consistent bowel movements), dulcolax suppository daily   - If no improvement despite above regimen with titrations, could consider Motegrity per GI as this would help with delayed gastric emptying as well   - Analgesia/Antiemetics, limit opioids as able     #Complex regional pain syndrome   -Continue PTA Butrans patch  -Continue PTA Flexeril   -Oxycodone 10mg as needed q 6 hours   -Continue with home Tylenol and Ibuprofen as needed      #Unspecified depressive disorder  #Panic disorder  #Generalized anxiety disorder  Patient was seen by psychiatry on 1/30/23. Recommended to start Zoloft 25 mg daily, after 5 days going up to 50 mg daily. She can go up to 100 mg daily after several weeks on 50 mg. May have transient GI symptoms.  -Started Zoloft 25 mg daily on 1/30/23  -Increase Zoloft to 50mg daily on 2/4/23  -Continue outpatient psychotherapy   -Continue with PTA Hydroxyzine as needed         Diet: Regular Diet Adult  / ADAT  DVT Prophylaxis: Pneumatic Compression Devices  Kwan Catheter: Not present  Lines: None     Cardiac Monitoring: None  Code Status: Full Code        Clinically Significant Risk Factors Present on Admission                       # Overweight: Estimated body mass index is 26.63 kg/m  as calculated from the following:    Height as of this encounter: 1.651 m (5' 5\").    Weight as of this encounter: 72.6 kg (160 lb).           Disposition Plan      Expected Discharge Date: 02/06/2023,  3:00 PM  Discharge Delays: *Medically Ready for Discharge  Placement - TCU  Destination: home          The patient's care was discussed with the " "Attending Physician, Dr. Marlys Espinoza.    MIRA Thakkar [facility]  Hospitalist Service  [facility]  Securely message with [employer] (more info)  Text page via [email or web address] Paging/Directory   ______________________________________________________________________    Interval History   Unable to eat, thought of eating \"makes me nauseous\". Is currently tolerating PO fluids. Will continue with IV fluids while awaiting GI recommendations.     Physical Exam   Vital Signs: Temp: 98  F (36.7  C) Temp src: Oral BP: 125/88 Pulse: 86   Resp: 16 SpO2: 95 % O2 Device: None (Room air)    Weight: 160 lbs 0 oz    Exam:  Constitutional: alert, no distress, and cooperative  Head: normocephalic, atraumatic  Neck: no asymmetry, masses, or scars  ENT: throat normal without erythema or exudate  Cardiovascular: RRR  Respiratory: diminished, respirations unlabored  Gastrointestinal: (+) BS, non tender, soft  Musculoskeletal: normal muscle tone, no pitting edema  Skin: no suspicious lesions or rashes  Neurologic: oriented x 3, moves all extremities, no slurred speech  Psychiatric: normal affect and mood    Medical Decision Making       Data     I have personally reviewed the following data over the past 24 hrs:    N/A  \   N/A   / N/A     N/A N/A N/A /  N/A   3.7 N/A N/A \       "

## 2023-02-03 NOTE — CONSULTS
"  Gastroenterology Consultation      Date of Admission:  1/27/2023  Reason for Admission: Falls  Date of Consult  2/3/2023   Requesting Physician:  No att. providers found           ASSESSMENT AND RECOMMENDATIONS:   Assessment:  47 year old female with a history of chronic immune sensory polyradiculopathy (CISP) / chronic inflammatory demyelinating polyneuropathy (CIDP) diagnosed in 2021, complex regional pain syndrome, BRCA+ mutation, s/p bilateral mastectomy, cervical cancer, celiac disease, history of diverticulitis, migraines, and depression who presented to the ED on 1/27/23 with multiple falls and worsening symptoms of CIDP. GI consulted for \"nausea, weight loss, dehydration, no BM x 1 week\".     # Constipation  # Nausea  # Presumed delayed gastric emptying   # History of celiac disease  - Patient reports no bowel movement in the last week. No abdominal pain but intermittent nausea  - Currently on Senna daily with PRN dulcolax and PRN dulcolax suppositories   - Labs unrevealing with normal lipase, normal WBC, LFTs normal  - Outpatient workup for presumed delayed gastric emptying in the setting of immobility and chronic narcotics; gastric emptying scan ordered but unable to be completed due to patient's buprenorphine   - Last CT imaging of abdomen and pelvis in 12/22 with evidence of large stool burden throughout colon.     Recommendations:  - AXR to rule out ileus/obstruction  - Pending AXR findings, recommend continuing full liquid to soft diet   - If no evidence of ileus/obstruction on AXR, recommend bowel regimen with Miralax BID (can titrate up to TID if needed), Senna BID, pink lady enema daily (can stop if having consistent bowel movements), dulcolax suppository daily   - If no improvement despite above regimen with titrations, could consider Motegrity as this would help with delayed gastric emptying as well   - Given presumed delayed gastric emptying, recommend small, frequent meals low in fat and " "fiber  - Analgesia/Antiemetics per primary team, limit opioids as able    COVID status negative    Gastroenterology follow up recommendations: TBD pending inpatient course    ADDENDUM:  - AXR with significant stool retention. Recommend bowel prep with 4L GoLytely     GI will sign off.     Thank you for involving us in this patient's care. Please do not hesitate to contact the GI service with any questions or concerns.     Pt seen and care plan discussed with Dr. Giron, GI staff physician.      Overall time spent on the date of this encounter preparing to see the patient (including chart review of available notes, clinical status events, imaging and labs); obtaining and/or reviewing separately obtained history; ordering medications, tests or procedures; communicating with other health care professionals; and documenting the above clinical information in the electronic medical record was 85 minutes.      Rosalinda Clark PA-C  GI Service  [facility]  Text Page  -------------------------------------------------------------------------------------------------------------------       Reason for Consultation:   We were asked by observation team to evaluate this patient with \"nausea, weight loss, dehydration, no BM x 1 week\".     History is obtained from the patient and the medical record.            History of Present Illness:     Peggy Jessica is a 47 year old female with a PMH significant for chronic immune sensory polyradiculopathy (CISP) / chronic inflammatory demyelinating polyneuropathy (CIDP) diagnosed in 2021, complex regional pain syndrome, BRCA+ mutation, s/p bilateral mastectomy, cervical cancer, celiac disease, history of diverticulitis, migraines, and depression who presented to the ED on 1/27/23 with multiple falls and worsening symptoms of CIDP.    She states she was seen by Dr. Mason in [place] for probable gastroparesis around a month ago. She reports she had an EGD at " the time that showed food in her stomach from a week ago. She was told to go on a strict liquid and soft food diet which she reports she has been doing for a month. She states she was supposed to have a gastric emptying scan but she has not been able to get off her bupronorphine in order to do the study. She reports over the past week she has had increased nausea with some regurgitation but no vomiting. She states her last bowel movement was 1 week ago, prior to that she states she was having 1 bowel movement a day, firm and formed. She notes that she did not need to strain often to have a bowel movement. She denies black or bloody stools. She denies abdominal pain. No fevers or chills. She reports prior to coming into the hospital she has had issues with food due to the sensory component of CIDP with food scent and texture so she was not eating a lot of food to begin with. She denies recent NSAID use. She does not drink alcohol. No current smoking tobacco.     Previous Procedures:  EGD 7/28/22 for dysphagia:  Findings:        The examined esophagus was normal.        The entire examined stomach was normal.        The examined duodenum was normal.                                               Diagnostic colonoscopy 6/15/2020 for chronic diarrhea and constipation, abdominal pain  Impression:        - The examined portion of the ileum was normal.   - One 6 mm polyp in the transverse colon, removed with a   cold snare. Resected and retrieved.   - The entire examined colon is normal on direct and   retroflexed views. Biopsied.             Past Medical History:   Reviewed and edited as appropriate  Past Medical History:   Diagnosis Date     BRCA positive      CIDP (chronic inflammatory demyelinating polyneuropathy) (H)      ASHKAN III with severe dysplasia 2002     Complex regional pain syndrome type 1 of right lower extremity             Past Surgical History:   Reviewed and edited as appropriate   Past Surgical History:    Procedure Laterality Date     BILATERAL OOPHORECTOMY Bilateral 2019     c section      2002     CHOLECYSTECTOMY  1997     COLONOSCOPY       ESOPHAGOSCOPY, GASTROSCOPY, DUODENOSCOPY (EGD), COMBINED N/A 07/28/2022    Procedure: ESOPHAGOGASTRODUODENOSCOPY (EGD);  Surgeon: Zack Maddox MD;  Location:  GI     HYSTERECTOMY  2004     MASTECTOMY Bilateral 2020              Social History:   Reviewed and edited as appropriate  Social History     Socioeconomic History     Marital status:      Spouse name: Not on file     Number of children: 2     Years of education: Not on file     Highest education level: Not on file   Occupational History     Not on file   Tobacco Use     Smoking status: Former     Types: Cigarettes     Smokeless tobacco: Never   Vaping Use     Vaping Use: Never used   Substance and Sexual Activity     Alcohol use: Not Currently     Drug use: Yes     Types: Marijuana     Comment: Medical Canibis patient     Sexual activity: Not Currently     Partners: Male   Other Topics Concern     Not on file   Social History Narrative     Not on file     Social Determinants of Health     Financial Resource Strain: Not on file   Food Insecurity: Not on file   Transportation Needs: Not on file   Physical Activity: Not on file   Stress: Not on file   Social Connections: Not on file   Intimate Partner Violence: Not on file   Housing Stability: Not on file              Family History:   Patient's family history is reviewed today and is non-contributory  Family History   Problem Relation Age of Onset     Kidney Disease Father         No known history of colorectal cancer, liver disease, or inflammatory bowel disease         Allergies:   Reviewed and edited as appropriate     Allergies   Allergen Reactions     Dihydroergotamine Anaphylaxis     Latex Anaphylaxis     Shellfish-Derived Products Anaphylaxis     Sumatriptan Anaphylaxis     Banana Unknown     Gabapentin Dizziness     Gluten Meal Other (See  Comments)     Celiac disease     Keppra [Levetiracetam] Nausea and Vomiting     Kiwi Unknown     Levofloxacin Other (See Comments)     Arrhythmia     Metronidazole Nausea and Vomiting     Nitrofurantoin Hives     Penicillins Hives     Pregabalin      Reglan [Metoclopramide]      Topiramate Visual Disturbance     Aspirin Rash     Methadone Rash     Morphine Hives     She got hives around are when morphine given but resolved after few minutes per patient      Risperidone Anxiety            Medications:     Medications Prior to Admission   Medication Sig Dispense Refill Last Dose     acetaminophen (TYLENOL) 325 MG tablet Take 3 tablets (975 mg) by mouth every 8 hours   1/26/2023 at unknown     buprenorphine (BUTRANS) 5 MCG/HR WK patch Place 1 patch onto the skin every 7 days   1/27/2023 at unknown     cyclobenzaprine (FLEXERIL) 10 MG tablet Take 1 tablet (10 mg) by mouth 3 times daily   1/27/2023 at unknown     dicyclomine (BENTYL) 20 MG tablet Take 20 mg by mouth every 6 hours   1/27/2023 at unknown     diphenhydrAMINE (BENADRYL) 25 MG tablet Take 0.5 tablets (12.5 mg) by mouth every 6 hours as needed for allergies or other (nausea) 30 tablet 0 Unknown at unknown     folic acid (FOLVITE) 400 MCG tablet Take 1 tablet (400 mcg) by mouth daily 90 tablet 0 1/27/2023 at unknown     hydrOXYzine (ATARAX) 25 MG tablet    1/27/2023 at unknown     ibuprofen (ADVIL/MOTRIN) 200 MG tablet Take 3 tablets (600 mg) by mouth every 6 hours as needed for mild pain   1/26/2023 at unknown     Lidocaine (LIDOCARE) 4 % Patch Place 3 patches onto the skin every 24 hours To prevent lidocaine toxicity, patient should be patch free for 12 hrs daily.  0 Past Week at unknown     medical cannabis (Patient's own supply) 1 Dose See Admin Instructions (The purpose of this order is to document that the patient reports taking medical cannabis.  This is not a prescription, and is not used to certify that the patient has a qualifying medical  condition.)  Per pt: 5-10 mg TID PRN   1/27/2023 at unknown     multivitamin w/minerals (THERA-VIT-M) tablet Take 1 tablet by mouth every morning Meg[employer] Womens Brand   1/27/2023 at unknown     naloxone (NARCAN) 4 MG/0.1ML nasal spray Spray 1 spray (4 mg) into one nostril alternating nostrils as needed for opioid reversal every 2-3 minutes until assistance arrives 0.2 mL 0 Unknown at unknown     ondansetron (ZOFRAN) 4 MG tablet Take 1 tablet (4 mg) by mouth every 6 hours as needed for nausea or vomiting 30 tablet 0 1/27/2023 at unknown     ondansetron (ZOFRAN) 8 MG tablet    1/27/2023 at [facility]     polyethylene glycol (MIRALAX) 17 GM/Dose powder Take by mouth as needed   Unknown at unknown     scopolamine (TRANSDERM) 1 MG/3DAYS 72 hr patch    1/27/2023 at unknown     senna-docusate (SENOKOT-S/PERICOLACE) 8.6-50 MG tablet Take 1 tablet by mouth 2 times daily   Unknown at unknown             Review of Systems:     A complete review of systems was performed and is negative except as noted in the HPI             Physical Exam:   Temp: 98  F (36.7  C) Temp src: Oral BP: 125/88 Pulse: 86   Resp: 16 SpO2: 95 % O2 Device: None (Room air)    Wt:   Wt Readings from Last 2 Encounters:   01/27/23 72.6 kg (160 lb)   01/19/23 75.8 kg (167 lb)        General: 47 year old female in NAD.  Answers appropriately.    HEENT: Head is AT/NC. Sclera anicteric. No conjunctival injection.  Oropharynx is clear, moist   Neck: Supple  Lungs: Non labored breathing on room air     Heart: Regular rate   Abdomen: Soft, non-tender, non-distended. No rebound or peritoneal signs  Extremities: No pedal edema.  Heme/Lymph: No cervical adenopathy.   Skin: No jaundice or rash  Neurologic: Grossly non-focal            Data:   Labs and imaging below were independently reviewed and interpreted    LAB WORK:    BMP  Recent Labs   Lab 02/03/23  0627 02/02/23  0707 02/01/23  0041 01/31/23  1900 01/28/23  0619 01/27/23  0839   NA  --   --   --  [phone]    POTASSIUM 3.7 3.9 3.7 3.2* 4.3 4.1   CHLORIDE  --   --   --  109* 105 101   ESTEBAN  --   --   --  7.9* 9.4 9.7   CO2  --   --   --  24 26 25   BUN  --   --   --  3.3* 10.3 9.4   CR  --   --   --  0.62 0.89 0.93   GLC  --   --   --  89 83 112*     CBC  Recent Labs   Lab 01/31/23  1900 01/28/23  0619 01/27/23  0839   WBC 4.4 4.2 6.5   RBC 3.59* 4.27 4.92   HGB 11.4* 13.4 15.7   HCT 34.4* 41.9 46.9   MCV 96 98 95   MCH 31.8 31.4 31.9   MCHC 33.1 32.0 33.5   RDW 14.0 13.9 14.0    243 317     INR  Recent Labs   Lab 01/28/23  0619   INR 0.97     LFTs  Recent Labs   Lab 01/28/23  0619 01/27/23  0839   ALKPHOS 91 115*   AST 23 28   ALT 14 17   BILITOTAL 0.3 0.4   PROTTOTAL 6.6 8.0   ALBUMIN 3.6 4.3      PANC  Recent Labs   Lab 01/27/23  0839   LIPASE 23       IMAGING:  No recent abdominal imaging         =======================================================================

## 2023-02-04 ENCOUNTER — APPOINTMENT (OUTPATIENT)
Dept: PHYSICAL THERAPY | Facility: CLINIC | Age: 48
End: 2023-02-04
Payer: COMMERCIAL

## 2023-02-04 PROCEDURE — 258N000003 HC RX IP 258 OP 636: Performed by: PHYSICIAN ASSISTANT

## 2023-02-04 PROCEDURE — 250N000011 HC RX IP 250 OP 636: Performed by: PHYSICIAN ASSISTANT

## 2023-02-04 PROCEDURE — 97116 GAIT TRAINING THERAPY: CPT | Mod: GP

## 2023-02-04 PROCEDURE — 999N000248 HC STATISTIC IV INSERT WITH US BY RN

## 2023-02-04 PROCEDURE — 97530 THERAPEUTIC ACTIVITIES: CPT | Mod: GP

## 2023-02-04 PROCEDURE — 250N000013 HC RX MED GY IP 250 OP 250 PS 637: Performed by: NURSE PRACTITIONER

## 2023-02-04 PROCEDURE — 250N000009 HC RX 250: Performed by: PHYSICIAN ASSISTANT

## 2023-02-04 PROCEDURE — 96376 TX/PRO/DX INJ SAME DRUG ADON: CPT

## 2023-02-04 PROCEDURE — G0378 HOSPITAL OBSERVATION PER HR: HCPCS

## 2023-02-04 PROCEDURE — 99231 SBSQ HOSP IP/OBS SF/LOW 25: CPT | Performed by: PHYSICIAN ASSISTANT

## 2023-02-04 PROCEDURE — 96361 HYDRATE IV INFUSION ADD-ON: CPT

## 2023-02-04 RX ADMIN — OXYCODONE HYDROCHLORIDE 10 MG: 10 TABLET ORAL at 09:16

## 2023-02-04 RX ADMIN — SENNOSIDES AND DOCUSATE SODIUM 1 TABLET: 50; 8.6 TABLET ORAL at 21:43

## 2023-02-04 RX ADMIN — SERTRALINE HYDROCHLORIDE 50 MG: 50 TABLET ORAL at 09:11

## 2023-02-04 RX ADMIN — DICYCLOMINE HYDROCHLORIDE 20 MG: 20 TABLET ORAL at 09:10

## 2023-02-04 RX ADMIN — DICYCLOMINE HYDROCHLORIDE 20 MG: 20 TABLET ORAL at 15:27

## 2023-02-04 RX ADMIN — PANTOPRAZOLE SODIUM 40 MG: 40 TABLET, DELAYED RELEASE ORAL at 09:11

## 2023-02-04 RX ADMIN — OXYCODONE HYDROCHLORIDE 10 MG: 10 TABLET ORAL at 15:27

## 2023-02-04 RX ADMIN — ACETAMINOPHEN 650 MG: 325 TABLET, FILM COATED ORAL at 21:43

## 2023-02-04 RX ADMIN — ONDANSETRON HYDROCHLORIDE 8 MG: 2 INJECTION, SOLUTION INTRAMUSCULAR; INTRAVENOUS at 13:58

## 2023-02-04 RX ADMIN — SODIUM CHLORIDE: 9 INJECTION, SOLUTION INTRAVENOUS at 02:00

## 2023-02-04 RX ADMIN — ONDANSETRON HYDROCHLORIDE 8 MG: 2 INJECTION, SOLUTION INTRAMUSCULAR; INTRAVENOUS at 20:16

## 2023-02-04 RX ADMIN — OXYCODONE HYDROCHLORIDE 10 MG: 10 TABLET ORAL at 21:44

## 2023-02-04 RX ADMIN — ACETAMINOPHEN 650 MG: 325 TABLET, FILM COATED ORAL at 09:16

## 2023-02-04 RX ADMIN — SODIUM CHLORIDE: 9 INJECTION, SOLUTION INTRAVENOUS at 18:42

## 2023-02-04 RX ADMIN — ONDANSETRON 4 MG: 4 TABLET, ORALLY DISINTEGRATING ORAL at 01:04

## 2023-02-04 RX ADMIN — Medication 400 MCG: at 09:10

## 2023-02-04 RX ADMIN — DICYCLOMINE HYDROCHLORIDE 20 MG: 20 TABLET ORAL at 21:43

## 2023-02-04 RX ADMIN — ACETAMINOPHEN 650 MG: 325 TABLET, FILM COATED ORAL at 15:27

## 2023-02-04 RX ADMIN — DICYCLOMINE HYDROCHLORIDE 20 MG: 20 TABLET ORAL at 05:14

## 2023-02-04 RX ADMIN — POLYETHYLENE GLYCOL 3350 17 G: 17 POWDER, FOR SOLUTION ORAL at 20:01

## 2023-02-04 RX ADMIN — CYCLOBENZAPRINE HYDROCHLORIDE 10 MG: 5 TABLET, FILM COATED ORAL at 09:11

## 2023-02-04 RX ADMIN — SCOPALAMINE 1 PATCH: 1 PATCH, EXTENDED RELEASE TRANSDERMAL at 20:03

## 2023-02-04 RX ADMIN — SODIUM CHLORIDE: 9 INJECTION, SOLUTION INTRAVENOUS at 14:10

## 2023-02-04 RX ADMIN — SENNOSIDES 8.6 MG: 8.6 TABLET, COATED ORAL at 09:16

## 2023-02-04 RX ADMIN — CYCLOBENZAPRINE HYDROCHLORIDE 10 MG: 5 TABLET, FILM COATED ORAL at 20:03

## 2023-02-04 RX ADMIN — CYCLOBENZAPRINE HYDROCHLORIDE 10 MG: 5 TABLET, FILM COATED ORAL at 13:58

## 2023-02-04 ASSESSMENT — ACTIVITIES OF DAILY LIVING (ADL)
ADLS_ACUITY_SCORE: 35
ADLS_ACUITY_SCORE: 36
ADLS_ACUITY_SCORE: 39
ADLS_ACUITY_SCORE: 36
ADLS_ACUITY_SCORE: 36
ADLS_ACUITY_SCORE: 35
ADLS_ACUITY_SCORE: 35
ADLS_ACUITY_SCORE: 36
ADLS_ACUITY_SCORE: 35
ADLS_ACUITY_SCORE: 35
ADLS_ACUITY_SCORE: 36
ADLS_ACUITY_SCORE: 39

## 2023-02-04 NOTE — PLAN OF CARE
"Neuro- A&O x4  Cardiac- no tele   VS Blood pressure (!) 116/90, pulse 76, temperature 98.1  F (36.7  C), temperature source Oral, resp. rate 18, height 1.651 m (5' 5\"), weight 72.6 kg (160 lb), SpO2 97 %, not currently breastfeeding.  Pain-denied pain/had nausea and got PRN Zofran   O2- room air  GI/-  up to commode x3 for loose BM/had aggressive bowel regime dt LBM being 8-9days ago. Regular diet. Adequate UOP  Lines-  PIV flushed and saline locked   Activity-  up Ax1, stand and pivot to commode    No other concerns will continue to monitor and follow POC.     Goal Outcome Evaluation:    Safety placement - Waiting for TCU  -Medically ready to discharge to TCU, waiting placement     Niurka Greer RN on 2/4/2023 at 6:39 AM    "

## 2023-02-04 NOTE — PROGRESS NOTES
Care Management Follow Up    Length of Stay (days): 0    Expected Discharge Date: 02/06/2023     Concerns to be Addressed: discharge planning     Patient plan of care discussed at interdisciplinary rounds: Yes    Anticipated Discharge Disposition: Skilled Nursing Facility     Anticipated Discharge Services:  (Deferred)  Anticipated Discharge DME:  (Deferred)    Patient/family educated on Medicare website which has current facility and service quality ratings: yes  Education Provided on the Discharge Plan:    Patient/Family in Agreement with the Plan: yes    Referrals Placed by CM/SW: Post Acute Facilities    Referrals have been made to the following facilities and their status is as follows:    [facility]  [address]  [address]  P: [phone]  P: [phone] - Admissions  F: [phone]  - 2/4: SNF does not have weekend admissions coverage. SW to f/u with SNF on Monday.     [facility]  [address] [facility] [address]   P: [phone]  P: [phone] - Admissions  F: [phone]  - 2/4: Writer left VM with SNF admissions. SNF does not have weekend admissions coverage. SW to f/u with SNF on Monday. Additionally, SNF does not accept MA, which likely poses a barrier for pt if she does have MA as reported.     [facility].   P: [phone] - Adin Liaison (Richelle)  F: [phone]   2/3 - SW called and spoke with Liaison. Richelle will review pt's referral and will call SW back.   -2/4: SNF does not have weekend admissions coverage. SW to f/u with SNF on Monday.     [facility]  [address] [place]  [address]  P: [phone]  P: [phone] - Admissions  F: [phone]  - Writer preemptively faxed referral packet for SNF to review.  2/2 - SW left VM with Luzma in admission follow up with SNF referral.   2/3 -  SW left VM with Luzma in admission.   -2/4: SNF does not have weekend admissions  coverage. SW to f/u with SNF on Monday.     [facility]  [address] [facility] [place]  [address]  P: [phone]  P: [phone] - Admissions  F: [phone]  - Writer preemptively faxed referral packet for SNF to review.  2/2 - SW left VM with admission follow up with SNF referral.   2/3 - SW left VM with admission.    -2/4: SNF does not have weekend admissions coverage. SW to f/u with SNF on Monday.     [facility]  [address] [place] [address]  [address]  P: [phone]  F: [phone]  1/29 - request for further work ups to find out pt's diagnosis in order to decide on admission.   - 1/31:Writer spoke with Xenia in admissions. SNF to review.   - Writer receives message from Xenia, pt is not appropriate for ARU. They also have concerns that pt is around her baseline from the previous time she was discharged. They will keep following pt but they feel pt should be placed at a community TCU.   - 2/4: Writer spoke with Luzma in admissions. Requested SNF reconsider community placement d/t barriers to community placement (dietary restrictions, fall Hx). SNF to re-review.  - Germaine messaged if pt would have 24/7 support after discharge.      [facility]  [address] [place]  [address]  P: [phone]  P: [phone] - Admissions  F: [phone]  - Writer left VM with SNF admissions.   2/2 - SW left VM with admission follow up with SNF referral.   2/3 - SW left VM with admission follow up with SNF referral.  -2/4: SNF does not have weekend admissions coverage. SW to f/u with SNF on Monday.    The following facilities have either declined pt or lack bed availability:     [facility]  [address] [facility] [place]  [address]  P: [phone]  P: [phone] - Admissions  F: [phone]  - Writer preemptively faxed referral packet for SNF to review.  2/3 - SW receive message via Feedtrace. TCU decline. No beds available. Writer has ceased  following this referral.     [facility]  P: [phone]  P: [phone] - Admissions  F: [phone]  - Writer preemptively faxed referral packet for SNF to review.  2/2 - SW left VM with admission follow up with SNF referral.   2/3 - SW received VM from admission. Declined can not meet pt's needs of assist of 2 with frequent falls. Writer has ceased following this referral.     [facility]  [address] [address]  [address]  P: [phone]  P: [phone] - Admissions  F: [phone]  - Writer preemptively faxed referral packet for SNF to review.  2/3 - SW receive message via Lokalite. TCU decline. No beds available. Writer has ceased following this referral.     [facility]  [address] [place]  [address]  P: [phone]  P: [phone] - Admissions  F: [phone]  - Writer preemptively faxed referral packet for SNF to review.  2/3 - SW receive message via Lokalite. TCU decline. No beds available. Writer has ceased following this referral.     [facility]  [address]  [place], [place]  [address]  P: [phone]  P: [phone] - Admissions  F: [phone]  - Writer preemptively faxed referral packet for SNF to review.  2/2 - SW spoke with Geri in admission Pt declines due to no bed available. Writer has ceased following this referral.     [facility]  P: [phone]  F: [phone]  - Writer preemptively faxed referral packet for SNF to review.  2/2 - SW checked in Lokalite. Facility report pt declined due to no bed available. Writer has ceased following this referral.     [facility]   [address] [facility] [place]  [address]  P: [phone]  P: [phone] - Admissions  F: [phone]  - Writer preemptively faxed referral packet for SNF to review.  2/2 - SW checked in Epic. Facility report pt declined due to no bed available. Writer has ceased following this referral.     [facility]   [address] [facility]  [place]  [address]  P: [phone]  P: [phone] - Admissions  F: [phone]  - Writer preemptively faxed referral packet for SNF to review.  2/2 - SW called admission and spoke with Geri who report pt declined due to no bed available. Writer has ceased following this referral.        [facility]  [address] [place]  [address]  P: [phone]  P: [phone] - Admissions  F: [phone]  -1/29: Lack bed availability. Writer has ceased following this referral.     [facility], [address] [place]  [address]  P: [phone]  P: [phone] - Admissions  F: [phone]  - 1/31: Spoke with Marj in admissions. SNF does not have bed availability until next week. Writer has ceased following this referral.     [address] [address]  Ph: [phone]  Adm: [phone]  Fax: [phone]  - Writer left VM with SNF admissions.   - Writer received VM from admissions that they do not have bed availability, but they also do not believe they could meet pt's needs. Writer has ceased following this referral.    Private pay costs discussed: Not discussed at this time    Additional Information:  Chart reviewed. Case discussed with bedside RN and medical provider. Pt is now SBA--requesting PT complete re-evaluation, to determine if pt still requires TCU.      Writer met with pt to discuss reassessment from PT and new assessment from OT. Discussed post TCU support availability for pt. Pt reports her spouse could take some time off after discharge from TCU, and that she is working with friends/family for additional assistance/support. Pt reports that she was just approved for disability but needs help with her MA. RADHA Thompson started [facility] referral Friday--this will need to be f/u on Monday.     Per PT reassessment, TCU is still recommended as pt is Ax1 and has \"19 steps " "to access bedroom and bathroom and is alone during the day\". SW to continue to follow up on TCUs.    ________________    ANGI Miller, [facility]  ED/Observation   [facility]  Phone: [phone]  Pager: [phone]  Fax: [phone]    On-call pager, [phone], 4:00pm to midnight        "

## 2023-02-04 NOTE — PROGRESS NOTES
ED OBSERVATION PROGRESS NOTE:  S:Peggy Jessica is a 47 year old female with a medical history of chronic immune sensory polyradiculopathy (CISP)/Chronic inflammatory demyelinating polyneuropathy (CIDP) diagnosed in 2021, complex regional pain syndrome (RLE 2/2 stress fracture; chest 2/2 bilateral mastectomy- currently under care of pain management), BRCA+ mutation, cervical cancer, celiac disease, diverticulitis, migraines, and depressed mood who presents to the ED progressive worsening of CIDP neurologic symptoms including loss of proprioception and x7 falls over the last 2 days.     Chief Complaint   Patient presents with     Arm Pain     Leg and hand     1. Paresthesias    2. Multiple falls    3. 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       ALLERGIES:    Allergies   Allergen Reactions     Dihydroergotamine Anaphylaxis     Latex Anaphylaxis     Shellfish-Derived Products Anaphylaxis     Sumatriptan Anaphylaxis     Compazine [Prochlorperazine] Anxiety     Banana Unknown     Gabapentin Dizziness     Gluten Meal Other (See Comments)     Celiac disease     Keppra [Levetiracetam] Nausea and Vomiting     Kiwi Unknown     Levofloxacin Other (See Comments)     Arrhythmia     Metronidazole Nausea and Vomiting     Nitrofurantoin Hives     Penicillins Hives     Pregabalin      Reglan [Metoclopramide]       "Topiramate Visual Disturbance     Aspirin Rash     Methadone Rash     Morphine Hives     She got hives around are when morphine given but resolved after few minutes per patient      Risperidone Anxiety       O:BP (!) 136/99 (BP Location: Right arm)   Pulse 84   Temp 98.4  F (36.9  C) (Oral)   Resp 16   Ht 1.651 m (5' 5\")   Wt 72.6 kg (160 lb)   SpO2 97%   BMI 26.63 kg/m      Physical Exam   Constitutional: Pt is oriented to person, place, and time.Pt appears well-developed and well-nourished.   HENT:   Head: Normocephalic and atraumatic.   Eyes: Conjunctivae are normal. Pupils are equal, round, and reactive to light.   Neck: Normal range of motion. Neck supple.   Cardiovascular: Normal rate, regular rhythm, normal heart sounds and intact distal pulses.    Pulmonary/Chest: Effort normal and breath sounds normal. No respiratory distress. Pt has no wheezes. Pt has no rales  Abdominal: Soft. Bowel sounds are normal. Pt exhibits no distension and no mass. No tenderness. Pt has no rebound and no guarding.   Musculoskeletal: Normal range of motion. Pt exhibits no edema.   Neurological: Pt is alert and oriented to person, place, and time. Normal reflexes.   Skin: Skin is warm and dry. No rash noted.   Psychiatric: Pt has a normal mood and affect. Behavior is normal. Judgment and thought content normal.     Assessment & Plan        Peggy Jessica is a 47 year old female with a medical history of chronic immune sensory polyradiculopathy (CISP)/Chronic inflammatory demyelinating polyneuropathy (CIDP) diagnosed in 2021, complex regional pain syndrome (RLE 2/2 stress fracture; chest 2/2 bilateral mastectomy- currently under care of pain management), BRCA+ mutation, cervical cancer, celiac disease, diverticulitis, migraines, and depressed mood who presents to the ED progressive worsening of CIDP neurologic symptoms including loss of proprioception and x7 falls over the last 2 days.      #Loss of proprioception with " falls  #Chronic immune sensory polyradiculopathy  The patient presents with safety concerning symptoms as she has fallen 7 times in the past 2 days when attempted to move around. CBC and CMP are unremarkable. Neurology was consulted and recommended MRI and lumbar puncture. The patient requested lumbar puncture performed by IR as she had difficulties in the past. Neuroradiology was consulted and performed successful LP. MRI lumbar spine did not show any nerve root enhancement and CSF studies didn't reveal elevated protein/cells to suggest active flair of CIDP, therefore no recommendation for IVIG treatment was made by neurology. Patient was evaluated by PT given her history of frequent falls. They recommended TCU. [facility] requested psychiartry assessment of functional component of patient's symptoms  for evaluation of ARU admission. Patient was seen by psychiatry on 1/30 who felt anxiety may be contributing to some of her physical symptoms and recommended starting Zoloft and Hydroxyzine. SW working on TCU placement.  -Continue PT  -SW to assist with safe disposition    #Constipation  Patient reports BM overnight after starting Golytely. Reports improving abdominal pain and bloating. On exam, abdomen is soft.   - Continue with Golytely   - Continue with Senna daily with PRN dulcolax and PRN dulcolax suppositories   - Outpatient workup for presumed delayed gastric emptying in the setting of immobility and chronic narcotics; gastric emptying scan ordered but unable to be completed due to patient's buprenorphine   - Last CT imaging of abdomen and pelvis in 12/22 with evidence of large stool burden throughout colon.      #Chronic nausea  #Weight loss   #History of celiac disease   The patient reports undergoing GI work up for possible gastroparesis for the last month with plan for outpatient gastric emptying study.  -ADAT   -Normal saline at 75ml/hr   -Abdominal Xray per GI to rule out illus/obstruction - this was  negative for ilius or obstruction  -start bowel regiment, GoLytely, Miralax BID (can titrate up to TID if needed), Senna BID, pink lady enema daily (can stop if having consistent bowel movements), dulcolax suppository daily   - If no improvement despite above regimen with titrations, could consider Motegrity per GI as this would help with delayed gastric emptying as well   - Analgesia/Antiemetics, limit opioids as able     #Complex regional pain syndrome   -Continue PTA Butrans patch  -Continue PTA Flexeril   -Oxycodone 10mg as needed q 6 hours   -Continue with home Tylenol and Ibuprofen as needed      #Unspecified depressive disorder  #Panic disorder  #Generalized anxiety disorder  Patient was seen by psychiatry on 1/30/23. Recommended to start Zoloft 25 mg daily, after 5 days going up to 50 mg daily. She can go up to 100 mg daily after several weeks on 50 mg. May have transient GI symptoms.  -Started Zoloft 25 mg daily on 1/30/23  -Increase Zoloft to 50mg daily on 2/4/23  -Continue outpatient psychotherapy   -Continue with PTA Hydroxyzine as needed         Diet: Regular Diet Adult  / ADAT  DVT Prophylaxis: Pneumatic Compression Devices  Kwan Catheter: Not present  Lines: None     Cardiac Monitoring: None  Code Status: Full Code         Signed:  Idalmis Roblero PA-C  February 4, 2023 at 3:37 PM

## 2023-02-04 NOTE — PLAN OF CARE
Neuro: A&Ox4. Afebrile, bilateral lower extremity numbness/tingling unchanged.   Cardiac: VSS. Denies SOB and chest pain.   Respiratory: Sating >95% on RA.  GI/: Adequate urine output via bedside commode, loose BM X2. PRN Senna given x1, other bowel medications refused.   Diet/appetite: Tolerating regular diet. Poor appetite. PRN Zofran given x1 for intermittent nausea with adequate relief.   Activity:  Assist of 1, up to chair and bedside commode. Worked with PT today.   Pain: PRN oxycodone and Tylenol given x2 for lower extremity numbness/tingling with good relief.   Skin: No new deficits noted.  LDA's: Left PIV x1 with NS infusing at 100 mL/hr, currently paused pending new IV access.     Plan: Awaiting TCU placement. Continue with POC. Notify primary team with changes.        Goal Outcome Evaluation:     1. Safety placement - Waiting for TCU - In process, pending referrals.

## 2023-02-05 LAB
ANION GAP SERPL CALCULATED.3IONS-SCNC: 9 MMOL/L (ref 7–15)
BUN SERPL-MCNC: 3.1 MG/DL (ref 6–20)
CALCIUM SERPL-MCNC: 8.9 MG/DL (ref 8.6–10)
CHLORIDE SERPL-SCNC: 106 MMOL/L (ref 98–107)
CREAT SERPL-MCNC: 0.7 MG/DL (ref 0.51–0.95)
DEPRECATED HCO3 PLAS-SCNC: 25 MMOL/L (ref 22–29)
ERYTHROCYTE [DISTWIDTH] IN BLOOD BY AUTOMATED COUNT: 14.2 % (ref 10–15)
GFR SERPL CREATININE-BSD FRML MDRD: >90 ML/MIN/1.73M2
GLUCOSE SERPL-MCNC: 76 MG/DL (ref 70–99)
HCT VFR BLD AUTO: 36.3 % (ref 35–47)
HGB BLD-MCNC: 12.2 G/DL (ref 11.7–15.7)
MCH RBC QN AUTO: 31.9 PG (ref 26.5–33)
MCHC RBC AUTO-ENTMCNC: 33.6 G/DL (ref 31.5–36.5)
MCV RBC AUTO: 95 FL (ref 78–100)
PLATELET # BLD AUTO: 190 10E3/UL (ref 150–450)
POTASSIUM SERPL-SCNC: 3.9 MMOL/L (ref 3.4–5.3)
RBC # BLD AUTO: 3.82 10E6/UL (ref 3.8–5.2)
SODIUM SERPL-SCNC: 140 MMOL/L (ref 136–145)
WBC # BLD AUTO: 3.8 10E3/UL (ref 4–11)

## 2023-02-05 PROCEDURE — 36415 COLL VENOUS BLD VENIPUNCTURE: CPT | Performed by: PHYSICIAN ASSISTANT

## 2023-02-05 PROCEDURE — 250N000011 HC RX IP 250 OP 636: Performed by: PHYSICIAN ASSISTANT

## 2023-02-05 PROCEDURE — 250N000013 HC RX MED GY IP 250 OP 250 PS 637: Performed by: NURSE PRACTITIONER

## 2023-02-05 PROCEDURE — 80048 BASIC METABOLIC PNL TOTAL CA: CPT | Performed by: PHYSICIAN ASSISTANT

## 2023-02-05 PROCEDURE — 85027 COMPLETE CBC AUTOMATED: CPT | Performed by: PHYSICIAN ASSISTANT

## 2023-02-05 PROCEDURE — 99231 SBSQ HOSP IP/OBS SF/LOW 25: CPT | Performed by: EMERGENCY MEDICINE

## 2023-02-05 PROCEDURE — G0378 HOSPITAL OBSERVATION PER HR: HCPCS

## 2023-02-05 PROCEDURE — 96376 TX/PRO/DX INJ SAME DRUG ADON: CPT

## 2023-02-05 PROCEDURE — 258N000003 HC RX IP 258 OP 636: Performed by: PHYSICIAN ASSISTANT

## 2023-02-05 RX ADMIN — ACETAMINOPHEN 650 MG: 325 TABLET, FILM COATED ORAL at 13:51

## 2023-02-05 RX ADMIN — SERTRALINE HYDROCHLORIDE 50 MG: 50 TABLET ORAL at 07:51

## 2023-02-05 RX ADMIN — OXYCODONE HYDROCHLORIDE 10 MG: 10 TABLET ORAL at 19:51

## 2023-02-05 RX ADMIN — DICYCLOMINE HYDROCHLORIDE 20 MG: 20 TABLET ORAL at 10:02

## 2023-02-05 RX ADMIN — Medication 10 MG: at 13:47

## 2023-02-05 RX ADMIN — DICYCLOMINE HYDROCHLORIDE 20 MG: 20 TABLET ORAL at 21:34

## 2023-02-05 RX ADMIN — ONDANSETRON HYDROCHLORIDE 8 MG: 2 INJECTION, SOLUTION INTRAMUSCULAR; INTRAVENOUS at 19:02

## 2023-02-05 RX ADMIN — CYCLOBENZAPRINE HYDROCHLORIDE 10 MG: 5 TABLET, FILM COATED ORAL at 19:51

## 2023-02-05 RX ADMIN — PANTOPRAZOLE SODIUM 40 MG: 40 TABLET, DELAYED RELEASE ORAL at 07:50

## 2023-02-05 RX ADMIN — POLYETHYLENE GLYCOL 3350 17 G: 17 POWDER, FOR SOLUTION ORAL at 19:51

## 2023-02-05 RX ADMIN — OXYCODONE HYDROCHLORIDE 10 MG: 10 TABLET ORAL at 13:50

## 2023-02-05 RX ADMIN — DICYCLOMINE HYDROCHLORIDE 20 MG: 20 TABLET ORAL at 04:47

## 2023-02-05 RX ADMIN — CYCLOBENZAPRINE HYDROCHLORIDE 10 MG: 5 TABLET, FILM COATED ORAL at 07:50

## 2023-02-05 RX ADMIN — SENNOSIDES AND DOCUSATE SODIUM 1 TABLET: 50; 8.6 TABLET ORAL at 21:34

## 2023-02-05 RX ADMIN — DICYCLOMINE HYDROCHLORIDE 20 MG: 20 TABLET ORAL at 17:11

## 2023-02-05 RX ADMIN — DOCUSATE SODIUM 226 ML: 50 LIQUID ORAL at 08:04

## 2023-02-05 RX ADMIN — ACETAMINOPHEN 650 MG: 325 TABLET, FILM COATED ORAL at 07:50

## 2023-02-05 RX ADMIN — SODIUM CHLORIDE: 9 INJECTION, SOLUTION INTRAVENOUS at 21:34

## 2023-02-05 RX ADMIN — ONDANSETRON HYDROCHLORIDE 8 MG: 2 INJECTION, SOLUTION INTRAMUSCULAR; INTRAVENOUS at 08:59

## 2023-02-05 RX ADMIN — CYCLOBENZAPRINE HYDROCHLORIDE 10 MG: 5 TABLET, FILM COATED ORAL at 13:46

## 2023-02-05 RX ADMIN — ACETAMINOPHEN 650 MG: 325 TABLET, FILM COATED ORAL at 19:50

## 2023-02-05 RX ADMIN — Medication 400 MCG: at 07:51

## 2023-02-05 RX ADMIN — OXYCODONE HYDROCHLORIDE 10 MG: 10 TABLET ORAL at 07:50

## 2023-02-05 ASSESSMENT — ACTIVITIES OF DAILY LIVING (ADL)
ADLS_ACUITY_SCORE: 35

## 2023-02-05 NOTE — PLAN OF CARE
"BP (!) 136/101   Pulse 91   Temp 97.9  F (36.6  C)   Resp 18   Ht 1.651 m (5' 5\")   Wt 72.6 kg (160 lb)   SpO2 98%   BMI 26.63 kg/m       Neuro: A&O x4, bilateral lower extremity N/T present.  Pain/Nausea: Managed with PRN Oxy given x2 with some relief. Pt c/o Nausea PRN Zofran given X1 with relief.  Mobility: Assist x1 to bedside commode  Diet: Regular diet with poor appetite  LDAs: RPIV infusing NS @ 75mL/hr  Skin/incisions: No new deficits noted  Respiratory: WDL on RA denies SOB  Cardiac: WDL, denies chest pain  GI/: Voids via bedside commode. Enema and suppository administered this morning with good result pt had 2 large BM  Plan: Awaiting TCU placement      Goal Outcome Evaluation:      Safety placement - Waiting for TCU - Not met      "

## 2023-02-06 ENCOUNTER — APPOINTMENT (OUTPATIENT)
Dept: PHYSICAL THERAPY | Facility: CLINIC | Age: 48
End: 2023-02-06
Attending: NURSE PRACTITIONER
Payer: COMMERCIAL

## 2023-02-06 PROCEDURE — 250N000011 HC RX IP 250 OP 636: Performed by: PHYSICIAN ASSISTANT

## 2023-02-06 PROCEDURE — 97530 THERAPEUTIC ACTIVITIES: CPT | Mod: GP

## 2023-02-06 PROCEDURE — 250N000013 HC RX MED GY IP 250 OP 250 PS 637: Performed by: NURSE PRACTITIONER

## 2023-02-06 PROCEDURE — G0378 HOSPITAL OBSERVATION PER HR: HCPCS

## 2023-02-06 PROCEDURE — 96376 TX/PRO/DX INJ SAME DRUG ADON: CPT

## 2023-02-06 PROCEDURE — 99231 SBSQ HOSP IP/OBS SF/LOW 25: CPT

## 2023-02-06 PROCEDURE — 258N000003 HC RX IP 258 OP 636: Performed by: PHYSICIAN ASSISTANT

## 2023-02-06 RX ADMIN — ONDANSETRON HYDROCHLORIDE 8 MG: 2 INJECTION, SOLUTION INTRAMUSCULAR; INTRAVENOUS at 10:25

## 2023-02-06 RX ADMIN — OXYCODONE HYDROCHLORIDE 10 MG: 10 TABLET ORAL at 04:21

## 2023-02-06 RX ADMIN — DICYCLOMINE HYDROCHLORIDE 20 MG: 20 TABLET ORAL at 04:21

## 2023-02-06 RX ADMIN — POLYETHYLENE GLYCOL 3350 17 G: 17 POWDER, FOR SOLUTION ORAL at 07:46

## 2023-02-06 RX ADMIN — SODIUM CHLORIDE: 9 INJECTION, SOLUTION INTRAVENOUS at 21:04

## 2023-02-06 RX ADMIN — PANTOPRAZOLE SODIUM 40 MG: 40 TABLET, DELAYED RELEASE ORAL at 07:46

## 2023-02-06 RX ADMIN — ACETAMINOPHEN 650 MG: 325 TABLET, FILM COATED ORAL at 04:20

## 2023-02-06 RX ADMIN — CYCLOBENZAPRINE HYDROCHLORIDE 10 MG: 5 TABLET, FILM COATED ORAL at 07:46

## 2023-02-06 RX ADMIN — ONDANSETRON HYDROCHLORIDE 8 MG: 2 INJECTION, SOLUTION INTRAMUSCULAR; INTRAVENOUS at 16:55

## 2023-02-06 RX ADMIN — CYCLOBENZAPRINE HYDROCHLORIDE 10 MG: 5 TABLET, FILM COATED ORAL at 14:16

## 2023-02-06 RX ADMIN — OXYCODONE HYDROCHLORIDE 10 MG: 10 TABLET ORAL at 16:56

## 2023-02-06 RX ADMIN — Medication 400 MCG: at 07:45

## 2023-02-06 RX ADMIN — BUPRENORPHINE 1 PATCH: 5 PATCH TRANSDERMAL at 16:56

## 2023-02-06 RX ADMIN — DICYCLOMINE HYDROCHLORIDE 20 MG: 20 TABLET ORAL at 16:56

## 2023-02-06 RX ADMIN — DICYCLOMINE HYDROCHLORIDE 20 MG: 20 TABLET ORAL at 11:24

## 2023-02-06 RX ADMIN — OXYCODONE HYDROCHLORIDE 10 MG: 10 TABLET ORAL at 11:29

## 2023-02-06 RX ADMIN — ACETAMINOPHEN 650 MG: 325 TABLET, FILM COATED ORAL at 11:29

## 2023-02-06 RX ADMIN — CYCLOBENZAPRINE HYDROCHLORIDE 10 MG: 5 TABLET, FILM COATED ORAL at 20:22

## 2023-02-06 RX ADMIN — SERTRALINE HYDROCHLORIDE 50 MG: 50 TABLET ORAL at 07:46

## 2023-02-06 RX ADMIN — DICYCLOMINE HYDROCHLORIDE 20 MG: 20 TABLET ORAL at 21:56

## 2023-02-06 RX ADMIN — ACETAMINOPHEN 650 MG: 325 TABLET, FILM COATED ORAL at 21:55

## 2023-02-06 RX ADMIN — POLYETHYLENE GLYCOL 3350 17 G: 17 POWDER, FOR SOLUTION ORAL at 20:22

## 2023-02-06 RX ADMIN — SODIUM CHLORIDE: 9 INJECTION, SOLUTION INTRAVENOUS at 07:54

## 2023-02-06 RX ADMIN — OXYCODONE HYDROCHLORIDE 10 MG: 10 TABLET ORAL at 21:56

## 2023-02-06 RX ADMIN — SENNOSIDES AND DOCUSATE SODIUM 1 TABLET: 50; 8.6 TABLET ORAL at 21:55

## 2023-02-06 ASSESSMENT — ACTIVITIES OF DAILY LIVING (ADL)
ADLS_ACUITY_SCORE: 35

## 2023-02-06 NOTE — PROGRESS NOTES
[facility]    Medicine Progress Note - Emergency Department Observation Unit    Date of Admission:  1/27/2023    Assessment & Plan   Peggy Jessica is a 47 year old female with a medical history of chronic immune sensory polyradiculopathy (CISP)/Chronic inflammatory demyelinating polyneuropathy (CIDP) diagnosed in 2021, complex regional pain syndrome (RLE 2/2 stress fracture; chest 2/2 bilateral mastectomy- currently under care of pain management), BRCA+ mutation, cervical cancer, celiac disease, diverticulitis, migraines, and depressed mood who presents to the ED progressive worsening of CIDP neurologic symptoms including loss of proprioception and x7 falls two days prior to admission (1/27/23)      #Loss of proprioception with falls  #Chronic immune sensory polyradiculopathy  The patient presents with safety concerning symptoms as she has fallen 7 times in the past 2 days when attempted to move around. CBC and CMP are unremarkable. Neurology was consulted and recommended MRI and lumbar puncture. The patient requested lumbar puncture performed by IR as she had difficulties in the past. Neuroradiology was consulted and performed successful LP. MRI lumbar spine did not show any nerve root enhancement and CSF studies didn't reveal elevated protein/cells to suggest active flair of CIDP, therefore no recommendation for IVIG treatment was made by neurology. Patient was evaluated by PT given her history of frequent falls. They recommended TCU. [facility] requested psychiartry assessment of functional component of patient's symptoms  for evaluation of ARU admission. Patient was seen by psychiatry on 1/30 who felt anxiety may be contributing to some of her physical symptoms and recommended starting Zoloft and Hydroxyzine. SW working on TCU placement.  -Continue PT  -SW to assist with safe disposition     #Constipation  Patient reports BM overnight after starting  Golytely. Reports improving abdominal pain and bloating. On exam, abdomen is soft.   - Continue with Miralax BID  - Continue with Senna daily with PRN dulcolax and PRN dulcolax suppositories   - Outpatient workup for presumed delayed gastric emptying in the setting of immobility and chronic narcotics; gastric emptying scan ordered but unable to be completed due to patient's buprenorphine   - Last CT imaging of abdomen and pelvis in 12/22 with evidence of large stool burden throughout colon.      #Chronic nausea  #Weight loss   #History of celiac disease   The patient reports undergoing GI work up for possible gastroparesis for the last month with plan for outpatient gastric emptying study.  -ADAT   -Normal saline at 75ml/hr   -Abdominal Xray per GI to rule out illus/obstruction - this was negative for ilius or obstruction  -Miralax BID (can titrate up to TID if needed), Senna BID, pink lady enema daily (can stop if having consistent bowel movements), dulcolax suppository daily    - If no improvement despite above regimen with titrations, could consider Motegrity per GI as this would help with delayed gastric emptying as well   - Analgesia/Antiemetics, limit opioids as able     #Complex regional pain syndrome   -Continue PTA Butrans patch  -Continue PTA Flexeril   -Oxycodone 10mg as needed q 6 hours   -Continue with home Tylenol and Ibuprofen as needed      #Unspecified depressive disorder  #Panic disorder  #Generalized anxiety disorder  Patient was seen by psychiatry on 1/30/23. Recommended to start Zoloft 25 mg daily, after 5 days going up to 50 mg daily. She can go up to 100 mg daily after several weeks on 50 mg. May have transient GI symptoms.  -Started Zoloft 25 mg daily on 1/30/23  -Increase Zoloft to 50mg daily on 2/4/23  -Continue outpatient psychotherapy   -Continue with PTA Hydroxyzine as needed         Diet: Regular Diet Adult  / ADAT  DVT Prophylaxis: Pneumatic Compression Devices  Kwan Catheter: Not  "present  Lines: None     Cardiac Monitoring: None  Code Status: Full Code      Clinically Significant Risk Factors Present on Admission                       # Overweight: Estimated body mass index is 26.63 kg/m  as calculated from the following:    Height as of this encounter: 1.651 m (5' 5\").    Weight as of this encounter: 72.6 kg (160 lb).           Disposition Plan     Expected Discharge Date: 02/06/2023,  3:00 PM  Discharge Delays: *Medically Ready for Discharge  Placement - TCU  Destination: home          The patient's care was discussed with the Attending Physician, Dr. Chappell, Bedside Nurse and Patient.    MIRA [facility]  Hospitalist Service  [facility]  Securely message with [employer] (more info)  Text page via [employer] Paging/Directory   ______________________________________________________________________    Interval History   Had a bowel movement     Physical Exam   Vital Signs: Temp: 97.6  F (36.4  C) Temp src: Oral BP: 122/87 Pulse: 77   Resp: 16 SpO2: 98 % O2 Device: None (Room air)    Weight: 160 lbs 0 oz    Constitutional: Pt is oriented to person, place, and time.Pt appears well-developed and well-nourished.   HENT:   Head: Normocephalic and atraumatic.   Eyes: Conjunctivae are normal. Pupils are equal, round, and reactive to light.   Neck: Normal range of motion. Neck supple.   Cardiovascular: Normal rate, regular rhythm, normal heart sounds and intact distal pulses.    Pulmonary/Chest: Effort normal and breath sounds normal. No respiratory distress. Pt has no wheezes. Pt has no rales  Abdominal: Soft. Bowel sounds are normal. Pt exhibits no distension and no mass. No tenderness. Pt has no rebound and no guarding.   Musculoskeletal: Normal range of motion. Pt exhibits no edema.   Neurological: Pt is alert and oriented to person, place, and time. Normal reflexes.   Skin: Skin is warm and dry. No rash noted.   Psychiatric: Pt has a normal mood " and affect. Behavior is normal. Judgment and thought content normal.     Medical Decision Making     30 MINUTES SPENT BY ME on the date of service doing chart review, history, exam, documentation & further activities per the note.      Data   ------------------------- PAST 24 HR DATA REVIEWED -----------------------------------------------

## 2023-02-06 NOTE — PLAN OF CARE
"Goal Outcome Evaluation:      Plan of Care Reviewed With: patient    Overall Patient Progress: improving    Shift: 9952-1320  VS: BP (!) 133/96 (BP Location: Left arm)   Pulse 80   Temp 98.2  F (36.8  C) (Oral)   Resp 16   Ht 1.651 m (5' 5\")   Wt 72.6 kg (160 lb)   SpO2 97%   BMI 26.63 kg/m    Pain: Endorsed 5-7/10 BL upper and lower aching, tenderness, and tingling managed with PRN oxycodone and tylenol q6h and heat.   Neuro: A/O x 4, BL extremity numbness and tingling at baseline.   Cardiac: Regular rate and rhythm, denies chest pain or SOB.   Respiratory: Normal work of breathing, LCTAB, sating in the upper 90's on RA.   Diet/Appetite:  Regular diet. Poor appetite. Nausea relieved with PRN nausea.   /GI: Voiding spontaneously to bedside commode. Several small BM's OVN.   LDA's: Right upper arm PIV infusing 75 mL/hr NS  Skin: Petechia to the inner thigh/groin  Activity: SBA, up to bedside commode.     Plan: Awaiting TCU Placement     "

## 2023-02-06 NOTE — PROGRESS NOTES
47 year old female with a history of chronic immune sensory polyradiculopathy (CISP) / chronic inflammatory demyelinating polyneuropathy (CIDP) diagnosed in 2021, complex regional pain syndrome, BRCA+ mutation, s/p bilateral mastectomy, cervical cancer, celiac disease, history of diverticulitis, migraines, and depression who remains on ED obs unit waiting for tcu placement.  She was started on stool regimen while here for constipation.  She has had several bowel movements.  Labs have been followed and at baseline.

## 2023-02-06 NOTE — PROGRESS NOTES
Pt brought in home med for labeling. Brought in #2 patches which will be stored in narc cabinet of obs med room.  Patient-supplied home medication. Verified by two Pharmacists (initials LEO and RD).   Store in medication room on patient unit. Send medication home with patient on discharge.  --  Update, will only label 1 patch for use tomorrow and send other home with patient's family

## 2023-02-06 NOTE — PROGRESS NOTES
Care Management Follow Up    Length of Stay (days): 0    Expected Discharge Date: 02/06/2023     Concerns to be Addressed: discharge planning     Patient plan of care discussed at interdisciplinary rounds: Yes    Anticipated Discharge Disposition: Skilled Nursing Facility     Anticipated Discharge Services:  (Deferred)  Anticipated Discharge DME:  (Deferred)    Patient/family educated on Medicare website which has current facility and service quality ratings: yes  Education Provided on the Discharge Plan:    Patient/Family in Agreement with the Plan: yes    Referrals Placed by CM/SW: Post Acute Facilities     Referrals have been made to the following facilities and their status is as follows:     [facility]  [address] [facility] [address]   P: [phone]  P: [phone] - Admissions  F: [phone]  - 2/4: Writer left VM with SNF admissions. SNF does not have weekend admissions coverage. SW to f/u with SNF on Monday. Additionally, SNF does not accept MA, which likely poses a barrier for pt if she does have MA as reported.  -2/6: SW call admission, not able to leave message. Mailbox is full.     [facility].   P: [phone] - [place] Liaison (Richelle)  F: [phone]   2/3 - SW called and spoke with Liaison. Richelle will review pt's referral and will call SW back.   -2/4: SNF does not have weekend admissions coverage. SW to f/u with SNF on Monday.  -2/6: SW connect with Richelle. [facility] -Declined d/t out of network provider at facility.      [facility]  [address] [place]  [address]  P: [phone]  P: [phone] - Admissions  F: [phone]  - Writer preemptively faxed referral packet for SNF to review.  2/2 - SW left VM with Luzma in admission follow up with SNF referral.   2/3 -  SW left VM with Luzma in admission.   -2/4: SNF does not have weekend admissions coverage. SW to f/u with SNF on  Monday.  -2/6: SW left VM with Luzma in admission follow up on SNF referral.      [facility]  [address] [place]  [address]  P: [phone]  P: [phone] - Admissions  F: [phone]  - Writer preemptively faxed referral packet for SNF to review.  2/2 - SW left VM with admission follow up with SNF referral.   2/3 - SW left VM with admission.    -2/4: SNF does not have weekend admissions coverage. SW to f/u with SNF on Monday.  2/6 - SW left VM with admission.     [facility]  [address] [place] [address]  [address]  P: [phone]  F: [phone]  1/29 - request for further work ups to find out pt's diagnosis in order to decide on admission.   - 1/31:Writer spoke with Xenia in admissions. SNF to review.   - Writer receives message from Xenia, pt is not appropriate for ARU. They also have concerns that pt is around her baseline from the previous time she was discharged. They will keep following pt but they feel pt should be placed at a community TCU.   - 2/4: Writer spoke with Luzma in admissions. Requested SNF reconsider community placement d/t barriers to community placement (dietary restrictions, fall Hx). SNF to re-review.  - Germaine messaged if pt would have 24/7 support after discharge.      [facility]  [address] [place]  [address]  P: [phone]  P: [phone] - Admissions  F: [phone]  - Writer left VM with SNF admissions.   2/2 - SW left VM with admission follow up with SNF referral.   2/3 - SW left VM with admission follow up with SNF referral.  -2/4: SNF does not have weekend admissions coverage. SW to f/u with SNF on Monday.  2/3 - SW left VM with admission.     The following facilities have either declined pt or lack bed availability:     [facility]  [facility] [address] [place] [address]  [address]  P: [phone]  P: [phone] - Admissions  F: [phone]  - 2/4: SNF does not have weekend admissions coverage. SW to  f/u with SNF on Monday.  2/6 - SW spoke with Shan. No beds available. Writer has ceased following this referral.     [facility]  [address] [place]  [address]  P: [phone]  P: [phone] - Admissions  F: [phone]  - Writer preemptively faxed referral packet for SNF to review.  2/3 - SW receive message via [employer]. TCU decline. No beds available. Writer has ceased following this referral.     [facility]  P: [phone]  P: [phone] - Admissions  F: [phone]  - Writer preemptively faxed referral packet for SNF to review.  2/2 - SW left VM with admission follow up with SNF referral.   2/3 - SW received VM from admission. Declined can not meet pt's needs of assist of 2 with frequent falls. Writer has ceased following this referral.     [facility]  [address] [place]  [address]  P: [phone]  P: [phone] - Admissions  F: [phone]  - Writer preemptively faxed referral packet for SNF to review.  2/3 - SW receive message via [employer]. TCU decline. No beds available. Writer has ceased following this referral.     [facility]  [address] [place]  [address]  P: [phone]  P: [phone] - Admissions  F: [phone]  - Writer preemptively faxed referral packet for SNF to review.  2/3 - SW receive message via [employer]. TCU decline. No beds available. Writer has ceased following this referral.     [facility]  [address]  [place], [place]  [address]  P: [phone]  P: [phone] - Admissions  F: [phone]  - Writer preemptively faxed referral packet for SNF to review.  2/2 - SW spoke with Geri in admission Pt declines due to no bed available. Writer has ceased following this referral.     [facility] [place]  P: [phone]  F: [phone]  - Writer preemptively faxed referral packet for SNF to review.  2/2 - SW checked in Epic. Facility report pt declined due to no bed  available. Writer has ceased following this referral.     [facility]   [address] [facility]  [place]  [address]  P: [phone]  P: [phone] - Admissions  F: [phone]  - Writer preemptively faxed referral packet for SNF to review.  2/2 - SW checked in Epic. Facility report pt declined due to no bed available. Writer has ceased following this referral.     [facility]   [address] [facility] [place]  [address]  P: [phone]  P: [phone] - Admissions  F: [phone]  - Writer preemptively faxed referral packet for SNF to review.  2/2 - SW called admission and spoke with Geri who report pt declined due to no bed available. Writer has ceased following this referral.     [facility]  [address]  [address]  P: [phone]  P: [phone] - Admissions  F: [phone]  -1/29: Lack bed availability. Writer has ceased following this referral.     [facility], [address] [place]  [address]  P: [phone]  P: [phone] - Admissions  F: [phone]  - 1/31: Spoke with Marj in admissions. SNF does not have bed availability until next week. Writer has ceased following this referral.     [address] [address]  Ph: [phone]  Adm: [phone]  Fax: [phone]  - Writer left VM with SNF admissions.   - Writer received VM from admissions that they do not have bed availability, but they also do not believe they could meet pt's needs. Writer has ceased following this referral.Private pay costs discussed: Not applicable    Additional Information:  4926  SW received call from [place] Liaison Julien) confirmed that [facility] is willing to take pt. It's aprivate room with a shared bathroom pending insurance authorization.     SW met with pt at bedside and informed her [facility] accepted referral for  private room with shared bathroom pending insurance check/auth. Pt is agreeable to plan. SW will keep pt update.     RADHA called Richelle and confirm pt will take the room. Richelle will have [facility] run insurance and will notify SW once they get the approval.     SW called SNF to follow up on referral.    1106  RADHA emailed [facility]: [place] asking for update on pt's MA status check. Aksuboaz have checked with the county and per them there is nothing that is active, open, or pending. Do you want me to reach out to pt and see if she will consider applying for MA?    SW respond and confirm with [facility] to reach out to pt to help start MA application.    1556  SW spoke with Liaison, Richelle and report that [facility] respond back to her and report that the doctor at [facility] is not in pt's network, therefore they can not accept pt. Richelle suggest to call [facility] and see if they can provide a list of in network SNF for pt.     RADHA met with pt at bedside and provide update. Due to facility's provider not being in pt's insurance network they are not able to accept pt. SW to continue to check on pt's pending SNF referrals.       available and will continue to follow for discharge planning and supports as needed.      _______________________    SAMMI Koo, LSW  ED/OBS   [facility]  Phone: [phone]  Pager: [phone]  Fax: [phone]     On-call pager, [phone], 4:00 pm to midnight

## 2023-02-07 ENCOUNTER — APPOINTMENT (OUTPATIENT)
Dept: PHYSICAL THERAPY | Facility: CLINIC | Age: 48
End: 2023-02-07
Payer: COMMERCIAL

## 2023-02-07 LAB
HOLD SPECIMEN: NORMAL
POTASSIUM SERPL-SCNC: 3.8 MMOL/L (ref 3.4–5.3)

## 2023-02-07 PROCEDURE — 99231 SBSQ HOSP IP/OBS SF/LOW 25: CPT

## 2023-02-07 PROCEDURE — 250N000013 HC RX MED GY IP 250 OP 250 PS 637: Performed by: NURSE PRACTITIONER

## 2023-02-07 PROCEDURE — 250N000011 HC RX IP 250 OP 636: Performed by: PHYSICIAN ASSISTANT

## 2023-02-07 PROCEDURE — 258N000003 HC RX IP 258 OP 636: Performed by: PHYSICIAN ASSISTANT

## 2023-02-07 PROCEDURE — 250N000009 HC RX 250: Performed by: PHYSICIAN ASSISTANT

## 2023-02-07 PROCEDURE — 36415 COLL VENOUS BLD VENIPUNCTURE: CPT | Performed by: NURSE PRACTITIONER

## 2023-02-07 PROCEDURE — 97116 GAIT TRAINING THERAPY: CPT | Mod: GP

## 2023-02-07 PROCEDURE — 97110 THERAPEUTIC EXERCISES: CPT | Mod: GP

## 2023-02-07 PROCEDURE — G0378 HOSPITAL OBSERVATION PER HR: HCPCS

## 2023-02-07 PROCEDURE — 84132 ASSAY OF SERUM POTASSIUM: CPT | Performed by: NURSE PRACTITIONER

## 2023-02-07 PROCEDURE — 96376 TX/PRO/DX INJ SAME DRUG ADON: CPT

## 2023-02-07 RX ADMIN — CYCLOBENZAPRINE HYDROCHLORIDE 10 MG: 5 TABLET, FILM COATED ORAL at 15:05

## 2023-02-07 RX ADMIN — POLYETHYLENE GLYCOL 3350 17 G: 17 POWDER, FOR SOLUTION ORAL at 08:35

## 2023-02-07 RX ADMIN — OXYCODONE HYDROCHLORIDE 10 MG: 10 TABLET ORAL at 22:32

## 2023-02-07 RX ADMIN — Medication 400 MCG: at 08:35

## 2023-02-07 RX ADMIN — CYCLOBENZAPRINE HYDROCHLORIDE 10 MG: 5 TABLET, FILM COATED ORAL at 20:04

## 2023-02-07 RX ADMIN — SCOPALAMINE 1 PATCH: 1 PATCH, EXTENDED RELEASE TRANSDERMAL at 20:07

## 2023-02-07 RX ADMIN — SERTRALINE HYDROCHLORIDE 50 MG: 50 TABLET ORAL at 08:35

## 2023-02-07 RX ADMIN — DICYCLOMINE HYDROCHLORIDE 20 MG: 20 TABLET ORAL at 11:14

## 2023-02-07 RX ADMIN — CYCLOBENZAPRINE HYDROCHLORIDE 10 MG: 5 TABLET, FILM COATED ORAL at 08:35

## 2023-02-07 RX ADMIN — SODIUM CHLORIDE: 9 INJECTION, SOLUTION INTRAVENOUS at 10:21

## 2023-02-07 RX ADMIN — ACETAMINOPHEN 650 MG: 325 TABLET, FILM COATED ORAL at 10:27

## 2023-02-07 RX ADMIN — PANTOPRAZOLE SODIUM 40 MG: 40 TABLET, DELAYED RELEASE ORAL at 08:35

## 2023-02-07 RX ADMIN — OXYCODONE HYDROCHLORIDE 10 MG: 10 TABLET ORAL at 04:11

## 2023-02-07 RX ADMIN — DICYCLOMINE HYDROCHLORIDE 20 MG: 20 TABLET ORAL at 22:32

## 2023-02-07 RX ADMIN — DICYCLOMINE HYDROCHLORIDE 20 MG: 20 TABLET ORAL at 04:11

## 2023-02-07 RX ADMIN — ACETAMINOPHEN 650 MG: 325 TABLET, FILM COATED ORAL at 22:32

## 2023-02-07 RX ADMIN — DICYCLOMINE HYDROCHLORIDE 20 MG: 20 TABLET ORAL at 16:16

## 2023-02-07 RX ADMIN — ACETAMINOPHEN 650 MG: 325 TABLET, FILM COATED ORAL at 16:16

## 2023-02-07 RX ADMIN — Medication 10 MG: at 11:14

## 2023-02-07 RX ADMIN — ONDANSETRON HYDROCHLORIDE 8 MG: 2 INJECTION, SOLUTION INTRAMUSCULAR; INTRAVENOUS at 08:42

## 2023-02-07 RX ADMIN — OXYCODONE HYDROCHLORIDE 10 MG: 10 TABLET ORAL at 10:27

## 2023-02-07 RX ADMIN — OXYCODONE HYDROCHLORIDE 10 MG: 10 TABLET ORAL at 16:16

## 2023-02-07 RX ADMIN — ACETAMINOPHEN 650 MG: 325 TABLET, FILM COATED ORAL at 04:11

## 2023-02-07 ASSESSMENT — ACTIVITIES OF DAILY LIVING (ADL)
ADLS_ACUITY_SCORE: 35
ADLS_ACUITY_SCORE: 36
ADLS_ACUITY_SCORE: 35
ADLS_ACUITY_SCORE: 36
ADLS_ACUITY_SCORE: 36
ADLS_ACUITY_SCORE: 35
ADLS_ACUITY_SCORE: 35

## 2023-02-07 NOTE — PROGRESS NOTES
Care Management Follow Up    Length of Stay (days): 0    Expected Discharge Date: 02/07/2023     Concerns to be Addressed: discharge planning     Patient plan of care discussed at interdisciplinary rounds: Yes    Anticipated Discharge Disposition: Home Care     Anticipated Discharge Services:  (Deferred)  Anticipated Discharge DME:  (Deferred)    Patient/family educated on Medicare website which has current facility and service quality ratings: yes  Education Provided on the Discharge Plan:    Patient/Family in Agreement with the Plan: yes    Referrals Placed by CM/SW: Post Acute Facilities    Referrals have been made to the following facilities and their status is as follows:    [facility] (P: [phone]) - HC Agency reviewing.     [facility]  [address] [facility] [address]   P: [phone]  P: [phone] - Admissions  F: [phone]  - 2/4: Writer left VM with SNF admissions. SNF does not have weekend admissions coverage. SW to f/u with SNF on Monday. Additionally, SNF does not accept MA, which likely poses a barrier for pt if she does have MA as reported.  -2/6: SW call admission, not able to leave message. Mailbox is full.   - Writer attempted to contact admissions, however, the VM box is full. Writer updated the , who transferred writer to another VM box where writer left a generic VM requesting call back.     [facility] at [facility]  [address] [place]  [address]  P: [phone]  P: [phone] - Admissions  F: [phone]  - Writer preemptively faxed referral packet for SNF to review.  2/2 - SW left VM with Luzma in admission follow up with SNF referral.   2/3 -  SW left VM with Luzma in admission.   -2/4: SNF does not have weekend admissions coverage. SW to f/u with SNF on Monday.  -2/6: SW left VM with Luzma in admission follow up on SNF referral.   -2/7: Writer spoke with Germaine who reports she will review.     [facility]  "[facility]  [address] [place]  [address]  P: [phone]  P: [phone] - Admissions  F: [phone]  - Writer preemptively faxed referral packet for SNF to review.  2/2 - SW left VM with admission follow up with SNF referral.   2/3 - SW left VM with admission.    -2/4: SNF does not have weekend admissions coverage. SW to f/u with SNF on Monday.  2/6 - SW left VM with admission.  - Writer left VM with SNF admissions.   - 2/7: Writer left VM with SNF admissions.       [facility]  [address] [place]  [address]  P: [phone]  P: [phone] - Admissions  F: [phone]  - Writer left VM with SNF admissions.   2/2 - SW left VM with admission follow up with SNF referral.   2/3 - SW left VM with admission follow up with SNF referral.  -2/4: SNF does not have weekend admissions coverage. SW to f/u with SNF on Monday.  - 2/6 - SW left VM with admission.  - 2/7: Writer left VM with SNF admissions.     The following facilities have either declined pt or lack bed availability:    [facility]  [address] [place] [address]  [address]  P: [phone]  F: [phone]  1/29 - request for further work ups to find out pt's diagnosis in order to decide on admission.   - 1/31:Writer spoke with Xenia in admissions. SNF to review.   - Writer receives message from Xenia, pt is not appropriate for ARU. They also have concerns that pt is around her baseline from the previous time she was discharged. They will keep following pt but they feel pt should be placed at a community TCU.   - 2/4: Writer spoke with Luzma in admissions. Requested SNF reconsider community placement d/t barriers to community placement (dietary restrictions, fall Hx). SNF to re-review.  - Germaine messaged if pt would have 24/7 support after discharge.   - 2/7: Writer spoke with Xenia in admissions. SNF has accepted pt, but they continue to have a \"long\" waitlist and are recommending community placement " continued to be pursued.  - 2/7 Writer spoke with Xenia in admissions. Xenia spoke with PT and confirmed pt is at her near baseline mobility and does not meet their admission criteria. SNF has declined pt for admission. Writer has ceased following this referral.     [facility]  [address] [place] [address]  [address]  P: [phone]  P: [phone] - Admissions  F: [phone]  - 2/4: SNF does not have weekend admissions coverage. SW to f/u with SNF on Monday.  2/6 - SW spoke with Shan. No beds available. Writer has ceased following this referral.     [facility]  [address] [place]  [address]  P: [phone]  P: [phone] - Admissions  F: [phone]  - Writer preemptively faxed referral packet for SNF to review.  2/3 - SW receive message via [employer]. TCU decline. No beds available. Writer has ceased following this referral.     [facility]  P: [phone]  P: [phone] - Admissions  F: [phone]  - Writer preemptively faxed referral packet for SNF to review.  2/2 - SW left VM with admission follow up with SNF referral.   2/3 - SW received VM from admission. Declined can not meet pt's needs of assist of 2 with frequent falls. Writer has ceased following this referral.     [facility]  [address]  [address]  P: [phone]  P: [phone] - Admissions  F: [phone]  - Writer preemptively faxed referral packet for SNF to review.  2/3 - SW receive message via [employer]. TCU decline. No beds available. Writer has ceased following this referral.     [facility]  [address] [place]  [address]  P: [phone]  P: [phone] - Admissions  F: [phone]  - Writer preemptively faxed referral packet for SNF to review.  2/3 - SW receive message via [employer]. TCU decline. No beds available. Writer has ceased following this referral.     [facility] [place]  [address] .  [place],  [place]  [address]  P: [phone]  P: [phone] - Admissions  F: [phone]  - Writer preemptively faxed referral packet for SNF to review.  2/2 - SW spoke with Geri in admission Pt declines due to no bed available. Writer has ceased following this referral.     [facility]  P: [phone]  F: [phone]  - Writer preemptively faxed referral packet for SNF to review.  2/2 - SW checked in Epic. Facility report pt declined due to no bed available. Writer has ceased following this referral.     [facility]   [address] [place]  [address]  P: [phone]  P: [phone] - Admissions  F: [phone]  - Writer preemptively faxed referral packet for SNF to review.  2/2 - SW checked in Epic. Facility report pt declined due to no bed available. Writer has ceased following this referral.     [facility]   [address] [facility]  [place]  [address]  P: [phone]  P: [phone] - Admissions  F: [phone]  - Writer preemptively faxed referral packet for SNF to review.  2/2 - SW called admission and spoke with Geri who report pt declined due to no bed available. Writer has ceased following this referral.     [facility]  [address]  [address]  P: [phone]  P: [phone] - Admissions  F: [phone]  -1/29: Lack bed availability. Writer has ceased following this referral.     [facility], [address] [place]  [address]  P: [phone]  P: [phone] - Admissions  F: [phone]  - 1/31: Spoke with Marj in admissions. SNF does not have bed availability until next week. Writer has ceased following this referral.     [address] [address]  Ph: [phone]  Adm: [phone]  Fax: [phone]  - Writer left VM with SNF admissions.   - Writer received VM from admissions that they do not have bed availability,  but they also do not believe they could meet pt's needs. Writer has ceased following     [place] facilities.   P: [phone] - [place] Liaison (Richelle)  F: [phone]   2/3 - SW called and spoke with Liaison. Richelle will review pt's referral and will call SW back.   -2/4: SNF does not have weekend admissions coverage. SW to f/u with SNF on Monday.  -2/6: SW connect with Richelle. [facility] -Declined d/t out of network provider at facility.   2/7: Writer received clarification from Richelle that pt is globally denied because her insurance is out of network with all [place] facilities. Writer has ceased following this referral.    Private pay costs discussed: Not applicable    Additional Information:  Chart reviewed. Case discussed with bedside RN and medical provider. PT re-evaluated and pt can return home with C.    Writer met with pt and discussed discharge dispositional change. Pt is agreeable to return home. Pt reports she would like to discharge Thursday. Per conversation with provider, pt would likely be ready to discharge Wed (tomorrow). Writer offered choice for HHC. Pt declined preference other than if they were in network with her insurance.    SW to f/u with [facility] tomorrow.    ________________    ANGI Miller, [facility]  ED/Observation   [facility]  Phone: [phone]  Pager: [phone]  Fax: [phone]    On-call pager, [phone], 4:00pm to midnight

## 2023-02-07 NOTE — PLAN OF CARE
Goal Outcome Evaluation:       Patient alert and oriented x4. VSS on RA. Regular diet, reported that she feels she ate more today. Pain in bilateral lower extremities- PRN Tylenol and Toradol. Continue with bowel meds.         Goal Outcome Evaluation:  -Safety placement - Waiting for TCU: Not met

## 2023-02-07 NOTE — PROGRESS NOTES
CLINICAL NUTRITION SERVICES  Reason for Assessment:  Nutrition education regarding gastroparesis diet (with relation to other food restrictions)  Diet History:  Pt has been tolerating liquid, pureed, soft foods (even tender meats) the last 3-4 weeks.  She has intolerance/dislikes many protein drinks/powders she has tried. Has questions about what else she can eat.   Interventions:  Provided instruction on gastroparesis diet, considering her other food intolerances/allergies. Encouraged soft, well cooked vegetables, tender meats/fish/seafood, the flesh of non-seeded fruits, white rice and gluten free breads, dairy products.  Encouraged use of fairlife milk for non-sweet alternative to protein drinks as a way to increase protein intake (along with plain yogurt and cottage cheese).  Provided handouts: Gastroparesis Diet and Gluten Free Menu  Sending Figure 8 Surgical oral supplement for patient to try  Follow-up:    Entered outpatient nutrition referral for provider to sign on discharge.  Patient agreeable to this and would like to have her  present.      Monitoring/Evaluation  Progress toward goals will be monitored and evaluated per protocol.     Leeanna Jose, MS, RD, LD, CCTD, CNSC  7A/Obs unit pager [phone]  Weekend pager [phone]

## 2023-02-08 ENCOUNTER — APPOINTMENT (OUTPATIENT)
Dept: PHYSICAL THERAPY | Facility: CLINIC | Age: 48
End: 2023-02-08
Payer: COMMERCIAL

## 2023-02-08 PROCEDURE — 97530 THERAPEUTIC ACTIVITIES: CPT | Mod: GP

## 2023-02-08 PROCEDURE — G0378 HOSPITAL OBSERVATION PER HR: HCPCS

## 2023-02-08 PROCEDURE — 250N000013 HC RX MED GY IP 250 OP 250 PS 637: Performed by: NURSE PRACTITIONER

## 2023-02-08 PROCEDURE — 250N000011 HC RX IP 250 OP 636: Performed by: PHYSICIAN ASSISTANT

## 2023-02-08 PROCEDURE — 96376 TX/PRO/DX INJ SAME DRUG ADON: CPT

## 2023-02-08 PROCEDURE — 258N000003 HC RX IP 258 OP 636: Performed by: PHYSICIAN ASSISTANT

## 2023-02-08 RX ADMIN — DICYCLOMINE HYDROCHLORIDE 20 MG: 20 TABLET ORAL at 22:08

## 2023-02-08 RX ADMIN — OXYCODONE HYDROCHLORIDE 10 MG: 10 TABLET ORAL at 13:31

## 2023-02-08 RX ADMIN — CYCLOBENZAPRINE HYDROCHLORIDE 10 MG: 5 TABLET, FILM COATED ORAL at 20:48

## 2023-02-08 RX ADMIN — DICYCLOMINE HYDROCHLORIDE 20 MG: 20 TABLET ORAL at 18:27

## 2023-02-08 RX ADMIN — ONDANSETRON HYDROCHLORIDE 8 MG: 2 INJECTION, SOLUTION INTRAMUSCULAR; INTRAVENOUS at 22:16

## 2023-02-08 RX ADMIN — ONDANSETRON HYDROCHLORIDE 8 MG: 2 INJECTION, SOLUTION INTRAMUSCULAR; INTRAVENOUS at 11:17

## 2023-02-08 RX ADMIN — PANTOPRAZOLE SODIUM 40 MG: 40 TABLET, DELAYED RELEASE ORAL at 07:44

## 2023-02-08 RX ADMIN — SODIUM CHLORIDE: 9 INJECTION, SOLUTION INTRAVENOUS at 13:28

## 2023-02-08 RX ADMIN — OXYCODONE HYDROCHLORIDE 10 MG: 10 TABLET ORAL at 20:19

## 2023-02-08 RX ADMIN — OXYCODONE HYDROCHLORIDE 10 MG: 10 TABLET ORAL at 07:44

## 2023-02-08 RX ADMIN — DICYCLOMINE HYDROCHLORIDE 20 MG: 20 TABLET ORAL at 04:44

## 2023-02-08 RX ADMIN — ACETAMINOPHEN 650 MG: 325 TABLET, FILM COATED ORAL at 20:20

## 2023-02-08 RX ADMIN — ACETAMINOPHEN 650 MG: 325 TABLET, FILM COATED ORAL at 13:31

## 2023-02-08 RX ADMIN — Medication 400 MCG: at 07:44

## 2023-02-08 RX ADMIN — SERTRALINE HYDROCHLORIDE 50 MG: 50 TABLET ORAL at 07:44

## 2023-02-08 RX ADMIN — SODIUM CHLORIDE: 9 INJECTION, SOLUTION INTRAVENOUS at 00:20

## 2023-02-08 RX ADMIN — CYCLOBENZAPRINE HYDROCHLORIDE 10 MG: 5 TABLET, FILM COATED ORAL at 07:44

## 2023-02-08 RX ADMIN — CYCLOBENZAPRINE HYDROCHLORIDE 10 MG: 5 TABLET, FILM COATED ORAL at 13:29

## 2023-02-08 RX ADMIN — SENNOSIDES AND DOCUSATE SODIUM 1 TABLET: 50; 8.6 TABLET ORAL at 22:08

## 2023-02-08 RX ADMIN — DICYCLOMINE HYDROCHLORIDE 20 MG: 20 TABLET ORAL at 13:28

## 2023-02-08 RX ADMIN — ACETAMINOPHEN 650 MG: 325 TABLET, FILM COATED ORAL at 07:44

## 2023-02-08 ASSESSMENT — ACTIVITIES OF DAILY LIVING (ADL)
ADLS_ACUITY_SCORE: 35

## 2023-02-08 ASSESSMENT — ENCOUNTER SYMPTOMS
ACTIVITY IMPAIRMENT: IMPAIRED DUE TO WEAKNESS
NO PATIENT REPORTED PAIN: 1
WEAKNESS: 1
DIETARY ISSUES: ADEQUATE INTAKE

## 2023-02-08 NOTE — PLAN OF CARE
"Observation Goals:  Safety placement - Waiting for TCU: Not met    BP (!) 134/96 (BP Location: Left arm)   Pulse 83   Temp 97.9  F (36.6  C) (Oral)   Resp 18   Ht 1.651 m (5' 5\")   Wt 72.6 kg (160 lb)   SpO2 96%   BMI 26.63 kg/m     "

## 2023-02-08 NOTE — PROGRESS NOTES
"Peggy Jessica is a 47 year old female patient.  1. Celiac disease    2. Paresthesias    3. Multiple falls    4. CIDP (chronic inflammatory demyelinating polyneuropathy) (H)      Past Medical History:   Diagnosis Date     BRCA positive      CIDP (chronic inflammatory demyelinating polyneuropathy) (H)      ASHKAN III with severe dysplasia 2002     Complex regional pain syndrome type 1 of right lower extremity      No current outpatient medications on file.     Allergies   Allergen Reactions     Dihydroergotamine Anaphylaxis     Latex Anaphylaxis     Shellfish-Derived Products Anaphylaxis     Sumatriptan Anaphylaxis     Compazine [Prochlorperazine] Anxiety     Banana Unknown     Gabapentin Dizziness     Gluten Meal Other (See Comments)     Celiac disease     Keppra [Levetiracetam] Nausea and Vomiting     Kiwi Unknown     Levofloxacin Other (See Comments)     Arrhythmia     Metronidazole Nausea and Vomiting     Nitrofurantoin Hives     Penicillins Hives     Pregabalin      Reglan [Metoclopramide] Other (See Comments)     restlessness/toe tapping      Topiramate Visual Disturbance     Aspirin Rash     Methadone Rash     Morphine Hives     She got hives around are when morphine given but resolved after few minutes per patient      Risperidone Anxiety     Active Problems:    * No active hospital problems. *    Blood pressure (!) 122/91, pulse 79, temperature 98.1  F (36.7  C), temperature source Oral, resp. rate 18, height 1.651 m (5' 5\"), weight 72.6 kg (160 lb), SpO2 98 %, not currently breastfeeding.    Subjective:  Symptoms:  Stable.  She reports weakness.    Diet:  Adequate intake.    Activity level: Impaired due to weakness.    Pain:  She reports no pain.      Objective:  General Appearance:  Comfortable.    Vital signs: (most recent): Blood pressure (!) 122/91, pulse 79, temperature 98.1  F (36.7  C), temperature source Oral, resp. rate 18, height 1.651 m (5' 5\"), weight 72.6 kg (160 lb), SpO2 98 %, not " currently breastfeeding.  Vital signs are normal.    Output: Producing urine.    HEENT: Normal HEENT exam.    Lungs:  Normal effort and normal respiratory rate.  Breath sounds clear to auscultation.    Heart: Normal rate.  Regular rhythm.  S1 normal and S2 normal.    Abdomen: Abdomen is soft.  Bowel sounds are normal.   There is no abdominal tenderness.     Extremities: Normal range of motion.    Pulses: Distal pulses are intact.    Neurological: Patient is alert.    Pupils:  Pupils are equal, round, and reactive to light.    Skin:  Warm.      Assessment:    Condition: In stable condition.  Unchanged.   (47 yof with CIDP presents with frequent falls, awaiting TCU but apparently not available, discharge to home with home care tomorrow.).     The pt was seen and examined by myself. The case was reviewed and the plan was discussed with the NAVI    Camila Morin MD, MD  2/8/2023

## 2023-02-08 NOTE — PROGRESS NOTES
"Care Management Follow Up    Length of Stay (days): 0    Expected Discharge Date: 02/07/2023     Concerns to be Addressed: discharge planning     Patient plan of care discussed at interdisciplinary rounds: Yes    Anticipated Discharge Disposition: Home Care     Anticipated Discharge Services:  (Deferred)  Anticipated Discharge DME:  (Deferred)    Patient/family educated on Medicare website which has current facility and service quality ratings: yes  Education Provided on the Discharge Plan:    Patient/Family in Agreement with the Plan: yes    Referrals Placed by CM/SW: Post Acute Facilities  Private pay costs discussed: Not applicable    Additional Information:  Pt has new discharge recommendations. \" mobilizing near baseline. Update d/c rec to home with family assist and  PT to further progress IND and stability:    The following home care referrals were placed today and are currently pending.     Pending Home Care Referrals  [facility]  P:[phone]    [facility] [facility]  [phone]    [place]  [phone]    [facility], [facility]  [phone]    [facility]   [phone]    [facility]  [phone]      Declined  [facility]:   don't take insurance     [facility]  P: [phone]  Don't take insurance     [facility]  P:[phone]  Don't take insurance    [facility]  P: [phone]  Don't take insurance    [facility]  P: [phone]  Don't take insurance    Care management will continue to follow and support safe discharge planning.     Trini Metzger RN  RNCC Float       "

## 2023-02-08 NOTE — PROGRESS NOTES
[facility]  ED Obs Progress Note    Date of Service: 02/08/2023       Assessment & Plan   Peggy Jessica is a 47 year old female with a medical history of chronic immune sensory polyradiculopathy (CISP)/Chronic inflammatory demyelinating polyneuropathy (CIDP) diagnosed in 2021, complex regional pain syndrome (RLE 2/2 stress fracture; chest 2/2 bilateral mastectomy- currently under care of pain management), BRCA+ mutation, cervical cancer, celiac disease, diverticulitis, migraines, and depressed mood who presents to the ED progressive worsening of CIDP neurologic symptoms including loss of proprioception and x7 falls two days prior to admission (1/27/23)      #Loss of proprioception with falls  #Chronic immune sensory polyradiculopathy  The patient presents with safety concerning symptoms as she has fallen 7 times in the past 2 days (1/27/23) when attempted to move around. CBC and CMP are unremarkable. Neurology was consulted and recommended MRI and lumbar puncture. The patient requested lumbar puncture performed by IR as she had difficulties in the past. Neuroradiology was consulted and performed successful LP. MRI lumbar spine did not show any nerve root enhancement and CSF studies didn't reveal elevated protein/cells to suggest active flair of CIDP, therefore no recommendation for IVIG treatment was made by neurology. Patient was evaluated by PT given her history of frequent falls. They recommended TCU. [facility] requested psychiartry assessment of functional component of patient's symptoms  for evaluation of ARU admission. Patient was seen by psychiatry on 1/30 who felt anxiety may be contributing to some of her physical symptoms and recommended starting Zoloft and Hydroxyzine. Patient was seen by PT today and patient was able to ambulate up stairs.  Home care referral sent per SW.  - Patient to discharge home tomorrow with home care.       #Constipation  Patient reports BM overnight after starting Golytely. Reports improving abdominal pain and bloating. On exam, abdomen is soft. Discussed with GI patient unable to complete gastric emptying study as she wants to continue buprenorphine. Unable to do gastric emptying study with this on. GI will discuss with her outpatient initiating Motegrity as this would help with delayed gastric emptying as well   - Continue with Miralax BID  - Continue with Senna daily with PRN dulcolax and PRN dulcolax suppositories      #Chronic nausea  #Weight loss   #History of celiac disease   The patient reports undergoing GI work up for possible gastroparesis for the last month with plan for outpatient gastric emptying study.  -ADAT   -Normal saline at 75ml/hr   -Abdominal Xray per GI to rule out illus/obstruction - this was negative for ilius or obstruction  -Miralax BID (can titrate up to TID if needed), Senna BID, pink lady enema daily (can stop if having consistent bowel movements), dulcolax suppository daily    - If no improvement despite above regimen with titrations, could consider Motegrity per GI as this would help with delayed gastric emptying as well   - Analgesia/Antiemetics, limit opioids as able     #Complex regional pain syndrome   -Continue PTA Butrans patch  -Continue PTA Flexeril   -Oxycodone 10mg as needed q 6 hours   -Continue with home Tylenol and Ibuprofen as needed      #Unspecified depressive disorder  #Panic disorder  #Generalized anxiety disorder  Patient was seen by psychiatry on 1/30/23. Recommended to start Zoloft 25 mg daily, after 5 days going up to 50 mg daily. She can go up to 100 mg daily after several weeks on 50 mg. May have transient GI symptoms.  -Started Zoloft 25 mg daily on 1/30/23  -Increase Zoloft to 50mg daily on 2/4/23  -Continue outpatient psychotherapy   -Continue with PTA Hydroxyzine as needed         Diet: Regular Diet Adult  / ADAT  DVT Prophylaxis: Pneumatic Compression  Devices  Kwan Catheter: Not present  Lines: None     Cardiac Monitoring: None  Code Status: Full Code      Smita Gutierrez PA-C  To contact the ED OBS NAVI on Service, [facility] [phone]  ED Observation Unit [phone]    Interval History   Patient ready to discharge tomorrow. RNCC working on HHC  ROS x 8 negative with exception of those things listed in interval hx    Physical Exam   Temp: 98.6  F (37  C) Temp src: Oral BP: (!) 134/98 Pulse: 78   Resp: 18 SpO2: 98 % O2 Device: None (Room air)    Vitals:    01/27/23 0744   Weight: 72.6 kg (160 lb)     Vital Signs with Ranges  Temp:  [98.1  F (36.7  C)-98.6  F (37  C)] 98.6  F (37  C)  Pulse:  [78-85] 78  Resp:  [18] 18  BP: (116-134)/(91-98) 134/98  SpO2:  [98 %] 98 %  No intake/output data recorded.      Constitutional: Awake, alert, cooperative, no apparent distress.  Eyes: Lids and lashes normal, pupils equal, round and reactive to light, extra ocular muscles intact, sclera clear, conjunctiva normal.  HENT: Normocephalic, atraumatic  Respiratory: No increased work of breathing, good air exchange,   Cardiovascular:  regular rate and rhythm,    GI:Abdomen soft, non-distended,  Skin: Warm & dry  Musculoskeletal: There is no redness, warmth, or swelling of the joints.   Neurologic: Awake, alert, oriented. Strength and sensory is intact. No focal deficits.  Neuropsychiatric: Calm, normal eye contact, alert, affect appropriate to situation, oriented, thought process normal.

## 2023-02-08 NOTE — PLAN OF CARE
"Outpatient/Observation goals to be met before discharge home:   BP (!) 134/96 (BP Location: Left arm)   Pulse 83   Temp 97.9  F (36.6  C) (Oral)   Resp 18   Ht 1.651 m (5' 5\")   Wt 72.6 kg (160 lb)   SpO2 96%   BMI 26.63 kg/m      Safety placement - Waiting for TCU: Not met  "

## 2023-02-08 NOTE — PLAN OF CARE
"Observation Goals:  Safety placement - Waiting for TCU: Not met    BP (!) 116/93 (BP Location: Left arm, Patient Position: Supine)   Pulse 85   Temp 98.5  F (36.9  C) (Oral)   Resp 18   Ht 1.651 m (5' 5\")   Wt 72.6 kg (160 lb)   SpO2 98%   BMI 26.63 kg/m     "

## 2023-02-08 NOTE — PLAN OF CARE
"Observation Goals:  Safety placement - Waiting for TCU/Home: Not met    BP (!) 122/91 (BP Location: Left arm)   Pulse 79   Temp 98.1  F (36.7  C) (Oral)   Resp 18   Ht 1.651 m (5' 5\")   Wt 72.6 kg (160 lb)   SpO2 98%   BMI 26.63 kg/m     "

## 2023-02-08 NOTE — PROGRESS NOTES
[facility]    Medicine Progress Note - Emergency Department Observation Unit  Date of Admission:  1/27/2023    Assessment & Plan   Peggy Jessica is a 47 year old female with a medical history of chronic immune sensory polyradiculopathy (CISP)/Chronic inflammatory demyelinating polyneuropathy (CIDP) diagnosed in 2021, complex regional pain syndrome (RLE 2/2 stress fracture; chest 2/2 bilateral mastectomy- currently under care of pain management), BRCA+ mutation, cervical cancer, celiac disease, diverticulitis, migraines, and depressed mood who presents to the ED progressive worsening of CIDP neurologic symptoms including loss of proprioception and x7 falls two days prior to admission (1/27/23)      #Loss of proprioception with falls  #Chronic immune sensory polyradiculopathy  The patient presents with safety concerning symptoms as she has fallen 7 times in the past 2 days (1/27/23) when attempted to move around. CBC and CMP are unremarkable. Neurology was consulted and recommended MRI and lumbar puncture. The patient requested lumbar puncture performed by IR as she had difficulties in the past. Neuroradiology was consulted and performed successful LP. MRI lumbar spine did not show any nerve root enhancement and CSF studies didn't reveal elevated protein/cells to suggest active flair of CIDP, therefore no recommendation for IVIG treatment was made by neurology. Patient was evaluated by PT given her history of frequent falls. They recommended TCU. [facility] requested psychiartry assessment of functional component of patient's symptoms  for evaluation of ARU admission. Patient was seen by psychiatry on 1/30 who felt anxiety may be contributing to some of her physical symptoms and recommended starting Zoloft and Hydroxyzine. Patient was seen by PT today and patient was able to ambulate up stairs.  Home care referral sent per SW.  - Patient to discharge home tomorrow  with home care.      #Constipation  Patient reports BM overnight after starting Golytely. Reports improving abdominal pain and bloating. On exam, abdomen is soft. Discussed with GI patient unable to complete gastric emptying study as she wants to continue buprenorphine. Unable to do gastric emptying study with this on. GI will discuss with her outpatient initiating Motegrity as this would help with delayed gastric emptying as well   - Continue with Miralax BID  - Continue with Senna daily with PRN dulcolax and PRN dulcolax suppositories      #Chronic nausea  #Weight loss   #History of celiac disease   The patient reports undergoing GI work up for possible gastroparesis for the last month with plan for outpatient gastric emptying study.  -ADAT   -Normal saline at 75ml/hr   -Abdominal Xray per GI to rule out illus/obstruction - this was negative for ilius or obstruction  -Miralax BID (can titrate up to TID if needed), Senna BID, pink lady enema daily (can stop if having consistent bowel movements), dulcolax suppository daily    - If no improvement despite above regimen with titrations, could consider Motegrity per GI as this would help with delayed gastric emptying as well   - Analgesia/Antiemetics, limit opioids as able     #Complex regional pain syndrome   -Continue PTA Butrans patch  -Continue PTA Flexeril   -Oxycodone 10mg as needed q 6 hours   -Continue with home Tylenol and Ibuprofen as needed      #Unspecified depressive disorder  #Panic disorder  #Generalized anxiety disorder  Patient was seen by psychiatry on 1/30/23. Recommended to start Zoloft 25 mg daily, after 5 days going up to 50 mg daily. She can go up to 100 mg daily after several weeks on 50 mg. May have transient GI symptoms.  -Started Zoloft 25 mg daily on 1/30/23  -Increase Zoloft to 50mg daily on 2/4/23  -Continue outpatient psychotherapy   -Continue with PTA Hydroxyzine as needed         Diet: Regular Diet Adult  / ADAT  DVT  Prophylaxis: Pneumatic Compression Devices  Kwan Catheter: Not present  Lines: None     Cardiac Monitoring: None  Code Status: Full Code               Disposition Plan     Expected Discharge Date: 02/07/2023,  3:00 PM  Discharge Delays: *Medically Ready for Discharge  Placement - TCU  Destination: home          The patient's care was discussed with the Attending Physician, Dr. Chappell, Bedside Nurse and Patient.    MIRA Todd CNP  ______________________________________________________________________    Interval History   Had a bowel movement, able to eat a small dinner     Physical Exam   Vital Signs: Temp: 97.9  F (36.6  C) Temp src: Oral BP: (!) 134/96 Pulse: 83   Resp: 18 SpO2: 96 % O2 Device: None (Room air)    Weight: 160 lbs 0 oz    Constitutional: Pt is oriented to person, place, and time.Pt appears well-developed and well-nourished.   HENT:   Head: Normocephalic and atraumatic.   Eyes: Conjunctivae are normal. Pupils are equal, round, and reactive to light.   Neck: Normal range of motion. Neck supple.   Cardiovascular: Normal rate, regular rhythm, normal heart sounds and intact distal pulses.    Pulmonary/Chest: Effort normal and breath sounds normal. No respiratory distress. Pt has no wheezes. Pt has no rales  Abdominal: Soft. Bowel sounds are normal. Pt exhibits no distension and no mass. No tenderness. Pt has no rebound and no guarding.   Musculoskeletal: Normal range of motion. Pt exhibits no edema.   Neurological: Pt is alert and oriented to person, place, and time. Normal reflexes.   Skin: Skin is warm and dry. No rash noted.   Psychiatric: Pt has a normal mood and affect. Behavior is normal. Judgment and thought content normal.     30 MINUTES SPENT BY ME on the date of service doing chart review, history, exam, documentation & further activities per the note.      Data   ------------------------- PAST 24 HR DATA REVIEWED -----------------------------------------------

## 2023-02-09 ENCOUNTER — APPOINTMENT (OUTPATIENT)
Dept: PHYSICAL THERAPY | Facility: CLINIC | Age: 48
End: 2023-02-09
Payer: COMMERCIAL

## 2023-02-09 VITALS
HEART RATE: 91 BPM | RESPIRATION RATE: 16 BRPM | SYSTOLIC BLOOD PRESSURE: 121 MMHG | TEMPERATURE: 98.1 F | WEIGHT: 160 LBS | DIASTOLIC BLOOD PRESSURE: 89 MMHG | BODY MASS INDEX: 26.66 KG/M2 | HEIGHT: 65 IN | OXYGEN SATURATION: 95 %

## 2023-02-09 PROCEDURE — 258N000003 HC RX IP 258 OP 636: Performed by: PHYSICIAN ASSISTANT

## 2023-02-09 PROCEDURE — 99238 HOSP IP/OBS DSCHRG MGMT 30/<: CPT | Performed by: EMERGENCY MEDICINE

## 2023-02-09 PROCEDURE — 250N000013 HC RX MED GY IP 250 OP 250 PS 637: Performed by: NURSE PRACTITIONER

## 2023-02-09 PROCEDURE — 97530 THERAPEUTIC ACTIVITIES: CPT | Mod: GP

## 2023-02-09 PROCEDURE — G0378 HOSPITAL OBSERVATION PER HR: HCPCS

## 2023-02-09 RX ORDER — HYDROXYZINE HYDROCHLORIDE 25 MG/1
25 TABLET, FILM COATED ORAL 3 TIMES DAILY PRN
Qty: 20 TABLET | Refills: 0 | Status: SHIPPED | OUTPATIENT
Start: 2023-02-09 | End: 2023-02-20

## 2023-02-09 RX ORDER — PANTOPRAZOLE SODIUM 40 MG/1
40 TABLET, DELAYED RELEASE ORAL
Qty: 30 TABLET | Refills: 0 | Status: SHIPPED | OUTPATIENT
Start: 2023-02-10

## 2023-02-09 RX ORDER — SERTRALINE HYDROCHLORIDE 25 MG/1
25 TABLET, FILM COATED ORAL DAILY
Qty: 5 TABLET | Refills: 0 | Status: SHIPPED | OUTPATIENT
Start: 2023-02-09 | End: 2023-03-08

## 2023-02-09 RX ORDER — OXYCODONE HYDROCHLORIDE 10 MG/1
10 TABLET ORAL EVERY 6 HOURS PRN
Qty: 15 TABLET | Refills: 0 | Status: SHIPPED | OUTPATIENT
Start: 2023-02-09 | End: 2023-05-12

## 2023-02-09 RX ADMIN — Medication 1 TABLET: at 07:57

## 2023-02-09 RX ADMIN — CYCLOBENZAPRINE HYDROCHLORIDE 10 MG: 5 TABLET, FILM COATED ORAL at 07:57

## 2023-02-09 RX ADMIN — DICYCLOMINE HYDROCHLORIDE 20 MG: 20 TABLET ORAL at 04:42

## 2023-02-09 RX ADMIN — SODIUM CHLORIDE: 9 INJECTION, SOLUTION INTRAVENOUS at 02:22

## 2023-02-09 RX ADMIN — ACETAMINOPHEN 650 MG: 325 TABLET, FILM COATED ORAL at 08:09

## 2023-02-09 RX ADMIN — DICYCLOMINE HYDROCHLORIDE 20 MG: 20 TABLET ORAL at 12:01

## 2023-02-09 RX ADMIN — OXYCODONE HYDROCHLORIDE 10 MG: 10 TABLET ORAL at 08:09

## 2023-02-09 RX ADMIN — Medication 400 MCG: at 07:58

## 2023-02-09 RX ADMIN — PANTOPRAZOLE SODIUM 40 MG: 40 TABLET, DELAYED RELEASE ORAL at 07:57

## 2023-02-09 ASSESSMENT — ACTIVITIES OF DAILY LIVING (ADL)
ADLS_ACUITY_SCORE: 35

## 2023-02-09 NOTE — PROGRESS NOTES
DC instructions given to pt and pts spouse, verbalized understanding.  All belongings with pt, IV DC'd and documented.     Discharge medications were reviewed and sent home with pt.

## 2023-02-09 NOTE — PROGRESS NOTES
Care Management Follow Up    Length of Stay (days): 0    Expected Discharge Date: 02/07/2023     Concerns to be Addressed: discharge planning     Patient plan of care discussed at interdisciplinary rounds: Yes    Anticipated Discharge Disposition: Home PT vs OP PT     Anticipated Discharge Services:  OP PT  Anticipated Discharge DME:  As prior to admission, no new needs     Patient/family educated on Medicare website which has current facility and service quality ratings: yes  Education Provided on the Discharge Plan:  Yes   Patient/Family in Agreement with the Plan: yes    Referrals Placed by CM/SW: Home Care x 13  Private pay costs discussed: Not applicable    Additional Information:  RNCC followed up with home care referrals. Pt has been declined by referred agencies d/t her insurance. RNCC met with Pt at the bedside to discuss progress with locating home PT. Unfortunately, the Pts insurance has been a barrier with finding services. RNCC recommended Pt contact her insurance company to determine if she has home care coverage and if so, with what agencies. Pt provided RNCC with the phone number for her CM with [employer] and requested help calling as her anxiety it too high to call.     RNCC contacted Ryann with [facility] and obtained 4 home care agencies they are contracted with. RNCC contacted each and results are listed below.    Pending  [facility]  P: [phone]  F:[phone]  Stated they would review referral  1120Update: RNCC followed up with referral, stated they did not receive the fax, fax number was correct. RNCC resent    Declined  [facility]   [phone]  ** stated they are only accepting Pts from [facility]  [phone]  ** RNCC had previously referred to them, call again and they stated they are not contracted with this insurancel    [facility]  P [phone]  ** Not accepting Pts for the next 30 days     - - - -    Declined  [facility]  P:[phone]     [facility]  [employer]  [phone]     [place]  [phone]     [facility] [employer]  [phone]     [facility]   [phone]     [facility]  [phone]    [facility]:   don't take insurance      [facility]  P: [phone]  Don't take insurance     [facility]  P:[phone]  Don't take insurance     [facility]  P: [phone]  Don't take insurance     [facility]  P: [phone]  Don't take insurance    RNCC spoke with PT team and they will print home therapies for the Pt to perform safely at home and confirmed the Pt has all DME equipment at home for a safe discharge as well the Pt's  and eldest daughter have agreed to work together and provided the Pt consistent home support.     Care management will continue to follow and support safe discharge planning as needed.    Trini Metzger RN  RNCC Mike

## 2023-02-09 NOTE — PLAN OF CARE
"Observation Goals:  Safety placement - Waiting for TCU/Home: Not met    BP (!) 134/98 (BP Location: Left arm)   Pulse 78   Temp 98.6  F (37  C) (Oral)   Resp 18   Ht 1.651 m (5' 5\")   Wt 72.6 kg (160 lb)   SpO2 98%   BMI 26.63 kg/m     "

## 2023-02-09 NOTE — PLAN OF CARE
"Goal Outcome Evaluation:  Safety placement - Waiting for TCU/Home: Met  Pt potentially will discharge home today. Pt refused Miralax and enema.   /89 (BP Location: Left arm, Cuff Size: Adult Regular)   Pulse 91   Temp 98.1  F (36.7  C) (Oral)   Resp 16   Ht 1.651 m (5' 5\")   Wt 72.6 kg (160 lb)   SpO2 95%   BMI 26.63 kg/m                "

## 2023-02-09 NOTE — PLAN OF CARE
"Goal Outcome Evaluation:  BP (!) 120/92 (BP Location: Left arm)   Pulse 81   Temp 98.1  F (36.7  C) (Oral)   Resp 18   Ht 1.651 m (5' 5\")   Wt 72.6 kg (160 lb)   SpO2 95%   BMI 26.63 kg/m    Safety placement - Waiting for TCU/Home: Home care Pending approval  "

## 2023-02-09 NOTE — DISCHARGE SUMMARY
[facility]  Emergency Department Observation Unit Discharge Summary      Date of Admission:  1/27/2023  Date of Discharge:  2/9/2023  Discharging Provider: MIRA Todd CNP  Discharge Service: Emergency Department Observation Unit    Discharge Diagnoses   Falls  CISP  Constipation  Gastroparesis      Follow-ups Needed After Discharge   Follow-up Appointments     Adult [facility] Follow-up and recommended labs and tests      Follow up with primary care provider, Andrew Peck, within 7 days for   hospital follow- up.  No follow up labs or test are needed.   Follow up with outpatient GI.   Follow up with outpatient Psychotherapy      Appointments on [place] and/or [facility] (with [facility] or [facility]   provider or service). Call [phone] if you haven't heard regarding   these appointments within 7 days of discharge.         {Additional follow-up instructions/to-do's for PCP    : Hospital follow up     Unresulted Labs Ordered in the Past 30 Days of this Admission     No orders found from 12/28/2022 to 1/28/2023.      These results will be followed up by PCP    Discharge Disposition   Discharged to home  Condition at discharge: Stable  Patient ready to discharge to a skilled nursing facility as soon as possible in order to create capacity for patients related to the COVID-19 pandemic.    Hospital Course   Peggy Jessica is a 47 year old female with a medical history of chronic immune sensory polyradiculopathy (CISP)/Chronic inflammatory demyelinating polyneuropathy (CIDP) diagnosed in 2021, complex regional pain syndrome (RLE 2/2 stress fracture; chest 2/2 bilateral mastectomy- currently under care of pain management), BRCA+ mutation, cervical cancer, celiac disease, diverticulitis, migraines, and depressed mood who presents to the ED progressive worsening of CIDP neurologic symptoms including loss of proprioception and x7 falls two days prior to admission  (1/27/23)      #Loss of proprioception with falls  #Chronic immune sensory polyradiculopathy  The patient presents with safety concerning symptoms as she has fallen 7 times in the past 2 days (1/27/23) when attempted to move around. CBC and CMP are unremarkable. Neurology was consulted and recommended MRI and lumbar puncture. The patient requested lumbar puncture performed by IR as she had difficulties in the past. Neuroradiology was consulted and performed successful LP. MRI lumbar spine did not show any nerve root enhancement and CSF studies didn't reveal elevated protein/cells to suggest active flair of CIDP, therefore no recommendation for IVIG treatment was made by neurology. Patient was evaluated by PT given her history of frequent falls. They recommended TCU. [facility] requested psychiartry assessment of functional component of patient's symptoms  for evaluation of ARU admission. Patient was seen by psychiatry on 1/30 who felt anxiety may be contributing to some of her physical symptoms and recommended starting Zoloft and Hydroxyzine. Patient was seen by PT today and patient was able to ambulate up stairs.  Unable to find TCU and Home care. Patient was able to find friends and family to assist her at home.     #Constipation  Patient reports BM overnight after starting Golytely. Reports improving abdominal pain and bloating. On exam, abdomen is soft. Discussed with GI patient unable to complete gastric emptying study as she wants to continue buprenorphine. Unable to do gastric emptying study with this on. GI will discuss with her outpatient initiating Motegrity as this would help with delayed gastric emptying as well   - Continue with Miralax BID  - Continue with Senna daily with PRN dulcolax and PRN dulcolax suppositories      #Chronic nausea  #Weight loss   #History of celiac disease   The patient reports undergoing GI work up for possible gastroparesis for the last month with plan for outpatient gastric  emptying study.  -Abdominal Xray per GI to rule out illus/obstruction - this was negative for ilius or obstruction  - If no improvement despite above regimen with titrations, could consider Motegrity per GI as this would help with delayed gastric emptying as well   - Analgesia/Antiemetics, limit opioids as able     #Complex regional pain syndrome   -Continue PTA Butrans patch  -Continue PTA Flexeril   -Oxycodone 10mg as needed q 6 hours   -Continue with home Tylenol and Ibuprofen as needed      #Unspecified depressive disorder  #Panic disorder  #Generalized anxiety disorder  Patient was seen by psychiatry while here. Recommended to start Zoloft 25 mg daily, after 5 days going up to 50 mg daily. She can go up to 100 mg daily after several weeks on 50 mg. May have transient GI symptoms.  -Continue outpatient psychotherapy   -Continue with PTA Hydroxyzine as needed     Consultations This Hospital Stay   INTERVENTIONAL RADIOLOGY ADULT/PEDS IP CONSULT  PHYSICAL THERAPY ADULT IP CONSULT  NEUROLOGY INTERVENTIONAL ADULT IP CONSULT  PSYCHIATRY IP CONSULT  NURSING TO CONSULT FOR VASCULAR ACCESS CARE IP CONSULT  GI LUMINAL ADULT IP CONSULT  PHYSICAL THERAPY ADULT IP CONSULT  SPIRITUAL HEALTH SERVICES IP CONSULT  NURSING TO CONSULT FOR VASCULAR ACCESS CARE IP CONSULT  OCCUPATIONAL THERAPY ADULT IP CONSULT  NURSING TO CONSULT FOR VASCULAR ACCESS CARE IP CONSULT  GI LUMINAL ADULT IP CONSULT  PHYSICAL THERAPY ADULT IP CONSULT  NUTRITION SERVICES ADULT IP CONSULT    Code Status   Full Code    Time Spent on this Encounter   I, MIRA Todd CNP, personally saw the patient today and spent less than or equal to 30 minutes discharging this patient.       MIRA Todd CNP  [facility] [address] OBSERVATION [place] [address] [address]  Phone: [phone]  Fax: [phone]  ______________________________________________________________________    Physical Exam   Vital Signs: Temp:  98.1  F (36.7  C) Temp src: Oral BP: 121/89 Pulse: 91   Resp: 16 SpO2: 95 % O2 Device: None (Room air)    Weight: 160 lbs 0 oz  Constitutional: Awake, alert, cooperative, no apparent distress.  Eyes: Lids and lashes normal, pupils equal, round and reactive to light, extra ocular muscles intact, sclera clear, conjunctiva normal.  HENT: Normocephalic, atraumatic  Respiratory: No increased work of breathing, good air exchange,   Cardiovascular:  regular rate and rhythm,    GI:Abdomen soft, non-distended,  Skin: Warm & dry  Musculoskeletal: There is no redness, warmth, or swelling of the joints.   Neurologic: Awake, alert, oriented. Strength and sensory is intact. No focal deficits.  Neuropsychiatric: Calm, normal eye contact, alert, affect appropriate to situation, oriented, thought process normal.          Primary Care Physician   Andrew Peck    Discharge Orders      Adult Neurology  Referral      Nutrition Referral      Physical Therapy Referral      Reason for your hospital stay    Falls  Constipation  Gastroporesis     Activity    Your activity upon discharge: activity as tolerated     Adult [facility] Follow-up and recommended labs and tests    Follow up with primary care provider, Andrew Peck, within 7 days for hospital follow- up.  No follow up labs or test are needed.   Follow up with outpatient GI.   Follow up with outpatient Psychotherapy      Appointments on [place] and/or [facility] (with [facility] or [facility] provider or service). Call [phone] if you haven't heard regarding these appointments within 7 days of discharge.     Discharge Instructions    You were admitted to the ED observation unit after falls at home.  Neurology was consulted and recommended MRI and lumbar puncture. MRI lumbar spine did not show any nerve root enhancement and CSF studies didn't reveal elevated protein/cells to suggest active flair of CIDP, therefore no recommendation for IVIG treatment was made by neurology. For  your constipation, you received stool softeners and laxatives with improvement of your constipation.  Continue with Miralax twice a day. Continue with Senna daily as needed dulcolax suppositories as needed. For your continued chronic nausea and gastroporesis, GI was consulted and recommended an abdominal xray which was negative for obstruction.  Recommend follow up with GI outpatient for a potential gastric emptying study. Will discuss starting Motegrity outpatient as this would help with delayed gastric emptying. Use anti-nausea medications as needed. Limit opioids as able.  For your pain management, continue Butrans patch, continue Flexeril, Oxycodone 10mg as needed q 6 hours and continue with home Tylenol and Ibuprofen as needed. You were seen by psychiatry who recommended start Zoloft 25 mg daily, after 5 days going up to 50 mg daily.Continue with Hydroxyzine as needed for anxiety. Continue outpatient psychotherapy.     Diet    Follow this diet upon discharge: As per prior to your hospital stay.       Significant Results and Procedures   Results for orders placed or performed during the hospital encounter of 01/27/23   MR Lumbar Spine w/o & w Contrast    Narrative    EXAM: MR LUMBAR SPINE W/O & W CONTRAST  1/27/2023 9:54 PM     HISTORY:  increasing paresthesias, concern for CIDP flare/relapse -  eval for nerve root enhancement       COMPARISON:  Lumbar MR 4/30/2022    TECHNIQUE: Sagittal T1-weighted and T2-weighted and axial T2-weighted  images of the lumbar spine were obtained without intravenous contrast.  Post intravenous contrast using gadolinium axial and sagittal  T1-weighted images were obtained with fat saturation.    CONTRAST: 7.5mL Gadavist.    FINDINGS:  There are 5 lumbar type vertebrae identified through the whole spine   on 4/30/2022 lumbar MR. Conus tip at approximately L1. Normal  lumbar vertebral alignment. Disc space narrowing at L4-5 and L5-S1  with some loss of the normal T2 intradiscal  signal. Normal marrow  signal. Normal cauda equina.    On a level by level basis, the findings are as follows:    L1-2: No spinal canal or neural foraminal stenosis.    L2-3: No spinal canal or neural foraminal stenosis.    L3-4: No spinal canal or neural foraminal stenosis.    L4-5: Central disc protrusion. Bilateral facet hypertrophy. Mild  spinal canal narrowing. No neural foraminal narrowing.    L5-S1: Disc osteophyte complex and superimposed central protrusion. No  significant spinal canal narrowing. Mild bilateral neuroforaminal  narrowing.    The visualized paraspinous soft tissues are within normal limits.       Impression    IMPRESSION:  1.  No abnormal lumbar or cauda equina enhancement identified.  2.  Stable lumbar spondylosis without high-grade spinal canal or  foraminal narrowing.    I have personally reviewed the examination and initial interpretation  and I agree with the findings.    PRECIOUS HARRIS MD         SYSTEM ID:  [id number]   XR Lumbar Puncture Spinal Tap Diag    Narrative    PROCEDURE: Lumbar Puncture using Fluoroscopy, 1/28/2023 1:14 PM    HISTORY:  evaluate  increased CSF protein    COMPARISON: 1/27/2023 MRI lumbar    STAFF NEURORADIOLOGIST: Dr. Precious Harris I, PRECIOUS HARRIS MD,  attest that I was immediately available to provide guidance and  assistance during the entirety of the procedure.    FELLOW PHYSICIAN: Dr. Wilfredo Jones    MEDICATIONS:  1. 5 cc of local lidocaine 1%    TECHNIQUE: Verbal and written consent for lumbar puncture was obtained  from the patient, and benefits and risk of the procedure were  explained, including but not limited to worsening headache,  hemorrhage, infection, lower extremity pain, or nerve root injury. The  patient was sterilely prepped and draped with the patient in the prone  position, over the lower back. Under fluoroscopic guidance, the  interlaminar spaces were noted. 1% lidocaine was administered for  local anesthetic over the L3-4  interlaminar space, and a 22 gauge 3.5  inch needle was advanced into the thecal sac under fluoroscopic  guidance.      There was initial show of clear CSF. Approximately 12 cc of CSF were  collected.    The needle was removed with the stylet in place. There was no  immediate complication associated with the procedure. Samples were  sent for the requested laboratory testing.      FLUORO TIME: 22 seconds low-dose pulsed    DOSE: 4.3 mGy      Impression    IMPRESSION: Successful lumbar puncture without immediate complication.    PLAN: Patient discharged to patient care unit in stable condition for  monitoring. Orders placed for 1 hour of bed rest, with patient laying  on the back and the head of the bed flat.    I have personally reviewed the examination and initial interpretation  and I agree with the findings.    PRECIOUS HARRIS MD         SYSTEM ID:  [id number]   XR Abdomen 1 View    Narrative    Abdomen one view    Indication: No bowel movement 8 days, evaluate for obstruction    COMPARISON: CT abdomen 1/26/2022    FINDINGS: No evidence of small bowel distention. Cholecystectomy clips  are present in the right upper abdomen. Large amount of retained fecal  material is present in the large intestine. No evidence of  pneumatosis.      Impression    IMPRESSION: Significant retention of fecal material throughout the  large intestine.    JAHAIRA ELIAS MD         SYSTEM ID:  [id number]       Discharge Medications   Current Discharge Medication List      START taking these medications    Details   oxyCODONE IR (ROXICODONE) 10 MG tablet Take 1 tablet (10 mg) by mouth every 6 hours as needed for severe pain (7-10)  Qty: 15 tablet, Refills: 0    Associated Diagnoses: Multiple falls      pantoprazole (PROTONIX) 40 MG EC tablet Take 1 tablet (40 mg) by mouth every morning (before breakfast)  Qty: 30 tablet, Refills: 0    Associated Diagnoses: Gastroesophageal reflux disease with esophagitis, unspecified whether hemorrhage       !! sertraline (ZOLOFT) 25 MG tablet Take 1 tablet (25 mg) by mouth daily for 5 days  Qty: 5 tablet, Refills: 0    Associated Diagnoses: Depression, unspecified depression type      !! sertraline (ZOLOFT) 50 MG tablet Take 1 tablet (50 mg) by mouth daily  Qty: 20 tablet, Refills: 0    Associated Diagnoses: Depression, unspecified depression type       !! - Potential duplicate medications found. Please discuss with provider.      CONTINUE these medications which have CHANGED    Details   !! hydrOXYzine (ATARAX) 25 MG tablet Take 1 tablet (25 mg) by mouth 3 times daily as needed for itching  Qty: 20 tablet, Refills: 0    Associated Diagnoses: Depression, unspecified depression type       !! - Potential duplicate medications found. Please discuss with provider.      CONTINUE these medications which have NOT CHANGED    Details   acetaminophen (TYLENOL) 325 MG tablet Take 3 tablets (975 mg) by mouth every 8 hours    Associated Diagnoses: Closed nondisplaced fracture of base of fourth metacarpal bone of right hand with routine healing, subsequent encounter      buprenorphine (BUTRANS) 5 MCG/HR WK patch Place 1 patch onto the skin every 7 days      cyclobenzaprine (FLEXERIL) 10 MG tablet Take 1 tablet (10 mg) by mouth 3 times daily    Associated Diagnoses: Muscle spasm      dicyclomine (BENTYL) 20 MG tablet Take 20 mg by mouth every 6 hours      diphenhydrAMINE (BENADRYL) 25 MG tablet Take 0.5 tablets (12.5 mg) by mouth every 6 hours as needed for allergies or other (nausea)  Qty: 30 tablet, Refills: 0    Associated Diagnoses: Vomiting and diarrhea      folic acid (FOLVITE) 400 MCG tablet Take 1 tablet (400 mcg) by mouth daily  Qty: 90 tablet, Refills: 0    Associated Diagnoses: Folic acid deficiency      !! hydrOXYzine (ATARAX) 25 MG tablet       ibuprofen (ADVIL/MOTRIN) 200 MG tablet Take 3 tablets (600 mg) by mouth every 6 hours as needed for mild pain    Associated Diagnoses: Closed nondisplaced fracture of base of  fourth metacarpal bone of right hand with routine healing, subsequent encounter      Lidocaine (LIDOCARE) 4 % Patch Place 3 patches onto the skin every 24 hours To prevent lidocaine toxicity, patient should be patch free for 12 hrs daily.  Refills: 0    Associated Diagnoses: Complex regional pain syndrome i of right lower limb      medical cannabis (Patient's own supply) 1 Dose See Admin Instructions (The purpose of this order is to document that the patient reports taking medical cannabis.  This is not a prescription, and is not used to certify that the patient has a qualifying medical condition.)  Per pt: 5-10 mg TID PRN      multivitamin w/minerals (THERA-VIT-M) tablet Take 1 tablet by mouth every morning Meg[employer] Womens Brand      naloxone (NARCAN) 4 MG/0.1ML nasal spray Spray 1 spray (4 mg) into one nostril alternating nostrils as needed for opioid reversal every 2-3 minutes until assistance arrives  Qty: 0.2 mL, Refills: 0    Associated Diagnoses: Chronic pain syndrome      !! ondansetron (ZOFRAN) 4 MG tablet Take 1 tablet (4 mg) by mouth every 6 hours as needed for nausea or vomiting  Qty: 30 tablet, Refills: 0    Associated Diagnoses: Hospital discharge follow-up; Vomiting and diarrhea      !! ondansetron (ZOFRAN) 8 MG tablet       polyethylene glycol (MIRALAX) 17 GM/Dose powder Take by mouth as needed      scopolamine (TRANSDERM) 1 MG/3DAYS 72 hr patch       senna-docusate (SENOKOT-S/PERICOLACE) 8.6-50 MG tablet Take 1 tablet by mouth 2 times daily    Associated Diagnoses: Constipation, unspecified constipation type       !! - Potential duplicate medications found. Please discuss with provider.        Allergies   Allergies   Allergen Reactions     Dihydroergotamine Anaphylaxis     Latex Anaphylaxis     Shellfish-Derived Products Anaphylaxis     Sumatriptan Anaphylaxis     Compazine [Prochlorperazine] Anxiety     Banana Unknown     Gabapentin Dizziness     Gluten Meal Other (See Comments)     Celiac disease      Keppra [Levetiracetam] Nausea and Vomiting     Kiwi Unknown     Levofloxacin Other (See Comments)     Arrhythmia     Metronidazole Nausea and Vomiting     Nitrofurantoin Hives     Penicillins Hives     Pregabalin      Reglan [Metoclopramide] Other (See Comments)     restlessness/toe tapping      Topiramate Visual Disturbance     Aspirin Rash     Methadone Rash     Morphine Hives     She got hives around are when morphine given but resolved after few minutes per patient      Risperidone Anxiety

## 2023-02-09 NOTE — PROGRESS NOTES
"ED Observation Progress Note  [facility]  Note Date: 2/9/2023    Peggy Jessica MRN: [id number]   Age: 47 year old YOB: 1975     Interval History   Patient is a 47-year-old female with a history of chronic immune sensory poly radiculopathy and chronic inflammatory demyelinating polyneuropathy admitted to the ED observation unit due to the recurrent falls.  Patient has been seen by PT as well as psychiatry as well as neurology.  The decision has been made the patient stable to be discharged home.  Appears that anxiety is likely was causing her falls versus something neurologically worsening.  Patient's MRI and LP results were reviewed.  Plan is for the patient to be discharged home later today.  Patient is aware of this plan.  I spoke to the patient about this this morning and she is happy to be going home.    Physical Exam   /89 (BP Location: Left arm, Cuff Size: Adult Regular)   Pulse 91   Temp 98.1  F (36.7  C) (Oral)   Resp 16   Ht 1.651 m (5' 5\")   Wt 72.6 kg (160 lb)   SpO2 95%   BMI 26.63 kg/m    Physical Exam  Physical Exam   Constitutional: oriented to person, place, and time. appears well-developed and well-nourished.   HENT:   Head: Normocephalic and atraumatic.   Neck: Normal range of motion.   Pulmonary/Chest: Effort normal. No respiratory distress.   Neurological: alert and oriented to person, place, and time.   Skin: Skin is warm and dry.   Psychiatric:  normal mood and affect.  behavior is normal. Thought content normal.     Results     Lab Results   Component Value Date    WBC 3.8 (L) 02/05/2023    WBC 4.4 01/31/2023    WBC 4.2 01/28/2023    HGB 12.2 02/05/2023    HGB 11.4 (L) 01/31/2023    HGB 13.4 01/28/2023    HCT 36.3 02/05/2023    HCT 34.4 (L) 01/31/2023    HCT 41.9 01/28/2023     02/05/2023     01/31/2023     01/28/2023     02/05/2023     01/31/2023     01/28/2023    POTASSIUM 3.8 02/07/2023    " POTASSIUM 3.9 02/05/2023    POTASSIUM 3.7 02/03/2023    CHLORIDE 106 02/05/2023    CHLORIDE 109 (H) 01/31/2023    CHLORIDE 105 01/28/2023    CO2 25 02/05/2023    CO2 24 01/31/2023    CO2 26 01/28/2023    BUN 3.1 (L) 02/05/2023    BUN 3.3 (L) 01/31/2023    BUN 10.3 01/28/2023    CR 0.70 02/05/2023    CR 0.62 01/31/2023    CR 0.89 01/28/2023    GLC 76 02/05/2023    GLC 89 01/31/2023    GLC 83 01/28/2023    TROPONIN <0.015 11/25/2021    TROPONIN <0.015 11/17/2021    AST 23 01/28/2023    AST 28 01/27/2023    AST 30 01/19/2023    ALT 14 01/28/2023    ALT 17 01/27/2023    ALT 19 01/19/2023    ALKPHOS 91 01/28/2023    ALKPHOS 115 (H) 01/27/2023    ALKPHOS 126 (H) 01/19/2023    BILITOTAL 0.3 01/28/2023    BILITOTAL 0.4 01/27/2023    BILITOTAL 0.4 01/19/2023    INR 0.97 01/28/2023              Assessments & Plan (with Medical Decision Making)   Peggy Jessica is a 47 year old female admitted to the ED Observation Unit with recurrent falls in the setting of worsening anxiety and underlying neurologic disorder.  Plan to discharge home today with home nurse assistance..     Services consulted during the observation course: Neurology, PT, psychiatry. Notable consultant recommendations include: N/A    Observation goals to be met before discharge home:      --  Janna Hairston MD  [facility] UNIT 6D OBSERVATION [place]  2/9/2023

## 2023-02-09 NOTE — PLAN OF CARE
"Goal Outcome Evaluation:  BP (!) 128/90 (BP Location: Left arm, Patient Position: Supine, Cuff Size: Adult Regular)   Pulse 77   Temp 98  F (36.7  C) (Oral)   Resp 18   Ht 1.651 m (5' 5\")   Wt 72.6 kg (160 lb)   SpO2 94%   BMI 26.63 kg/m    Safety placement - Waiting for TCU/Home: Pending approval  "

## 2023-02-09 NOTE — PLAN OF CARE
"Goal Outcome Evaluation:  BP (!) 135/96 (BP Location: Left arm, Patient Position: Semi-Andrews's, Cuff Size: Adult Regular)   Pulse 89   Temp 98.2  F (36.8  C) (Oral)   Resp 18   Ht 1.651 m (5' 5\")   Wt 72.6 kg (160 lb)   SpO2 97%   BMI 26.63 kg/m      Safety placement - Waiting for TCU/Home: Pending approval  "

## 2023-02-16 ENCOUNTER — MYC REFILL (OUTPATIENT)
Dept: FAMILY MEDICINE | Facility: CLINIC | Age: 48
End: 2023-02-16

## 2023-02-16 DIAGNOSIS — F32.A DEPRESSION, UNSPECIFIED DEPRESSION TYPE: ICD-10-CM

## 2023-02-16 RX ORDER — DICYCLOMINE HCL 20 MG
20 TABLET ORAL EVERY 6 HOURS
Status: CANCELLED | OUTPATIENT
Start: 2023-02-16

## 2023-02-16 RX ORDER — HYDROXYZINE HYDROCHLORIDE 25 MG/1
TABLET, FILM COATED ORAL
Status: CANCELLED | OUTPATIENT
Start: 2023-02-16

## 2023-02-16 RX ORDER — ONDANSETRON 8 MG/1
TABLET, FILM COATED ORAL
Status: CANCELLED | OUTPATIENT
Start: 2023-02-16

## 2023-02-16 RX ORDER — SCOLOPAMINE TRANSDERMAL SYSTEM 1 MG/1
PATCH, EXTENDED RELEASE TRANSDERMAL
Status: CANCELLED | OUTPATIENT
Start: 2023-02-16

## 2023-02-18 NOTE — TELEPHONE ENCOUNTER
1/19/2023  [facility] [employer]     Andrew Peck APRN CNP        ondansetron (ZOFRAN) 8 MG tablet    Last Written Prescription Date:  unknown  Last Fill Quantity: unknown,   # refills: unknown      scopolamine (TRANSDERM) 1 MG/3DAYS 72 hr [id number]    Last Written Prescription Date:  unknown  Last Fill Quantity: unknown,   # refills: unknown       hydrOXYzine (ATARAX) 25 MG tablet  Last Written Prescription Date: 2/9/23  Last Fill Quantity: 20,   # refills: 0        diphenhydrAMINE (BENADRYL) 25 MG tablet  Last Written Prescription Date:  12/7/22  Last Fill Quantity: 30,   # refills: 0    Routing refill request to provider for review/approval because: ondansetron,scopolamine historical, hydroxyzine,last ordered from other provider.

## 2023-02-20 ENCOUNTER — TELEPHONE (OUTPATIENT)
Dept: FAMILY MEDICINE | Facility: CLINIC | Age: 48
End: 2023-02-20
Payer: COMMERCIAL

## 2023-02-20 RX ORDER — HYDROXYZINE HYDROCHLORIDE 25 MG/1
25 TABLET, FILM COATED ORAL 3 TIMES DAILY PRN
Qty: 20 TABLET | Refills: 0 | Status: SHIPPED | OUTPATIENT
Start: 2023-02-20 | End: 2023-05-05

## 2023-02-22 ENCOUNTER — VIRTUAL VISIT (OUTPATIENT)
Dept: FAMILY MEDICINE | Facility: CLINIC | Age: 48
End: 2023-02-22
Payer: COMMERCIAL

## 2023-02-22 DIAGNOSIS — U07.1 INFECTION DUE TO 2019 NOVEL CORONAVIRUS: Primary | ICD-10-CM

## 2023-02-22 NOTE — PROGRESS NOTES
Peggy is a 47 year old who is being evaluated via a billable telephone visit.      What phone number would you like to be contacted at? [phone]  How would you like to obtain your AVS? Ursulat  Distant Location (provider location):  On-site    Assessment/Plan  1. Infection due to 2019 novel coronavirus  Treating pt with paxlovid. Discussed use and possible rebound Covid. Educated on red flags requiring emergency intervention (CP, SOB at rest, intractable nausea/vomiting/diarrhea, etc.)   Reviewed Paxlovid medication interactions- pt denies current oxycodone use.  Advised albuterol for SOBOE. Pt declined albuterol inhaler rx due to unwanted side effects experienced with past use.     - nirmatrelvir and ritonavir (PAXLOVID) therapy pack; Take 3 tablets by mouth 2 times daily for 5 days  Dispense: 30 tablet; Refill: 0      Subjective   Peggy is a 47 year old, presenting for the following health issues:  Covid-19 infection      HPI     47-year-old female presents to discuss COVID infection.Patient has complex PMH including chronic immune sensory polyradiculopathy (CISP)/Chronic inflammatory demyelinating polyneuropathy (CIDP), complex regional pain syndrome (RLE 2/2 stress fracture; chest 2/2 bilateral mastectomy- currently under care of pain management), BRCA+ mutation, cervical cancer, diverticulitis, and migraines.    Pt reports symptom onset Friday 2/17/23 with sore throat and fatigue. Symptoms improved Monday but worsened Tuesday with chills, cough, and chest tightness. Positive at home Covid test 2/21/23. Pt describes dry cough with SOB on exertion. Denies SOB at rest and chest pain. Denies audible wheezing/rhonchi. GI symptoms at baseline for pt (hx gastroparesis, currently following Dr. Mason.) No other acute concerns/symptoms at time of exam.             Review of Systems   CONSTITUTIONAL:POSITIVE  for chills and fatigue   ENT/MOUTH: POSITIVE for sore throat and nasal congestion  RESP:POSITIVE for  cough-non productive and dyspnea on exertion and NEGATIVE for SOB/dyspnea at rest  CV: NEGATIVE for chest pain/chest pressure and cyanosis  GI: NEGATIVE for nausea, abdominal pain, heartburn, or change in bowel habits  Constitutional, HEENT, cardiovascular, pulmonary, gi and gu systems are negative, except as otherwise noted.      Current Outpatient Medications   Medication     acetaminophen (TYLENOL) 325 MG tablet     buprenorphine (BUTRANS) 5 MCG/HR WK patch     cyclobenzaprine (FLEXERIL) 10 MG tablet     dicyclomine (BENTYL) 20 MG tablet     diphenhydrAMINE (BENADRYL) 25 MG tablet     folic acid (FOLVITE) 400 MCG tablet     hydrOXYzine (ATARAX) 25 MG tablet     hydrOXYzine (ATARAX) 25 MG tablet     ibuprofen (ADVIL/MOTRIN) 200 MG tablet     Lidocaine (LIDOCARE) 4 % Patch     medical cannabis (Patient's own supply)     multivitamin w/minerals (THERA-VIT-M) tablet     naloxone (NARCAN) 4 MG/0.1ML nasal spray     ondansetron (ZOFRAN) 4 MG tablet     ondansetron (ZOFRAN) 8 MG tablet     oxyCODONE IR (ROXICODONE) 10 MG tablet     pantoprazole (PROTONIX) 40 MG EC tablet     polyethylene glycol (MIRALAX) 17 GM/Dose powder     scopolamine (TRANSDERM) 1 MG/3DAYS 72 hr patch     senna-docusate (SENOKOT-S/PERICOLACE) 8.6-50 MG tablet     sertraline (ZOLOFT) 50 MG tablet     sertraline (ZOLOFT) 25 MG tablet     No current facility-administered medications for this visit.     Past Medical History:   Diagnosis Date     BRCA positive      CIDP (chronic inflammatory demyelinating polyneuropathy) (H)      ASHKAN III with severe dysplasia 2002     Complex regional pain syndrome type 1 of right lower extremity              Objective           Vitals:  No vitals were obtained today due to virtual visit.    Physical Exam   alert and no distress  PSYCH: Alert and oriented times 3; coherent speech, normal   rate and volume, able to articulate logical thoughts, able   to abstract reason, no tangential thoughts, no hallucinations   or  delusions  Her affect is normal  RESP: Intermittent cough, no audible wheezing, able to talk in full sentences  Remainder of exam unable to be completed due to telephone visits    Note by Park Underwood DNP Student.    I, Andrew MEYERS CNP reviewed and verified the nurse practitioner (NP)  student's documentation of the patient's history. I performed the exam and the medical decision making activities with the NP student, who was present for learning purposes.    All questions/concerns addressed. Patient stated understanding/agreement to plan of care.    MIRA Addison, CNP  [facility] [employer]            Phone call duration: 12:20 minutes

## 2023-02-22 NOTE — PATIENT INSTRUCTIONS
Go to ER with any concerning chest pain, shortness of breath at rest, and/or intractable nausea/vomiting/diarrhea/fevers.

## 2023-03-07 NOTE — PROGRESS NOTES
"   SEATING AND WHEELED MOBILITY ASSESSMENT  05/19/22 1400   Quick Adds   Quick Adds Certification;Current Manual Wheelchair   General Information    Rehab Discipline OT   Funding [employer]   Service Outpatient;Occupational Therapy;Seating/Wheeled Mobility Evaluation   Height 5'5\"   Weight 150   Start Of Care Date 05/19/22   Referring Physician Jon Duenas   Orders Evaluate And Treat As Indicated;Per Therapist Evaluation   Orders Date 03/22/22   Others Present at Evaluation spouse   Patient/Caregiver Goals wheelchair   Rehabilitation Technology Supplier Lin HAMILTON from [employer]   Current Community Support Family/Friend Caregiver   Patient role/Employment history Disabled   Fall Risk Screen   Fall screen completed by OT   Have you fallen 2 or more times in the past year? Yes   Have you fallen and had an injury in the past year? No   Is patient a fall risk? Yes   Fall screen comments Daily falls, has hit head.  Most recently fell on wrist and was veyr sore   Medical History   Onset Of Illness/injury Or Date Of Surgery 3/22/22   Medical Diagnosis CIDP   Medical History Fibromyalgia, CRPS in R foot from stress fracture and chest from preventative masectomy   Current Manual Wheelchair   Manual Wheelchair Comments borrowed chair from a friend   Home Accessibility   Living Environment House   Primary Entrance Stairs  (4)   All Rooms Wheelchair Accessible No   Home Accessibility Comments Looking into ramp options to get into the home and possibly getting bathroom on main level.  Uses commode   Community ADL   Transportation Car  (small [employer])   Cognitive/Visual/Hearing Status   Observations No Problems Observed During Evaluation   Vision Corrective Lenses  (blurry vision at time)   Hearing Intact   ADL Status   Feeding Independent   Grooming/Hygiene Requires Assist   Dressing Requires Assist   Toileting Requires Assist   Bathing Requires Assist;Uses Equipment  (shower chair)   Meal Preparation Requires Assist   Home Management " Requires Assist   ADL Comments Doing less and less housework due to limited mobility and energy   Fatigue   Fatigue Measure Score Has to plan day around fatigue- make calls in the morning   Balance   Unsupported Sitting Balance Uses Upper Extremities for Balance   Sitting Balance in Chair Uses Upper Extremities for Balance   Standing Balance Physical Assist Required   Ambulation   Ambulation Ambulatory   Ambulation Assist Requires Assist   Ambulation Equipment 4 Wheeled Walker with Seat   Ambulation Comments Per PT 25 ft in 25 seconds with decreased cadene and impaired balance.   Transfers   Transfer Assist Independent;Minimal Assist   Transfer Method Stand Pivot   Wheelchair Ability   Wheelchair Ability Quick Adds Manual Chair;Wheelchair Use   Manual Wheelchair Propulsion   Manual Wheelchair Propulsion Assist   Comments Unable to propel long distances due to pain with ill fitting chair   Wheelchair Use   Hours in Wheelchair Daily 6   Hours Spent Alone Daily 6   Neuromuscular   History of Pressure Sores No   Pain Yes   Pain Location chest, B LE up to hip area and hands up to elbow.  Pain increases with any and all activity   Sensory Deficits Reported all extremities   Head and Neck   Head and Neck Position Functional   Head Control Good   Upper Extremities   UE ROM B Shoulders limited to about 90 degrees d/t pain and tightness   UE Strength 4/5   Pelvis   Anterior/Posterior Pelvis Position Posterior Tilt   Trunk   Anterior/Posterior Trunk Position Increased Thoracic Kyphosis   Lower Extremities   LE ROM WFL- ataxic painful ranges   LE Strength Bilateral Knee extension 3+/5 hip flexion 4/5 bilateral knee 4/5 DF 3-/5   Patient Measurements   Other per atp notes   Education Assessment   Barriers to Learning Physical   Preferred Learning Style Listening;Demonstration   Assessment/Plan   Criteria for Skilled Interventions Met Yes, Treatment Indicated   Treatment Diagnosis impiared participation in MRADLs   Therapy  Frequency once   Planned Therapy Interventions Wheelchair Management/Propulsion Training   Planned Therapy Interventions Comments Determined need for proper fit manual chair and power assist to best meet patients full time needs with progressing weakness and neuropathies that are resulting in significant pain and fatigue.  Patient to work on making home more accessible via ramps and will trial smart drive and k5 rigid chair with numotion.  Patient sat in better fitting chair and propelled during eval today and was visibly more at ease and able to move herself vs when sitting in borrowed chair clearly guarding all movements due to pain.  Educated on use of chiar for safety and fatigue mgmt, limiting falls and saving energy for safety wtih all movement/transfers.   Risks and benefits of treatment have been explained Yes   Patient/family & other staff in agreement with plan of care Yes   Comments Trials to be dne.   Session Time   OT Wheelchair Management Minutes (90799) 75   Certification   Certification date from 05/19/22   Certification date to 05/19/22    OT Goal 3   Goal Identifier AE   Goal Description Client will successfully verbalize and/or demonstrate understanding of 3-5 compensatory strategies or equipment and will report increased participation in relevant I/ADL activity with application by 25% self-percieved.   Goal Progress Goal progressing   Target Date 05/24/22   Electronically signed by:  Radha OLEARY/NIRAJ, ATP      Occupational Therapist, Assistive   [phone]      fax: [phone]      [email or web address]  [facility], [address]  [address]  [place]   [address]     26

## 2023-03-08 ENCOUNTER — OFFICE VISIT (OUTPATIENT)
Dept: FAMILY MEDICINE | Facility: CLINIC | Age: 48
End: 2023-03-08
Payer: COMMERCIAL

## 2023-03-08 VITALS
OXYGEN SATURATION: 100 % | TEMPERATURE: 98.1 F | SYSTOLIC BLOOD PRESSURE: 126 MMHG | HEART RATE: 97 BPM | BODY MASS INDEX: 24.99 KG/M2 | HEIGHT: 65 IN | DIASTOLIC BLOOD PRESSURE: 91 MMHG | WEIGHT: 150 LBS

## 2023-03-08 DIAGNOSIS — F32.A DEPRESSION, UNSPECIFIED DEPRESSION TYPE: ICD-10-CM

## 2023-03-08 DIAGNOSIS — Z09 HOSPITAL DISCHARGE FOLLOW-UP: ICD-10-CM

## 2023-03-08 DIAGNOSIS — G61.81 CIDP (CHRONIC INFLAMMATORY DEMYELINATING POLYNEUROPATHY) (H): Primary | ICD-10-CM

## 2023-03-08 NOTE — NURSING NOTE
"ROOM:2  YOVANY CHO    Preferred Name: Peggy     How did you hear about us?  Current Patient    47 year old  Chief Complaint   Patient presents with     numbness in hands & Feet      Muscle cramping   Had Covid at the last week of february      Refill Request     sertraline (ZOLOFT) 50 MG tablet       Blood pressure (!) 126/91, pulse 105, temperature 98.1  F (36.7  C), temperature source Oral, height 1.651 m (5' 5\"), weight 68 kg (150 lb), SpO2 100 %, not currently breastfeeding. Body mass index is 24.96 kg/m .  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)     Complex regional pain syndrome i of right lower limb     Fibromyalgia syndrome     Diverticulitis     Chronic daily headache     Celiac disease     BRCA gene mutation positive in female     Autoimmune disorder (H)     CIDP (chronic inflammatory demyelinating polyneuropathy) (H)     Physical deconditioning       Wt Readings from Last 2 Encounters:   03/08/23 68 kg (150 lb)   01/27/23 72.6 kg (160 lb)     BP Readings from Last 3 Encounters:   03/08/23 (!) 126/91   02/09/23 121/89   01/19/23 (!) 129/95       Allergies   Allergen Reactions     Dihydroergotamine Anaphylaxis     Latex Anaphylaxis     Shellfish-Derived Products Anaphylaxis     Sumatriptan Anaphylaxis     Compazine [Prochlorperazine] Anxiety     Banana Unknown     Gabapentin Dizziness     Gluten Meal Other (See Comments)     Celiac disease     Keppra [Levetiracetam] Nausea and Vomiting     Kiwi Unknown     Levofloxacin Other (See Comments)     Arrhythmia     Metronidazole Nausea and Vomiting     Nitrofurantoin Hives     Penicillins Hives     Pregabalin      Reglan [Metoclopramide] Other (See Comments)     restlessness/toe tapping      Topiramate Visual Disturbance     Aspirin Rash     Methadone " Rash     Morphine Hives     She got hives around are when morphine given but resolved after few minutes per patient      Risperidone Anxiety       Current Outpatient Medications   Medication     acetaminophen (TYLENOL) 325 MG tablet     buprenorphine (BUTRANS) 5 MCG/HR WK patch     cyclobenzaprine (FLEXERIL) 10 MG tablet     dicyclomine (BENTYL) 20 MG tablet     diphenhydrAMINE (BENADRYL) 25 MG tablet     folic acid (FOLVITE) 400 MCG tablet     hydrOXYzine (ATARAX) 25 MG tablet     hydrOXYzine (ATARAX) 25 MG tablet     ibuprofen (ADVIL/MOTRIN) 200 MG tablet     Lidocaine (LIDOCARE) 4 % Patch     medical cannabis (Patient's own supply)     multivitamin w/minerals (THERA-VIT-M) tablet     naloxone (NARCAN) 4 MG/0.1ML nasal spray     ondansetron (ZOFRAN) 4 MG tablet     ondansetron (ZOFRAN) 8 MG tablet     oxyCODONE IR (ROXICODONE) 10 MG tablet     pantoprazole (PROTONIX) 40 MG EC tablet     polyethylene glycol (MIRALAX) 17 GM/Dose powder     scopolamine (TRANSDERM) 1 MG/3DAYS 72 hr patch     senna-docusate (SENOKOT-S/PERICOLACE) 8.6-50 MG tablet     sertraline (ZOLOFT) 50 MG tablet     sertraline (ZOLOFT) 25 MG tablet     No current facility-administered medications for this visit.       Social History     Tobacco Use     Smoking status: Former     Types: Cigarettes     Smokeless tobacco: Never   Vaping Use     Vaping Use: Never used   Substance Use Topics     Alcohol use: Not Currently     Drug use: Yes     Types: Marijuana     Comment: Medical [facility] patient       Honoring Choices - Health Care Directive Guide offered to patient at time of visit.    Health Maintenance Due   Topic Date Due     YEARLY PREVENTIVE VISIT  Never done     ADVANCE CARE PLANNING  Never done     HEPATITIS B IMMUNIZATION (1 of 3 - 3-dose series) Never done     COLORECTAL CANCER SCREENING  Never done     HIV SCREENING  Never done     HEPATITIS C SCREENING  Never done     PAP  11/28/2020     COVID-19 Vaccine (3 - Booster for Moderna series)  06/12/2021     INFLUENZA VACCINE (1) 09/01/2022     URINE DRUG SCREEN  03/30/2023       Immunization History   Administered Date(s) Administered     COVID-19 Vaccine 18+ (Moderna) 03/20/2021, 04/17/2021       No results found for: PAP    Recent Labs   Lab Test 02/07/23  0710 02/05/23  0721 02/01/23  0041 01/31/23  1900 01/28/23  0619 01/27/23  0839 01/19/23  1352 10/17/22  0955 07/20/22  0959 06/06/22  2213 03/30/22  0928   LDL  --   --   --   --   --   --   --   --   --   --  147*   HDL  --   --   --   --   --   --   --   --   --   --  56   TRIG  --   --   --   --   --   --   --   --   --   --  131   ALT  --   --   --   --  14 17 19   < > 19  --  23   CR  --  0.70  --  0.62 0.89 0.93 0.89   < > 0.76   < > 0.81   GFRESTIMATED  --  >90  --  >90 80 75 80   < > >90   < > 90   ALBUMIN  --   --   --   --  3.6 4.3 4.5   < > 3.3*  --  3.5   POTASSIUM 3.8 3.9   < > 3.2* 4.3 4.1 4.5   < > 4.6   < > 4.1   TSH  --   --   --   --  3.12  --   --   --  1.38  --   --     < > = values in this interval not displayed.       PHQ-2 ( 1999 Pfizer) 2/22/2023 12/7/2022   Q1: Little interest or pleasure in doing things 0 0   Q2: Feeling down, depressed or hopeless 0 0   PHQ-2 Score 0 0       No flowsheet data found.    LINO-7 SCORE 3/30/2022   Total Score 2 (minimal anxiety)   Total Score 2       No flowsheet data found.    Wily Sandoval    March 8, 2023 2:31 PM

## 2023-03-08 NOTE — PROGRESS NOTES
"Today's Date: Mar 8, 2023     Patient Peggy Jessica 1975 presents to the clinic today to address: Hospital/COVID follow-up.               SUBJECTIVE     History of Present Illness:    47-year-old female presents with partner to discuss a few concerns. Patient has complex PMH including chronic immune sensory polyradiculopathy (CISP)/Chronic inflammatory demyelinating polyneuropathy (CIDP), complex regional pain syndrome (RLE 2/2 stress fracture; chest 2/2 bilateral mastectomy- currently under care of pain management), BRCA+ mutation, cervical cancer, diverticulitis, and migraines.    Per chart review, patient had recent hospitalization from 1/27/23-2/9/23 for worsening CIDP neurologic symptoms including loss of proprioception and frequent falls. Neurology was consulted, they did not recommend IVIG treatment given patient's workup findings ( her MRI lumbar spine did not show nerve root enhancement and her LP/ CSF studies didn't reveal elevated protein/cells to suggest active flair of CIDP.) Psych was consulted during her stay and recommended starting zoloft and hydroxyzine. She reports that the zoloft has helped her anxiety. She was advised to discharge to TCU by PT but had trouble with placement. She is currently living at home, she reports that her insurance will not cover home health PT.     Today, she reports that her bilateral upper/lower extremity numbness/tingling is still present. She reports \"sometimes my feet get so cold and turn blue.\" She also has persistent nausea, psych advised against scopolamine use given it's anticholingeric properties. She is awaiting to hear from her GI re: nausea management (she was supposed to complete a gastric emptying study but is unable to due to her buprenorphine patch.)       Recent COVID-19 Infection- Patient reports that Paxlovid was very effective and denies prolonged COVID symptoms today. No other acute concerns/symptoms at time of exam.    Review of Systems "   Constitutional, HEENT, cardiovascular, pulmonary, gi and gu systems are negative, except as otherwise noted.      Allergies   Allergen Reactions     Dihydroergotamine Anaphylaxis     Latex Anaphylaxis     Shellfish-Derived Products Anaphylaxis     Sumatriptan Anaphylaxis     Compazine [Prochlorperazine] Anxiety     Banana Unknown     Gabapentin Dizziness     Gluten Meal Other (See Comments)     Celiac disease     Keppra [Levetiracetam] Nausea and Vomiting     Kiwi Unknown     Levofloxacin Other (See Comments)     Arrhythmia     Metronidazole Nausea and Vomiting     Nitrofurantoin Hives     Penicillins Hives     Pregabalin      Reglan [Metoclopramide] Other (See Comments)     restlessness/toe tapping      Topiramate Visual Disturbance     Aspirin Rash     Methadone Rash     Morphine Hives     She got hives around are when morphine given but resolved after few minutes per patient      Risperidone Anxiety        Current Outpatient Medications   Medication Instructions     acetaminophen (TYLENOL) 975 mg, Oral, EVERY 8 HOURS     buprenorphine (BUTRANS) 5 MCG/HR WK patch 1 patch, Transdermal, EVERY 7 DAYS     cyclobenzaprine (FLEXERIL) 10 mg, Oral, 3 TIMES DAILY     dicyclomine (BENTYL) 20 mg, Oral, EVERY 6 HOURS     diphenhydrAMINE (BENADRYL) 12.5 mg, Oral, EVERY 6 HOURS PRN     folic acid (FOLVITE) 400 mcg, Oral, DAILY     hydrOXYzine (ATARAX) 25 MG tablet No dose, route, or frequency recorded.     hydrOXYzine (ATARAX) 25 mg, Oral, 3 TIMES DAILY PRN     ibuprofen (ADVIL/MOTRIN) 600 mg, Oral, EVERY 6 HOURS PRN     Lidocaine (LIDOCARE) 4 % Patch 3 patches, Transdermal, EVERY 24 HOURS, To prevent lidocaine toxicity, patient should be patch free for 12 hrs daily.     medical cannabis (Patient's own supply) 1 Dose, SEE ADMIN INSTRUCTIONS, (The purpose of this order is to document that the patient reports taking medical cannabis.  This is not a prescription, and is not used to certify that the patient has a qualifying  "medical condition.)<BR>Per pt: 5-10 mg TID PRN      multivitamin w/minerals (THERA-VIT-M) tablet 1 tablet, Oral, EVERY MORNING, Meg[employer] Womens Brand     naloxone (NARCAN) 4 mg, Alternating Nostrils, PRN, every 2-3 minutes until assistance arrives     ondansetron (ZOFRAN) 8 MG tablet No dose, route, or frequency recorded.     ondansetron (ZOFRAN) 4 mg, Oral, EVERY 6 HOURS PRN     oxyCODONE IR (ROXICODONE) 10 mg, Oral, EVERY 6 HOURS PRN     pantoprazole (PROTONIX) 40 mg, Oral, EVERY MORNING BEFORE BREAKFAST     polyethylene glycol (MIRALAX) 17 GM/Dose powder Oral, PRN     scopolamine (TRANSDERM) 1 MG/3DAYS 72 hr patch No dose, route, or frequency recorded.     senna-docusate (SENOKOT-S/PERICOLACE) 8.6-50 MG tablet 1 tablet, Oral, 2 TIMES DAILY     sertraline (ZOLOFT) 25 mg, Oral, DAILY     sertraline (ZOLOFT) 50 mg, Oral, DAILY       Past Medical History:   Diagnosis Date     BRCA positive      CIDP (chronic inflammatory demyelinating polyneuropathy) (H)      ASHKAN III with severe dysplasia 2002     Complex regional pain syndrome type 1 of right lower extremity         Family History   Problem Relation Age of Onset     Kidney Disease Father         Social History     Tobacco Use     Smoking status: Former     Types: Cigarettes     Smokeless tobacco: Never   Vaping Use     Vaping Use: Never used   Substance Use Topics     Alcohol use: Not Currently     Drug use: Yes     Types: Marijuana     Comment: Medical [facility] patient        History   Sexual Activity     Sexual activity: Not Currently     Partners: Male        No flowsheet data found.     Immunization History   Administered Date(s) Administered     COVID-19 Vaccine 18+ (Moderna) 03/20/2021, 04/17/2021               OBJECTIVE       BP (!) 126/91   Pulse 97   Temp 98.1  F (36.7  C) (Oral)   Ht 1.651 m (5' 5\")   Wt 68 kg (150 lb)   SpO2 100%   BMI 24.96 kg/m        Labs:  Lab Results   Component Value Date    WBC 3.8 (L) 02/05/2023    HGB 12.2 02/05/2023    HCT " 36.3 02/05/2023     02/05/2023    CHOL 229 (H) 03/30/2022    TRIG 131 03/30/2022    HDL 56 03/30/2022    ALT 14 01/28/2023    AST 23 01/28/2023     02/05/2023    BUN 3.1 (L) 02/05/2023    CO2 25 02/05/2023    TSH 3.12 01/28/2023    INR 0.97 01/28/2023        Physical Exam  Constitutional:       General: She is not in acute distress.     Appearance: She is not ill-appearing.      Comments: In wheelchair   Eyes:      Extraocular Movements: Extraocular movements intact.   Cardiovascular:      Rate and Rhythm: Normal rate and regular rhythm.      Pulses:           Dorsalis pedis pulses are 2+ on the right side and 2+ on the left side.      Heart sounds: No murmur heard.  Pulmonary:      Effort: Pulmonary effort is normal. No respiratory distress.      Breath sounds: Normal breath sounds. No wheezing or rales.   Musculoskeletal:      Cervical back: Neck supple.      Right lower leg: No edema.      Left lower leg: No edema.   Feet:      Right foot:      Skin integrity: Skin integrity normal. No skin breakdown.      Left foot:      Skin integrity: Skin integrity normal. No skin breakdown.      Comments: No cyanosis noted bilaterally.  Lymphadenopathy:      Cervical: No cervical adenopathy.   Skin:     Capillary Refill: Capillary refill takes more than 3 seconds.   Neurological:      General: No focal deficit present.      Mental Status: She is alert.   Psychiatric:         Thought Content: Thought content normal.         Judgment: Judgment normal.               ASSESSMENT/PLAN     1. CIDP (chronic inflammatory demyelinating polyneuropathy) (H)  Pt s/p hospitalization where MRI lumbar spine did not show nerve root enhancement and her LP/ CSF studies didn't reveal elevated protein/cells to suggest active flair of CIDP.     Re: foot complaints-No appreciable cyanosis noted today, advised her to call vascular surgery to complete her referral.      Referral placed to PT for outpatient PT for CIDP symptoms (her  insurance does not cover HH PT.)      Instructed to follow-up with GI to discuss motegrity given her chronic nausea.    Instructed to establish with Internal medicine considering her complex PMH.    - Physical Therapy Referral; Future    2. Depression, unspecified depression type  Patient reports improvement in anxiety, refill sent.  - sertraline (ZOLOFT) 50 MG tablet; Take 1 tablet (50 mg) by mouth daily  Dispense: 60 tablet; Refill: 1    3. Hospital discharge follow-up  As noted above.          Follow-Up:  - Follow up as needed.    Options for treatment and follow-up care were reviewed with the patient. Patient engaged in the decision making process and verbalized understanding of the options discussed and agreed with the final plan.  AVS printed and given to patient.    MIRA Addison [facility]  [facility]  [address] [place] [id number]  [phone]

## 2023-03-08 NOTE — PATIENT INSTRUCTIONS
Please call [phone] to schedule vascular surgery evaluation and [phone] to establish with Internal Medicine.

## 2023-03-09 ENCOUNTER — TELEPHONE (OUTPATIENT)
Dept: VASCULAR SURGERY | Facility: CLINIC | Age: 48
End: 2023-03-09
Payer: COMMERCIAL

## 2023-03-09 NOTE — TELEPHONE ENCOUNTER
[facility] Call Center    Phone Message    May a detailed message be left on voicemail: yes     Reason for Call: Other: Patient called to schedule appt based on referral. Please call back [phone]     Action Taken: Message routed to:  [facility]    Travel Screening: Not Applicable

## 2023-03-13 NOTE — TELEPHONE ENCOUNTER
"Appt Note:  Referred to [facility] by Andrew Peck APRN CNP for Prolonged capillary refill time, BUE & BLE numbness/tingling, cold feet that turn blue    Pt needs to be scheduled for new patient consult with Vascular Medicine.  Will route to scheduling to coordinate an appointment at next available.    Per Dr Peck' note 3/8/23: bilateral upper/lower extremity numbness/tingling is still present. She reports \"sometimes my feet get so cold and turn blue    ALICJA WalkerN, RN  [facility] [place]    "

## 2023-03-13 NOTE — TELEPHONE ENCOUNTER
JOI the below and active requests. Patient called [facility] to schedule. Please review and advise on scheduling needed.

## 2023-03-15 ENCOUNTER — OFFICE VISIT (OUTPATIENT)
Dept: FAMILY MEDICINE | Facility: CLINIC | Age: 48
End: 2023-03-15
Payer: COMMERCIAL

## 2023-03-15 VITALS
DIASTOLIC BLOOD PRESSURE: 83 MMHG | HEIGHT: 65 IN | WEIGHT: 164 LBS | BODY MASS INDEX: 27.32 KG/M2 | RESPIRATION RATE: 16 BRPM | SYSTOLIC BLOOD PRESSURE: 118 MMHG | HEART RATE: 98 BPM | OXYGEN SATURATION: 97 %

## 2023-03-15 DIAGNOSIS — M79.644 PAIN OF FINGER OF RIGHT HAND: ICD-10-CM

## 2023-03-15 DIAGNOSIS — K21.00 GASTROESOPHAGEAL REFLUX DISEASE WITH ESOPHAGITIS, UNSPECIFIED WHETHER HEMORRHAGE: ICD-10-CM

## 2023-03-15 DIAGNOSIS — G61.81 CIDP (CHRONIC INFLAMMATORY DEMYELINATING POLYNEUROPATHY) (H): Primary | ICD-10-CM

## 2023-03-15 DIAGNOSIS — E83.19 IRON EXCESS: ICD-10-CM

## 2023-03-15 LAB
ALBUMIN SERPL BCG-MCNC: 4.3 G/DL (ref 3.5–5.2)
ALP SERPL-CCNC: 109 U/L (ref 35–104)
ALT SERPL W P-5'-P-CCNC: 19 U/L (ref 10–35)
AST SERPL W P-5'-P-CCNC: 24 U/L (ref 10–35)
BASOPHILS # BLD AUTO: 0 10E3/UL (ref 0–0.2)
BASOPHILS NFR BLD AUTO: 0 %
BILIRUB DIRECT SERPL-MCNC: <0.2 MG/DL (ref 0–0.3)
BILIRUB SERPL-MCNC: 0.2 MG/DL
EOSINOPHIL # BLD AUTO: 0.1 10E3/UL (ref 0–0.7)
EOSINOPHIL NFR BLD AUTO: 1 %
ERYTHROCYTE [DISTWIDTH] IN BLOOD BY AUTOMATED COUNT: 13.1 % (ref 10–15)
FERRITIN SERPL-MCNC: 312 NG/ML (ref 6–175)
HCT VFR BLD AUTO: 39.5 % (ref 35–47)
HGB BLD-MCNC: 13.6 G/DL (ref 11.7–15.7)
IMM GRANULOCYTES # BLD: 0 10E3/UL
IMM GRANULOCYTES NFR BLD: 0 %
IRON BINDING CAPACITY (ROCHE): 339 UG/DL (ref 240–430)
IRON SATN MFR SERPL: 24 % (ref 15–46)
IRON SERPL-MCNC: 80 UG/DL (ref 37–145)
LYMPHOCYTES # BLD AUTO: 1.6 10E3/UL (ref 0.8–5.3)
LYMPHOCYTES NFR BLD AUTO: 25 %
MCH RBC QN AUTO: 32.4 PG (ref 26.5–33)
MCHC RBC AUTO-ENTMCNC: 34.4 G/DL (ref 31.5–36.5)
MCV RBC AUTO: 94 FL (ref 78–100)
MONOCYTES # BLD AUTO: 0.6 10E3/UL (ref 0–1.3)
MONOCYTES NFR BLD AUTO: 9 %
NEUTROPHILS # BLD AUTO: 4.1 10E3/UL (ref 1.6–8.3)
NEUTROPHILS NFR BLD AUTO: 64 %
PLATELET # BLD AUTO: 276 10E3/UL (ref 150–450)
PROT SERPL-MCNC: 7.7 G/DL (ref 6.4–8.3)
RBC # BLD AUTO: 4.2 10E6/UL (ref 3.8–5.2)
TRANSFERRIN SERPL-MCNC: 285 MG/DL (ref 200–360)
WBC # BLD AUTO: 6.5 10E3/UL (ref 4–11)

## 2023-03-15 PROCEDURE — 84466 ASSAY OF TRANSFERRIN: CPT | Performed by: PHYSICIAN ASSISTANT

## 2023-03-15 PROCEDURE — 80076 HEPATIC FUNCTION PANEL: CPT | Performed by: PHYSICIAN ASSISTANT

## 2023-03-15 PROCEDURE — 82728 ASSAY OF FERRITIN: CPT | Performed by: PHYSICIAN ASSISTANT

## 2023-03-15 PROCEDURE — 85025 COMPLETE CBC W/AUTO DIFF WBC: CPT | Performed by: PHYSICIAN ASSISTANT

## 2023-03-15 PROCEDURE — 36415 COLL VENOUS BLD VENIPUNCTURE: CPT | Performed by: PHYSICIAN ASSISTANT

## 2023-03-15 PROCEDURE — 83540 ASSAY OF IRON: CPT | Performed by: PHYSICIAN ASSISTANT

## 2023-03-15 PROCEDURE — 99215 OFFICE O/P EST HI 40 MIN: CPT | Performed by: PHYSICIAN ASSISTANT

## 2023-03-15 RX ORDER — ONDANSETRON 8 MG/1
TABLET, FILM COATED ORAL
Status: CANCELLED | OUTPATIENT
Start: 2023-03-15

## 2023-03-15 RX ORDER — PANTOPRAZOLE SODIUM 40 MG/1
40 TABLET, DELAYED RELEASE ORAL
Qty: 30 TABLET | Refills: 0 | Status: CANCELLED | OUTPATIENT
Start: 2023-03-15

## 2023-03-15 ASSESSMENT — PAIN SCALES - GENERAL: PAINLEVEL: MODERATE PAIN (5)

## 2023-03-15 NOTE — PROGRESS NOTES
"Assessment & Plan     CIDP (chronic inflammatory demyelinating polyneuropathy) (H)  Continue close follow-up with family medicine as well as neurology. LP performed at recent hospitalization. On-going PT.   - CBC with platelets and differential  - Iron and iron binding capacity  - Ferritin  - CBC with platelets and differential  - Iron and iron binding capacity  - Ferritin    Iron excess  Evaluate further with iron studies.  - CBC with platelets and differential  - Hepatic panel (Albumin, ALT, AST, Bili, Alk Phos, TP)  - Iron and iron binding capacity  - Ferritin  - Transferrin    Gastroesophageal reflux disease with esophagitis, unspecified whether hemorrhage  Continue follow-up with GI.  Symptoms of constipation well controlled.  Check labs above.    - Hepatic panel (Albumin, ALT, AST, Bili, Alk Phos, TP)    Pain of finger of right hand  Ice/heat alternating.  Immobilization for comfort.  Follow-up if no improvement or worsening.      40 minutes spent on the date of the encounter doing chart review, review of outside records, review of test results, interpretation of tests, patient visit and documentation        BMI:   Estimated body mass index is 27.29 kg/m  as calculated from the following:    Height as of this encounter: 1.651 m (5' 5\").    Weight as of this encounter: 74.4 kg (164 lb).         No follow-ups on file.    The likelihood of other entities in the differential is insufficient to justify any further testing for them at this time. This was explained to the patient. The patient was advised that persistent or worsening symptoms would require further evaluation. Patient advised to call the office and if unable to reach to go to the emergency room if they develop any new or worsening symptoms. Expressed understanding and agreement with above stated plan.     Wilfredo Juarez PA-C  [facility]    [place] Tasha Jessica is a 47 year old female presenting for the following " "health issues:  Patient presents with:  Results: Ferritin level    Sent for evaluation by family medicine for elevated ferritin levels.  1 year ago levels were 211.  1 month ago 285.  Does not take supplemental iron as it upsets her stomach.  No personal or family history of hemochromatosis.  Most recent liver function tests were unremarkable.  CT abdomen pelvis revealed hepatic cysts, but no other abnormalities. No abnormal bleeding.      Ongoing evaluation for gastroparesis, has been unable to pursue further testing due to need to stop her pain medication which is not an option at this time.    Recent hospitalization in late January for increased falling due to her chronic inflammatory demyelinating polyneuropathy. Continue at home.  Did fall into a door about a week ago injuring her right third finger.  Swelling near the MCP joint.  Discomfort with right third finger flexion and extension.    Upcoming appointment with vascular for delayed cap refill.    Ongoing physical therapy.    Lives in [facility].   with 2 children.    Review of Systems   Constitutional, HEENT, cardiovascular, pulmonary, GI, , musculoskeletal, neuro, skin, endocrine and psych systems are negative, except as otherwise noted.      Objective    /83 (BP Location: Right arm, Patient Position: Sitting, Cuff Size: Adult Regular)   Pulse 98   Resp 16   Ht 1.651 m (5' 5\")   Wt 74.4 kg (164 lb)   SpO2 97%   BMI 27.29 kg/m    5' 5\"  164 lbs 0 oz    Allergies   Allergen Reactions     Dihydroergotamine Anaphylaxis     Latex Anaphylaxis     Shellfish-Derived Products Anaphylaxis     Sumatriptan Anaphylaxis     Compazine [Prochlorperazine] Anxiety     Banana Unknown     Gabapentin Dizziness     Gluten Meal Other (See Comments)     Celiac disease     Keppra [Levetiracetam] Nausea and Vomiting     Kiwi Unknown     Levofloxacin Other (See Comments)     Arrhythmia     Metronidazole Nausea and Vomiting     Nitrofurantoin Hives     " Penicillins Hives     Pregabalin      Reglan [Metoclopramide] Other (See Comments)     restlessness/toe tapping      Topiramate Visual Disturbance     Aspirin Rash     Methadone Rash     Morphine Hives     She got hives around are when morphine given but resolved after few minutes per patient      Risperidone Anxiety     Current Outpatient Medications   Medication Sig Dispense Refill     acetaminophen (TYLENOL) 325 MG tablet Take 3 tablets (975 mg) by mouth every 8 hours       buprenorphine (BUTRANS) 5 MCG/HR WK patch Place 1 patch onto the skin every 7 days       cyclobenzaprine (FLEXERIL) 10 MG tablet Take 1 tablet (10 mg) by mouth 3 times daily       dicyclomine (BENTYL) 20 MG tablet Take 20 mg by mouth every 6 hours       diphenhydrAMINE (BENADRYL) 25 MG tablet Take 0.5 tablets (12.5 mg) by mouth every 6 hours as needed for allergies or other (nausea) 30 tablet 0     folic acid (FOLVITE) 400 MCG tablet Take 1 tablet (400 mcg) by mouth daily 90 tablet 0     hydrOXYzine (ATARAX) 25 MG tablet Take 1 tablet (25 mg) by mouth 3 times daily as needed for itching 20 tablet 0     hydrOXYzine (ATARAX) 25 MG tablet        Lidocaine (LIDOCARE) 4 % Patch Place 3 patches onto the skin every 24 hours To prevent lidocaine toxicity, patient should be patch free for 12 hrs daily.  0     medical cannabis (Patient's own supply) 1 Dose See Admin Instructions (The purpose of this order is to document that the patient reports taking medical cannabis.  This is not a prescription, and is not used to certify that the patient has a qualifying medical condition.)  Per pt: 5-10 mg TID PRN       multivitamin w/minerals (THERA-VIT-M) tablet Take 1 tablet by mouth every morning Megafood Womens Brand       naloxone (NARCAN) 4 MG/0.1ML nasal spray Spray 1 spray (4 mg) into one nostril alternating nostrils as needed for opioid reversal every 2-3 minutes until assistance arrives 0.2 mL 0     ondansetron (ZOFRAN) 4 MG tablet Take 1 tablet (4 mg) by  mouth every 6 hours as needed for nausea or vomiting 30 tablet 0     ondansetron (ZOFRAN) 8 MG tablet        oxyCODONE IR (ROXICODONE) 10 MG tablet Take 1 tablet (10 mg) by mouth every 6 hours as needed for severe pain (7-10) 15 tablet 0     pantoprazole (PROTONIX) 40 MG EC tablet Take 1 tablet (40 mg) by mouth every morning (before breakfast) 30 tablet 0     polyethylene glycol (MIRALAX) 17 GM/Dose powder Take by mouth as needed       scopolamine (TRANSDERM) 1 MG/3DAYS 72 hr patch        senna-docusate (SENOKOT-S/PERICOLACE) 8.6-50 MG tablet Take 1 tablet by mouth 2 times daily       sertraline (ZOLOFT) 50 MG tablet Take 1 tablet (50 mg) by mouth daily 60 tablet 1     Past Medical History:   Diagnosis Date     BRCA positive      CIDP (chronic inflammatory demyelinating polyneuropathy) (H)      ASHKAN III with severe dysplasia 2002     Complex regional pain syndrome type 1 of right lower extremity      Past Surgical History:   Procedure Laterality Date     BILATERAL OOPHORECTOMY Bilateral 2019     c section      2002     CHOLECYSTECTOMY  1997     COLONOSCOPY       ESOPHAGOSCOPY, GASTROSCOPY, DUODENOSCOPY (EGD), COMBINED N/A 07/28/2022    Procedure: ESOPHAGOGASTRODUODENOSCOPY (EGD);  Surgeon: Zack Maddox MD;  Location:  GI     HYSTERECTOMY  2004     MASTECTOMY Bilateral 2020       Physical Exam   GENERAL: healthy, alert and no distress. Wheelchair bound.   EYES: Eyes grossly normal to inspection, PERRL and conjunctivae and sclerae normal  NECK: no asymmetry, masses, or scars  RESP: lungs clear to auscultation - no rales, rhonchi or wheezes  CV: regular rate and rhythm, normal S1 S2, no S3 or S4, no murmur, click or rub, no peripheral edema  ABDOMEN: soft, nontender, no hepatosplenomegaly, no masses and bowel sounds normal  MS: no gross musculoskeletal defects noted, no edema right third MCP joint with swelling and bruising.  Sensation intact.  Strong pulses.  SKIN: no suspicious lesions or rashes  NEURO:  Normal strength and tone, mentation intact and speech normal  PSYCH: mentation appears normal, affect normal/bright

## 2023-03-16 NOTE — RESULT ENCOUNTER NOTE
[place],     It was very nice meeting you yesterday. Ferritin did come back elevated at 321. Up from 285 1 month ago. Fortunately, your iron circulating in your blood is normal as well as your transferrin which regulates the transport of iron into your blood.     This ferritin level is difficult to interpret. I don't suspect given no family history of genetic predisposition to iron overload (I.e hemochromatosis) and no signs of acute iron overload or liver disease that this is a immediate problem.  Recent CT scan showed liver cyst but no signs of damage which is good.  Ferritin is not overly elevated either.    Ferritin is rather nonspecific and can be elevated for a number of reasons such as  chronic inflammation.  Given recent hospitalization due to worsening of your CIDP as well as a recent COVID-19 infection both of these things could cause an increase in her ferritin levels.  Options are to repeat again in 3 months to see where the level is at or I could place a referral to a hematologist for their thoughts.    Please let me know your thoughts!     Best,    Wilfredo Juarez PA-C  [facility]

## 2023-03-20 ENCOUNTER — OFFICE VISIT (OUTPATIENT)
Dept: OTHER | Facility: CLINIC | Age: 48
End: 2023-03-20
Attending: INTERNAL MEDICINE
Payer: COMMERCIAL

## 2023-03-20 ENCOUNTER — TELEPHONE (OUTPATIENT)
Dept: NEUROLOGY | Facility: CLINIC | Age: 48
End: 2023-03-20

## 2023-03-20 VITALS
HEIGHT: 65 IN | WEIGHT: 164 LBS | OXYGEN SATURATION: 100 % | BODY MASS INDEX: 27.32 KG/M2 | DIASTOLIC BLOOD PRESSURE: 88 MMHG | SYSTOLIC BLOOD PRESSURE: 122 MMHG | HEART RATE: 105 BPM

## 2023-03-20 DIAGNOSIS — R09.89 PROLONGED CAPILLARY REFILL TIME: ICD-10-CM

## 2023-03-20 PROCEDURE — G0463 HOSPITAL OUTPT CLINIC VISIT: HCPCS

## 2023-03-20 PROCEDURE — 99205 OFFICE O/P NEW HI 60 MIN: CPT | Performed by: INTERNAL MEDICINE

## 2023-03-20 NOTE — PROGRESS NOTES
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 She is now on disability from a white collar job with [employer], which she had to give up due to cognitive focus challenges during the workday due to fatigue form her CIDP.  She has a h/o former minimal tobacco abuse on and off for fifteen years total, having quit in 2018 who is also BRCA positive and underwent prophylactic mastectomy for this 10/2020. She underwent DHRUV / bilateral oophorectomy 2004 due to cervical cancer. The patient presents today to address prolonged capillary refill time, BUE & BLE numbness/tingling, cold feet that turn blue. All sxs in the above sentence began after her dx of CIDP. She has had bilateral LE arterial duplex revealing no macrovascular stenoses. Her sxs occur irrespective of environmental temperature. She is wheelchair bound due to her CIDP and therefore is not capable of giving a hx of claudication. She had a dog scratch on her RLE, and the superficial  Wound took five to six weeks to heal.    She notes that her sxs have gotten worse since she stopped IV IG due to poor peripheral access 5/2022, and  she had COVID four weeks ago.       Review Of Systems  Skin: negative  Eyes: negative  Ears/Nose/Throat: negative  Respiratory: No shortness of breath, dyspnea on exertion, cough, or hemoptysis  Cardiovascular: negative  Gastrointestinal: negative  Genitourinary: negative  Musculoskeletal: as above  Neurologic:as above  Psychiatric: negative  Hematologic/Lymphatic/Immunologic: negative  Endocrine: negative      PAST MEDICAL HISTORY:                  Past Medical History:   Diagnosis Date      BRCA positive      CIDP (chronic inflammatory demyelinating polyneuropathy) (H)      ASHKAN III with severe dysplasia 2002     Complex regional pain syndrome type 1 of right lower extremity        PAST SURGICAL HISTORY:                  Past Surgical History:   Procedure Laterality Date     BILATERAL OOPHORECTOMY Bilateral 2019     c section      2002     CHOLECYSTECTOMY  1997     COLONOSCOPY       ESOPHAGOSCOPY, GASTROSCOPY, DUODENOSCOPY (EGD), COMBINED N/A 07/28/2022    Procedure: ESOPHAGOGASTRODUODENOSCOPY (EGD);  Surgeon: Zack Maddox MD;  Location:  GI     HYSTERECTOMY  2004     MASTECTOMY Bilateral 2020       CURRENT MEDICATIONS:                  Current Outpatient Medications   Medication Sig Dispense Refill     acetaminophen (TYLENOL) 325 MG tablet Take 3 tablets (975 mg) by mouth every 8 hours       buprenorphine (BUTRANS) 5 MCG/HR WK patch Place 1 patch onto the skin every 7 days       cyclobenzaprine (FLEXERIL) 10 MG tablet Take 1 tablet (10 mg) by mouth 3 times daily       dicyclomine (BENTYL) 20 MG tablet Take 20 mg by mouth every 6 hours       diphenhydrAMINE (BENADRYL) 25 MG tablet Take 0.5 tablets (12.5 mg) by mouth every 6 hours as needed for allergies or other (nausea) 30 tablet 0     folic acid (FOLVITE) 400 MCG tablet Take 1 tablet (400 mcg) by mouth daily 90 tablet 0     hydrOXYzine (ATARAX) 25 MG tablet Take 1 tablet (25 mg) by mouth 3 times daily as needed for itching 20 tablet 0     hydrOXYzine (ATARAX) 25 MG tablet        Lidocaine (LIDOCARE) 4 % Patch Place 3 patches onto the skin every 24 hours To prevent lidocaine toxicity, patient should be patch free for 12 hrs daily.  0     medical cannabis (Patient's own supply) 1 Dose See Admin Instructions (The purpose of this order is to document that the patient reports taking medical cannabis.  This is not a prescription, and is not used to certify that the patient has a qualifying medical condition.)  Per pt: 5-10 mg TID PRN        multivitamin w/minerals (THERA-VIT-M) tablet Take 1 tablet by mouth every morning Megafood Womens Brand       naloxone (NARCAN) 4 MG/0.1ML nasal spray Spray 1 spray (4 mg) into one nostril alternating nostrils as needed for opioid reversal every 2-3 minutes until assistance arrives 0.2 mL 0     ondansetron (ZOFRAN) 4 MG tablet Take 1 tablet (4 mg) by mouth every 6 hours as needed for nausea or vomiting 30 tablet 0     ondansetron (ZOFRAN) 8 MG tablet        oxyCODONE IR (ROXICODONE) 10 MG tablet Take 1 tablet (10 mg) by mouth every 6 hours as needed for severe pain (7-10) 15 tablet 0     pantoprazole (PROTONIX) 40 MG EC tablet Take 1 tablet (40 mg) by mouth every morning (before breakfast) 30 tablet 0     polyethylene glycol (MIRALAX) 17 GM/Dose powder Take by mouth as needed       scopolamine (TRANSDERM) 1 MG/3DAYS 72 hr patch        senna-docusate (SENOKOT-S/PERICOLACE) 8.6-50 MG tablet Take 1 tablet by mouth 2 times daily       sertraline (ZOLOFT) 50 MG tablet Take 1 tablet (50 mg) by mouth daily 60 tablet 1       ALLERGIES:                  Allergies   Allergen Reactions     Dihydroergotamine Anaphylaxis     Latex Anaphylaxis     Shellfish-Derived Products Anaphylaxis     Sumatriptan Anaphylaxis     Compazine [Prochlorperazine] Anxiety     Banana Unknown     Gabapentin Dizziness     Gluten Meal Other (See Comments)     Celiac disease     Keppra [Levetiracetam] Nausea and Vomiting     Kiwi Unknown     Levofloxacin Other (See Comments)     Arrhythmia     Metronidazole Nausea and Vomiting     Nitrofurantoin Hives     Penicillins Hives     Pregabalin      Reglan [Metoclopramide] Other (See Comments)     restlessness/toe tapping      Topiramate Visual Disturbance     Aspirin Rash     Methadone Rash     Morphine Hives     She got hives around are when morphine given but resolved after few minutes per patient      Risperidone Anxiety       SOCIAL HISTORY:                  Social History     Socioeconomic History      Marital status:      Spouse name: Not on file     Number of children: 2     Years of education: Not on file     Highest education level: Not on file   Occupational History     Not on file   Tobacco Use     Smoking status: Former     Types: Cigarettes     Smokeless tobacco: Never   Vaping Use     Vaping Use: Never used   Substance and Sexual Activity     Alcohol use: Not Currently     Drug use: Yes     Types: Marijuana     Comment: Medical Canibis patient     Sexual activity: Not Currently     Partners: Male   Other Topics Concern     Not on file   Social History Narrative     Not on file     Social Determinants of Health     Financial Resource Strain: Not on file   Food Insecurity: Not on file   Transportation Needs: Not on file   Physical Activity: Not on file   Stress: Not on file   Social Connections: Not on file   Intimate Partner Violence: Not on file   Housing Stability: Not on file       FAMILY HISTORY:                   Family History   Problem Relation Age of Onset     Kidney Disease Father          Physical exam Reveals:    O/P: WNL  HEENT: WNL  NECK: No JVD, thyromegaly, or lymphadenopathy  HEART: RRR, no murmurs, gallops, or rubs  LUNGS: CTA bilaterally without rales, wheezes, or rhonchi  GI: NABS, nondistended, nontender, soft  EXT:without cyanosis, clubbing, or edema  NEURO: nonfocal  : no flank tenderness      Rt femoral: 3 plus palpable    Rt popliteal: 3 plus palpable   Rt DP:  2 plus palpable, triphasic dopplerable   Rt PT:   2 plus palpable , triphasic dopplerable       Lt femoral: 3 plus palpable   Lt popliteal: 3 plus palpable  Lt DP:  2 plus palpable, triphasic dopplerable  Lt PT:   2 plus palpable, triphasic dopplerable     CRT all toes on right > 10 seconds      ULTRASOUND UPPER EXTREMITY VENOUS DUPLEX BILATERAL 7/27/2022 2:46 PM     CLINICAL HISTORY: Swelling of both upper extremities.      COMPARISONS: None available.     REFERRING PROVIDER: YOVANY CHO     TECHNIQUE: Bilateral  internal jugular, innominate, subclavian,  axillary, and brachial veins were evaluated with grayscale, color  Doppler, and spectral pulsed wave Doppler ultrasound.     Bilateral radial, ulnar, cephalic, and basilic veins evaluated with  grayscale imaging and compression.     FINDINGS: Right internal jugular vein is patent and fully compressible  with a phasic waveform.     Right innominate and subclavian veins fill [id number] in color  Doppler images with phasic waveforms. Subclavian vein is fully  compressible in its mid and lateral segments.     Right axillary and brachial veins are patent and fully compressible  with phasic waveforms.     Right radial, ulnar, cephalic, and basilic veins are patent and fully  compressible.     Left internal jugular vein is patent and fully compressible with a  phasic waveform.     Left innominate and subclavian veins fill [id number] in color  Doppler images with phasic waveforms. Subclavian vein is fully  compressible in its mid and lateral segments.     Left axillary and brachial veins are patent and fully compressible  with phasic waveforms.     Left radial, ulnar, cephalic, and basilic veins are patent and fully  compressible.                                                                      IMPRESSION: No deep or superficial venous thrombosis demonstrated in  either arm.     ARIELLE OKEEFE MD       Exam: Duplex ultrasound of iliac and lower extremity arteries dated  7/27/2022.     Clinical information: Bilateral cold feet      Comparison: None available     Technique: Includes Gray Scale images, color Doppler, spectral Doppler  waveforms and velocities with appropriate angles of 60 degrees or  less.     Ordering provider: YOAVNY CHO     Findings:     Right lower extremity:      EIA: 184/0 cm/sec   CFA: 105/0 cm/sec   PFA: 58/0 cm/sec   SFA prox: 92/0 cm/sec   SFA mid: 107/0 cm/sec   SFA distal: 87/0 cm/sec   Pop prox: 61/0 cm/sec   Pop distal: 69/0 cm/sec       LUCERO at  ankle: 95/0 cm/sec   PTA at ankle : 87/0 cm/sec   DPA at ankle: 86/0 cm/sec      Waveforms are triphasic .     Left lower extremity:      EIA: 149/0 cm/sec   CFA: 142/0 cm/sec   PFA: 66/0 cm/sec   SFA prox: 97/0 cm/sec   SFA mid: 106/0 cm/sec   SFA distal: 101/0 cm/sec   Pop prox: 73/0 cm/sec   Pop distal: 91/0 cm/sec   LUCERO at ankle: 67/0 cm/sec   PTA at ankle: 87/0 cm/sec   DPA at ankle: 74/0 cm/sec     Waveforms are triphasic .                                                                      Impression: Patent bilateral lower extremity vasculature without  hemodynamically significant stenosis.        I have personally reviewed the examination and initial interpretation  and I agree with the findings.     ARIELLE OKEEFE MD               A/P:      (R09.89) Prolonged capillary refill time  Comment: She has prolonged CRT in her toes, but has easily palpable peripheral pulses which are multiphasic doppler auscultatable in her admittedly cool feet.    Plan: She has CIDP. She has no evidence of an identifiable large vessel atherosclerotic process. She has no evidence of a vasculitis on exam. She likely has an annoying vascular sx (prolonged CRT and cool feet and legs) which is of neurologic origin (autonomic dysfunction due to CIDP). I advised no further vascular testing. I suggested she see her neurologist to consider resumption of IV IG infusions, through a PICC line if necessary due to her poor peripheral IV access. In my opinion, the risk of a PICC line or more central indwelling line (infection, bleeding catheter associated thrombosis) is manageable and low in comparison to risk of clinical CIDP progression.     64 minutes total medical care on today's date.

## 2023-03-20 NOTE — TELEPHONE ENCOUNTER
[facility] Call Center    Phone Message    May a detailed message be left on voicemail: yes     Reason for Call: Other: Patient requests a call back from clinic as she does not want to see Dr. Duenas.  Patient indicates Dr. Duenas is refusing to do treatment required.  She would like to see another neurologist ASA.  Please Call patient. back.        Action Taken: Message routed to:  [facility] ([facility]    Travel Screening: Not Applicable

## 2023-03-20 NOTE — PROGRESS NOTES
"Patient is here to discuss consult    /88 (BP Location: Left arm, Patient Position: Chair, Cuff Size: Adult Regular)   Pulse 105   Ht 5' 5\" (1.651 m)   Wt 164 lb (74.4 kg)   SpO2 100%   BMI 27.29 kg/m      Questions patient would like addressed today are: N/A.    Refills are needed: No    Has homecare services and agency name:  [facility]"

## 2023-03-21 NOTE — TELEPHONE ENCOUNTER
Per Cheyenne Mora LPN, ok to schedule patient on 5/8 in one of Dr. Duenas's SHANTELLE slots + add to waitlist. Spoke with patient. Per patient she will be in ER before May. Concerned that with the way her feet are currently she will only be mobile for 1 more week. Patient scheduled an appointment with Dr. Duenas on 5/8 & is now on wait list. She would like to hear back from clinical staff to discuss her options. Patient is wondering if it would be best for her to go to the ER if she can't be seen in the next week or two. Forwarding to clinical team for review.    Lupillo Zavala on 3/21/2023 at 10:23 AM

## 2023-04-03 ENCOUNTER — THERAPY VISIT (OUTPATIENT)
Dept: PHYSICAL THERAPY | Facility: CLINIC | Age: 48
End: 2023-04-03
Attending: NURSE PRACTITIONER
Payer: COMMERCIAL

## 2023-04-03 DIAGNOSIS — G61.81 CIDP (CHRONIC INFLAMMATORY DEMYELINATING POLYNEUROPATHY) (H): ICD-10-CM

## 2023-04-03 PROCEDURE — 97162 PT EVAL MOD COMPLEX 30 MIN: CPT | Mod: GP | Performed by: PHYSICAL THERAPIST

## 2023-04-03 PROCEDURE — 97110 THERAPEUTIC EXERCISES: CPT | Mod: GP | Performed by: PHYSICAL THERAPIST

## 2023-04-03 NOTE — PROGRESS NOTES
04/03/23 0700   Quick Adds   Type of Visit Initial OP PT Evaluation   General Information   Start of Care Date 04/03/23   Referring Physician Andrew Peck, APRN, CIDP   Orders Evaluate and Treat as Indicated   Order Date 03/08/23   Medical Diagnosis CIDP (chronic inflammatory demyelinating polyneuropathy)   Onset of illness/injury or Date of Surgery 03/08/23   Surgical/Medical history reviewed Yes  (CIDP, history of breast cancer, B mastectomy 2020, complex regional pain syndrome, R 4th metacarpal fracture 10/17/22, Celiac disease)   Pertinent history of current problem (include personal factors and/or comorbidities that impact the POC) patient had recent hospitalization from 1/27/23-2/9/23 for worsening CIDP neurologic symptoms including loss of proprioception and frequent falls.  Was doing well prior to February, then got COVID, and things went backwards.  Was walking with walker, going up and down stairs. Since then has felt even worse then when she was diagnosed - feels like cold throughout her body.  Feeling tremors in UE and LE.  IN the night her legs move quite a bit, feels tricia horses.  Gastroperis is worse as well.  Feels strength is there but the connection isnt there.  Also feels some swallowing issues. Going on stairs on butt. Hands are becoming painful. Sleeping more than usual. Exhaustion mgmt has been important.  Vasular stats she needs to have IVIG.   Pertinent Visual History  wears glasses   Prior level of functional mobility Transfers   Transfers Mod I from w/c to chair / sofa, but has fallen   Prior level of function comment Was   Previous/Current Treatment   (In past year 2 sessions of therapy but only attended 1-2 visits)   Current Community Support Family/friend caregiver   Patient role/Employment history Disabled   [facility]   Home/Community Accessibility Comments Uses the commode on main floor during day, then goes up onto butt to get to upstairs.  Working to  get a ramp built for w/c into house. Has 4 steps onto porch to get into house. Uses 4WW in house.  helps with w/c down on stairs.  drives.  Has been falling a lot - using walker more.   Current Assistive Devices Walker   ADL Devices Shower/Tub Chair   General Information Comments Doing exercises from [facility] in October, was doing some chair based yoga.  Moving quickly affects her vision.  Since COVID has been more cautious.   Fall Risk Screen   Fall screen completed by PT   Have you fallen 2 or more times in the past year? Yes   Have you fallen and had an injury in the past year? Yes   Fall screen comments Falls have been with sitting to standing - things get a little gray.   Vitals Signs   Heart Rate 92   Blood Pressure 123/89   Cognitive Status Examination   Orientation orientation to person, place and time   Level of Consciousness alert   Follows Commands and Answers Questions 100% of the time   Personal Safety and Judgment intact   Memory intact   Posture   Posture Normal   Range of Motion (ROM)   ROM Comment Patient WNLs / hypermobile with LE ROM   Strength   Strength Comments During MMT tremors noted when asking to move into testing positions.  Patient able to move through ROM but tremors when asked to hold against alena force.  Is able to achieve antigravity ROM.  Tremors stop when LE at rest.  Noted with hip flexion, knee ext, ankle DF, S / L hip abduction and extension   Bed Mobility   Bed Mobility Comments rolling - WNLs   Transfer Skills   Transfer Comments Mod I with stand pivot transfer from W/C to table and back.  Sit to supine to sit WNLs.  No tremors noted.   Locomotion   Wheel Chair Mobility Comments Independent MWC propulsion   Gait   Gait Comments NT today   Sensory Examination   Sensory Perception Comments Impaired distal to shin BLE, not formally assessed today   Muscle Tone   Muscle Tone Comments WNLs   Planned Therapy Interventions   Planned Therapy Interventions balance  training;bed mobility training;gait training;ROM;strengthening;stretching   Clinical Impression   Criteria for Skilled Therapeutic Interventions Met yes, treatment indicated   PT Diagnosis Impaired force production deficit   Influenced by the following impairments impaired strength, impaired sensation, impaired balance, occasional tremors   Functional limitations due to impairments falling, decreased activitiy level   Clinical Presentation Evolving/Changing   Clinical Presentation Rationale medically complex   Clinical Decision Making (Complexity) Moderate complexity   Therapy Frequency 1 time/week   Predicted Duration of Therapy Intervention (days/wks) 8 weeks   Risk & Benefits of therapy have been explained Yes   Patient, Family & other staff in agreement with plan of care Yes   Clinical Impression Comments Patient is a 47-year-old female with CI DP who presents with a regression in function after COVID 19.  She is able to transfer as prior in clinic today but reports having falls with this at home.  She does show decreased strength, but somewhat difficult to test as tremor start with manual muscle testing.  She is able to perform exercises today without tremors.  She would benefit from a course of skilled PT intervention to maximize function.  She is in agreement with this plan, although states she may have upcoming hospitalization.   GOALS   PT Eval Goals 1;3;2;4   Goal 1   Goal Identifier Function   Goal Description Patient will be able to ambulate 200 feet with walker without falls.   Target Date 06/14/23   Goal 2   Goal Identifier TUG   Goal Description Patient will perform tug showing safe transfers and performing in less than 60 seconds to allow her to work back towards regular ambulation   Target Date 06/14/23   Goal 3   Goal Identifier Sit to stand   Goal Description Patient will perform 5 times sit to  less than 30 seconds to decrease fall risk   Target Date 06/14/23   Goal 4   Goal Identifier  HEP   Goal Description Patient will report compliance with home program to allow for continued health and wellness upon discharge from therapy   Target Date 06/14/23   Total Evaluation Time   PT Jackie, Moderate Complexity Minutes ([id number]) 30

## 2023-04-05 NOTE — PROGRESS NOTES
04/03/23 0700   Quick Adds   Type of Visit Initial OP PT Evaluation   General Information   Start of Care Date 04/03/23   Referring Physician Andrew Peck, APRN, CIDP   Orders Evaluate and Treat as Indicated   Order Date 03/08/23   Medical Diagnosis CIDP (chronic inflammatory demyelinating polyneuropathy)   Onset of illness/injury or Date of Surgery 03/08/23   Surgical/Medical history reviewed Yes  (CIDP, history of breast cancer, B mastectomy 2020, complex regional pain syndrome, R 4th metacarpal fracture 10/17/22, Celiac disease)   Pertinent history of current problem (include personal factors and/or comorbidities that impact the POC) patient had recent hospitalization from 1/27/23-2/9/23 for worsening CIDP neurologic symptoms including loss of proprioception and frequent falls.  Was doing well prior to February, then got COVID, and things went backwards.  Was walking with walker, going up and down stairs. Since then has felt even worse then when she was diagnosed - feels like cold throughout her body.  Feeling tremors in UE and LE.  IN the night her legs move quite a bit, feels tricia horses.  Gastroperis is worse as well.  Feels strength is there but the connection isnt there.  Also feels some swallowing issues. Going on stairs on butt. Hands are becoming painful. Sleeping more than usual. Exhaustion mgmt has been important.  Vasular stats she needs to have IVIG.   Pertinent Visual History  wears glasses   Prior level of functional mobility Transfers   Transfers Mod I from w/c to chair / sofa, but has fallen   Prior level of function comment Was   Previous/Current Treatment   (In past year 2 sessions of therapy but only attended 1-2 visits)   Current Community Support Family/friend caregiver   Patient role/Employment history Disabled   [facility]   Home/Community Accessibility Comments Uses the commode on main floor during day, then goes up onto butt to get to upstairs.  Working to  get a ramp built for w/c into house. Has 4 steps onto porch to get into house. Uses 4WW in house.  helps with w/c down on stairs.  drives.  Has been falling a lot - using walker more.   Current Assistive Devices Walker   ADL Devices Shower/Tub Chair   General Information Comments Doing exercises from [facility] in October, was doing some chair based yoga.  Moving quickly affects her vision.  Since COVID has been more cautious.   Fall Risk Screen   Fall screen completed by PT   Have you fallen 2 or more times in the past year? Yes   Have you fallen and had an injury in the past year? Yes   Fall screen comments Falls have been with sitting to standing - things get a little gray.   Vitals Signs   Heart Rate 92   Blood Pressure 123/89   Cognitive Status Examination   Orientation orientation to person, place and time   Level of Consciousness alert   Follows Commands and Answers Questions 100% of the time   Personal Safety and Judgment intact   Memory intact   Posture   Posture Normal   Range of Motion (ROM)   ROM Comment Patient WNLs / hypermobile with LE ROM   Strength   Strength Comments During MMT tremors noted when asking to move into testing positions.  Patient able to move through ROM but tremors when asked to hold against alena force.  Is able to achieve antigravity ROM.  Tremors stop when LE at rest.  Noted with hip flexion, knee ext, ankle DF, S / L hip abduction and extension   Bed Mobility   Bed Mobility Comments rolling - WNLs   Transfer Skills   Transfer Comments Mod I with stand pivot transfer from W/C to table and back.  Sit to supine to sit WNLs.  No tremors noted.   Locomotion   Wheel Chair Mobility Comments Independent MWC propulsion   Gait   Gait Comments NT today   Sensory Examination   Sensory Perception Comments Impaired distal to shin BLE, not formally assessed today   Muscle Tone   Muscle Tone Comments WNLs   Planned Therapy Interventions   Planned Therapy Interventions balance  training;bed mobility training;gait training;ROM;strengthening;stretching   Clinical Impression   Criteria for Skilled Therapeutic Interventions Met yes, treatment indicated   PT Diagnosis Impaired force production deficit   Influenced by the following impairments impaired strength, impaired sensation, impaired balance, occasional tremors   Functional limitations due to impairments falling, decreased activitiy level   Clinical Presentation Evolving/Changing   Clinical Presentation Rationale medically complex   Clinical Decision Making (Complexity) Moderate complexity   Therapy Frequency 1 time/week   Predicted Duration of Therapy Intervention (days/wks) 8 weeks   Risk & Benefits of therapy have been explained Yes   Patient, Family & other staff in agreement with plan of care Yes   Clinical Impression Comments Patient is a 47-year-old female with CI DP who presents with a regression in function after COVID 19.  She is able to transfer as prior in clinic today but reports having falls with this at home.  She does show decreased strength, but somewhat difficult to test as tremor start with manual muscle testing.  She is able to perform exercises today without tremors.  She would benefit from a course of skilled PT intervention to maximize function.  She is in agreement with this plan, although states she may have upcoming hospitalization.   GOALS   PT Eval Goals 1;3;2;4   Goal 1   Goal Identifier Function   Goal Description Patient will be able to ambulate 200 feet with walker without falls.   Target Date 06/14/23   Goal 2   Goal Identifier TUG   Goal Description Patient will perform tug showing safe transfers and performing in less than 60 seconds to allow her to work back towards regular ambulation   Target Date 06/14/23   Goal 3   Goal Identifier Sit to stand   Goal Description Patient will perform 5 times sit to  less than 30 seconds to decrease fall risk   Target Date 06/14/23   Goal 4   Goal Identifier  HEP   Goal Description Patient will report compliance with home program to allow for continued health and wellness upon discharge from therapy   Target Date 06/14/23   Total Evaluation Time   PT Jackie, Moderate Complexity Minutes ([id number]) 30

## 2023-04-15 ENCOUNTER — HEALTH MAINTENANCE LETTER (OUTPATIENT)
Age: 48
End: 2023-04-15

## 2023-05-05 ENCOUNTER — MYC REFILL (OUTPATIENT)
Dept: FAMILY MEDICINE | Facility: CLINIC | Age: 48
End: 2023-05-05

## 2023-05-05 DIAGNOSIS — R11.10 VOMITING AND DIARRHEA: Primary | ICD-10-CM

## 2023-05-05 DIAGNOSIS — F32.A DEPRESSION, UNSPECIFIED DEPRESSION TYPE: ICD-10-CM

## 2023-05-05 DIAGNOSIS — R19.7 VOMITING AND DIARRHEA: Primary | ICD-10-CM

## 2023-05-05 RX ORDER — SCOLOPAMINE TRANSDERMAL SYSTEM 1 MG/1
PATCH, EXTENDED RELEASE TRANSDERMAL
OUTPATIENT
Start: 2023-05-05

## 2023-05-05 RX ORDER — ONDANSETRON 8 MG/1
4 TABLET, FILM COATED ORAL EVERY 8 HOURS PRN
Qty: 20 TABLET | Refills: 0 | Status: SHIPPED | OUTPATIENT
Start: 2023-05-05 | End: 2023-05-05

## 2023-05-05 RX ORDER — DICYCLOMINE HCL 20 MG
20 TABLET ORAL EVERY 6 HOURS
OUTPATIENT
Start: 2023-05-05

## 2023-05-05 RX ORDER — HYDROXYZINE HYDROCHLORIDE 25 MG/1
TABLET, FILM COATED ORAL
Status: CANCELLED | OUTPATIENT
Start: 2023-05-05

## 2023-05-05 RX ORDER — HYDROXYZINE HYDROCHLORIDE 25 MG/1
25 TABLET, FILM COATED ORAL 3 TIMES DAILY PRN
Qty: 20 TABLET | Refills: 0 | Status: SHIPPED | OUTPATIENT
Start: 2023-05-05 | End: 2023-05-05

## 2023-05-05 NOTE — TELEPHONE ENCOUNTER
dicyclomine (BENTYL) 20 MG tablet      Last Written Prescription Date:  historical    Last Office Visit : 3-8-23  Future Office visit:  none    Routing refill request to provider for review/approval because:  Medication is reported/historical    scopolamine (TRANSDERM) 1 MG/3DAYS 72 hr patch      Last Written Prescription Date:  historical  Routing refill request to provider for review/approval because:  Medication is reported/historical  Med not on protocol    hydrOXYzine (ATARAX) 25 MG tablet      Last Written Prescription Date:  2-20-23  Last Fill Quantity: 20,   # refills: 0    Routing refill request to provider for review/approval because:  Last rx: limited quantity    ondansetron (ZOFRAN) 8 MG tablet      Last Written Prescription Date:  Historical      On active med list:ondansetron (ZOFRAN) 4 MG tablet   30 tablet 0 12/7/2022  No  Sig - Route: Take 1 tablet (4 mg) by mouth every 6 hours as needed for nausea or vomiting - Oral    Routing refill request to provider for review/approval because:  Medication is reported/historical

## 2023-05-12 ENCOUNTER — HOSPITAL ENCOUNTER (OUTPATIENT)
Facility: CLINIC | Age: 48
Setting detail: OBSERVATION
Discharge: HOME | End: 2023-05-20
Attending: EMERGENCY MEDICINE | Admitting: INTERNAL MEDICINE
Payer: COMMERCIAL

## 2023-05-12 ENCOUNTER — APPOINTMENT (OUTPATIENT)
Dept: MRI IMAGING | Facility: CLINIC | Age: 48
End: 2023-05-12
Attending: EMERGENCY MEDICINE
Payer: COMMERCIAL

## 2023-05-12 DIAGNOSIS — R29.898 BILATERAL LEG WEAKNESS: ICD-10-CM

## 2023-05-12 DIAGNOSIS — R29.6 MULTIPLE FALLS: ICD-10-CM

## 2023-05-12 DIAGNOSIS — R20.0 NUMBNESS AND TINGLING OF BOTH LEGS: ICD-10-CM

## 2023-05-12 DIAGNOSIS — F51.5 NIGHTMARES: ICD-10-CM

## 2023-05-12 DIAGNOSIS — R10.13 ABDOMINAL PAIN, EPIGASTRIC: Primary | ICD-10-CM

## 2023-05-12 DIAGNOSIS — F32.A DEPRESSION, UNSPECIFIED DEPRESSION TYPE: ICD-10-CM

## 2023-05-12 DIAGNOSIS — G61.81 CIDP (CHRONIC INFLAMMATORY DEMYELINATING POLYNEUROPATHY) (H): ICD-10-CM

## 2023-05-12 DIAGNOSIS — R20.2 NUMBNESS AND TINGLING OF BOTH LEGS: ICD-10-CM

## 2023-05-12 DIAGNOSIS — K31.84 GASTROPARESIS: ICD-10-CM

## 2023-05-12 LAB
ALBUMIN SERPL BCG-MCNC: 4.2 G/DL (ref 3.5–5.2)
ALBUMIN UR-MCNC: NEGATIVE MG/DL
ALP SERPL-CCNC: 106 U/L (ref 35–104)
ALT SERPL W P-5'-P-CCNC: 14 U/L (ref 10–35)
ANION GAP SERPL CALCULATED.3IONS-SCNC: 10 MMOL/L (ref 7–15)
APPEARANCE UR: CLEAR
AST SERPL W P-5'-P-CCNC: 18 U/L (ref 10–35)
BASOPHILS # BLD AUTO: 0 10E3/UL (ref 0–0.2)
BASOPHILS NFR BLD AUTO: 0 %
BILIRUB SERPL-MCNC: 0.2 MG/DL
BILIRUB UR QL STRIP: NEGATIVE
BUN SERPL-MCNC: 6.1 MG/DL (ref 6–20)
CALCIUM SERPL-MCNC: 9.8 MG/DL (ref 8.6–10)
CHLORIDE SERPL-SCNC: 102 MMOL/L (ref 98–107)
COLOR UR AUTO: NORMAL
CREAT SERPL-MCNC: 0.92 MG/DL (ref 0.51–0.95)
CRP SERPL-MCNC: <3 MG/L
DEPRECATED HCO3 PLAS-SCNC: 26 MMOL/L (ref 22–29)
EOSINOPHIL # BLD AUTO: 0.1 10E3/UL (ref 0–0.7)
EOSINOPHIL NFR BLD AUTO: 1 %
ERYTHROCYTE [DISTWIDTH] IN BLOOD BY AUTOMATED COUNT: 13.1 % (ref 10–15)
GFR SERPL CREATININE-BSD FRML MDRD: 76 ML/MIN/1.73M2
GLUCOSE SERPL-MCNC: 110 MG/DL (ref 70–99)
GLUCOSE UR STRIP-MCNC: NEGATIVE MG/DL
HCT VFR BLD AUTO: 44.1 % (ref 35–47)
HGB BLD-MCNC: 14.6 G/DL (ref 11.7–15.7)
HGB UR QL STRIP: NEGATIVE
HOLD SPECIMEN: NORMAL
HOLD SPECIMEN: NORMAL
IMM GRANULOCYTES # BLD: 0 10E3/UL
IMM GRANULOCYTES NFR BLD: 0 %
KETONES UR STRIP-MCNC: NEGATIVE MG/DL
LEUKOCYTE ESTERASE UR QL STRIP: NEGATIVE
LYMPHOCYTES # BLD AUTO: 1.3 10E3/UL (ref 0.8–5.3)
LYMPHOCYTES NFR BLD AUTO: 23 %
MCH RBC QN AUTO: 30.4 PG (ref 26.5–33)
MCHC RBC AUTO-ENTMCNC: 33.1 G/DL (ref 31.5–36.5)
MCV RBC AUTO: 92 FL (ref 78–100)
MONOCYTES # BLD AUTO: 0.3 10E3/UL (ref 0–1.3)
MONOCYTES NFR BLD AUTO: 6 %
NEUTROPHILS # BLD AUTO: 4 10E3/UL (ref 1.6–8.3)
NEUTROPHILS NFR BLD AUTO: 70 %
NITRATE UR QL: NEGATIVE
NRBC # BLD AUTO: 0 10E3/UL
NRBC BLD AUTO-RTO: 0 /100
PH UR STRIP: 5.5 [PH] (ref 5–7)
PLATELET # BLD AUTO: 307 10E3/UL (ref 150–450)
POTASSIUM SERPL-SCNC: 4.1 MMOL/L (ref 3.4–5.3)
PROT SERPL-MCNC: 7.8 G/DL (ref 6.4–8.3)
RBC # BLD AUTO: 4.8 10E6/UL (ref 3.8–5.2)
SODIUM SERPL-SCNC: 138 MMOL/L (ref 136–145)
SP GR UR STRIP: 1.01 (ref 1–1.03)
UROBILINOGEN UR STRIP-MCNC: NORMAL MG/DL
VIT B12 SERPL-MCNC: 537 PG/ML (ref 232–1245)
WBC # BLD AUTO: 5.7 10E3/UL (ref 4–11)

## 2023-05-12 PROCEDURE — 96361 HYDRATE IV INFUSION ADD-ON: CPT

## 2023-05-12 PROCEDURE — G0378 HOSPITAL OBSERVATION PER HR: HCPCS

## 2023-05-12 PROCEDURE — 72158 MRI LUMBAR SPINE W/O & W/DYE: CPT

## 2023-05-12 PROCEDURE — 250N000013 HC RX MED GY IP 250 OP 250 PS 637: Performed by: INTERNAL MEDICINE

## 2023-05-12 PROCEDURE — 86140 C-REACTIVE PROTEIN: CPT | Performed by: PSYCHIATRY & NEUROLOGY

## 2023-05-12 PROCEDURE — 80053 COMPREHEN METABOLIC PANEL: CPT | Performed by: EMERGENCY MEDICINE

## 2023-05-12 PROCEDURE — 36415 COLL VENOUS BLD VENIPUNCTURE: CPT | Performed by: PSYCHIATRY & NEUROLOGY

## 2023-05-12 PROCEDURE — 82607 VITAMIN B-12: CPT | Performed by: PSYCHIATRY & NEUROLOGY

## 2023-05-12 PROCEDURE — 250N000013 HC RX MED GY IP 250 OP 250 PS 637: Performed by: EMERGENCY MEDICINE

## 2023-05-12 PROCEDURE — 99285 EMERGENCY DEPT VISIT HI MDM: CPT | Mod: 25

## 2023-05-12 PROCEDURE — A9585 GADOBUTROL INJECTION: HCPCS | Performed by: EMERGENCY MEDICINE

## 2023-05-12 PROCEDURE — 99223 1ST HOSP IP/OBS HIGH 75: CPT | Performed by: INTERNAL MEDICINE

## 2023-05-12 PROCEDURE — 81003 URINALYSIS AUTO W/O SCOPE: CPT | Performed by: INTERNAL MEDICINE

## 2023-05-12 PROCEDURE — 86038 ANTINUCLEAR ANTIBODIES: CPT | Performed by: PSYCHIATRY & NEUROLOGY

## 2023-05-12 PROCEDURE — 250N000011 HC RX IP 250 OP 636: Performed by: INTERNAL MEDICINE

## 2023-05-12 PROCEDURE — 86235 NUCLEAR ANTIGEN ANTIBODY: CPT | Performed by: PSYCHIATRY & NEUROLOGY

## 2023-05-12 PROCEDURE — 36415 COLL VENOUS BLD VENIPUNCTURE: CPT | Performed by: EMERGENCY MEDICINE

## 2023-05-12 PROCEDURE — 258N000003 HC RX IP 258 OP 636: Performed by: INTERNAL MEDICINE

## 2023-05-12 PROCEDURE — 96374 THER/PROPH/DIAG INJ IV PUSH: CPT

## 2023-05-12 PROCEDURE — 85025 COMPLETE CBC W/AUTO DIFF WBC: CPT | Performed by: EMERGENCY MEDICINE

## 2023-05-12 PROCEDURE — 999N000128 HC STATISTIC PERIPHERAL IV START W/O US GUIDANCE

## 2023-05-12 PROCEDURE — 255N000002 HC RX 255 OP 636: Performed by: EMERGENCY MEDICINE

## 2023-05-12 RX ORDER — LANOLIN ALCOHOL/MO/W.PET/CERES
400 CREAM (GRAM) TOPICAL DAILY
Status: DISCONTINUED | OUTPATIENT
Start: 2023-05-12 | End: 2023-05-20 | Stop reason: HOSPADM

## 2023-05-12 RX ORDER — POLYETHYLENE GLYCOL 3350 17 G/17G
17 POWDER, FOR SOLUTION ORAL DAILY PRN
Status: DISCONTINUED | OUTPATIENT
Start: 2023-05-12 | End: 2023-05-20 | Stop reason: HOSPADM

## 2023-05-12 RX ORDER — GADOBUTROL 604.72 MG/ML
7 INJECTION INTRAVENOUS ONCE
Status: COMPLETED | OUTPATIENT
Start: 2023-05-12 | End: 2023-05-12

## 2023-05-12 RX ORDER — SENNOSIDES A AND B 8.6 MG/1
1 TABLET, FILM COATED ORAL PRN
Status: ON HOLD | COMMUNITY
End: 2024-01-16

## 2023-05-12 RX ORDER — HYDROXYZINE HYDROCHLORIDE 25 MG/1
25 TABLET, FILM COATED ORAL
Status: DISCONTINUED | OUTPATIENT
Start: 2023-05-12 | End: 2023-05-20 | Stop reason: HOSPADM

## 2023-05-12 RX ORDER — PANTOPRAZOLE SODIUM 40 MG/1
40 TABLET, DELAYED RELEASE ORAL
Status: DISCONTINUED | OUTPATIENT
Start: 2023-05-12 | End: 2023-05-17

## 2023-05-12 RX ORDER — NALOXONE HYDROCHLORIDE 0.4 MG/ML
0.2 INJECTION, SOLUTION INTRAMUSCULAR; INTRAVENOUS; SUBCUTANEOUS
Status: DISCONTINUED | OUTPATIENT
Start: 2023-05-12 | End: 2023-05-20 | Stop reason: HOSPADM

## 2023-05-12 RX ORDER — LIDOCAINE 4 G/G
1 PATCH TOPICAL
Status: DISCONTINUED | OUTPATIENT
Start: 2023-05-13 | End: 2023-05-17

## 2023-05-12 RX ORDER — NALOXONE HYDROCHLORIDE 0.4 MG/ML
0.4 INJECTION, SOLUTION INTRAMUSCULAR; INTRAVENOUS; SUBCUTANEOUS
Status: DISCONTINUED | OUTPATIENT
Start: 2023-05-12 | End: 2023-05-20 | Stop reason: HOSPADM

## 2023-05-12 RX ORDER — DIPHENHYDRAMINE HCL 25 MG
25 CAPSULE ORAL EVERY 6 HOURS PRN
Status: DISCONTINUED | OUTPATIENT
Start: 2023-05-12 | End: 2023-05-20 | Stop reason: HOSPADM

## 2023-05-12 RX ORDER — ONDANSETRON 4 MG/1
4 TABLET, ORALLY DISINTEGRATING ORAL EVERY 6 HOURS PRN
Status: DISCONTINUED | OUTPATIENT
Start: 2023-05-12 | End: 2023-05-20 | Stop reason: HOSPADM

## 2023-05-12 RX ORDER — IBUPROFEN 600 MG/1
600 TABLET, FILM COATED ORAL 2 TIMES DAILY PRN
Status: DISCONTINUED | OUTPATIENT
Start: 2023-05-12 | End: 2023-05-12

## 2023-05-12 RX ORDER — AMOXICILLIN 250 MG
1 CAPSULE ORAL 2 TIMES DAILY PRN
COMMUNITY

## 2023-05-12 RX ORDER — TRAMADOL HYDROCHLORIDE 50 MG/1
50 TABLET ORAL ONCE
Status: COMPLETED | OUTPATIENT
Start: 2023-05-12 | End: 2023-05-12

## 2023-05-12 RX ORDER — ACETAMINOPHEN 500 MG
500-1000 TABLET ORAL EVERY 6 HOURS PRN
COMMUNITY

## 2023-05-12 RX ORDER — DIAZEPAM 5 MG
5 TABLET ORAL ONCE
Status: COMPLETED | OUTPATIENT
Start: 2023-05-12 | End: 2023-05-12

## 2023-05-12 RX ORDER — CYCLOBENZAPRINE HCL 10 MG
10 TABLET ORAL 3 TIMES DAILY
Status: DISCONTINUED | OUTPATIENT
Start: 2023-05-12 | End: 2023-05-20 | Stop reason: HOSPADM

## 2023-05-12 RX ORDER — ONDANSETRON 4 MG/1
4 TABLET, FILM COATED ORAL EVERY 6 HOURS PRN
Status: DISCONTINUED | OUTPATIENT
Start: 2023-05-12 | End: 2023-05-12

## 2023-05-12 RX ORDER — DICYCLOMINE HCL 20 MG
20 TABLET ORAL
Status: DISCONTINUED | OUTPATIENT
Start: 2023-05-12 | End: 2023-05-20 | Stop reason: HOSPADM

## 2023-05-12 RX ORDER — BUPRENORPHINE 5 UG/H
1 PATCH TRANSDERMAL WEEKLY
Status: DISCONTINUED | OUTPATIENT
Start: 2023-05-17 | End: 2023-05-13

## 2023-05-12 RX ORDER — MULTIPLE VITAMINS W/ MINERALS TAB 9MG-400MCG
1 TAB ORAL EVERY MORNING
Status: DISCONTINUED | OUTPATIENT
Start: 2023-05-13 | End: 2023-05-20 | Stop reason: HOSPADM

## 2023-05-12 RX ORDER — ONDANSETRON 2 MG/ML
4 INJECTION INTRAMUSCULAR; INTRAVENOUS EVERY 6 HOURS PRN
Status: DISCONTINUED | OUTPATIENT
Start: 2023-05-12 | End: 2023-05-20 | Stop reason: HOSPADM

## 2023-05-12 RX ORDER — ACETAMINOPHEN 325 MG/1
650 TABLET ORAL EVERY 6 HOURS PRN
Status: DISCONTINUED | OUTPATIENT
Start: 2023-05-12 | End: 2023-05-20 | Stop reason: HOSPADM

## 2023-05-12 RX ORDER — POLYETHYLENE GLYCOL 3350 17 G/17G
17 POWDER, FOR SOLUTION ORAL DAILY PRN
Status: DISCONTINUED | OUTPATIENT
Start: 2023-05-12 | End: 2023-05-12

## 2023-05-12 RX ORDER — BUPRENORPHINE 7.5 UG/H
1 PATCH TRANSDERMAL WEEKLY
Status: ON HOLD | COMMUNITY
Start: 2023-04-18 | End: 2024-01-16

## 2023-05-12 RX ORDER — ACETAMINOPHEN 650 MG/1
650 SUPPOSITORY RECTAL EVERY 6 HOURS PRN
Status: DISCONTINUED | OUTPATIENT
Start: 2023-05-12 | End: 2023-05-20 | Stop reason: HOSPADM

## 2023-05-12 RX ADMIN — ACETAMINOPHEN 650 MG: 325 TABLET ORAL at 23:03

## 2023-05-12 RX ADMIN — FOLIC ACID TAB 400 MCG 400 MCG: 400 TAB at 16:41

## 2023-05-12 RX ADMIN — ONDANSETRON 4 MG: 4 TABLET, ORALLY DISINTEGRATING ORAL at 19:53

## 2023-05-12 RX ADMIN — PANTOPRAZOLE SODIUM 40 MG: 40 TABLET, DELAYED RELEASE ORAL at 15:11

## 2023-05-12 RX ADMIN — SODIUM CHLORIDE 1000 ML: 9 INJECTION, SOLUTION INTRAVENOUS at 13:20

## 2023-05-12 RX ADMIN — ACETAMINOPHEN 650 MG: 325 TABLET ORAL at 17:10

## 2023-05-12 RX ADMIN — DIAZEPAM 5 MG: 5 TABLET ORAL at 11:31

## 2023-05-12 RX ADMIN — HYDROXYZINE HYDROCHLORIDE 25 MG: 25 TABLET ORAL at 16:42

## 2023-05-12 RX ADMIN — SERTRALINE HYDROCHLORIDE 50 MG: 50 TABLET ORAL at 15:11

## 2023-05-12 RX ADMIN — ONDANSETRON 4 MG: 2 INJECTION INTRAMUSCULAR; INTRAVENOUS at 13:20

## 2023-05-12 RX ADMIN — DICYCLOMINE HYDROCHLORIDE 20 MG: 20 TABLET ORAL at 16:41

## 2023-05-12 RX ADMIN — CYCLOBENZAPRINE 10 MG: 10 TABLET, FILM COATED ORAL at 15:11

## 2023-05-12 RX ADMIN — TRAMADOL HYDROCHLORIDE 50 MG: 50 TABLET, COATED ORAL at 21:17

## 2023-05-12 RX ADMIN — GADOBUTROL 7 ML: 604.72 INJECTION INTRAVENOUS at 12:57

## 2023-05-12 RX ADMIN — CYCLOBENZAPRINE 10 MG: 10 TABLET, FILM COATED ORAL at 19:53

## 2023-05-12 ASSESSMENT — ACTIVITIES OF DAILY LIVING (ADL)
ADLS_ACUITY_SCORE: 33
ADLS_ACUITY_SCORE: 35
ADLS_ACUITY_SCORE: 33
ADLS_ACUITY_SCORE: 35

## 2023-05-12 NOTE — PROGRESS NOTES
Observation goals  PRIOR TO DISCHARGE        Comments:   -diagnostic tests and consults completed and resulted not met  -vital signs normal or at patient baseline met  -tolerating oral intake to maintain hydration partiallymet  -safe disposition plan has been identified not met  Nurse to notify provider when observation goals have been met and patient is ready for discharge.

## 2023-05-12 NOTE — PROGRESS NOTES
RECEIVING UNIT ED HANDOFF REVIEW    ED Nurse Handoff Report was reviewed by: Mayela Swift RN on May 12, 2023 at 1:01 PM

## 2023-05-12 NOTE — CONSULTS
DATE OF SERVICE : 5/12/2023    DATE OF ADMISSION: 5/12/2023    NEUROLOGICAL CONSULTATION    REQUESTED BY Matt Recio,     SOURCE OF INFORMATION:Patient and [facility] EHR    REASON FOR CONSULTATION:   CIDP patient here with weakness following COVID 19 2/2023   HISTORY OF PRESENT ILLNESS:       She is 47 years old female presenting for evaluation regarding numbness.    Patient has reports at baseline have numbness below the knees.  She suffered from COVID-19 end of February/2023 since after 2 weeks she started to experience numbness progressing into bilateral thighs and into bilateral upper limbs involving whole body including the face.  She cannot feel the bladder fullness.  In the past patient had extensive evaluation at [facility] and [facility].  11/2021 had diffuse mild enhancement of cauda equina nerve roots repeat imaging studies revealed resolved enhancement she had elevated CSF protein 11/2020 1 repeat CSF analysis showed normal protein 9/2022.  Previous EMG study performed 9/2022 showed unobtainable medial plantar responses bilaterally but otherwise normal motor and sensory responses.      Past Medical History:   Diagnosis Date     BRCA positive      CIDP (chronic inflammatory demyelinating polyneuropathy) (H)      ASHKAN III with severe dysplasia 2002     Complex regional pain syndrome type 1 of right lower extremity          PSHx:   Past Surgical History:   Procedure Laterality Date     BILATERAL OOPHORECTOMY Bilateral 2019     c section      2002     CHOLECYSTECTOMY  1997     COLONOSCOPY       ESOPHAGOSCOPY, GASTROSCOPY, DUODENOSCOPY (EGD), COMBINED N/A 07/28/2022    Procedure: ESOPHAGOGASTRODUODENOSCOPY (EGD);  Surgeon: Zack Maddox MD;  Location:  GI     HYSTERECTOMY  2004     MASTECTOMY Bilateral 2020       Medications Prior to Admission   Medication Sig Dispense Refill Last Dose     acetaminophen (TYLENOL) 500 MG tablet Take 500-1,000 mg by mouth every 6 hours as  needed for mild pain   Unknown     buprenorphine (BUTRANS) 7.5 MCG/HR WK patch Place 1 patch onto the skin once a week Applies on Wednesday at 9 am   5/10/2023 at 0900     cyclobenzaprine (FLEXERIL) 10 MG tablet Take 1 tablet (10 mg) by mouth 3 times daily   5/10/2023     dicyclomine (BENTYL) 20 MG tablet Take 20 mg by mouth 3 times daily (before meals)   5/10/2023     diphenhydrAMINE (BENADRYL) 25 MG tablet Take 0.5 tablets (12.5 mg) by mouth every 6 hours as needed for allergies or other (nausea) 30 tablet 0 Unknown     folic acid (FOLVITE) 400 MCG tablet Take 1 tablet (400 mcg) by mouth daily 90 tablet 0 5/10/2023     hydrOXYzine (ATARAX) 25 MG tablet Take 25 mg by mouth 3 times daily (before meals)   5/12/2023 at am     Lidocaine (LIDOCARE) 4 % Patch Place 3 patches onto the skin every 24 hours To prevent lidocaine toxicity, patient should be patch free for 12 hrs daily. (Patient taking differently: Place 3 patches onto the skin daily as needed for moderate pain To prevent lidocaine toxicity, patient should be patch free for 12 hrs daily.)  0 Unknown     medical cannabis (Patient's own supply) Take 1 Dose by mouth See Admin Instructions (The purpose of this order is to document that the patient reports taking medical cannabis.  This is not a prescription, and is not used to certify that the patient has a qualifying medical condition.)  Per pt: 5-10 mg tablet three times a day PRN   Unknown     multivitamin w/minerals (THERA-VIT-M) tablet Take 1 tablet by mouth every morning Megafood Womens Brand   5/11/2023     naloxone (NARCAN) 4 MG/0.1ML nasal spray Spray 1 spray (4 mg) into one nostril alternating nostrils as needed for opioid reversal every 2-3 minutes until assistance arrives 0.2 mL 0 5/11/2023     ondansetron (ZOFRAN) 4 MG tablet Take 1 tablet (4 mg) by mouth every 6 hours as needed for nausea or vomiting 30 tablet 0 Unknown     pantoprazole (PROTONIX) 40 MG EC tablet Take 1 tablet (40 mg) by mouth every  morning (before breakfast) 30 tablet 0 5/11/2023 at am     polyethylene glycol (MIRALAX) 17 GM/Dose powder Take 17 g by mouth daily as needed for constipation   Unknown     scopolamine (TRANSDERM) 1 MG/3DAYS 72 hr patch 1 patch every 72 hours   ran out     senna (SENOKOT) 8.6 MG tablet Take 1 tablet by mouth daily   5/9/2023     senna-docusate (SENOKOT-S/PERICOLACE) 8.6-50 MG tablet Take 1 tablet by mouth 2 times daily as needed for constipation   Unknown     sertraline (ZOLOFT) 50 MG tablet Take 1 tablet (50 mg) by mouth daily 60 tablet 1 5/10/2023     Current Facility-Administered Medications   Medication Dose Route Frequency     [START ON 5/17/2023] buprenorphine  1 patch Transdermal Weekly    And     buprenorphine  1 each Transdermal Q8H [facility]     cyclobenzaprine  10 mg Oral TID     dicyclomine  20 mg Oral TID AC     folic acid  400 mcg Oral Daily     hydrOXYzine  25 mg Oral TID AC     [START ON 5/13/2023] lidocaine  1 patch Transdermal Q24H    And     lidocaine   Transdermal Q8H [facility]     [START ON 5/13/2023] multivitamin w/minerals  1 tablet Oral QAM     pantoprazole  40 mg Oral QAM AC     sertraline  50 mg Oral Daily     Current Facility-Administered Medications   Medication Dose Route Frequency     acetaminophen  650 mg Oral Q6H PRN    Or     acetaminophen  650 mg Rectal Q6H PRN     diphenhydrAMINE  25 mg Oral Q6H PRN     melatonin  1 mg Oral At Bedtime PRN     naloxone  0.2 mg Intravenous Q2 Min PRN    Or     naloxone  0.4 mg Intravenous Q2 Min PRN    Or     naloxone  0.2 mg Intramuscular Q2 Min PRN    Or     naloxone  0.4 mg Intramuscular Q2 Min PRN     ondansetron  4 mg Oral Q6H PRN    Or     ondansetron  4 mg Intravenous Q6H PRN     ondansetron  4 mg Oral Q6H PRN     polyethylene glycol  17 g Oral Daily PRN          Allergies   Allergen Reactions     Dihydroergotamine Anaphylaxis     Latex Anaphylaxis     Shellfish-Derived Products Anaphylaxis     Sumatriptan Anaphylaxis     Compazine [Prochlorperazine]  "Anxiety     Banana Unknown     Gabapentin Dizziness     Gluten Meal Other (See Comments)     Celiac disease     Keppra [Levetiracetam] Nausea and Vomiting     Kiwi Unknown     Levofloxacin Other (See Comments)     Arrhythmia     Metronidazole Nausea and Vomiting     Nitrofurantoin Hives     Penicillins Hives     Pregabalin      Reglan [Metoclopramide] Other (See Comments)     restlessness/toe tapping      Topiramate Visual Disturbance     Aspirin Rash     Methadone Rash     Morphine Hives     She got hives around are when morphine given but resolved after few minutes per patient      Risperidone Anxiety       SocHx:  reports that she has quit smoking. Her smoking use included cigarettes. She has never used smokeless tobacco. She reports that she does not currently use alcohol. She reports current drug use. Drug: Marijuana.    Family History   Problem Relation Age of Onset     Kidney Disease Father            PHYSICAL EXAM  /74 (BP Location: Right arm, Patient Position: Left side, Cuff Size: Adult Regular)   Pulse 95   Temp 98  F (36.7  C) (Oral)   Resp 16   Ht 1.651 m (5' 5\")   Wt 76.7 kg (169 lb)   SpO2 100%   BMI 28.12 kg/m      No acute distress no labored breathing normal mood no clubbing cyanosis or pedal edema  Alert and oriented to current situations fund of knowledge is intact spontaneous speech comprehension is normal no dysarthria  Pupils equal and round reacting to light visual fields are full extraocular movements are intact face is symmetric hearing normal to conversation tongue movements are normal  Nearly intact strength involving bilateral upper and lower extremity, some of which is effort dependent  Sensations are diminished to touch temperature vibration involving bilateral upper and lower extremities she also has diminished sensation over the left forehead to vibration   Reflexes to upper and lower extremities plantars are equivocal    Lab and X-ray:   Recent Labs   Lab Test " 05/12/23  0956 01/31/23  1900 01/28/23  0619 06/06/22  2213 03/30/22  0928   WBC 5.7   < > 4.2   < > 4.2   HGB 14.6   < > 13.4   < > 14.2      < > 243   < > 240   INR  --   --  0.97  --   --    POTASSIUM 4.1   < > 4.3   < > 4.1   LDL  --   --   --   --  147*    < > = values in this interval not displayed.     Recent Labs   Lab Test 05/12/23  0956 02/07/23  0710 02/05/23 0721   POTASSIUM 4.1 3.8 3.9   CHLORIDE 102  --  106   BUN 6.1  --  3.1*     Recent Labs   Lab Test 05/12/23  0956 03/15/23  0743 01/31/23  1900 01/28/23  0619   WBC 5.7 6.5   < > 4.2   HGB 14.6 13.6   < > 13.4   MCV 92 94   < > 98    276   < > 243   INR  --   --   --  0.97    < > = values in this interval not displayed.     Recent Labs   Lab Test 05/12/23  0956 03/15/23  0743   AST 18 24   ALT 14 19   ALKPHOS 106* 109*     Recent Labs   Lab Test 03/30/22  0928   HDL 56   *     No lab results found.    Laboratory results were personally interpreted and reviewed in detail.  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)     Complex regional pain syndrome i of right lower limb     Fibromyalgia syndrome     Diverticulitis     Chronic daily headache     Celiac disease     BRCA gene mutation positive in female     Autoimmune disorder (H)     CIDP (chronic inflammatory demyelinating polyneuropathy) (H)     Physical deconditioning     Numbness and tingling of both legs     Bilateral leg weakness     Multiple falls       ASSESSMENT:     48 y/o female with known history of chronic inflammatory sensory polyradiculopathy in late 2021 currently presenting with worsening paresthesias involving bilateral upper and lower extremities whole body numbness.    Exam noted to have intact reflexes.    The past  she has extensive neuropathy work-up at [facility] and [facility] with repeat CSF protein being normal 9/2022    Vitamin B-12,  CRP, SSA/SSB  MRI lumbar spine pending   Physical therapy  Neurochecks protocol  Call us with any questions    Thank you for the opportunity to provide consultation on Peggy Jessica

## 2023-05-12 NOTE — PHARMACY-ADMISSION MEDICATION HISTORY
Pharmacist Admission Medication History    Admission medication history is complete. The information provided in this note is only as accurate as the sources available at the time of the update.    Medication reconciliation/reorder completed by provider prior to medication history? No    Information Source(s): Patient and CareEverywhere/SureScripts via in-person    Pertinent Information: Pt has not taken meds recently due to illness and running out of some meds    Changes made to PTA medication list:    Added: senna    Deleted: oxycodone    Changed: apap, butrans, dicylomine, hydroxyzine, scopolamine, senna s    Medication Affordability:  Not including over the counter (OTC) medications, was there a time in the past 3 months when you did not take your medications as prescribed because of cost?: No    Allergies reviewed with patient and updates made in EHR: no, numerous allergies and she said she reviewed with the nurse    Medication History Completed By: Ethan Puckett RPH 5/12/2023 12:07 PM    Prior to Admission medications    Medication Sig Last Dose Taking? Auth Provider Long Term End Date   acetaminophen (TYLENOL) 500 MG tablet Take 500-1,000 mg by mouth every 6 hours as needed for mild pain Unknown Yes Unknown, Entered By History     buprenorphine (BUTRANS) 7.5 MCG/HR WK patch Place 1 patch onto the skin once a week Applies on Wednesday at 9 am 5/10/2023 at 0900 Yes Unknown, Entered By History     cyclobenzaprine (FLEXERIL) 10 MG tablet Take 1 tablet (10 mg) by mouth 3 times daily 5/10/2023 Yes Bogdan Sheets MD     dicyclomine (BENTYL) 20 MG tablet Take 20 mg by mouth 3 times daily (before meals) 5/10/2023 Yes Reported, Patient     diphenhydrAMINE (BENADRYL) 25 MG tablet Take 0.5 tablets (12.5 mg) by mouth every 6 hours as needed for allergies or other (nausea) Unknown Yes Andrew Peck APRN CNP     folic acid (FOLVITE) 400 MCG tablet Take 1 tablet (400 mcg) by mouth daily 5/10/2023 Yes Andrew Peck  MIRA ROSARIO CNP     hydrOXYzine (ATARAX) 25 MG tablet Take 25 mg by mouth 3 times daily (before meals) 5/12/2023 at am Yes Reported, Patient     Lidocaine (LIDOCARE) 4 % Patch Place 3 patches onto the skin every 24 hours To prevent lidocaine toxicity, patient should be patch free for 12 hrs daily.  Patient taking differently: Place 3 patches onto the skin daily as needed for moderate pain To prevent lidocaine toxicity, patient should be patch free for 12 hrs daily. Unknown Yes Lin Chavez PA-C     medical cannabis (Patient's own supply) Take 1 Dose by mouth See Admin Instructions (The purpose of this order is to document that the patient reports taking medical cannabis.  This is not a prescription, and is not used to certify that the patient has a qualifying medical condition.)  Per pt: 5-10 mg tablet three times a day PRN Unknown Yes Reported, Patient     multivitamin w/minerals (THERA-VIT-M) tablet Take 1 tablet by mouth every morning Megafood Womens Brand 5/11/2023 Yes Reported, Patient     naloxone (NARCAN) 4 MG/0.1ML nasal spray Spray 1 spray (4 mg) into one nostril alternating nostrils as needed for opioid reversal every 2-3 minutes until assistance arrives 5/11/2023 Yes Carlita Woodward MD Yes    ondansetron (ZOFRAN) 4 MG tablet Take 1 tablet (4 mg) by mouth every 6 hours as needed for nausea or vomiting Unknown Yes Andrew Peck APRN CNP     pantoprazole (PROTONIX) 40 MG EC tablet Take 1 tablet (40 mg) by mouth every morning (before breakfast) 5/11/2023 at am Yes Luzma Goncalves APRN CNP     polyethylene glycol (MIRALAX) 17 GM/Dose powder Take 17 g by mouth daily as needed for constipation Unknown Yes Reported, Patient     scopolamine (TRANSDERM) 1 MG/3DAYS 72 hr patch 1 patch every 72 hours ran out Yes Reported, Patient     senna (SENOKOT) 8.6 MG tablet Take 1 tablet by mouth daily 5/9/2023 Yes Unknown, Entered By History     senna-docusate (SENOKOT-S/PERICOLACE) 8.6-50 MG tablet Take 1 tablet by  mouth 2 times daily as needed for constipation Unknown Yes Unknown, Entered By History     sertraline (ZOLOFT) 50 MG tablet Take 1 tablet (50 mg) by mouth daily 5/10/2023 Yes Andrew Peck, APRN CNP Yes
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
## 2023-05-12 NOTE — PROVIDER NOTIFICATION
MD Notification    Notified Person: MD    Notified Person Name:  Matt Guardado DO         Notification Date/Time:5-12 1655    Notification Interaction: vocera    Purpose of Notification:Pt rating pain in legs 7/10. Req pain med other than tylenol. Pt would like to bring PO cannabis    from home tomorrow.    Abd pain 6/10 tender to touch, nausea.   Pt states they are in [place] dept of health cannabis registry. Pt can keep it in lock box if ordered. Will reachout to pharm tomorrow.    Orders Received: Check if butrans in place.  Asked if fexeril, and bentyl. Given. Reach out to pharm for policy    Comments:

## 2023-05-12 NOTE — ED NOTES
[facility]  ED Nurse Handoff Report    ED Chief complaint: Abdominal Pain and Fall      ED Diagnosis:   Final diagnoses:   Multiple falls   Numbness and tingling of both legs   Bilateral leg weakness   CIDP (chronic inflammatory demyelinating polyneuropathy) (H) - history of       Code Status: Full Code    Allergies:   Allergies   Allergen Reactions     Dihydroergotamine Anaphylaxis     Latex Anaphylaxis     Shellfish-Derived Products Anaphylaxis     Sumatriptan Anaphylaxis     Compazine [Prochlorperazine] Anxiety     Banana Unknown     Gabapentin Dizziness     Gluten Meal Other (See Comments)     Celiac disease     Keppra [Levetiracetam] Nausea and Vomiting     Kiwi Unknown     Levofloxacin Other (See Comments)     Arrhythmia     Metronidazole Nausea and Vomiting     Nitrofurantoin Hives     Penicillins Hives     Pregabalin      Reglan [Metoclopramide] Other (See Comments)     restlessness/toe tapping      Topiramate Visual Disturbance     Aspirin Rash     Methadone Rash     Morphine Hives     She got hives around are when morphine given but resolved after few minutes per patient      Risperidone Anxiety       Patient Story: Pt came into ED for frequent falls (up to 3x a day) and numbness/tingling to her bilat lower extremities.  Hx of CISP.  States feels like getting worse.  Focused Assessment:  MRI pending.  Lab work unremarkable. A/o x4.  Independently uses WC at home. Chronic pain.  Pt does have bup. Patch to L chest.     Treatments and/or interventions provided: MRI  Patient's response to treatments and/or interventions:     To be done/followed up on inpatient unit:  UA    Does this patient have any cognitive concerns?: n/a    Activity level - Baseline/Home:  Independent  Activity Level - Current:   Stand with Assist    Patient's Preferred language: English   Needed?: No    Isolation: None  Infection: Not Applicable  Patient tested for COVID 19 prior to admission: NO  Bariatric?:  No    Vital Signs:   Vitals:    05/12/23 0922 05/12/23 1132   BP: (!) 114/101 110/83   Pulse: 101 95   Resp: 16    Temp: 98  F (36.7  C)    TempSrc: Temporal    SpO2: 98% 98%       Cardiac Rhythm:     Was the PSS-3 completed:   Yes  What interventions are required if any?               Family Comments: n/a  OBS brochure/video discussed/provided to patient/family: N/A              Name of person given brochure if not patient:               Relationship to patient:     For the majority of the shift this patient's behavior was Green.   Behavioral interventions performed were *[id number].    ED NURSE PHONE NUMBER:

## 2023-05-12 NOTE — PLAN OF CARE
Orientation/Cognitive: A&O  Observation Goals (Met/ Not Met): Not met  Mobility Level/Assist Equipment: WC baseline. A1 gb to pivot   Fall Risk (Y/N): Y  Behavior Concerns: N  Pain Management: Butrans patch in place. Chronic pain.   Tele/VS/O2: tachy. RA  ABNL Lab/BG: no  Diet: reg, gluten free. poor appetite, encouraged food and fluids  Bowel/Bladder: Abd discomfort and tenderness. Soft, normoactive. Voided 1x on shift, watch for retention. Incont at times. Pivot to commode  Skin Concerns: no, able to repo self  Drains/Devices: no  Tests/Procedures for next shift: Neuro. PT OT SW  Anticipated DC date & active delays: tbd  Patient Stated Goal for Today: rest

## 2023-05-12 NOTE — H&P
[facility]    History and Physical - Hospitalist Service       Date of Admission:  5/12/2023    Assessment & Plan      Peggy Jessica is a 47 year old female admitted on 5/12/2023. 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  She presents with gradually worsening weakness/more instability and falls ever since she had COVID in Feb 2023.  She does not feel she can manage at home with her weakness/falls.  Also has chronic gastroparesis issues and chronic regional pain syndrome.    Generalized weakness/mechanical falls, exacerbated since COVID 2/2023  CISP (Chronic immune sensory polyradiculopathy) history:  -  Initially developed neuro complaints 2021 following a flu vaccine with subsequent IVIG treatment.  Eventually IVIG stopped as not improving/felt needed further by her providers.  Hospitalized Jan 2023 and Oct 2022 [facility] with weakness/falls.  Also extensively evaluated Fall 2022 at [facility].  She has had multiple MRI's and a couple LP's in the past 9 months. At [place] MRI spine was repeated. It again showed that the prior enhancement had resolved. NCS were performed, and showed similar results to our prior studies. She also had a large serologic work up.   These have returned negative for new inflammation.  More recent Jan hospital stay eval also returned negative for inflammation.  While she had her initial illness and IVIG tx, the more recent provider assessments have felt she was not having a major acute exacerbation.  There are also notes regarding concerns of a functional component overlaying the other neurologic issues. (See Dr. Duenas's neurology October 2022 note for additional historical details)    Neuro complaints have involved weakness upper and lower body, numbness including face, at times diplopia.   Patient perceives her weakness and numbness are progressing particularly since Feb 2023.  She baseline falls every day and these falls  have increased to 3+ times daily.  She denies any major injuries/bruising as she catches herself/has soft fall locations by her report.    She doesn't feel her outpatient neurologist is listening to her problems and sought eval at the hospital as she thinks her issues have worsened to the point over the past week she cannot function at home.       Plan:  ED provider spoke with neurology who recommended an MRI.  If MRI negative for inflam, I would not obtain an LP until neurology see's her given the more recent multiple negative ones.  Denies head trauma.  Will defer to neuro if they feel any additional head or back imaging/LP's recommended.  It is possible her generalized weakness is more her chronic neuro issues + COVID deconditioning and not an acute neuro issue otherwise from her prior diagnosis.    Will also ask for PT/OT/SW consults.  She may need rehab/TCU placement.  Also if more functional element concerns could consider re-involving psychiatry who also saw her in the past during a stay.    Complex regional pain syndrome:  -  Follows with pain provider/clinic.  Is on Buprenorphine which will continue.     Chronic nausea/Gastroparesis concerns  Chronic constipation:  -  Recently had some emesis.  This is a chronic issue with her.  Suspect gastroparesis exacerbated with her opioids.  GI prior considered more eval but limited with ongoing opioid use.  Anti-emetics, monitor.  ABD exam fairly benign.  Offered abd x-ray for stool concerns but she would like to wait on that imaging as abd complaints better.    Depression/anxiety:  -  Continue PTA meds once verified           Medical Decision Making       80 MINUTES SPENT BY ME on the date of service doing chart review, history, exam, documentation & further activities per the note.          Diet: Regular Diet Adult    DVT Prophylaxis: Pneumatic Compression Devices  Kwan Catheter: Not present  Lines: None     Cardiac Monitoring: None  Code Status:   Full  Code    Clinically Significant Risk Factors Present on Admission                                Disposition Plan      Expected Discharge Date: 05/13/2023                  Matt Guardado DO  Hospitalist Service  [facility]  Securely message with [employer] (more info)  Text page via [employer] Paging/Directory   ____________________________________________________________________    Chief Complaint   Weakness, falls    History is obtained from the patient    History of Present Illness   Peggy Jessica is a 47 year old female who presented with worsening weakness/falls.  Also has progression of more numbness up body and face (similar to prior hospital presentation notes).  Patient perceives her weakness and numbness are progressing particularly since Feb 2023 when she had COVID-19.  She baseline falls every day and these falls have increased to 3+ times daily.  She denies any major injuries/bruising as she catches herself/has soft fall locations by her report.    She doesn't feel her outpatient neurologist is listening to her problems and sought eval at the hospital as she thinks her issues have worsened to the point over the past week she cannot function at home.    Also has some intermittent ABD complaints and emesis she attributes to gastroparesis.  No fevers, chills, new resp complaints.  Chronic pain an issue but she feels buprenorphine controlling.      Past Medical History    Past Medical History:   Diagnosis Date     BRCA positive      CIDP (chronic inflammatory demyelinating polyneuropathy) (H)      ASHKAN III with severe dysplasia 2002     Complex regional pain syndrome type 1 of right lower extremity        Past Surgical History   Past Surgical History:   Procedure Laterality Date     BILATERAL OOPHORECTOMY Bilateral 2019     c section      2002     CHOLECYSTECTOMY  1997     COLONOSCOPY       ESOPHAGOSCOPY, GASTROSCOPY, DUODENOSCOPY (EGD), COMBINED N/A 07/28/2022    Procedure:  ESOPHAGOGASTRODUODENOSCOPY (EGD);  Surgeon: Zack Mdadox MD;  Location:  GI     HYSTERECTOMY  2004     MASTECTOMY Bilateral 2020       Prior to Admission Medications   Prior to Admission Medications   Prescriptions Last Dose Informant Patient Reported? Taking?   Lidocaine (LIDOCARE) 4 % Patch  Self No No   Sig: Place 3 patches onto the skin every 24 hours To prevent lidocaine toxicity, patient should be patch free for 12 hrs daily.   acetaminophen (TYLENOL) 325 MG tablet  Self No No   Sig: Take 3 tablets (975 mg) by mouth every 8 hours   buprenorphine (BUTRANS) 5 MCG/HR WK patch  Self Yes No   Sig: Place 1 patch onto the skin every 7 days   cyclobenzaprine (FLEXERIL) 10 MG tablet  Self No No   Sig: Take 1 tablet (10 mg) by mouth 3 times daily   dicyclomine (BENTYL) 20 MG tablet  Self Yes No   Sig: Take 20 mg by mouth every 6 hours   diphenhydrAMINE (BENADRYL) 25 MG tablet  Self No No   Sig: Take 0.5 tablets (12.5 mg) by mouth every 6 hours as needed for allergies or other (nausea)   folic acid (FOLVITE) 400 MCG tablet  Self No No   Sig: Take 1 tablet (400 mcg) by mouth daily   hydrOXYzine (ATARAX) 25 MG tablet  Self Yes No   medical cannabis (Patient's own supply)  Self Yes No   Si Dose See Admin Instructions (The purpose of this order is to document that the patient reports taking medical cannabis.  This is not a prescription, and is not used to certify that the patient has a qualifying medical condition.)  Per pt: 5-10 mg TID PRN   multivitamin w/minerals (THERA-VIT-M) tablet  Self Yes No   Sig: Take 1 tablet by mouth every morning [employer] W[employer]   naloxone (NARCAN) 4 MG/0.1ML nasal spray  Self No No   Sig: Spray 1 spray (4 mg) into one nostril alternating nostrils as needed for opioid reversal every 2-3 minutes until assistance arrives   ondansetron (ZOFRAN) 4 MG tablet  Self No No   Sig: Take 1 tablet (4 mg) by mouth every 6 hours as needed for nausea or vomiting   oxyCODONE IR  (ROXICODONE) 10 MG tablet   No No   Sig: Take 1 tablet (10 mg) by mouth every 6 hours as needed for severe pain (7-10)   pantoprazole (PROTONIX) 40 MG EC tablet   No No   Sig: Take 1 tablet (40 mg) by mouth every morning (before breakfast)   polyethylene glycol (MIRALAX) 17 GM/Dose powder  Self Yes No   Sig: Take by mouth as needed   scopolamine (TRANSDERM) 1 MG/3DAYS 72 hr patch  Self Yes No   senna-docusate (SENOKOT-S/PERICOLACE) 8.6-50 MG tablet  Self No No   Sig: Take 1 tablet by mouth 2 times daily   sertraline (ZOLOFT) 50 MG tablet   No No   Sig: Take 1 tablet (50 mg) by mouth daily      Facility-Administered Medications: None      Allergies   Allergies   Allergen Reactions     Dihydroergotamine Anaphylaxis     Latex Anaphylaxis     Shellfish-Derived Products Anaphylaxis     Sumatriptan Anaphylaxis     Compazine [Prochlorperazine] Anxiety     Banana Unknown     Gabapentin Dizziness     Gluten Meal Other (See Comments)     Celiac disease     Keppra [Levetiracetam] Nausea and Vomiting     Kiwi Unknown     Levofloxacin Other (See Comments)     Arrhythmia     Metronidazole Nausea and Vomiting     Nitrofurantoin Hives     Penicillins Hives     Pregabalin      Reglan [Metoclopramide] Other (See Comments)     restlessness/toe tapping      Topiramate Visual Disturbance     Aspirin Rash     Methadone Rash     Morphine Hives     She got hives around are when morphine given but resolved after few minutes per patient      Risperidone Anxiety             Physical Exam   Vital Signs: Temp: 98  F (36.7  C) Temp src: Temporal BP: (!) 114/101 Pulse: 101   Resp: 16 SpO2: 98 % O2 Device: None (Room air)    Weight: 0 lbs 0 oz    GEN:  Alert, oriented x 3, appears comfortable.  HEENT:  Normocephalic/atraumatic, no scleral icterus, no nasal discharge, mouth moist.  CV:  Regular rate and rhythm, no loud murmur/rub  LUNGS:  Clear to auscultation bilaterally without rales/rhonchi/wheezing/retractions.  Symmetric chest rise on  inhalation noted.  ABD:  Active bowel sounds, soft, non-tender/non-distended.  No rebound/guarding/rigidity.  EXT:  No edema.  No cyanosis.  No acute joint synovitis noted.  SKIN:  Dry to touch, no exanthems noted in the visualized areas.  NEURO:  Moves all ext on request.  Initially had legs crossed and uncrossed them before I started examining her but when asked to move legs again she struggled.  She also had push/pulling of arms when I tried to test UE strength.  Sensations/reflexes diminished LE's.  Difficult to say how much strength reduced.  She was able with encouragement to raise each leg off bed.    Data     I have personally reviewed the following data over the past 24 hrs:    5.7  \   14.6   / 307     138 102 6.1 /  110 (H)   4.1 26 0.92 \       ALT: 14 AST: 18 AP: 106 (H) TBILI: 0.2   ALB: 4.2 TOT PROTEIN: 7.8 LIPASE: N/A         No results found for this or any previous visit (from the past 24 hour(s)).

## 2023-05-13 ENCOUNTER — APPOINTMENT (OUTPATIENT)
Dept: PHYSICAL THERAPY | Facility: CLINIC | Age: 48
End: 2023-05-13
Attending: INTERNAL MEDICINE
Payer: COMMERCIAL

## 2023-05-13 LAB
ANION GAP SERPL CALCULATED.3IONS-SCNC: 11 MMOL/L (ref 7–15)
BUN SERPL-MCNC: 7 MG/DL (ref 6–20)
CALCIUM SERPL-MCNC: 9.2 MG/DL (ref 8.6–10)
CHLORIDE SERPL-SCNC: 106 MMOL/L (ref 98–107)
CREAT SERPL-MCNC: 0.83 MG/DL (ref 0.51–0.95)
DEPRECATED HCO3 PLAS-SCNC: 22 MMOL/L (ref 22–29)
ERYTHROCYTE [DISTWIDTH] IN BLOOD BY AUTOMATED COUNT: 13.3 % (ref 10–15)
GFR SERPL CREATININE-BSD FRML MDRD: 86 ML/MIN/1.73M2
GLUCOSE SERPL-MCNC: 91 MG/DL (ref 70–99)
HCT VFR BLD AUTO: 40.3 % (ref 35–47)
HGB BLD-MCNC: 13.6 G/DL (ref 11.7–15.7)
MCH RBC QN AUTO: 30.7 PG (ref 26.5–33)
MCHC RBC AUTO-ENTMCNC: 33.7 G/DL (ref 31.5–36.5)
MCV RBC AUTO: 91 FL (ref 78–100)
PLATELET # BLD AUTO: 257 10E3/UL (ref 150–450)
POTASSIUM SERPL-SCNC: 4.6 MMOL/L (ref 3.4–5.3)
RBC # BLD AUTO: 4.43 10E6/UL (ref 3.8–5.2)
SODIUM SERPL-SCNC: 139 MMOL/L (ref 136–145)
WBC # BLD AUTO: 4.6 10E3/UL (ref 4–11)

## 2023-05-13 PROCEDURE — G0378 HOSPITAL OBSERVATION PER HR: HCPCS

## 2023-05-13 PROCEDURE — 36415 COLL VENOUS BLD VENIPUNCTURE: CPT | Performed by: INTERNAL MEDICINE

## 2023-05-13 PROCEDURE — 250N000013 HC RX MED GY IP 250 OP 250 PS 637: Performed by: PHYSICIAN ASSISTANT

## 2023-05-13 PROCEDURE — 80048 BASIC METABOLIC PNL TOTAL CA: CPT | Performed by: INTERNAL MEDICINE

## 2023-05-13 PROCEDURE — 85027 COMPLETE CBC AUTOMATED: CPT | Performed by: INTERNAL MEDICINE

## 2023-05-13 PROCEDURE — 250N000011 HC RX IP 250 OP 636: Performed by: INTERNAL MEDICINE

## 2023-05-13 PROCEDURE — 250N000013 HC RX MED GY IP 250 OP 250 PS 637: Performed by: INTERNAL MEDICINE

## 2023-05-13 PROCEDURE — 99233 SBSQ HOSP IP/OBS HIGH 50: CPT | Performed by: PHYSICIAN ASSISTANT

## 2023-05-13 PROCEDURE — 97530 THERAPEUTIC ACTIVITIES: CPT | Mod: GP | Performed by: PHYSICAL THERAPIST

## 2023-05-13 PROCEDURE — 97161 PT EVAL LOW COMPLEX 20 MIN: CPT | Mod: GP | Performed by: PHYSICAL THERAPIST

## 2023-05-13 RX ORDER — BUPRENORPHINE 7.5 UG/H
1 PATCH TRANSDERMAL WEEKLY
Status: DISCONTINUED | OUTPATIENT
Start: 2023-05-13 | End: 2023-05-13

## 2023-05-13 RX ORDER — TRAMADOL HYDROCHLORIDE 50 MG/1
50 TABLET ORAL
Status: COMPLETED | OUTPATIENT
Start: 2023-05-13 | End: 2023-05-13

## 2023-05-13 RX ORDER — BUPRENORPHINE 7.5 UG/H
1 PATCH TRANSDERMAL WEEKLY
Status: DISCONTINUED | OUTPATIENT
Start: 2023-05-17 | End: 2023-05-20 | Stop reason: HOSPADM

## 2023-05-13 RX ADMIN — DICYCLOMINE HYDROCHLORIDE 20 MG: 20 TABLET ORAL at 11:22

## 2023-05-13 RX ADMIN — HYDROXYZINE HYDROCHLORIDE 25 MG: 25 TABLET ORAL at 16:37

## 2023-05-13 RX ADMIN — FOLIC ACID TAB 400 MCG 400 MCG: 400 TAB at 08:45

## 2023-05-13 RX ADMIN — CYCLOBENZAPRINE 10 MG: 10 TABLET, FILM COATED ORAL at 13:56

## 2023-05-13 RX ADMIN — ONDANSETRON 4 MG: 4 TABLET, ORALLY DISINTEGRATING ORAL at 05:08

## 2023-05-13 RX ADMIN — CYCLOBENZAPRINE 10 MG: 10 TABLET, FILM COATED ORAL at 08:44

## 2023-05-13 RX ADMIN — HYDROXYZINE HYDROCHLORIDE 25 MG: 25 TABLET ORAL at 11:22

## 2023-05-13 RX ADMIN — ACETAMINOPHEN 650 MG: 325 TABLET ORAL at 11:22

## 2023-05-13 RX ADMIN — PANTOPRAZOLE SODIUM 40 MG: 40 TABLET, DELAYED RELEASE ORAL at 08:44

## 2023-05-13 RX ADMIN — DICYCLOMINE HYDROCHLORIDE 20 MG: 20 TABLET ORAL at 16:38

## 2023-05-13 RX ADMIN — ONDANSETRON 4 MG: 4 TABLET, ORALLY DISINTEGRATING ORAL at 11:23

## 2023-05-13 RX ADMIN — SERTRALINE HYDROCHLORIDE 50 MG: 50 TABLET ORAL at 08:45

## 2023-05-13 RX ADMIN — ACETAMINOPHEN 650 MG: 325 TABLET ORAL at 18:07

## 2023-05-13 RX ADMIN — TRAMADOL HYDROCHLORIDE 50 MG: 50 TABLET, COATED ORAL at 16:38

## 2023-05-13 RX ADMIN — ONDANSETRON 4 MG: 4 TABLET, ORALLY DISINTEGRATING ORAL at 18:07

## 2023-05-13 RX ADMIN — ACETAMINOPHEN 650 MG: 325 TABLET ORAL at 05:08

## 2023-05-13 RX ADMIN — HYDROXYZINE HYDROCHLORIDE 25 MG: 25 TABLET ORAL at 08:45

## 2023-05-13 RX ADMIN — DICYCLOMINE HYDROCHLORIDE 20 MG: 20 TABLET ORAL at 08:45

## 2023-05-13 RX ADMIN — CYCLOBENZAPRINE 10 MG: 10 TABLET, FILM COATED ORAL at 19:59

## 2023-05-13 ASSESSMENT — ACTIVITIES OF DAILY LIVING (ADL)
ADLS_ACUITY_SCORE: 33
ADLS_ACUITY_SCORE: 37
ADLS_ACUITY_SCORE: 33
ADLS_ACUITY_SCORE: 37
ADLS_ACUITY_SCORE: 33
ADLS_ACUITY_SCORE: 37
ADLS_ACUITY_SCORE: 37
ADLS_ACUITY_SCORE: 33
ADLS_ACUITY_SCORE: 33

## 2023-05-13 NOTE — PROGRESS NOTES
OBS GOALS    -diagnostic tests and consults completed and resulted: MET    -vital signs normal or at patient baseline: MET    -tolerating oral intake to maintain hydration: MET    -safe disposition plan has been identified: NOT MET

## 2023-05-13 NOTE — ED PROVIDER NOTES
History     Chief Complaint:  Abdominal Pain and Fall       HPI   Peggy Jessica is a 47 year old female with a history of CIDP, chronic pain/CRPS  on buprenorphine, and more who presents with multiple complaints including bilateral leg numbness/weakness, abdominal pain/vomiting, and more. She reports chronic CIDP starting 2021 for which she was treated with IVIG with improvement. This was stopped about 1 year ago, and pt has not agreed with the decision. She follows with Dr. Duenas from [place] [facility]. She has also seen [facility]. Pt had extensive workup through the [facility] in January of this year with negative MRI and LP studies. Pt also had negative workup through [facility] of last year with similarly negative CSF studies. Pt reports she's heard from multiple other specialists that she needs to be on IVIG and expresses frustration. She reports her numbness is now above her waist which is new for her. She reports falling at least 4 times/day. She denies any bruises/injuries from these falls.       Independent Historian:   None - Patient Only    Review of External Notes: Reviewed multiple outpt neurology notes from both the [facility] as well as [facility]. Also reviewed discharge summary from [facility] earlier this year.     ROS:  Review of Systems   Negative for fevers/URI symptoms  Positive for weakness, numbness, multiple falls, vomiting, abdominal pain.     Allergies:  Dihydroergotamine  Latex  Shellfish-Derived Products  Sumatriptan  Compazine [Prochlorperazine]  Banana  Gabapentin  Gluten Meal  Keppra [Levetiracetam]  Kiwi  Levofloxacin  Metronidazole  Nitrofurantoin  Penicillins  Pregabalin  Reglan [Metoclopramide]  Topiramate  Aspirin  Methadone  Morphine  Risperidone     Medications:    No current outpatient medications on file.      Past Medical History:    Past Medical History:   Diagnosis Date     BRCA positive      CIDP (chronic inflammatory demyelinating polyneuropathy) (H)   "    ASHKAN III with severe dysplasia 2002     Complex regional pain syndrome type 1 of right lower extremity        Past Surgical History:    Past Surgical History:   Procedure Laterality Date     BILATERAL OOPHORECTOMY Bilateral 2019     c section      2002     CHOLECYSTECTOMY  1997     COLONOSCOPY       ESOPHAGOSCOPY, GASTROSCOPY, DUODENOSCOPY (EGD), COMBINED N/A 07/28/2022    Procedure: ESOPHAGOGASTRODUODENOSCOPY (EGD);  Surgeon: Zack Maddox MD;  Location:  GI     HYSTERECTOMY  2004     MASTECTOMY Bilateral 2020        Family History:    family history includes Kidney Disease in her father.    Social History:   reports that she has quit smoking. Her smoking use included cigarettes. She has never used smokeless tobacco. She reports that she does not currently use alcohol. She reports current drug use. Drug: Marijuana.  PCP: Anderw Peck     Physical Exam     Patient Vitals for the past 24 hrs:   BP Temp Temp src Pulse Resp SpO2 Height Weight   05/12/23 1400 111/74 98  F (36.7  C) Oral 95 -- 100 % 1.651 m (5' 5\") 76.7 kg (169 lb)   05/12/23 1314 98/84 -- -- 102 -- 97 % -- --   05/12/23 1132 110/83 -- -- 95 -- 98 % -- --   05/12/23 0922 (!) 114/101 98  F (36.7  C) Temporal 101 16 98 % -- --        Physical Exam  Nursing note and vitals reviewed.  HENT:   Mouth/Throat: Moist mucous membranes.   Eyes: EOMI, nonicteric sclera  Cardiovascular: Normal rate, regular rhythm, no murmurs, rubs, or gallops  Pulmonary/Chest: Effort normal and breath sounds normal. No respiratory distress. No wheezes. No rales.   Abdominal: Soft. Nontender, nondistended, no guarding or rigidity.   Musculoskeletal: Normal range of motion.   Neurological: Alert. Moves all extremities spontaneously. Equal strength bilateral upper/lower extremities.   Pt noted to be sitting with her legs crossed during h&p, uncrosses them without difficulty, but then shakes/tremors when asked to perform strength testing.   Skin: Skin is warm and dry. " No rash noted.     Emergency Department Course       Imaging:  MRI L-spine pending at time of admission.     Laboratory:  Labs Ordered and Resulted from Time of ED Arrival to Time of ED Departure   COMPREHENSIVE METABOLIC PANEL - Abnormal       Result Value    Sodium 138      Potassium 4.1      Chloride 102      Carbon Dioxide (CO2) 26      Anion Gap 10      Urea Nitrogen 6.1      Creatinine 0.92      Calcium 9.8      Glucose 110 (*)     Alkaline Phosphatase 106 (*)     AST 18      ALT 14      Protein Total 7.8      Albumin 4.2      Bilirubin Total 0.2      GFR Estimate 76     CBC WITH PLATELETS AND DIFFERENTIAL    WBC Count 5.7      RBC Count 4.80      Hemoglobin 14.6      Hematocrit 44.1      MCV 92      MCH 30.4      MCHC 33.1      RDW 13.1      Platelet Count 307      % Neutrophils 70      % Lymphocytes 23      % Monocytes 6      % Eosinophils 1      % Basophils 0      % Immature Granulocytes 0      NRBCs per 100 WBC 0      Absolute Neutrophils 4.0      Absolute Lymphocytes 1.3      Absolute Monocytes 0.3      Absolute Eosinophils 0.1      Absolute Basophils 0.0      Absolute Immature Granulocytes 0.0      Absolute NRBCs 0.0              Emergency Department Course & Assessments:             Interventions:  Medications   diazepam (VALIUM) tablet 5 mg (5 mg Oral $Given 5/12/23 1131)   0.9% sodium chloride BOLUS ( Intravenous Restarted 5/12/23 1510)         Independent Interpretation (X-rays, CTs, rhythm strip):  None    Consultations/Discussion of Management or Tests:     The patient arrived in triage where vitals were measured and recorded.   The patient was then escorted back to the emergency department.   The patient's medical records were reviewed.  Nursing notes and vitals were reviewed.    I performed an exam of the patient as documented above. The patient is in agreement with my plan of care.       Social Determinants of Health affecting care:   Chronic pain. Adversarial relationship with healthcare team.      Disposition:  The patient was admitted to the hospital under the care of Dr. Guardado.     Impression & Plan        Medical Decision Making:  Pt presents with multiple complaints notable for bilateral lower extremity weakness/numbness in the context of CIDP diagnosis. Pt evaluated extensively in recent past and has had 2 negative workups for persistent inflammation both through the [facility] and [facility]. She presents today out of concern she is having another flare. Vital signs unremarkable. Physical exam without focal neurologic deficit. Discussed with on-call neurology, Dr. Munroe, who reviewed pt's records and recommended repeat MRI l-spine given her lower extremity symptoms. Stated that if L-spine MRI were normal, would not pursue repeat LP. Discussed with pt and answered all questions. She feels as though she is unable to manage safely at home at this time and requests observation admission for PT/OT similar to January of this year. MRI pending at time of admission. Discussed with Dr. Guardado who accepts.    Diagnosis:    ICD-10-CM    1. Multiple falls  R29.6       2. Numbness and tingling of both legs  R20.0     R20.2       3. Bilateral leg weakness  R29.898       4. CIDP (chronic inflammatory demyelinating polyneuropathy) (H)  G61.81     history of                Irvin Kaba MD  05/12/23 2023

## 2023-05-13 NOTE — PROGRESS NOTES
OBS GOALS    -diagnostic tests and consults completed and resulted: NOT MET    -vital signs normal or at patient baseline: MET    -tolerating oral intake to maintain hydration: MET    -safe disposition plan has been identified: NOT MET

## 2023-05-13 NOTE — PROGRESS NOTES
[facility]    Medicine Progress Note - Hospitalist Service    Date of Admission:  5/12/2023    Assessment & Plan   Peggy Jessica is a 47 year old female admitted on 5/12/2023. 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  She presents with gradually worsening weakness/more instability and falls ever since she had COVID in Feb 2023.  She does not feel she can manage at home with her weakness/falls.  Also has chronic gastroparesis issues and chronic regional pain syndrome.     Generalized weakness/mechanical falls, exacerbated since COVID 2/2023  CISP (Chronic immune sensory polyradiculopathy) history:  -  Initially developed neuro complaints 2021 following a flu vaccine with subsequent IVIG treatment.  Eventually IVIG stopped as not improving/felt needed further by her providers.  Hospitalized Jan 2023 and Oct 2022 [facility] with weakness/falls.  Also extensively evaluated Fall 2022 at [facility].  She has had multiple MRI's and a couple LP's in the past 9 months. At [place] MRI spine was repeated. It again showed that the prior enhancement had resolved. NCS were performed, and showed similar results to our prior studies. She also had a large serologic work up.   These have returned negative for new inflammation.  More recent Jan hospital stay eval also returned negative for inflammation.  While she had her initial illness and IVIG tx, the more recent provider assessments have felt she was not having a major acute exacerbation.  There are also notes regarding concerns of a functional component overlaying the other neurologic issues. (See Dr. Duenas's neurology October 2022 note for additional historical details)     Neuro complaints have involved weakness upper and lower body, numbness including face, at times diplopia.   Patient perceives her weakness and numbness are progressing particularly since Feb 2023.  She baseline falls every day and these falls  "have increased to 3+ times daily.  She denies any major injuries/bruising as she catches herself/has soft fall locations by her report.    She doesn't feel her outpatient neurologist is listening to her problems and sought eval at the hospital as she thinks her issues have worsened to the point over the past week she cannot function at home.        Plan:  -Appreciate Neurology assistance  -MRI of spine with no acute changes  -Vitamin B12 and CRP normal, SSA pending  - PT consult recommending TCU  -Patient and spouse want to discuss IVIG with neurology - note from Dr. Kwan per chart review on 3/20 indicates ongoing evaluation with neurology       Complex regional pain syndrome:  -  Follows with pain provider/clinic.  Continue PTA Buprenorphine and home supply of cannabis available.     Chronic nausea/Gastroparesis concerns  Chronic constipation:  -  Recently had some emesis.  This is a chronic issue with her.  Suspect gastroparesis exacerbated with her opioids.  GI prior considered more eval but limited with ongoing opioid use.    - At baseline, continue current regimen     Depression/anxiety:  -  Continue PTA Zoloft          Diet: Gluten Free Diet    DVT Prophylaxis: Low Risk/Ambulatory with no VTE prophylaxis indicated  Kwan Catheter: Not present  Lines: None     Cardiac Monitoring: None  Code Status: Full Code      Clinically Significant Risk Factors Present on Admission                       # Overweight: Estimated body mass index is 28.12 kg/m  as calculated from the following:    Height as of this encounter: 1.651 m (5' 5\").    Weight as of this encounter: 76.7 kg (169 lb).            Disposition Plan Awaiting TCU Placement.     Expected Discharge Date: 05/15/2023        Discharge Comments: PT/OT  SW/CC  Neuro following  Autoimmune disorder; multiple falls.        The patient's care was discussed with the Attending Physician, Dr. Gunderson, Bedside Nurse, Care Coordinator/, Patient and Charge " RN.    MARILUZ OcampoC  Hospitalist Service  [facility]  Securely message with [employer] (more info)  Text page via [employer] Paging/Directory   ______________________________________________________________________    Interval History   Doing well, remains weak.  Relatively unchanged from yesterday. Seen prior to PT eval.  Reviewed history of polyneuropathy diagnosis and recent decline after COVID infection.   No other complaints.    Physical Exam   Vital Signs: Temp: 97.7  F (36.5  C) Temp src: Oral BP: 96/63 Pulse: 66   Resp: 16 SpO2: 94 % O2 Device: None (Room air)    Weight: 169 lbs 0 oz    General Appearance: A&O x3, NAD, resting in bed  Respiratory: CTA, no accessory muscle use  Cardiovascular: rrr s1 s2 no murmurs, no LE edema  GI: soft, ND, BS present  Skin: warm and dr  Other: Moves all extremities, no focal neurologic deficits, answers questions appropriately with normal mood and affect     Medical Decision Making       50 MINUTES SPENT BY ME on the date of service doing chart review, history, exam, documentation & further activities per the note.      Data     I have personally reviewed the following data over the past 24 hrs:    N/A  \   N/A   / N/A     139 106 7.0 /  91   4.6 22 0.83 \       Procal: N/A CRP: <3.00 Lactic Acid: N/A         Imaging results reviewed over the past 24 hrs:   Recent Results (from the past 24 hour(s))   MR Lumbar Spine w/o & w Contrast    Narrative    MRI LUMBAR SPINE WITHOUT AND WITH CONTRAST   5/12/2023 12:57 PM     HISTORY: Subjective bilateral lower extremity numbness, history of  CIDP/CISP 2021.     TECHNIQUE: Multiplanar multisequence MRI of the lumbar spine without  and with 7mL Gadavist.     COMPARISON: MRI lumbar spine 1/27/2023.     FINDINGS: Noncontrast based on five lumbar vertebral bodies. Normal  vertebral body heights and sagittal alignment. Bone marrow signal  appears within normal limits. Normal appearance of the distal  spinal  cord with the conus terminating at the L1 inferior endplate level.  Normal appearance of the cauda equina. No abnormal enhancement  identified. The visualized paraspinous soft tissues and bony pelvis  otherwise appear unremarkable.    Segmental analysis:  T12-L1: Normal disc height and signal. No herniation. Normal facets.  No spinal canal or neural foraminal stenosis. No change.    L1-L2: Normal disc height and signal. No herniation. Normal facets. No  spinal canal or neural foraminal stenosis. No change.    L2-L3: Normal disc height and signal. No herniation. Normal facets. No  spinal canal or neural foraminal stenosis. No change.    L3-L4: Normal disc height and signal. No herniation. Normal facets. No  spinal canal or neural foraminal stenosis.    L4-L5: Disc desiccation without significant disc height loss.  Symmetric disc bulge with superimposed central disc protrusion. Mild  facet arthropathy. Mild bilateral lateral recess stenosis without  significant central spinal canal stenosis. Mild left neural foraminal  stenosis. The right neural foramen appears patent. Overall, no  significant change.    L5-S1: Disc desiccation with moderate disc height loss. Symmetric disc  bulge with superimposed shallow central disc protrusion. Mild facet  arthropathy. No spinal canal stenosis. Mild bilateral neural foraminal  stenosis. Overall, no significant change.      Impression    IMPRESSION:  1. Normal appearance of visualized distal spinal cord and cauda  equina. No abnormal enhancement identified.  2. Unchanged degenerative findings at L4-L5 and L5-S1, as described.  3. Mild bilateral lateral recess narrowing at L4-L5. No high-grade  central spinal canal stenosis.  4. Unchanged mild left L4-L5 and bilateral L5-S1 neural foraminal  stenosis.    SCOTT CALDERA MD         SYSTEM ID:  [id number]

## 2023-05-13 NOTE — UTILIZATION REVIEW
"  Admission Status; Secondary Review Determination         Under the authority of the Utilization Management Committee, the utilization review process indicated a secondary review on the above patient.  The review outcome is based on review of the medical records, discussions with staff, and applying clinical experience noted on the date of the review.       (x) Observation Status Appropriate - This patient does not meet hospital inpatient criteria and is placed in observation status. If this patient's primary payer is Medicare and was admitted as an inpatient, Condition Code 44 should be used and patient status changed to \"observation\".     RATIONALE FOR DETERMINATION     The patient is a 47-year-old female admitted on 5/12/2023.  She has a history of chronic immune sensory polyradiculopathy.  She has had extensive work-ups at [place] and [facility] due to weakness and falls.  She has had increasing weakness and does not feel she can manage at home.  She has chronic gastroparesis and chronic regional pain syndrome.  She was seen by neurology.  An MRI shows normal distal spinal cord and otherwise nonrelated issues with spinal stenosis and other chronic findings of the spine.  Physical therapy evaluated the patient and recommends transitional care unit for strengthening.  Neurology had recommendations for adding supplements.  Patient otherwise has a normal white count and negative CRP.  Based on current diagnosis, current evaluation and current treatment plan, recommend continuation of observation status.  [employer] paging text to be sent to Karni Byrd who requested this review and she is encouraged to call with any questions..    The severity of illness, intensity of service provided, expected LOS and risk for adverse outcome make the care complex, high risk and appropriate for hospital admission.        The information on this document is developed by the utilization review team in order for the " business office to ensure compliance.  This only denotes the appropriateness of proper admission status and does not reflect the quality of care rendered.         The definitions of Inpatient Status and Observation Status used in making the determination above are those provided in the CMS Coverage Manual, Chapter 1 and Chapter 6, section 70.4.      Sincerely,     Terence Alonso MD  Physician Advisor  Utilization Review/ Case Management  [facility].

## 2023-05-13 NOTE — PLAN OF CARE
Orientation/Cognitive: A&O. Neuros at baseline. Numbness face and extremities. Able to state where touch is on face and L or R. Double vision, able to track with eyes but makes pt dizzy. Nausea at times. Smile symmetrical, tongue midline. Pronator drift absent. Weak extremities, but even. Slow and deliberate heel to shin and finger to nose.   Observation Goals (Met/ Not Met): Not met  Mobility Level/Assist Equipment: WC. SBA gb pivot  Fall Risk (Y/N): Y  Behavior Concerns: N  Pain Management: Abd and generalized pain. tpump. Butrans patch L chest placed 5-10 at 0900. Tylenol, Tramadol prn x1. Cannabis home supply and registration to be verified by pharm   Tele/VS/O2: VSS on RA  ABNL Lab/BG: no  Diet: gluten free  Bowel/Bladder: incont at times. Unable to feel if bladder is full at times. Interm nausea, zofran and peppermint oil   Skin Concerns: no  Drains/Devices: no  Tests/Procedures for next shift: neuro following. SW  Anticipated DC date & active delays: tbd  Patient Stated Goal for Today: pain relief

## 2023-05-13 NOTE — PROVIDER NOTIFICATION
MD Notification    Notified Person: MD    Notified Person Name:Karin Hankins RIO Francois    Notification Date/Time:5-13 1529    Notification Interaction:miguel angel    Purpose of Notification:Pt still having complaints of abd and generalized pain. Pt req Tpump. Pt also req any other approp meds.    Orders Received:    Comments:

## 2023-05-13 NOTE — PLAN OF CARE
"[id number]: No acute events overnight. Pt appeared to sleep well between cares. Pt's  is planning to bring PTA PO cannabis (see previous provider notification). Neuros intact ex reported decreased sensation in arms; L>R. Was able to stand to commode and get back in bed w/ minimal assistance. Denied extremity pain overnight.     Orientation/Cognitive: A&Ox4  Mobility Level/Assist Equipment: SBA/Ax1 w/ GB to BSC  Fall Risk (Y/N): Yes  Behavior Concerns: None  Pain Management: Butrans patch in place. Tylenol given for abdominal discomfort. Declines heat/ice. PO zofran for intermittent nausea.  Tele/VS/O2: VSS on RA.  ABNL Lab/BG: AM BMP and CBC pending  Diet: Talya Gluten Free diet  Bowel/Bladder: Continent B/B. Voiding adequately. Pt reported that yesterday \"I couldn't tell when I had to use the bathroom\" and that she \"wasn't able to feel when she urinated.\" Bladder scanned after voiding, 13mL PVR.  Skin Concerns: None, intact.  Drains/Devices: PIV SL  Tests/Procedures for next shift: Neuro has seen. PT/OT/SW/CC consults.   Anticipated DC date & active delays: TBD pending consults    "

## 2023-05-13 NOTE — PLAN OF CARE
OT: Order received, chart reviewed and discussed with care team. Per discussion with PT, patient most likely needing TCU following discharge due to current level of assist. Inpatient needs can be addressed by PT. Defer discharge recommendations to PT and next level of care. Will complete OT orders.

## 2023-05-13 NOTE — PLAN OF CARE
Goal Outcome Evaluation:    Orientation/Cognitive: A&O  Observation Goals (Met/ Not Met): Not met  Mobility Level/Assist Equipment: WC baseline. A1 gb to pivot   Fall Risk (Y/N): Y  Behavior Concerns: N  Pain Management: Butrans patch in place. Chronic pain. PRN one time PRN Tramadol given.   Tele/VS/O2: tachy. RA  ABNL Lab/BG: no  Diet: reg, gluten free. poor appetite, encouraged food and fluids  Bowel/Bladder: Abd discomfort and tenderness. Soft, normoactive.Pivots to commode. Pt did not want to get up to try to void during shift.   Skin Concerns: no, able to repo self  Drains/Devices: no  Tests/Procedures for next shift: Neuro. PT OT SW  Anticipated DC date & active delays: tbd  Patient Stated Goal for Today: rest

## 2023-05-13 NOTE — PROVIDER NOTIFICATION
MD Notification    Notified Person: MD    Notified Person Name:Kylee Hankinsjoy Francois PA-C    Notification Date/Time:5-13 1137    Notification Interaction:miguel angel    Purpose of Notification:Pt brought cannabis po home supply. If approp pharm states for provider to place pharm consult and order/authorize med from prior to admission list for pt to manage. Also, butrans patch in place is 7.5 mcg vs 5 mcg in MAR.     Orders Received:    Comments:

## 2023-05-13 NOTE — PROGRESS NOTES
"   05/13/23 0900   Appointment Info   Signing Clinician's Name / Credentials (PT) Lan Senior DPT       Present no   Living Environment   People in Home spouse   Current Living Arrangements house   Home Accessibility stairs to enter home;stairs within home   Number of Stairs, Main Entrance 4   Stair Railings, Main Entrance railing on right side (ascending)   Number of Stairs, Within Home, Primary greater than 10 stairs  (16 (4+4+8))   Transportation Anticipated family or friend will provide   Living Environment Comments Pt lives in a house with her spouse. Stairs to enter and within the home. Pt reports her spouse will pick pt up upon discharge. Per pt, pt's spouse works full-time and can not provide 24/7 assist.   Self-Care   Usual Activity Tolerance moderate   Current Activity Tolerance fair   Equipment Currently Used at Home tub bench   Fall history within last six months yes   Number of times patient has fallen within last six months 50   Activity/Exercise/Self-Care Comment Pt reports being IND with dressing but otherwise requires assist with other ADLs. Pt reports she was able to ambulate up until late March and has been using a WC since. Pt reports she negotiates stairs on her bottom and is often unable to get up from stairs without assist.   General Information   Onset of Illness/Injury or Date of Surgery 05/13/23   Referring Physician Matt Guardado, DO   Patient/Family Therapy Goals Statement (PT) \"To go to rehab\"   Pertinent History of Current Problem (include personal factors and/or comorbidities that impact the POC) Per Chart: Peggy Jessica is a 47 year old female admitted on 5/12/2023. 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  She presents with gradually worsening weakness/more instability and falls ever since she had COVID in Feb 2023.  She does not feel she can manage at home with her weakness/falls.  Also has chronic gastroparesis issues and " chronic regional pain syndrome.   Existing Precautions/Restrictions fall   Weight-Bearing Status - LLE full weight-bearing   Weight-Bearing Status - RLE full weight-bearing   Cognition   Orientation Status (Cognition) oriented x 4   Pain Assessment   Patient Currently in Pain Yes, see Vital Sign flowsheet   Integumentary/Edema   Integumentary/Edema no deficits were identifed   Posture    Posture Forward head position;Protracted shoulders   Range of Motion (ROM)   Range of Motion ROM is WFL   Strength (Manual Muscle Testing)   Strength (Manual Muscle Testing) Deficits observed during functional mobility   Strength Comments BLE Hip Flexion: 3/5   Bed Mobility   Comment, (Bed Mobility) Supine>sit w/ CGA   Transfers   Comment, (Transfers) Sit>stand w/ FWW and CGA   Gait/Stairs (Locomotion)   [place] Level (Gait) minimum assist (75% patient effort)   Assistive Device (Gait) walker, front-wheeled   Distance in Feet 5'   Distance in Feet (Gait) 5' x 2   Balance   Balance Comments Good static balance; Pt unsteady in standing   Sensory Examination   Sensory Perception Comments Numbness in BLEs   Clinical Impression   Criteria for Skilled Therapeutic Intervention Yes, treatment indicated   PT Diagnosis (PT) Impaired gait and transfers   Influenced by the following impairments Decreased activity tolerance; decreased balance; decreased strength   Functional limitations due to impairments Impaired functional mobility   Clinical Presentation (PT Evaluation Complexity) Stable/Uncomplicated   Clinical Presentation Rationale Clinical judgement   Clinical Decision Making (Complexity) low complexity   Planned Therapy Interventions (PT) balance training;bed mobility training;gait training;patient/family education;stair training;strengthening;transfer training;wheelchair management/propulsion training;progressive activity/exercise   Risk & Benefits of therapy have been explained evaluation/treatment results reviewed;care  plan/treatment goals reviewed;risks/benefits reviewed;current/potential barriers reviewed;participants voiced agreement with care plan;participants included;patient   PT Total Evaluation Time   PT Eval, Low Complexity Minutes (40098) 10   Physical Therapy Goals   PT Frequency 3x/week   PT Predicted Duration/Target Date for Goal Attainment 05/18/23   PT Goals Bed Mobility;Transfers;Gait;Stairs   PT: Bed Mobility Supervision/stand-by assist;Supine to/from sit   PT: Transfers Supervision/stand-by assist;Sit to/from stand;Assistive device   PT: Gait Supervision/stand-by assist;Assistive device;25 feet   PT: Stairs Minimal assist;Greater than 10 stairs   Interventions   Interventions Quick Adds Gait Training;Therapeutic Activity   Therapeutic Activity   Therapeutic Activities: dynamic activities to improve functional performance Minutes (79019) 30   Symptoms Noted During/After Treatment Fatigue   Treatment Detail/Skilled Intervention Greeted pt supine in bed, agreed to PT. VSS on RA throughout session. Pt reports she fatigues quickly and requires rest breaks with all transfers. Pt performed supine>sit w/ CGA. Once in sitting, pt able to scoot self to EOB and sit unsupported without LOB. Pt performed sit>stand x 4 w/ FWW and min A x 1 progressing to CGA, verbal cues for hand placement. Pt standing with wide ZULEIMA, mildly unsteady, but able to gain balance without PT assist. Pt performed standing marches x 5 reps bilaterally w/ FWW. Uncoordinated movements noted when attempting marches which pt reports has been occurring in the past. Pt able to perform pivot transfer to WC w/ FWW and CGA. Pt motivated to attempt to ambulate. Pt ambulated 5' x 2 w/ FWW and min A x 1 for safety. Pt ambulated with step-to pattern and unsteady, but no overt LOB noted. Pt unable to tolerate further activity. Pt performed pivot transfer to bed w/ FWW and CGA. Pt returned to supine w/ CGA. Pt ended session supine in bed, with all needs met and call  light within reach.   Gait Training   Treatment Detail/Skilled Intervention See TA for details   PT Discharge Planning   PT Plan Repeat sit>stands; Pre-gait exercises; pivot transfers; stairs   PT Discharge Recommendation (DC Rec) Transitional Care Facility   PT Rationale for DC Rec Pt is below baseline. Pt currently requires assist with all functional mobility. Pt presents with deficits in activity tolerance, balance, and strength. Due to these deficits and recent falls history, pt is a high falls risk and unsafe to be home at this time. Pt would benefit from continued skilled PT services via TCU to address deficits and improve IND with safety and functional mobility. Pt's spouse works full-time job and is unable to provide 24/7 assist.   PT Brief overview of current status Supine>sit w/ CGA; sit>stand w/ FWW and CGA; gait w/ FWW and min A x 1   Total Session Time   Timed Code Treatment Minutes 30   Total Session Time (sum of timed and untimed services) 40

## 2023-05-13 NOTE — PLAN OF CARE
MD Notification    Notified Person: MD    Notified Person Name:  Catie Cam    Notification Date/Time: 05/12/23, 2115    Notification Interaction: Pt has increasing pain 7/10 on abdomen.     Purpose of Notification: PRN pain medication    Orders Received: One time order of Tramadol 50 mg.     Comments:

## 2023-05-13 NOTE — PROVIDER NOTIFICATION
MD Notification    Notified Person: MD    Notified Person Name:Karin Hankins PA-C    Notification Date/Time:5-13 1506    Notification Interaction:web page    Purpose of Notification:Fyi your note from today states home supply of cannabis is avail. However it is not available. Please place an order if approp.     Orders Received:  Provider states pharm to address.     Contacted pharm [phone]. Pharm states provider orders cannabis from home med list. Pharm states they will page Karin Hankins PA-C     Comments:

## 2023-05-14 PROCEDURE — 250N000009 HC RX 250: Performed by: PHYSICIAN ASSISTANT

## 2023-05-14 PROCEDURE — 99233 SBSQ HOSP IP/OBS HIGH 50: CPT | Performed by: PHYSICIAN ASSISTANT

## 2023-05-14 PROCEDURE — G0378 HOSPITAL OBSERVATION PER HR: HCPCS

## 2023-05-14 PROCEDURE — 250N000013 HC RX MED GY IP 250 OP 250 PS 637: Performed by: INTERNAL MEDICINE

## 2023-05-14 PROCEDURE — 250N000011 HC RX IP 250 OP 636: Performed by: INTERNAL MEDICINE

## 2023-05-14 PROCEDURE — 99418 PROLNG IP/OBS E/M EA 15 MIN: CPT | Performed by: PHYSICIAN ASSISTANT

## 2023-05-14 PROCEDURE — 250N000013 HC RX MED GY IP 250 OP 250 PS 637: Performed by: PHYSICIAN ASSISTANT

## 2023-05-14 RX ORDER — CALCIUM CARBONATE 500 MG/1
500 TABLET, CHEWABLE ORAL DAILY PRN
Status: DISCONTINUED | OUTPATIENT
Start: 2023-05-14 | End: 2023-05-20 | Stop reason: HOSPADM

## 2023-05-14 RX ORDER — SCOLOPAMINE TRANSDERMAL SYSTEM 1 MG/1
1 PATCH, EXTENDED RELEASE TRANSDERMAL
Status: DISCONTINUED | OUTPATIENT
Start: 2023-05-14 | End: 2023-05-20 | Stop reason: HOSPADM

## 2023-05-14 RX ORDER — TRAMADOL HYDROCHLORIDE 50 MG/1
50 TABLET ORAL 2 TIMES DAILY PRN
Status: COMPLETED | OUTPATIENT
Start: 2023-05-14 | End: 2023-05-15

## 2023-05-14 RX ADMIN — SERTRALINE HYDROCHLORIDE 50 MG: 50 TABLET ORAL at 08:36

## 2023-05-14 RX ADMIN — ACETAMINOPHEN 650 MG: 325 TABLET ORAL at 12:00

## 2023-05-14 RX ADMIN — HYDROXYZINE HYDROCHLORIDE 25 MG: 25 TABLET ORAL at 16:18

## 2023-05-14 RX ADMIN — TRAMADOL HYDROCHLORIDE 50 MG: 50 TABLET, COATED ORAL at 22:10

## 2023-05-14 RX ADMIN — CYCLOBENZAPRINE 10 MG: 10 TABLET, FILM COATED ORAL at 19:51

## 2023-05-14 RX ADMIN — HYDROXYZINE HYDROCHLORIDE 25 MG: 25 TABLET ORAL at 12:00

## 2023-05-14 RX ADMIN — ONDANSETRON 4 MG: 4 TABLET, ORALLY DISINTEGRATING ORAL at 13:15

## 2023-05-14 RX ADMIN — SCOPALAMINE 1 PATCH: 1 PATCH, EXTENDED RELEASE TRANSDERMAL at 14:26

## 2023-05-14 RX ADMIN — CYCLOBENZAPRINE 10 MG: 10 TABLET, FILM COATED ORAL at 13:14

## 2023-05-14 RX ADMIN — POLYETHYLENE GLYCOL 3350 17 G: 17 POWDER, FOR SOLUTION ORAL at 17:10

## 2023-05-14 RX ADMIN — ACETAMINOPHEN 650 MG: 325 TABLET ORAL at 05:20

## 2023-05-14 RX ADMIN — ACETAMINOPHEN 650 MG: 325 TABLET ORAL at 18:30

## 2023-05-14 RX ADMIN — FOLIC ACID TAB 400 MCG 400 MCG: 400 TAB at 08:36

## 2023-05-14 RX ADMIN — DICYCLOMINE HYDROCHLORIDE 20 MG: 20 TABLET ORAL at 12:00

## 2023-05-14 RX ADMIN — HYDROXYZINE HYDROCHLORIDE 25 MG: 25 TABLET ORAL at 06:47

## 2023-05-14 RX ADMIN — DICYCLOMINE HYDROCHLORIDE 20 MG: 20 TABLET ORAL at 16:18

## 2023-05-14 RX ADMIN — CYCLOBENZAPRINE 10 MG: 10 TABLET, FILM COATED ORAL at 08:36

## 2023-05-14 RX ADMIN — TRAMADOL HYDROCHLORIDE 50 MG: 50 TABLET, COATED ORAL at 14:25

## 2023-05-14 RX ADMIN — ONDANSETRON 4 MG: 4 TABLET, ORALLY DISINTEGRATING ORAL at 05:19

## 2023-05-14 RX ADMIN — PANTOPRAZOLE SODIUM 40 MG: 40 TABLET, DELAYED RELEASE ORAL at 06:47

## 2023-05-14 RX ADMIN — DICYCLOMINE HYDROCHLORIDE 20 MG: 20 TABLET ORAL at 06:47

## 2023-05-14 RX ADMIN — CALCIUM CARBONATE (ANTACID) CHEW TAB 500 MG 500 MG: 500 CHEW TAB at 10:56

## 2023-05-14 ASSESSMENT — ACTIVITIES OF DAILY LIVING (ADL)
ADLS_ACUITY_SCORE: 37

## 2023-05-14 NOTE — PROGRESS NOTES
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     Exam noted to have intact reflexes.     The past she has extensive neuropathy work-up at [facility] and [facility] with repeat CSF protein being normal 9/2022     Vitamin B-12,  CRP  PAULINE, SSA/SSB- pending   Consider EMG, No plan for IVIG for now   Consider Psychiatric eval   Physical therapy  Neurochecks protocol  Call us with any questions    My Colleague Dr. Cunningham will follow      Thank you for the opportunity to provide consultation on Peggy Jessica             CLINICAL PROBLEMS:    History of CISP 2021 s/p IVIG  Paresthesias     24 HOUR EVENTS:    Subjective paresthesias   B-12/Crp normal    EXAMINATION:   Past medical history, family history, social history and review of systems are unchanged except as noted below.    VITAL SIGNS:   /73 (BP Location: Right leg)   Pulse 83   Temp 98.4  F (36.9  C) (Oral)   Resp 14   Ht 1.651 m (5' 5\")   Wt 76.7 kg (169 lb)   SpO2 97%   BMI 28.12 kg/m        On general examination the patient was in no acute distress. No labored breathing. Mood and affect was normal  No clubbing,cyanosis, pedal edema in extremities     Neurologic:  Mental status: alert and oriented  to the current situations  fund of knowledge intact.  Intact attention Speech and Language: Comprehension and spontaneous speech are normal. No dysarthria. Visual fields were full, Face -symmetric Eye closure- intact. Tongue movements- normal ; Motor strength- moves all four limbs equally ; effort dependent No tremors or involuntary movements  Sensations - subjective decrease to light touch, vibration in stocking pattern UE and LE  Reflexes normal and symmetric    PERTINENT DATA:  Lab and X-ray: Recent Labs   Lab Test 05/13/23  1115 05/13/23  0807 " 01/31/23  1900 01/28/23  0619 06/06/22  2213 03/30/22  0928   WBC 4.6  --    < > 4.2   < > 4.2   HGB 13.6  --    < > 13.4   < > 14.2     --    < > 243   < > 240   INR  --   --   --  0.97  --   --    POTASSIUM  --  4.6   < > 4.3   < > 4.1   LDL  --   --   --   --   --  147*    < > = values in this interval not displayed.     [unfilled]  Recent Labs   Lab Test 05/13/23  0807 05/12/23  0956   POTASSIUM 4.6 4.1   CHLORIDE 106 102   BUN 7.0 6.1     Recent Labs   Lab Test 05/13/23  1115 05/12/23  0956 01/31/23  1900 01/28/23  0619   WBC 4.6 5.7   < > 4.2   HGB 13.6 14.6   < > 13.4   MCV 91 92   < > 98    307   < > 243   INR  --   --   --  0.97    < > = values in this interval not displayed.     Recent Labs   Lab Test 05/12/23  0956 03/15/23  0743   AST 18 24   ALT 14 19   ALKPHOS 106* 109*     Recent Labs   Lab Test 03/30/22  0928   HDL 56   *     No lab results found.    Laboratory results were personally interpreted and reviewed in detail.    Imaging studies reviewed and interpreted in detail    Summary: List Problems:   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)     Complex regional pain syndrome i of right lower limb     Fibromyalgia syndrome     Diverticulitis     Chronic daily headache     Celiac disease     BRCA gene mutation positive in female     Autoimmune disorder (H)     CIDP (chronic inflammatory demyelinating polyneuropathy) (H)     Physical deconditioning     Numbness and tingling of both legs     Bilateral leg weakness     Multiple falls

## 2023-05-14 NOTE — PROVIDER NOTIFICATION
MD Notification    Notified Person: MD    Notified Person Name:Kylee Hankinsjoy Francois PA-C    Notification Date/Time:5-14    Notification Interaction: web page    Purpose of Notification:  Pt req scopolamine patch & something more for 6/10 abd pain, pl order if approp. Pt thinks butrans patch may not be working after removal/replacement for imaging.  to bring another butrans patch tomorrow or tues.Pt upset rt neuro consult.    Orders Received: scopolamine and tramadol ordered. Provider states removal and reapplication should not affect patch. Pt will have another neuro consult 5-15    Comments:

## 2023-05-14 NOTE — PLAN OF CARE
Orientation/Cognitive:   A&O. Neuros at baseline. Numbness face and extremities. Able to state where touch is on face and L or R. Double vision, able to track with eyes but makes pt dizzy. Nausea at times. Slight HA. Smile symmetrical, tongue midline. Pronator drift absent. Weak extremities, but even. Slow and deliberate heel to shin and finger to nose.   Observation Goals (Met/ Not Met): Not met  Mobility Level/Assist Equipment: WC. SBA to pivot  Fall Risk (Y/N): Y  Behavior Concerns: N  Pain Management: Abd and generalized pain. butrans patch L chest. Tramadol, tylenol, tpump, home supply cannabis in lock box.   Tele/VS/O2: VSS on RA  ABNL Lab/BG: no  Diet: gluten free reg. Pt states poor appetite  Bowel/Bladder: Mostly cont. Pt states cannot feel when bladder is full at times. Scopolamine patch in place R ear, zofran. Constipation, Miralax given  Skin Concerns: N  Drains/Devices: no  Tests/Procedures for next shift: Neuro consult 5-15. SW  Anticipated DC date & active delays: pending placement  Patient Stated Goal for Today: pain relief.

## 2023-05-14 NOTE — PROGRESS NOTES
Pt's PO cannabis visually inspected by pharmacist (see note). Lock box obtained from security. Pt's cannabis (2 bottles) placed in lock box and locked onto pt side-rail.

## 2023-05-14 NOTE — PROGRESS NOTES
[facility]    Medicine Progress Note - Hospitalist Service    Date of Admission:  5/12/2023    Assessment & Plan   Peggy Jessica is a 47 year old female admitted on 5/12/2023. 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  She presents with gradually worsening weakness/more instability and falls ever since she had COVID in Feb 2023.  She does not feel she can manage at home with her weakness/falls.  Also has chronic gastroparesis issues and chronic regional pain syndrome.     Generalized weakness/mechanical falls, exacerbated since COVID 2/2023  CISP (Chronic immune sensory polyradiculopathy) history:  Dizziness  -  Initially developed neuro complaints 2021 following a flu vaccine with subsequent IVIG treatment.  Eventually IVIG stopped as not improving/felt needed further by her providers.  Hospitalized Jan 2023 and Oct 2022 [facility] with weakness/falls.  Also extensively evaluated Fall 2022 at [facility].  She has had multiple MRI's and a couple LP's in the past 9 months. At [facility] spine was repeated. It again showed that the prior enhancement had resolved. NCS were performed, and showed similar results to our prior studies. She also had a large serologic work up.   These have returned negative for new inflammation.  More recent Jan hospital stay eval also returned negative for inflammation.  While she had her initial illness and IVIG tx, the more recent provider assessments have felt she was not having a major acute exacerbation.  There are also notes regarding concerns of a functional component overlaying the other neurologic issues. (See Dr. Duenas's neurology October 2022 note for additional historical details)     Neuro complaints have involved weakness upper and lower body, numbness including face, at times diplopia.   Patient perceives her weakness and numbness are progressing particularly since Feb 2023.  She baseline falls every day and  "these falls have increased to 3+ times daily.  She denies any major injuries/bruising as she catches herself/has soft fall locations by her report.    She doesn't feel her outpatient neurologist is listening to her problems and sought eval at the hospital as she thinks her issues have worsened to the point over the past week she cannot function at home.        Plan:  -Appreciate Neurology assistance  -MRI of spine with no acute changes  -Vitamin B12 and CRP normal, SSA pending  - PT consult recommending TCU - SW consult pending  -Patient and spouse want to discuss IVIG with neurology - note from Dr. Kwan per chart review on 3/20 indicates ongoing evaluation with neurology       Complex regional pain syndrome:  -  Follows with pain provider/clinic.  Continue PTA Buprenorphine and home supply of cannabis available.     Chronic nausea/Gastroparesis concerns  Chronic constipation:  GERD  -  Recently had some emesis.  This is a chronic issue with her.  Suspect gastroparesis exacerbated with her opioids.  GI prior considered more eval but limited with ongoing opioid use.    - Addition of tums for heart burn     Depression/anxiety:  -  Continue PTA Zoloft          Diet: Gluten Free Diet    DVT Prophylaxis: Low Risk/Ambulatory with no VTE prophylaxis indicated  Kwan Catheter: Not present  Lines: None     Cardiac Monitoring: None  Code Status: Full Code      Clinically Significant Risk Factors Present on Admission                       # Overweight: Estimated body mass index is 28.12 kg/m  as calculated from the following:    Height as of this encounter: 1.651 m (5' 5\").    Weight as of this encounter: 76.7 kg (169 lb).            Disposition Plan Awaiting TCU Placement.     Expected Discharge Date: 05/15/2023,  6:00 PM      Discharge Comments: PT/OT rec TCU, needs placement  SW/CC  Neuro following  Autoimmune disorder; multiple falls.        The patient's care was discussed with the Attending Physician, Dr. Gunderson, " Bedside Nurse, Care Coordinator/, Patient and Charge RN.    Karin Hankins PA-C  Hospitalist Service  [facility]  Securely message with [employer] (more info)  Text page via [employer] Paging/Directory   ______________________________________________________________________    Interval History   Doing well, unchanged from yesterday. Long conversation about treatment of CISP with patient and current goal to improve back to baseline since getting COVID.  at bedside. Heart burn and full body dizziness present today - has a history of this but may be worse.    Physical Exam   Vital Signs: Temp: 97.8  F (36.6  C) Temp src: Oral BP: 105/64 Pulse: 73   Resp: 18 SpO2: 96 % O2 Device: None (Room air)    Weight: 169 lbs 0 oz    General Appearance: A&O x3, NAD, resting in bed  Respiratory: CTA, no accessory muscle use  Cardiovascular: rrr s1 s2 no murmurs, no LE edema  Skin: warm and dry, intact  Other: Moves all extremities, no focal neurologic deficits, answers questions appropriately with normal mood and affect     Medical Decision Making       65 MINUTES SPENT BY ME on the date of service doing chart review, history, exam, documentation & further activities per the note.      Data     I have personally reviewed the following data over the past 24 hrs:    4.6  \   13.6   / 257     N/A N/A N/A /  N/A   N/A N/A N/A \       Imaging results reviewed over the past 24 hrs:   No results found for this or any previous visit (from the past 24 hour(s)).

## 2023-05-14 NOTE — PLAN OF CARE
[id number]: No acute events overnight. Pt appeared to sleep well between cares. PTA medical cannabis verified by pharmacist and is in lock box on pt's bed side rail. Neuros remain unchanged --  reported decreased sensation in arms; L>R. Abdominal discomfort, abdominal soft and mildly tender on palpation, worse on R side.    Orientation/Cognitive: A&Ox4  Mobility Level/Assist Equipment: SBA/Ax1 w/ GB to BSC  Fall Risk (Y/N): Yes  Behavior Concerns: None  Pain Management: Butrans patch in place (placed on Wednesday 5/10/23, good for 1 week). Scheduled flexeril. Declines lidocaine patch as she prefers T-pump for abdominal discomfort. PO zofran for intermittent nausea.  Tele/VS/O2: VSS on RA.  ABNL Lab/BG: AM BMP and CBC pending  Diet: Talya Gluten Free diet  Bowel/Bladder: Continent B/B. Voiding adequately w/o difficulty.    Skin Concerns: None, intact.  Drains/Devices: PIV SL  Tests/Procedures for next shift: Neuro has seen. PT recommending TCU. SW consulted for dispo planning.   Anticipated DC date & active delays: TBD

## 2023-05-14 NOTE — PHARMACY
Medical Cannabis: Registration in the [facility] Medical Cannabis Registry is confirmed. ID# [id number]    Medical Cannabis visually inspected and does NOT appear obviously adulterated.

## 2023-05-15 ENCOUNTER — APPOINTMENT (OUTPATIENT)
Dept: MRI IMAGING | Facility: CLINIC | Age: 48
End: 2023-05-15
Attending: PSYCHIATRY & NEUROLOGY
Payer: COMMERCIAL

## 2023-05-15 LAB
ANA PAT SER IF-IMP: ABNORMAL
ANA SER QL IF: ABNORMAL
ANA TITR SER IF: ABNORMAL {TITER}

## 2023-05-15 PROCEDURE — 255N000002 HC RX 255 OP 636: Performed by: HOSPITALIST

## 2023-05-15 PROCEDURE — 72157 MRI CHEST SPINE W/O & W/DYE: CPT

## 2023-05-15 PROCEDURE — 99233 SBSQ HOSP IP/OBS HIGH 50: CPT | Performed by: HOSPITALIST

## 2023-05-15 PROCEDURE — 250N000013 HC RX MED GY IP 250 OP 250 PS 637: Performed by: INTERNAL MEDICINE

## 2023-05-15 PROCEDURE — 70553 MRI BRAIN STEM W/O & W/DYE: CPT

## 2023-05-15 PROCEDURE — 72156 MRI NECK SPINE W/O & W/DYE: CPT

## 2023-05-15 PROCEDURE — G0378 HOSPITAL OBSERVATION PER HR: HCPCS

## 2023-05-15 PROCEDURE — 250N000013 HC RX MED GY IP 250 OP 250 PS 637: Performed by: PHYSICIAN ASSISTANT

## 2023-05-15 PROCEDURE — 250N000013 HC RX MED GY IP 250 OP 250 PS 637: Performed by: PSYCHIATRY & NEUROLOGY

## 2023-05-15 PROCEDURE — A9585 GADOBUTROL INJECTION: HCPCS | Performed by: HOSPITALIST

## 2023-05-15 PROCEDURE — 250N000011 HC RX IP 250 OP 636: Performed by: INTERNAL MEDICINE

## 2023-05-15 PROCEDURE — 250N000013 HC RX MED GY IP 250 OP 250 PS 637: Performed by: HOSPITALIST

## 2023-05-15 RX ORDER — DIAZEPAM 5 MG
5 TABLET ORAL
Status: DISCONTINUED | OUTPATIENT
Start: 2023-05-15 | End: 2023-05-16

## 2023-05-15 RX ORDER — BISACODYL 10 MG
10 SUPPOSITORY, RECTAL RECTAL ONCE
Status: COMPLETED | OUTPATIENT
Start: 2023-05-15 | End: 2023-05-15

## 2023-05-15 RX ORDER — GADOBUTROL 604.72 MG/ML
8 INJECTION INTRAVENOUS ONCE
Status: COMPLETED | OUTPATIENT
Start: 2023-05-15 | End: 2023-05-15

## 2023-05-15 RX ORDER — LIDOCAINE 40 MG/G
CREAM TOPICAL
Status: CANCELLED | OUTPATIENT
Start: 2023-05-15

## 2023-05-15 RX ADMIN — CYCLOBENZAPRINE 10 MG: 10 TABLET, FILM COATED ORAL at 08:44

## 2023-05-15 RX ADMIN — GADOBUTROL 8 ML: 604.72 INJECTION INTRAVENOUS at 23:17

## 2023-05-15 RX ADMIN — DICYCLOMINE HYDROCHLORIDE 20 MG: 20 TABLET ORAL at 12:43

## 2023-05-15 RX ADMIN — SERTRALINE HYDROCHLORIDE 50 MG: 50 TABLET ORAL at 08:44

## 2023-05-15 RX ADMIN — DICYCLOMINE HYDROCHLORIDE 20 MG: 20 TABLET ORAL at 17:25

## 2023-05-15 RX ADMIN — CYCLOBENZAPRINE 10 MG: 10 TABLET, FILM COATED ORAL at 20:08

## 2023-05-15 RX ADMIN — TRAMADOL HYDROCHLORIDE 50 MG: 50 TABLET, COATED ORAL at 11:18

## 2023-05-15 RX ADMIN — ACETAMINOPHEN 650 MG: 325 TABLET ORAL at 09:17

## 2023-05-15 RX ADMIN — DIAZEPAM 5 MG: 5 TABLET ORAL at 21:00

## 2023-05-15 RX ADMIN — FOLIC ACID TAB 400 MCG 400 MCG: 400 TAB at 08:44

## 2023-05-15 RX ADMIN — DICYCLOMINE HYDROCHLORIDE 20 MG: 20 TABLET ORAL at 06:31

## 2023-05-15 RX ADMIN — ACETAMINOPHEN 650 MG: 325 TABLET ORAL at 20:08

## 2023-05-15 RX ADMIN — ONDANSETRON 4 MG: 4 TABLET, ORALLY DISINTEGRATING ORAL at 11:19

## 2023-05-15 RX ADMIN — HYDROXYZINE HYDROCHLORIDE 25 MG: 25 TABLET ORAL at 06:31

## 2023-05-15 RX ADMIN — HYDROXYZINE HYDROCHLORIDE 25 MG: 25 TABLET ORAL at 12:43

## 2023-05-15 RX ADMIN — PANTOPRAZOLE SODIUM 40 MG: 40 TABLET, DELAYED RELEASE ORAL at 06:31

## 2023-05-15 RX ADMIN — TRAMADOL HYDROCHLORIDE 25 MG: 50 TABLET ORAL at 21:00

## 2023-05-15 RX ADMIN — HYDROXYZINE HYDROCHLORIDE 25 MG: 25 TABLET ORAL at 17:25

## 2023-05-15 RX ADMIN — CYCLOBENZAPRINE 10 MG: 10 TABLET, FILM COATED ORAL at 14:50

## 2023-05-15 RX ADMIN — BISACODYL 10 MG: 10 SUPPOSITORY RECTAL at 12:43

## 2023-05-15 ASSESSMENT — ACTIVITIES OF DAILY LIVING (ADL)
ADLS_ACUITY_SCORE: 37
DEPENDENT_IADLS:: CLEANING;COOKING;LAUNDRY;SHOPPING;MEAL PREPARATION;TRANSPORTATION
ADLS_ACUITY_SCORE: 37

## 2023-05-15 NOTE — PROGRESS NOTES
Observation goals  PRIOR TO DISCHARGE       Comments: -diagnostic tests and consults completed and resulted: not met  -vital signs normal or at patient baseline: met   -tolerating oral intake to maintain hydration: met   -safe disposition plan has been identified: not met   Nurse to notify provider when observation goals have been met and patient is ready for discharge.

## 2023-05-15 NOTE — PROGRESS NOTES
Observation goals  PRIOR TO DISCHARGE       Comments: -diagnostic tests and consults completed and resulted: not met, to see neuro in AM   -vital signs normal or at patient baseline: met   -tolerating oral intake to maintain hydration: met   -safe disposition plan has been identified: not met   Nurse to notify provider when observation goals have been met and patient is ready for discharge.

## 2023-05-15 NOTE — PROGRESS NOTES
[facility]  Neurology Daily Note      Admission Date:5/12/2023   Date of service: 05/15/2023   Hospital Day: 4                                                   Assessment and Plan:         #.  Increased weakness, sensory loss, urinary dysfunction and visual disturbance over the last month.  Patient feels that it may have come on after COVID infection in February 2023.  She does not have findings of CIDP on her current examination-reflexes are intact so that is less likely but still a consideration given her past history.  Otherwise her examination reveals functional findings of vision and sensory changes and weakness.  Differential diagnosis might include demyelinating disease  --Plan to have MRI cervical, thoracic spine as well as the brain  Needs to have medication in MRI due to claustrophobia  Depending on results, may need to proceed with additional CSF studies or possibly EMG  My findings were discussed with the patient..  #. PT/OT/Speech  --continue evaluations  #.Nutrition     --Per speech therapy evaluation       Interval History:   Chart reviewed-reviewed notes of Dr. Ornelas.She was unimpressed with Dr. Ornelas's care yesterday, and felt she was not adequately cared for  Prior history of CIDP diagnosed in 2021 treated with IVIG.  She has been followed at [facility] and at the [facility].-Outside records from both of those facilities were reviewed [id number].  She received a wheelchair and August 2022 which she would use intermittently  More recent notes in 2022 and in early 2023 from providers indicated that there was no evidence of ongoing CIDP or other neurologic condition and she was considered to have a functional disorder.  Patient reports after having had COVID and February 2023, she started to have new/worsening symptoms.  This has progressed over the last 3 weeks to include increasing weakness to the point that she using wheelchair  all the time over the past month due to  "further weakness-she is having more difficulty with transfers.  She reports increasing paresthesias in her face arms and legs.  The weakness in her arms has prevented her from being able to note over the last month.  Pt reports that does not feel need to urinate, the pressure sensation has reduced   Pt reports muscle spasms  He also reports having double vision which is also started in the last 3 weeks.                Medications:   Scheduled Meds:    [START ON 5/17/2023] buprenorphine  1 patch Transdermal Weekly     buprenorphine  1 each Transdermal Q[facility]H [facility]     cyclobenzaprine  10 mg Oral TID     dicyclomine  20 mg Oral TID AC     folic acid  400 mcg Oral Daily     hydrOXYzine  25 mg Oral TID AC     lidocaine  1 patch Transdermal Q24H    And     lidocaine   Transdermal Q8H [facility]     medical cannabis  1 Dose Other See Admin Instructions     multivitamin w/minerals  1 tablet Oral QAM     pantoprazole  40 mg Oral QAM AC     scopolamine  1 patch Transdermal Q72H    And     scopolamine   Transdermal Q8H     sertraline  50 mg Oral Daily     sodium chloride (PF)  3 mL Intracatheter Q8H     PRN Meds: acetaminophen **OR** acetaminophen, calcium carbonate, diphenhydrAMINE, melatonin, naloxone **OR** naloxone **OR** naloxone **OR** naloxone, ondansetron **OR** ondansetron, polyethylene glycol        Physical Exam:   Vitals: Temp: 98.2  F (36.8  C) Temp src: Oral BP: 116/79 Pulse: 89   Resp: 18 SpO2: 98 % O2 Device: None (Room air)    Vital Signs with Ranges: Temp:  [97.7  F (36.5  C)-98.6  F (37  C)] 98.2  F (36.8  C)  Pulse:  [72-89] 89  Resp:  [14-20] 18  BP: (112-130)/(66-79) 116/79  SpO2:  [96 %-98 %] 98 %    General Appearance:  No acute distress  Neuro:                    Cranial nerves: With eye movement testing she would suddenly blink her eyes shut and then open, to \"prevent from getting dizzy \"-otherwise extraocular movements were intact.  Facial strength and sensation was normal.  When asked to count digits always " "reported seeing one more than held up: \"I know its two, but I see three\" when testing just 1 eye at a time.  Sudden give way weakness on testing strength of 4 extremitates associated with contraction related tremors.  Antigravity strength is intact in all 4 extremities    Feel dull prick on jaw, upper legs, back-questionable sensory level at the base of the neck  Absent vibration sensation at the knees and feet-absent pinprick sensation in the lower extremities.  Inconsistent pin sensation along the spine    Reflexes intact-no clonus plantar signs normal.      Extremities: No clubbing, no cyanosis, no edema       Data:   ROUTINE IP LABS (Last 3results)  CBC RESULTS:     Recent Labs   Lab Test 05/13/23  1115 05/12/23  0956 03/15/23  0743   WBC 4.6 5.7 6.5   RBC 4.43 4.80 4.20   HGB 13.6 14.6 13.6   HCT 40.3 44.1 39.5    307 276     Basic Metabolic Panel:  Recent Labs   Lab Test 05/13/23  0807 05/12/23  0956 02/07/23  0710 02/05/23  0721    138  --  140   POTASSIUM 4.6 4.1 3.8 3.9   CHLORIDE 106 102  --  106   CO2 22 26  --  25   BUN 7.0 6.1  --  3.1*   CR 0.83 0.92  --  0.70   GLC 91 110*  --  76   ESTEBAN 9.2 9.8  --  8.9     Liver panel:  Recent Labs   Lab Test 05/12/23  0956 03/15/23  0743 01/28/23  0619 01/27/23  0839 01/19/23  1352   PROTTOTAL 7.8 7.7 6.6 8.0 8.4*   ALBUMIN 4.2 4.3 3.6 4.3 4.5   BILITOTAL 0.2 0.2 0.3 0.4 0.4   ALKPHOS 106* 109* 91 115* 126*   AST 18 24 23 28 30   ALT 14 19 14 17 19     Thyroid Panel:  Recent Labs   Lab Test 01/28/23  0619 07/20/22  0959   TSH 3.12 1.38      Vitamin B12:   Recent Labs   Lab Test 05/12/23  1631 01/27/23  1259 07/20/22  0959   B12 537 582 406        IMAGING:   Independent interpretation of the following studies by myself as part of today's encounter.   MRI LUMBAR SPINE WITHOUT AND WITH CONTRAST   5/12/2023 12:57 PM                                                                         IMPRESSION:  1. Normal appearance of visualized distal spinal cord and " cauda  equina. No abnormal enhancement identified.  2. Unchanged degenerative findings at L4-L5 and L5-S1, as described.  3. Mild bilateral lateral recess narrowing at L4-L5. No high-grade  central spinal canal stenosis.  4. Unchanged mild left L4-L5 and bilateral L5-S1 neural foraminal  stenosis    Previous MRI of the brain and cervical spine April 2022-reviewed reports-unremarkable    CSF analysis in 2021 revealed an elevated protein of 103, more recent spinal fluid analysis in January 2023 was normal.      TIME     55minutes Evaluation/management time     Joselyn Cunningham M.D.  Neurologist  [facility]  Office [phone]

## 2023-05-15 NOTE — PLAN OF CARE
Goal Outcome Evaluation:      Observation goals  PRIOR TO DISCHARGE       Comments: -diagnostic tests and consults completed and resulted: not met, to see neuro in AM   -vital signs normal or at patient baseline: met   -tolerating oral intake to maintain hydration: met   -safe disposition plan has been identified: not met   Nurse to notify provider when observation goals have been met and patient is ready for discharge.    Orientation/Cognitive: A&Ox4. Q4 neuro checks with double vision, facial numbness L>R, absent sensation in BLE from hips down, BUE +4 strength, BLE +3 strength, all over numbness and tingling, RUE drift. No changes this shift.  Observation Goals (Met/ Not Met): not met  Mobility Level/Assist Equipment: SBA pivot  Fall Risk (Y/N): Y  Behavior Concerns: none  Pain Management: butrans patch in place, scheduled flexeril, atarax and bentyl. PRN tramadol, PRN tylenol. No lidocaine patch on.  Tele/VS/O2: VSS on RA  ABNL Lab/BG: none this shift.   Diet: Gluten free  Bowel/Bladder: incontinent of urine at times  Skin Concerns: none  Drains/Devices: PIV SL  Tests/Procedures for next shift: neuro consult  Anticipated DC date & active delays: TBD pending improvement, plan, and TCU placement  Patient Stated Goal for Today: pain control

## 2023-05-15 NOTE — PROGRESS NOTES
Observation goals  PRIOR TO DISCHARGE        Comments: -diagnostic tests and consults completed and resulted - not met  -vital signs normal or at patient baseline - met  -tolerating oral intake to maintain hydration - met  -safe disposition plan has been identified - not met  Nurse to notify provider when observation goals have been met and patient is ready for discharge.

## 2023-05-15 NOTE — PLAN OF CARE
Shift:[id number] 5/15/23  Summary: BLE weakness, falls  Orientation/Cognitive:A/Ox4  Observation Goals (Met/ Not Met):not met   Mobility Level/Assist Equipment:Wheelchair at baseline, A1 GB/walker to [facility] [place] [facility]   Fall Risk (Y/N):Yes   Behavior Concerns:tearful at times   Pain Management:Tramadol given for abd pain, Butrans patch in place  Tele/VS/O2:VSS on room air  ABNL Lab/BG:WNL  Diet:regular   Bowel/Bladder:voiding, BM x1 this shift   Skin Concerns:none   Drains/Devices:IV SL   Tests/Procedures for next shift: MRI brain, cervical and thoracic spine, GI and psych consult    Anticipated DC date & active delays: pending placement to TCU   Patient Stated Goal for Today:  Other important info:Neuros

## 2023-05-15 NOTE — PROGRESS NOTES
[facility]    Medicine Progress Note - Hospitalist Service    Date of Admission:  5/12/2023    Assessment & Plan   Peggy Jessica is a 47 year old female admitted on 5/12/2023. 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  She presents with gradually worsening weakness/more instability and falls ever since she had COVID in Feb 2023.  She does not feel she can manage at home with her weakness/falls.  Also has chronic gastroparesis issues and chronic regional pain syndrome.     Generalized weakness/mechanical falls, exacerbated since COVID 2/2023  CISP (Chronic immune sensory polyradiculopathy) history:  Dizziness  -  Initially developed neuro complaints 2021 following a flu vaccine with subsequent IVIG treatment.  Eventually IVIG stopped as not improving/felt needed further by her providers.  Hospitalized Jan 2023 and Oct 2022 [facility] with weakness/falls.  Also extensively evaluated Fall 2022 at [facility].  She has had multiple MRI's and a couple LP's in the past 9 months. At [place] MRI spine was repeated. It again showed that the prior enhancement had resolved. NCS were performed, and showed similar results to our prior studies. She also had a large serologic work up.   These have returned negative for new inflammation.  More recent Jan hospital stay eval also returned negative for inflammation.  While she had her initial illness and IVIG tx, the more recent provider assessments have felt she was not having a major acute exacerbation.  There are also notes regarding concerns of a functional component overlaying the other neurologic issues. (See Dr. Duenas's neurology October 2022 note for additional historical details)     Neuro complaints have involved weakness upper and lower body, numbness including face, at times diplopia.   Patient perceives her weakness and numbness are progressing particularly since Feb 2023.  She baseline falls every day and  "these falls have increased to 3+ times daily.  She denies any major injuries/bruising as she catches herself/has soft fall locations by her report.    She doesn't feel her outpatient neurologist is listening to her problems and sought eval at the hospital as she thinks her issues have worsened to the point over the past week she cannot function at home.        -Appreciate Neurology assistance, plan is MRI brain, C and T spines to eval for demyelinating disease. Neuro considering EMG/LP and CSF studies based on MRI findings.   -MRI of spine with no acute changes  -Vitamin B12 and CRP normal, SSA pending  - PT consult recommending TCU - SW consulted    Complex regional pain syndrome:  -  Follows with pain provider/clinic.  Continue PTA Buprenorphine and home supply of cannabis available.     Chronic nausea/Gastroparesis concerns  Chronic constipation:  GERD  -  Recently had some emesis.  This is a chronic issue with her.  Suspect gastroparesis exacerbated with her opioids.  GI prior considered more eval but limited with ongoing opioid use.    - Addition of tums for heart burn     Depression/anxiety:  -  Continue PTA Zoloft  - psychiatry consult    Chronic nausea, suspected gastroparesis  Epigastric abd pain  Constipation  - GI consult, possibly needs EGD. Noted normal on July 2022  - PPI  - bowel regimen, dulcolax suppx1          Diet: Gluten Free Diet    DVT Prophylaxis: Low Risk/Ambulatory with no VTE prophylaxis indicated  Kwan Catheter: Not present  Lines: None     Cardiac Monitoring: None  Code Status: Full Code      Clinically Significant Risk Factors Present on Admission                       # Overweight: Estimated body mass index is 28.12 kg/m  as calculated from the following:    Height as of this encounter: 1.651 m (5' 5\").    Weight as of this encounter: 76.7 kg (169 lb).            Disposition Plan Awaiting TCU Placement.     Expected Discharge Date: 05/16/2023,  6:00 PM    Destination: home with " help/services;home with family (TCU)  Discharge Comments: PT/OT rec TCU, needs placement  SW/CC  Neuro following  Autoimmune disorder; multiple falls.        The patient's care was discussed with the Bedside Nurse, Care Coordinator/, Patient and GI/neurology.    Bret Sky MD  Hospitalist Service  [facility]  Securely message with [employer] (more info)  Text page via [employer] Paging/Directory   ______________________________________________________________________    Interval History     Feels about the same, tingling numbness all over, weak, chronic nausea now worse with epigastric pain after eating. No BM for 5 days. Denies bloating.  Tearful at times and states that no treatment has been offered. Reports anxiety.    Physical Exam   Vital Signs: Temp: 97.9  F (36.6  C) Temp src: Oral BP: 118/82 Pulse: 88   Resp: 18 SpO2: 99 % O2 Device: None (Room air)    Weight: 169 lbs 0 oz    General Appearance: A&O x3, NAD, resting in bed  Respiratory: CTA, no accessory muscle use  Cardiovascular:  s1 s2 regular, no murmurs, no LE edema  Skin: warm and dry, intact  Other: Moves all extremities, no focal neurologic deficits, answers questions appropriately with normal mood and affect     Medical Decision Making       55 MINUTES SPENT BY ME on the date of service doing chart review, history, exam, documentation & further activities per the note.      Data         Imaging results reviewed over the past 24 hrs:   No results found for this or any previous visit (from the past 24 hour(s)).

## 2023-05-15 NOTE — CONSULTS
Care Management Initial Consult    General Information  Assessment completed with: Patient,    Type of CM/SW Visit: Initial Assessment    Primary Care Provider verified and updated as needed:     Readmission within the last 30 days:        Reason for Consult: discharge planning  Advance Care Planning: Advance Care Planning Reviewed: no concerns identified          Communication Assessment  Patient's communication style: spoken language (English or Bilingual)    Hearing Difficulty or Deaf: no        Cognitive  Cognitive/Neuro/Behavioral: WDL  Level of Consciousness: alert  Arousal Level: opens eyes spontaneously  Orientation: oriented x 4  Mood/Behavior: calm, cooperative  Best Language: 0 - No aphasia  Speech: clear    Living Environment:   People in home: spouse  Robert  Current living Arrangements: house      Able to return to prior arrangements:         Family/Social Support:  Care provided by: self, spouse/significant other  Provides care for: no one, unable/limited ability to care for self  Marital Status:   , Children          Description of Support System: Supportive    Support Assessment: Lacks necessary supervision and assistance    Current Resources:   Patient receiving home care services: No (Insurance has been barrier)     Community Resources:    Equipment currently used at home: tub bench  Supplies currently used at home:      Employment/Financial:  Employment Status: disabled        Financial Concerns:             Does the patient's insurance plan have a 3 day qualifying hospital stay waiver?  No    Lifestyle & Psychosocial Needs:  Social Determinants of Health     Tobacco Use: Medium Risk (3/15/2023)    Patient History      Smoking Tobacco Use: Former      Smokeless Tobacco Use: Never      Passive Exposure: Not on file   Alcohol Use: Not on file   Financial Resource Strain: Not on file   Food Insecurity: Not on file   Transportation Needs: Not on file   Physical Activity: Not on file  "  Stress: Not on file   Social Connections: Not on file   Intimate Partner Violence: Not on file   Depression: Not at risk (2/22/2023)    PHQ-2      PHQ-2 Score: 0   Housing Stability: Not on file       Functional Status:  Prior to admission patient needed assistance:   Dependent ADLs:: Ambulation-walker, Ambulation-cane, Wheelchair-independent, Bathing, Dressing  Dependent IADLs:: Cleaning, Cooking, Laundry, Shopping, Meal Preparation, Transportation       Mental Health Status:          Chemical Dependency Status:                Values/Beliefs:  Spiritual, Cultural Beliefs, Scientology Practices, Values that affect care:                 Additional Information:  SW spoke with pt for initial consult/discharge planning. Pt was admitted on 5/12/23 for generalized weakness. Pt lives in a house with spouse, two children are older and have moved out. Spouse works full time, pt reports concern of discharging back home with no help/support. Pt reports falling and unable to use stairs, has a commode in the living room, needs help with ADL/IADL's. Pt reports being homebound and unable to leave the house without total assistance. Pt concerned they have not been labeled \"homebound\" in the past so have been unable to get home care. Pt has been to [facility] in the past. Worries TCU's won't be able to work with celiac. Has a [facility] , Ryann Wick, [phone]. Pt reports insurance being a barrier to prior TCU attempts. Pt would like TCU but does not believe they can private pay and knows that insurance won't cover their stay. Informed of up front deposits/daily rate averages. If going to TCU, would like one near home, or [facility]. SW sent referrals to check on bed availability and deposit costs.     Trini Lyn, STEVE  Social Work  [facility]           "

## 2023-05-16 ENCOUNTER — TELEPHONE (OUTPATIENT)
Dept: FAMILY MEDICINE | Facility: CLINIC | Age: 48
End: 2023-05-16
Payer: COMMERCIAL

## 2023-05-16 ENCOUNTER — APPOINTMENT (OUTPATIENT)
Dept: PHYSICAL THERAPY | Facility: CLINIC | Age: 48
End: 2023-05-16
Payer: COMMERCIAL

## 2023-05-16 LAB — UPPER GI ENDOSCOPY: NORMAL

## 2023-05-16 PROCEDURE — 43239 EGD BIOPSY SINGLE/MULTIPLE: CPT | Performed by: INTERNAL MEDICINE

## 2023-05-16 PROCEDURE — 99233 SBSQ HOSP IP/OBS HIGH 50: CPT | Performed by: HOSPITALIST

## 2023-05-16 PROCEDURE — 999N000099 HC STATISTIC MODERATE SEDATION < 10 MIN: Performed by: INTERNAL MEDICINE

## 2023-05-16 PROCEDURE — 250N000009 HC RX 250: Performed by: INTERNAL MEDICINE

## 2023-05-16 PROCEDURE — 250N000011 HC RX IP 250 OP 636: Performed by: INTERNAL MEDICINE

## 2023-05-16 PROCEDURE — 250N000013 HC RX MED GY IP 250 OP 250 PS 637: Performed by: INTERNAL MEDICINE

## 2023-05-16 PROCEDURE — 88305 TISSUE EXAM BY PATHOLOGIST: CPT | Mod: TC | Performed by: INTERNAL MEDICINE

## 2023-05-16 PROCEDURE — 258N000003 HC RX IP 258 OP 636: Performed by: HOSPITALIST

## 2023-05-16 PROCEDURE — 88305 TISSUE EXAM BY PATHOLOGIST: CPT | Mod: 26 | Performed by: PATHOLOGY

## 2023-05-16 PROCEDURE — G0378 HOSPITAL OBSERVATION PER HR: HCPCS

## 2023-05-16 PROCEDURE — 250N000013 HC RX MED GY IP 250 OP 250 PS 637: Performed by: HOSPITALIST

## 2023-05-16 PROCEDURE — 97530 THERAPEUTIC ACTIVITIES: CPT | Mod: GP

## 2023-05-16 RX ORDER — FLUMAZENIL 0.1 MG/ML
0.2 INJECTION, SOLUTION INTRAVENOUS
Status: CANCELLED | OUTPATIENT
Start: 2023-05-16 | End: 2023-05-17

## 2023-05-16 RX ORDER — OXYCODONE HYDROCHLORIDE 5 MG/1
5 TABLET ORAL EVERY 8 HOURS PRN
Status: DISCONTINUED | OUTPATIENT
Start: 2023-05-16 | End: 2023-05-16

## 2023-05-16 RX ORDER — AZITHROMYCIN 250 MG/1
250 TABLET, FILM COATED ORAL DAILY
Status: DISCONTINUED | OUTPATIENT
Start: 2023-05-16 | End: 2023-05-20 | Stop reason: HOSPADM

## 2023-05-16 RX ORDER — SODIUM CHLORIDE 9 MG/ML
INJECTION, SOLUTION INTRAVENOUS CONTINUOUS
Status: ACTIVE | OUTPATIENT
Start: 2023-05-16 | End: 2023-05-16

## 2023-05-16 RX ORDER — FENTANYL CITRATE 50 UG/ML
INJECTION, SOLUTION INTRAMUSCULAR; INTRAVENOUS PRN
Status: DISCONTINUED | OUTPATIENT
Start: 2023-05-16 | End: 2023-05-16 | Stop reason: HOSPADM

## 2023-05-16 RX ADMIN — HYDROXYZINE HYDROCHLORIDE 25 MG: 25 TABLET ORAL at 11:14

## 2023-05-16 RX ADMIN — AZITHROMYCIN MONOHYDRATE 250 MG: 250 TABLET ORAL at 17:41

## 2023-05-16 RX ADMIN — SODIUM CHLORIDE: 9 INJECTION, SOLUTION INTRAVENOUS at 11:14

## 2023-05-16 RX ADMIN — ACETAMINOPHEN 650 MG: 325 TABLET ORAL at 20:54

## 2023-05-16 RX ADMIN — TRAMADOL HYDROCHLORIDE 25 MG: 50 TABLET, COATED ORAL at 11:56

## 2023-05-16 RX ADMIN — DICYCLOMINE HYDROCHLORIDE 20 MG: 20 TABLET ORAL at 06:46

## 2023-05-16 RX ADMIN — CYCLOBENZAPRINE 10 MG: 10 TABLET, FILM COATED ORAL at 20:50

## 2023-05-16 RX ADMIN — CYCLOBENZAPRINE 10 MG: 10 TABLET, FILM COATED ORAL at 16:17

## 2023-05-16 RX ADMIN — TRAMADOL HYDROCHLORIDE 25 MG: 50 TABLET, COATED ORAL at 20:54

## 2023-05-16 RX ADMIN — PANTOPRAZOLE SODIUM 40 MG: 40 TABLET, DELAYED RELEASE ORAL at 06:47

## 2023-05-16 RX ADMIN — CYCLOBENZAPRINE 10 MG: 10 TABLET, FILM COATED ORAL at 08:03

## 2023-05-16 RX ADMIN — HYDROXYZINE HYDROCHLORIDE 25 MG: 25 TABLET ORAL at 06:46

## 2023-05-16 RX ADMIN — FOLIC ACID TAB 400 MCG 400 MCG: 400 TAB at 08:03

## 2023-05-16 RX ADMIN — DICYCLOMINE HYDROCHLORIDE 20 MG: 20 TABLET ORAL at 16:17

## 2023-05-16 RX ADMIN — DICYCLOMINE HYDROCHLORIDE 20 MG: 20 TABLET ORAL at 11:14

## 2023-05-16 RX ADMIN — SERTRALINE HYDROCHLORIDE 50 MG: 50 TABLET ORAL at 08:03

## 2023-05-16 RX ADMIN — HYDROXYZINE HYDROCHLORIDE 25 MG: 25 TABLET ORAL at 16:17

## 2023-05-16 ASSESSMENT — ACTIVITIES OF DAILY LIVING (ADL)
ADLS_ACUITY_SCORE: 37

## 2023-05-16 NOTE — PROGRESS NOTES
Orientation/Cognitive: A/O x4   Observation Goals (Met/ Not Met): Partially met   Mobility Level/Assist Equipment: Ax1/GB/W   Fall Risk (Y/N): Yes  Behavior Concerns: Green   Pain Management: Butrans patch in place, scheduled Flexeril   Tele/VS/O2: VSS on RA  ABNL Lab/BG: See chart   Diet: NPO after MN  Bowel/Bladder: Continent. Up to BSC  Skin Concerns: none  Drains/Devices: PIV SL  Tests/Procedures for next shift: EGD   Anticipated DC date & active delays: TBD. TCU placement pending. SW following.   Patient Stated Goal for Today: pain control, rest       Observation goals  PRIOR TO DISCHARGE        Comments:   -diagnostic tests and consults completed and resulted - Partially met  -vital signs normal or at patient baseline - met  -tolerating oral intake to maintain hydration - met   -safe disposition plan has been identified - not met     Nurse to notify provider when observation goals have been met and patient is ready for discharge.

## 2023-05-16 NOTE — PROGRESS NOTES
[facility]    Medicine Progress Note - Hospitalist Service    Date of Admission:  5/12/2023    Assessment & Plan   Peggy Jessica is a 47 year old female admitted on 5/12/2023. 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  She presents with gradually worsening weakness/more instability and falls ever since she had COVID in Feb 2023.  She does not feel she can manage at home with her weakness/falls.  Also has chronic gastroparesis issues and chronic regional pain syndrome.     Generalized weakness/mechanical falls, exacerbated since COVID 2/2023  CISP (Chronic immune sensory polyradiculopathy) history  Dizziness  -  Initially developed neuro complaints 2021 following a flu vaccine with subsequent IVIG treatment.  Eventually IVIG stopped as not improving/felt needed further by her providers.  Hospitalized Jan 2023 and Oct 2022 [facility] with weakness/falls.  Also extensively evaluated Fall 2022 at [facility]. She has had multiple MRI's and a couple LP's in the past 9 months. At [place] MRI spine was repeated. It again showed that the prior enhancement had resolved. NCS were performed, and showed similar results to our prior studies. She also had a large serologic work up. These have returned negative for new inflammation.  More recent Jan hospital stay eval also returned negative for inflammation. While she had her initial illness and IVIG tx, the more recent provider assessments have felt she was not having a major acute exacerbation.  There are also notes regarding concerns of a functional component overlaying the other neurologic issues. (See Dr. Duenas's neurology October 2022 note for additional historical details)     Neuro complaints have involved weakness upper and lower body, numbness including face, at times diplopia.   Patient perceives her weakness and numbness are progressing particularly since Feb 2023.  She baseline falls every day and these  "falls have increased to 3+ times daily.  She denies any major injuries/bruising as she catches herself/has soft fall locations by her report.   She doesn't feel her outpatient neurologist is listening to her problems and sought eval at the hospital as she thinks her issues have worsened to the point over the past week she cannot function at home.        -Appreciate Neurology assistance,  MRI brain, cervical, thoracic, lumbar spines completed and negative for finding of  demyelinating disease. Neuro considering EMG/LP and CSF studies   -Vitamin B12 and CRP normal, SSA pending  - PT consult recommending TCU - SW consulted and following    Complex regional pain syndrome:  -  Follows with pain provider/clinic.  Continue PTA Buprenorphine and home supply of cannabis available.     Chronic nausea/Gastroparesis concerns  Chronic constipation:  GERD/epigastric pain  -  Recently had some emesis.  This is a chronic issue with her.  Suspect gastroparesis exacerbated with her opioids.  GI prior considered more eval but limited with ongoing opioid use.    -Continue with PPI, Addition of tums for heart burn, tramadol for pain  -GI consulted, plan is EGD today  -Bowel regimen      Diet: NPO per Anesthesia Guidelines for Procedure/Surgery Except for: Meds    DVT Prophylaxis: Low Risk/Ambulatory with no VTE prophylaxis indicated  Kwan Catheter: Not present  Lines: None     Cardiac Monitoring: None  Code Status: Full Code      Clinically Significant Risk Factors Present on Admission                       # Overweight: Estimated body mass index is 28.12 kg/m  as calculated from the following:    Height as of this encounter: 1.651 m (5' 5\").    Weight as of this encounter: 76.7 kg (169 lb).            Disposition Plan Awaiting TCU Placement.     Expected Discharge Date: 05/17/2023,  6:00 PM    Destination: home with help/services;home with family (TCU)  Discharge Comments: PT/OT rec TCU, needs placement  SW/CC  Neuro " following  Autoimmune disorder; multiple falls.  GI following.   EGD today.        The patient's care was discussed with the Bedside Nurse, Care Coordinator/, Patient and GI/neurology.    Bret Sky MD  Hospitalist Service  [facility]  Securely message with [employer] (more info)  Text page via [facility] Paging/Directory   ______________________________________________________________________    Interval History     Evaluated patient this morning, ongoing nausea and abdominal pain unchanged, had bowel movement after Dulcolax suppository yesterday.       Physical Exam   Vital Signs: Temp: 97.8  F (36.6  C) Temp src: Oral BP: 102/72 Pulse: 93   Resp: 18 SpO2: 92 % O2 Device: None (Room air)    Weight: 169 lbs 0 oz    General Appearance: A&O x3, NAD, resting in bed  Respiratory: CTA, no accessory muscle use  Cardiovascular:  s1 s2 regular, no murmurs, no LE edema  Skin: warm and dry, intact  Other: Moves all extremities, no focal neurologic deficits, answers questions appropriately with normal mood and affect     Medical Decision Making       55 MINUTES SPENT BY ME on the date of service doing chart review, history, exam, documentation & further activities per the note.      Data         Imaging results reviewed over the past 24 hrs:   Recent Results (from the past 24 hour(s))   MR Brain w/o & w Contrast    Narrative    EXAM: MR BRAIN W/O and W CONTRAST  LOCATION: [facility]  DATE/TIME: 5/15/2023 11:10 PM CDT    INDICATION: Progressive extremity weakness and pain, increased walking difficulty, urinary dysfunction over last 1month; rule out demyelinating lesion.  COMPARISON: 10/17/2022, 4/30/2022  CONTRAST: 8 Gadavist  TECHNIQUE: Multiplanar multisequence head MRI without and with intravenous contrast according to the MS protocol.    FINDINGS:  INTRACRANIAL CONTENTS: No acute or subacute infarct. No mass, acute hemorrhage, or extra-axial fluid collections.  Minimal nonspecific foci of T2/FLAIR hyperintense signal in the cerebral white matter, stable from the prior exam. No callosal or posterior   fossa lesions. Stable tiny subependymal cyst along the body of the left lateral ventricle. Normal ventricles and sulci. Normal position of the cerebellar tonsils. No pathologic contrast enhancement.    SELLA: No abnormality accounting for technique.    OSSEOUS STRUCTURES/SOFT TISSUES: Normal marrow signal. The major intracranial vascular flow voids are maintained.     ORBITS: No abnormality accounting for technique.     SINUSES/MASTOIDS: No paranasal sinus mucosal disease. Scattered fluid/membrane thickening in the left mastoid air cells. No apparent mass in the posterior nasopharynx or skull base.       Impression    IMPRESSION:  1.  No acute intracranial abnormality.  2.  Stable minimal nonspecific foci of T2/FLAIR hyperintensity within the cerebral white matter. Differential diagnosis includes sequelae of chronic headaches, chronic microvascular ischemic disease, or other remote insult. The distribution does not meet   criteria for demyelination.  3.  No abnormal enhancement.   MR Thoracic Spine w/o & w Contrast    Narrative    EXAM: MR THORACIC SPINE W/O and W CONTRAST  LOCATION: [facility]  DATE/TIME: 5/15/2023 11:17 PM CDT    INDICATION: progressive extremity weakness and pain, increased walking difficulty, urinary dysfunction over last 1month r o demyelinating lesion.  COMPARISON: 04/30/2022  CONTRAST: 8 Gadavist  TECHNIQUE: Routine Thoracic Spine MRI without and with IV contrast.    FINDINGS:   Normal vertebral body heights, alignment and marrow signal. Normal disc heights. No herniation. Minimal facet arthropathy. No spinal canal or neural foraminal stenosis. No abnormal cord signal.     Redemonstrated cysts within the liver. Intrahepatic and extrahepatic biliary dilatation, likely related to cholecystectomy change.      Impression     IMPRESSION:  1.  No abnormal cord signal or enhancement.  2.  Minor degenerative changes without spinal canal or neural foraminal narrowing.   MR Cervical Spine w/o & w Contrast    Narrative    EXAM: MR CERVICAL SPINE W/O and W CONTRAST  LOCATION: [facility]  DATE/TIME: 5/15/2023 11:18 PM CDT    INDICATION: Progressive extremity weakness and pain, increased walking difficulty, urinary dysfunction over last 1month; rule out demyelinating lesion.  COMPARISON: 10/17/2022, 4/30/2022  CONTRAST: 8 Gadavist  TECHNIQUE: MRI Cervical Spine without and with IV contrast.    FINDINGS:   Straightening of the cervical spine lordosis. Vertebral body heights are preserved. Minimal Modic type II degenerative endplate changes at C5-C6. No suspicious osseous lesions or marrow edema. No abnormal cord signal or enhancement. No extraspinal   abnormality.    Craniovertebral junction and C1-C2: Mild degenerative changes of the atlantodental interval. No spinal canal stenosis.    C2-C3: Normal disc height. No herniation. Normal facets. No spinal canal or neural foraminal stenosis.     C3-C4: Right paracentral disc protrusion. Mild right neural foraminal narrowing and effacement of the ventral thecal sac. No narrowing of the left neural foramen.     C4-C5: Normal disc height. No herniation. Normal facets. No spinal canal or neural foraminal stenosis.     C5-C6: Disc osteophyte complex with left greater than right uncovertebral hypertrophy and facet arthropathy. Mild spinal canal stenosis. Mild to moderate left neural foraminal narrowing. No significant narrowing on the right.     C6-C7: Small disc osteophyte complex with uncovertebral hypertrophy and facet arthropathy. Mild bilateral neural foraminal narrowing. No spinal canal stenosis.     C7-T1: Normal disc height. No herniation. Normal facets. No spinal canal or neural foraminal stenosis.      Impression    IMPRESSION:  1.  No abnormal cord signal or  enhancement.  2.  Stable mild degenerative changes of the cervical spine.

## 2023-05-16 NOTE — PROGRESS NOTES
Care Management Follow Up    Length of Stay (days): 0    Expected Discharge Date: 05/17/2023     Concerns to be Addressed:       Patient plan of care discussed at interdisciplinary rounds: Yes    Anticipated Discharge Disposition:  Home Care     Anticipated Discharge Services: Transportation Services  Anticipated Discharge DME:      Patient/family educated on Medicare website which has current facility and service quality ratings: yes  Education Provided on the Discharge Plan:    Patient/Family in Agreement with the Plan:      Referrals Placed by CM/SW:  Home care  Private pay costs discussed: Not applicable    Additional Information:  Writer was informed that [place] [facility] has tried 12 agencies for home care and have one acceptance by [employer]. Patient's [employer] requires Home care orders to come from patient's PCP and not the hospital. Patient also has a $25 co-pay. Writer called Primary care clinic at [phone] , writer was transferred 3 times but did end up speaking the NP?PA coordinator who took down all the information. She sent a message to Andrew Peck CNP who will be there tomorrow. Writer provided agency and phone number of [employer] and writer's number if they have any questions.        Moraima Hardy RN
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
## 2023-05-16 NOTE — PROGRESS NOTES
MD Notification    Notified Person: MD     Notified Person Name: Asya     Notification Date/Time: 5/16/23 at 11:36 am    Notification Interaction: Vocera page    Purpose of Notification: Pt having increased abdominal pain, orders for PRN pain medication for breakthrough pain? Thank you.    Orders Received: Tramadol 3 times daily PRN    Comments:

## 2023-05-16 NOTE — TELEPHONE ENCOUNTER
Sloane the care coordinator at the [facility] called for home health care orders from her PCP. They have secured service for home health care through [employer]. Her insurance ([employer]) requires the orders to come from PCP, Andrew Peck. They would like to set up an RN, ON, and PT home health aid. The number to call for [employer] is [phone]. If there are any questions regarding the orders they are looking for you can call Sloane at [phone].   Avis ROSARIO, EMT 3:10 PM 5/16/2023

## 2023-05-16 NOTE — CONSULTS
[facility]  Gastroenterology Consultation         Peggy Jessica  [address] [facility] [address]  47 year old female    Admission Date/Time: 5/12/2023  Primary Care Provider: Andrew Peck  Referring / Attending Physician:  Dr. Bret Sky    We were asked to see the patient in consultation by Dr. Bret Sky for evaluation of gastroparesis.      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    She has a history of gastroparesis as noted on EGD with a stomach full of food earlier this year at [facility]. She has been followed closely and followed low fiber diet and was doing well with scopolamine patch as well as hydroxyzine. She is unable to take Reglan due to tardive dyskinesia. She has also been on dicyclomine for abdominal cramping that has been helpful. Her constipation has improved with eating daily gluten free oatmeal.    She has had sharp burning epigastric, RUQ and RLQ pain over last week worse with eating. Has had mild nausea with minimal vomiting.    ROS: A comprehensive ten point review of systems was negative aside from those in mentioned in the HPI.      PAST MED HX:  I have reviewed this patient's medical history and updated it with pertinent information if needed.   Past Medical History:   Diagnosis Date     BRCA positive      CIDP (chronic inflammatory demyelinating polyneuropathy) (H)      ASHKAN III with severe dysplasia 2002     Complex regional pain syndrome type 1 of right lower extremity        MEDICATIONS:   Prior to Admission Medications   Prescriptions Last Dose Informant Patient Reported? Taking?   Lidocaine (LIDOCARE) 4 % Patch Unknown Self No Yes   Sig: Place 3  patches onto the skin every 24 hours To prevent lidocaine toxicity, patient should be patch free for 12 hrs daily.   Patient taking differently: Place 3 patches onto the skin daily as needed for moderate pain To prevent lidocaine toxicity, patient should be patch free for 12 hrs daily.   acetaminophen (TYLENOL) 500 MG tablet Unknown  Yes Yes   Sig: Take 500-1,000 mg by mouth every 6 hours as needed for mild pain   buprenorphine (BUTRANS) 7.5 MCG/HR WK patch 5/10/2023 at 0900  Yes Yes   Sig: Place 1 patch onto the skin once a week Applies on Wednesday at 9 am   cyclobenzaprine (FLEXERIL) 10 MG tablet 5/10/2023 Self No Yes   Sig: Take 1 tablet (10 mg) by mouth 3 times daily   dicyclomine (BENTYL) 20 MG tablet 5/10/2023 Self Yes Yes   Sig: Take 20 mg by mouth 3 times daily (before meals)   diphenhydrAMINE (BENADRYL) 25 MG tablet Unknown Self No Yes   Sig: Take 0.5 tablets (12.5 mg) by mouth every 6 hours as needed for allergies or other (nausea)   folic acid (FOLVITE) 400 MCG tablet 5/10/2023 Self No Yes   Sig: Take 1 tablet (400 mcg) by mouth daily   hydrOXYzine (ATARAX) 25 MG tablet 5/12/2023 at am Self Yes Yes   Sig: Take 25 mg by mouth 3 times daily (before meals)   medical cannabis (Patient's own supply) Unknown Self Yes Yes   Sig: Take 1 Dose by mouth See Admin Instructions (The purpose of this order is to document that the patient reports taking medical cannabis.  This is not a prescription, and is not used to certify that the patient has a qualifying medical condition.)  Per pt: 5-10 mg tablet three times a day PRN   multivitamin w/minerals (THERA-VIT-M) tablet 5/11/2023 Self Yes Yes   Sig: Take 1 tablet by mouth every morning Meg[employer] Womens Brand   naloxone (NARCAN) 4 MG/0.1ML nasal spray 5/11/2023 Self No Yes   Sig: Spray 1 spray (4 mg) into one nostril alternating nostrils as needed for opioid reversal every 2-3 minutes until assistance arrives   ondansetron (ZOFRAN) 4 MG tablet Unknown Self No Yes   Sig:  Take 1 tablet (4 mg) by mouth every 6 hours as needed for nausea or vomiting   pantoprazole (PROTONIX) 40 MG EC tablet 2023 at am  No Yes   Sig: Take 1 tablet (40 mg) by mouth every morning (before breakfast)   polyethylene glycol (MIRALAX) 17 GM/Dose powder Unknown Self Yes Yes   Sig: Take 17 g by mouth daily as needed for constipation   scopolamine (TRANSDERM) 1 MG/3DAYS 72 hr patch ran out Self Yes Yes   Si patch every 72 hours   senna (SENOKOT) 8.6 MG tablet 2023  Yes Yes   Sig: Take 1 tablet by mouth daily   senna-docusate (SENOKOT-S/PERICOLACE) 8.6-50 MG tablet Unknown  Yes Yes   Sig: Take 1 tablet by mouth 2 times daily as needed for constipation   sertraline (ZOLOFT) 50 MG tablet 5/10/2023  No Yes   Sig: Take 1 tablet (50 mg) by mouth daily      Facility-Administered Medications: None       ALLERGIES:   Allergies   Allergen Reactions     Dihydroergotamine Anaphylaxis     Latex Anaphylaxis     Shellfish-Derived Products Anaphylaxis     Sumatriptan Anaphylaxis     Compazine [Prochlorperazine] Anxiety     Banana Unknown     Gabapentin Dizziness     Gluten Meal Other (See Comments)     Celiac disease     Keppra [Levetiracetam] Nausea and Vomiting     Kiwi Unknown     Levofloxacin Other (See Comments)     Arrhythmia     Metronidazole Nausea and Vomiting     Nitrofurantoin Hives     Penicillins Hives     Pregabalin      Reglan [Metoclopramide] Other (See Comments)     restlessness/toe tapping      Topiramate Visual Disturbance     Aspirin Rash     Methadone Rash     Morphine Hives     She got hives around are when morphine given but resolved after few minutes per patient      Risperidone Anxiety       SOCIAL HISTORY:  Social History     Tobacco Use     Smoking status: Former     Types: Cigarettes     Smokeless tobacco: Never   Vaping Use     Vaping status: Never Used   Substance Use Topics     Alcohol use: Not Currently     Drug use: Yes     Types: Marijuana     Comment: Medical [facility] patient        FAMILY HISTORY:  Family History   Problem Relation Age of Onset     Kidney Disease Father        PHYSICAL EXAM:   General  Alert, oriented and comfortable  Vital Signs with Ranges  Temp: 97.8  F (36.6  C) Temp src: Oral BP: 102/72 Pulse: 93   Resp: 18 SpO2: 92 % O2 Device: None (Room air)    I/O last 3 completed shifts:  In: 360 [P.O.:360]  Out: -     Constitutional: alert, mild distress, cooperative and pale   Cardiovascular: negative, PMI normal. No lifts, heaves, or thrills. RRR. No murmurs, clicks gallops or rub  Respiratory: negative, Percussion normal. Good diaphragmatic excursion. Lungs clear  Abdomen: Abdomen soft, RUQ, RLQ and epigastric tenderness with light and deep pressure. BS normal. No masses, organomegaly          ADDITIONAL COMMENTS:   I reviewed the patient's new clinical lab test results.   Recent Labs   Lab Test 05/13/23  1115 05/12/23  0956 03/15/23  0743 01/31/23  1900 01/28/23  0619   WBC 4.6 5.7 6.5   < > 4.2   HGB 13.6 14.6 13.6   < > 13.4   MCV 91 92 94   < > 98    307 276   < > 243   INR  --   --   --   --  0.97    < > = values in this interval not displayed.     Recent Labs   Lab Test 05/13/23  0807 05/12/23  0956 02/07/23  0710 02/05/23  0721   POTASSIUM 4.6 4.1 3.8 3.9   CHLORIDE 106 102  --  106   CO2 22 26  --  25   BUN 7.0 6.1  --  3.1*   ANIONGAP 11 10  --  9     Recent Labs   Lab Test 05/12/23  1659 05/12/23  0956 03/15/23  0743 01/28/23  0619 01/27/23  1241 01/27/23  0839 01/19/23  1352 12/06/22  0003 12/05/22  2032   ALBUMIN  --  4.2 4.3 3.6  --  4.3   < >  --  4.8   BILITOTAL  --  0.2 0.2 0.3  --  0.4   < >  --  0.4   ALT  --  14 19 14  --  17   < >  --  24   AST  --  18 24 23  --  28   < >  --  32   PROTEIN Negative  --   --   --  Negative  --   --  10*  --    LIPASE  --   --   --   --   --  23  --   --  22    < > = values in this interval not displayed.       I reviewed the patient's new imaging results.        CONSULTATION ASSESSMENT AND PLAN:    Peggy oCrdova  Jaskaran is a 47 year old female admitted on 5/12/2023. 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  She presents with gradually worsening weakness/more instability and falls ever since she had COVID in Feb 2023.  She does not feel she can manage at home with her weakness/falls.  Also has chronic gastroparesis issues and chronic regional pain syndrome.     Gastroparesis  Epigastric/RUQ pain  Pain may be due to constipation vs PUD vs other source  Labs unremarkable  Patient has long history of chronic pain and remains on chronic pain medication that exacerbate constipation and gastroparesis    - EGD today  - continue home antiemetics (scopolamine and hydroxyzine)  - Pantoprazole daily  - NPO  - bowel regimen, dulcolax suppx1      Generalized weakness/mechanical falls, exacerbated since COVID 2/2023  CISP (Chronic immune sensory polyradiculopathy) history:  Dizziness  Neuro consulted and plan MRI brain, C and T spines to eval for demyelinating disease. Neuro considering EMG/LP and CSF studies based on MRI findings.   - Appreciate help        [facility].  Office: [phone]  Cell : [phone] (Dr. Mason)  Cell: [phone] (Shannan Castro PA-C)

## 2023-05-16 NOTE — PROGRESS NOTES
Orientation/Cognitive: AOX4  Observation Goals (Met/ Not Met): not met  Mobility Level/Assist Equipment: AX1 gb/walker pivot to BSC. BLE weakness/numbness.  Fall Risk (Y/N): Yes  Behavior Concerns: none  Pain Management: Prn Tramadol, tylenol for abdominal pain. Scheduled Flexerel, Scopolam and Butrans patch in place.  Tele/VS/O2: VSS on RA. No tele  ABNL Lab/BG: WNL  Diet: Gluten free. NPO after MN.  Bowel/Bladder: Continent. Up to BSC. Small BM this shift.  Skin Concerns: none  Drains/Devices: PIV SL  Tests/Procedures for next shift: EGD tomorrow. GI and Neuro following. Pt went down for MRI brain, thoracic spine, Cervical spine scan. Psych consult pending.  Anticipated DC date & active delays: TBD. TCU placement pending. SW following.   Patient Stated Goal for Today: pain control

## 2023-05-16 NOTE — PROGRESS NOTES
Observation goals  PRIOR TO DISCHARGE        Comments:   -diagnostic tests and consults completed and resulted - Partially met  -vital signs normal or at patient baseline - Met  -tolerating oral intake to maintain hydration - Not met, NPO  -safe disposition plan has been identified - Not met     Nurse to notify provider when observation goals have been met and patient is ready for discharge.

## 2023-05-16 NOTE — PROGRESS NOTES
Observation goals  PRIOR TO DISCHARGE        Comments:   -diagnostic tests and consults completed and resulted - Partially met  -vital signs normal or at patient baseline - Met  -tolerating oral intake to maintain hydration - Not met, NPO  -safe disposition plan has been identified - Not met, SW following      Nurse to notify provider when observation goals have been met and patient is ready for discharge.

## 2023-05-16 NOTE — PROGRESS NOTES
Pt home supply of one Butrans patch given to pharmacy. RN to either notify pharmacy when patch due at 8:00 am tomorrow morning or pharmacy to loads in pyxis.

## 2023-05-16 NOTE — PLAN OF CARE
Goal Outcome Evaluation:      Plan of Care Reviewed With: patient    Orientation/Cognitive: AOx4  Observation Goals (Met/ Not Met): Not met  Mobility Level/Assist Equipment: A1/GB/W. Uses wheelchair as baseline. Pivot to C  Fall Risk (Y/N): Y  Behavior Concerns: Green  Pain Management: C/o abdominal pain, MD notified, Tramadol 3 times daily PRN ordered for breakthrough pain. Butrans patch in place to L chest  Tele/VS/O2: VSS on RA  ABNL Lab/BG: EGD biopsies pending   Diet: Full Liquid   Bowel/Bladder: Continent. 1 BM throughout shift, loose and runny  Skin Concerns: None  Drains/Devices: PIV SL  Tests/Procedures for next shift: Lumbar puncture tomorrow at 8:30 am. EMG to be done around noon tomorrow. Psych consulted.   Anticipated DC date & active delays: TBD, [facility] following for home care services

## 2023-05-16 NOTE — PROVIDER NOTIFICATION
MD Notification    Notified Person: MD    Notified Person Name: DR. Cam     Notification Date/Time:  5/15/23   2020    Notification Interaction: text    Purpose of Notification: Pt complaining of abdominal pain 6/10,  requesting prn Tramadol, not available in the MAR. Can pt have one time dose of Tramadol ordered please. thanks     Orders Received:    Comments:

## 2023-05-17 ENCOUNTER — APPOINTMENT (OUTPATIENT)
Dept: GENERAL RADIOLOGY | Facility: CLINIC | Age: 48
End: 2023-05-17
Attending: PSYCHIATRY & NEUROLOGY
Payer: COMMERCIAL

## 2023-05-17 LAB
APPEARANCE CSF: CLEAR
COLOR CSF: COLORLESS
ENA SS-A AB SER IA-ACNC: <0.5 U/ML
ENA SS-A AB SER IA-ACNC: NEGATIVE
ENA SS-B IGG SER IA-ACNC: <0.6 U/ML
ENA SS-B IGG SER IA-ACNC: NEGATIVE
GLUCOSE CSF-MCNC: 62 MG/DL (ref 40–70)
PATH REPORT.COMMENTS IMP SPEC: NORMAL
PATH REPORT.COMMENTS IMP SPEC: NORMAL
PATH REPORT.FINAL DX SPEC: NORMAL
PATH REPORT.GROSS SPEC: NORMAL
PATH REPORT.MICROSCOPIC SPEC OTHER STN: NORMAL
PATH REPORT.RELEVANT HX SPEC: NORMAL
PHOTO IMAGE: NORMAL
PROT CSF-MCNC: 35.8 MG/DL (ref 15–45)
RBC # CSF MANUAL: 111 /UL (ref 0–2)
TUBE # CSF: 1
WBC # CSF MANUAL: 1 /UL (ref 0–5)

## 2023-05-17 PROCEDURE — 87205 SMEAR GRAM STAIN: CPT | Performed by: PSYCHIATRY & NEUROLOGY

## 2023-05-17 PROCEDURE — 88108 CYTOPATH CONCENTRATE TECH: CPT | Mod: 26 | Performed by: PATHOLOGY

## 2023-05-17 PROCEDURE — 88108 CYTOPATH CONCENTRATE TECH: CPT | Mod: TC | Performed by: PSYCHIATRY & NEUROLOGY

## 2023-05-17 PROCEDURE — 250N000009 HC RX 250: Performed by: HOSPITALIST

## 2023-05-17 PROCEDURE — 250N000013 HC RX MED GY IP 250 OP 250 PS 637: Performed by: INTERNAL MEDICINE

## 2023-05-17 PROCEDURE — G0378 HOSPITAL OBSERVATION PER HR: HCPCS

## 2023-05-17 PROCEDURE — 99232 SBSQ HOSP IP/OBS MODERATE 35: CPT | Performed by: HOSPITALIST

## 2023-05-17 PROCEDURE — 89050 BODY FLUID CELL COUNT: CPT | Performed by: PSYCHIATRY & NEUROLOGY

## 2023-05-17 PROCEDURE — 87070 CULTURE OTHR SPECIMN AEROBIC: CPT | Performed by: PSYCHIATRY & NEUROLOGY

## 2023-05-17 PROCEDURE — 250N000009 HC RX 250: Performed by: PHYSICIAN ASSISTANT

## 2023-05-17 PROCEDURE — 250N000013 HC RX MED GY IP 250 OP 250 PS 637: Performed by: HOSPITALIST

## 2023-05-17 PROCEDURE — 62328 DX LMBR SPI PNXR W/FLUOR/CT: CPT

## 2023-05-17 PROCEDURE — 84157 ASSAY OF PROTEIN OTHER: CPT | Performed by: PSYCHIATRY & NEUROLOGY

## 2023-05-17 PROCEDURE — 82945 GLUCOSE OTHER FLUID: CPT | Performed by: PSYCHIATRY & NEUROLOGY

## 2023-05-17 PROCEDURE — 250N000011 HC RX IP 250 OP 636: Performed by: INTERNAL MEDICINE

## 2023-05-17 PROCEDURE — 99254 IP/OBS CNSLTJ NEW/EST MOD 60: CPT

## 2023-05-17 PROCEDURE — 250N000013 HC RX MED GY IP 250 OP 250 PS 637

## 2023-05-17 PROCEDURE — 250N000013 HC RX MED GY IP 250 OP 250 PS 637: Performed by: PHYSICIAN ASSISTANT

## 2023-05-17 RX ORDER — PRAZOSIN HYDROCHLORIDE 1 MG/1
1 CAPSULE ORAL AT BEDTIME
Status: DISCONTINUED | OUTPATIENT
Start: 2023-05-17 | End: 2023-05-20 | Stop reason: HOSPADM

## 2023-05-17 RX ORDER — LIDOCAINE 4 G/G
1 PATCH TOPICAL
Status: DISCONTINUED | OUTPATIENT
Start: 2023-05-17 | End: 2023-05-20 | Stop reason: HOSPADM

## 2023-05-17 RX ORDER — SERTRALINE HYDROCHLORIDE 100 MG/1
100 TABLET, FILM COATED ORAL DAILY
Status: DISCONTINUED | OUTPATIENT
Start: 2023-05-18 | End: 2023-05-20 | Stop reason: HOSPADM

## 2023-05-17 RX ORDER — LIDOCAINE HYDROCHLORIDE 10 MG/ML
30 INJECTION, SOLUTION EPIDURAL; INFILTRATION; INTRACAUDAL; PERINEURAL ONCE
Status: COMPLETED | OUTPATIENT
Start: 2023-05-17 | End: 2023-05-17

## 2023-05-17 RX ORDER — PANTOPRAZOLE SODIUM 40 MG/1
40 TABLET, DELAYED RELEASE ORAL
Status: DISCONTINUED | OUTPATIENT
Start: 2023-05-17 | End: 2023-05-20 | Stop reason: HOSPADM

## 2023-05-17 RX ORDER — OXYCODONE HYDROCHLORIDE 5 MG/1
5 TABLET ORAL ONCE
Status: COMPLETED | OUTPATIENT
Start: 2023-05-17 | End: 2023-05-17

## 2023-05-17 RX ADMIN — PANTOPRAZOLE SODIUM 40 MG: 40 TABLET, DELAYED RELEASE ORAL at 06:46

## 2023-05-17 RX ADMIN — FOLIC ACID TAB 400 MCG 400 MCG: 400 TAB at 08:16

## 2023-05-17 RX ADMIN — ONDANSETRON 4 MG: 4 TABLET, ORALLY DISINTEGRATING ORAL at 14:20

## 2023-05-17 RX ADMIN — DICYCLOMINE HYDROCHLORIDE 20 MG: 20 TABLET ORAL at 16:41

## 2023-05-17 RX ADMIN — HYDROXYZINE HYDROCHLORIDE 25 MG: 25 TABLET ORAL at 12:46

## 2023-05-17 RX ADMIN — ONDANSETRON 4 MG: 4 TABLET, ORALLY DISINTEGRATING ORAL at 08:15

## 2023-05-17 RX ADMIN — PANTOPRAZOLE SODIUM 40 MG: 40 TABLET, DELAYED RELEASE ORAL at 16:41

## 2023-05-17 RX ADMIN — CYCLOBENZAPRINE 10 MG: 10 TABLET, FILM COATED ORAL at 20:13

## 2023-05-17 RX ADMIN — PRAZOSIN HYDROCHLORIDE 1 MG: 1 CAPSULE ORAL at 21:45

## 2023-05-17 RX ADMIN — AZITHROMYCIN MONOHYDRATE 250 MG: 250 TABLET ORAL at 08:14

## 2023-05-17 RX ADMIN — TRAMADOL HYDROCHLORIDE 25 MG: 50 TABLET, COATED ORAL at 08:15

## 2023-05-17 RX ADMIN — ACETAMINOPHEN 650 MG: 325 TABLET ORAL at 21:07

## 2023-05-17 RX ADMIN — MULTIPLE VITAMINS W/ MINERALS TAB 1 TABLET: TAB at 08:16

## 2023-05-17 RX ADMIN — OXYCODONE HYDROCHLORIDE 5 MG: 5 TABLET ORAL at 10:47

## 2023-05-17 RX ADMIN — HYDROXYZINE HYDROCHLORIDE 25 MG: 25 TABLET ORAL at 16:41

## 2023-05-17 RX ADMIN — SCOPALAMINE 1 PATCH: 1 PATCH, EXTENDED RELEASE TRANSDERMAL at 12:46

## 2023-05-17 RX ADMIN — BUPRENORPHINE 1 PATCH: 7.5 PATCH TRANSDERMAL at 10:07

## 2023-05-17 RX ADMIN — LIDOCAINE HYDROCHLORIDE 5 ML: 10 INJECTION, SOLUTION EPIDURAL; INFILTRATION; INTRACAUDAL; PERINEURAL at 09:08

## 2023-05-17 RX ADMIN — SERTRALINE HYDROCHLORIDE 50 MG: 50 TABLET ORAL at 08:14

## 2023-05-17 RX ADMIN — CYCLOBENZAPRINE 10 MG: 10 TABLET, FILM COATED ORAL at 14:20

## 2023-05-17 RX ADMIN — CYCLOBENZAPRINE 10 MG: 10 TABLET, FILM COATED ORAL at 08:16

## 2023-05-17 RX ADMIN — DICYCLOMINE HYDROCHLORIDE 20 MG: 20 TABLET ORAL at 12:46

## 2023-05-17 RX ADMIN — TRAMADOL HYDROCHLORIDE 25 MG: 50 TABLET, COATED ORAL at 21:07

## 2023-05-17 RX ADMIN — ACETAMINOPHEN 650 MG: 325 TABLET ORAL at 16:47

## 2023-05-17 RX ADMIN — TRAMADOL HYDROCHLORIDE 25 MG: 50 TABLET, COATED ORAL at 14:20

## 2023-05-17 RX ADMIN — DICYCLOMINE HYDROCHLORIDE 20 MG: 20 TABLET ORAL at 06:46

## 2023-05-17 RX ADMIN — HYDROXYZINE HYDROCHLORIDE 25 MG: 25 TABLET ORAL at 06:46

## 2023-05-17 RX ADMIN — LIDOCAINE 1 PATCH: 560 PATCH PERCUTANEOUS; TOPICAL; TRANSDERMAL at 20:12

## 2023-05-17 ASSESSMENT — ACTIVITIES OF DAILY LIVING (ADL)
ADLS_ACUITY_SCORE: 37

## 2023-05-17 NOTE — PLAN OF CARE
"Goal Outcome Evaluation:      Plan of Care Reviewed With: patient    Overall Patient Progress: no changeOverall Patient Progress: no change    Orientation/Cognitive: AOx4  Observation Goals (Met/ Not Met): Not met  Mobility Level/Assist Equipment: A1/GB/W. Uses wheelchair as baseline. Pivot to [facility]. Ambulated in room from bed to door.   Fall Risk (Y/N): Y  Behavior Concerns: Green  Pain Management: Tramadol, 1x oxycodone for 7/10 pain post LP states she \"Feels like they hit my nerve\".   Tele/VS/O2: VSS on RA  ABNL Lab/BG: myasthenia gravis lab pending  Diet: GF, soft diet - c/o nausea, Zofran given x2 with some relief   Bowel/Bladder: Continent. no bm this shift LBM 5/16.   Skin Concerns: None. LP site to low back.   Drains/Devices: PIV SL  Tests/Procedures for next shift: PT, OT to assess for discharge options  Anticipated DC date & active delays: TBD, SW following for home care services, possible ARU  "

## 2023-05-17 NOTE — PROGRESS NOTES
[facility]  Neurology Daily Note      Admission Date:5/12/2023   Date of service: 05/17/2023   Hospital Day: 6                                                   Assessment and Plan:   #.  Increased weakness, sensory loss, urinary dysfunction and visual disturbance over the last month.  Patient feels that it may have come on after COVID infection in February 2023.  Exam findings and CSF, EMG are not consistent with CIDP.  Examination reveals mainly functional type weakness.  PAULINE borderline positive of uncertain significance   MRI cervical, thoracic spine brain- unchanged and therefore excluded demyelination disease  SSA and SSB arenormal  CSF study protein cell counts are normal  EMG of the left upper and lower extremity were within normal limits without evidence of slowed conduction velocity or neuropathy/muscle disease  My findings were discussed with the patient..    I did share with her my concerns that many of her findings are more consistent with functional disease which correlates with the diagnosis given at [facility] as well as at the [facility].  In the interest of being thorough although, we will repeated testing.  Agree with continuing mental health/psychiatry assessment.  Obtain ophthalmology evaluation as an outpatient.  Added on laboratory study for myasthenia gravis panel  No further neurologic testing is indicated for this hospital stay-okay to discharge from the neurologic perspective  Management plan discussed with hospitalist, Dr. Sky     Recommended the patient speak with  about options for TCU versus returning home with family assistance.    Will sign off.  Please contact General Neurology service if questions related to neurologic status or follow up needed during this admission.             Interval History:    gastroenterology work-up reviewed  Patient had lumbar puncture done earlier today-during the procedure, she developed pain radiating down the left leg which  made it difficult to move.  She is just recovering now.  Otherwise her symptoms are about the same.  She is currently visiting with her daughter at the bedside    Previously obtained pertinent information:  In the past physical therapy had been recommended for her functional neurologic symptoms.  She reports that she was unable to comply because of the expense for 3 times a week physical therapy.  History of complex regional pain syndrome preceding the diagnosis of CIDP in 2021       Medications:   Scheduled Meds:    azithromycin  250 mg Oral Daily     buprenorphine  1 patch Transdermal Weekly     buprenorphine  1 each Transdermal Q8H SHERMAN     cyclobenzaprine  10 mg Oral TID     dicyclomine  20 mg Oral TID AC     folic acid  400 mcg Oral Daily     hydrOXYzine  25 mg Oral TID AC     lidocaine  1 patch Transdermal Q24H    And     lidocaine   Transdermal Q8H SHERMAN     medical cannabis  1 Dose Other See Admin Instructions     multivitamin w/minerals  1 tablet Oral QAM     pantoprazole  40 mg Oral BID AC     prazosin  1 mg Oral At Bedtime     scopolamine  1 patch Transdermal Q72H    And     scopolamine   Transdermal Q8H     [START ON 5/18/2023] sertraline  100 mg Oral Daily     sodium chloride (PF)  3 mL Intracatheter Q8H     PRN Meds: acetaminophen **OR** acetaminophen, calcium carbonate, diphenhydrAMINE, melatonin, naloxone **OR** naloxone **OR** naloxone **OR** naloxone, ondansetron **OR** ondansetron, polyethylene glycol, traMADol        Physical Exam:   Vitals: Temp: 97.9  F (36.6  C) Temp src: Oral BP: 107/73 Pulse: 92   Resp: 18 SpO2: 93 % O2 Device: None (Room air) Oxygen Delivery: (P) 2 LPM  Vital Signs with Ranges: Temp:  [97.5  F (36.4  C)-98.7  F (37.1  C)] 97.9  F (36.6  C)  Pulse:  [] 92  Resp:  [6-23] 18  BP: ()/(60-99) 107/73  SpO2:  [72 %-100 %] 93 %    General Appearance: alert and oriented.  Language and speech intact.  Neuro: Antigravity strength intact-functional type weakness more  pronounced in the left arm.  Tremor-like movement with contraction.  Decreases with bilateral simultaneous testing.         Extremities: No clubbing, no cyanosis, no edema       Data:   ROUTINE IP LABS (Last 3results)  CBC RESULTS:     Recent Labs   Lab Test 05/13/23  1115 05/12/23  0956 03/15/23  0743   WBC 4.6 5.7 6.5   RBC 4.43 4.80 4.20   HGB 13.6 14.6 13.6   HCT 40.3 44.1 39.5    307 276     Basic Metabolic Panel:  Recent Labs   Lab Test 05/13/23  0807 05/12/23  0956 02/07/23  0710 02/05/23  0721    138  --  140   POTASSIUM 4.6 4.1 3.8 3.9   CHLORIDE 106 102  --  106   CO2 22 26  --  25   BUN 7.0 6.1  --  3.1*   CR 0.83 0.92  --  0.70   GLC 91 110*  --  76   ESTEBAN 9.2 9.8  --  8.9     Liver panel:  Recent Labs   Lab Test 05/12/23  0956 03/15/23  0743 01/28/23  0619 01/27/23  0839 01/19/23  1352   PROTTOTAL 7.8 7.7 6.6 8.0 8.4*   ALBUMIN 4.2 4.3 3.6 4.3 4.5   BILITOTAL 0.2 0.2 0.3 0.4 0.4   ALKPHOS 106* 109* 91 115* 126*   AST 18 24 23 28 30   ALT 14 19 14 17 19     Thyroid Panel:  Recent Labs   Lab Test 01/28/23  0619 07/20/22  0959   TSH 3.12 1.38      Vitamin B12:   Recent Labs   Lab Test 05/12/23  1631 01/27/23  1259 07/20/22  0959   B12 537 582 406          IMAGING:   Independent interpretation of the following studies by myself as part of today's encounter.   MR THORACIC SPINE W/O and W CONTRAST  LOCATION: [facility]  DATE/TIME: 5/15/2023 11:17 PM CDT                                                                        IMPRESSION:  1.  No abnormal cord signal or enhancement.  2.  Minor degenerative changes without spinal canal or neural foraminal narrowing  MR BRAIN W/O and W CONTRAST  LOCATION: [facility]  DATE/TIME: 5/15/2023 11:10 PM CDT  .                                                                       IMPRESSION:  1.  No acute intracranial abnormality.  2.  Stable minimal nonspecific foci of T2/FLAIR hyperintensity within the  cerebral white matter. Differential diagnosis includes sequelae of chronic headaches, chronic microvascular ischemic disease, or other remote insult. The distribution does not meet   criteria for demyelination.  3.  No abnormal enhancement  MR CERVICAL SPINE W/O and W CONTRAST  LOCATION: [facility]  DATE/TIME: 5/15/2023 11:18 PM CDT  .                                                                      IMPRESSION:  1.  No abnormal cord signal or enhancement.  2.  Stable mild degenerative changes of the cervical spine   Latest Reference Range & Units Most Recent   RBC CSF 0 - 2 /uL 111 (H)  5/17/23 09:18   Total Nucleated Cells 0 - 5 /uL 1  5/17/23 09:18   Appearance CSF Clear  Clear  5/17/23 09:18   Color CSF Colorless  Colorless  5/17/23 09:18   Tube Number  1  5/17/23 09:18   Glucose CSF 40 - 70 mg/dL 62  5/17/23 09:18   (H): Data is abnormally high   Latest Reference Range & Units Most Recent   Protein total CSF 15.0 - 45.0 mg/dL 35.8  5/17/23 09:18      Latest Reference Range & Units Most Recent   PAULINE interpretation Negative  Borderline Positive !  5/12/23 16:31   !: Data is abnormal  EMG of the left upper and lower extremities reviewed: Normal    TIME     40minutes Evaluation/management time     Joselyn Cunningham M.D.  Neurologist  [facility] Neurology  Office [phone]

## 2023-05-17 NOTE — PROVIDER NOTIFICATION
"MD Notification    Notified Person: MD    Notified Person Name: lesly    Notification Date/Time: 1:14 PM 05/17/23    Notification Interaction: Soila    Purpose of Notification:  1.had LP and is back in room. she edourses that they hit a nerve during the LP and she is having 7/10 back pain radiating down her L leg now. due to this she wants to hold off on the EMG today, also asking for pain meds. Already recieved her tramadol this AM.   2.FYI I did pt's next set of neuros and she is not able to lift her LLE like she was this morning, not able to lift it off bed - I can see her muscle contract though. unable to do heel to shin on that side as well.     Orders Received: will assess    Comments: Pt was able to lift leg off bed for provider. Stated to writer afterward \"well I can lift it, it just hurts\".   "
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
## 2023-05-17 NOTE — PROGRESS NOTES
[facility]    Medicine Progress Note - Hospitalist Service    Date of Admission:  5/12/2023    Assessment & Plan   Peggy Jessica is a 47 year old female admitted on 5/12/2023. 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  She presents with gradually worsening weakness/more instability and falls ever since she had COVID in Feb 2023.  She does not feel she can manage at home with her weakness/falls.  Also has chronic gastroparesis issues and chronic regional pain syndrome.     Generalized weakness/mechanical falls, exacerbated since COVID 2/2023  CISP (Chronic immune sensory polyradiculopathy) history  Dizziness  -  Initially developed neuro complaints 2021 following a flu vaccine with subsequent IVIG treatment.  Eventually IVIG stopped as not improving/felt needed further by her providers.  Hospitalized Jan 2023 and Oct 2022 [facility] with weakness/falls.  Also extensively evaluated Fall 2022 at [facility]. She has had multiple MRI's and a couple LP's in the past 9 months. [facility] spine was repeated. It again showed that the prior enhancement had resolved. NCS were performed, and showed similar results to our prior studies. She also had a large serologic work up. These have returned negative for new inflammation.  More recent Jan hospital stay eval also returned negative for inflammation. While she had her initial illness and IVIG tx, the more recent provider assessments have felt she was not having a major acute exacerbation.  There are also notes regarding concerns of a functional component overlaying the other neurologic issues. (See Dr. Duenas's neurology October 2022 note for additional historical details)     Neuro complaints have involved weakness upper and lower body, numbness including face, at times diplopia.   Patient perceives her weakness and numbness are progressing particularly since Feb 2023.  She baseline falls every day and these  "falls have increased to 3+ times daily.  She denies any major injuries/bruising as she catches herself/has soft fall locations by her report.   She doesn't feel her outpatient neurologist is listening to her problems and sought eval at the hospital as she thinks her issues have worsened to the point over the past week she cannot function at home.        -Appreciate Neurology assistance,  MRI brain, cervical, thoracic, lumbar spines completed and negative for finding of  demyelinating disease.  LP done for CSF studies this AM and getting EMG done today  - Vitamin B12 and CRP normal, SSA pending  - PT consult recommending TCU - SW consulted and following    Complex regional pain syndrome:  -  Follows with pain provider/clinic.  Continue PTA Buprenorphine and home supply of cannabis available.     Chronic nausea/Gastroparesis concerns  Chronic constipation:  GERD/epigastric pain  -  Recently had some emesis.  This is a chronic issue with her.  Suspect gastroparesis exacerbated with her opioids.  GI prior considered more eval but limited with ongoing opioid use.    -Continue with PPI, Addition of tums for heart burn, tramadol for pain  -GI consulted, s/p EGD, unremarkable. Started on azithromycin for motility.  -Bowel regimen      Diet: Gluten Free Diet    DVT Prophylaxis: Low Risk/Ambulatory with no VTE prophylaxis indicated  Kwan Catheter: Not present  Lines: None     Cardiac Monitoring: None  Code Status: Full Code      Clinically Significant Risk Factors Present on Admission                       # Overweight: Estimated body mass index is 28.12 kg/m  as calculated from the following:    Height as of this encounter: 1.651 m (5' 5\").    Weight as of this encounter: 76.7 kg (169 lb).            Disposition Plan Awaiting TCU Placement.     Expected Discharge Date: 05/18/2023,  6:00 PM    Destination: home with help/services;home with family (TCU)  Discharge Comments: PT/OT rec TCU, needs placement  SW/CC  Neuro " following  Autoimmune disorder; multiple falls.  Psych consult.  LP today.        The patient's care was discussed with the Bedside Nurse, Care Coordinator/, Patient and GI/neurology.    Bret Sky MD  Hospitalist Service  [facility]  Securely message with [employer] (more info)  Text page via [facility] Paging/Directory   ______________________________________________________________________    Interval History     Evaluated patient this morning. Reported left leg pain after LP, feels a nerve was hit during procedure. No worsening headache. Ongoing nausea and abdominal pain unchanged, poor appetite, taking liquid mostlyhad bowel movement after Dulcolax suppository yesterday.       Physical Exam   Vital Signs: Temp: 97.9  F (36.6  C) Temp src: Oral BP: 107/73 Pulse: 92   Resp: 18 SpO2: 93 % O2 Device: None (Room air) Oxygen Delivery: (P) 2 LPM  Weight: 169 lbs 0 oz    General Appearance: A&O x3, NAD, resting in bed  Respiratory: CTA, no accessory muscle use  Cardiovascular:  s1 s2 regular, no murmurs, no LE edema  Skin: warm and dry, intact  Other: Moves all extremities, no focal neurologic deficits, answers questions appropriately with normal mood and affect.    Medical Decision Making       45 MINUTES SPENT BY ME on the date of service doing chart review, history, exam, documentation & further activities per the note.      Data         Imaging results reviewed over the past 24 hrs:   No results found for this or any previous visit (from the past 24 hour(s)).

## 2023-05-17 NOTE — PROGRESS NOTES
Overnight    Pt hypotensive otherwise vital signs stable on room air. Ultram given for breakthrough pain. Alert and oriented x4. Ax1 GB to pivot to commode. Wheelchair baseline. Weakness present to all extremeties. Reports blurry vision even with glasses as baseline Lumbar puncture and emg tomorrow.

## 2023-05-17 NOTE — PROGRESS NOTES
Care Management Follow Up    Length of Stay (days): 0    Expected Discharge Date: 05/18/2023     Concerns to be Addressed:       Patient plan of care discussed at interdisciplinary rounds: Yes    Anticipated Discharge Disposition: Transitional Care, Home Care     Anticipated Discharge Services: Transportation Services  Anticipated Discharge DME:      Patient/family educated on Medicare website which has current facility and service quality ratings: yes  Education Provided on the Discharge Plan:    Patient/Family in Agreement with the Plan:      Referrals Placed by CM/SW:    Private pay costs discussed: Not applicable    Additional Information:  Spoke with PT regarding therapies and if pt could be ARU appropriate. PT does not have pt on the schedule for today but will see tomorrow. They will put a note in. RADHA paged MD to see if OT could be ordered. MD ordered.     Call from Tammie at  ARU, they will evaluate after therapies see tomorrow.     Spoke with pt regarding TCU's pending, and assessing for ARU tomorrow with PT/OT. Patient very motivated for ARU. Patient also agreeable to home care with [facility] and $25 co-pay associated.     STEVE Summers  Social Work  [facility]

## 2023-05-17 NOTE — TELEPHONE ENCOUNTER
Spoke with Chrissypargeraldine and provided verbal orders for  RN, OT, and PT. Patient is still hospitalized.

## 2023-05-17 NOTE — PROGRESS NOTES
[facility]  Gastroenterology Progress Note     Peggy Jessica MRN# [id number]   YOB: 1975 Age: 47 year old          Assessment and Plan:   Peggy Jessica is a 47 year old female admitted on 5/12/2023. 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  She presents with gradually worsening weakness/more instability and falls ever since she had COVID in Feb 2023.  She does not feel she can manage at home with her weakness/falls.  Also has chronic gastroparesis issues and chronic regional pain syndrome.     Gastroparesis  Epigastric/RUQ pain  Pain may be due to constipation vs PUD vs other source  Labs unremarkable  Patient has long history of chronic pain and remains on chronic pain medication that exacerbate constipation and gastroparesis  5/26 EGD noted grade C esophagitis, medium-sized hiatal hernia. Patchy congested mucosa in entire stomach. Biopsies for H pylori.  - pain may be related to gastritis vs neurologic vs IBS    - continue home antiemetics (scopolamine and hydroxyzine)  - Pantoprazole twice daily  - advance diet to soft gluten free diet  - bowel regimen, dulcolax suppx1     Generalized weakness/mechanical falls, exacerbated since COVID 2/2023  CISP (Chronic immune sensory polyradiculopathy) history:  Dizziness  Neuro consulted and plan MRI brain, C and T spines to eval for demyelinating disease. Neuro considering EMG/LP and CSF studies based on MRI findings.   - Appreciate help  - underwent lumbar puncture this a.m.                 Interval History:   no new complaints, denies shortness of breath, alert, oriented to person, place and time, has had a bowel movement in the last 24 hours and has large bowel movement last evening with no improvement in pain. Epigastric/RUQ pain remains present              Review of Systems:   C: NEGATIVE for fever, chills, change in weight  E/M: NEGATIVE for ear, mouth and throat problems  R: NEGATIVE for  significant cough or SOB  CV: NEGATIVE for chest pain, palpitations or peripheral edema             Medications:   I have reviewed this patient's current medications    azithromycin  250 mg Oral Daily     buprenorphine  1 patch Transdermal Weekly     buprenorphine  1 each Transdermal Q8H [facility]     cyclobenzaprine  10 mg Oral TID     dicyclomine  20 mg Oral TID AC     folic acid  400 mcg Oral Daily     hydrOXYzine  25 mg Oral TID AC     lidocaine  1 patch Transdermal Q24H    And     lidocaine   Transdermal Q8H [facility]     medical cannabis  1 Dose Other See Admin Instructions     multivitamin w/minerals  1 tablet Oral QAM     pantoprazole  40 mg Oral QAM AC     scopolamine  1 patch Transdermal Q72H    And     scopolamine   Transdermal Q8H     sertraline  50 mg Oral Daily     sodium chloride (PF)  3 mL Intracatheter Q8H                  Physical Exam:   Vitals were reviewed  Vital Signs with Ranges  Temp:  [97.5  F (36.4  C)-98.9  F (37.2  C)] 97.5  F (36.4  C)  Pulse:  [] 81  Resp:  [6-23] 18  BP: ()/(60-99) 107/68  SpO2:  [72 %-100 %] 96 %  I/O last 3 completed shifts:  In: 240 [P.O.:240]  Out: -   Constitutional: healthy, alert, no distress, cooperative and pale   Cardiovascular: negative, PMI normal. No lifts, heaves, or thrills. RRR. No murmurs, clicks gallops or rub  Respiratory: negative, Percussion normal. Good diaphragmatic excursion. Lungs clear  Abdomen: Abdomen soft, tender epigastric tenderness. BS normal. No masses, organomegaly           Data:   I reviewed the patient's new clinical lab test results.   Recent Labs   Lab Test 05/13/23  1115 05/12/23  0956 03/15/23  0743 01/31/23  1900 01/28/23  0619   WBC 4.6 5.7 6.5   < > 4.2   HGB 13.6 14.6 13.6   < > 13.4   MCV 91 92 94   < > 98    307 276   < > 243   INR  --   --   --   --  0.97    < > = values in this interval not displayed.     Recent Labs   Lab Test 05/13/23  0807 05/12/23  0956 02/07/23  0710 02/05/23  0721   POTASSIUM 4.6 4.1 3.8 3.9    CHLORIDE 106 102  --  106   CO2 22 26  --  25   BUN 7.0 6.1  --  3.1*   ANIONGAP 11 10  --  9     Recent Labs   Lab Test 05/12/23  1659 05/12/23  0956 03/15/23  0743 01/28/23  0619 01/27/23  1241 01/27/23  0839 01/19/23  1352 12/06/22  0003 12/05/22 2032   ALBUMIN  --  4.2 4.3 3.6  --  4.3   < >  --  4.8   BILITOTAL  --  0.2 0.2 0.3  --  0.4   < >  --  0.4   ALT  --  14 19 14  --  17   < >  --  24   AST  --  18 24 23  --  28   < >  --  32   PROTEIN Negative  --   --   --  Negative  --   --  10*  --    LIPASE  --   --   --   --   --  23  --   --  22    < > = values in this interval not displayed.       I reviewed the patient's new imaging results.    All laboratory data reviewed  All imaging studies reviewed by me.    Shannan Castro PA-C,  5/17/2023  [facility]  Office : [phone]  Cell: [phone] (Dr. Mason)  Cell: [phone] (Shannan Castro PA-C)
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
## 2023-05-17 NOTE — PROGRESS NOTES
Observation goals  PRIOR TO DISCHARGE        Comments:   -diagnostic tests and consults completed and resulted -Partially met  -vital signs normal or at patient baseline - Met  -tolerating oral intake to maintain hydration - Not met, poor appetite, nausea  -safe disposition plan has been identified - Not met, SW following      Nurse to notify provider when observation goals have been met and patient is ready for discharge.

## 2023-05-18 ENCOUNTER — APPOINTMENT (OUTPATIENT)
Dept: PHYSICAL THERAPY | Facility: CLINIC | Age: 48
End: 2023-05-18
Payer: COMMERCIAL

## 2023-05-18 ENCOUNTER — APPOINTMENT (OUTPATIENT)
Dept: OCCUPATIONAL THERAPY | Facility: CLINIC | Age: 48
End: 2023-05-18
Attending: HOSPITALIST
Payer: COMMERCIAL

## 2023-05-18 LAB
PATH REPORT.COMMENTS IMP SPEC: NORMAL
PATH REPORT.FINAL DX SPEC: NORMAL
PATH REPORT.GROSS SPEC: NORMAL
PATH REPORT.MICROSCOPIC SPEC OTHER STN: NORMAL
PATH REPORT.RELEVANT HX SPEC: NORMAL

## 2023-05-18 PROCEDURE — 250N000011 HC RX IP 250 OP 636: Performed by: INTERNAL MEDICINE

## 2023-05-18 PROCEDURE — G0378 HOSPITAL OBSERVATION PER HR: HCPCS

## 2023-05-18 PROCEDURE — 99232 SBSQ HOSP IP/OBS MODERATE 35: CPT | Performed by: HOSPITALIST

## 2023-05-18 PROCEDURE — 97530 THERAPEUTIC ACTIVITIES: CPT | Mod: GP

## 2023-05-18 PROCEDURE — 250N000013 HC RX MED GY IP 250 OP 250 PS 637

## 2023-05-18 PROCEDURE — 250N000013 HC RX MED GY IP 250 OP 250 PS 637: Performed by: PHYSICIAN ASSISTANT

## 2023-05-18 PROCEDURE — 250N000013 HC RX MED GY IP 250 OP 250 PS 637: Performed by: INTERNAL MEDICINE

## 2023-05-18 PROCEDURE — 97535 SELF CARE MNGMENT TRAINING: CPT | Mod: GO

## 2023-05-18 PROCEDURE — 36415 COLL VENOUS BLD VENIPUNCTURE: CPT | Performed by: PSYCHIATRY & NEUROLOGY

## 2023-05-18 PROCEDURE — 97116 GAIT TRAINING THERAPY: CPT | Mod: GP

## 2023-05-18 PROCEDURE — 97165 OT EVAL LOW COMPLEX 30 MIN: CPT | Mod: GO

## 2023-05-18 PROCEDURE — 83519 RIA NONANTIBODY: CPT | Performed by: PSYCHIATRY & NEUROLOGY

## 2023-05-18 PROCEDURE — 250N000013 HC RX MED GY IP 250 OP 250 PS 637: Performed by: HOSPITALIST

## 2023-05-18 RX ADMIN — ONDANSETRON 4 MG: 4 TABLET, ORALLY DISINTEGRATING ORAL at 09:26

## 2023-05-18 RX ADMIN — PANTOPRAZOLE SODIUM 40 MG: 40 TABLET, DELAYED RELEASE ORAL at 15:35

## 2023-05-18 RX ADMIN — DICYCLOMINE HYDROCHLORIDE 20 MG: 20 TABLET ORAL at 12:18

## 2023-05-18 RX ADMIN — HYDROXYZINE HYDROCHLORIDE 25 MG: 25 TABLET ORAL at 06:33

## 2023-05-18 RX ADMIN — PANTOPRAZOLE SODIUM 40 MG: 40 TABLET, DELAYED RELEASE ORAL at 06:33

## 2023-05-18 RX ADMIN — LIDOCAINE 1 PATCH: 560 PATCH PERCUTANEOUS; TOPICAL; TRANSDERMAL at 20:41

## 2023-05-18 RX ADMIN — FOLIC ACID TAB 400 MCG 400 MCG: 400 TAB at 09:18

## 2023-05-18 RX ADMIN — SERTRALINE HYDROCHLORIDE 100 MG: 100 TABLET ORAL at 09:18

## 2023-05-18 RX ADMIN — DICYCLOMINE HYDROCHLORIDE 20 MG: 20 TABLET ORAL at 06:33

## 2023-05-18 RX ADMIN — ACETAMINOPHEN 650 MG: 325 TABLET ORAL at 09:26

## 2023-05-18 RX ADMIN — PRAZOSIN HYDROCHLORIDE 1 MG: 1 CAPSULE ORAL at 21:37

## 2023-05-18 RX ADMIN — ACETAMINOPHEN 650 MG: 325 TABLET ORAL at 15:35

## 2023-05-18 RX ADMIN — POLYETHYLENE GLYCOL 3350 17 G: 17 POWDER, FOR SOLUTION ORAL at 17:44

## 2023-05-18 RX ADMIN — ACETAMINOPHEN 650 MG: 325 TABLET ORAL at 21:36

## 2023-05-18 RX ADMIN — TRAMADOL HYDROCHLORIDE 25 MG: 50 TABLET, COATED ORAL at 15:35

## 2023-05-18 RX ADMIN — DICYCLOMINE HYDROCHLORIDE 20 MG: 20 TABLET ORAL at 17:31

## 2023-05-18 RX ADMIN — CYCLOBENZAPRINE 10 MG: 10 TABLET, FILM COATED ORAL at 15:04

## 2023-05-18 RX ADMIN — ONDANSETRON 4 MG: 4 TABLET, ORALLY DISINTEGRATING ORAL at 15:35

## 2023-05-18 RX ADMIN — TRAMADOL HYDROCHLORIDE 25 MG: 50 TABLET, COATED ORAL at 21:36

## 2023-05-18 RX ADMIN — AZITHROMYCIN MONOHYDRATE 250 MG: 250 TABLET ORAL at 09:18

## 2023-05-18 RX ADMIN — HYDROXYZINE HYDROCHLORIDE 25 MG: 25 TABLET ORAL at 17:31

## 2023-05-18 RX ADMIN — CYCLOBENZAPRINE 10 MG: 10 TABLET, FILM COATED ORAL at 09:18

## 2023-05-18 RX ADMIN — TRAMADOL HYDROCHLORIDE 25 MG: 50 TABLET, COATED ORAL at 09:26

## 2023-05-18 RX ADMIN — HYDROXYZINE HYDROCHLORIDE 25 MG: 25 TABLET ORAL at 12:18

## 2023-05-18 RX ADMIN — CYCLOBENZAPRINE 10 MG: 10 TABLET, FILM COATED ORAL at 20:42

## 2023-05-18 ASSESSMENT — ACTIVITIES OF DAILY LIVING (ADL)
ADLS_ACUITY_SCORE: 37

## 2023-05-18 NOTE — PROGRESS NOTES
"   05/18/23 0820   Appointment Info   Signing Clinician's Name / Credentials (OT) Priscila Senior, OTR/L   Living Environment   People in Home spouse   Current Living Arrangements house   Home Accessibility stairs to enter home;stairs within home   Living Environment Comments Pt lives in a house with her spouse. Stairs to enter and within the home. Pt's spouse works long hours full-time outside of house. Pt cannot have 24/7 assist. Reports has stair chair that paramedics use to get her up one flight of stairs to upper level. Reports they are working on converting a room  in their upper level into a \"sitting room\" with a microwave and mini fridge.   Self-Care   Usual Activity Tolerance moderate   Current Activity Tolerance fair   Regular Exercise Yes   Activity/Exercise Type   (resistive bands. pink and yellow foam blocks)   Equipment Currently Used at Home shower chair;commode chair;walker, standard   Fall history within last six months yes   Number of times patient has fallen within last six months   (\"very frequently\")   Activity/Exercise/Self-Care Comment At baseline is independent in toileting using commode, dressing, pivot transfers to/from wheelchair. Reports she goes downstairs in AM when spouse is home and then stays downstairs all day. Uses wheelchair for mobility in lower level- self propels using BUEs. Showers approximately once weekly with spouse assist with shower chair and tub/shower. Reports wheelchair does not go upstairs so sometimes walks distances of 10 ft with FWW upstairs. Sleeps in standard bed. Does have access to trek poles, canes, 4WW. Has wheelchair sling bag for transporting items. Reports vehicle transfers are very difficult   Instrumental Activities of Daily Living (IADL)   IADL Comments Makes her own simple meals. Has cutting board with spikes and lightweight pots and pans. Has 4 dogs, 2 cats, birds at home. Has a service dog in training   General Information   Onset of Illness/Injury " "or Date of Surgery 05/12/23   Referring Physician Bret Sky MD   Patient/Family Therapy Goal Statement (OT) Reduce falls   Additional Occupational Profile Info/Pertinent History of Current Problem Per Hospitalist note on 5/17/23- \"47 year old female admitted on 5/12/2023. 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  She presents with gradually worsening weakness/more instability and falls ever since she had COVID in Feb 2023.  She does not feel she can manage at home with her weakness/falls.  Also has chronic gastroparesis issues and chronic regional pain syndrome.\"   Existing Precautions/Restrictions fall   Cognitive Status Examination   Affect/Mental Status (Cognitive) WFL   Follows Commands follows multi-step commands   Cognitive Status Comments Following 2-3 step commands   Visual Perception   Visual Impairment/Limitations corrective lenses full-time   Impact of Vision Impairment on Function (Vision) Reports intermittent diplopia- particularly when fatigued.   Sensory   Sensory Comments Reports baseline BUE intermittent numbness/tingling from digits to mid upper-arm. Reports intermittent BUE burning sensation   Pain Assessment   Patient Currently in Pain Yes, see Vital Sign flowsheet   Strength Comprehensive (MMT)   Comment, General Manual Muscle Testing (MMT) Assessment Generalized weakness   Coordination   Coordination Comments Mild BUE incoordination. Able to manipulate all grooming objects   Bed Mobility   Bed Mobility supine-sit;sit-supine   Supine-Sit [place] (Bed Mobility) independent   Sit-Supine [place] (Bed Mobility) independent   Transfers   Transfers sit-stand transfer;toilet transfer   Sit-Stand Transfer   Sit-Stand [place] (Transfers) contact guard   Toilet Transfer   Type (Toilet Transfer) stand-sit;sit-stand   [place] Level (Toilet Transfer) contact guard   Activities of Daily Living   BADL Assessment/Intervention lower body dressing   Lower Body " Dressing Assessment/Training   Comment, (Lower Body Dressing) per clinical judgment   [place] Level (Lower Body Dressing) minimum assist (75% patient effort)   Clinical Impression   Criteria for Skilled Therapeutic Interventions Met (OT) Yes, treatment indicated   OT Diagnosis Decline function   OT Problem List-Impairments impacting ADL activity tolerance impaired;balance;strength;sensation;mobility;coordination   Assessment of Occupational Performance 3-5 Performance Deficits   Identified Performance Deficits LB dressing, toileting, functional mobility, meal prep   Planned Therapy Interventions (OT) ADL retraining;transfer training;home program guidelines;progressive activity/exercise   Clinical Decision Making Complexity (OT) low complexity   Anticipated Equipment Needs Upon Discharge (OT)   (reacher)   Risk & Benefits of therapy have been explained evaluation/treatment results reviewed;care plan/treatment goals reviewed;risks/benefits reviewed;current/potential barriers reviewed;participants voiced agreement with care plan;participants included;patient   OT Total Evaluation Time   OT Eval, Low Complexity Minutes (69973) 14   OT Goals   Therapy Frequency (OT) 5 times/wk   OT Predicted Duration/Target Date for Goal Attainment 05/23/23   OT Goals Lower Body Dressing;Toilet Transfer/Toileting;OT Goal 1   OT: Lower Body Dressing Modified independent;including set-up/clothing retrieval   OT: Toilet Transfer/Toileting Modified independent;toilet transfer;cleaning and garment management;using adaptive equipment   OT: Goal 1 Pt will independently self-propel wheelchair a household distance with no reports of fatigue   Interventions   Interventions Quick Adds Self-Care/Home Management   Self-Care/Home Management   Self-Care/Home Mgmt/ADL, Compensatory, Meal Prep Minutes (58401) 25   Symptoms Noted During/After Treatment (Meal Preparation/Planning Training) fatigue   Treatment Detail/Skilled Intervention Upon arrival  pt very agreeable to therapy. Pt seen for a focus on enhancing activity tolerance. Pt completed low pivot transfers from bed to/from commode with CGA for balance safety. One cue for head hips relationship. Completed low pivot transfer to wheelchair with CGA for balance safety. Self propelled wheelchair approximately 25 ft to bathroom sink using BUEs with SBA and increased time and effort. Sat at wheelchair accessible sink to complete UB sponge bath, grooming tasks, don/doff gown with SBA. Pt requested to return to bed due to fatigue. Pivot transfer from wheelchair to bed with CGA for balance safety. Pt left with bed alarm on and needed supplies.   OT Discharge Planning   OT Plan toileting using commode. LB dressing. Wheelchair self propelling   OT Discharge Recommendation (DC Rec) Acute [facility]-Motivated patient will benefit from intensive, interdisciplinary therapy.  Anticipate will be able to tolerate 3 hours of therapy per day   OT Rationale for DC Rec Pt functioning below baseline and will benefit from continued skilled OT to maximize safety and independence. Concern regarding pt's activity tolerance to complete her daily activities and recent history of very frequent falls at home. If ARC is not an option, recommend home OT.   OT Brief overview of current status CGA bed <> commode, bed <> wheelchair. SBA self propel wheelchair to sink. Fatigues   Total Session Time   Timed Code Treatment Minutes 25   Total Session Time (sum of timed and untimed services) 39

## 2023-05-18 NOTE — PROGRESS NOTES
PRIOR TO DISCHARGE        Comments:   -diagnostic tests and consults completed and resulted -Partially met  -vital signs normal or at patient baseline - Met  -tolerating oral intake to maintain hydration - Not met, poor appetite, nausea  -safe disposition plan has been identified - Not met, SW following      Nurse to notify provider when observation goals have been met and patient is ready for discharge.

## 2023-05-18 NOTE — PROGRESS NOTES
PRIOR TO DISCHARGE        Comments:   -diagnostic tests and consults completed and resulted -Partially met  -vital signs normal or at patient baseline - Met  -tolerating oral intake to maintain hydration -Progressing  -safe disposition plan has been identified - Not met, SW following      Nurse to notify provider when observation goals have been met and patient is ready for discharge.
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
## 2023-05-18 NOTE — PROGRESS NOTES
Pt A&Ox4; VSS on RA. Pain managed with current regimen. Dressing over LP CDI. Reported baseline neuropathy in BUE and BLE. No nausea reported this shift. SL. Pivot to bedside commode; A of 1 GB/W. Possible discharge to ARU today.

## 2023-05-18 NOTE — PROGRESS NOTES
[facility]  Medicine Progress Note - Hospitalist Service    Date of Admission:  5/12/2023    Assessment & Plan   Peggy Jessica is a 47 year old female admitted on 5/12/2023. 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  She presents with gradually worsening weakness/more instability and falls ever since she had COVID in Feb 2023.  She does not feel she can manage at home with her weakness/falls.  Also has chronic gastroparesis issues and chronic regional pain syndrome.     Generalized weakness/mechanical falls, exacerbated since COVID 2/2023  CISP (Chronic immune sensory polyradiculopathy) history  Dizziness  -  Initially developed neuro complaints 2021 following a flu vaccine with subsequent IVIG treatment.  Eventually IVIG stopped as not improving/felt needed further by her providers.  Hospitalized Jan 2023 and Oct 2022 [facility] with weakness/falls.  Also extensively evaluated Fall 2022 at [facility]. She has had multiple MRI's and a couple LP's in the past 9 months. At [place] MRI spine was repeated. It again showed that the prior enhancement had resolved. NCS were performed, and showed similar results to our prior studies. She also had a large serologic work up. These have returned negative for new inflammation.  More recent Jan hospital stay eval also returned negative for inflammation. While she had her initial illness and IVIG tx, the more recent provider assessments have felt she was not having a major acute exacerbation.  There are also notes regarding concerns of a functional component overlaying the other neurologic issues. (See Dr. Duenas's neurology October 2022 note for additional historical details)     Neuro complaints have involved weakness upper and lower body, numbness including face, at times diplopia.   Patient perceives her weakness and numbness are progressing particularly since Feb 2023.  She baseline falls every day and these  "falls have increased to 3+ times daily.  She denies any major injuries/bruising as she catches herself/has soft fall locations by her report.   She doesn't feel her outpatient neurologist is listening to her problems and sought eval at the hospital as she thinks her issues have worsened to the point over the past week she cannot function at home.        -Appreciate Neurology assistance,  MRI brain, cervical, thoracic, lumbar spines completed and negative for finding of  demyelinating disease.    -LP done for CSF studies- likely traumatic tap.   - EMG normal  - Vitamin B12 and CRP normal, SSA negative. PAULINE boarder line positive. Was positive a year ago, had extensive autoimmune and rheumatology work up at [place].   - PT OT eval noted, recommending ARU - SW consulted and following    Complex regional pain syndrome  -  Follows with pain provider/clinic.  Continue PTA Buprenorphine and home supply of cannabis available.     Chronic nausea/Gastroparesis concerns  Chronic constipation:  GERD/epigastric pain  -  Recently had some emesis.  This is a chronic issue with her.  Suspect gastroparesis exacerbated with her opioids.  GI prior considered more eval but limited with ongoing opioid use.    -Continue with PPI, Addition of tums for heart burn, tramadol for pain  -GI consulted, s/p EGD, unremarkable. Started on azithromycin for motility.  -Bowel regimen      Diet: Gluten Free Diet  Snacks/Supplements Adult: Ensure Clear; Between Meals    DVT Prophylaxis: Low Risk/Ambulatory with no VTE prophylaxis indicated  Kwan Catheter: Not present  Lines: None     Cardiac Monitoring: None  Code Status: Full Code      Clinically Significant Risk Factors Present on Admission                       # Overweight: Estimated body mass index is 28.12 kg/m  as calculated from the following:    Height as of this encounter: 1.651 m (5' 5\").    Weight as of this encounter: 76.7 kg (169 lb).            Disposition Plan Awaiting TCU Placement.   "   Expected Discharge Date: 05/19/2023,  6:00 PM    Destination: home with help/services;home with family (TCU)  Discharge Comments: PT/OT to see if qualifies for ARU, [facility]  SW/CC  Autoimmune disorder; multiple falls.  Psych consult.        The patient's care was discussed with the Bedside Nurse, Care Coordinator/, Patient and neurologist.    Bret Sky MD  Hospitalist Service  [facility]  Securely message with [employer] (more info)  Text page via [facility] Paging/Directory   ______________________________________________________________________    Interval History     Evaluated patient this morning. Lt leg occasional pain - better today than yesterday.    -no other acute issues reported. Reported dizziness while checking orthostatic, BP dropped slightly not enough to meet criteria.     Physical Exam   Vital Signs: Temp: 97.7  F (36.5  C) Temp src: Oral BP: 96/66 Pulse: 85   Resp: 18 SpO2: 93 % O2 Device: None (Room air)    Weight: 169 lbs 0 oz    General Appearance: A&O x3, NAD, resting in bed  Respiratory: CTA, no accessory muscle use  Cardiovascular:  s1 s2 regular, no murmurs, no LE edema  Skin: warm and dry, intact  Other: Moves all extremities, no focal neurologic deficits, answers questions appropriately with normal mood and affect.    Medical Decision Making       38 MINUTES SPENT BY ME on the date of service doing chart review, history, exam, documentation & further activities per the note.      Data         Imaging results reviewed over the past 24 hrs:   No results found for this or any previous visit (from the past 24 hour(s)).

## 2023-05-18 NOTE — PLAN OF CARE
"Goal Outcome Evaluation:      Plan of Care Reviewed With: patient    Overall Patient Progress: improvingOverall Patient Progress: improving    Orientation/Cognitive: AOx4  Observation Goals (Met/ Not Met): Not met  Mobility Level/Assist Equipment: A1/GB/W. Uses wheelchair as baseline. Pivot to [facility]. Ambulated in room from bed to door.   Fall Risk (Y/N): Y  Behavior Concerns: Green  Pain Management: Tramadol, 1x oxycodone for 7/10 pain post LP states she \"Feels like they hit my nerve\".   Tele/VS/O2: VSS on RA   ABNL Lab/BG: myasthenia gravis lab pending  Diet: GF, soft diet - c/o nausea, Zofran given x2 with some relief   Bowel/Bladder: Continent. no bm this shift LBM 5/16. Miralax given  Skin Concerns: None.   Drains/Devices: PIV SL  Tests/Procedures for next shift: SW following for possible placement.   Anticipated DC date & active delays: TBD, SW following for home care services, possible ARU  "

## 2023-05-18 NOTE — UTILIZATION REVIEW
Concurrent stay review; Secondary Review Determination    [facility]        Under the authority of the Utilization Management Committee, the utilization review process indicated a secondary review on the above patient.  The review outcome is based on review of the medical records, discussions with staff, and applying clinical experience noted on the date of the review.        (x) Observation/outpatient Status Appropriate - Concurrent stay review       RATIONALE FOR DETERMINATION:     47 year old female admitted on 5/12/2023. 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  She presents with gradually worsening weakness/more instability and falls ever since she had COVID in Feb 2023.  She does not feel she can manage at home with her weakness/falls.  Also has chronic gastroparesis issues and chronic regional pain syndrome.  Neurology evaluation was provided on May 17: Exam findings and CSF, EMG are not consistent with CIDP. Examination reveals mainly functional type weakness. PAULINE borderline positive of uncertain significance MRI cervical, thoracic spine brain- unchanged and therefore excluded demyelination disease SSA and SSB arenormal  CSF study protein cell counts are normal EMG of the left upper and lower extremity were within normal limits without evidence of slowed conduction velocity or neuropathy/muscle disease.  Patient was recommended discharge to TCU.  Patient is awaiting TCU placement.      Patient delayed discharge is related to disposition, there is no medical necessity for inpatient admission at the time of this review. If there is a change in patient status, please resend for review.    The information on this document is developed by the utilization review team in order for the business office to ensure compliance.  This only denotes the appropriateness of proper admission status and does not reflect the quality of care rendered.       The definitions of Inpatient  Status and Observation Status used in making the determination above are those provided in the CMS Coverage Manual, Chapter 1 and Chapter 6, section 70.4.       Sincerely,     EULALIA MONTANO MD   Utilization Review  Physician Advisor  [facility]

## 2023-05-19 ENCOUNTER — APPOINTMENT (OUTPATIENT)
Dept: PHYSICAL THERAPY | Facility: CLINIC | Age: 48
End: 2023-05-19
Payer: COMMERCIAL

## 2023-05-19 PROCEDURE — 250N000013 HC RX MED GY IP 250 OP 250 PS 637: Performed by: INTERNAL MEDICINE

## 2023-05-19 PROCEDURE — 97530 THERAPEUTIC ACTIVITIES: CPT | Mod: GP

## 2023-05-19 PROCEDURE — 250N000013 HC RX MED GY IP 250 OP 250 PS 637: Performed by: HOSPITALIST

## 2023-05-19 PROCEDURE — 250N000013 HC RX MED GY IP 250 OP 250 PS 637

## 2023-05-19 PROCEDURE — 250N000013 HC RX MED GY IP 250 OP 250 PS 637: Performed by: PHYSICIAN ASSISTANT

## 2023-05-19 PROCEDURE — G0378 HOSPITAL OBSERVATION PER HR: HCPCS

## 2023-05-19 PROCEDURE — 97116 GAIT TRAINING THERAPY: CPT | Mod: GP

## 2023-05-19 PROCEDURE — 99232 SBSQ HOSP IP/OBS MODERATE 35: CPT | Performed by: HOSPITALIST

## 2023-05-19 PROCEDURE — 250N000011 HC RX IP 250 OP 636: Performed by: INTERNAL MEDICINE

## 2023-05-19 RX ORDER — AZITHROMYCIN 250 MG/1
250 TABLET, FILM COATED ORAL DAILY
Qty: 30 TABLET | Refills: 0 | Status: SHIPPED | OUTPATIENT
Start: 2023-05-20 | End: 2023-07-18

## 2023-05-19 RX ORDER — SERTRALINE HYDROCHLORIDE 100 MG/1
50 TABLET, FILM COATED ORAL DAILY
Qty: 30 TABLET | Refills: 0 | Status: SHIPPED | OUTPATIENT
Start: 2023-05-19 | End: 2023-05-20

## 2023-05-19 RX ORDER — TRAMADOL HYDROCHLORIDE 50 MG/1
25 TABLET ORAL 3 TIMES DAILY PRN
Qty: 15 TABLET | Refills: 0 | Status: SHIPPED | OUTPATIENT
Start: 2023-05-19 | End: 2023-10-11

## 2023-05-19 RX ORDER — PRAZOSIN HYDROCHLORIDE 1 MG/1
1 CAPSULE ORAL AT BEDTIME
Qty: 30 CAPSULE | Refills: 0 | Status: SHIPPED | OUTPATIENT
Start: 2023-05-19 | End: 2023-05-24 | Stop reason: SINTOL

## 2023-05-19 RX ADMIN — TRAMADOL HYDROCHLORIDE 25 MG: 50 TABLET, COATED ORAL at 16:25

## 2023-05-19 RX ADMIN — CYCLOBENZAPRINE 10 MG: 10 TABLET, FILM COATED ORAL at 07:57

## 2023-05-19 RX ADMIN — ACETAMINOPHEN 650 MG: 325 TABLET ORAL at 22:32

## 2023-05-19 RX ADMIN — DICYCLOMINE HYDROCHLORIDE 20 MG: 20 TABLET ORAL at 16:21

## 2023-05-19 RX ADMIN — CYCLOBENZAPRINE 10 MG: 10 TABLET, FILM COATED ORAL at 13:52

## 2023-05-19 RX ADMIN — HYDROXYZINE HYDROCHLORIDE 25 MG: 25 TABLET ORAL at 06:48

## 2023-05-19 RX ADMIN — DICYCLOMINE HYDROCHLORIDE 20 MG: 20 TABLET ORAL at 13:52

## 2023-05-19 RX ADMIN — HYDROXYZINE HYDROCHLORIDE 25 MG: 25 TABLET ORAL at 16:20

## 2023-05-19 RX ADMIN — PRAZOSIN HYDROCHLORIDE 1 MG: 1 CAPSULE ORAL at 22:19

## 2023-05-19 RX ADMIN — FOLIC ACID TAB 400 MCG 400 MCG: 400 TAB at 07:57

## 2023-05-19 RX ADMIN — TRAMADOL HYDROCHLORIDE 25 MG: 50 TABLET, COATED ORAL at 10:02

## 2023-05-19 RX ADMIN — ACETAMINOPHEN 650 MG: 325 TABLET ORAL at 16:25

## 2023-05-19 RX ADMIN — LIDOCAINE 1 PATCH: 560 PATCH PERCUTANEOUS; TOPICAL; TRANSDERMAL at 20:58

## 2023-05-19 RX ADMIN — CYCLOBENZAPRINE 10 MG: 10 TABLET, FILM COATED ORAL at 20:58

## 2023-05-19 RX ADMIN — PANTOPRAZOLE SODIUM 40 MG: 40 TABLET, DELAYED RELEASE ORAL at 16:21

## 2023-05-19 RX ADMIN — ONDANSETRON 4 MG: 4 TABLET, ORALLY DISINTEGRATING ORAL at 13:59

## 2023-05-19 RX ADMIN — ACETAMINOPHEN 650 MG: 325 TABLET ORAL at 10:02

## 2023-05-19 RX ADMIN — DICYCLOMINE HYDROCHLORIDE 20 MG: 20 TABLET ORAL at 06:48

## 2023-05-19 RX ADMIN — AZITHROMYCIN MONOHYDRATE 250 MG: 250 TABLET ORAL at 07:57

## 2023-05-19 RX ADMIN — CALCIUM CARBONATE (ANTACID) CHEW TAB 500 MG 500 MG: 500 CHEW TAB at 10:07

## 2023-05-19 RX ADMIN — PANTOPRAZOLE SODIUM 40 MG: 40 TABLET, DELAYED RELEASE ORAL at 06:48

## 2023-05-19 RX ADMIN — TRAMADOL HYDROCHLORIDE 25 MG: 50 TABLET, COATED ORAL at 22:30

## 2023-05-19 RX ADMIN — HYDROXYZINE HYDROCHLORIDE 25 MG: 25 TABLET ORAL at 13:52

## 2023-05-19 RX ADMIN — SERTRALINE HYDROCHLORIDE 100 MG: 100 TABLET ORAL at 07:57

## 2023-05-19 ASSESSMENT — ACTIVITIES OF DAILY LIVING (ADL)
ADLS_ACUITY_SCORE: 37

## 2023-05-19 NOTE — PROGRESS NOTES
Orientation/Cognitive: A&OX4  Observation Goals (Met/ Not Met): Not met  Mobility Level/Assist Equipment: AX1, GBW  Fall Risk (Y/N): Yes  Behavior Concerns: None  Pain Management: PRN Tylenol, Tramadol  Tele/VS/O2:VSS  ABNL Lab/BG: WDL  Diet: Gluten free  Bowel/Bladder: Continent  Skin Concerns: None  Drains/Devices: PIV SL  Tests/Procedures for next shift:   Anticipated DC date & active delays: TBD

## 2023-05-19 NOTE — PLAN OF CARE
Goal Outcome Evaluation:      Plan of Care Reviewed With: patient    Overall Patient Progress: improvingOverall Patient Progress: improving    Orientation/Cognitive: A&Ox4  Observation Goals (Met/ Not Met): Not met  Mobility Level/Assist Equipment: Ax1, GB, walker to BSC, pt uses wheelchair at baseline   Fall Risk (Y/N): Y  Behavior Concerns: NA  Pain Management: PRN tylenol and tramadol given   Tele/VS/O2: VSS  ABNL Lab/BG: WDL  Diet: Gluten free  Bowel/Bladder: Continent   Skin Concerns: NA  Drains/Devices: PIV:S/L  Tests/Procedures for next shift:NA, some labs are still pending   Anticipated DC date & active delays:TBD, pt will need Acute rehab SW following for placement   Patient Stated Goal for Today: To sleep       Observation goals  PRIOR TO DISCHARGE        Comments:   -diagnostic tests and consults completed and resulted:Not met  -vital signs normal or at patient baseline:Met   -tolerating oral intake to maintain hydration:Met   -safe disposition plan has been identified:Not met   Nurse to notify provider when observation goals have been met and patient is ready for discharge.

## 2023-05-19 NOTE — PROGRESS NOTES
Care Management Follow Up    Length of Stay (days): 0    Expected Discharge Date: 05/20/2023     Concerns to be Addressed:       Patient plan of care discussed at interdisciplinary rounds: Yes    Anticipated Discharge Disposition: Transitional Care, Home Care     Anticipated Discharge Services: Transportation Services  Anticipated Discharge DME:      Patient/family educated on Medicare website which has current facility and service quality ratings: yes  Education Provided on the Discharge Plan:    Patient/Family in Agreement with the Plan:      Referrals Placed by CM/SW:    Private pay costs discussed: Not applicable    Additional Information:  Message to Adam at  ARU, declined by ARU due to not having new reason for weakness, not far off from baseline, would likely do better at slower progression with TCU stay. Paged MD to update.     STEVE Summers  Social Work  [facility]

## 2023-05-19 NOTE — PROGRESS NOTES
Observation goals  PRIOR TO DISCHARGE         -diagnostic tests and consults completed and resulted - Not met  -vital signs normal or at patient baseline - Met  -tolerating oral intake to maintain hydration - Met  -safe disposition plan has been identified - Not met    Nurse to notify provider when observation goals have been met and patient is ready for discharge.

## 2023-05-19 NOTE — PROGRESS NOTES
[facility]  Medicine Progress Note - Hospitalist Service    Date of Admission:  5/12/2023    Assessment & Plan   Peggy Jessica is a 47 year old female admitted on 5/12/2023. 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  She presents with gradually worsening weakness/more instability and falls ever since she had COVID in Feb 2023.  She does not feel she can manage at home with her weakness/falls.  Also has chronic gastroparesis issues and chronic regional pain syndrome.     Generalized weakness/mechanical falls, exacerbated since COVID 2/2023  CISP (Chronic immune sensory polyradiculopathy) history  Dizziness  -  Initially developed neuro complaints 2021 following a flu vaccine with subsequent IVIG treatment.  Eventually IVIG stopped as not improving/felt needed further by her providers.  Hospitalized Jan 2023 and Oct 2022 [facility] with weakness/falls.  Also extensively evaluated Fall 2022 at [facility]. She has had multiple MRI's and a couple LP's in the past 9 months. At [place] MRI spine was repeated. It again showed that the prior enhancement had resolved. NCS were performed, and showed similar results to our prior studies. She also had a large serologic work up. These have returned negative for new inflammation.  More recent Jan hospital stay eval also returned negative for inflammation. While she had her initial illness and IVIG tx, the more recent provider assessments have felt she was not having a major acute exacerbation.  There are also notes regarding concerns of a functional component overlaying the other neurologic issues. (See Dr. Duenas's neurology October 2022 note for additional historical details)     Neuro complaints have involved weakness upper and lower body, numbness including face, at times diplopia.   Patient perceives her weakness and numbness are progressing particularly since Feb 2023.  She baseline falls every day and these  falls have increased to 3+ times daily.  She denies any major injuries/bruising as she catches herself/has soft fall locations by her report.   She doesn't feel her outpatient neurologist is listening to her problems and sought eval at the hospital as she thinks her issues have worsened to the point over the past week she cannot function at home.        -Appreciate Neurology assistance,  MRI brain, cervical, thoracic, lumbar spines completed and negative for finding of  demyelinating disease.    - LP done for CSF studies- likely traumatic tap. Otherwise no concerning findings.   - EMG as well done, normal  - Vitamin B12 and CRP normal, SSA negative. PAULINE boarder line positive. Was positive a year ago, had extensive autoimmune and rheumatology work up at [place].   - PT OT eval noted, recommending ARU - SW consulted and following.   - Appears functional, psychiatry consulted, patient also has night toney, and prazosin added- check orthostatic periodically. Recommended follow up for therapy as outpatient.   - stable for discharge.     Complex regional pain syndrome  -  Follows with pain provider/clinic. Continue PTA Buprenorphine and home supply of cannabis available.     Chronic nausea/Gastroparesis concerns  Chronic constipation:  GERD/epigastric pain  -  Recently had some emesis.  This is a chronic issue with her.  Suspect gastroparesis exacerbated with her opioids.  GI prior considered more eval but limited with ongoing opioid use.    -Continue with PPI, Addition of tums for heart burn, tramadol for pain  -GI consulted, s/p EGD, unremarkable. Started on azithromycin for motility.  -Bowel regimen      Diet: Gluten Free Diet  Snacks/Supplements Adult: Ensure Clear; Between Meals    DVT Prophylaxis: Low Risk/Ambulatory with no VTE prophylaxis indicated  Kwan Catheter: Not present  Lines: None     Cardiac Monitoring: None  Code Status: Full Code      Clinically Significant Risk Factors Present on Admission                 "       # Overweight: Estimated body mass index is 28.12 kg/m  as calculated from the following:    Height as of this encounter: 1.651 m (5' 5\").    Weight as of this encounter: 76.7 kg (169 lb).            Disposition Plan Awaiting TCU Placement.     Expected Discharge Date: 05/20/2023,  6:00 PM    Destination: home with help/services;home with family (TCU)  Discharge Comments: PT to see if qualifies for ARU, [facility]  SW/CC        The patient's care was discussed with the Bedside Nurse, Care Coordinator/ and Patient.    Bret Sky MD  Hospitalist Service  [facility]  Securely message with [employer] (more info)  Text page via [employer] Paging/Directory   ______________________________________________________________________    Interval History     Evaluated patient this morning. Did not report any acute issues. Discussed at length about orthostatic hypotension and precautions including wearing compression stocking, wedge bed, avoiding abrupt standing etc      Physical Exam   Vital Signs: Temp: 97.3  F (36.3  C) Temp src: Oral BP: 98/68 Pulse: 75   Resp: 16 SpO2: 95 % O2 Device: None (Room air)    Weight: 169 lbs 0 oz    General Appearance: A&O x3, NAD, resting in bed  Respiratory: CTA, no accessory muscle use  Cardiovascular:  s1 s2 regular, no murmurs, no LE edema  Skin: warm and dry, intact  Other: Moves all extremities although strength is very inconsistent on exam, becomes shaky in extremities while checking for strength, no focal neurologic deficits, answers questions appropriately with normal mood and affect.    Medical Decision Making       35 MINUTES SPENT BY ME on the date of service doing chart review, history, exam, documentation & further activities per the note.      Data         Imaging results reviewed over the past 24 hrs:   No results found for this or any previous visit (from the past 24 hour(s)).  "

## 2023-05-19 NOTE — PROGRESS NOTES
Gastroenterology Progress Note     Peggy Jessica MRN# [id number]   YOB: 1975 Age: 47 year old          Assessment and Plan:       CIDP (chronic inflammatory demyelinating polyneuropathy) (H)    Numbness and tingling of both legs    Bilateral leg weakness    Multiple falls  Patient is overall doing well patient continues to complain of some symptoms of nausea however patient has not any vomiting overall patient feels symptoms are improving patient was started on azithromycin yesterday patient has been tolerating it well without any side effects.  Continue on current treatment plan GI will continue to follow along.doing well; no cp, sob, n/v/d, or abd pain.            Multiple falls  Numbness and tingling of both legs  Bilateral leg weakness  CIDP (chronic inflammatory demyelinating polyneuropathy) (H)      Interval History:     doing well; no cp, sob, n/v/d, or abd pain.              Review of Systems:     C: NEGATIVE for fever, chills, change in weight  E/M: NEGATIVE for ear, mouth and throat problems  R: NEGATIVE for significant cough or SOB  CV: NEGATIVE for chest pain, palpitations or peripheral edema             Medications:   I have reviewed this patient's current medications    azithromycin  250 mg Oral Daily     buprenorphine  1 patch Transdermal Weekly     buprenorphine  1 each Transdermal Q8H [facility]     cyclobenzaprine  10 mg Oral TID     dicyclomine  20 mg Oral TID AC     folic acid  400 mcg Oral Daily     hydrOXYzine  25 mg Oral TID AC     lidocaine  1 patch Transdermal Q24H    And     lidocaine   Transdermal Q8H [facility]     medical cannabis  1 Dose Other See Admin Instructions     multivitamin w/minerals  1 tablet Oral QAM     pantoprazole  40 mg Oral BID AC     prazosin  1 mg Oral At Bedtime     scopolamine  1 patch Transdermal Q72H    And     scopolamine   Transdermal Q8H     sertraline  100 mg Oral Daily     sodium chloride (PF)  3 mL Intracatheter  Q8H                  Physical Exam:   Vitals were reviewed  Vital Signs with Ranges  Temp:  [97.6  F (36.4  C)-98.5  F (36.9  C)] 98.5  F (36.9  C)  Pulse:  [85-98] 91  Resp:  [18] 18  BP: ()/(62-72) 107/71  SpO2:  [93 %-99 %] 99 %  I/O last 3 completed shifts:  In: 1040 [P.O.:1040]  Out: -            Data:   I reviewed the patient's new clinical lab test results.   Recent Labs   Lab Test 05/13/23  1115 05/12/23  0956 03/15/23  0743 01/31/23  1900 01/28/23  0619   WBC 4.6 5.7 6.5   < > 4.2   HGB 13.6 14.6 13.6   < > 13.4   MCV 91 92 94   < > 98    307 276   < > 243   INR  --   --   --   --  0.97    < > = values in this interval not displayed.     Recent Labs   Lab Test 05/13/23  0807 05/12/23  0956 02/07/23  0710 02/05/23  0721   POTASSIUM 4.6 4.1 3.8 3.9   CHLORIDE 106 102  --  106   CO2 22 26  --  25   BUN 7.0 6.1  --  3.1*   ANIONGAP 11 10  --  9     Recent Labs   Lab Test 05/12/23  1659 05/12/23  0956 03/15/23  0743 01/28/23  0619 01/27/23  1241 01/27/23  0839 01/19/23  1352 12/06/22  0003 12/05/22  2032   ALBUMIN  --  4.2 4.3 3.6  --  4.3   < >  --  4.8   BILITOTAL  --  0.2 0.2 0.3  --  0.4   < >  --  0.4   ALT  --  14 19 14  --  17   < >  --  24   AST  --  18 24 23  --  28   < >  --  32   PROTEIN Negative  --   --   --  Negative  --   --  10*  --    LIPASE  --   --   --   --   --  23  --   --  22    < > = values in this interval not displayed.       I reviewed the patient's new imaging results.    All laboratory data reviewed  All imaging studies reviewed by me.    Aris Mason MD,  5/18/2023  [facility]  Office : [phone]  Cell: [phone]      [facility]  Ph: [phone] Fax: [phone]

## 2023-05-19 NOTE — PROGRESS NOTES
Observation goals  PRIOR TO DISCHARGE        Comments:   -diagnostic tests and consults completed and resulted:Not met  -vital signs normal or at patient baseline:Met   -tolerating oral intake to maintain hydration:Met   -safe disposition plan has been identified:Not met   Nurse to notify provider when observation goals have been met and patient is ready for discharge.

## 2023-05-19 NOTE — PLAN OF CARE
Goal Outcome Evaluation:          Observation goals  PRIOR TO DISCHARGE        Comments: -diagnostic tests and consults completed and resulted met vital signs normal or at patient baseline Blood pressure slight soft,   -tolerating oral intake to maintain hydration met  -safe disposition plan has been identified not met  Nurse to notify provider when observation goals have been met and patient is ready for discharge.

## 2023-05-20 VITALS
TEMPERATURE: 98.1 F | RESPIRATION RATE: 16 BRPM | OXYGEN SATURATION: 93 % | DIASTOLIC BLOOD PRESSURE: 59 MMHG | WEIGHT: 169 LBS | BODY MASS INDEX: 28.16 KG/M2 | HEIGHT: 65 IN | HEART RATE: 81 BPM | SYSTOLIC BLOOD PRESSURE: 100 MMHG

## 2023-05-20 PROCEDURE — 250N000013 HC RX MED GY IP 250 OP 250 PS 637

## 2023-05-20 PROCEDURE — 250N000013 HC RX MED GY IP 250 OP 250 PS 637: Performed by: HOSPITALIST

## 2023-05-20 PROCEDURE — 99239 HOSP IP/OBS DSCHRG MGMT >30: CPT | Performed by: HOSPITALIST

## 2023-05-20 PROCEDURE — 250N000013 HC RX MED GY IP 250 OP 250 PS 637: Performed by: INTERNAL MEDICINE

## 2023-05-20 PROCEDURE — G0378 HOSPITAL OBSERVATION PER HR: HCPCS

## 2023-05-20 PROCEDURE — 250N000013 HC RX MED GY IP 250 OP 250 PS 637: Performed by: PHYSICIAN ASSISTANT

## 2023-05-20 PROCEDURE — 250N000009 HC RX 250: Performed by: PHYSICIAN ASSISTANT

## 2023-05-20 RX ORDER — SERTRALINE HYDROCHLORIDE 100 MG/1
100 TABLET, FILM COATED ORAL DAILY
Qty: 30 TABLET | Refills: 0 | Status: SHIPPED | OUTPATIENT
Start: 2023-05-20 | End: 2023-06-28

## 2023-05-20 RX ADMIN — AZITHROMYCIN MONOHYDRATE 250 MG: 250 TABLET ORAL at 07:51

## 2023-05-20 RX ADMIN — CYCLOBENZAPRINE 10 MG: 10 TABLET, FILM COATED ORAL at 13:23

## 2023-05-20 RX ADMIN — CYCLOBENZAPRINE 10 MG: 10 TABLET, FILM COATED ORAL at 07:51

## 2023-05-20 RX ADMIN — DICYCLOMINE HYDROCHLORIDE 20 MG: 20 TABLET ORAL at 13:23

## 2023-05-20 RX ADMIN — SCOPALAMINE 1 PATCH: 1 PATCH, EXTENDED RELEASE TRANSDERMAL at 13:24

## 2023-05-20 RX ADMIN — ACETAMINOPHEN 650 MG: 325 TABLET ORAL at 08:02

## 2023-05-20 RX ADMIN — FOLIC ACID TAB 400 MCG 400 MCG: 400 TAB at 07:51

## 2023-05-20 RX ADMIN — HYDROXYZINE HYDROCHLORIDE 25 MG: 25 TABLET ORAL at 06:56

## 2023-05-20 RX ADMIN — HYDROXYZINE HYDROCHLORIDE 25 MG: 25 TABLET ORAL at 13:23

## 2023-05-20 RX ADMIN — DICYCLOMINE HYDROCHLORIDE 20 MG: 20 TABLET ORAL at 06:56

## 2023-05-20 RX ADMIN — FOLIC ACID TAB 400 MCG 400 MCG: 400 TAB at 08:02

## 2023-05-20 RX ADMIN — TRAMADOL HYDROCHLORIDE 25 MG: 50 TABLET, COATED ORAL at 08:03

## 2023-05-20 RX ADMIN — PANTOPRAZOLE SODIUM 40 MG: 40 TABLET, DELAYED RELEASE ORAL at 06:56

## 2023-05-20 RX ADMIN — SERTRALINE HYDROCHLORIDE 100 MG: 100 TABLET ORAL at 07:51

## 2023-05-20 ASSESSMENT — ACTIVITIES OF DAILY LIVING (ADL)
ADLS_ACUITY_SCORE: 37

## 2023-05-20 NOTE — PROGRESS NOTES
Observation goals  PRIOR TO DISCHARGE         -diagnostic tests and consults completed and resulted - Met  -vital signs normal or at patient baseline - Met  -tolerating oral intake to maintain hydration - Met  -safe disposition plan has been identified - Not met    Nurse to notify provider when observation goals have been met and patient is ready for discharge.

## 2023-05-20 NOTE — PROGRESS NOTES
Observation goals  PRIOR TO DISCHARGE       Comments: -diagnostic tests and consults completed and resulted met vital signs normal or at patient baseline Blood pressure slight soft,   -tolerating oral intake to maintain hydration met  -safe disposition plan has been identified not met  Nurse to notify provider when observation goals have been met and patient is ready for discharge.

## 2023-05-20 NOTE — PROGRESS NOTES
Discharge summary and medications handed to pt. Teaching done and pt verbalized understanding. Pt discharged to home with family/.

## 2023-05-20 NOTE — PLAN OF CARE
Goal Outcome Evaluation:    Orientation/Cognitive: A&OX4    Observation Goals (Met/ Not Met): Not met    Mobility Level/Assist Equipment: AX1, GBW    Fall Risk (Y/N): Yes    Behavior Concerns: None    Pain Management: PRN Tylenol, Tramadol    Tele/VS/O2:VSS    ABNL Lab/BG:None    Diet: Gluten free    Bowel/Bladder: Continent, using BSC    Skin Concerns: None    Drains/Devices: PIV SL    Tests/Procedures for next shift: None    Anticipated DC date & active delays: TBD    Observation goals  PRIOR TO DISCHARGE       Comments: -diagnostic tests and consults completed and resulted met  - vital signs normal or at patient baseline soft BP  -tolerating oral intake to maintain hydration met  -safe disposition plan has been identified not met  Nurse to notify provider when observation goals have been met and patient is ready for discharge.

## 2023-05-20 NOTE — DISCHARGE SUMMARY
"[facility]  Hospitalist Discharge Summary      Date of Admission:  5/12/2023  Date of Discharge:  5/20/2023  Discharging Provider: Bret Sky MD  Discharge Service: Hospitalist Service    Discharge Diagnoses     Please refer to the hospital course below    Clinically Significant Risk Factors     # Overweight: Estimated body mass index is 28.12 kg/m  as calculated from the following:    Height as of this encounter: 1.651 m (5' 5\").    Weight as of this encounter: 76.7 kg (169 lb).       Follow-ups Needed After Discharge   Follow-up Appointments     Follow-up and recommended labs and tests       Follow up with primary care provider, Andrew Peck, within 7 days to   evaluate medication change and for hospital follow- up.               Unresulted Labs Ordered in the Past 30 Days of this Admission     Date and Time Order Name Status Description    5/17/2023  3:32 PM Myasthenia Gravis (MG) Evaulation with MuSK Reflex In process     5/16/2023  1:26 PM Cerebrospinal fluid Aerobic Bacterial Culture Routine Preliminary       These results will be followed up by PCP/neurologist/hosptialist    Discharge Disposition   Discharged to home with home cares   Condition at discharge: Stable    Hospital Course   Peggy Jessica is a 47 year old female admitted on 5/12/2023. 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  She presents with gradually worsening weakness/more instability and falls ever since she had COVID in Feb 2023.  She does not feel she can manage at home with her weakness/falls.  Also has chronic gastroparesis issues and chronic regional pain syndrome.     Generalized weakness/mechanical falls, exacerbated since COVID 2/2023  CISP (Chronic immune sensory polyradiculopathy) history  Dizziness  -  Initially developed neuro complaints 2021 following a flu vaccine with subsequent IVIG treatment.  Eventually IVIG stopped as not improving/felt needed further by her " providers.  Hospitalized Jan 2023 and Oct 2022 [facility] with weakness/falls.  Also extensively evaluated Fall 2022 at [facility]. She has had multiple MRI's and a couple LP's in the past 9 months. At [place] MRI spine was repeated. It again showed that the prior enhancement had resolved. NCS were performed, and showed similar results to our prior studies. She also had a large serologic work up. These have returned negative for new inflammation.  More recent Jan hospital stay eval also returned negative for inflammation. While she had her initial illness and IVIG tx, the more recent provider assessments have felt she was not having a major acute exacerbation.  There are also notes regarding concerns of a functional component overlaying the other neurologic issues. (See Dr. Duenas's neurology October 2022 note for additional historical details)     Neuro complaints have involved weakness upper and lower body, numbness including face, at times diplopia.   Patient perceives her weakness and numbness are progressing particularly since Feb 2023.  She baseline falls every day and these falls reportedly have increased to 3+ times daily.  She denies any major injuries/bruising as she catches herself/has soft fall locations by her report.   She doesn't feel her outpatient neurologist is listening to her problems and sought eval at the hospital as she thinks her issues have worsened to the point over the past week she cannot function at home.         Neurology consulted this stay, MRI brain, cervical, thoracic, lumbar spines completed and negative for finding of  demyelinating disease.    - LP done for CSF studies- likely traumatic tap. Otherwise no concerning findings. MG panel pending at discharge.  - EMG as well done, normal.  - Vitamin B12 and CRP normal, SSA negative. PAULINE boarder line positive. Was positive a year ago, had extensive autoimmune and rheumatology work up at [facility].   - PT OT eval noted, recommended  ARU but declined - SW consulted and patient discharging home with home cares which have been arranged.   - Appears functional component, neuro exam inconsistent and variable, psychiatry consulted, patient also has nightmares and prazosin added-  Orthostatic negative although some symptoms, discussed with patient at length regarding orthostatic precautions including ways bed, plenty of fluid, compression stocking which see details she does not tolerate and to be careful standing up and avoid abrupt standing.  Psychiatrist recommended follow up for therapy as outpatient. Also sertraline dose increased.    Complex regional pain syndrome  -  Follows with pain provider/clinic. Continue PTA Buprenorphine and home supply of cannabis      Chronic nausea/Gastroparesis concerns  Chronic constipation:  GERD/epigastric pain  -  Recently had some emesis.  This is a chronic issue with her.  Suspect gastroparesis exacerbated with her opioids.  GI prior considered more eval but limited with ongoing opioid use.    -Continue with PPI, Addition of tums for heart burn, tramadol for pain  -GI consulted, s/p EGD, unremarkable. Started on azithromycin for motility.  -Bowel regimen     Consultations This Hospital Stay   PHARMACY IP CONSULT  NEUROLOGY IP CONSULT  PHYSICAL THERAPY ADULT IP CONSULT  OCCUPATIONAL THERAPY ADULT IP CONSULT  CARE MANAGEMENT / SOCIAL WORK IP CONSULT  VASCULAR ACCESS ADULT IP CONSULT  PHARMACY IP CONSULT  PSYCHIATRY IP CONSULT  GASTROENTEROLOGY IP CONSULT  OCCUPATIONAL THERAPY ADULT IP CONSULT    Code Status   Full Code    Time Spent on this Encounter   I, Bret Sky MD, personally saw the patient today and spent greater than 30 minutes discharging this patient.       Bret Sky MD  [facility] EXTENDED RECOVERY AND SHORT STAY  [address] [address]  Phone: [phone]  ______________________________________________________________________    Physical Exam    Vital Signs: Temp: 97.8  F (36.6  C) Temp src: Oral BP: 102/61 Pulse: 89   Resp: 16 SpO2: 97 % O2 Device: None (Room air)    Weight: 169 lbs 0 oz    General Appearance:  A&O x3, NAD, resting in bed  Respiratory: CTA, no accessory muscle use  Cardiovascular:  s1 s2 regular, no murmurs, no LE edema  Skin: warm and dry, intact  Other:  Moves all extremities although strength is very inconsistent on exam, becomes shaky in extremities while checking for strength, no focal neurologic deficits, answers questions appropriately with normal mood and affect.       Primary Care Physician   Andrew Peck    Discharge Orders      Home Care Referral      Reason for your hospital stay    Bilateral leg weakness/numbness     Follow-up and recommended labs and tests     Follow up with primary care provider, Andrew Peck, within 7 days to evaluate medication change and for hospital follow- up.     Activity    Your activity upon discharge: activity as tolerated     Diet    Follow this diet upon discharge: Orders Placed This Encounter      Snacks/Supplements Adult: Ensure Clear; Between Meals      Gluten Free Diet       Significant Results and Procedures   Most Recent 3 CBC's:  Recent Labs   Lab Test 05/13/23  1115 05/12/23  0956 03/15/23  0743   WBC 4.6 5.7 6.5   HGB 13.6 14.6 13.6   MCV 91 92 94    307 276     Most Recent 3 BMP's:  Recent Labs   Lab Test 05/13/23  0807 05/12/23  0956 02/07/23  0710 02/05/23  0721    138  --  140   POTASSIUM 4.6 4.1 3.8 3.9   CHLORIDE 106 102  --  106   CO2 22 26  --  25   BUN 7.0 6.1  --  3.1*   CR 0.83 0.92  --  0.70   ANIONGAP 11 10  --  9   ESTEBAN 9.2 9.8  --  8.9   GLC 91 110*  --  76     Most Recent 2 LFT's:  Recent Labs   Lab Test 05/12/23  0956 03/15/23  0743   AST 18 24   ALT 14 19   ALKPHOS 106* 109*   BILITOTAL 0.2 0.2     7-Day Micro Results     Collected Updated Procedure Result Status      05/17/2023 0920 05/18/2023 1117 Cytology non gyn CSF [[id number]]   Cerebrospinal fluid  from Lumbar Puncture    Final result Component Value   Final Diagnosis This result contains rich text formatting which cannot be displayed here.   Clinical Information This result contains rich text formatting which cannot be displayed here.   Gross Description This result contains rich text formatting which cannot be displayed here.   Microscopic Description This result contains rich text formatting which cannot be displayed here.   Performing Labs This result contains rich text formatting which cannot be displayed here.            05/17/2023 0919 05/20/2023 0728 Cerebrospinal fluid Aerobic Bacterial Culture Routine [[id number]]   Cerebrospinal fluid from Lumbar Puncture    Preliminary result Component Value   Culture No growth after 2 days  [P]    Gram Stain Result No organisms seen  [P]     2+ WBC seen  [P]     Quantification of host cells and microbiological organisms was done on a cytocentrifuged preparation               05/17/2023 0918 05/17/2023 1154 CSF Cell Count with Differential: [[id number]]    (Abnormal)   Cerebrospinal fluid from Lumbar Puncture    Final result Component Value   No component results            05/17/2023 0918 05/17/2023 1154 Cell Count CSF [[id number]]    (Abnormal)   Cerebrospinal fluid from Lumbar Puncture    Final result Component Value Units   Tube Number 1    Color Colorless    Clarity Clear    Total Nucleated Cells 1 /uL   RBC Count 111 /uL                Most Recent TSH and T4:  Recent Labs   Lab Test 01/28/23  0619   TSH 3.12     Most Recent 6 glucoses:  Recent Labs   Lab Test 05/13/23  0807 05/12/23  0956 02/05/23  0721 01/31/23  1900 01/28/23  0619 01/27/23  0839   GLC 91 110* 76 89 83 112*     Most Recent Urinalysis:  Recent Labs   Lab Test 05/12/23  1659 01/27/23  1241 10/17/22  1250 12/03/21  1543   COLOR Light Yellow Light Yellow   < > Yellow   APPEARANCE Clear Clear   < > Clear   URINEGLC Negative Negative   < > Negative   URINEBILI Negative Negative   < >  Negative   URINEKETONE Negative Negative   < > Negative   SG 1.014 1.013   < > 1.015   UBLD Negative Negative   < > Negative   URINEPH 5.5 5.5   < > 7.0   PROTEIN Negative Negative   < > Negative   UROBILINOGEN  --   --   --  1.0   NITRITE Negative Negative   < > Negative   LEUKEST Negative Negative   < > Negative   RBCU  --  0   < >  --    WBCU  --  0   < >  --     < > = values in this interval not displayed.   ,   Results for orders placed or performed during the hospital encounter of 05/12/23   MR Lumbar Spine w/o & w Contrast    Narrative    MRI LUMBAR SPINE WITHOUT AND WITH CONTRAST   5/12/2023 12:57 PM     HISTORY: Subjective bilateral lower extremity numbness, history of  CIDP/CISP 2021.     TECHNIQUE: Multiplanar multisequence MRI of the lumbar spine without  and with 7mL Gadavist.     COMPARISON: MRI lumbar spine 1/27/2023.     FINDINGS: Noncontrast based on five lumbar vertebral bodies. Normal  vertebral body heights and sagittal alignment. Bone marrow signal  appears within normal limits. Normal appearance of the distal spinal  cord with the conus terminating at the L1 inferior endplate level.  Normal appearance of the cauda equina. No abnormal enhancement  identified. The visualized paraspinous soft tissues and bony pelvis  otherwise appear unremarkable.    Segmental analysis:  T12-L1: Normal disc height and signal. No herniation. Normal facets.  No spinal canal or neural foraminal stenosis. No change.    L1-L2: Normal disc height and signal. No herniation. Normal facets. No  spinal canal or neural foraminal stenosis. No change.    L2-L3: Normal disc height and signal. No herniation. Normal facets. No  spinal canal or neural foraminal stenosis. No change.    L3-L4: Normal disc height and signal. No herniation. Normal facets. No  spinal canal or neural foraminal stenosis.    L4-L5: Disc desiccation without significant disc height loss.  Symmetric disc bulge with superimposed central disc protrusion.  Mild  facet arthropathy. Mild bilateral lateral recess stenosis without  significant central spinal canal stenosis. Mild left neural foraminal  stenosis. The right neural foramen appears patent. Overall, no  significant change.    L5-S1: Disc desiccation with moderate disc height loss. Symmetric disc  bulge with superimposed shallow central disc protrusion. Mild facet  arthropathy. No spinal canal stenosis. Mild bilateral neural foraminal  stenosis. Overall, no significant change.      Impression    IMPRESSION:  1. Normal appearance of visualized distal spinal cord and cauda  equina. No abnormal enhancement identified.  2. Unchanged degenerative findings at L4-L5 and L5-S1, as described.  3. Mild bilateral lateral recess narrowing at L4-L5. No high-grade  central spinal canal stenosis.  4. Unchanged mild left L4-L5 and bilateral L5-S1 neural foraminal  stenosis.    SCOTT CALDERA MD         SYSTEM ID:  [id number]   MR Brain w/o & w Contrast    Narrative    EXAM: MR BRAIN W/O and W CONTRAST  LOCATION: [facility]  DATE/TIME: 5/15/2023 11:10 PM CDT    INDICATION: Progressive extremity weakness and pain, increased walking difficulty, urinary dysfunction over last 1month; rule out demyelinating lesion.  COMPARISON: 10/17/2022, 4/30/2022  CONTRAST: 8 Gadavist  TECHNIQUE: Multiplanar multisequence head MRI without and with intravenous contrast according to the MS protocol.    FINDINGS:  INTRACRANIAL CONTENTS: No acute or subacute infarct. No mass, acute hemorrhage, or extra-axial fluid collections. Minimal nonspecific foci of T2/FLAIR hyperintense signal in the cerebral white matter, stable from the prior exam. No callosal or posterior   fossa lesions. Stable tiny subependymal cyst along the body of the left lateral ventricle. Normal ventricles and sulci. Normal position of the cerebellar tonsils. No pathologic contrast enhancement.    SELLA: No abnormality accounting for technique.    OSSEOUS  STRUCTURES/SOFT TISSUES: Normal marrow signal. The major intracranial vascular flow voids are maintained.     ORBITS: No abnormality accounting for technique.     SINUSES/MASTOIDS: No paranasal sinus mucosal disease. Scattered fluid/membrane thickening in the left mastoid air cells. No apparent mass in the posterior nasopharynx or skull base.       Impression    IMPRESSION:  1.  No acute intracranial abnormality.  2.  Stable minimal nonspecific foci of T2/FLAIR hyperintensity within the cerebral white matter. Differential diagnosis includes sequelae of chronic headaches, chronic microvascular ischemic disease, or other remote insult. The distribution does not meet   criteria for demyelination.  3.  No abnormal enhancement.   MR Thoracic Spine w/o & w Contrast    Narrative    EXAM: MR THORACIC SPINE W/O and W CONTRAST  LOCATION: [facility]  DATE/TIME: 5/15/2023 11:17 PM CDT    INDICATION: progressive extremity weakness and pain, increased walking difficulty, urinary dysfunction over last 1month r o demyelinating lesion.  COMPARISON: 04/30/2022  CONTRAST: 8 Gadavist  TECHNIQUE: Routine Thoracic Spine MRI without and with IV contrast.    FINDINGS:   Normal vertebral body heights, alignment and marrow signal. Normal disc heights. No herniation. Minimal facet arthropathy. No spinal canal or neural foraminal stenosis. No abnormal cord signal.     Redemonstrated cysts within the liver. Intrahepatic and extrahepatic biliary dilatation, likely related to cholecystectomy change.      Impression    IMPRESSION:  1.  No abnormal cord signal or enhancement.  2.  Minor degenerative changes without spinal canal or neural foraminal narrowing.   MR Cervical Spine w/o & w Contrast    Narrative    EXAM: MR CERVICAL SPINE W/O and W CONTRAST  LOCATION: [facility]  DATE/TIME: 5/15/2023 11:18 PM CDT    INDICATION: Progressive extremity weakness and pain, increased walking difficulty,  urinary dysfunction over last 1month; rule out demyelinating lesion.  COMPARISON: 10/17/2022, 4/30/2022  CONTRAST: 8 Gadavist  TECHNIQUE: MRI Cervical Spine without and with IV contrast.    FINDINGS:   Straightening of the cervical spine lordosis. Vertebral body heights are preserved. Minimal Modic type II degenerative endplate changes at C5-C6. No suspicious osseous lesions or marrow edema. No abnormal cord signal or enhancement. No extraspinal   abnormality.    Craniovertebral junction and C1-C2: Mild degenerative changes of the atlantodental interval. No spinal canal stenosis.    C2-C3: Normal disc height. No herniation. Normal facets. No spinal canal or neural foraminal stenosis.     C3-C4: Right paracentral disc protrusion. Mild right neural foraminal narrowing and effacement of the ventral thecal sac. No narrowing of the left neural foramen.     C4-C5: Normal disc height. No herniation. Normal facets. No spinal canal or neural foraminal stenosis.     C5-C6: Disc osteophyte complex with left greater than right uncovertebral hypertrophy and facet arthropathy. Mild spinal canal stenosis. Mild to moderate left neural foraminal narrowing. No significant narrowing on the right.     C6-C7: Small disc osteophyte complex with uncovertebral hypertrophy and facet arthropathy. Mild bilateral neural foraminal narrowing. No spinal canal stenosis.     C7-T1: Normal disc height. No herniation. Normal facets. No spinal canal or neural foraminal stenosis.      Impression    IMPRESSION:  1.  No abnormal cord signal or enhancement.  2.  Stable mild degenerative changes of the cervical spine.     XR Lumbar Puncture Spinal Tap Diag    Narrative    LUMBAR PUNCTURE WITH FLUOROSCOPIC GUIDANCE 5/17/2023 9:35 AM     HISTORY: progressive weakness/incoordination r/o reoccur kenya of CIDP    PROCEDURE:  Informed consent was obtained including discussion of  risks, benefits and potential complications of the procedure. The  patient was placed  in prone position. The L3-4 interspace was  identified and the overlying skin was marked. The patient was then  prepped, draped and anesthetized using sterile technique. Local  anesthesia was performed using 1% lidocaine.    A 22-gauge 3 1/2 inch spinal needle was inserted under fluoroscopic  guidance into the CSF space with return of clear fluid. 4 mL of clear  cerebrospinal fluid was sent to the laboratory for analysis. The  stylet was reinserted and the spinal needle was removed. There were no  complications.     FLUOROSCOPY TIME: 0.1 minutes.   SPOT IMAGES OR CINE RUNS: 3      Impression    IMPRESSION: Successful fluoroscopic-guided lumbar puncture.    KEN TORRES MD         SYSTEM ID:  [id number]       Discharge Medications   Current Discharge Medication List      START taking these medications    Details   azithromycin (ZITHROMAX) 250 MG tablet Take 1 tablet (250 mg) by mouth daily  Qty: 30 tablet, Refills: 0    Comments: Future refills by PCP Dr. Andrew Peck with phone number [phone].  Associated Diagnoses: Gastroparesis      prazosin (MINIPRESS) 1 MG capsule Take 1 capsule (1 mg) by mouth At Bedtime  Qty: 30 capsule, Refills: 0    Comments: Future refills by PCP Dr. Andrew Peck with phone number [phone].  Associated Diagnoses: Nightmares      traMADol (ULTRAM) 50 MG tablet Take 0.5 tablets (25 mg) by mouth 3 times daily as needed for severe pain  Qty: 15 tablet, Refills: 0    Associated Diagnoses: Abdominal pain, epigastric         CONTINUE these medications which have CHANGED    Details   sertraline (ZOLOFT) 100 MG tablet Take 1 tablet (100 mg) by mouth daily  Qty: 30 tablet, Refills: 0    Comments: Future refills by PCP Dr. Andrew Peck with phone number [phone].  Associated Diagnoses: Depression, unspecified depression type         CONTINUE these medications which have NOT CHANGED    Details   acetaminophen (TYLENOL) 500 MG tablet Take 500-1,000 mg by mouth every 6 hours as needed  for mild pain      buprenorphine (BUTRANS) 7.5 MCG/HR WK patch Place 1 patch onto the skin once a week Applies on Wednesday at 9 am      cyclobenzaprine (FLEXERIL) 10 MG tablet Take 1 tablet (10 mg) by mouth 3 times daily    Associated Diagnoses: Muscle spasm      dicyclomine (BENTYL) 20 MG tablet Take 20 mg by mouth 3 times daily (before meals)      diphenhydrAMINE (BENADRYL) 25 MG tablet Take 0.5 tablets (12.5 mg) by mouth every 6 hours as needed for allergies or other (nausea)  Qty: 30 tablet, Refills: 0    Associated Diagnoses: Vomiting and diarrhea      folic acid (FOLVITE) 400 MCG tablet Take 1 tablet (400 mcg) by mouth daily  Qty: 90 tablet, Refills: 0    Associated Diagnoses: Folic acid deficiency      hydrOXYzine (ATARAX) 25 MG tablet Take 25 mg by mouth 3 times daily (before meals)      Lidocaine (LIDOCARE) 4 % Patch Place 3 patches onto the skin every 24 hours To prevent lidocaine toxicity, patient should be patch free for 12 hrs daily.  Refills: 0    Associated Diagnoses: Complex regional pain syndrome i of right lower limb      medical cannabis (Patient's own supply) Take 1 Dose by mouth See Admin Instructions (The purpose of this order is to document that the patient reports taking medical cannabis.  This is not a prescription, and is not used to certify that the patient has a qualifying medical condition.)  Per pt: 5-10 mg tablet three times a day PRN      multivitamin w/minerals (THERA-VIT-M) tablet Take 1 tablet by mouth every morning Megafood Womens Brand      naloxone (NARCAN) 4 MG/0.1ML nasal spray Spray 1 spray (4 mg) into one nostril alternating nostrils as needed for opioid reversal every 2-3 minutes until assistance arrives  Qty: 0.2 mL, Refills: 0    Associated Diagnoses: Chronic pain syndrome      ondansetron (ZOFRAN) 4 MG tablet Take 1 tablet (4 mg) by mouth every 6 hours as needed for nausea or vomiting  Qty: 30 tablet, Refills: 0    Associated Diagnoses: Hospital discharge follow-up;  Vomiting and diarrhea      pantoprazole (PROTONIX) 40 MG EC tablet Take 1 tablet (40 mg) by mouth every morning (before breakfast)  Qty: 30 tablet, Refills: 0    Associated Diagnoses: Gastroesophageal reflux disease with esophagitis, unspecified whether hemorrhage      polyethylene glycol (MIRALAX) 17 GM/Dose powder Take 17 g by mouth daily as needed for constipation      scopolamine (TRANSDERM) 1 MG/3DAYS 72 hr patch 1 patch every 72 hours      senna (SENOKOT) 8.6 MG tablet Take 1 tablet by mouth daily      senna-docusate (SENOKOT-S/PERICOLACE) 8.6-50 MG tablet Take 1 tablet by mouth 2 times daily as needed for constipation           Allergies   Allergies   Allergen Reactions     Dihydroergotamine Anaphylaxis     Latex Anaphylaxis     Shellfish-Derived Products Anaphylaxis     Sumatriptan Anaphylaxis     Compazine [Prochlorperazine] Anxiety     Banana Unknown     Gabapentin Dizziness     Gluten Meal Other (See Comments)     Celiac disease     Keppra [Levetiracetam] Nausea and Vomiting     Kiwi Unknown     Levofloxacin Other (See Comments)     Arrhythmia     Metronidazole Nausea and Vomiting     Nitrofurantoin Hives     Penicillins Hives     Pregabalin      Reglan [Metoclopramide] Other (See Comments)     restlessness/toe tapping      Topiramate Visual Disturbance     Aspirin Rash     Methadone Rash     Morphine Hives     She got hives around are when morphine given but resolved after few minutes per patient      Risperidone Anxiety

## 2023-05-20 NOTE — PROGRESS NOTES
Observation goals  PRIOR TO DISCHARGE       Comments: -diagnostic tests and consults completed and resulted met  - vital signs normal or at patient baseline soft BP  -tolerating oral intake to maintain hydration met  -safe disposition plan has been identified not met  Nurse to notify provider when observation goals have been met and patient is ready for discharge.

## 2023-05-20 NOTE — PROGRESS NOTES
Observation goals  PRIOR TO DISCHARGE         -diagnostic tests and consults completed and resulted - Met  -vital signs normal or at patient baseline - Met  -tolerating oral intake to maintain hydration - Met  -safe disposition plan has been identified - Met    Nurse to notify provider when observation goals have been met and patient is ready for discharge.

## 2023-05-22 ENCOUNTER — PATIENT OUTREACH (OUTPATIENT)
Dept: CARE COORDINATION | Facility: CLINIC | Age: 48
End: 2023-05-22
Payer: COMMERCIAL

## 2023-05-22 ENCOUNTER — MEDICAL CORRESPONDENCE (OUTPATIENT)
Dept: HEALTH INFORMATION MANAGEMENT | Facility: CLINIC | Age: 48
End: 2023-05-22

## 2023-05-22 ENCOUNTER — TELEPHONE (OUTPATIENT)
Dept: FAMILY MEDICINE | Facility: CLINIC | Age: 48
End: 2023-05-22
Payer: COMMERCIAL

## 2023-05-22 LAB
BACTERIA CSF CULT: NO GROWTH
GRAM STAIN RESULT: NORMAL
GRAM STAIN RESULT: NORMAL
MAYO MISC RESULT: NORMAL

## 2023-05-22 NOTE — PLAN OF CARE
Occupational Therapy Discharge Summary    Reason for therapy discharge:    Discharged to home with home therapy.    Progress towards therapy goal(s). See goals on Care Plan in [facility] electronic health record for goal details.  Goals partially met.  Barriers to achieving goals:   discharge from facility.    Therapy recommendation(s):    Continued therapy is recommended.  Rationale/Recommendations:  Pt functioning below baseline and will benefit from continued skilled OT to maximize safety and independence. Concern regarding pt's activity tolerance to complete her daily activities and recent history of very frequent falls at home. If ARC is not an option, recommend home OT..

## 2023-05-22 NOTE — TELEPHONE ENCOUNTER
.[facility] Family Medicine phone call message - order or referral request from patient:     Order or referral being requested: Referral: Referral to Physical Therapy     Additional Details: Physical therapy  two times a week for four weeks   for strengthening, gait and balance       Referral to a specific location? Yes    OK to leave a message on voice mail? Yes    Primary language: English      needed? No    Call taken on May 22, 2023 at 1:43 PM by Wily Sandoval

## 2023-05-22 NOTE — TELEPHONE ENCOUNTER
Nurse Cagle, called to get verbal orders as well as talk to provider about questions she has about patient's medications.

## 2023-05-22 NOTE — PROGRESS NOTES
[facility]    Background: Transitional Care Management program identified per system criteria and reviewed by [facility] team for possible outreach.    Assessment: Upon chart review, CCR Team member will not proceed with patient outreach related to this episode of Transitional Care Management program due to reason below:    Patient has active communication with a nurse, provider or care team for reason of post-hospital follow up plan.  Outreach call by CCR team not indicated to minimize duplicative efforts.     Plan: Transitional Care Management episode addressed appropriately per reason noted above.      Julianna Solitario RN  [facility] [place], [facility]    *Connected Care Resource Team does NOT follow patient ongoing. Referrals are identified based on internal discharge reports and the outreach is to ensure patient has an understanding of their discharge instructions.

## 2023-05-22 NOTE — TELEPHONE ENCOUNTER
Called and spoke with Tsering. Patient should come in for hospital follow-up and EKG either Friday of this week or next week.

## 2023-05-22 NOTE — CONFIDENTIAL NOTE
Tsering RN called re: med interactions azthromycin and ondansetron (concern for QTc prolongation). Advised follow-up appointment next week with EKG.    All questions/concerns addressed.    MIRA Addison, CNP  [facility] [employer]

## 2023-05-23 LAB
ACHR BIND IGG+IGM SER IA-SCNC: 0 NMOL/L
IMMUNOLOGIST REVIEW: NORMAL

## 2023-05-24 ENCOUNTER — OFFICE VISIT (OUTPATIENT)
Dept: FAMILY MEDICINE | Facility: CLINIC | Age: 48
End: 2023-05-24
Payer: COMMERCIAL

## 2023-05-24 VITALS
TEMPERATURE: 97.9 F | HEIGHT: 65 IN | BODY MASS INDEX: 28.16 KG/M2 | SYSTOLIC BLOOD PRESSURE: 128 MMHG | DIASTOLIC BLOOD PRESSURE: 97 MMHG | OXYGEN SATURATION: 96 % | WEIGHT: 169 LBS | HEART RATE: 104 BPM

## 2023-05-24 DIAGNOSIS — Z09 HOSPITAL DISCHARGE FOLLOW-UP: Primary | ICD-10-CM

## 2023-05-24 DIAGNOSIS — G90.521 COMPLEX REGIONAL PAIN SYNDROME I OF RIGHT LOWER LIMB: ICD-10-CM

## 2023-05-24 NOTE — PROGRESS NOTES
"Today's Date: May 24, 2023     Patient Peggy Jessica 1975 presents to the clinic today to address   Chief Complaint   Patient presents with     Hospital F/U     Medications check              SUBJECTIVE     History of Present Illness:      47-year-old female presents with partner for hospital follow-up.  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    Patient was admitted on 5/12/23 after she presented with gradually worsening weakness/instability and falls since her COVID-19 course in Feb 2023. Neuro was consulted considering her extensive CIDP hx, MRI brain, cervical, thoracic, lumbar spines were completed which were negative for demyelinating disease. LP completed, per discharging provider, \"likely traumatic tap. Otherwise no concerning findings.\" Masthenia gravis panel negative. PT/OT recommended ARU but patient declined. SW was then consulted and arranged discharge back to home with home health.  She had her sertraline dose increased and psych advised therapy follow-up outpatient. Patient is awaiting to establish with a therapist within the [facility] system. She is not currently taking prazosin due to dizziness/drop in BP.     CRPS- Patient reports that she brought her buprenorphine box to the hospital (there are 4 patches in 1 box), however, when she went to apply a new patch this week, the box was empty. She called the hospital but they told her they couldn't find it. She has notified her Pain Management clinic who prescribed hydroxyzine for upcoming withdrawal symptoms.     Gastroparesis- Patient had an EGD completed inpatient.  EGD demonstrated medium-sized hiatal hernia, congestive gastropathy " (biopsy results with reactive changes, non specific), and grade c reflux esophagitis without bleeding. She was started on azithromycin for motility. She reports today that the azithromycin helps with sleeping, she does not have to change positions/sit up as much.  She has adjusted her diet to help with her chronic nausea.    Review of Systems   Constitutional, HEENT, cardiovascular, pulmonary, gi and gu systems are negative, except as otherwise noted.      Allergies   Allergen Reactions     Dihydroergotamine Anaphylaxis     Latex Anaphylaxis     Shellfish-Derived Products Anaphylaxis     Sumatriptan Anaphylaxis     Compazine [Prochlorperazine] Anxiety     Banana Unknown     Gabapentin Dizziness     Gluten Meal Other (See Comments)     Celiac disease     Keppra [Levetiracetam] Nausea and Vomiting     Kiwi Unknown     Levofloxacin Other (See Comments)     Arrhythmia     Metronidazole Nausea and Vomiting     Nitrofurantoin Hives     Penicillins Hives     Pregabalin      Reglan [Metoclopramide] Other (See Comments)     restlessness/toe tapping      Topiramate Visual Disturbance     Aspirin Rash     Methadone Rash     Morphine Hives     She got hives around are when morphine given but resolved after few minutes per patient      Risperidone Anxiety        Current Outpatient Medications   Medication Instructions     acetaminophen (TYLENOL) 500-1,000 mg, Oral, EVERY 6 HOURS PRN     azithromycin (ZITHROMAX) 250 mg, Oral, DAILY     buprenorphine (BUTRANS) 7.5 MCG/HR WK patch 1 patch, WEEKLY     cyclobenzaprine (FLEXERIL) 10 mg, Oral, 3 TIMES DAILY     dicyclomine (BENTYL) 20 mg, Oral, 3 TIMES DAILY BEFORE MEALS     diphenhydrAMINE (BENADRYL) 12.5 mg, Oral, EVERY 6 HOURS PRN     folic acid (FOLVITE) 400 mcg, Oral, DAILY     hydrOXYzine (ATARAX) 25 mg, Oral, 3 TIMES DAILY BEFORE MEALS     Lidocaine (LIDOCARE) 4 % Patch 3 patches, Transdermal, EVERY 24 HOURS, To prevent lidocaine toxicity, patient should be patch free for 12 hrs  daily.     medical cannabis (Patient's own supply) 1 Dose, Oral, SEE ADMIN INSTRUCTIONS, (The purpose of this order is to document that the patient reports taking medical cannabis.  This is not a prescription, and is not used to certify that the patient has a qualifying medical condition.)<BR>Per pt: 5-10 mg tablet three times a day PRN     multivitamin w/minerals (THERA-VIT-M) tablet 1 tablet, Oral, EVERY MORNING, [employer]     naloxone (NARCAN) 4 mg, Alternating Nostrils, PRN, every 2-3 minutes until assistance arrives     ondansetron (ZOFRAN) 4 mg, Oral, EVERY 6 HOURS PRN     pantoprazole (PROTONIX) 40 mg, Oral, EVERY MORNING BEFORE BREAKFAST     polyethylene glycol (MIRALAX) 17 g, Oral, DAILY PRN     prazosin (MINIPRESS) 1 mg, Oral, AT BEDTIME     scopolamine (TRANSDERM) 1 MG/3DAYS 72 hr patch 1 patch, EVERY 72 HOURS     senna (SENOKOT) 8.6 MG tablet 1 tablet, Oral, DAILY     senna-docusate (SENOKOT-S/PERICOLACE) 8.6-50 MG tablet 1 tablet, Oral, 2 TIMES DAILY PRN     sertraline (ZOLOFT) 100 mg, Oral, DAILY     traMADol (ULTRAM) 25 mg, Oral, 3 TIMES DAILY PRN       Past Medical History:   Diagnosis Date     BRCA positive      Cancer (H)      CIDP (chronic inflammatory demyelinating polyneuropathy) (H)      ASHKAN III with severe dysplasia 2002     Complex regional pain syndrome type 1 of right lower extremity         Family History   Problem Relation Age of Onset     Kidney Disease Father         Social History     Tobacco Use     Smoking status: Former     Types: Cigarettes     Smokeless tobacco: Never   Vaping Use     Vaping status: Never Used   Substance Use Topics     Alcohol use: Not Currently     Drug use: Yes     Types: Marijuana     Comment: Medical Canibis patient        History   Sexual Activity     Sexual activity: Not Currently     Partners: Male             View : No data to display.                 Immunization History   Administered Date(s) Administered     COVID-19 Monovalent 18+  "(Moderna) 03/20/2021, 04/17/2021     Influenza Vaccine >6 months (Alfuria,Fluzone) 02/17/2021     TDAP (Adacel,Boostrix) 11/22/2016                 OBJECTIVE     BP (!) 128/97   Pulse 104   Temp 97.9  F (36.6  C) (Oral)   Ht 1.651 m (5' 5\")   Wt 76.7 kg (169 lb)   SpO2 96%   BMI 28.12 kg/m       Labs:  Lab Results   Component Value Date    WBC 4.6 05/13/2023    HGB 13.6 05/13/2023    HCT 40.3 05/13/2023     05/13/2023    CHOL 229 (H) 03/30/2022    TRIG 131 03/30/2022    HDL 56 03/30/2022    ALT 14 05/12/2023    AST 18 05/12/2023     05/13/2023    BUN 7.0 05/13/2023    CO2 22 05/13/2023    TSH 3.12 01/28/2023    INR 0.97 01/28/2023        Physical Exam  Constitutional:       General: She is not in acute distress.     Appearance: She is not ill-appearing or diaphoretic.      Comments: In wheelchair.   Cardiovascular:      Rate and Rhythm: Normal rate and regular rhythm.      Heart sounds: Normal heart sounds. No murmur heard.  Pulmonary:      Effort: Pulmonary effort is normal.      Breath sounds: Normal breath sounds. No wheezing.   Abdominal:      General: Bowel sounds are normal.      Palpations: Abdomen is soft.      Tenderness: There is generalized abdominal tenderness.   Musculoskeletal:      Cervical back: Neck supple.   Neurological:      General: No focal deficit present.      Mental Status: She is alert.   Psychiatric:         Mood and Affect: Mood normal.         Behavior: Behavior normal.         Thought Content: Thought content normal.         Judgment: Judgment normal.          EKG: NSR, No ST changes c/w ischemia noted. QTC 405ms. HR 92.         ASSESSMENT/PLAN     1. Hospital discharge follow-up  Patient s/p hospitalization for generalized weakness. She has had an extensive neuro workup. We had her come for a post-hospital follow-up to monitor her EKG considering addition of azithromycin to her regimen (azithromycin used in combination with ondansetron can cause qtc prolongation). " Her QTC is normal today. Encourage to continue home health therapies.    - EKG 12-lead complete w/read - Clinics    2. Complex regional pain syndrome i of right lower limb  Patient currently out of Buprenorphine patch. She does not appear agitated nor is she diaphoretic, and her EKG had a normal rate. She has abdominal pain but this is not new considering her gastroparesis. Advised her to call the nurse's station to see if their medication lockbox has the patches. Otherwise will defer to her pain management specialist for management.        Follow-Up:  - Follow up in 3 month(s) for preventative visit, or sooner if symptoms worsen or fail to improve.     Options for treatment and follow-up care were reviewed with the patient. Patient engaged in the decision making process and verbalized understanding of the options discussed and agreed with the final plan.  AVS printed and given to patient.    MIRA Addison CNP    [facility]  [facility]  [address] [address]  [phone]

## 2023-05-24 NOTE — NURSING NOTE
"ROOM:1  YOVANY CHO    Preferred Name: Peggy     How did you hear about us?  Current Patient    47 year old  Chief Complaint   Patient presents with     Hospital F/U     Medications check        Blood pressure (!) 128/97, pulse 104, temperature 97.9  F (36.6  C), temperature source Oral, height 1.651 m (5' 5\"), weight 76.7 kg (169 lb), SpO2 96 %, not currently breastfeeding. Body mass index is 28.12 kg/m .  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)     Complex regional pain syndrome i of right lower limb     Fibromyalgia syndrome     Diverticulitis     Chronic daily headache     Celiac disease     BRCA gene mutation positive in female     Autoimmune disorder (H)     CIDP (chronic inflammatory demyelinating polyneuropathy) (H)     Physical deconditioning     Numbness and tingling of both legs     Bilateral leg weakness     Multiple falls       Wt Readings from Last 2 Encounters:   05/24/23 76.7 kg (169 lb)   05/12/23 76.7 kg (169 lb)     BP Readings from Last 3 Encounters:   05/24/23 (!) 128/97   05/20/23 100/59   03/20/23 122/88       Allergies   Allergen Reactions     Dihydroergotamine Anaphylaxis     Latex Anaphylaxis     Shellfish-Derived Products Anaphylaxis     Sumatriptan Anaphylaxis     Compazine [Prochlorperazine] Anxiety     Banana Unknown     Gabapentin Dizziness     Gluten Meal Other (See Comments)     Celiac disease     Keppra [Levetiracetam] Nausea and Vomiting     Kiwi Unknown     Levofloxacin Other (See Comments)     Arrhythmia     Metronidazole Nausea and Vomiting     Nitrofurantoin Hives     Penicillins Hives     Pregabalin      Reglan [Metoclopramide] Other (See Comments)     restlessness/toe tapping      Topiramate Visual Disturbance     Aspirin Rash     Methadone Rash     Morphine Hives     " She got hives around are when morphine given but resolved after few minutes per patient      Risperidone Anxiety       Current Outpatient Medications   Medication     acetaminophen (TYLENOL) 500 MG tablet     azithromycin (ZITHROMAX) 250 MG tablet     cyclobenzaprine (FLEXERIL) 10 MG tablet     dicyclomine (BENTYL) 20 MG tablet     diphenhydrAMINE (BENADRYL) 25 MG tablet     folic acid (FOLVITE) 400 MCG tablet     hydrOXYzine (ATARAX) 25 MG tablet     Lidocaine (LIDOCARE) 4 % Patch     medical cannabis (Patient's own supply)     multivitamin w/minerals (THERA-VIT-M) tablet     naloxone (NARCAN) 4 MG/0.1ML nasal spray     ondansetron (ZOFRAN) 4 MG tablet     pantoprazole (PROTONIX) 40 MG EC tablet     polyethylene glycol (MIRALAX) 17 GM/Dose powder     prazosin (MINIPRESS) 1 MG capsule     scopolamine (TRANSDERM) 1 MG/3DAYS 72 hr patch     senna (SENOKOT) 8.6 MG tablet     senna-docusate (SENOKOT-S/PERICOLACE) 8.6-50 MG tablet     sertraline (ZOLOFT) 100 MG tablet     traMADol (ULTRAM) 50 MG tablet     buprenorphine (BUTRANS) 7.5 MCG/HR WK patch     No current facility-administered medications for this visit.       Social History     Tobacco Use     Smoking status: Former     Types: Cigarettes     Smokeless tobacco: Never   Vaping Use     Vaping status: Never Used   Substance Use Topics     Alcohol use: Not Currently     Drug use: Yes     Types: Marijuana     Comment: Medical Dominique patient       Honoring Choices - Health Care Directive Guide offered to patient at time of visit.    Health Maintenance Due   Topic Date Due     YEARLY PREVENTIVE VISIT  Never done     ADVANCE CARE PLANNING  Never done     HEPATITIS B IMMUNIZATION (1 of 3 - 3-dose series) Never done     COLORECTAL CANCER SCREENING  Never done     HIV SCREENING  Never done     HEPATITIS C SCREENING  Never done     PAP  11/28/2020     COVID-19 Vaccine (3 - Moderna series) 06/12/2021     INFLUENZA VACCINE (1) 09/01/2022     URINE DRUG SCREEN  03/30/2023        Immunization History   Administered Date(s) Administered     COVID-19 Monovalent 18+ (Moderna) 03/20/2021, 04/17/2021     Influenza Vaccine >6 months (Alfuria,Fluzone) 02/17/2021     TDAP (Adacel,Boostrix) 11/22/2016       No results found for: PAP    Recent Labs   Lab Test 05/13/23  0807 05/12/23  0956 03/15/23  0743 01/31/23  1900 01/28/23  0619 10/17/22  0955 07/20/22  0959 06/06/22 2213 03/30/22  0928   LDL  --   --   --   --   --   --   --   --  147*   HDL  --   --   --   --   --   --   --   --  56   TRIG  --   --   --   --   --   --   --   --  131   ALT  --  14 19  --  14   < > 19  --  23   CR 0.83 0.92  --    < > 0.89   < > 0.76   < > 0.81   GFRESTIMATED 86 76  --    < > 80   < > >90   < > 90   ALBUMIN  --  4.2 4.3  --  3.6   < > 3.3*  --  3.5   POTASSIUM 4.6 4.1  --    < > 4.3   < > 4.6   < > 4.1   TSH  --   --   --   --  3.12  --  1.38  --   --     < > = values in this interval not displayed.           2/22/2023     8:09 AM 12/7/2022     4:00 PM   PHQ-2 ( 1999 Pfizer)   Q1: Little interest or pleasure in doing things 0 0   Q2: Feeling down, depressed or hopeless 0 0   PHQ-2 Score 0 0            View : No data to display.                    3/30/2022     7:43 AM   LINO-7 SCORE   Total Score 2 (minimal anxiety)   Total Score 2            View : No data to display.                Wily Sandoval    May 24, 2023 3:22 PM

## 2023-05-26 ENCOUNTER — TELEPHONE (OUTPATIENT)
Dept: FAMILY MEDICINE | Facility: CLINIC | Age: 48
End: 2023-05-26
Payer: COMMERCIAL

## 2023-05-26 NOTE — TELEPHONE ENCOUNTER
Anny Mccabe, from [facility] physical therapy assistant. She has a high resting heart rate of 115. No other symptoms and patient feels fine. They have to inform provider per protocol. I told her I would take a message and let the provider know.   Avis ROSARIO, EMT 9:31 AM 5/26/2023

## 2023-05-27 NOTE — TELEPHONE ENCOUNTER
Thanks, her HR was 104 during her appointment on Wednesday, did they mention if this HR was pre or post working with PT? If she develops symptoms then she will need to be seen.

## 2023-06-02 ENCOUNTER — TELEPHONE (OUTPATIENT)
Dept: FAMILY MEDICINE | Facility: CLINIC | Age: 48
End: 2023-06-02
Payer: COMMERCIAL

## 2023-06-02 NOTE — TELEPHONE ENCOUNTER
[facility] Family Medicine phone call message - order or referral request from patient:     Order or referral being requested: Referral: Referral to Occupational Therapy      Additional Details: New evaluation for OT  has an appointment with patient on 06/06/2023    Referral to a specific location? Yes at patient's home    OK to leave a message on voice mail? Yes    Primary language: English      needed? No    Call taken on June 2, 2023 at 10:04 AM by Wily Sandoval

## 2023-06-02 NOTE — PROGRESS NOTES
This is a recent snapshot of the patient's [facility] medical record.  For current drug dose and complete information and questions, call [phone] or In Basket pool, fv home infusion ([id number])  CSN Number:  [id number]

## 2023-06-02 NOTE — CONFIDENTIAL NOTE
Spoke with Case. Provided verbal orders for Home OT.    All questions/concerns addressed.    MIRA Addison, CNP  [facility] [employer]

## 2023-06-05 ENCOUNTER — DOCUMENTATION ONLY (OUTPATIENT)
Dept: FAMILY MEDICINE | Facility: CLINIC | Age: 48
End: 2023-06-05
Payer: COMMERCIAL

## 2023-06-05 ENCOUNTER — MEDICAL CORRESPONDENCE (OUTPATIENT)
Dept: HEALTH INFORMATION MANAGEMENT | Facility: CLINIC | Age: 48
End: 2023-06-05
Payer: COMMERCIAL

## 2023-06-05 DIAGNOSIS — Z53.9 DIAGNOSIS NOT YET DEFINED: Primary | ICD-10-CM

## 2023-06-05 NOTE — PROGRESS NOTES
"When opening a documentation only encounter, be sure to enter in \"Chief Complaint\" Forms and in \" Comments\" Title of form, description if needed.    Peggy is a 48 year old  female  Form received via: Fax  Form now resides in: Provider's Folder    Title of Form: [employer]  Form Number (if listed): [id number] (8 pages) AND [id number] (2 pages)  Other Description (if needed)    Avis Hargrove, EMT    "

## 2023-06-06 ENCOUNTER — TELEPHONE (OUTPATIENT)
Dept: FAMILY MEDICINE | Facility: CLINIC | Age: 48
End: 2023-06-06
Payer: COMMERCIAL

## 2023-06-06 NOTE — TELEPHONE ENCOUNTER
Celia from [facility] called wanting to state verbal orders to Andrew Peck to continue home care. Occupational therapy starting the 6/11, 1 time a week for 3 weeks. Would like a call back in regards to this, wanting a verbal okay. Celia mentioned the patient did have a fall yesterday 6/5, was getting up from the couch and fell on her butt, no injuries.    Mine RUSSO, LULY 11:21 AM 6/6/2023

## 2023-06-06 NOTE — TELEPHONE ENCOUNTER
Ok- Can you provide verbal orders and have patient follow-up if she has any sustained injuries/symptoms from fall?

## 2023-06-08 ENCOUNTER — TELEPHONE (OUTPATIENT)
Dept: FAMILY MEDICINE | Facility: CLINIC | Age: 48
End: 2023-06-08

## 2023-06-08 NOTE — TELEPHONE ENCOUNTER
"When opening a documentation only encounter, be sure to enter in \"Chief Complaint\" Forms and in \" Comments\" Title of form, description if needed.    Peggy is a 48 year old  female  Form received via: Fax  Form now resides in: Provider's Folder    Title of Form: [facility] Add On Discipline for OT  Form Number (if listed): [id number]  Other Description (if needed)    Nita Grubbs [facility]    "

## 2023-06-13 ENCOUNTER — DOCUMENTATION ONLY (OUTPATIENT)
Dept: FAMILY MEDICINE | Facility: CLINIC | Age: 48
End: 2023-06-13

## 2023-06-13 ENCOUNTER — TELEPHONE (OUTPATIENT)
Dept: FAMILY MEDICINE | Facility: CLINIC | Age: 48
End: 2023-06-13
Payer: COMMERCIAL

## 2023-06-13 NOTE — PROGRESS NOTES
"When opening a documentation only encounter, be sure to enter in \"Chief Complaint\" Forms and in \" Comments\" Title of form, description if needed.    Peggy is a 48 year old  female  Form received via: Fax  Form now resides in: Provider's Folder    Title of Form: [facility] Eval and Treat   Form Number (if listed): [id number]  Other Description (if needed)    Nita Grubbs [facility]    "

## 2023-06-13 NOTE — TELEPHONE ENCOUNTER
Lorraine Mccabe, physical therapy assistant, works for [facility] health physical therapy, called to report a fall. The patient stood up to reach something, fell on her right side, no injuries and pain is about the same as normal. I told her I would let Neftali know.   Avis ROSARIO, EMT 9:47 AM 6/13/2023

## 2023-06-13 NOTE — PROGRESS NOTES
"When opening a documentation only encounter, be sure to enter in \"Chief Complaint\" Forms and in \" Comments\" Title of form, description if needed.    Peggy is a 48 year old  female  Form received via: Fax  Form now resides in: Provider's Folder    Title of Form: [facility] Delay of Treatment  Form Number (if listed): [id number]  Other Description (if needed)    Nita Grubbs [facility]  "

## 2023-06-15 ENCOUNTER — TELEPHONE (OUTPATIENT)
Dept: FAMILY MEDICINE | Facility: CLINIC | Age: 48
End: 2023-06-15
Payer: COMMERCIAL

## 2023-06-15 NOTE — TELEPHONE ENCOUNTER
Anny from [facility] health PT, requested verbal orders for a PT extension of twice a week for one week and once a week for two weeks.   Number to call is [phone]     Avis ROSARIO, EMT 4:07 PM 6/15/2023

## 2023-06-16 ENCOUNTER — MEDICAL CORRESPONDENCE (OUTPATIENT)
Dept: HEALTH INFORMATION MANAGEMENT | Facility: CLINIC | Age: 48
End: 2023-06-16
Payer: COMMERCIAL

## 2023-06-17 ENCOUNTER — MEDICAL CORRESPONDENCE (OUTPATIENT)
Dept: HEALTH INFORMATION MANAGEMENT | Facility: CLINIC | Age: 48
End: 2023-06-17
Payer: COMMERCIAL

## 2023-06-19 ENCOUNTER — DOCUMENTATION ONLY (OUTPATIENT)
Dept: FAMILY MEDICINE | Facility: CLINIC | Age: 48
End: 2023-06-19
Payer: COMMERCIAL

## 2023-06-19 NOTE — PROGRESS NOTES
"When opening a documentation only encounter, be sure to enter in \"Chief Complaint\" Forms and in \" Comments\" Title of form, description if needed.    Peggy is a 48 year old  female  Form received via: Fax  Form now resides in: Provider's Folder    Title of Form: [facility] home health  Form Number (if listed): [id number]  Other Description (if needed)    Avis Hargrove, EMT    "

## 2023-06-21 ENCOUNTER — TELEPHONE (OUTPATIENT)
Dept: FAMILY MEDICINE | Facility: CLINIC | Age: 48
End: 2023-06-21
Payer: COMMERCIAL
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
## 2023-06-21 NOTE — TELEPHONE ENCOUNTER
Hina from [facility] called wanting to give message to Neftali to report 2 falls. One on Saturday and another on Sunday. Both landed on right hip, increased pain but weight bearing, no bruising or other injuries. Hina recommended to have an X-ray taken, pt said she'll discuss it with her spouse.    Mine RUSSO CMA 10:55 AM 6/21/2023

## 2023-06-22 ENCOUNTER — DOCUMENTATION ONLY (OUTPATIENT)
Dept: FAMILY MEDICINE | Facility: CLINIC | Age: 48
End: 2023-06-22
Payer: COMMERCIAL

## 2023-06-22 NOTE — PROGRESS NOTES
"When opening a documentation only encounter, be sure to enter in \"Chief Complaint\" Forms and in \" Comments\" Title of form, description if needed.    Peggy is a 48 year old  female  Form received via: Fax  Form now resides in: Provider's Folder    Title of Form: client coordination note report  Form Number (if listed): n/a  Other Description (if needed)    Avis Hargrove, EMT    "

## 2023-06-26 NOTE — UTILIZATION REVIEW
Concurrent stay review; Secondary Review Determination - [facility]        Under the authority of the Utilization Management Committee, the utilization review process indicated a secondary review on the above patient.  The review outcome is based on review of the medical records, discussions with staff, and applying clinical experience noted on the date of the review.        (x) Observation/outpatient Status Appropriate - Concurrent stay review       RATIONALE FOR DETERMINATION:     Patient delayed discharge is related to disposition, there is no medical necessity for inpatient admission at the time of this review. If there is a change in patient status, please resend for review.    The information on this document is developed by the utilization review team in order for the business office to ensure compliance.  This only denotes the appropriateness of proper admission status and does not reflect the quality of care rendered.       The definitions of Inpatient Status and Observation Status used in making the determination above are those provided in the CMS Coverage Manual, Chapter 1 and Chapter 6, section 70.4.       Sincerely,    Sandro Terry, DO

## 2023-06-28 ENCOUNTER — OFFICE VISIT (OUTPATIENT)
Dept: FAMILY MEDICINE | Facility: CLINIC | Age: 48
End: 2023-06-28
Payer: COMMERCIAL

## 2023-06-28 VITALS
OXYGEN SATURATION: 97 % | BODY MASS INDEX: 28.16 KG/M2 | WEIGHT: 169 LBS | SYSTOLIC BLOOD PRESSURE: 118 MMHG | TEMPERATURE: 98.3 F | DIASTOLIC BLOOD PRESSURE: 88 MMHG | HEIGHT: 65 IN | HEART RATE: 98 BPM

## 2023-06-28 DIAGNOSIS — F32.A DEPRESSION, UNSPECIFIED DEPRESSION TYPE: ICD-10-CM
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
## 2023-06-28 RX ORDER — SERTRALINE HYDROCHLORIDE 100 MG/1
100 TABLET, FILM COATED ORAL DAILY
Qty: 90 TABLET | Refills: 1 | Status: SHIPPED | OUTPATIENT
Start: 2023-06-28

## 2023-06-28 ASSESSMENT — PAIN SCALES - GENERAL: PAINLEVEL: SEVERE PAIN (7)

## 2023-06-28 NOTE — NURSING NOTE
"ROOM:3  CHOCANDACE ROSARIOIVONNE ROSARIO    Preferred Name: Peggy     How did you hear about us?  Current Patient    48 year old  Chief Complaint   Patient presents with     Fall     4, 5 times really hard fall on right hip, after standing up to move, getting up from couch     Refill Request     Zoloft, Zofran, tramadol       Blood pressure 118/88, pulse 98, temperature 98.3  F (36.8  C), temperature source Oral, height 1.651 m (5' 5\"), weight 76.7 kg (169 lb), SpO2 97 %, not currently breastfeeding. Body mass index is 28.12 kg/m .  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)     Complex regional pain syndrome i of right lower limb     Fibromyalgia syndrome     Diverticulitis     Chronic daily headache     Celiac disease     BRCA gene mutation positive in female     Autoimmune disorder (H)     CIDP (chronic inflammatory demyelinating polyneuropathy) (H)     Physical deconditioning     Numbness and tingling of both legs     Bilateral leg weakness     Multiple falls       Wt Readings from Last 2 Encounters:   06/28/23 76.7 kg (169 lb)   05/24/23 76.7 kg (169 lb)     BP Readings from Last 3 Encounters:   06/28/23 118/88   05/24/23 (!) 128/97   05/20/23 100/59       Allergies   Allergen Reactions     Dihydroergotamine Anaphylaxis     Latex Anaphylaxis     Shellfish-Derived Products Anaphylaxis     Sumatriptan Anaphylaxis     Compazine [Prochlorperazine] Anxiety     Banana Unknown     Gabapentin Dizziness     Gluten Meal Other (See Comments)     Celiac disease     Keppra [Levetiracetam] Nausea and Vomiting     Kiwi Unknown     Levofloxacin Other (See Comments)     Arrhythmia     Metronidazole Nausea and Vomiting     Nitrofurantoin Hives     Penicillins Hives     Pregabalin      Reglan [Metoclopramide] Other (See Comments)     " restlessness/toe tapping      Topiramate Visual Disturbance     Aspirin Rash     Methadone Rash     Morphine Hives     She got hives around are when morphine given but resolved after few minutes per patient      Risperidone Anxiety       Current Outpatient Medications   Medication     acetaminophen (TYLENOL) 500 MG tablet     buprenorphine (BUTRANS) 7.5 MCG/HR WK patch     cyclobenzaprine (FLEXERIL) 10 MG tablet     dicyclomine (BENTYL) 20 MG tablet     diphenhydrAMINE (BENADRYL) 25 MG tablet     folic acid (FOLVITE) 400 MCG tablet     hydrOXYzine (ATARAX) 25 MG tablet     Lidocaine (LIDOCARE) 4 % Patch     medical cannabis (Patient's own supply)     multivitamin w/minerals (THERA-VIT-M) tablet     naloxone (NARCAN) 4 MG/0.1ML nasal spray     ondansetron (ZOFRAN) 4 MG tablet     pantoprazole (PROTONIX) 40 MG EC tablet     polyethylene glycol (MIRALAX) 17 GM/Dose powder     scopolamine (TRANSDERM) 1 MG/3DAYS 72 hr patch     senna (SENOKOT) 8.6 MG tablet     senna-docusate (SENOKOT-S/PERICOLACE) 8.6-50 MG tablet     sertraline (ZOLOFT) 100 MG tablet     azithromycin (ZITHROMAX) 250 MG tablet     traMADol (ULTRAM) 50 MG tablet     No current facility-administered medications for this visit.       Social History     Tobacco Use     Smoking status: Former     Types: Cigarettes     Smokeless tobacco: Never   Vaping Use     Vaping Use: Never used   Substance Use Topics     Alcohol use: Not Currently     Drug use: Yes     Types: Marijuana     Comment: Medical [facility]ib[facility] patient       Honoring Choices - Health Care Directive Guide offered to patient at time of visit.    Health Maintenance Due   Topic Date Due     YEARLY PREVENTIVE VISIT  Never done     ADVANCE CARE PLANNING  Never done     HEPATITIS B IMMUNIZATION (1 of 3 - 3-dose series) Never done     COLORECTAL CANCER SCREENING  Never done     HIV SCREENING  Never done     HEPATITIS C SCREENING  Never done     PAP  11/28/2020     COVID-19 Vaccine (3 - Moderna series)  06/12/2021     URINE DRUG SCREEN  03/30/2023       Immunization History   Administered Date(s) Administered     COVID-19 Monovalent 18+ (Moderna) 03/20/2021, 04/17/2021     Influenza Vaccine >6 months (Alfuria,Fluzone) 02/17/2021     TDAP (Adacel,Boostrix) 11/22/2016       No results found for: PAP    Recent Labs   Lab Test 05/13/23  0807 05/12/23  0956 03/15/23  0743 01/31/23  1900 01/28/23  0619 10/17/22  0955 07/20/22  0959 06/06/22 2213 03/30/22 0928   LDL  --   --   --   --   --   --   --   --  147*   HDL  --   --   --   --   --   --   --   --  56   TRIG  --   --   --   --   --   --   --   --  131   ALT  --  14 19  --  14   < > 19  --  23   CR 0.83 0.92  --    < > 0.89   < > 0.76   < > 0.81   GFRESTIMATED 86 76  --    < > 80   < > >90   < > 90   ALBUMIN  --  4.2 4.3  --  3.6   < > 3.3*  --  3.5   POTASSIUM 4.6 4.1  --    < > 4.3   < > 4.6   < > 4.1   TSH  --   --   --   --  3.12  --  1.38  --   --     < > = values in this interval not displayed.           2/22/2023     8:09 AM 12/7/2022     4:00 PM   PHQ-2 ( 1999 Pfizer)   Q1: Little interest or pleasure in doing things 0 0   Q2: Feeling down, depressed or hopeless 0 0   PHQ-2 Score 0 0            No data to display                    3/30/2022     7:43 AM   LINO-7 SCORE   Total Score 2 (minimal anxiety)   Total Score 2            No data to display                [place]    June 28, 2023 8:10 AM

## 2023-06-28 NOTE — PROGRESS NOTES
"Today's Date: Jun 28, 2023     Patient Peggy Jessica 1975 presents to the clinic today to address   Chief Complaint   Patient presents with     Fall     4, 5 times really hard fall on right hip, after standing up to move, getting up from couch, pain is a 7     Refill Request     Zoloft, Zofran, tramadol             SUBJECTIVE     History of Present Illness:    48-year-old female presents to discuss recent falls.  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    Falls- Patient reports over the past week or so she has had 4-5 falls, each time \"falling hard\" on her right hip.  These falls occur after she stands up to move.  She reports that she has dizziness upon standing which is not new for her.  She endorses possible dehydration with recent warm weather.  The pain is predominantly on the right hip but also in the right buttock.  She denies any focal bruising.  She denies any head trauma.  She has been working with home health physical therapy.  (We received a call on June 21, 2023 from  notifying us of 2 falls to the right hip.  Hip x-ray was ordered, however, patient reports today that she was not notified of the x-ray order and henceforth has not completed the x-ray.) Patient has used topical lidocaine for pain. Pain currently 7/10 (she has a pain provider). She would like a refill of tramadol.    Refills- Patient also like refills of Zofran and sertraline. No other acute concerns/symptoms at time of exam.    Review of Systems   Constitutional, HEENT, cardiovascular, pulmonary, gi and gu systems are negative, except as otherwise noted.          Allergies   Allergen Reactions     Dihydroergotamine " Anaphylaxis     Latex Anaphylaxis     Shellfish-Derived Products Anaphylaxis     Sumatriptan Anaphylaxis     Compazine [Prochlorperazine] Anxiety     Banana Unknown     Gabapentin Dizziness     Gluten Meal Other (See Comments)     Celiac disease     Keppra [Levetiracetam] Nausea and Vomiting     Kiwi Unknown     Levofloxacin Other (See Comments)     Arrhythmia     Metronidazole Nausea and Vomiting     Nitrofurantoin Hives     Penicillins Hives     Pregabalin      Reglan [Metoclopramide] Other (See Comments)     restlessness/toe tapping      Topiramate Visual Disturbance     Aspirin Rash     Methadone Rash     Morphine Hives     She got hives around are when morphine given but resolved after few minutes per patient      Risperidone Anxiety        Current Outpatient Medications   Medication Instructions     acetaminophen (TYLENOL) 500-1,000 mg, Oral, EVERY 6 HOURS PRN     azithromycin (ZITHROMAX) 250 mg, Oral, DAILY     buprenorphine (BUTRANS) 7.5 MCG/HR WK patch 1 patch, Transdermal, WEEKLY, Applies on Wednesday at 9 am     cyclobenzaprine (FLEXERIL) 10 mg, Oral, 3 TIMES DAILY     dicyclomine (BENTYL) 20 mg, Oral, 3 TIMES DAILY BEFORE MEALS     diphenhydrAMINE (BENADRYL) 12.5 mg, Oral, EVERY 6 HOURS PRN     folic acid (FOLVITE) 400 mcg, Oral, DAILY     hydrOXYzine (ATARAX) 25 mg, Oral, 3 TIMES DAILY BEFORE MEALS     Lidocaine (LIDOCARE) 4 % Patch 3 patches, Transdermal, EVERY 24 HOURS, To prevent lidocaine toxicity, patient should be patch free for 12 hrs daily.     medical cannabis (Patient's own supply) 1 Dose, Oral, SEE ADMIN INSTRUCTIONS, (The purpose of this order is to document that the patient reports taking medical cannabis.  This is not a prescription, and is not used to certify that the patient has a qualifying medical condition.)<BR>Per pt: 5-10 mg tablet three times a day PRN     multivitamin w/minerals (THERA-VIT-M) tablet 1 tablet, Oral, EVERY MORNING, [employer]     naloxone (NARCAN) 4 mg,  "Alternating Nostrils, PRN, every 2-3 minutes until assistance arrives     ondansetron (ZOFRAN) 4 mg, Oral, EVERY 6 HOURS PRN     pantoprazole (PROTONIX) 40 mg, Oral, EVERY MORNING BEFORE BREAKFAST     polyethylene glycol (MIRALAX) 17 g, Oral, DAILY PRN     scopolamine (TRANSDERM) 1 MG/3DAYS 72 hr patch 1 patch, EVERY 72 HOURS     senna (SENOKOT) 8.6 MG tablet 1 tablet, Oral, PRN     senna-docusate (SENOKOT-S/PERICOLACE) 8.6-50 MG tablet 1 tablet, Oral, 2 TIMES DAILY PRN     sertraline (ZOLOFT) 100 mg, Oral, DAILY     traMADol (ULTRAM) 25 mg, Oral, 3 TIMES DAILY PRN       Past Medical History:   Diagnosis Date     BRCA positive      Cancer (H)      CIDP (chronic inflammatory demyelinating polyneuropathy) (H)      ASHKAN III with severe dysplasia 2002     Complex regional pain syndrome type 1 of right lower extremity         Family History   Problem Relation Age of Onset     Kidney Disease Father         Social History     Tobacco Use     Smoking status: Former     Types: Cigarettes     Smokeless tobacco: Never   Vaping Use     Vaping Use: Never used   Substance Use Topics     Alcohol use: Not Currently     Drug use: Yes     Types: Marijuana     Comment: Medical Canib[employer] patient        History   Sexual Activity     Sexual activity: Not Currently     Partners: Male             No data to display                 Immunization History   Administered Date(s) Administered     COVID-19 Monovalent 18+ (Moderna) 03/20/2021, 04/17/2021     Influenza Vaccine >6 months (Alfuria,Fluzone) 02/17/2021     TDAP (Adacel,Boostrix) 11/22/2016                 OBJECTIVE     /88   Pulse 98   Temp 98.3  F (36.8  C) (Oral)   Ht 1.651 m (5' 5\")   Wt 76.7 kg (169 lb)   SpO2 97%   BMI 28.12 kg/m       Labs:  Lab Results   Component Value Date    WBC 4.6 05/13/2023    HGB 13.6 05/13/2023    HCT 40.3 05/13/2023     05/13/2023    CHOL 229 (H) 03/30/2022    TRIG 131 03/30/2022    HDL 56 03/30/2022    ALT 14 05/12/2023    AST 18 " 05/12/2023     05/13/2023    BUN 7.0 05/13/2023    CO2 22 05/13/2023    TSH 3.12 01/28/2023    INR 0.97 01/28/2023        Physical Exam  Constitutional:       General: She is not in acute distress.     Appearance: She is not ill-appearing.      Comments: In wheelchair.  Patient declined chaperone.   Eyes:      Extraocular Movements: Extraocular movements intact.      Pupils: Pupils are equal, round, and reactive to light.   Cardiovascular:      Rate and Rhythm: Normal rate.   Pulmonary:      Effort: Pulmonary effort is normal. No respiratory distress.   Musculoskeletal:         General: No signs of injury.      Right hip: Tenderness present.      Comments: Tenderness over R greater trochanter and R sacroiliac area. No bruising/hematoma noted. Pain with right hip flexion> pain with R hip extension.   Skin:     General: Skin is warm and dry.   Neurological:      Mental Status: She is alert.      Comments: CN II-XII grossly intact.   Psychiatric:         Thought Content: Thought content normal.         Judgment: Judgment normal.               ASSESSMENT/PLAN     1. Fall, initial encounter  48-year-old female with complex past medical history including CIDP and CRPS presents to discuss numerous falls to her right hip over the past week (she denies head trauma).  She reports her dizziness on standing has been a chronic issue.  She was instructed to increase hydration especially considering the warmer weather. She has pain along the right greater trochanter and right sacroiliac region.  We will have her complete the x-ray that was ordered last week.  If her x-ray is negative then we will consider CT to rule out soft tissue injury.  Offered a prescription for Voltaren, however, patient reports that she has used this in the past and that it has been ineffective and that her insurance does not cover it.  She has some Voltaren at home, so I advised her to try it and see if it helps.  We can always use goodRx to find an  affordable option for her.  Otherwise, she was instructed to discuss tramadol with her pain management provider.  Disposition pending results.  Should her imaging be negative, we will have her continue to work with home health physical therapy to work on positional changes, dizziness, and stretches to help with sacroiliac pain (patient has a history of degenerative disc disease at L5-S1 so sacroiliac pain will likely be a chronic issue.)    2. Depression, unspecified depression type  Patient reports improvement with sertraline.  Refills provided.  - sertraline (ZOLOFT) 100 MG tablet; Take 1 tablet (100 mg) by mouth daily  Dispense: 90 tablet; Refill: 1          Follow-Up:  - Follow up in as needed, or if symptoms worsen or fail to improve.  Disposition pending results.      Options for treatment and follow-up care were reviewed with the patient. Patient engaged in the decision making process and verbalized understanding of the options discussed and agreed with the final plan.  AVS printed and given to patient.    MIRA Addison [facility]  Nurse Practitioners [address] [id number] [phone]        Note: Chart documentation was done in part with Dragon Voice Recognition software.  Although reviewed after completion, some word and grammatical errors may remain. Please contact author for any clarification or concerns.

## 2023-06-28 NOTE — PATIENT INSTRUCTIONS
Please increase hydration and obtain hip x-ray.  Discuss tramadol with pain management.  Discuss zofran with GI, Dr. Mason.

## 2023-06-30 ENCOUNTER — ANCILLARY PROCEDURE (OUTPATIENT)
Dept: GENERAL RADIOLOGY | Facility: CLINIC | Age: 48
End: 2023-06-30
Attending: NURSE PRACTITIONER
Payer: COMMERCIAL
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
## 2023-06-30 PROCEDURE — 73502 X-RAY EXAM HIP UNI 2-3 VIEWS: CPT | Mod: RT | Performed by: RADIOLOGY

## 2023-07-05 ENCOUNTER — TELEPHONE (OUTPATIENT)
Dept: FAMILY MEDICINE | Facility: CLINIC | Age: 48
End: 2023-07-05
Payer: COMMERCIAL

## 2023-07-05 NOTE — TELEPHONE ENCOUNTER
Celia from [facility] needs OT verbal orders for 1 time 1 week. Was unable to see her last week but client agreed to see her today. Needs verbal orders in order to see her. Neftali is out so I will ask Janina Soria. Will call back after speaking to provider.     Phone number to call back is [phone].  Avis ROSARIO, EMT 8:28 AM 7/5/2023

## 2023-07-06 ENCOUNTER — DOCUMENTATION ONLY (OUTPATIENT)
Dept: FAMILY MEDICINE | Facility: CLINIC | Age: 48
End: 2023-07-06
Payer: COMMERCIAL

## 2023-07-06 NOTE — PROGRESS NOTES
"When opening a documentation only encounter, be sure to enter in \"Chief Complaint\" Forms and in \" Comments\" Title of form, description if needed.    Peggy is a 48 year old  female  Form received via: Fax  Form now resides in: Provider's Folder    Title of Form: [facility] health  Form Number (if listed): [id number]  Other Description (if needed)    Avis Hargrove, EMT    "

## 2023-07-07 ENCOUNTER — TRANSFERRED RECORDS (OUTPATIENT)
Dept: HEALTH INFORMATION MANAGEMENT | Facility: CLINIC | Age: 48
End: 2023-07-07
Payer: COMMERCIAL

## 2023-07-10 ENCOUNTER — MEDICAL CORRESPONDENCE (OUTPATIENT)
Dept: HEALTH INFORMATION MANAGEMENT | Facility: CLINIC | Age: 48
End: 2023-07-10
Payer: COMMERCIAL

## 2023-07-11 ENCOUNTER — MYC REFILL (OUTPATIENT)
Dept: FAMILY MEDICINE | Facility: CLINIC | Age: 48
End: 2023-07-11

## 2023-07-11 ENCOUNTER — TRANSCRIBE ORDERS (OUTPATIENT)
Dept: OTHER | Age: 48
End: 2023-07-11

## 2023-07-11 DIAGNOSIS — H52.13 MYOPIA, BILATERAL: Primary | ICD-10-CM

## 2023-07-11 DIAGNOSIS — G61.81 CHRONIC INFLAMMATORY DEMYELINATING POLYNEURITIS (H): ICD-10-CM

## 2023-07-11 DIAGNOSIS — F32.A DEPRESSION, UNSPECIFIED DEPRESSION TYPE: Primary | ICD-10-CM

## 2023-07-11 DIAGNOSIS — H52.223 REGULAR ASTIGMATISM OF BOTH EYES: ICD-10-CM

## 2023-07-11 DIAGNOSIS — H52.4 PRESBYOPIA: ICD-10-CM

## 2023-07-11 RX ORDER — HYDROXYZINE HYDROCHLORIDE 25 MG/1
25 TABLET, FILM COATED ORAL
Qty: 90 TABLET | Refills: 1 | Status: SHIPPED | OUTPATIENT
Start: 2023-07-11

## 2023-07-11 RX ORDER — SCOLOPAMINE TRANSDERMAL SYSTEM 1 MG/1
1 PATCH, EXTENDED RELEASE TRANSDERMAL
OUTPATIENT
Start: 2023-07-11

## 2023-07-11 RX ORDER — DICYCLOMINE HCL 20 MG
20 TABLET ORAL
OUTPATIENT
Start: 2023-07-11

## 2023-07-12 ENCOUNTER — TELEPHONE (OUTPATIENT)
Dept: OPHTHALMOLOGY | Facility: CLINIC | Age: 48
End: 2023-07-12
Payer: COMMERCIAL

## 2023-07-12 NOTE — TELEPHONE ENCOUNTER
Health Call Center    Phone Message    May a detailed message be left on voicemail: yes     Reason for Call: Appointment Intake    Referring Provider Name: Wale Murrell O.D.   [facility]   [address] [place] [id number]  T: [phone]  F: [phone]  Diagnosis and/or Symptoms: Myopia, bilateral [H52.13]  Regular astigmatism of both eyes [H52.223]  Presbyopia [H52.4]  Chronic inflammatory demyelinating polyneuritis (H) [G61.81]    Dr Marquez from [facility] referring patient to see Dr Tovar in neuro ophthalmology regarding nerve related symptoms per pt. Diagnosis is not for neuro or in guidelines. Please call patient back to see if neuro eye is appropriate at [phone]. Thank you.    Action Taken: Message routed to:  [facility] Eye    Travel Screening: Not Applicable

## 2023-07-13 NOTE — TELEPHONE ENCOUNTER
Called and LVM     Peggy can make an appointment with Dr. Tovar in a new neuro spot - ok per jericho to schedule for next available and add on to wait list

## 2023-07-18 ENCOUNTER — APPOINTMENT (OUTPATIENT)
Dept: CT IMAGING | Facility: CLINIC | Age: 48
End: 2023-07-18
Payer: COMMERCIAL

## 2023-07-18 ENCOUNTER — HOSPITAL ENCOUNTER (OUTPATIENT)
Facility: CLINIC | Age: 48
Setting detail: OBSERVATION
Discharge: HOME | End: 2023-07-21
Attending: STUDENT IN AN ORGANIZED HEALTH CARE EDUCATION/TRAINING PROGRAM | Admitting: INTERNAL MEDICINE
Payer: COMMERCIAL

## 2023-07-18 DIAGNOSIS — G90.521 COMPLEX REGIONAL PAIN SYNDROME I OF RIGHT LOWER LIMB: ICD-10-CM

## 2023-07-18 DIAGNOSIS — R10.9 ABDOMINAL PAIN: Primary | ICD-10-CM

## 2023-07-18 DIAGNOSIS — K31.84 GASTROPARESIS: ICD-10-CM

## 2023-07-18 DIAGNOSIS — G61.81 CIDP (CHRONIC INFLAMMATORY DEMYELINATING POLYNEUROPATHY) (H): ICD-10-CM

## 2023-07-18 DIAGNOSIS — R11.2 NAUSEA AND VOMITING: ICD-10-CM

## 2023-07-18 LAB
ALBUMIN SERPL BCG-MCNC: 4.3 G/DL (ref 3.5–5.2)
ALP SERPL-CCNC: 109 U/L (ref 35–104)
ALT SERPL W P-5'-P-CCNC: 13 U/L (ref 0–50)
ANION GAP SERPL CALCULATED.3IONS-SCNC: 9 MMOL/L (ref 7–15)
AST SERPL W P-5'-P-CCNC: 20 U/L (ref 0–45)
ATRIAL RATE - MUSE: 90 BPM
BASOPHILS # BLD AUTO: 0 10E3/UL (ref 0–0.2)
BASOPHILS NFR BLD AUTO: 1 %
BILIRUB SERPL-MCNC: 0.3 MG/DL
BUN SERPL-MCNC: 10 MG/DL (ref 6–20)
CALCIUM SERPL-MCNC: 9.5 MG/DL (ref 8.6–10)
CHLORIDE SERPL-SCNC: 102 MMOL/L (ref 98–107)
CREAT SERPL-MCNC: 0.8 MG/DL (ref 0.51–0.95)
DEPRECATED HCO3 PLAS-SCNC: 27 MMOL/L (ref 22–29)
DIASTOLIC BLOOD PRESSURE - MUSE: NORMAL MMHG
EOSINOPHIL # BLD AUTO: 0 10E3/UL (ref 0–0.7)
EOSINOPHIL NFR BLD AUTO: 1 %
ERYTHROCYTE [DISTWIDTH] IN BLOOD BY AUTOMATED COUNT: 12.4 % (ref 10–15)
GFR SERPL CREATININE-BSD FRML MDRD: 90 ML/MIN/1.73M2
GLUCOSE SERPL-MCNC: 101 MG/DL (ref 70–99)
HCT VFR BLD AUTO: 44 % (ref 35–47)
HGB BLD-MCNC: 14.7 G/DL (ref 11.7–15.7)
IMM GRANULOCYTES # BLD: 0 10E3/UL
IMM GRANULOCYTES NFR BLD: 0 %
INTERPRETATION ECG - MUSE: NORMAL
LIPASE SERPL-CCNC: 27 U/L (ref 13–60)
LYMPHOCYTES # BLD AUTO: 1.2 10E3/UL (ref 0.8–5.3)
LYMPHOCYTES NFR BLD AUTO: 23 %
MAGNESIUM SERPL-MCNC: 2.2 MG/DL (ref 1.7–2.3)
MCH RBC QN AUTO: 29.8 PG (ref 26.5–33)
MCHC RBC AUTO-ENTMCNC: 33.4 G/DL (ref 31.5–36.5)
MCV RBC AUTO: 89 FL (ref 78–100)
MONOCYTES # BLD AUTO: 0.4 10E3/UL (ref 0–1.3)
MONOCYTES NFR BLD AUTO: 7 %
NEUTROPHILS # BLD AUTO: 3.8 10E3/UL (ref 1.6–8.3)
NEUTROPHILS NFR BLD AUTO: 68 %
NRBC # BLD AUTO: 0 10E3/UL
NRBC BLD AUTO-RTO: 0 /100
P AXIS - MUSE: 41 DEGREES
PLATELET # BLD AUTO: 248 10E3/UL (ref 150–450)
POTASSIUM SERPL-SCNC: 4.5 MMOL/L (ref 3.4–5.3)
PR INTERVAL - MUSE: 150 MS
PROT SERPL-MCNC: 7.6 G/DL (ref 6.4–8.3)
QRS DURATION - MUSE: 72 MS
QT - MUSE: 344 MS
QTC - MUSE: 420 MS
R AXIS - MUSE: -1 DEGREES
RBC # BLD AUTO: 4.93 10E6/UL (ref 3.8–5.2)
SODIUM SERPL-SCNC: 138 MMOL/L (ref 136–145)
SYSTOLIC BLOOD PRESSURE - MUSE: NORMAL MMHG
T AXIS - MUSE: 17 DEGREES
TROPONIN T SERPL HS-MCNC: <6 NG/L
VENTRICULAR RATE- MUSE: 90 BPM
WBC # BLD AUTO: 5.5 10E3/UL (ref 4–11)

## 2023-07-18 PROCEDURE — 83735 ASSAY OF MAGNESIUM: CPT

## 2023-07-18 PROCEDURE — 85025 COMPLETE CBC W/AUTO DIFF WBC: CPT

## 2023-07-18 PROCEDURE — 250N000011 HC RX IP 250 OP 636: Mod: JZ | Performed by: PHYSICIAN ASSISTANT

## 2023-07-18 PROCEDURE — 84484 ASSAY OF TROPONIN QUANT: CPT | Performed by: PHYSICIAN ASSISTANT

## 2023-07-18 PROCEDURE — 80053 COMPREHEN METABOLIC PANEL: CPT

## 2023-07-18 PROCEDURE — 258N000003 HC RX IP 258 OP 636: Performed by: PHYSICIAN ASSISTANT

## 2023-07-18 PROCEDURE — 96375 TX/PRO/DX INJ NEW DRUG ADDON: CPT

## 2023-07-18 PROCEDURE — G0378 HOSPITAL OBSERVATION PER HR: HCPCS

## 2023-07-18 PROCEDURE — 96374 THER/PROPH/DIAG INJ IV PUSH: CPT | Mod: 59

## 2023-07-18 PROCEDURE — 93005 ELECTROCARDIOGRAM TRACING: CPT

## 2023-07-18 PROCEDURE — 96361 HYDRATE IV INFUSION ADD-ON: CPT

## 2023-07-18 PROCEDURE — 74177 CT ABD & PELVIS W/CONTRAST: CPT

## 2023-07-18 PROCEDURE — 99285 EMERGENCY DEPT VISIT HI MDM: CPT | Mod: 25

## 2023-07-18 PROCEDURE — 99222 1ST HOSP IP/OBS MODERATE 55: CPT | Mod: FS | Performed by: PHYSICIAN ASSISTANT

## 2023-07-18 PROCEDURE — 250N000011 HC RX IP 250 OP 636: Mod: JZ | Performed by: STUDENT IN AN ORGANIZED HEALTH CARE EDUCATION/TRAINING PROGRAM

## 2023-07-18 PROCEDURE — 250N000011 HC RX IP 250 OP 636: Performed by: STUDENT IN AN ORGANIZED HEALTH CARE EDUCATION/TRAINING PROGRAM

## 2023-07-18 PROCEDURE — C9113 INJ PANTOPRAZOLE SODIUM, VIA: HCPCS | Mod: JZ | Performed by: PHYSICIAN ASSISTANT

## 2023-07-18 PROCEDURE — 250N000013 HC RX MED GY IP 250 OP 250 PS 637: Performed by: PHYSICIAN ASSISTANT

## 2023-07-18 PROCEDURE — 258N000003 HC RX IP 258 OP 636

## 2023-07-18 PROCEDURE — 96376 TX/PRO/DX INJ SAME DRUG ADON: CPT

## 2023-07-18 PROCEDURE — 250N000011 HC RX IP 250 OP 636: Mod: JZ

## 2023-07-18 PROCEDURE — 99207 PR APP CREDIT; MD BILLING SHARED VISIT: CPT | Mod: FS | Performed by: HOSPITALIST

## 2023-07-18 PROCEDURE — 36415 COLL VENOUS BLD VENIPUNCTURE: CPT

## 2023-07-18 PROCEDURE — 83690 ASSAY OF LIPASE: CPT | Performed by: PHYSICIAN ASSISTANT

## 2023-07-18 RX ORDER — CYCLOBENZAPRINE HCL 10 MG
10 TABLET ORAL 3 TIMES DAILY
Status: DISCONTINUED | OUTPATIENT
Start: 2023-07-18 | End: 2023-07-21 | Stop reason: HOSPADM

## 2023-07-18 RX ORDER — HYDROXYZINE HYDROCHLORIDE 25 MG/1
25 TABLET, FILM COATED ORAL
Status: DISCONTINUED | OUTPATIENT
Start: 2023-07-18 | End: 2023-07-21 | Stop reason: HOSPADM

## 2023-07-18 RX ORDER — ONDANSETRON 2 MG/ML
4 INJECTION INTRAMUSCULAR; INTRAVENOUS EVERY 6 HOURS
Status: DISCONTINUED | OUTPATIENT
Start: 2023-07-18 | End: 2023-07-21 | Stop reason: HOSPADM

## 2023-07-18 RX ORDER — SCOLOPAMINE TRANSDERMAL SYSTEM 1 MG/1
1 PATCH, EXTENDED RELEASE TRANSDERMAL
Status: DISCONTINUED | OUTPATIENT
Start: 2023-07-20 | End: 2023-07-21 | Stop reason: HOSPADM

## 2023-07-18 RX ORDER — BISACODYL 10 MG
10 SUPPOSITORY, RECTAL RECTAL DAILY PRN
Status: DISCONTINUED | OUTPATIENT
Start: 2023-07-18 | End: 2023-07-21 | Stop reason: HOSPADM

## 2023-07-18 RX ORDER — AMOXICILLIN 250 MG
1 CAPSULE ORAL 2 TIMES DAILY PRN
Status: DISCONTINUED | OUTPATIENT
Start: 2023-07-18 | End: 2023-07-18

## 2023-07-18 RX ORDER — IOPAMIDOL 755 MG/ML
85 INJECTION, SOLUTION INTRAVASCULAR ONCE
Status: COMPLETED | OUTPATIENT
Start: 2023-07-18 | End: 2023-07-18

## 2023-07-18 RX ORDER — DICYCLOMINE HCL 20 MG
20 TABLET ORAL
Status: DISCONTINUED | OUTPATIENT
Start: 2023-07-18 | End: 2023-07-21 | Stop reason: HOSPADM

## 2023-07-18 RX ORDER — AMOXICILLIN 250 MG
1 CAPSULE ORAL 2 TIMES DAILY PRN
Status: DISCONTINUED | OUTPATIENT
Start: 2023-07-18 | End: 2023-07-21 | Stop reason: HOSPADM

## 2023-07-18 RX ORDER — LANOLIN ALCOHOL/MO/W.PET/CERES
400 CREAM (GRAM) TOPICAL DAILY
Status: DISCONTINUED | OUTPATIENT
Start: 2023-07-18 | End: 2023-07-21 | Stop reason: HOSPADM

## 2023-07-18 RX ORDER — SUCRALFATE 1 G/1
1 TABLET ORAL
Status: DISCONTINUED | OUTPATIENT
Start: 2023-07-18 | End: 2023-07-21 | Stop reason: HOSPADM

## 2023-07-18 RX ORDER — SERTRALINE HYDROCHLORIDE 100 MG/1
100 TABLET, FILM COATED ORAL DAILY
Status: DISCONTINUED | OUTPATIENT
Start: 2023-07-19 | End: 2023-07-21 | Stop reason: HOSPADM

## 2023-07-18 RX ORDER — DROPERIDOL 2.5 MG/ML
0.62 INJECTION, SOLUTION INTRAMUSCULAR; INTRAVENOUS ONCE
Status: COMPLETED | OUTPATIENT
Start: 2023-07-18 | End: 2023-07-18

## 2023-07-18 RX ORDER — AMOXICILLIN 250 MG
2 CAPSULE ORAL 2 TIMES DAILY PRN
Status: DISCONTINUED | OUTPATIENT
Start: 2023-07-18 | End: 2023-07-21 | Stop reason: HOSPADM

## 2023-07-18 RX ORDER — ACETAMINOPHEN 500 MG
500-1000 TABLET ORAL EVERY 6 HOURS PRN
Status: DISCONTINUED | OUTPATIENT
Start: 2023-07-18 | End: 2023-07-21 | Stop reason: HOSPADM

## 2023-07-18 RX ORDER — SODIUM CHLORIDE 9 MG/ML
INJECTION, SOLUTION INTRAVENOUS CONTINUOUS
Status: ACTIVE | OUTPATIENT
Start: 2023-07-18 | End: 2023-07-19

## 2023-07-18 RX ORDER — POLYETHYLENE GLYCOL 3350 17 G/17G
17 POWDER, FOR SOLUTION ORAL DAILY
Status: DISCONTINUED | OUTPATIENT
Start: 2023-07-18 | End: 2023-07-21 | Stop reason: HOSPADM

## 2023-07-18 RX ORDER — LIDOCAINE 40 MG/G
CREAM TOPICAL
Status: DISCONTINUED | OUTPATIENT
Start: 2023-07-18 | End: 2023-07-21 | Stop reason: HOSPADM

## 2023-07-18 RX ADMIN — HYDROXYZINE HYDROCHLORIDE 25 MG: 25 TABLET, FILM COATED ORAL at 13:55

## 2023-07-18 RX ADMIN — PANTOPRAZOLE SODIUM 40 MG: 40 INJECTION, POWDER, FOR SOLUTION INTRAVENOUS at 11:24

## 2023-07-18 RX ADMIN — DICYCLOMINE HYDROCHLORIDE 20 MG: 20 TABLET ORAL at 13:55

## 2023-07-18 RX ADMIN — CYCLOBENZAPRINE 10 MG: 10 TABLET, FILM COATED ORAL at 20:54

## 2023-07-18 RX ADMIN — SUCRALFATE 1 G: 1 TABLET ORAL at 21:13

## 2023-07-18 RX ADMIN — ONDANSETRON 4 MG: 2 INJECTION INTRAMUSCULAR; INTRAVENOUS at 17:14

## 2023-07-18 RX ADMIN — IOPAMIDOL 85 ML: 755 INJECTION, SOLUTION INTRAVENOUS at 08:59

## 2023-07-18 RX ADMIN — SUCRALFATE 1 G: 1 TABLET ORAL at 17:14

## 2023-07-18 RX ADMIN — ONDANSETRON 4 MG: 2 INJECTION INTRAMUSCULAR; INTRAVENOUS at 11:24

## 2023-07-18 RX ADMIN — HYDROXYZINE HYDROCHLORIDE 25 MG: 25 TABLET, FILM COATED ORAL at 17:14

## 2023-07-18 RX ADMIN — DICYCLOMINE HYDROCHLORIDE 20 MG: 20 TABLET ORAL at 17:14

## 2023-07-18 RX ADMIN — CYCLOBENZAPRINE 10 MG: 10 TABLET, FILM COATED ORAL at 13:54

## 2023-07-18 RX ADMIN — SODIUM CHLORIDE 1000 ML: 9 INJECTION, SOLUTION INTRAVENOUS at 08:20

## 2023-07-18 RX ADMIN — SODIUM CHLORIDE: 9 INJECTION, SOLUTION INTRAVENOUS at 13:58

## 2023-07-18 RX ADMIN — DROPERIDOL 0.62 MG: 2.5 INJECTION, SOLUTION INTRAMUSCULAR; INTRAVENOUS at 08:23

## 2023-07-18 ASSESSMENT — ACTIVITIES OF DAILY LIVING (ADL)
ADLS_ACUITY_SCORE: 32
ADLS_ACUITY_SCORE: 35
ADLS_ACUITY_SCORE: 33
ADLS_ACUITY_SCORE: 35
ADLS_ACUITY_SCORE: 33
ADLS_ACUITY_SCORE: 32
ADLS_ACUITY_SCORE: 33
ADLS_ACUITY_SCORE: 35

## 2023-07-18 NOTE — PROGRESS NOTES
Summary: Admitted on 7/18/2023 after presenting with epigastric pain and worsening vomiting.   Orientation/Cognitive: A/OX4  Observation Goals (Met/ Not Met): Not met  Mobility Level/Assist Equipment: A1/GB/W  Fall Risk (Y/N): Y  Behavior Concerns: None  Pain Management: Libby Flexeril,Atarax and warm packs  Tele/VS/O2: VSS on RA.   ABNL Lab/BG: CT abd with Large stool within the colon. Pt reports that she had 2 BM's yesterday. Refused Miralax today. Will try PRN bisacodyl suppository later tonight.   Diet: Clears. Unable to tolerate jello as she continues to have abd pain after eating it.   Bowel/Bladder: Continent.  Skin Concerns: WDL  Drains/Devices: PIV infusing IVF as ordered.   Tests/Procedures for next shift: None  Anticipated DC date & active delays: 1-2day, pending clinical improvement.  Patient Stated Goal for Today: Pain management.

## 2023-07-18 NOTE — ED PROVIDER NOTES
"History     Chief Complaint:  Vomiting       HPI   Peggy Jessica is a 48 year old female with a complex past medical history significant for chronic inflammatory demyelinating polyneuropathy, complex regional pain syndrome, and being worked up for possible gastroparesis presenting today for evaluation of vomiting.  Patient states she has been struggling with gastroparesis for \"a while\".  She has been instructed to come to the emergency department by her GI provider for uncontrolled vomiting at home.  On 7/15, she had onset of upper abdominal pain that has been progressively worsening.  Since last night, she has thrown up 4-5 times and continues to feel nauseated today.  She feels dehydrated.  She reports passing 5 stools per day over the weekend.  No blood in the stools.  No blood in the vomit.  No difficulty breathing.  No fevers.    Independent Historian:   None - Patient Only    Review of External Notes:   Most recent CT abdomen pelvis was on 12/6/2022 and showed no acute findings.  Discharge summary from 5/20/2023, the patient's last hospitalization, she had an EGD that was unremarkable at that time.  She was started on azithromycin for motility.  Primary care provider note from 6/28/2023.  She was evaluated for frequent falls.    Medications:    acetaminophen (TYLENOL) 500 MG tablet  cyclobenzaprine (FLEXERIL) 10 MG tablet  dicyclomine (BENTYL) 20 MG tablet  diphenhydrAMINE (BENADRYL) 25 MG tablet  folic acid (FOLVITE) 400 MCG tablet  hydrOXYzine (ATARAX) 25 MG tablet  Lidocaine (LIDOCARE) 4 % Patch  medical cannabis (Patient's own supply)  multivitamin w/minerals (THERA-VIT-M) tablet  naloxone (NARCAN) 4 MG/0.1ML nasal spray  pantoprazole (PROTONIX) 40 MG EC tablet  polyethylene glycol (MIRALAX) 17 GM/Dose powder  scopolamine (TRANSDERM) 1 MG/3DAYS 72 hr patch  senna (SENOKOT) 8.6 MG tablet  senna-docusate (SENOKOT-S/PERICOLACE) 8.6-50 MG tablet  sertraline (ZOLOFT) 100 MG tablet  traMADol (ULTRAM) 50 " MG tablet  buprenorphine (BUTRANS) 7.5 MCG/HR WK patch  ondansetron (ZOFRAN) 4 MG tablet        Past Medical History:    Past Medical History:   Diagnosis Date     BRCA positive      Cancer (H)      CIDP (chronic inflammatory demyelinating polyneuropathy) (H)      AHSKAN III with severe dysplasia 2002     Complex regional pain syndrome type 1 of right lower extremity        Past Surgical History:    Past Surgical History:   Procedure Laterality Date     BILATERAL OOPHORECTOMY Bilateral 2019     c section      2002     CHOLECYSTECTOMY  1997     COLONOSCOPY       ESOPHAGOSCOPY, GASTROSCOPY, DUODENOSCOPY (EGD), COMBINED N/A 07/28/2022    Procedure: ESOPHAGOGASTRODUODENOSCOPY (EGD);  Surgeon: Zack Maddox MD;  Location:  GI     ESOPHAGOSCOPY, GASTROSCOPY, DUODENOSCOPY (EGD), COMBINED N/A 5/16/2023    Procedure: Esophagoscopy, gastroscopy, duodenoscopy (EGD), combined;  Surgeon: Aris Mason MD;  Location:  GI     HYSTERECTOMY  2004     MASTECTOMY Bilateral 2020        Physical Exam     Patient Vitals for the past 24 hrs:   BP Temp Pulse Resp SpO2   07/18/23 0740 (!) 114/96 98.1  F (36.7  C) 109 14 97 %        Physical Exam  BP (!) 114/96   Pulse 109   Temp 98.1  F (36.7  C)   Resp 14   SpO2 97%    General: Well-developed and well-nourished. Patient appears uncomfortable.   Head: Atraumatic. Normocephalic.  EENT: PERRL. EOMI. Dry mucus membranes.   CV: Tachycardic rate and regular rhythm. No appreciable murmurs, rubs, or gallops.   Respiratory: Breathing comfortably on room air. Lungs clear to auscultation bilaterally without wheezes, rhonchi, or rales.  GI: Hyperesthesia noted throughout the abdominal wall with exquisite tenderness to palpation. Soft, non-distended.  Msk: No LE swelling.  Skin: Warm and dry. No rashes.  Neuro: Awake, alert, and conversant. No focal neurologic deficits.   Psych: Appropriate mood and affect.    Emergency Department Course   EKG:  ECG results from 07/18/23   EKG  12-lead, tracing only     Value    Systolic Blood Pressure     Diastolic Blood Pressure     Ventricular Rate 90    Atrial Rate 90    ID Interval 150    QRS Duration 72        QTc 420    P Axis 41    R AXIS -1    T Axis 17    Interpretation ECG      Sinus rhythm  Possible Inferior infarct , age undetermined  Abnormal ECG  When compared with ECG of 17-OCT-2022 10:31,  No significant change was found  Confirmed by GENERATED REPORT, COMPUTER ([id number]),  Chaz Keating ([id number]) on 7/18/2023 9:33:54 AM         Imaging:  CT Abdomen Pelvis w Contrast   Preliminary Result   IMPRESSION:    1.  No acute abnormality identified.   2.  Large stool within the colon.         Report per radiology    Laboratory:  Labs Ordered and Resulted from Time of ED Arrival to Time of ED Departure   COMPREHENSIVE METABOLIC PANEL - Abnormal       Result Value    Sodium 138      Potassium 4.5      Chloride 102      Carbon Dioxide (CO2) 27      Anion Gap 9      Urea Nitrogen 10.0      Creatinine 0.80      Calcium 9.5      Glucose 101 (*)     Alkaline Phosphatase 109 (*)     AST 20      ALT 13      Protein Total 7.6      Albumin 4.3      Bilirubin Total 0.3      GFR Estimate 90     MAGNESIUM - Normal    Magnesium 2.2     CBC WITH PLATELETS AND DIFFERENTIAL    WBC Count 5.5      RBC Count 4.93      Hemoglobin 14.7      Hematocrit 44.0      MCV 89      MCH 29.8      MCHC 33.4      RDW 12.4      Platelet Count 248      % Neutrophils 68      % Lymphocytes 23      % Monocytes 7      % Eosinophils 1      % Basophils 1      % Immature Granulocytes 0      NRBCs per 100 WBC 0      Absolute Neutrophils 3.8      Absolute Lymphocytes 1.2      Absolute Monocytes 0.4      Absolute Eosinophils 0.0      Absolute Basophils 0.0      Absolute Immature Granulocytes 0.0      Absolute NRBCs 0.0        Emergency Department Course & Assessments:  Interventions:  Medications   pantoprazole (PROTONIX) IV push injection 40 mg (has no administration in time range)    sucralfate (CARAFATE) tablet 1 g (has no administration in time range)   polyethylene glycol (MIRALAX) Packet 17 g (has no administration in time range)   ondansetron (ZOFRAN) injection 4 mg (has no administration in time range)   0.9% sodium chloride BOLUS (1,000 mLs Intravenous $New Bag 23 0820)   droperidol (INAPSINE) injection 0.625 mg (0.625 mg Intravenous $Given 23 0823)   iopamidol (ISOVUE-370) solution 85 mL (85 mLs Intravenous $Given 23 0859)   sodium chloride (PF) 0.9% PF flush 64 mL (100 mLs Intravenous $Given 23 0859)        Assessments and Consultations:  Patient was seen in conjunction with attending physician Dr. Chavarria.    0730   I performed my initial evaluation of the patient  ED Course as of 23 1111   e 2023   0924 Patient was updated on the results.  She reports continued discomfort.   0940 Spoke with Dr. Mason from GI regarding this patient.  Patient has had 2 previous normal upper endoscopies.  CT scan from today is normal.  He is not confident that her pain is from gastroparesis.  However, given her severe pain uncontrolled here, he recommends admitting her to the hospital for further work-up and management.   0949 I spoke with Dr. Villa regarding this patient.  She accepted for admission to observation.   1003 I spoke with the patient about plan for admission.  She is in agreement with this plan.     Independent Interpretation (X-rays, CTs, rhythm strip):  None    Social Determinants of Health affecting care:   Stress/Adjustment Disorders and Social Connections/Isolation    Disposition:  The patient was admitted to the hospital under the care of Dr. Villa.     Impression & Plan    Medical Decision Makin-year-old female presenting as above.  Vital signs were stable upon arrival without hypotension or fever.  Patient is mildly tachycardic, I suspect secondary to dehydration or anxiety.  Differential diagnosis included acute exacerbation of  gastroparesis, bowel obstruction, viral syndrome.  Labs within normal limits.  CT obtained to look for any gastric outlet obstruction or other cause of her abdominal pain.  CT did show a large amount of stool in the colon, but not any obvious obstruction.  I spoke with Dr. Mason from GI who is familiar with this patient.  He recommended admission to the hospital for observation.  Either he or one of his team members will see the patient while she is in the hospital.  She was given IV droperidol and fluids here.  She reports improvement of her nausea and short-term improvement of her abdominal pain, but abdominal pain has since returned and is quite severe.  She has had previous tar dive dyskinesia and anxiety with Reglan and Compazine, so will not initiate that here.  Patient remained stable while in the emergency department.  I discussed plans for observation with her and she was in agreement with this plan.  The patient was admitted under Dr. Villa in stable condition.    Diagnosis:    ICD-10-CM    1. Abdominal pain  R10.9       2. Gastroparesis  K31.84       3. Nausea and vomiting  R11.2       4. CIDP (chronic inflammatory demyelinating polyneuropathy) (H)  G61.81                 Courtney Hurst PA-C  07/18/23 1111

## 2023-07-18 NOTE — PHARMACY-ADMISSION MEDICATION HISTORY
Pharmacy Intern Admission Medication History    Admission medication history is complete. The information provided in this note is only as accurate as the sources available at the time of the update.    Medication reconciliation/reorder completed by provider prior to medication history? Yes    Information Source(s): Patient and CareEverywhere/SureScripts via in-person    Pertinent Information: Patient is currently taking THC-CBG and had a dose this morning. Patient is also wearing a scopolamine patch that she applied yesterday, 7/18/23 in the morning.     Changes made to PTA medication list:    Added: None    Deleted: None    Changed: None    Medication Affordability:  Not including over the counter (OTC) medications, was there a time in the past 3 months when you did not take your medications as prescribed because of cost?: No    Allergies reviewed with patient and updates made in EHR: Allergies reviewed by ED nurse upon admit    Medication History Completed By: Avis Victor 7/18/2023 11:09 AM    Prior to Admission medications    Medication Sig Last Dose Taking? Auth Provider Long Term End Date   acetaminophen (TYLENOL) 500 MG tablet Take 500-1,000 mg by mouth every 6 hours as needed for mild pain Past Week Yes Unknown, Entered By History     cyclobenzaprine (FLEXERIL) 10 MG tablet Take 1 tablet (10 mg) by mouth 3 times daily 7/18/2023 at AM Yes Bogdan Sheets MD     dicyclomine (BENTYL) 20 MG tablet Take 20 mg by mouth 3 times daily (before meals) 7/18/2023 at AM Yes Reported, Patient     diphenhydrAMINE (BENADRYL) 25 MG tablet Take 0.5 tablets (12.5 mg) by mouth every 6 hours as needed for allergies or other (nausea)  at PRN Yes Andrew Peck APRN CNP     folic acid (FOLVITE) 400 MCG tablet Take 1 tablet (400 mcg) by mouth daily Past Week Yes Andrew Peck APRN CNP     hydrOXYzine (ATARAX) 25 MG tablet Take 1 tablet (25 mg) by mouth 3 times daily (before meals) Past Week Yes Andrew Peck  APRN CNP     Lidocaine (LIDOCARE) 4 % Patch Place 3 patches onto the skin every 24 hours To prevent lidocaine toxicity, patient should be patch free for 12 hrs daily.  Patient taking differently: Place 3 patches onto the skin daily as needed for moderate pain To prevent lidocaine toxicity, patient should be patch free for 12 hrs daily.  at PRN Yes Lin Chavez PA-C     medical cannabis (Patient's own supply) Take 1 Dose by mouth See Admin Instructions (The purpose of this order is to document that the patient reports taking medical cannabis.  This is not a prescription, and is not used to certify that the patient has a qualifying medical condition.)  Per pt: 5-10 mg tablet three times a day PRN 7/18/2023 at AM Yes Reported, Patient     multivitamin w/minerals (THERA-VIT-M) tablet Take 1 tablet by mouth every morning [employer] Wom[employer] 7/13/2023 Yes Reported, Patient     naloxone (NARCAN) 4 MG/0.1ML nasal spray Spray 1 spray (4 mg) into one nostril alternating nostrils as needed for opioid reversal every 2-3 minutes until assistance arrives  at PRN Yes Carlita Woodward MD Yes    pantoprazole (PROTONIX) 40 MG EC tablet Take 1 tablet (40 mg) by mouth every morning (before breakfast) 7/18/2023 at AM Yes Luzma Goncalves APRN CNP     polyethylene glycol (MIRALAX) 17 GM/Dose powder Take 17 g by mouth daily as needed for constipation  Yes Reported, Patient     scopolamine (TRANSDERM) 1 MG/3DAYS 72 hr patch 1 patch every 72 hours 7/17/2023 Yes Reported, Patient     senna (SENOKOT) 8.6 MG tablet Take 1 tablet by mouth as needed  at PRN Yes Unknown, Entered By History     senna-docusate (SENOKOT-S/PERICOLACE) 8.6-50 MG tablet Take 1 tablet by mouth 2 times daily as needed for constipation  at PRN Yes Unknown, Entered By History     sertraline (ZOLOFT) 100 MG tablet Take 1 tablet (100 mg) by mouth daily 7/18/2023 at AM Yes Andrew Peck APRN CNP Yes    traMADol (ULTRAM) 50 MG tablet Take 0.5 tablets (25 mg) by  mouth 3 times daily as needed for severe pain  at PRN Yes Bret Sky MD     buprenorphine (BUTRANS) 7.5 MCG/HR WK patch Place 1 patch onto the skin once a week Applies on Wednesday at 9 am  Patient not taking: Reported on 7/18/2023 Not Taking  Unknown, Entered By History     ondansetron (ZOFRAN) 4 MG tablet Take 1 tablet (4 mg) by mouth every 6 hours as needed for nausea or vomiting  Patient not taking: Reported on 7/18/2023 Not Taking  Andrew Peck, APRN CNP

## 2023-07-18 NOTE — PROGRESS NOTES
Observation goals  PRIOR TO DISCHARGE       Comments:      Pain controlled: Met     Tolerating diet: Not met     Nurse to notify provider when observation goals have been met and patient is ready for discharge.

## 2023-07-18 NOTE — ED PROVIDER NOTES
ED ATTENDING PHYSICIAN NOTE:   I evaluated this patient in conjunction with Aris PATE  I have participated in the care of the patient and personally performed key elements of the history, exam, and medical decision making.      HPI:   Peggy Jessica is a 48 year old female with a complex medical history of chronic inflammatory demyelinating polyneuropathy, complex regional pain syndrome, chronic gastroparesis, chronic constipation, generalized weakness, presenting with concerns of vomiting and abdominal pain.  Feels like prior episodes of gastroparesis.    I  EXAM:   GENERAL: Patient resting comfortably.  Nontoxic.  HEAD: Atraumatic.  Neck: No rigidity  PULM: Reading comfortably  ABD: Soft, no guarding or distention.  Right-sided abdominal tenderness.  DERM: No rash. Skin warm and dry  EXTREMITY: Moving all extremities without difficulty.     Independent Interpretation (X-rays, CTs, rhythm strip):  None    Consultations/Discussion of Management or Tests:  Hospitalist Dr. Villa        MEDICAL DECISION MAKING/ASSESSMENT AND PLAN:   Symptoms appear most consistent with gastroparesis.  Chronic conditions complicating- gastroparesis.  Differential diagnosis does considered obstruction, infection, electrolyte abnormality, among others.  Labs unremarkable.  Vital signs notable for mild tachycardia.  CT abdomen pelvis nonacute.  Given IV fluids, IV droperidol  Patient had persistent symptoms thus consulted hospitalist and patient admitted.     DIAGNOSIS:     ICD-10-CM    1. Abdominal pain  R10.9       2. Gastroparesis  K31.84       3. Nausea and vomiting  R11.2       4. CIDP (chronic inflammatory demyelinating polyneuropathy) (H)  G61.81                DISPOSITION:   admit     Scooby Chavarria MD  7/18/2023  [facility] EXTENDED RECOVERY AND SHORT STAY       Scooby Chavarria MD  07/18/23 8051

## 2023-07-18 NOTE — ED NOTES
"[facility]  ED Nurse Handoff Report    ED Chief complaint: Vomiting      ED Diagnosis:   Final diagnoses:   None       Code Status: Full Code    Allergies:   Allergies   Allergen Reactions     Dihydroergotamine Anaphylaxis     Latex Anaphylaxis     Shellfish-Derived Products Anaphylaxis     Sumatriptan Anaphylaxis     Compazine [Prochlorperazine] Anxiety     Banana Unknown     Gabapentin Dizziness     Gluten Meal Other (See Comments)     Celiac disease     Keppra [Levetiracetam] Nausea and Vomiting     Kiwi Unknown     Levofloxacin Other (See Comments)     Arrhythmia     Metronidazole Nausea and Vomiting     Nitrofurantoin Hives     Penicillins Hives     Pregabalin      Reglan [Metoclopramide] Other (See Comments)     restlessness/toe tapping      Topiramate Visual Disturbance     Aspirin Rash     Methadone Rash     Morphine Hives     She got hives around are when morphine given but resolved after few minutes per patient      Risperidone Anxiety       Patient Story: Patient states she has been struggling with gastroparesis for \"a while\".  She has been instructed to come to the emergency department by her GI provider for uncontrolled vomiting at home.  On 7/15, she had onset of upper abdominal pain that has been progressively worsening.  Since last night, she has thrown up 4-5 times and continues to feel nauseated today.  She feels dehydrated.  She reports passing 5 stools per day over the weekend  Focused Assessment:  abd pain     Treatments and/or interventions provided: iv, meds, imaging   Patient's response to treatments and/or interventions: na     To be done/followed up on inpatient unit:   Na     Does this patient have any cognitive concerns?: na    Activity level - Baseline/Home:  Unknown  Activity Level - Current:   Unknown    Patient's Preferred language: English   Needed?: No    Isolation: None  Infection: Not Applicable  Patient tested for COVID 19 prior to admission: " NO  Bariatric?: No        Vital Signs:   Vitals:    07/18/23 0740   BP: (!) 114/96   Pulse: 109   Resp: 14   Temp: 98.1  F (36.7  C)   SpO2: 97%       Cardiac Rhythm:     Was the PSS-3 completed:   Yes  What interventions are required if any?               Family Comments:   OBS brochure/video discussed/provided to patient/family: Yes              Name of person given brochure if not patient:               Relationship to patient:     For the majority of the shift this patient's behavior was Green.   Behavioral interventions performed were .    ED NURSE PHONE NUMBER: [phone]

## 2023-07-18 NOTE — ED TRIAGE NOTES
Hx of gastroparesis with worsening sx     Triage Assessment     Row Name 07/18/23 0732       Triage Assessment (Adult)    Airway WDL WDL       Respiratory WDL    Respiratory WDL WDL       Skin Circulation/Temperature WDL    Skin Circulation/Temperature WDL WDL       Cardiac WDL    Cardiac WDL WDL       Peripheral/Neurovascular WDL    Peripheral Neurovascular WDL WDL       Cognitive/Neuro/Behavioral WDL    Cognitive/Neuro/Behavioral WDL WDL

## 2023-07-18 NOTE — CONSULTS
[facility]  Gastroenterology Consultation         Peggy Jessica  [address], [place] [address]  48 year old female    Admission Date/Time: 7/18/2023  Primary Care Provider: Andrew Peck  Referring / Attending Physician:  Courtney AYON    We were asked to see the patient in consultation by Courtney AYON for evaluation of nausea, vomiting and abdominal pain.      CC: nausea and vomiting    HPI:  Peggy Jessica is a 48 year old female who has a past medical history of chronic inflammatory demyelinating polyneuropathy, complex regional pain syndrome, probable gastroparesis presenting today for evaluation of vomiting and well known to [facility]. Reports has been tolerating soft diet over last few months with minimal vomiting. On Saturday (3 days ago) developed severe nausea and vomiting and continued despite being on clear liquid diet. She had intractable vomiting with at least 5 episodes of vomiting in 4 hours. Has never had vomiting this severe. She does report extreme stress with lack of ability to get her pain patch, due to mix up at pharmacy and recent death of a friend.    She reports multiple stools daily with no constipation. Has chronic abdominal pain and seems worse now after retching. CT revealed a colon full of large amount of stool.    ROS: A comprehensive ten point review of systems was negative aside from those in mentioned in the HPI.      PAST MED HX:  I have reviewed this patient's medical history and updated it with pertinent information if needed.   Past Medical History:   Diagnosis Date     BRCA positive      Cancer (H)      CIDP (chronic inflammatory demyelinating polyneuropathy) (H)      ASHKAN III with severe dysplasia 2002     Complex regional pain syndrome type 1 of right lower extremity        MEDICATIONS:   Prior to Admission Medications   Prescriptions Last Dose Informant Patient Reported? Taking?   Lidocaine (LIDOCARE) 4 % Patch  Self  No No   Sig: Place 3 patches onto the skin every 24 hours To prevent lidocaine toxicity, patient should be patch free for 12 hrs daily.   Patient taking differently: Place 3 patches onto the skin daily as needed for moderate pain To prevent lidocaine toxicity, patient should be patch free for 12 hrs daily.   acetaminophen (TYLENOL) 500 MG tablet   Yes No   Sig: Take 500-1,000 mg by mouth every 6 hours as needed for mild pain   azithromycin (ZITHROMAX) 250 MG tablet   No No   Sig: Take 1 tablet (250 mg) by mouth daily   Patient not taking: Reported on 6/28/2023   buprenorphine (BUTRANS) 7.5 MCG/HR WK patch   Yes No   Sig: Place 1 patch onto the skin once a week Applies on Wednesday at 9 am   cyclobenzaprine (FLEXERIL) 10 MG tablet  Self No No   Sig: Take 1 tablet (10 mg) by mouth 3 times daily   dicyclomine (BENTYL) 20 MG tablet  Self Yes No   Sig: Take 20 mg by mouth 3 times daily (before meals)   diphenhydrAMINE (BENADRYL) 25 MG tablet  Self No No   Sig: Take 0.5 tablets (12.5 mg) by mouth every 6 hours as needed for allergies or other (nausea)   folic acid (FOLVITE) 400 MCG tablet  Self No No   Sig: Take 1 tablet (400 mcg) by mouth daily   hydrOXYzine (ATARAX) 25 MG tablet   No No   Sig: Take 1 tablet (25 mg) by mouth 3 times daily (before meals)   medical cannabis (Patient's own supply)  Self Yes No   Sig: Take 1 Dose by mouth See Admin Instructions (The purpose of this order is to document that the patient reports taking medical cannabis.  This is not a prescription, and is not used to certify that the patient has a qualifying medical condition.)  Per pt: 5-10 mg tablet three times a day PRN   multivitamin w/minerals (THERA-VIT-M) tablet  Self Yes No   Sig: Take 1 tablet by mouth every morning Megafood Womens Brand   naloxone (NARCAN) 4 MG/0.1ML nasal spray  Self No No   Sig: Spray 1 spray (4 mg) into one nostril alternating nostrils as needed for opioid reversal every 2-3 minutes until assistance arrives    ondansetron (ZOFRAN) 4 MG tablet  Self No No   Sig: Take 1 tablet (4 mg) by mouth every 6 hours as needed for nausea or vomiting   pantoprazole (PROTONIX) 40 MG EC tablet   No No   Sig: Take 1 tablet (40 mg) by mouth every morning (before breakfast)   polyethylene glycol (MIRALAX) 17 GM/Dose powder  Self Yes No   Sig: Take 17 g by mouth daily as needed for constipation   scopolamine (TRANSDERM) 1 MG/3DAYS 72 hr patch  Self Yes No   Si patch every 72 hours   senna (SENOKOT) 8.6 MG tablet   Yes No   Sig: Take 1 tablet by mouth as needed   senna-docusate (SENOKOT-S/PERICOLACE) 8.6-50 MG tablet   Yes No   Sig: Take 1 tablet by mouth 2 times daily as needed for constipation   sertraline (ZOLOFT) 100 MG tablet   No No   Sig: Take 1 tablet (100 mg) by mouth daily   traMADol (ULTRAM) 50 MG tablet   No No   Sig: Take 0.5 tablets (25 mg) by mouth 3 times daily as needed for severe pain      Facility-Administered Medications: None       ALLERGIES:   Allergies   Allergen Reactions     Dihydroergotamine Anaphylaxis     Latex Anaphylaxis     Shellfish-Derived Products Anaphylaxis     Sumatriptan Anaphylaxis     Compazine [Prochlorperazine] Anxiety     Banana Unknown     Gabapentin Dizziness     Gluten Meal Other (See Comments)     Celiac disease     Keppra [Levetiracetam] Nausea and Vomiting     Kiwi Unknown     Levofloxacin Other (See Comments)     Arrhythmia     Metronidazole Nausea and Vomiting     Nitrofurantoin Hives     Penicillins Hives     Pregabalin      Reglan [Metoclopramide] Other (See Comments)     restlessness/toe tapping      Topiramate Visual Disturbance     Aspirin Rash     Methadone Rash     Morphine Hives     She got hives around are when morphine given but resolved after few minutes per patient      Risperidone Anxiety       SOCIAL HISTORY:  Social History     Tobacco Use     Smoking status: Former     Types: Cigarettes     Smokeless tobacco: Never   Vaping Use     Vaping Use: Never used   Substance Use  Topics     Alcohol use: Not Currently     Drug use: Yes     Types: Marijuana     Comment: Medical [facility] patient       FAMILY HISTORY:  Family History   Problem Relation Age of Onset     Kidney Disease Father        PHYSICAL EXAM:   General  Alert, oriented and comfortable  Vital Signs with Ranges  Temp: 98.1  F (36.7  C)   BP: (!) 114/96 Pulse: 109   Resp: 14 SpO2: 97 %      No intake/output data recorded.    Constitutional: healthy, alert, mild distress, cooperative and pale   Cardiovascular: negative, PMI normal. No lifts, heaves, or thrills. RRR. No murmurs, clicks gallops or rub  Respiratory: negative, Percussion normal. Good diaphragmatic excursion. Lungs clear  Abdomen: Abdomen soft, non-tender. BS normal. No masses, organomegaly, positive findings: tenderness marked generalized          ADDITIONAL COMMENTS:   I reviewed the patient's new clinical lab test results.   Recent Labs   Lab Test 07/18/23  0819 05/13/23  1115 05/12/23  0956 01/31/23  1900 01/28/23  0619   WBC 5.5 4.6 5.7   < > 4.2   HGB 14.7 13.6 14.6   < > 13.4   MCV 89 91 92   < > 98    257 307   < > 243   INR  --   --   --   --  0.97    < > = values in this interval not displayed.     Recent Labs   Lab Test 07/18/23  0819 05/13/23  0807 05/12/23  0956   POTASSIUM 4.5 4.6 4.1   CHLORIDE 102 106 102   CO2 27 22 26   BUN 10.0 7.0 6.1   ANIONGAP 9 11 10     Recent Labs   Lab Test 07/18/23  0819 05/12/23  1659 05/12/23  0956 03/15/23  0743 01/28/23  0619 01/27/23  1241 01/27/23  0839 01/19/23  1352 12/06/22  0003 12/05/22  2032   ALBUMIN 4.3  --  4.2 4.3   < >  --  4.3   < >  --  4.8   BILITOTAL 0.3  --  0.2 0.2   < >  --  0.4   < >  --  0.4   ALT 13  --  14 19   < >  --  17   < >  --  24   AST 20  --  18 24   < >  --  28   < >  --  32   PROTEIN  --  Negative  --   --   --  Negative  --   --  10*  --    LIPASE  --   --   --   --   --   --  23  --   --  22    < > = values in this interval not displayed.       I reviewed the patient's new  imaging results.        CONSULTATION ASSESSMENT AND PLAN:    Peggy Jessica is a 48 year old female with a complex past medical history significant for chronic inflammatory demyelinating polyneuropathy, complex regional pain syndrome, gastroparesis presenting with vomiting.    Abdominal pain  Gastroparesis  Nausea and vomiting  constipation  Patient has had chronic nausea but acute onset vomiting  Weight stable  Patient has had significant stress with lack of pain control and death of friend  Gastroparesis has not been confirmed as patient has not been able to tolerate gastric emptying study  Patient has acute on chronic nausea and vomiting.    -- recommend schedule ondansetron  -- daily ppi  -- carafate 1g QID  -- clear liquid diet and advance tolerate  -- do no recommend PEJ tube at this time as symptoms are acute  -- Daily miralax      [facility].  Office: [phone]  Cell : [phone] (Dr. Mason)  Cell: [phone] (Shannan Castro PA-C)

## 2023-07-18 NOTE — H&P
[facility]    History and Physical - Hospitalist Service       Date of Admission:  7/18/2023    Assessment & Plan      Peggy Jessica is a 48 year old female with a complex past medical history significant for chronic inflammatory demyelinating polyneuropathy 2021 in the past treated with IVIG, complex regional pain syndrome followed by pain clinic, chronic gastroparesis, chronic constipation, depression/anxiety, hx cervical cancer, generalized weakness with frequent falls admitted to observation on 7/18/2023 after presenting with epigastric pain and worsening vomiting.     Acute on chronic nausea and vomiting  Epigastric pain  Suspected gastroparesis   Chronic constipation  Patient presents with 3 day onset of epigastric pain that occurs immediately after eating as well as acute worsening of her chronic nausea and vomiting. Unable to tolerate po intake today. Denies melena, hematemesis, hematochezia. Reports regular BMs though CT shows constipation. No normal diagnosis of gastroparesis as she has not tolerated gastric emptying study. She is s/p cholecystectomy.   Notably reports cannabis hyperemesis has been considered, she stopped using for a month without improvement so has restarted (does report hot showers help her symptoms)  *CT in ED shows large stool in colon, otherwise negative for abnormalities.   *Alk phos very mildly elevated 109, otherwise LFT, WBC, lytes are ok.   *EKG without acute ischemia.   *most recent EGD 5/2023 with medium hiatal hernia, congestive gastropathy, rflux esophagitis without bleeding  *Follows with pain clinic and reports she ran out of her buprenorphine patches 2 weeks ago- this may be contributing to overall pain   --admit to Obs  --add on lipase  -- GI following, pt well known to their team. Plan for scheduled zofran, continuation of PTA daily PPI, additional of carafate scheduled. Appreciate assistance  --continue IVF until tolerating some po  "intake   --clear liquid diet, ADAT per GI  --start daily miralax   --continue PTA bentyl tid, hydroxyzine 25mg tid, scopolamine patch, medical cannabis   --continue PTA azithromycin for motility   --avoid narcotics     Complex regional pain syndrome  Follows with pain provider/clinic. Reports she ran out of Buprenorphine patches 2 weeks ago.    --pt has virtual appointment with pain clinic 7/19 @ 730am, states  can bring patches after this  -- Buprenorphine plan pending pain team visit tomorrow  --continue PTA flexeril, cannabis      chronic inflammatory sensory polyradiculopathy   Chronic generalized weakness with falls  Chronic dizziness   Initially developed neuro complaints 2021 following a flu vaccine with subsequent IVIG treatment.  Eventually IVIG stopped as not improving/felt needed further by her providers.  Hospitalized Jan 2023 and Oct 2022 [facility] with weakness/falls.  Also extensively evaluated Fall 2022 at [facility].  Reports she sees a Neurologist locally but is hoping to switch.   Chronic dizziness,paraesthesias and weakness are at baseline without recent change. Dizziness actually improved after getting a lift chair.   --continue routine outpatient follow up with Neurology        Diet:  clear liquids, ADAT   DVT Prophylaxis: Pneumatic Compression Devices  Kwan Catheter: Not present  Lines: None     Cardiac Monitoring: ACTIVE order. Indication: QTc prolonging medication (48 hours)  Code Status:   FULL CODE     Clinically Significant Risk Factors Present on Admission                       # Overweight: Estimated body mass index is 28.12 kg/m  as calculated from the following:    Height as of 6/28/23: 1.651 m (5' 5\").    Weight as of 6/28/23: 76.7 kg (169 lb).            Disposition Plan      Expected Discharge Date: 07/19/2023                The patient's care was discussed with Dr. Villa who agrees with the above plan   Tessie Valdivia PA-C  Hospitalist Service  M " [facility]  Securely message with WebPT (more info)  Text page via [facility] Paging/Directory     ______________________________________________________________________    Chief Complaint   Nausea, vomiting,  Abdominal pain     History is obtained from the patient and discussion with GI provider     History of Present Illness   Peggy Jessica is a 48 year old female with a complex past medical history significant for chronic inflammatory demyelinating polyneuropathy 2021 in the past treated with IVIG, complex regional pain syndrome followed by pain clinic, chronic gastroparesis, chronic constipation, depression/anxiety, hx cervical cancer, generalized weakness with frequent falls admitted to observation on 7/18/2023 after presenting with epigastric pain and worsening vomiting.  The patient struggles with chronic nausea and vomiting in the setting of suspected gastroparesis.  She reports she typically vomits approximately twice per week and is able to tolerate small frequent meals.  2 weeks prior to admission she began developing some  epigastric pain that occurred shortly after attempts at eating.  She describes this as a cramping sensation.  3 days prior to admission symptoms significantly worsened and became difficult for her to tolerate p.o.  Around the same time also notes increased frequency of vomiting without hematemesis.  She says she feels she eats she has onset of pain right away and then frequently vomits.  She feels dehydrated.  She reports regular and frequent normal-appearing bowel movements for the past several weeks and is very surprised to hear her CT shows a lot of stool.  She has not had any bleeding in her stool.  Denies fevers, chills.  Denies shortness of breath.  She does occasionally feel some chest tightness when her pain is significant.  She is not very active and is essentially wheelchair-bound so difficult to assess functional status.  She has chronic dizziness  with position changes which is actually been improved lately as she got a lift chair.  Reports all of her chronic neurologic symptoms including paresthesias and weakness have been at baseline without change.  She is essentially been wheelchair-bound due to falls and recently graduated from home PT per her report.  She has had a lot of significant stress in her life with the death of a friend and reports she is also run out of her buprenorphine patches approximately 2 weeks prior to admission.  She has not been taking tramadol recently.        Past Medical History    Past Medical History:   Diagnosis Date     BRCA positive      Cancer (H)      CIDP (chronic inflammatory demyelinating polyneuropathy) (H)      ASHKAN III with severe dysplasia 2002     Complex regional pain syndrome type 1 of right lower extremity        Past Surgical History   Past Surgical History:   Procedure Laterality Date     BILATERAL OOPHORECTOMY Bilateral 2019     c section      2002     CHOLECYSTECTOMY  1997     COLONOSCOPY       ESOPHAGOSCOPY, GASTROSCOPY, DUODENOSCOPY (EGD), COMBINED N/A 07/28/2022    Procedure: ESOPHAGOGASTRODUODENOSCOPY (EGD);  Surgeon: Zack Maddox MD;  Location:  GI     ESOPHAGOSCOPY, GASTROSCOPY, DUODENOSCOPY (EGD), COMBINED N/A 5/16/2023    Procedure: Esophagoscopy, gastroscopy, duodenoscopy (EGD), combined;  Surgeon: Aris Mason MD;  Location:  GI     HYSTERECTOMY  2004     MASTECTOMY Bilateral 2020       Prior to Admission Medications   Prior to Admission Medications   Prescriptions Last Dose Informant Patient Reported? Taking?   Lidocaine (LIDOCARE) 4 % Patch  Self No No   Sig: Place 3 patches onto the skin every 24 hours To prevent lidocaine toxicity, patient should be patch free for 12 hrs daily.   Patient taking differently: Place 3 patches onto the skin daily as needed for moderate pain To prevent lidocaine toxicity, patient should be patch free for 12 hrs daily.   acetaminophen (TYLENOL)  500 MG tablet   Yes No   Sig: Take 500-1,000 mg by mouth every 6 hours as needed for mild pain   azithromycin (ZITHROMAX) 250 MG tablet   No No   Sig: Take 1 tablet (250 mg) by mouth daily   Patient not taking: Reported on 6/28/2023   buprenorphine (BUTRANS) 7.5 MCG/HR WK patch   Yes No   Sig: Place 1 patch onto the skin once a week Applies on Wednesday at 9 am   cyclobenzaprine (FLEXERIL) 10 MG tablet  Self No No   Sig: Take 1 tablet (10 mg) by mouth 3 times daily   dicyclomine (BENTYL) 20 MG tablet  Self Yes No   Sig: Take 20 mg by mouth 3 times daily (before meals)   diphenhydrAMINE (BENADRYL) 25 MG tablet  Self No No   Sig: Take 0.5 tablets (12.5 mg) by mouth every 6 hours as needed for allergies or other (nausea)   folic acid (FOLVITE) 400 MCG tablet  Self No No   Sig: Take 1 tablet (400 mcg) by mouth daily   hydrOXYzine (ATARAX) 25 MG tablet   No No   Sig: Take 1 tablet (25 mg) by mouth 3 times daily (before meals)   medical cannabis (Patient's own supply)  Self Yes No   Sig: Take 1 Dose by mouth See Admin Instructions (The purpose of this order is to document that the patient reports taking medical cannabis.  This is not a prescription, and is not used to certify that the patient has a qualifying medical condition.)  Per pt: 5-10 mg tablet three times a day PRN   multivitamin w/minerals (THERA-VIT-M) tablet  Self Yes No   Sig: Take 1 tablet by mouth every morning Megafood Womens Brand   naloxone (NARCAN) 4 MG/0.1ML nasal spray  Self No No   Sig: Spray 1 spray (4 mg) into one nostril alternating nostrils as needed for opioid reversal every 2-3 minutes until assistance arrives   ondansetron (ZOFRAN) 4 MG tablet  Self No No   Sig: Take 1 tablet (4 mg) by mouth every 6 hours as needed for nausea or vomiting   pantoprazole (PROTONIX) 40 MG EC tablet   No No   Sig: Take 1 tablet (40 mg) by mouth every morning (before breakfast)   polyethylene glycol (MIRALAX) 17 GM/Dose powder  Self Yes No   Sig: Take 17 g by mouth  daily as needed for constipation   scopolamine (TRANSDERM) 1 MG/3DAYS 72 hr patch  Self Yes No   Si patch every 72 hours   senna (SENOKOT) 8.6 MG tablet   Yes No   Sig: Take 1 tablet by mouth as needed   senna-docusate (SENOKOT-S/PERICOLACE) 8.6-50 MG tablet   Yes No   Sig: Take 1 tablet by mouth 2 times daily as needed for constipation   sertraline (ZOLOFT) 100 MG tablet   No No   Sig: Take 1 tablet (100 mg) by mouth daily   traMADol (ULTRAM) 50 MG tablet   No No   Sig: Take 0.5 tablets (25 mg) by mouth 3 times daily as needed for severe pain      Facility-Administered Medications: None        Social History   I have reviewed this patient's social history and updated it with pertinent information if needed.  Social History     Tobacco Use     Smoking status: Former     Types: Cigarettes     Smokeless tobacco: Never   Vaping Use     Vaping Use: Never used   Substance Use Topics     Alcohol use: Not Currently     Drug use: Yes     Types: Marijuana     Comment: Medical Canibis patient        Physical Exam   Temp: 98.1  F (36.7  C)   BP: (!) 114/96 Pulse: 109   Resp: 14 SpO2: 97 %      There were no vitals filed for this visit.  Vital Signs with Ranges  Temp:  [98.1  F (36.7  C)] 98.1  F (36.7  C)  Pulse:  [109] 109  Resp:  [14] 14  BP: (114)/(96) 114/96  SpO2:  [97 %] 97 %  No intake/output data recorded.    Constitutional: Alert and oriented, sitting up in bed. Appears comfortable and is appropriately conversant   ENT:  moist mucous membranes  Eyes:  Sclera anicteric, EOMI  Respiratory: Lungs clear to auscultation bilaterally, no increased work of breathing  Cardiovascular: Regular rate and rhythm  GI: positive bowel sounds, abdomen is soft, non-distended. Diffusely tender to palpation worse in epigastric area   Skin/Integumen: warm, dry  MSK:  Moves all four extremities  Neuro:  Reduced sensation bilateral LE which is baseline. Speech is clear. Face symmetric. Follows commands       Medical Decision Making        60 MINUTES SPENT BY ME on the date of service doing chart review, history, exam, documentation & further activities per the note.      Data     I have personally reviewed the following data over the past 24 hrs:    5.5  \   14.7   / 248     138 102 10.0 /  101 (H)   4.5 27 0.80 \       ALT: 13 AST: 20 AP: 109 (H) TBILI: 0.3   ALB: 4.3 TOT PROTEIN: 7.6 LIPASE: N/A       Imaging results reviewed over the past 24 hrs:   Recent Results (from the past 24 hour(s))   CT Abdomen Pelvis w Contrast    Narrative    CT ABDOMEN/PELVIS WITH CONTRAST July 18, 2023 9:14 AM    CLINICAL HISTORY: Rule out obstruction, gastroparesis, diffuse abdomen  pain, vomiting.    TECHNIQUE: CT scan of the abdomen and pelvis was performed following  injection of IV contrast. Multiplanar reformats were obtained. Dose  reduction techniques were used.  CONTRAST: 85mL Isovue-370.    COMPARISON: CT abdomen and pelvis 12/6/2022.    FINDINGS:   LOWER CHEST: Normal.    HEPATOBILIARY: Hepatic cysts. Cholecystectomy.    PANCREAS: Normal.    SPLEEN: Normal.    ADRENAL GLANDS: Normal.    KIDNEYS/BLADDER: Normal.    BOWEL: No obstruction. Prominent stool within the colon. No evidence  for appendicitis. No abscess or free air. No acute inflammatory  change.    PELVIC ORGANS: Normal.    ADDITIONAL FINDINGS: None.    MUSCULOSKELETAL: Normal.      Impression    IMPRESSION:   1.  No acute abnormality identified.  2.  Large stool within the colon.

## 2023-07-19 LAB — GLUCOSE BLDC GLUCOMTR-MCNC: 92 MG/DL (ref 70–99)

## 2023-07-19 PROCEDURE — 250N000011 HC RX IP 250 OP 636: Mod: JZ | Performed by: PHYSICIAN ASSISTANT

## 2023-07-19 PROCEDURE — 99232 SBSQ HOSP IP/OBS MODERATE 35: CPT | Performed by: INTERNAL MEDICINE

## 2023-07-19 PROCEDURE — 82962 GLUCOSE BLOOD TEST: CPT

## 2023-07-19 PROCEDURE — 96376 TX/PRO/DX INJ SAME DRUG ADON: CPT

## 2023-07-19 PROCEDURE — 258N000003 HC RX IP 258 OP 636: Performed by: PHYSICIAN ASSISTANT

## 2023-07-19 PROCEDURE — 258N000003 HC RX IP 258 OP 636: Performed by: INTERNAL MEDICINE

## 2023-07-19 PROCEDURE — G0378 HOSPITAL OBSERVATION PER HR: HCPCS

## 2023-07-19 PROCEDURE — 96375 TX/PRO/DX INJ NEW DRUG ADDON: CPT

## 2023-07-19 PROCEDURE — 250N000013 HC RX MED GY IP 250 OP 250 PS 637: Performed by: PHYSICIAN ASSISTANT

## 2023-07-19 PROCEDURE — 96361 HYDRATE IV INFUSION ADD-ON: CPT

## 2023-07-19 PROCEDURE — C9113 INJ PANTOPRAZOLE SODIUM, VIA: HCPCS | Mod: JZ | Performed by: PHYSICIAN ASSISTANT

## 2023-07-19 PROCEDURE — 250N000013 HC RX MED GY IP 250 OP 250 PS 637: Performed by: INTERNAL MEDICINE

## 2023-07-19 RX ORDER — NALOXONE HYDROCHLORIDE 0.4 MG/ML
0.2 INJECTION, SOLUTION INTRAMUSCULAR; INTRAVENOUS; SUBCUTANEOUS
Status: DISCONTINUED | OUTPATIENT
Start: 2023-07-19 | End: 2023-07-21 | Stop reason: HOSPADM

## 2023-07-19 RX ORDER — SODIUM CHLORIDE 9 MG/ML
INJECTION, SOLUTION INTRAVENOUS CONTINUOUS
Status: DISCONTINUED | OUTPATIENT
Start: 2023-07-19 | End: 2023-07-21

## 2023-07-19 RX ORDER — BUPRENORPHINE 7.5 UG/H
1 PATCH TRANSDERMAL WEEKLY
Status: DISCONTINUED | OUTPATIENT
Start: 2023-07-19 | End: 2023-07-21 | Stop reason: HOSPADM

## 2023-07-19 RX ORDER — SODIUM CHLORIDE 9 MG/ML
INJECTION, SOLUTION INTRAVENOUS CONTINUOUS
Status: DISCONTINUED | OUTPATIENT
Start: 2023-07-19 | End: 2023-07-19

## 2023-07-19 RX ORDER — NALOXONE HYDROCHLORIDE 0.4 MG/ML
0.4 INJECTION, SOLUTION INTRAMUSCULAR; INTRAVENOUS; SUBCUTANEOUS
Status: DISCONTINUED | OUTPATIENT
Start: 2023-07-19 | End: 2023-07-21 | Stop reason: HOSPADM

## 2023-07-19 RX ORDER — PROMETHAZINE HYDROCHLORIDE 12.5 MG/1
12.5 SUPPOSITORY RECTAL EVERY 6 HOURS PRN
Status: DISCONTINUED | OUTPATIENT
Start: 2023-07-19 | End: 2023-07-21 | Stop reason: HOSPADM

## 2023-07-19 RX ADMIN — HYDROXYZINE HYDROCHLORIDE 25 MG: 25 TABLET, FILM COATED ORAL at 11:11

## 2023-07-19 RX ADMIN — HYDROXYZINE HYDROCHLORIDE 25 MG: 25 TABLET, FILM COATED ORAL at 06:32

## 2023-07-19 RX ADMIN — BISACODYL 10 MG: 10 SUPPOSITORY RECTAL at 14:05

## 2023-07-19 RX ADMIN — FOLIC ACID TAB 400 MCG 400 MCG: 400 TAB at 08:49

## 2023-07-19 RX ADMIN — SUCRALFATE 1 G: 1 TABLET ORAL at 21:44

## 2023-07-19 RX ADMIN — PANTOPRAZOLE SODIUM 40 MG: 40 INJECTION, POWDER, FOR SOLUTION INTRAVENOUS at 08:49

## 2023-07-19 RX ADMIN — SERTRALINE HYDROCHLORIDE 100 MG: 100 TABLET ORAL at 08:49

## 2023-07-19 RX ADMIN — HYDROXYZINE HYDROCHLORIDE 25 MG: 25 TABLET, FILM COATED ORAL at 17:19

## 2023-07-19 RX ADMIN — DICYCLOMINE HYDROCHLORIDE 20 MG: 20 TABLET ORAL at 06:32

## 2023-07-19 RX ADMIN — ONDANSETRON 4 MG: 2 INJECTION INTRAMUSCULAR; INTRAVENOUS at 06:31

## 2023-07-19 RX ADMIN — SUCRALFATE 1 G: 1 TABLET ORAL at 17:19

## 2023-07-19 RX ADMIN — ONDANSETRON 4 MG: 2 INJECTION INTRAMUSCULAR; INTRAVENOUS at 17:19

## 2023-07-19 RX ADMIN — ONDANSETRON 4 MG: 2 INJECTION INTRAMUSCULAR; INTRAVENOUS at 11:11

## 2023-07-19 RX ADMIN — SODIUM CHLORIDE: 9 INJECTION, SOLUTION INTRAVENOUS at 23:40

## 2023-07-19 RX ADMIN — BUPRENORPHINE 1 PATCH: 7.5 PATCH, EXTENDED RELEASE TRANSDERMAL at 11:20

## 2023-07-19 RX ADMIN — SODIUM CHLORIDE: 9 INJECTION, SOLUTION INTRAVENOUS at 12:04

## 2023-07-19 RX ADMIN — SUCRALFATE 1 G: 1 TABLET ORAL at 11:11

## 2023-07-19 RX ADMIN — SODIUM CHLORIDE: 9 INJECTION, SOLUTION INTRAVENOUS at 00:18

## 2023-07-19 RX ADMIN — CYCLOBENZAPRINE 10 MG: 10 TABLET, FILM COATED ORAL at 14:05

## 2023-07-19 RX ADMIN — SUCRALFATE 1 G: 1 TABLET ORAL at 06:32

## 2023-07-19 RX ADMIN — ONDANSETRON 4 MG: 2 INJECTION INTRAMUSCULAR; INTRAVENOUS at 00:06

## 2023-07-19 RX ADMIN — DICYCLOMINE HYDROCHLORIDE 20 MG: 20 TABLET ORAL at 11:11

## 2023-07-19 RX ADMIN — CYCLOBENZAPRINE 10 MG: 10 TABLET, FILM COATED ORAL at 08:49

## 2023-07-19 RX ADMIN — DICYCLOMINE HYDROCHLORIDE 20 MG: 20 TABLET ORAL at 17:19

## 2023-07-19 RX ADMIN — CYCLOBENZAPRINE 10 MG: 10 TABLET, FILM COATED ORAL at 21:44

## 2023-07-19 RX ADMIN — ONDANSETRON 4 MG: 2 INJECTION INTRAMUSCULAR; INTRAVENOUS at 23:46

## 2023-07-19 ASSESSMENT — ACTIVITIES OF DAILY LIVING (ADL)
ADLS_ACUITY_SCORE: 33
ADLS_ACUITY_SCORE: 36
ADLS_ACUITY_SCORE: 33
ADLS_ACUITY_SCORE: 36
ADLS_ACUITY_SCORE: 33
ADLS_ACUITY_SCORE: 36
ADLS_ACUITY_SCORE: 33

## 2023-07-19 NOTE — PROGRESS NOTES
[facility]  Gastroenterology Progress Note     Peggy Jessica MRN# [id number]   YOB: 1975 Age: 48 year old          Assessment and Plan:   Peggy Jessica is a 48 year old female with a complex past medical history significant for chronic inflammatory demyelinating polyneuropathy, complex regional pain syndrome, gastroparesis presenting with vomiting.     Abdominal pain  Gastroparesis  Nausea and vomiting  constipation  Patient has had chronic nausea but acute onset vomiting  Weight stable  Patient has had significant stress with lack of pain control with buprenophine patch and death of friend  Gastroparesis has not been confirmed as patient has not been able to tolerate gastric emptying study  Patient has acute on chronic nausea and vomiting.     -- recommend schedule ondansetron  -- daily ppi  -- carafate 1g QID  -- clear liquid diet and advance tolerate  -- do no recommend PEJ tube at this time as symptoms are acute  -- Daily miralax  -- continue bentyl  -- trial of phenergan suppository q 6 hour prn                Interval History:   denies chest pain, denies shortness of breath, alert, oriented to person, place and time, has had a bowel movement in the last 24 hours and still has chronic nausea with no vomiting. Has abdominal pain with no pressure              Review of Systems:   C: NEGATIVE for fever, chills, change in weight  E/M: NEGATIVE for ear, mouth and throat problems  R: NEGATIVE for significant cough or SOB  CV: NEGATIVE for chest pain, palpitations or peripheral edema             Medications:   I have reviewed this patient's current medications    buprenorphine  1 patch Transdermal Weekly     buprenorphine   Transdermal Q8H     cyclobenzaprine  10 mg Oral TID     dicyclomine  20 mg Oral TID AC     folic acid  400 mcg Oral Daily     hydrOXYzine  25 mg Oral TID AC     ondansetron  4 mg Intravenous Q6H     pantoprazole  40 mg Intravenous Daily with  breakfast     polyethylene glycol  17 g Oral Daily     [START ON 7/20/2023] scopolamine  1 patch Transdermal Q72H    And     scopolamine   Transdermal Q8H SHERMAN     sertraline  100 mg Oral Daily     sodium chloride (PF)  3 mL Intracatheter Q8H     sucralfate  1 g Oral 4x Daily AC & HS                  Physical Exam:   Vitals were reviewed  Vital Signs with Ranges  Temp:  [98.2  F (36.8  C)-98.6  F (37  C)] 98.5  F (36.9  C)  Pulse:  [70-88] 85  Resp:  [16-18] 18  BP: ()/(66-78) 120/78  SpO2:  [97 %-98 %] 98 %  I/O last 3 completed shifts:  In: 240 [P.O.:240]  Out: -   Constitutional: healthy, alert, mild distress, cooperative and pale   Cardiovascular: negative, PMI normal. No lifts, heaves, or thrills. RRR. No murmurs, clicks gallops or rub  Respiratory: negative, Percussion normal. Good diaphragmatic excursion. Lungs clear  Abdomen: Abdomen soft, exquisitely tender. BS normal. No masses, organomegaly, positive findings: tenderness marked generalized with minimal to no pressure           Data:   I reviewed the patient's new clinical lab test results.   Recent Labs   Lab Test 07/18/23  0819 05/13/23  1115 05/12/23  0956 01/31/23  1900 01/28/23  0619   WBC 5.5 4.6 5.7   < > 4.2   HGB 14.7 13.6 14.6   < > 13.4   MCV 89 91 92   < > 98    257 307   < > 243   INR  --   --   --   --  0.97    < > = values in this interval not displayed.     Recent Labs   Lab Test 07/18/23  0819 05/13/23  0807 05/12/23  0956   POTASSIUM 4.5 4.6 4.1   CHLORIDE 102 106 102   CO2 27 22 26   BUN 10.0 7.0 6.1   ANIONGAP 9 11 10     Recent Labs   Lab Test 07/18/23  0819 05/12/23  1659 05/12/23  0956 03/15/23  0743 01/28/23  0619 01/27/23  1241 01/27/23  0839 01/19/23  1352 12/06/22  0003 12/05/22 2032   ALBUMIN 4.3  --  4.2 4.3   < >  --  4.3   < >  --  4.8   BILITOTAL 0.3  --  0.2 0.2   < >  --  0.4   < >  --  0.4   ALT 13  --  14 19   < >  --  17   < >  --  24   AST 20  --  18 24   < >  --  28   < >  --  32   PROTEIN  --  Negative   --   --   --  Negative  --   --  10*  --    LIPASE 27  --   --   --   --   --  23  --   --  22    < > = values in this interval not displayed.       I reviewed the patient's new imaging results.    All laboratory data reviewed  All imaging studies reviewed by me.    Shannan Castro PA-C,  7/19/2023  [facility]  Office : [phone]  Cell: [phone] (Dr. Mason)  Cell: [phone] (Shannan Castro PA-C)

## 2023-07-19 NOTE — PROGRESS NOTES
[facility]    Hospitalist Progress Note    Brief Summary:  Peggy Jessica is a 48 year old female with a complex past medical history significant for chronic inflammatory demyelinating polyneuropathy 2021 in the past treated with IVIG, complex regional pain syndrome followed by pain clinic, chronic gastroparesis, chronic constipation, depression/anxiety, hx cervical cancer, generalized weakness with frequent falls admitted to observation on 7/18/2023 after presenting with epigastric pain and worsening vomiting.       Assessment & Plan      Acute on chronic nausea and vomiting  Epigastric pain  Suspected gastroparesis   Chronic constipation  Patient presents with 3 day onset of epigastric pain that occurs immediately after eating as well as acute worsening of her chronic nausea and vomiting. Unable to tolerate po intake today. Denies melena, hematemesis, hematochezia. Reports regular BMs though CT shows constipation. No official diagnosis of gastroparesis as she has not tolerated gastric emptying study. She is s/p cholecystectomy.     Notably reports cannabis hyperemesis has been considered, she stopped using for a month without improvement so has restarted  *CT abdomen pelvis done in ED shows large stool in colon, otherwise negative for abnormalities.   *Alk phos very mildly elevated 109, otherwise LFT, WBC, lytes are ok.   *EKG without acute ischemia.   *most recent EGD 5/2023 with medium hiatal hernia, congestive gastropathy, rflux esophagitis without bleeding  *Follows with pain clinic and reports she ran out of her buprenorphine patches 2 weeks ago- this may be contributing to overall pain     She had a virtual visit with her pain clinic this morning, I will  resume her buprenorphine patch 7.5 mg every week, hope that help controlling her pain better.  Continue supportive care with IV fluids antiemetics, Bentyl 3 times daily, and Protonix IV daily.  Recommend to use MiraLAX, stool  softeners.    -  -- GI following, pt well known to their team. Plan for scheduled zofran, continuation of PTA daily PPI, additional of carafate scheduled.    She is on clear liquid diet, will advance as tolerated.  -- Continue daily miralax   --continue PTA bentyl tid, hydroxyzine 25mg tid, scopolamine patch, hold medical cannabis   -- Hold PTA azithromycin for motility   --avoid narcotics      Complex regional pain syndrome  Follows with pain provider/clinic. Reports she ran out of Buprenorphine patches 2 weeks ago.    --pt has virtual appointment with pain clinic 7/19 @ 730am,   --  Will resume buprenorphine patches from today.  --continue PTA flexeril, cannabis       chronic inflammatory sensory polyradiculopathy   Chronic generalized weakness with falls  Chronic dizziness   Initially developed neuro complaints 2021 following a flu vaccine with subsequent IVIG treatment.  Eventually IVIG stopped as not improving/felt needed further by her providers.  Hospitalized Jan 2023 and Oct 2022 [facility] with weakness/falls.  Also extensively evaluated Fall 2022 at [facility].  Reports she sees a Neurologist locally but is hoping to switch.   Chronic dizziness,paraesthesias and weakness are at baseline without recent change. Dizziness actually improved after getting a lift chair.   --continue routine outpatient follow up with Neurology            Diet:  clear liquids, advance diet as tolerated  DVT Prophylaxis: Pneumatic Compression Devices  Kwan Catheter: Not present  Lines: None             DVT Prophylaxis: Pneumatic Compression Devices  Code Status: Full Code    Disposition: Expected discharge tomorrow once pain control improve and tolerate oral diet.    Chapincito Gary MD, MD  Text Page  (7am - 6pm)    Interval History   Patient seen and evaluated this morning, still having some nausea no vomiting, unable to tolerate even Jell-O at this time.  Denies any fever chills chest pain, dysuria hematuria  constipation diarrhea at this time.  She had last BM on 2 days ago, but she does not think she has been constipated she is passing gas.    Encouraged her to use MiraLAX.    No other significant event overnight    -Data reviewed today: I reviewed all new labs and imaging results over the last 24 hours. I personally reviewed no images or EKG's today.    Physical Exam   Temp: 98.5  F (36.9  C) Temp src: Oral BP: 120/78 Pulse: 85   Resp: 18 SpO2: 98 % O2 Device: None (Room air)    Vitals:    07/18/23 1221   Weight: 72.6 kg (160 lb)     Vital Signs with Ranges  Temp:  [98.2  F (36.8  C)-98.6  F (37  C)] 98.5  F (36.9  C)  Pulse:  [70-88] 85  Resp:  [16-18] 18  BP: ()/(66-78) 120/78  SpO2:  [97 %-98 %] 98 %  I/O last 3 completed shifts:  In: 240 [P.O.:240]  Out: -     Constitutional: awake, alert, cooperative, no apparent distress, and appears stated age  Eyes: Lids and lashes normal, pupils equal, round and reactive to light, extra ocular muscles intact, sclera clear, conjunctiva normal  Respiratory: No increased work of breathing, good air exchange, clear to auscultation bilaterally, no crackles or wheezing  Cardiovascular: Normal apical impulse, regular rate and rhythm, normal S1 and S2, no S3 or S4, and no murmur noted  GI: No scars, normal bowel sounds, soft, non-distended, non-tender, no masses palpated, no hepatosplenomegally  Musculoskeletal: no lower extremity pitting edema present  Neurologic: No focal deficit    Medications       buprenorphine  1 patch Transdermal Weekly     buprenorphine   Transdermal Q8H     cyclobenzaprine  10 mg Oral TID     dicyclomine  20 mg Oral TID AC     folic acid  400 mcg Oral Daily     hydrOXYzine  25 mg Oral TID AC     ondansetron  4 mg Intravenous Q6H     pantoprazole  40 mg Intravenous Daily with breakfast     polyethylene glycol  17 g Oral Daily     [START ON 7/20/2023] scopolamine  1 patch Transdermal Q72H    And     scopolamine   Transdermal Q8H SHERMAN     sertraline  100  mg Oral Daily     sodium chloride (PF)  3 mL Intracatheter Q8H     sucralfate  1 g Oral 4x Daily AC & HS       Data   Recent Labs   Lab 07/18/23  0819   WBC 5.5   HGB 14.7   MCV 89         POTASSIUM 4.5   CHLORIDE 102   CO2 27   BUN 10.0   CR 0.80   ANIONGAP 9   ESTEBAN 9.5   *   ALBUMIN 4.3   PROTTOTAL 7.6   BILITOTAL 0.3   ALKPHOS 109*   ALT 13   AST 20   LIPASE 27       No results found for this or any previous visit (from the past 24 hour(s)).

## 2023-07-19 NOTE — PROGRESS NOTES
Summary: Admitted on 7/18/2023 after presenting with epigastric pain and worsening vomiting.   Orientation/Cognitive: A/OX4  Observation Goals (Met/ Not Met): Not met  Mobility Level/Assist Equipment: A1/GB/Wheelchair  Fall Risk (Y/N): Y  Behavior Concerns: None  Pain Management: Libby Flexeril,Atarax and warm packs and buprenorphine patch applied today.  Tele/VS/O2: VSS on RA.   ABNL Lab/BG: CT abd with Large stool within the colon. Pt reports that she had 2 BM's on 7/17. Refused Miralax today. PRN bisacodyl suppository given w/ a results of a small BM.  Diet: Clears. Unable to tolerate jello as she continues to have abd pain after eating it.   Bowel/Bladder: Continent.  Skin Concerns: WDL  Drains/Devices: PIV infusing IVF as ordered.   Tests/Procedures for next shift: None  Anticipated DC date & active delays: 1-2day, pending clinical improvement.  Patient Stated Goal for Today: Pain management.

## 2023-07-19 NOTE — PLAN OF CARE
Summary: Admitted on 7/18/2023 after presenting with epigastric pain and worsening vomiting.   Orientation/Cognitive: A/OX4  Observation Goals (Met/ Not Met): Not met  Mobility Level/Assist Equipment: A1/GB/W  Fall Risk (Y/N): Y  Behavior Concerns: None  Pain Management: PRN Avilable  Tele/VS/O2: VSS on RA.   ABNL Lab/BG: See Chart  Diet: Clears.   Bowel/Bladder: Constipation, refuse suppository   Skin Concerns: WDL  Drains/Devices: PIV infusing IVF as ordered.   Tests/Procedures for next shift: None  Anticipated DC date & active delays: 1-2day, pending clinical improvement.  Patient Stated Goal for Today: Pain management.         Observation goals  PRIOR TO DISCHARGE       Comments:      Pain controlled: Met     Tolerating diet: Not met     Nurse to notify provider when observation goals have been met and patient is ready for discharge.

## 2023-07-19 NOTE — UTILIZATION REVIEW
Concurrent stay review; Secondary Review Determination    Under the authority of the Utilization Management Committee, the utilization review process indicated a secondary review on the above patient. The review outcome is based on review of the medical records, discussions with staff, and applying clinical experience noted on the date of the review.    (x) Observation Status Appropriate - Concurrent stay review    RATIONALE FOR DETERMINATION    Peggy Jessica is a 48 year old female with history of chronic inflammatory demyelinating polyneuropathy 2021 in the past treated with IVIG, complex regional pain syndrome (followed by pain clinic), chronic gastroparesis, chronic constipation, depression, anxiety, cervical cancer, generalized weakness, with frequent falls. She presented to the ED on 7/18/2023 with epigastric pain and worsening vomiting.   Emergency department evaluation showed tachycardia that improved with IV fluids.  Laboratory evaluation was unremarkable.  CT of abdomen pelvis showed stool in colon but was otherwise unremarkable.  She was admitted for symptomatic cares.  Prior to admission buprenorphine patch was replaced.  She is received IV fluids and antiemetic medications.  Clear liquid diet has been started today with plans to advance as tolerated.    Patient is clinically improving and there is no clear indication to change patient's status to inpatient. The severity of illness, intensity of service provided, expected LOS and risk for adverse outcome make the care appropriate for observation.    This document was produced using voice recognition software    The information on this document is developed by the utilization review team in order for the business office to ensure compliance. This only denotes the appropriateness of proper admission status and does not reflect the quality of care rendered.    The definitions of Inpatient Status and Observation Status used in making the determination  above are those provided in the CMS Coverage Manual, Chapter 1 and Chapter 6, section 70.4.    Sincerely,    Andrew Espinoza MD     Utilization Review    Physician Advisor    [facility].

## 2023-07-20 ENCOUNTER — MEDICAL CORRESPONDENCE (OUTPATIENT)
Dept: HEALTH INFORMATION MANAGEMENT | Facility: CLINIC | Age: 48
End: 2023-07-20

## 2023-07-20 ENCOUNTER — DOCUMENTATION ONLY (OUTPATIENT)
Dept: FAMILY MEDICINE | Facility: CLINIC | Age: 48
End: 2023-07-20
Payer: COMMERCIAL

## 2023-07-20 LAB
ALBUMIN SERPL BCG-MCNC: 3.7 G/DL (ref 3.5–5.2)
ANION GAP SERPL CALCULATED.3IONS-SCNC: 10 MMOL/L (ref 7–15)
BASOPHILS # BLD AUTO: 0 10E3/UL (ref 0–0.2)
BASOPHILS NFR BLD AUTO: 0 %
BUN SERPL-MCNC: 8.4 MG/DL (ref 6–20)
CALCIUM SERPL-MCNC: 8.9 MG/DL (ref 8.6–10)
CHLORIDE SERPL-SCNC: 104 MMOL/L (ref 98–107)
CREAT SERPL-MCNC: 0.79 MG/DL (ref 0.51–0.95)
DEPRECATED HCO3 PLAS-SCNC: 23 MMOL/L (ref 22–29)
EOSINOPHIL # BLD AUTO: 0 10E3/UL (ref 0–0.7)
EOSINOPHIL NFR BLD AUTO: 1 %
ERYTHROCYTE [DISTWIDTH] IN BLOOD BY AUTOMATED COUNT: 12 % (ref 10–15)
GFR SERPL CREATININE-BSD FRML MDRD: >90 ML/MIN/1.73M2
GLUCOSE SERPL-MCNC: 75 MG/DL (ref 70–99)
HCT VFR BLD AUTO: 38.8 % (ref 35–47)
HGB BLD-MCNC: 12.7 G/DL (ref 11.7–15.7)
IMM GRANULOCYTES # BLD: 0 10E3/UL
IMM GRANULOCYTES NFR BLD: 0 %
LYMPHOCYTES # BLD AUTO: 1.1 10E3/UL (ref 0.8–5.3)
LYMPHOCYTES NFR BLD AUTO: 19 %
MCH RBC QN AUTO: 29.1 PG (ref 26.5–33)
MCHC RBC AUTO-ENTMCNC: 32.7 G/DL (ref 31.5–36.5)
MCV RBC AUTO: 89 FL (ref 78–100)
MONOCYTES # BLD AUTO: 0.4 10E3/UL (ref 0–1.3)
MONOCYTES NFR BLD AUTO: 6 %
NEUTROPHILS # BLD AUTO: 4.3 10E3/UL (ref 1.6–8.3)
NEUTROPHILS NFR BLD AUTO: 74 %
NRBC # BLD AUTO: 0 10E3/UL
NRBC BLD AUTO-RTO: 0 /100
PHOSPHATE SERPL-MCNC: 3.4 MG/DL (ref 2.5–4.5)
PLATELET # BLD AUTO: 210 10E3/UL (ref 150–450)
POTASSIUM SERPL-SCNC: 4.2 MMOL/L (ref 3.4–5.3)
RBC # BLD AUTO: 4.37 10E6/UL (ref 3.8–5.2)
SODIUM SERPL-SCNC: 137 MMOL/L (ref 136–145)
UPPER GI ENDOSCOPY: NORMAL
WBC # BLD AUTO: 5.8 10E3/UL (ref 4–11)

## 2023-07-20 PROCEDURE — 80069 RENAL FUNCTION PANEL: CPT | Performed by: INTERNAL MEDICINE

## 2023-07-20 PROCEDURE — 250N000009 HC RX 250: Performed by: INTERNAL MEDICINE

## 2023-07-20 PROCEDURE — 250N000011 HC RX IP 250 OP 636: Performed by: INTERNAL MEDICINE

## 2023-07-20 PROCEDURE — 85025 COMPLETE CBC W/AUTO DIFF WBC: CPT | Performed by: INTERNAL MEDICINE

## 2023-07-20 PROCEDURE — C9113 INJ PANTOPRAZOLE SODIUM, VIA: HCPCS | Mod: JZ | Performed by: PHYSICIAN ASSISTANT

## 2023-07-20 PROCEDURE — 999N000099 HC STATISTIC MODERATE SEDATION < 10 MIN: Performed by: INTERNAL MEDICINE

## 2023-07-20 PROCEDURE — 96376 TX/PRO/DX INJ SAME DRUG ADON: CPT | Mod: 59

## 2023-07-20 PROCEDURE — G0378 HOSPITAL OBSERVATION PER HR: HCPCS

## 2023-07-20 PROCEDURE — 36415 COLL VENOUS BLD VENIPUNCTURE: CPT | Performed by: INTERNAL MEDICINE

## 2023-07-20 PROCEDURE — 250N000009 HC RX 250: Performed by: PHYSICIAN ASSISTANT

## 2023-07-20 PROCEDURE — 96361 HYDRATE IV INFUSION ADD-ON: CPT

## 2023-07-20 PROCEDURE — 250N000013 HC RX MED GY IP 250 OP 250 PS 637: Performed by: INTERNAL MEDICINE

## 2023-07-20 PROCEDURE — 43235 EGD DIAGNOSTIC BRUSH WASH: CPT | Performed by: INTERNAL MEDICINE

## 2023-07-20 PROCEDURE — 99232 SBSQ HOSP IP/OBS MODERATE 35: CPT | Performed by: INTERNAL MEDICINE

## 2023-07-20 PROCEDURE — 250N000013 HC RX MED GY IP 250 OP 250 PS 637: Performed by: PHYSICIAN ASSISTANT

## 2023-07-20 PROCEDURE — 250N000011 HC RX IP 250 OP 636: Mod: JZ | Performed by: PHYSICIAN ASSISTANT

## 2023-07-20 RX ORDER — FLUMAZENIL 0.1 MG/ML
0.2 INJECTION, SOLUTION INTRAVENOUS
Status: CANCELLED | OUTPATIENT
Start: 2023-07-20 | End: 2023-07-21

## 2023-07-20 RX ORDER — FENTANYL CITRATE 50 UG/ML
INJECTION, SOLUTION INTRAMUSCULAR; INTRAVENOUS PRN
Status: DISCONTINUED | OUTPATIENT
Start: 2023-07-20 | End: 2023-07-20 | Stop reason: HOSPADM

## 2023-07-20 RX ADMIN — ONDANSETRON 4 MG: 2 INJECTION INTRAMUSCULAR; INTRAVENOUS at 23:26

## 2023-07-20 RX ADMIN — SUCRALFATE 1 G: 1 TABLET ORAL at 06:31

## 2023-07-20 RX ADMIN — CYCLOBENZAPRINE 10 MG: 10 TABLET, FILM COATED ORAL at 15:01

## 2023-07-20 RX ADMIN — PANTOPRAZOLE SODIUM 40 MG: 40 INJECTION, POWDER, FOR SOLUTION INTRAVENOUS at 08:02

## 2023-07-20 RX ADMIN — SUCRALFATE 1 G: 1 TABLET ORAL at 23:26

## 2023-07-20 RX ADMIN — POLYETHYLENE GLYCOL 3350 17 G: 17 POWDER, FOR SOLUTION ORAL at 10:09

## 2023-07-20 RX ADMIN — DICYCLOMINE HYDROCHLORIDE 20 MG: 20 TABLET ORAL at 06:31

## 2023-07-20 RX ADMIN — CYCLOBENZAPRINE 10 MG: 10 TABLET, FILM COATED ORAL at 20:20

## 2023-07-20 RX ADMIN — BISACODYL 10 MG: 10 SUPPOSITORY RECTAL at 20:20

## 2023-07-20 RX ADMIN — ONDANSETRON 4 MG: 2 INJECTION INTRAMUSCULAR; INTRAVENOUS at 17:51

## 2023-07-20 RX ADMIN — SODIUM PHOSPHATE, DIBASIC AND SODIUM PHOSPHATE, MONOBASIC 1 ENEMA: 7; 19 ENEMA RECTAL at 17:04

## 2023-07-20 RX ADMIN — CYCLOBENZAPRINE 10 MG: 10 TABLET, FILM COATED ORAL at 08:02

## 2023-07-20 RX ADMIN — ONDANSETRON 4 MG: 2 INJECTION INTRAMUSCULAR; INTRAVENOUS at 06:31

## 2023-07-20 RX ADMIN — SCOPALAMINE 1 PATCH: 1 PATCH, EXTENDED RELEASE TRANSDERMAL at 08:02

## 2023-07-20 RX ADMIN — SUCRALFATE 1 G: 1 TABLET ORAL at 17:51

## 2023-07-20 RX ADMIN — ACETAMINOPHEN 500 MG: 500 TABLET ORAL at 08:02

## 2023-07-20 RX ADMIN — HYDROXYZINE HYDROCHLORIDE 25 MG: 25 TABLET, FILM COATED ORAL at 06:31

## 2023-07-20 RX ADMIN — HYDROXYZINE HYDROCHLORIDE 25 MG: 25 TABLET, FILM COATED ORAL at 17:51

## 2023-07-20 RX ADMIN — SERTRALINE HYDROCHLORIDE 100 MG: 100 TABLET ORAL at 08:02

## 2023-07-20 RX ADMIN — DICYCLOMINE HYDROCHLORIDE 20 MG: 20 TABLET ORAL at 17:51

## 2023-07-20 RX ADMIN — FOLIC ACID TAB 400 MCG 400 MCG: 400 TAB at 08:02

## 2023-07-20 ASSESSMENT — ACTIVITIES OF DAILY LIVING (ADL)
ADLS_ACUITY_SCORE: 32
ADLS_ACUITY_SCORE: 33
ADLS_ACUITY_SCORE: 32
ADLS_ACUITY_SCORE: 33
ADLS_ACUITY_SCORE: 32
ADLS_ACUITY_SCORE: 33
ADLS_ACUITY_SCORE: 32
ADLS_ACUITY_SCORE: 32
ADLS_ACUITY_SCORE: 33
ADLS_ACUITY_SCORE: 32
ADLS_ACUITY_SCORE: 32
ADLS_ACUITY_SCORE: 33

## 2023-07-20 NOTE — PROGRESS NOTES
[facility]    Hospitalist Progress Note    Brief Summary:  Pegyg Jessica is a 48 year old female with a complex past medical history significant for chronic inflammatory demyelinating polyneuropathy 2021 in the past treated with IVIG, complex regional pain syndrome followed by pain clinic, chronic gastroparesis, chronic constipation, depression/anxiety, hx cervical cancer, generalized weakness with frequent falls admitted to observation on 7/18/2023 after presenting with epigastric pain and worsening vomiting.        Assessment & Plan      Acute on chronic nausea and vomiting  Epigastric pain  Suspected gastroparesis   Chronic constipation  Patient presents with 3 day onset of epigastric pain that occurs immediately after eating as well as acute worsening of her chronic nausea and vomiting. Unable to tolerate po intake today. Denies melena, hematemesis, hematochezia. Reports regular BMs though CT shows constipation. No official diagnosis of gastroparesis as she has not tolerated gastric emptying study. She is s/p cholecystectomy.     Notably reports cannabis hyperemesis has been considered, she stopped using for a month without improvement so has restarted  *CT abdomen pelvis done in ED shows large stool in colon, otherwise negative for abnormalities.   *Alk phos very mildly elevated 109, otherwise LFT, WBC, lytes are ok.   *EKG without acute ischemia.   *most recent EGD 5/2023 with medium hiatal hernia, congestive gastropathy, rflux esophagitis without bleeding  *Follows with pain clinic and reports she ran out of her buprenorphine patches 2 weeks ago- this may be contributing to overall pain     She had a virtual visit with her pain clinic on 7/19/2023, I did resume her buprenorphine patch 7.5 mg every week, hope that help controlling her pain better.  Continue supportive care with IV fluids antiemetics, Bentyl 3 times daily, and Protonix IV daily.  Recommend to use MiraLAX, stool  softeners.    -  -- GI following, pt well known to their team. Plan for scheduled zofran, continuation of PTA daily PPI, additional of carafate scheduled.    She is on clear liquid diet, will advance as tolerated.  -- Continue daily miralax   --continue PTA bentyl tid, hydroxyzine 25mg tid, scopolamine patch, hold medical cannabis   -- Hold PTA azithromycin for motility   --avoid narcotics     As of 7/20/2023 she continues to have nausea but no more vomiting, continued abdominal pain and unable to tolerate much other than Jell-O.  I recommended to take MiraLAX, if did not work then we can use enema.  Discussed with the GI and they are planning to repeat the EGD today, other things to do is to do the gastric emptying study but she is on buprenorphine, Atarax, Bentyl which can affect the gastric emptying study.  Encouraged her to ambulate, use MiraLAX, EGD today and we will see how she does.     Complex regional pain syndrome  Follows with pain provider/clinic. Reports she ran out of Buprenorphine patches 2 weeks ago.    --pt had virtual appointment with pain clinic 7/19 @ 730am,   --  Resumed buprenorphine patches from 7/19/2023.  Neuropathic pain is much better now.  --continue PTA flexeril, cannabis       chronic inflammatory sensory polyradiculopathy   Chronic generalized weakness with falls  Chronic dizziness: Stable   Initially developed neuro complaints 2021 following a flu vaccine with subsequent IVIG treatment.  Eventually IVIG stopped as not improving/felt needed further by her providers.  Hospitalized Jan 2023 and Oct 2022 [facility] with weakness/falls.  Also extensively evaluated Fall 2022 at [facility].  Reports she sees a Neurologist locally but is hoping to switch.   Chronic dizziness,paraesthesias and weakness are at baseline without recent change. Dizziness actually improved after getting a lift chair.   --continue routine outpatient follow up with Neurology          Recommend to take  MiraLAX  If no BM will give enema  We will place her n.p.o., discussed with Dr. Mason of GI and will do the EGD later today.  Ambulate the patient.  Advance diet as tolerated    Discussed with the patient, Dr. Mason and the nursing staff thank you the patient.        Diet:  clear liquids, advance diet as tolerated  DVT Prophylaxis: Pneumatic Compression Devices  Code Status: Full Code    Disposition: Expected discharge once able to tolerate orally and pain control.    Chapincito Gary MD, MD  Text Page  (7am - 6pm)    Interval History   Patient continued to have nausea, no more vomiting, unable to tolerate more than water and Jell-O.  As soon as she eats she felt pain in the belly, she has no significant BM since she has been high in the hospital.  Think her neuropathic pain is better after the buprenorphine patch.    No other significant event overnight    -Data reviewed today: I reviewed all new labs and imaging results over the last 24 hours. I personally reviewed no images or EKG's today.    Physical Exam   Temp: 98.8  F (37.1  C) Temp src: Oral BP: 110/72 Pulse: 91   Resp: 16 SpO2: 96 % O2 Device: None (Room air)    Vitals:    07/18/23 1221   Weight: 72.6 kg (160 lb)     Vital Signs with Ranges  Temp:  [97.9  F (36.6  C)-99  F (37.2  C)] 98.8  F (37.1  C)  Pulse:  [63-91] 91  Resp:  [16-18] 16  BP: (107-126)/(59-83) 110/72  SpO2:  [95 %-97 %] 96 %  I/O last 3 completed shifts:  In: 360 [P.O.:360]  Out: -     Constitutional: awake, alert, cooperative, no apparent distress, and appears stated age  Eyes: Lids and lashes normal, pupils equal, round and reactive to light, extra ocular muscles intact, sclera clear, conjunctiva normal  Respiratory: No increased work of breathing, good air exchange, clear to auscultation bilaterally, no crackles or wheezing  Cardiovascular: Normal apical impulse, regular rate and rhythm, normal S1 and S2, no S3 or S4, and no murmur noted  GI: No scars, normal bowel sounds, soft,  non-distended, mild diffuse tenderness, no masses palpated, no hepatosplenomegally  Musculoskeletal: no lower extremity pitting edema present  Neurologic: No focal deficit    Medications     sodium chloride 75 mL/hr at 07/19/23 2340       buprenorphine  1 patch Transdermal Weekly     buprenorphine   Transdermal Q8H     cyclobenzaprine  10 mg Oral TID     dicyclomine  20 mg Oral TID AC     folic acid  400 mcg Oral Daily     hydrOXYzine  25 mg Oral TID AC     ondansetron  4 mg Intravenous Q6H     pantoprazole  40 mg Intravenous Daily with breakfast     polyethylene glycol  17 g Oral Daily     scopolamine  1 patch Transdermal Q72H    And     scopolamine   Transdermal Q8H SHERMAN     sertraline  100 mg Oral Daily     sodium chloride (PF)  3 mL Intracatheter Q8H     sucralfate  1 g Oral 4x Daily AC & HS       Data   Recent Labs   Lab 07/20/23  0651 07/19/23  1616 07/18/23  0819   WBC 5.8  --  5.5   HGB 12.7  --  14.7   MCV 89  --  89     --  248     --  138   POTASSIUM 4.2  --  4.5   CHLORIDE 104  --  102   CO2 23  --  27   BUN 8.4  --  10.0   CR 0.79  --  0.80   ANIONGAP 10  --  9   ESTEBAN 8.9  --  9.5   GLC 75 92 101*   ALBUMIN 3.7  --  4.3   PROTTOTAL  --   --  7.6   BILITOTAL  --   --  0.3   ALKPHOS  --   --  109*   ALT  --   --  13   AST  --   --  20   LIPASE  --   --  27       No results found for this or any previous visit (from the past 24 hour(s)).

## 2023-07-20 NOTE — PLAN OF CARE
Summary: Nausea, and epigastric pain   Date & Time: 5660-9835 7/20  Orientation: A&Ox4  Activity Level: Assist of 1 GB/W  Fall Risk: Yes  Behavior & Aggression: Green  Pain Management: Decreased with heat, tylenol, and scheduled meds  Diet: Mechanical soft  Bowel/Bladder: continent up to bathroom  Drains/Devices: IV infusing @ 75 ml/hr  ABNL VS/O2: VSS on RA   Tests/Procedures: EGD completed   Anticipated  DC Date: Pending  Other Important Info: enema if no BM later, had nausea (reduced by scheduled meds)

## 2023-07-20 NOTE — PLAN OF CARE
Summary: Admitted on 7/18/2023 after presenting with epigastric pain and worsening vomiting.   Orientation/Cognitive: A/OX4  Observation Goals (Met/ Not Met): Not met  Mobility Level/Assist Equipment: A1/GB/W  Fall Risk (Y/N): Y  Behavior Concerns: None  Pain Management: PRN Avilable  Tele/VS/O2: VSS on RA.   ABNL Lab/BG: See Chart  Diet: Clears.   Bowel/Bladder: Continent. 2 Small Bowel Over Night.  Skin Concerns: WDL  Drains/Devices: PIV infusing IVF as ordered.   Tests/Procedures for next shift: None  Anticipated DC date & active delays: 1-2day, pending clinical improvement.  Patient Stated Goal for Today: Pain management.         Observation goals  PRIOR TO DISCHARGE       Comments:      Pain controlled: Met     Tolerating diet: Not met     Nurse to notify provider when observation goals have been met and patient is ready for discharge.

## 2023-07-20 NOTE — UTILIZATION REVIEW
Concurrent stay review; Secondary Review Determination     [facility]          Under the authority of the Utilization Management Committee, the utilization review process indicated a secondary review on the above patient.  The review outcome is based on review of the medical records, discussions with staff, and applying clinical experience noted on the date of the review.          (x) Observation Status Appropriate - Concurrent stay review    RATIONALE FOR DETERMINATION     48-year-old female with a complex medical history including chronic inflammatory demyelinating polyneuropathy, complex regional pain syndrome, chronic gastroparesis, chronic constipation, depression/anxiety, and a history of cervical cancer presented to the hospital with epigastric pain and worsening vomiting. CT imaging showed constipation, and blood tests showed mildly elevated alkaline phosphatase levels. The patient had a virtual visit with her pain clinic and her buprenorphine patch was resumed. She received supportive care with IV fluids, antiemetics, Bentyl, and Protonix. Recommendations were given for MiraLAX and stool softeners. GI follow-up was planned, and a repeat EGD was scheduled. The patient reported improved neuropathic pain with the resumption of the buprenorphine patch. She continued to experience nausea but no more vomiting and had difficulty tolerating solid food.  The severity of illness, intensity of service provided, expected LOS and risk for adverse outcome make the care appropriate for observation.      This document was produced using voice recognition software       The information on this document is developed by the utilization review team in order for the business office to ensure compliance.  This only denotes the appropriateness of proper admission status and does not reflect the quality of care rendered.         The definitions of Inpatient Status and Observation Status used in making the  determination above are those provided in the CMS Coverage Manual, Chapter 1 and Chapter 6, section 70.4.      Sincerely,     INDIRA EDUARDO MD    System Medical Director  Utilization Management  [facility].

## 2023-07-20 NOTE — PROGRESS NOTES
"When opening a documentation only encounter, be sure to enter in \"Chief Complaint\" Forms and in \" Comments\" Title of form, description if needed.    Peggy is a 48 year old  female  Form received via: Fax  Form now resides in: Provider's Folder    Title of Form: [facility]  Form Number (if listed): [id number]  Other Description (if needed)    Avis Hargrove, EMT    "

## 2023-07-20 NOTE — PROGRESS NOTES
Patient is AO X 4; VSS; on RA.  I administered fleet enema as ordered by the physician. I recommended administering Dulcolax daily PRN later.   She had a bit of nausea while eating dinner.   Ambulated to bedside commode with FWW; IVF is in place.   She is no acute distress; no pain; She had chronic complete loss of feeling - anesthesia - in her lower extremities (mostly in her feet.)

## 2023-07-20 NOTE — PLAN OF CARE
Observation goals  PRIOR TO DISCHARGE       Comments:      Pain controlled: partially met     Tolerating diet: Not met     Nurse to notify provider when observation goals have been met and patient is ready for discharge.

## 2023-07-21 VITALS
HEART RATE: 83 BPM | OXYGEN SATURATION: 99 % | TEMPERATURE: 98.7 F | RESPIRATION RATE: 16 BRPM | BODY MASS INDEX: 26.63 KG/M2 | WEIGHT: 160 LBS | DIASTOLIC BLOOD PRESSURE: 82 MMHG | SYSTOLIC BLOOD PRESSURE: 121 MMHG

## 2023-07-21 PROCEDURE — 96361 HYDRATE IV INFUSION ADD-ON: CPT

## 2023-07-21 PROCEDURE — 99239 HOSP IP/OBS DSCHRG MGMT >30: CPT | Performed by: INTERNAL MEDICINE

## 2023-07-21 PROCEDURE — 258N000003 HC RX IP 258 OP 636: Performed by: INTERNAL MEDICINE

## 2023-07-21 PROCEDURE — 96376 TX/PRO/DX INJ SAME DRUG ADON: CPT

## 2023-07-21 PROCEDURE — 250N000011 HC RX IP 250 OP 636: Mod: JZ | Performed by: PHYSICIAN ASSISTANT

## 2023-07-21 PROCEDURE — G0378 HOSPITAL OBSERVATION PER HR: HCPCS

## 2023-07-21 PROCEDURE — C9113 INJ PANTOPRAZOLE SODIUM, VIA: HCPCS | Mod: JZ | Performed by: PHYSICIAN ASSISTANT

## 2023-07-21 PROCEDURE — 250N000013 HC RX MED GY IP 250 OP 250 PS 637: Performed by: PHYSICIAN ASSISTANT

## 2023-07-21 RX ORDER — SUCRALFATE 1 G/1
1 TABLET ORAL
Qty: 40 TABLET | Refills: 0 | Status: SHIPPED | OUTPATIENT
Start: 2023-07-21 | End: 2023-07-31

## 2023-07-21 RX ORDER — ONDANSETRON 4 MG/1
4 TABLET, ORALLY DISINTEGRATING ORAL EVERY 8 HOURS PRN
Qty: 30 TABLET | Refills: 0 | Status: SHIPPED | OUTPATIENT
Start: 2023-07-21 | End: 2023-09-01

## 2023-07-21 RX ADMIN — HYDROXYZINE HYDROCHLORIDE 25 MG: 25 TABLET, FILM COATED ORAL at 06:52

## 2023-07-21 RX ADMIN — ONDANSETRON 4 MG: 2 INJECTION INTRAMUSCULAR; INTRAVENOUS at 06:52

## 2023-07-21 RX ADMIN — DICYCLOMINE HYDROCHLORIDE 20 MG: 20 TABLET ORAL at 06:52

## 2023-07-21 RX ADMIN — DICYCLOMINE HYDROCHLORIDE 20 MG: 20 TABLET ORAL at 11:18

## 2023-07-21 RX ADMIN — SUCRALFATE 1 G: 1 TABLET ORAL at 11:18

## 2023-07-21 RX ADMIN — ONDANSETRON 4 MG: 2 INJECTION INTRAMUSCULAR; INTRAVENOUS at 11:19

## 2023-07-21 RX ADMIN — SENNOSIDES AND DOCUSATE SODIUM 2 TABLET: 50; 8.6 TABLET ORAL at 08:40

## 2023-07-21 RX ADMIN — SERTRALINE HYDROCHLORIDE 100 MG: 100 TABLET ORAL at 08:40

## 2023-07-21 RX ADMIN — SUCRALFATE 1 G: 1 TABLET ORAL at 06:52

## 2023-07-21 RX ADMIN — POLYETHYLENE GLYCOL 3350 17 G: 17 POWDER, FOR SOLUTION ORAL at 08:40

## 2023-07-21 RX ADMIN — HYDROXYZINE HYDROCHLORIDE 25 MG: 25 TABLET, FILM COATED ORAL at 11:18

## 2023-07-21 RX ADMIN — SODIUM CHLORIDE: 9 INJECTION, SOLUTION INTRAVENOUS at 01:24

## 2023-07-21 RX ADMIN — FOLIC ACID TAB 400 MCG 400 MCG: 400 TAB at 08:40

## 2023-07-21 RX ADMIN — PANTOPRAZOLE SODIUM 40 MG: 40 INJECTION, POWDER, FOR SOLUTION INTRAVENOUS at 08:40

## 2023-07-21 RX ADMIN — CYCLOBENZAPRINE 10 MG: 10 TABLET, FILM COATED ORAL at 08:39

## 2023-07-21 ASSESSMENT — ACTIVITIES OF DAILY LIVING (ADL)
ADLS_ACUITY_SCORE: 32

## 2023-07-21 NOTE — PLAN OF CARE
/82 (BP Location: Left arm)   Pulse 83   Temp 98.7  F (37.1  C) (Oral)   Resp 16   Wt 72.6 kg (160 lb)   SpO2 99%   BMI 26.63 kg/m    Patient's condition and vital Signs are stable/WNL.  Discharge instructions reviewed with patient and questions answered. Patient verbalizes understanding. IV removed. Pain under control.  Patient is tolerating mechanical soft diet and denies any N/V. Patient to be discharged to home via her . Patient has all belongings.

## 2023-07-21 NOTE — DISCHARGE SUMMARY
[facility]    Discharge Summary  Hospitalist    Date of Admission:  7/18/2023  Date of Discharge:  7/21/2023  Discharging Provider: Chapincito Gary MD, MD  Date of Service (when I saw the patient): 07/21/23    Discharge Diagnoses   Please refer below    History of Present Illness   Peggy Jessica is an 48 year old female who presented with N/V and abdominal pain     Hospital Course      Peggy Jessica is a 48 year old female with a complex past medical history significant for chronic inflammatory demyelinating polyneuropathy 2021 in the past treated with IVIG, complex regional pain syndrome followed by pain clinic, possible chronic gastroparesis, chronic constipation, depression/anxiety, hx cervical cancer, generalized weakness with frequent falls admitted to observation on 7/18/2023 after presenting with epigastric pain and worsening vomiting.         Final discharge diagnoses and hospital course     Acute on chronic nausea and vomiting: Improved  Epigastric pain: Improved, likely secondary to constipation  Suspected gastroparesis   Chronic constipation: Resolved  Patient presents with 3 day onset of epigastric pain that occurs immediately after eating as well as acute worsening of her chronic nausea and vomiting. Unable to tolerate po intake. Denies melena, hematemesis, hematochezia. Reports regular BMs though CT shows constipation. No official diagnosis of gastroparesis as she has not tolerated gastric emptying study. She is s/p cholecystectomy.      Notably reports cannabis hyperemesis has been considered, she stopped using for a month without improvement so has restarted  *CT abdomen pelvis done in ED shows large stool in colon, otherwise negative for abnormalities.   *Alk phos very mildly elevated 109, otherwise LFT, WBC, lytes are ok.   *EKG without acute ischemia.   *most recent EGD 5/2023 with medium hiatal hernia, congestive gastropathy, rflux esophagitis without  bleeding  *Follows with pain clinic and reports she ran out of her buprenorphine patches 2 weeks ago- this may be contributing to overall pain      She had a virtual visit with her pain clinic on 7/19/2023, I did resume her buprenorphine patch 7.5 mg every week, hope that help controlling her pain better.  Continue supportive care with IV fluids antiemetics, Bentyl 3 times daily, and Protonix IV daily.  Recommend to use MiraLAX, stool softeners.     -  -- GI following, pt well known to their team. Plan for scheduled zofran, continuation of PTA daily PPI, additional of carafate scheduled.     She is on clear liquid diet, will advance as tolerated.  -- Continue daily miralax   --continue PTA bentyl tid, hydroxyzine 25mg tid, scopolamine patch, hold medical cannabis   -- Hold PTA azithromycin for motility   --avoid narcotics     GI was consulted and the patient was following, EGD was repeated.  Did not show any significant abnormality other than congested mucosa of the gastric.  Discussed with her, gave her MiraLAX and gave her Fleet enema.  With that she has couple of bowel movements.  Her symptoms improved with that, she is able to tolerate mechanical soft diet, abdominal pain is improved as well.  She has been ambulating some as well.    At the time of discharge, constipation resolved, she is tolerating mechanical soft diet, has some chronic nausea but no vomiting at this time.  Abdominal pain is improved as well.  She will be discharged home in stable improved condition.  Did prescribe her Zofran for chronic nausea.  Continue all other home medication.  Follow-up with the primary care physician in 1 week and follow-up with GI as scheduled.    Recommend her to continue taking stool softener including senna and MiraLAX and avoid constipation.  Discharged home in stable and improved condition.  Please refer to EGD report below       Complex regional pain syndrome  Follows with pain provider/clinic. Reports she ran out  of Buprenorphine patches 2 weeks ago.    --pt had virtual appointment with pain clinic 7/19 @ 730am, her pain clinic will prescribe her outpatient buprenorphine patches.  --  Resumed buprenorphine patches from 7/19/2023.  Neuropathic pain is much better now.  --continue PTA flexeril, cannabis.      chronic inflammatory sensory polyradiculopathy   Chronic generalized weakness with falls  Chronic dizziness: Stable   Initially developed neuro complaints 2021 following a flu vaccine with subsequent IVIG treatment.  Eventually IVIG stopped as not improving/felt needed further by her providers.  Hospitalized Jan 2023 and Oct 2022 [facility] with weakness/falls.  Also extensively evaluated Fall 2022 at [facility].     Chronic dizziness,paraesthesias and weakness are at baseline without recent change. Dizziness actually improved after getting a lift chair.   --continue routine outpatient follow up with Neurology          Code Status: Full Code     Disposition:  To home in stable improved condition     Chapincito Gary MD, MD    Significant Results and Procedures   EGD  Impression:               - Normal esophagus.                             - Congestive gastropathy.                             - Small hiatal hernia.                             - Normal duodenal bulb, first portion of the                             duodenum and second portion of the duodenum.                             - No specimens collected    Pending Results   These results will be followed up by PCP  Unresulted Labs Ordered in the Past 30 Days of this Admission     No orders found from 6/18/2023 to 7/19/2023.          Code Status   Full Code       Primary Care Physician   Andrew Peck    Physical Exam   Temp: 98.7  F (37.1  C) Temp src: Oral BP: 121/82 Pulse: 83   Resp: 16 SpO2: 99 % O2 Device: None (Room air)    Vitals:    07/18/23 1221   Weight: 72.6 kg (160 lb)     Vital Signs with Ranges  Temp:  [98  F (36.7  C)-98.8  F (37.1  C)]  98.7  F (37.1  C)  Pulse:  [74-96] 83  Resp:  [8-36] 16  BP: (110-139)/() 121/82  SpO2:  [96 %-100 %] 99 %  No intake/output data recorded.    Constitutional: awake, alert, cooperative, no apparent distress, and appears stated age  Eyes: Lids and lashes normal, pupils equal, round and reactive to light, extra ocular muscles intact, sclera clear, conjunctiva normal  Respiratory: No increased work of breathing, good air exchange, clear to auscultation bilaterally, no crackles or wheezing  Cardiovascular: Normal apical impulse, regular rate and rhythm, normal S1 and S2, no S3 or S4, and no murmur noted  GI: No scars, normal bowel sounds, soft, non-distended, non-tender, no masses palpated, no hepatosplenomegally  Neurologic: No focal deficit    Discharge Disposition   Discharged to home  Condition at discharge: Stable    Consultations This Hospital Stay   None    Time Spent on this Encounter   IChapincito MD, personally saw the patient today and spent greater than 30 minutes discharging this patient.    Discharge Orders      Reason for your hospital stay    Nausea, vomiting, abdominal pain   Constipation     Follow-up and recommended labs and tests     Follow up with primary care provider, Andrew Peck, within 7 days for hospital follow- up.  No follow up labs or test are needed.     Activity    Your activity upon discharge: activity as tolerated     Diet    Follow this diet upon discharge: Orders Placed This Encounter      Mechanical/Dental Soft Diet     Discharge Medications   Current Discharge Medication List      START taking these medications    Details   ondansetron (ZOFRAN ODT) 4 MG ODT tab Take 1 tablet (4 mg) by mouth every 8 hours as needed for nausea  Qty: 30 tablet, Refills: 0    Associated Diagnoses: Gastroparesis      sucralfate (CARAFATE) 1 GM tablet Take 1 tablet (1 g) by mouth 4 times daily (before meals and nightly) for 10 days  Qty: 40 tablet, Refills: 0    Associated Diagnoses:  Gastroparesis         CONTINUE these medications which have NOT CHANGED    Details   acetaminophen (TYLENOL) 500 MG tablet Take 500-1,000 mg by mouth every 6 hours as needed for mild pain      cyclobenzaprine (FLEXERIL) 10 MG tablet Take 1 tablet (10 mg) by mouth 3 times daily    Associated Diagnoses: Muscle spasm      dicyclomine (BENTYL) 20 MG tablet Take 20 mg by mouth 3 times daily (before meals)      diphenhydrAMINE (BENADRYL) 25 MG tablet Take 0.5 tablets (12.5 mg) by mouth every 6 hours as needed for allergies or other (nausea)  Qty: 30 tablet, Refills: 0    Associated Diagnoses: Vomiting and diarrhea      folic acid (FOLVITE) 400 MCG tablet Take 1 tablet (400 mcg) by mouth daily  Qty: 90 tablet, Refills: 0    Associated Diagnoses: Folic acid deficiency      hydrOXYzine (ATARAX) 25 MG tablet Take 1 tablet (25 mg) by mouth 3 times daily (before meals)  Qty: 90 tablet, Refills: 1    Associated Diagnoses: Depression, unspecified depression type      Lidocaine (LIDOCARE) 4 % Patch Place 3 patches onto the skin every 24 hours To prevent lidocaine toxicity, patient should be patch free for 12 hrs daily.  Refills: 0    Associated Diagnoses: Complex regional pain syndrome i of right lower limb      medical cannabis (Patient's own supply) Take 1 Dose by mouth See Admin Instructions (The purpose of this order is to document that the patient reports taking medical cannabis.  This is not a prescription, and is not used to certify that the patient has a qualifying medical condition.)  Per pt: 5-10 mg tablet three times a day PRN      multivitamin w/minerals (THERA-VIT-M) tablet Take 1 tablet by mouth every morning Megafood Womens Brand      naloxone (NARCAN) 4 MG/0.1ML nasal spray Spray 1 spray (4 mg) into one nostril alternating nostrils as needed for opioid reversal every 2-3 minutes until assistance arrives  Qty: 0.2 mL, Refills: 0    Associated Diagnoses: Chronic pain syndrome      pantoprazole (PROTONIX) 40 MG EC  tablet Take 1 tablet (40 mg) by mouth every morning (before breakfast)  Qty: 30 tablet, Refills: 0    Associated Diagnoses: Gastroesophageal reflux disease with esophagitis, unspecified whether hemorrhage      polyethylene glycol (MIRALAX) 17 GM/Dose powder Take 17 g by mouth daily as needed for constipation      scopolamine (TRANSDERM) 1 MG/3DAYS 72 hr patch 1 patch every 72 hours      senna (SENOKOT) 8.6 MG tablet Take 1 tablet by mouth as needed      senna-docusate (SENOKOT-S/PERICOLACE) 8.6-50 MG tablet Take 1 tablet by mouth 2 times daily as needed for constipation      sertraline (ZOLOFT) 100 MG tablet Take 1 tablet (100 mg) by mouth daily  Qty: 90 tablet, Refills: 1    Associated Diagnoses: Depression, unspecified depression type      traMADol (ULTRAM) 50 MG tablet Take 0.5 tablets (25 mg) by mouth 3 times daily as needed for severe pain  Qty: 15 tablet, Refills: 0    Associated Diagnoses: Abdominal pain, epigastric      buprenorphine (BUTRANS) 7.5 MCG/HR WK patch Place 1 patch onto the skin once a week Applies on Wednesday at 9 am         STOP taking these medications       ondansetron (ZOFRAN) 4 MG tablet Comments:   Reason for Stopping:             Allergies   Allergies   Allergen Reactions     Dihydroergotamine Anaphylaxis     Latex Anaphylaxis     Shellfish-Derived Products Anaphylaxis     Sumatriptan Anaphylaxis     Compazine [Prochlorperazine] Anxiety     Banana Unknown     Gabapentin Dizziness     Gluten Meal Other (See Comments)     Celiac disease     Keppra [Levetiracetam] Nausea and Vomiting     Kiwi Unknown     Levofloxacin Other (See Comments)     Arrhythmia     Metronidazole Nausea and Vomiting     Nitrofurantoin Hives     Penicillins Hives     Pregabalin      Reglan [Metoclopramide] Other (See Comments)     restlessness/toe tapping      Topiramate Visual Disturbance     Aspirin Rash     Methadone Rash     Morphine Hives     She got hives around are when morphine given but resolved after few  minutes per patient      Risperidone Anxiety     Data   Most Recent 3 CBC's:Recent Labs   Lab Test 07/20/23  0651 07/18/23  0819 05/13/23  1115   WBC 5.8 5.5 4.6   HGB 12.7 14.7 13.6   MCV 89 89 91    248 257      Most Recent 3 BMP's:  Recent Labs   Lab Test 07/20/23  0651 07/19/23  1616 07/18/23  0819 05/13/23  0807     --  138 139   POTASSIUM 4.2  --  4.5 4.6   CHLORIDE 104  --  102 106   CO2 23  --  27 22   BUN 8.4  --  10.0 7.0   CR 0.79  --  0.80 0.83   ANIONGAP 10  --  9 11   ESTEBAN 8.9  --  9.5 9.2   GLC 75 92 101* 91     Most Recent 2 LFT's:  Recent Labs   Lab Test 07/18/23  0819 05/12/23  0956   AST 20 18   ALT 13 14   ALKPHOS 109* 106*   BILITOTAL 0.3 0.2     Most Recent INR's and Anticoagulation Dosing History:  Anticoagulation Dose History        Latest Ref Rng & Units 1/28/2023   Recent Dosing and Labs   INR 0.85 - 1.15 0.97      Most Recent 3 Troponin's:  Recent Labs   Lab Test 11/25/21  0015 11/17/21  0810   TROPONIN <0.015 <0.015     Most Recent Cholesterol Panel:  Recent Labs   Lab Test 03/30/22  0928   CHOL 229*   *   HDL 56   TRIG 131     Most Recent 6 Bacteria Isolates From Any Culture (See EPIC Reports for Culture Details):No lab results found.  Most Recent TSH, T4 and A1c Labs:  Recent Labs   Lab Test 01/28/23  0619   TSH 3.12     Results for orders placed or performed during the hospital encounter of 07/18/23   CT Abdomen Pelvis w Contrast    Narrative    CT ABDOMEN/PELVIS WITH CONTRAST July 18, 2023 9:14 AM    CLINICAL HISTORY: Rule out obstruction, gastroparesis, diffuse abdomen  pain, vomiting.    TECHNIQUE: CT scan of the abdomen and pelvis was performed following  injection of IV contrast. Multiplanar reformats were obtained. Dose  reduction techniques were used.  CONTRAST: 85mL Isovue-370.    COMPARISON: CT abdomen and pelvis 12/6/2022.    FINDINGS:   LOWER CHEST: Normal.    HEPATOBILIARY: Hepatic cysts. Cholecystectomy.    PANCREAS: Normal.    SPLEEN: Normal.    ADRENAL  GLANDS: Normal.    KIDNEYS/BLADDER: Normal.    BOWEL: No obstruction. Prominent stool within the colon. No evidence  for appendicitis. No abscess or free air. No acute inflammatory  change.    PELVIC ORGANS: Normal.    ADDITIONAL FINDINGS: None.    MUSCULOSKELETAL: Normal.      Impression    IMPRESSION:   1.  No acute abnormality identified.  2.  Large stool within the colon.    MELI BLUE MD         SYSTEM ID:  [id number]     Most Recent 3 CBC's:Recent Labs   Lab Test 07/20/23  0651 07/18/23  0819 05/13/23  1115   WBC 5.8 5.5 4.6   HGB 12.7 14.7 13.6   MCV 89 89 91    248 257     Most Recent 3 BMP's:Recent Labs   Lab Test 07/20/23  0651 07/19/23  1616 07/18/23  0819 05/13/23  0807     --  138 139   POTASSIUM 4.2  --  4.5 4.6   CHLORIDE 104  --  102 106   CO2 23  --  27 22   BUN 8.4  --  10.0 7.0   CR 0.79  --  0.80 0.83   ANIONGAP 10  --  9 11   ESTEBAN 8.9  --  9.5 9.2   GLC 75 92 101* 91     Most Recent 2 LFT's:Recent Labs   Lab Test 07/18/23  0819 05/12/23  0956   AST 20 18   ALT 13 14   ALKPHOS 109* 106*   BILITOTAL 0.3 0.2

## 2023-07-21 NOTE — PLAN OF CARE
Summary: Admitted on 7/18/2023 after presenting with epigastric pain and worsening vomiting.   Orientation/Cognitive: A/OX4  Observation Goals (Met/ Not Met): Not met  Mobility Level/Assist Equipment: A1/WC  Fall Risk (Y/N): Y  Behavior Concerns: None  Pain Management: PRN Avilable  Tele/VS/O2: VSS on RA.   ABNL Lab/BG: See Chart  Diet: Soft Diet   Bowel/Bladder: Continent. PRN Dulcolax Given, Small Bowel movement overnight, Ct shows Large Bowel in intestine. Requesting for enema in the morning .  Skin Concerns: WDL  Drains/Devices: PIV infusing IVF as ordered.   Tests/Procedures for next shift: None  Anticipated DC date & active delays: 1-2day, pending clinical improvement.  Patient Stated Goal for Today: Pain management.         Observation goals  PRIOR TO DISCHARGE       Comments:      Pain controlled: Met     Tolerating diet: Not met     Nurse to notify provider when observation goals have been met and patient is ready for discharge.

## 2023-07-21 NOTE — PROGRESS NOTES
Observation goals  PRIOR TO DISCHARGE        Comments: Pain controlled Met  Tolerating diet In Progress  Nurse to notify provider when observation goals have been met and patient is ready for discharge.     /86 (BP Location: Left arm)   Pulse 87   Temp 98.8  F (37.1  C) (Oral)   Resp 16   Wt 72.6 kg (160 lb)   SpO2 96%   BMI 26.63 kg/m

## 2023-07-23 ENCOUNTER — PATIENT OUTREACH (OUTPATIENT)
Dept: CARE COORDINATION | Facility: CLINIC | Age: 48
End: 2023-07-23
Payer: COMMERCIAL

## 2023-07-23 NOTE — PROGRESS NOTES
Connected Care Resource Center Contact  [facility]/Voicemail     Clinical Data: Transitional Care Management Outreach     Outreach attempted x 2.  Left message on patient's voicemail, providing [facility]'s 24/7 scheduling and nurse triage phone number [phone] ([phone]) for questions/concerns and/or to schedule an appt with an [facility] provider, if they do not have a PCP.      Plan:  [facility] will do no further outreaches at this time.       Dominique Singh MA  Connected Care Resource Center, [facility]    *Connected Care Resource Team does NOT follow patient ongoing. Referrals are identified based on internal discharge reports and the outreach is to ensure patient has an understanding of their discharge instructions.

## 2023-08-03 NOTE — PROGRESS NOTES
"Today's Date: Aug 4, 2023     Patient Peggy Jessica 1975 presents to the clinic today to address   Chief Complaint   Patient presents with     Constipation     Has not had a bowel movement in 2 weeks, is passing gas but nothing else     Fatigue     Is really tired, sleeping 13 hrs             SUBJECTIVE     History of Present Illness:    48-year-old female presents with partner for hospital follow-up.  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     Patient was admitted on 7/18/2023 following 3 days of epigastric pain immediately upon eating as well as acute on chronic nausea/vomiting.  She had a CT abdomen pelvis which showed large stool in colon, otherwise negative.  EKG without acute ischemia.     GI was consulted and they repeated an EGD.  EGD without significant abnormality other than congested mucosa in the gastric body as well as a small hiatal hernia.  The patient was given Miralax and Fleet enema.  Her symptoms improved and by discharge she was tolerating mechanical soft diet, her constipation resolved, and her abdominal pain improved.  She was prescribed Zofran for chronic nausea.  Per discharge summary, \"Recommend her to continue taking stool softener including senna and MiraLAX and avoid constipation.\"    Today, patient reports that she has not had a bowel movement since her discharge.  Her chronic nausea has made p.o. intake difficult.  She believes she eats about 1 meal's worth of food throughout the entire day (she is following mechanical soft diet).  She is taking her MiraLAX and senna.  She has mag citrate at home but she has not used this.  She reports that " she is passing gas.She called her GI yesterday and left a message but has not heard back.    Concentration concerns-received home health report which was notable for slums score of 20/30.  When this was brought to the patient's attention, she reports that she has had difficulty concentrating/focusing/reading since her COVID infection in February 2023.  She also has chronic fatigue since her COVID infection.  She reports that since her hospitalization she has been sleeping about 13 hours/day and that she feels exhausted.  She denies worsening depression or anxiety and feels that her Zoloft is helpful. No other acute concerns/symptoms at time of exam.    Review of Systems   Constitutional, HEENT, cardiovascular, pulmonary, gi and gu systems are negative, except as otherwise noted.      Allergies   Allergen Reactions     Dihydroergotamine Anaphylaxis     Latex Anaphylaxis     Shellfish-Derived Products Anaphylaxis     Sumatriptan Anaphylaxis     Compazine [Prochlorperazine] Anxiety     Banana Unknown     Gabapentin Dizziness     Gluten Meal Other (See Comments)     Celiac disease     Keppra [Levetiracetam] Nausea and Vomiting     Kiwi Unknown     Levofloxacin Other (See Comments)     Arrhythmia     Metronidazole Nausea and Vomiting     Nitrofurantoin Hives     Penicillins Hives     Pregabalin      Reglan [Metoclopramide] Other (See Comments)     restlessness/toe tapping      Topiramate Visual Disturbance     Aspirin Rash     Methadone Rash     Morphine Hives     She got hives around are when morphine given but resolved after few minutes per patient      Risperidone Anxiety        Current Outpatient Medications   Medication Instructions     acetaminophen (TYLENOL) 500-1,000 mg, Oral, EVERY 6 HOURS PRN     baclofen (LIORESAL) 10 mg, Oral, 3 TIMES DAILY, Takes 5 mg morning and early afternoon and between 5/10 at bedtime     buprenorphine (BUTRANS) 7.5 MCG/HR WK patch 1 patch, WEEKLY     cyclobenzaprine (FLEXERIL) 10 mg,  Oral, 3 TIMES DAILY     dicyclomine (BENTYL) 20 mg, Oral, 3 TIMES DAILY BEFORE MEALS     diphenhydrAMINE (BENADRYL) 12.5 mg, Oral, EVERY 6 HOURS PRN     folic acid (FOLVITE) 400 mcg, Oral, DAILY     hydrOXYzine (ATARAX) 25 mg, Oral, 3 TIMES DAILY BEFORE MEALS     Lidocaine (LIDOCARE) 4 % Patch 3 patches, Transdermal, EVERY 24 HOURS, To prevent lidocaine toxicity, patient should be patch free for 12 hrs daily.     medical cannabis (Patient's own supply) 1 Dose, Oral, SEE ADMIN INSTRUCTIONS, (The purpose of this order is to document that the patient reports taking medical cannabis.  This is not a prescription, and is not used to certify that the patient has a qualifying medical condition.)<BR>Per pt: 5-10 mg tablet three times a day PRN     multivitamin w/minerals (THERA-VIT-M) tablet 1 tablet, Oral, EVERY MORNING, [employer]     naloxone (NARCAN) 4 mg, Alternating Nostrils, PRN, every 2-3 minutes until assistance arrives     ondansetron (ZOFRAN ODT) 4 mg, Oral, EVERY 8 HOURS PRN     pantoprazole (PROTONIX) 40 mg, Oral, EVERY MORNING BEFORE BREAKFAST     polyethylene glycol (MIRALAX) 17 g, Oral, DAILY PRN     scopolamine (TRANSDERM) 1 MG/3DAYS 72 hr patch 1 patch, EVERY 72 HOURS     senna (SENOKOT) 8.6 MG tablet 1 tablet, Oral, PRN     senna-docusate (SENOKOT-S/PERICOLACE) 8.6-50 MG tablet 1 tablet, Oral, 2 TIMES DAILY PRN     sertraline (ZOLOFT) 100 mg, Oral, DAILY     traMADol (ULTRAM) 25 mg, Oral, 3 TIMES DAILY PRN       Past Medical History:   Diagnosis Date     Arthritis      BRCA positive      Cancer (H)      CIDP (chronic inflammatory demyelinating polyneuropathy) (H)      ASHKAN III with severe dysplasia 2002     Complex regional pain syndrome type 1 of right lower extremity         Family History   Problem Relation Age of Onset     Kidney Disease Father         Social History     Tobacco Use     Smoking status: Former     Types: Cigarettes     Smokeless tobacco: Never   Vaping Use     Vaping Use:  Never used   Substance Use Topics     Alcohol use: Not Currently     Drug use: Yes     Types: Marijuana     Comment: Medical [facility] patient        History   Sexual Activity     Sexual activity: Not Currently     Partners: Male             No data to display                 Immunization History   Administered Date(s) Administered     COVID-19 Monovalent 18+ (Moderna) 03/20/2021, 04/17/2021     Influenza Vaccine >6 months (Alfuria,Fluzone) 02/17/2021     TDAP (Adacel,Boostrix) 11/22/2016                 OBJECTIVE     /85 (BP Location: Left arm, Patient Position: Sitting, Cuff Size: Adult Regular)   Pulse 88   Temp 98.4  F (36.9  C) (Oral)   Resp 18   SpO2 95%      Labs:  Lab Results   Component Value Date    WBC 5.8 07/20/2023    HGB 12.7 07/20/2023    HCT 38.8 07/20/2023     07/20/2023    CHOL 229 (H) 03/30/2022    TRIG 131 03/30/2022    HDL 56 03/30/2022    ALT 13 07/18/2023    AST 20 07/18/2023     07/20/2023    BUN 8.4 07/20/2023    CO2 23 07/20/2023    TSH 3.12 01/28/2023    INR 0.97 01/28/2023        Physical Exam  Constitutional:       General: She is not in acute distress.     Appearance: She is not ill-appearing.      Comments: In wheelchair   Eyes:      Extraocular Movements: Extraocular movements intact.      Pupils: Pupils are equal, round, and reactive to light.   Cardiovascular:      Rate and Rhythm: Normal rate and regular rhythm.      Heart sounds: Normal heart sounds. No murmur heard.  Pulmonary:      Effort: Pulmonary effort is normal. No respiratory distress.      Breath sounds: Normal breath sounds. No wheezing or rales.   Abdominal:      General: Bowel sounds are normal. There is no distension.      Palpations: Abdomen is soft.      Tenderness: There is abdominal tenderness in the epigastric area.   Musculoskeletal:      Cervical back: Neck supple.   Lymphadenopathy:      Cervical: No cervical adenopathy.   Neurological:      General: No focal deficit present.      Mental  Status: She is alert and oriented to person, place, and time.   Psychiatric:         Thought Content: Thought content normal.         Judgment: Judgment normal.               ASSESSMENT/PLAN     1. Hospital discharge follow-up  This is a pleasant 48-year-old female with a complex past medical history  including chronic immune sensory polyradiculopathy (CISP)/Chronic inflammatory demyelinating polyneuropathy (CIDP), complex regional pain syndrome (RLE 2/2 stress fracture; chest 2/2 bilateral mastectomy- currently under care of pain management), BRCA+ mutation, cervical cancer, diverticulitis, migraines, chronic constipation, and possible gastroparesis who presents for hospital follow-up.  During her admission her CT scan abdomen and pelvis demonstrated large amount of stool.  Unfortunately, patient reports that she has had  low p.o. intake and has not had a bowel movement since her discharge(She has chronic nausea for which she takes Zofran).  Low suspicion for obstruction today considering her bowel sounds are normal, she is passing gas, and her abdomen is soft.  She has some epigastric tenderness which is likely secondary to her gastropathy.  We will have her continue MiraLAX and senna. In the interest of avoiding SBO, we will add Dulcolax suppository daily until she has a BM.  If the Dulcolax does not help, we could explore a repeat Fleet enema(We do not have Fleet enemas at this clinic). Will route note to GI to keep abreast.  - Adult Post Covid Clinic  Referral; Future    2. Other fatigue  Patient reports difficulty concentrating, focusing, reading since her COVID infection in February 2023.  She had a slums score of 20/30 via Home OT.  She is alert and oriented x4 today, without focal neurodeficit. She denies worsening depression or anxiety symptoms, she would like to continue sertraline. We will check thyroid function (by time of discharge her CBC and renal panel were without red flags.  Her hepatic  labs from 7/18/2023 were without red flags).  Considering her complex medical history we will also refer to our post-COVID clinic.  - Adult Post Covid Clinic  Referral; Future  - TSH with free T4 reflex    3. Constipation, unspecified constipation type  As noted above.  - polyethylene glycol (MIRALAX) 17 GM/Dose powder; Take 17 g by mouth daily Until BM, then use every day prn  Dispense: 510 g; Refill: 0  - bisacodyl (DULCOLAX) 10 MG suppository; Place 1 suppository (10 mg) rectally daily as needed for constipation  Dispense: 25 suppository; Refill: 1  - TSH with free T4 reflex    4. Difficulty concentrating  As noted above.  - Adult Post Covid Clinic  Referral; Future  - TSH with free T4 reflex      Follow-Up:  - Follow up in 3 month(s), or sooner if symptoms worsen or fail to improve.     Options for treatment and follow-up care were reviewed with the patient. Patient engaged in the decision making process and verbalized understanding of the options discussed and agreed with the final plan.  AVS printed and given to patient.    MIRA Addison [facility]  [address] [id number] [phone]        Note: Chart documentation was done in part with Dragon Voice Recognition software.  Although reviewed after completion, some word and grammatical errors may remain. Please contact author for any clarification or concerns.

## 2023-08-04 ENCOUNTER — OFFICE VISIT (OUTPATIENT)
Dept: FAMILY MEDICINE | Facility: CLINIC | Age: 48
End: 2023-08-04
Payer: COMMERCIAL

## 2023-08-04 VITALS
OXYGEN SATURATION: 95 % | RESPIRATION RATE: 18 BRPM | HEART RATE: 88 BPM | SYSTOLIC BLOOD PRESSURE: 117 MMHG | DIASTOLIC BLOOD PRESSURE: 85 MMHG | TEMPERATURE: 98.4 F

## 2023-08-04 DIAGNOSIS — R41.840 DIFFICULTY CONCENTRATING: ICD-10-CM

## 2023-08-04 DIAGNOSIS — K59.00 CONSTIPATION, UNSPECIFIED CONSTIPATION TYPE: ICD-10-CM

## 2023-08-04 DIAGNOSIS — Z09 HOSPITAL DISCHARGE FOLLOW-UP: Primary | ICD-10-CM

## 2023-08-04 DIAGNOSIS — R53.83 OTHER FATIGUE: ICD-10-CM

## 2023-08-04 LAB — TSH SERPL DL<=0.005 MIU/L-ACNC: 2.09 UIU/ML (ref 0.3–4.2)

## 2023-08-04 PROCEDURE — 84443 ASSAY THYROID STIM HORMONE: CPT | Mod: ORL | Performed by: NURSE PRACTITIONER

## 2023-08-04 RX ORDER — BACLOFEN 10 MG/1
10 TABLET ORAL 3 TIMES DAILY
COMMUNITY
Start: 2023-07-25

## 2023-08-04 RX ORDER — POLYETHYLENE GLYCOL 3350 17 G/17G
17 POWDER, FOR SOLUTION ORAL DAILY
Qty: 510 G | Refills: 0 | Status: ON HOLD | OUTPATIENT
Start: 2023-08-04 | End: 2024-01-16

## 2023-08-04 RX ORDER — BISACODYL 10 MG
10 SUPPOSITORY, RECTAL RECTAL DAILY PRN
Qty: 25 SUPPOSITORY | Refills: 1 | Status: SHIPPED | OUTPATIENT
Start: 2023-08-04

## 2023-08-04 NOTE — NURSING NOTE
ROOM:1  CANDACE CHOEL S    Preferred Name: Peggy     How did you hear about us?  Current Patient    48 year old  Chief Complaint   Patient presents with     Constipation     Has not had a bowel movement in 2 weeks, is passing gas but nothing else     Fatigue     Is really tired, sleeping 13 hrs       Blood pressure 117/85, pulse 88, temperature 98.4  F (36.9  C), temperature source Oral, resp. rate 18, SpO2 95 %, not currently breastfeeding. There is no height or weight on file to calculate BMI.  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)     Complex regional pain syndrome i of right lower limb     Fibromyalgia syndrome     Diverticulitis     Chronic daily headache     Celiac disease     BRCA gene mutation positive in female     Autoimmune disorder (H)     CIDP (chronic inflammatory demyelinating polyneuropathy) (H)     Physical deconditioning     Numbness and tingling of both legs     Bilateral leg weakness     Multiple falls     Gastroparesis     Abdominal pain     Nausea and vomiting       Wt Readings from Last 2 Encounters:   07/18/23 72.6 kg (160 lb)   06/28/23 76.7 kg (169 lb)     BP Readings from Last 3 Encounters:   08/04/23 117/85   07/21/23 121/82   06/28/23 118/88       Allergies   Allergen Reactions     Dihydroergotamine Anaphylaxis     Latex Anaphylaxis     Shellfish-Derived Products Anaphylaxis     Sumatriptan Anaphylaxis     Compazine [Prochlorperazine] Anxiety     Banana Unknown     Gabapentin Dizziness     Gluten Meal Other (See Comments)     Celiac disease     Keppra [Levetiracetam] Nausea and Vomiting     Kiwi Unknown     Levofloxacin Other (See Comments)     Arrhythmia     Metronidazole Nausea and Vomiting     Nitrofurantoin Hives     Penicillins Hives     Pregabalin      Reglan [Metoclopramide]  Other (See Comments)     restlessness/toe tapping      Topiramate Visual Disturbance     Aspirin Rash     Methadone Rash     Morphine Hives     She got hives around are when morphine given but resolved after few minutes per patient      Risperidone Anxiety       Current Outpatient Medications   Medication     acetaminophen (TYLENOL) 500 MG tablet     baclofen (LIORESAL) 10 MG tablet     dicyclomine (BENTYL) 20 MG tablet     diphenhydrAMINE (BENADRYL) 25 MG tablet     folic acid (FOLVITE) 400 MCG tablet     hydrOXYzine (ATARAX) 25 MG tablet     Lidocaine (LIDOCARE) 4 % Patch     medical cannabis (Patient's own supply)     multivitamin w/minerals (THERA-VIT-M) tablet     naloxone (NARCAN) 4 MG/0.1ML nasal spray     ondansetron (ZOFRAN ODT) 4 MG ODT tab     pantoprazole (PROTONIX) 40 MG EC tablet     polyethylene glycol (MIRALAX) 17 GM/Dose powder     scopolamine (TRANSDERM) 1 MG/3DAYS 72 hr patch     senna (SENOKOT) 8.6 MG tablet     senna-docusate (SENOKOT-S/PERICOLACE) 8.6-50 MG tablet     sertraline (ZOLOFT) 100 MG tablet     traMADol (ULTRAM) 50 MG tablet     buprenorphine (BUTRANS) 7.5 MCG/HR WK patch     cyclobenzaprine (FLEXERIL) 10 MG tablet     No current facility-administered medications for this visit.       Social History     Tobacco Use     Smoking status: Former     Types: Cigarettes     Smokeless tobacco: Never   Vaping Use     Vaping Use: Never used   Substance Use Topics     Alcohol use: Not Currently     Drug use: Yes     Types: Marijuana     Comment: Medical Canib[facility] patient       Honoring Choices - Health Care Directive Guide offered to patient at time of visit.    Health Maintenance Due   Topic Date Due     YEARLY PREVENTIVE VISIT  Never done     ADVANCE CARE PLANNING  Never done     HEPATITIS B IMMUNIZATION (1 of 3 - 3-dose series) Never done     COLORECTAL CANCER SCREENING  Never done     HIV SCREENING  Never done     HEPATITIS C SCREENING  Never done     PAP  11/28/2020     COVID-19 Vaccine (3 -  Moderna series) 06/12/2021     URINE DRUG SCREEN  03/30/2023       Immunization History   Administered Date(s) Administered     COVID-19 Monovalent 18+ (Moderna) 03/20/2021, 04/17/2021     Influenza Vaccine >6 months (Alfuria,Fluzone) 02/17/2021     TDAP (Adacel,Boostrix) 11/22/2016       No results found for: PAP    Recent Labs   Lab Test 07/20/23  0651 07/18/23  0819 05/13/23  0807 05/12/23  0956 03/15/23  0743 01/31/23  1900 01/28/23  0619 10/17/22  0955 07/20/22  0959 06/06/22 2213 03/30/22 0928   LDL  --   --   --   --   --   --   --   --   --   --  147*   HDL  --   --   --   --   --   --   --   --   --   --  56   TRIG  --   --   --   --   --   --   --   --   --   --  131   ALT  --  13  --  14 19  --  14   < > 19  --  23   CR 0.79 0.80   < > 0.92  --    < > 0.89   < > 0.76   < > 0.81   GFRESTIMATED >90 90   < > 76  --    < > 80   < > >90   < > 90   ALBUMIN 3.7 4.3  --  4.2 4.3  --  3.6   < > 3.3*  --  3.5   POTASSIUM 4.2 4.5   < > 4.1  --    < > 4.3   < > 4.6   < > 4.1   TSH  --   --   --   --   --   --  3.12  --  1.38  --   --     < > = values in this interval not displayed.           8/4/2023     3:32 PM 2/22/2023     8:09 AM   PHQ-2 ( 1999 Pfizer)   Q1: Little interest or pleasure in doing things 0 0   Q2: Feeling down, depressed or hopeless 0 0   PHQ-2 Score 0 0            No data to display                    3/30/2022     7:43 AM   LINO-7 SCORE   Total Score 2 (minimal anxiety)   Total Score 2            No data to display                Avis Hargrove, EMT    August 4, 2023 3:34 PM

## 2023-08-04 NOTE — Clinical Note
Grayson Mason,  I saw Peggy today for hospital follow-up. She reports that she has not had a BM since her discharge on 7/21/23. Her abdomen is soft with normal bowel sounds and she reports that she is passing gas. She has poor PO intake secondary to her chronic nausea which is likely contributory. I advised her to continue miralax and senna and added dulcolax suppositories every day until she has a BM.  Please let me know if you have any questions or concerns. Thanks, Neftali

## 2023-08-04 NOTE — PATIENT INSTRUCTIONS
Try miralax with bisacodyl suppository. We can try fleet enema if this combination does not work.

## 2023-08-25 NOTE — PROGRESS NOTES
Peggy Jessica is a 48 year old female with the following diagnoses:   1. Chronic inflammatory demyelinating polyneuritis (H)    2. Myopia, bilateral    3. Regular astigmatism of both eyes    4. Presbyopia    5. Visual field defect         Patient was sent for consultation by Dr. Murrell for chronic inflammatory demyelinating polyneuritis.    HPI:    Patient reports double vision and shadows around objects. She first noticed double vision in February. The double vision is worse with occluding either eye. It is better with both eyes open. She has double vision at far and near. The double vision is constant all day every day but it is worse when she is fatigued. She got Covid in February and had worsening flare of her CIDP. She is going to establish care with long-Covid clinic at [facility] because she thinks getting Covid has flared all of her symptoms.     She also reports poor mid-range vision. She has progressive glasses as well as lined bifocals and neither help out her middle vision. She has issues going from a well lit room to dimly lit room. She will fall if she is standing up and the room lights are turned off. She is able to see the moon and stars at night. She uses a wheelchair at all times at home. She has also had difficulty seeing objects off to her sides.    She was diagnosed with chronic inflammatory demyelinating polyneuritis in 2021. She had gait abnormalities, paresthesias in extremities, weakness, and vision changes. She got a flu shot and started losing weight two weeks later which was her first symptom. When she was first diagnosed she was admitted for IVIG which she continued to get until May 2022. They then struggled to get IV access but she was not permitted to get a port per her Neurologist, so she had to stop IVIG at this time. She has stopped driving because she has motion sickness.      Independent historians:  Patient    Review of outside testing:    MRI Brain 5/15/23  IMPRESSION:  1.   No acute intracranial abnormality.  2.  Stable minimal nonspecific foci of T2/FLAIR hyperintensity within the cerebral white matter. Differential diagnosis includes sequelae of chronic headaches, chronic microvascular ischemic disease, or other remote insult. The distribution does not meet   criteria for demyelination.  3.  No abnormal enhancement.      My interpretation performed today of outside testing:  I have independently reviewed MRI Brain performed 5/15/23. No abnormal FLAIR hyperintensity or enhancement in the orbits or elsewhere along the visual pathways.        Review of outside clinical notes:    7/7/23 -- Visit with Dr. Murrell            10/5/2022 Office Visit with Dr. Duenas (Neurology)  Assessment:    Peggy Jessica is a 47 year old woman who developed CISP in late 2021. The clinical and laboratory evidence for this diagnosis was strong: non-length dependant sensory symptoms, ataxia, reduced DTR, nerve root enhancement and unequivocal CSF albuminocytological dissociation. She also had a convincing response to IVIG in late 2021 and early 2022. In the spring 2022 the benefit of IVIG was less convincing, and she developed tolerability issues. Her condition also became layered with functional elements that could not be explained by the neuropathy.  It became suspected that, although she may have some residual deficits from the inflammatory neuropathy, CISP was no longer immunologically active. IVIG was subsequently suspended. I appreciate that thoughtful work up that she has recently been through at [facility] by Dr. Raman. Notably, MRI shows resolved root enhancement and CSF protein has normalized. NCS still show no marked sensory neuropathy or neuronopathy. These findings all reaffirm the immunologically inactive nature of her suspected CISP. Her neurologic examination is improved from our initial meeting a year ago, but has functional elements. I reviewed this data with Peggy. I reinforced the  importance of supportive care at this stage through a combination of physical therapy, pain management, and mental health support. Understandably, this has been a struggle. I am happy to help her through this in whatever way I can.      Plan:      1. Immunotherapy: No indication for immunotherapy at this time  2. Physical therapy: She recently took a month hiatus from PT. I encouraged her to return to PT and OT. This is a very important part of her recovery. She is going to PT and OT tomorrow at [facility]. She inquires about pool therapy. I am supportive of this. Will see what the [facility] PT plan is after tomorrow.   3. Symptomatic management of pain and paresthesias:  She follows at [facility] pain clinic, treated with oxycodone, cannabis and flexeril. Appreciate collateral care with pain management. I will defer pain management to the pain clinic ecpertise.   4. Encouraged her to follow up with PCP for general health maintenance  5. Home services: She has a wheelchair. Requesting handicap parking. I am happy to help her with this. I completed paper work today.   6. Emotional health: She has a mental health provider. I encouraged her to continue to work with her therapist to optimize mental health.   7. INCBASE: Withdrawn [id number]  8. Follow up in 6 months.       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)    Complex regional pain syndrome i of right lower limb    Fibromyalgia syndrome    Diverticulitis    Chronic daily headache    Celiac disease    BRCA gene mutation positive in female    Autoimmune disorder (H)    CIDP (chronic inflammatory demyelinating polyneuropathy) (H)    Physical deconditioning    Numbness and tingling of both legs    Bilateral leg weakness    Multiple falls    Gastroparesis    Abdominal pain    Nausea and vomiting          Medications:   Current Outpatient Medications   Medication    acetaminophen (TYLENOL) 500 MG tablet    baclofen (LIORESAL) 10 MG tablet    bisacodyl (DULCOLAX) 10 MG suppository    buprenorphine (BUTRANS) 7.5 MCG/HR WK patch        dicyclomine (BENTYL) 20 MG tablet    diphenhydrAMINE (BENADRYL) 25 MG tablet prn        hydrOXYzine (ATARAX) 25 MG tablet    Lidocaine (LIDOCARE) 4 % Patch    medical cannabis (Patient's own supply)    memantine (NAMENDA) 5 MG tablet    multivitamin w/minerals (THERA-VIT-M) tablet    naloxone (NARCAN) 4 MG/0.1ML nasal spray    ondansetron (ZOFRAN ODT) 4 MG ODT tab    pantoprazole (PROTONIX) 40 MG EC tablet    polyethylene glycol (MIRALAX) 17 GM/Dose powder    scopolamine (TRANSDERM) 1 MG/3DAYS 72 hr patch    senna (SENOKOT) 8.6 MG tablet    senna-docusate (SENOKOT-S/PERICOLACE) 8.6-50 MG tablet    sertraline (ZOLOFT) 100 MG tablet             Family history / social history:  Patient's family history includes Kidney Disease in her father; Macular Degeneration in her paternal grandmother.     Patient  reports that she has quit smoking. Her smoking use included cigarettes. She has never used smokeless tobacco. She reports that she does not currently use alcohol. She reports current drug use. Drug: Marijuana. Currently on disability - was in  and worked at [employer] in [place]. Studied art history in college.      Exam:  Visual acuity 20/30 right eye 20/30 left eye.  Color vision 11/11 right eye and 11/11 left eye.  Pupils isocoric without APD.  Intraocular pressure 14 right eye and 10 left eye.  Anterior segment exam with corneal surface dryness and mild cataracts in both eyes.  Fundus exam unremarkable.  Strabismus exam with full extraocular movements. Monocular diplopia with occlusion of either eye.    Tests ordered and interpreted today:  OCT RNFL 9/5/23:  Right eye: mean thickness 90, no thinning  Left eye: mean thickness 91, no thinning    Normal ganglion cell layer in  both eyes on OCT macula    GTOP visual fields 9/5/23:  Right eye: reliable, mean deviation 15.5, global depression with superior constriction  Left eye: unreliable, mean deviation 17.8, global depression with superior and inferior constriction    Discussion of management / interpretation with another provider:   None    Assessment/Plan:   It is my impression that patient has monocular diplopia.  Monocular diplopia  The cause of double vision which does not resolve with covering one eye is almost always optical.  This is generally not a neurologic phenomenon.  These optical issues are either refractive, tear film, lens, or occasionally retinal.  Tear film is typically intermittent.  Refractive, lenticular and retinal tend to be constant. In monocular double vision, the double vision improves with pinhole viewing.  This did not occur in this patient suggesting a functional neurologic concern.      She also has decreased peripheral vision on automated perimetry today however this does not correlate to OCT RNFL or ganglion cell layer on OCT macula. There is no structural correlate on MRI imaging. Most likely is a functional neurologic disorder vs. Poor visual field test taking. Provided reassurance that this will likely resolve spontaneously.  Follow up in 6-12 months for repeat visual fields.            Attending Physician Attestation:  Complete documentation of historical and exam elements from today's encounter can be found in the full encounter summary report (not reduplicated in this progress note).  I personally obtained the chief complaint(s) and history of present illness.  I confirmed and edited as necessary the review of systems, past medical/surgical history, family history, social history, and examination findings as documented by others; and I examined the patient myself.  I personally reviewed the relevant tests, images, and reports as documented above.  I formulated and edited as necessary the assessment  and plan and discussed the findings and management plan with the patient and family. I personally reviewed the ophthalmic test(s) associated with this encounter, agree with the interpretation(s) as documented by the resident/fellow, and have edited the corresponding report(s) as necessary.  - Wilfredo Aguilar MD  Ophthalmology Resident PGY3

## 2023-08-26 ENCOUNTER — APPOINTMENT (OUTPATIENT)
Dept: CT IMAGING | Facility: CLINIC | Age: 48
End: 2023-08-26
Attending: EMERGENCY MEDICINE
Payer: COMMERCIAL

## 2023-08-26 ENCOUNTER — HOSPITAL ENCOUNTER (EMERGENCY)
Facility: CLINIC | Age: 48
Discharge: HOME | End: 2023-08-26
Attending: EMERGENCY MEDICINE | Admitting: EMERGENCY MEDICINE
Payer: COMMERCIAL

## 2023-08-26 VITALS
WEIGHT: 160 LBS | DIASTOLIC BLOOD PRESSURE: 69 MMHG | RESPIRATION RATE: 8 BRPM | SYSTOLIC BLOOD PRESSURE: 109 MMHG | OXYGEN SATURATION: 94 % | HEIGHT: 65 IN | BODY MASS INDEX: 26.66 KG/M2 | TEMPERATURE: 97.2 F | HEART RATE: 79 BPM

## 2023-08-26 DIAGNOSIS — R10.84 GENERALIZED ABDOMINAL PAIN: Primary | ICD-10-CM

## 2023-08-26 DIAGNOSIS — K59.01 SLOW TRANSIT CONSTIPATION: ICD-10-CM

## 2023-08-26 DIAGNOSIS — R11.2 NAUSEA AND VOMITING, UNSPECIFIED VOMITING TYPE: ICD-10-CM

## 2023-08-26 LAB
ALBUMIN SERPL BCG-MCNC: 4.4 G/DL (ref 3.5–5.2)
ALP SERPL-CCNC: 111 U/L (ref 35–104)
ALT SERPL W P-5'-P-CCNC: 15 U/L (ref 0–50)
ANION GAP SERPL CALCULATED.3IONS-SCNC: 12 MMOL/L (ref 7–15)
AST SERPL W P-5'-P-CCNC: 25 U/L (ref 0–45)
ATRIAL RATE - MUSE: 73 BPM
BASOPHILS # BLD AUTO: 0 10E3/UL (ref 0–0.2)
BASOPHILS NFR BLD AUTO: 0 %
BILIRUB SERPL-MCNC: 0.3 MG/DL
BUN SERPL-MCNC: 5.6 MG/DL (ref 6–20)
CALCIUM SERPL-MCNC: 9.7 MG/DL (ref 8.6–10)
CHLORIDE SERPL-SCNC: 98 MMOL/L (ref 98–107)
CREAT SERPL-MCNC: 0.94 MG/DL (ref 0.51–0.95)
DEPRECATED HCO3 PLAS-SCNC: 27 MMOL/L (ref 22–29)
DIASTOLIC BLOOD PRESSURE - MUSE: NORMAL MMHG
EOSINOPHIL # BLD AUTO: 0 10E3/UL (ref 0–0.7)
EOSINOPHIL NFR BLD AUTO: 1 %
ERYTHROCYTE [DISTWIDTH] IN BLOOD BY AUTOMATED COUNT: 12.2 % (ref 10–15)
GFR SERPL CREATININE-BSD FRML MDRD: 74 ML/MIN/1.73M2
GLUCOSE SERPL-MCNC: 108 MG/DL (ref 70–99)
HCT VFR BLD AUTO: 43.4 % (ref 35–47)
HGB BLD-MCNC: 14.6 G/DL (ref 11.7–15.7)
IMM GRANULOCYTES # BLD: 0 10E3/UL
IMM GRANULOCYTES NFR BLD: 0 %
INTERPRETATION ECG - MUSE: NORMAL
LIPASE SERPL-CCNC: 24 U/L (ref 13–60)
LYMPHOCYTES # BLD AUTO: 1.9 10E3/UL (ref 0.8–5.3)
LYMPHOCYTES NFR BLD AUTO: 31 %
MCH RBC QN AUTO: 28.7 PG (ref 26.5–33)
MCHC RBC AUTO-ENTMCNC: 33.6 G/DL (ref 31.5–36.5)
MCV RBC AUTO: 85 FL (ref 78–100)
MONOCYTES # BLD AUTO: 0.5 10E3/UL (ref 0–1.3)
MONOCYTES NFR BLD AUTO: 7 %
NEUTROPHILS # BLD AUTO: 3.8 10E3/UL (ref 1.6–8.3)
NEUTROPHILS NFR BLD AUTO: 61 %
NRBC # BLD AUTO: 0 10E3/UL
NRBC BLD AUTO-RTO: 0 /100
P AXIS - MUSE: 42 DEGREES
PLATELET # BLD AUTO: 269 10E3/UL (ref 150–450)
POTASSIUM SERPL-SCNC: 4 MMOL/L (ref 3.4–5.3)
PR INTERVAL - MUSE: 170 MS
PROT SERPL-MCNC: 8.1 G/DL (ref 6.4–8.3)
QRS DURATION - MUSE: 70 MS
QT - MUSE: 380 MS
QTC - MUSE: 418 MS
R AXIS - MUSE: 9 DEGREES
RBC # BLD AUTO: 5.08 10E6/UL (ref 3.8–5.2)
SODIUM SERPL-SCNC: 137 MMOL/L (ref 136–145)
SYSTOLIC BLOOD PRESSURE - MUSE: NORMAL MMHG
T AXIS - MUSE: 32 DEGREES
VENTRICULAR RATE- MUSE: 73 BPM
WBC # BLD AUTO: 6.2 10E3/UL (ref 4–11)

## 2023-08-26 PROCEDURE — 85025 COMPLETE CBC W/AUTO DIFF WBC: CPT | Performed by: EMERGENCY MEDICINE

## 2023-08-26 PROCEDURE — 250N000011 HC RX IP 250 OP 636: Performed by: EMERGENCY MEDICINE

## 2023-08-26 PROCEDURE — 96375 TX/PRO/DX INJ NEW DRUG ADDON: CPT

## 2023-08-26 PROCEDURE — 99285 EMERGENCY DEPT VISIT HI MDM: CPT | Mod: 25

## 2023-08-26 PROCEDURE — 93005 ELECTROCARDIOGRAM TRACING: CPT

## 2023-08-26 PROCEDURE — 250N000009 HC RX 250: Performed by: EMERGENCY MEDICINE

## 2023-08-26 PROCEDURE — 36415 COLL VENOUS BLD VENIPUNCTURE: CPT | Performed by: EMERGENCY MEDICINE

## 2023-08-26 PROCEDURE — 74177 CT ABD & PELVIS W/CONTRAST: CPT

## 2023-08-26 PROCEDURE — 83690 ASSAY OF LIPASE: CPT | Performed by: EMERGENCY MEDICINE

## 2023-08-26 PROCEDURE — 80053 COMPREHEN METABOLIC PANEL: CPT | Performed by: EMERGENCY MEDICINE

## 2023-08-26 PROCEDURE — 250N000011 HC RX IP 250 OP 636: Mod: JZ | Performed by: EMERGENCY MEDICINE

## 2023-08-26 PROCEDURE — 96374 THER/PROPH/DIAG INJ IV PUSH: CPT | Mod: 59

## 2023-08-26 RX ORDER — ONDANSETRON 2 MG/ML
4 INJECTION INTRAMUSCULAR; INTRAVENOUS ONCE
Status: COMPLETED | OUTPATIENT
Start: 2023-08-26 | End: 2023-08-26

## 2023-08-26 RX ORDER — IOPAMIDOL 755 MG/ML
81 INJECTION, SOLUTION INTRAVASCULAR ONCE
Status: COMPLETED | OUTPATIENT
Start: 2023-08-26 | End: 2023-08-26

## 2023-08-26 RX ORDER — DROPERIDOL 2.5 MG/ML
0.62 INJECTION, SOLUTION INTRAMUSCULAR; INTRAVENOUS ONCE
Status: COMPLETED | OUTPATIENT
Start: 2023-08-26 | End: 2023-08-26

## 2023-08-26 RX ADMIN — DROPERIDOL 0.62 MG: 2.5 INJECTION, SOLUTION INTRAMUSCULAR; INTRAVENOUS at 18:19

## 2023-08-26 RX ADMIN — IOPAMIDOL 81 ML: 755 INJECTION, SOLUTION INTRAVENOUS at 18:40

## 2023-08-26 RX ADMIN — SODIUM CHLORIDE 63 ML: 9 INJECTION, SOLUTION INTRAVENOUS at 18:40

## 2023-08-26 RX ADMIN — ONDANSETRON 4 MG: 2 INJECTION INTRAMUSCULAR; INTRAVENOUS at 18:14

## 2023-08-26 ASSESSMENT — ACTIVITIES OF DAILY LIVING (ADL)
ADLS_ACUITY_SCORE: 35
ADLS_ACUITY_SCORE: 33

## 2023-08-26 NOTE — ED NOTES
Pt reports being a difficult IV start with US and prefers to wait until she is roomed to start IV.

## 2023-08-26 NOTE — ED TRIAGE NOTES
Pt reports severe RLQ abdominal pain that began this am. States she feels as if there is a lump there. Pt has history of gastroparesis. Last BM on Tuesday. Works with Dr. Mason for GI.      Triage Assessment       Row Name 08/26/23 0588       Triage Assessment (Adult)    Airway WDL WDL       Respiratory WDL    Respiratory WDL WDL       Skin Circulation/Temperature WDL    Skin Circulation/Temperature WDL WDL       Cardiac WDL    Cardiac WDL X;rhythm    Pulse Rate & Regularity tachycardic       Peripheral/Neurovascular WDL    Peripheral Neurovascular WDL WDL       Cognitive/Neuro/Behavioral WDL    Cognitive/Neuro/Behavioral WDL WDL

## 2023-08-26 NOTE — ED NOTES
Right after blood drawn from US Iv start - pt stated she did not feel well and ultimatley ended up dry heaving a couple times and lying feet straddling the bed and head face down .  and myself bedside,vitally stable. Cool towel applied to forehead and placed pt on monitor . Pt denies having vasal vagal responses in the past - pt denies that this was one as well. Pt stated she has some cognitive deficits and emotional barriers ( sensory issues , memory issues. ) Provider notified.

## 2023-08-26 NOTE — ED PROVIDER NOTES
"    History     Chief Complaint:  Abdominal Pain       HPI   Peggy Jessica is a 48 year old female with history of gastroparesis, CIDP, and diverticulitis who presents with right lower abdominal pain that radiates to her back. She has been constipated for the past month. After being discharged from a hospital a month ago she did not have a bowel movement for 2 weeks and needed a colonoscopy press. She had a bowel movement a week ago, has been constipated since, and has difficulty passing gas. Her abdominal pain has been worsening and is much worse than when she was in the hospital a month ago. She normally has bowel movements daily and has recently been taking senna and miralax daily. Patient also takes bentyl, Zofran, and hydroxyzine. She notes vomiting for the past few days and has not been able to keep solid food down for a week. She denies bloody stool, urinary symptoms, chest pain, shortness of breath, and fever.       Independent Historian:    None     Review of External Notes:  NA      Medications:    Bentyl   cyclobenzaprine   Baclofen   Atarax  Benadryl   Narcan  Protonix  Zoloft   Tramadol     Past Medical History:    Arthritis  Diverticulitis   BRCA positive  Pyelonephritis   Cervical cancer (H)  Fibromyalgia syndrome   Celiac disease   Migraines   CIDP (chronic inflammatory demyelinating polyneuropathy) (H)  ASHKAN III with severe dysplasia  Complex regional pain syndrome type 1 of right lower extremity    Past Surgical History:    Cholecystectomy   Hysterectomy   oophorectomy    section   Colonoscopy    Mastectomy       Physical Exam   Patient Vitals for the past 24 hrs:   BP Temp Temp src Pulse Resp SpO2 Height Weight   23 1900 109/69 -- -- 79 (!) 8 94 % -- --   23 1800 (!) 117/91 -- -- 80 23 97 % -- --   23 1723 110/82 -- -- 105 -- 93 % -- --   23 1354 (!) 121/91 97.2  F (36.2  C) Temporal 107 18 95 % 1.651 m (5' 5\") 72.6 kg (160 lb)        Physical Exam  Physical " Exam   Constitutional:   They appear well-developed and well-nourished.  HENT:    Atraumatic  Mouth/Throat:   Oropharynx is clear and moist.   Eyes:    Conjunctivae normal and EOM are normal.   Neck:    Normal range of motion.   Cardiovascular: Normal rate, regular rhythm and normal heart sounds.  Exam reveals no gallop and no friction rub.  No murmur heard.  Pulmonary/Chest:  Effort normal and breath sounds normal. Patient has no wheezes. Patient has no rales.   Abdominal:   Soft. Bowel sounds are normal. Patient exhibits no mass. There is tenderness over the epigastrium, right upper quadrant and right lower quadrant to light palpation. Abdomen is soft. There is no rebound and no guarding.   Musculoskeletal:  Normal range of motion. Patient exhibits no edema.   Neurological:    Patient has normal strength. No cranial nerve deficit or sensory deficit. GCS 15  Skin:   Skin is warm and dry. No rash noted. No erythema.   Psychiatric:   Patient has a normal mood and affect. Patient's behavior is normal. Judgment and thought content normal.      Emergency Department Course   ECG  ECG results from 08/26/23   EKG 12-lead, tracing only     Value    Systolic Blood Pressure     Diastolic Blood Pressure     Ventricular Rate 73    Atrial Rate 73    WI Interval 170    QRS Duration 70        QTc 418    P Axis 42    R AXIS 9    T Axis 32    Interpretation ECG      Sinus rhythm  Normal ECG  When compared with ECG of 18-JUL-2023 08:09,  Borderline criteria for Inferior infarct are no longer Present  Confirmed by GENERATED REPORT, COMPUTER ([id number]),  JAN GREGORIO ([id number]) on 8/26/2023 6:08:11 PM          Imaging:  CT Abdomen Pelvis w Contrast   Final Result   IMPRESSION:    1.  No acute findings.   2.  Moderate to large colonic stool burden.        Report per radiology    Laboratory:  Labs Ordered and Resulted from Time of ED Arrival to Time of ED Departure   COMPREHENSIVE METABOLIC PANEL - Abnormal       Result Value     Sodium 137      Potassium 4.0      Chloride 98      Carbon Dioxide (CO2) 27      Anion Gap 12      Urea Nitrogen 5.6 (*)     Creatinine 0.94      Calcium 9.7      Glucose 108 (*)     Alkaline Phosphatase 111 (*)     AST 25      ALT 15      Protein Total 8.1      Albumin 4.4      Bilirubin Total 0.3      GFR Estimate 74     LIPASE - Normal    Lipase 24     CBC WITH PLATELETS AND DIFFERENTIAL    WBC Count 6.2      RBC Count 5.08      Hemoglobin 14.6      Hematocrit 43.4      MCV 85      MCH 28.7      MCHC 33.6      RDW 12.2      Platelet Count 269      % Neutrophils 61      % Lymphocytes 31      % Monocytes 7      % Eosinophils 1      % Basophils 0      % Immature Granulocytes 0      NRBCs per 100 WBC 0      Absolute Neutrophils 3.8      Absolute Lymphocytes 1.9      Absolute Monocytes 0.5      Absolute Eosinophils 0.0      Absolute Basophils 0.0      Absolute Immature Granulocytes 0.0      Absolute NRBCs 0.0     UA MACROSCOPIC WITH REFLEX TO MICRO AND CULTURE        Emergency Department Course & Assessments:  Interventions:  Medications   droPERidol (INAPSINE) injection 0.625 mg (0.625 mg Intravenous $Given 8/26/23 1819)   ondansetron (ZOFRAN) injection 4 mg (4 mg Intravenous $Given 8/26/23 1814)   iopamidol (ISOVUE-370) solution 81 mL (81 mLs Intravenous $Given 8/26/23 1840)   sodium chloride 0.9 % CT scan flush use (63 mLs Intravenous $Given 8/26/23 1840)        Assessments/Consultations/Discussion of Management or Tests:   1711 I obtained history and examined the patient as noted above.   1920 I rechecked the patient and explained findings. Her nausea is improved but she has continuing abdominal pain. Patient is comfortable going home.     Independent Interpretation (X-rays, CTs, rhythm strip):  CT: significant stool burden    Social Determinants of Health affecting care:  None      Disposition:  The patient was discharged to home.     Impression & Plan    Medical Decision Making:  CBC, CMP and lipase are grossly  unremarkable.  It is explained to her that she recently had a CT of the abdomen, which was remarkable only for an increased stool burden, and it is unlikely that an additional scan will reveal any new information.  She expresses that she is concerned that she has an obstruction, and would like to get an additional CT scan.  CT scan reveals an increased stool burden, but no other acute intra-abdominal pathology is identified.  Patient remained stable throughout her time in the emergency department.  A discussion was had with the patient regarding further regimen.  A suppository and enema are offered to the patient, but she refuses.  She reports she would prefer to not be admitted, as she can continue her bowel regimen at home.  Return precautions were given and she verbalizes understanding.  She is discharged home in stable condition.      Diagnosis:    ICD-10-CM    1. Generalized abdominal pain  R10.84       2. Nausea and vomiting, unspecified vomiting type  R11.2       3. Slow transit constipation  K59.01            Discharge Medications:  New Prescriptions    No medications on file          Scribe Disclosure:  I, Pavel Roblero, am serving as a scribe at 5:24 PM on 8/26/2023 to document services personally performed by Remy Fall MD based on my observations and the provider's statements to me.  8/26/2023   Remy Fall MD Peery, Stephen, MD  08/27/23 0009

## 2023-08-27 NOTE — DISCHARGE INSTRUCTIONS
Continue taking your bowel regimen medication at home.  You can increase your MiraLAX doses as needed.  You can start with 2 capfuls twice a day.  Please return to the emergency department if you have any new or concerning symptoms.

## 2023-09-01 ENCOUNTER — HOSPITAL ENCOUNTER (EMERGENCY)
Facility: CLINIC | Age: 48
Discharge: HOME | End: 2023-09-01
Attending: PHYSICIAN ASSISTANT | Admitting: PHYSICIAN ASSISTANT
Payer: COMMERCIAL

## 2023-09-01 VITALS
HEART RATE: 93 BPM | SYSTOLIC BLOOD PRESSURE: 113 MMHG | WEIGHT: 150 LBS | HEIGHT: 65 IN | OXYGEN SATURATION: 98 % | DIASTOLIC BLOOD PRESSURE: 80 MMHG | BODY MASS INDEX: 24.99 KG/M2 | RESPIRATION RATE: 20 BRPM | TEMPERATURE: 97.5 F

## 2023-09-01 DIAGNOSIS — K31.84 GASTROPARESIS: ICD-10-CM

## 2023-09-01 DIAGNOSIS — G61.81 CIDP (CHRONIC INFLAMMATORY DEMYELINATING POLYNEUROPATHY) (H): ICD-10-CM

## 2023-09-01 DIAGNOSIS — R11.2 NAUSEA AND VOMITING, UNSPECIFIED VOMITING TYPE: ICD-10-CM

## 2023-09-01 LAB
ALBUMIN SERPL BCG-MCNC: 4.8 G/DL (ref 3.5–5.2)
ALP SERPL-CCNC: 100 U/L (ref 35–104)
ALT SERPL W P-5'-P-CCNC: 16 U/L (ref 0–50)
ANION GAP SERPL CALCULATED.3IONS-SCNC: 13 MMOL/L (ref 7–15)
AST SERPL W P-5'-P-CCNC: 21 U/L (ref 0–45)
BASOPHILS # BLD AUTO: 0 10E3/UL (ref 0–0.2)
BASOPHILS NFR BLD AUTO: 1 %
BILIRUB SERPL-MCNC: 0.4 MG/DL
BUN SERPL-MCNC: 10.5 MG/DL (ref 6–20)
CALCIUM SERPL-MCNC: 10.2 MG/DL (ref 8.6–10)
CHLORIDE SERPL-SCNC: 99 MMOL/L (ref 98–107)
CREAT SERPL-MCNC: 0.79 MG/DL (ref 0.51–0.95)
DEPRECATED HCO3 PLAS-SCNC: 28 MMOL/L (ref 22–29)
EOSINOPHIL # BLD AUTO: 0 10E3/UL (ref 0–0.7)
EOSINOPHIL NFR BLD AUTO: 0 %
ERYTHROCYTE [DISTWIDTH] IN BLOOD BY AUTOMATED COUNT: 12.5 % (ref 10–15)
GFR SERPL CREATININE-BSD FRML MDRD: >90 ML/MIN/1.73M2
GLUCOSE SERPL-MCNC: 92 MG/DL (ref 70–99)
HCT VFR BLD AUTO: 45.9 % (ref 35–47)
HGB BLD-MCNC: 15.2 G/DL (ref 11.7–15.7)
IMM GRANULOCYTES # BLD: 0 10E3/UL
IMM GRANULOCYTES NFR BLD: 0 %
LIPASE SERPL-CCNC: 25 U/L (ref 13–60)
LYMPHOCYTES # BLD AUTO: 1.7 10E3/UL (ref 0.8–5.3)
LYMPHOCYTES NFR BLD AUTO: 26 %
MCH RBC QN AUTO: 28.8 PG (ref 26.5–33)
MCHC RBC AUTO-ENTMCNC: 33.1 G/DL (ref 31.5–36.5)
MCV RBC AUTO: 87 FL (ref 78–100)
MONOCYTES # BLD AUTO: 0.4 10E3/UL (ref 0–1.3)
MONOCYTES NFR BLD AUTO: 6 %
NEUTROPHILS # BLD AUTO: 4.2 10E3/UL (ref 1.6–8.3)
NEUTROPHILS NFR BLD AUTO: 67 %
NRBC # BLD AUTO: 0 10E3/UL
NRBC BLD AUTO-RTO: 0 /100
PLATELET # BLD AUTO: 300 10E3/UL (ref 150–450)
POTASSIUM SERPL-SCNC: 3.8 MMOL/L (ref 3.4–5.3)
PROT SERPL-MCNC: 8.5 G/DL (ref 6.4–8.3)
RBC # BLD AUTO: 5.28 10E6/UL (ref 3.8–5.2)
SODIUM SERPL-SCNC: 140 MMOL/L (ref 136–145)
WBC # BLD AUTO: 6.4 10E3/UL (ref 4–11)

## 2023-09-01 PROCEDURE — 250N000009 HC RX 250: Performed by: PHYSICIAN ASSISTANT

## 2023-09-01 PROCEDURE — 80053 COMPREHEN METABOLIC PANEL: CPT | Performed by: PHYSICIAN ASSISTANT

## 2023-09-01 PROCEDURE — 258N000003 HC RX IP 258 OP 636: Performed by: PHYSICIAN ASSISTANT

## 2023-09-01 PROCEDURE — 99284 EMERGENCY DEPT VISIT MOD MDM: CPT | Mod: 25

## 2023-09-01 PROCEDURE — 85025 COMPLETE CBC W/AUTO DIFF WBC: CPT | Performed by: PHYSICIAN ASSISTANT

## 2023-09-01 PROCEDURE — 96376 TX/PRO/DX INJ SAME DRUG ADON: CPT

## 2023-09-01 PROCEDURE — 250N000011 HC RX IP 250 OP 636: Mod: JZ | Performed by: PHYSICIAN ASSISTANT

## 2023-09-01 PROCEDURE — 96374 THER/PROPH/DIAG INJ IV PUSH: CPT

## 2023-09-01 PROCEDURE — 36415 COLL VENOUS BLD VENIPUNCTURE: CPT | Performed by: PHYSICIAN ASSISTANT

## 2023-09-01 PROCEDURE — 83690 ASSAY OF LIPASE: CPT | Performed by: PHYSICIAN ASSISTANT

## 2023-09-01 PROCEDURE — 96361 HYDRATE IV INFUSION ADD-ON: CPT

## 2023-09-01 PROCEDURE — 250N000011 HC RX IP 250 OP 636: Performed by: EMERGENCY MEDICINE

## 2023-09-01 RX ORDER — SCOLOPAMINE TRANSDERMAL SYSTEM 1 MG/1
1 PATCH, EXTENDED RELEASE TRANSDERMAL
Status: DISCONTINUED | OUTPATIENT
Start: 2023-09-01 | End: 2023-09-01 | Stop reason: HOSPADM

## 2023-09-01 RX ORDER — ONDANSETRON 4 MG/1
8 TABLET, ORALLY DISINTEGRATING ORAL ONCE
Status: COMPLETED | OUTPATIENT
Start: 2023-09-01 | End: 2023-09-01

## 2023-09-01 RX ORDER — SCOLOPAMINE TRANSDERMAL SYSTEM 1 MG/1
1 PATCH, EXTENDED RELEASE TRANSDERMAL
Qty: 3 PATCH | Refills: 0 | Status: SHIPPED | OUTPATIENT
Start: 2023-09-01

## 2023-09-01 RX ORDER — ONDANSETRON 4 MG/1
4 TABLET, ORALLY DISINTEGRATING ORAL EVERY 8 HOURS PRN
Qty: 30 TABLET | Refills: 0 | Status: SHIPPED | OUTPATIENT
Start: 2023-09-01

## 2023-09-01 RX ORDER — ONDANSETRON 2 MG/ML
4 INJECTION INTRAMUSCULAR; INTRAVENOUS ONCE
Status: COMPLETED | OUTPATIENT
Start: 2023-09-01 | End: 2023-09-01

## 2023-09-01 RX ADMIN — SCOPALAMINE 1 PATCH: 1 PATCH, EXTENDED RELEASE TRANSDERMAL at 21:16

## 2023-09-01 RX ADMIN — ONDANSETRON 4 MG: 2 INJECTION INTRAMUSCULAR; INTRAVENOUS at 20:41

## 2023-09-01 RX ADMIN — SODIUM CHLORIDE 1000 ML: 9 INJECTION, SOLUTION INTRAVENOUS at 20:10

## 2023-09-01 RX ADMIN — SODIUM CHLORIDE 1000 ML: 9 INJECTION, SOLUTION INTRAVENOUS at 18:10

## 2023-09-01 RX ADMIN — ONDANSETRON 4 MG: 2 INJECTION INTRAMUSCULAR; INTRAVENOUS at 18:15

## 2023-09-01 RX ADMIN — ONDANSETRON 8 MG: 4 TABLET, ORALLY DISINTEGRATING ORAL at 13:51

## 2023-09-01 ASSESSMENT — ACTIVITIES OF DAILY LIVING (ADL)
ADLS_ACUITY_SCORE: 35
ADLS_ACUITY_SCORE: 35

## 2023-09-01 NOTE — ED PROVIDER NOTES
History     Chief Complaint:  Nausea & Vomiting       The history is provided by the patient.      Peggy Jessica is a 48 year old female who has a history of CIDP, chronic nausea and vomiting and diverticulitis who presents with nausea and vomiting. She was seen in ED on 23 for nausea and vomiting. She was diagnosed with constipation and discharged home. Patient was evaluated at Dr. Mason's clnic two days ago where she was prescribed Linzess. Patient states she had a small bowel movement after this. Patient states that she has been unable to keep food down for the past tweo days. She reports her emesis is yellow and without blood. Patient also endorses intermittent abdominal pain. No fevers, chills, or urinary symptoms. Patient reports a cycle of vomiting and abdominal pain every 2 weeks. Patient concerned she will need feeding tube per her discussion with GI provider two days ago.    Independent Historian:   None - Patient Only    Review of External Notes:   I reviewed ED notes from  and .     Medications:    baclofen   bisacodyl   buprenorphine   cyclobenzaprine   dicyclomine   folic acid   hydrOXYzine   Lidocaine 4 % Patch  medical cannabis  naloxone   ondansetron   pantoprazole  scopolamine   senna   senna-docusate   sertraline   traMADol    Past Medical History:    Arthritis  Diverticulitis   BRCA positive  Pyelonephritis   Cervical cancer (H)  Fibromyalgia syndrome   Celiac disease   Migraines   CIDP (chronic inflammatory demyelinating polyneuropathy) (H)  ASHKAN III with severe dysplasia  Complex regional pain syndrome type 1 of right lower extremity    Past Surgical History:    Cholecystectomy   Hysterectomy   oophorectomy    section   Colonoscopy    Mastectomy  EGD x3     Physical Exam   Patient Vitals for the past 24 hrs:   BP Temp Temp src Pulse Resp SpO2 Height Weight   23 -- -- -- -- -- 98 % -- --   23 -- -- -- -- -- 96 % -- --   23 1930 113/80 --  "-- 93 -- 94 % -- --   09/01/23 1900 120/77 -- -- 95 -- 98 % -- --   09/01/23 1830 107/82 -- -- 91 -- 96 % -- --   09/01/23 1815 (!) 122/90 -- -- 91 -- 93 % -- --   09/01/23 1745 100/86 -- -- 86 -- 98 % -- --   09/01/23 1730 (!) 119/102 -- -- 100 -- 94 % -- --   09/01/23 1343 116/86 97.5  F (36.4  C) Temporal 90 20 97 % 1.651 m (5' 5\") 68 kg (150 lb)        Physical Exam  Constitutional: Alert, attentive, GCS 15  HENT:    Nose: Nose normal.    Mouth/Throat: Oropharynx is clear, mucous membranes are moist   Eyes: EOM are normal.   CV: regular rate and rhythm; no murmurs, rubs or gallups  Chest: Effort normal and breath sounds normal.   GI:  Generalized abdominal tenderness but no focal findings. No distension. Normal bowel sounds  MSK: Normal range of motion.   Neurological: Alert, attentive  Skin: Skin is warm and dry.    Emergency Department Course     Laboratory:  Labs Ordered and Resulted from Time of ED Arrival to Time of ED Departure   COMPREHENSIVE METABOLIC PANEL - Abnormal       Result Value    Sodium 140      Potassium 3.8      Chloride 99      Carbon Dioxide (CO2) 28      Anion Gap 13      Urea Nitrogen 10.5      Creatinine 0.79      Calcium 10.2 (*)     Glucose 92      Alkaline Phosphatase 100      AST 21      ALT 16      Protein Total 8.5 (*)     Albumin 4.8      Bilirubin Total 0.4      GFR Estimate >90     CBC WITH PLATELETS AND DIFFERENTIAL - Abnormal    WBC Count 6.4      RBC Count 5.28 (*)     Hemoglobin 15.2      Hematocrit 45.9      MCV 87      MCH 28.8      MCHC 33.1      RDW 12.5      Platelet Count 300      % Neutrophils 67      % Lymphocytes 26      % Monocytes 6      % Eosinophils 0      % Basophils 1      % Immature Granulocytes 0      NRBCs per 100 WBC 0      Absolute Neutrophils 4.2      Absolute Lymphocytes 1.7      Absolute Monocytes 0.4      Absolute Eosinophils 0.0      Absolute Basophils 0.0      Absolute Immature Granulocytes 0.0      Absolute NRBCs 0.0     LIPASE - Normal    Lipase " 25       Emergency Department Course & Assessments:       Interventions:  Medications   ondansetron (ZOFRAN ODT) ODT tab 8 mg (8 mg Oral $Given 9/1/23 1351)   0.9% sodium chloride BOLUS (0 mLs Intravenous Stopped 9/1/23 2001)   ondansetron (ZOFRAN) injection 4 mg (4 mg Intravenous $Given 9/1/23 1815)   0.9% sodium chloride BOLUS (0 mLs Intravenous Stopped 9/1/23 2117)   ondansetron (ZOFRAN) injection 4 mg (4 mg Intravenous $Given 9/1/23 2041)        Assessments:  1749 I obtained history and examined the patient as noted above.  1935 I updated the patient.   2103 I updated the patient. We discussed plans for discharge and the patient was okay with this plan.     Independent Interpretation (X-rays, CTs, rhythm strip):  None    Consultations/Discussion of Management or Tests:  1916 I spoke with Dr. Mason, GI, regarding the patient's history and presentation in the emergency department today.   1957 I talked with Dr. York.       Social Determinants of Health affecting care:   None    Disposition:  The patient was discharged to home.     Impression & Plan    CMS Diagnoses: None    Medical Decision Making:  Patient is a well-appearing 47 yo F with complex medication history including recurrent nausea and vomiting, abdominal pain, constipation, and CIDP. She follows with Dr. Mason.  Vitals are appropriate on arrival, she is afebrile.  Physical examination reveals a generalized abdominal tenderness however no focal findings.  Patient has no vomiting while in the department.  Recent ED visit reviewed.  Abdominal CT is negative for acute findings but shows moderate to large stool burden.  Prior abdominal CT from last month shows similar findings.  Preliminary labs today are reassuring.  No evidence of leukocytosis.  Hemoglobin is stable.  LFTs and lipase are also stable. No indication for repeat imaging. Patient was given IV fluids and Zofran with mild improvement of symptoms.  I spoke with Dr. Mason about patient's  case. Dr. Mason would like to see patient in clinic next week for further management but does not suspect further work-up or hospitalization is indicated. Patient is well-appearing and there is no evidence of severe dehydration or emergent abdominal process today. There is no evidence of severe GI symptoms such as intractable vomiting and patient appears comfortable after IV fluids and zofran. Other medications offered including droperidol however patient declined more antiemetics. She is concerned that she is currently low on her medications. Refills for zofran and scopolamine provided and patient was given scopolamine patch before discharge.  She is otherwise well-appearing and may follow-up as an outpatient. Supportive cares and return precautions to ED discussed and patient was discharged.    Diagnosis:    ICD-10-CM    1. Nausea and vomiting, unspecified vomiting type  R11.2       2. CIDP (chronic inflammatory demyelinating polyneuropathy) (H)  G61.81       3. Gastroparesis  K31.84 ondansetron (ZOFRAN ODT) 4 MG ODT tab           Discharge Medications:  Discharge Medication List as of 9/1/2023  9:17 PM             Scribe Disclosure:  I, Monty Khan, am serving as a scribe at 5:38 PM on 9/1/2023 to document services personally performed by Avis Terry PA-C based on my observations and the provider's statements to me.   9/1/2023   Avis Terry PA-C Steinbrueck, Emily, PA-C  09/01/23 8051

## 2023-09-01 NOTE — ED TRIAGE NOTES
Patient reports long history of nausea and vomiting. States her GI doctor told her to come get admitted for feeding tube.      Triage Assessment       Row Name 09/01/23 3354       Triage Assessment (Adult)    Airway WDL WDL       Respiratory WDL    Respiratory WDL WDL       Cognitive/Neuro/Behavioral WDL    Cognitive/Neuro/Behavioral WDL WDL

## 2023-09-02 NOTE — DISCHARGE INSTRUCTIONS
Your labs are reassuring today. Continue supportive cares at home including soft foods and small meals throughout the day. Continue medications for constipation as prescribed. Contact Dr. Mason's office to schedule an appointment. For any new or worsening symptoms, return to ED.

## 2023-09-05 ENCOUNTER — OFFICE VISIT (OUTPATIENT)
Dept: OPHTHALMOLOGY | Facility: CLINIC | Age: 48
End: 2023-09-05
Attending: OPHTHALMOLOGY
Payer: COMMERCIAL

## 2023-09-05 ENCOUNTER — TELEPHONE (OUTPATIENT)
Dept: PHYSICAL MEDICINE AND REHAB | Facility: CLINIC | Age: 48
End: 2023-09-05
Payer: COMMERCIAL

## 2023-09-05 DIAGNOSIS — H52.4 PRESBYOPIA: ICD-10-CM

## 2023-09-05 DIAGNOSIS — H52.223 REGULAR ASTIGMATISM OF BOTH EYES: ICD-10-CM

## 2023-09-05 DIAGNOSIS — H53.40 VISUAL FIELD DEFECT: Primary | ICD-10-CM

## 2023-09-05 DIAGNOSIS — G61.81 CHRONIC INFLAMMATORY DEMYELINATING POLYNEURITIS (H): Primary | ICD-10-CM

## 2023-09-05 DIAGNOSIS — H53.40 VISUAL FIELD DEFECT: ICD-10-CM

## 2023-09-05 DIAGNOSIS — H52.13 MYOPIA, BILATERAL: ICD-10-CM

## 2023-09-05 PROCEDURE — G0463 HOSPITAL OUTPT CLINIC VISIT: HCPCS | Performed by: OPHTHALMOLOGY

## 2023-09-05 PROCEDURE — 92083 EXTENDED VISUAL FIELD XM: CPT | Performed by: OPHTHALMOLOGY

## 2023-09-05 PROCEDURE — 99214 OFFICE O/P EST MOD 30 MIN: CPT | Mod: GC | Performed by: OPHTHALMOLOGY

## 2023-09-05 PROCEDURE — 92133 CPTRZD OPH DX IMG PST SGM ON: CPT | Performed by: OPHTHALMOLOGY

## 2023-09-05 RX ORDER — MEMANTINE HYDROCHLORIDE 10 MG/1
10 TABLET ORAL 2 TIMES DAILY
COMMUNITY
Start: 2023-08-23

## 2023-09-05 ASSESSMENT — REFRACTION_WEARINGRX
OD_ADD: +1.75
OD_CYLINDER: +1.25
SPECS_TYPE: PAL
OS_AXIS: 084
OS_ADD: +1.75
OS_SPHERE: -3.00
OS_CYLINDER: +1.25
OD_SPHERE: -3.50
OD_AXIS: 091

## 2023-09-05 ASSESSMENT — CONF VISUAL FIELD
OD_SUPERIOR_TEMPORAL_RESTRICTION: 3
OD_SUPERIOR_NASAL_RESTRICTION: 3
OD_INFERIOR_TEMPORAL_RESTRICTION: 3
OS_SUPERIOR_TEMPORAL_RESTRICTION: 3
OD_INFERIOR_NASAL_RESTRICTION: 0
OS_SUPERIOR_NASAL_RESTRICTION: 3
OS_INFERIOR_TEMPORAL_RESTRICTION: 3
OS_INFERIOR_NASAL_RESTRICTION: 0

## 2023-09-05 ASSESSMENT — TONOMETRY
IOP_METHOD: ICARE
OS_IOP_MMHG: 10
OD_IOP_MMHG: 14

## 2023-09-05 ASSESSMENT — CUP TO DISC RATIO
OS_RATIO: 0.1
OD_RATIO: 0.15

## 2023-09-05 ASSESSMENT — EXTERNAL EXAM - RIGHT EYE: OD_EXAM: NORMAL

## 2023-09-05 ASSESSMENT — VISUAL ACUITY
OS_CC+: +1
METHOD: SNELLEN - LINEAR
OS_CC: 20/30
CORRECTION_TYPE: GLASSES
OD_CC: 20/30

## 2023-09-05 ASSESSMENT — EXTERNAL EXAM - LEFT EYE: OS_EXAM: NORMAL

## 2023-09-05 ASSESSMENT — SLIT LAMP EXAM - LIDS
COMMENTS: NORMAL
COMMENTS: NORMAL

## 2023-09-05 NOTE — PLAN OF CARE
Physical Therapy Discharge Summary    Reason for therapy discharge:    Discharged to home with home therapy.    Progress towards therapy goal(s). See goals on Care Plan in [facility] electronic health record for goal details.  Goals partially met.  Barriers to achieving goals:   discharge from facility.    Therapy recommendation(s):    Continued therapy is recommended.  Rationale/Recommendations:  home or OP PT to maximize strength, endurance, and wean from FWW use.       Unsure if drain is still in place.  No follow up scheduled with Dr. Bruce.  Contacted patient and left VM.  Annabelle Marie RN  IR nurse clinician  [phone]

## 2023-09-05 NOTE — LETTER
2023         RE:  :  MRN: Peggy Jessica  1975  [id number]     Dear Dr. Murrell,    Thank you for asking me to see your very pleasant patient, Peggy Jessica, in neuro-ophthalmic consultation.  I would like to thank you for sending your records and I have summarized them in the history of present illness.  My assessment and plan are below.  For further details, please see my attached clinic note.      Peggy Jessica is a 48 year old female with the following diagnoses:   1. Chronic inflammatory demyelinating polyneuritis (H)    2. Myopia, bilateral    3. Regular astigmatism of both eyes    4. Presbyopia    5. Visual field defect       Patient was sent for consultation by Dr. Murrell for chronic inflammatory demyelinating polyneuritis.    HPI: Patient reports double vision and shadows around objects. She first noticed double vision in February. The double vision is worse with occluding either eye. It is better with both eyes open. She has double vision at far and near. The double vision is constant all day every day but it is worse when she is fatigued. She got Covid in February and had worsening flare of her CIDP. She is going to establish care with long-Covid clinic at [facility] because she thinks getting Covid has flared all of her symptoms.     She also reports poor mid-range vision. She has progressive glasses as well as lined bifocals and neither help out her middle vision. She has issues going from a well lit room to dimly lit room. She will fall if she is standing up and the room lights are turned off. She is able to see the moon and stars at night. She uses a wheelchair at all times at home. She has also had difficulty seeing objects off to her sides.    She was diagnosed with chronic inflammatory demyelinating polyneuritis in . She had gait abnormalities, paresthesias in extremities, weakness, and vision changes. She got a flu shot and started losing weight two weeks later which  was her first symptom. When she was first diagnosed she was admitted for IVIG which she continued to get until May 2022. They then struggled to get IV access but she was not permitted to get a port per her Neurologist, so she had to stop IVIG at this time. She has stopped driving because she has motion sickness.    Independent historians: Patient    Review of outside testing:    MRI Brain 5/15/23  IMPRESSION:  1.  No acute intracranial abnormality.  2.  Stable minimal nonspecific foci of T2/FLAIR hyperintensity within the cerebral white matter. Differential diagnosis includes sequelae of chronic headaches, chronic microvascular ischemic disease, or other remote insult. The distribution does not meet   criteria for demyelination.  3.  No abnormal enhancement.      My interpretation performed today of outside testing:  I have independently reviewed MRI Brain performed 5/15/23. No abnormal FLAIR hyperintensity or enhancement in the orbits or elsewhere along the visual pathways.    Review of outside clinical notes:    7/7/23 -- Visit with Dr. Murrell            10/5/2022 Office Visit with Dr. Duenas (Neurology)  Assessment:    Peggy Jessica is a 47 year old woman who developed CISP in late 2021. The clinical and laboratory evidence for this diagnosis was strong: non-length dependant sensory symptoms, ataxia, reduced DTR, nerve root enhancement and unequivocal CSF albuminocytological dissociation. She also had a convincing response to IVIG in late 2021 and early 2022. In the spring 2022 the benefit of IVIG was less convincing, and she developed tolerability issues. Her condition also became layered with functional elements that could not be explained by the neuropathy.  It became suspected that, although she may have some residual deficits from the inflammatory neuropathy, CISP was no longer immunologically active. IVIG was subsequently suspended. I appreciate that thoughtful work up that she has recently been  through at [facility] by Dr. Raman. Notably, MRI shows resolved root enhancement and CSF protein has normalized. NCS still show no marked sensory neuropathy or neuronopathy. These findings all reaffirm the immunologically inactive nature of her suspected CISP. Her neurologic examination is improved from our initial meeting a year ago, but has functional elements. I reviewed this data with Peggy. I reinforced the importance of supportive care at this stage through a combination of physical therapy, pain management, and mental health support. Understandably, this has been a struggle. I am happy to help her through this in whatever way I can.      Plan:      1. Immunotherapy: No indication for immunotherapy at this time  2. Physical therapy: She recently took a month hiatus from PT. I encouraged her to return to PT and OT. This is a very important part of her recovery. She is going to PT and OT tomorrow at [facility]. She inquires about pool therapy. I am supportive of this. Will see what the [facility] PT plan is after tomorrow.   3. Symptomatic management of pain and paresthesias:  She follows at [facility] pain clinic, treated with oxycodone, cannabis and flexeril. Appreciate collateral care with pain management. I will defer pain management to the pain clinic ecpertise.   4. Encouraged her to follow up with PCP for general health maintenance  5. Home services: She has a wheelchair. Requesting handicap parking. I am happy to help her with this. I completed paper work today.   6. Emotional health: She has a mental health provider. I encouraged her to continue to work with her therapist to optimize mental health.   7. INCBASE: Withdrawn [id number]  8. Follow up in 6 months.       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)    Complex regional pain syndrome  i of right lower limb    Fibromyalgia syndrome    Diverticulitis    Chronic daily headache    Celiac disease    BRCA gene mutation positive in female    Autoimmune disorder (H)    CIDP (chronic inflammatory demyelinating polyneuropathy) (H)    Physical deconditioning    Numbness and tingling of both legs    Bilateral leg weakness    Multiple falls    Gastroparesis    Abdominal pain    Nausea and vomiting         Medications:   Current Outpatient Medications   Medication    acetaminophen (TYLENOL) 500 MG tablet    baclofen (LIORESAL) 10 MG tablet    bisacodyl (DULCOLAX) 10 MG suppository    buprenorphine (BUTRANS) 7.5 MCG/HR WK patch        dicyclomine (BENTYL) 20 MG tablet    diphenhydrAMINE (BENADRYL) 25 MG tablet prn        hydrOXYzine (ATARAX) 25 MG tablet    Lidocaine (LIDOCARE) 4 % Patch    medical cannabis (Patient's own supply)    memantine (NAMENDA) 5 MG tablet    multivitamin w/minerals (THERA-VIT-M) tablet    naloxone (NARCAN) 4 MG/0.1ML nasal spray    ondansetron (ZOFRAN ODT) 4 MG ODT tab    pantoprazole (PROTONIX) 40 MG EC tablet    polyethylene glycol (MIRALAX) 17 GM/Dose powder    scopolamine (TRANSDERM) 1 MG/3DAYS 72 hr patch    senna (SENOKOT) 8.6 MG tablet    senna-docusate (SENOKOT-S/PERICOLACE) 8.6-50 MG tablet    sertraline (ZOLOFT) 100 MG tablet         Family history / social history: Patient's family history includes Kidney Disease in her father; Macular Degeneration in her paternal grandmother.     Patient  reports that she has quit smoking. Her smoking use included cigarettes. She has never used smokeless tobacco. She reports that she does not currently use alcohol. She reports current drug use. Drug: Marijuana. Currently on disability - was in  and worked at [employer] in [employer]. Studied art history in college.    Exam: Visual acuity 20/30 right eye 20/30 left eye.  Color vision 11/11 right eye and 11/11 left eye.  Pupils isocoric without APD.  Intraocular pressure 14 right eye and 10  left eye.  Anterior segment exam with corneal surface dryness and mild cataracts in both eyes.  Fundus exam unremarkable.  Strabismus exam with full extraocular movements. Monocular diplopia with occlusion of either eye.    Tests ordered and interpreted today:  OCT RNFL 9/5/23:  Right eye: mean thickness 90, no thinning  Left eye: mean thickness 91, no thinning    Normal ganglion cell layer in both eyes on OCT macula    GTOP visual fields 9/5/23:  Right eye: reliable, mean deviation 15.5, global depression with superior constriction  Left eye: unreliable, mean deviation 17.8, global depression with superior and inferior constriction    Discussion of management / interpretation with another provider: None    Assessment/Plan:   It is my impression that patient has monocular diplopia.  Monocular diplopia  The cause of double vision which does not resolve with covering one eye is almost always optical.  This is generally not a neurologic phenomenon.  These optical issues are either refractive, tear film, lens, or occasionally retinal.  Tear film is typically intermittent.  Refractive, lenticular and retinal tend to be constant. In monocular double vision, the double vision improves with pinhole viewing.  This did not occur in this patient suggesting a functional neurologic concern.      She also has decreased peripheral vision on automated perimetry today however this does not correlate to OCT RNFL or ganglion cell layer on OCT macula. There is no structural correlate on MRI imaging. Most likely is a functional neurologic disorder vs. Poor visual field test taking. Provided reassurance that this will likely resolve spontaneously.  Follow up in 6-12 months for repeat visual fields.    Again, thank you for allowing me to participate in the care of your patient.      Sincerely,    Wilfredo Tovar MD  Professor  Ophthalmology Residency   Director of Neuro-Ophthalmology  [facility]  Chair  Departments of Ophthalmology, Neurology, and Neurosurgery  [facility] [address] [place]  [address]  T - [phone]  F - [phone]  MONY [email or web address]      CC: [facility]  [address] [facility] [address]  Via Fax: [phone]     Jon Duenas MD  [address] [facility] [address]  Via In Basket

## 2023-09-05 NOTE — TELEPHONE ENCOUNTER
Left Voicemail (1st Attempt) and Sent Mychart (1st Attempt) for the patient to call back and schedule the following:    Appointment type: Reschedule Appointment   Provider:    Appointment date: 9/6/2023 at 10:15 am  Specialty phone number: [phone]  Additional appointment(s) needed: N/A  Additonal Notes:   -Dr. Bronson had to cancel clinic on 9/6. They have opened a clinic for 9/5 and 9/12 to reschedule the pts

## 2023-09-05 NOTE — NURSING NOTE
Chief Complaints and History of Present Illnesses   Patient presents with    New Patient     CIDP      Chief Complaint(s) and History of Present Illness(es)       New Patient              Comments: CIDP               Comments    Pt here for Neuro eval today. Vision appearing worse in both eyes since February.   No eye pain today. Flashes and floaters in both eyes, no changes.  Diplopia (side by side) and (Up/down) depending how tired pt is.  No redness or dryness. No DM.    ISHAN Bond September 5, 2023 9:28 AM

## 2023-09-08 ENCOUNTER — VIRTUAL VISIT (OUTPATIENT)
Dept: PHYSICAL MEDICINE AND REHAB | Facility: CLINIC | Age: 48
End: 2023-09-08
Attending: NURSE PRACTITIONER
Payer: COMMERCIAL

## 2023-09-08 DIAGNOSIS — G61.81 CIDP (CHRONIC INFLAMMATORY DEMYELINATING POLYNEUROPATHY) (H): Primary | ICD-10-CM

## 2023-09-08 DIAGNOSIS — R06.09 POST-COVID CHRONIC DYSPNEA: ICD-10-CM

## 2023-09-08 DIAGNOSIS — R53.83 OTHER FATIGUE: ICD-10-CM

## 2023-09-08 DIAGNOSIS — U09.9 POST-COVID CHRONIC DYSPNEA: ICD-10-CM

## 2023-09-08 DIAGNOSIS — G90.521 COMPLEX REGIONAL PAIN SYNDROME I OF RIGHT LOWER LIMB: ICD-10-CM

## 2023-09-08 DIAGNOSIS — R41.840 DIFFICULTY CONCENTRATING: ICD-10-CM

## 2023-09-08 PROCEDURE — 99215 OFFICE O/P EST HI 40 MIN: CPT | Mod: VID | Performed by: PHYSICIAN ASSISTANT

## 2023-09-08 ASSESSMENT — ENCOUNTER SYMPTOMS
BLOATING: 1
WEIGHT GAIN: 0
EYE IRRITATION: 1
COUGH: 0
SNORES LOUDLY: 0
WEIGHT LOSS: 1
WEAKNESS: 1
HOARSE VOICE: 0
CHILLS: 1
TREMORS: 1
LOSS OF CONSCIOUSNESS: 1
TROUBLE SWALLOWING: 1
MUSCLE CRAMPS: 1
NUMBNESS: 1
HALLUCINATIONS: 0
MUSCLE WEAKNESS: 1
SORE THROAT: 0
SMELL DISTURBANCE: 1
MEMORY LOSS: 1
NECK MASS: 0
DECREASED APPETITE: 1
DISTURBANCES IN COORDINATION: 1
NECK PAIN: 0
MYALGIAS: 1
ALTERED TEMPERATURE REGULATION: 1
SINUS PAIN: 0
VOMITING: 1
BLOOD IN STOOL: 0
BACK PAIN: 1
WHEEZING: 0
TINGLING: 1
DYSPNEA ON EXERTION: 0
COUGH DISTURBING SLEEP: 0
EYE REDNESS: 1
INCREASED ENERGY: 1
SEIZURES: 0
POSTURAL DYSPNEA: 1
FATIGUE: 1
NIGHT SWEATS: 0
RECTAL PAIN: 1
JOINT SWELLING: 1
ABDOMINAL PAIN: 1
HEMOPTYSIS: 0
DOUBLE VISION: 1
SHORTNESS OF BREATH: 1
SINUS CONGESTION: 0
BOWEL INCONTINENCE: 1
STIFFNESS: 1
NAUSEA: 1
DIZZINESS: 1
EYE WATERING: 0
SPEECH CHANGE: 0
TASTE DISTURBANCE: 1
POLYPHAGIA: 0
FEVER: 0
POLYDIPSIA: 0
JAUNDICE: 0
DIARRHEA: 1
PARALYSIS: 0
EYE PAIN: 0
SPUTUM PRODUCTION: 0
ARTHRALGIAS: 0
CONSTIPATION: 1
HEADACHES: 0
HEARTBURN: 0

## 2023-09-08 ASSESSMENT — ANXIETY QUESTIONNAIRES
1. FEELING NERVOUS, ANXIOUS, OR ON EDGE: NOT AT ALL
2. NOT BEING ABLE TO STOP OR CONTROL WORRYING: NOT AT ALL
6. BECOMING EASILY ANNOYED OR IRRITABLE: NOT AT ALL
GAD7 TOTAL SCORE: 0
5. BEING SO RESTLESS THAT IT IS HARD TO SIT STILL: NOT AT ALL
7. FEELING AFRAID AS IF SOMETHING AWFUL MIGHT HAPPEN: NOT AT ALL
4. TROUBLE RELAXING: NOT AT ALL
GAD7 TOTAL SCORE: 0
3. WORRYING TOO MUCH ABOUT DIFFERENT THINGS: NOT AT ALL

## 2023-09-08 ASSESSMENT — PATIENT HEALTH QUESTIONNAIRE - PHQ9
SUM OF ALL RESPONSES TO PHQ QUESTIONS 1-9: 6
SUM OF ALL RESPONSES TO PHQ QUESTIONS 1-9: 6
10. IF YOU CHECKED OFF ANY PROBLEMS, HOW DIFFICULT HAVE THESE PROBLEMS MADE IT FOR YOU TO DO YOUR WORK, TAKE CARE OF THINGS AT HOME, OR GET ALONG WITH OTHER PEOPLE: NOT DIFFICULT AT ALL

## 2023-09-08 NOTE — PATIENT INSTRUCTIONS
Post COVID Self Care Suggestions:     Fatigue Management:       https://www.archives-pmr.org/action/showPdf?ldy=-[email or web address]%2819%2633172-4       Self Care:      [email or web address]  Recovery World Health Organization:    https://apps.who.int/iris/Presence Networkstream/handle/09827/024467/JNK-SCEE-9178-972-86817-64389-eng.pdf  Breathing exercises:    https://[email or web address].org/health/conditions-and-diseases/coronavirus/coronavirus-recovery-breathing-exercises      Kim Yolettelovid Study  [email or web address]/[email or web address]-study/

## 2023-09-08 NOTE — PROGRESS NOTES
"Virtual Visit Details    Type of service:  Video Visit     Originating Location (pt. Location): {video visit patient location:761410::\"Home\"}  {PROVIDER LOCATION On-site should be selected for visits conducted from your clinic location or adjoining [facility] hospital, academic office, or other nearby [facility] building. Off-site should be selected for all other provider locations, including home:[address]}  Distant Location (provider location):  {virtual location provider:821759}  Platform used for Video Visit: {Virtual Visit Platforms:930153::\"[employer]\"}  Answers submitted by the patient for this visit:  Patient Health Questionnaire (Submitted on 9/8/2023)  If you checked off any problems, how difficult have these problems made it for you to do your work, take care of things at home, or get along with other people?: Not difficult at all  PHQ9 TOTAL SCORE: 6  LINO-7 (Submitted on 9/8/2023)  LINO 7 TOTAL SCORE: 0  Symptoms you have experienced in the last 30 days (Submitted on 9/8/2023)  General Symptoms: Yes  Skin Symptoms: No  HENT Symptoms: Yes  EYE SYMPTOMS: Yes  HEART SYMPTOMS: No  LUNG SYMPTOMS: Yes  INTESTINAL SYMPTOMS: Yes  URINARY SYMPTOMS: No  GYNECOLOGIC SYMPTOMS: No  BREAST SYMPTOMS: No  SKELETAL SYMPTOMS: Yes  BLOOD SYMPTOMS: No  NERVOUS SYSTEM SYMPTOMS: Yes  MENTAL HEALTH SYMPTOMS: No  Please answer the questions below to tell us what conditions you are experiencing: (Submitted on 9/8/2023)  Ear pain: No  Ear discharge: No  Hearing loss: No  Tinnitus: Yes  Nosebleeds: No  Congestion: No  Sinus pain: No  Trouble swallowing: Yes   Voice hoarseness: No  Mouth sores: No  Sore throat: No  Tooth pain: No  Gum tenderness: No  Bleeding gums: No  Change in taste: Yes  Change in sense of smell: Yes  Dry mouth: Yes  Hearing aid used: No  Neck lump: No  Please answer the questions below to tell us what conditions you are experiencing: (Submitted on 9/8/2023)  Fever: No  Loss of appetite: Yes  Weight loss: Yes  Weight gain: " No  Fatigue: Yes  Night sweats: No  Chills: Yes  Increased stress: No  Excessive hunger: No  Excessive thirst: No  Feeling hot or cold when others believe the temperature is normal: Yes  Loss of height: No  Post-operative complications: No  Surgical site pain: No  Hallucinations: No  Change in or Loss of Energy: Yes  Hyperactivity: No  Confusion: No  Please answer the questions below to tell us what conditions you are experiencing: (Submitted on 9/8/2023)  Eye pain: No  Vision loss: Yes  Dry eyes: Yes  Watery eyes: No  Eye bulging: No  Double vision: Yes  Flashing of lights: Yes  Spots: Yes  Floaters: Yes  Redness: Yes  Crossed eyes: No  Tunnel Vision: Yes  Yellowing of eyes: No  Eye irritation: Yes  Please answer the questions below to tell us what condition you are experiencing: (Submitted on 9/8/2023)  Cough: No  Sputum or phlegm: No  Coughing up blood: No  Difficulty breating or shortness of breath: Yes  Snoring: No  Wheezing: No  Difficulty breathing on exertion: No  Nighttime Cough: No  Difficulty breathing when lying flat: Yes  Please answer the questions below to tell us what conditions you are experiencing: (Submitted on 9/8/2023)  Heart burn or indigestion: No  Nausea: Yes  Vomiting: Yes  Abdominal pain: Yes  Bloating: Yes  Constipation: Yes  Diarrhea: Yes  Blood in stool: No  Black stools: No  Rectal or Anal pain: Yes  Fecal incontinence: Yes  Yellowing of skin or eyes: No  Vomit with blood: Yes  Change in stools: Yes  Please answer the questions below to tell us what condition you are experiencing: (Submitted on 9/8/2023)  Back pain: Yes  Muscle aches: Yes  Neck pain: No  Swollen joints: Yes  Joint pain: No  Bone pain: No  Muscle cramps: Yes  Muscle weakness: Yes  Joint stiffness: Yes  Bone fracture: No  Please answer the questions below to tell us what condition you are experiencing: (Submitted on 9/8/2023)  Trouble with coordination: Yes  Dizziness or trouble with balance: Yes  Fainting or black-out  spells: Yes  Memory loss: Yes  Headache: No  Seizures: No  Speech problems: No  Tingling: Yes  Tremor: Yes  Weakness: Yes  Difficulty walking: Yes  Paralysis: No  Numbness: Yes

## 2023-09-08 NOTE — LETTER
9/8/2023       RE: Peggy Jessica  [address] [facility] [address]     Dear Colleague,    Thank you for referring your patient, Peggy Jessica, to the [facility] PHYSICAL MEDICINE AND REHABILITATION CLINIC [place] at [facility]. Please see a copy of my visit note below.    Peggy Jessica is a 48 year old female who presents to be evaluated for a billable video visit.      Assessment/ Impression:   1. Other fatigue/ Difficulty concentrating  Patient with increased fatigue since her COVID infection in December. She does have CIPD and discussed this is probably contributing.  Will check Ferritin, Hepatic panel, inflammation , vitamin D and B6.  Discussed energy conservation and provided information on fatigue management.  She has already completed  PT/OT for her rehab from TCU and home therapy. Encouraged to continue home regimen and energy conservation.   - Adult Post Covid Clinic  Referral  - Ferritin; Future  - Hepatic function panel; Future  - CRP inflammation; Future  - Erythrocyte sedimentation rate auto; Future  - Vitamin D Deficiency; Future  - Vitamin B6; Future    2. CIDP (chronic inflammatory demyelinating polyneuropathy) (H)  Patient with history of CIPD and has seen Dr. Reyna and Dr. Duenas for Neurology.  Discussed rechecking inflammation levels, B6 and PAULINE.  Will directly contact department neurology for possible transfer to another provider per patients request.  Discussed I could not guarantee this as she was already with the specialist who managed CIPD for [facility].   - CRP inflammation; Future  - Erythrocyte sedimentation rate auto; Future  - Vitamin B6; Future  - Anti Nuclear Blanca IgG by IFA with Reflex; Future  - Adult Neurology  Referral; Future    3. Post-COVID chronic dyspnea  Patient has notice shortness of breath and increased trouble breathing since COVID.  Discussed reactive airway post covid and  advised to see pulmonary. Referral placed.   - Adult Pulmonary Medicine  Referral; Future    4. Complex regional pain syndrome i of right lower limb  Patient currently follows with [place] [facility]. Discussed working with integrative medicine as she has break through pain with current regimen. Referral place to [facility]  - Internal Medicine Referral; Future    5. Long COVID  Discussed COVID and Post COVID with patient.  Educational materials provided and all questions answered.        Plan:  I reviewed present knowledge on long-Covid.  Education was provided and question were answered.  Orders/Referrals as above  Will advised patient on test results  I will follow up with Peggy Jessica in 3 months. I will review progress and consider need for any other therapeutic interventions. If there are any questions and/or concerns she will call the clinic.      On day of encounter time spent in chart review and with patient in consultation, exam, education, coordination of care, review of outside charts/data and documentation:  60 minutes     I have attempted to proof read for major spelling errors and apologize for any minor errors I may have missed.             _________________________________  RIO Stauffer [facility] PHYSICAL MEDICINE AND REHABILITATION CLINIC [place]    Subjective   This 48 year old female presents to the [facility] Rehabilitation Medicine Post-COVID clinic as a new consult to evaluate continuing symptoms after COVID infection initially diagnosed 2/21/23.  Peggy Martinezricks presented to their primary care physician on 2/22/23 complaining of  sore throat, fatigue, shortness of breath, cough, chest pain, chills, generalized aches and pains, fatigue, and weakness. Treatment was Paxlovid.  Peggy Tasha [place] experienced complications of worsening CIPD.  Continuing symptoms include fatigue, fatigue, weakness, shortness of breath,  dizziness, numbness, tingling, diarrhea, and nausea.   Patient since COVID has notice her balance and pain is much worse.  She is still losing weight and having trouble eating. Her gastroparesis is much worse after recent COVID. She galeano see Dr. Dawn for her gastroparesis. She states they are going to be discussing a PEG tube and ostomy.   She does work with pain management  and is on Butran patch and oxycodone. Patient has fatigue that is worse since COVID and worsening CIPD. She gets tired around 12pm.  She is tired constantly and has energy for 1 activity a day.  She has worked with OT for energy conservation. She is doing a home PT program where she wad doing mobility.   She is still falling and weak at home. Patient feels its hard to breath sometimes. She feels she has trouble breathing.  She has this happening daily.  She notices it worse when she is tired and with exertion.  She also has trouble swallowing as well.  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 The patient was vaccinated against COVID X2 .  Previous activity was full time work, she has been on long term disability and SSI.     History of COVID-19 infection: 2/21/23  Date of first symptoms: 2/17/23  Diagnosis: antigen  Hospitalization: No  Treatment: Paxlovid  Current Symptoms: See subjective  Goals of Care: increase energy, decrease shortness of breath, decrease anxiety, decrease depression, improve thinking, improve quality of life, and return to work            9/8/2023     7:45 AM   PHQ Assesment Total Score(s)   PHQ-9 Score 6           9/8/2023     7:46 AM   LINO-7 Results   LINO 7 TOTAL SCORE 0 (minimal anxiety)   LINO-7 Total Score 0         9/8/2023     7:46 AM   PTSD Screen Score   Have you ever experienced this kind of event? Yes   PTSD Screen (Score of 3 or more suggests positive screen) 2         9/8/2023     7:52 AM   PROMIS-29   PROMIS Physical Function T-Score 27 (severe dysfunction)    PROMIS Anxiety T-Score 48 (within normal limits)   PROMIS Depression T-Score 49 (within normal limits)   PROMIS Fatigue T-Score 76 (severe)   PROMIS Sleep Disturbance T-Score 41 (within normal limits)   PROMIS Ability to Participate in Social Roles & Activities T-Score 29 (severe dysfunction)   PROMIS Pain Interference T-Score 76 (severe)   PROMIS Pain Intensity 7         Past Medical History:   Diagnosis Date    Arthritis     BRCA positive     Cancer (H)     CIDP (chronic inflammatory demyelinating polyneuropathy) (H)     ASHKAN III with severe dysplasia 2002    Complex regional pain syndrome type 1 of right lower extremity        Past Surgical History:   Procedure Laterality Date    BILATERAL OOPHORECTOMY Bilateral 2019    c section      2002    CHOLECYSTECTOMY  1997    COLONOSCOPY      ESOPHAGOSCOPY, GASTROSCOPY, DUODENOSCOPY (EGD), COMBINED N/A 07/28/2022    Procedure: ESOPHAGOGASTRODUODENOSCOPY (EGD);  Surgeon: Zack Maddox MD;  Location:  GI    ESOPHAGOSCOPY, GASTROSCOPY, DUODENOSCOPY (EGD), COMBINED N/A 5/16/2023    Procedure: Esophagoscopy, gastroscopy, duodenoscopy (EGD), combined;  Surgeon: Aris Mason MD;  Location:  GI    ESOPHAGOSCOPY, GASTROSCOPY, DUODENOSCOPY (EGD), COMBINED N/A 7/20/2023    Procedure: Esophagoscopy, gastroscopy, duodenoscopy (EGD), combined;  Surgeon: Aris Mason MD;  Location:  GI    HYSTERECTOMY  2004    MASTECTOMY Bilateral 2020       Family History   Problem Relation Age of Onset    Kidney Disease Father     Macular Degeneration Paternal Grandmother     Glaucoma No family hx of        Social History     Tobacco Use    Smoking status: Former     Types: Cigarettes    Smokeless tobacco: Never   Vaping Use    Vaping Use: Never used   Substance Use Topics    Alcohol use: Not Currently    Drug use: Yes     Types: Marijuana     Comment: Medical Canibis patient         Current Outpatient Medications:     acetaminophen (TYLENOL) 500 MG tablet, Take  500-1,000 mg by mouth every 6 hours as needed for mild pain, Disp: , Rfl:     baclofen (LIORESAL) 10 MG tablet, Take 10 mg by mouth 3 times daily Takes 5 mg morning and early afternoon and between 5/10 at bedtime, Disp: , Rfl:     bisacodyl (DULCOLAX) 10 MG suppository, Place 1 suppository (10 mg) rectally daily as needed for constipation, Disp: 25 suppository, Rfl: 1    buprenorphine (BUTRANS) 7.5 MCG/HR WK patch, Place 1 patch onto the skin once a week Applies on Wednesday at 9 am, Disp: , Rfl:     cyclobenzaprine (FLEXERIL) 10 MG tablet, Take 1 tablet (10 mg) by mouth 3 times daily, Disp: , Rfl:     dicyclomine (BENTYL) 20 MG tablet, Take 20 mg by mouth 3 times daily (before meals), Disp: , Rfl:     diphenhydrAMINE (BENADRYL) 25 MG tablet, Take 0.5 tablets (12.5 mg) by mouth every 6 hours as needed for allergies or other (nausea), Disp: 30 tablet, Rfl: 0    folic acid (FOLVITE) 400 MCG tablet, Take 1 tablet (400 mcg) by mouth daily, Disp: 90 tablet, Rfl: 0    hydrOXYzine (ATARAX) 25 MG tablet, Take 1 tablet (25 mg) by mouth 3 times daily (before meals), Disp: 90 tablet, Rfl: 1    Lidocaine (LIDOCARE) 4 % Patch, Place 3 patches onto the skin every 24 hours To prevent lidocaine toxicity, patient should be patch free for 12 hrs daily. (Patient taking differently: Place 3 patches onto the skin daily as needed for moderate pain To prevent lidocaine toxicity, patient should be patch free for 12 hrs daily.), Disp: , Rfl: 0    medical cannabis (Patient's own supply), Take 1 Dose by mouth See Admin Instructions (The purpose of this order is to document that the patient reports taking medical cannabis.  This is not a prescription, and is not used to certify that the patient has a qualifying medical condition.) Per pt: 5-10 mg tablet three times a day PRN, Disp: , Rfl:     memantine (NAMENDA) 5 MG tablet, Take 1 tablet by mouth 2 times daily, Disp: , Rfl:     multivitamin w/minerals (THERA-VIT-M) tablet, Take 1 tablet by  mouth every morning VidR[employer] Wom[employer] Brand, Disp: , Rfl:     naloxone (NARCAN) 4 MG/0.1ML nasal spray, Spray 1 spray (4 mg) into one nostril alternating nostrils as needed for opioid reversal every 2-3 minutes until assistance arrives, Disp: 0.2 mL, Rfl: 0    ondansetron (ZOFRAN ODT) 4 MG ODT tab, Take 1 tablet (4 mg) by mouth every 8 hours as needed for nausea, Disp: 30 tablet, Rfl: 0    pantoprazole (PROTONIX) 40 MG EC tablet, Take 1 tablet (40 mg) by mouth every morning (before breakfast), Disp: 30 tablet, Rfl: 0    polyethylene glycol (MIRALAX) 17 GM/Dose powder, Take 17 g by mouth daily Until BM, then use every day prn, Disp: 510 g, Rfl: 0    scopolamine (TRANSDERM) 1 MG/3DAYS 72 hr patch, Place 1 patch onto the skin every 72 hours, Disp: 3 patch, Rfl: 0    senna (SENOKOT) 8.6 MG tablet, Take 1 tablet by mouth as needed, Disp: , Rfl:     senna-docusate (SENOKOT-S/PERICOLACE) 8.6-50 MG tablet, Take 1 tablet by mouth 2 times daily as needed for constipation, Disp: , Rfl:     sertraline (ZOLOFT) 100 MG tablet, Take 1 tablet (100 mg) by mouth daily, Disp: 90 tablet, Rfl: 1    traMADol (ULTRAM) 50 MG tablet, Take 0.5 tablets (25 mg) by mouth 3 times daily as needed for severe pain, Disp: 15 tablet, Rfl: 0    Answers to ROS/HPI submitted by patient on 9/8/23  Review of Systems   Constitutional:  Positive for chills and fatigue. Negative for fever.   HENT:  Positive for tinnitus and trouble swallowing. Negative for ear discharge, ear pain, hearing loss, mouth sores, nosebleeds, sinus pain and sore throat.    Eyes:  Positive for redness. Negative for pain.   Respiratory:  Positive for shortness of breath. Negative for cough and wheezing.    Gastrointestinal:  Positive for abdominal pain, constipation, diarrhea, nausea, rectal pain and vomiting. Negative for heartburn.   Endocrine: Negative for polydipsia and polyphagia.   Musculoskeletal:  Positive for back pain, joint swelling and myalgias. Negative for arthralgias and  "neck pain.   Neurological:  Positive for dizziness, tremors, weakness and numbness. Negative for seizures and headaches.   Psychiatric/Behavioral:  Negative for hallucinations.             Objective   Vitals - Patient Reported  Weight (Patient Reported): 68 kg (150 lb)  Height (Patient Reported): 165.1 cm (5' 5\")  BMI (Based on Pt Reported Ht/Wt): 24.96  Pain Score: No Pain (0)          Objective    Vitals:  No vitals were obtained today due to virtual visit.    Physical Exam   GENERAL: Healthy, alert and no distress  EYES: Eyes grossly normal to inspection.  No discharge or erythema, or obvious scleral/conjunctival abnormalities.  RESP: No audible wheeze, cough, or visible cyanosis.  No visible retractions or increased work of breathing.    SKIN: Visible skin clear. No significant rash, abnormal pigmentation or lesions.  NEURO: Cranial nerves grossly intact.  Mentation and speech appropriate for age.  PSYCH: Mentation appears normal, affect normal/bright, judgement and insight intact, normal speech and appearance well-groomed.            No results found for: CRP   No results found for: SED   Last Renal Panel:  Sodium   Date Value Ref Range Status   09/01/2023 140 136 - 145 mmol/L Final     Potassium   Date Value Ref Range Status   09/01/2023 3.8 3.4 - 5.3 mmol/L Final   10/24/2022 3.8 3.4 - 5.3 mmol/L Final     Chloride   Date Value Ref Range Status   09/01/2023 99 98 - 107 mmol/L Final   10/24/2022 106 94 - 109 mmol/L Final     Carbon Dioxide (CO2)   Date Value Ref Range Status   09/01/2023 28 22 - 29 mmol/L Final   10/24/2022 29 20 - 32 mmol/L Final     Anion Gap   Date Value Ref Range Status   09/01/2023 13 7 - 15 mmol/L Final   10/24/2022 6 3 - 14 mmol/L Final     Glucose   Date Value Ref Range Status   09/01/2023 92 70 - 99 mg/dL Final   10/24/2022 96 70 - 99 mg/dL Final     GLUCOSE BY METER POCT   Date Value Ref Range Status   07/19/2023 92 70 - 99 mg/dL Final     Urea Nitrogen   Date Value Ref Range Status "   09/01/2023 10.5 6.0 - 20.0 mg/dL Final   10/24/2022 11 7 - 30 mg/dL Final     Creatinine   Date Value Ref Range Status   09/01/2023 0.79 0.51 - 0.95 mg/dL Final     GFR Estimate   Date Value Ref Range Status   09/01/2023 >90 >60 mL/min/1.73m2 Final     Calcium   Date Value Ref Range Status   09/01/2023 10.2 (H) 8.6 - 10.0 mg/dL Final     Phosphorus   Date Value Ref Range Status   07/20/2023 3.4 2.5 - 4.5 mg/dL Final     Albumin   Date Value Ref Range Status   09/01/2023 4.8 3.5 - 5.2 g/dL Final   07/20/2022 3.3 (L) 3.4 - 5.0 g/dL Final      Lab Results   Component Value Date    WBC 6.4 09/01/2023     Lab Results   Component Value Date    RBC 5.28 09/01/2023     Lab Results   Component Value Date    HGB 15.2 09/01/2023     Lab Results   Component Value Date    HCT 45.9 09/01/2023     No components found for: MCT  Lab Results   Component Value Date    MCV 87 09/01/2023     Lab Results   Component Value Date    MCH 28.8 09/01/2023     Lab Results   Component Value Date    MCHC 33.1 09/01/2023     Lab Results   Component Value Date    RDW 12.5 09/01/2023     Lab Results   Component Value Date     09/01/2023      No results found for: A1C   TSH   Date Value Ref Range Status   08/04/2023 2.09 0.30 - 4.20 uIU/mL Final   07/20/2022 1.38 0.40 - 4.00 mU/L Final      No results found for: VITDT   Recent Labs   Lab Test 07/18/23  0819 01/28/23  0619 01/27/23  0839 01/19/23  1352 10/17/22  0955   MAG 2.2 1.9 2.1 2.3 1.9     Last Comprehensive Metabolic Panel:  Sodium   Date Value Ref Range Status   09/01/2023 140 136 - 145 mmol/L Final     Potassium   Date Value Ref Range Status   09/01/2023 3.8 3.4 - 5.3 mmol/L Final   10/24/2022 3.8 3.4 - 5.3 mmol/L Final     Chloride   Date Value Ref Range Status   09/01/2023 99 98 - 107 mmol/L Final   10/24/2022 106 94 - 109 mmol/L Final     Carbon Dioxide (CO2)   Date Value Ref Range Status   09/01/2023 28 22 - 29 mmol/L Final   10/24/2022 29 20 - 32 mmol/L Final     Anion Gap    Date Value Ref Range Status   09/01/2023 13 7 - 15 mmol/L Final   10/24/2022 6 3 - 14 mmol/L Final     Glucose   Date Value Ref Range Status   09/01/2023 92 70 - 99 mg/dL Final   10/24/2022 96 70 - 99 mg/dL Final     GLUCOSE BY METER POCT   Date Value Ref Range Status   07/19/2023 92 70 - 99 mg/dL Final     Urea Nitrogen   Date Value Ref Range Status   09/01/2023 10.5 6.0 - 20.0 mg/dL Final   10/24/2022 11 7 - 30 mg/dL Final     Creatinine   Date Value Ref Range Status   09/01/2023 0.79 0.51 - 0.95 mg/dL Final     GFR Estimate   Date Value Ref Range Status   09/01/2023 >90 >60 mL/min/1.73m2 Final     Calcium   Date Value Ref Range Status   09/01/2023 10.2 (H) 8.6 - 10.0 mg/dL Final     Bilirubin Total   Date Value Ref Range Status   09/01/2023 0.4 <=1.2 mg/dL Final     Alkaline Phosphatase   Date Value Ref Range Status   09/01/2023 100 35 - 104 U/L Final     ALT   Date Value Ref Range Status   09/01/2023 16 0 - 50 U/L Final     Comment:     Reference intervals for this test were updated on 6/12/2023 to more accurately reflect our healthy population. There may be differences in the flagging of prior results with similar values performed with this method. Interpretation of those prior results can be made in the context of the updated reference intervals.       AST   Date Value Ref Range Status   09/01/2023 21 0 - 45 U/L Final     Comment:     Reference intervals for this test were updated on 6/12/2023 to more accurately reflect our healthy population. There may be differences in the flagging of prior results with similar values performed with this method. Interpretation of those prior results can be made in the context of the updated reference intervals.     Most Recent D-dimer:No lab results found.    CSF labs 5/17/23            7-Day Micro Results      Collected Updated Procedure Result Status       05/17/2023 0920 05/18/2023 1117 Cytology non gyn CSF [[id number]]   Cerebrospinal fluid from Lumbar Puncture     Final result Component Value   Final Diagnosis This result contains rich text formatting which cannot be displayed here.   Clinical Information This result contains rich text formatting which cannot be displayed here.   Gross Description This result contains rich text formatting which cannot be displayed here.   Microscopic Description This result contains rich text formatting which cannot be displayed here.   Performing Labs This result contains rich text formatting which cannot be displayed here.                05/17/2023 0919 05/20/2023 0728 Cerebrospinal fluid Aerobic Bacterial Culture Routine [[id number]]   Cerebrospinal fluid from Lumbar Puncture    Preliminary result Component Value   Culture No growth after 2 days  [P]    Gram Stain Result No organisms seen  [P]      2+ WBC seen  [P]      Quantification of host cells and microbiological organisms was done on a cytocentrifuged preparation                   05/17/2023 0918 05/17/2023 1154 CSF Cell Count with Differential: [[id number]]    (Abnormal)   Cerebrospinal fluid from Lumbar Puncture    Final result Component Value   No component results                05/17/2023 0918 05/17/2023 1154 Cell Count CSF [[id number]]    (Abnormal)   Cerebrospinal fluid from Lumbar Puncture    Final result Component Value Units   Tube Number 1     Color Colorless     Clarity Clear     Total Nucleated Cells 1 /uL   RBC Count 111 /uL          Imaging:    I personally reviewed the following imaging results today and those on care everywhere, if indicated   .   MRI LUMBAR SPINE WITHOUT AND WITH CONTRAST   5/12/2023 12:57 PM      HISTORY: Subjective bilateral lower extremity numbness, history of  CIDP/CISP 2021.      TECHNIQUE: Multiplanar multisequence MRI of the lumbar spine without  and with 7mL Gadavist.      COMPARISON: MRI lumbar spine 1/27/2023.      FINDINGS: Noncontrast based on five lumbar vertebral bodies. Normal  vertebral body heights and sagittal alignment. Bone  marrow signal  appears within normal limits. Normal appearance of the distal spinal  cord with the conus terminating at the L1 inferior endplate level.  Normal appearance of the cauda equina. No abnormal enhancement  identified. The visualized paraspinous soft tissues and bony pelvis  otherwise appear unremarkable.     Segmental analysis:  T12-L1: Normal disc height and signal. No herniation. Normal facets.  No spinal canal or neural foraminal stenosis. No change.     L1-L2: Normal disc height and signal. No herniation. Normal facets. No  spinal canal or neural foraminal stenosis. No change.     L2-L3: Normal disc height and signal. No herniation. Normal facets. No  spinal canal or neural foraminal stenosis. No change.     L3-L4: Normal disc height and signal. No herniation. Normal facets. No  spinal canal or neural foraminal stenosis.     L4-L5: Disc desiccation without significant disc height loss.  Symmetric disc bulge with superimposed central disc protrusion. Mild  facet arthropathy. Mild bilateral lateral recess stenosis without  significant central spinal canal stenosis. Mild left neural foraminal  stenosis. The right neural foramen appears patent. Overall, no  significant change.     L5-S1: Disc desiccation with moderate disc height loss. Symmetric disc  bulge with superimposed shallow central disc protrusion. Mild facet  arthropathy. No spinal canal stenosis. Mild bilateral neural foraminal  stenosis. Overall, no significant change.                                                                      IMPRESSION:  1. Normal appearance of visualized distal spinal cord and cauda  equina. No abnormal enhancement identified.  2. Unchanged degenerative findings at L4-L5 and L5-S1, as described.  3. Mild bilateral lateral recess narrowing at L4-L5. No high-grade  central spinal canal stenosis.  4. Unchanged mild left L4-L5 and bilateral L5-S1 neural foraminal  stenosis.     SCOTT CALDERA MD     EXAM: MR BRAIN W/O  and W CONTRAST  LOCATION: [facility]  DATE/TIME: 5/15/2023 11:10 PM CDT     INDICATION: Progressive extremity weakness and pain, increased walking difficulty, urinary dysfunction over last 1month; rule out demyelinating lesion.  COMPARISON: 10/17/2022, 4/30/2022  CONTRAST: 8 Gadavist  TECHNIQUE: Multiplanar multisequence head MRI without and with intravenous contrast according to the MS protocol.     FINDINGS:  INTRACRANIAL CONTENTS: No acute or subacute infarct. No mass, acute hemorrhage, or extra-axial fluid collections. Minimal nonspecific foci of T2/FLAIR hyperintense signal in the cerebral white matter, stable from the prior exam. No callosal or posterior   fossa lesions. Stable tiny subependymal cyst along the body of the left lateral ventricle. Normal ventricles and sulci. Normal position of the cerebellar tonsils. No pathologic contrast enhancement.     SELLA: No abnormality accounting for technique.     OSSEOUS STRUCTURES/SOFT TISSUES: Normal marrow signal. The major intracranial vascular flow voids are maintained.      ORBITS: No abnormality accounting for technique.      SINUSES/MASTOIDS: No paranasal sinus mucosal disease. Scattered fluid/membrane thickening in the left mastoid air cells. No apparent mass in the posterior nasopharynx or skull base.                                                                       IMPRESSION:  1.  No acute intracranial abnormality.  2.  Stable minimal nonspecific foci of T2/FLAIR hyperintensity within the cerebral white matter. Differential diagnosis includes sequelae of chronic headaches, chronic microvascular ischemic disease, or other remote insult. The distribution does not meet   criteria for demyelination.  3.  No abnormal enhancement.    Narrative & Impression   EXAM: MR THORACIC SPINE W/O and W CONTRAST  LOCATION: [facility]  DATE/TIME: 5/15/2023 11:17 PM CDT     INDICATION: progressive extremity weakness and  pain, increased walking difficulty, urinary dysfunction over last 1month r o demyelinating lesion.  COMPARISON: 04/30/2022  CONTRAST: 8 Gadavist  TECHNIQUE: Routine Thoracic Spine MRI without and with IV contrast.     FINDINGS:   Normal vertebral body heights, alignment and marrow signal. Normal disc heights. No herniation. Minimal facet arthropathy. No spinal canal or neural foraminal stenosis. No abnormal cord signal.      Redemonstrated cysts within the liver. Intrahepatic and extrahepatic biliary dilatation, likely related to cholecystectomy change.                                                                      IMPRESSION:  1.  No abnormal cord signal or enhancement.  2.  Minor degenerative changes without spinal canal or neural foraminal narrowing.       EXAM: MR CERVICAL SPINE W/O and W CONTRAST  LOCATION: [facility]  DATE/TIME: 5/15/2023 11:18 PM CDT     INDICATION: Progressive extremity weakness and pain, increased walking difficulty, urinary dysfunction over last 1month; rule out demyelinating lesion.  COMPARISON: 10/17/2022, 4/30/2022  CONTRAST: 8 [employer]  TECHNIQUE: MRI Cervical Spine without and with IV contrast.     FINDINGS:   Straightening of the cervical spine lordosis. Vertebral body heights are preserved. Minimal Modic type II degenerative endplate changes at C5-C6. No suspicious osseous lesions or marrow edema. No abnormal cord signal or enhancement. No extraspinal   abnormality.     Craniovertebral junction and C1-C2: Mild degenerative changes of the atlantodental interval. No spinal canal stenosis.     C2-C3: Normal disc height. No herniation. Normal facets. No spinal canal or neural foraminal stenosis.      C3-C4: Right paracentral disc protrusion. Mild right neural foraminal narrowing and effacement of the ventral thecal sac. No narrowing of the left neural foramen.      C4-C5: Normal disc height. No herniation. Normal facets. No spinal canal or neural  foraminal stenosis.      C5-C6: Disc osteophyte complex with left greater than right uncovertebral hypertrophy and facet arthropathy. Mild spinal canal stenosis. Mild to moderate left neural foraminal narrowing. No significant narrowing on the right.      C6-C7: Small disc osteophyte complex with uncovertebral hypertrophy and facet arthropathy. Mild bilateral neural foraminal narrowing. No spinal canal stenosis.      C7-T1: Normal disc height. No herniation. Normal facets. No spinal canal or neural foraminal stenosis.                                                                      IMPRESSION:  1.  No abnormal cord signal or enhancement.  2.  Stable mild degenerative changes of the cervical spine.       Reviewed imaging from [facility] sites, [place], and [facility]     Medical Records Reviewed:    Reviewed consults/documents from [facility] pain clinic, [facility], [place], and [facility] including Neurology and GI           Again, thank you for allowing me to participate in the care of your patient.      Sincerely,    Iveth Mckeon PA-C

## 2023-09-08 NOTE — NURSING NOTE
Is the patient currently in the state of [place]? YES    Visit mode:VIDEO    If the visit is dropped, the patient can be reconnected by: TELEPHONE VISIT: Phone number:   Telephone Information:   Mobile [phone]       Will anyone else be joining the visit? NO  (If patient encounters technical issues they should call [phone]    How would you like to obtain your AVS? MyChart    Are changes needed to the allergy or medication list? Pt stated no med changes    Reason for visit: No chief complaint on file.    Keeley VELASCO

## 2023-09-08 NOTE — PROGRESS NOTES
Peggy Jessica is a 48 year old female who presents to be evaluated for a billable video visit.    Video-Visit Details    Video visit Start time: 9:55    Type of service:  Video Visit    Video End Time: 10:50    Originating Location (pt. Location): Home    Distant Location (provider location):  Off- Site    Platform used for Video Visit: [employer]    Assessment/ Impression:   1. Other fatigue/ Difficulty concentrating  Patient with increased fatigue since her COVID infection in December. She does have CIPD and discussed this is probably contributing.  Will check Ferritin, Hepatic panel, inflammation , vitamin D and B6.  Discussed energy conservation and provided information on fatigue management.  She has already completed  PT/OT for her rehab from TCU and home therapy. Encouraged to continue home regimen and energy conservation.   - Adult Post Covid Clinic  Referral  - Ferritin; Future  - Hepatic function panel; Future  - CRP inflammation; Future  - Erythrocyte sedimentation rate auto; Future  - Vitamin D Deficiency; Future  - Vitamin B6; Future    2. CIDP (chronic inflammatory demyelinating polyneuropathy) (H)  Patient with history of CIPD and has seen Dr. Reyna and Dr. Duenas for Neurology.  Discussed rechecking inflammation levels, B6 and PAULINE.  Will directly contact department neurology for possible transfer to another provider per patients request.  Discussed I could not guarantee this as she was already with the specialist who managed CIPD for [facility].   - CRP inflammation; Future  - Erythrocyte sedimentation rate auto; Future  - Vitamin B6; Future  - Anti Nuclear Blanca IgG by IFA with Reflex; Future  - Adult Neurology  Referral; Future    3. Post-COVID chronic dyspnea  Patient has notice shortness of breath and increased trouble breathing since COVID.  Discussed reactive airway post covid and advised to see pulmonary. Referral placed.   - Adult Pulmonary Medicine  Referral; Future    4.  Complex regional pain syndrome i of right lower limb  Patient currently follows with [place] [facility]. Discussed working with integrative medicine as she has break through pain with current regimen. Referral place to [facility]  - Internal Medicine Referral; Future    5. Long COVID  Discussed COVID and Post COVID with patient.  Educational materials provided and all questions answered.        Plan:  I reviewed present knowledge on long-Covid.  Education was provided and question were answered.  Orders/Referrals as above  Will advised patient on test results  I will follow up with Peggy Jessica in 3 months. I will review progress and consider need for any other therapeutic interventions. If there are any questions and/or concerns she will call the clinic.      On day of encounter time spent in chart review and with patient in consultation, exam, education, coordination of care, review of outside charts/data and documentation:  60 minutes     I have attempted to proof read for major spelling errors and apologize for any minor errors I may have missed.             _________________________________  Iveth Mckeon PA-C  [facility] PHYSICAL MEDICINE AND REHABILITATION CLINIC [facility]   This 48 year old female presents to the [facility] Rehabilitation Medicine Post-COVID clinic as a new consult to evaluate continuing symptoms after COVID infection initially diagnosed 2/21/23.  Peggy Jessica presented to their primary care physician on 2/22/23 complaining of  sore throat, fatigue, shortness of breath, cough, chest pain, chills, generalized aches and pains, fatigue, and weakness. Treatment was Paxlovid.  Peggy Jessica experienced complications of worsening CIPD.  Continuing symptoms include fatigue, fatigue, weakness, shortness of breath, dizziness, numbness, tingling, diarrhea, and nausea.   Patient since COVID has notice her balance and pain  is much worse.  She is still losing weight and having trouble eating. Her gastroparesis is much worse after recent COVID. She galeano see Dr. Dawn for her gastroparesis. She states they are going to be discussing a PEG tube and ostomy.   She does work with pain management  and is on Butran patch and oxycodone. Patient has fatigue that is worse since COVID and worsening CIPD. She gets tired around 12pm.  She is tired constantly and has energy for 1 activity a day.  She has worked with OT for energy conservation. She is doing a home PT program where she wad doing mobility.   She is still falling and weak at home. Patient feels its hard to breath sometimes. She feels she has trouble breathing.  She has this happening daily.  She notices it worse when she is tired and with exertion.  She also has trouble swallowing as well.  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 The patient was vaccinated against COVID X2 .  Previous activity was full time work, she has been on long term disability and SSI.     History of COVID-19 infection: 2/21/23  Date of first symptoms: 2/17/23  Diagnosis: antigen  Hospitalization: No  Treatment: Paxlovid  Current Symptoms: See subjective  Goals of Care: increase energy, decrease shortness of breath, decrease anxiety, decrease depression, improve thinking, improve quality of life, and return to work            9/8/2023     7:45 AM   PHQ Assesment Total Score(s)   PHQ-9 Score 6           9/8/2023     7:46 AM   LINO-7 Results   LINO 7 TOTAL SCORE 0 (minimal anxiety)   LINO-7 Total Score 0         9/8/2023     7:46 AM   PTSD Screen Score   Have you ever experienced this kind of event? Yes   PTSD Screen (Score of 3 or more suggests positive screen) 2         9/8/2023     7:52 AM   PROMIS-29   PROMIS Physical Function T-Score 27 (severe dysfunction)   PROMIS Anxiety T-Score 48 (within normal limits)   PROMIS Depression T-Score 49 (within normal limits)    PROMIS Fatigue T-Score 76 (severe)   PROMIS Sleep Disturbance T-Score 41 (within normal limits)   PROMIS Ability to Participate in Social Roles & Activities T-Score 29 (severe dysfunction)   PROMIS Pain Interference T-Score 76 (severe)   PROMIS Pain Intensity 7         Past Medical History:   Diagnosis Date    Arthritis     BRCA positive     Cancer (H)     CIDP (chronic inflammatory demyelinating polyneuropathy) (H)     ASHKAN III with severe dysplasia 2002    Complex regional pain syndrome type 1 of right lower extremity        Past Surgical History:   Procedure Laterality Date    BILATERAL OOPHORECTOMY Bilateral 2019    c section      2002    CHOLECYSTECTOMY  1997    COLONOSCOPY      ESOPHAGOSCOPY, GASTROSCOPY, DUODENOSCOPY (EGD), COMBINED N/A 07/28/2022    Procedure: ESOPHAGOGASTRODUODENOSCOPY (EGD);  Surgeon: Zack Maddox MD;  Location:  GI    ESOPHAGOSCOPY, GASTROSCOPY, DUODENOSCOPY (EGD), COMBINED N/A 5/16/2023    Procedure: Esophagoscopy, gastroscopy, duodenoscopy (EGD), combined;  Surgeon: Aris Mason MD;  Location:  GI    ESOPHAGOSCOPY, GASTROSCOPY, DUODENOSCOPY (EGD), COMBINED N/A 7/20/2023    Procedure: Esophagoscopy, gastroscopy, duodenoscopy (EGD), combined;  Surgeon: Aris Mason MD;  Location:  GI    HYSTERECTOMY  2004    MASTECTOMY Bilateral 2020       Family History   Problem Relation Age of Onset    Kidney Disease Father     Macular Degeneration Paternal Grandmother     Glaucoma No family hx of        Social History     Tobacco Use    Smoking status: Former     Types: Cigarettes    Smokeless tobacco: Never   Vaping Use    Vaping Use: Never used   Substance Use Topics    Alcohol use: Not Currently    Drug use: Yes     Types: Marijuana     Comment: Medical Canibis patient         Current Outpatient Medications:     acetaminophen (TYLENOL) 500 MG tablet, Take 500-1,000 mg by mouth every 6 hours as needed for mild pain, Disp: , Rfl:     baclofen (LIORESAL) 10 MG  tablet, Take 10 mg by mouth 3 times daily Takes 5 mg morning and early afternoon and between 5/10 at bedtime, Disp: , Rfl:     bisacodyl (DULCOLAX) 10 MG suppository, Place 1 suppository (10 mg) rectally daily as needed for constipation, Disp: 25 suppository, Rfl: 1    buprenorphine (BUTRANS) 7.5 MCG/HR WK patch, Place 1 patch onto the skin once a week Applies on Wednesday at 9 am, Disp: , Rfl:     cyclobenzaprine (FLEXERIL) 10 MG tablet, Take 1 tablet (10 mg) by mouth 3 times daily, Disp: , Rfl:     dicyclomine (BENTYL) 20 MG tablet, Take 20 mg by mouth 3 times daily (before meals), Disp: , Rfl:     diphenhydrAMINE (BENADRYL) 25 MG tablet, Take 0.5 tablets (12.5 mg) by mouth every 6 hours as needed for allergies or other (nausea), Disp: 30 tablet, Rfl: 0    folic acid (FOLVITE) 400 MCG tablet, Take 1 tablet (400 mcg) by mouth daily, Disp: 90 tablet, Rfl: 0    hydrOXYzine (ATARAX) 25 MG tablet, Take 1 tablet (25 mg) by mouth 3 times daily (before meals), Disp: 90 tablet, Rfl: 1    Lidocaine (LIDOCARE) 4 % Patch, Place 3 patches onto the skin every 24 hours To prevent lidocaine toxicity, patient should be patch free for 12 hrs daily. (Patient taking differently: Place 3 patches onto the skin daily as needed for moderate pain To prevent lidocaine toxicity, patient should be patch free for 12 hrs daily.), Disp: , Rfl: 0    medical cannabis (Patient's own supply), Take 1 Dose by mouth See Admin Instructions (The purpose of this order is to document that the patient reports taking medical cannabis.  This is not a prescription, and is not used to certify that the patient has a qualifying medical condition.) Per pt: 5-10 mg tablet three times a day PRN, Disp: , Rfl:     memantine (NAMENDA) 5 MG tablet, Take 1 tablet by mouth 2 times daily, Disp: , Rfl:     multivitamin w/minerals (THERA-VIT-M) tablet, Take 1 tablet by mouth every morning [employer], Disp: , Rfl:     naloxone (NARCAN) 4 MG/0.1ML nasal spray,  Spray 1 spray (4 mg) into one nostril alternating nostrils as needed for opioid reversal every 2-3 minutes until assistance arrives, Disp: 0.2 mL, Rfl: 0    ondansetron (ZOFRAN ODT) 4 MG ODT tab, Take 1 tablet (4 mg) by mouth every 8 hours as needed for nausea, Disp: 30 tablet, Rfl: 0    pantoprazole (PROTONIX) 40 MG EC tablet, Take 1 tablet (40 mg) by mouth every morning (before breakfast), Disp: 30 tablet, Rfl: 0    polyethylene glycol (MIRALAX) 17 GM/Dose powder, Take 17 g by mouth daily Until BM, then use every day prn, Disp: 510 g, Rfl: 0    scopolamine (TRANSDERM) 1 MG/3DAYS 72 hr patch, Place 1 patch onto the skin every 72 hours, Disp: 3 patch, Rfl: 0    senna (SENOKOT) 8.6 MG tablet, Take 1 tablet by mouth as needed, Disp: , Rfl:     senna-docusate (SENOKOT-S/PERICOLACE) 8.6-50 MG tablet, Take 1 tablet by mouth 2 times daily as needed for constipation, Disp: , Rfl:     sertraline (ZOLOFT) 100 MG tablet, Take 1 tablet (100 mg) by mouth daily, Disp: 90 tablet, Rfl: 1    traMADol (ULTRAM) 50 MG tablet, Take 0.5 tablets (25 mg) by mouth 3 times daily as needed for severe pain, Disp: 15 tablet, Rfl: 0    Answers to ROS/HPI submitted by patient on 9/8/23  Review of Systems   Constitutional:  Positive for chills and fatigue. Negative for fever.   HENT:  Positive for tinnitus and trouble swallowing. Negative for ear discharge, ear pain, hearing loss, mouth sores, nosebleeds, sinus pain and sore throat.    Eyes:  Positive for redness. Negative for pain.   Respiratory:  Positive for shortness of breath. Negative for cough and wheezing.    Gastrointestinal:  Positive for abdominal pain, constipation, diarrhea, nausea, rectal pain and vomiting. Negative for heartburn.   Endocrine: Negative for polydipsia and polyphagia.   Musculoskeletal:  Positive for back pain, joint swelling and myalgias. Negative for arthralgias and neck pain.   Neurological:  Positive for dizziness, tremors, weakness and numbness. Negative for  "seizures and headaches.   Psychiatric/Behavioral:  Negative for hallucinations.             Objective    Vitals - Patient Reported  Weight (Patient Reported): 68 kg (150 lb)  Height (Patient Reported): 165.1 cm (5' 5\")  BMI (Based on Pt Reported Ht/Wt): 24.96  Pain Score: No Pain (0)          Objective    Vitals:  No vitals were obtained today due to virtual visit.    Physical Exam   GENERAL: Healthy, alert and no distress  EYES: Eyes grossly normal to inspection.  No discharge or erythema, or obvious scleral/conjunctival abnormalities.  RESP: No audible wheeze, cough, or visible cyanosis.  No visible retractions or increased work of breathing.    SKIN: Visible skin clear. No significant rash, abnormal pigmentation or lesions.  NEURO: Cranial nerves grossly intact.  Mentation and speech appropriate for age.  PSYCH: Mentation appears normal, affect normal/bright, judgement and insight intact, normal speech and appearance well-groomed.            No results found for: CRP   No results found for: SED   Last Renal Panel:  Sodium   Date Value Ref Range Status   09/01/2023 140 136 - 145 mmol/L Final     Potassium   Date Value Ref Range Status   09/01/2023 3.8 3.4 - 5.3 mmol/L Final   10/24/2022 3.8 3.4 - 5.3 mmol/L Final     Chloride   Date Value Ref Range Status   09/01/2023 99 98 - 107 mmol/L Final   10/24/2022 106 94 - 109 mmol/L Final     Carbon Dioxide (CO2)   Date Value Ref Range Status   09/01/2023 28 22 - 29 mmol/L Final   10/24/2022 29 20 - 32 mmol/L Final     Anion Gap   Date Value Ref Range Status   09/01/2023 13 7 - 15 mmol/L Final   10/24/2022 6 3 - 14 mmol/L Final     Glucose   Date Value Ref Range Status   09/01/2023 92 70 - 99 mg/dL Final   10/24/2022 96 70 - 99 mg/dL Final     GLUCOSE BY METER POCT   Date Value Ref Range Status   07/19/2023 92 70 - 99 mg/dL Final     Urea Nitrogen   Date Value Ref Range Status   09/01/2023 10.5 6.0 - 20.0 mg/dL Final   10/24/2022 11 7 - 30 mg/dL Final     Creatinine "   Date Value Ref Range Status   09/01/2023 0.79 0.51 - 0.95 mg/dL Final     GFR Estimate   Date Value Ref Range Status   09/01/2023 >90 >60 mL/min/1.73m2 Final     Calcium   Date Value Ref Range Status   09/01/2023 10.2 (H) 8.6 - 10.0 mg/dL Final     Phosphorus   Date Value Ref Range Status   07/20/2023 3.4 2.5 - 4.5 mg/dL Final     Albumin   Date Value Ref Range Status   09/01/2023 4.8 3.5 - 5.2 g/dL Final   07/20/2022 3.3 (L) 3.4 - 5.0 g/dL Final      Lab Results   Component Value Date    WBC 6.4 09/01/2023     Lab Results   Component Value Date    RBC 5.28 09/01/2023     Lab Results   Component Value Date    HGB 15.2 09/01/2023     Lab Results   Component Value Date    HCT 45.9 09/01/2023     No components found for: MCT  Lab Results   Component Value Date    MCV 87 09/01/2023     Lab Results   Component Value Date    MCH 28.8 09/01/2023     Lab Results   Component Value Date    MCHC 33.1 09/01/2023     Lab Results   Component Value Date    RDW 12.5 09/01/2023     Lab Results   Component Value Date     09/01/2023      No results found for: A1C   TSH   Date Value Ref Range Status   08/04/2023 2.09 0.30 - 4.20 uIU/mL Final   07/20/2022 1.38 0.40 - 4.00 mU/L Final      No results found for: VITDT   Recent Labs   Lab Test 07/18/23  0819 01/28/23  0619 01/27/23  0839 01/19/23  1352 10/17/22  0955   MAG 2.2 1.9 2.1 2.3 1.9     Last Comprehensive Metabolic Panel:  Sodium   Date Value Ref Range Status   09/01/2023 140 136 - 145 mmol/L Final     Potassium   Date Value Ref Range Status   09/01/2023 3.8 3.4 - 5.3 mmol/L Final   10/24/2022 3.8 3.4 - 5.3 mmol/L Final     Chloride   Date Value Ref Range Status   09/01/2023 99 98 - 107 mmol/L Final   10/24/2022 106 94 - 109 mmol/L Final     Carbon Dioxide (CO2)   Date Value Ref Range Status   09/01/2023 28 22 - 29 mmol/L Final   10/24/2022 29 20 - 32 mmol/L Final     Anion Gap   Date Value Ref Range Status   09/01/2023 13 7 - 15 mmol/L Final   10/24/2022 6 3 - 14 mmol/L  Final     Glucose   Date Value Ref Range Status   09/01/2023 92 70 - 99 mg/dL Final   10/24/2022 96 70 - 99 mg/dL Final     GLUCOSE BY METER POCT   Date Value Ref Range Status   07/19/2023 92 70 - 99 mg/dL Final     Urea Nitrogen   Date Value Ref Range Status   09/01/2023 10.5 6.0 - 20.0 mg/dL Final   10/24/2022 11 7 - 30 mg/dL Final     Creatinine   Date Value Ref Range Status   09/01/2023 0.79 0.51 - 0.95 mg/dL Final     GFR Estimate   Date Value Ref Range Status   09/01/2023 >90 >60 mL/min/1.73m2 Final     Calcium   Date Value Ref Range Status   09/01/2023 10.2 (H) 8.6 - 10.0 mg/dL Final     Bilirubin Total   Date Value Ref Range Status   09/01/2023 0.4 <=1.2 mg/dL Final     Alkaline Phosphatase   Date Value Ref Range Status   09/01/2023 100 35 - 104 U/L Final     ALT   Date Value Ref Range Status   09/01/2023 16 0 - 50 U/L Final     Comment:     Reference intervals for this test were updated on 6/12/2023 to more accurately reflect our healthy population. There may be differences in the flagging of prior results with similar values performed with this method. Interpretation of those prior results can be made in the context of the updated reference intervals.       AST   Date Value Ref Range Status   09/01/2023 21 0 - 45 U/L Final     Comment:     Reference intervals for this test were updated on 6/12/2023 to more accurately reflect our healthy population. There may be differences in the flagging of prior results with similar values performed with this method. Interpretation of those prior results can be made in the context of the updated reference intervals.     Most Recent D-dimer:No lab results found.    CSF labs 5/17/23            7-Day Micro Results      Collected Updated Procedure Result Status       05/17/2023 0920 05/18/2023 1117 Cytology non gyn CSF [[id number]]   Cerebrospinal fluid from Lumbar Puncture    Final result Component Value   Final Diagnosis This result contains rich text formatting which  cannot be displayed here.   Clinical Information This result contains rich text formatting which cannot be displayed here.   Gross Description This result contains rich text formatting which cannot be displayed here.   Microscopic Description This result contains rich text formatting which cannot be displayed here.   Performing Labs This result contains rich text formatting which cannot be displayed here.                05/17/2023 0919 05/20/2023 0728 Cerebrospinal fluid Aerobic Bacterial Culture Routine [[id number]]   Cerebrospinal fluid from Lumbar Puncture    Preliminary result Component Value   Culture No growth after 2 days  [P]    Gram Stain Result No organisms seen  [P]      2+ WBC seen  [P]      Quantification of host cells and microbiological organisms was done on a cytocentrifuged preparation                   05/17/2023 0918 05/17/2023 1154 CSF Cell Count with Differential: [[id number]]    (Abnormal)   Cerebrospinal fluid from Lumbar Puncture    Final result Component Value   No component results                05/17/2023 0918 05/17/2023 1154 Cell Count CSF [[id number]]    (Abnormal)   Cerebrospinal fluid from Lumbar Puncture    Final result Component Value Units   Tube Number 1     Color Colorless     Clarity Clear     Total Nucleated Cells 1 /uL   RBC Count 111 /uL          Imaging:    I personally reviewed the following imaging results today and those on care everywhere, if indicated   .   MRI LUMBAR SPINE WITHOUT AND WITH CONTRAST   5/12/2023 12:57 PM      HISTORY: Subjective bilateral lower extremity numbness, history of  CIDP/CISP 2021.      TECHNIQUE: Multiplanar multisequence MRI of the lumbar spine without  and with 7mL Gadavist.      COMPARISON: MRI lumbar spine 1/27/2023.      FINDINGS: Noncontrast based on five lumbar vertebral bodies. Normal  vertebral body heights and sagittal alignment. Bone marrow signal  appears within normal limits. Normal appearance of the distal spinal  cord with  the conus terminating at the L1 inferior endplate level.  Normal appearance of the cauda equina. No abnormal enhancement  identified. The visualized paraspinous soft tissues and bony pelvis  otherwise appear unremarkable.     Segmental analysis:  T12-L1: Normal disc height and signal. No herniation. Normal facets.  No spinal canal or neural foraminal stenosis. No change.     L1-L2: Normal disc height and signal. No herniation. Normal facets. No  spinal canal or neural foraminal stenosis. No change.     L2-L3: Normal disc height and signal. No herniation. Normal facets. No  spinal canal or neural foraminal stenosis. No change.     L3-L4: Normal disc height and signal. No herniation. Normal facets. No  spinal canal or neural foraminal stenosis.     L4-L5: Disc desiccation without significant disc height loss.  Symmetric disc bulge with superimposed central disc protrusion. Mild  facet arthropathy. Mild bilateral lateral recess stenosis without  significant central spinal canal stenosis. Mild left neural foraminal  stenosis. The right neural foramen appears patent. Overall, no  significant change.     L5-S1: Disc desiccation with moderate disc height loss. Symmetric disc  bulge with superimposed shallow central disc protrusion. Mild facet  arthropathy. No spinal canal stenosis. Mild bilateral neural foraminal  stenosis. Overall, no significant change.                                                                      IMPRESSION:  1. Normal appearance of visualized distal spinal cord and cauda  equina. No abnormal enhancement identified.  2. Unchanged degenerative findings at L4-L5 and L5-S1, as described.  3. Mild bilateral lateral recess narrowing at L4-L5. No high-grade  central spinal canal stenosis.  4. Unchanged mild left L4-L5 and bilateral L5-S1 neural foraminal  stenosis.     SCOTT CALDERA MD     EXAM: MR BRAIN W/O and W CONTRAST  LOCATION: [facility]  DATE/TIME: 5/15/2023 11:10 PM  CDT     INDICATION: Progressive extremity weakness and pain, increased walking difficulty, urinary dysfunction over last 1month; rule out demyelinating lesion.  COMPARISON: 10/17/2022, 4/30/2022  CONTRAST: 8 Gadavist  TECHNIQUE: Multiplanar multisequence head MRI without and with intravenous contrast according to the MS protocol.     FINDINGS:  INTRACRANIAL CONTENTS: No acute or subacute infarct. No mass, acute hemorrhage, or extra-axial fluid collections. Minimal nonspecific foci of T2/FLAIR hyperintense signal in the cerebral white matter, stable from the prior exam. No callosal or posterior   fossa lesions. Stable tiny subependymal cyst along the body of the left lateral ventricle. Normal ventricles and sulci. Normal position of the cerebellar tonsils. No pathologic contrast enhancement.     SELLA: No abnormality accounting for technique.     OSSEOUS STRUCTURES/SOFT TISSUES: Normal marrow signal. The major intracranial vascular flow voids are maintained.      ORBITS: No abnormality accounting for technique.      SINUSES/MASTOIDS: No paranasal sinus mucosal disease. Scattered fluid/membrane thickening in the left mastoid air cells. No apparent mass in the posterior nasopharynx or skull base.                                                                       IMPRESSION:  1.  No acute intracranial abnormality.  2.  Stable minimal nonspecific foci of T2/FLAIR hyperintensity within the cerebral white matter. Differential diagnosis includes sequelae of chronic headaches, chronic microvascular ischemic disease, or other remote insult. The distribution does not meet   criteria for demyelination.  3.  No abnormal enhancement.    Narrative & Impression   EXAM: MR THORACIC SPINE W/O and W CONTRAST  LOCATION: [facility]  DATE/TIME: 5/15/2023 11:17 PM CDT     INDICATION: progressive extremity weakness and pain, increased walking difficulty, urinary dysfunction over last 1month r o demyelinating  lesion.  COMPARISON: 04/30/2022  CONTRAST: 8 Gadavist  TECHNIQUE: Routine Thoracic Spine MRI without and with IV contrast.     FINDINGS:   Normal vertebral body heights, alignment and marrow signal. Normal disc heights. No herniation. Minimal facet arthropathy. No spinal canal or neural foraminal stenosis. No abnormal cord signal.      Redemonstrated cysts within the liver. Intrahepatic and extrahepatic biliary dilatation, likely related to cholecystectomy change.                                                                      IMPRESSION:  1.  No abnormal cord signal or enhancement.  2.  Minor degenerative changes without spinal canal or neural foraminal narrowing.       EXAM: MR CERVICAL SPINE W/O and W CONTRAST  LOCATION: [facility]  DATE/TIME: 5/15/2023 11:18 PM CDT     INDICATION: Progressive extremity weakness and pain, increased walking difficulty, urinary dysfunction over last 1month; rule out demyelinating lesion.  COMPARISON: 10/17/2022, 4/30/2022  CONTRAST: 8 [employer]  TECHNIQUE: MRI Cervical Spine without and with IV contrast.     FINDINGS:   Straightening of the cervical spine lordosis. Vertebral body heights are preserved. Minimal Modic type II degenerative endplate changes at C5-C6. No suspicious osseous lesions or marrow edema. No abnormal cord signal or enhancement. No extraspinal   abnormality.     Craniovertebral junction and C1-C2: Mild degenerative changes of the atlantodental interval. No spinal canal stenosis.     C2-C3: Normal disc height. No herniation. Normal facets. No spinal canal or neural foraminal stenosis.      C3-C4: Right paracentral disc protrusion. Mild right neural foraminal narrowing and effacement of the ventral thecal sac. No narrowing of the left neural foramen.      C4-C5: Normal disc height. No herniation. Normal facets. No spinal canal or neural foraminal stenosis.      C5-C6: Disc osteophyte complex with left greater than right uncovertebral  hypertrophy and facet arthropathy. Mild spinal canal stenosis. Mild to moderate left neural foraminal narrowing. No significant narrowing on the right.      C6-C7: Small disc osteophyte complex with uncovertebral hypertrophy and facet arthropathy. Mild bilateral neural foraminal narrowing. No spinal canal stenosis.      C7-T1: Normal disc height. No herniation. Normal facets. No spinal canal or neural foraminal stenosis.                                                                      IMPRESSION:  1.  No abnormal cord signal or enhancement.  2.  Stable mild degenerative changes of the cervical spine.       Reviewed imaging from [facility] sites, [place], and [facility]     Medical Records Reviewed:    Reviewed consults/documents from [facility] pain clinic, [facility], [place], and [facility] including Neurology and GI

## 2023-09-11 ENCOUNTER — TELEPHONE (OUTPATIENT)
Dept: NEUROLOGY | Facility: CLINIC | Age: 48
End: 2023-09-11
Payer: COMMERCIAL

## 2023-09-11 DIAGNOSIS — R06.09 POST-COVID CHRONIC DYSPNEA: Primary | ICD-10-CM

## 2023-09-11 DIAGNOSIS — U09.9 POST-COVID CHRONIC DYSPNEA: Primary | ICD-10-CM

## 2023-09-11 NOTE — CONFIDENTIAL NOTE
RECORDS RECEIVED FROM: internal    DATE RECEIVED:  9.13.23    NOTES STATUS DETAILS   OFFICE NOTE from referring provider internal  Iveth Mckeon PA-C   OFFICE NOTE from other specialist     DISCHARGE SUMMARY from hospital     DISCHARGE REPORT from the ER     MEDICATION LIST internal     IMAGING  (NEED IMAGES AND REPORTS)     CT SCAN internal  10.17.22, 7/12/22, 11.19.21     CHEST XRAY (CXR) internal  6.6.22   TESTS     PULMONARY FUNCTION TESTING (PFT) internal  Scheduled 9.13.23

## 2023-09-11 NOTE — TELEPHONE ENCOUNTER
Left Voicemail (1st Attempt) for the patient to call back and schedule the following:    Appointment type: New    Provider:   Return date: Next Available   Specialty phone number: [phone]  Additional appointment(s) needed: N/A  Additonal Notes:  Schedule patient with .

## 2023-09-13 ENCOUNTER — OFFICE VISIT (OUTPATIENT)
Dept: PULMONOLOGY | Facility: CLINIC | Age: 48
End: 2023-09-13
Payer: COMMERCIAL

## 2023-09-13 ENCOUNTER — PRE VISIT (OUTPATIENT)
Dept: PULMONOLOGY | Facility: CLINIC | Age: 48
End: 2023-09-13

## 2023-09-13 ENCOUNTER — OFFICE VISIT (OUTPATIENT)
Dept: PULMONOLOGY | Facility: CLINIC | Age: 48
End: 2023-09-13
Attending: PHYSICIAN ASSISTANT
Payer: COMMERCIAL

## 2023-09-13 VITALS
SYSTOLIC BLOOD PRESSURE: 110 MMHG | HEIGHT: 65 IN | BODY MASS INDEX: 27.32 KG/M2 | HEART RATE: 109 BPM | OXYGEN SATURATION: 97 % | DIASTOLIC BLOOD PRESSURE: 81 MMHG | WEIGHT: 164 LBS

## 2023-09-13 DIAGNOSIS — R06.09 POST-COVID CHRONIC DYSPNEA: Primary | ICD-10-CM

## 2023-09-13 DIAGNOSIS — R07.89 MUSCULOSKELETAL CHEST PAIN: Primary | ICD-10-CM

## 2023-09-13 DIAGNOSIS — U09.9 POST-COVID CHRONIC DYSPNEA: Primary | ICD-10-CM

## 2023-09-13 LAB
DLCOCOR-%PRED-PRE: 106 %
DLCOCOR-PRE: 22.65 ML/MIN/MMHG
DLCOUNC-%PRED-PRE: 111 %
DLCOUNC-PRE: 23.69 ML/MIN/MMHG
DLCOUNC-PRED: 21.21 ML/MIN/MMHG
ERV-%PRED-PRE: 36 %
ERV-PRE: 0.41 L
ERV-PRED: 1.1 L
EXPTIME-PRE: 5.06 SEC
FEF2575-%PRED-PRE: 140 %
FEF2575-PRE: 3.78 L/SEC
FEF2575-PRED: 2.68 L/SEC
FEFMAX-%PRED-PRE: 89 %
FEFMAX-PRE: 6.18 L/SEC
FEFMAX-PRED: 6.91 L/SEC
FEV1-%PRED-PRE: 120 %
FEV1-PRE: 3.24 L
FEV1FEV6-PRE: 85 %
FEV1FEV6-PRED: 82 %
FEV1FVC-PRE: 85 %
FEV1FVC-PRED: 81 %
FEV1SVC-PRE: 83 %
FEV1SVC-PRED: 69 %
FIFMAX-PRE: 4.67 L/SEC
FRCPLETH-%PRED-PRE: 88 %
FRCPLETH-PRE: 2.44 L
FRCPLETH-PRED: 2.77 L
FVC-%PRED-PRE: 115 %
FVC-PRE: 3.82 L
FVC-PRED: 3.31 L
IC-%PRED-PRE: 131 %
IC-PRE: 3.48 L
IC-PRED: 2.64 L
RVPLETH-%PRED-PRE: 132 %
RVPLETH-PRE: 2.04 L
RVPLETH-PRED: 1.53 L
TLCPLETH-%PRED-PRE: 109 %
TLCPLETH-PRE: 5.92 L
TLCPLETH-PRED: 5.39 L
VA-%PRED-PRE: 103 %
VA-PRE: 5.17 L
VC-%PRED-PRE: 100 %
VC-PRE: 3.89 L
VC-PRED: 3.86 L

## 2023-09-13 PROCEDURE — 94729 DIFFUSING CAPACITY: CPT | Performed by: INTERNAL MEDICINE

## 2023-09-13 PROCEDURE — 94375 RESPIRATORY FLOW VOLUME LOOP: CPT | Performed by: INTERNAL MEDICINE

## 2023-09-13 PROCEDURE — 94726 PLETHYSMOGRAPHY LUNG VOLUMES: CPT | Performed by: INTERNAL MEDICINE

## 2023-09-13 PROCEDURE — G0463 HOSPITAL OUTPT CLINIC VISIT: HCPCS | Performed by: STUDENT IN AN ORGANIZED HEALTH CARE EDUCATION/TRAINING PROGRAM

## 2023-09-13 PROCEDURE — 99205 OFFICE O/P NEW HI 60 MIN: CPT | Performed by: STUDENT IN AN ORGANIZED HEALTH CARE EDUCATION/TRAINING PROGRAM

## 2023-09-13 ASSESSMENT — PAIN SCALES - GENERAL: PAINLEVEL: SEVERE PAIN (6)

## 2023-09-13 NOTE — LETTER
September 14, 2023      Peggy Jessica  [address] [address]        Dear ,    We are writing to inform you of your test results.    {results letter list:300483}    Resulted Orders   Pulmonary function test   Result Value Ref Range    FVC-Pred 3.31 L    FVC-Pre 3.82 L    FVC-%Pred-Pre 115 %    FEV1-Pre 3.24 L    FEV1-%Pred-Pre 120 %    FEV1FVC-Pred 81 %    FEV1FVC-Pre 85 %    FEFMax-Pred 6.91 L/sec    FEFMax-Pre 6.18 L/sec    FEFMax-%Pred-Pre 89 %    FEF2575-Pred 2.68 L/sec    FEF2575-Pre 3.78 L/sec    HII6112-%Pred-Pre 140 %    ExpTime-Pre 5.06 sec    FIFMax-Pre 4.67 L/sec    VC-Pred 3.86 L    VC-Pre 3.89 L    VC-%Pred-Pre 100 %    IC-Pred 2.64 L    IC-Pre 3.48 L    IC-%Pred-Pre 131 %    ERV-Pred 1.10 L    ERV-Pre 0.41 L    ERV-%Pred-Pre 36 %    FEV1FEV6-Pred 82 %    FEV1FEV6-Pre 85 %    FRCPleth-Pred 2.77 L    FRCPleth-Pre 2.44 L    FRCPleth-%Pred-Pre 88 %    RVPleth-Pred 1.53 L    RVPleth-Pre 2.04 L    RVPleth-%Pred-Pre 132 %    TLCPleth-Pred 5.39 L    TLCPleth-Pre 5.92 L    TLCPleth-%Pred-Pre 109 %    DLCOunc-Pred 21.21 ml/min/mmHg    DLCOunc-Pre 23.69 ml/min/mmHg    DLCOunc-%Pred-Pre 111 %    DLCOcor-Pre 22.65 ml/min/mmHg    DLCOcor-%Pred-Pre 106 %    VA-Pre 5.17 L    VA-%Pred-Pre 103 %    FEV1SVC-Pred 69 %    FEV1SVC-Pre 83 %    Narrative    The FVC, FEV1, FEV1/FVC ratio and JIA23-49% are within normal limits.  Lung volumes are normal.  The diffusing capacity is normal.        IMPRESSION:    Normal spirometry, lung volumes and diffusing capacity.          This interpretation has been electronically signed:  BRIANA GUNTER 09/13/2023  07:08:55 PM         If you have any questions or concerns, please call the clinic at the number listed above.       Sincerely,      Bryan Mckeon MD

## 2023-09-13 NOTE — NURSING NOTE
Chief Complaint   Patient presents with    New Patient     New post covid       Vitals were taken and medications were reconciled.     Bonnie Archuleta RMA  7:54 AM

## 2023-09-13 NOTE — PROGRESS NOTES
[facility] Pulmonology Clinic Appointment     Peggy Jessica MRN# [id number]   Age: 48 year old YOB: 1975         Reason for consult:    Peggy Jessica is a 48 year old year old female who is being seen for New Patient (New post covid  )        Requesting physician:              Chief Complaint:   Chest discomfort     History is obtained from the patient and her  who accompanies her to the appointment         History of Present Illness:   Peggy Jessica is a 48 year old year old female with chronic inflammatory demyelinating polyneuropathy referred forchest related complaints.    Peggy has a complicated medical history and has had a myriad of symptoms over the last few years inclluding peripheral neuropathy, fatigue, proximal muscle weakness, issues with imbalance and was eventually diagnosed with CIDP and has been following with PM&R. She contracted Covid in Feb 2023 and was referred for persistent dyspnea after covid.     On further qualifying her symptoms, she actually denies true dyspnea with exertion. She predominantly ambulates around the house with a walker and says she does not experience shortness of breath. Her main chest related complaint is actually R sided thoracic wall pain which was I was able to reproduce by palpation anterior to her mid axillary line down several intercostal spaces. She has been experiencing this for a few months. While she denies any specific discrete injury/trauama/fall prior to having this discomfort, she does say she frequently falls.  She has no history of any chronic respiratory disease. She does have a history of smoking but quit several years ago. She estimates quitting > 5yrs ago.     She does not have any recent dedicated chest imaging, particularly not since Feb 2023. Last CT chest in Oct 2022 did not note any parenchymal abnormalities. Pulmonary function testing today was within normal limits.              Past Medical  History:        Past Medical History:   Diagnosis Date    Arthritis     BRCA positive     Cancer (H)     CIDP (chronic inflammatory demyelinating polyneuropathy) (H)     ASHKAN III with severe dysplasia 2002    Complex regional pain syndrome type 1 of right lower extremity             Past Surgical History:        Past Surgical History:   Procedure Laterality Date    BILATERAL OOPHORECTOMY Bilateral 2019    c section      2002    CHOLECYSTECTOMY  1997    COLONOSCOPY      ESOPHAGOSCOPY, GASTROSCOPY, DUODENOSCOPY (EGD), COMBINED N/A 07/28/2022    Procedure: ESOPHAGOGASTRODUODENOSCOPY (EGD);  Surgeon: Zack Maddox MD;  Location:  GI    ESOPHAGOSCOPY, GASTROSCOPY, DUODENOSCOPY (EGD), COMBINED N/A 5/16/2023    Procedure: Esophagoscopy, gastroscopy, duodenoscopy (EGD), combined;  Surgeon: Aris Mason MD;  Location:  GI    ESOPHAGOSCOPY, GASTROSCOPY, DUODENOSCOPY (EGD), COMBINED N/A 7/20/2023    Procedure: Esophagoscopy, gastroscopy, duodenoscopy (EGD), combined;  Surgeon: Aris Mason MD;  Location:  GI    HYSTERECTOMY  2004    MASTECTOMY Bilateral 2020            Social History:     Social History     Socioeconomic History    Marital status:      Spouse name: Not on file    Number of children: 2    Years of education: Not on file    Highest education level: Not on file   Occupational History    Not on file   Tobacco Use    Smoking status: Former     Types: Cigarettes    Smokeless tobacco: Never   Vaping Use    Vaping Use: Never used   Substance and Sexual Activity    Alcohol use: Not Currently    Drug use: Yes     Types: Marijuana     Comment: Medical Canibis patient    Sexual activity: Not Currently     Partners: Male   Other Topics Concern    Not on file   Social History Narrative    Not on file     Social Determinants of Health     Financial Resource Strain: Not on file   Food Insecurity: Not on file   Transportation Needs: Not on file   Physical Activity: Not on file    Stress: Not on file   Social Connections: Not on file   Intimate Partner Violence: Not on file   Housing Stability: Not on file             Family History:     Family History   Problem Relation Age of Onset    Kidney Disease Father     Macular Degeneration Paternal Grandmother     Glaucoma No family hx of             Immunizations:     Immunization History   Administered Date(s) Administered    COVID-19 Monovalent 18+ (Moderna) 03/20/2021, 04/17/2021    Influenza Vaccine >6 months (Alfuria,Fluzone) 02/17/2021    TDAP (Adacel,Boostrix) 11/22/2016            Allergies:     Allergies   Allergen Reactions    Dihydroergotamine Anaphylaxis    Latex Anaphylaxis    Shellfish-Derived Products Anaphylaxis    Sumatriptan Anaphylaxis    Compazine [Prochlorperazine] Anxiety    Banana Unknown    Gabapentin Dizziness    Gluten Meal Other (See Comments)     Celiac disease    Keppra [Levetiracetam] Nausea and Vomiting    Kiwi Unknown    Levofloxacin Other (See Comments)     Arrhythmia    Metronidazole Nausea and Vomiting    Nitrofurantoin Hives    Penicillins Hives    Pregabalin     Reglan [Metoclopramide] Other (See Comments)     restlessness/toe tapping     Topiramate Visual Disturbance    Aspirin Rash    Methadone Rash    Morphine Hives     She got hives around are when morphine given but resolved after few minutes per patient     Risperidone Anxiety          Medications:        Current Outpatient Medications   Medication    acetaminophen (TYLENOL) 500 MG tablet    baclofen (LIORESAL) 10 MG tablet    bisacodyl (DULCOLAX) 10 MG suppository    buprenorphine (BUTRANS) 7.5 MCG/HR WK patch    diclofenac (VOLTAREN) 1 % topical gel    dicyclomine (BENTYL) 20 MG tablet    diphenhydrAMINE (BENADRYL) 25 MG tablet    folic acid (FOLVITE) 400 MCG tablet    hydrOXYzine (ATARAX) 25 MG tablet    Lidocaine (LIDOCARE) 4 % Patch    medical cannabis (Patient's own supply)    memantine (NAMENDA) 5 MG tablet    multivitamin w/minerals (THERA-VIT-M)  "tablet    naloxone (NARCAN) 4 MG/0.1ML nasal spray    ondansetron (ZOFRAN ODT) 4 MG ODT tab    pantoprazole (PROTONIX) 40 MG EC tablet    polyethylene glycol (MIRALAX) 17 GM/Dose powder    scopolamine (TRANSDERM) 1 MG/3DAYS 72 hr patch    senna (SENOKOT) 8.6 MG tablet    senna-docusate (SENOKOT-S/PERICOLACE) 8.6-50 MG tablet    sertraline (ZOLOFT) 100 MG tablet    traMADol (ULTRAM) 50 MG tablet     No current facility-administered medications for this visit.          Review of Systems:   The Review of Systems is negative other than noted in the HPI         Physical Exam:   /81 (BP Location: Right arm, Patient Position: Sitting, Cuff Size: Adult Regular)   Pulse 109   Ht 1.651 m (5' 5\")   Wt 74.4 kg (164 lb)   SpO2 97%   BMI 27.29 kg/m    Patient declined being weighed.       GENERAL APPEARANCE:  alert, and in no apparent distress.  EYES: PERRL, EOMI  HENT: Nasal mucosa with no edema and no hyperemia. No nasal polyps.  MOUTH: Oral mucosa is moist, without any lesions, no tonsillar enlargement, no oropharyngeal exudate. Poor dentition.   NECK: supple, no masses, no thyromegaly.  LYMPHATICS: No palpable axillary, cervical, or supraclavicular nodes.  RESP: good air flow throughout.  No crackles. No rhonchi. No wheezes. Normal percussion. Normal chest expansion  CV: Normal S1, S2, regular rhythm, normal rate. No murmur.  No rub. No gallop. No LE edema.   ABDOMEN:  soft, nontender, no HSM or masses. Normal bowel sounds  MS: extremities normal. No clubbing. No cyanosis.   SKIN: no rash on limited exam   NEURO: speech normal, cannot assess gait as sitting in wheelchair, power 4/5 of upper and lower extremities  PSYCH: normal mood and affect         Data:         Recent Results (from the past 4368 hour(s))   Comprehensive metabolic panel    Collection Time: 05/12/23  9:56 AM   Result Value Ref Range    Sodium 138 136 - 145 mmol/L    Potassium 4.1 3.4 - 5.3 mmol/L    Chloride 102 98 - 107 mmol/L    Carbon Dioxide " (CO2) 26 22 - 29 mmol/L    Anion Gap 10 7 - 15 mmol/L    Urea Nitrogen 6.1 6.0 - 20.0 mg/dL    Creatinine 0.92 0.51 - 0.95 mg/dL    Calcium 9.8 8.6 - 10.0 mg/dL    Glucose 110 (H) 70 - 99 mg/dL    Alkaline Phosphatase 106 (H) 35 - 104 U/L    AST 18 10 - 35 U/L    ALT 14 10 - 35 U/L    Protein Total 7.8 6.4 - 8.3 g/dL    Albumin 4.2 3.5 - 5.2 g/dL    Bilirubin Total 0.2 <=1.2 mg/dL    GFR Estimate 76 >60 mL/min/1.73m2   CBC with platelets and differential    Collection Time: 05/12/23  9:56 AM   Result Value Ref Range    WBC Count 5.7 4.0 - 11.0 10e3/uL    RBC Count 4.80 3.80 - 5.20 10e6/uL    Hemoglobin 14.6 11.7 - 15.7 g/dL    Hematocrit 44.1 35.0 - 47.0 %    MCV 92 78 - 100 fL    MCH 30.4 26.5 - 33.0 pg    MCHC 33.1 31.5 - 36.5 g/dL    RDW 13.1 10.0 - 15.0 %    Platelet Count 307 150 - 450 10e3/uL    % Neutrophils 70 %    % Lymphocytes 23 %    % Monocytes 6 %    % Eosinophils 1 %    % Basophils 0 %    % Immature Granulocytes 0 %    NRBCs per 100 WBC 0 <1 /100    Absolute Neutrophils 4.0 1.6 - 8.3 10e3/uL    Absolute Lymphocytes 1.3 0.8 - 5.3 10e3/uL    Absolute Monocytes 0.3 0.0 - 1.3 10e3/uL    Absolute Eosinophils 0.1 0.0 - 0.7 10e3/uL    Absolute Basophils 0.0 0.0 - 0.2 10e3/uL    Absolute Immature Granulocytes 0.0 <=0.4 10e3/uL    Absolute NRBCs 0.0 10e3/uL   Extra Blue Top Tube    Collection Time: 05/12/23  9:58 AM   Result Value Ref Range    Hold Specimen JIC    Extra Red Top Tube    Collection Time: 05/12/23  9:58 AM   Result Value Ref Range    Hold Specimen JIC    Anti Nuclear Blanca IgG by IFA with Reflex    Collection Time: 05/12/23  4:31 PM   Result Value Ref Range    PAULINE interpretation Borderline Positive (A) Negative    PAULINE pattern 1 Dense fine speckled     PAULINE titer 1 1:80    SSA Ro SYLWIA Antibody IgG    Collection Time: 05/12/23  4:31 PM   Result Value Ref Range    SSA Blanca IgG Instrument Value <0.5 <7.0 U/mL    SSA (Ro) Antibody IgG Negative Negative   SSB La SYLWIA Antibody IgG    Collection Time: 05/12/23   4:31 PM   Result Value Ref Range    SSB Blanca IgG Instrument Value <0.6 <7.0 U/mL    SSB (La) Antibody IgG Negative Negative   CRP inflammation    Collection Time: 05/12/23  4:31 PM   Result Value Ref Range    CRP Inflammation <3.00 <5.00 mg/L   Vitamin B12    Collection Time: 05/12/23  4:31 PM   Result Value Ref Range    Vitamin B12 537 232 - 1,245 pg/mL   UA Macroscopic with reflex to Microscopic and Culture    Collection Time: 05/12/23  4:59 PM    Specimen: Urine, Midstream   Result Value Ref Range    Color Urine Light Yellow Colorless, Straw, Light Yellow, Yellow    Appearance Urine Clear Clear    Glucose Urine Negative Negative mg/dL    Bilirubin Urine Negative Negative    Ketones Urine Negative Negative mg/dL    Specific Gravity Urine 1.014 1.003 - 1.035    Blood Urine Negative Negative    pH Urine 5.5 5.0 - 7.0    Protein Albumin Urine Negative Negative mg/dL    Urobilinogen Urine Normal Normal, 2.0 mg/dL    Nitrite Urine Negative Negative    Leukocyte Esterase Urine Negative Negative   Basic metabolic panel    Collection Time: 05/13/23  8:07 AM   Result Value Ref Range    Sodium 139 136 - 145 mmol/L    Potassium 4.6 3.4 - 5.3 mmol/L    Chloride 106 98 - 107 mmol/L    Carbon Dioxide (CO2) 22 22 - 29 mmol/L    Anion Gap 11 7 - 15 mmol/L    Urea Nitrogen 7.0 6.0 - 20.0 mg/dL    Creatinine 0.83 0.51 - 0.95 mg/dL    Calcium 9.2 8.6 - 10.0 mg/dL    Glucose 91 70 - 99 mg/dL    GFR Estimate 86 >60 mL/min/1.73m2   CBC with platelets    Collection Time: 05/13/23 11:15 AM   Result Value Ref Range    WBC Count 4.6 4.0 - 11.0 10e3/uL    RBC Count 4.43 3.80 - 5.20 10e6/uL    Hemoglobin 13.6 11.7 - 15.7 g/dL    Hematocrit 40.3 35.0 - 47.0 %    MCV 91 78 - 100 fL    MCH 30.7 26.5 - 33.0 pg    MCHC 33.7 31.5 - 36.5 g/dL    RDW 13.3 10.0 - 15.0 %    Platelet Count 257 150 - 450 10e3/uL   UPPER GI ENDOSCOPY    Collection Time: 05/16/23  3:02 PM   Result Value Ref Range    Upper GI Endoscopy       [facility]  [address]  [address] [place]  [address]  _______________________________________________________________________________  Patient Name: Peggy Jessica         Procedure Date: 5/16/2023 3:02 PM  MRN: [id number]                       Account Number: [id number]  YOB: 1975               Admit Type: Outpatient  Age: 47                               Room: [place] [address]  Note Status: Finalized                Attending MD: CAL CALI MD,   Instrument Name: 506 GIF  Gastroscope   _______________________________________________________________________________     Procedure:                Upper GI endoscopy  Providers:                CAL CALI MD, Daisy Zhu RN  Referring MD:               Medicines:                Fentanyl 100 micrograms IV, Midazolam 4 mg IV,                             Cetacaine spray  Complications:            No immediate complications.  _____________________________________________________________________ __________  Procedure:                Pre-Anesthesia Assessment:                            - Prior to the procedure, a History and Physical                             was performed, and patient medications and                             allergies were reviewed. The risks and benefits of                             the procedure and the sedation options and risks                             were discussed with the patient. All questions were                             answered and informed consent was obtained. Patient                             identification and proposed procedure were verified                             by the physician. Mental Status Examination:                             normal. Airway Examination: normal oropharyngeal                             airway and neck mobility. Respiratory Examination:                             clear to auscultation. Prophylactic Antibiotics:                             The patient does not  require prophylactic                              antibiotics. Prior Anticoagulants: The patient has                             taken no anticoagulant or antiplatelet agents.                             After reviewing the risks and benefits, the patient                             was deemed in satisfactory condition to undergo the                             procedure. The anesthesia plan was to use minimal                             sedation / analgesia (anxiolysis). Immediately                             prior to administration of medications, the patient                             was re-assessed for adequacy to receive sedatives.                             The heart rate, respiratory rate, oxygen                             saturations, blood pressure, adequacy of pulmonary                             ventilation, and response to care were monitored                             throughout the procedure. The physical status of                             the patient was re-assessed after the procedure.                             After obtaining informed consent, the endoscope was                             passed under direct vision. Throughout the                             procedure, the patient's blood pressure, pulse, and                             oxygen saturations were monitored continuously. The                             endoscope 506 was introduced through the mouth, and                             advanced to the third part of duodenum. The upper                             GI endoscopy was accomplished without difficulty.                             The patient tolerated the procedure well.                                                                                   Findings:       A medium-sized hiatal hernia was present.       Patchy moderately congested mucosa was found in the entire examined        stomach. Biopsies were taken with a cold forceps for histology. Biopsies        were  taken with a cold forceps for Helicobacter pylori testing.       The cardia and gastric f undus were normal on retroflexion.       The duodenal bulb, first portion of the duodenum and second portion of        the duodenum were normal.       LA Grade C (one or more mucosal breaks continuous between tops of 2 or        more mucosal folds, less than 75% circumference) esophagitis with no        bleeding was found 37 cm from the incisors.                                                                                   Impression:               - Medium-sized hiatal hernia.                            - Congestive gastropathy. Biopsied.                            - Normal duodenal bulb, first portion of the                             duodenum and second portion of the duodenum.                            - LA Grade C reflux esophagitis with no bleeding.  Recommendation:           - Please continue to follow with Primary care                             Physician.                            - Anti Relux Precautions                            - Full liquid diet.                             - Return patient to hospital [facility] for ongoing care.                                                                                   Procedure Code(s):       --- Professional ---       83326, Esophagogastroduodenoscopy, flexible, transoral; with biopsy,        single or multiple  Diagnosis Code(s):       --- Professional ---       K44.9, Diaphragmatic hernia without obstruction or gangrene       K31.89, Other diseases of stomach and duodenum    CPT copyright 2021 [employer]. All rights reserved.    The codes documented in this report are preliminary and upon  review may   be revised to meet current compliance requirements.    Electronically Signed by Aris Thompson  ________________________  ARIS CALI MD  5/16/2023 3:56:58 PM  I was physically present for the entire viewing portion of the exam.  ARIS  "MD KARELY  Number of Addenda: 0    Note Initiated On: 5/16/2023 3:02 PM  Total Procedure Duration: 0 hours 1 minute 52 seconds   Scope In: 3 :25:31 PM  Scope Out: 3:27:23 PM     Surgical Pathology Exam    Collection Time: 05/16/23  3:28 PM   Result Value Ref Range    Case Report       Surgical Pathology Report                         Case: [id number]                                  Authorizing Provider:  Aris Mason MD  Collected:           05/16/2023 03:28 PM          Ordering Location:     [facility]          Received:            05/16/2023 03:45 PM                                 [facility]                                                          Pathologist:           Gildardo Frausto MD                                                           Specimen:    Stomach, Body, R/O H PYLORI                                                                Final Diagnosis       A. Gastric body, biopsy:  - Corpus with reactive changes, non specific         Clinical Information       Procedure:  Esophagoscopy, gastroscopy, duodenoscopy (EGD), combined  Pre-op Diagnosis: Nausea and vomiting [R11.2]  Post-op Diagnosis: R11.2 - Nausea and vomiting [ICD-10-CM]      Gross Description       A(1). Stomach, Body, R/O H PYLORI:  The specimen is received in formalin labeled with the patient's name, medical record number and other identifying information and designated \"stomach, body\". It consists of 2 tan tissue fragments, 0.1 cm and 0.3 cm in greatest dimension.  Entirely submitted in 1 cassette.   (GABO Davis ([facility]))      Microscopic Description       Microscopic examination performed, substantiating the above diagnosis.        Performing Labs       The technical component of this testing was completed at [facility]      Case Images     Glucose CSF:    Collection Time: 05/17/23  9:18 AM   Result Value Ref Range    Glucose CSF 62 40 " - 70 mg/dL   Protein total CSF:    Collection Time: 05/17/23  9:18 AM   Result Value Ref Range    Protein total CSF 35.8 15.0 - 45.0 mg/dL   Cell Count CSF    Collection Time: 05/17/23  9:18 AM   Result Value Ref Range    Tube Number 1     Color Colorless Colorless    Clarity Clear Clear    Total Nucleated Cells 1 0 - 5 /uL    RBC Count 111 (H) 0 - 2 /uL   Cerebrospinal fluid Aerobic Bacterial Culture Routine    Collection Time: 05/17/23  9:19 AM    Specimen: Lumbar Puncture; Cerebrospinal fluid   Result Value Ref Range    Culture No Growth     Gram Stain Result No organisms seen     Gram Stain Result 2+ WBC seen    Cytology non gyn CSF    Collection Time: 05/17/23  9:20 AM   Result Value Ref Range    Final Diagnosis       Specimen A     Interpretation:      Negative for malignancy     Adequacy:     Satisfactory for evaluation          Clinical Information       CIPD      Gross Description       A(A). Lumbar Puncture, :A. Lumbar Puncture, , CSF:  Received 2 ml of clear, colorless fluid, processed as 1 Pap stained cytospin and 1 Martin stained cytospin.               Microscopic Description       Specimen is moderately cellular and consists of benign mature lymphocytes and scant monocytes.  Increased red cells are present, suggestive of a traumatic tap.   Negative for infectious organisms or malignancy.        Performing Labs       The technical component of this testing was completed at [facility]     Myasthenia Gravis (MG) Evaulation with MuSK Reflex    Collection Time: 05/18/23  3:42 PM   Result Value Ref Range    ACh Receptor (Muscle) Binding Ab 0.00 <=0.02 nmol/L    MG Interpretive Comments SEE NOTE    : [place] Miscellaneous Test    Collection Time: 05/18/23  3:42 PM   Result Value Ref Range    [place] Result SEE NOTE    EKG 12-lead, tracing only    Collection Time: 07/18/23  8:09 AM   Result Value Ref Range    Systolic Blood Pressure  mmHg     Diastolic Blood Pressure  mmHg    Ventricular Rate 90 BPM    Atrial Rate 90 BPM    NE Interval 150 ms    QRS Duration 72 ms     ms    QTc 420 ms    P Axis 41 degrees    R AXIS -1 degrees    T Axis 17 degrees    Interpretation ECG       Sinus rhythm  Possible Inferior infarct , age undetermined  Abnormal ECG  When compared with ECG of 17-OCT-2022 10:31,  No significant change was found  Confirmed by GENERATED REPORT, COMPUTER ([id number]),  Chaz Keating ([id number]) on 7/18/2023 9:33:54 AM     Comprehensive metabolic panel    Collection Time: 07/18/23  8:19 AM   Result Value Ref Range    Sodium 138 136 - 145 mmol/L    Potassium 4.5 3.4 - 5.3 mmol/L    Chloride 102 98 - 107 mmol/L    Carbon Dioxide (CO2) 27 22 - 29 mmol/L    Anion Gap 9 7 - 15 mmol/L    Urea Nitrogen 10.0 6.0 - 20.0 mg/dL    Creatinine 0.80 0.51 - 0.95 mg/dL    Calcium 9.5 8.6 - 10.0 mg/dL    Glucose 101 (H) 70 - 99 mg/dL    Alkaline Phosphatase 109 (H) 35 - 104 U/L    AST 20 0 - 45 U/L    ALT 13 0 - 50 U/L    Protein Total 7.6 6.4 - 8.3 g/dL    Albumin 4.3 3.5 - 5.2 g/dL    Bilirubin Total 0.3 <=1.2 mg/dL    GFR Estimate 90 >60 mL/min/1.73m2   Magnesium    Collection Time: 07/18/23  8:19 AM   Result Value Ref Range    Magnesium 2.2 1.7 - 2.3 mg/dL   CBC with platelets and differential    Collection Time: 07/18/23  8:19 AM   Result Value Ref Range    WBC Count 5.5 4.0 - 11.0 10e3/uL    RBC Count 4.93 3.80 - 5.20 10e6/uL    Hemoglobin 14.7 11.7 - 15.7 g/dL    Hematocrit 44.0 35.0 - 47.0 %    MCV 89 78 - 100 fL    MCH 29.8 26.5 - 33.0 pg    MCHC 33.4 31.5 - 36.5 g/dL    RDW 12.4 10.0 - 15.0 %    Platelet Count 248 150 - 450 10e3/uL    % Neutrophils 68 %    % Lymphocytes 23 %    % Monocytes 7 %    % Eosinophils 1 %    % Basophils 1 %    % Immature Granulocytes 0 %    NRBCs per 100 WBC 0 <1 /100    Absolute Neutrophils 3.8 1.6 - 8.3 10e3/uL    Absolute Lymphocytes 1.2 0.8 - 5.3 10e3/uL    Absolute Monocytes 0.4 0.0 - 1.3 10e3/uL    Absolute Eosinophils  0.0 0.0 - 0.7 10e3/uL    Absolute Basophils 0.0 0.0 - 0.2 10e3/uL    Absolute Immature Granulocytes 0.0 <=0.4 10e3/uL    Absolute NRBCs 0.0 10e3/uL   Troponin T, High Sensitivity    Collection Time: 07/18/23  8:19 AM   Result Value Ref Range    Troponin T, High Sensitivity <6 <=14 ng/L   Lipase    Collection Time: 07/18/23  8:19 AM   Result Value Ref Range    Lipase 27 13 - 60 U/L   Glucose by meter    Collection Time: 07/19/23  4:16 PM   Result Value Ref Range    GLUCOSE BY METER POCT 92 70 - 99 mg/dL   Renal panel    Collection Time: 07/20/23  6:51 AM   Result Value Ref Range    Sodium 137 136 - 145 mmol/L    Potassium 4.2 3.4 - 5.3 mmol/L    Chloride 104 98 - 107 mmol/L    Carbon Dioxide (CO2) 23 22 - 29 mmol/L    Anion Gap 10 7 - 15 mmol/L    Glucose 75 70 - 99 mg/dL    Urea Nitrogen 8.4 6.0 - 20.0 mg/dL    Creatinine 0.79 0.51 - 0.95 mg/dL    GFR Estimate >90 >60 mL/min/1.73m2    Calcium 8.9 8.6 - 10.0 mg/dL    Albumin 3.7 3.5 - 5.2 g/dL    Phosphorus 3.4 2.5 - 4.5 mg/dL   CBC with platelets and differential    Collection Time: 07/20/23  6:51 AM   Result Value Ref Range    WBC Count 5.8 4.0 - 11.0 10e3/uL    RBC Count 4.37 3.80 - 5.20 10e6/uL    Hemoglobin 12.7 11.7 - 15.7 g/dL    Hematocrit 38.8 35.0 - 47.0 %    MCV 89 78 - 100 fL    MCH 29.1 26.5 - 33.0 pg    MCHC 32.7 31.5 - 36.5 g/dL    RDW 12.0 10.0 - 15.0 %    Platelet Count 210 150 - 450 10e3/uL    % Neutrophils 74 %    % Lymphocytes 19 %    % Monocytes 6 %    % Eosinophils 1 %    % Basophils 0 %    % Immature Granulocytes 0 %    NRBCs per 100 WBC 0 <1 /100    Absolute Neutrophils 4.3 1.6 - 8.3 10e3/uL    Absolute Lymphocytes 1.1 0.8 - 5.3 10e3/uL    Absolute Monocytes 0.4 0.0 - 1.3 10e3/uL    Absolute Eosinophils 0.0 0.0 - 0.7 10e3/uL    Absolute Basophils 0.0 0.0 - 0.2 10e3/uL    Absolute Immature Granulocytes 0.0 <=0.4 10e3/uL    Absolute NRBCs 0.0 10e3/uL   UPPER GI ENDOSCOPY    Collection Time: 07/20/23 12:37 PM   Result Value Ref Range    Upper GI  Endoscopy       [address] [place]  [address]  _______________________________________________________________________________  Patient Name: Peggy Jessica         Procedure Date: 7/20/2023 12:37 PM  MRN: [id number]                       Account Number: [id number]  YOB: 1975               Admit Type: Outpatient  Age: 48                               Room: 3  Note Status: Finalized                Attending MD: CAL CALI MD,   Instrument Name: 509 GIF  Gastroscope   _______________________________________________________________________________     Procedure:                Upper GI endoscopy  Indications:              Epigastric abdominal pain, Functional Dyspepsia  Providers:                CAL CALI MD, Garima Rowe RN  Referring MD:               Medicines:                Fentanyl 100 micrograms IV, Midazolam 4 mg IV,                             Cetacaine spray  Complications:            No immediate complications.  ___ ____________________________________________________________________________  Procedure:                Pre-Anesthesia Assessment:                            - Prior to the procedure, a History and Physical                             was performed, and patient medications and                             allergies were reviewed. The patient is competent.                             The risks and benefits of the procedure and the                             sedation options and risks were discussed with the                             patient. All questions were answered and informed                             consent was obtained. Patient identification and                             proposed procedure were verified by the physician.                             Mental Status Examination: normal. Prophylactic                             Antibiotics: The patient does not require                             prophylactic  antibiotics. Prior Anticoagulants: The                              patient has taken Effient (prasugrel), last dose                             was 14 days prior to procedure. ASA Grade                             Assessment: I - A normal, healthy patient. After                             reviewing the risks and benefits, the patient was                             deemed in satisfactory condition to undergo the                             procedure. The anesthesia plan was to use minimal                             sedation / analgesia (anxiolysis). Immediately                             prior to administration of medications, the patient                             was re-assessed for adequacy to receive sedatives.                             The heart rate, respiratory rate, oxygen                             saturations, blood pressure, adequacy of pulmonary                             ventilation, and response to care were monitored                             throughout the procedure. The physical status of                             the amy ent was re-assessed after the procedure.                            - Immediately prior to administration of                             medications, the patient was re-assessed for                             adequacy to receive sedatives.                            - The sedation level attained was moderate.                            After obtaining informed consent, the endoscope was                             passed under direct vision. Throughout the                             procedure, the patient's blood pressure, pulse, and                             oxygen saturations were monitored continuously. The                             endoscope 509 was introduced through the mouth, and                             advanced to the second part of duodenum. The upper                             GI endoscopy was accomplished without difficulty.                              The patient tolerated the procedure well.                                                                                    Findings:       The examined esophagus was normal.       Diffuse mildly congested mucosa was found in the gastric body.       A small hiatal hernia was present.       The duodenal bulb, first portion of the duodenum and second portion of        the duodenum were normal.       The cardia and gastric fundus were normal on retroflexion.                                                                                   Impression:               - Normal esophagus.                            - Congestive gastropathy.                            - Small hiatal hernia.                            - Normal duodenal bulb, first portion of the                             duodenum and second portion of the duodenum.                            - No specimens collected.  Recommendation:           - Return patient to [facility] for [facility] care.                            - Mechanical soft diet.                            - Continue present medications.                                                                                    Procedure Code(s):       --- Professional ---       35892, Esophagogastroduodenoscopy, flexible, transoral; diagnostic,        including collection of specimen(s) by brushing or washing, when        performed (separate procedure)  Diagnosis Code(s):       --- Professional ---       K31.89, Other diseases of stomach and duodenum       K44.9, Diaphragmatic hernia without obstruction or gangrene       R10.13, Epigastric pain       K30, Functional dyspepsia    CPT copyright 2022 [employer]. All rights reserved.    The codes documented in this report are preliminary and upon  review may   be revised to meet current compliance requirements.    Electronically Signed by Aris Thompson  ________________________  ARIS CALI MD  7/20/2023 1:01:11  PM  I was physically present for the entire viewing portion of the exam.  CAL CALI MD  Number of Addenda: 0    Note Initiated On: 7/20/2023 12:37 PM  Total Procedure Du ration: 0 hours 0 minutes 48 seconds   Scope In: 12:57:34 PM  Scope Out: 12:58:22 PM     TSH with free T4 reflex    Collection Time: 08/04/23  4:14 PM   Result Value Ref Range    TSH 2.09 0.30 - 4.20 uIU/mL   Comprehensive metabolic panel    Collection Time: 08/26/23  5:38 PM   Result Value Ref Range    Sodium 137 136 - 145 mmol/L    Potassium 4.0 3.4 - 5.3 mmol/L    Chloride 98 98 - 107 mmol/L    Carbon Dioxide (CO2) 27 22 - 29 mmol/L    Anion Gap 12 7 - 15 mmol/L    Urea Nitrogen 5.6 (L) 6.0 - 20.0 mg/dL    Creatinine 0.94 0.51 - 0.95 mg/dL    Calcium 9.7 8.6 - 10.0 mg/dL    Glucose 108 (H) 70 - 99 mg/dL    Alkaline Phosphatase 111 (H) 35 - 104 U/L    AST 25 0 - 45 U/L    ALT 15 0 - 50 U/L    Protein Total 8.1 6.4 - 8.3 g/dL    Albumin 4.4 3.5 - 5.2 g/dL    Bilirubin Total 0.3 <=1.2 mg/dL    GFR Estimate 74 >60 mL/min/1.73m2   Lipase    Collection Time: 08/26/23  5:38 PM   Result Value Ref Range    Lipase 24 13 - 60 U/L   CBC with platelets and differential    Collection Time: 08/26/23  5:38 PM   Result Value Ref Range    WBC Count 6.2 4.0 - 11.0 10e3/uL    RBC Count 5.08 3.80 - 5.20 10e6/uL    Hemoglobin 14.6 11.7 - 15.7 g/dL    Hematocrit 43.4 35.0 - 47.0 %    MCV 85 78 - 100 fL    MCH 28.7 26.5 - 33.0 pg    MCHC 33.6 31.5 - 36.5 g/dL    RDW 12.2 10.0 - 15.0 %    Platelet Count 269 150 - 450 10e3/uL    % Neutrophils 61 %    % Lymphocytes 31 %    % Monocytes 7 %    % Eosinophils 1 %    % Basophils 0 %    % Immature Granulocytes 0 %    NRBCs per 100 WBC 0 <1 /100    Absolute Neutrophils 3.8 1.6 - 8.3 10e3/uL    Absolute Lymphocytes 1.9 0.8 - 5.3 10e3/uL    Absolute Monocytes 0.5 0.0 - 1.3 10e3/uL    Absolute Eosinophils 0.0 0.0 - 0.7 10e3/uL    Absolute Basophils 0.0 0.0 - 0.2 10e3/uL    Absolute Immature Granulocytes 0.0 <=0.4 10e3/uL     Absolute NRBCs 0.0 10e3/uL   EKG 12-lead, tracing only    Collection Time: 08/26/23  5:58 PM   Result Value Ref Range    Systolic Blood Pressure  mmHg    Diastolic Blood Pressure  mmHg    Ventricular Rate 73 BPM    Atrial Rate 73 BPM    NV Interval 170 ms    QRS Duration 70 ms     ms    QTc 418 ms    P Axis 42 degrees    R AXIS 9 degrees    T Axis 32 degrees    Interpretation ECG       Sinus rhythm  Normal ECG  When compared with ECG of 18-JUL-2023 08:09,  Borderline criteria for Inferior infarct are no longer Present  Confirmed by GENERATED REPORT, COMPUTER ([id number]),  JAN GREGORIO ([id number]) on 8/26/2023 6:08:11 PM     Comprehensive metabolic panel    Collection Time: 09/01/23  6:07 PM   Result Value Ref Range    Sodium 140 136 - 145 mmol/L    Potassium 3.8 3.4 - 5.3 mmol/L    Chloride 99 98 - 107 mmol/L    Carbon Dioxide (CO2) 28 22 - 29 mmol/L    Anion Gap 13 7 - 15 mmol/L    Urea Nitrogen 10.5 6.0 - 20.0 mg/dL    Creatinine 0.79 0.51 - 0.95 mg/dL    Calcium 10.2 (H) 8.6 - 10.0 mg/dL    Glucose 92 70 - 99 mg/dL    Alkaline Phosphatase 100 35 - 104 U/L    AST 21 0 - 45 U/L    ALT 16 0 - 50 U/L    Protein Total 8.5 (H) 6.4 - 8.3 g/dL    Albumin 4.8 3.5 - 5.2 g/dL    Bilirubin Total 0.4 <=1.2 mg/dL    GFR Estimate >90 >60 mL/min/1.73m2   Lipase    Collection Time: 09/01/23  6:07 PM   Result Value Ref Range    Lipase 25 13 - 60 U/L   CBC with platelets and differential    Collection Time: 09/01/23  6:07 PM   Result Value Ref Range    WBC Count 6.4 4.0 - 11.0 10e3/uL    RBC Count 5.28 (H) 3.80 - 5.20 10e6/uL    Hemoglobin 15.2 11.7 - 15.7 g/dL    Hematocrit 45.9 35.0 - 47.0 %    MCV 87 78 - 100 fL    MCH 28.8 26.5 - 33.0 pg    MCHC 33.1 31.5 - 36.5 g/dL    RDW 12.5 10.0 - 15.0 %    Platelet Count 300 150 - 450 10e3/uL    % Neutrophils 67 %    % Lymphocytes 26 %    % Monocytes 6 %    % Eosinophils 0 %    % Basophils 1 %    % Immature Granulocytes 0 %    NRBCs per 100 WBC 0 <1 /100    Absolute Neutrophils 4.2 1.6 -  8.3 10e3/uL    Absolute Lymphocytes 1.7 0.8 - 5.3 10e3/uL    Absolute Monocytes 0.4 0.0 - 1.3 10e3/uL    Absolute Eosinophils 0.0 0.0 - 0.7 10e3/uL    Absolute Basophils 0.0 0.0 - 0.2 10e3/uL    Absolute Immature Granulocytes 0.0 <=0.4 10e3/uL    Absolute NRBCs 0.0 10e3/uL   Pulmonary function test    Collection Time: 09/13/23  6:30 AM   Result Value Ref Range    FVC-Pred 3.31 L    FVC-Pre 3.82 L    FVC-%Pred-Pre 115 %    FEV1-Pre 3.24 L    FEV1-%Pred-Pre 120 %    FEV1FVC-Pred 81 %    FEV1FVC-Pre 85 %    FEFMax-Pred 6.91 L/sec    FEFMax-Pre 6.18 L/sec    FEFMax-%Pred-Pre 89 %    FEF2575-Pred 2.68 L/sec    FEF2575-Pre 3.78 L/sec    QNN9781-%Pred-Pre 140 %    ExpTime-Pre 5.06 sec    FIFMax-Pre 4.67 L/sec    VC-Pred 3.86 L    VC-Pre 3.89 L    VC-%Pred-Pre 100 %    IC-Pred 2.64 L    IC-Pre 3.48 L    IC-%Pred-Pre 131 %    ERV-Pred 1.10 L    ERV-Pre 0.41 L    ERV-%Pred-Pre 36 %    FEV1FEV6-Pred 82 %    FEV1FEV6-Pre 85 %    FRCPleth-Pred 2.77 L    FRCPleth-Pre 2.44 L    FRCPleth-%Pred-Pre 88 %    RVPleth-Pred 1.53 L    RVPleth-Pre 2.04 L    RVPleth-%Pred-Pre 132 %    TLCPleth-Pred 5.39 L    TLCPleth-Pre 5.92 L    TLCPleth-%Pred-Pre 109 %    DLCOunc-Pred 21.21 ml/min/mmHg    DLCOunc-Pre 23.69 ml/min/mmHg    DLCOunc-%Pred-Pre 111 %    DLCOcor-Pre 22.65 ml/min/mmHg    DLCOcor-%Pred-Pre 106 %    VA-Pre 5.17 L    VA-%Pred-Pre 103 %    FEV1SVC-Pred 69 %    FEV1SVC-Pre 83 %                       Assessment and Plan:   Assessment:         48F with CIDP and no apparent respiratory disease or limitation, experiencing chest discomfort seemingly secondary to musculoskeletal pain.     Plan:        -I have recommended continued efforts at physical rehabilitation and also prescribed topical diclofenac in the event that her musculoskeletal pain may be  relieved in the interim   -I do not recommend any further diagnostic testing at the moment as patient does not appear to have any respiratory disease and     I spent a total of 65 minutes  on the day of the encounter dedicated to the office visit with Peggy Jessica.    This included review of vitals, labs, imaging, other diagnostic tests (I.e. PFTs, ECHO), face-to-face interaction, physical exam, counseling the patient +/- family members, and/or coordinating care.    Bryan Mckeon MD

## 2023-09-13 NOTE — LETTER
9/13/2023      RE: Peggy Jessica  [address] [facility] [address]       [facility] Appointment     Peggy Jessica MRN# [id number]   Age: 48 year old YOB: 1975         Reason for consult:    Peggy Jessica is a 48 year old year old female who is being seen for New Patient (New post covid  )        Requesting physician:              Chief Complaint:   Chest discomfort     History is obtained from the patient and her  who accompanies her to the appointment         History of Present Illness:   Peggy Jessica is a 48 year old year old female with chronic inflammatory demyelinating polyneuropathy referred forchest related complaints.    Peggy has a complicated medical history and has had a myriad of symptoms over the last few years inclluding peripheral neuropathy, fatigue, proximal muscle weakness, issues with imbalance and was eventually diagnosed with CIDP and has been following with PM&R. She contracted Covid in Feb 2023 and was referred for persistent dyspnea after covid.     On further qualifying her symptoms, she actually denies true dyspnea with exertion. She predominantly ambulates around the house with a walker and says she does not experience shortness of breath. Her main chest related complaint is actually R sided thoracic wall pain which was I was able to reproduce by palpation anterior to her mid axillary line down several intercostal spaces. She has been experiencing this for a few months. While she denies any specific discrete injury/trauama/fall prior to having this discomfort, she does say she frequently falls.  She has no history of any chronic respiratory disease. She does have a history of smoking but quit several years ago. She estimates quitting > 5yrs ago.     She does not have any recent dedicated chest imaging, particularly not since Feb 2023. Last CT chest in Oct 2022 did not note any parenchymal abnormalities.  Pulmonary function testing today was within normal limits.              Past Medical History:        Past Medical History:   Diagnosis Date     Arthritis      BRCA positive      Cancer (H)      CIDP (chronic inflammatory demyelinating polyneuropathy) (H)      ASHKAN III with severe dysplasia 2002     Complex regional pain syndrome type 1 of right lower extremity             Past Surgical History:        Past Surgical History:   Procedure Laterality Date     BILATERAL OOPHORECTOMY Bilateral 2019     c section      2002     CHOLECYSTECTOMY  1997     COLONOSCOPY       ESOPHAGOSCOPY, GASTROSCOPY, DUODENOSCOPY (EGD), COMBINED N/A 07/28/2022    Procedure: ESOPHAGOGASTRODUODENOSCOPY (EGD);  Surgeon: Zack Maddox MD;  Location:  GI     ESOPHAGOSCOPY, GASTROSCOPY, DUODENOSCOPY (EGD), COMBINED N/A 5/16/2023    Procedure: Esophagoscopy, gastroscopy, duodenoscopy (EGD), combined;  Surgeon: Aris Mason MD;  Location:  GI     ESOPHAGOSCOPY, GASTROSCOPY, DUODENOSCOPY (EGD), COMBINED N/A 7/20/2023    Procedure: Esophagoscopy, gastroscopy, duodenoscopy (EGD), combined;  Surgeon: Airs Mason MD;  Location:  GI     HYSTERECTOMY  2004     MASTECTOMY Bilateral 2020            Social History:     Social History     Socioeconomic History     Marital status:      Spouse name: Not on file     Number of children: 2     Years of education: Not on file     Highest education level: Not on file   Occupational History     Not on file   Tobacco Use     Smoking status: Former     Types: Cigarettes     Smokeless tobacco: Never   Vaping Use     Vaping Use: Never used   Substance and Sexual Activity     Alcohol use: Not Currently     Drug use: Yes     Types: Marijuana     Comment: Medical Canibis patient     Sexual activity: Not Currently     Partners: Male   Other Topics Concern     Not on file   Social History Narrative     Not on file     Social Determinants of Health     Financial Resource Strain: Not on  file   Food Insecurity: Not on file   Transportation Needs: Not on file   Physical Activity: Not on file   Stress: Not on file   Social Connections: Not on file   Intimate Partner Violence: Not on file   Housing Stability: Not on file             Family History:     Family History   Problem Relation Age of Onset     Kidney Disease Father      Macular Degeneration Paternal Grandmother      Glaucoma No family hx of             Immunizations:     Immunization History   Administered Date(s) Administered     COVID-19 Monovalent 18+ (Moderna) 03/20/2021, 04/17/2021     Influenza Vaccine >6 months (Alfuria,Fluzone) 02/17/2021     TDAP (Adacel,Boostrix) 11/22/2016            Allergies:     Allergies   Allergen Reactions     Dihydroergotamine Anaphylaxis     Latex Anaphylaxis     Shellfish-Derived Products Anaphylaxis     Sumatriptan Anaphylaxis     Compazine [Prochlorperazine] Anxiety     Banana Unknown     Gabapentin Dizziness     Gluten Meal Other (See Comments)     Celiac disease     Keppra [Levetiracetam] Nausea and Vomiting     Kiwi Unknown     Levofloxacin Other (See Comments)     Arrhythmia     Metronidazole Nausea and Vomiting     Nitrofurantoin Hives     Penicillins Hives     Pregabalin      Reglan [Metoclopramide] Other (See Comments)     restlessness/toe tapping      Topiramate Visual Disturbance     Aspirin Rash     Methadone Rash     Morphine Hives     She got hives around are when morphine given but resolved after few minutes per patient      Risperidone Anxiety          Medications:        Current Outpatient Medications   Medication     acetaminophen (TYLENOL) 500 MG tablet     baclofen (LIORESAL) 10 MG tablet     bisacodyl (DULCOLAX) 10 MG suppository     buprenorphine (BUTRANS) 7.5 MCG/HR WK patch     diclofenac (VOLTAREN) 1 % topical gel     dicyclomine (BENTYL) 20 MG tablet     diphenhydrAMINE (BENADRYL) 25 MG tablet     folic acid (FOLVITE) 400 MCG tablet     hydrOXYzine (ATARAX) 25 MG tablet      "Lidocaine (LIDOCARE) 4 % Patch     medical cannabis (Patient's own supply)     memantine (NAMENDA) 5 MG tablet     multivitamin w/minerals (THERA-VIT-M) tablet     naloxone (NARCAN) 4 MG/0.1ML nasal spray     ondansetron (ZOFRAN ODT) 4 MG ODT tab     pantoprazole (PROTONIX) 40 MG EC tablet     polyethylene glycol (MIRALAX) 17 GM/Dose powder     scopolamine (TRANSDERM) 1 MG/3DAYS 72 hr patch     senna (SENOKOT) 8.6 MG tablet     senna-docusate (SENOKOT-S/PERICOLACE) 8.6-50 MG tablet     sertraline (ZOLOFT) 100 MG tablet     traMADol (ULTRAM) 50 MG tablet     No current facility-administered medications for this visit.          Review of Systems:   The Review of Systems is negative other than noted in the HPI         Physical Exam:   /81 (BP Location: Right arm, Patient Position: Sitting, Cuff Size: Adult Regular)   Pulse 109   Ht 1.651 m (5' 5\")   Wt 74.4 kg (164 lb)   SpO2 97%   BMI 27.29 kg/m    Patient declined being weighed.       GENERAL APPEARANCE:  alert, and in no apparent distress.  EYES: PERRL, EOMI  HENT: Nasal mucosa with no edema and no hyperemia. No nasal polyps.  MOUTH: Oral mucosa is moist, without any lesions, no tonsillar enlargement, no oropharyngeal exudate. Poor dentition.   NECK: supple, no masses, no thyromegaly.  LYMPHATICS: No palpable axillary, cervical, or supraclavicular nodes.  RESP: good air flow throughout.  No crackles. No rhonchi. No wheezes. Normal percussion. Normal chest expansion  CV: Normal S1, S2, regular rhythm, normal rate. No murmur.  No rub. No gallop. No LE edema.   ABDOMEN:  soft, nontender, no HSM or masses. Normal bowel sounds  MS: extremities normal. No clubbing. No cyanosis.   SKIN: no rash on limited exam   NEURO: speech normal, cannot assess gait as sitting in wheelchair, power 4/5 of upper and lower extremities  PSYCH: normal mood and affect         Data:         Recent Results (from the past 4368 hour(s))   Comprehensive metabolic panel    Collection " Time: 05/12/23  9:56 AM   Result Value Ref Range    Sodium 138 136 - 145 mmol/L    Potassium 4.1 3.4 - 5.3 mmol/L    Chloride 102 98 - 107 mmol/L    Carbon Dioxide (CO2) 26 22 - 29 mmol/L    Anion Gap 10 7 - 15 mmol/L    Urea Nitrogen 6.1 6.0 - 20.0 mg/dL    Creatinine 0.92 0.51 - 0.95 mg/dL    Calcium 9.8 8.6 - 10.0 mg/dL    Glucose 110 (H) 70 - 99 mg/dL    Alkaline Phosphatase 106 (H) 35 - 104 U/L    AST 18 10 - 35 U/L    ALT 14 10 - 35 U/L    Protein Total 7.8 6.4 - 8.3 g/dL    Albumin 4.2 3.5 - 5.2 g/dL    Bilirubin Total 0.2 <=1.2 mg/dL    GFR Estimate 76 >60 mL/min/1.73m2   CBC with platelets and differential    Collection Time: 05/12/23  9:56 AM   Result Value Ref Range    WBC Count 5.7 4.0 - 11.0 10e3/uL    RBC Count 4.80 3.80 - 5.20 10e6/uL    Hemoglobin 14.6 11.7 - 15.7 g/dL    Hematocrit 44.1 35.0 - 47.0 %    MCV 92 78 - 100 fL    MCH 30.4 26.5 - 33.0 pg    MCHC 33.1 31.5 - 36.5 g/dL    RDW 13.1 10.0 - 15.0 %    Platelet Count 307 150 - 450 10e3/uL    % Neutrophils 70 %    % Lymphocytes 23 %    % Monocytes 6 %    % Eosinophils 1 %    % Basophils 0 %    % Immature Granulocytes 0 %    NRBCs per 100 WBC 0 <1 /100    Absolute Neutrophils 4.0 1.6 - 8.3 10e3/uL    Absolute Lymphocytes 1.3 0.8 - 5.3 10e3/uL    Absolute Monocytes 0.3 0.0 - 1.3 10e3/uL    Absolute Eosinophils 0.1 0.0 - 0.7 10e3/uL    Absolute Basophils 0.0 0.0 - 0.2 10e3/uL    Absolute Immature Granulocytes 0.0 <=0.4 10e3/uL    Absolute NRBCs 0.0 10e3/uL   Extra Blue Top Tube    Collection Time: 05/12/23  9:58 AM   Result Value Ref Range    Hold Specimen JIC    Extra Red Top Tube    Collection Time: 05/12/23  9:58 AM   Result Value Ref Range    Hold Specimen JIC    Anti Nuclear Blanca IgG by IFA with Reflex    Collection Time: 05/12/23  4:31 PM   Result Value Ref Range    PAULINE interpretation Borderline Positive (A) Negative    PAULINE pattern 1 Dense fine speckled     PAULINE titer 1 1:80    SSA Ro SYLWIA Antibody IgG    Collection Time: 05/12/23  4:31 PM    Result Value Ref Range    SSA Blanca IgG Instrument Value <0.5 <7.0 U/mL    SSA (Ro) Antibody IgG Negative Negative   SSB La SYLWIA Antibody IgG    Collection Time: 05/12/23  4:31 PM   Result Value Ref Range    SSB Blanca IgG Instrument Value <0.6 <7.0 U/mL    SSB (La) Antibody IgG Negative Negative   CRP inflammation    Collection Time: 05/12/23  4:31 PM   Result Value Ref Range    CRP Inflammation <3.00 <5.00 mg/L   Vitamin B12    Collection Time: 05/12/23  4:31 PM   Result Value Ref Range    Vitamin B12 537 232 - 1,245 pg/mL   UA Macroscopic with reflex to Microscopic and Culture    Collection Time: 05/12/23  4:59 PM    Specimen: Urine, Midstream   Result Value Ref Range    Color Urine Light Yellow Colorless, Straw, Light Yellow, Yellow    Appearance Urine Clear Clear    Glucose Urine Negative Negative mg/dL    Bilirubin Urine Negative Negative    Ketones Urine Negative Negative mg/dL    Specific Gravity Urine 1.014 1.003 - 1.035    Blood Urine Negative Negative    pH Urine 5.5 5.0 - 7.0    Protein Albumin Urine Negative Negative mg/dL    Urobilinogen Urine Normal Normal, 2.0 mg/dL    Nitrite Urine Negative Negative    Leukocyte Esterase Urine Negative Negative   Basic metabolic panel    Collection Time: 05/13/23  8:07 AM   Result Value Ref Range    Sodium 139 136 - 145 mmol/L    Potassium 4.6 3.4 - 5.3 mmol/L    Chloride 106 98 - 107 mmol/L    Carbon Dioxide (CO2) 22 22 - 29 mmol/L    Anion Gap 11 7 - 15 mmol/L    Urea Nitrogen 7.0 6.0 - 20.0 mg/dL    Creatinine 0.83 0.51 - 0.95 mg/dL    Calcium 9.2 8.6 - 10.0 mg/dL    Glucose 91 70 - 99 mg/dL    GFR Estimate 86 >60 mL/min/1.73m2   CBC with platelets    Collection Time: 05/13/23 11:15 AM   Result Value Ref Range    WBC Count 4.6 4.0 - 11.0 10e3/uL    RBC Count 4.43 3.80 - 5.20 10e6/uL    Hemoglobin 13.6 11.7 - 15.7 g/dL    Hematocrit 40.3 35.0 - 47.0 %    MCV 91 78 - 100 fL    MCH 30.7 26.5 - 33.0 pg    MCHC 33.7 31.5 - 36.5 g/dL    RDW 13.3 10.0 - 15.0 %    Platelet  Count 257 150 - 450 10e3/uL   UPPER GI ENDOSCOPY    Collection Time: 05/16/23  3:02 PM   Result Value Ref Range    Upper GI Endoscopy       [address] [place] [place]  [address]  _______________________________________________________________________________  Patient Name: Peggy Jessica         Procedure Date: 5/16/2023 3:02 PM  MRN: [id number]                       Account Number: [id number]  YOB: 1975               Admit Type: Outpatient  Age: 47                               Room: [place] [address]  Note Status: Finalized                Attending MD: CAL CALI MD,   Instrument Name: 506 GIF  Gastroscope   _______________________________________________________________________________     Procedure:                Upper GI endoscopy  Providers:                CAL CALI MD, Daisy Zhu, DESIRE  Referring MD:               Medicines:                Fentanyl 100 micrograms IV, Midazolam 4 mg IV,                             Cetacaine spray  Complications:            No immediate complications.  _____________________________________________________________________ __________  Procedure:                Pre-Anesthesia Assessment:                            - Prior to the procedure, a History and Physical                             was performed, and patient medications and                             allergies were reviewed. The risks and benefits of                             the procedure and the sedation options and risks                             were discussed with the patient. All questions were                             answered and informed consent was obtained. Patient                             identification and proposed procedure were verified                             by the physician. Mental Status Examination:                             normal. Airway Examination: normal oropharyngeal                             airway and neck  mobility. Respiratory Examination:                             clear to auscultation. Prophylactic Antibiotics:                             The patient does not require prophylactic                              antibiotics. Prior Anticoagulants: The patient has                             taken no anticoagulant or antiplatelet agents.                             After reviewing the risks and benefits, the patient                             was deemed in satisfactory condition to undergo the                             procedure. The anesthesia plan was to use minimal                             sedation / analgesia (anxiolysis). Immediately                             prior to administration of medications, the patient                             was re-assessed for adequacy to receive sedatives.                             The heart rate, respiratory rate, oxygen                             saturations, blood pressure, adequacy of pulmonary                             ventilation, and response to care were monitored                             throughout the procedure. The physical status of                             the patient was re-assessed after the procedure.                             After obtaining informed consent, the endoscope was                             passed under direct vision. Throughout the                             procedure, the patient's blood pressure, pulse, and                             oxygen saturations were monitored continuously. The                             endoscope 506 was introduced through the mouth, and                             advanced to the third part of duodenum. The upper                             GI endoscopy was accomplished without difficulty.                             The patient tolerated the procedure well.                                                                                   Findings:       A medium-sized hiatal hernia was present.        Patchy moderately congested mucosa was found in the entire examined        stomach. Biopsies were taken with a cold forceps for histology. Biopsies        were taken with a cold forceps for Helicobacter pylori testing.       The cardia and gastric f undus were normal on retroflexion.       The duodenal bulb, first portion of the duodenum and second portion of        the duodenum were normal.       LA Grade C (one or more mucosal breaks continuous between tops of 2 or        more mucosal folds, less than 75% circumference) esophagitis with no        bleeding was found 37 cm from the incisors.                                                                                   Impression:               - Medium-sized hiatal hernia.                            - Congestive gastropathy. Biopsied.                            - Normal duodenal bulb, first portion of the                             duodenum and second portion of the duodenum.                            - LA Grade C reflux esophagitis with no bleeding.  Recommendation:           - Please continue to follow with Primary care                             Physician.                            - Anti Relux Precautions                            - Full liquid diet.                             - Return patient to hospital [facility] for ongoing care.                                                                                   Procedure Code(s):       --- Professional ---       35907, Esophagogastroduodenoscopy, flexible, transoral; with biopsy,        single or multiple  Diagnosis Code(s):       --- Professional ---       K44.9, Diaphragmatic hernia without obstruction or gangrene       K31.89, Other diseases of stomach and duodenum    CPT copyright 2021 [employer]. All rights reserved.    The codes documented in this report are preliminary and upon  review may   be revised to meet current compliance requirements.    Electronically Signed by  "Aris Garciai  ________________________  ARIS MASON MD  5/16/2023 3:56:58 PM  I was physically present for the entire viewing portion of the exam.  ARIS MASON MD  Number of Addenda: 0    Note Initiated On: 5/16/2023 3:02 PM  Total Procedure Duration: 0 hours 1 minute 52 seconds   Scope In: 3 :25:31 PM  Scope Out: 3:27:23 PM     Surgical Pathology Exam    Collection Time: 05/16/23  3:28 PM   Result Value Ref Range    Case Report       Surgical Pathology Report                         Case: [id number]                                  Authorizing Provider:  Aris Mason MD  Collected:           05/16/2023 03:28 PM          Ordering Location:     [facility]          Received:            05/16/2023 03:45 PM                                 [facility]                                                          Pathologist:           Gildardo Frausto MD                                                           Specimen:    Stomach, Body, R/O H PYLORI                                                                Final Diagnosis       A. Gastric body, biopsy:  - Corpus with reactive changes, non specific         Clinical Information       Procedure:  Esophagoscopy, gastroscopy, duodenoscopy (EGD), combined  Pre-op Diagnosis: Nausea and vomiting [R11.2]  Post-op Diagnosis: R11.2 - Nausea and vomiting [ICD-10-CM]      Gross Description       A(1). Stomach, Body, R/O H PYLORI:  The specimen is received in formalin labeled with the patient's name, medical record number and other identifying information and designated \"stomach, body\". It consists of 2 tan tissue fragments, 0.1 cm and 0.3 cm in greatest dimension.  Entirely submitted in 1 cassette.   (GABO Davis (ASCP))      Microscopic Description       Microscopic examination performed, substantiating the above diagnosis.        Performing Labs       The technical component of this testing was completed at [facility] " [facility]      Case Images     Glucose CSF:    Collection Time: 05/17/23  9:18 AM   Result Value Ref Range    Glucose CSF 62 40 - 70 mg/dL   Protein total CSF:    Collection Time: 05/17/23  9:18 AM   Result Value Ref Range    Protein total CSF 35.8 15.0 - 45.0 mg/dL   Cell Count CSF    Collection Time: 05/17/23  9:18 AM   Result Value Ref Range    Tube Number 1     Color Colorless Colorless    Clarity Clear Clear    Total Nucleated Cells 1 0 - 5 /uL    RBC Count 111 (H) 0 - 2 /uL   Cerebrospinal fluid Aerobic Bacterial Culture Routine    Collection Time: 05/17/23  9:19 AM    Specimen: Lumbar Puncture; Cerebrospinal fluid   Result Value Ref Range    Culture No Growth     Gram Stain Result No organisms seen     Gram Stain Result 2+ WBC seen    Cytology non gyn CSF    Collection Time: 05/17/23  9:20 AM   Result Value Ref Range    Final Diagnosis       Specimen A     Interpretation:      Negative for malignancy     Adequacy:     Satisfactory for evaluation          Clinical Information       CIPD      Gross Description       A(A). Lumbar Puncture, :A. Lumbar Puncture, , CSF:  Received 2 ml of clear, colorless fluid, processed as 1 Pap stained cytospin and 1 Martin stained cytospin.               Microscopic Description       Specimen is moderately cellular and consists of benign mature lymphocytes and scant monocytes.  Increased red cells are present, suggestive of a traumatic tap.   Negative for infectious organisms or malignancy.        Performing Labs       The technical component of this testing was completed at [facility]     Myasthenia Gravis (MG) Evaulation with MuSK Reflex    Collection Time: 05/18/23  3:42 PM   Result Value Ref Range    ACh Receptor (Muscle) Binding Ab 0.00 <=0.02 nmol/L    MG Interpretive Comments SEE NOTE    : [place] Miscellaneous Test    Collection Time: 05/18/23  3:42 PM    Result Value Ref Range    [place] Result SEE NOTE    EKG 12-lead, tracing only    Collection Time: 07/18/23  8:09 AM   Result Value Ref Range    Systolic Blood Pressure  mmHg    Diastolic Blood Pressure  mmHg    Ventricular Rate 90 BPM    Atrial Rate 90 BPM    SD Interval 150 ms    QRS Duration 72 ms     ms    QTc 420 ms    P Axis 41 degrees    R AXIS -1 degrees    T Axis 17 degrees    Interpretation ECG       Sinus rhythm  Possible Inferior infarct , age undetermined  Abnormal ECG  When compared with ECG of 17-OCT-2022 10:31,  No significant change was found  Confirmed by GENERATED REPORT, COMPUTER ([id number]),  Chaz Keating ([id number]) on 7/18/2023 9:33:54 AM     Comprehensive metabolic panel    Collection Time: 07/18/23  8:19 AM   Result Value Ref Range    Sodium 138 136 - 145 mmol/L    Potassium 4.5 3.4 - 5.3 mmol/L    Chloride 102 98 - 107 mmol/L    Carbon Dioxide (CO2) 27 22 - 29 mmol/L    Anion Gap 9 7 - 15 mmol/L    Urea Nitrogen 10.0 6.0 - 20.0 mg/dL    Creatinine 0.80 0.51 - 0.95 mg/dL    Calcium 9.5 8.6 - 10.0 mg/dL    Glucose 101 (H) 70 - 99 mg/dL    Alkaline Phosphatase 109 (H) 35 - 104 U/L    AST 20 0 - 45 U/L    ALT 13 0 - 50 U/L    Protein Total 7.6 6.4 - 8.3 g/dL    Albumin 4.3 3.5 - 5.2 g/dL    Bilirubin Total 0.3 <=1.2 mg/dL    GFR Estimate 90 >60 mL/min/1.73m2   Magnesium    Collection Time: 07/18/23  8:19 AM   Result Value Ref Range    Magnesium 2.2 1.7 - 2.3 mg/dL   CBC with platelets and differential    Collection Time: 07/18/23  8:19 AM   Result Value Ref Range    WBC Count 5.5 4.0 - 11.0 10e3/uL    RBC Count 4.93 3.80 - 5.20 10e6/uL    Hemoglobin 14.7 11.7 - 15.7 g/dL    Hematocrit 44.0 35.0 - 47.0 %    MCV 89 78 - 100 fL    MCH 29.8 26.5 - 33.0 pg    MCHC 33.4 31.5 - 36.5 g/dL    RDW 12.4 10.0 - 15.0 %    Platelet Count 248 150 - 450 10e3/uL    % Neutrophils 68 %    % Lymphocytes 23 %    % Monocytes 7 %    % Eosinophils 1 %    % Basophils 1 %    % Immature Granulocytes 0 %    NRBCs per  100 WBC 0 <1 /100    Absolute Neutrophils 3.8 1.6 - 8.3 10e3/uL    Absolute Lymphocytes 1.2 0.8 - 5.3 10e3/uL    Absolute Monocytes 0.4 0.0 - 1.3 10e3/uL    Absolute Eosinophils 0.0 0.0 - 0.7 10e3/uL    Absolute Basophils 0.0 0.0 - 0.2 10e3/uL    Absolute Immature Granulocytes 0.0 <=0.4 10e3/uL    Absolute NRBCs 0.0 10e3/uL   Troponin T, High Sensitivity    Collection Time: 07/18/23  8:19 AM   Result Value Ref Range    Troponin T, High Sensitivity <6 <=14 ng/L   Lipase    Collection Time: 07/18/23  8:19 AM   Result Value Ref Range    Lipase 27 13 - 60 U/L   Glucose by meter    Collection Time: 07/19/23  4:16 PM   Result Value Ref Range    GLUCOSE BY METER POCT 92 70 - 99 mg/dL   Renal panel    Collection Time: 07/20/23  6:51 AM   Result Value Ref Range    Sodium 137 136 - 145 mmol/L    Potassium 4.2 3.4 - 5.3 mmol/L    Chloride 104 98 - 107 mmol/L    Carbon Dioxide (CO2) 23 22 - 29 mmol/L    Anion Gap 10 7 - 15 mmol/L    Glucose 75 70 - 99 mg/dL    Urea Nitrogen 8.4 6.0 - 20.0 mg/dL    Creatinine 0.79 0.51 - 0.95 mg/dL    GFR Estimate >90 >60 mL/min/1.73m2    Calcium 8.9 8.6 - 10.0 mg/dL    Albumin 3.7 3.5 - 5.2 g/dL    Phosphorus 3.4 2.5 - 4.5 mg/dL   CBC with platelets and differential    Collection Time: 07/20/23  6:51 AM   Result Value Ref Range    WBC Count 5.8 4.0 - 11.0 10e3/uL    RBC Count 4.37 3.80 - 5.20 10e6/uL    Hemoglobin 12.7 11.7 - 15.7 g/dL    Hematocrit 38.8 35.0 - 47.0 %    MCV 89 78 - 100 fL    MCH 29.1 26.5 - 33.0 pg    MCHC 32.7 31.5 - 36.5 g/dL    RDW 12.0 10.0 - 15.0 %    Platelet Count 210 150 - 450 10e3/uL    % Neutrophils 74 %    % Lymphocytes 19 %    % Monocytes 6 %    % Eosinophils 1 %    % Basophils 0 %    % Immature Granulocytes 0 %    NRBCs per 100 WBC 0 <1 /100    Absolute Neutrophils 4.3 1.6 - 8.3 10e3/uL    Absolute Lymphocytes 1.1 0.8 - 5.3 10e3/uL    Absolute Monocytes 0.4 0.0 - 1.3 10e3/uL    Absolute Eosinophils 0.0 0.0 - 0.7 10e3/uL    Absolute Basophils 0.0 0.0 - 0.2 10e3/uL     Absolute Immature Granulocytes 0.0 <=0.4 10e3/uL    Absolute NRBCs 0.0 10e3/uL   UPPER GI ENDOSCOPY    Collection Time: 07/20/23 12:37 PM   Result Value Ref Range    Upper GI Endoscopy       [address] [place]  [address]  _______________________________________________________________________________  Patient Name: Peggy Jessica         Procedure Date: 7/20/2023 12:37 PM  MRN: [id number]                       Account Number: [id number]  YOB: 1975               Admit Type: Outpatient  Age: 48                               Room: 3  Note Status: Finalized                Attending MD: CAL CALI MD,   Instrument Name: 509 GIF  Gastroscope   _______________________________________________________________________________     Procedure:                Upper GI endoscopy  Indications:              Epigastric abdominal pain, Functional Dyspepsia  Providers:                CAL CALI MD, Garima Rowe RN  Referring MD:               Medicines:                Fentanyl 100 micrograms IV, Midazolam 4 mg IV,                             Cetacaine spray  Complications:            No immediate complications.  ___ ____________________________________________________________________________  Procedure:                Pre-Anesthesia Assessment:                            - Prior to the procedure, a History and Physical                             was performed, and patient medications and                             allergies were reviewed. The patient is competent.                             The risks and benefits of the procedure and the                             sedation options and risks were discussed with the                             patient. All questions were answered and informed                             consent was obtained. Patient identification and                             proposed procedure were verified by the physician.                              Mental Status Examination: normal. Prophylactic                             Antibiotics: The patient does not require                             prophylactic antibiotics. Prior Anticoagulants: The                              patient has taken Effient (prasugrel), last dose                             was 14 days prior to procedure. ASA Grade                             Assessment: I - A normal, healthy patient. After                             reviewing the risks and benefits, the patient was                             deemed in satisfactory condition to undergo the                             procedure. The anesthesia plan was to use minimal                             sedation / analgesia (anxiolysis). Immediately                             prior to administration of medications, the patient                             was re-assessed for adequacy to receive sedatives.                             The heart rate, respiratory rate, oxygen                             saturations, blood pressure, adequacy of pulmonary                             ventilation, and response to care were monitored                             throughout the procedure. The physical status of                             the amy ent was re-assessed after the procedure.                            - Immediately prior to administration of                             medications, the patient was re-assessed for                             adequacy to receive sedatives.                            - The sedation level attained was moderate.                            After obtaining informed consent, the endoscope was                             passed under direct vision. Throughout the                             procedure, the patient's blood pressure, pulse, and                             oxygen saturations were monitored continuously. The                             endoscope 509 was introduced through the mouth, and                              advanced to the second part of duodenum. The upper                             GI endoscopy was accomplished without difficulty.                             The patient tolerated the procedure well.                                                                                    Findings:       The examined esophagus was normal.       Diffuse mildly congested mucosa was found in the gastric body.       A small hiatal hernia was present.       The duodenal bulb, first portion of the duodenum and second portion of        the duodenum were normal.       The cardia and gastric fundus were normal on retroflexion.                                                                                   Impression:               - Normal esophagus.                            - Congestive gastropathy.                            - Small hiatal hernia.                            - Normal duodenal bulb, first portion of the                             duodenum and second portion of the duodenum.                            - No specimens collected.  Recommendation:           - Return patient to [facility] for [facility] care.                            - Mechanical soft diet.                            - Continue present medications.                                                                                    Procedure Code(s):       --- Professional ---       57632, Esophagogastroduodenoscopy, flexible, transoral; diagnostic,        including collection of specimen(s) by brushing or washing, when        performed (separate procedure)  Diagnosis Code(s):       --- Professional ---       K31.89, Other diseases of stomach and duodenum       K44.9, Diaphragmatic hernia without obstruction or gangrene       R10.13, Epigastric pain       K30, Functional dyspepsia    CPT copyright 2022 [employer]. All rights reserved.    The codes documented in this report are preliminary and upon   review may   be revised to meet current compliance requirements.    Electronically Signed by Aris Thompson  ________________________  ARIS CALI MD  7/20/2023 1:01:11 PM  I was physically present for the entire viewing portion of the exam.  ARIS CALI MD  Number of Addenda: 0    Note Initiated On: 7/20/2023 12:37 PM  Total Procedure Du ration: 0 hours 0 minutes 48 seconds   Scope In: 12:57:34 PM  Scope Out: 12:58:22 PM     TSH with free T4 reflex    Collection Time: 08/04/23  4:14 PM   Result Value Ref Range    TSH 2.09 0.30 - 4.20 uIU/mL   Comprehensive metabolic panel    Collection Time: 08/26/23  5:38 PM   Result Value Ref Range    Sodium 137 136 - 145 mmol/L    Potassium 4.0 3.4 - 5.3 mmol/L    Chloride 98 98 - 107 mmol/L    Carbon Dioxide (CO2) 27 22 - 29 mmol/L    Anion Gap 12 7 - 15 mmol/L    Urea Nitrogen 5.6 (L) 6.0 - 20.0 mg/dL    Creatinine 0.94 0.51 - 0.95 mg/dL    Calcium 9.7 8.6 - 10.0 mg/dL    Glucose 108 (H) 70 - 99 mg/dL    Alkaline Phosphatase 111 (H) 35 - 104 U/L    AST 25 0 - 45 U/L    ALT 15 0 - 50 U/L    Protein Total 8.1 6.4 - 8.3 g/dL    Albumin 4.4 3.5 - 5.2 g/dL    Bilirubin Total 0.3 <=1.2 mg/dL    GFR Estimate 74 >60 mL/min/1.73m2   Lipase    Collection Time: 08/26/23  5:38 PM   Result Value Ref Range    Lipase 24 13 - 60 U/L   CBC with platelets and differential    Collection Time: 08/26/23  5:38 PM   Result Value Ref Range    WBC Count 6.2 4.0 - 11.0 10e3/uL    RBC Count 5.08 3.80 - 5.20 10e6/uL    Hemoglobin 14.6 11.7 - 15.7 g/dL    Hematocrit 43.4 35.0 - 47.0 %    MCV 85 78 - 100 fL    MCH 28.7 26.5 - 33.0 pg    MCHC 33.6 31.5 - 36.5 g/dL    RDW 12.2 10.0 - 15.0 %    Platelet Count 269 150 - 450 10e3/uL    % Neutrophils 61 %    % Lymphocytes 31 %    % Monocytes 7 %    % Eosinophils 1 %    % Basophils 0 %    % Immature Granulocytes 0 %    NRBCs per 100 WBC 0 <1 /100    Absolute Neutrophils 3.8 1.6 - 8.3 10e3/uL    Absolute Lymphocytes 1.9 0.8 - 5.3 10e3/uL    Absolute  Monocytes 0.5 0.0 - 1.3 10e3/uL    Absolute Eosinophils 0.0 0.0 - 0.7 10e3/uL    Absolute Basophils 0.0 0.0 - 0.2 10e3/uL    Absolute Immature Granulocytes 0.0 <=0.4 10e3/uL    Absolute NRBCs 0.0 10e3/uL   EKG 12-lead, tracing only    Collection Time: 08/26/23  5:58 PM   Result Value Ref Range    Systolic Blood Pressure  mmHg    Diastolic Blood Pressure  mmHg    Ventricular Rate 73 BPM    Atrial Rate 73 BPM    HI Interval 170 ms    QRS Duration 70 ms     ms    QTc 418 ms    P Axis 42 degrees    R AXIS 9 degrees    T Axis 32 degrees    Interpretation ECG       Sinus rhythm  Normal ECG  When compared with ECG of 18-JUL-2023 08:09,  Borderline criteria for Inferior infarct are no longer Present  Confirmed by GENERATED REPORT, COMPUTER ([id number]),  JAN GREGORIO ([id number]) on 8/26/2023 6:08:11 PM     Comprehensive metabolic panel    Collection Time: 09/01/23  6:07 PM   Result Value Ref Range    Sodium 140 136 - 145 mmol/L    Potassium 3.8 3.4 - 5.3 mmol/L    Chloride 99 98 - 107 mmol/L    Carbon Dioxide (CO2) 28 22 - 29 mmol/L    Anion Gap 13 7 - 15 mmol/L    Urea Nitrogen 10.5 6.0 - 20.0 mg/dL    Creatinine 0.79 0.51 - 0.95 mg/dL    Calcium 10.2 (H) 8.6 - 10.0 mg/dL    Glucose 92 70 - 99 mg/dL    Alkaline Phosphatase 100 35 - 104 U/L    AST 21 0 - 45 U/L    ALT 16 0 - 50 U/L    Protein Total 8.5 (H) 6.4 - 8.3 g/dL    Albumin 4.8 3.5 - 5.2 g/dL    Bilirubin Total 0.4 <=1.2 mg/dL    GFR Estimate >90 >60 mL/min/1.73m2   Lipase    Collection Time: 09/01/23  6:07 PM   Result Value Ref Range    Lipase 25 13 - 60 U/L   CBC with platelets and differential    Collection Time: 09/01/23  6:07 PM   Result Value Ref Range    WBC Count 6.4 4.0 - 11.0 10e3/uL    RBC Count 5.28 (H) 3.80 - 5.20 10e6/uL    Hemoglobin 15.2 11.7 - 15.7 g/dL    Hematocrit 45.9 35.0 - 47.0 %    MCV 87 78 - 100 fL    MCH 28.8 26.5 - 33.0 pg    MCHC 33.1 31.5 - 36.5 g/dL    RDW 12.5 10.0 - 15.0 %    Platelet Count 300 150 - 450 10e3/uL    % Neutrophils 67 %     % Lymphocytes 26 %    % Monocytes 6 %    % Eosinophils 0 %    % Basophils 1 %    % Immature Granulocytes 0 %    NRBCs per 100 WBC 0 <1 /100    Absolute Neutrophils 4.2 1.6 - 8.3 10e3/uL    Absolute Lymphocytes 1.7 0.8 - 5.3 10e3/uL    Absolute Monocytes 0.4 0.0 - 1.3 10e3/uL    Absolute Eosinophils 0.0 0.0 - 0.7 10e3/uL    Absolute Basophils 0.0 0.0 - 0.2 10e3/uL    Absolute Immature Granulocytes 0.0 <=0.4 10e3/uL    Absolute NRBCs 0.0 10e3/uL   Pulmonary function test    Collection Time: 09/13/23  6:30 AM   Result Value Ref Range    FVC-Pred 3.31 L    FVC-Pre 3.82 L    FVC-%Pred-Pre 115 %    FEV1-Pre 3.24 L    FEV1-%Pred-Pre 120 %    FEV1FVC-Pred 81 %    FEV1FVC-Pre 85 %    FEFMax-Pred 6.91 L/sec    FEFMax-Pre 6.18 L/sec    FEFMax-%Pred-Pre 89 %    FEF2575-Pred 2.68 L/sec    FEF2575-Pre 3.78 L/sec    WGX4155-%Pred-Pre 140 %    ExpTime-Pre 5.06 sec    FIFMax-Pre 4.67 L/sec    VC-Pred 3.86 L    VC-Pre 3.89 L    VC-%Pred-Pre 100 %    IC-Pred 2.64 L    IC-Pre 3.48 L    IC-%Pred-Pre 131 %    ERV-Pred 1.10 L    ERV-Pre 0.41 L    ERV-%Pred-Pre 36 %    FEV1FEV6-Pred 82 %    FEV1FEV6-Pre 85 %    FRCPleth-Pred 2.77 L    FRCPleth-Pre 2.44 L    FRCPleth-%Pred-Pre 88 %    RVPleth-Pred 1.53 L    RVPleth-Pre 2.04 L    RVPleth-%Pred-Pre 132 %    TLCPleth-Pred 5.39 L    TLCPleth-Pre 5.92 L    TLCPleth-%Pred-Pre 109 %    DLCOunc-Pred 21.21 ml/min/mmHg    DLCOunc-Pre 23.69 ml/min/mmHg    DLCOunc-%Pred-Pre 111 %    DLCOcor-Pre 22.65 ml/min/mmHg    DLCOcor-%Pred-Pre 106 %    VA-Pre 5.17 L    VA-%Pred-Pre 103 %    FEV1SVC-Pred 69 %    FEV1SVC-Pre 83 %                       Assessment and Plan:   Assessment:         48F with CIDP and no apparent respiratory disease or limitation, experiencing chest discomfort seemingly secondary to musculoskeletal pain.     Plan:        -I have recommended continued efforts at physical rehabilitation and also prescribed topical diclofenac in the event that her musculoskeletal pain may be  relieved in  the interim   -I do not recommend any further diagnostic testing at the moment as patient does not appear to have any respiratory disease and     I spent a total of 65 minutes on the day of the encounter dedicated to the office visit with Peggy Jessica.    This included review of vitals, labs, imaging, other diagnostic tests (I.e. PFTs, ECHO), face-to-face interaction, physical exam, counseling the patient +/- family members, and/or coordinating care.    Bryan Mckeon MD

## 2023-09-14 ENCOUNTER — TELEPHONE (OUTPATIENT)
Dept: NEUROLOGY | Facility: CLINIC | Age: 48
End: 2023-09-14

## 2023-09-14 ENCOUNTER — LAB (OUTPATIENT)
Dept: LAB | Facility: CLINIC | Age: 48
End: 2023-09-14
Payer: COMMERCIAL

## 2023-09-14 DIAGNOSIS — G61.81 CIDP (CHRONIC INFLAMMATORY DEMYELINATING POLYNEUROPATHY) (H): ICD-10-CM

## 2023-09-14 DIAGNOSIS — E55.9 VITAMIN D DEFICIENCY: ICD-10-CM

## 2023-09-14 DIAGNOSIS — R53.83 OTHER FATIGUE: Primary | ICD-10-CM

## 2023-09-14 DIAGNOSIS — R53.83 OTHER FATIGUE: ICD-10-CM

## 2023-09-14 LAB
ALBUMIN SERPL BCG-MCNC: 4.5 G/DL (ref 3.5–5.2)
ALP SERPL-CCNC: 96 U/L (ref 35–104)
ALT SERPL W P-5'-P-CCNC: 14 U/L (ref 0–50)
AST SERPL W P-5'-P-CCNC: 17 U/L (ref 0–45)
BILIRUB DIRECT SERPL-MCNC: <0.2 MG/DL (ref 0–0.3)
BILIRUB SERPL-MCNC: 0.3 MG/DL
CRP SERPL-MCNC: <3 MG/L
DEPRECATED CALCIDIOL+CALCIFEROL SERPL-MC: 7 UG/L (ref 20–75)
ERYTHROCYTE [SEDIMENTATION RATE] IN BLOOD BY WESTERGREN METHOD: 14 MM/HR (ref 0–20)
FERRITIN SERPL-MCNC: 360 NG/ML (ref 6–175)
PROT SERPL-MCNC: 8 G/DL (ref 6.4–8.3)

## 2023-09-14 PROCEDURE — 85652 RBC SED RATE AUTOMATED: CPT | Performed by: PATHOLOGY

## 2023-09-14 PROCEDURE — 86038 ANTINUCLEAR ANTIBODIES: CPT | Performed by: PHYSICIAN ASSISTANT

## 2023-09-14 PROCEDURE — 82728 ASSAY OF FERRITIN: CPT | Performed by: PATHOLOGY

## 2023-09-14 PROCEDURE — 99000 SPECIMEN HANDLING OFFICE-LAB: CPT | Performed by: PATHOLOGY

## 2023-09-14 PROCEDURE — 86140 C-REACTIVE PROTEIN: CPT | Performed by: PATHOLOGY

## 2023-09-14 PROCEDURE — 82306 VITAMIN D 25 HYDROXY: CPT | Performed by: PHYSICIAN ASSISTANT

## 2023-09-14 PROCEDURE — 80076 HEPATIC FUNCTION PANEL: CPT | Performed by: PATHOLOGY

## 2023-09-14 PROCEDURE — 36415 COLL VENOUS BLD VENIPUNCTURE: CPT | Performed by: PATHOLOGY

## 2023-09-14 PROCEDURE — 84207 ASSAY OF VITAMIN B-6: CPT | Mod: 90 | Performed by: PATHOLOGY

## 2023-09-14 RX ORDER — ERGOCALCIFEROL 1.25 MG/1
50000 CAPSULE, LIQUID FILLED ORAL WEEKLY
Qty: 8 CAPSULE | Refills: 0 | Status: ON HOLD | OUTPATIENT
Start: 2023-09-14 | End: 2023-10-15

## 2023-09-14 RX ORDER — CHOLECALCIFEROL (VITAMIN D3) 50 MCG
1 TABLET ORAL WEEKLY
Qty: 8 TABLET | Refills: 1 | Status: SHIPPED | OUTPATIENT
Start: 2023-09-14 | End: 2023-09-14 | Stop reason: DRUGHIGH

## 2023-09-14 NOTE — TELEPHONE ENCOUNTER
Left Voicemail (2nd Attempt) and Sent Mychart (2nd Attempt) for the patient to call back and schedule the following:    Appointment type: Schedule new appt  Provider: Dr. Catalan   Return date: Next Available   Specialty phone number: [phone]  Additional appointment(s) needed: N/A  Additonal Notes: Pt request a change in provider   -Per Dr. Catalan Peggy can be scheduled with another [facility] provider.  Please contact her and schedule her with Dr. Catalan. Thanks!       Tika Soto on 9/14/2023 at 11:16 AM

## 2023-09-15 LAB
ANA PAT SER IF-IMP: ABNORMAL
ANA SER QL IF: ABNORMAL
ANA TITR SER IF: ABNORMAL {TITER}

## 2023-09-17 LAB — PYRIDOXAL PHOS SERPL-SCNC: 39.9 NMOL/L

## 2023-09-20 NOTE — RESULT ENCOUNTER NOTE
Results discussed directly with patient while patient was present. Any further details documented in the note.   Bryan Mckeon MD

## 2023-10-11 ENCOUNTER — HOSPITAL ENCOUNTER (EMERGENCY)
Facility: CLINIC | Age: 48
End: 2023-10-11
Payer: COMMERCIAL

## 2023-10-11 ENCOUNTER — OFFICE VISIT (OUTPATIENT)
Dept: FAMILY MEDICINE | Facility: CLINIC | Age: 48
End: 2023-10-11
Payer: COMMERCIAL

## 2023-10-11 ENCOUNTER — APPOINTMENT (OUTPATIENT)
Dept: CT IMAGING | Facility: CLINIC | Age: 48
End: 2023-10-11
Attending: EMERGENCY MEDICINE
Payer: COMMERCIAL

## 2023-10-11 ENCOUNTER — APPOINTMENT (OUTPATIENT)
Dept: MRI IMAGING | Facility: CLINIC | Age: 48
End: 2023-10-11
Attending: EMERGENCY MEDICINE
Payer: COMMERCIAL

## 2023-10-11 ENCOUNTER — HOSPITAL ENCOUNTER (OUTPATIENT)
Facility: CLINIC | Age: 48
Setting detail: OBSERVATION
Discharge: HOME | End: 2023-10-15
Attending: EMERGENCY MEDICINE | Admitting: INTERNAL MEDICINE
Payer: COMMERCIAL

## 2023-10-11 VITALS
RESPIRATION RATE: 18 BRPM | SYSTOLIC BLOOD PRESSURE: 135 MMHG | DIASTOLIC BLOOD PRESSURE: 87 MMHG | HEART RATE: 94 BPM | OXYGEN SATURATION: 96 % | TEMPERATURE: 98.5 F

## 2023-10-11 DIAGNOSIS — R15.9 INCONTINENCE OF FECES, UNSPECIFIED FECAL INCONTINENCE TYPE: ICD-10-CM

## 2023-10-11 DIAGNOSIS — R53.83 OTHER FATIGUE: ICD-10-CM

## 2023-10-11 DIAGNOSIS — R29.6 FALLS FREQUENTLY: ICD-10-CM

## 2023-10-11 DIAGNOSIS — M54.50 LOW BACK PAIN, UNSPECIFIED BACK PAIN LATERALITY, UNSPECIFIED CHRONICITY, UNSPECIFIED WHETHER SCIATICA PRESENT: Primary | ICD-10-CM

## 2023-10-11 DIAGNOSIS — R68.89: ICD-10-CM
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
## 2023-10-11 DIAGNOSIS — E55.9 VITAMIN D DEFICIENCY: ICD-10-CM

## 2023-10-11 DIAGNOSIS — R32 URINARY INCONTINENCE, UNSPECIFIED TYPE: ICD-10-CM

## 2023-10-11 LAB
ALBUMIN SERPL BCG-MCNC: 4.5 G/DL (ref 3.5–5.2)
ALP SERPL-CCNC: 103 U/L (ref 35–104)
ALT SERPL W P-5'-P-CCNC: 14 U/L (ref 0–50)
ANION GAP SERPL CALCULATED.3IONS-SCNC: 14 MMOL/L (ref 7–15)
AST SERPL W P-5'-P-CCNC: 28 U/L (ref 0–45)
BASO+EOS+MONOS # BLD AUTO: ABNORMAL 10*3/UL
BASO+EOS+MONOS NFR BLD AUTO: ABNORMAL %
BASOPHILS # BLD AUTO: 0 10E3/UL (ref 0–0.2)
BASOPHILS NFR BLD AUTO: 0 %
BILIRUB SERPL-MCNC: 0.4 MG/DL
BUN SERPL-MCNC: 8.4 MG/DL (ref 6–20)
CALCIUM SERPL-MCNC: 9.9 MG/DL (ref 8.6–10)
CHLORIDE SERPL-SCNC: 99 MMOL/L (ref 98–107)
CREAT SERPL-MCNC: 0.83 MG/DL (ref 0.51–0.95)
DEPRECATED HCO3 PLAS-SCNC: 25 MMOL/L (ref 22–29)
EGFRCR SERPLBLD CKD-EPI 2021: 86 ML/MIN/1.73M2
EOSINOPHIL # BLD AUTO: 0.1 10E3/UL (ref 0–0.7)
EOSINOPHIL NFR BLD AUTO: 1 %
ERYTHROCYTE [DISTWIDTH] IN BLOOD BY AUTOMATED COUNT: 15.3 % (ref 10–15)
GLUCOSE SERPL-MCNC: 102 MG/DL (ref 70–99)
HCT VFR BLD AUTO: 45.3 % (ref 35–47)
HGB BLD-MCNC: 15.2 G/DL (ref 11.7–15.7)
IMM GRANULOCYTES # BLD: 0 10E3/UL
IMM GRANULOCYTES NFR BLD: 0 %
LYMPHOCYTES # BLD AUTO: 1.9 10E3/UL (ref 0.8–5.3)
LYMPHOCYTES NFR BLD AUTO: 27 %
MCH RBC QN AUTO: 29.9 PG (ref 26.5–33)
MCHC RBC AUTO-ENTMCNC: 33.6 G/DL (ref 31.5–36.5)
MCV RBC AUTO: 89 FL (ref 78–100)
MONOCYTES # BLD AUTO: 0.5 10E3/UL (ref 0–1.3)
MONOCYTES NFR BLD AUTO: 7 %
NEUTROPHILS # BLD AUTO: 4.6 10E3/UL (ref 1.6–8.3)
NEUTROPHILS NFR BLD AUTO: 65 %
NRBC # BLD AUTO: 0 10E3/UL
NRBC BLD AUTO-RTO: 0 /100
PLATELET # BLD AUTO: 325 10E3/UL (ref 150–450)
POTASSIUM SERPL-SCNC: 3.9 MMOL/L (ref 3.4–5.3)
PROT SERPL-MCNC: 8.2 G/DL (ref 6.4–8.3)
RBC # BLD AUTO: 5.09 10E6/UL (ref 3.8–5.2)
SODIUM SERPL-SCNC: 138 MMOL/L (ref 135–145)
WBC # BLD AUTO: 7.1 10E3/UL (ref 4–11)

## 2023-10-11 PROCEDURE — 80053 COMPREHEN METABOLIC PANEL: CPT | Performed by: EMERGENCY MEDICINE

## 2023-10-11 PROCEDURE — 36415 COLL VENOUS BLD VENIPUNCTURE: CPT | Performed by: EMERGENCY MEDICINE

## 2023-10-11 PROCEDURE — 96374 THER/PROPH/DIAG INJ IV PUSH: CPT | Mod: XU | Performed by: EMERGENCY MEDICINE

## 2023-10-11 PROCEDURE — 74177 CT ABD & PELVIS W/CONTRAST: CPT | Mod: 26 | Performed by: RADIOLOGY

## 2023-10-11 PROCEDURE — 250N000013 HC RX MED GY IP 250 OP 250 PS 637: Performed by: EMERGENCY MEDICINE

## 2023-10-11 PROCEDURE — 72148 MRI LUMBAR SPINE W/O DYE: CPT

## 2023-10-11 PROCEDURE — 250N000011 HC RX IP 250 OP 636: Performed by: EMERGENCY MEDICINE

## 2023-10-11 PROCEDURE — 85025 COMPLETE CBC W/AUTO DIFF WBC: CPT | Performed by: EMERGENCY MEDICINE

## 2023-10-11 PROCEDURE — 250N000011 HC RX IP 250 OP 636: Mod: JZ | Performed by: EMERGENCY MEDICINE

## 2023-10-11 PROCEDURE — 120N000002 HC R&B MED SURG/OB UMMC

## 2023-10-11 PROCEDURE — 74177 CT ABD & PELVIS W/CONTRAST: CPT

## 2023-10-11 PROCEDURE — 99285 EMERGENCY DEPT VISIT HI MDM: CPT | Mod: 25 | Performed by: EMERGENCY MEDICINE

## 2023-10-11 PROCEDURE — 99285 EMERGENCY DEPT VISIT HI MDM: CPT | Performed by: EMERGENCY MEDICINE

## 2023-10-11 PROCEDURE — 72148 MRI LUMBAR SPINE W/O DYE: CPT | Mod: 26 | Performed by: RADIOLOGY

## 2023-10-11 RX ORDER — LORAZEPAM 0.5 MG/1
1 TABLET ORAL ONCE
Status: COMPLETED | OUTPATIENT
Start: 2023-10-11 | End: 2023-10-11

## 2023-10-11 RX ORDER — ONDANSETRON 2 MG/ML
4 INJECTION INTRAMUSCULAR; INTRAVENOUS EVERY 30 MIN PRN
Status: DISCONTINUED | OUTPATIENT
Start: 2023-10-11 | End: 2023-10-15

## 2023-10-11 RX ORDER — IOPAMIDOL 755 MG/ML
100 INJECTION, SOLUTION INTRAVASCULAR ONCE
Status: COMPLETED | OUTPATIENT
Start: 2023-10-11 | End: 2023-10-11

## 2023-10-11 RX ORDER — OXYCODONE HYDROCHLORIDE 5 MG/1
5 TABLET ORAL ONCE
Status: COMPLETED | OUTPATIENT
Start: 2023-10-11 | End: 2023-10-11

## 2023-10-11 RX ADMIN — IOPAMIDOL 100 ML: 755 INJECTION, SOLUTION INTRAVENOUS at 21:33

## 2023-10-11 RX ADMIN — ONDANSETRON 4 MG: 2 INJECTION INTRAMUSCULAR; INTRAVENOUS at 20:22

## 2023-10-11 RX ADMIN — LORAZEPAM 1 MG: 0.5 TABLET ORAL at 22:04

## 2023-10-11 RX ADMIN — OXYCODONE HYDROCHLORIDE 5 MG: 5 TABLET ORAL at 20:22

## 2023-10-11 ASSESSMENT — ACTIVITIES OF DAILY LIVING (ADL)
ADLS_ACUITY_SCORE: 33
ADLS_ACUITY_SCORE: 35
ADLS_ACUITY_SCORE: 35

## 2023-10-11 NOTE — PROGRESS NOTES
"Today's Date: Oct 11, 2023     Patient Peggy Jessica 1975 presents to the clinic today to address   Chief Complaint   Patient presents with    Tailbone Pain     Sunday morning fell on the frame of the wheelchair, hitting tailbone, painful to sit down              SUBJECTIVE     History of Present Illness:    48-year-old female presents with partner to discuss fall.  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      Today, patient reports that on Sunday, 10/8/2023, she was trying to transfer from her recliner to her wheelchair but stepped on a slick spot on her bamboo floors and fell on the corner of her wheelchair.  Trauma was primarily to her lumbar spine/ right buttock.  She reports pain that starts in the lumbar/coccyx region and works its way down through her right buttock. She denies numbness/tingling down lower extremities. When asked about saddle anesthesia, she replies with,\"that's a good question.\" Since the fall on Sunday, she reports has been difficult to pass a bowel movement and endorses bowel incontinence starting on Monday (she is currently being worked up for chronic constipation by her GI team and had an x-ray completed on 10/9/2023 and 10/11/2023 as part of sitz marker study).  Her last BM was this morning. She also endorses new urinary incontinence and frequency over the past week.  She has chronic weakness of bilateral upper and lower extremities and is unsure if she has had worsening weakness.  She does report \"sometimes I have trouble controlling my legs.\"  She denies any fevers or redness/swelling near the trauma. No other acute concerns/symptoms at " time of exam.    Review of Systems   Constitutional, HEENT, cardiovascular, pulmonary, gi and gu systems are negative, except as otherwise noted.      Allergies   Allergen Reactions    Dihydroergotamine Anaphylaxis    Latex Anaphylaxis    Shellfish-Derived Products Anaphylaxis    Sumatriptan Anaphylaxis    Compazine [Prochlorperazine] Anxiety    Banana Unknown    Gabapentin Dizziness    Gluten Meal Other (See Comments)     Celiac disease    Keppra [Levetiracetam] Nausea and Vomiting    Kiwi Unknown    Levofloxacin Other (See Comments)     Arrhythmia    Metronidazole Nausea and Vomiting    Nitrofurantoin Hives    Penicillins Hives    Pregabalin     Reglan [Metoclopramide] Other (See Comments)     restlessness/toe tapping     Topiramate Visual Disturbance    Aspirin Rash    Methadone Rash    Morphine Hives     She got hives around are when morphine given but resolved after few minutes per patient     Risperidone Anxiety        Current Outpatient Medications   Medication Instructions    acetaminophen (TYLENOL) 500-1,000 mg, Oral, EVERY 6 HOURS PRN    baclofen (LIORESAL) 10 mg, Oral, 3 TIMES DAILY, Takes 5 mg morning and early afternoon and between 5/10 at bedtime    bisacodyl (DULCOLAX) 10 mg, Rectal, DAILY PRN    buprenorphine (BUTRANS) 7.5 MCG/HR WK patch 1 patch, Transdermal, WEEKLY, Applies on Wednesday at 9 am    diclofenac (VOLTAREN) 4 g, Topical, 4 TIMES DAILY    dicyclomine (BENTYL) 20 mg, Oral, 3 TIMES DAILY BEFORE MEALS    diphenhydrAMINE (BENADRYL) 12.5 mg, Oral, EVERY 6 HOURS PRN    folic acid (FOLVITE) 400 mcg, Oral, DAILY    hydrOXYzine (ATARAX) 25 mg, Oral, 3 TIMES DAILY BEFORE MEALS    Lidocaine (LIDOCARE) 4 % Patch 3 patches, Transdermal, EVERY 24 HOURS, To prevent lidocaine toxicity, patient should be patch free for 12 hrs daily.    medical cannabis (Patient's own supply) 1 Dose, Oral, SEE ADMIN INSTRUCTIONS, (The purpose of this order is to document that the patient reports taking medical cannabis.   This is not a prescription, and is not used to certify that the patient has a qualifying medical condition.)<BR>Per pt: 5-10 mg tablet three times a day PRN    memantine (NAMENDA) 5 MG tablet 1 tablet, Oral, 2 TIMES DAILY    multivitamin w/minerals (THERA-VIT-M) tablet 1 tablet, Oral, EVERY MORNING, [employer] W[employer]    naloxone (NARCAN) 4 mg, Alternating Nostrils, PRN, every 2-3 minutes until assistance arrives    ondansetron (ZOFRAN ODT) 4 mg, Oral, EVERY 8 HOURS PRN    pantoprazole (PROTONIX) 40 mg, Oral, EVERY MORNING BEFORE BREAKFAST    polyethylene glycol (MIRALAX) 17 g, Oral, DAILY, Until BM, then use every day prn    scopolamine (TRANSDERM) 1 MG/3DAYS 72 hr patch 1 patch, Transdermal, EVERY 72 HOURS    senna (SENOKOT) 8.6 MG tablet 1 tablet, Oral, PRN    senna-docusate (SENOKOT-S/PERICOLACE) 8.6-50 MG tablet 1 tablet, Oral, 2 TIMES DAILY PRN    sertraline (ZOLOFT) 100 mg, Oral, DAILY    traMADol (ULTRAM) 25 mg, Oral, 3 TIMES DAILY PRN    vitamin D2 (ERGOCALCIFEROL) 50,000 Units, Oral, WEEKLY       Past Medical History:   Diagnosis Date    Arthritis     BRCA positive     Cancer (H)     CIDP (chronic inflammatory demyelinating polyneuropathy) (H)     ASHKAN III with severe dysplasia 2002    Complex regional pain syndrome type 1 of right lower extremity         Family History   Problem Relation Age of Onset    Kidney Disease Father     Macular Degeneration Paternal Grandmother     Glaucoma No family hx of         Social History     Tobacco Use    Smoking status: Former     Types: Cigarettes    Smokeless tobacco: Never   Vaping Use    Vaping Use: Never used   Substance Use Topics    Alcohol use: Not Currently    Drug use: Yes     Types: Marijuana     Comment: Medical [facility] patient        History   Sexual Activity    Sexual activity: Not Currently    Partners: Male            9/8/2023     7:45 AM   PHQ   PHQ-9 Total Score 6   Q9: Thoughts of better off dead/self-harm past 2 weeks Not at all        Immunization  History   Administered Date(s) Administered    COVID-19 Monovalent 18+ (Moderna) 03/20/2021, 04/17/2021    Influenza Vaccine >6 months (Alfuria,Fluzone) 02/17/2021    TDAP (Adacel,Boostrix) 11/22/2016                 OBJECTIVE     /87   Pulse 94   Temp 98.5  F (36.9  C) (Oral)   SpO2 96%      Labs:  Lab Results   Component Value Date    WBC 6.4 09/01/2023    HGB 15.2 09/01/2023    HCT 45.9 09/01/2023     09/01/2023    CHOL 229 (H) 03/30/2022    TRIG 131 03/30/2022    HDL 56 03/30/2022    ALT 14 09/14/2023    AST 17 09/14/2023     09/01/2023    BUN 10.5 09/01/2023    CO2 28 09/01/2023    TSH 2.09 08/04/2023    INR 0.97 01/28/2023        Physical Exam  Exam conducted with a chaperone present.   Constitutional:       General: She is not in acute distress.     Appearance: She is not ill-appearing.      Comments: In wheelchair   Cardiovascular:      Rate and Rhythm: Normal rate and regular rhythm.      Heart sounds: Normal heart sounds. No murmur heard.  Pulmonary:      Effort: Pulmonary effort is normal. No respiratory distress.      Breath sounds: Normal breath sounds. No wheezing or rales.   Musculoskeletal:      Cervical back: Neck supple.      Lumbar back: Tenderness present. No swelling or edema.        Back:       Comments: Tenderness as noted above without redness, swelling, bruising.   Neurological:      Mental Status: She is alert.      Motor: Weakness and tremor present.      Comments: BLE strength 4/5. No tremor with passive ROM, tremor noted with strength testing.   Psychiatric:         Thought Content: Thought content normal.         Judgment: Judgment normal.        Nita Grubbs CMA served as chaperone.         ASSESSMENT/PLAN     1. Fall, initial encounter  This is a 48-year-old female with a complex past medical history includingchronic immune sensory polyradiculopathy (CISP)/Chronic inflammatory demyelinating polyneuropathy (CIDP), complex regional pain syndrome, and chronic  constipation who presents to clinic to discuss fall from 10/8/2023 while transferring from her recliner to her wheelchair.  Since the fall she reports lumbar/coccyx pain that radiates down through her right buttock.  Associated symptoms include urinary incontinence/frequency and bowel incontinence.    Physical exam did not demonstrate any redness/swelling/bruising in her lumbar/right buttock.  She had tenderness from her lumbar spine through right buttock.  She had decreased strength with bilateral legs, however, that appears consistent with previous exams.    We had a torsten discussion over whether she needs an emergent evaluation versus trying to manage this outpatient. Her bowel incontinence could be secondary to her chronic constipation, however, I advised that in the setting of recent trauma to her lumbar spine/buttock with bowel and bladder incontinence, cauda equina is a consideration and does require emergent evaluation.  Patient and partner state understanding and agreed to go to [facility].  Telephone report provided to Dr. Tovar at 1630 p.m. on 10/11/2023.          Follow-Up:  - Follow up after ED visit.    Options for treatment and follow-up care were reviewed with the patient. Patient engaged in the decision making process and verbalized understanding of the options discussed and agreed with the final plan.  AVS printed and given to patient.    MIRA Addison [facility]  [address] [id number] [phone]        Note: Chart documentation was done in part with Dragon Voice Recognition software.  Although reviewed after completion, some word and grammatical errors may remain. Please contact author for any clarification or concerns.

## 2023-10-11 NOTE — NURSING NOTE
ROOM:  [facility]    Preferred Name: Peggy     How did you hear about us?  Current Patient    48 year old  Chief Complaint   Patient presents with     Tailbone Pain     Mauricio morning fell on the frame of the wheelchair, hitting tailbone, painful to sit down        Blood pressure 135/87, pulse 94, temperature 98.5  F (36.9  C), temperature source Oral, SpO2 96%, not currently breastfeeding. There is no height or weight on file to calculate BMI.  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)     Complex regional pain syndrome i of right lower limb     Fibromyalgia syndrome     Diverticulitis     Chronic daily headache     Celiac disease     BRCA gene mutation positive in female     Autoimmune disorder (H24)     CIDP (chronic inflammatory demyelinating polyneuropathy) (H)     Physical deconditioning     Numbness and tingling of both legs     Bilateral leg weakness     Multiple falls     Gastroparesis     Abdominal pain     Nausea and vomiting       Wt Readings from Last 2 Encounters:   09/13/23 74.4 kg (164 lb)   09/01/23 68 kg (150 lb)     BP Readings from Last 3 Encounters:   10/11/23 135/87   09/13/23 110/81   09/01/23 113/80       Allergies   Allergen Reactions     Dihydroergotamine Anaphylaxis     Latex Anaphylaxis     Shellfish-Derived Products Anaphylaxis     Sumatriptan Anaphylaxis     Compazine [Prochlorperazine] Anxiety     Banana Unknown     Gabapentin Dizziness     Gluten Meal Other (See Comments)     Celiac disease     Keppra [Levetiracetam] Nausea and Vomiting     Kiwi Unknown     Levofloxacin Other (See Comments)     Arrhythmia     Metronidazole Nausea and Vomiting     Nitrofurantoin Hives     Penicillins Hives     Pregabalin      Reglan [Metoclopramide] Other (See Comments)     restlessness/toe  tapping      Topiramate Visual Disturbance     Aspirin Rash     Methadone Rash     Morphine Hives     She got hives around are when morphine given but resolved after few minutes per patient      Risperidone Anxiety       Current Outpatient Medications   Medication     acetaminophen (TYLENOL) 500 MG tablet     baclofen (LIORESAL) 10 MG tablet     bisacodyl (DULCOLAX) 10 MG suppository     buprenorphine (BUTRANS) 7.5 MCG/HR WK patch     diclofenac (VOLTAREN) 1 % topical gel     dicyclomine (BENTYL) 20 MG tablet     diphenhydrAMINE (BENADRYL) 25 MG tablet     folic acid (FOLVITE) 400 MCG tablet     hydrOXYzine (ATARAX) 25 MG tablet     Lidocaine (LIDOCARE) 4 % Patch     medical cannabis (Patient's own supply)     memantine (NAMENDA) 5 MG tablet     multivitamin w/minerals (THERA-VIT-M) tablet     naloxone (NARCAN) 4 MG/0.1ML nasal spray     ondansetron (ZOFRAN ODT) 4 MG ODT tab     pantoprazole (PROTONIX) 40 MG EC tablet     polyethylene glycol (MIRALAX) 17 GM/Dose powder     scopolamine (TRANSDERM) 1 MG/3DAYS 72 hr patch     senna (SENOKOT) 8.6 MG tablet     senna-docusate (SENOKOT-S/PERICOLACE) 8.6-50 MG tablet     sertraline (ZOLOFT) 100 MG tablet     vitamin D2 (ERGOCALCIFEROL) 17255 units (1250 mcg) capsule     traMADol (ULTRAM) 50 MG tablet     No current facility-administered medications for this visit.       Social History     Tobacco Use     Smoking status: Former     Types: Cigarettes     Smokeless tobacco: Never   Vaping Use     Vaping Use: Never used   Substance Use Topics     Alcohol use: Not Currently     Drug use: Yes     Types: Marijuana     Comment: Medical [facility] patient       Honoring Choices - Health Care Directive Guide offered to patient at time of visit.    Health Maintenance Due   Topic Date Due     YEARLY PREVENTIVE VISIT  Never done     ADVANCE CARE PLANNING  Never done     HEPATITIS B IMMUNIZATION (1 of 3 - 3-dose series) Never done     COLORECTAL CANCER SCREENING  Never done     HIV  "SCREENING  Never done     HEPATITIS C SCREENING  Never done     PAP  11/28/2020     COVID-19 Vaccine (3 - Moderna series) 06/12/2021     URINE DRUG SCREEN  03/30/2023     INFLUENZA VACCINE (1) 09/01/2023       Immunization History   Administered Date(s) Administered     COVID-19 Monovalent 18+ (Moderna) 03/20/2021, 04/17/2021     Influenza Vaccine >6 months (Alfuria,Fluzone) 02/17/2021     TDAP (Adacel,Boostrix) 11/22/2016       No results found for: \"PAP\"    Recent Labs   Lab Test 09/14/23  0652 09/01/23  1807 08/26/23  1738 08/04/23  1614 01/31/23  1900 01/28/23  0619 06/06/22 2213 03/30/22 0928   LDL  --   --   --   --   --   --   --  147*   HDL  --   --   --   --   --   --   --  56   TRIG  --   --   --   --   --   --   --  131   ALT 14 16 15  --    < > 14   < > 23   CR  --  0.79 0.94  --    < > 0.89   < > 0.81   GFRESTIMATED  --  >90 74  --    < > 80   < > 90   ALBUMIN 4.5 4.8 4.4  --    < > 3.6   < > 3.5   POTASSIUM  --  3.8 4.0  --    < > 4.3   < > 4.1   TSH  --   --   --  2.09  --  3.12   < >  --     < > = values in this interval not displayed.           8/4/2023     3:32 PM 2/22/2023     8:09 AM   PHQ-2 ( 1999 Pfizer)   Q1: Little interest or pleasure in doing things 0 0   Q2: Feeling down, depressed or hopeless 0 0   PHQ-2 Score 0 0           9/8/2023     7:45 AM   PHQ-9 SCORE   PHQ-9 Total Score MyChart 6 (Mild depression)   PHQ-9 Total Score 6           3/30/2022     7:43 AM 9/8/2023     7:46 AM   LINO-7 SCORE   Total Score 2 (minimal anxiety) 0 (minimal anxiety)   Total Score 2 0            No data to display                Wily Sandoval    October 11, 2023 3:42 PM    "

## 2023-10-11 NOTE — ED TRIAGE NOTES
Pt referred to ED this afternoon for neuro consult. Possible cauda equina following fall a few days back.      Triage Assessment (Adult)       Row Name 10/11/23 5609          Triage Assessment    Airway WDL WDL        Respiratory WDL    Respiratory WDL WDL        Skin Circulation/Temperature WDL    Skin Circulation/Temperature WDL WDL        Cardiac WDL    Cardiac WDL WDL        Peripheral/Neurovascular WDL    Peripheral Neurovascular WDL X  BLE numbness        Cognitive/Neuro/Behavioral WDL    Cognitive/Neuro/Behavioral WDL WDL        Levi Coma Scale    Best Eye Response 4-->(E4) spontaneous     Best Motor Response 6-->(M6) obeys commands     Best Verbal Response 5-->(V5) oriented     Levi Coma Scale Score 15

## 2023-10-11 NOTE — ED PROVIDER NOTES
History     Chief Complaint   Patient presents with    Neurologic Problem     HPI  Peggy Jessica is a 48 year old female with a past medical history of chronic inflammatory demyelinating polyneuropathy/chronic immune sensory polyradiculopathy, BRCA gene mutation status post bilateral mastectomy, celiac disease, fibromyalgia, diverticulitis, complex regional pain syndrome of the right lower extremity (patient follows with a pain clinic for pain management), chronic constipation, gastroparesis, frequent falls, migraines, cervical cancer, seizure who presents to the emergency department with a chief complaint of lower back pain.  The patient reports that on Mauricio 10/8 she was attempting to transfer from her recliner at home to her wheelchair, but stepped on a slippery patch on her bamboo floors, causing her to fall, landing with her tailbone hitting the edge of her wheelchair (on a metal bar/lever).  Since then, she has had pain in the tailbone area as well as in the lumbar back.  This radiates into her right buttock.  No associated numbness/tingling down her legs.  Uncertain if she has saddle anesthesia.  However, she does note that she has had difficulty with both bowel movements and urination since that time.  She states it seems it is both difficult to initiate a bowel movement or urinate, but then when either of these starts, she feels she has no control over it.  She states that she had an episode where she was attempting to urinate and while sitting on the toilet, she started to urinate, but could not really feel that she was urinating, she states she only knew she was passing urine because she could hear it landing in the toilet bowl.  The patient is very concerned that since the fall she has had significant difficulty managing her ADLs at home.  She states she lives in a multilevel house with multiple sets of stairs.  She is typically able to make it up and down the stairs and uses her wheelchair as  needed.  However, she states she has not been able to do so since the fall.  She states that she does have a commode she can use on the main level, but that both bathrooms are on other levels of the house.  She states she has required placement in a rehab facility before and feels she may need this again.    The patient was referred to the emergency department today to rule out cauda equina    I have reviewed the Medications, Allergies, Past Medical and Surgical History, and Social History in the Epic system.    AXR today  FINDINGS:   Colonic markers are present, which are primarily seen in the ascending colon, hepatic flexure, the distal transverse colon. 24 markers are present. Surgical clips in the right upper quadrant of the abdomen. Moderate amount of stool material. No soft tissue mass by plain film. No suspicious calcification.     Past Medical History:   Diagnosis Date    Arthritis     BRCA positive     Cancer (H)     CIDP (chronic inflammatory demyelinating polyneuropathy) (H)     ASHKAN III with severe dysplasia 2002    Complex regional pain syndrome type 1 of right lower extremity      Past Surgical History:   Procedure Laterality Date    BILATERAL OOPHORECTOMY Bilateral 2019    c section      2002    CHOLECYSTECTOMY  1997    COLONOSCOPY      ESOPHAGOSCOPY, GASTROSCOPY, DUODENOSCOPY (EGD), COMBINED N/A 07/28/2022    Procedure: ESOPHAGOGASTRODUODENOSCOPY (EGD);  Surgeon: Zack Maddox MD;  Location:  GI    ESOPHAGOSCOPY, GASTROSCOPY, DUODENOSCOPY (EGD), COMBINED N/A 5/16/2023    Procedure: Esophagoscopy, gastroscopy, duodenoscopy (EGD), combined;  Surgeon: Aris Mason MD;  Location:  GI    ESOPHAGOSCOPY, GASTROSCOPY, DUODENOSCOPY (EGD), COMBINED N/A 7/20/2023    Procedure: Esophagoscopy, gastroscopy, duodenoscopy (EGD), combined;  Surgeon: Aris Mason MD;  Location:  GI    HYSTERECTOMY  2004    MASTECTOMY Bilateral 2020     No current facility-administered medications for  this encounter.     Current Outpatient Medications   Medication    acetaminophen (TYLENOL) 500 MG tablet    baclofen (LIORESAL) 10 MG tablet    bisacodyl (DULCOLAX) 10 MG suppository    buprenorphine (BUTRANS) 7.5 MCG/HR WK patch    diclofenac (VOLTAREN) 1 % topical gel    dicyclomine (BENTYL) 20 MG tablet    diphenhydrAMINE (BENADRYL) 25 MG tablet    folic acid (FOLVITE) 400 MCG tablet    hydrOXYzine (ATARAX) 25 MG tablet    Lidocaine (LIDOCARE) 4 % Patch    medical cannabis (Patient's own supply)    memantine (NAMENDA) 5 MG tablet    multivitamin w/minerals (THERA-VIT-M) tablet    naloxone (NARCAN) 4 MG/0.1ML nasal spray    ondansetron (ZOFRAN ODT) 4 MG ODT tab    pantoprazole (PROTONIX) 40 MG EC tablet    polyethylene glycol (MIRALAX) 17 GM/Dose powder    scopolamine (TRANSDERM) 1 MG/3DAYS 72 hr patch    senna (SENOKOT) 8.6 MG tablet    senna-docusate (SENOKOT-S/PERICOLACE) 8.6-50 MG tablet    sertraline (ZOLOFT) 100 MG tablet    vitamin D2 (ERGOCALCIFEROL) 10024 units (1250 mcg) capsule     Allergies   Allergen Reactions    Dihydroergotamine Anaphylaxis    Latex Anaphylaxis    Shellfish-Derived Products Anaphylaxis    Sumatriptan Anaphylaxis    Compazine [Prochlorperazine] Anxiety    Banana Unknown    Gabapentin Dizziness    Gluten Meal Other (See Comments)     Celiac disease    Keppra [Levetiracetam] Nausea and Vomiting    Kiwi Unknown    Levofloxacin Other (See Comments)     Arrhythmia    Metronidazole Nausea and Vomiting    Nitrofurantoin Hives    Penicillins Hives    Pregabalin     Reglan [Metoclopramide] Other (See Comments)     restlessness/toe tapping     Topiramate Visual Disturbance    Aspirin Rash    Methadone Rash    Morphine Hives     She got hives around are when morphine given but resolved after few minutes per patient     Risperidone Anxiety     Past medical history, past surgical history, medications, and allergies were reviewed with the patient. Additional pertinent items: None    Social History      Socioeconomic History    Marital status:      Spouse name: Not on file    Number of children: 2    Years of education: Not on file    Highest education level: Not on file   Occupational History    Not on file   Tobacco Use    Smoking status: Former     Types: Cigarettes    Smokeless tobacco: Never   Vaping Use    Vaping Use: Never used   Substance and Sexual Activity    Alcohol use: Not Currently    Drug use: Yes     Types: Marijuana     Comment: Medical Canibis patient    Sexual activity: Not Currently     Partners: Male   Other Topics Concern    Not on file   Social History Narrative    Not on file     Social Determinants of Health     Financial Resource Strain: Not on file   Food Insecurity: Not on file   Transportation Needs: Not on file   Physical Activity: Not on file   Stress: Not on file   Social Connections: Not on file   Interpersonal Safety: Not on file   Housing Stability: Not on file     Social history was reviewed with the patient. Additional pertinent items: None    Review of Systems  A medically appropriate review of systems was performed with pertinent positives and negatives noted in the HPI, and all other systems negative.    Physical Exam   BP: (!) 130/90  Pulse: 81  Temp: 98.6  F (37  C)  Resp: 15  SpO2: 98 %      General: Well nourished, well developed, NAD  HEENT: EOMI, anicteric. NCAT, MMM  Neck: no jugular venous distension, supple, nl ROM  Cardiac: Regular rate and rhythm. No murmurs, rubs, or gallops. Normal S1, S2.  Intact peripheral pulses  Pulm: CTAB, no stridor, wheezes, rales, rhonchi  Abd: Soft, nontender, nondistended.  No masses palpated.    Skin: Warm and dry to the touch.  No rash  Extremities: No LE edema, no cyanosis, w/w/p  Neuro: A&Ox3, no gross focal deficits    ED Course        Procedures                        Labs Ordered and Resulted from Time of ED Arrival to Time of ED Departure   COMPREHENSIVE METABOLIC PANEL - Abnormal       Result Value    Sodium 138       Potassium 3.9      Carbon Dioxide (CO2) 25      Anion Gap 14      Urea Nitrogen 8.4      Creatinine 0.83      GFR Estimate 86      Calcium 9.9      Chloride 99      Glucose 102 (*)     Alkaline Phosphatase 103      AST 28      ALT 14      Protein Total 8.2      Albumin 4.5      Bilirubin Total 0.4     CBC WITH PLATELETS AND DIFFERENTIAL - Abnormal    WBC Count 7.1      RBC Count 5.09      Hemoglobin 15.2      Hematocrit 45.3      MCV 89      MCH 29.9      MCHC 33.6      RDW 15.3 (*)     Platelet Count 325      % Neutrophils 65      % Lymphocytes 27      % Monocytes 7      Mids % (Monos, Eos, Basos)        % Eosinophils 1      % Basophils 0      % Immature Granulocytes 0      NRBCs per 100 WBC 0      Absolute Neutrophils 4.6      Absolute Lymphocytes 1.9      Absolute Monocytes 0.5      Mids Abs (Monos, Eos, Basos)        Absolute Eosinophils 0.1      Absolute Basophils 0.0      Absolute Immature Granulocytes 0.0      Absolute NRBCs 0.0     CBC WITH PLATELETS - Abnormal    WBC Count 5.8      RBC Count 4.26      Hemoglobin 12.6      Hematocrit 38.8      MCV 91      MCH 29.6      MCHC 32.5      RDW 15.3 (*)     Platelet Count 248     CREATININE - Normal    Creatinine 0.86      GFR Estimate 82     BASIC METABOLIC PANEL - Normal    Sodium 140      Potassium 4.1      Chloride 101      Carbon Dioxide (CO2) 28      Anion Gap 11      Urea Nitrogen 8.5      Creatinine 0.88      GFR Estimate 80      Calcium 9.2      Glucose 94     ROUTINE UA WITH MICROSCOPIC REFLEX TO CULTURE            Results for orders placed or performed during the hospital encounter of 10/11/23 (from the past 24 hour(s))   CBC with platelets differential    Narrative    The following orders were created for panel order CBC with platelets differential.  Procedure                               Abnormality         Status                     ---------                               -----------         ------                     CBC with platelets and  corine..[[id number]]  Abnormal            Final result                 Please view results for these tests on the individual orders.   Comprehensive metabolic panel   Result Value Ref Range    Sodium 138 135 - 145 mmol/L    Potassium 3.9 3.4 - 5.3 mmol/L    Carbon Dioxide (CO2) 25 22 - 29 mmol/L    Anion Gap 14 7 - 15 mmol/L    Urea Nitrogen 8.4 6.0 - 20.0 mg/dL    Creatinine 0.83 0.51 - 0.95 mg/dL    GFR Estimate 86 >60 mL/min/1.73m2    Calcium 9.9 8.6 - 10.0 mg/dL    Chloride 99 98 - 107 mmol/L    Glucose 102 (H) 70 - 99 mg/dL    Alkaline Phosphatase 103 35 - 104 U/L    AST 28 0 - 45 U/L    ALT 14 0 - 50 U/L    Protein Total 8.2 6.4 - 8.3 g/dL    Albumin 4.5 3.5 - 5.2 g/dL    Bilirubin Total 0.4 <=1.2 mg/dL   CBC with platelets and differential   Result Value Ref Range    WBC Count 7.1 4.0 - 11.0 10e3/uL    RBC Count 5.09 3.80 - 5.20 10e6/uL    Hemoglobin 15.2 11.7 - 15.7 g/dL    Hematocrit 45.3 35.0 - 47.0 %    MCV 89 78 - 100 fL    MCH 29.9 26.5 - 33.0 pg    MCHC 33.6 31.5 - 36.5 g/dL    RDW 15.3 (H) 10.0 - 15.0 %    Platelet Count 325 150 - 450 10e3/uL    % Neutrophils 65 %    % Lymphocytes 27 %    % Monocytes 7 %    Mids % (Monos, Eos, Basos)      % Eosinophils 1 %    % Basophils 0 %    % Immature Granulocytes 0 %    NRBCs per 100 WBC 0 <1 /100    Absolute Neutrophils 4.6 1.6 - 8.3 10e3/uL    Absolute Lymphocytes 1.9 0.8 - 5.3 10e3/uL    Absolute Monocytes 0.5 0.0 - 1.3 10e3/uL    Mids Abs (Monos, Eos, Basos)      Absolute Eosinophils 0.1 0.0 - 0.7 10e3/uL    Absolute Basophils 0.0 0.0 - 0.2 10e3/uL    Absolute Immature Granulocytes 0.0 <=0.4 10e3/uL    Absolute NRBCs 0.0 10e3/uL   CT Abdomen Pelvis w Contrast    Narrative    EXAM: CT ABDOMEN PELVIS W CONTRAST  LOCATION: [facility]  DATE: 10/11/2023    INDICATION: abd pain, recent fall  COMPARISON: CT from 08/26/2023.  TECHNIQUE: CT scan of the abdomen and pelvis was performed following injection of IV contrast.  Multiplanar reformats were obtained. Dose reduction techniques were used.  CONTRAST: 100 mL of Isovue-370.    FINDINGS:   LOWER CHEST: Normal.    HEPATOBILIARY: Benign cysts in segments 1, 2, 5, and 6 requiring no follow-up. Absent gallbladder.    PANCREAS: Normal.    SPLEEN: Normal.    ADRENAL GLANDS: Normal.    KIDNEYS/BLADDER: Normal.    BOWEL: There is a large amount of proximal colonic stool, with the colon tapering to a normal caliber in the descending and sigmoid segments. Multiple fluid-filled loops of small bowel without distention or transition point to suggest obstruction. A   normal appendix is present medial to the cecum. No free air.    LYMPH NODES: Normal.    VASCULATURE: Scant atherosclerotic change.    PELVIC ORGANS: Absent uterus. No free fluid.    MUSCULOSKELETAL: No acute, displaced fracture.      Impression    IMPRESSION:   1.  No acute findings in the abdomen or pelvis.    2.  Large amount of proximal colonic stool suggestive of constipation.    3.  No displaced fracture. Specifically, no sacral or coccygeal fracture.   MR Lumbar Spine w/o Contrast    Narrative    EXAM: MR LUMBAR SPINE W/O CONTRAST  LOCATION: [facility]  DATE: 10/11/2023    INDICATION: fall, back pain, incontinence; Low back pain; Trauma and or suspected fracture; Significant trauma; No lumbar CT result available; No known automatically detected potential contraindications to CT  COMPARISON: CT abdomen and pelvis dated 10/11/2023  TECHNIQUE: Routine Lumbar Spine MRI without IV contrast.    FINDINGS:   Nomenclature is based on 5 lumbar type vertebral bodies. Normal alignment and vertebral body heights. Mild Modic type I degenerative endplate changes at L5-S1. Scattered benign hemangiomas. Normal distal spinal cord and cauda equina with conus medullaris   at L1. Redemonstrated partially visualized cysts in the liver. Unremarkable visualized bony pelvis.    T12-L1: Normal disc height  and signal. No herniation. Normal facets. No spinal canal or neural foraminal stenosis.     L1-L2: Normal disc height and signal. No herniation. Normal facets. No spinal canal or neural foraminal stenosis.    L2-L3: Normal disc height and signal. No herniation. Normal facets. No spinal canal or neural foraminal stenosis.     L3-L4: Normal disc height and signal. No herniation. Normal facets. No spinal canal or neural foraminal stenosis.    L4-L5: Central disc protrusion and mild facet arthropathy. No spinal canal stenosis. The subarticular recesses are patent. Mild bilateral neural foraminal narrowing.    L5-S1: Central and left paracentral disc protrusion. Mild facet arthropathy. No spinal canal stenosis. The subarticular recesses are patent. Mild bilateral neural foraminal narrowing.      Impression    IMPRESSION:  1.  Mild degenerative changes of the lumbar spine L4-L5 and L5-S1, without high-grade spinal canal or neural foraminal narrowing.  2.  No evidence of an acute fracture.  3.  Mild Modic type I degenerative endplate changes at L5-S1.   Creatinine   Result Value Ref Range    Creatinine 0.86 0.51 - 0.95 mg/dL    GFR Estimate 82 >60 mL/min/1.73m2   Basic metabolic panel   Result Value Ref Range    Sodium 140 135 - 145 mmol/L    Potassium 4.1 3.4 - 5.3 mmol/L    Chloride 101 98 - 107 mmol/L    Carbon Dioxide (CO2) 28 22 - 29 mmol/L    Anion Gap 11 7 - 15 mmol/L    Urea Nitrogen 8.5 6.0 - 20.0 mg/dL    Creatinine 0.88 0.51 - 0.95 mg/dL    GFR Estimate 80 >60 mL/min/1.73m2    Calcium 9.2 8.6 - 10.0 mg/dL    Glucose 94 70 - 99 mg/dL   CBC with platelets   Result Value Ref Range    WBC Count 5.8 4.0 - 11.0 10e3/uL    RBC Count 4.26 3.80 - 5.20 10e6/uL    Hemoglobin 12.6 11.7 - 15.7 g/dL    Hematocrit 38.8 35.0 - 47.0 %    MCV 91 78 - 100 fL    MCH 29.6 26.5 - 33.0 pg    MCHC 32.5 31.5 - 36.5 g/dL    RDW 15.3 (H) 10.0 - 15.0 %    Platelet Count 248 150 - 450 10e3/uL   UA with Microscopic reflex to Culture     Specimen: Urine, Catheter   Result Value Ref Range    Color Urine Light Yellow Colorless, Straw, Light Yellow, Yellow    Appearance Urine Clear Clear    Glucose Urine Negative Negative mg/dL    Bilirubin Urine Negative Negative    Ketones Urine Negative Negative mg/dL    Specific Gravity Urine 1.010 1.003 - 1.035    Blood Urine Negative Negative    pH Urine 6.0 5.0 - 7.0    Protein Albumin Urine Negative Negative mg/dL    Urobilinogen Urine Normal Normal, 2.0 mg/dL    Nitrite Urine Negative Negative    Leukocyte Esterase Urine Negative Negative    Mucus Urine Present (A) None Seen /LPF    RBC Urine <1 <=2 /HPF    WBC Urine 1 <=5 /HPF    Squamous Epithelials Urine 1 <=1 /HPF    Narrative    Urine Culture not indicated       Labs, vital signs, and imaging studies were reviewed by me.    Medications   ondansetron (ZOFRAN) injection 4 mg (4 mg Intravenous $Given 10/11/23 2022)   baclofen (LIORESAL) tablet 10 mg (10 mg Oral $Given 10/12/23 1320)   bisacodyl (DULCOLAX) suppository 10 mg (has no administration in time range)   dicyclomine (BENTYL) tablet 20 mg (20 mg Oral $Given 10/12/23 1800)   diphenhydrAMINE (BENADRYL) solution 12.5 mg (has no administration in time range)   folic acid (FOLVITE) tablet 400 mcg (400 mcg Oral $Given 10/12/23 0800)   hydrOXYzine (ATARAX) tablet 25 mg (25 mg Oral $Given 10/12/23 1320)   Lidocaine (LIDOCARE) 4 % Patch 2 patch (has no administration in time range)   memantine (NAMENDA) tablet 5 mg (5 mg Oral $Given 10/12/23 0800)   pantoprazole (PROTONIX) EC tablet 40 mg (40 mg Oral $Given 10/12/23 0701)   polyethylene glycol (MIRALAX) Packet 17 g (17 g Oral $Given 10/12/23 0759)   senna-docusate (SENOKOT-S/PERICOLACE) 8.6-50 MG per tablet 1 tablet (has no administration in time range)   sertraline (ZOLOFT) tablet 100 mg (100 mg Oral $Given 10/12/23 0759)   vitamin D2 (ERGOCALCIFEROL) 25225 units (1250 mcg) capsule 50,000 Units (has no administration in time range)   melatonin tablet 1 mg  (has no administration in time range)   enoxaparin ANTICOAGULANT (LOVENOX) injection 40 mg (40 mg Subcutaneous $Given 10/12/23 0800)   ondansetron (ZOFRAN ODT) ODT tab 4 mg (4 mg Oral $Given 10/12/23 1317)     Or   ondansetron (ZOFRAN) injection 4 mg ( Intravenous See Alternative 10/12/23 1317)   oxyCODONE (ROXICODONE) tablet 5-10 mg (10 mg Oral $Given 10/12/23 1320)   naloxone (NARCAN) injection 0.2 mg (has no administration in time range)     Or   naloxone (NARCAN) injection 0.4 mg (has no administration in time range)     Or   naloxone (NARCAN) injection 0.2 mg (has no administration in time range)     Or   naloxone (NARCAN) injection 0.4 mg (has no administration in time range)   scopolamine (TRANSDERM) 72 hr patch 1 patch (1 patch Transdermal $Patch/Med Applied 10/12/23 1058)     And   scopolamine (TRANSDERM-SCOP) Patch in Place ( Transdermal Patch in Place 10/12/23 1641)   buprenorphine (BUTRANS) Patch in Place ( Transdermal Patch in Place 10/12/23 1641)   buprenorphine (BUTRANS) 7.5 MCG/HR WK patch 1 patch (1 patch Transdermal $Patch/Med Applied 10/12/23 0935)   acetaminophen (TYLENOL) solution 500-1,000 mg ++Gluten free++ (has no administration in time range)   oxyCODONE (ROXICODONE) tablet 5 mg (5 mg Oral $Given 10/11/23 2022)   sodium chloride (PF) 0.9% PF flush 75 mL (75 mLs Intravenous $Given 10/11/23 2133)   iopamidol (ISOVUE-370) solution 100 mL (100 mLs Intravenous $Given 10/11/23 2133)   LORazepam (ATIVAN) tablet 1 mg (1 mg Oral $Given 10/11/23 2204)       Assessments & Plan (with Medical Decision Making)   Peggy Jessica is a 48 year old female who presents to the emergency department with back pain and fecal/urinary incontinence after a fall.  Concern for cauda equina based on the symptoms.  Patient also complains of abdominal pain at this time and tailbone pain.  CT of the abdomen pelvis ordered to rule out acute traumatic injury and MRI of the lumbar spine ordered to rule out cauda equina.   Medications ordered for symptomatic relief in the emergency department.    Laboratory work-up is unremarkable.    CT shows no acute disease process.    MRI shows chronic degenerative findings, no evidence for cauda equina    Critical care was not performed.     Medical Decision Making  The patient's presentation was of high complexity (a chronic illness severe exacerbation, progression, or side effect of treatment).    The patient's evaluation involved:  review of 1 test result(s) ordered prior to this encounter (see separate area of note for details)  ordering and/or review of 3+ test(s) in this encounter (see separate area of note for details)  independent interpretation of testing performed by another health professional (see separate area of note for details)  discussion of management or test interpretation with another health professional (see separate area of note for details)    The patient's management necessitated moderate risk (prescription drug management including medications given in the ED) and high risk (a decision regarding hospitalization).    CT images were personally reviewed by me, I agree with the radiology reads.    I have reviewed the nursing notes.    I have reviewed the findings, diagnosis, plan and need for follow up with the patient.    Patient and their further management were discussed with IM, to be admitted to their service. Plan was discussed with patient who understands and agrees with plan.    Current Discharge Medication List          Final diagnoses:   Incontinence of feces, unspecified fecal incontinence type   Urinary incontinence, unspecified type   Falls frequently   Unable to manage stairs without assistance       BRYAN PERLA MD  10/11/2023   [facility] EMERGENCY DEPARTMENT       Bryan Perla MD  10/12/23 1927

## 2023-10-12 ENCOUNTER — APPOINTMENT (OUTPATIENT)
Dept: PHYSICAL THERAPY | Facility: CLINIC | Age: 48
End: 2023-10-12
Attending: INTERNAL MEDICINE
Payer: COMMERCIAL

## 2023-10-12 LAB
ALBUMIN UR-MCNC: NEGATIVE MG/DL
ANION GAP SERPL CALCULATED.3IONS-SCNC: 11 MMOL/L (ref 7–15)
APPEARANCE UR: CLEAR
BILIRUB UR QL STRIP: NEGATIVE
BUN SERPL-MCNC: 8.5 MG/DL (ref 6–20)
CALCIUM SERPL-MCNC: 9.2 MG/DL (ref 8.6–10)
CHLORIDE SERPL-SCNC: 101 MMOL/L (ref 98–107)
COLOR UR AUTO: ABNORMAL
CREAT SERPL-MCNC: 0.86 MG/DL (ref 0.51–0.95)
CREAT SERPL-MCNC: 0.88 MG/DL (ref 0.51–0.95)
DEPRECATED HCO3 PLAS-SCNC: 28 MMOL/L (ref 22–29)
EGFRCR SERPLBLD CKD-EPI 2021: 80 ML/MIN/1.73M2
EGFRCR SERPLBLD CKD-EPI 2021: 82 ML/MIN/1.73M2
ERYTHROCYTE [DISTWIDTH] IN BLOOD BY AUTOMATED COUNT: 15.3 % (ref 10–15)
GLUCOSE SERPL-MCNC: 94 MG/DL (ref 70–99)
GLUCOSE UR STRIP-MCNC: NEGATIVE MG/DL
HCT VFR BLD AUTO: 38.8 % (ref 35–47)
HGB BLD-MCNC: 12.6 G/DL (ref 11.7–15.7)
HGB UR QL STRIP: NEGATIVE
KETONES UR STRIP-MCNC: NEGATIVE MG/DL
LEUKOCYTE ESTERASE UR QL STRIP: NEGATIVE
MCH RBC QN AUTO: 29.6 PG (ref 26.5–33)
MCHC RBC AUTO-ENTMCNC: 32.5 G/DL (ref 31.5–36.5)
MCV RBC AUTO: 91 FL (ref 78–100)
MUCOUS THREADS #/AREA URNS LPF: PRESENT /LPF
NITRATE UR QL: NEGATIVE
PH UR STRIP: 6 [PH] (ref 5–7)
PLATELET # BLD AUTO: 248 10E3/UL (ref 150–450)
POTASSIUM SERPL-SCNC: 4.1 MMOL/L (ref 3.4–5.3)
RBC # BLD AUTO: 4.26 10E6/UL (ref 3.8–5.2)
RBC URINE: <1 /HPF
SODIUM SERPL-SCNC: 140 MMOL/L (ref 135–145)
SP GR UR STRIP: 1.01 (ref 1–1.03)
SQUAMOUS EPITHELIAL: 1 /HPF
UROBILINOGEN UR STRIP-MCNC: NORMAL MG/DL
WBC # BLD AUTO: 5.8 10E3/UL (ref 4–11)
WBC URINE: 1 /HPF

## 2023-10-12 PROCEDURE — 99222 1ST HOSP IP/OBS MODERATE 55: CPT | Performed by: PHYSICIAN ASSISTANT

## 2023-10-12 PROCEDURE — 81001 URINALYSIS AUTO W/SCOPE: CPT | Performed by: INTERNAL MEDICINE

## 2023-10-12 PROCEDURE — 250N000011 HC RX IP 250 OP 636: Performed by: INTERNAL MEDICINE

## 2023-10-12 PROCEDURE — 99222 1ST HOSP IP/OBS MODERATE 55: CPT | Mod: GC | Performed by: STUDENT IN AN ORGANIZED HEALTH CARE EDUCATION/TRAINING PROGRAM

## 2023-10-12 PROCEDURE — 97530 THERAPEUTIC ACTIVITIES: CPT | Mod: GP | Performed by: PHYSICAL THERAPIST

## 2023-10-12 PROCEDURE — 85027 COMPLETE CBC AUTOMATED: CPT | Performed by: INTERNAL MEDICINE

## 2023-10-12 PROCEDURE — 80048 BASIC METABOLIC PNL TOTAL CA: CPT | Performed by: INTERNAL MEDICINE

## 2023-10-12 PROCEDURE — 36415 COLL VENOUS BLD VENIPUNCTURE: CPT | Performed by: INTERNAL MEDICINE

## 2023-10-12 PROCEDURE — G0378 HOSPITAL OBSERVATION PER HR: HCPCS

## 2023-10-12 PROCEDURE — 82565 ASSAY OF CREATININE: CPT | Performed by: INTERNAL MEDICINE

## 2023-10-12 PROCEDURE — 99223 1ST HOSP IP/OBS HIGH 75: CPT | Performed by: INTERNAL MEDICINE

## 2023-10-12 PROCEDURE — 250N000013 HC RX MED GY IP 250 OP 250 PS 637: Performed by: INTERNAL MEDICINE

## 2023-10-12 PROCEDURE — 97161 PT EVAL LOW COMPLEX 20 MIN: CPT | Mod: GP | Performed by: PHYSICAL THERAPIST

## 2023-10-12 PROCEDURE — 250N000009 HC RX 250: Performed by: HOSPITALIST

## 2023-10-12 PROCEDURE — 250N000013 HC RX MED GY IP 250 OP 250 PS 637: Performed by: HOSPITALIST

## 2023-10-12 PROCEDURE — 96372 THER/PROPH/DIAG INJ SC/IM: CPT | Performed by: INTERNAL MEDICINE

## 2023-10-12 PROCEDURE — 250N000011 HC RX IP 250 OP 636: Mod: JZ | Performed by: INTERNAL MEDICINE

## 2023-10-12 RX ORDER — PANTOPRAZOLE SODIUM 40 MG/1
40 TABLET, DELAYED RELEASE ORAL
Status: DISCONTINUED | OUTPATIENT
Start: 2023-10-12 | End: 2023-10-15 | Stop reason: HOSPADM

## 2023-10-12 RX ORDER — BISACODYL 10 MG
10 SUPPOSITORY, RECTAL RECTAL DAILY PRN
Status: DISCONTINUED | OUTPATIENT
Start: 2023-10-12 | End: 2023-10-15 | Stop reason: HOSPADM

## 2023-10-12 RX ORDER — ERGOCALCIFEROL 1.25 MG/1
50000 CAPSULE, LIQUID FILLED ORAL WEEKLY
Status: DISCONTINUED | OUTPATIENT
Start: 2023-10-17 | End: 2023-10-15 | Stop reason: HOSPADM

## 2023-10-12 RX ORDER — ONDANSETRON 2 MG/ML
4 INJECTION INTRAMUSCULAR; INTRAVENOUS EVERY 6 HOURS PRN
Status: DISCONTINUED | OUTPATIENT
Start: 2023-10-12 | End: 2023-10-15 | Stop reason: HOSPADM

## 2023-10-12 RX ORDER — ACETAMINOPHEN 325 MG/10.15ML
500-1000 LIQUID ORAL EVERY 6 HOURS PRN
Status: DISCONTINUED | OUTPATIENT
Start: 2023-10-12 | End: 2023-10-13

## 2023-10-12 RX ORDER — AMOXICILLIN 250 MG
1 CAPSULE ORAL 2 TIMES DAILY PRN
Status: DISCONTINUED | OUTPATIENT
Start: 2023-10-12 | End: 2023-10-15 | Stop reason: HOSPADM

## 2023-10-12 RX ORDER — POLYETHYLENE GLYCOL 3350 17 G/17G
17 POWDER, FOR SOLUTION ORAL DAILY
Status: DISCONTINUED | OUTPATIENT
Start: 2023-10-12 | End: 2023-10-15 | Stop reason: HOSPADM

## 2023-10-12 RX ORDER — ACETAMINOPHEN 500 MG
500-1000 TABLET ORAL EVERY 6 HOURS PRN
Status: DISCONTINUED | OUTPATIENT
Start: 2023-10-12 | End: 2023-10-12

## 2023-10-12 RX ORDER — HYDROXYZINE HYDROCHLORIDE 25 MG/1
25 TABLET, FILM COATED ORAL 3 TIMES DAILY
Status: DISCONTINUED | OUTPATIENT
Start: 2023-10-12 | End: 2023-10-15 | Stop reason: HOSPADM

## 2023-10-12 RX ORDER — NALOXONE HYDROCHLORIDE 0.4 MG/ML
0.4 INJECTION, SOLUTION INTRAMUSCULAR; INTRAVENOUS; SUBCUTANEOUS
Status: DISCONTINUED | OUTPATIENT
Start: 2023-10-12 | End: 2023-10-15 | Stop reason: HOSPADM

## 2023-10-12 RX ORDER — DICYCLOMINE HCL 20 MG
20 TABLET ORAL
Status: DISCONTINUED | OUTPATIENT
Start: 2023-10-12 | End: 2023-10-15 | Stop reason: HOSPADM

## 2023-10-12 RX ORDER — OXYCODONE HYDROCHLORIDE 5 MG/1
5-10 TABLET ORAL EVERY 4 HOURS PRN
Status: DISCONTINUED | OUTPATIENT
Start: 2023-10-12 | End: 2023-10-13

## 2023-10-12 RX ORDER — SERTRALINE HYDROCHLORIDE 100 MG/1
100 TABLET, FILM COATED ORAL DAILY
Status: DISCONTINUED | OUTPATIENT
Start: 2023-10-12 | End: 2023-10-15 | Stop reason: HOSPADM

## 2023-10-12 RX ORDER — LIDOCAINE 4 G/G
2 PATCH TOPICAL DAILY PRN
Status: DISCONTINUED | OUTPATIENT
Start: 2023-10-12 | End: 2023-10-13

## 2023-10-12 RX ORDER — NALOXONE HYDROCHLORIDE 0.4 MG/ML
0.2 INJECTION, SOLUTION INTRAMUSCULAR; INTRAVENOUS; SUBCUTANEOUS
Status: DISCONTINUED | OUTPATIENT
Start: 2023-10-12 | End: 2023-10-15 | Stop reason: HOSPADM

## 2023-10-12 RX ORDER — MULTIPLE VITAMINS W/ MINERALS TAB 9MG-400MCG
1 TAB ORAL EVERY MORNING
Status: DISCONTINUED | OUTPATIENT
Start: 2023-10-12 | End: 2023-10-12

## 2023-10-12 RX ORDER — BUPRENORPHINE 5 UG/H
1 PATCH TRANSDERMAL WEEKLY
Status: DISCONTINUED | OUTPATIENT
Start: 2023-10-18 | End: 2023-10-12

## 2023-10-12 RX ORDER — BUPRENORPHINE 7.5 UG/H
1 PATCH TRANSDERMAL WEEKLY
Status: DISCONTINUED | OUTPATIENT
Start: 2023-10-12 | End: 2023-10-15 | Stop reason: HOSPADM

## 2023-10-12 RX ORDER — MEMANTINE HYDROCHLORIDE 5 MG/1
5 TABLET ORAL 2 TIMES DAILY
Status: DISCONTINUED | OUTPATIENT
Start: 2023-10-12 | End: 2023-10-15 | Stop reason: HOSPADM

## 2023-10-12 RX ORDER — LANOLIN ALCOHOL/MO/W.PET/CERES
400 CREAM (GRAM) TOPICAL DAILY
Status: DISCONTINUED | OUTPATIENT
Start: 2023-10-12 | End: 2023-10-15 | Stop reason: HOSPADM

## 2023-10-12 RX ORDER — BACLOFEN 10 MG/1
10 TABLET ORAL 3 TIMES DAILY
Status: DISCONTINUED | OUTPATIENT
Start: 2023-10-12 | End: 2023-10-15 | Stop reason: HOSPADM

## 2023-10-12 RX ORDER — DIPHENHYDRAMINE HCL 12.5MG/5ML
12.5 LIQUID (ML) ORAL EVERY 6 HOURS PRN
Status: DISCONTINUED | OUTPATIENT
Start: 2023-10-12 | End: 2023-10-15 | Stop reason: HOSPADM

## 2023-10-12 RX ORDER — ONDANSETRON 4 MG/1
4 TABLET, ORALLY DISINTEGRATING ORAL EVERY 6 HOURS PRN
Status: DISCONTINUED | OUTPATIENT
Start: 2023-10-12 | End: 2023-10-15 | Stop reason: HOSPADM

## 2023-10-12 RX ORDER — SCOLOPAMINE TRANSDERMAL SYSTEM 1 MG/1
1 PATCH, EXTENDED RELEASE TRANSDERMAL
Status: DISCONTINUED | OUTPATIENT
Start: 2023-10-12 | End: 2023-10-15 | Stop reason: HOSPADM

## 2023-10-12 RX ORDER — ENOXAPARIN SODIUM 100 MG/ML
40 INJECTION SUBCUTANEOUS EVERY 24 HOURS
Status: DISCONTINUED | OUTPATIENT
Start: 2023-10-12 | End: 2023-10-15 | Stop reason: HOSPADM

## 2023-10-12 RX ADMIN — OXYCODONE HYDROCHLORIDE 10 MG: 5 TABLET ORAL at 13:20

## 2023-10-12 RX ADMIN — BUPRENORPHINE 1 PATCH: 7.5 PATCH TRANSDERMAL at 09:35

## 2023-10-12 RX ADMIN — OXYCODONE HYDROCHLORIDE 5 MG: 5 TABLET ORAL at 07:04

## 2023-10-12 RX ADMIN — Medication 400 MCG: at 08:00

## 2023-10-12 RX ADMIN — HYDROXYZINE HYDROCHLORIDE 25 MG: 25 TABLET, FILM COATED ORAL at 13:20

## 2023-10-12 RX ADMIN — ONDANSETRON 4 MG: 4 TABLET, ORALLY DISINTEGRATING ORAL at 13:17

## 2023-10-12 RX ADMIN — HYDROXYZINE HYDROCHLORIDE 25 MG: 25 TABLET, FILM COATED ORAL at 20:19

## 2023-10-12 RX ADMIN — POLYETHYLENE GLYCOL 3350 17 G: 17 POWDER, FOR SOLUTION ORAL at 07:59

## 2023-10-12 RX ADMIN — Medication 1 MG: at 20:28

## 2023-10-12 RX ADMIN — ENOXAPARIN SODIUM 40 MG: 40 INJECTION SUBCUTANEOUS at 08:00

## 2023-10-12 RX ADMIN — SERTRALINE HYDROCHLORIDE 100 MG: 100 TABLET ORAL at 07:59

## 2023-10-12 RX ADMIN — DICYCLOMINE HYDROCHLORIDE 20 MG: 20 TABLET ORAL at 08:00

## 2023-10-12 RX ADMIN — MEMANTINE 5 MG: 5 TABLET ORAL at 20:19

## 2023-10-12 RX ADMIN — ACETAMINOPHEN 1000 MG: 500 TABLET ORAL at 06:13

## 2023-10-12 RX ADMIN — LIDOCAINE 2 PATCH: 4 PATCH TOPICAL at 20:29

## 2023-10-12 RX ADMIN — BACLOFEN 10 MG: 10 TABLET ORAL at 13:20

## 2023-10-12 RX ADMIN — BACLOFEN 10 MG: 10 TABLET ORAL at 08:00

## 2023-10-12 RX ADMIN — ONDANSETRON 4 MG: 4 TABLET, ORALLY DISINTEGRATING ORAL at 20:41

## 2023-10-12 RX ADMIN — ACETAMINOPHEN 1000 MG: 500 TABLET ORAL at 13:22

## 2023-10-12 RX ADMIN — SCOPALAMINE 1 PATCH: 1 PATCH, EXTENDED RELEASE TRANSDERMAL at 10:58

## 2023-10-12 RX ADMIN — OXYCODONE HYDROCHLORIDE 10 MG: 5 TABLET ORAL at 20:28

## 2023-10-12 RX ADMIN — BACLOFEN 10 MG: 10 TABLET ORAL at 20:19

## 2023-10-12 RX ADMIN — DICYCLOMINE HYDROCHLORIDE 20 MG: 20 TABLET ORAL at 18:00

## 2023-10-12 RX ADMIN — MEMANTINE 5 MG: 5 TABLET ORAL at 08:00

## 2023-10-12 RX ADMIN — OXYCODONE HYDROCHLORIDE 5 MG: 5 TABLET ORAL at 02:52

## 2023-10-12 RX ADMIN — PANTOPRAZOLE SODIUM 40 MG: 40 TABLET, DELAYED RELEASE ORAL at 07:01

## 2023-10-12 RX ADMIN — DICYCLOMINE HYDROCHLORIDE 20 MG: 20 TABLET ORAL at 13:21

## 2023-10-12 RX ADMIN — ONDANSETRON 4 MG: 4 TABLET, ORALLY DISINTEGRATING ORAL at 07:04

## 2023-10-12 ASSESSMENT — ACTIVITIES OF DAILY LIVING (ADL)
ADLS_ACUITY_SCORE: 35
ADLS_ACUITY_SCORE: 35
ADLS_ACUITY_SCORE: 34
ADLS_ACUITY_SCORE: 35
ADLS_ACUITY_SCORE: 35
ADLS_ACUITY_SCORE: 37
ADLS_ACUITY_SCORE: 35
ADLS_ACUITY_SCORE: 35
ADLS_ACUITY_SCORE: 34
ADLS_ACUITY_SCORE: 35
ADLS_ACUITY_SCORE: 35
ADLS_ACUITY_SCORE: 37

## 2023-10-12 NOTE — PLAN OF CARE
VS: /65 (BP Location: Left arm)   Pulse 68   Temp 98.1  F (36.7  C) (Oral)   Resp 16   SpO2 97%      O2: Stable @ RA, On pulse oximetry   Output: Urine retention,Straight catheter fr bladder scan >300mls  Done X1 this shift   Last BM: 10/12/23   Activity: Assist of 1(short distances) and a wheel chair for long distances    Skin: Intact   Pain: C/o of pain on a level of 8,order for pain consult   CMS: AXOX4   Diet: Gluten free diet   LDA: R,PIV SL   Equipment: Personal belongings,wheel chair   Plan: Continue with POC   Additional Info: Pt is an admission of the shift ,Admission protocols observed,  UA collected ad sent to the lab  RN managed potassium-4.1  On bladder management protocol  I/O for 24 hrs  Pts Cannabis has been sent to the Pharmacy,Pt will discuss with provider tomorrow on adding it to her orders.

## 2023-10-12 NOTE — PROGRESS NOTES
[facility]    Medicine Progress Note - Hospitalist Service, GOLD TEAM 18    Date of Admission:  10/11/2023    Assessment & Plan     Peggy Jessica is a 48 year old female admitted on 10/11/2023. She has hx of CISP in 2021 treated with IVIG in the past, complex regional pain syndrom followed by pain clinic, frequent falls, gastroparesis, constipation, depression and anxiety who presented to the ED 4 days after a mechanical fall with worsening pain and weakness. Also endorsed bladder and bowel issues in the setting of negative lumber MRI.     Mechanical fall  Buttock contusion  Acute on chronic LE weakness and pain  Hx of Chronic  inflammatory demyelinating polyneuropathy  ( CISP)   Lost balance as she was transferring from her recliner to wheelchair 4 days PTA. Hit lumbar/coccyx region, now has radiating pain over R posterior thigh, also states 'difficulty with BM' and 'unable to feel bladder fullness (able to urinate)'. Hx of CISP with similar presentation in the past. No saddle anesthesia, but significant tenderness over lumbosacral area. In ED, MRI lumbar spine unremarkable. CT a/p large amount of stool. Since the trigger for worsening pain and weakness was the fall, likely due to MSK issue, however, the weakness/pain could be due to CISP.  - Dr Irwin  discussed with neurosurgery -> imaging reviewed. Canals are widely patent. No indication for any neurosurgery. Consider neurology consult if neurological sx persists  - Neurology consult requested: Dr Irwin  spoke w oncall resident, She had outpatient neurology notes and detailed, relatively recent comprehensive work up. No urgent work up or tx needed, but full consult to be done during the day.   - pain control: continue pta baclofen, buprenorphine patch, lidocaine patch and voltaren gel. Received oxycodone po. Consider reaching to her outpatient pain clinic ([facility] pain clinic?) provider Marco Antonio  - PT/OT  "consult   - sees [place] neurologist, has new one and appointment is Nov 1     Complex regional pain syndrom  Closely followed by outside provider. Pain control could be a bit more challenging with buprenorphine management.  - will ask pain management team to see and assist   - continue PTA pain meds including baclofen, , lidocaine, buprenorphine. galeano snot use Voltaren cream ( had rash form it)   -Also on medical cannabis     Chronic constipation  Gastroparesis  - continue PTA bowel regimen and zofran prn,on miralax and senna  \"if john\" has BM about 1 x a week  - last BM was 10/11 AM prior ti that was about a week ago   - has had history of no  for 2 weeks, had golytly prep that did not work,   - has tried linzest and after taking for 5 days daily had small BM    -mnhad EGD 7/2023  showed congested mucosa, small hiatus hernia  but nothing else     Issues with urination  - states has had issues with difficulty voiding since Sunday AM prior to fall  - check UA UC, bladder scan       States that appetite is down  Gastroparesis   - due to her chr nausea and vomiting  - eats mechanical soft diet     - weight   - currently undergoing SITZ marker test through [facility]     Depression  Anxiety  Appears emotional and anxious at times  - continue sertraline      celiac disease  - needs GF diet , asked pharmacy to also make sure all medications she gets are GF     History of breast Ca, BRCA1 and 2 mutations  - status post  bilateral  mastectomy 10/2020      Diet:  regular  DVT Prophylaxis: Enoxaparin (Lovenox) SQ  Kwan Catheter: Not present  Lines: None     Cardiac Monitoring: None  Code Status:  full     Clinically Significant Risk Factors Present on Admission                       # Overweight: Estimated body mass index is 27.29 kg/m  as calculated from the following:    Height as of 9/13/23: 1.651 m (5' 5\").    Weight as of 9/13/23: 74.4 kg (164 lb).       # Financial/Environmental Concerns:           Disposition Plan    "   Expected Discharge Date: 10/13/2023                    Carlita Woodward MD  Hospitalist Service, GOLD TEAM 18  [facility]  Securely message with [employer] (more info)  Text page via [employer] Paging/Directory   See signed in provider for up to date coverage information  ______________________________________________________________________    Interval History     Follow up  form  this AMs admission   Still with ongoing pain, also issues with urination chronic constipation       Physical Exam   Vital Signs: Temp: 98.1  F (36.7  C) Temp src: Oral BP: 108/65 Pulse: 68   Resp: 16 SpO2: 97 % O2 Device: None (Room air)    Weight: 0 lbs 0 oz  General appearance: awake alert in  no apparent distress     HEENT: EOMI and PEARLA, sclera nonicteric, moist mucus membranes, head atraumatic  NECK: supple  RESPIRATORY: lungs are clear  CARDIOVASCULAR: normal S1S2 regular rate and rhythm  GASTROINTESTINAL: abdomen is  soft,  non-distended, non-tender with normal bowel sounds.   SKIN: warm and dry, no rashes, no mottling noted   Tenderness over sacral area and  post pelvis , I did not see any hematomas bruises at this point yet   NEUROLOGIC: awake alert and oriented, can wiggle toes, decreased sensation in lower extremities worse form knee down    EXTREMITIES: no clubbing cyanosis or edema         Data     I have personally reviewed the following data over the past 24 hrs:    5.8  \   12.6   / 248     140 101 8.5 /  94   4.1 28 0.88 \     ALT: 14 AST: 28 AP: 103 TBILI: 0.4   ALB: 4.5 TOT PROTEIN: 8.2 LIPASE: N/A       Imaging results reviewed over the past 24 hrs:   Recent Results (from the past 24 hour(s))   CT Abdomen Pelvis w Contrast    Narrative    EXAM: CT ABDOMEN PELVIS W CONTRAST  LOCATION: [facility]  DATE: 10/11/2023    INDICATION: abd pain, recent fall  COMPARISON: CT from 08/26/2023.  TECHNIQUE: CT scan of the abdomen and pelvis was  performed following injection of IV contrast. Multiplanar reformats were obtained. Dose reduction techniques were used.  CONTRAST: 100 mL of Isovue-370.    FINDINGS:   LOWER CHEST: Normal.    HEPATOBILIARY: Benign cysts in segments 1, 2, 5, and 6 requiring no follow-up. Absent gallbladder.    PANCREAS: Normal.    SPLEEN: Normal.    ADRENAL GLANDS: Normal.    KIDNEYS/BLADDER: Normal.    BOWEL: There is a large amount of proximal colonic stool, with the colon tapering to a normal caliber in the descending and sigmoid segments. Multiple fluid-filled loops of small bowel without distention or transition point to suggest obstruction. A   normal appendix is present medial to the cecum. No free air.    LYMPH NODES: Normal.    VASCULATURE: Scant atherosclerotic change.    PELVIC ORGANS: Absent uterus. No free fluid.    MUSCULOSKELETAL: No acute, displaced fracture.      Impression    IMPRESSION:   1.  No acute findings in the abdomen or pelvis.    2.  Large amount of proximal colonic stool suggestive of constipation.    3.  No displaced fracture. Specifically, no sacral or coccygeal fracture.   MR Lumbar Spine w/o Contrast    Narrative    EXAM: MR LUMBAR SPINE W/O CONTRAST  LOCATION: [facility]  DATE: 10/11/2023    INDICATION: fall, back pain, incontinence; Low back pain; Trauma and or suspected fracture; Significant trauma; No lumbar CT result available; No known automatically detected potential contraindications to CT  COMPARISON: CT abdomen and pelvis dated 10/11/2023  TECHNIQUE: Routine Lumbar Spine MRI without IV contrast.    FINDINGS:   Nomenclature is based on 5 lumbar type vertebral bodies. Normal alignment and vertebral body heights. Mild Modic type I degenerative endplate changes at L5-S1. Scattered benign hemangiomas. Normal distal spinal cord and cauda equina with conus medullaris   at L1. Redemonstrated partially visualized cysts in the liver. Unremarkable  visualized bony pelvis.    T12-L1: Normal disc height and signal. No herniation. Normal facets. No spinal canal or neural foraminal stenosis.     L1-L2: Normal disc height and signal. No herniation. Normal facets. No spinal canal or neural foraminal stenosis.    L2-L3: Normal disc height and signal. No herniation. Normal facets. No spinal canal or neural foraminal stenosis.     L3-L4: Normal disc height and signal. No herniation. Normal facets. No spinal canal or neural foraminal stenosis.    L4-L5: Central disc protrusion and mild facet arthropathy. No spinal canal stenosis. The subarticular recesses are patent. Mild bilateral neural foraminal narrowing.    L5-S1: Central and left paracentral disc protrusion. Mild facet arthropathy. No spinal canal stenosis. The subarticular recesses are patent. Mild bilateral neural foraminal narrowing.      Impression    IMPRESSION:  1.  Mild degenerative changes of the lumbar spine L4-L5 and L5-S1, without high-grade spinal canal or neural foraminal narrowing.  2.  No evidence of an acute fracture.  3.  Mild Modic type I degenerative endplate changes at L5-S1.     Recent Labs   Lab 10/12/23  0628 10/12/23  0407 10/11/23  2018   WBC 5.8  --  7.1   HGB 12.6  --  15.2   MCV 91  --  89     --  325     --  138   POTASSIUM 4.1  --  3.9   CHLORIDE 101  --  99   CO2 28  --  25   BUN 8.5  --  8.4   CR 0.88 0.86 0.83   ANIONGAP 11  --  14   ESTEBAN 9.2  --  9.9   GLC 94  --  102*   ALBUMIN  --   --  4.5   PROTTOTAL  --   --  8.2   BILITOTAL  --   --  0.4   ALKPHOS  --   --  103   ALT  --   --  14   AST  --   --  28

## 2023-10-12 NOTE — H&P
[facility]    History and Physical - Hospitalist Service, GOLD TEAM        Date of Admission:  10/11/2023    Assessment & Plan      Peggy Jessica is a 48 year old female admitted on 10/11/2023. She has hx of CISP in 2021 treated with IVIG in the past, complex regional pain syndrom followed by pain clinic, frequent falls, gastroparesis, constipation, depression and anxiety who presented to the ED 4 days after a mechanical fall with worsening pain and weakness. Also endorsed bladder and bowel issues in the setting of negative lumber MRI.    Mechanical fall  Buttock contusion  Acute on chronic LE weakness and pain  Hx of Chronic immune sensory polyradiculopathy  Lost balance as she was transferring from her recliner to wheelchair 4 days PTA. Hit lumbar/coccyx region, now has radiating pain over R posterior thigh, also states 'difficulty with BM' and 'unable to feel bladder fullness (able to urinate)'. Hx of CISP with similar presentation in the past. No saddle anesthesia, but significant tenderness over lumbosacral area. In ED, MRI lumbar spine unremarkable. CT a/p large amount of stool. Since the trigger for worsening pain and weakness was the fall, likely due to MSK issue, however, the weakness/pain could be due to CISP.  - I discussed with neurosurgery -> imaging reviewed. Canals are widely patent. No indication for any neurosurgery. Consider neurology consult if neurological sx persists  - Neurology consult requested: I spoke w [facility] resident, She had outpatient neurology notes and detailed, relatively recent comprehensive work up. No urgent work up or tx needed, but full consult to be done during the day.   - pain control: continue pta baclofen, buprenorphine patch, lidocaine patch and voltaren gel. Received oxycodone po. Consider reaching to her outpatient pain clinic ([facility] pain clinic?) provider Marco Antonio  - PT/OT consult    Complex regional pain  "syndrom  Closely followed by outside provider. Pain control could be a bit more challenging with buprenorphine management.  - as above, consider reaching primary pain clinic  - continue PTA pain meds including baclofen, voltaren, lidocaine, buprenorphine. Also on medical cannabis    Chronic constipation  Gastroparesis  - continue PTA bowel regimen and zofran prn    Depression  Anxiety  Appears emotional and anxious at times  - continue sertraline          Diet:  regular  DVT Prophylaxis: Enoxaparin (Lovenox) SQ  Kwan Catheter: Not present  Lines: None     Cardiac Monitoring: None  Code Status:  full    Clinically Significant Risk Factors Present on Admission                       # Overweight: Estimated body mass index is 27.29 kg/m  as calculated from the following:    Height as of 9/13/23: 1.651 m (5' 5\").    Weight as of 9/13/23: 74.4 kg (164 lb).       # Financial/Environmental Concerns:           Disposition Plan      Expected Discharge Date: 10/13/2023                  Bhavana Irwin MD  Hospitalist Service, [facility]  Securely message with [employer] (more info)  Text page via [facility] Paging/Directory   See signed in provider for up to date coverage information    ______________________________________________________________________    Chief Complaint   Hitting buttock area with worsening pain and weakness    History is obtained from the patient    History of Present Illness   Peggy Jessica is a 48 year old female who has hx of CISP and complex regional pain who presented to ED 4 days after a fall. She uses wheelchair as needed and as she transferred from her recliner to a wheelchair, she slid and hit her lumbar sacral area with wheelchair. Since then, she has been having worsenign pain radiating down to R posterior thigh. She feels weaker in legs. She is unable to get up a flight of stairs at home. When asked about fecal and urinary " incontinence, she indicated that she is more constipated and this felt different than usual. She also felt she does not feel bladder getting full. She is able to urinate but needs to strain for 5 minutes.   She is closely followed by pain clinic for complex regional pain syndrom, and the usual pain regimen is not helping with her acute pain. She presented to ED for further work up.      Past Medical History    Past Medical History:   Diagnosis Date    Arthritis     BRCA positive     Cancer (H)     CIDP (chronic inflammatory demyelinating polyneuropathy) (H)     ASHKAN III with severe dysplasia 2002    Complex regional pain syndrome type 1 of right lower extremity        Past Surgical History   Past Surgical History:   Procedure Laterality Date    BILATERAL OOPHORECTOMY Bilateral 2019    c section      2002    CHOLECYSTECTOMY  1997    COLONOSCOPY      ESOPHAGOSCOPY, GASTROSCOPY, DUODENOSCOPY (EGD), COMBINED N/A 07/28/2022    Procedure: ESOPHAGOGASTRODUODENOSCOPY (EGD);  Surgeon: Zack Maddox MD;  Location:  GI    ESOPHAGOSCOPY, GASTROSCOPY, DUODENOSCOPY (EGD), COMBINED N/A 5/16/2023    Procedure: Esophagoscopy, gastroscopy, duodenoscopy (EGD), combined;  Surgeon: Aris Mason MD;  Location:  GI    ESOPHAGOSCOPY, GASTROSCOPY, DUODENOSCOPY (EGD), COMBINED N/A 7/20/2023    Procedure: Esophagoscopy, gastroscopy, duodenoscopy (EGD), combined;  Surgeon: Aris Mason MD;  Location:  GI    HYSTERECTOMY  2004    MASTECTOMY Bilateral 2020       Prior to Admission Medications   Prior to Admission Medications   Prescriptions Last Dose Informant Patient Reported? Taking?   Lidocaine (LIDOCARE) 4 % Patch  Self No No   Sig: Place 3 patches onto the skin every 24 hours To prevent lidocaine toxicity, patient should be patch free for 12 hrs daily.   Patient taking differently: Place 3 patches onto the skin daily as needed for moderate pain To prevent lidocaine toxicity, patient should be patch free  for 12 hrs daily.   acetaminophen (TYLENOL) 500 MG tablet  Self Yes No   Sig: Take 500-1,000 mg by mouth every 6 hours as needed for mild pain   baclofen (LIORESAL) 10 MG tablet   Yes No   Sig: Take 10 mg by mouth 3 times daily Takes 5 mg morning and early afternoon and between 5/10 at bedtime   bisacodyl (DULCOLAX) 10 MG suppository   No No   Sig: Place 1 suppository (10 mg) rectally daily as needed for constipation   buprenorphine (BUTRANS) 7.5 MCG/HR WK patch  Self Yes No   Sig: Place 1 patch onto the skin once a week Applies on Wednesday at 9 am   diclofenac (VOLTAREN) 1 % topical gel   No No   Sig: Apply 4 g topically 4 times daily   dicyclomine (BENTYL) 20 MG tablet  Self Yes No   Sig: Take 20 mg by mouth 3 times daily (before meals)   diphenhydrAMINE (BENADRYL) 25 MG tablet  Self No No   Sig: Take 0.5 tablets (12.5 mg) by mouth every 6 hours as needed for allergies or other (nausea)   folic acid (FOLVITE) 400 MCG tablet  Self No No   Sig: Take 1 tablet (400 mcg) by mouth daily   hydrOXYzine (ATARAX) 25 MG tablet  Self No No   Sig: Take 1 tablet (25 mg) by mouth 3 times daily (before meals)   medical cannabis (Patient's own supply)  Self Yes No   Sig: Take 1 Dose by mouth See Admin Instructions (The purpose of this order is to document that the patient reports taking medical cannabis.  This is not a prescription, and is not used to certify that the patient has a qualifying medical condition.)  Per pt: 5-10 mg tablet three times a day PRN   memantine (NAMENDA) 5 MG tablet   Yes No   Sig: Take 1 tablet by mouth 2 times daily   multivitamin w/minerals (THERA-VIT-M) tablet  Self Yes No   Sig: Take 1 tablet by mouth every morning [employer] W[employer]   naloxone (NARCAN) 4 MG/0.1ML nasal spray  Self No No   Sig: Spray 1 spray (4 mg) into one nostril alternating nostrils as needed for opioid reversal every 2-3 minutes until assistance arrives   ondansetron (ZOFRAN ODT) 4 MG ODT tab   No No   Sig: Take 1 tablet (4  mg) by mouth every 8 hours as needed for nausea   pantoprazole (PROTONIX) 40 MG EC tablet  Self No No   Sig: Take 1 tablet (40 mg) by mouth every morning (before breakfast)   polyethylene glycol (MIRALAX) 17 GM/Dose powder   No No   Sig: Take 17 g by mouth daily Until BM, then use every day prn   scopolamine (TRANSDERM) 1 MG/3DAYS 72 hr patch   No No   Sig: Place 1 patch onto the skin every 72 hours   senna (SENOKOT) 8.6 MG tablet  Self Yes No   Sig: Take 1 tablet by mouth as needed   senna-docusate (SENOKOT-S/PERICOLACE) 8.6-50 MG tablet  Self Yes No   Sig: Take 1 tablet by mouth 2 times daily as needed for constipation   sertraline (ZOLOFT) 100 MG tablet  Self No No   Sig: Take 1 tablet (100 mg) by mouth daily   vitamin D2 (ERGOCALCIFEROL) 38444 units (1250 mcg) capsule   No No   Sig: Take 1 capsule (50,000 Units) by mouth once a week for 8 doses      Facility-Administered Medications: None        Review of Systems    The 10 point Review of Systems is negative other than noted in the HPI or here.      Physical Exam   Vital Signs: Temp: 98.4  F (36.9  C) Temp src: Oral BP: 132/85 Pulse: 81   Resp: 16 SpO2: 92 % O2 Device: None (Room air)    Weight: 0 lbs 0 oz    General Appearance: Lying in bed, pleasant, but intermittently mourns with pain  Respiratory: unlabored, clear bilaterally  Cardiovascular: RRR no mrumur  GI: soft non tender  Skin: skin over lumbar spine and buttock intact other than one linear shallow scratch, no bruises or mass, sensation intact  Other: Sensation intact in perianal area     Medical Decision Making       75 MINUTES SPENT BY ME on the date of service doing chart review, history, exam, documentation & further activities per the note.      Data     I have personally reviewed the following data over the past 24 hrs:    7.1  \   15.2   / 325     138 99 8.4 /  102 (H)   3.9 25 0.83 \     ALT: 14 AST: 28 AP: 103 TBILI: 0.4   ALB: 4.5 TOT PROTEIN: 8.2 LIPASE: N/A       Imaging results reviewed  over the past 24 hrs:   Recent Results (from the past 24 hour(s))   XR ABDOMEN 1 VIEW    Narrative    For Patients:  As a result of the 21st Century Cures Act, medical imaging exams and procedure reports are released immediately into your electronic medical record.  You may view this report before your referring provider.  If you have questions, please contact your health care provider.      HISTORY:  Constipation.    COMPARISON:  10/09/2023.    TECHNIQUE:  Abdomen, 2 views.    FINDINGS:  Colonic markers are present, which are primarily seen in the ascending colon, hepatic flexure, the distal transverse colon. 24 markers are present. Surgical clips in the right upper quadrant of the abdomen. Moderate amount of stool material. No soft tissue mass by plain film. No suspicious calcification.     Impression:     Sitz marker study as above.      Dictated by Gildardo Aguila MD @ Oct 11 2023  8:15AM    (Electronically Signed)            CT Abdomen Pelvis w Contrast    Narrative    EXAM: CT ABDOMEN PELVIS W CONTRAST  LOCATION: [facility]  DATE: 10/11/2023    INDICATION: abd pain, recent fall  COMPARISON: CT from 08/26/2023.  TECHNIQUE: CT scan of the abdomen and pelvis was performed following injection of IV contrast. Multiplanar reformats were obtained. Dose reduction techniques were used.  CONTRAST: 100 mL of Isovue-370.    FINDINGS:   LOWER CHEST: Normal.    HEPATOBILIARY: Benign cysts in segments 1, 2, 5, and 6 requiring no follow-up. Absent gallbladder.    PANCREAS: Normal.    SPLEEN: Normal.    ADRENAL GLANDS: Normal.    KIDNEYS/BLADDER: Normal.    BOWEL: There is a large amount of proximal colonic stool, with the colon tapering to a normal caliber in the descending and sigmoid segments. Multiple fluid-filled loops of small bowel without distention or transition point to suggest obstruction. A   normal appendix is present medial to the cecum. No free air.    LYMPH NODES:  Normal.    VASCULATURE: Scant atherosclerotic change.    PELVIC ORGANS: Absent uterus. No free fluid.    MUSCULOSKELETAL: No acute, displaced fracture.      Impression    IMPRESSION:   1.  No acute findings in the abdomen or pelvis.    2.  Large amount of proximal colonic stool suggestive of constipation.    3.  No displaced fracture. Specifically, no sacral or coccygeal fracture.   MR Lumbar Spine w/o Contrast    Narrative    EXAM: MR LUMBAR SPINE W/O CONTRAST  LOCATION: [facility]  DATE: 10/11/2023    INDICATION: fall, back pain, incontinence; Low back pain; Trauma and or suspected fracture; Significant trauma; No lumbar CT result available; No known automatically detected potential contraindications to CT  COMPARISON: CT abdomen and pelvis dated 10/11/2023  TECHNIQUE: Routine Lumbar Spine MRI without IV contrast.    FINDINGS:   Nomenclature is based on 5 lumbar type vertebral bodies. Normal alignment and vertebral body heights. Mild Modic type I degenerative endplate changes at L5-S1. Scattered benign hemangiomas. Normal distal spinal cord and cauda equina with conus medullaris   at L1. Redemonstrated partially visualized cysts in the liver. Unremarkable visualized bony pelvis.    T12-L1: Normal disc height and signal. No herniation. Normal facets. No spinal canal or neural foraminal stenosis.     L1-L2: Normal disc height and signal. No herniation. Normal facets. No spinal canal or neural foraminal stenosis.    L2-L3: Normal disc height and signal. No herniation. Normal facets. No spinal canal or neural foraminal stenosis.     L3-L4: Normal disc height and signal. No herniation. Normal facets. No spinal canal or neural foraminal stenosis.    L4-L5: Central disc protrusion and mild facet arthropathy. No spinal canal stenosis. The subarticular recesses are patent. Mild bilateral neural foraminal narrowing.    L5-S1: Central and left paracentral disc protrusion. Mild  facet arthropathy. No spinal canal stenosis. The subarticular recesses are patent. Mild bilateral neural foraminal narrowing.      Impression    IMPRESSION:  1.  Mild degenerative changes of the lumbar spine L4-L5 and L5-S1, without high-grade spinal canal or neural foraminal narrowing.  2.  No evidence of an acute fracture.  3.  Mild Modic type I degenerative endplate changes at L5-S1.

## 2023-10-12 NOTE — CONSULTS
"Pain Service Consultation Note  [facility]      Patient Name: Peggy Jessica  MRN: [id number]   Age: 48 year old  Sex: female  Date: October 12, 2023                                      Reviewed: Yes    Referring Provider:  Carlita Woodward MD  Referring Service:  medicine service  Reason for Consultation:     pt with chronic pain syndrome, in with acute on chronic pain, please assist with pain management         Visit/Communication Style   Virtual (Video) communication was used to evaluate Peggy.  Peggy consented to the use of video communication.  Video START time: 1225, 10/12/2023  Video STOP time:1253, 10/12/2023   Patient's location: [facility] [address]  Provider's location during the visit: [facility]             Assessment/Recommendations:  Peggy Jessica is a 48 year old female who has PMH of Chronic immune sensory polyradiculopathy (CISP) in 2021 treated with IVIG in the past, complex regional pain syndrom of right foot/lower leg in 2019 followed by [facility] ([facility]), frequent falls, gastroparesis-followed by outpatient GI, constipation (4 recent admissions for this per patient), depression and anxiety who was admitted to observation on 10/11/23 days after a mechanical fall at home while transitioning from recliner to wheelchair on 10/8/23 with worsening pain and weakness.  Lumbar spine MRI done on 10/11/23 showed \"mild degenerative changes of the lumbar spine L4-L5 and L5-S1, without high-grade spinal canal or neural foraminal narrowing. No evidence of an acute fracture.\"     Pain service consulted to assist with pain management given her h/o chronic pain.    Peggy is seen lying in bed.  NAD. She is AAOx3.  She verbalizes pain on lower back and right buttock. States pain has been ongoing since Mauricio 10/8/23 from a fall at home while transitioning from recliner to wheelchair.  She reports a fall down the stairs as well on 10/10/23.   She states that she is " mostly wheelchair bound at home.  She is able to ambulate on her own occasionally for less than 150feet/day due to CISP and CRPS.  THe CISP (is being treated next) and CRPS (primary) are managed by [facility] with Buprenorphine patch 7.5mcg/hour changing Q 7 days.   Currently pain level is 8/10 which is not tolerable for her.  She feels that chronic buprenorphine alone is not enough to manage her current pain.  Discussed that pain is likely musculoskeletal nature.  There is no other work up pending.  Neurology has been consulted and recommended symptomatic management of pain.    Discussed with Peggy the typical management of acute lower back pain, unfortunately some medications are limited due to her medication intolerances/sensitives/allergies. We discussed that oxycodone can be used for the next 1-2 days but then it would be discontinued.  The pain plan reviewed with her.  She reports gastroparesis and chronic constipation which are being followed by outpatient GI with recent testing done 10/10/23 and will need abdominal Xray f/up on 12/13/23 at 7:45 am at [facility]--so she does not think that she can make it as she is inpatient.  She states that because of gastroparesis-she does not think that she absorbs oxycodone/medication appropriately.  We discussed trialing liquid formulation of oxycodone.   Did discuss that opioids should be minimized in patients with gastroparesis and chronic constipation as opioids can worsen the symptoms.  She states that she was inadvertently off buprenorphine x 1.5 months and the gastroparesis was worse.        Plan:   Continue PTA dose of Buprenorphine 7.5mcg/hour patch Q 7 days   -keep appt. Next week at  pain clinic   -should have at least 1 patch at home to last for 1 week.  Change oxycodone to 5-7.5mg liquid PO Q 4 hours PRN x 1 day.      On 10/13/23, change to 5-7.5mg Q 6 hours PRN x 1 day.    On 10/14/23 change to  5-7.5mg Q 12 hours PRN x 1 day and OFF.  Change lidocaine  patch to 3 patches Q 24 hours  Continue PTA dose of baclofen 10mg PO TID.  Bowel regimen-has chronic constipation.  Has 3 BMs per 2 weeks typically.  Acetaminophen 975mg PO Q 8 hours.  Unable to use NSAIDs, voltaren gel, menthol patches, neuropathics due to allergies/intolerances    Thank you for the opportunity to participate in the care of Peggy Jessica  Pain Service will sign off.    Discussed with attending anesthesiologist  Primary Service Contacted with Recommendations? Yes    Ana Galvez PA-C  10/12/2023      Per [place]  review pulled from system on 10/12/23.     09/20/2023 09/20/2023 1 Buprenorphine 7.5 Mcg/hr Patch 4.00 28   08/24/2023 08/23/2023 1 Buprenorphine 7.5 Mcg/hr Patch 4.00 28   07/26/2023 07/25/2023 1 Buprenorphine 7.5 Mcg/hr Patch 4.00 28   06/15/2023 05/12/2023 1 Buprenorphine 7.5 Mcg/hr Patch 3.00 21   06/15/2023 05/12/2023 1 Buprenorphine 7.5 Mcg/hr Patch 1.00 7   05/20/2023 05/19/2023 1 Tramadol Hcl 50 Mg Tablet 10.00 7   04/18/2023 04/10/2023 1 Buprenorphine 7.5 Mcg/hr Patch 4.00 28   03/22/2023 03/21/2023 1 Buprenorphine 7.5 Mcg/hr Patch 3.00 21       Pain Medications/Prescriber: Marco Antonio Carmen PA-C at [facility] pain clinic    Past Medical History:  Past Medical History:   Diagnosis Date    Arthritis     BRCA positive     Cancer (H)     CIDP (chronic inflammatory demyelinating polyneuropathy) (H)     ASHKAN III with severe dysplasia 2002    Complex regional pain syndrome type 1 of right lower extremity          Family History:    Family History   Problem Relation Age of Onset    Kidney Disease Father     Macular Degeneration Paternal Grandmother     Glaucoma No family hx of        Social History:  Social History     Tobacco Use    Smoking status: Former     Types: Cigarettes    Smokeless tobacco: Never   Substance Use Topics    Alcohol use: Not Currently               Review of Systems:  Complete ROS reviewed. Unless otherwise noted, all other systems found to be negative.         Laboratory Results:  Recent Labs   Lab Test 10/12/23  0628 01/31/23  1900 01/28/23  0619 01/27/23 2015   INR  --   --  0.97  --       < > 243  --    PTT  --   --   --  28   BUN 8.5   < > 10.3  --     < > = values in this interval not displayed.       Allergies:  Allergies   Allergen Reactions    Dihydroergotamine Anaphylaxis    Latex Anaphylaxis    Shellfish-Derived Products Anaphylaxis    Sumatriptan Anaphylaxis    Compazine [Prochlorperazine] Anxiety    Banana Unknown    Gabapentin Dizziness    Gluten Meal Other (See Comments)     Celiac disease    Keppra [Levetiracetam] Nausea and Vomiting    Kiwi Unknown    Levofloxacin Other (See Comments)     Arrhythmia    Metronidazole Nausea and Vomiting    Nitrofurantoin Hives    Penicillins Hives    Pregabalin     Reglan [Metoclopramide] Other (See Comments)     restlessness/toe tapping     Topiramate Visual Disturbance    Aspirin Rash    Methadone Rash    Morphine Hives     She got hives around are when morphine given but resolved after few minutes per patient     Risperidone Anxiety         Current Pain Related Medications:  Medications related to Pain Management (From now, onward)      Start     Dose/Rate Route Frequency Ordered Stop    10/12/23 0934  buprenorphine (BUTRANS) 7.5 MCG/HR WK patch 1 patch         1 patch  over 168 Hours Transdermal WEEKLY 10/12/23 0848      10/12/23 0835  hydrOXYzine (ATARAX) tablet 25 mg        Note to Pharmacy: PTA Sig:Take 1 tablet (25 mg) by mouth 3 times daily (before meals)      25 mg Oral 3 TIMES DAILY 10/12/23 0237      10/12/23 0800  baclofen (LIORESAL) tablet 10 mg        Note to Pharmacy: PTA Sig:Take 10 mg by mouth 3 times daily Takes 5 mg morning and early afternoon and between 5/10 at bedtime      10 mg Oral 3 TIMES DAILY 10/12/23 0237      10/12/23 0800  polyethylene glycol (MIRALAX) Packet 17 g        Note to Pharmacy: PTA Sig:Take 17 g by mouth daily Until BM, then use every day prn      17 g Oral DAILY  10/12/23 0237      10/12/23 0730  dicyclomine (BENTYL) tablet 20 mg        Note to Pharmacy: PTA Sig:Take 20 mg by mouth 3 times daily (before meals)      20 mg Oral 3 TIMES DAILY BEFORE MEALS 10/12/23 0237      10/12/23 0245  oxyCODONE (ROXICODONE) tablet 5-10 mg         5-10 mg Oral EVERY 4 HOURS PRN 10/12/23 0245      10/12/23 0237  diphenhydrAMINE (BENADRYL) solution 12.5 mg        Note to Pharmacy: PTA Sig:Take 0.5 tablets (12.5 mg) by mouth every 6 hours as needed for allergies or other (nausea)      12.5 mg Oral EVERY 6 HOURS PRN 10/12/23 0237      10/12/23 0237  Lidocaine (LIDOCARE) 4 % Patch 2 patch        Note to Pharmacy: PTA Sig:Place 3 patches onto the skin every 24 hours To prevent lidocaine toxicity, patient should be patch free for 12 hrs daily.  Patient taking differently: Place 3 patches onto the skin daily as needed for moderate pain To prevent lidocaine toxicity, patient should be patch free for 12 hrs daily    2 patch  over 12 Hours Transdermal DAILY PRN 10/12/23 0237      10/12/23 0237  senna-docusate (SENOKOT-S/PERICOLACE) 8.6-50 MG per tablet 1 tablet        Note to Pharmacy: PTA Sig:Take 1 tablet by mouth 2 times daily as needed for constipation      1 tablet Oral 2 TIMES DAILY PRN 10/12/23 0237      10/12/23 0237  acetaminophen (TYLENOL) tablet 500-1,000 mg        Note to Pharmacy: PTA Sig:Take 500-1,000 mg by mouth every 6 hours as needed for mild pain      500-1,000 mg Oral EVERY 6 HOURS PRN 10/12/23 0237      10/12/23 0237  bisacodyl (DULCOLAX) suppository 10 mg        Note to Pharmacy: PTA Sig:Place 1 suppository (10 mg) rectally daily as needed for constipation      10 mg Rectal DAILY PRN 10/12/23 0237                Physical Exam:  Vitals: /65 (BP Location: Left arm)   Pulse 68   Temp 98.1  F (36.7  C) (Oral)   Resp 16   SpO2 97%     Physical Exam:     CONSTITUTIONAL/GENERAL APPEARANCE:  NAD. Conversant.  EYES: EOMI, sclera anicteric, PERRLA  ENT/NECK: atraumatic, lips and  "oral mucous membranes dry  RESPIRATORY: non-labored breathing. No cough, wheeze  CV: HR within normal limits  ABDOMEN: Soft, non-tender, non-distended  MUSCULOSKELETAL/BACK/SPINE/EXTREMITIES: Moves UEs.  NEURO: Alert and Oriented x3. Answers questions appropriately  SKIN/VASCULAR EXAM: tattoos present on left arm, no obvious lesions seen on exposed skin.        Billing on time: 65 minutes spent on chart review, face to face with patient, discuss plan of care with RN and primary service and documenting.       Acute Inpatient Pain Service [facility]  Hours of pain coverage 24/7   Page via Amcom- Please Page the Pain Team Via Amcom: \"PAIN MANAGEMENT ACUTE INPATIENT/ [facility]\"           "

## 2023-10-12 NOTE — PROGRESS NOTES
Report given to 8MS RN. Transport set up/. Patient is A&Ox4. 1A to the BR. VSS on room air. Has a butrans patch on the L shoulder and scopolamine patch behind the right ear.

## 2023-10-12 NOTE — CONSULTS
"[facility]  Neurology Consultation    Patient Name:  Peggy Jessica  MRN:  [id number]    :  1975  Date of Service:  2023  Primary care provider:  Andrew Peck      Neurology consultation service was asked to see Peggy Jessica by Dr. Irwin to evaluate for recurrence of CISP vs. 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    Chief Complaint:  Worsening pain in tailbone/right sacral region.     History of Present Illness:   Peggy Jessica is a 48 year old female with past medical history of CISP, complex regional pain syndrome, anxiety, depression who presents to the ED for worsening pain in her lumbar/sacral region radiating down her posterior right thigh after falling on her tailbone 4 days ago while transferring from an armchair to her wheel chair. She had persistent pain thereafter, worsened by a second fall two days ago when walking down the stair in her home. Patient reports she generally has ~12 falls per month caused by weakness and diminished sensation in her lower extremities.     Patient was diagnosed with CISP in 2021 based on MRI and lumbar puncture findings. She was successfully treated with IVIG and subsequent MRI findings, lumbar puncture findings, and EMGs have been negative for CISP. She endorses persistent sensory deficits in her hands and feet at baseline, likely contributing to her repeat falls. These have not worsened since her fall 4 days ago. She has not had any bowel or bladder incontinence since her fall 4 days ago.     Patient has not had any nausea, vomiting, diarrhea, fevers, diplopia, sudden onset weakness, bleeding, new rashes/bruises.       Per Dr. Jon Duenas's assessment/plan on 10/05/2022 when he saw this patient in clinic:    \"Assessment:    Peggy Jessica is a 47 year old woman who developed CISP in late . The clinical and laboratory evidence " for this diagnosis was strong: non-length dependant sensory symptoms, ataxia, reduced DTR, nerve root enhancement and unequivocal CSF albuminocytological dissociation. She also had a convincing response to IVIG in late 2021 and early 2022. In the spring 2022 the benefit of IVIG was less convincing, and she developed tolerability issues. Her condition also became layered with functional elements that could not be explained by the neuropathy.  It became suspected that, although she may have some residual deficits from the inflammatory neuropathy, CISP was no longer immunologically active. IVIG was subsequently suspended. I appreciate that thoughtful work up that she has recently been through at [facility] by Dr. Raman. Notably, MRI shows resolved root enhancement and CSF protein has normalized. NCS still show no marked sensory neuropathy or neuronopathy. These findings all reaffirm the immunologically inactive nature of her suspected CISP. Her neurologic examination is improved from our initial meeting a year ago, but has functional elements. I reviewed this data with Peggy. I reinforced the importance of supportive care at this stage through a combination of physical therapy, pain management, and mental health support. Understandably, this has been a struggle. I am happy to help her through this in whatever way I can.      Plan:      1. Immunotherapy: No indication for immunotherapy at this time  2. Physical therapy: She recently took a month hiatus from PT. I encouraged her to return to PT and OT. This is a very important part of her recovery. She is going to PT and OT tomorrow at [facility]. She inquires about pool therapy. I am supportive of this. Will see what the [facility] PT plan is after tomorrow.   3. Symptomatic management of pain and paresthesias:  She follows at [facility] pain clinic, treated with oxycodone, cannabis and flexeril. Appreciate collateral care with pain management. I will defer pain management to the pain  "clinic ecpertise.   4. Encouraged her to follow up with PCP for general health maintenance  5. Home services: She has a wheelchair. Requesting handicap parking. I am happy to help her with this. I completed paper work today.   6. Emotional health: She has a mental health provider. I encouraged her to continue to work with her therapist to optimize mental health.   7. INCBASE: Withdrawn [id number]  8. Follow up in 6 months.\"      ROS  A comprehensive ROS was performed and pertinent findings were included in HPI.     PMH  Past Medical History:   Diagnosis Date    Arthritis     BRCA positive     Cancer (H)     CIDP (chronic inflammatory demyelinating polyneuropathy) (H)     ASHKAN III with severe dysplasia 2002    Complex regional pain syndrome type 1 of right lower extremity      Past Surgical History:   Procedure Laterality Date    BILATERAL OOPHORECTOMY Bilateral 2019    c section      2002    CHOLECYSTECTOMY  1997    COLONOSCOPY      ESOPHAGOSCOPY, GASTROSCOPY, DUODENOSCOPY (EGD), COMBINED N/A 07/28/2022    Procedure: ESOPHAGOGASTRODUODENOSCOPY (EGD);  Surgeon: Zack Maddox MD;  Location:  GI    ESOPHAGOSCOPY, GASTROSCOPY, DUODENOSCOPY (EGD), COMBINED N/A 5/16/2023    Procedure: Esophagoscopy, gastroscopy, duodenoscopy (EGD), combined;  Surgeon: Aris Mason MD;  Location:  GI    ESOPHAGOSCOPY, GASTROSCOPY, DUODENOSCOPY (EGD), COMBINED N/A 7/20/2023    Procedure: Esophagoscopy, gastroscopy, duodenoscopy (EGD), combined;  Surgeon: Aris Mason MD;  Location:  GI    HYSTERECTOMY  2004    MASTECTOMY Bilateral 2020       Medications   I have personally reviewed the patient's medication list.     Allergies  I have personally reviewed the patient's allergy list.     Social History  Patient endorses medical marijuana use for pain control. She does not smoke cigarettes or drink alcohol.     Family History    Father: Kidney disease  Paternal grandmother: macular degeneration      Physical " Examination   Vitals: /65 (BP Location: Left arm)   Pulse 68   Temp 98.1  F (36.7  C) (Oral)   Resp 16   SpO2 97%   General: Lying in bed, NAD  Head: NC/AT  Eyes: no icterus, op pink and moist  GI: S/NT/ND  Skin: No rash or lesion on exposed skin  Psych: Mood pleasant, affect congruent  Neuro:  Mental status: Awake, alert, attentive, oriented to self, time, place, and circumstance. Language is fluent and coherent with intact comprehension of complex commands.  Cranial nerves: VFF, PERRL, conjugate gaze, EOMI, facial sensation intact, face symmetric, shoulder shrug strong, no dysarthria.   Motor: Normal bulk and tone. Impersistent activation in upper and lower extremities bilaterally. Lower extremity strength also limited by pain. 5/5 strength bilaterally in deltoids, biceps, triceps, hand , hip flexors, hip extensors, knee flexion, knee extension, plantarflexion, dorsiflexion.   Reflexes: 1+ patellar and achilles bilaterally.   Sensory: diminished pin prick sensation in lower extremities up to the level of mid shin, and in upper extremities up to the chest. Vibratory sensation absent at bilateral great toe and medial malleolus bilaterally, intact at patella.   Coordination: FNF and HS without ataxia or dysmetria.       Investigations   I have personally reviewed pertinent labs, tests, and radiological imaging. Discussion of notable findings is included under Impression.     Was patient transferred from outside hospital?   No    Impression  Patient is a 48-year-old female with PMH including CISP diagnosis in 2021, complex regional pain syndrome, anxiety, depression presenting to the ED with concern for worsening pain and weakness in her lower back and lower extremities after a fall. MRI lumbar spine was completed without acute pathology. Neurologic exam is notable for impersistent muscle activation in all extremities though appears to be full strength. Her sensory exam is consistent with prior diagnosis  of CISP. Overall  her exam is nearly identical to outpatient neurology exams. Have a low suspicion for current symptoms to be secondary to active CISP, but rather MSK in the setting of recent fall. No indication for further neurologic work up and treatment of CISP at this time.     Recommendations  - PT/OT  - Pain management per primary team  - Neurology will sign off at this time. Patient has outpatient neurology appointment 11/01 for third opinion on CISP treatment plan.     Thank you for involving Neurology in the care of Peggy Jessica.  Please do not hesitate to call with questions/concerns (consult pager [phone]).      Patient was seen and discussed with Dr. Munroe.    Ronnell Cr MS4 on 10/12/2023 at 11:43 AM     Ghazala Thompson MD  Neurology Resident PGY4

## 2023-10-12 NOTE — UTILIZATION REVIEW
"Inpatient to Observation note:    Admission Status; Secondary Review Determination         Under the authority of the Utilization Management Committee, the utilization review process indicated a secondary review on the above patient.  The review outcome is based on review of the medical records, discussions with staff, and applying clinical experience noted on the date of the review.          (x) Observation Status Appropriate - This patient does not meet hospital inpatient criteria and is placed in observation status. If this patient's primary payer is Medicare and was admitted as an inpatient, Condition Code 44 should be used and patient status changed to \"observation\".     RATIONALE FOR DETERMINATION     48-year-old female with history of CISP, complex regional pain syndrome, frequent falls was admitted to [facility] on 10/7/2023 with mechanical fall, generalized weakness and acute on chronic pain.  There was also concern for possible urinary incontinence however no saddle anesthesia.  MRI of the lumbar spine done in the emergency room did not show any acute findings.  Pain appears to be controlled on oral regimen at the moment.  She does not meet inpatient criteria at this time.    The severity of illness, intensity of service provided, expected LOS and risk for adverse outcome make the care appropriate for further observation; however, doesn't meet criteria for hospital inpatient admission. Dr Woodward notified of this determination.    This document was produced using voice recognition software.      The information on this document is developed by the utilization review team in order for the business office to ensure compliance.  This only denotes the appropriateness of proper admission status and does not reflect the quality of care rendered.         The definitions of Inpatient Status and Observation Status used in making the determination above are those provided in the CMS Coverage Manual, Chapter 1 and Chapter " 6, section 70.4.      Sincerely,  Grey Raza MD    Utilization Review  Physician Advisor  [facility].

## 2023-10-12 NOTE — PROGRESS NOTES
10/12/23 1113   Appointment Info   Signing Clinician's Name / Credentials (PT) NOELLE Quick   Student Supervision Therapy services provided with the co-signing licensed therapist guiding and directing the services, and providing the skilled judgement and assessment throughout the session;Direct supervision provided       Present no   Language English   Living Environment   People in Home spouse   Current Living Arrangements house   Home Accessibility stairs to enter home;stairs within home   Number of Stairs, Main Entrance 4   Stair Railings, Main Entrance railings safe and in good condition;railings on both sides of stairs   Number of Stairs, Within Home, Primary greater than 10 stairs   Stair Railings, Within Home, Primary none   Transportation Anticipated family or friend will provide   Living Environment Comments bathroom on the second floor, pt has a commode on the main floor, can stay on one level for short time but pt would like to use the bathroom upstairs   Self-Care   Usual Activity Tolerance poor   Current Activity Tolerance poor   Regular Exercise No   Equipment Currently Used at Home wheelchair, manual;walker, rolling;shower chair;cane, quad   Fall history within last six months yes   Number of times patient has fallen within last six months 1  (Fall from recliner which brought pt here)   General Information   Onset of Illness/Injury or Date of Surgery 10/11/23   Referring Physician Amanda Schroeder MD   Patient/Family Therapy Goals Statement (PT) Keep moving   Pertinent History of Current Problem (include personal factors and/or comorbidities that impact the POC) Peggy Jessica is a 48 year old female admitted on 10/11/2023. She has hx of CISP in 2021 treated with IVIG in the past, complex regional pain syndrom followed by pain clinic, frequent falls, gastroparesis, constipation, depression and anxiety who presented to the ED 4 days after a mechanical fall with  worsening pain and weakness.   Existing Precautions/Restrictions fall   Weight-Bearing Status - LUE full weight-bearing   Weight-Bearing Status - RUE full weight-bearing   Weight-Bearing Status - LLE full weight-bearing   Weight-Bearing Status - RLE full weight-bearing   Heart Disease Risk Factors Lack of physical activity   General Observations Pt supine in bed, pleasant and agreeable   Cognition   Affect/Mental Status (Cognition) WNL   Orientation Status (Cognition) oriented x 4   Follows Commands (Cognition) WNL   Pain Assessment   Patient Currently in Pain Yes, see Vital Sign flowsheet   Integumentary/Edema   Integumentary/Edema other (describe)   Integumentary/Edema Comments Unable to assess due to tight pants   Posture    Posture Not impaired   Range of Motion (ROM)   Range of Motion ROM deficits secondary to pain   Strength (Manual Muscle Testing)   Strength (Manual Muscle Testing) strength is WFL   Bed Mobility   Comment, (Bed Mobility) SBA for supine <> EOB   Transfers   Comment, (Transfers) SBA w/ FWW for EOB <> standing   Gait/Stairs (Locomotion)   Comment, (Gait/Stairs) Unable to assess, pt declined ambulation due to pain   Sensory Examination   Sensory Perception other (describe)   Sensory Perception Comments Burning shooting pain on back of RLE after fall, MD aware   Clinical Impression   Criteria for Skilled Therapeutic Intervention Yes, treatment indicated   PT Diagnosis (PT) functional mobility deficit   Influenced by the following impairments pain and complex medical history   Functional limitations due to impairments bed mobility, transfer, ambulation, stairs   Clinical Presentation (PT Evaluation Complexity) stable   Clinical Presentation Rationale per clinical judgement   Clinical Decision Making (Complexity) low complexity   Planned Therapy Interventions (PT) bed mobility training;gait training;stair training;transfer training   Risk & Benefits of therapy have been explained  evaluation/treatment results reviewed;care plan/treatment goals reviewed;risks/benefits reviewed   PT Total Evaluation Time   PT Eval, Low Complexity Minutes (21399) 13   Physical Therapy Goals   PT Frequency 5x/week   PT Predicted Duration/Target Date for Goal Attainment 10/17/23   PT Goals Bed Mobility;Transfers;Gait;Stairs   PT: Bed Mobility Supervision/stand-by assist;Supine to/from sit   PT: Transfers Supervision/stand-by assist;Sit to/from stand;Assistive device   PT: Gait Supervision/stand-by assist;Rolling walker;50 feet   PT: Stairs Supervision/stand-by assist;3 stairs;Rail on both sides   Interventions   Interventions Quick Adds Therapeutic Activity   Therapeutic Activity   Therapeutic Activities: dynamic activities to improve functional performance Minutes (24731) 17   Symptoms Noted During/After Treatment Fatigue   Treatment Detail/Skilled Intervention Pt supine in bed and agreeable to PT. Reported not supposed to amb more than 100' for energy reservation and fatigue control. Transferred from supine to EOB with SBA, EOB <> standing with FWW, cued for hand placement, unable to bear weight on RLE due to pain, pt reported intense pain in R hip after sitting down, subsided in 1 min. Maintained standing for 2 min, with good balance. SPT offerred another standing, pt declined due to fatigue. Transferred from EOB to supine with SBA, unable to lift RLE voluntarily, pt used UEs to lift RLE to bed, reported intense pain in RLE. Pt left supine in bed with all needs met at end of PT session.   PT Discharge Planning   PT Plan Bed mobility, transfer, trial amb if able to tolerate   PT Discharge Recommendation (DC Rec) home with assist   PT Rationale for DC Rec Pt is near her baseline, and has spouse at home to assist. Pt is mainly WC based at home, and only ambulated when there are visitors. Pt's spouse is very helpful and has been helping pt with ADLs and stairs.   PT Brief overview of current status SBA w/ FWW for  bed mobility and transfer   PT Equipment Needed at Discharge   (none)   Total Session Time   Timed Code Treatment Minutes 17   Total Session Time (sum of timed and untimed services) 30

## 2023-10-13 ENCOUNTER — APPOINTMENT (OUTPATIENT)
Dept: PHYSICAL THERAPY | Facility: CLINIC | Age: 48
End: 2023-10-13
Payer: COMMERCIAL

## 2023-10-13 LAB — POTASSIUM SERPL-SCNC: 4.1 MMOL/L (ref 3.4–5.3)

## 2023-10-13 PROCEDURE — 250N000013 HC RX MED GY IP 250 OP 250 PS 637: Performed by: INTERNAL MEDICINE

## 2023-10-13 PROCEDURE — 96372 THER/PROPH/DIAG INJ SC/IM: CPT | Performed by: INTERNAL MEDICINE

## 2023-10-13 PROCEDURE — 250N000011 HC RX IP 250 OP 636: Performed by: INTERNAL MEDICINE

## 2023-10-13 PROCEDURE — 36415 COLL VENOUS BLD VENIPUNCTURE: CPT | Performed by: HOSPITALIST

## 2023-10-13 PROCEDURE — 84132 ASSAY OF SERUM POTASSIUM: CPT | Performed by: HOSPITALIST

## 2023-10-13 PROCEDURE — 99232 SBSQ HOSP IP/OBS MODERATE 35: CPT | Performed by: INTERNAL MEDICINE

## 2023-10-13 PROCEDURE — 97530 THERAPEUTIC ACTIVITIES: CPT | Mod: GP | Performed by: PHYSICAL THERAPIST

## 2023-10-13 PROCEDURE — G0378 HOSPITAL OBSERVATION PER HR: HCPCS

## 2023-10-13 PROCEDURE — 999N000111 HC STATISTIC OT IP EVAL DEFER

## 2023-10-13 RX ORDER — OXYCODONE HCL 5 MG/5 ML
5-7.5 SOLUTION, ORAL ORAL EVERY 6 HOURS PRN
Status: DISPENSED | OUTPATIENT
Start: 2023-10-14 | End: 2023-10-14

## 2023-10-13 RX ORDER — ACETAMINOPHEN 325 MG/10.15ML
1000 LIQUID ORAL EVERY 8 HOURS
Status: DISCONTINUED | OUTPATIENT
Start: 2023-10-13 | End: 2023-10-15 | Stop reason: HOSPADM

## 2023-10-13 RX ORDER — OXYCODONE HCL 5 MG/5 ML
5-7.5 SOLUTION, ORAL ORAL EVERY 4 HOURS PRN
Status: DISPENSED | OUTPATIENT
Start: 2023-10-13 | End: 2023-10-14

## 2023-10-13 RX ORDER — LIDOCAINE 4 G/G
3 PATCH TOPICAL
Status: DISCONTINUED | OUTPATIENT
Start: 2023-10-13 | End: 2023-10-15 | Stop reason: HOSPADM

## 2023-10-13 RX ORDER — OXYCODONE HCL 5 MG/5 ML
5-7.5 SOLUTION, ORAL ORAL EVERY 12 HOURS PRN
Status: DISCONTINUED | OUTPATIENT
Start: 2023-10-15 | End: 2023-10-15 | Stop reason: HOSPADM

## 2023-10-13 RX ADMIN — Medication 400 MCG: at 08:52

## 2023-10-13 RX ADMIN — HYDROXYZINE HYDROCHLORIDE 25 MG: 25 TABLET, FILM COATED ORAL at 19:52

## 2023-10-13 RX ADMIN — ENOXAPARIN SODIUM 40 MG: 40 INJECTION SUBCUTANEOUS at 08:52

## 2023-10-13 RX ADMIN — ACETAMINOPHEN 1000 MG: 325 SOLUTION ORAL at 10:52

## 2023-10-13 RX ADMIN — DICYCLOMINE HYDROCHLORIDE 20 MG: 20 TABLET ORAL at 18:05

## 2023-10-13 RX ADMIN — DICYCLOMINE HYDROCHLORIDE 20 MG: 20 TABLET ORAL at 12:15

## 2023-10-13 RX ADMIN — HYDROXYZINE HYDROCHLORIDE 25 MG: 25 TABLET, FILM COATED ORAL at 08:52

## 2023-10-13 RX ADMIN — PANTOPRAZOLE SODIUM 40 MG: 40 TABLET, DELAYED RELEASE ORAL at 08:52

## 2023-10-13 RX ADMIN — DICYCLOMINE HYDROCHLORIDE 20 MG: 20 TABLET ORAL at 08:52

## 2023-10-13 RX ADMIN — SERTRALINE HYDROCHLORIDE 100 MG: 100 TABLET ORAL at 08:52

## 2023-10-13 RX ADMIN — ONDANSETRON 4 MG: 4 TABLET, ORALLY DISINTEGRATING ORAL at 19:53

## 2023-10-13 RX ADMIN — POLYETHYLENE GLYCOL 3350 17 G: 17 POWDER, FOR SOLUTION ORAL at 08:52

## 2023-10-13 RX ADMIN — MEMANTINE 5 MG: 5 TABLET ORAL at 08:52

## 2023-10-13 RX ADMIN — HYDROXYZINE HYDROCHLORIDE 25 MG: 25 TABLET, FILM COATED ORAL at 13:42

## 2023-10-13 RX ADMIN — ACETAMINOPHEN 1000 MG: 325 SOLUTION ORAL at 15:13

## 2023-10-13 RX ADMIN — OXYCODONE HYDROCHLORIDE 7.5 MG: 5 SOLUTION ORAL at 19:51

## 2023-10-13 RX ADMIN — OXYCODONE HYDROCHLORIDE 7.5 MG: 5 SOLUTION ORAL at 13:41

## 2023-10-13 RX ADMIN — OXYCODONE HYDROCHLORIDE 7.5 MG: 5 SOLUTION ORAL at 23:48

## 2023-10-13 RX ADMIN — BACLOFEN 10 MG: 10 TABLET ORAL at 15:12

## 2023-10-13 RX ADMIN — ACETAMINOPHEN 1000 MG: 325 SOLUTION ORAL at 22:45

## 2023-10-13 RX ADMIN — ONDANSETRON 4 MG: 4 TABLET, ORALLY DISINTEGRATING ORAL at 10:52

## 2023-10-13 RX ADMIN — BACLOFEN 10 MG: 10 TABLET ORAL at 19:52

## 2023-10-13 RX ADMIN — MEMANTINE 5 MG: 5 TABLET ORAL at 19:52

## 2023-10-13 RX ADMIN — OXYCODONE HYDROCHLORIDE 7.5 MG: 5 SOLUTION ORAL at 09:05

## 2023-10-13 ASSESSMENT — ACTIVITIES OF DAILY LIVING (ADL)
ADLS_ACUITY_SCORE: 35
DEPENDENT_IADLS:: CLEANING;COOKING;LAUNDRY;SHOPPING;MEAL PREPARATION;TRANSPORTATION
ADLS_ACUITY_SCORE: 35

## 2023-10-13 NOTE — PROGRESS NOTES
"Major Shift Events:    -A&Ox4. VSS on RA. Denies CP and SOB. Assist x1 with walker in room and wheelchair out of room. Continent B/B, uses BSC, bladder management protocol for retention,  Gluten free diet, thin liquids, pills whole. Continuous Nausea managed with PRN Zofran. Pain managed with PRN Oxycodone, heating pad, and scheduled meds. R PIV - SL. On RN managed potassium.   - monitored  622 pm - paged sent to MD Machado \"MRN: [id number] , I previously paged MD Woodward about the patient medical cannabis home medication she uses for pain, as per pharmacy they spoke to MD Woodward and waiting for the order?\"  [id number]               - MD called back , wont be ordering it for today, well monitor.  Plan: K  recheck          : Safety and monitoring          : pain control  For vital signs and complete assessments, please see documentation flowsheets.     "

## 2023-10-13 NOTE — PLAN OF CARE
Goal Outcome Evaluation:      Plan of Care Reviewed With: patient    Overall Patient Progress: improvingOverall Patient Progress: improving    Outcome Evaluation: Discussed discharge planning. Patient is agreeable to discharging home but is nervous that she won't have enough support. Please see this writer's Consult note from today for more information.

## 2023-10-13 NOTE — CONSULTS
Care Management Initial Consult    General Information  Assessment completed with: Patient,         Primary Care Provider verified and updated as needed:     Readmission within the last 30 days: no previous admission in last 30 days      Reason for Consult: discharge planning  Advance Care Planning: Advance Care Planning Reviewed: no concerns identified          Communication Assessment  Patient's communication style: spoken language (English or Bilingual)    Hearing Difficulty or Deaf: no   Wear Glasses or Blind: yes    Cognitive  Cognitive/Neuro/Behavioral: WDL                      Living Environment:   People in home: spouse     Current living Arrangements: house      Able to return to prior arrangements: yes       Family/Social Support:  Care provided by: self, spouse/significant other  Provides care for: no one, unable/limited ability to care for self  Marital Status:   Significant Other          Description of Support System: Involved, Supportive    Support Assessment: Adequate family and caregiver support, Adequate social supports    Current Resources:   Patient receiving home care services: No (Has used [facility] in the past.)     Community Resources: None  Equipment currently used at home: wheelchair, manual, walker, rolling, shower chair, cane, quad  Supplies currently used at home: None    Employment/Financial:  Employment Status: disabled        Financial Concerns: other (see comments) (Patient stated that her long term care insurance was recently denied. She is working to gather the necessary information.)   Referral to Financial Worker: No       Does the patient's insurance plan have a 3 day qualifying hospital stay waiver?  No    Lifestyle & Psychosocial Needs:  Social Determinants of Health     Food Insecurity: Not on file   Depression: Not at risk (9/8/2023)    PHQ-2     PHQ-2 Score: 0   Housing Stability: Not on file   Tobacco Use: Medium Risk (9/13/2023)    Patient History     Smoking  Tobacco Use: Former     Smokeless Tobacco Use: Never     Passive Exposure: Not on file   Financial Resource Strain: Not on file   Alcohol Use: Not on file   Transportation Needs: Not on file   Physical Activity: Not on file   Interpersonal Safety: Not on file   Stress: Not on file   Social Connections: Not on file       Functional Status:  Prior to admission patient needed assistance:   Dependent ADLs:: Ambulation-walker, Ambulation-cane, Wheelchair-independent, Bathing, Dressing  Dependent IADLs:: Cleaning, Cooking, Laundry, Shopping, Meal Preparation, Transportation  Assesssment of Functional Status: Not at baseline with ADL Functioning, Not at baseline with mobility    Mental Health Status:  Mental Health Status: No Current Concerns       Chemical Dependency Status:  Chemical Dependency Status: No Current Concerns             Values/Beliefs:  Spiritual, Cultural Beliefs, Scientologist Practices, Values that affect care: no               Additional Information:    SW met with patient at bedside and introduced self/role. SW completed Initial Assessment. Please see above for assessment information.    SW discussed home care, patient is agreeable. She does not have agency preference, but notes that she has used [facility] in the past. Patient is concerned about returning home in her current state. She is not moving well and her  works five days per week, so patient will be home alone. SW empathized with patient and encouraged her to share these concerns with her doctor.    RADHA sent referral to [facility] for home PT.        ALICJA FieldW, LSW  8 Med Surg   10U beds: -  [facility]  Phone: [phone]  Pager: [phone]

## 2023-10-13 NOTE — PLAN OF CARE
Occupational Therapy: Orders received. Chart reviewed and discussed with care team.? Occupational Therapy not indicated due to at ADL baseline with supportive environment and  A at baseline.? Defer discharge recommendations to PT and care team.? Will complete orders.

## 2023-10-13 NOTE — PROGRESS NOTES
Notified MD at 1420 PM regarding    .MRN: [id number] patient asking for the plan to restart her medical cannabis medication for pain. What is the plan?  [id number]      Spoke with: MD Mcginnis    Orders    . Call back received as per MD she will call pharmacist and start medical cannabis if allowed and not if not allowed. Patient educated    Comments:

## 2023-10-13 NOTE — PLAN OF CARE
Physical Therapy Discharge Summary    Reason for therapy discharge:    Discharged to home with home therapy.    Progress towards therapy goal(s). See goals on Care Plan in [facility] electronic health record for goal details.  Goals partially met.  Barriers to achieving goals:   discharge from facility. Pt is at her baseline for transfers and will be safe to return home safely with spouse to assist. Pt reported that spouse assists her up the stairs at baseline and need to review them prior to discharge to home.  Pt ambulates at most 10' in the home, but consistently uses a w/c for mobility. Pt does not have any further inpatient PT needs and pt is in agreement.     Therapy recommendation(s):    Continued therapy is recommended.  Rationale/Recommendations:  Home therapy for strengthening and home safety eval.

## 2023-10-13 NOTE — PROGRESS NOTES
[facility]    Medicine Progress Note - Hospitalist Service, GOLD TEAM 18    Date of Admission:  10/11/2023    Assessment & Plan     Peggy Jessica is a 48 year old female admitted on 10/11/2023. She has hx of CISP in 2021 treated with IVIG in the past, complex regional pain syndrom followed by pain clinic, frequent falls, gastroparesis, constipation, depression and anxiety who presented to the ED 4 days after a mechanical fall with worsening pain and weakness. Also endorsed bladder and bowel issues in the setting of negative lumber MRI.     Mechanical fall  Buttock contusion  Acute on chronic LE weakness and pain  Hx of Chronic  inflammatory demyelinating polyneuropathy  ( CISP)   Lost balance as she was transferring from her recliner to wheelchair 4 days PTA. Hit lumbar/coccyx region, now has radiating pain over R posterior thigh, also states 'difficulty with BM' and 'unable to feel bladder fullness (able to urinate)'. Hx of CISP with similar presentation in the past. No saddle anesthesia, but significant tenderness over lumbosacral area. In ED, MRI lumbar spine unremarkable. CT a/p large amount of stool. Since the trigger for worsening pain and weakness was the fall, likely due to MSK issue, however, the weakness/pain could be due to CISP.  - Dr Irwin  discussed with neurosurgery -> imaging reviewed. Canals are widely patent. No indication for any neurosurgery. Consider neurology consult if neurological sx persists  - Neurology consult requested: Dr Irwin  spoke w oncall resident, She had outpatient neurology notes and detailed, relatively recent comprehensive work up. No urgent work up or tx needed, but full consult to be done during the day.   - pain control: continue pta baclofen, buprenorphine patch, lidocaine patch and voltaren gel. Received oxycodone po. Consider reaching to her outpatient pain clinic ([facility] pain clinic?) provider Marco Antonio  - PT/OT  "consult   - sees [place] neurologist, has new one and appointment is Nov 1     Complex regional pain syndrom  Closely followed by outside provider. Pain control could be a bit more challenging with buprenorphine management.  - asked pain management team to see and assist , appreciate help. Changes made , tapering oxycodone liquid ordered, lidoderm patch  and OTC tylenol, pot was ok with this plan   - continue PTA pain meds including baclofen, , lidocaine, buprenorphine. galeano snot use Voltaren cream ( had rash form it)   -Also on medical cannabis     Chronic constipation  Gastroparesis  - continue PTA bowel regimen and zofran prn,on miralax and senna  \"if john\" has BM about 1 x a week  - last BM was 10/11 AM prior ti that was about a week ago   - has had history of no  for 2 weeks, had golytly prep that did not work,   - has tried linzest and after taking for 5 days daily had small BM    -had EGD 7/2023  showed congested mucosa, small hiatus hernia  but nothing else     Issues with urination  - states has had issues with difficulty voiding since Sunday AM prior to fall  - check UA UC, bladder scan       States that appetite is down  Gastroparesis   - due to her chr nausea and vomiting  - eats mechanical soft diet     - weight   - currently undergoing SITZ marker test through [facility]     Depression  Anxiety  Appears emotional and anxious at times  - continue sertraline      celiac disease  - needs GF diet , asked pharmacy to also make sure all medications she gets are GF     History of breast Ca, BRCA1 and 2 mutations  - status post  bilateral  mastectomy 10/2020      Diet:  regular  DVT Prophylaxis: Enoxaparin (Lovenox) SQ  Kwan Catheter: Not present  Lines: None     Cardiac Monitoring: None  Code Status:  full     Clinically Significant Risk Factors Present on Admission                       # Overweight: Estimated body mass index is 27.29 kg/m  as calculated from the following:    Height as of 9/13/23: 1.651 m (5' " "5\").    Weight as of 9/13/23: 74.4 kg (164 lb).         # Financial/Environmental Concerns:           Disposition Plan      Expected Discharge Date: 10/14/2023                    Carlita Woodward MD  Hospitalist Service, GOLD TEAM [address] [place] [address]  Securely message with [email or web address] (more info)  Text page via [facility] Paging/Directory   See signed in provider for up to date coverage information  ______________________________________________________________________    Interval History     Still having significant pain , ongoing numbness chronic, some new numbness going form hip to knee ( since admit) no major improvement, denies chest pain  no shortness of breath  no nausea vomiting, no BM       Physical Exam   Vital Signs: Temp: 98  F (36.7  C) Temp src: Oral BP: 106/66 Pulse: 65   Resp: 16 SpO2: 96 % O2 Device: None (Room air)    Weight: 0 lbs 0 oz  General appearance: awake alert in  no apparent distress     HEENT: EOMI and PEARLA, sclera nonicteric, moist mucus membranes, head atraumatic  NECK: supple  RESPIRATORY: lungs are clear  CARDIOVASCULAR: normal S1S2 regular rate and rhythm  GASTROINTESTINAL: abdomen is  soft,  non-distended, non-tender with normal bowel sounds.   SKIN: warm and dry, no rashes, no mottling noted   Tenderness over sacral area and  post pelvis , I did not see any hematomas bruises at this point yet   NEUROLOGIC: awake alert and oriented, can wiggle toes, decreased sensation in lower extremities worse form knee down    EXTREMITIES: no clubbing cyanosis or edema         Data     I have personally reviewed the following data over the past 24 hrs:    N/A  \   N/A   / N/A     N/A N/A N/A /  N/A   N/A N/A N/A \       Imaging results reviewed over the past 24 hrs:   No results found for this or any previous visit (from the past 24 hour(s)).  Recent Labs   Lab 10/12/23  0628 10/12/23  0407 10/11/23  2018   WBC 5.8  --  7.1   HGB 12.6  --  15.2   MCV 91  --  89 "     --  325     --  138   POTASSIUM 4.1  --  3.9   CHLORIDE 101  --  99   CO2 28  --  25   BUN 8.5  --  8.4   CR 0.88 0.86 0.83   ANIONGAP 11  --  14   ESTEBAN 9.2  --  9.9   GLC 94  --  102*   ALBUMIN  --   --  4.5   PROTTOTAL  --   --  8.2   BILITOTAL  --   --  0.4   ALKPHOS  --   --  103   ALT  --   --  14   AST  --   --  28

## 2023-10-13 NOTE — PLAN OF CARE
Goal Outcome Evaluation:    PT A&Ox4. VSS on RA. Denies CP and SOB. Assist x1 with walker in room and wheelchair out of room. Continent B/B, uses BSC, bladder management protocol for retention, bladder scanned x2 and straight cathed x1. Requested not to be bladder scanned Q4hrs overnight. LBM 10/12/2023. Gluten free diet, thin liquids, pills whole. Continuous Nausea managed with PRN Zofran. Pain managed with PRN Oxycodone, heating pad, and scheduled meds. R PIV - SL. On RN managed potassium.    Temp: 98  F (36.7  C) Temp src: Oral BP: 106/66 Pulse: 65   Resp: 16 SpO2: 96 % O2 Device: None (Room air)       No acute issues this shift. Call light within reach. Continue POC.

## 2023-10-14 PROCEDURE — 96372 THER/PROPH/DIAG INJ SC/IM: CPT | Performed by: INTERNAL MEDICINE

## 2023-10-14 PROCEDURE — 250N000013 HC RX MED GY IP 250 OP 250 PS 637: Performed by: INTERNAL MEDICINE

## 2023-10-14 PROCEDURE — G0378 HOSPITAL OBSERVATION PER HR: HCPCS

## 2023-10-14 PROCEDURE — 250N000011 HC RX IP 250 OP 636: Mod: JZ | Performed by: INTERNAL MEDICINE

## 2023-10-14 PROCEDURE — 99232 SBSQ HOSP IP/OBS MODERATE 35: CPT | Performed by: INTERNAL MEDICINE

## 2023-10-14 RX ADMIN — OXYCODONE HYDROCHLORIDE 7.5 MG: 5 SOLUTION ORAL at 14:30

## 2023-10-14 RX ADMIN — POLYETHYLENE GLYCOL 3350 17 G: 17 POWDER, FOR SOLUTION ORAL at 08:22

## 2023-10-14 RX ADMIN — MEMANTINE 5 MG: 5 TABLET ORAL at 21:10

## 2023-10-14 RX ADMIN — Medication 1 MG: at 04:19

## 2023-10-14 RX ADMIN — BACLOFEN 10 MG: 10 TABLET ORAL at 13:28

## 2023-10-14 RX ADMIN — DICYCLOMINE HYDROCHLORIDE 20 MG: 20 TABLET ORAL at 06:40

## 2023-10-14 RX ADMIN — OXYCODONE HYDROCHLORIDE 5 MG: 5 SOLUTION ORAL at 08:34

## 2023-10-14 RX ADMIN — ACETAMINOPHEN 1000 MG: 325 SOLUTION ORAL at 06:40

## 2023-10-14 RX ADMIN — HYDROXYZINE HYDROCHLORIDE 25 MG: 25 TABLET, FILM COATED ORAL at 21:10

## 2023-10-14 RX ADMIN — ACETAMINOPHEN 1000 MG: 325 SOLUTION ORAL at 16:15

## 2023-10-14 RX ADMIN — OXYCODONE HYDROCHLORIDE 7.5 MG: 5 SOLUTION ORAL at 21:29

## 2023-10-14 RX ADMIN — ENOXAPARIN SODIUM 40 MG: 40 INJECTION SUBCUTANEOUS at 08:22

## 2023-10-14 RX ADMIN — MEMANTINE 5 MG: 5 TABLET ORAL at 08:22

## 2023-10-14 RX ADMIN — BACLOFEN 10 MG: 10 TABLET ORAL at 21:10

## 2023-10-14 RX ADMIN — OXYCODONE HYDROCHLORIDE 5 MG: 5 SOLUTION ORAL at 21:10

## 2023-10-14 RX ADMIN — PANTOPRAZOLE SODIUM 40 MG: 40 TABLET, DELAYED RELEASE ORAL at 06:40

## 2023-10-14 RX ADMIN — SERTRALINE HYDROCHLORIDE 100 MG: 100 TABLET ORAL at 08:22

## 2023-10-14 RX ADMIN — OXYCODONE HYDROCHLORIDE 7.5 MG: 5 SOLUTION ORAL at 04:17

## 2023-10-14 RX ADMIN — DICYCLOMINE HYDROCHLORIDE 20 MG: 20 TABLET ORAL at 16:15

## 2023-10-14 RX ADMIN — BACLOFEN 10 MG: 10 TABLET ORAL at 08:22

## 2023-10-14 RX ADMIN — ACETAMINOPHEN 1000 MG: 325 SOLUTION ORAL at 23:48

## 2023-10-14 RX ADMIN — ONDANSETRON 4 MG: 4 TABLET, ORALLY DISINTEGRATING ORAL at 23:55

## 2023-10-14 RX ADMIN — ONDANSETRON 4 MG: 4 TABLET, ORALLY DISINTEGRATING ORAL at 14:30

## 2023-10-14 RX ADMIN — Medication 400 MCG: at 08:22

## 2023-10-14 RX ADMIN — HYDROXYZINE HYDROCHLORIDE 25 MG: 25 TABLET, FILM COATED ORAL at 13:28

## 2023-10-14 RX ADMIN — DICYCLOMINE HYDROCHLORIDE 20 MG: 20 TABLET ORAL at 13:28

## 2023-10-14 RX ADMIN — HYDROXYZINE HYDROCHLORIDE 25 MG: 25 TABLET, FILM COATED ORAL at 08:22

## 2023-10-14 ASSESSMENT — ACTIVITIES OF DAILY LIVING (ADL)
ADLS_ACUITY_SCORE: 35

## 2023-10-14 NOTE — PROGRESS NOTES
Medicine cross coverage note  -I was called by nursing staff about resuming the patient home medication of medical marijuana.  I discussed with inpatient pharmacy who based on discussion with the nursing staff do have the medication which is a home medication for the patient available.  The inpatient pharmacist reviewed the current policy for administration of medical marijuana, shaded with the undersigned, and recommended further review of the dosing by the morning pharmacist then resumption of the medication as a nonformulary medication based on the current [place] policy.  I discussed all of the above with the nursing staff.

## 2023-10-14 NOTE — PLAN OF CARE
Goal Outcome Evaluation:      Plan of Care Reviewed With: patient    Overall Patient Progress: no changeOverall Patient Progress: no change    Outcome Evaluation: Pt is concerned with the lack of communication in regards to their medical cannabis.  Pt is concerned that their nerve pain will be uncontrolled at time of discharge because of the increased nerve pain, and thinks it may be unsafe to discharge with increasing levels of pain. Writer was in communication with pharmacy and providers in an attempt to understand what was happening with the pts medical cannabis (see previous notes). Writer was told that a pharmacist has to review the medication, and then a non-formulary order has to be put in the AM on 10/14. pt has been continent of B+B and voiding adequately during shift. No acute events during shift.

## 2023-10-14 NOTE — PROGRESS NOTES
Care Management Follow Up    Length of Stay (days): 1    Expected Discharge Date: 10/16/2023     Concerns to be Addressed: discharge planning     Patient plan of care discussed at interdisciplinary rounds: Yes    Anticipated Discharge Disposition: Home, Home Care     Anticipated Discharge Services:  Home care  Anticipated Discharge DME: Wheelchair (owns walker)    Patient/family educated on Medicare website which has current facility and service quality ratings: no  Education Provided on the Discharge Plan: Yes  Patient/Family in Agreement with the Plan: yes    Referrals Placed by CM/SW: Homecare  Private pay costs discussed: Not applicable    Additional Information:  48 year old female admitted on 10/11/2023. She has hx of CISP in 2021 treated with IVIG in the past, complex regional pain syndrom followed by pain clinic, frequent falls, gastroparesis, constipation, depression and anxiety who presented to the ED 4 days after a mechanical fall with worsening pain and weakness. Also endorsed bladder and bowel issues in the setting of negative lumber MRI.     RNCC still awaiting accepting home care agency. 4 agencies have declined and 3 pending.   Team still addressing pain management. Pt was restarted on medical cannabis today.    JERRI 10/16    NEXT: RNCC to follow for accepting home care agency and discharge planning.     Rosalba Mckeon RNCC  10/14/2023  Nurse Coordinator    Social Work and Care Management Department     [facility] search CARE COORDINATOR     [place] ([phone]) Saturday & Sunday; ([phone]) [facility] Recognized Holidays     Units: 5A, 5B & 5C  Pager: [phone]    Units: 6B, 6C & 6D    Pager: [phone]    Units: 7A, 7B, 7C & 7D    Pager: [phone]    Units: 6A & ICU   Pager: [phone]    Units: 5 Ortho, 5MS & WB ED Pager: [phone]    Units: 6MS, 8A & 10 ICU  Pager [phone]

## 2023-10-14 NOTE — PROGRESS NOTES
07:00-11:00  Major Shift Events:    -A&Ox4. VSS on RA. Denies CP and SOB. Assist x1 with walker in room and wheelchair out of room. Continent B/B, uses BSC, bladder management protocol for retention,  Gluten free diet, thin liquids, pills whole. Continuous Nausea managed with PRN Zofran. Pain managed with PRN Oxycodone, heating pad, and scheduled meds. R AKUA - SL. On RN managed potassium.   - monitored    Plan: medical cannabis to be started waiting for MD order          : Safety and monitoring          : pain control  For vital signs and complete assessments, please see documentation flowsheets.

## 2023-10-14 NOTE — PHARMACY-ADMISSION MEDICATION HISTORY
Pharmacist Admission Medication History    Admission medication history is complete. The information provided in this note is only as accurate as the sources available at the time of the update.    Information Source(s): Patient via in-person, medication fill history    Pertinent Information: Patient read through the medication list.  Diclofenac 1% topical gel: it gives pt rashes. So it was deleted from the medication list.   Ergocalciferol: 4 capsules left at home.     Changes made to PTA medication list:  Added: None  Deleted: Diclofenac 1% topical gel  Changed: Memantine 5 mg >> 10 mg po bid    Medication Affordability:N/A       Allergies reviewed with patient and updates made in EHR: no    Medication History Completed By: Jesse Dickinson RPH 10/13/2023 7:52 PM    PTA Med List   Medication Sig Last Dose    acetaminophen (TYLENOL) 500 MG tablet Take 500-1,000 mg by mouth every 6 hours as needed for mild pain Past Week    baclofen (LIORESAL) 10 MG tablet Take 10 mg by mouth 3 times daily Takes 5 mg morning and early afternoon and between 5/10 at bedtime Past Week    bisacodyl (DULCOLAX) 10 MG suppository Place 1 suppository (10 mg) rectally daily as needed for constipation Past Week    buprenorphine (BUTRANS) 7.5 MCG/HR WK patch Place 1 patch onto the skin once a week Applies on Thursdays at 9 am 10/12/2023    dicyclomine (BENTYL) 10 MG capsule Take 20 mg by mouth 3 times daily (before meals) Past Week    diphenhydrAMINE (BENADRYL) 25 MG tablet Take 0.5 tablets (12.5 mg) by mouth every 6 hours as needed for allergies or other (nausea) Past Week    folic acid (FOLVITE) 400 MCG tablet Take 1 tablet (400 mcg) by mouth daily Past Week    hydrOXYzine (ATARAX) 25 MG tablet Take 1 tablet (25 mg) by mouth 3 times daily (before meals) Past Week    Lidocaine (LIDOCARE) 4 % Patch Place 3 patches onto the skin every 24 hours To prevent lidocaine toxicity, patient should be patch free for 12 hrs daily. (Patient taking differently:  Place 3 patches onto the skin daily as needed for moderate pain To prevent lidocaine toxicity, patient should be patch free for 12 hrs daily.) Past Month    medical cannabis (Patient's own supply) Take 1 Dose by mouth See Admin Instructions (The purpose of this order is to document that the patient reports taking medical cannabis.  This is not a prescription, and is not used to certify that the patient has a qualifying medical condition.)  Per pt: 5-10 mg tablet three times a day PRN Past Week    memantine (NAMENDA) 10 MG tablet Take 10 mg by mouth 2 times daily Past Week    multivitamin w/minerals (THERA-VIT-M) tablet Take 1 tablet by mouth every morning Megafood Womens Brand Past Week    ondansetron (ZOFRAN ODT) 4 MG ODT tab Take 1 tablet (4 mg) by mouth every 8 hours as needed for nausea Past Week    pantoprazole (PROTONIX) 40 MG EC tablet Take 1 tablet (40 mg) by mouth every morning (before breakfast) Past Week    polyethylene glycol (MIRALAX) 17 GM/Dose powder Take 17 g by mouth daily Until BM, then use every day prn Past Week    scopolamine (TRANSDERM) 1 MG/3DAYS 72 hr patch Place 1 patch onto the skin every 72 hours 10/12/2023    senna (SENOKOT) 8.6 MG tablet Take 1 tablet by mouth as needed Past Week    senna-docusate (SENOKOT-S/PERICOLACE) 8.6-50 MG tablet Take 1 tablet by mouth 2 times daily as needed for constipation Past Week    sertraline (ZOLOFT) 100 MG tablet Take 1 tablet (100 mg) by mouth daily Past Week    vitamin D2 (ERGOCALCIFEROL) 76917 units (1250 mcg) capsule Take 1 capsule (50,000 Units) by mouth once a week for 8 doses (Patient taking differently: Take 50,000 Units by mouth once a week On Tuesdays)

## 2023-10-14 NOTE — PROVIDER NOTIFICATION
8 m/s, rm 837, pt [place]. pt is still waiting for medical cannabis orders, wondering if they are going to be put in or not, according to pt it has been 2 days without any clear answers.  please advise. DESIRE Herrera [phone]    Provider Malvin called back said they would call pharmacy to start medication.    Writer called pharmacy at 0015 to enquire about said medication as no new orders, notes or updates were put in.    Provider Malvin called back @ 0024, after consulting with the pharmacist, and reading the new policy, the provider and pharmacist agreed that it should be reviewed in the AM and put in as a non-formulary order.

## 2023-10-14 NOTE — PROGRESS NOTES
11:00-15:00   Pt is alert&Ox4,, call light within reach. Able to make needs known. Pt denies chest pain, SOB,  Continent bowel and bladder.  R piv SL   Gluten free diet-hx celiac disease  Pain and nausea managed with oxycodone and Zofran.   Ax1 with walker and gait belt. Pt pivots to commode at bedside  P:continue with plan of care

## 2023-10-14 NOTE — PROGRESS NOTES
[facility]    Medicine Progress Note - Hospitalist Service, GOLD TEAM 18    Date of Admission:  10/11/2023    Assessment & Plan     Peggy Jessica is a 48 year old female admitted on 10/11/2023. She has hx of CISP in 2021 treated with IVIG in the past, complex regional pain syndrom followed by pain clinic, frequent falls, gastroparesis, constipation, depression and anxiety who presented to the ED 4 days after a mechanical fall with worsening pain and weakness. Also endorsed bladder and bowel issues in the setting of negative lumber MRI.     Mechanical fall  Buttock contusion  Acute on chronic LE weakness and pain  Hx of Chronic  inflammatory demyelinating polyneuropathy  ( CISP)   Lost balance as she was transferring from her recliner to wheelchair 4 days PTA. Hit lumbar/coccyx region, now has radiating pain over R posterior thigh, also states 'difficulty with BM' and 'unable to feel bladder fullness (able to urinate)'. Hx of CISP with similar presentation in the past. No saddle anesthesia, but significant tenderness over lumbosacral area. In ED, MRI lumbar spine unremarkable. CT a/p large amount of stool. Since the trigger for worsening pain and weakness was the fall, likely due to MSK issue, however, the weakness/pain could be due to CISP.  - Dr Irwin  discussed with neurosurgery -> imaging reviewed. Canals are widely patent. No indication for any neurosurgery. Consider neurology consult if neurological sx persists  - Neurology consult requested: Dr Irwin  spoke w oncall resident, She had outpatient neurology notes and detailed, relatively recent comprehensive work up. No urgent work up or tx needed, but full consult to be done during the day.   - pain control: continue pta baclofen, buprenorphine patch, lidocaine patch and voltaren gel. Received oxycodone po. Consider reaching to her outpatient pain clinic ([facility] pain [facility]?) provider Marco Antonio  - PT/OT  "consult   - sees [place] neurologist, has new one and appointment is Nov 1     Complex regional pain syndrom  Closely followed by outside provider. Pain control could be a bit more challenging with buprenorphine management.  - asked pain management team to see and assist , appreciate help. Changes made , tapering oxycodone liquid ordered, lidoderm patch  and OTC tylenol, pot was ok with this plan   - continue PTA pain meds including baclofen, , lidocaine, buprenorphine. galeano snot use Voltaren cream ( had rash form it)   -Also on medical cannabis, patient  wants this restarted, patient  signed paperwork and will start ASAP        Chronic constipation  Gastroparesis  - continue PTA bowel regimen and zofran prn,on miralax and senna  \"if john\" has BM about 1 x a week  - last BM was 10/11 AM prior ti that was about a week ago   - has had history of no  for 2 weeks, had golytly prep that did not work,   - has tried linzest and after taking for 5 days daily had small BM    -had EGD 7/2023  showed congested mucosa, small hiatus hernia  but nothing else     Issues with urination  - states has had issues with difficulty voiding since Sunday AM prior to fall  - UA  negative , bladder scan  as needed      States that appetite is down  Gastroparesis   - due to her chr nausea and vomiting  - eats mechanical soft diet     - currently undergoing SITZ marker test through [facility]     Depression  Anxiety  - continue sertraline      celiac disease  - needs GF diet , asked pharmacy to also make sure all medications she gets are GF     History of breast Ca, BRCA1 and 2 mutations  - status post  bilateral  mastectomy 10/2020      Diet:  regular  DVT Prophylaxis: Enoxaparin (Lovenox) SQ  Kwan Catheter: Not present  Lines: None     Cardiac Monitoring: None  Code Status:  full     Clinically Significant Risk Factors Present on Admission                       # Overweight: Estimated body mass index is 27.66 kg/m  as calculated from the " "following:    Height as of this encounter: 1.651 m (5' 5\").    Weight as of this encounter: 75.4 kg (166 lb 3.6 oz).         # Financial/Environmental Concerns: other (see comments) (Patient stated that her long term care insurance was recently denied. She is working to gather the necessary information.)         Disposition Plan     Expected Discharge Date: 10/16/2023      Destination: home with help/services;home with family              Carlita Woodward MD  Hospitalist Service, GOLD TEAM 18  [facility]  Securely message with [employer] (more info)  Text page via [facility] Paging/Directory   See signed in provider for up to date coverage information  ______________________________________________________________________    Interval History     Still having pain, upset that medica cannabis has not been restarted, I told  her that will work with pharmacist to get this  started  ASAP as allowed by hospital policy         Physical Exam   Vital Signs: Temp: 97.7  F (36.5  C) Temp src: Oral BP: 118/72 Pulse: 65   Resp: 16 SpO2: 97 % O2 Device: None (Room air)    Weight: 166 lbs 3.63 oz  General appearance: awake alert in  no apparent distress     HEENT: EOMI and PEARLA, sclera nonicteric, moist mucus membranes, head atraumatic  NECK: supple  RESPIRATORY: nonlaboured    CARDIOVASCULAR: regular rate and rhythm  GASTROINTESTINAL: abdomen is  soft,  non-distended,   NEUROLOGIC: awake alert and oriented,         Data     I have personally reviewed the following data over the past 24 hrs:    N/A  \   N/A   / N/A     N/A N/A N/A /  N/A   4.1 N/A N/A \       Imaging results reviewed over the past 24 hrs:   No results found for this or any previous visit (from the past 24 hour(s)).  Recent Labs   Lab 10/13/23  0906 10/12/23  0628 10/12/23  0407 10/11/23  2018   WBC  --  5.8  --  7.1   HGB  --  12.6  --  15.2   MCV  --  91  --  89   PLT  --  248  --  325   NA  --  140  --  138   POTASSIUM 4.1 " 4.1  --  3.9   CHLORIDE  --  101  --  99   CO2  --  28  --  25   BUN  --  8.5  --  8.4   CR  --  0.88 0.86 0.83   ANIONGAP  --  11  --  14   ESTEBAN  --  9.2  --  9.9   GLC  --  94  --  102*   ALBUMIN  --   --   --  4.5   PROTTOTAL  --   --   --  8.2   BILITOTAL  --   --   --  0.4   ALKPHOS  --   --   --  103   ALT  --   --   --  14   AST  --   --   --  28

## 2023-10-15 VITALS
HEART RATE: 82 BPM | OXYGEN SATURATION: 96 % | SYSTOLIC BLOOD PRESSURE: 100 MMHG | DIASTOLIC BLOOD PRESSURE: 82 MMHG | TEMPERATURE: 98.1 F | HEIGHT: 65 IN | RESPIRATION RATE: 18 BRPM | BODY MASS INDEX: 27.7 KG/M2 | WEIGHT: 166.23 LBS

## 2023-10-15 LAB
CREAT SERPL-MCNC: 0.78 MG/DL (ref 0.51–0.95)
EGFRCR SERPLBLD CKD-EPI 2021: >90 ML/MIN/1.73M2
PLATELET # BLD AUTO: 248 10E3/UL (ref 150–450)

## 2023-10-15 PROCEDURE — G0378 HOSPITAL OBSERVATION PER HR: HCPCS

## 2023-10-15 PROCEDURE — 36415 COLL VENOUS BLD VENIPUNCTURE: CPT | Performed by: INTERNAL MEDICINE

## 2023-10-15 PROCEDURE — 250N000011 HC RX IP 250 OP 636: Mod: JZ | Performed by: INTERNAL MEDICINE

## 2023-10-15 PROCEDURE — 85049 AUTOMATED PLATELET COUNT: CPT | Performed by: INTERNAL MEDICINE

## 2023-10-15 PROCEDURE — 82565 ASSAY OF CREATININE: CPT | Performed by: INTERNAL MEDICINE

## 2023-10-15 PROCEDURE — 99238 HOSP IP/OBS DSCHRG MGMT 30/<: CPT | Performed by: INTERNAL MEDICINE

## 2023-10-15 PROCEDURE — 99207 PR APP CREDIT; MD BILLING SHARED VISIT: CPT | Performed by: INTERNAL MEDICINE

## 2023-10-15 PROCEDURE — 250N000009 HC RX 250: Performed by: HOSPITALIST

## 2023-10-15 PROCEDURE — 250N000013 HC RX MED GY IP 250 OP 250 PS 637: Performed by: INTERNAL MEDICINE

## 2023-10-15 PROCEDURE — 96372 THER/PROPH/DIAG INJ SC/IM: CPT | Performed by: INTERNAL MEDICINE

## 2023-10-15 RX ORDER — ERGOCALCIFEROL 1.25 MG/1
50000 CAPSULE, LIQUID FILLED ORAL WEEKLY
Start: 2023-10-15

## 2023-10-15 RX ORDER — SENNOSIDES 8.6 MG
1 TABLET ORAL 2 TIMES DAILY
Status: DISCONTINUED | OUTPATIENT
Start: 2023-10-15 | End: 2023-10-15 | Stop reason: HOSPADM

## 2023-10-15 RX ORDER — OXYCODONE HCL 5 MG/5 ML
5-7.5 SOLUTION, ORAL ORAL EVERY 12 HOURS PRN
Qty: 30 ML | Refills: 0 | Status: SHIPPED | OUTPATIENT
Start: 2023-10-15 | End: 2023-10-17

## 2023-10-15 RX ADMIN — SCOPALAMINE 1 PATCH: 1 PATCH, EXTENDED RELEASE TRANSDERMAL at 09:35

## 2023-10-15 RX ADMIN — MEMANTINE 5 MG: 5 TABLET ORAL at 09:09

## 2023-10-15 RX ADMIN — DICYCLOMINE HYDROCHLORIDE 20 MG: 20 TABLET ORAL at 09:08

## 2023-10-15 RX ADMIN — OXYCODONE HYDROCHLORIDE 5 MG: 5 SOLUTION ORAL at 09:07

## 2023-10-15 RX ADMIN — BACLOFEN 10 MG: 10 TABLET ORAL at 09:08

## 2023-10-15 RX ADMIN — ONDANSETRON 4 MG: 4 TABLET, ORALLY DISINTEGRATING ORAL at 09:11

## 2023-10-15 RX ADMIN — Medication 400 MCG: at 09:09

## 2023-10-15 RX ADMIN — HYDROXYZINE HYDROCHLORIDE 25 MG: 25 TABLET, FILM COATED ORAL at 09:09

## 2023-10-15 RX ADMIN — ENOXAPARIN SODIUM 40 MG: 40 INJECTION SUBCUTANEOUS at 09:08

## 2023-10-15 RX ADMIN — SENNOSIDES 1 TABLET: 8.6 TABLET, FILM COATED ORAL at 09:07

## 2023-10-15 RX ADMIN — SERTRALINE HYDROCHLORIDE 100 MG: 100 TABLET ORAL at 09:09

## 2023-10-15 RX ADMIN — ACETAMINOPHEN 1000 MG: 325 SOLUTION ORAL at 09:34

## 2023-10-15 RX ADMIN — PANTOPRAZOLE SODIUM 40 MG: 40 TABLET, DELAYED RELEASE ORAL at 09:08

## 2023-10-15 ASSESSMENT — ACTIVITIES OF DAILY LIVING (ADL)
ADLS_ACUITY_SCORE: 35
ADLS_ACUITY_SCORE: 35
ADLS_ACUITY_SCORE: 39
ADLS_ACUITY_SCORE: 35

## 2023-10-15 NOTE — PROGRESS NOTES
A/Ox's 4. Pt rated pain as tolerable. Dressing CDI. CMS to baseline. Zofran given for Nausea, Pt did not have any Emesis. Denied any CP, SOB, lightheadedness or dizziness. Voiding without pain or difficulty. Passing flatus. Up with assist of one to the commode.  Resting in bed at this time with call light in reach. Able to make needs known.

## 2023-10-15 NOTE — PROGRESS NOTES
Care Management Discharge Note    Discharge Date: 10/15/2023       Discharge Disposition: Home, Home Care    Discharge Services:      Discharge DME: Wheelchair (owns walker)    Discharge Transportation: family or friend will provide    Private pay costs discussed: Not applicable    Does the patient's insurance plan have a 3 day qualifying hospital stay waiver?  No    PAS Confirmation Code:    Patient/family educated on Medicare website which has current facility and service quality ratings: no    Education Provided on the Discharge Plan: Yes  Persons Notified of Discharge Plans: Patient, provider  Patient/Family in Agreement with the Plan: yes    Handoff Referral Completed: Yes    Additional Information:  Per care team rounds pt medically cleared for discharge today.  Updated Dr. Woodward that [facility] unable to secure home care d/t weekend as well as insurance.  Updated patient. Pt tearful during discussion,voiced frustration over insurance and financial strain.  Per discussion with patient she qualified for SSDI in January of this year but is over income for MA, unable to afford spenddown for waivered services.  Pt confirmed she has a [employer] [phone].  With whom she has been working.   Per pt her spouse works full time and is limited on transportation support for clinic visits/follow up.  Per pt she does virtual visits with her providers d/t transportation limitations.  Pt confirmed she has a therapies that she does see (virtually).  Writer provided time for pt to express concerns/feelings.  Pt notes no other concerns or questions regarding discharge.    Latrice Nielsen RN BSN, PHN, ACM-RN  7A RN Care Coordinator  Phone: [phone]  Pager [phone]    To contact the weekend RNCC  [place] ([phone]) Saturday and Sunday    Units: 5A,5B,5C [phone]    Units: 6A, [phone]    Units 6B, 6C, 6D [phone]    Units: 7A, 7B, 7C, 7D, [phone]    [facility] ([phone])  Saturday and Sunday    Units: 5 Ortho, 8A, 10 ICU, & Pediatric Units-Pager 4: [phone]    10/15/2023 11:09 AM

## 2023-10-15 NOTE — DISCHARGE SUMMARY
"[facility]  Discharge Summary - Medicine & Pediatrics       Date of Admission:  10/11/2023  Date of Discharge:  10/15/2023   Discharging Provider: Carlita Woodward MD   Discharge Service: Hospitalist Service, GOLD TEAM 18    Discharge Diagnoses   Mechanical fall  Buttock contusion  Acute on chronic LE weakness and pain  Hx of Chronic  inflammatory demyelinating polyneuropathy  ( CISP)   Complex regional pain syndrom  Chronic constipation  Gastroparesis  Issues with urination  States that appetite is down  Gastroparesis   Depression  Anxiety  celiac disease         History of breast Ca, BRCA1 and 2 mutations  - status post  bilateral  mastectomy 10/2020   Clinically Significant Risk Factors     # Overweight: Estimated body mass index is 27.66 kg/m  as calculated from the following:    Height as of this encounter: 1.651 m (5' 5\").    Weight as of this encounter: 75.4 kg (166 lb 3.6 oz).       Follow-ups Needed After Discharge   Follow-up Appointments     Adult [facility] [place]/[facility] Follow-up and recommended labs and tests      Follow up with primary care provider, Andrew Peck, within 7 days for   hospital follow- up.    Follow up  with your pain specialist ASAP       Appointments on [place] and/or [facility] (with [facility] [place] or [facility]   provider or service). Call [phone] if you haven't heard regarding   these appointments within 7 days of discharge.          Discharge Disposition   Discharged to home  Condition at discharge: Stable    Hospital Course     Peggy Jessica is a 48 year old female admitted on 10/11/2023. She has hx of CISP in 2021 treated with IVIG in the past, complex regional pain syndrom followed by pain clinic, frequent falls, gastroparesis, constipation, depression and anxiety who presented to the ED 4 days after a mechanical fall with worsening pain and weakness. Also endorsed bladder and bowel issues in the setting of negative lumber MRI.     Mechanical " "fall  Buttock contusion  Acute on chronic LE weakness and pain  Hx of Chronic  inflammatory demyelinating polyneuropathy  ( CISP)   Lost balance as she was transferring from her recliner to wheelchair 4 days PTA. Hit lumbar/coccyx region, now has radiating pain over R posterior thigh, also states 'difficulty with BM' and 'unable to feel bladder fullness (able to urinate)'. Hx of CISP with similar presentation in the past. No saddle anesthesia, but significant tenderness over lumbosacral area. In ED, MRI lumbar spine unremarkable. CT a/p large amount of stool. Since the trigger for worsening pain and weakness was the fall, likely due to MSK issue, however, the weakness/pain could be due to CISP.  - Dr Irwin  discussed with neurosurgery -> imaging reviewed. Canals are widely patent. No indication for any neurosurgery. Consider neurology consult if neurological sx persists  - Neurology saw patient   , rep physical therapy   and out patient  follow up    - pain control: continue pta baclofen, buprenorphine patch, lidocaine patch and voltaren gel.oxycodone taper  see gabriel   - PT/OT consult   - sees [place] neurologist, has new one and appointment is Nov 1      Complex regional pain syndrom  Closely followed by outside provider. Pain control could be a bit more challenging with buprenorphine management.  - asked pain management team to see and assist , appreciate help. Changes made , tapering oxycodone liquid ordered and currently on q 12 hr PRN x 24 hr, she is aware of these plans , did request 2-3 days of pain medications as still continue to have pain , did write for 2 days worth . Continue   lidoderm patch  and OTC tylenol,  PTA  medical cannabis restarted   - 10/15 requested discharge       Chronic constipationGastroparesis  - continue PTA bowel regimen and zofran prn,on miralax and senna  \"if john\" has BM about 1 x a week  - last BM was 10/11 AM prior to that was about a week ago   - has had history of no  for 2 " weeks, had golytly prep that did not work,   - has tried linzest and after taking for 5 days daily had small BM    -had EGD 7/2023  showed congested mucosa, small hiatus hernia  but nothing else      Issues with urination  - states has had issues with difficulty voiding since Sunday AM prior to fall  - UA  negative , bladder scan  as needed   - has been able to urinate but still no full control       States that appetite is down  Gastroparesis   - due to her chr nausea and vomiting  - eats mechanical soft diet     - currently undergoing SITZ marker test through [employer]      Depression  Anxiety  - continue sertraline      celiac disease  - needs GF diet also needs GF medications       History of breast Ca, BRCA1 and 2 mutations  - status post  bilateral  mastectomy 10/2020       Consultations This Hospital Stay   NEUROLOGY GENERAL ADULT IP CONSULT  PHYSICAL THERAPY ADULT IP CONSULT  OCCUPATIONAL THERAPY ADULT IP CONSULT  PAIN MANAGEMENT ADULT IP CONSULT  PHARMACY IP CONSULT  CARE MANAGEMENT / SOCIAL WORK IP CONSULT    Code Status   Full Code       Carlita Woodward MD    [facility] [address]  [address] [facility] [address]  Phone: [phone]  Fax: [phone]  ______________________________________________________________________    Physical Exam   Vital Signs: Temp: 98.1  F (36.7  C) Temp src: Oral BP: 100/82 Pulse: 82   Resp: 18 SpO2: 96 % O2 Device: None (Room air)    Weight: 166 lbs 3.63 oz      General appearance: awake alert in  no apparent distress      HEENT: EOMI and PEARLA, sclera nonicteric, moist mucus membranes, head atraumatic  NECK: supple  RESPIRATORY: nonlaboured    CARDIOVASCULAR: regular rate and rhythm  GASTROINTESTINAL: abdomen is  soft,  non-distended, + bowel sounds   NEUROLOGIC: awake alert and oriented,  able to wiggle toes some and dosro and plantarflex  Primary Care Physician   Andrew Peck    Discharge Orders      Reason for your hospital stay    Status post  fall, acute lower  back pain     Activity    Your activity upon discharge: activity as tolerated     Adult [facility] Follow-up and recommended labs and tests    Follow up with primary care provider, Andrew Peck, within 7 days for hospital follow- up.    Follow up  with your pain specialist ASAP       Appointments on [place] and/or [facility] (with [facility] or [facility] provider or service). Call [phone] if you haven't heard regarding these appointments within 7 days of discharge.     Diet    Follow this diet upon discharge: as prior       Significant Results and Procedures   Most Recent 3 CBC's:  Recent Labs   Lab Test 10/15/23  0625 10/12/23  0628 10/11/23  2018 09/01/23  1807   WBC  --  5.8 7.1 6.4   HGB  --  12.6 15.2 15.2   MCV  --  91 89 87    248 325 300     Most Recent 3 BMP's:  Recent Labs   Lab Test 10/15/23  0625 10/13/23  0906 10/12/23  0628 10/12/23  0407 10/11/23  2018 09/01/23  1807   NA  --   --  140  --  138 140   POTASSIUM  --  4.1 4.1  --  3.9 3.8   CHLORIDE  --   --  101  --  99 99   CO2  --   --  28  --  25 28   BUN  --   --  8.5  --  8.4 10.5   CR 0.78  --  0.88 0.86 0.83 0.79   ANIONGAP  --   --  11  --  14 13   ESTEBAN  --   --  9.2  --  9.9 10.2*   GLC  --   --  94  --  102* 92     Most Recent 2 LFT's:  Recent Labs   Lab Test 10/11/23  2018 09/14/23  0652   AST 28 17   ALT 14 14   ALKPHOS 103 96   BILITOTAL 0.4 0.3   ,   Results for orders placed or performed during the hospital encounter of 10/11/23   MR Lumbar Spine w/o Contrast    Narrative    EXAM: MR LUMBAR SPINE W/O CONTRAST  LOCATION: [facility]  DATE: 10/11/2023    INDICATION: fall, back pain, incontinence; Low back pain; Trauma and or suspected fracture; Significant trauma; No lumbar CT result available; No known automatically detected potential contraindications to CT  COMPARISON: CT abdomen and pelvis dated 10/11/2023  TECHNIQUE: Routine Lumbar Spine MRI without IV contrast.    FINDINGS:    Nomenclature is based on 5 lumbar type vertebral bodies. Normal alignment and vertebral body heights. Mild Modic type I degenerative endplate changes at L5-S1. Scattered benign hemangiomas. Normal distal spinal cord and cauda equina with conus medullaris   at L1. Redemonstrated partially visualized cysts in the liver. Unremarkable visualized bony pelvis.    T12-L1: Normal disc height and signal. No herniation. Normal facets. No spinal canal or neural foraminal stenosis.     L1-L2: Normal disc height and signal. No herniation. Normal facets. No spinal canal or neural foraminal stenosis.    L2-L3: Normal disc height and signal. No herniation. Normal facets. No spinal canal or neural foraminal stenosis.     L3-L4: Normal disc height and signal. No herniation. Normal facets. No spinal canal or neural foraminal stenosis.    L4-L5: Central disc protrusion and mild facet arthropathy. No spinal canal stenosis. The subarticular recesses are patent. Mild bilateral neural foraminal narrowing.    L5-S1: Central and left paracentral disc protrusion. Mild facet arthropathy. No spinal canal stenosis. The subarticular recesses are patent. Mild bilateral neural foraminal narrowing.      Impression    IMPRESSION:  1.  Mild degenerative changes of the lumbar spine L4-L5 and L5-S1, without high-grade spinal canal or neural foraminal narrowing.  2.  No evidence of an acute fracture.  3.  Mild Modic type I degenerative endplate changes at L5-S1.   CT Abdomen Pelvis w Contrast    Narrative    EXAM: CT ABDOMEN PELVIS W CONTRAST  LOCATION: [facility]  DATE: 10/11/2023    INDICATION: abd pain, recent fall  COMPARISON: CT from 08/26/2023.  TECHNIQUE: CT scan of the abdomen and pelvis was performed following injection of IV contrast. Multiplanar reformats were obtained. Dose reduction techniques were used.  CONTRAST: 100 mL of Isovue-370.    FINDINGS:   LOWER CHEST: Normal.    HEPATOBILIARY:  Benign cysts in segments 1, 2, 5, and 6 requiring no follow-up. Absent gallbladder.    PANCREAS: Normal.    SPLEEN: Normal.    ADRENAL GLANDS: Normal.    KIDNEYS/BLADDER: Normal.    BOWEL: There is a large amount of proximal colonic stool, with the colon tapering to a normal caliber in the descending and sigmoid segments. Multiple fluid-filled loops of small bowel without distention or transition point to suggest obstruction. A   normal appendix is present medial to the cecum. No free air.    LYMPH NODES: Normal.    VASCULATURE: Scant atherosclerotic change.    PELVIC ORGANS: Absent uterus. No free fluid.    MUSCULOSKELETAL: No acute, displaced fracture.      Impression    IMPRESSION:   1.  No acute findings in the abdomen or pelvis.    2.  Large amount of proximal colonic stool suggestive of constipation.    3.  No displaced fracture. Specifically, no sacral or coccygeal fracture.       Discharge Medications   Current Discharge Medication List        START taking these medications    Details   oxyCODONE (ROXICODONE) 5 MG/5ML solution Take 5-7.5 mLs (5-7.5 mg) by mouth every 12 hours as needed for moderate pain  Qty: 30 mL, Refills: 0    Associated Diagnoses: Low back pain, unspecified back pain laterality, unspecified chronicity, unspecified whether sciatica present           CONTINUE these medications which have CHANGED    Details   vitamin D2 (ERGOCALCIFEROL) 47430 units (1250 mcg) capsule Take 1 capsule (50,000 Units) by mouth once a week On Tuesdays    Associated Diagnoses: Other fatigue; Vitamin D deficiency           CONTINUE these medications which have NOT CHANGED    Details   acetaminophen (TYLENOL) 500 MG tablet Take 500-1,000 mg by mouth every 6 hours as needed for mild pain      baclofen (LIORESAL) 10 MG tablet Take 10 mg by mouth 3 times daily Takes 5 mg morning and early afternoon and between 5/10 at bedtime      bisacodyl (DULCOLAX) 10 MG suppository Place 1 suppository (10 mg) rectally daily as  needed for constipation  Qty: 25 suppository, Refills: 1    Associated Diagnoses: Constipation, unspecified constipation type      buprenorphine (BUTRANS) 7.5 MCG/HR WK patch Place 1 patch onto the skin once a week Applies on Thursdays at 9 am      dicyclomine (BENTYL) 10 MG capsule Take 20 mg by mouth 3 times daily (before meals)      diphenhydrAMINE (BENADRYL) 25 MG tablet Take 0.5 tablets (12.5 mg) by mouth every 6 hours as needed for allergies or other (nausea)  Qty: 30 tablet, Refills: 0    Associated Diagnoses: Vomiting and diarrhea      folic acid (FOLVITE) 400 MCG tablet Take 1 tablet (400 mcg) by mouth daily  Qty: 90 tablet, Refills: 0    Associated Diagnoses: Folic acid deficiency      hydrOXYzine (ATARAX) 25 MG tablet Take 1 tablet (25 mg) by mouth 3 times daily (before meals)  Qty: 90 tablet, Refills: 1    Associated Diagnoses: Depression, unspecified depression type      Lidocaine (LIDOCARE) 4 % Patch Place 3 patches onto the skin every 24 hours To prevent lidocaine toxicity, patient should be patch free for 12 hrs daily.  Refills: 0    Associated Diagnoses: Complex regional pain syndrome i of right lower limb      medical cannabis (Patient's own supply) Take 1 Dose by mouth See Admin Instructions (The purpose of this order is to document that the patient reports taking medical cannabis.  This is not a prescription, and is not used to certify that the patient has a qualifying medical condition.)  Per pt: 5-10 mg tablet three times a day PRN      memantine (NAMENDA) 10 MG tablet Take 10 mg by mouth 2 times daily      multivitamin w/minerals (THERA-VIT-M) tablet Take 1 tablet by mouth every morning Meg[employer] Womens Brand      ondansetron (ZOFRAN ODT) 4 MG ODT tab Take 1 tablet (4 mg) by mouth every 8 hours as needed for nausea  Qty: 30 tablet, Refills: 0    Associated Diagnoses: Gastroparesis      pantoprazole (PROTONIX) 40 MG EC tablet Take 1 tablet (40 mg) by mouth every morning (before breakfast)  Qty:  30 tablet, Refills: 0    Associated Diagnoses: Gastroesophageal reflux disease with esophagitis, unspecified whether hemorrhage      polyethylene glycol (MIRALAX) 17 GM/Dose powder Take 17 g by mouth daily Until BM, then use every day prn  Qty: 510 g, Refills: 0    Associated Diagnoses: Constipation, unspecified constipation type      scopolamine (TRANSDERM) 1 MG/3DAYS 72 hr patch Place 1 patch onto the skin every 72 hours  Qty: 3 patch, Refills: 0      senna (SENOKOT) 8.6 MG tablet Take 1 tablet by mouth as needed      senna-docusate (SENOKOT-S/PERICOLACE) 8.6-50 MG tablet Take 1 tablet by mouth 2 times daily as needed for constipation      sertraline (ZOLOFT) 100 MG tablet Take 1 tablet (100 mg) by mouth daily  Qty: 90 tablet, Refills: 1    Associated Diagnoses: Depression, unspecified depression type      diclofenac (VOLTAREN) 1 % topical gel Apply 4 g topically 4 times daily  Qty: 100 g, Refills: 0    Associated Diagnoses: Musculoskeletal chest pain      naloxone (NARCAN) 4 MG/0.1ML nasal spray Spray 1 spray (4 mg) into one nostril alternating nostrils as needed for opioid reversal every 2-3 minutes until assistance arrives  Qty: 0.2 mL, Refills: 0    Associated Diagnoses: Chronic pain syndrome           Allergies   Allergies   Allergen Reactions    Dihydroergotamine Anaphylaxis    Latex Anaphylaxis    Shellfish-Derived Products Anaphylaxis    Sumatriptan Anaphylaxis    Compazine [Prochlorperazine] Anxiety    Banana Unknown    Gabapentin Dizziness    Gluten Meal Other (See Comments)     Celiac disease    Keppra [Levetiracetam] Nausea and Vomiting    Kiwi Unknown    Levofloxacin Other (See Comments)     Arrhythmia    Metronidazole Nausea and Vomiting    Nitrofurantoin Hives    Penicillins Hives    Pregabalin     Reglan [Metoclopramide] Other (See Comments)     restlessness/toe tapping     Topiramate Visual Disturbance    Aspirin Rash    Methadone Rash    Morphine Hives     She got hives around are when morphine  given but resolved after few minutes per patient     Risperidone Anxiety

## 2023-10-15 NOTE — PROGRESS NOTES
Discharged home at 1245 via private car driven by family member. Discharge paperwork discussed and all questions answered. All personal belongings and prescribed medications went with. R PIV removed prior to discharge.

## 2023-10-15 NOTE — PROGRESS NOTES
[facility]    Medicine Progress Note - Hospitalist Service, GOLD TEAM 18    Date of Admission:  10/11/2023    Assessment & Plan     Peggy Jessica is a 48 year old female admitted on 10/11/2023. She has hx of CISP in 2021 treated with IVIG in the past, complex regional pain syndrom followed by pain clinic, frequent falls, gastroparesis, constipation, depression and anxiety who presented to the ED 4 days after a mechanical fall with worsening pain and weakness. Also endorsed bladder and bowel issues in the setting of negative lumber MRI.     Mechanical fall  Buttock contusion  Acute on chronic LE weakness and pain  Hx of Chronic  inflammatory demyelinating polyneuropathy  ( CISP)   Lost balance as she was transferring from her recliner to wheelchair 4 days PTA. Hit lumbar/coccyx region, now has radiating pain over R posterior thigh, also states 'difficulty with BM' and 'unable to feel bladder fullness (able to urinate)'. Hx of CISP with similar presentation in the past. No saddle anesthesia, but significant tenderness over lumbosacral area. In ED, MRI lumbar spine unremarkable. CT a/p large amount of stool. Since the trigger for worsening pain and weakness was the fall, likely due to MSK issue, however, the weakness/pain could be due to CISP.  - Dr Irwin  discussed with neurosurgery -> imaging reviewed. Canals are widely patent. No indication for any neurosurgery. Consider neurology consult if neurological sx persists  - Neurology saw patient   , rep physical therapy   and out patient  follow up    - pain control: continue pta baclofen, buprenorphine patch, lidocaine patch and voltaren gel.oxycodone taper  see gabriel   - PT/OT consult   - sees [place] neurologist, has new one and appointment is Nov 1     Complex regional pain syndrom  Closely followed by outside provider. Pain control could be a bit more challenging with buprenorphine management.  - asked pain  "management team to see and assist , appreciate help. Changes made , tapering oxycodone liquid ordered and currently on q 12 hr PRN x 24 hr, she is aware of these plans ,  lidoderm patch  and OTC tylenol, pt was ok with this plan ,  PTA  medical cannabis restarted      Chronic constipation  Gastroparesis  - continue PTA bowel regimen and zofran prn,on miralax and senna  \"if john\" has BM about 1 x a week  - last BM was 10/11 AM prior to that was about a week ago   - has had history of no  for 2 weeks, had golytly prep that did not work,   - has tried linzest and after taking for 5 days daily had small BM    -had EGD 7/2023  showed congested mucosa, small hiatus hernia  but nothing else     Issues with urination  - states has had issues with difficulty voiding since Sunday AM prior to fall  - UA  negative , bladder scan  as needed   - has been able to urinate but still no full control      States that appetite is down  Gastroparesis   - due to her chr nausea and vomiting  - eats mechanical soft diet     - currently undergoing SITZ marker test through [employer]     Depression  Anxiety  - continue sertraline      celiac disease  - needs GF diet also needs GF medications      History of breast Ca, BRCA1 and 2 mutations  - status post  bilateral  mastectomy 10/2020      Diet:  regular  DVT Prophylaxis: Enoxaparin (Lovenox) SQ  Kwan Catheter: Not present  Lines: None     Cardiac Monitoring: None  Code Status:  full     Clinically Significant Risk Factors Present on Admission                       # Overweight: Estimated body mass index is 27.66 kg/m  as calculated from the following:    Height as of this encounter: 1.651 m (5' 5\").    Weight as of this encounter: 75.4 kg (166 lb 3.6 oz).         # Financial/Environmental Concerns: other (see comments) (Patient stated that her long term care insurance was recently denied. She is working to gather the necessary information.)         Disposition Plan       10/15 received call " form RN that wants to discharge today  , will do     Expected Discharge Date: 10/16/2023      Destination: home with help/services;home with family              Carlita Woodward MD  Hospitalist Service, GOLD TEAM 18  [facility]  Securely message with [employer] (more info)  Text page via [facility] Paging/Directory   See signed in provider for up to date coverage information  ______________________________________________________________________    Interval History     Pain bit better but still there,  still not able to fully control urination,  BM yet  and will think about enemeez ( ordered PRN ) , back on her medical cannabis, thinks would be ok to go home tomorrow       Physical Exam   Vital Signs: Temp: 98.3  F (36.8  C) Temp src: Oral BP: 98/78 Pulse: 85   Resp: 16 SpO2: 96 % O2 Device: None (Room air)    Weight: 166 lbs 3.63 oz  General appearance: awake alert in  no apparent distress     HEENT: EOMI and PEARLA, sclera nonicteric, moist mucus membranes, head atraumatic  NECK: supple  RESPIRATORY: nonlaboured    CARDIOVASCULAR: regular rate and rhythm  GASTROINTESTINAL: abdomen is  soft,  non-distended, + bowel sounds   NEUROLOGIC: awake alert and oriented,  able to wiggle toes some and dosro and plantarflex        Data         Imaging results reviewed over the past 24 hrs:   No results found for this or any previous visit (from the past 24 hour(s)).  Recent Labs   Lab 10/13/23  0906 10/12/23  0628 10/12/23  0407 10/11/23  2018   WBC  --  5.8  --  7.1   HGB  --  12.6  --  15.2   MCV  --  91  --  89   PLT  --  248  --  325   NA  --  140  --  138   POTASSIUM 4.1 4.1  --  3.9   CHLORIDE  --  101  --  99   CO2  --  28  --  25   BUN  --  8.5  --  8.4   CR  --  0.88 0.86 0.83   ANIONGAP  --  11  --  14   ESTEBAN  --  9.2  --  9.9   GLC  --  94  --  102*   ALBUMIN  --   --   --  4.5   PROTTOTAL  --   --   --  8.2   BILITOTAL  --   --   --  0.4   ALKPHOS  --   --   --  103   ALT  --    --   --  14   AST  --   --   --  28

## 2023-10-15 NOTE — PROGRESS NOTES
A&Ox4 VSS on RA. PRN oxy x 1 for hip pain. Cont B/B this shift. Pt called appropriately and cooperated with cares.

## 2023-10-27 ENCOUNTER — OFFICE VISIT (OUTPATIENT)
Dept: FAMILY MEDICINE | Facility: CLINIC | Age: 48
End: 2023-10-27
Payer: COMMERCIAL

## 2023-10-27 VITALS
TEMPERATURE: 98.4 F | DIASTOLIC BLOOD PRESSURE: 77 MMHG | RESPIRATION RATE: 18 BRPM | OXYGEN SATURATION: 98 % | SYSTOLIC BLOOD PRESSURE: 116 MMHG | HEART RATE: 74 BPM

## 2023-10-27 DIAGNOSIS — G62.9 NEUROPATHY: ICD-10-CM

## 2023-10-27 DIAGNOSIS — G90.521 COMPLEX REGIONAL PAIN SYNDROME I OF RIGHT LOWER LIMB: Primary | ICD-10-CM

## 2023-10-27 ASSESSMENT — PAIN SCALES - GENERAL: PAINLEVEL: SEVERE PAIN (7)

## 2023-10-27 NOTE — PROGRESS NOTES
"Today's Date: Oct 27, 2023     Patient Peggy Jessica 1975 presents to the clinic today to address   Chief Complaint   Patient presents with    Hospital F/U     Doesn't feel like there is any pain control right now  Home PT orders             SUBJECTIVE     History of Present Illness:    48-year-old female presents to clinic with partner for hospital follow-up.  Patient has a complex medical history including chronic immune sensory polyradiculopathy (CIS P)/chronic inflammatory demyelinating polyneuropathy (CIDP), complex regional pain syndrome (RLE 2/2 stress fracture; chest 2/2 bilateral mastectomy- currently under care of pain management), BRCA+ mutation, cervical cancer, diverticulitis, migraines, chronic constipation, and possible gastroparesis.      Briefly, patient was seen by me on 10/11/2023 to discuss a fall from 10/8/2023 while transferring from recliner to wheelchair.  She had lumbar/coccyx pain with associated urinary incontinence/frequency and bowel incontinence.  She was then directed to the emergency department to rule out cauda equina syndrome (please see note from 10/11/2023 for full HPI/plan).  The patient was admitted and stayed in hospital from 10/11/2023 to 10/15/2023.  Her MRI lumbar spine was unremarkable and her CT abdomen/pelvis demonstrated large amount stool.  Case was discussed with neurosurgery and per discharge summary \"Canals are widely patent. No indication for any neurosurgery.\"  Neurology was consulted who advised PT/OT and outpatient follow-up.  Patient scheduled to see a new neurologist on 11/1/2023.    Today, patient reports that her lumbar/coccyx pain is improved.  However, she reports systemic pain to her bilateral lower and upper extremities.  The patient describes the pain as if someone \"turned the static up.\"  She has history of neuropathy and endorses increased neuropathy.  She believes that her neuropathy makes her CIDP symptoms work as it becomes hard to " control her body/ambulate.  She saw her pain management specialist on 10/16/2023 she reports that she discussed this with him briefly, however, her symptoms have become worse. No other acute concerns/symptoms at time of exam.    Review of Systems   Constitutional, HEENT, cardiovascular, pulmonary, gi and gu systems are negative, except as otherwise noted.        Allergies   Allergen Reactions    Dihydroergotamine Anaphylaxis    Latex Anaphylaxis    Shellfish-Derived Products Anaphylaxis    Sumatriptan Anaphylaxis    Compazine [Prochlorperazine] Anxiety    Banana Unknown    Gabapentin Dizziness    Gluten Meal Other (See Comments)     Celiac disease    Keppra [Levetiracetam] Nausea and Vomiting    Kiwi Unknown    Levofloxacin Other (See Comments)     Arrhythmia    Metronidazole Nausea and Vomiting    Nitrofurantoin Hives    Penicillins Hives    Pregabalin     Reglan [Metoclopramide] Other (See Comments)     restlessness/toe tapping     Topiramate Visual Disturbance    Aspirin Rash    Methadone Rash    Morphine Hives     She got hives around are when morphine given but resolved after few minutes per patient     Risperidone Anxiety        Current Outpatient Medications   Medication Instructions    acetaminophen (TYLENOL) 500-1,000 mg, Oral, EVERY 6 HOURS PRN    baclofen (LIORESAL) 10 mg, Oral, 3 TIMES DAILY, Takes 5 mg morning and early afternoon and between 5/10 at bedtime    bisacodyl (DULCOLAX) 10 mg, Rectal, DAILY PRN    buprenorphine (BUTRANS) 7.5 MCG/HR WK patch 1 patch, Transdermal, WEEKLY, Applies on Thursdays at 9 am    diclofenac (VOLTAREN) 4 g, Topical, 4 TIMES DAILY    dicyclomine (BENTYL) 20 mg, Oral, 3 TIMES DAILY BEFORE MEALS    diphenhydrAMINE (BENADRYL) 12.5 mg, Oral, EVERY 6 HOURS PRN    folic acid (FOLVITE) 400 mcg, Oral, DAILY    hydrOXYzine (ATARAX) 25 mg, Oral, 3 TIMES DAILY BEFORE MEALS    Lidocaine (LIDOCARE) 4 % Patch 3 patches, Transdermal, EVERY 24 HOURS, To prevent lidocaine toxicity, patient  should be patch free for 12 hrs daily.    medical cannabis (Patient's own supply) 1 Dose, Oral, SEE ADMIN INSTRUCTIONS, (The purpose of this order is to document that the patient reports taking medical cannabis.  This is not a prescription, and is not used to certify that the patient has a qualifying medical condition.)<BR>Per pt: 5-10 mg tablet three times a day PRN    memantine (NAMENDA) 10 mg, Oral, 2 TIMES DAILY    multivitamin w/minerals (THERA-VIT-M) tablet 1 tablet, Oral, EVERY MORNING, [employer]    naloxone (NARCAN) 4 mg, Alternating Nostrils, PRN, every 2-3 minutes until assistance arrives    ondansetron (ZOFRAN ODT) 4 mg, Oral, EVERY 8 HOURS PRN    pantoprazole (PROTONIX) 40 mg, Oral, EVERY MORNING BEFORE BREAKFAST    polyethylene glycol (MIRALAX) 17 g, Oral, DAILY, Until BM, then use every day prn    scopolamine (TRANSDERM) 1 MG/3DAYS 72 hr patch 1 patch, Transdermal, EVERY 72 HOURS    senna (SENOKOT) 8.6 MG tablet 1 tablet, Oral, PRN    senna-docusate (SENOKOT-S/PERICOLACE) 8.6-50 MG tablet 1 tablet, Oral, 2 TIMES DAILY PRN    sertraline (ZOLOFT) 100 mg, Oral, DAILY    vitamin D2 (ERGOCALCIFEROL) 50,000 Units, Oral, WEEKLY, On Tuesdays       Past Medical History:   Diagnosis Date    Arthritis     BRCA positive     Cancer (H)     CIDP (chronic inflammatory demyelinating polyneuropathy) (H)     ASHKAN III with severe dysplasia 2002    Complex regional pain syndrome type 1 of right lower extremity         Family History   Problem Relation Age of Onset    Kidney Disease Father     Macular Degeneration Paternal Grandmother     Glaucoma No family hx of         Social History     Tobacco Use    Smoking status: Former     Types: Cigarettes    Smokeless tobacco: Never   Vaping Use    Vaping Use: Never used   Substance Use Topics    Alcohol use: Not Currently    Drug use: Yes     Types: Marijuana     Comment: Medical [facility] patient        History   Sexual Activity    Sexual activity: Not Currently     Partners: Male            9/8/2023     7:45 AM   PHQ   PHQ-9 Total Score 6   Q9: Thoughts of better off dead/self-harm past 2 weeks Not at all        Immunization History   Administered Date(s) Administered    COVID-19 Monovalent 18+ (Moderna) 03/20/2021, 04/17/2021    Influenza Vaccine >6 months (Alfuria,Fluzone) 02/17/2021    TDAP (Adacel,Boostrix) 11/22/2016                 OBJECTIVE     /77 (BP Location: Left arm, Patient Position: Sitting, Cuff Size: Adult Regular)   Pulse 74   Temp 98.4  F (36.9  C) (Oral)   Resp 18   SpO2 98%      Labs:  Lab Results   Component Value Date    WBC 5.8 10/12/2023    HGB 12.6 10/12/2023    HCT 38.8 10/12/2023     10/15/2023    CHOL 229 (H) 03/30/2022    TRIG 131 03/30/2022    HDL 56 03/30/2022    ALT 14 10/11/2023    AST 28 10/11/2023     10/12/2023    BUN 8.5 10/12/2023    CO2 28 10/12/2023    TSH 2.09 08/04/2023    INR 0.97 01/28/2023        Physical Exam  Constitutional:       General: She is not in acute distress.     Appearance: She is not ill-appearing.      Comments: In wheelchair.    Eyes:      Extraocular Movements: Extraocular movements intact.      Pupils: Pupils are equal, round, and reactive to light.   Cardiovascular:      Rate and Rhythm: Normal rate and regular rhythm.      Heart sounds: Normal heart sounds. No murmur heard.  Pulmonary:      Effort: Pulmonary effort is normal. No respiratory distress.      Breath sounds: Normal breath sounds. No wheezing or rales.   Musculoskeletal:      Cervical back: Neck supple.   Lymphadenopathy:      Cervical: No cervical adenopathy.   Neurological:      General: No focal deficit present.      Mental Status: She is alert. Mental status is at baseline.      Motor: Tremor present. No atrophy.      Comments: CN II-XII grossly intact.  BUE strength 5/5.  BLE strength 4/5, tremor noted with strength testing. No resting tremor noted.  Diminished patellar reflexes bilaterally.   Psychiatric:         Thought  "Content: Thought content normal.         Judgment: Judgment normal.               ASSESSMENT/PLAN     1. Complex regional pain syndrome i of right lower limb  This is a 48-year-old female with complex past medical history signifcant for chronic immune sensory polyradiculopathy (CISP)/chronic inflammatory demyelinating polyneuropathy (CIDP), complex regional pain syndrome (RLE 2/2 stress fracture; chest 2/2 bilateral mastectomy- currently under care of pain management), BRCA+ mutation, cervical cancer, diverticulitis, migraines, and chronic constipation who presents for hospital follow-up after fall with subsequent acute on chronic lower extremity weakness and pain.  Her case was discussed with neurosurgery and neurology saw the patient who advised outpatient physical therapy/follow-up.  Today, she reports her lower back pain is improved.  However, she has increased systemic neuropathic pain which she believes makes her CIDP symptoms worse.  Her physical exam demonstrates diminished patellar reflexes bilaterally, however, she has had this in the past including on 6/30/2022 and 11/15/2021.  Otherwise, patient appears at baseline. She is scheduled to see a new neurologist on 11/1/2023.    The patient has had EMGs in the past.  Per neuro note on 10/12/2023, her \"EMGs have been negative for CISP\".  I offered to obtain the labs as noted below today.  Unfortunately, our support staff was unable to obtain the labs.  In light of this and how her neuropathy is chronic (dating back to at least 2021), advised patient to discuss her symptoms/history with her new neurologist 11/1/2023 and see what additional labs that provider would like to order at that time.    We discussed symptomatic treatment for neuropathy.  Unfortunately, the patient is allergic to gabapentin and pregabalin.  She has taken amitriptyline in the past.  She has also use SNRIs including Effexor and Cymbalta (she is currently on Zoloft so this would need to be " tapered down before starting SNRI).  After our discussion, patient agreed to discuss case with neurology before proceeding with further diagnostics/treatment.  In the interim, will place order for home health physical therapy. Her AVS provided information on non-pharmacological measures for neuropathy.      - Vitamin B12; Future  - Hemoglobin A1c; Future  - TSH with free T4 reflex; Future  - Folate; Future  - Home Care Referral      2. Neuropathy  As noted above.  - Vitamin B12; Future  - Hemoglobin A1c; Future  - TSH with free T4 reflex; Future  - Folate; Future  - Home Care Referral          Follow-Up:  - Follow up in as needed, or if symptoms worsen or fail to improve.     Options for treatment and follow-up care were reviewed with the patient. Patient engaged in the decision making process and verbalized understanding of the options discussed and agreed with the final plan.  AVS printed and given to patient.    MIRA Addison [facility]  [address] [id number] [phone]        Note: Chart documentation was done in part with Dragon Voice Recognition software.  Although reviewed after completion, some word and grammatical errors may remain. Please contact author for any clarification or concerns.

## 2023-10-27 NOTE — NURSING NOTE
ROOM:1  [facility] ROSARIO    Preferred Name: Peggy     How did you hear about us?  Current Patient    48 year old  Chief Complaint   Patient presents with     Hospital F/U     Doesn't feel like there is any pain control right now  Home PT orders       Blood pressure 116/77, pulse 74, temperature 98.4  F (36.9  C), temperature source Oral, resp. rate 18, SpO2 98%, not currently breastfeeding. There is no height or weight on file to calculate BMI.  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)     Complex regional pain syndrome i of right lower limb     Fibromyalgia syndrome     Diverticulitis     Chronic daily headache     Celiac disease     BRCA gene mutation positive in female     Autoimmune disorder (H24)     CIDP (chronic inflammatory demyelinating polyneuropathy) (H)     Physical deconditioning     Numbness and tingling of both legs     Bilateral leg weakness     Multiple falls     Gastroparesis     Abdominal pain     Nausea and vomiting     Falls frequently     Urinary incontinence, unspecified type     Incontinence of feces, unspecified fecal incontinence type     Unable to manage stairs without assistance       Wt Readings from Last 2 Encounters:   10/13/23 75.4 kg (166 lb 3.6 oz)   09/13/23 74.4 kg (164 lb)     BP Readings from Last 3 Encounters:   10/27/23 116/77   10/15/23 100/82   10/11/23 135/87       Allergies   Allergen Reactions     Dihydroergotamine Anaphylaxis     Latex Anaphylaxis     Shellfish-Derived Products Anaphylaxis     Sumatriptan Anaphylaxis     Compazine [Prochlorperazine] Anxiety     Banana Unknown     Gabapentin Dizziness     Gluten Meal Other (See Comments)     Celiac disease     Keppra [Levetiracetam] Nausea and Vomiting     Kiwi Unknown     Levofloxacin Other (See Comments)      Arrhythmia     Metronidazole Nausea and Vomiting     Nitrofurantoin Hives     Penicillins Hives     Pregabalin      Reglan [Metoclopramide] Other (See Comments)     restlessness/toe tapping      Topiramate Visual Disturbance     Aspirin Rash     Methadone Rash     Morphine Hives     She got hives around are when morphine given but resolved after few minutes per patient      Risperidone Anxiety       Current Outpatient Medications   Medication     acetaminophen (TYLENOL) 500 MG tablet     baclofen (LIORESAL) 10 MG tablet     bisacodyl (DULCOLAX) 10 MG suppository     buprenorphine (BUTRANS) 7.5 MCG/HR WK patch     diclofenac (VOLTAREN) 1 % topical gel     dicyclomine (BENTYL) 10 MG capsule     diphenhydrAMINE (BENADRYL) 25 MG tablet     folic acid (FOLVITE) 400 MCG tablet     hydrOXYzine (ATARAX) 25 MG tablet     Lidocaine (LIDOCARE) 4 % Patch     medical cannabis (Patient's own supply)     memantine (NAMENDA) 10 MG tablet     multivitamin w/minerals (THERA-VIT-M) tablet     naloxone (NARCAN) 4 MG/0.1ML nasal spray     ondansetron (ZOFRAN ODT) 4 MG ODT tab     pantoprazole (PROTONIX) 40 MG EC tablet     polyethylene glycol (MIRALAX) 17 GM/Dose powder     scopolamine (TRANSDERM) 1 MG/3DAYS 72 hr patch     senna (SENOKOT) 8.6 MG tablet     senna-docusate (SENOKOT-S/PERICOLACE) 8.6-50 MG tablet     sertraline (ZOLOFT) 100 MG tablet     vitamin D2 (ERGOCALCIFEROL) 51781 units (1250 mcg) capsule     No current facility-administered medications for this visit.       Social History     Tobacco Use     Smoking status: Former     Types: Cigarettes     Smokeless tobacco: Never   Vaping Use     Vaping Use: Never used   Substance Use Topics     Alcohol use: Not Currently     Drug use: Yes     Types: Marijuana     Comment: Medical Canibis patient       Honoring Choices - Health Care Directive Guide offered to patient at time of visit.    Health Maintenance Due   Topic Date Due     YEARLY PREVENTIVE VISIT  Never done     ADVANCE  "CARE PLANNING  Never done     HEPATITIS B IMMUNIZATION (1 of 3 - 3-dose series) Never done     COLORECTAL CANCER SCREENING  Never done     HIV SCREENING  Never done     HEPATITIS C SCREENING  Never done     PAP  11/28/2020     URINE DRUG SCREEN  03/30/2023     INFLUENZA VACCINE (1) 09/01/2023     COVID-19 Vaccine (3 - 2023-24 season) 09/01/2023       Immunization History   Administered Date(s) Administered     COVID-19 Monovalent 18+ (Moderna) 03/20/2021, 04/17/2021     Influenza Vaccine >6 months (Alfuria,Fluzone) 02/17/2021     TDAP (Adacel,Boostrix) 11/22/2016       No results found for: \"PAP\"    Recent Labs   Lab Test 10/15/23  0625 10/13/23  0906 10/12/23  0628 10/12/23  0407 10/11/23  2018 09/14/23  0652 09/01/23  1807 08/26/23  1738 08/04/23  1614 01/31/23  1900 01/28/23  0619 06/06/22  2213 03/30/22  0928   LDL  --   --   --   --   --   --   --   --   --   --   --   --  147*   HDL  --   --   --   --   --   --   --   --   --   --   --   --  56   TRIG  --   --   --   --   --   --   --   --   --   --   --   --  131   ALT  --   --   --   --  14 14 16   < >  --    < > 14   < > 23   CR 0.78  --  0.88   < > 0.83  --  0.79   < >  --    < > 0.89   < > 0.81   GFRESTIMATED >90  --  80   < > 86  --  >90   < >  --    < > 80   < > 90   ALBUMIN  --   --   --   --  4.5 4.5 4.8   < >  --    < > 3.6   < > 3.5   POTASSIUM  --  4.1 4.1  --  3.9  --  3.8   < >  --    < > 4.3   < > 4.1   TSH  --   --   --   --   --   --   --   --  2.09  --  3.12   < >  --     < > = values in this interval not displayed.           10/27/2023     7:20 AM 8/4/2023     3:32 PM   PHQ-2 ( 1999 Pfizer)   Q1: Little interest or pleasure in doing things 0 0   Q2: Feeling down, depressed or hopeless 1 0   PHQ-2 Score 1 0   Q1: Little interest or pleasure in doing things Not at all    Q2: Feeling down, depressed or hopeless Several days    PHQ-2 Score 1            9/8/2023     7:45 AM   PHQ-9 SCORE   PHQ-9 Total Score MyChart 6 (Mild depression)   PHQ-9 " Total Score 6           3/30/2022     7:43 AM 9/8/2023     7:46 AM   LINO-7 SCORE   Total Score 2 (minimal anxiety) 0 (minimal anxiety)   Total Score 2 0            No data to display                Avis Hargrove, EMT    October 27, 2023 8:00 AM

## 2023-11-01 ENCOUNTER — OFFICE VISIT (OUTPATIENT)
Dept: NEUROLOGY | Facility: CLINIC | Age: 48
End: 2023-11-01
Payer: COMMERCIAL

## 2023-11-01 VITALS — HEART RATE: 79 BPM | DIASTOLIC BLOOD PRESSURE: 83 MMHG | OXYGEN SATURATION: 97 % | SYSTOLIC BLOOD PRESSURE: 116 MMHG

## 2023-11-01 DIAGNOSIS — G61.81 CIDP (CHRONIC INFLAMMATORY DEMYELINATING POLYNEUROPATHY) (H): ICD-10-CM

## 2023-11-01 PROCEDURE — 99215 OFFICE O/P EST HI 40 MIN: CPT | Performed by: PSYCHIATRY & NEUROLOGY

## 2023-11-01 NOTE — LETTER
11/1/2023         RE: Peggy Jessica  [address] [facility] [address]        Dear Colleague,    Thank you for referring your patient, Peggy Jessica, to the [facility]. Please see a copy of my visit note below.    RIO Chan, APRN CNP    I had the pleasure of seeing Peggy Jessica at the [facility] neuromuscular clinic in [place].  I am the third or fourth neurologist seeing her.  She has a complex history.  In brief, she was diagnosed with CISP (Chronic Immune Sensory Polyradiculopathy) in the fall of 2021, by my colleague Dr. Duenas at the [facility].  Details of her presentation are summarized excellently in her medical record.  CISP was previously considered a variant of CIDP, although it is no more classified as such.  She presented with numbness in all 4 limbs, that was asymmetric and multifocal, hand clumsiness and dropping things, gait ataxia, and falls.  Between October and November 2021 she was admitted to the [facility] and had a CSF examination showing a protein of 105 mg/dl-very elevated-without pleocytosis.  Oligoclonal bands were negative.  Extensive serological work-up for cause of neuropathy including ganglioside antibodies, serum immunofixation, paraneoplastic antibodies PAULINE, B12, copper, etc. was negative or normal.  MRI of the lumbar spine showed mild nerve root enhancement of the cauda equina.  EMG was repeatedly normal.  The diagnosis made sense based on the above findings, she was treated with IVIG, to which she showed a very good response initially.  She then had problematic venous access, and there was a discussion whether port would be necessary or not.  However, in 2022, after an initial good response to IVIG, she reported worsening of her symptoms, with more pain and imbalance.  At that time, her work-up was repeated. The MRI of the lumbar spine no more showed the root  enhancement, and repeat CSF examination showed normal protein.  It was felt that her CISP was no more neurologically active, and her neuro exam raised concerns about coexistent functional disorder. She was evaluated by 2 international peripheral nerve experts in mid 2022, Eliz Duenas and Danisha at [facility], who both reached the same conclusion.  Of note, she also has a diagnosis of CRPS of the right leg, following a stress fracture of the foot, for which she is seeing a pain clinic for a long time.    She has been off immunotherapy for over a year now.  In the last 8 to 9 months, her balance and gait are clearly deteriorating, as is her hand coordination.  She spends most of her time in the wheelchair, and gets around cautiously using a walker with great difficulty.  She has had numerous falls.  Her hands and feet are more numb than prior and there is complete numbness from the elbow down, and from the knee down.  She also gets painful sensation with electric current like feeling in the same locations, which is very uncomfortable.  Her current pain clinic provider is giving her memantine 10 mg twice daily, and she is also on oxycodone and buprenorphine.  She previously could not tolerate gabapentin or Lyrica due to marked weight gain and syncopal episodes, amitriptyline caused increasing falls, duloxetine caused syncopal episodes, and Topamax was stopped due to recurrent urinary tract infections.  She also has a history of chronic migraine.    She was readmitted to [facility] in October 11, 2023, and was seen by neurology who did not believe she had active disease. She was also admitted in July 18 to July 21 at [facility].  She has chronic gastroparesis and constipation which could be opioid complications.  Cervical, thoracic spine, lumbar spine, and brain were reimaged WITH contrast on May 2023 and she had another non-contrast lumbar spine MRI in October 2023. There was  again no cauda equina or nerve root enhancement. Cervical spine, thoracic spine and brain did not show a structural lesion that could account for her sensory loss.  She had a repeat lumbar puncture in May 2023 showing again normal protein at 35.8 mg per DL, similar to last year and no pleocytosis.  Vitamin B12 levels were again normal PAULINE antibodies were borderline positive 1:80. SSA and SSB antibodies were negative. EMG in 5/2023 was again normal- no signs of neuropathy.      Past Medical History:   Diagnosis Date     Arthritis      BRCA positive      Cancer (H)      CIDP (chronic inflammatory demyelinating polyneuropathy) (H)      ASHKAN III with severe dysplasia 2002     Complex regional pain syndrome type 1 of right lower extremity        Current Outpatient Medications   Medication     acetaminophen (TYLENOL) 500 MG tablet     baclofen (LIORESAL) 10 MG tablet     bisacodyl (DULCOLAX) 10 MG suppository     buprenorphine (BUTRANS) 7.5 MCG/HR WK patch     diclofenac (VOLTAREN) 1 % topical gel     dicyclomine (BENTYL) 10 MG capsule     diphenhydrAMINE (BENADRYL) 25 MG tablet     folic acid (FOLVITE) 400 MCG tablet     hydrOXYzine (ATARAX) 25 MG tablet     Lidocaine (LIDOCARE) 4 % Patch     medical cannabis (Patient's own supply)     memantine (NAMENDA) 10 MG tablet     multivitamin w/minerals (THERA-VIT-M) tablet     naloxone (NARCAN) 4 MG/0.1ML nasal spray     ondansetron (ZOFRAN ODT) 4 MG ODT tab     pantoprazole (PROTONIX) 40 MG EC tablet     polyethylene glycol (MIRALAX) 17 GM/Dose powder     scopolamine (TRANSDERM) 1 MG/3DAYS 72 hr patch     senna (SENOKOT) 8.6 MG tablet     senna-docusate (SENOKOT-S/PERICOLACE) 8.6-50 MG tablet     sertraline (ZOLOFT) 100 MG tablet     vitamin D2 (ERGOCALCIFEROL) 65941 units (1250 mcg) capsule     No current facility-administered medications for this visit.       /83   Pulse 79   SpO2 97%       EXAM: She is sitting comfortably in her wheelchair, and does not appear in  immediate distress.  Strength in the upper extremities shows variable/inconsistent activation for deltoid, biceps, triceps, although she is able to momentarily provide full power. Accurate rating is difficult.  Wrist extension is full.  She has similar variable effort/giveaway when testing FDI, APB, handgrip, and finger extensors.  The same pattern is seen when testing all lower extremity muscles.  Tone is normal.  Reflexes are absent in the ankles, but 2+ at the knees, 2+ and symmetric at biceps, triceps, brachioradialis.  Plantar responses were mute.  There was no Lea sign.  She has no vibration sensation at the toes, none at the left medial malleolus, 1 to 2 seconds at the right medial malleolus, 3 seconds at the knees, 3 at the right index finger, and similar at the left, 5 of the left wrist (ulnar styloid), and 3-4 at the right ulnar styloid.  All those results are abnormal.  Position sensation is impaired of the fingers and the toes.  When she has eyes open, finger-to-nose is done without overt dysmetria.  With eyes closed, there is marked past-pointing.  I do not see pseudoathetosis of her hands when outstretched.  She can get up from a chair with using the arms.  She has a wide-based extremely cautious gait. One peculiar observation was that with feet adducted and eyes closed, she would not fall.    IMPRESSION: Sensory ataxia, query active CISP vs functional disorder.    I explained to Ms. Jessica that this is certainly a challenging situation, and I do understand the dilemmas that my colleagues Dr Duenas at the [facility] and Dr Raman at [facility] had last year.  While initially the diagnosis of CISP was very plausible, based on exam findings, MRI, and CSF findings, and there was a clear response to IVIG, later the patient showed some elements in her physical exam that could be interpreted as functional disorder, and both her MRI and CSF examination  normalized.  That said, she is clearly worse in the  last year and her exam does show major loss of vibration and joint position sensation.  Monitoring CISP with electrophysiologic studies is challenging, as this is a disease of the dorsal roots, and sensory nerve conduction studies are consistently normal.  In those situations, somatosensory evoked potentials (SSEP) are required to show the sensory abnormality.  She did have SSEP test done a year ago at the [facility] which was interestingly negative.  I think it is of critical importance to repeat it.  If the SSEP is normal, there is no way that her sensory loss is due to an organic/anatomic abnormality of the CNS or PNS, and functional disorder should be considered the most likely explanation.  In that case, there would be no benefit from immunotherapy.  However, if the SSEP test is clearly abnormal, then she does have active CISP (as there is no alternative explanation for her sensory ataxia for multiple tests), and she should be treated with IVIG again.  Unfortunately, evoked potentials are no more done at the [facility], and per recent communication of [facility], they are not done in most private neurology clinics in [facility] either.  The only place in [facility] that performs this test is [facility], and I will have to refer the patient again there. She has a good understanding of the above plan and agrees. I will follow with her to discuss the test result.    Sincerely,      Tapan Catalan MD      Again, thank you for allowing me to participate in the care of your patient.        Sincerely,        Tapan Catalan MD

## 2023-11-01 NOTE — PROGRESS NOTES
RIO Chan APRN CNP    I had the pleasure of seeing Peggy Jessica at the [facility] neuromuscular clinic in [place].  I am the third or fourth neurologist seeing her.  She has a complex history.  In brief, she was diagnosed with CISP (Chronic Immune Sensory Polyradiculopathy) in the fall of 2021, by my colleague Dr. Duenas at the [facility].  Details of her presentation are summarized excellently in her medical record.  CISP was previously considered a variant of CIDP, although it is no more classified as such.  She presented with numbness in all 4 limbs, that was asymmetric and multifocal, hand clumsiness and dropping things, gait ataxia, and falls.  Between October and November 2021 she was admitted to the [facility] and had a CSF examination showing a protein of 105 mg/dl-very elevated-without pleocytosis.  Oligoclonal bands were negative.  Extensive serological work-up for cause of neuropathy including ganglioside antibodies, serum immunofixation, paraneoplastic antibodies PAULINE, B12, copper, etc. was negative or normal.  MRI of the lumbar spine showed mild nerve root enhancement of the cauda equina.  EMG was repeatedly normal.  The diagnosis made sense based on the above findings, she was treated with IVIG, to which she showed a very good response initially.  She then had problematic venous access, and there was a discussion whether port would be necessary or not.  However, in 2022, after an initial good response to IVIG, she reported worsening of her symptoms, with more pain and imbalance.  At that time, her work-up was repeated. The MRI of the lumbar spine no more showed the root enhancement, and repeat CSF examination showed normal protein.  It was felt that her CISP was no more neurologically active, and her neuro exam raised concerns about coexistent functional disorder. She was evaluated by 2 international peripheral nerve experts in mid 2022,  Eliz Duenas and Danisha at [facility], who both reached the same conclusion.  Of note, she also has a diagnosis of CRPS of the right leg, following a stress fracture of the foot, for which she is seeing a pain clinic for a long time.    She has been off immunotherapy for over a year now.  In the last 8 to 9 months, her balance and gait are clearly deteriorating, as is her hand coordination.  She spends most of her time in the wheelchair, and gets around cautiously using a walker with great difficulty.  She has had numerous falls.  Her hands and feet are more numb than prior and there is complete numbness from the elbow down, and from the knee down.  She also gets painful sensation with electric current like feeling in the same locations, which is very uncomfortable.  Her current pain clinic provider is giving her memantine 10 mg twice daily, and she is also on oxycodone and buprenorphine.  She previously could not tolerate gabapentin or Lyrica due to marked weight gain and syncopal episodes, amitriptyline caused increasing falls, duloxetine caused syncopal episodes, and Topamax was stopped due to recurrent urinary tract infections.  She also has a history of chronic migraine.    She was readmitted to [facility] in October 11, 2023, and was seen by neurology who did not believe she had active disease. She was also admitted in July 18 to July 21 at [facility].  She has chronic gastroparesis and constipation which could be opioid complications.  Cervical, thoracic spine, lumbar spine, and brain were reimaged WITH contrast on May 2023 and she had another non-contrast lumbar spine MRI in October 2023. There was again no cauda equina or nerve root enhancement. Cervical spine, thoracic spine and brain did not show a structural lesion that could account for her sensory loss.  She had a repeat lumbar puncture in May 2023 showing again normal protein at 35.8 mg per DL, similar to last  year and no pleocytosis.  Vitamin B12 levels were again normal PAULINE antibodies were borderline positive 1:80. SSA and SSB antibodies were negative. EMG in 5/2023 was again normal- no signs of neuropathy.      Past Medical History:   Diagnosis Date    Arthritis     BRCA positive     Cancer (H)     CIDP (chronic inflammatory demyelinating polyneuropathy) (H)     ASHKAN III with severe dysplasia 2002    Complex regional pain syndrome type 1 of right lower extremity        Current Outpatient Medications   Medication    acetaminophen (TYLENOL) 500 MG tablet    baclofen (LIORESAL) 10 MG tablet    bisacodyl (DULCOLAX) 10 MG suppository    buprenorphine (BUTRANS) 7.5 MCG/HR WK patch    diclofenac (VOLTAREN) 1 % topical gel    dicyclomine (BENTYL) 10 MG capsule    diphenhydrAMINE (BENADRYL) 25 MG tablet    folic acid (FOLVITE) 400 MCG tablet    hydrOXYzine (ATARAX) 25 MG tablet    Lidocaine (LIDOCARE) 4 % Patch    medical cannabis (Patient's own supply)    memantine (NAMENDA) 10 MG tablet    multivitamin w/minerals (THERA-VIT-M) tablet    naloxone (NARCAN) 4 MG/0.1ML nasal spray    ondansetron (ZOFRAN ODT) 4 MG ODT tab    pantoprazole (PROTONIX) 40 MG EC tablet    polyethylene glycol (MIRALAX) 17 GM/Dose powder    scopolamine (TRANSDERM) 1 MG/3DAYS 72 hr patch    senna (SENOKOT) 8.6 MG tablet    senna-docusate (SENOKOT-S/PERICOLACE) 8.6-50 MG tablet    sertraline (ZOLOFT) 100 MG tablet    vitamin D2 (ERGOCALCIFEROL) 28249 units (1250 mcg) capsule     No current facility-administered medications for this visit.       /83   Pulse 79   SpO2 97%       EXAM: She is sitting comfortably in her wheelchair, and does not appear in immediate distress.  Strength in the upper extremities shows variable/inconsistent activation for deltoid, biceps, triceps, although she is able to momentarily provide full power. Accurate rating is difficult.  Wrist extension is full.  She has similar variable effort/giveaway when testing FDI, APB,  handgrip, and finger extensors.  The same pattern is seen when testing all lower extremity muscles.  Tone is normal.  Reflexes are absent in the ankles, but 2+ at the knees, 2+ and symmetric at biceps, triceps, brachioradialis.  Plantar responses were mute.  There was no Lea sign.  She has no vibration sensation at the toes, none at the left medial malleolus, 1 to 2 seconds at the right medial malleolus, 3 seconds at the knees, 3 at the right index finger, and similar at the left, 5 of the left wrist (ulnar styloid), and 3-4 at the right ulnar styloid.  All those results are abnormal.  Position sensation is impaired of the fingers and the toes.  When she has eyes open, finger-to-nose is done without overt dysmetria.  With eyes closed, there is marked past-pointing.  I do not see pseudoathetosis of her hands when outstretched.  She can get up from a chair with using the arms.  She has a wide-based extremely cautious gait. One peculiar observation was that with feet adducted and eyes closed, she would not fall.    IMPRESSION: Sensory ataxia, query active CISP vs functional disorder.    I explained to Ms. Jessica that this is certainly a challenging situation, and I do understand the dilemmas that my colleagues Dr Duenas at the [facility] and Dr Raman at [facility] had last year.  While initially the diagnosis of CISP was very plausible, based on exam findings, MRI, and CSF findings, and there was a clear response to IVIG, later the patient showed some elements in her physical exam that could be interpreted as functional disorder, and both her MRI and CSF examination  normalized.  That said, she is clearly worse in the last year and her exam does show major loss of vibration and joint position sensation.  Monitoring CISP with electrophysiologic studies is challenging, as this is a disease of the dorsal roots, and sensory nerve conduction studies are consistently normal.  In those situations, somatosensory evoked  potentials (SSEP) are required to show the sensory abnormality.  She did have SSEP test done a year ago at the [facility] which was interestingly negative.  I think it is of critical importance to repeat it.  If the SSEP is normal, there is no way that her sensory loss is due to an organic/anatomic abnormality of the CNS or PNS, and functional disorder should be considered the most likely explanation.  In that case, there would be no benefit from immunotherapy.  However, if the SSEP test is clearly abnormal, then she does have active CISP (as there is no alternative explanation for her sensory ataxia for multiple tests), and she should be treated with IVIG again.  Unfortunately, evoked potentials are no more done at the [facility], and per recent communication of mine, they are not done in most private neurology clinics in [facility] either.  The only place in [facility] that performs this test is [facility], and I will have to refer the patient again there. She has a good understanding of the above plan and agrees. I will follow with her to discuss the test result.    Sincerely,      Tapan Catalan MD

## 2023-11-01 NOTE — NURSING NOTE
"Peggy Jessica is a 48 year old female who presents for:  Chief Complaint   Patient presents with    Referral     CIDP        Initial Vitals:  /83   Pulse 79   SpO2 97%  Estimated body mass index is 27.66 kg/m  as calculated from the following:    Height as of 10/13/23: 1.651 m (5' 5\").    Weight as of 10/13/23: 75.4 kg (166 lb 3.6 oz).. There is no height or weight on file to calculate BSA. BP completed using cuff size: josafat Archuleta    "

## 2023-11-01 NOTE — PATIENT INSTRUCTIONS
Your exam clearly shows major sensory loss in the hands and feet and especially loss of vibration and joint position sensation. Of course those could be consistent with active CISP.   I would like a test called SSEP (somatosensory evoked potentials) to be repeated. It can only be done at [facility] now- the [facility] no more does this, and nor do [facility], [facility] or [facility] (I have asked them all about this earlier this year, as I had another patient case I needed it)  We will contact [facility] to facilitate this and I will ask them to report the results to me immediately after the test.   If it is abnormal will restart the IVIG  If it is normal we need to have a talk (in that case there is nothing other than functional disorder than can explain your problem)

## 2023-11-10 ENCOUNTER — TELEPHONE (OUTPATIENT)
Dept: FAMILY MEDICINE | Facility: CLINIC | Age: 48
End: 2023-11-10

## 2023-11-10 NOTE — TELEPHONE ENCOUNTER
[facility] phone call message - order or referral request from patient:     Order or referral being requested: Referral: Referral to Physical Therapy Gait training    Additional Details:   Needs date changed to 48 hrs of seeing patient so that orders given date changed to today instead of 10/20/23  Referral to a specific location? Yes    OK to leave a message on voice mail? Yes    Primary language: English      needed? No    Call taken on November 10, 2023 at 2:58 PM by Wily Sandoval

## 2023-11-17 ENCOUNTER — TELEPHONE (OUTPATIENT)
Dept: FAMILY MEDICINE | Facility: CLINIC | Age: 48
End: 2023-11-17

## 2023-11-17 NOTE — TELEPHONE ENCOUNTER
"[facility] to let them know that they have not been able to get a hold of the patient to set them up for services. At this time they are going to proceed with a \"non-admit\".   "

## 2023-11-30 ENCOUNTER — MYC MEDICAL ADVICE (OUTPATIENT)
Dept: FAMILY MEDICINE | Facility: CLINIC | Age: 48
End: 2023-11-30

## 2023-11-30 DIAGNOSIS — Z77.120 SUSPECTED EXPOSURE TO MOLD: Primary | ICD-10-CM

## 2023-12-26 ENCOUNTER — MEDICAL CORRESPONDENCE (OUTPATIENT)
Dept: HEALTH INFORMATION MANAGEMENT | Facility: CLINIC | Age: 48
End: 2023-12-26

## 2023-12-27 ENCOUNTER — HOSPITAL ENCOUNTER (INPATIENT)
Facility: CLINIC | Age: 48
Setting detail: SURGERY ADMIT
End: 2023-12-27
Attending: COLON & RECTAL SURGERY | Admitting: COLON & RECTAL SURGERY
Payer: COMMERCIAL

## 2023-12-28 ENCOUNTER — TELEPHONE (OUTPATIENT)
Dept: WOUND CARE | Facility: CLINIC | Age: 48
End: 2023-12-28
Payer: COMMERCIAL

## 2023-12-28 NOTE — TELEPHONE ENCOUNTER
[facility]    Received fax referral from [facility] clinic for ileostomy marking 12/27.     Called patient, left  requesting call back to schedule aptXimena COKER   1st choice: Securely message with [employer] ([facility] [employer])   (2nd option: [facility] Office Phone [phone], messages checked periodically Mon-Fri 8a-4p)

## 2024-01-01 ENCOUNTER — APPOINTMENT (OUTPATIENT)
Dept: CT IMAGING | Facility: CLINIC | Age: 49
DRG: 329 | End: 2024-01-01
Attending: EMERGENCY MEDICINE
Payer: COMMERCIAL

## 2024-01-01 ENCOUNTER — HOSPITAL ENCOUNTER (INPATIENT)
Facility: CLINIC | Age: 49
LOS: 15 days | Discharge: HOME | DRG: 329 | End: 2024-01-16
Attending: EMERGENCY MEDICINE | Admitting: INTERNAL MEDICINE
Payer: COMMERCIAL

## 2024-01-01 ENCOUNTER — ANESTHESIA (OUTPATIENT)
Dept: SURGERY | Facility: CLINIC | Age: 49
DRG: 329 | End: 2024-01-01
Payer: COMMERCIAL

## 2024-01-01 ENCOUNTER — APPOINTMENT (OUTPATIENT)
Dept: GENERAL RADIOLOGY | Facility: CLINIC | Age: 49
DRG: 329 | End: 2024-01-01
Attending: EMERGENCY MEDICINE
Payer: COMMERCIAL

## 2024-01-01 ENCOUNTER — ANESTHESIA EVENT (OUTPATIENT)
Dept: SURGERY | Facility: CLINIC | Age: 49
DRG: 329 | End: 2024-01-01
Payer: COMMERCIAL

## 2024-01-01 DIAGNOSIS — K56.609 LARGE BOWEL OBSTRUCTION (H): Primary | ICD-10-CM

## 2024-01-01 DIAGNOSIS — Z43.2 ILEOSTOMY CARE (H): ICD-10-CM

## 2024-01-01 DIAGNOSIS — R10.11 RIGHT UPPER QUADRANT ABDOMINAL PAIN: ICD-10-CM

## 2024-01-01 DIAGNOSIS — M79.7 FIBROMYALGIA SYNDROME: ICD-10-CM

## 2024-01-01 LAB
ABO/RH(D): NORMAL
ALBUMIN SERPL BCG-MCNC: 4.1 G/DL (ref 3.5–5.2)
ALBUMIN UR-MCNC: NEGATIVE MG/DL
ALP SERPL-CCNC: 95 U/L (ref 40–150)
ALT SERPL W P-5'-P-CCNC: 17 U/L (ref 0–50)
ANION GAP SERPL CALCULATED.3IONS-SCNC: 8 MMOL/L (ref 7–15)
ANTIBODY SCREEN: NEGATIVE
APPEARANCE UR: CLEAR
AST SERPL W P-5'-P-CCNC: 22 U/L (ref 0–45)
BASOPHILS # BLD AUTO: 0 10E3/UL (ref 0–0.2)
BASOPHILS NFR BLD AUTO: 0 %
BILIRUB SERPL-MCNC: 0.3 MG/DL
BILIRUB UR QL STRIP: NEGATIVE
BUN SERPL-MCNC: 8.8 MG/DL (ref 6–20)
CALCIUM SERPL-MCNC: 9.5 MG/DL (ref 8.6–10)
CHLORIDE SERPL-SCNC: 107 MMOL/L (ref 98–107)
COLOR UR AUTO: ABNORMAL
CREAT SERPL-MCNC: 0.89 MG/DL (ref 0.51–0.95)
DEPRECATED HCO3 PLAS-SCNC: 28 MMOL/L (ref 22–29)
EGFRCR SERPLBLD CKD-EPI 2021: 79 ML/MIN/1.73M2
EOSINOPHIL # BLD AUTO: 0 10E3/UL (ref 0–0.7)
EOSINOPHIL NFR BLD AUTO: 0 %
ERYTHROCYTE [DISTWIDTH] IN BLOOD BY AUTOMATED COUNT: 14.6 % (ref 10–15)
GLUCOSE SERPL-MCNC: 113 MG/DL (ref 70–99)
GLUCOSE UR STRIP-MCNC: NEGATIVE MG/DL
HCT VFR BLD AUTO: 40.7 % (ref 35–47)
HGB BLD-MCNC: 13.5 G/DL (ref 11.7–15.7)
HGB UR QL STRIP: NEGATIVE
IMM GRANULOCYTES # BLD: 0 10E3/UL
IMM GRANULOCYTES NFR BLD: 0 %
KETONES UR STRIP-MCNC: NEGATIVE MG/DL
LEUKOCYTE ESTERASE UR QL STRIP: NEGATIVE
LIPASE SERPL-CCNC: 20 U/L (ref 13–60)
LYMPHOCYTES # BLD AUTO: 0.9 10E3/UL (ref 0.8–5.3)
LYMPHOCYTES NFR BLD AUTO: 20 %
MCH RBC QN AUTO: 31 PG (ref 26.5–33)
MCHC RBC AUTO-ENTMCNC: 33.2 G/DL (ref 31.5–36.5)
MCV RBC AUTO: 94 FL (ref 78–100)
MONOCYTES # BLD AUTO: 0.3 10E3/UL (ref 0–1.3)
MONOCYTES NFR BLD AUTO: 6 %
MUCOUS THREADS #/AREA URNS LPF: PRESENT /LPF
NEUTROPHILS # BLD AUTO: 3.5 10E3/UL (ref 1.6–8.3)
NEUTROPHILS NFR BLD AUTO: 74 %
NITRATE UR QL: NEGATIVE
NRBC # BLD AUTO: 0 10E3/UL
NRBC BLD AUTO-RTO: 0 /100
PH UR STRIP: 7 [PH] (ref 5–7)
PLATELET # BLD AUTO: 249 10E3/UL (ref 150–450)
POTASSIUM SERPL-SCNC: 4.1 MMOL/L (ref 3.4–5.3)
PROT SERPL-MCNC: 7.3 G/DL (ref 6.4–8.3)
RBC # BLD AUTO: 4.35 10E6/UL (ref 3.8–5.2)
RBC URINE: 1 /HPF
SODIUM SERPL-SCNC: 143 MMOL/L (ref 135–145)
SP GR UR STRIP: 1.03 (ref 1–1.03)
SPECIMEN EXPIRATION DATE: NORMAL
SQUAMOUS EPITHELIAL: <1 /HPF
UROBILINOGEN UR STRIP-MCNC: NORMAL MG/DL
WBC # BLD AUTO: 4.7 10E3/UL (ref 4–11)
WBC URINE: 1 /HPF

## 2024-01-01 PROCEDURE — 250N000009 HC RX 250: Performed by: ANESTHESIOLOGY

## 2024-01-01 PROCEDURE — 86900 BLOOD TYPING SEROLOGIC ABO: CPT | Performed by: COLON & RECTAL SURGERY

## 2024-01-01 PROCEDURE — 85025 COMPLETE CBC W/AUTO DIFF WBC: CPT | Performed by: EMERGENCY MEDICINE

## 2024-01-01 PROCEDURE — 250N000025 HC SEVOFLURANE, PER MIN: Performed by: COLON & RECTAL SURGERY

## 2024-01-01 PROCEDURE — 0D1B0Z4 BYPASS ILEUM TO CUTANEOUS, OPEN APPROACH: ICD-10-PCS | Performed by: COLON & RECTAL SURGERY

## 2024-01-01 PROCEDURE — 99223 1ST HOSP IP/OBS HIGH 75: CPT | Mod: AI | Performed by: INTERNAL MEDICINE

## 2024-01-01 PROCEDURE — 250N000009 HC RX 250: Performed by: NURSE ANESTHETIST, CERTIFIED REGISTERED

## 2024-01-01 PROCEDURE — 81001 URINALYSIS AUTO W/SCOPE: CPT | Performed by: COLON & RECTAL SURGERY

## 2024-01-01 PROCEDURE — 36415 COLL VENOUS BLD VENIPUNCTURE: CPT | Performed by: EMERGENCY MEDICINE

## 2024-01-01 PROCEDURE — 258N000003 HC RX IP 258 OP 636: Performed by: NURSE ANESTHETIST, CERTIFIED REGISTERED

## 2024-01-01 PROCEDURE — 74283 THER NMA RDCTJ INTUS/OBSTRCJ: CPT

## 2024-01-01 PROCEDURE — 370N000017 HC ANESTHESIA TECHNICAL FEE, PER MIN: Performed by: COLON & RECTAL SURGERY

## 2024-01-01 PROCEDURE — 74177 CT ABD & PELVIS W/CONTRAST: CPT

## 2024-01-01 PROCEDURE — 83690 ASSAY OF LIPASE: CPT | Performed by: EMERGENCY MEDICINE

## 2024-01-01 PROCEDURE — 250N000009 HC RX 250: Performed by: COLON & RECTAL SURGERY

## 2024-01-01 PROCEDURE — 258N000003 HC RX IP 258 OP 636: Performed by: INTERNAL MEDICINE

## 2024-01-01 PROCEDURE — 250N000011 HC RX IP 250 OP 636: Performed by: NURSE ANESTHETIST, CERTIFIED REGISTERED

## 2024-01-01 PROCEDURE — 96376 TX/PRO/DX INJ SAME DRUG ADON: CPT

## 2024-01-01 PROCEDURE — 250N000011 HC RX IP 250 OP 636: Performed by: COLON & RECTAL SURGERY

## 2024-01-01 PROCEDURE — 96361 HYDRATE IV INFUSION ADD-ON: CPT

## 2024-01-01 PROCEDURE — 250N000011 HC RX IP 250 OP 636: Performed by: EMERGENCY MEDICINE

## 2024-01-01 PROCEDURE — 120N000001 HC R&B MED SURG/OB

## 2024-01-01 PROCEDURE — 80053 COMPREHEN METABOLIC PANEL: CPT | Performed by: EMERGENCY MEDICINE

## 2024-01-01 PROCEDURE — 99285 EMERGENCY DEPT VISIT HI MDM: CPT | Mod: 25

## 2024-01-01 PROCEDURE — 96374 THER/PROPH/DIAG INJ IV PUSH: CPT | Mod: 59

## 2024-01-01 PROCEDURE — 250N000009 HC RX 250: Performed by: EMERGENCY MEDICINE

## 2024-01-01 PROCEDURE — G0378 HOSPITAL OBSERVATION PER HR: HCPCS

## 2024-01-01 PROCEDURE — 250N000011 HC RX IP 250 OP 636: Performed by: ANESTHESIOLOGY

## 2024-01-01 PROCEDURE — 272N000001 HC OR GENERAL SUPPLY STERILE: Performed by: COLON & RECTAL SURGERY

## 2024-01-01 PROCEDURE — 999N000141 HC STATISTIC PRE-PROCEDURE NURSING ASSESSMENT: Performed by: COLON & RECTAL SURGERY

## 2024-01-01 PROCEDURE — 360N000077 HC SURGERY LEVEL 4, PER MIN: Performed by: COLON & RECTAL SURGERY

## 2024-01-01 PROCEDURE — 710N000009 HC RECOVERY PHASE 1, LEVEL 1, PER MIN: Performed by: COLON & RECTAL SURGERY

## 2024-01-01 PROCEDURE — 96375 TX/PRO/DX INJ NEW DRUG ADDON: CPT

## 2024-01-01 PROCEDURE — 0W3P0ZZ CONTROL BLEEDING IN GASTROINTESTINAL TRACT, OPEN APPROACH: ICD-10-PCS | Performed by: COLON & RECTAL SURGERY

## 2024-01-01 PROCEDURE — 250N000011 HC RX IP 250 OP 636: Performed by: INTERNAL MEDICINE

## 2024-01-01 RX ORDER — HYDROMORPHONE HCL IN WATER/PF 6 MG/30 ML
0.4 PATIENT CONTROLLED ANALGESIA SYRINGE INTRAVENOUS EVERY 5 MIN PRN
Status: DISCONTINUED | OUTPATIENT
Start: 2024-01-01 | End: 2024-01-02 | Stop reason: HOSPADM

## 2024-01-01 RX ORDER — IOPAMIDOL 755 MG/ML
81 INJECTION, SOLUTION INTRAVASCULAR ONCE
Status: COMPLETED | OUTPATIENT
Start: 2024-01-01 | End: 2024-01-01

## 2024-01-01 RX ORDER — METRONIDAZOLE 500 MG/100ML
500 INJECTION, SOLUTION INTRAVENOUS
Status: COMPLETED | OUTPATIENT
Start: 2024-01-01 | End: 2024-01-01

## 2024-01-01 RX ORDER — FENTANYL CITRATE 50 UG/ML
INJECTION, SOLUTION INTRAMUSCULAR; INTRAVENOUS PRN
Status: DISCONTINUED | OUTPATIENT
Start: 2024-01-01 | End: 2024-01-01

## 2024-01-01 RX ORDER — VECURONIUM BROMIDE 1 MG/ML
INJECTION, POWDER, LYOPHILIZED, FOR SOLUTION INTRAVENOUS PRN
Status: DISCONTINUED | OUTPATIENT
Start: 2024-01-01 | End: 2024-01-01

## 2024-01-01 RX ORDER — NALOXONE HYDROCHLORIDE 0.4 MG/ML
0.4 INJECTION, SOLUTION INTRAMUSCULAR; INTRAVENOUS; SUBCUTANEOUS
Status: DISCONTINUED | OUTPATIENT
Start: 2024-01-01 | End: 2024-01-16 | Stop reason: HOSPADM

## 2024-01-01 RX ORDER — HYDROMORPHONE HYDROCHLORIDE 1 MG/ML
0.5 INJECTION, SOLUTION INTRAMUSCULAR; INTRAVENOUS; SUBCUTANEOUS ONCE
Status: COMPLETED | OUTPATIENT
Start: 2024-01-01 | End: 2024-01-01

## 2024-01-01 RX ORDER — CALCIUM CARBONATE 500 MG/1
1000 TABLET, CHEWABLE ORAL 4 TIMES DAILY PRN
Status: DISCONTINUED | OUTPATIENT
Start: 2024-01-01 | End: 2024-01-16 | Stop reason: HOSPADM

## 2024-01-01 RX ORDER — HYDROMORPHONE HCL IN WATER/PF 6 MG/30 ML
0.2 PATIENT CONTROLLED ANALGESIA SYRINGE INTRAVENOUS EVERY 5 MIN PRN
Status: DISCONTINUED | OUTPATIENT
Start: 2024-01-01 | End: 2024-01-02 | Stop reason: HOSPADM

## 2024-01-01 RX ORDER — HYDROMORPHONE HYDROCHLORIDE 1 MG/ML
0.5 INJECTION, SOLUTION INTRAMUSCULAR; INTRAVENOUS; SUBCUTANEOUS
Status: DISCONTINUED | OUTPATIENT
Start: 2024-01-01 | End: 2024-01-01

## 2024-01-01 RX ORDER — FENTANYL CITRATE 50 UG/ML
75 INJECTION, SOLUTION INTRAMUSCULAR; INTRAVENOUS EVERY 30 MIN PRN
Status: DISCONTINUED | OUTPATIENT
Start: 2024-01-01 | End: 2024-01-02

## 2024-01-01 RX ORDER — LIDOCAINE HYDROCHLORIDE 20 MG/ML
INJECTION, SOLUTION INFILTRATION; PERINEURAL PRN
Status: DISCONTINUED | OUTPATIENT
Start: 2024-01-01 | End: 2024-01-01

## 2024-01-01 RX ORDER — ACETAMINOPHEN 650 MG/1
650 SUPPOSITORY RECTAL EVERY 4 HOURS PRN
Status: DISCONTINUED | OUTPATIENT
Start: 2024-01-01 | End: 2024-01-01

## 2024-01-01 RX ORDER — FENTANYL CITRATE 50 UG/ML
25 INJECTION, SOLUTION INTRAMUSCULAR; INTRAVENOUS EVERY 5 MIN PRN
Status: DISCONTINUED | OUTPATIENT
Start: 2024-01-01 | End: 2024-01-02 | Stop reason: HOSPADM

## 2024-01-01 RX ORDER — HALOPERIDOL 5 MG/ML
1 INJECTION INTRAMUSCULAR
Status: DISCONTINUED | OUTPATIENT
Start: 2024-01-01 | End: 2024-01-02 | Stop reason: HOSPADM

## 2024-01-01 RX ORDER — DEXMEDETOMIDINE HYDROCHLORIDE 4 UG/ML
INJECTION, SOLUTION INTRAVENOUS CONTINUOUS PRN
Status: DISCONTINUED | OUTPATIENT
Start: 2024-01-01 | End: 2024-01-01

## 2024-01-01 RX ORDER — TENAPANOR HYDROCHLORIDE 53.2 MG/1
50 TABLET ORAL
COMMUNITY

## 2024-01-01 RX ORDER — AMOXICILLIN 250 MG
1 CAPSULE ORAL 2 TIMES DAILY PRN
Status: DISCONTINUED | OUTPATIENT
Start: 2024-01-01 | End: 2024-01-04

## 2024-01-01 RX ORDER — ONDANSETRON 2 MG/ML
4 INJECTION INTRAMUSCULAR; INTRAVENOUS ONCE
Status: COMPLETED | OUTPATIENT
Start: 2024-01-01 | End: 2024-01-01

## 2024-01-01 RX ORDER — HEPARIN SODIUM 5000 [USP'U]/.5ML
5000 INJECTION, SOLUTION INTRAVENOUS; SUBCUTANEOUS
Status: COMPLETED | OUTPATIENT
Start: 2024-01-01 | End: 2024-01-01

## 2024-01-01 RX ORDER — LIDOCAINE 40 MG/G
CREAM TOPICAL
Status: DISCONTINUED | OUTPATIENT
Start: 2024-01-01 | End: 2024-01-02

## 2024-01-01 RX ORDER — VANCOMYCIN HYDROCHLORIDE 1 G/200ML
1000 INJECTION, SOLUTION INTRAVENOUS
Status: COMPLETED | OUTPATIENT
Start: 2024-01-01 | End: 2024-01-01

## 2024-01-01 RX ORDER — SODIUM CHLORIDE 9 MG/ML
INJECTION, SOLUTION INTRAVENOUS CONTINUOUS
Status: DISCONTINUED | OUTPATIENT
Start: 2024-01-01 | End: 2024-01-02

## 2024-01-01 RX ORDER — NALOXONE HYDROCHLORIDE 0.4 MG/ML
0.2 INJECTION, SOLUTION INTRAMUSCULAR; INTRAVENOUS; SUBCUTANEOUS
Status: DISCONTINUED | OUTPATIENT
Start: 2024-01-01 | End: 2024-01-16 | Stop reason: HOSPADM

## 2024-01-01 RX ORDER — PROPOFOL 10 MG/ML
INJECTION, EMULSION INTRAVENOUS PRN
Status: DISCONTINUED | OUTPATIENT
Start: 2024-01-01 | End: 2024-01-01

## 2024-01-01 RX ORDER — ACETAMINOPHEN 325 MG/1
975 TABLET ORAL ONCE
Status: COMPLETED | OUTPATIENT
Start: 2024-01-01 | End: 2024-01-01

## 2024-01-01 RX ORDER — ONDANSETRON 4 MG/1
4 TABLET, ORALLY DISINTEGRATING ORAL EVERY 6 HOURS PRN
Status: DISCONTINUED | OUTPATIENT
Start: 2024-01-01 | End: 2024-01-02

## 2024-01-01 RX ORDER — FENTANYL CITRATE 50 UG/ML
50 INJECTION, SOLUTION INTRAMUSCULAR; INTRAVENOUS EVERY 5 MIN PRN
Status: DISCONTINUED | OUTPATIENT
Start: 2024-01-01 | End: 2024-01-02 | Stop reason: HOSPADM

## 2024-01-01 RX ORDER — OLANZAPINE 10 MG/1
5 INJECTION, POWDER, LYOPHILIZED, FOR SOLUTION INTRAMUSCULAR ONCE
Status: DISCONTINUED | OUTPATIENT
Start: 2024-01-01 | End: 2024-01-01

## 2024-01-01 RX ORDER — MAGNESIUM HYDROXIDE 1200 MG/15ML
LIQUID ORAL PRN
Status: DISCONTINUED | OUTPATIENT
Start: 2024-01-01 | End: 2024-01-02 | Stop reason: HOSPADM

## 2024-01-01 RX ORDER — ONDANSETRON 2 MG/ML
4 INJECTION INTRAMUSCULAR; INTRAVENOUS EVERY 30 MIN PRN
Status: DISCONTINUED | OUTPATIENT
Start: 2024-01-01 | End: 2024-01-02 | Stop reason: HOSPADM

## 2024-01-01 RX ORDER — HYDROMORPHONE HYDROCHLORIDE 2 MG/1
4 TABLET ORAL EVERY 4 HOURS PRN
Status: DISCONTINUED | OUTPATIENT
Start: 2024-01-01 | End: 2024-01-01

## 2024-01-01 RX ORDER — HYDROMORPHONE HYDROCHLORIDE 2 MG/1
2 TABLET ORAL EVERY 4 HOURS PRN
Status: DISCONTINUED | OUTPATIENT
Start: 2024-01-01 | End: 2024-01-01

## 2024-01-01 RX ORDER — ONDANSETRON 4 MG/1
4 TABLET, ORALLY DISINTEGRATING ORAL EVERY 30 MIN PRN
Status: DISCONTINUED | OUTPATIENT
Start: 2024-01-01 | End: 2024-01-02 | Stop reason: HOSPADM

## 2024-01-01 RX ORDER — SODIUM CHLORIDE, SODIUM LACTATE, POTASSIUM CHLORIDE, CALCIUM CHLORIDE 600; 310; 30; 20 MG/100ML; MG/100ML; MG/100ML; MG/100ML
INJECTION, SOLUTION INTRAVENOUS CONTINUOUS PRN
Status: DISCONTINUED | OUTPATIENT
Start: 2024-01-01 | End: 2024-01-01

## 2024-01-01 RX ORDER — AMOXICILLIN 250 MG
2 CAPSULE ORAL 2 TIMES DAILY PRN
Status: DISCONTINUED | OUTPATIENT
Start: 2024-01-01 | End: 2024-01-04

## 2024-01-01 RX ORDER — SODIUM CHLORIDE, SODIUM LACTATE, POTASSIUM CHLORIDE, CALCIUM CHLORIDE 600; 310; 30; 20 MG/100ML; MG/100ML; MG/100ML; MG/100ML
INJECTION, SOLUTION INTRAVENOUS CONTINUOUS
Status: DISCONTINUED | OUTPATIENT
Start: 2024-01-01 | End: 2024-01-02 | Stop reason: HOSPADM

## 2024-01-01 RX ORDER — KETAMINE HYDROCHLORIDE 10 MG/ML
INJECTION INTRAMUSCULAR; INTRAVENOUS PRN
Status: DISCONTINUED | OUTPATIENT
Start: 2024-01-01 | End: 2024-01-01

## 2024-01-01 RX ORDER — ONDANSETRON 2 MG/ML
4 INJECTION INTRAMUSCULAR; INTRAVENOUS EVERY 6 HOURS PRN
Status: DISCONTINUED | OUTPATIENT
Start: 2024-01-01 | End: 2024-01-02

## 2024-01-01 RX ORDER — DEXAMETHASONE SODIUM PHOSPHATE 4 MG/ML
INJECTION, SOLUTION INTRA-ARTICULAR; INTRALESIONAL; INTRAMUSCULAR; INTRAVENOUS; SOFT TISSUE PRN
Status: DISCONTINUED | OUTPATIENT
Start: 2024-01-01 | End: 2024-01-01

## 2024-01-01 RX ORDER — PROPOFOL 10 MG/ML
INJECTION, EMULSION INTRAVENOUS CONTINUOUS PRN
Status: DISCONTINUED | OUTPATIENT
Start: 2024-01-01 | End: 2024-01-01

## 2024-01-01 RX ORDER — ACETAMINOPHEN 325 MG/1
650 TABLET ORAL EVERY 4 HOURS PRN
Status: DISCONTINUED | OUTPATIENT
Start: 2024-01-01 | End: 2024-01-01

## 2024-01-01 RX ADMIN — PROPOFOL 30 MCG/KG/MIN: 10 INJECTION, EMULSION INTRAVENOUS at 21:10

## 2024-01-01 RX ADMIN — FENTANYL CITRATE 75 MCG: 50 INJECTION, SOLUTION INTRAMUSCULAR; INTRAVENOUS at 14:32

## 2024-01-01 RX ADMIN — SODIUM CHLORIDE, SODIUM LACTATE, POTASSIUM CHLORIDE, CALCIUM CHLORIDE: 600; 310; 30; 20 INJECTION, SOLUTION INTRAVENOUS at 21:10

## 2024-01-01 RX ADMIN — ONDANSETRON 4 MG: 2 INJECTION INTRAMUSCULAR; INTRAVENOUS at 21:00

## 2024-01-01 RX ADMIN — MIDAZOLAM 2 MG: 1 INJECTION INTRAMUSCULAR; INTRAVENOUS at 21:00

## 2024-01-01 RX ADMIN — IOPAMIDOL 81 ML: 755 INJECTION, SOLUTION INTRAVENOUS at 13:12

## 2024-01-01 RX ADMIN — SODIUM CHLORIDE 63 ML: 9 INJECTION, SOLUTION INTRAVENOUS at 13:12

## 2024-01-01 RX ADMIN — DEXMEDETOMIDINE HYDROCHLORIDE 0.3 MCG/KG/HR: 200 INJECTION INTRAVENOUS at 21:10

## 2024-01-01 RX ADMIN — SODIUM CHLORIDE: 9 INJECTION, SOLUTION INTRAVENOUS at 19:49

## 2024-01-01 RX ADMIN — PHENYLEPHRINE HYDROCHLORIDE 100 MCG: 10 INJECTION INTRAVENOUS at 21:45

## 2024-01-01 RX ADMIN — SUGAMMADEX 200 MG: 100 INJECTION, SOLUTION INTRAVENOUS at 22:57

## 2024-01-01 RX ADMIN — HYDROMORPHONE HYDROCHLORIDE 0.5 MG: 1 INJECTION, SOLUTION INTRAMUSCULAR; INTRAVENOUS; SUBCUTANEOUS at 10:54

## 2024-01-01 RX ADMIN — PHENYLEPHRINE HYDROCHLORIDE 0.25 MCG/KG/MIN: 10 INJECTION INTRAVENOUS at 21:58

## 2024-01-01 RX ADMIN — ROCURONIUM BROMIDE 100 MG: 50 INJECTION, SOLUTION INTRAVENOUS at 21:04

## 2024-01-01 RX ADMIN — DEXAMETHASONE SODIUM PHOSPHATE 4 MG: 4 INJECTION, SOLUTION INTRA-ARTICULAR; INTRALESIONAL; INTRAMUSCULAR; INTRAVENOUS; SOFT TISSUE at 21:20

## 2024-01-01 RX ADMIN — PROPOFOL 200 MG: 10 INJECTION, EMULSION INTRAVENOUS at 21:04

## 2024-01-01 RX ADMIN — HYDROMORPHONE HYDROCHLORIDE 0.5 MG: 1 INJECTION, SOLUTION INTRAMUSCULAR; INTRAVENOUS; SUBCUTANEOUS at 13:00

## 2024-01-01 RX ADMIN — FENTANYL CITRATE 75 MCG: 50 INJECTION, SOLUTION INTRAMUSCULAR; INTRAVENOUS at 16:08

## 2024-01-01 RX ADMIN — HYDROMORPHONE HYDROCHLORIDE 0.5 MG: 1 INJECTION, SOLUTION INTRAMUSCULAR; INTRAVENOUS; SUBCUTANEOUS at 22:30

## 2024-01-01 RX ADMIN — FENTANYL CITRATE 50 MCG: 50 INJECTION INTRAMUSCULAR; INTRAVENOUS at 21:04

## 2024-01-01 RX ADMIN — VECURONIUM BROMIDE 2 MG: 1 INJECTION, POWDER, LYOPHILIZED, FOR SOLUTION INTRAVENOUS at 22:10

## 2024-01-01 RX ADMIN — VANCOMYCIN HYDROCHLORIDE 1000 MG: 1 INJECTION, SOLUTION INTRAVENOUS at 20:45

## 2024-01-01 RX ADMIN — FENTANYL CITRATE 50 MCG: 50 INJECTION INTRAMUSCULAR; INTRAVENOUS at 21:38

## 2024-01-01 RX ADMIN — PHENYLEPHRINE HYDROCHLORIDE 100 MCG: 10 INJECTION INTRAVENOUS at 22:05

## 2024-01-01 RX ADMIN — HEPARIN SODIUM 5000 UNITS: 5000 INJECTION, SOLUTION INTRAVENOUS; SUBCUTANEOUS at 20:49

## 2024-01-01 RX ADMIN — Medication 20 MG: at 21:38

## 2024-01-01 RX ADMIN — Medication 15 ML: at 23:02

## 2024-01-01 RX ADMIN — FENTANYL CITRATE 75 MCG: 50 INJECTION, SOLUTION INTRAMUSCULAR; INTRAVENOUS at 19:06

## 2024-01-01 RX ADMIN — DIATRIZOATE MEGLUMINE AND DIATRIZOATE SODIUM 480 ML: 660; 100 SOLUTION ORAL; RECTAL at 17:56

## 2024-01-01 RX ADMIN — DEXTROSE AND SODIUM CHLORIDE: 5; 450 INJECTION, SOLUTION INTRAVENOUS at 14:41

## 2024-01-01 RX ADMIN — ONDANSETRON 4 MG: 2 INJECTION INTRAMUSCULAR; INTRAVENOUS at 13:00

## 2024-01-01 RX ADMIN — FENTANYL CITRATE 25 MCG: 50 INJECTION, SOLUTION INTRAMUSCULAR; INTRAVENOUS at 23:59

## 2024-01-01 RX ADMIN — ONDANSETRON 4 MG: 2 INJECTION INTRAMUSCULAR; INTRAVENOUS at 19:49

## 2024-01-01 RX ADMIN — PHENYLEPHRINE HYDROCHLORIDE 50 MCG: 10 INJECTION INTRAVENOUS at 21:59

## 2024-01-01 RX ADMIN — HYDROMORPHONE HYDROCHLORIDE 0.5 MG: 1 INJECTION, SOLUTION INTRAMUSCULAR; INTRAVENOUS; SUBCUTANEOUS at 19:49

## 2024-01-01 RX ADMIN — FENTANYL CITRATE 25 MCG: 50 INJECTION, SOLUTION INTRAMUSCULAR; INTRAVENOUS at 23:33

## 2024-01-01 RX ADMIN — SODIUM CHLORIDE, POTASSIUM CHLORIDE, SODIUM LACTATE AND CALCIUM CHLORIDE: 600; 310; 30; 20 INJECTION, SOLUTION INTRAVENOUS at 21:00

## 2024-01-01 RX ADMIN — METRONIDAZOLE 500 MG: 500 INJECTION, SOLUTION INTRAVENOUS at 21:00

## 2024-01-01 RX ADMIN — FENTANYL CITRATE 75 MCG: 50 INJECTION, SOLUTION INTRAMUSCULAR; INTRAVENOUS at 16:53

## 2024-01-01 RX ADMIN — LIDOCAINE HYDROCHLORIDE 100 MG: 20 INJECTION, SOLUTION INFILTRATION; PERINEURAL at 21:04

## 2024-01-01 RX ADMIN — Medication 30 MG: at 22:19

## 2024-01-01 ASSESSMENT — ACTIVITIES OF DAILY LIVING (ADL)
ADLS_ACUITY_SCORE: 35
ADLS_ACUITY_SCORE: 35
ADLS_ACUITY_SCORE: 36
ADLS_ACUITY_SCORE: 37
ADLS_ACUITY_SCORE: 35

## 2024-01-01 ASSESSMENT — ENCOUNTER SYMPTOMS
SEIZURES: 1
ORTHOPNEA: 0
DYSRHYTHMIAS: 0

## 2024-01-01 ASSESSMENT — LIFESTYLE VARIABLES: TOBACCO_USE: 1

## 2024-01-01 NOTE — ED PROVIDER NOTES
History     Chief Complaint:  Abdominal Pain     The history is provided by the patient and the spouse.      Peggy Jessica is a 48 year old female with history of chronic inflammatory demyelinating polyneuropathy, wheelchair-bound, complex regional pain syndrome, gastroparesis, cervical cancer, celiac disease who presents to the ED with her  for evaluation of abdominal pain. Peggy reports severe 9/10 right-sided abdominal pain that radiates to her back onset 3 days ago. She endorses nausea without vomiting. She has ongoing abdominal pain but this is the worst she has experienced. Over the last 6 months her pain seems to be worsening. She obtains most of her daily calories from soft foods and drinks; her pain worsens when she eats solid foods. She last ate stuffing yesterday. She has a trial ostomy scheduled at the end of 2024 for chronic constipation. She states most medications do not improve her symptoms or pain.    Independent Historian:   Spouse/Partner - They report as above.    Review of External Notes:   Discharge summary from 10/15/2023.  Patient admitted that time for mechanical fall, admitted with refractory pain.    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  Complex regional pain syndrome of right lower limb  Fibromyalgia syndrome  Diverticulitis  Celiac disease  BRCA gene mutation positive  Autoimmune disorder  Chronic inflammatory demyelinating polyneuropathy  Physical deconditioning  Multiple falls  Gastroparesis  Urinary and fecal incontinence  Arthritis    Past Surgical History:    Hysterectomy  BSO  Cholecystectomy   section  Mastectomy    Physical Exam   Patient Vitals for the past 24 hrs:   BP Temp Temp src Pulse Resp SpO2 Height Weight   24 0932 (!) 139/93 97.1  F (36.2  C)  "Temporal 90 16 97 % 1.626 m (5' 4\") 72.6 kg (160 lb)      Physical Exam  General: alert, in pain  HENT: mucous membranes moist  CV: regular rate, regular rhythm  Resp: normal effort, clear throughout, no crackles or wheezing  GI: Patient only able to tolerate very light touch in all quadrants, no real palpation tolerated.  MSK: no bony tenderness  Skin: appropriately warm and dry  Extremities: no edema, calves non-tender  Neuro: alert, clear speech, oriented  Psych: normal mood and affect     Emergency Department Course     Imaging:  CT Abdomen Pelvis w Contrast   Final Result   IMPRESSION:    1.  Mobile cecum which is now located in the mid abdomen and previously was in the right lower quadrant, with a swirled appearance of the junction of the cecum and ascending colon. No upstream small bowel distention or evidence for cecal volvulus. The    mobile cecum may account for intermittent abdominal pain. No acute inflammatory findings in the abdomen and pelvis.      XR Colon Water Soluble    (Results Pending)      Report per radiology    Laboratory:  Labs Ordered and Resulted from Time of ED Arrival to Time of ED Departure   COMPREHENSIVE METABOLIC PANEL - Abnormal       Result Value    Sodium 143      Potassium 4.1      Carbon Dioxide (CO2) 28      Anion Gap 8      Urea Nitrogen 8.8      Creatinine 0.89      GFR Estimate 79      Calcium 9.5      Chloride 107      Glucose 113 (*)     Alkaline Phosphatase 95      AST 22      ALT 17      Protein Total 7.3      Albumin 4.1      Bilirubin Total 0.3     LIPASE - Normal    Lipase 20     CBC WITH PLATELETS AND DIFFERENTIAL    WBC Count 4.7      RBC Count 4.35      Hemoglobin 13.5      Hematocrit 40.7      MCV 94      MCH 31.0      MCHC 33.2      RDW 14.6      Platelet Count 249      % Neutrophils 74      % Lymphocytes 20      % Monocytes 6      % Eosinophils 0      % Basophils 0      % Immature Granulocytes 0      NRBCs per 100 WBC 0      Absolute Neutrophils 3.5      Absolute " "Lymphocytes 0.9      Absolute Monocytes 0.3      Absolute Eosinophils 0.0      Absolute Basophils 0.0      Absolute Immature Granulocytes 0.0      Absolute NRBCs 0.0     ROUTINE UA WITH MICROSCOPIC REFLEX TO CULTURE     Procedures   None    Emergency Department Course & Assessments:    Interventions:  Medications   fentaNYL (PF) (SUBLIMAZE) injection 75 mcg (75 mcg Intravenous $Given 1/1/24 1432)   HYDROmorphone (PF) (DILAUDID) injection 0.5 mg (0.5 mg Intravenous $Given 1/1/24 1054)   HYDROmorphone (PF) (DILAUDID) injection 0.5 mg (0.5 mg Intravenous $Given 1/1/24 1300)   ondansetron (ZOFRAN) injection 4 mg (4 mg Intravenous $Given 1/1/24 1300)   iopamidol (ISOVUE-370) solution 81 mL (81 mLs Intravenous $Given 1/1/24 1312)   Saline flush (63 mLs Intravenous $Given 1/1/24 1312)   dextrose 5% and 0.45% NaCl infusion ( Intravenous Rate/Dose Verify 1/1/24 1508)     Assessments:  1012 I obtained history and examined the patient as noted above.  1230 I rechecked the patient and explained findings.     Independent Interpretation (X-rays, CTs, rhythm strip):  I reviewed the CT of the abdomen/pelvis.  No free air.    Consultations/Discussion of Management or Tests:    ED Course as of 01/01/24 1550   Mon Jan 01, 2024   1423 I spoke to Dr. Rojas, who recommends, gastrograffin enema to see if constrast reaches the ileum.   1434 I spoke to Dr. Hardy with IR.  He did come in to do the Gastrografin enema study.  She is currently on route to [facility] for several other emergent cases, and will likely be several hours before she can do the procedure.   1445 Dr. Rojas aware that we are unable to get Gastrografin enema emergently.  Reevaluated the patient.  She continues to complain of severe pain, states \"nothing is helping.\"  On recheck, her abdomen is soft, but she remains focally tender in the right upper and right lower quadrants.       Social Determinants of Health affecting care:   None    Disposition:  The patient was " admitted to the hospital under the care of Dr. May.     Impression & Plan    CMS Diagnoses: None    Medical Decision Making:  Peggy Jessica is a 48 year old female with history of chronic inflammatory demyelinating polyneuropathy, wheelchair-bound, complex regional pain syndrome, gastroparesis, cervical cancer, celiac disease who presents to the ED with her  for evaluation of abdominal pain.  On exam, she has normal vitals.  She has diffuse abdominal tenderness, and is a difficult exam.  However, on several repeat exams, I do not think she has peritonitis.  She is focally tender in the right upper quadrant.  Labs are unremarkable, including white blood cell count.  CT demonstrates a mobile cecum.  I discussed the patient and reviewed scans with Dr. Rojas, who is on-call for colorectal surgery.  She recommends admission to hospitalist service, and Gastrografin enema when possible to further evaluate for need for surgical reduction.  Patient does have history of chronic pain, and pain has been difficult to manage in the ED.  However, on repeat exams, I do not think she has an evolving exam.  Vitals have remained stable.  She will be admitted to the hospital for continued pain management and colorectal surgery evaluation.      Diagnosis:    ICD-10-CM    1. Right upper quadrant abdominal pain  R10.11         Scribe Disclosure:  RAZA, Ilsa Noonan, am serving as a scribe at 10:27 AM on 1/1/2024 to document services personally performed by Isabel Phillips MD based on my observations and the provider's statements to me.   1/1/2024   Isabel Phillips MD Pepper, Tracy Lynn, MD  01/01/24 1554

## 2024-01-01 NOTE — LETTER
Transition Communication Hand-off for Care Transitions to Next Level of Care Provider    Name: Peggy Jessica  : 1975  MRN #: [id number]  Primary Care Provider: will be establishing in [place], [place]     Primary Clinic: will be establishing in [place], [place]   Reason for Hospitalization:  RUQ abdominal pain [R10.11]  Large bowel obstruction (H) [K56.609]  Admit Date/Time: 2024  9:35 AM  Discharge Date: Anticipated 1/15 or   Payor Source: Payor: MEDICA / Plan: MEDICA ESSENTIAL / Product Type: Indemnity /       Key Recommendations:  Please evaluated new home care referral.     Jasmyne Calzada RN   [facility]   Phone [phone], [employer] or [phone]

## 2024-01-01 NOTE — ED NOTES
[facility]  ED Nurse Handoff Report    ED Chief complaint: Abdominal Pain      ED Diagnosis:   Final diagnoses:   None       Code Status: Full Code    Allergies:   Allergies   Allergen Reactions    Dihydroergotamine Anaphylaxis    Latex Anaphylaxis    Shellfish-Derived Products Anaphylaxis    Sumatriptan Anaphylaxis    Compazine [Prochlorperazine] Anxiety    Banana Unknown    Gabapentin Dizziness    Gluten Meal Other (See Comments)     Celiac disease    Keppra [Levetiracetam] Nausea and Vomiting    Kiwi Unknown    Levofloxacin Other (See Comments)     Arrhythmia    Metronidazole Nausea and Vomiting    Nitrofurantoin Hives    Penicillins Hives    Pregabalin     Reglan [Metoclopramide] Other (See Comments)     restlessness/toe tapping     Topiramate Visual Disturbance    Aspirin Rash    Methadone Rash    Morphine Hives     She got hives around are when morphine given but resolved after few minutes per patient     Risperidone Anxiety       Patient Story: Pt is a 48 year old female who presents to the ED with her  for evaluation of abdominal pain. Peggy reports severe 9/10 right-sided abdominal pain that radiates to her back onset 3 days ago. She endorses nausea without vomiting. She has ongoing abdominal pain but this is the worst she has experienced.       Focused Assessment:  Pt is awake, alert and orientated X 4    Treatments and/or interventions provided: Labs, Xray, CT      Patient's response to treatments and/or interventions: Pt tolerated well     To be done/followed up on inpatient unit:      Does this patient have any cognitive concerns?:  A O X 4    Activity level - Baseline/Home:  Independent  Activity Level - Current:   Independent    Patient's Preferred language: English   Needed?: No    Isolation: None  Infection: Not Applicable  Patient tested for COVID 19 prior to admission: YES  Bariatric?: No    Vital Signs:   Vitals:    01/01/24 1254 01/01/24 1256 01/01/24 1348  01/01/24 1430   BP:    121/87   Pulse: 77 76 69 72   Resp: 14 12 16 13   Temp:       TempSrc:       SpO2: 95% 93%     Weight:       Height:           Cardiac Rhythm:     Was the PSS-3 completed:   Yes  What interventions are required if any?               Family Comments:  at  bedside   OBS brochure/video discussed/provided to patient/family: No              Name of person given brochure if not patient:               Relationship to patient:     For the majority of the shift this patient's behavior was Green.   Behavioral interventions performed were .    ED NURSE PHONE NUMBER: *[phone]

## 2024-01-01 NOTE — LETTER
Transition Communication Hand-off for Care Transitions to Next Level of Care Provider    Name: Peggy Jessica  : 1975  MRN #: [id number]  Primary Care Provider: will be establishing in [place], [place]     Primary Clinic: will be establishing in [place], [place]   Reason for Hospitalization:  RUQ abdominal pain [R10.11]  Large bowel obstruction (H) [K56.609]  Admit Date/Time: 2024  9:35 AM  Discharge Date: Anticipated 1/15 or   Payor Source: Payor: MEDICA / Plan: MEDICA ESSENTIAL / Product Type: Indemnity /       Key Recommendations:  Please evaluated new home care referral.     Jasmyne Calzada RN   [facility]   Phone [phone], [employer] or [phone]

## 2024-01-01 NOTE — ED NOTES
"Patient encouraged to drink water to provide a urine sample.  Patient states \"I haven't been able to drink anything for 2 days, I am not going to drink that\" Writer left water at bedside and educated patient on need for urine sample.    "

## 2024-01-01 NOTE — PROCEDURES
RADIOLOGY NOTE:    Gastrografin enema was performed. Images show contrast flows easily through the rectum, sigmoid colon, descending colon, transverse colon and ascending colon. Once contrast gets to the ascending colon in the right lower quadrant in the normal region of the cecum, the cecum is NOT visualized and there is an abrupt cut off of contrast in the ascending colon. After 12 minutes there is still NO contrast going into the cecum or small bowel.     Multiple maneuvers including rotating the patient, using a paddle to compress, etc a tiny thin trickle of contrast seen flowing from the ascending colon into the cecum which is midline and to left lower quadrant, similar to the CT. No definite contrast is seen flowing into the terminal ileum.     Findings are concerning for internal hernia although differential would also include cecal volvulus.      Findings were dicussed with the ED provider and the colorectal attending and fellow.     Jasmyne Hardy, DO  [facility]

## 2024-01-01 NOTE — PHARMACY-ADMISSION MEDICATION HISTORY
Pharmacist Admission Medication History    Admission medication history is complete. The information provided in this note is only as accurate as the sources available at the time of the update.    Information Source(s): Patient via in-person    Pertinent Information:     Changes made to PTA medication list:  Added: Ibsrela  Deleted: Lidocaine patch  Changed: None    Medication Affordability:       Allergies reviewed with patient and updates made in EHR:    Medication History Completed By: Katie Jones [facility] 1/1/2024 2:45 PM    PTA Med List   Medication Sig Last Dose    acetaminophen (TYLENOL) 500 MG tablet Take 500-1,000 mg by mouth every 6 hours as needed for mild pain     baclofen (LIORESAL) 10 MG tablet Take 10 mg by mouth 3 times daily Takes 5 mg morning and early afternoon and between 5/10 at bedtime 12/31/2023    buprenorphine (BUTRANS) 7.5 MCG/HR WK patch Place 1 patch onto the skin once a week Applies on Thursdays at 9 am 12/28/2023    dicyclomine (BENTYL) 10 MG capsule Take 20 mg by mouth 3 times daily (before meals) 12/31/2023    diphenhydrAMINE (BENADRYL) 25 MG tablet Take 0.5 tablets (12.5 mg) by mouth every 6 hours as needed for allergies or other (nausea)     folic acid (FOLVITE) 400 MCG tablet Take 1 tablet (400 mcg) by mouth daily 12/31/2023    hydrOXYzine (ATARAX) 25 MG tablet Take 1 tablet (25 mg) by mouth 3 times daily (before meals) 12/31/2023    medical cannabis (Patient's own supply) Take 1 Dose by mouth See Admin Instructions (The purpose of this order is to document that the patient reports taking medical cannabis.  This is not a prescription, and is not used to certify that the patient has a qualifying medical condition.)  Per pt: 5-10 mg tablet three times a day PRN     memantine (NAMENDA) 10 MG tablet Take 10 mg by mouth 2 times daily 12/31/2023    multivitamin w/minerals (THERA-VIT-M) tablet Take 1 tablet by mouth every morning Megafood Womens Brand 12/31/2023    naloxone (NARCAN) 4  MG/0.1ML nasal spray Spray 1 spray (4 mg) into one nostril alternating nostrils as needed for opioid reversal every 2-3 minutes until assistance arrives     ondansetron (ZOFRAN ODT) 4 MG ODT tab Take 1 tablet (4 mg) by mouth every 8 hours as needed for nausea     pantoprazole (PROTONIX) 40 MG EC tablet Take 1 tablet (40 mg) by mouth every morning (before breakfast) 12/31/2023    polyethylene glycol (MIRALAX) 17 GM/Dose powder Take 17 g by mouth daily Until BM, then use every day prn     scopolamine (TRANSDERM) 1 MG/3DAYS 72 hr patch Place 1 patch onto the skin every 72 hours 12/31/2023    senna (SENOKOT) 8.6 MG tablet Take 1 tablet by mouth as needed     senna-docusate (SENOKOT-S/PERICOLACE) 8.6-50 MG tablet Take 1 tablet by mouth 2 times daily as needed for constipation     sertraline (ZOLOFT) 100 MG tablet Take 1 tablet (100 mg) by mouth daily 12/31/2023    Tenapanor HCl (IBSRELA) 50 MG TABS Take 50 mg by mouth every 48 hours 12/30/2023 at am    vitamin D2 (ERGOCALCIFEROL) 94812 units (1250 mcg) capsule Take 1 capsule (50,000 Units) by mouth once a week On Tuesdays 12/31/2023

## 2024-01-01 NOTE — ED TRIAGE NOTES
Hx gastroparesis, states worsening pain since yesterday.  Denies fevers.       Triage Assessment (Adult)       Row Name 01/01/24 0934          Triage Assessment    Airway WDL WDL        Respiratory WDL    Respiratory WDL WDL        Cardiac WDL    Cardiac WDL WDL        Peripheral/Neurovascular WDL    Peripheral Neurovascular WDL WDL        Cognitive/Neuro/Behavioral WDL    Cognitive/Neuro/Behavioral WDL X

## 2024-01-01 NOTE — H&P
"[facility]    History and Physical - Hospitalist Service       Date of Admission:  1/1/2024    Assessment & Plan      Peggy Jessica is a 48 year old female with complex medical history including chronic pain, fibromyalgia, breast cancer s/p  bilateral mastectomy who was admitted on 1/1/2024 for abdominal pain.     Possible cecal volvulus vs internal hernia  Chronic pain syndrome  10/10 abdominal pain reported with benign exams in ED.  CT of the abdomen and pelvis with contrast showed \"mobile cecum located in the mid abdomen, whereas previously it was in the right lower quadrant.  No upstream small bowel distention or evidence for cecal volvulus.  No acute inflammatory findings.  Moderate amount of formed stool noted in colon.\"  Allergy list includes reactions to morphine, methadone, aspirin, risperidone, Topamax, Reglan, pregabalin, Compazine, gabapentin, dihydroergotamine, sumatriptan.  -admit to observation for pain control, colorectal surgery management  -colorectal surgery consult (patient already seen and examined in the emergency department by Dr. Rojas)  -NPO  -pain medications and prn antiemetics  -gastrograffin enema ordered per Dr. Rojas in ED. Per ED attending, IR will be able to administer yet today.   -Went IR for GGE. Findings concerning for internal hernia or cecal volvulus.   -Patient moved to observation unit (initially requested bed) but now appears going to OR this evening for ex lap with possible bowel resection and possible stoma.    Chronic inflammatory demyelinating polyneuropathy  Fibromyalgia  Chronic daily headaches  Wheelchair dependency  Frequent falls  Chronic urinary and fecal incontinence  -resume PTA regimen once verified    Breast cancer  Status post bilateral mastectomy  Noted    Polypharmacy  Multiple drug allergies  Noted          Diet: NPO per Anesthesia Guidelines for Procedure/Surgery Except for: No Exceptions    DVT Prophylaxis: Ambulate every " "shift and observation status  Kwan Catheter: Not present  Lines: None     Cardiac Monitoring: None  Code Status:  full    Clinically Significant Risk Factors Present on Admission                       # Overweight: Estimated body mass index is 27.46 kg/m  as calculated from the following:    Height as of this encounter: 1.626 m (5' 4\").    Weight as of this encounter: 72.6 kg (160 lb).       # Financial/Environmental Concerns:           Disposition Plan      Expected Discharge Date: 01/02/2024                  Geri Keller MD  Hospitalist Service  [facility]  Securely message with [employer] (more info)  Text page via [facility] Paging/Directory     ______________________________________________________________________    Chief Complaint   Abdominal pain    History is obtained from the patient, electronic health record, and emergency department physician    History of Present Illness   Peggy Jessica is a 48 year old female with complex medical history including chronic pain, fibromyalgia, breast cancer s/p bilateral mastectomies who presented with abdominal pain. Reports 9-10/10 intensity pain in upper abdomen and right upper quadrant. Afebrile. Labs unremarkable and normal WBC. Vital signs stable. Did not get to talk with her very long before she was brought to Interventional Radiology and then on her way to OR.     Follows with [facility] pain clinic for multiple chronic pain symptoms.  Pain regimen includes baclofen, buprenorphine, Bentyl, medical cannabis, tenapanor (for IBS symptoms), bowel regimen.    Case discussed with Dr. Hollis from the emergency department.  Dr. Phillips's note reviewed.    Past Medical History    Past Medical History:   Diagnosis Date    Arthritis     BRCA positive     Cancer (H)     CIDP (chronic inflammatory demyelinating polyneuropathy) (H)     ASHKAN III with severe dysplasia 2002    Complex regional pain syndrome type 1 of right lower extremity  "       Past Surgical History   Past Surgical History:   Procedure Laterality Date    BILATERAL OOPHORECTOMY Bilateral 2019    c section      2002    CHOLECYSTECTOMY  1997    COLONOSCOPY      ESOPHAGOSCOPY, GASTROSCOPY, DUODENOSCOPY (EGD), COMBINED N/A 07/28/2022    Procedure: ESOPHAGOGASTRODUODENOSCOPY (EGD);  Surgeon: Zack Maddox MD;  Location:  GI    ESOPHAGOSCOPY, GASTROSCOPY, DUODENOSCOPY (EGD), COMBINED N/A 5/16/2023    Procedure: Esophagoscopy, gastroscopy, duodenoscopy (EGD), combined;  Surgeon: Aris Mason MD;  Location:  GI    ESOPHAGOSCOPY, GASTROSCOPY, DUODENOSCOPY (EGD), COMBINED N/A 7/20/2023    Procedure: Esophagoscopy, gastroscopy, duodenoscopy (EGD), combined;  Surgeon: Aris Mason MD;  Location:  GI    HYSTERECTOMY  2004    MASTECTOMY Bilateral 2020       Prior to Admission Medications   Prior to Admission Medications   Prescriptions Last Dose Informant Patient Reported? Taking?   Tenapanor HCl (IBSRELA) 50 MG TABS 12/30/2023 at am  Yes Yes   Sig: Take 50 mg by mouth every 48 hours   acetaminophen (TYLENOL) 500 MG tablet  Self Yes Yes   Sig: Take 500-1,000 mg by mouth every 6 hours as needed for mild pain   baclofen (LIORESAL) 10 MG tablet 12/31/2023  Yes Yes   Sig: Take 10 mg by mouth 3 times daily Takes 5 mg morning and early afternoon and between 5/10 at bedtime   bisacodyl (DULCOLAX) 10 MG suppository prn  No No   Sig: Place 1 suppository (10 mg) rectally daily as needed for constipation   buprenorphine (BUTRANS) 7.5 MCG/HR WK patch 12/28/2023 Self Yes Yes   Sig: Place 1 patch onto the skin once a week Applies on Thursdays at 9 am   dicyclomine (BENTYL) 10 MG capsule 12/31/2023 Self Yes Yes   Sig: Take 20 mg by mouth 3 times daily (before meals)   diphenhydrAMINE (BENADRYL) 25 MG tablet  Self No Yes   Sig: Take 0.5 tablets (12.5 mg) by mouth every 6 hours as needed for allergies or other (nausea)   folic acid (FOLVITE) 400 MCG tablet 12/31/2023 Self No Yes    Sig: Take 1 tablet (400 mcg) by mouth daily   hydrOXYzine (ATARAX) 25 MG tablet 12/31/2023 Self No Yes   Sig: Take 1 tablet (25 mg) by mouth 3 times daily (before meals)   medical cannabis (Patient's own supply)  Self Yes Yes   Sig: Take 1 Dose by mouth See Admin Instructions (The purpose of this order is to document that the patient reports taking medical cannabis.  This is not a prescription, and is not used to certify that the patient has a qualifying medical condition.)  Per pt: 5-10 mg tablet three times a day PRN   memantine (NAMENDA) 10 MG tablet 12/31/2023  Yes Yes   Sig: Take 10 mg by mouth 2 times daily   multivitamin w/minerals (THERA-VIT-M) tablet 12/31/2023 Self Yes Yes   Sig: Take 1 tablet by mouth every morning Meg[employer] Wom[employer] Brand   naloxone (NARCAN) 4 MG/0.1ML nasal spray  Self No Yes   Sig: Spray 1 spray (4 mg) into one nostril alternating nostrils as needed for opioid reversal every 2-3 minutes until assistance arrives   ondansetron (ZOFRAN ODT) 4 MG ODT tab   No Yes   Sig: Take 1 tablet (4 mg) by mouth every 8 hours as needed for nausea   pantoprazole (PROTONIX) 40 MG EC tablet 12/31/2023 Self No Yes   Sig: Take 1 tablet (40 mg) by mouth every morning (before breakfast)   polyethylene glycol (MIRALAX) 17 GM/Dose powder   No Yes   Sig: Take 17 g by mouth daily Until BM, then use every day prn   scopolamine (TRANSDERM) 1 MG/3DAYS 72 hr patch 12/31/2023  No Yes   Sig: Place 1 patch onto the skin every 72 hours   senna (SENOKOT) 8.6 MG tablet  Self Yes Yes   Sig: Take 1 tablet by mouth as needed   senna-docusate (SENOKOT-S/PERICOLACE) 8.6-50 MG tablet  Self Yes Yes   Sig: Take 1 tablet by mouth 2 times daily as needed for constipation   sertraline (ZOLOFT) 100 MG tablet 12/31/2023 Self No Yes   Sig: Take 1 tablet (100 mg) by mouth daily   vitamin D2 (ERGOCALCIFEROL) 12863 units (1250 mcg) capsule 12/31/2023  No Yes   Sig: Take 1 capsule (50,000 Units) by mouth once a week On Tuesdays       Facility-Administered Medications: None        Review of Systems    The 10 point Review of Systems is negative other than noted in the HPI or here.      Physical Exam   Vital Signs: Temp: 97.1  F (36.2  C) Temp src: Temporal BP: 121/87 Pulse: 66   Resp: 10 SpO2: 94 %      Weight: 160 lbs 0 oz    General Appearance: uncomfortable, pleasant  Eyes: sclerae anicteric  HEENT: mmm  Respiratory: clear, no wheezing or crackles  Cardiovascular: regular, no murmur  GI: +hyperesthesia to skin of abdomen, +guarding, no rebound tenderness  Skin: no acute rashes or lesions, no jaundice  Neurologic: no tremors, speech normal, face symmetric  Psychiatric: calm affect, no obvious thought disorder or confusion    Medical Decision Making       MANAGEMENT DISCUSSED with the following over the past 24 hours: patient, bedside nurse in ED, ED physician   NOTE(S)/MEDICAL RECORDS REVIEWED over the past 24 hours: colorectal surgery note, labs, imaging results  Tests ORDERED & REVIEWED in the past 24 hours:  - See lab/imaging results included in the data section of the note      Data     I have personally reviewed the following data over the past 24 hrs:    4.7  \   13.5   / 249     143 107 8.8 /  113 (H)   4.1 28 0.89 \     ALT: 17 AST: 22 AP: 95 TBILI: 0.3   ALB: 4.1 TOT PROTEIN: 7.3 LIPASE: 20       Imaging results reviewed over the past 24 hrs:   Recent Results (from the past 24 hour(s))   CT Abdomen Pelvis w Contrast    Narrative    EXAM: CT ABDOMEN PELVIS W CONTRAST  LOCATION: [facility]  DATE: 1/1/2024    INDICATION: acute on chronic abdominal pain  COMPARISON: 10/11/2023  TECHNIQUE: CT scan of the abdomen and pelvis was performed following injection of IV contrast. Multiplanar reformats were obtained. Dose reduction techniques were used.  CONTRAST: 81mL Isovue 370    FINDINGS:   LOWER CHEST: Mild linear atelectasis in the lung bases.    HEPATOBILIARY: Few simple cysts in the liver are stable. Mild  intrahepatic and extrahepatic biliary ductal dilatation is stable and may be related to postcholecystectomy reservoir effect. Cholecystectomy.    PANCREAS: Normal.    SPLEEN: Normal.    ADRENAL GLANDS: Normal.    KIDNEYS/BLADDER: Normal.    BOWEL: No small bowel or colonic obstruction or inflammatory changes. The cecum is located in the midabdomen, previously the right lower quadrant with a mobile cecum. Mildly swirled appearance of the junction of the cecum and ascending colon (coronal   series 5, image 39-40). No evidence for cecal volvulus or upstream obstruction. Normal appendix. Moderate amount of formed stool in the colon.    LYMPH NODES: No lymphadenopathy.    VASCULATURE: No abdominal aortic aneurysm.    PELVIC ORGANS: Hysterectomy. No pelvic masses. No free fluid or fluid collections. No free air.    MUSCULOSKELETAL: Normal.      Impression    IMPRESSION:   1.  Mobile cecum which is now located in the mid abdomen and previously was in the right lower quadrant, with a swirled appearance of the junction of the cecum and ascending colon. No upstream small bowel distention or evidence for cecal volvulus. The   mobile cecum may account for intermittent abdominal pain. No acute inflammatory findings in the abdomen and pelvis.

## 2024-01-02 LAB
ALBUMIN UR-MCNC: 30 MG/DL
ANION GAP SERPL CALCULATED.3IONS-SCNC: 10 MMOL/L (ref 7–15)
APPEARANCE UR: CLEAR
BILIRUB UR QL STRIP: NEGATIVE
BUN SERPL-MCNC: 10.5 MG/DL (ref 6–20)
CALCIUM SERPL-MCNC: 9.3 MG/DL (ref 8.6–10)
CHLORIDE SERPL-SCNC: 101 MMOL/L (ref 98–107)
COLOR UR AUTO: YELLOW
CREAT SERPL-MCNC: 0.68 MG/DL (ref 0.51–0.95)
DEPRECATED HCO3 PLAS-SCNC: 28 MMOL/L (ref 22–29)
EGFRCR SERPLBLD CKD-EPI 2021: >90 ML/MIN/1.73M2
ERYTHROCYTE [DISTWIDTH] IN BLOOD BY AUTOMATED COUNT: 14.7 % (ref 10–15)
GLUCOSE SERPL-MCNC: 153 MG/DL (ref 70–99)
GLUCOSE UR STRIP-MCNC: NEGATIVE MG/DL
HCT VFR BLD AUTO: 37.9 % (ref 35–47)
HGB BLD-MCNC: 12.9 G/DL (ref 11.7–15.7)
HGB UR QL STRIP: NEGATIVE
KETONES UR STRIP-MCNC: 100 MG/DL
LEUKOCYTE ESTERASE UR QL STRIP: NEGATIVE
MAGNESIUM SERPL-MCNC: 1.7 MG/DL (ref 1.7–2.3)
MCH RBC QN AUTO: 31.5 PG (ref 26.5–33)
MCHC RBC AUTO-ENTMCNC: 34 G/DL (ref 31.5–36.5)
MCV RBC AUTO: 93 FL (ref 78–100)
MUCOUS THREADS #/AREA URNS LPF: PRESENT /LPF
NITRATE UR QL: NEGATIVE
PH UR STRIP: 7.5 [PH] (ref 5–7)
PHOSPHATE SERPL-MCNC: 3.4 MG/DL (ref 2.5–4.5)
PLATELET # BLD AUTO: 262 10E3/UL (ref 150–450)
POTASSIUM SERPL-SCNC: 3.7 MMOL/L (ref 3.4–5.3)
RBC # BLD AUTO: 4.09 10E6/UL (ref 3.8–5.2)
RBC URINE: 2 /HPF
SODIUM SERPL-SCNC: 139 MMOL/L (ref 135–145)
SP GR UR STRIP: 1.04 (ref 1–1.03)
UROBILINOGEN UR STRIP-MCNC: 2 MG/DL
WBC # BLD AUTO: 13.6 10E3/UL (ref 4–11)
WBC URINE: 2 /HPF

## 2024-01-02 PROCEDURE — 85027 COMPLETE CBC AUTOMATED: CPT | Performed by: COLON & RECTAL SURGERY

## 2024-01-02 PROCEDURE — 250N000013 HC RX MED GY IP 250 OP 250 PS 637: Performed by: HOSPITALIST

## 2024-01-02 PROCEDURE — 258N000003 HC RX IP 258 OP 636: Performed by: COLON & RECTAL SURGERY

## 2024-01-02 PROCEDURE — 84100 ASSAY OF PHOSPHORUS: CPT | Performed by: COLON & RECTAL SURGERY

## 2024-01-02 PROCEDURE — 250N000013 HC RX MED GY IP 250 OP 250 PS 637: Performed by: COLON & RECTAL SURGERY

## 2024-01-02 PROCEDURE — 250N000011 HC RX IP 250 OP 636: Performed by: COLON & RECTAL SURGERY

## 2024-01-02 PROCEDURE — 80048 BASIC METABOLIC PNL TOTAL CA: CPT | Performed by: COLON & RECTAL SURGERY

## 2024-01-02 PROCEDURE — 83735 ASSAY OF MAGNESIUM: CPT | Performed by: COLON & RECTAL SURGERY

## 2024-01-02 PROCEDURE — 250N000013 HC RX MED GY IP 250 OP 250 PS 637: Performed by: STUDENT IN AN ORGANIZED HEALTH CARE EDUCATION/TRAINING PROGRAM

## 2024-01-02 PROCEDURE — 999N000127 HC STATISTIC PERIPHERAL IV START W US GUIDANCE

## 2024-01-02 PROCEDURE — 36415 COLL VENOUS BLD VENIPUNCTURE: CPT | Performed by: COLON & RECTAL SURGERY

## 2024-01-02 PROCEDURE — 120N000001 HC R&B MED SURG/OB

## 2024-01-02 PROCEDURE — 250N000011 HC RX IP 250 OP 636: Performed by: HOSPITALIST

## 2024-01-02 PROCEDURE — 81001 URINALYSIS AUTO W/SCOPE: CPT | Performed by: COLON & RECTAL SURGERY

## 2024-01-02 PROCEDURE — 99233 SBSQ HOSP IP/OBS HIGH 50: CPT | Performed by: HOSPITALIST

## 2024-01-02 PROCEDURE — 999N000198 HC STATISTIC WOC PT EDUCATION, 16-30 MIN

## 2024-01-02 RX ORDER — ONDANSETRON 2 MG/ML
4 INJECTION INTRAMUSCULAR; INTRAVENOUS EVERY 6 HOURS PRN
Status: DISCONTINUED | OUTPATIENT
Start: 2024-01-02 | End: 2024-01-02

## 2024-01-02 RX ORDER — HYDROMORPHONE HCL IN WATER/PF 6 MG/30 ML
0.2 PATIENT CONTROLLED ANALGESIA SYRINGE INTRAVENOUS
Status: DISCONTINUED | OUTPATIENT
Start: 2024-01-02 | End: 2024-01-03

## 2024-01-02 RX ORDER — ENOXAPARIN SODIUM 100 MG/ML
40 INJECTION SUBCUTANEOUS EVERY 24 HOURS
Status: DISCONTINUED | OUTPATIENT
Start: 2024-01-02 | End: 2024-01-16 | Stop reason: HOSPADM

## 2024-01-02 RX ORDER — SODIUM CHLORIDE, SODIUM LACTATE, POTASSIUM CHLORIDE, CALCIUM CHLORIDE 600; 310; 30; 20 MG/100ML; MG/100ML; MG/100ML; MG/100ML
INJECTION, SOLUTION INTRAVENOUS CONTINUOUS
Status: DISCONTINUED | OUTPATIENT
Start: 2024-01-02 | End: 2024-01-05

## 2024-01-02 RX ORDER — ONDANSETRON 2 MG/ML
8 INJECTION INTRAMUSCULAR; INTRAVENOUS EVERY 6 HOURS PRN
Status: DISCONTINUED | OUTPATIENT
Start: 2024-01-02 | End: 2024-01-16 | Stop reason: HOSPADM

## 2024-01-02 RX ORDER — BACLOFEN 10 MG/1
10 TABLET ORAL ONCE
Status: COMPLETED | OUTPATIENT
Start: 2024-01-02 | End: 2024-01-02

## 2024-01-02 RX ORDER — OXYCODONE HYDROCHLORIDE 5 MG/1
10 TABLET ORAL EVERY 4 HOURS PRN
Status: DISCONTINUED | OUTPATIENT
Start: 2024-01-02 | End: 2024-01-02

## 2024-01-02 RX ORDER — ACETAMINOPHEN 325 MG/1
975 TABLET ORAL EVERY 8 HOURS
Status: DISCONTINUED | OUTPATIENT
Start: 2024-01-02 | End: 2024-01-02

## 2024-01-02 RX ORDER — PANTOPRAZOLE SODIUM 40 MG/1
40 TABLET, DELAYED RELEASE ORAL
Status: DISCONTINUED | OUTPATIENT
Start: 2024-01-02 | End: 2024-01-03

## 2024-01-02 RX ORDER — SODIUM CHLORIDE, SODIUM LACTATE, POTASSIUM CHLORIDE, CALCIUM CHLORIDE 600; 310; 30; 20 MG/100ML; MG/100ML; MG/100ML; MG/100ML
INJECTION, SOLUTION INTRAVENOUS CONTINUOUS
Status: DISCONTINUED | OUTPATIENT
Start: 2024-01-02 | End: 2024-01-02

## 2024-01-02 RX ORDER — ACETAMINOPHEN 325 MG/1
975 TABLET ORAL EVERY 8 HOURS
Status: COMPLETED | OUTPATIENT
Start: 2024-01-02 | End: 2024-01-04

## 2024-01-02 RX ORDER — BUPRENORPHINE 5 UG/H
1 PATCH TRANSDERMAL WEEKLY
Status: DISCONTINUED | OUTPATIENT
Start: 2024-01-02 | End: 2024-01-03

## 2024-01-02 RX ORDER — ONDANSETRON 4 MG/1
4 TABLET, ORALLY DISINTEGRATING ORAL EVERY 6 HOURS PRN
Status: DISCONTINUED | OUTPATIENT
Start: 2024-01-02 | End: 2024-01-02

## 2024-01-02 RX ORDER — HYDROXYZINE HYDROCHLORIDE 25 MG/1
25 TABLET, FILM COATED ORAL 3 TIMES DAILY PRN
Status: DISCONTINUED | OUTPATIENT
Start: 2024-01-02 | End: 2024-01-16 | Stop reason: HOSPADM

## 2024-01-02 RX ORDER — OXYCODONE HYDROCHLORIDE 5 MG/1
15 TABLET ORAL EVERY 4 HOURS PRN
Status: DISCONTINUED | OUTPATIENT
Start: 2024-01-02 | End: 2024-01-02

## 2024-01-02 RX ORDER — MEMANTINE HYDROCHLORIDE 10 MG/1
10 TABLET ORAL 2 TIMES DAILY
Status: DISCONTINUED | OUTPATIENT
Start: 2024-01-02 | End: 2024-01-16 | Stop reason: HOSPADM

## 2024-01-02 RX ORDER — ACETAMINOPHEN 325 MG/1
650 TABLET ORAL EVERY 4 HOURS PRN
Status: DISCONTINUED | OUTPATIENT
Start: 2024-01-04 | End: 2024-01-16 | Stop reason: HOSPADM

## 2024-01-02 RX ORDER — HYDROMORPHONE HYDROCHLORIDE 1 MG/ML
0.5 INJECTION, SOLUTION INTRAMUSCULAR; INTRAVENOUS; SUBCUTANEOUS
Status: DISCONTINUED | OUTPATIENT
Start: 2024-01-02 | End: 2024-01-02

## 2024-01-02 RX ORDER — HYDROMORPHONE HYDROCHLORIDE 2 MG/1
4 TABLET ORAL EVERY 4 HOURS PRN
Status: DISCONTINUED | OUTPATIENT
Start: 2024-01-02 | End: 2024-01-03

## 2024-01-02 RX ORDER — ACETAMINOPHEN 325 MG/1
650 TABLET ORAL ONCE
Status: COMPLETED | OUTPATIENT
Start: 2024-01-02 | End: 2024-01-02

## 2024-01-02 RX ORDER — BACLOFEN 10 MG/1
5 TABLET ORAL 3 TIMES DAILY
Status: DISCONTINUED | OUTPATIENT
Start: 2024-01-02 | End: 2024-01-16 | Stop reason: HOSPADM

## 2024-01-02 RX ORDER — HYDROMORPHONE HYDROCHLORIDE 2 MG/1
6 TABLET ORAL EVERY 4 HOURS PRN
Status: DISCONTINUED | OUTPATIENT
Start: 2024-01-02 | End: 2024-01-03

## 2024-01-02 RX ORDER — BACLOFEN 10 MG/1
5 TABLET ORAL DAILY PRN
Status: DISCONTINUED | OUTPATIENT
Start: 2024-01-02 | End: 2024-01-16 | Stop reason: HOSPADM

## 2024-01-02 RX ORDER — LIDOCAINE 40 MG/G
CREAM TOPICAL
Status: DISCONTINUED | OUTPATIENT
Start: 2024-01-02 | End: 2024-01-02

## 2024-01-02 RX ORDER — LIDOCAINE 40 MG/G
CREAM TOPICAL
Status: DISCONTINUED | OUTPATIENT
Start: 2024-01-02 | End: 2024-01-16 | Stop reason: HOSPADM

## 2024-01-02 RX ORDER — ONDANSETRON 4 MG/1
8 TABLET, ORALLY DISINTEGRATING ORAL EVERY 6 HOURS PRN
Status: DISCONTINUED | OUTPATIENT
Start: 2024-01-02 | End: 2024-01-16 | Stop reason: HOSPADM

## 2024-01-02 RX ORDER — HYDROMORPHONE HYDROCHLORIDE 1 MG/ML
0.5 INJECTION, SOLUTION INTRAMUSCULAR; INTRAVENOUS; SUBCUTANEOUS
Status: DISCONTINUED | OUTPATIENT
Start: 2024-01-02 | End: 2024-01-03

## 2024-01-02 RX ORDER — ENOXAPARIN SODIUM 100 MG/ML
40 INJECTION SUBCUTANEOUS EVERY 24 HOURS
Status: DISCONTINUED | OUTPATIENT
Start: 2024-01-02 | End: 2024-01-02

## 2024-01-02 RX ORDER — HYDROMORPHONE HCL IN WATER/PF 6 MG/30 ML
0.2 PATIENT CONTROLLED ANALGESIA SYRINGE INTRAVENOUS
Status: DISCONTINUED | OUTPATIENT
Start: 2024-01-02 | End: 2024-01-02

## 2024-01-02 RX ORDER — ACETAMINOPHEN 325 MG/1
650 TABLET ORAL EVERY 4 HOURS PRN
Status: DISCONTINUED | OUTPATIENT
Start: 2024-01-05 | End: 2024-01-02

## 2024-01-02 RX ADMIN — HYDROMORPHONE HYDROCHLORIDE 0.5 MG: 1 INJECTION, SOLUTION INTRAMUSCULAR; INTRAVENOUS; SUBCUTANEOUS at 09:55

## 2024-01-02 RX ADMIN — ACETAMINOPHEN 975 MG: 325 TABLET, FILM COATED ORAL at 16:43

## 2024-01-02 RX ADMIN — ACETAMINOPHEN 650 MG: 325 TABLET, FILM COATED ORAL at 04:13

## 2024-01-02 RX ADMIN — MEMANTINE 10 MG: 10 TABLET ORAL at 09:55

## 2024-01-02 RX ADMIN — ONDANSETRON 4 MG: 2 INJECTION INTRAMUSCULAR; INTRAVENOUS at 10:51

## 2024-01-02 RX ADMIN — ONDANSETRON 8 MG: 2 INJECTION INTRAMUSCULAR; INTRAVENOUS at 21:25

## 2024-01-02 RX ADMIN — ACETAMINOPHEN 975 MG: 325 TABLET, FILM COATED ORAL at 23:52

## 2024-01-02 RX ADMIN — HYDROXYZINE HYDROCHLORIDE 25 MG: 25 TABLET, FILM COATED ORAL at 18:11

## 2024-01-02 RX ADMIN — HYDROMORPHONE HYDROCHLORIDE 0.5 MG: 1 INJECTION, SOLUTION INTRAMUSCULAR; INTRAVENOUS; SUBCUTANEOUS at 16:56

## 2024-01-02 RX ADMIN — SERTRALINE HYDROCHLORIDE 100 MG: 50 TABLET ORAL at 09:55

## 2024-01-02 RX ADMIN — BACLOFEN 5 MG: 10 TABLET ORAL at 16:43

## 2024-01-02 RX ADMIN — BACLOFEN 10 MG: 10 TABLET ORAL at 04:14

## 2024-01-02 RX ADMIN — HYDROMORPHONE HYDROCHLORIDE 0.5 MG: 1 INJECTION, SOLUTION INTRAMUSCULAR; INTRAVENOUS; SUBCUTANEOUS at 14:20

## 2024-01-02 RX ADMIN — BACLOFEN 5 MG: 10 TABLET ORAL at 21:25

## 2024-01-02 RX ADMIN — ONDANSETRON 8 MG: 2 INJECTION INTRAMUSCULAR; INTRAVENOUS at 15:29

## 2024-01-02 RX ADMIN — HYDROMORPHONE HYDROCHLORIDE 0.5 MG: 1 INJECTION, SOLUTION INTRAMUSCULAR; INTRAVENOUS; SUBCUTANEOUS at 23:52

## 2024-01-02 RX ADMIN — HYDROMORPHONE HYDROCHLORIDE 0.5 MG: 1 INJECTION, SOLUTION INTRAMUSCULAR; INTRAVENOUS; SUBCUTANEOUS at 21:25

## 2024-01-02 RX ADMIN — SODIUM CHLORIDE, POTASSIUM CHLORIDE, SODIUM LACTATE AND CALCIUM CHLORIDE: 600; 310; 30; 20 INJECTION, SOLUTION INTRAVENOUS at 05:34

## 2024-01-02 RX ADMIN — ONDANSETRON 4 MG: 2 INJECTION INTRAMUSCULAR; INTRAVENOUS at 04:42

## 2024-01-02 RX ADMIN — HYDROMORPHONE HYDROCHLORIDE 0.2 MG: 0.2 INJECTION, SOLUTION INTRAMUSCULAR; INTRAVENOUS; SUBCUTANEOUS at 05:29

## 2024-01-02 RX ADMIN — MEMANTINE 10 MG: 10 TABLET ORAL at 21:25

## 2024-01-02 RX ADMIN — PANTOPRAZOLE SODIUM 40 MG: 40 TABLET, DELAYED RELEASE ORAL at 09:55

## 2024-01-02 RX ADMIN — SODIUM CHLORIDE, POTASSIUM CHLORIDE, SODIUM LACTATE AND CALCIUM CHLORIDE: 600; 310; 30; 20 INJECTION, SOLUTION INTRAVENOUS at 18:11

## 2024-01-02 RX ADMIN — HYDROMORPHONE HYDROCHLORIDE 0.2 MG: 0.2 INJECTION, SOLUTION INTRAMUSCULAR; INTRAVENOUS; SUBCUTANEOUS at 01:03

## 2024-01-02 RX ADMIN — HYDROMORPHONE HYDROCHLORIDE 0.5 MG: 1 INJECTION, SOLUTION INTRAMUSCULAR; INTRAVENOUS; SUBCUTANEOUS at 19:13

## 2024-01-02 RX ADMIN — ACETAMINOPHEN 975 MG: 325 TABLET, FILM COATED ORAL at 08:20

## 2024-01-02 RX ADMIN — BACLOFEN 5 MG: 10 TABLET ORAL at 10:50

## 2024-01-02 ASSESSMENT — ACTIVITIES OF DAILY LIVING (ADL)
ADLS_ACUITY_SCORE: 34
ADLS_ACUITY_SCORE: 33
ADLS_ACUITY_SCORE: 34

## 2024-01-02 NOTE — CONSULTS
"[facility] Nurse Inpatient Assessment     Consulted for:  New loop ileostomy    Current status:  resting in bed, reports feeling tired and sore;  present and supportive at bedside.  Small runny brown stool in pouch.  Pouch left intact; plan for full pouch change and teaching session tomorrow Wed 1/3 around noon when  present.     Patient History (according to provider note(s):      48 F hx of cervical cancer s/p radiation with long standing constipation and mobile cecum. No volvulus noted at the time of OR.      S/p ex lap and loop ileostomy for diversion.     Areas Assessed:      Areas visualized during today's visit: Abdomen    Assessment of new loop Ileostomy:  Diagnosis Pertinent to Stoma:  transit issues       Surgery Date: 1-1-24  Surgeon: Dr. Smith [facility]: [facility]    Pouching system in place on assessment today: [place] one piece and flat   Pouch barrier status: intact  Pouch last changed/wear time: post-op 1/1  Reason for pouch change today: pouch not changed today  Effectiveness of current pouching/ supply plan:  will change plan at first pouch change  Change made with ostomy management today: No  Pouching system placed/ in place today: Lucio one piece and flat   Supplies: supplies stored on unit    Last photo: 1-2-24      Stoma location: RLQ  Stoma size: approx 1 1/4\" as seen through pouch      Stoma appearance: viable, pink, and round, mild protrusion, lumen tipped toward 6 o'clock  Mucocutaneous junction:  not visualized (barrier in place)  Peristomal complication(s): area not visualized, barrier in place  Output: thin and brown  Output volume emptied during visit: 0ml    Abdominal assessment: Soft  Surgical site(s):  glued incision clear and intact  NG still in place? No  Pain: tender, aching  Is patient still on a PCA? No    Ostomy education assessment:  Participant of teaching session today: patient  and spouse  Education " completed today: Introduction to pouches, Low fiber diet , Infection prevention/hygiene , Hernia prevention, and Lifestyle adjustments   Educational materials/methods: Verbal,  hand out, [place] education hand out, and Left packet of information at bedside   Education still needed: Initial fitting, Stoma assessment, Pouching system assessment , Evaluate leakage issue, Refitting of appliance , Adjustment of pouching plan, Pouch change demonstration, Pouch change return demonstration, Ostomy accessory product use , Peristomal skin care, Pouch emptying demonstration, Pouch emptying return demonstration, Intake and output recording, Fluid and electrolyte balance , Importance of hydration, When to seek medical attention, Odor/flatus management , and Discharge instructions    Learning Comprehension:   Psychosocial assessment: pt states she has several friends with similar digestive issues that also have ostomies, so this is not a new concept for her.   present and supportive, wishing to be part of the education, stating he may be the one performing cares in the future.  1/2 Pt not feeling ready to try and look at her own stoma/ abdomen.    Patient readiness for education today: distracted  Following today's visit: patient  and spouse is able to demonstrate;         1. How to empty their pouch? No        2. How to change their pouch? No        3. How to read and record intake and output correctly? No    Preparation for discharge completed: No discharge preparation started yet  Preparation for discharge still needed: Placed prescription recommendations in discharge navigator for MD to sign, Ensured patient has extra supplies for discharge, Discussed how to order supplies after discharge , Ordered samples from  after gaining consent from patient/caregiver, Discussed how and when to make an outpatient WOC nurse appointment after discharge, Prepared for discharge home with home care, and  "Discuss signs/symptoms of when to seek medical attention  Pt support system on discharge: , and possibly oldest son  WO recommend home care? Yes       Treatment Plan:     RLQ Ileostomy pouching plan:   Pouching system: ostomy supplies pouches: Lucio 57 FECAL ([id number]) ostomy supplies barrier: [place] 57mm SOFT CONVEX ([id number])  Accessories used: [facility] ostomy accessories: 2\" Cera Barrier Ring ([id number])   Frequency of pouch changes: PRN leakage and Three times a week  WOC follow up plan: Daily Monday-Friday (as able) and As needed   Bedside RN interventions: Change pouch PRN if leaking using the supplies above, Empty pouch when 1/3 to 1/2 full, ensure to clean pouch outlet after emptying to prevent odor, Notify WOC for ongoing pouch leakage, Document stoma appearance and output volume, color, and consistency every shift, Encourage patient to empty pouch with assist, and Assist patient to measure and record output      Orders: Written    RECOMMEND PRIMARY TEAM ORDER: None, at this time  Education provided: plan of care  Discussed plan of care with: Patient, Family, and Nurse  WO nurse follow-up plan:  daily Mon - Fri as able  Notify WOC if wound(s) deteriorate.  Nursing to notify the Provider(s) and re-consult the WO Nurse if new skin concern.    DATA:     Current support surface: Standard  Standard gel/foam mattress (IsoFlex, Atmos air, etc)  Containment of urine/stool: Indwelling catheter and Ileostomy pouch  BMI: Body mass index is 28.76 kg/m .   Active diet order: Orders Placed This Encounter      Full Liquid Diet     Output: I/O last 3 completed shifts:  In: 2139.4 [I.V.:2139.4]  Out: 480 [Urine:350; Stool:125; Blood:5]     Labs:   Recent Labs   Lab 01/02/24  0603 01/01/24  1043   ALBUMIN  --  4.1   HGB 12.9 13.5   WBC 13.6* 4.7     Pressure injury risk assessment:   Sensory Perception: 3-->slightly limited  Moisture: 4-->rarely moist  Activity: 2-->chairfast  Mobility: 3-->slightly limited  Nutrition: " 2-->probably inadequate  Friction and Shear: 2-->potential problem  Gabriele Score: 16    Aracelis Moya RN CWOCN  -Securely message with [employer] ([facility] [employer])   -[facility] Office Phone: [phone] (messages checked periodically Mon-Fri 8a-4p)

## 2024-01-02 NOTE — ANESTHESIA PROCEDURE NOTES
"TAP Procedure Note    Pre-Procedure   Staff -        Anesthesiologist:  Avis Huffman MD       Performed By: anesthesiologist       Location: OR       Pre-Anesthestic Checklist: patient identified, IV checked, site marked, risks and benefits discussed, informed consent, monitors and equipment checked, pre-op evaluation, at physician/surgeon's request and post-op pain management  Timeout:       Correct Patient: Yes        Correct Procedure: Yes        Correct Site: Yes        Correct Position: Yes        Correct Laterality: Yes        Site Marked: Yes  Procedure Documentation  Procedure: TAP       Diagnosis: PARTIAL SMALL BOWEL OBSTRUCTION,EX LAP       Laterality: right       Patient Position: supine       Patient Prep/Sterile Barriers: mask       Skin prep: Chloraprep       Needle Type: short bevel       Needle Gauge: 21.        Needle Length (Inches): 3.13        Ultrasound guided       1. Ultrasound was used to identify targeted nerve, plexus, vascular marker, or fascial plane and place a needle adjacent to it in real-time.       2. Ultrasound was used to visualize the spread of anesthetic in close proximity to the above referenced structure.       3. A permanent image is entered into the patient's record.       4. The visualized anatomic structures appeared normal.       5. There were no apparent abnormal pathologic findings.    Assessment/Narrative         The placement was negative for: blood aspirated, painful injection and site bleeding       Paresthesias: No.       Bolus given via needle..        Secured via.        Insertion/Infusion Method: Single Shot       Complications: none    Medication(s) Administered   Bupivacaine 0.25% w/ 1:400K Epi (Injection) - Injection   25 mL - 1/1/2024 11:02:00 PM    FOR [facility] ([facility]) ONLY:   Pain Team Contact information: please page the Pain Team Via [employer]. Search \"Pain\". During daytime hours, please page the attending first. At night please page the "  first.

## 2024-01-02 NOTE — ANESTHESIA CARE TRANSFER NOTE
Patient: Peggy Jessica    Procedure: Procedure(s):  EXPLORATORY LAPAROTOMY WITH DIVERTING LOOP ILEOSTOMY       Diagnosis: Large bowel obstruction (H) [K56.609]  Diagnosis Additional Information: No value filed.    Anesthesia Type:   General     Note:    Oropharynx: oropharynx clear of all foreign objects and spontaneously breathing  Level of Consciousness: awake  Oxygen Supplementation: nasal cannula  Level of Supplemental Oxygen (L/min / FiO2): 4  Independent Airway: airway patency satisfactory and stable  Dentition: dentition unchanged  Vital Signs Stable: post-procedure vital signs reviewed and stable  Report to RN Given: handoff report given  Patient transferred to: PACU  Comments: Neuromuscular blockade reversed after TOF 3/4, spontaneous respirations, adequate tidal volumes, followed commands to voice, oropharynx suctioned with soft flexible catheter, extubated atraumatically, extubated with suction, airway patent after extubation.  Oxygen via nasal cannula at 3 liters per minute to PACU. Oxygen tubing connected to wall O2 in PACU, SpO2, NiBP, and EKG monitors and alarms on and functioning, Geno Hugger warmer connected to patient gown, report on patient's clinical status given to PACU RN, RN questions answered.         Handoff Report: Identifed the Patient, Identified the Reponsible Provider, Reviewed the pertinent medical history, Discussed the surgical course, Reviewed Intra-OP anesthesia mangement and issues during anesthesia, Set expectations for post-procedure period and Allowed opportunity for questions and acknowledgement of understanding      Vitals:  Vitals Value Taken Time   /74 01/01/24 2310   Temp     Pulse 80 01/01/24 2316   Resp 15 01/01/24 2316   SpO2 99 % 01/01/24 2316   Vitals shown include unfiled device data.    Electronically Signed By: MIRA Cantor CRNA  January 1, 2024  11:17 PM

## 2024-01-02 NOTE — ANESTHESIA PREPROCEDURE EVALUATION
Anesthesia Pre-Procedure Evaluation    Patient: Peggy Jessica   MRN: [id number] : 1975        Procedure : Procedure(s):  exploratory laparotomy possible bowel resection , possible stoma          Past Medical History:   Diagnosis Date    Arthritis     BRCA positive     Cancer (H)     CIDP (chronic inflammatory demyelinating polyneuropathy) (H)     ASHKAN III with severe dysplasia     Complex regional pain syndrome type 1 of right lower extremity       Past Surgical History:   Procedure Laterality Date    BILATERAL OOPHORECTOMY Bilateral 2019    c section          CHOLECYSTECTOMY      COLONOSCOPY      ESOPHAGOSCOPY, GASTROSCOPY, DUODENOSCOPY (EGD), COMBINED N/A 2022    Procedure: ESOPHAGOGASTRODUODENOSCOPY (EGD);  Surgeon: Zack Maddox MD;  Location:  GI    ESOPHAGOSCOPY, GASTROSCOPY, DUODENOSCOPY (EGD), COMBINED N/A 2023    Procedure: Esophagoscopy, gastroscopy, duodenoscopy (EGD), combined;  Surgeon: Aris Mason MD;  Location:  GI    ESOPHAGOSCOPY, GASTROSCOPY, DUODENOSCOPY (EGD), COMBINED N/A 2023    Procedure: Esophagoscopy, gastroscopy, duodenoscopy (EGD), combined;  Surgeon: Aris Mason MD;  Location:  GI    HYSTERECTOMY      MASTECTOMY Bilateral       Allergies   Allergen Reactions    Dihydroergotamine Anaphylaxis    Latex Anaphylaxis    Shellfish-Derived Products Anaphylaxis    Sumatriptan Anaphylaxis    Compazine [Prochlorperazine] Anxiety    Banana Unknown    Gabapentin Dizziness    Gluten Meal Other (See Comments)     Celiac disease    Keppra [Levetiracetam] Nausea and Vomiting    Kiwi Unknown    Levofloxacin Other (See Comments)     Arrhythmia    Metronidazole Nausea and Vomiting    Nitrofurantoin Hives    Penicillins Hives    Pregabalin     Reglan [Metoclopramide] Other (See Comments)     restlessness/toe tapping     Topiramate Visual Disturbance    Aspirin Rash    Methadone Rash    Morphine Hives     She got hives  "around are when morphine given but resolved after few minutes per patient     Risperidone Anxiety      Social History     Tobacco Use    Smoking status: Former     Types: Cigarettes    Smokeless tobacco: Never   Substance Use Topics    Alcohol use: Not Currently      Wt Readings from Last 1 Encounters:   01/01/24 72.6 kg (160 lb)        Anesthesia Evaluation   Pt has had prior anesthetic.     History of anesthetic complications  - PONV.      ROS/MED HX  ENT/Pulmonary:     (+)                tobacco use, Past use,                    (-) asthma, sleep apnea and recent URI   Neurologic: Comment: Chronic inflammatory demyelinating polyneuropathy  Fibromyalgia  Chronic daily headaches  Wheelchair dependency  Frequent falls      (+)       seizures, last seizure: 10 yrs ago,                     (-) no CVA, no TIA and no Spinal cord injury (Patient denies any spinal cord injury)   Cardiovascular:    (-) orthopnea/PND and arrhythmias   METS/Exercise Tolerance: 1 - Eating, dressing    Hematologic:    (-) history of blood clots   Musculoskeletal: Comment: \"Follows with [facility] pain clinic for multiple chronic pain symptoms.  Pain regimen includes baclofen, buprenorphine, Bentyl, medical cannabis, tenapanor (for IBS symptoms), bowel regimen.\"      GI/Hepatic: Comment: Abdominal pain    (+) GERD, Asymptomatic on medication,               (-) liver disease   Renal/Genitourinary:    (-) renal disease   Endo:    (-) Type II DM and obesity   Psychiatric/Substance Use:     (+)     Recreational drug usage: Cannabis.    Infectious Disease:    (-) Recent Fever   Malignancy: Comment: Breast cancer s/p b/l mastectomy  Cervical cancer s/p radiation with long standing constipation ( goes 1x/week) with stool incontinence intermittently   (+) Malignancy,     Other:      (+)  , H/O Chronic Pain,         Physical Exam    Airway        Mallampati: I   TM distance: > 3 FB   Neck ROM: full   Mouth opening: > 3 cm    Respiratory Devices and " "Support         Dental         B=Bridge, C=Chipped, L=Loose, M=Missing    Cardiovascular          Rhythm and rate: regular and normal     Pulmonary           breath sounds clear to auscultation         OUTSIDE LABS:  CBC:   Lab Results   Component Value Date    WBC 4.7 01/01/2024    WBC 5.8 10/12/2023    HGB 13.5 01/01/2024    HGB 12.6 10/12/2023    HCT 40.7 01/01/2024    HCT 38.8 10/12/2023     01/01/2024     10/15/2023     BMP:   Lab Results   Component Value Date     01/01/2024     10/12/2023    POTASSIUM 4.1 01/01/2024    POTASSIUM 4.1 10/13/2023    CHLORIDE 107 01/01/2024    CHLORIDE 101 10/12/2023    CO2 28 01/01/2024    CO2 28 10/12/2023    BUN 8.8 01/01/2024    BUN 8.5 10/12/2023    CR 0.89 01/01/2024    CR 0.78 10/15/2023     (H) 01/01/2024    GLC 94 10/12/2023     COAGS:   Lab Results   Component Value Date    PTT 28 01/27/2023    INR 0.97 01/28/2023     POC: No results found for: \"BGM\", \"HCG\", \"HCGS\"  HEPATIC:   Lab Results   Component Value Date    ALBUMIN 4.1 01/01/2024    PROTTOTAL 7.3 01/01/2024    ALT 17 01/01/2024    AST 22 01/01/2024    ALKPHOS 95 01/01/2024    BILITOTAL 0.3 01/01/2024     OTHER:   Lab Results   Component Value Date    LACT 1.0 11/24/2021    ESTEBAN 9.5 01/01/2024    PHOS 3.4 07/20/2023    MAG 2.2 07/18/2023    LIPASE 20 01/01/2024    TSH 2.09 08/04/2023    SED 14 09/14/2023       Anesthesia Plan    ASA Status:  3, emergent    NPO Status:  NPO Appropriate    Anesthesia Type: General.     - Airway: ETT   Induction: Intravenous, Propofol, RSI.   Maintenance: Balanced.   Techniques and Equipment:     - Lines/Monitors: 2nd IV     Consents    Anesthesia Plan(s) and associated risks, benefits, and realistic alternatives discussed. Questions answered and patient/representative(s) expressed understanding.     - Discussed: Risks, Benefits and Alternatives for the PROCEDURE were discussed     - Discussed with:  Patient            Postoperative Care    Pain " management: Multi-modal analgesia, IV analgesics, Peripheral nerve block (Single Shot) (Add ketamine, dexmedetomidine. B/L TAP blocks).   PONV prophylaxis: Ondansetron (or other 5HT-3), Dexamethasone or Solumedrol, Background Propofol Infusion     Comments:               Avis Huffman MD    I have reviewed the pertinent notes and labs in the chart from the past 30 days and (re)examined the patient.  Any updates or changes from those notes are reflected in this note.

## 2024-01-02 NOTE — PROVIDER NOTIFICATION
MD Notification    Notified Person: MD    Notified Person Name: Dr. Rollins    Notification Date/Time: 01/02/2024 0322    Notification Interaction: Telephone     Purpose of Notification: Pain control--Pt consistently rating pain at 9/10. Unsafe to give further narcotics dt respiratory status. Wondering about adding PTA Baclofen or Atarax?     Orders Received:  No new orders.    Comments:  Deferred ordering PTA medications to hospitalist team & will consult pain team today.

## 2024-01-02 NOTE — ED PROVIDER NOTES
Sign Out Note    Pt accepted in sign out from: Dr. Isabel Phillips    Briefly pt presented to the ED for: abdominal pain    Plan at time of sign out: Patient requires admission.  Colorectal had already evaluated the patient.  Was awaiting hospitalist callback.    Care of patient during my shift: I spoke to the hospitalist, Dr. Keller at [phone] who agreed to admit the patient for continued evaluation and treatment.  Dr. Rojas came into the emergency department to evaluate the patient.  Patient had a benign abdominal exam for her.  She recommended a Gastrografin enema.  This had already been ordered.  I spoke to Dr. Kylee Hardy at 427, interventional radiologist, who agreed, to perform this.  She performed the study and called me at 543 and notified me that there is an obstruction.  I then called Dr. Rojas and informed her as well.  Provided Dr. Rojas with Dr. Hardy number for additional details.  I then spoke to Dr. Keller again and updated her.    Plan for patient at this time: Patient continues to be admitted.     Gildardo Hollis,   01/01/24 1899

## 2024-01-02 NOTE — PROGRESS NOTES
Colorectal Surgery Progress Note    POD # 1  Interval History:  No acute events overnight. Pain control is adequate. Ileostomy working.     Physical Exam:   Temp:  [97.1  F (36.2  C)-98.1  F (36.7  C)] 97.8  F (36.6  C)  Pulse:  [60-95] 95  Resp:  [7-26] 15  BP: (108-139)/(72-93) 125/86  SpO2:  [92 %-100 %] 95 %  General: Alert, oriented, appears comfortable, NAD.  Respiratory: breathing non labored  Abdomen: Abdomen is soft, appropriately tender, non distended. Incision is clean, dry and intact without signs of infection, ostomy with liquid stool    Data:   All laboratory and imaging data in the past 24 hours reviewed  I/O last 3 completed shifts:  In: 2139.4 [I.V.:2139.4]  Out: 480 [Urine:350; Stool:125; Blood:5]  Recent Labs   Lab Test 01/02/24  0603 01/01/24  1043 10/15/23  0625 10/12/23  0628 01/31/23  1900 01/28/23  0619   WBC 13.6* 4.7  --  5.8   < > 4.2   HGB 12.9 13.5  --  12.6   < > 13.4    249 248 248   < > 243   INR  --   --   --   --   --  0.97    < > = values in this interval not displayed.      Recent Labs   Lab Test 01/02/24  0603 01/01/24  1043 10/15/23  0625 10/13/23  0906 10/12/23  0628    143  --   --  140   POTASSIUM 3.7 4.1  --  4.1 4.1   CHLORIDE 101 107  --   --  101   CO2 28 28  --   --  28   BUN 10.5 8.8  --   --  8.5   CR 0.68 0.89 0.78  --  0.88   ANIONGAP 10 8  --   --  11   ESTEBAN 9.3 9.5  --   --  9.2   * 113*  --   --  94        Recent Labs   Lab Test 01/01/24  1043   PROTTOTAL 7.3   ALBUMIN 4.1   BILITOTAL 0.3   ALKPHOS 95   AST 22   ALT 17       Assessment and Plan:     48 F hx of cervical cancer s/p radiation with long standing constipation and mobile cecum. No volvulus noted at the time of OR.     S/p ex lap and loop ileostomy for diversion.     Plan    Ok for fulls   WOCN   No abx   Lovenox   OOB     Pt seen and d/w Dr Kendall Rojas MD  Fellow, Colon and Rectal Surgery  [facility]  Pager: [phone]

## 2024-01-02 NOTE — PROVIDER NOTIFICATION
Dr Rollins at bedside, consent signed. Ok for flagyl during OR-rxn N/V-which pt has baseline. MDA notified ABO yet to be drawn-CRNA to draw on PIV insertion and UA to be collected with strong placement per OR RN. No other labs required at this time.MDA also aware pt own bupren. And scop patch in place.     Pt to transfer to Gen Surg post-op, writer verified they are aware and have RN ready. Pt own W/C and coat sent to 2207 from 6915-all labeled. Phone and purse with pt and placed safely in PACU-will transfer with pt to Gen surg.

## 2024-01-02 NOTE — PROVIDER NOTIFICATION
MD Notification    Notified Person: MD    Notified Person Name: Dr. Barr    Notification Date/Time: 01/022024454509 0515    Notification Interaction: Let's Talk messaging    Purpose of Notification: Pt wondering about adding PTA Baclofen & Atarax -- surgery team deferred to hospitalist     Orders Received: 1x dose baclofen & Tylenol    Comments:

## 2024-01-02 NOTE — CONSULTS
Colon and Rectal Surgery Consultation Note  [facility]    Peggy Jessica MRN# [id number]   Age: 48 year old YOB: 1975     Date of Admission:  1/1/2024    Reason for consult: Suspected cecal volvulus       Requesting physician: Dr Phillips       Level of consult: Consult, follow and place orders           Assessment:   This is a 48 F with complex past medical history including hx of cervical cancer s/p radiation with long standing constipation ( goes 1x/week) with stool incontinence intermittently presenting with acute on chronic abdominal pain. CT scan concerning for mobile cecum and concern for cecal volvulus. Clinical exam is not peritoneal. We recommended a GGE and reviewed images with Dr Hadry. The imaging is concerning for partial obstruction but volvulus or internal hernia cannot be ruled out.     For her constipation there were ongoing discussion for creation of end ileostomy and TPC. Pt amenable to ostomy if needed.     At this time holding off on surgical intervention.          Recommendations:   Close monitoring with abdominal exam   No abx indicated   NPO  Follow up final images from GGE  Monitor wbc   Admit to RNF     Pt d/w Dr Ayo Rojas MD  Fellow, Colon and Rectal Surgery  [facility]  Pager: [phone]            History of Present Illness:   CC: abdominal pain     History is obtained from the patient    This is a 48-year-old patient with significant past medical history of cervical cancer status post pelvic radiation, chronic pain on medical cannabis, longstanding constipation-patient goes to the bathroom once a week despite bowel regimen.  She presented to the emergency room today because she had acute on chronic onset of abdominal pain mostly on the right side right upper quadrant specifically.  Patient states that he has had no nausea or vomiting.  Denies fevers or chills.  She had a bowel movement that woke her up on Saturday  morning that was mostly liquid.  She is passing gas.  Her last gas was this morning.  She is taking narcotic pain medication for her pain relief currently.  Her white count was normal.  She is afebrile.  Her CT scan was reviewed that shows a mobile cecum and a suspicion for possibility of sick cecal volvulus.    Of note patient has seen Dr. Rollins, where she had discussed options for her chronic constipation.  She has baseline stool incontinence for which there was discussions of end ileostomy and the potential for total proctocolectomy.  Patient realizes that she might need an ostomy given her constipation and is amenable to that.          Past Medical History:     Past Medical History:   Diagnosis Date    Arthritis     BRCA positive     Cancer (H)     CIDP (chronic inflammatory demyelinating polyneuropathy) (H)     ASHKAN III with severe dysplasia 2002    Complex regional pain syndrome type 1 of right lower extremity              Past Surgical History:     Past Surgical History:   Procedure Laterality Date    BILATERAL OOPHORECTOMY Bilateral 2019    c section      2002    CHOLECYSTECTOMY  1997    COLONOSCOPY      ESOPHAGOSCOPY, GASTROSCOPY, DUODENOSCOPY (EGD), COMBINED N/A 07/28/2022    Procedure: ESOPHAGOGASTRODUODENOSCOPY (EGD);  Surgeon: Zack Maddox MD;  Location:  GI    ESOPHAGOSCOPY, GASTROSCOPY, DUODENOSCOPY (EGD), COMBINED N/A 5/16/2023    Procedure: Esophagoscopy, gastroscopy, duodenoscopy (EGD), combined;  Surgeon: Aris Mason MD;  Location:  GI    ESOPHAGOSCOPY, GASTROSCOPY, DUODENOSCOPY (EGD), COMBINED N/A 7/20/2023    Procedure: Esophagoscopy, gastroscopy, duodenoscopy (EGD), combined;  Surgeon: Aris Mason MD;  Location:  GI    HYSTERECTOMY  2004    MASTECTOMY Bilateral 2020             Social History:     Social History     Socioeconomic History    Marital status:      Spouse name: Not on file    Number of children: 2    Years of education: Not on file     Highest education level: Not on file   Occupational History    Not on file   Tobacco Use    Smoking status: Former     Types: Cigarettes    Smokeless tobacco: Never   Vaping Use    Vaping Use: Never used   Substance and Sexual Activity    Alcohol use: Not Currently    Drug use: Yes     Types: Marijuana     Comment: Medical Can[facility] patient    Sexual activity: Not Currently     Partners: Male   Other Topics Concern    Not on file   Social History Narrative    Not on file     Social Determinants of Health     Financial Resource Strain: Low Risk  (10/27/2023)    Financial Resource Strain     Within the past 12 months, have you or your family members you live with been unable to get utilities (heat, electricity) when it was really needed?: No   Food Insecurity: Low Risk  (10/27/2023)    Food Insecurity     Within the past 12 months, did you worry that your food would run out before you got money to buy more?: No     Within the past 12 months, did the food you bought just not last and you didn t have money to get more?: No   Transportation Needs: High Risk (10/27/2023)    Transportation Needs     Within the past 12 months, has lack of transportation kept you from medical appointments, getting your medicines, non-medical meetings or appointments, work, or from getting things that you need?: Yes   Physical Activity: Not on file   Stress: Not on file   Social Connections: Not on file   Interpersonal Safety: Not on file   Housing Stability: Low Risk  (10/27/2023)    Housing Stability     Do you have housing? : Yes     Are you worried about losing your housing?: No             Family History:     Family History   Problem Relation Age of Onset    Kidney Disease Father     Macular Degeneration Paternal Grandmother     Glaucoma No family hx of              Allergies:      Allergies   Allergen Reactions    Dihydroergotamine Anaphylaxis    Latex Anaphylaxis    Shellfish-Derived Products Anaphylaxis    Sumatriptan Anaphylaxis     Compazine [Prochlorperazine] Anxiety    Banana Unknown    Gabapentin Dizziness    Gluten Meal Other (See Comments)     Celiac disease    Keppra [Levetiracetam] Nausea and Vomiting    Kiwi Unknown    Levofloxacin Other (See Comments)     Arrhythmia    Metronidazole Nausea and Vomiting    Nitrofurantoin Hives    Penicillins Hives    Pregabalin     Reglan [Metoclopramide] Other (See Comments)     restlessness/toe tapping     Topiramate Visual Disturbance    Aspirin Rash    Methadone Rash    Morphine Hives     She got hives around are when morphine given but resolved after few minutes per patient     Risperidone Anxiety             Medications:   No current facility-administered medications on file prior to encounter.  acetaminophen (TYLENOL) 500 MG tablet, Take 500-1,000 mg by mouth every 6 hours as needed for mild pain  baclofen (LIORESAL) 10 MG tablet, Take 10 mg by mouth 3 times daily Takes 5 mg morning and early afternoon and between 5/10 at bedtime  buprenorphine (BUTRANS) 7.5 MCG/HR WK patch, Place 1 patch onto the skin once a week Applies on Thursdays at 9 am  dicyclomine (BENTYL) 10 MG capsule, Take 20 mg by mouth 3 times daily (before meals)  diphenhydrAMINE (BENADRYL) 25 MG tablet, Take 0.5 tablets (12.5 mg) by mouth every 6 hours as needed for allergies or other (nausea)  folic acid (FOLVITE) 400 MCG tablet, Take 1 tablet (400 mcg) by mouth daily  hydrOXYzine (ATARAX) 25 MG tablet, Take 1 tablet (25 mg) by mouth 3 times daily (before meals)  medical cannabis (Patient's own supply), Take 1 Dose by mouth See Admin Instructions (The purpose of this order is to document that the patient reports taking medical cannabis.  This is not a prescription, and is not used to certify that the patient has a qualifying medical condition.)  Per pt: 5-10 mg tablet three times a day PRN  memantine (NAMENDA) 10 MG tablet, Take 10 mg by mouth 2 times daily  multivitamin w/minerals (THERA-VIT-M) tablet, Take 1 tablet by mouth  "every morning Meg[employer] Womens Brand  naloxone (NARCAN) 4 MG/0.1ML nasal spray, Spray 1 spray (4 mg) into one nostril alternating nostrils as needed for opioid reversal every 2-3 minutes until assistance arrives  ondansetron (ZOFRAN ODT) 4 MG ODT tab, Take 1 tablet (4 mg) by mouth every 8 hours as needed for nausea  pantoprazole (PROTONIX) 40 MG EC tablet, Take 1 tablet (40 mg) by mouth every morning (before breakfast)  polyethylene glycol (MIRALAX) 17 GM/Dose powder, Take 17 g by mouth daily Until BM, then use every day prn  scopolamine (TRANSDERM) 1 MG/3DAYS 72 hr patch, Place 1 patch onto the skin every 72 hours  senna (SENOKOT) 8.6 MG tablet, Take 1 tablet by mouth as needed  senna-docusate (SENOKOT-S/PERICOLACE) 8.6-50 MG tablet, Take 1 tablet by mouth 2 times daily as needed for constipation  sertraline (ZOLOFT) 100 MG tablet, Take 1 tablet (100 mg) by mouth daily  Tenapanor HCl (IBSRELA) 50 MG TABS, Take 50 mg by mouth every 48 hours  vitamin D2 (ERGOCALCIFEROL) 68530 units (1250 mcg) capsule, Take 1 capsule (50,000 Units) by mouth once a week On Tuesdays  bisacodyl (DULCOLAX) 10 MG suppository, Place 1 suppository (10 mg) rectally daily as needed for constipation              Review of Systems:      All other review of systems negative, except for what is mentioned above        Physical Exam:   /87   Pulse 67   Temp 97.1  F (36.2  C) (Temporal)   Resp 15   Ht 1.626 m (5' 4\")   Wt 72.6 kg (160 lb)   SpO2 92%   BMI 27.46 kg/m    General: Alert, interactive, NAD  Resp: CTAB, no crackles or wheezes  Cardiac: RRR, NS1,S2, No m/r/g  Abdomen: Soft, nontender, nondistended. +BS.  No HSM or masses, no rebound or guarding.  Extremities: No LE edema or obvious joint abnormalities  Skin: Warm and dry, no jaundice or rash  Neuro: A&Ox3, CN 2-12 intact, GASTON            Data:   All laboratory data reviewed  All imaging studies reviewed by me.       Pt d/w Dr Ayo Rojas MD  Fellow, Colon and Rectal " Surgery  [facility]  Pager: [phone]

## 2024-01-02 NOTE — ANESTHESIA POSTPROCEDURE EVALUATION
Patient: Peggy Jessica    Procedure: Procedure(s):  EXPLORATORY LAPAROTOMY WITH DIVERTING LOOP ILEOSTOMY       Anesthesia Type:  General    Note:  Disposition: Inpatient   Postop Pain Control: Uneventful            Sign Out: Well controlled pain   PONV: No   Neuro/Psych: Uneventful            Sign Out: Acceptable/Baseline neuro status   Airway/Respiratory: Uneventful            Sign Out: Acceptable/Baseline resp. status   CV/Hemodynamics: Uneventful            Sign Out: Acceptable CV status; No obvious hypovolemia; No obvious fluid overload   Other NRE: NONE   DID A NON-ROUTINE EVENT OCCUR? No           Last vitals:  Vitals Value Taken Time   /84 01/01/24 2350   Temp 36.4  C (97.5  F) 01/01/24 2310   Pulse 76 01/01/24 2352   Resp 15 01/01/24 2352   SpO2 99 % 01/01/24 2352   Vitals shown include unfiled device data.    Electronically Signed By: Avis Huffman MD  January 1, 2024  11:54 PM

## 2024-01-02 NOTE — PROGRESS NOTES
Patient requesting to continue her PTA supply of medical marijuana. Discussed with pharmacist and bedside RN.  Pharmacist will assist with placing orders for this, patient is able to continue her PTA medical marijuana.  This was continued during her hospitalization in October 2023 at the [place] as well for pain management.

## 2024-01-02 NOTE — PROGRESS NOTES
Discussed requirements regarding [facility] policy for patient to continue PTA medical cannabis supply.  Patient will sign the form and after this can resume PTA medical cannabis.  Discussed with nurse and pharmacist.

## 2024-01-02 NOTE — OP NOTE
OPERATIVE REPORT      PREOPERATIVE DIAGNOSIS: Cecal Volvulus    POSTOPERATIVE DIAGNOSIS: Partial large bowel obstruction    OPERATION PERFORMED:   1. Exploratory Laparotomy  2. Lysis of adhesions  3. Diverting loop ileostomy    SURGEON: Celia Rollins M.D.    ASSISTANT: Stacy Rojas MD - Colorectal Fellow    ANESTHESIA: General.    INDICATIONS: Peggy Jessica is a 48 year old female who presented the Emergency Room with an 3 day history of worsening abdominal pain. CT scan of the abdomen was suspicious for cecal volvulus.  Given then CT scan did not demonstrate an obstruction we elected to perform a GGE.  Findings on GGE were abnormal and included an obstruction in the proximal ascending colon.  There was concern for a volvulus versus possible internal hernia.  Given these radiologic findings and her persistent abdominal pain, I have recommended exploratory laparotomy with possible right colectomy and possible ileostomy. The risks and benefits of this surgery have been explained in detail to the patient and her family members. She has consented to proceed with surgery.    OPERATIVE FINDINGS: Was adhesion of the omentum down to the pelvis.  Underneath this adhesion the mobile cecum had rotated to the midline.  Once this adhesion was taking down the cecum easily reduced back to the right lower quadrant.  There is no evidence of a cecal volvulus.  A diverting loop ileostomy was created.     PROCEDURE IN DETAIL: After informed consent was obtained, the patient was brought to the operating room and placed in the supine position on the operating room table. Sequential compression devices were placed on the lower extremities prior to induction, and general anesthesia was induced without difficulty. A Kawn catheter was inserted. Her abdomen was sterilely prepped and draped in usual fashion.    A 5 cm midline incision was made extending just above and to the level of the umbilicus. This was carried down through  subcutaneous tissue to the fascia. The abdomen was safely entered through the fascia. There were no adhesions encountered to the anterior abdominal wall.  A moderate amount of serous fluid was evacuated from the abdominal cavity. The large Micheal wound retractor was placed in the incision. The abdomen was explored. The operative findings are noted above.    There was a single band of the omentum adherent down into the pelvis.  This was released using the LigaSure device.  After this we noted that the cecum was to the left of midline and had been trapped under this omental band.  The cecum was easily returned then to the right lower quadrant.  There is no evidence of a cecal volvulus just more of a mobile cecum.  It appeared that this omental band was likely causing compression of the cecum/proximal ascending colon.  This would be consistent with the radiographic findings on CT scan and Gastrografin enema.  Given that there was no volvulus we elected to not perform ileocolic resection or right colectomy.  Valuate the rest of the ascending colon up to the hepatic flexure and proximal transverse colon to make sure that there is no other kinking or obstruction.  There was a small capsular tear of the liver which was identified and treated with electrocautery.  Hemostasis was excellent.    This patient was already scheduled for diverting loop ileostomy with me next month elected to perform this at the time of the surgery.  We identified the terminal ileum and went proximal to the ileocecal valve for about 20 cm.  We then make sure that we had adequate which through the abdominal wall and at our proposed site for her ileostomy.  We then created a nickel sized incision at the skin with Bovie electrocautery.  This was carried down to the subcutaneous fat to the fascia with cautery.  The fascia was incised.  Using a muscle-splitting technique we were then able to enter the abdomen.  The stoma aperture could accommodate 2  finger breaths.  We then pulled up the loop of ileum through the abdominal wall taking great care to ensure that there was no twisting of the mesentery.  We ensured that the proximal portion of the ileum was superior in the trephine and the distal portion going to the ileocecal valve and cecum were inferior and the stoma trephine.    We then irrigated out the abdomen with several liters of warm sterile saline with clear returns.  The fascia of the small lower midline incision was closed using a 0-looped PDS suture from either end. The incision was irrigated with saline solution, and the skin was closed using 4-0 Monocryl suture in running subcuticular fashion. Dermabond was applied to all incisions.    Line incision was then isolated.  We then proceeded with maturation of the ileostomy.  This was done in a Keeley fashion using 3-0 Vicryl sutures.  Again proximal was superior and distal was inferior and the stoma trephine.  A stoma appliance was then applied.    The patient tolerated the procedure well without complications. Estimated blood loss was 10 mL. I was present and scrubbed for the entire duration of the operation. The patient was extubated in the operating room and brought to the recovery room in stable condition.      SPECIMEN: None.    MIKO RICHARDSON MD

## 2024-01-02 NOTE — PROGRESS NOTES
"[facility]    Hospitalist Progress Note    Date of Admission:  1/1/2024    Assessment & Plan     Peggy Jessica is a 48 year old female with complex medical history including chronic pain, chronic constipation, fibromyalgia, breast cancer s/p  bilateral mastectomy who was admitted on 1/1/2024 for abdominal pain.      Abdominal pain due to large bowel obstruction, s/p exploratory laparotomy with diverting loop ileostomy on 1/1/2024  Chronic pain syndrome  10/10 abdominal pain reported with benign exams in ED.  CT of the abdomen and pelvis with contrast showed \"mobile cecum located in the mid abdomen, whereas previously it was in the right lower quadrant.  No upstream small bowel distention or evidence for cecal volvulus.  No acute inflammatory findings.  Moderate amount of formed stool noted in colon.\"  Allergy list includes reactions to morphine, methadone, aspirin, risperidone, Topamax, Reglan, pregabalin, Compazine, gabapentin, dihydroergotamine, sumatriptan.  -Inpatient admission.  Colorectal surgery consulted.  -Patient underwent surgery as noted above.  No cecal volvulus noted.  Postoperative management per CRS including diet and pain controlled.  -Currently on full liquid diet  -Currently on as needed p.o. and IV Dilaudid, continued PTA Butrans patch, Tylenol, memantine for neuropathic pain, baclofen, Atarax  -Consider consult to pain team if continued issues with pain control     Chronic inflammatory demyelinating polyneuropathy  Fibromyalgia  Chronic daily headaches  Wheelchair dependency  Frequent falls  Chronic urinary and fecal incontinence  -Is on disability  -Pain meds as noted above.  Consider consult to pain team if continued issues with pain control.  Has multiple drug allergies as listed above.     Breast cancer  Status post bilateral mastectomy  Noted     Polypharmacy  Multiple drug allergies  Noted           Diet: NPO per Anesthesia Guidelines for Procedure/Surgery " "Except for: No Exceptions    DVT Prophylaxis: Ambulate every shift and observation status  Kwan Catheter: Not present  Lines: None     Cardiac Monitoring: None  Code Status:  full         Medical Decision Making       Please see A&P for additional details of medical decision making.        Clinically Significant Risk Factors Present on Admission                       # Overweight: Estimated body mass index is 28.76 kg/m  as calculated from the following:    Height as of this encounter: 1.626 m (5' 4\").    Weight as of this encounter: 76 kg (167 lb 8.8 oz).       # Financial/Environmental Concerns:             Kerri Gibbons MD  Text Page (7am - 6pm, M-F)  [facility]  Securely message with the Vocera Web Console (learn more here)  Text page via [email or web address] Paging/Directory      Interval History   Had pain control issues overnight.  This morning was sleeping but awoke easily.  Continues to complain of pain.  Reiterates that she has multiple drug intolerances/allergies.  Is also nauseous.  No fevers.    -Data reviewed today: I reviewed all new labs and imaging results over the last 24 hours. I personally reviewed CT scan with result as noted above    Physical Exam   Temp: 97.8  F (36.6  C) Temp src: Oral BP: 125/86 Pulse: 95   Resp: 16 SpO2: 96 % O2 Device: None (Room air) Oxygen Delivery: 1 LPM  Vitals:    01/01/24 0932 01/01/24 1937   Weight: 72.6 kg (160 lb) 76 kg (167 lb 8.8 oz)     Vital Signs with Ranges  Temp:  [97.5  F (36.4  C)-98.1  F (36.7  C)] 97.8  F (36.6  C)  Pulse:  [60-95] 95  Resp:  [7-26] 16  BP: (108-134)/(72-87) 125/86  SpO2:  [92 %-100 %] 96 %  I/O last 3 completed shifts:  In: 2139.4 [I.V.:2139.4]  Out: 480 [Urine:350; Stool:125; Blood:5]    Constitutional: Alert, appears comfortable, in no acute distress  Respiratory: Non labored breathing, clear to auscultation bilaterally  Cardiovascular: Heart sounds regular rate and rhythm, no murmurs, no leg edema  GI: Abdomen is soft, " appropriately tender postsurgery, nondistended.  Ostomy noted.  Neuro: alert, converses appropriately, moving all extremities, fluent speech, no facial asymmetry  Psych: mood and affect appropriate      Medications    lactated ringers 75 mL/hr at 01/02/24 0534      acetaminophen  975 mg Oral Q8H    baclofen  5 mg Oral TID    buprenorphine  1 patch Transdermal Weekly    And    buprenorphine   Transdermal Q8H SHERMAN    enoxaparin ANTICOAGULANT  40 mg Subcutaneous Q24H    memantine  10 mg Oral BID    pantoprazole  40 mg Oral QAM AC    sertraline  100 mg Oral Daily    sodium chloride (PF)  3 mL Intracatheter Q8H       Data   Recent Labs   Lab 01/02/24  0603 01/01/24  1043   WBC 13.6* 4.7   HGB 12.9 13.5   MCV 93 94    249    143   POTASSIUM 3.7 4.1   CHLORIDE 101 107   CO2 28 28   BUN 10.5 8.8   CR 0.68 0.89   ANIONGAP 10 8   ESTEBAN 9.3 9.5   * 113*   ALBUMIN  --  4.1   PROTTOTAL  --  7.3   BILITOTAL  --  0.3   ALKPHOS  --  95   ALT  --  17   AST  --  22   LIPASE  --  20       Imaging  Recent Results (from the past 24 hour(s))   XR Colon Water Soluble    Narrative    EXAM: XR COLON WATER SOLUBLE DIAGNOSTIC  LOCATION: [facility]  DATE: 1/1/2024    INDICATION: To rule out cecal volvulus to see if contrast reaches ileum  COMPARISON: CT from same day and CT from 10/11/2023.  TECHNIQUE: Gastrografin enema diagnostic.    FLUOROSCOPIC TIME: 3.4  NUMBER OF IMAGES: 29    FINDINGS: A  image was obtained, images show residual contrast in the kidneys, ureters and bladder. No dilated loops of small or large bowel.     Patient was placed in the lateral decubitus position. The enema tip was inserted and the balloon was inflated. Gastrografin enema was performed. Images show contrast flows easily through the rectum, sigmoid colon, descending colon, transverse colon and   ascending colon. Once contrast gets to the ascending colon in the right lower quadrant in the normal region of the cecum,  the cecum is NOT visualized and there is an abrupt cut-off of contrast in the ascending colon. After 12 minutes there is still NO   contrast going into the cecum or small bowel.   ?  Multiple maneuvers, including rotating the patient, using a paddle to compress, etc. Finally, with maneuvers, a thin trickle of contrast seen flowing from the ascending colon into the cecum which is midline and to the left lower quadrant, similar to the   CT from earlier today. No definite contrast is seen flowing into the terminal ileum. Postvoid images were obtained which show a large amount of gastrografin remains in the colon.   ?  Findings are concerning for internal hernia, although differential would also include cecal volvulus. ?  ?  Findings were discussed with the ED provider and the colorectal attending and fellow.?      Impression    IMPRESSION:  1.  Abrupt cut-off of the ascending colon in the right lower quadrant with no contrast flowing into the cecum for at least 12 minutes. After maneuvers and manual compression, a thin trickle of contrast is seen flowing from the ascending colon into the   cecum which crosses midline and into the left lower quadrant. No definite contrast is seen flowing into the terminal ilium. This is concerning for an internal hernia, although differential would also include a cecal volvulus.     Findings were discussed with the ED attending, the Dr. Stacy Rojas and Dr. Sarah Rollins.

## 2024-01-02 NOTE — BRIEF OP NOTE
POSTOPERATIVE / POSTPROCEDURE NOTE - IMMEDIATE :    Surgeon(s)/Proceduralist(s) and Assistants (if any):  Surgeon(s):  Sarah Rollins MD Gupta, Shreya, MD  Circulator: Jasmeet Willis RN  Relief Scrub: Tika Frias  Scrub Person: Gwen Campos    Procedure(s):  Exploratory laparotomy with diverting loop ileostomy    Procedure(s) findings:   Mobile cecum with one adhesive omental band to the pelvis    Specimen(s) removed: No    (EBL) Estimated blood loss (ml): 5    Postoperative/Postprocedure Diagnosis:       Stacy Rojas MD      ADDENDUM:    PATIENT DATA  Indicate Y or N:  Home O2 No  Hemodialysis No  Transplant patient No  Cirrhosis No  Steroids in last 30 days No  Immunomodulators in last 30 days No  Anticoagulation at time of surgery No   List medication na  Prior abdominal surgery Yes  Pelvic irradiation Yes    Albumin within 30 days if known 4.1  Hgb within 30 days if known 13.5  Cr within 30 days if known 0.89  Body mass index is 28.76 kg/m .    OR DATA  Emergent Yes   <24 hours Yes   <1 week No  Bowel Prep (Y or N) No  Antibiotics (Y or N) Yes  DVT prophylaxis    Heparin Yes   SCD Yes   None No  Drain No  ASA (1,2,3,4,5 if unknown) 3  OR time (min) 74  Stents No  Transfuse >/= 2U No  Anastomosis   Stapled No   Handsewn No  Leak Test (pos, neg, not done) na

## 2024-01-02 NOTE — ANESTHESIA PROCEDURE NOTES
"TAP Procedure Note    Pre-Procedure   Staff -        Anesthesiologist:  Avis Huffman MD       Performed By: anesthesiologist       Location: OR       Pre-Anesthestic Checklist: patient identified, IV checked, site marked, risks and benefits discussed, informed consent, monitors and equipment checked, pre-op evaluation, at physician/surgeon's request and post-op pain management  Timeout:       Correct Patient: Yes        Correct Procedure: Yes        Correct Site: Yes        Correct Position: Yes        Correct Laterality: Yes        Site Marked: Yes  Procedure Documentation  Procedure: TAP       Diagnosis: PARTIAL SMALL BOWEL OBSTRUCTION,EX LAP       Laterality: left       Patient Position: supine       Patient Prep/Sterile Barriers: mask       Skin prep: Chloraprep       Needle Type: insulated       Needle Gauge: 21.        Needle Length (Inches): 3.13        Ultrasound guided       1. Ultrasound was used to identify targeted nerve, plexus, vascular marker, or fascial plane and place a needle adjacent to it in real-time.       2. Ultrasound was used to visualize the spread of anesthetic in close proximity to the above referenced structure.       3. A permanent image is entered into the patient's record.       4. The visualized anatomic structures appeared normal.       5. There were no apparent abnormal pathologic findings.    Assessment/Narrative         The placement was negative for: blood aspirated, painful injection and site bleeding       Paresthesias: No.       Bolus given via needle..        Secured via.        Insertion/Infusion Method: Single Shot       Complications: none    Medication(s) Administered   Bupivacaine 0.25% w/ 1:400K Epi (Injection) - Injection   15 mL - 1/1/2024 11:02:00 PM    FOR [facility] ([facility] ONLY:   Pain Team Contact information: please page the Pain Team Via USDS. Search \"Pain\". During daytime hours, please page the attending first. At night please page the resident " first.

## 2024-01-02 NOTE — PROGRESS NOTES
Admitted from ED at 1935:  Patient taken down to OR in cart at 2000:      Alert and oriented x4. Denies chest pain and SOB. VSS on RA. RLQ abdominal pain and nausea on arrival to the unit. Gave iv Dilaudid and zofran. Had small emesis. Baseline neuropathy. Provided incontinence care. Applied new wrist band with code status, fall and allergy bands. Patient taken down to OR at 2000.

## 2024-01-02 NOTE — ANESTHESIA PROCEDURE NOTES
Airway       Patient location during procedure: OR       Procedure Start/Stop Times: 1/1/2024 9:08 PM  Staff -        Anesthesiologist:  Avis Huffman MD       CRNA: Patricia Carranza APRN CRNA       Performed By: CRNA  Consent for Airway        Urgency: elective  Indications and Patient Condition       Indications for airway management: naeem-procedural       Induction type:RSI       Mask difficulty assessment: 0 - not attempted    Final Airway Details       Final airway type: endotracheal airway       Successful airway: ETT - single  Endotracheal Airway Details        ETT size (mm): 7.0       Cuffed: yes       Successful intubation technique: video laryngoscopy       VL Blade Size: Dietz 3       Grade View of Cords: 1       Adjucts: stylet       Position: Right       Measured from: gums/teeth       Secured at (cm): 21       Bite block used: None    Post intubation assessment        Placement verified by: capnometry, equal breath sounds and chest rise        Number of attempts at approach: 1       Secured with: tape       Ease of procedure: easy       Dentition: Intact and Unchanged (Very poor dentition at baseline.)    Medication(s) Administered   Medication Administration Time: 1/1/2024 9:08 PM

## 2024-01-02 NOTE — PLAN OF CARE
Surgery/POD#: POD #1 s/p ex lap w/ diverting loop ileostomy  Orientation: A&Ox4  ABNL VS/O2: VSS on 1L O2  Pain Management: PRN IV Dilaudid x2  Bowel/Bladder: Kwan w/ adequate UOP. Ileostomy w/ small amount of green liquid output & gas.  Drains: PIV infusing LR at 75 ml/hr  Diet: Fulls, only had sip of water with pills  Activity Level: Not OOB since surgery. Pivot transfer to W/C at baseline.   Anticipated  DC Date: pending  Significant Information: Midline incision intact w/ liquid bandage. Abd binder on. Pt consistently rating pain at 9/10. Limited use of narcotic dt respiratory status--MD aware. Zofran effective x1 for nausea & scop patch in place.

## 2024-01-03 LAB
ANION GAP SERPL CALCULATED.3IONS-SCNC: 10 MMOL/L (ref 7–15)
BUN SERPL-MCNC: 8.5 MG/DL (ref 6–20)
CALCIUM SERPL-MCNC: 9.6 MG/DL (ref 8.6–10)
CHLORIDE SERPL-SCNC: 102 MMOL/L (ref 98–107)
CREAT SERPL-MCNC: 0.66 MG/DL (ref 0.51–0.95)
DEPRECATED HCO3 PLAS-SCNC: 27 MMOL/L (ref 22–29)
EGFRCR SERPLBLD CKD-EPI 2021: >90 ML/MIN/1.73M2
ERYTHROCYTE [DISTWIDTH] IN BLOOD BY AUTOMATED COUNT: 14.9 % (ref 10–15)
GLUCOSE BLDC GLUCOMTR-MCNC: 111 MG/DL (ref 70–99)
GLUCOSE BLDC GLUCOMTR-MCNC: 89 MG/DL (ref 70–99)
GLUCOSE SERPL-MCNC: 104 MG/DL (ref 70–99)
HCT VFR BLD AUTO: 37.2 % (ref 35–47)
HGB BLD-MCNC: 12.5 G/DL (ref 11.7–15.7)
MCH RBC QN AUTO: 31.5 PG (ref 26.5–33)
MCHC RBC AUTO-ENTMCNC: 33.6 G/DL (ref 31.5–36.5)
MCV RBC AUTO: 94 FL (ref 78–100)
PLATELET # BLD AUTO: 259 10E3/UL (ref 150–450)
POTASSIUM SERPL-SCNC: 3.8 MMOL/L (ref 3.4–5.3)
RBC # BLD AUTO: 3.97 10E6/UL (ref 3.8–5.2)
SODIUM SERPL-SCNC: 139 MMOL/L (ref 135–145)
WBC # BLD AUTO: 10.4 10E3/UL (ref 4–11)

## 2024-01-03 PROCEDURE — 250N000011 HC RX IP 250 OP 636: Performed by: HOSPITALIST

## 2024-01-03 PROCEDURE — 258N000003 HC RX IP 258 OP 636: Performed by: COLON & RECTAL SURGERY

## 2024-01-03 PROCEDURE — 250N000011 HC RX IP 250 OP 636: Performed by: NURSE PRACTITIONER

## 2024-01-03 PROCEDURE — 99232 SBSQ HOSP IP/OBS MODERATE 35: CPT | Performed by: HOSPITALIST

## 2024-01-03 PROCEDURE — 99254 IP/OBS CNSLTJ NEW/EST MOD 60: CPT | Performed by: NURSE PRACTITIONER

## 2024-01-03 PROCEDURE — 120N000001 HC R&B MED SURG/OB

## 2024-01-03 PROCEDURE — 999N000198 HC STATISTIC WOC PT EDUCATION, 16-30 MIN

## 2024-01-03 PROCEDURE — 80048 BASIC METABOLIC PNL TOTAL CA: CPT | Performed by: HOSPITALIST

## 2024-01-03 PROCEDURE — C9113 INJ PANTOPRAZOLE SODIUM, VIA: HCPCS | Performed by: HOSPITALIST

## 2024-01-03 PROCEDURE — 36415 COLL VENOUS BLD VENIPUNCTURE: CPT | Performed by: HOSPITALIST

## 2024-01-03 PROCEDURE — 250N000013 HC RX MED GY IP 250 OP 250 PS 637: Performed by: NURSE PRACTITIONER

## 2024-01-03 PROCEDURE — 250N000011 HC RX IP 250 OP 636: Performed by: COLON & RECTAL SURGERY

## 2024-01-03 PROCEDURE — 250N000013 HC RX MED GY IP 250 OP 250 PS 637: Performed by: HOSPITALIST

## 2024-01-03 PROCEDURE — 85027 COMPLETE CBC AUTOMATED: CPT | Performed by: HOSPITALIST

## 2024-01-03 RX ORDER — SIMETHICONE 40MG/0.6ML
80 SUSPENSION, DROPS(FINAL DOSAGE FORM)(ML) ORAL EVERY 6 HOURS PRN
Status: DISCONTINUED | OUTPATIENT
Start: 2024-01-03 | End: 2024-01-16 | Stop reason: HOSPADM

## 2024-01-03 RX ORDER — LORAZEPAM 2 MG/ML
0.5 INJECTION INTRAMUSCULAR EVERY 6 HOURS PRN
Status: DISCONTINUED | OUTPATIENT
Start: 2024-01-03 | End: 2024-01-04

## 2024-01-03 RX ORDER — SIMETHICONE 80 MG
80 TABLET,CHEWABLE ORAL EVERY 6 HOURS PRN
Status: DISCONTINUED | OUTPATIENT
Start: 2024-01-03 | End: 2024-01-03

## 2024-01-03 RX ORDER — KETOROLAC TROMETHAMINE 30 MG/ML
30 INJECTION, SOLUTION INTRAMUSCULAR; INTRAVENOUS EVERY 6 HOURS PRN
Status: DISPENSED | OUTPATIENT
Start: 2024-01-03 | End: 2024-01-08

## 2024-01-03 RX ORDER — HYDROMORPHONE HYDROCHLORIDE 1 MG/ML
.5-1 INJECTION, SOLUTION INTRAMUSCULAR; INTRAVENOUS; SUBCUTANEOUS
Status: DISCONTINUED | OUTPATIENT
Start: 2024-01-03 | End: 2024-01-03

## 2024-01-03 RX ORDER — OXYCODONE HYDROCHLORIDE 5 MG/1
5-10 TABLET ORAL EVERY 4 HOURS PRN
Status: DISCONTINUED | OUTPATIENT
Start: 2024-01-03 | End: 2024-01-09

## 2024-01-03 RX ORDER — METHOCARBAMOL 100 MG/ML
500 INJECTION, SOLUTION INTRAMUSCULAR; INTRAVENOUS EVERY 6 HOURS
Status: DISCONTINUED | OUTPATIENT
Start: 2024-01-03 | End: 2024-01-03

## 2024-01-03 RX ORDER — HYDROXYZINE HYDROCHLORIDE 25 MG/1
25 TABLET, FILM COATED ORAL 3 TIMES DAILY PRN
Status: CANCELLED | OUTPATIENT
Start: 2024-01-03

## 2024-01-03 RX ORDER — DIPHENHYDRAMINE HYDROCHLORIDE 50 MG/ML
25 INJECTION INTRAMUSCULAR; INTRAVENOUS EVERY 6 HOURS PRN
Status: DISCONTINUED | OUTPATIENT
Start: 2024-01-03 | End: 2024-01-12

## 2024-01-03 RX ORDER — BUPRENORPHINE 5 UG/H
1 PATCH TRANSDERMAL WEEKLY
Status: DISCONTINUED | OUTPATIENT
Start: 2024-01-03 | End: 2024-01-12

## 2024-01-03 RX ORDER — MORPHINE SULFATE 2 MG/ML
2-4 INJECTION, SOLUTION INTRAMUSCULAR; INTRAVENOUS
Status: DISCONTINUED | OUTPATIENT
Start: 2024-01-03 | End: 2024-01-03

## 2024-01-03 RX ORDER — METHOCARBAMOL 100 MG/ML
500 INJECTION, SOLUTION INTRAMUSCULAR; INTRAVENOUS EVERY 8 HOURS
Qty: 40 ML | Refills: 0 | Status: COMPLETED | OUTPATIENT
Start: 2024-01-04 | End: 2024-01-06

## 2024-01-03 RX ORDER — HYDROMORPHONE HYDROCHLORIDE 1 MG/ML
0.5 INJECTION, SOLUTION INTRAMUSCULAR; INTRAVENOUS; SUBCUTANEOUS
Status: DISCONTINUED | OUTPATIENT
Start: 2024-01-03 | End: 2024-01-09

## 2024-01-03 RX ADMIN — ONDANSETRON 8 MG: 2 INJECTION INTRAMUSCULAR; INTRAVENOUS at 14:02

## 2024-01-03 RX ADMIN — BUPRENORPHINE 1 PATCH: 5 PATCH, EXTENDED RELEASE TRANSDERMAL at 15:54

## 2024-01-03 RX ADMIN — HYDROMORPHONE HYDROCHLORIDE 0.5 MG: 1 INJECTION, SOLUTION INTRAMUSCULAR; INTRAVENOUS; SUBCUTANEOUS at 18:39

## 2024-01-03 RX ADMIN — ONDANSETRON 8 MG: 4 TABLET, ORALLY DISINTEGRATING ORAL at 20:41

## 2024-01-03 RX ADMIN — HYDROMORPHONE HYDROCHLORIDE 0.5 MG: 1 INJECTION, SOLUTION INTRAMUSCULAR; INTRAVENOUS; SUBCUTANEOUS at 16:17

## 2024-01-03 RX ADMIN — SIMETHICONE 80 MG: 20 SOLUTION/ DROPS ORAL at 14:13

## 2024-01-03 RX ADMIN — LORAZEPAM 0.5 MG: 2 INJECTION INTRAMUSCULAR; INTRAVENOUS at 19:36

## 2024-01-03 RX ADMIN — HYDROMORPHONE HYDROCHLORIDE 1 MG: 1 INJECTION, SOLUTION INTRAMUSCULAR; INTRAVENOUS; SUBCUTANEOUS at 14:02

## 2024-01-03 RX ADMIN — PANTOPRAZOLE SODIUM 40 MG: 40 INJECTION, POWDER, FOR SOLUTION INTRAVENOUS at 09:52

## 2024-01-03 RX ADMIN — HYDROMORPHONE HYDROCHLORIDE 0.5 MG: 1 INJECTION, SOLUTION INTRAMUSCULAR; INTRAVENOUS; SUBCUTANEOUS at 07:39

## 2024-01-03 RX ADMIN — ENOXAPARIN SODIUM 40 MG: 40 INJECTION SUBCUTANEOUS at 18:39

## 2024-01-03 RX ADMIN — HYDROMORPHONE HYDROCHLORIDE 0.5 MG: 1 INJECTION, SOLUTION INTRAMUSCULAR; INTRAVENOUS; SUBCUTANEOUS at 21:01

## 2024-01-03 RX ADMIN — LORAZEPAM 0.5 MG: 2 INJECTION INTRAMUSCULAR; INTRAVENOUS at 12:21

## 2024-01-03 RX ADMIN — HYDROMORPHONE HYDROCHLORIDE 0.5 MG: 1 INJECTION, SOLUTION INTRAMUSCULAR; INTRAVENOUS; SUBCUTANEOUS at 04:38

## 2024-01-03 RX ADMIN — MEMANTINE 10 MG: 10 TABLET ORAL at 20:40

## 2024-01-03 RX ADMIN — SODIUM CHLORIDE, POTASSIUM CHLORIDE, SODIUM LACTATE AND CALCIUM CHLORIDE: 600; 310; 30; 20 INJECTION, SOLUTION INTRAVENOUS at 07:31

## 2024-01-03 RX ADMIN — HYDROMORPHONE HYDROCHLORIDE 0.5 MG: 1 INJECTION, SOLUTION INTRAMUSCULAR; INTRAVENOUS; SUBCUTANEOUS at 02:22

## 2024-01-03 RX ADMIN — ONDANSETRON 8 MG: 2 INJECTION INTRAMUSCULAR; INTRAVENOUS at 07:30

## 2024-01-03 RX ADMIN — HYDROMORPHONE HYDROCHLORIDE 1 MG: 1 INJECTION, SOLUTION INTRAMUSCULAR; INTRAVENOUS; SUBCUTANEOUS at 09:52

## 2024-01-03 ASSESSMENT — ACTIVITIES OF DAILY LIVING (ADL)
ADLS_ACUITY_SCORE: 40
ADLS_ACUITY_SCORE: 40
ADLS_ACUITY_SCORE: 34
ADLS_ACUITY_SCORE: 40
ADLS_ACUITY_SCORE: 34
ADLS_ACUITY_SCORE: 34
ADLS_ACUITY_SCORE: 40
ADLS_ACUITY_SCORE: 34
DEPENDENT_IADLS:: CLEANING;COOKING;LAUNDRY;SHOPPING;MEAL PREPARATION;TRANSPORTATION;INCONTINENCE

## 2024-01-03 NOTE — PLAN OF CARE
Oriented x4.  VSS.  IV fluids infusing.  Abdominal pain issue throughout day, using tylenol, baclofen, IV dilaudid, and also patient's own supply of medical marijuana at bedside in safe.  Butrans patch on arm.  Sat upright in bed but unable to dangle due to nausea and pain.  Using zofran as available, and seabands in place.  PCD on.  Ice to incision.  Stoma with liquid stool and gas in ostomy.  Abdominal binder on.  Poor appetite, only drinking ginger ale.  New IV placed via US.  Strong with marci urine output.  Will plan to pull strong on Wednesday after pain and nausea better controlled.  Pt is pivot transfer from wheelchair to commode as baseline.  Nursing will continue to monitor.

## 2024-01-03 NOTE — PROGRESS NOTES
"Care Management Follow Up    Length of Stay (days): 2    Expected Discharge Date: 01/05/2024     Concerns to be Addressed: basic needs, discharge planning, home safety     Patient plan of care discussed at interdisciplinary rounds: Yes    Anticipated Discharge Disposition:       Anticipated Discharge Services:    Anticipated Discharge DME:      Patient/family educated on Medicare website which has current facility and service quality ratings:    Education Provided on the Discharge Plan:    Patient/Family in Agreement with the Plan:      Referrals Placed by CM/SW:    Private pay costs discussed: Not applicable    Additional Information:  Writer filed adult protection report   [phone]    Addendum  - [employer] adult protection  called to discuss. They are not \"opening an investigation at this time\" but patient can call them if she wants to talk or provide more information.       Silvia Torres  is the care coordinator through [employer]     Addendum - spoke to Silvia Care coordinator. Silvia shared with writer that patient often asks for resources but does not always follow through. Patient has a SW Anny  who can also assist. Patient has been assessed for MA and was told she would have a spend down. Writer will continue to follow patient, left a message with Anny.     STEVE Alejandro    "

## 2024-01-03 NOTE — CONSULTS
[facility] ACUTE PAIN SERVICE CONSULTATION   [facility]   Text Page Pain Via [facility] Smita    Date of Admission:  1/1/2024  Date of Consult (When I saw the patient): 01/03/24  Physician requesting consult: Avis Argueta    Service: Surgery  Reason for consult: Acute postoperative pain     Assessment/Plan:     Peggy Jessica is a 48 year old female who was admitted on 1/1/2024.  2 Days Post-Op Exploratory laparotomy with diverting loop ileostomy and removal cecum with 1 omental band to pelvis, . I was asked to see the patient for pain management with pain type in setting of chronic abdominal pain recurrent abdominal ileostomy surgeries with associated constipation and nausea. History of chronic abdominal pain present to the emergency room with increasing abdominal pain and for constipation.  She states this pain is recurrent and she has frequent episodes of constipation with associated nausea and has had multiple ileostomy revisions or procedures in the past.  Describes pain as sharp, constant and twisting greater in the left lower quadrant.  She has a numeric rating of 1010, feeling as if current measures are adequate in controlling her pain.  She continues with nausea and therefore oral routes for medication management are suboptimal.  She is also sleepy during the interview but says that this is her normal and she does not sleep much at baseline due to excessive amount of pain. She has a past medical history of.chronic abdominal pain, chronic continuous opioid analgesia, bilateral mastectomies, Fibromyalgia, breast cancer with bilateral mastectomies, regular medical cannabis user enrolled in the [place] cannabis program.  The patient denies shortness of breath, abdominal pain,. The patient does is a former cigarette smoker of unknown quantity smoke and no chemical dependency history. Opioid tolerance is moderate.     Opioid Induced Respiratory Depression Risk  "Assessment:  (Low 0-1; Moderate 2-4; or High >4 or >/= 3 if two of the risk factors are age > 60 and opioid naive) due to the following risk factors: RYLEE, COPD/Asthma/pulmonary disease, CHF, renal dysfunction, hepatic dysfunction, Obesity, Smoker, Age>60, >2 opioid therapies, concomitant CNS depressants, opioid naive status, or post surgical ?   [place]pulled from [place] on 01/03/24. Last refill on 12/21/23 Butrans patch 10 mcg/hr#4 . Does supports analgesic of opioid use.  [facility]  Chronic opioid use = Butrans patch 10 mcg = 18  mg MME, opioid used this past 24 hours  4 mg dilaudid , Butran Patch 5 mcg = 89 mg Morphine  EQ .   PLAN:   1) Pain is consistent with visceral, musculoskeletal secondary to acute postoperative state, in the setting of chronic abdominal pain with concerns by nursing hypotension further decline with opioid use and development of an ileus on chronic opioid use. Treatment plan includes multimodal pain approach, medications as listed below, reviewed.  Discharge medications, advised keeping track of doses, educated on tapering off as pain improves, watch for constipation and stool softeners, no driving or alcohol with opioids, store medications in a safe place, advised to take no more than the prescribed dose as increased doses may cause respiratory depression or death. Patient is understanding of the plan. All questions and concerns addressed to patient's satisfaction.   2)Multimodal Medication Therapy  Topical:   Declined to use due to allergic reaction with lidocaine and hives with diclofenac does not feel menthol patches would be or appointment, Lidocaine patch 4%, Lidocaine cream 4%, and Diclofenac gel.   Adjuvants: CrCl is 102.9 mg/dl and Tylenol 975 mg every 8 hours\", Hydroxyzine unable to take p.o. so we will hold and order scheduled Benadryl 25 mg every 6 hours IV\" Gabapentin consider at later date, Muscle relaxer's methocarbamol 500 mg every 8 hours IV for 8 " "doses then reevaluate, hold home dose of baclofen p.o. \"  Antidepressants/anxiolytics: Not ordered  Medical cannabis per self administrating policy  Opioids:Oxycodone 5 to 10 mg every 4 hours as needed  IV Pain medication: Dilaudid 0.5 mg and Try to minimize & give po first   3)Non-medication interventions- Ice, Heat, physical therapy, distraction aroma therapy    4)Constipation Prophylaxis- senna and miralax . Continue to monitor.   5) Follow up   -acute pain team will  continue to follow .   -Opioid prescriber has been [facility]. PCP is Andrew Peck    -Discharge Recommendations - We recommend prescribing the following at the time of discharge:   Butrans patch 5 mcg will need to order this at discharge the patient does not go back to her 10 mg micrograms patch PTA dose  Oxycodone 5 mg dose to be determined if needed    Disposition: Home  Anticipated discharge to be determined  Prescribing at discharge: Surgery       History of Present Illness (HPI):       Peggy Jessica is a 48 year old  female with a past medical history noted above and presents with recurrent abdominal pain intractable.  Patient underwent procedure noted above with cecum tact down to the pelvis and is postop day 0 with minimal blood loss.  The patient reports pain that is located  in abdomen left lower quadrant and 9 radiates.  Alleviating factors include medications and exacerbates include nausea, unknown factors.  Current pain is rated at 8/10 and goal is 4/10.  Per [place]  [place] noted [place]. The patient has a moderate opioid tolerance. Opioid induced side effects including sedation is positive, respiratory suppression negative, nausea unrelated to opiates, and constipation.  There is positive.  The patient does note history of RYLEE is negative as well as history of, substance use disorder..     Discussed multimodal interventions, interventions other than systemic pharmacologic treatments for pain including: Reviewing " benefits of multimodal therapy and minimizing opiates if able    Review of medical record/Summary of labs and care everywhere as part of comprehensive review.      Past pain treatments have include pain clinic, Butrans, various muscle relaxers, gabapentin, topical agents.,     UDS   Amphetamines Urine   Date Value Ref Range Status   03/30/2022 Screen Negative Screen Negative Final     Comment:     Cutoff for a negative amphetamine is 500 ng/mL or less.     Barbiturates Urine   Date Value Ref Range Status   03/30/2022 Screen Negative Screen Negative Final     Comment:     Cutoff for a negative barbiturate is 200 ng/mL or less.     Cannabinoids Urine   Date Value Ref Range Status   03/30/2022 Screen Positive (A) Screen Negative Final     Comment:     Cutoff for a positive cannabinoid is greater than 50 ng/mL.   This is an unconfirmed screening result to be used for medical purposes only.     Cocaine Urine   Date Value Ref Range Status   03/30/2022 Screen Negative Screen Negative Final     Comment:     Cutoff for a negative cocaine is 300 ng/mL or less.     Opiates Urine   Date Value Ref Range Status   03/30/2022 Screen Negative Screen Negative Final     Comment:     Cutoff for a negative opiate is 300 ng/mL or less.     PCP Urine   Date Value Ref Range Status   03/30/2022 Screen Negative Screen Negative Final     Comment:     Cutoff for a negative PCP is 25 ng/mL or less.              Medical History   has a past medical history of Arthritis, BRCA positive, Cancer (H), CIDP (chronic inflammatory demyelinating polyneuropathy) (H), ASHKAN III with severe dysplasia (2002), and Complex regional pain syndrome type 1 of right lower extremity.       Surgical History   has a past surgical history that includes Hysterectomy (2004); Mastectomy (Bilateral, 2020); c section; Cholecystectomy (1997); Bilateral Oophorectomy (Bilateral, 2019); colonoscopy; Esophagoscopy, gastroscopy, duodenoscopy (EGD), combined (N/A, 07/28/2022);  Esophagoscopy, gastroscopy, duodenoscopy (EGD), combined (N/A, 5/16/2023); Esophagoscopy, gastroscopy, duodenoscopy (EGD), combined (N/A, 7/20/2023); and Colectomy with colostomy, combined (N/A, 1/1/2024).     Allergies     Allergies   Allergen Reactions    Dihydroergotamine Anaphylaxis    Latex Anaphylaxis    Shellfish-Derived Products Anaphylaxis    Sumatriptan Anaphylaxis    Compazine [Prochlorperazine] Anxiety    Banana Unknown    Gabapentin Dizziness    Gluten Meal Other (See Comments)     Celiac disease    Keppra [Levetiracetam] Nausea and Vomiting    Kiwi Unknown    Levofloxacin Other (See Comments)     Arrhythmia    Metronidazole Nausea and Vomiting    Nitrofurantoin Hives    Penicillins Hives    Pregabalin     Reglan [Metoclopramide] Other (See Comments)     restlessness/toe tapping     Topiramate Visual Disturbance    Aspirin Rash    Methadone Rash    Morphine Hives     She got hives around are when morphine given but resolved after few minutes per patient     Risperidone Anxiety        Current Home Medications   Prior to Admission medications    Medication Sig Start Date End Date Taking? Authorizing Provider   acetaminophen (TYLENOL) 500 MG tablet Take 500-1,000 mg by mouth every 6 hours as needed for mild pain   Yes Unknown, Entered By History   baclofen (LIORESAL) 10 MG tablet Take 10 mg by mouth 3 times daily Takes 5 mg morning and early afternoon and between 5/10 at bedtime 7/25/23  Yes Reported, Patient   buprenorphine (BUTRANS) 7.5 MCG/HR WK patch Place 1 patch onto the skin once a week Applies on Thursdays at 9 am 4/18/23  Yes Unknown, Entered By History   dicyclomine (BENTYL) 10 MG capsule Take 20 mg by mouth 3 times daily (before meals)   Yes Reported, Patient   diphenhydrAMINE (BENADRYL) 25 MG tablet Take 0.5 tablets (12.5 mg) by mouth every 6 hours as needed for allergies or other (nausea) 12/7/22  Yes Andrew Peck APRN CNP   folic acid (FOLVITE) 400 MCG tablet Take 1 tablet (400 mcg) by  mouth daily 7/21/22  Yes Andrew Peck APRN CNP   hydrOXYzine (ATARAX) 25 MG tablet Take 1 tablet (25 mg) by mouth 3 times daily (before meals) 7/11/23  Yes Andrew Peck APRN CNP   medical cannabis (Patient's own supply) Take 1 Dose by mouth See Admin Instructions (The purpose of this order is to document that the patient reports taking medical cannabis.  This is not a prescription, and is not used to certify that the patient has a qualifying medical condition.)  Per pt: 5-10 mg tablet three times a day PRN   Yes Reported, Patient   memantine (NAMENDA) 10 MG tablet Take 10 mg by mouth 2 times daily 8/23/23  Yes Reported, Patient   multivitamin w/minerals (THERA-VIT-M) tablet Take 1 tablet by mouth every morning Megafood Womens Brand   Yes Reported, Patient   naloxone (NARCAN) 4 MG/0.1ML nasal spray Spray 1 spray (4 mg) into one nostril alternating nostrils as needed for opioid reversal every 2-3 minutes until assistance arrives 10/27/22  Yes Carlita Woodward MD   ondansetron (ZOFRAN ODT) 4 MG ODT tab Take 1 tablet (4 mg) by mouth every 8 hours as needed for nausea 9/1/23  Yes Avis Terry PA-C   pantoprazole (PROTONIX) 40 MG EC tablet Take 1 tablet (40 mg) by mouth every morning (before breakfast) 2/10/23  Yes Luzma Goncalves APRN CNP   polyethylene glycol (MIRALAX) 17 GM/Dose powder Take 17 g by mouth daily Until BM, then use every day prn 8/4/23  Yes Andrew Peck APRN CNP   scopolamine (TRANSDERM) 1 MG/3DAYS 72 hr patch Place 1 patch onto the skin every 72 hours 9/1/23  Yes Avis Terry PA-C   senna (SENOKOT) 8.6 MG tablet Take 1 tablet by mouth as needed   Yes Unknown, Entered By History   senna-docusate (SENOKOT-S/PERICOLACE) 8.6-50 MG tablet Take 1 tablet by mouth 2 times daily as needed for constipation   Yes Unknown, Entered By History   sertraline (ZOLOFT) 100 MG tablet Take 1 tablet (100 mg) by mouth daily 6/28/23  Yes Andrew Peck APRN CNP   Tenapanor HCl (IBSRELA) 50 MG  TABS Take 50 mg by mouth every 48 hours   Yes Unknown, Entered By History   vitamin D2 (ERGOCALCIFEROL) 30349 units (1250 mcg) capsule Take 1 capsule (50,000 Units) by mouth once a week On Tuesdays 10/15/23  Yes Carlita Woodward MD   bisacodyl (DULCOLAX) 10 MG suppository Place 1 suppository (10 mg) rectally daily as needed for constipation 8/4/23   Andrew Peck, APRN CNP          Social History  Reviewed, and she  reports that she has quit smoking. Her smoking use included cigarettes. She has never used smokeless tobacco. She reports that she does not currently use alcohol. She reports current drug use. Drug: Marijuana.      Family History- Reviewed care everywhere to find family history Nothing relevant to pain consult    Reviewed, and family history includes Kidney Disease in her father; Macular Degeneration in her paternal grandmother.    Review of Systems  Complete ROS reviewed, unless noted  , all other systems reviewed (with patient) and all others found to be negative.         Objective:     Vitals:  B/P: 134/95, T: 98.2, P: 98, R: 16     Weight:   167 lbs 8.79 oz  Body mass index is 28.76 kg/m .      Physical Exam:     General Appearance:  Alert, cooperative, no distress, appears stated age, is thin sleepy but responsive to voice.  Patient is pleasant and able to make needs known.   Head:  Normocephalic, without obvious abnormality, atraumatic   Eyes:  Pupils are EOMIs, clear conjunctival without discharge.  Pupils midposition normal size   ENT/Throat: Lips and mouth are moist   Lymph/Neck: Supple, symmetrical, trachea midline, no adenopathy, thyroid: not enlarged, symmetric    Lungs:   Clear to auscultation bilaterally, respirations unlabored   Chest Wall:  No tenderness or deformity   Cardiovascular/Heart:  Regular rate and rhythm, S1, S2 normotensive no edema.   Abdomen:   Soft, non-tender, bowel sounds active all four quadrants,  no masses, no organomegaly.  Ileostomy bag without flatus or stool.    Musculoskeletal: Extremities normal, atraumatic  Incision and staples intact some erythema on either side of incision   Skin: Skin color good, lesions none   Neurologic: Alert and oriented X 3, Moves all 4 extremities          Imaging Reviewed Personally By Myself     Results for orders placed or performed during the hospital encounter of 01/01/24   CT Abdomen Pelvis w Contrast    Impression    IMPRESSION:   1.  Mobile cecum which is now located in the mid abdomen and previously was in the right lower quadrant, with a swirled appearance of the junction of the cecum and ascending colon. No upstream small bowel distention or evidence for cecal volvulus. The   mobile cecum may account for intermittent abdominal pain. No acute inflammatory findings in the abdomen and pelvis.   XR Colon Water Soluble    Impression    IMPRESSION:  1.  Abrupt cut-off of the ascending colon in the right lower quadrant with no contrast flowing into the cecum for at least 12 minutes. After maneuvers and manual compression, a thin trickle of contrast is seen flowing from the ascending colon into the   cecum which crosses midline and into the left lower quadrant. No definite contrast is seen flowing into the terminal ilium. This is concerning for an internal hernia, although differential would also include a cecal volvulus.     Findings were discussed with the ED attending, the Dr. Stacy Rojas and Dr. Sarah Rollins.         Labs Reviewed Personally By Myself    Sodium   Date Value Ref Range Status   01/03/2024 139 135 - 145 mmol/L Final     Comment:     Reference intervals for this test were updated on 09/26/2023 to more accurately reflect our healthy population. There may be differences in the flagging of prior results with similar values performed with this method. Interpretation of those prior results can be made in the context of the updated reference intervals.      Potassium   Date Value Ref Range Status   01/03/2024 3.8 3.4 - 5.3  mmol/L Final   10/24/2022 3.8 3.4 - 5.3 mmol/L Final     Chloride   Date Value Ref Range Status   01/03/2024 102 98 - 107 mmol/L Final   10/24/2022 106 94 - 109 mmol/L Final     Carbon Dioxide (CO2)   Date Value Ref Range Status   01/03/2024 27 22 - 29 mmol/L Final   10/24/2022 29 20 - 32 mmol/L Final     Anion Gap   Date Value Ref Range Status   01/03/2024 10 7 - 15 mmol/L Final   10/24/2022 6 3 - 14 mmol/L Final     Glucose   Date Value Ref Range Status   01/03/2024 104 (H) 70 - 99 mg/dL Final   10/24/2022 96 70 - 99 mg/dL Final     GLUCOSE BY METER POCT   Date Value Ref Range Status   01/03/2024 111 (H) 70 - 99 mg/dL Final     Urea Nitrogen   Date Value Ref Range Status   01/03/2024 8.5 6.0 - 20.0 mg/dL Final   10/24/2022 11 7 - 30 mg/dL Final     Creatinine   Date Value Ref Range Status   01/03/2024 0.66 0.51 - 0.95 mg/dL Final     GFR Estimate   Date Value Ref Range Status   01/03/2024 >90 >60 mL/min/1.73m2 Final     Calcium   Date Value Ref Range Status   01/03/2024 9.6 8.6 - 10.0 mg/dL Final            Please see A&P for additional details of medical decision making.  MANAGEMENT DISCUSSED with the following over the past 24 hours: Katerina Ely RN, Shai Manning MD   NOTE(S)/MEDICAL RECORDS REVIEWED over the past 24 hours: yes   Tests REVIEWED in the past 24 hours:  - See lab/imaging results included in the data section of the note  - CMP  - CBC  SUPPLEMENTAL HISTORY, in addition to the patient's history, over the past 24 hours obtained from:   - Spouse or significant other  - .parent  Medical complexity over the past 24 hours:  - Parenteral (IV) CONTROLLED SUBSTANCES ordered  - Decision regarding ESCALATION OF LEVEL OF CARE  - Prescription DRUG MANAGEMENT performed  70 MINUTES SPENT BY ME on the date of service doing chart review, history, exam, documentation & further activities per the note.    Thank you for this consultation.      Smita Flower RN, PGMT-BC APRN,CNP,ACHPN   Acute Pain Team ( [facility])  8-4:30 after 3:30 page house officer   No weekend coverage   [facility] Paging/Directory Pain  Securely message with the [facility] Web Console (learn more here)

## 2024-01-03 NOTE — PHARMACY
Medical Cannabis: Registration in the [facility] Medical Cannabis Registry is confirmed.     Medical Cannabis visually inspected and does NOT appear obviously adulterated.    Lucretia Navarrete, PharmD, BCPS

## 2024-01-03 NOTE — PROGRESS NOTES
Colorectal Surgery Progress Note    POD # 1  Interval History:  Pt nauseous this am. Refused strong removal. Ileostomy output lower.     Physical Exam:   Temp:  [98.2  F (36.8  C)-99.1  F (37.3  C)] 98.2  F (36.8  C)  Pulse:  [87-98] 98  Resp:  [10-16] 12  BP: (121-134)/(75-95) 134/95  SpO2:  [93 %-97 %] 97 %  General: Alert, oriented, appears comfortable, NAD.  Respiratory: breathing non labored  Abdomen: Abdomen is soft, appropriately tender, mild to moderately distended. Incision is clean, dry and intact without signs of infection, ostomy with liquid stool    Data:   All laboratory and imaging data in the past 24 hours reviewed  I/O last 3 completed shifts:  In: 978 [P.O.:240; I.V.:738]  Out: 780 [Urine:700; Stool:80]  Recent Labs   Lab Test 01/03/24  0731 01/02/24  0603 01/01/24  1043 01/31/23  1900 01/28/23  0619   WBC 10.4 13.6* 4.7   < > 4.2   HGB 12.5 12.9 13.5   < > 13.4    262 249   < > 243   INR  --   --   --   --  0.97    < > = values in this interval not displayed.      Recent Labs   Lab Test 01/03/24  0731 01/03/24  0618 01/02/24  2358 01/02/24  0603 01/01/24  1043     --   --  139 143   POTASSIUM 3.8  --   --  3.7 4.1   CHLORIDE 102  --   --  101 107   CO2 27  --   --  28 28   BUN 8.5  --   --  10.5 8.8   CR 0.66  --   --  0.68 0.89   ANIONGAP 10  --   --  10 8   ESTEBAN 9.6  --   --  9.3 9.5   * 111* 89 153* 113*        Recent Labs   Lab Test 01/01/24  1043   PROTTOTAL 7.3   ALBUMIN 4.1   BILITOTAL 0.3   ALKPHOS 95   AST 22   ALT 17       Assessment and Plan:     48 F hx of cervical cancer s/p radiation with long standing constipation and mobile cecum. No volvulus noted at the time of OR.     S/p ex lap and loop ileostomy for diversion.     Plan    Stay on fulls given the abdominal distension and nausea  WOCN   No abx   Lovenox   OOB   Strong removal today    Pt seen and d/w Dr Kendall Rojas MD  Fellow, Colon and Rectal Surgery  [facility]  Pager: [phone]
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
## 2024-01-03 NOTE — PROGRESS NOTES
"[facility]    Hospitalist Progress Note    Date of Admission:  1/1/2024    Assessment & Plan     Peggy Jessica is a 48 year old female with complex medical history including chronic pain, chronic constipation, fibromyalgia, breast cancer s/p  bilateral mastectomy who was admitted on 1/1/2024 for abdominal pain.      Abdominal pain due to large bowel obstruction, s/p exploratory laparotomy with diverting loop ileostomy on 1/1/2024  Chronic pain syndrome  10/10 abdominal pain reported with benign exams in ED.  CT of the abdomen and pelvis with contrast showed \"mobile cecum located in the mid abdomen, whereas previously it was in the right lower quadrant.  No upstream small bowel distention or evidence for cecal volvulus.  No acute inflammatory findings.  Moderate amount of formed stool noted in colon.\"  Allergy list includes reactions to morphine, methadone, aspirin, risperidone, Topamax, Reglan, pregabalin, Compazine, gabapentin, dihydroergotamine, sumatriptan.  -Inpatient admission.  Colorectal surgery consulted.  -Patient underwent surgery as noted above.  No cecal volvulus noted.  Postoperative management per CRS including diet and pain control.  -Given persistent issues with pain control, patient was advised to back down to sips of clear liquids.  She really has not had any meaningful oral intake so far.  Pain team has been consulted.  They have ordered IV Robaxin every 6 hours, as needed IV Benadryl, Dilaudid 0.5 mg every 2 hours as needed.  Also has 0.5 Mg Ativan available every 6 hours as needed for nausea, as needed Toradol, Zofran, oxycodone, simethicone.  Of note she has multiple drug intolerances/allergies that make symptom management very challenging.  Also has Tylenol, memantine for neuropathic pain, baclofen available.  -Consider NG tube if persistent vomiting.  Also noted to have decreased ileostomy output.?  Concern for developing IBS.  -BMP, CBC reassuring on 1/3.   " "  Chronic inflammatory demyelinating polyneuropathy  Fibromyalgia  Chronic daily headaches  Wheelchair dependency  Frequent falls  Chronic urinary and fecal incontinence  -Is on disability  -Pain meds as noted above.    -Pain team consulted  -Resumed PTA medical marijuana    Breast cancer  Status post bilateral mastectomy  Noted     Polypharmacy  Multiple drug allergies  Noted           Diet: Sips of clear liquids/full liquid diet as tolerated  DVT Prophylaxis: Ambulate every shift and observation status  Kwan Catheter: Not present  Lines: None     Cardiac Monitoring: None  Code Status:  full         Medical Decision Making       Please see A&P for additional details of medical decision making.        Clinically Significant Risk Factors                         # Overweight: Estimated body mass index is 28.76 kg/m  as calculated from the following:    Height as of this encounter: 1.626 m (5' 4\").    Weight as of this encounter: 76 kg (167 lb 8.8 oz)., PRESENT ON ADMISSION       # Financial/Environmental Concerns: unable to afford rent/mortgage           Kerri Gibbons MD  Text Page (7am - 6pm, M-F)  [facility]  Securely message with the Vocera Web Console (learn more here)  Text page via [employer] Paging/Directory      Interval History   Continued pain control issues overnight, continues to have significant nausea, did have emesis this morning.  Reports Dilaudid does not work, does not want Toradol, Compazine or Reglan.  Has hives with morphine.  Multiple drug intolerances.  Pain team to see patient today.  Decreased ileostomy output noted.    -Data reviewed today: I reviewed all new labs and imaging results over the last 24 hours. I personally reviewed CT scan with result as noted above    Physical Exam   Temp: 98.2  F (36.8  C) Temp src: Oral BP: (!) 134/95 Pulse: 98   Resp: 12 SpO2: 97 % O2 Device: Nasal cannula Oxygen Delivery: 2 LPM  Vitals:    01/01/24 0932 01/01/24 1937   Weight: 72.6 " kg (160 lb) 76 kg (167 lb 8.8 oz)     Vital Signs with Ranges  Temp:  [98.2  F (36.8  C)-99.1  F (37.3  C)] 98.2  F (36.8  C)  Pulse:  [87-98] 98  Resp:  [10-16] 12  BP: (121-134)/(75-95) 134/95  SpO2:  [93 %-97 %] 97 %  I/O last 3 completed shifts:  In: 978 [P.O.:240; I.V.:738]  Out: 951 [Urine:851; Stool:100]    Constitutional: Appears groggy but easily arousable, appears uncomfortable but not in distress  Respiratory: Non labored breathing, clear to auscultation bilaterally  Cardiovascular: Heart sounds regular rate and rhythm, no murmurs, no leg edema  GI: Abdomen is soft, appropriately tender postsurgery, nondistended.  Ostomy noted.  Neuro: alert, converses appropriately, moving all extremities, fluent speech, no facial asymmetry  Psych: mood and affect appropriate      Medications    lactated ringers 75 mL/hr at 01/03/24 0731      acetaminophen  975 mg Oral Q8H    baclofen  5 mg Oral TID    buprenorphine  1 patch Transdermal Weekly    And    buprenorphine   Transdermal Q8H SHERMAN    enoxaparin ANTICOAGULANT  40 mg Subcutaneous Q24H    medical cannabis  1 Dose Other See Admin Instructions    memantine  10 mg Oral BID    methocarbamol  500 mg Intravenous Q6H    pantoprazole  40 mg Intravenous Daily with breakfast    sertraline  100 mg Oral Daily    sodium chloride (PF)  3 mL Intracatheter Q8H       Data   Recent Labs   Lab 01/03/24  0731 01/03/24  0618 01/02/24  2358 01/02/24  0603 01/01/24  1043   WBC 10.4  --   --  13.6* 4.7   HGB 12.5  --   --  12.9 13.5   MCV 94  --   --  93 94     --   --  262 249     --   --  139 143   POTASSIUM 3.8  --   --  3.7 4.1   CHLORIDE 102  --   --  101 107   CO2 27  --   --  28 28   BUN 8.5  --   --  10.5 8.8   CR 0.66  --   --  0.68 0.89   ANIONGAP 10  --   --  10 8   ESTEBAN 9.6  --   --  9.3 9.5   * 111* 89 153* 113*   ALBUMIN  --   --   --   --  4.1   PROTTOTAL  --   --   --   --  7.3   BILITOTAL  --   --   --   --  0.3   ALKPHOS  --   --   --   --  95   ALT  --    --   --   --  17   AST  --   --   --   --  22   LIPASE  --   --   --   --  20       Imaging  No results found for this or any previous visit (from the past 24 hour(s)).

## 2024-01-03 NOTE — PROGRESS NOTES
"[facility] Nurse Inpatient Assessment     Consulted for:  New loop ileostomy    Current status: Wednesday 1/3: Attempted to see patient x3. Initially, met with patient and spouse in room. Patient appeared very uncomfortable and reported she was in too much pain to do pouch change at that time. Writer returned to room about 40 minutes later and pain team was meeting with patient. Spoke with RN who said there is concern for ileus/obstruction and NGT may have to be replaced. Pouch is intact at this time. [facility] nurse will follow up with patient and RN tomorrow.     Patient History (according to provider note(s):      48 F hx of cervical cancer s/p radiation with long standing constipation and mobile cecum. No volvulus noted at the time of OR.      S/p ex lap and loop ileostomy for diversion.     Areas Assessed:      Areas visualized during today's visit: Abdomen    Assessment of new loop Ileostomy:  Diagnosis Pertinent to Stoma:  transit issues       Surgery Date: 1-1-24  Surgeon: Dr. [facility]: [facility]    Pouching system in place on assessment today: [place] one piece and flat   Pouch barrier status: intact  Pouch last changed/wear time: post-op 1/1  Reason for pouch change today: pouch not changed today  Effectiveness of current pouching/ supply plan:  will change plan at first pouch change  Change made with ostomy management today: No  Pouching system placed/ in place today: Lucio one piece and flat   Supplies: supplies stored on unit    Last photo: 1-2-24      Stoma location: RLQ  Stoma size: approx 1 1/4\" as seen through pouch      Stoma appearance: viable, pink, and round, mild protrusion, lumen tipped toward 6 o'clock  Mucocutaneous junction:  not visualized (barrier in place)  Peristomal complication(s): area not visualized, barrier in place  Output: thin and brown  Output volume emptied during visit: 0ml    Abdominal assessment: Soft  Surgical site(s): "  glued incision clear and intact  NG still in place? No  Pain: tender, aching  Is patient still on a PCA? No    Ostomy education assessment:  Participant of teaching session today: patient  and spouse  Education completed today: Introduction to pouches, Low fiber diet , Infection prevention/hygiene , Hernia prevention, and Lifestyle adjustments   Educational materials/methods: Verbal,  hand out, [place]RL[place] education hand out, and Left packet of information at bedside   Education still needed: Initial fitting, Stoma assessment, Pouching system assessment , Evaluate leakage issue, Refitting of appliance , Adjustment of pouching plan, Pouch change demonstration, Pouch change return demonstration, Ostomy accessory product use , Peristomal skin care, Pouch emptying demonstration, Pouch emptying return demonstration, Intake and output recording, Fluid and electrolyte balance , Importance of hydration, When to seek medical attention, Odor/flatus management , and Discharge instructions    Learning Comprehension:   Psychosocial assessment: pt states she has several friends with similar digestive issues that also have ostomies, so this is not a new concept for her.   present and supportive, wishing to be part of the education, stating he may be the one performing cares in the future.  1/2 Pt not feeling ready to try and look at her own stoma/ abdomen.    Patient readiness for education today: distracted  Following today's visit: patient  and spouse is able to demonstrate;         1. How to empty their pouch? No        2. How to change their pouch? No        3. How to read and record intake and output correctly? No    Preparation for discharge completed: No discharge preparation started yet  Preparation for discharge still needed: Placed prescription recommendations in discharge navigator for MD to sign, Ensured patient has extra supplies for discharge, Discussed how to order supplies after discharge ,  "Ordered samples from  after gaining consent from patient/caregiver, Discussed how and when to make an outpatient WO nurse appointment after discharge, Prepared for discharge home with home care, and Discuss signs/symptoms of when to seek medical attention  Pt support system on discharge: , and possibly oldest son  WO recommend home care? Yes       Treatment Plan:     RLQ Ileostomy pouching plan:   Pouching system: ostomy supplies pouches: Lucio 57 FECAL ([id number]) ostomy supplies barrier: [place] 57mm SOFT CONVEX ([id number])  Accessories used: [facility] ostomy accessories: 2\" Cera Barrier Ring ([id number])   Frequency of pouch changes: PRN leakage and Three times a week  WOC follow up plan: Daily Monday-Friday (as able) and As needed   Bedside RN interventions: Change pouch PRN if leaking using the supplies above, Empty pouch when 1/3 to 1/2 full, ensure to clean pouch outlet after emptying to prevent odor, Notify WOC for ongoing pouch leakage, Document stoma appearance and output volume, color, and consistency every shift, Encourage patient to empty pouch with assist, and Assist patient to measure and record output      Orders: Written    RECOMMEND PRIMARY TEAM ORDER: None, at this time  Education provided: plan of care  Discussed plan of care with: Patient, Family, and Nurse  WO nurse follow-up plan:  daily Mon - Fri as able  Notify WOC if wound(s) deteriorate.  Nursing to notify the Provider(s) and re-consult the WO Nurse if new skin concern.    DATA:     Current support surface: Standard  Standard gel/foam mattress (IsoFlex, Atmos air, etc)  Containment of urine/stool: Indwelling catheter and Ileostomy pouch  BMI: Body mass index is 28.76 kg/m .   Active diet order: Orders Placed This Encounter      Full Liquid Diet     Output: I/O last 3 completed shifts:  In: 978 [P.O.:240; I.V.:738]  Out: 780 [Urine:700; Stool:80]     Labs:   Recent Labs   Lab 01/03/24  0731 01/02/24  0603 01/01/24  1043 " "  ALBUMIN  --   --  4.1   HGB 12.5   < > 13.5   WBC 10.4   < > 4.7    < > = values in this interval not displayed.     Pressure injury risk assessment:   Sensory Perception: 4-->no impairment  Moisture: 4-->rarely moist  Activity: 1-->bedfast  Mobility: 2-->very limited  Nutrition: 1-->very poor  Friction and Shear: 2-->potential problem  Gabriele Score: 14    Nita Bianchi RN, CWOCN  Please contact via [employer] at name or group \"[facility] nurse\"- M-F 8A-4P  Leave VM @ [phone] for non-urgent needs. Checked occasionally M-F.   "

## 2024-01-03 NOTE — PLAN OF CARE
Date & Time: 1/2/24 7pm- 1/3/24 7am  Surgery/POD#: POD 1 exploratory lap w/ diverting loop ileostomy  Behavior & Aggression: green  Fall Risk: yes  Orientation: AOx4  ABNL VS/O2: VSS on 1L of O2 overnight, bradypneic   Pain Management: pain managed w/ IV dilaudid, has medical cannabis  Bowel/Bladder: Pt refused removal of strong until pain under control, strong patent w/ good output, ostomy w/ scant output  Drains/Lines: PIV infusing LR@75  Surgical Sites: Ostomy site, ML inc  Diet: Full liq  Activity Level: not OOB this shift, pivot, WC at baseline  Anticipated  DC Date: Pending

## 2024-01-04 ENCOUNTER — APPOINTMENT (OUTPATIENT)
Dept: GENERAL RADIOLOGY | Facility: CLINIC | Age: 49
DRG: 329 | End: 2024-01-04
Attending: PHYSICIAN ASSISTANT
Payer: COMMERCIAL

## 2024-01-04 LAB
ANION GAP SERPL CALCULATED.3IONS-SCNC: 9 MMOL/L (ref 7–15)
BUN SERPL-MCNC: 14 MG/DL (ref 6–20)
CALCIUM SERPL-MCNC: 9.5 MG/DL (ref 8.6–10)
CHLORIDE SERPL-SCNC: 102 MMOL/L (ref 98–107)
CREAT SERPL-MCNC: 0.53 MG/DL (ref 0.51–0.95)
DEPRECATED HCO3 PLAS-SCNC: 28 MMOL/L (ref 22–29)
EGFRCR SERPLBLD CKD-EPI 2021: >90 ML/MIN/1.73M2
GLUCOSE BLDC GLUCOMTR-MCNC: 116 MG/DL (ref 70–99)
GLUCOSE BLDC GLUCOMTR-MCNC: 137 MG/DL (ref 70–99)
GLUCOSE SERPL-MCNC: 133 MG/DL (ref 70–99)
PLATELET # BLD AUTO: 305 10E3/UL (ref 150–450)
POTASSIUM SERPL-SCNC: 4.2 MMOL/L (ref 3.4–5.3)
SODIUM SERPL-SCNC: 139 MMOL/L (ref 135–145)

## 2024-01-04 PROCEDURE — 85049 AUTOMATED PLATELET COUNT: CPT | Performed by: COLON & RECTAL SURGERY

## 2024-01-04 PROCEDURE — C9113 INJ PANTOPRAZOLE SODIUM, VIA: HCPCS | Performed by: HOSPITALIST

## 2024-01-04 PROCEDURE — 250N000011 HC RX IP 250 OP 636: Performed by: HOSPITALIST

## 2024-01-04 PROCEDURE — 36415 COLL VENOUS BLD VENIPUNCTURE: CPT | Performed by: HOSPITALIST

## 2024-01-04 PROCEDURE — 258N000003 HC RX IP 258 OP 636: Performed by: COLON & RECTAL SURGERY

## 2024-01-04 PROCEDURE — 80048 BASIC METABOLIC PNL TOTAL CA: CPT | Performed by: HOSPITALIST

## 2024-01-04 PROCEDURE — G0463 HOSPITAL OUTPT CLINIC VISIT: HCPCS

## 2024-01-04 PROCEDURE — 250N000013 HC RX MED GY IP 250 OP 250 PS 637: Performed by: HOSPITALIST

## 2024-01-04 PROCEDURE — 99233 SBSQ HOSP IP/OBS HIGH 50: CPT | Performed by: HOSPITALIST

## 2024-01-04 PROCEDURE — 250N000013 HC RX MED GY IP 250 OP 250 PS 637: Performed by: COLON & RECTAL SURGERY

## 2024-01-04 PROCEDURE — 999N000065 XR ABDOMEN 1 VIEW

## 2024-01-04 PROCEDURE — 250N000011 HC RX IP 250 OP 636: Performed by: COLON & RECTAL SURGERY

## 2024-01-04 PROCEDURE — 250N000011 HC RX IP 250 OP 636: Performed by: NURSE PRACTITIONER

## 2024-01-04 PROCEDURE — 120N000001 HC R&B MED SURG/OB

## 2024-01-04 RX ORDER — SCOLOPAMINE TRANSDERMAL SYSTEM 1 MG/1
1 PATCH, EXTENDED RELEASE TRANSDERMAL
Status: DISCONTINUED | OUTPATIENT
Start: 2024-01-04 | End: 2024-01-16 | Stop reason: HOSPADM

## 2024-01-04 RX ORDER — DIAZEPAM 10 MG/2ML
5 INJECTION, SOLUTION INTRAMUSCULAR; INTRAVENOUS EVERY 6 HOURS PRN
Status: DISCONTINUED | OUTPATIENT
Start: 2024-01-04 | End: 2024-01-15

## 2024-01-04 RX ADMIN — METHOCARBAMOL 500 MG: 100 INJECTION, SOLUTION INTRAMUSCULAR; INTRAVENOUS at 17:32

## 2024-01-04 RX ADMIN — HYDROMORPHONE HYDROCHLORIDE 0.5 MG: 1 INJECTION, SOLUTION INTRAMUSCULAR; INTRAVENOUS; SUBCUTANEOUS at 14:15

## 2024-01-04 RX ADMIN — SODIUM CHLORIDE, POTASSIUM CHLORIDE, SODIUM LACTATE AND CALCIUM CHLORIDE: 600; 310; 30; 20 INJECTION, SOLUTION INTRAVENOUS at 22:24

## 2024-01-04 RX ADMIN — HYDROMORPHONE HYDROCHLORIDE 0.5 MG: 1 INJECTION, SOLUTION INTRAMUSCULAR; INTRAVENOUS; SUBCUTANEOUS at 08:42

## 2024-01-04 RX ADMIN — ENOXAPARIN SODIUM 40 MG: 40 INJECTION SUBCUTANEOUS at 17:32

## 2024-01-04 RX ADMIN — ONDANSETRON 8 MG: 2 INJECTION INTRAMUSCULAR; INTRAVENOUS at 05:28

## 2024-01-04 RX ADMIN — HYDROMORPHONE HYDROCHLORIDE 0.5 MG: 1 INJECTION, SOLUTION INTRAMUSCULAR; INTRAVENOUS; SUBCUTANEOUS at 16:18

## 2024-01-04 RX ADMIN — SERTRALINE HYDROCHLORIDE 100 MG: 50 TABLET ORAL at 10:14

## 2024-01-04 RX ADMIN — LORAZEPAM 0.5 MG: 2 INJECTION INTRAMUSCULAR; INTRAVENOUS at 10:10

## 2024-01-04 RX ADMIN — SIMETHICONE 80 MG: 20 SOLUTION/ DROPS ORAL at 10:14

## 2024-01-04 RX ADMIN — BACLOFEN 5 MG: 10 TABLET ORAL at 15:01

## 2024-01-04 RX ADMIN — SODIUM CHLORIDE, POTASSIUM CHLORIDE, SODIUM LACTATE AND CALCIUM CHLORIDE: 600; 310; 30; 20 INJECTION, SOLUTION INTRAVENOUS at 09:06

## 2024-01-04 RX ADMIN — METHOCARBAMOL 500 MG: 100 INJECTION, SOLUTION INTRAMUSCULAR; INTRAVENOUS at 12:14

## 2024-01-04 RX ADMIN — HYDROMORPHONE HYDROCHLORIDE 0.5 MG: 1 INJECTION, SOLUTION INTRAMUSCULAR; INTRAVENOUS; SUBCUTANEOUS at 18:16

## 2024-01-04 RX ADMIN — METHOCARBAMOL 500 MG: 100 INJECTION, SOLUTION INTRAMUSCULAR; INTRAVENOUS at 01:21

## 2024-01-04 RX ADMIN — HYDROMORPHONE HYDROCHLORIDE 0.5 MG: 1 INJECTION, SOLUTION INTRAMUSCULAR; INTRAVENOUS; SUBCUTANEOUS at 11:09

## 2024-01-04 RX ADMIN — DIAZEPAM 5 MG: 10 INJECTION, SOLUTION INTRAMUSCULAR; INTRAVENOUS at 22:15

## 2024-01-04 RX ADMIN — HYDROMORPHONE HYDROCHLORIDE 0.5 MG: 1 INJECTION, SOLUTION INTRAMUSCULAR; INTRAVENOUS; SUBCUTANEOUS at 05:36

## 2024-01-04 RX ADMIN — MEMANTINE 10 MG: 10 TABLET ORAL at 20:59

## 2024-01-04 RX ADMIN — HYDROMORPHONE HYDROCHLORIDE 0.5 MG: 1 INJECTION, SOLUTION INTRAMUSCULAR; INTRAVENOUS; SUBCUTANEOUS at 01:06

## 2024-01-04 RX ADMIN — ACETAMINOPHEN 650 MG: 325 TABLET, FILM COATED ORAL at 20:58

## 2024-01-04 RX ADMIN — MEMANTINE 10 MG: 10 TABLET ORAL at 10:14

## 2024-01-04 RX ADMIN — BACLOFEN 5 MG: 10 TABLET ORAL at 10:14

## 2024-01-04 RX ADMIN — HYDROMORPHONE HYDROCHLORIDE 0.5 MG: 1 INJECTION, SOLUTION INTRAMUSCULAR; INTRAVENOUS; SUBCUTANEOUS at 20:58

## 2024-01-04 RX ADMIN — PANTOPRAZOLE SODIUM 40 MG: 40 INJECTION, POWDER, FOR SOLUTION INTRAVENOUS at 08:42

## 2024-01-04 ASSESSMENT — ACTIVITIES OF DAILY LIVING (ADL)
ADLS_ACUITY_SCORE: 36
ADLS_ACUITY_SCORE: 40
ADLS_ACUITY_SCORE: 36
ADLS_ACUITY_SCORE: 40
ADLS_ACUITY_SCORE: 36
ADLS_ACUITY_SCORE: 40

## 2024-01-04 NOTE — DISCHARGE INSTRUCTIONS
New Ileostomy: discharge instructions    Diagnosis Pertinent to Stoma:  chronic constipation and mobile cecum                                              Surgery Date: 1-1-24  Surgeon: Dr. Smith [facility]    General:   1. Change appliance 2-3x/week and as needed for any leakage.    2. Empty pouch when 1/3 to 1/2 full. Initially wake to check need for pouch emtpying at least once during the night.    3. Carry an extra pouching system with you at all times (can prepare the barrier ahead of time - cut out the opening and apply ring).  4. Diet: Eat frequent small meals.  Low fiber diet.  (Noted you may have other additional dietary guidelines)  5. NO laxatives. NO timed-released medications.   6. May shower with appliance on or off. Blow dry (on cool setting) cloth backing and tape following bathing.   7. Change your appliance in the morning before breakfast (less likely to have stool coming out at this time).  8. Initially monitor your output to avoid dehydration.  Call Physician if your output exceeds 1500cc/24hrs.    Supplies:  1) Check with your insurance provider to determine which Durable Medical Equipment (DME) suppliers are in network. You can contact your insurance company at the phone number listed on the back of your insurance card or at their website.   2) When you have obtained a list of in network DME suppliers, contact a supplier to establish an account.   3) The supplies and numbers below are products that were used while you were in the hospital. On discharge, this will serve as a prescription for ostomy supplies. 4) You will go home with about two weeks of ostomy supplies. Make sure that you have these supplies with your belongings when you leave the hospital.   ~ RNs and other staff cannot contact your insurance company for you due to privacy regulations.  ~ Every insurance plan is different so the only way to make sure a  "DME supplier is covered is to contact your insurance company directly.    Lucio New Image 2-pc (15 pouch changes/month)   1.  Barrier: New Image CeraPlus soft convex cut-to-fit with tape    -   #38979  (5/box = 3 box/month)   2.  Pouch: high output   -   #77264  (10/box = 2 box/month)  and/or  3.  Pouch: drainable lock n roll    -   #83142 (clear pouch) or  #42173 (beige pouch)  (10/box = 2 box/month)  4.  Ring:  [place] 2\" CeraRing   -   #8805  (10/box = 2 box/month)  5.  3M Cavilon no-sting skin barrier (optional if needed)   -   #3344  (50/box = 1 box as needed)  6.  Stoma powder (if needed)   -    #7906  (1 bottle as needed)  7.  Ostomy belt (optional)   -    #7300 (medium) or #7299  (large) - (box of 10 = 1 box as needed)  8.  Odor eliminator drops (optional)   -   #7717  (8oz bottle) or #7715 (1oz bottle, box of 12)  9.  Lucio skin protective wipes (optional )   -   #7917  (50/box = 1 box as needed)  10.  Du Bois adhesive remover spray (optional)   -    #7737  (1/box = 1 box as needed)      Pouching procedure:  1.  Cut out opening in barrier following pattern.   (approx. 1/8\" larger than stoma)  2.  Remove plastic backing.  3.  Open ring, stretch and apply around the cut opening on sticky side of barrier.  Press into place.  Note:  can 'build up' the ring to fill in any divots in peristomal skin.     4.  Fold back edge of paper that covers the tape to create a \"tab.\"  This assists with paper removal later on.  5.  May attach pouch to barrier at this time.  Set prepared pouch aside.  6.  Remove old pouch from around stoma.  Discard.   7.  Cleanse around stoma with water only.  Dry well.     8.  Apply pouch:  Ensure skin completely dry.  Pull up on abdomen to create flat pouching surface.  Center stoma in barrier opening and press barrier firmly to skin.  9.  Pull on \"tabs\" to remove paper that covers the tape.  Press tape to skin.  Ok if there are wrinkles in the tape.    10.  Attach pouch to " barrier, if have not already done.  Ensure pouch is closed.   11.  Hold warm hand over stoma/barrier for a few minutes, to help pouch form a good secure bond with the skin.      Additional ostomy resources:      Tapestry - pouch supports/covers     O[place]omym[place].org - OAMA Ostomy Association of the [facility] - ostomy support group with email newsletters and in-person monthly meetings that can also be virtual via Partigi     Ostomyassociationof[employer] - [facility] ostomy support group    OstomysecretsLiquid Spins - specialty ostomy undergarments     [employer] [place], Coloplast, and Convatec (ostomy manufacturers) - websites have contact info as well as general ostomy information and educational videos:   [email or web address]  [email or web address]  [email or web address]    Ostobuddy - smart phone john to help manage ostomy cares       Follow-up as needed (after home care is done):     Aracelis Banks Amanda, Kaitlin, Rachel - CWOCNs (ostomy nurses)  Clinic room is on 1st floor in [facility] - [facility]  Office phone # [phone] (Mon - Fri) - call for any questions or concerns    The acute pain service was pleased to be a part of your care team below are some helpful resources for you    Pain clinic in the [facility].   [facility]   [address]     José Montemayor MD     [place] [address]  [phone]    Please be advised that we are not familiar with these pain clinic and recommend that you make contact with them and see what is the best fit for you. As many pain clinic book out for several month, It is recommended you plan ahead with your present pain provider so you do not run out of medication, risking opioid withdrawl   Signs of withdrawal are:   -dilated pupils, sweating, goose bumps on the skin, elevated hear rate and  Blood pressure, flu like symptoms. If these symptoms become  intolerable contact your pain provider or go the local Emergency room.      Secure Disposal of Unused Opioids:    To safely dispose of unused opioid prescriptions, the [facility] and [facility] now has a medication disposal kiosk located inside the pharmacy. Their pharmacy also sells Deterra disposal  packets, which permanently destroy prescription and over the counter medications, for $3.99. Made with non-toxic materials, the easy-to-use Deterra pouches are proven to deactivate medication in a safe, eco-friendly way. Visit https://Minus/ to learn more.    All [facility] pharmacies that do not currently offer a safe medication disposal kiosk offer DisposeRx  packets or other drug disposal options available upon request in the pharmacy at  no cost. Visit DisposeRx.com to learn more.    Opioid Medication Information    You have been given a prescription for an opioid (narcotic) pain medicine and/or have received a pain medicine. These medicines can make you drowsy or impaired. You must not drive, operate dangerous equipment, or engage in any other dangerous activities while taking these medications. If you drive while taking these medications, you could be arrested for DUI, or driving under the influence. Do not drink any alcohol while you are taking these medications.   Opioid pain medications can cause addiction. If you have a history of chemical dependency of any type, you are at a higher risk of becoming addicted to pain medications.  Only take these prescribed medications to treat your pain when all other options have been tried. Take it for as short a time and as few doses as possible. Store your pain pills in a secure place, as they are frequently stolen and provide a dangerous opportunity for children or visitors in your house to start abusing these powerful medications. We will not replace any lost or stolen medicine.  As soon as your pain is better, you should seek out a drug take back program  (see your local police department) to dispose of them.   Over-the-counter medications and prescription drugs can pollute fermin and be harmful to humans, fish, and other wildlife when disposed of improperly -- do not flush medications down the toilet or place in the trash.  Properly disposing of medicines is important to prevent abuse or poisoning and protect the environment.     Contact your local law enforcement for where to drop off  controlled substances like opioids medication  Many prescription pain medications contain Tylenol  (acetaminophen), including Vicodin , Tylenol #3 , Norco , Lortab , and Percocet .  You should not take any extra pills of Tylenol  if you are using these prescription medications or you can get very sick.  Do not ever take more than 3000 mg of acetaminophen in any 24 hour period.  All opioids tend to cause constipation. Drink plenty of water and eat foods that have a lot of fiber, such as fruits, vegetables, prune juice, apple juice and high fiber cereal.  Take a laxative if you don t move your bowels at least every other day. Miralax , Milk of Magnesia, Colace , or Senna  can be used to keep you regular.  You will likely need to continue stool softeners and stimulants while taking opioids.     --You will need to establish with a primary care MD in [place] and that provider can then send home care referrals out. The agency the hospital tried to work with was [facility].

## 2024-01-04 NOTE — PROVIDER NOTIFICATION
"MD Notification    Notified Person: MD    Notified Person Name: Gilberto Rojas     Notification Date/Time: 1/4 1832, 0861    Notification Interaction: web based page    Purpose of Notification: \"Pt with small emesis x5 overnight, requesting NG tube.\"    Orders Received: NG for decompression, NPO, X ray to confirm placement    Comments:    "

## 2024-01-04 NOTE — PROGRESS NOTES
MD Notification    Notified Person: MD    Notified Person Name:Neo Meyer     Notification Date/Time:2050 01/03/2024    Notification Interaction: vocteetee    Purpose of Notification:Read - 10:50 pm  pt has scopolamine patch on prior to surgery and wants it to be replaced, pt wants to know if you can order it  ANATOLY Meyer Aiad - 10:51 pm  When was the last patch?  ANATOLY Montalvoad - 10:51 pm  And was it for nausea?  IO  Read - 11:32 pm  nausea, on sunday she placed it, she says she cant stop throwing up  ANATOLY Meyer Aiad - 11:33 pm  Place an order for zofran 8mg IV stat once as a verbal under me  IO  Read - 11:44 pm  i gave cristina zofran at 2041 8mg oral  ANATOLY Meyer Aiad - 11:45 pm  Ok then let's give that some time to work.  ANATOLY Meyer Aiad - 11:45 pm  Scopolamine really won't add to that  ANATOLY Meyer Aiad - 11:45 pm  And if she tolerated it orally, then her nausea is reasonably controlled  IO  Read - 11:47 pm  okay    Orders Received:    Comments:
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
## 2024-01-04 NOTE — PLAN OF CARE
Goal Outcome Evaluation:  Abdominal pain    DATE & TIME: 01/03/2023 9080-7536    Cognitive Concerns/ Orientation : A/ox4   BEHAVIOR & AGGRESSION TOOL COLOR: Green   ABNL VS/O2: VSS on room air,  hypertensive patient wanted CAPNo off during the night because of intermittent vomiting and nausea   MOBILITY:    PAIN MANAGMENT: Dilaudid x3, scheduled Robaxin   DIET: full liquid   BOWEL/BLADDER: Purwick in place, Ileostomy small amount output brown and liquid   ABNL LAB/BG: ,137   DRAIN/DEVICES: LPIV LR 75ml/hr, RPIV saline locked   SKIN: Lap site liquid bandage CDI, ileostomy pouch wnl   TESTS/PROCEDURES: possible NG tube placement   D/C DATE: TBD   OTHER IMPORTANT INFO: Pt placed her own scopalamine patch said it helped with nausea, antimeric given overnight helped intermittently. Pt refused oral tylenol . Pt refused repositioning. Pt wanted CAPNo off overnight. Stated we could use 1l NC pt declined, O2 monitor left in place over night. Pt refused oral intake. Vomiting very small amount 3cc at  a time
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
## 2024-01-04 NOTE — PROGRESS NOTES
COLON & RECTAL SURGERY  PROGRESS NOTE    January 4, 2024  Post-op Day # 3    SUBJECTIVE:  Frequent nausea and emesis overnight. Having some ileostomy output. Pain unchanged. AVSS.    OBJECTIVE:  Temp:  [98.2  F (36.8  C)-98.4  F (36.9  C)] 98.3  F (36.8  C)  Pulse:  [94-99] 96  Resp:  [10-16] 10  BP: (132-153)/(84-96) 143/96  SpO2:  [93 %-100 %] 96 %    Intake/Output Summary (Last 24 hours) at 1/4/2024 0841  Last data filed at 1/4/2024 0550  Gross per 24 hour   Intake 1052 ml   Output 496 ml   Net 556 ml       GENERAL:  Awake, alert, no acute distress. Actively vomiting small amounts.  HEAD: Normocephalic atraumatic  SCLERA: Anicteric  EXTREMITIES: Warm and well perfused  ABDOMEN:  Soft, appropriately tender, mildly distended. No guarding, rigidity, or peritoneal signs. Ileostomy pink with some liquid stool in bag.  INCISION:  C/d/i    LABS:  Lab Results   Component Value Date    WBC 10.4 01/03/2024     Lab Results   Component Value Date    HGB 12.5 01/03/2024     Lab Results   Component Value Date    HCT 37.2 01/03/2024     Lab Results   Component Value Date     01/04/2024     Last Basic Metabolic Panel:  Lab Results   Component Value Date     01/04/2024      Lab Results   Component Value Date    POTASSIUM 4.2 01/04/2024    POTASSIUM 3.8 10/24/2022     Lab Results   Component Value Date    CHLORIDE 102 01/04/2024    CHLORIDE 106 10/24/2022     Lab Results   Component Value Date    ESTEBAN 9.5 01/04/2024     Lab Results   Component Value Date    CO2 28 01/04/2024    CO2 29 10/24/2022     Lab Results   Component Value Date    BUN 14.0 01/04/2024    BUN 11 10/24/2022     Lab Results   Component Value Date    CR 0.53 01/04/2024     Lab Results   Component Value Date     01/04/2024     01/04/2024    GLC 96 10/24/2022       ASSESSMENT/PLAN: 49 yo F POD#3 s/p ex lap, PAOLA, diverting loop ileostomy. Has baseline chronic pain and nausea, both worse yesterday. Frequent emesis overnight.    - NPO, ok for  ice chips  - NGT for decompression, XR to confirm placement. Please record output.  - Pain team consulted for assistance with pain control given chronic pain and multiple medication allergies/intolerances  - Continue IVF  - OOB, doesn't walk at baseline but should be getting into wheelchair and moving as able  - PT/OT   - WOCN for stoma teaching  - Lovenox for ppx    Discussed with Dr. Macias.    For questions/paging, please contact the CRS office at [phone].    Rafa Layton PA-C  Colorectal Physician Assistant    [facility]  [address]  [place] [address]  T: [phone]  F: [phone]      Colon and Rectal Surgery Attending Note    Patient seen and examined independently.  Agree with above assessment and plan.  Frequent nausea and vomiting overnight.  Actively vomiting during our interview today.  Having some ileostomy output.  Continues to have pain.  Requesting nasogastric tube.  Abdomen soft and round.  Stoma pink with liquid stool in the bag.  No signs of infection.    Plan: Place nasogastric tubes.  Follow-up x-ray.  Encourage mobility, at baseline uses a wheelchair.    Stoma care and teaching  Lovenox for now.    Germaine Macias MD  [facility]  [address], [address]  [place] [address]  T: [phone]  F: [phone]   [email or web address]

## 2024-01-04 NOTE — PLAN OF CARE
Oriented x4 but lethargic.  Capno WDL but RR fluctuating between 8 -14. Pain and nausea continues to be issues despite ice packs, IV dilaudid, zofran and ativan.  Pain team consulted and made changes.  Simethacone started.  Endorses lots of gas, frequently belching to the point that she becomes nauseated and vomits.  Small amount of liquid stool output via ileostomy with some gas present. Kwan removed in AM, small amount of concentrated urine output via external catheter.  Unable to tolerate any oral intake.  IV fluids infusing.  Pale skin.  Diaphoretic, fan in use.  Right deltoid butrans patch in place.  Mother supportive of pt.
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
## 2024-01-05 ENCOUNTER — APPOINTMENT (OUTPATIENT)
Dept: PHYSICAL THERAPY | Facility: CLINIC | Age: 49
DRG: 329 | End: 2024-01-05
Attending: COLON & RECTAL SURGERY
Payer: COMMERCIAL

## 2024-01-05 LAB
ANION GAP SERPL CALCULATED.3IONS-SCNC: 9 MMOL/L (ref 7–15)
BUN SERPL-MCNC: 18.5 MG/DL (ref 6–20)
CALCIUM SERPL-MCNC: 8.8 MG/DL (ref 8.6–10)
CHLORIDE SERPL-SCNC: 103 MMOL/L (ref 98–107)
CREAT SERPL-MCNC: 0.59 MG/DL (ref 0.51–0.95)
DEPRECATED HCO3 PLAS-SCNC: 29 MMOL/L (ref 22–29)
EGFRCR SERPLBLD CKD-EPI 2021: >90 ML/MIN/1.73M2
GLUCOSE BLDC GLUCOMTR-MCNC: 79 MG/DL (ref 70–99)
GLUCOSE BLDC GLUCOMTR-MCNC: 83 MG/DL (ref 70–99)
GLUCOSE SERPL-MCNC: 82 MG/DL (ref 70–99)
POTASSIUM SERPL-SCNC: 3.6 MMOL/L (ref 3.4–5.3)
SODIUM SERPL-SCNC: 141 MMOL/L (ref 135–145)

## 2024-01-05 PROCEDURE — 99232 SBSQ HOSP IP/OBS MODERATE 35: CPT | Performed by: HOSPITALIST

## 2024-01-05 PROCEDURE — 120N000001 HC R&B MED SURG/OB

## 2024-01-05 PROCEDURE — 36415 COLL VENOUS BLD VENIPUNCTURE: CPT | Performed by: HOSPITALIST

## 2024-01-05 PROCEDURE — 258N000003 HC RX IP 258 OP 636: Performed by: COLON & RECTAL SURGERY

## 2024-01-05 PROCEDURE — C9113 INJ PANTOPRAZOLE SODIUM, VIA: HCPCS | Performed by: HOSPITALIST

## 2024-01-05 PROCEDURE — 250N000011 HC RX IP 250 OP 636: Performed by: COLON & RECTAL SURGERY

## 2024-01-05 PROCEDURE — 97530 THERAPEUTIC ACTIVITIES: CPT | Mod: GP

## 2024-01-05 PROCEDURE — 250N000013 HC RX MED GY IP 250 OP 250 PS 637: Performed by: HOSPITALIST

## 2024-01-05 PROCEDURE — 250N000011 HC RX IP 250 OP 636: Performed by: HOSPITALIST

## 2024-01-05 PROCEDURE — 250N000011 HC RX IP 250 OP 636: Mod: JZ | Performed by: NURSE PRACTITIONER

## 2024-01-05 PROCEDURE — 999N000197 HC STATISTIC WOC PT EDUCATION, 0-15 MIN

## 2024-01-05 PROCEDURE — 258N000003 HC RX IP 258 OP 636: Performed by: INTERNAL MEDICINE

## 2024-01-05 PROCEDURE — 97161 PT EVAL LOW COMPLEX 20 MIN: CPT | Mod: GP

## 2024-01-05 PROCEDURE — 80048 BASIC METABOLIC PNL TOTAL CA: CPT | Performed by: HOSPITALIST

## 2024-01-05 RX ADMIN — METHOCARBAMOL 500 MG: 100 INJECTION, SOLUTION INTRAMUSCULAR; INTRAVENOUS at 09:32

## 2024-01-05 RX ADMIN — ENOXAPARIN SODIUM 40 MG: 40 INJECTION SUBCUTANEOUS at 18:52

## 2024-01-05 RX ADMIN — ONDANSETRON 8 MG: 2 INJECTION INTRAMUSCULAR; INTRAVENOUS at 22:04

## 2024-01-05 RX ADMIN — SIMETHICONE 80 MG: 20 SOLUTION/ DROPS ORAL at 23:50

## 2024-01-05 RX ADMIN — PANTOPRAZOLE SODIUM 40 MG: 40 INJECTION, POWDER, FOR SOLUTION INTRAVENOUS at 09:32

## 2024-01-05 RX ADMIN — SERTRALINE HYDROCHLORIDE 100 MG: 50 TABLET ORAL at 09:32

## 2024-01-05 RX ADMIN — METHOCARBAMOL 500 MG: 100 INJECTION, SOLUTION INTRAMUSCULAR; INTRAVENOUS at 01:04

## 2024-01-05 RX ADMIN — DIAZEPAM 5 MG: 10 INJECTION, SOLUTION INTRAMUSCULAR; INTRAVENOUS at 11:53

## 2024-01-05 RX ADMIN — SODIUM CHLORIDE, POTASSIUM CHLORIDE, SODIUM LACTATE AND CALCIUM CHLORIDE: 600; 310; 30; 20 INJECTION, SOLUTION INTRAVENOUS at 11:53

## 2024-01-05 RX ADMIN — BACLOFEN 5 MG: 10 TABLET ORAL at 09:33

## 2024-01-05 RX ADMIN — HYDROMORPHONE HYDROCHLORIDE 0.5 MG: 1 INJECTION, SOLUTION INTRAMUSCULAR; INTRAVENOUS; SUBCUTANEOUS at 09:45

## 2024-01-05 RX ADMIN — ONDANSETRON 8 MG: 2 INJECTION INTRAMUSCULAR; INTRAVENOUS at 09:39

## 2024-01-05 RX ADMIN — METHOCARBAMOL 500 MG: 100 INJECTION, SOLUTION INTRAMUSCULAR; INTRAVENOUS at 16:17

## 2024-01-05 RX ADMIN — DEXTROSE AND SODIUM CHLORIDE: 5; 900 INJECTION, SOLUTION INTRAVENOUS at 23:39

## 2024-01-05 RX ADMIN — HYDROMORPHONE HYDROCHLORIDE 0.5 MG: 1 INJECTION, SOLUTION INTRAMUSCULAR; INTRAVENOUS; SUBCUTANEOUS at 18:52

## 2024-01-05 RX ADMIN — HYDROMORPHONE HYDROCHLORIDE 0.5 MG: 1 INJECTION, SOLUTION INTRAMUSCULAR; INTRAVENOUS; SUBCUTANEOUS at 15:06

## 2024-01-05 RX ADMIN — MEMANTINE 10 MG: 10 TABLET ORAL at 09:33

## 2024-01-05 RX ADMIN — HYDROMORPHONE HYDROCHLORIDE 0.5 MG: 1 INJECTION, SOLUTION INTRAMUSCULAR; INTRAVENOUS; SUBCUTANEOUS at 22:26

## 2024-01-05 RX ADMIN — METHOCARBAMOL 500 MG: 100 INJECTION, SOLUTION INTRAMUSCULAR; INTRAVENOUS at 23:41

## 2024-01-05 RX ADMIN — HYDROMORPHONE HYDROCHLORIDE 0.5 MG: 1 INJECTION, SOLUTION INTRAMUSCULAR; INTRAVENOUS; SUBCUTANEOUS at 00:10

## 2024-01-05 ASSESSMENT — ACTIVITIES OF DAILY LIVING (ADL)
ADLS_ACUITY_SCORE: 36

## 2024-01-05 NOTE — PROGRESS NOTES
"Care Management Follow Up    Length of Stay (days): 4    Expected Discharge Date: 01/09/2024     Concerns to be Addressed: basic needs, discharge planning, home safety     Patient plan of care discussed at interdisciplinary rounds: Yes    Anticipated Discharge Disposition:       Anticipated Discharge Services:    Anticipated Discharge DME:      Patient/family educated on Medicare website which has current facility and service quality ratings:    Education Provided on the Discharge Plan:    Patient/Family in Agreement with the Plan:      Referrals Placed by CM/SW:    Private pay costs discussed: Not applicable    Additional Information:  Writer made a referral to financial counseling to see if patient can qualify for MA.   Writer spoke with Eduardo Mccormick  for more information about patient and to share information about patients comments to writer. Anny reports speaking to patient 1 -2 times per month for around 4 months. Patient has been given information about applying for Medical Assistance, getting on a waiver with the county etc. Patient often accesses resources but does not always follow through on necessary steps to acquire. Anny was unsure if patient has medicare. Anny reported that patients income is around $1000 per month.     Writer called the [employer] linkage line to see if we could determine patients medicare number.   Senior linkage line was not able to determine (utilizing their systems) but shared with writer that Medicare typically does not start coverage until a person has been on social security disability for 24 months after start date. Call social security local number to check if medicare has started. If patient is on their spouses employee sponsored plan, that plan would be primary and medicare secondary. Patients spouse would need to \"drop her\" from his plan in order to access medicare benefits if patient has them. The  medicare benefits could potentially cover a TCU stay if " patient has them.     Addendum   Writer met with patient and her mom at bedside to discuss discharge planning. Hiral (patients mom) will call social security to find out if medicare benefits are active. Hiral and patient waiting for financial counselors call. Hiral reports ability to pay for TCU stay out of pocket if necessary and also is considering allowing patient to go and stay with her in [place]. Wanted to understand options for transportation of patient to [place] and ability to schedule follow up appointments in another state. Writer assured Hiral that these type of things can be  arranged if necessary.       Addendum - patient will have medicare coverage because of her SSDI but it does not start until April.     STEVE Alejandro
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
## 2024-01-05 NOTE — PROGRESS NOTES
"[facility] Nurse Inpatient Assessment     Consulted for:  New loop ileostomy    Summary:  New loop ileostomy, starting to function but pt had to have NG placed today 1/4 with large return.  Pt has complex social situation, see Social Work notes.      1/5: Checked in with patient and mother at ~ 9:30 AM. Both requested a visit at 1 PM. Writer returned at 1 PM and patient was sleeping soundly. Mother requested that writer let the patient sleep. Writer returned to room at ~ 2:15 PM. Patient's spouse and mother at bedside. Patient awake but stated she was not up for a visit. Pouch is intact. Explained to all parties that [facility] nurse not available over the weekend, but will follow up on Monday.    Patient History (according to provider note(s):      48 F hx of cervical cancer s/p radiation with long standing constipation and mobile cecum. No volvulus noted at the time of OR.      S/p ex lap and loop ileostomy for diversion.     Areas Assessed:      Areas visualized during today's visit: Abdomen    Assessment of new loop Ileostomy:  Diagnosis Pertinent to Stoma:  transit issues       Surgery Date: 1-1-24  [facility]    Pouching system in place on assessment today: Arnold one piece and flat   Pouch barrier status: intact but melting  Pouch last changed/wear time: post-op 1/1, 1/4  Reason for pouch change today: ostomy education, routine schedule, and initial post-op assessment  Effectiveness of current pouching/ supply plan: Attempting new system, will re-evaluate next assessment  Change made with ostomy management today: Yes  Pouching system placed/ in place today: Arnold 2pc 57mm soft convex with ring and high-output pouch  Supplies: ordered from [facility]    Last photo: 1-4-24      Stoma location: RLQ  Stoma size: approx 1 1/4\" loop stoma  Stoma appearance: viable, pink, and round, lower loop minimally protruding  Mucocutaneous junction:  " intact  Peristomal complication(s): small divots/creases in skin along inferior aspect  Output: thin and brown  Output volume emptied during visit: <30ml    Abdominal assessment: Soft  Surgical site(s):  glued incision clear and intact  NG still in place? No  Pain: tender, aching  Is patient still on a PCA? No    Ostomy education assessment:  Participant of teaching session today: patient  and mother  Education completed today: Initial fitting, Stoma assessment, Pouching system assessment , Refitting of appliance , Adjustment of pouching plan, Pouch change demonstration, Ostomy accessory product use , Introduction to pouches, Peristomal skin care, Pouch emptying demonstration, Fluid and electrolyte balance , Importance of hydration, Low fiber diet , Infection prevention/hygiene , and Lifestyle adjustments   Educational materials/methods: Verbal,  hand out, FOD [place] education hand out, and Left packet of information at bedside   Education still needed: Pouch change return demonstration, Pouch emptying demonstration, Intake and output recording, and Discharge instructions    Learning Comprehension:   Psychosocial assessment: pt states she has several friends with similar digestive issues that also have ostomies, and who may be part of her support system.  Pt and mother engaged in education; pt notes she will need assist with cares due to dexterity and mobility issues.   has expressed interest in learning cares; per pt and mother it is unclear if pt will be returning to live with  so his involvement remains unclear.     Patient readiness for education today: distracted  Following today's visit: patient  and mother  is able to demonstrate;         1. How to empty their pouch? Demo provided         2. How to change their pouch? Demo provided         3. How to read and record intake and output correctly? No    Preparation for discharge completed: started gathering supplies and updating  "AVS  Preparation for discharge still needed: Placed prescription recommendations in discharge navigator for MD to sign, Ensured patient has extra supplies for discharge, Discussed how to order supplies after discharge , Ordered samples from  after gaining consent from patient/caregiver, Discussed how and when to make an outpatient WOC nurse appointment after discharge, Prepared for discharge home with home care, and Discuss signs/symptoms of when to seek medical attention  Pt support system on discharge: mother; possibly son and some friends as well; questionable if  will be involved  WO recommend home care? Yes       Treatment Plan:     RLQ Ileostomy pouching plan:   Pouching system: ostomy supplies pouches: Lucio 57 FECAL (548508) or high output pouch [id number]; ostomy supplies barrier: Lucio 57mm SOFT CONVEX (818276)  Accessories used: [facility] ostomy accessories: 2\" Cera Barrier Ring ([id number])   Frequency of pouch changes: PRN leakage and Three times a week  WOC follow up plan: Daily Monday-Friday (as able) and As needed   Bedside RN interventions: Change pouch PRN if leaking using the supplies above, Empty pouch when 1/3 to 1/2 full, ensure to clean pouch outlet after emptying to prevent odor, Notify WOC for ongoing pouch leakage, Document stoma appearance and output volume, color, and consistency every shift, Encourage patient to empty pouch with assist, and Assist patient to measure and record output      Orders: Reviewed and Updated    RECOMMEND PRIMARY TEAM ORDER: None, at this time  Education provided: plan of care  Discussed plan of care with: Patient, Family, and Nurse  WOC nurse follow-up plan:  daily Mon - Fri as able  Notify WOC if wound(s) deteriorate.  Nursing to notify the Provider(s) and re-consult the WOC Nurse if new skin concern.    DATA:     Current support surface: Standard  Standard gel/foam mattress (IsoFlex, Atmos air, etc)  Containment of urine/stool: Indwelling " "catheter and Ileostomy pouch  BMI: Body mass index is 28.76 kg/m .   Active diet order: Orders Placed This Encounter      NPO for Medical/Clinical Reasons Except for: Meds, Ice Chips     Output: I/O last 3 completed shifts:  In: 2003 [P.O.:200; I.V.:1803]  Out: 1425 [Urine:75; Emesis/NG output:1350]     Labs:   Recent Labs   Lab 01/03/24  0731 01/02/24  0603 01/01/24  1043   ALBUMIN  --   --  4.1   HGB 12.5   < > 13.5   WBC 10.4   < > 4.7    < > = values in this interval not displayed.     Pressure injury risk assessment:   Sensory Perception: 4-->no impairment  Moisture: 4-->rarely moist  Activity: 1-->bedfast  Mobility: 2-->very limited  Nutrition: 1-->very poor  Friction and Shear: 2-->potential problem  Gabriele Score: 14    Nita Bianchi RN, CWOCN  Please contact via my6sense at name or group \"[facility] nurse\"- M-F [phone]  Leave  @ [phone] for non-urgent needs. Checked occasionally M-F.     "

## 2024-01-05 NOTE — CONSULTS
[facility]  Gastroenterology Consultation         Peggy Jessica  [address] [address]  48 year old female    Admission Date/Time: 1/1/2024  Primary Care Provider: Andrew Peck  Referring / Attending Physician:  Rafa Layton PA-C    We were asked to see the patient in consultation by Rafa Layton PA-C for evaluation of constipation and gastroparesis.      CC: nausea, vomiting and abdominal pain    HPI:  Peggy Jessica is a 48 year old female who has a significant past medical history including chronic pain, chronic constipation, fibromyalgia, breast cancer s/p bilateral mastectomy who was admitted on 1/1/2024 for abdominal pain and well known to our clinic. I personally see patient on a regular basis and and aware of her severe constipation and had referred to colorectal surgery a month ago in which a appointment was scheduled and was evaluated with plans for outpatient surgery 2/28/23 however, developed acute abdominal pain and found to have volvulus that resulted in exploratory laparoscopy and diverting loop ileostomy complicated with an ileus.    She has many factors that have decreased motility of her entire GI tract including chronic pain, decreased mobility due to chronic inflammatory demyelinating polyneuropathy. She has been treated for her constipation with linzess and other rx laxatives with minimal success. She has also been tried on Reglan but has an allergy and treatment for presumed gastroparesis has been through diet. She has not been able to tolerate a gastric emptying study due to inability to tolerate food and vomits.    ROS: A comprehensive ten point review of systems was negative aside from those in mentioned in the HPI.      PAST MED HX:  I have reviewed this patient's medical history and updated it with pertinent information if needed.   Past Medical History:   Diagnosis Date    Arthritis     BRCA positive     Cancer (H)      CIDP (chronic inflammatory demyelinating polyneuropathy) (H)     ASHKAN III with severe dysplasia 2002    Complex regional pain syndrome type 1 of right lower extremity        MEDICATIONS:   Prior to Admission Medications   Prescriptions Last Dose Informant Patient Reported? Taking?   Tenapanor HCl (IBSRELA) 50 MG TABS 12/30/2023 at am  Yes Yes   Sig: Take 50 mg by mouth every 48 hours   acetaminophen (TYLENOL) 500 MG tablet  Self Yes Yes   Sig: Take 500-1,000 mg by mouth every 6 hours as needed for mild pain   baclofen (LIORESAL) 10 MG tablet 12/31/2023  Yes Yes   Sig: Take 10 mg by mouth 3 times daily Takes 5 mg morning and early afternoon and between 5/10 at bedtime   bisacodyl (DULCOLAX) 10 MG suppository prn  No No   Sig: Place 1 suppository (10 mg) rectally daily as needed for constipation   buprenorphine (BUTRANS) 7.5 MCG/HR WK patch 12/28/2023 Self Yes Yes   Sig: Place 1 patch onto the skin once a week Applies on Thursdays at 9 am   dicyclomine (BENTYL) 10 MG capsule 12/31/2023 Self Yes Yes   Sig: Take 20 mg by mouth 3 times daily (before meals)   diphenhydrAMINE (BENADRYL) 25 MG tablet  Self No Yes   Sig: Take 0.5 tablets (12.5 mg) by mouth every 6 hours as needed for allergies or other (nausea)   folic acid (FOLVITE) 400 MCG tablet 12/31/2023 Self No Yes   Sig: Take 1 tablet (400 mcg) by mouth daily   hydrOXYzine (ATARAX) 25 MG tablet 12/31/2023 Self No Yes   Sig: Take 1 tablet (25 mg) by mouth 3 times daily (before meals)   medical cannabis (Patient's own supply)  Self Yes Yes   Sig: Take 1 Dose by mouth See Admin Instructions (The purpose of this order is to document that the patient reports taking medical cannabis.  This is not a prescription, and is not used to certify that the patient has a qualifying medical condition.)  Per pt: 5-10 mg tablet three times a day PRN   memantine (NAMENDA) 10 MG tablet 12/31/2023  Yes Yes   Sig: Take 10 mg by mouth 2 times daily   multivitamin w/minerals (THERA-VIT-M)  tablet 12/31/2023 Self Yes Yes   Sig: Take 1 tablet by mouth every morning Megafood Womens Brand   naloxone (NARCAN) 4 MG/0.1ML nasal spray  Self No Yes   Sig: Spray 1 spray (4 mg) into one nostril alternating nostrils as needed for opioid reversal every 2-3 minutes until assistance arrives   ondansetron (ZOFRAN ODT) 4 MG ODT tab   No Yes   Sig: Take 1 tablet (4 mg) by mouth every 8 hours as needed for nausea   pantoprazole (PROTONIX) 40 MG EC tablet 12/31/2023 Self No Yes   Sig: Take 1 tablet (40 mg) by mouth every morning (before breakfast)   polyethylene glycol (MIRALAX) 17 GM/Dose powder   No Yes   Sig: Take 17 g by mouth daily Until BM, then use every day prn   scopolamine (TRANSDERM) 1 MG/3DAYS 72 hr patch 12/31/2023  No Yes   Sig: Place 1 patch onto the skin every 72 hours   senna (SENOKOT) 8.6 MG tablet  Self Yes Yes   Sig: Take 1 tablet by mouth as needed   senna-docusate (SENOKOT-S/PERICOLACE) 8.6-50 MG tablet  Self Yes Yes   Sig: Take 1 tablet by mouth 2 times daily as needed for constipation   sertraline (ZOLOFT) 100 MG tablet 12/31/2023 Self No Yes   Sig: Take 1 tablet (100 mg) by mouth daily   vitamin D2 (ERGOCALCIFEROL) 73441 units (1250 mcg) capsule 12/31/2023  No Yes   Sig: Take 1 capsule (50,000 Units) by mouth once a week On Tuesdays      Facility-Administered Medications: None       ALLERGIES:   Allergies   Allergen Reactions    Dihydroergotamine Anaphylaxis    Latex Anaphylaxis    Shellfish-Derived Products Anaphylaxis    Sumatriptan Anaphylaxis    Compazine [Prochlorperazine] Anxiety    Banana Unknown    Gabapentin Dizziness    Gluten Meal Other (See Comments)     Celiac disease    Keppra [Levetiracetam] Nausea and Vomiting    Kiwi Unknown    Levofloxacin Other (See Comments)     Arrhythmia    Metronidazole Nausea and Vomiting    Nitrofurantoin Hives    Penicillins Hives    Pregabalin     Reglan [Metoclopramide] Other (See Comments)     restlessness/toe tapping     Topiramate Visual Disturbance     Aspirin Rash    Methadone Rash    Morphine Hives     She got hives around are when morphine given but resolved after few minutes per patient     Risperidone Anxiety       SOCIAL HISTORY:  Social History     Tobacco Use    Smoking status: Former     Types: Cigarettes    Smokeless tobacco: Never   Vaping Use    Vaping Use: Never used   Substance Use Topics    Alcohol use: Not Currently    Drug use: Yes     Types: Marijuana     Comment: Medical Canibis patient       FAMILY HISTORY:  Family History   Problem Relation Age of Onset    Kidney Disease Father     Macular Degeneration Paternal Grandmother     Glaucoma No family hx of        PHYSICAL EXAM:   General  alert, oriented and has mild pain  Vital Signs with Ranges  Temp: 99.3  F (37.4  C) Temp src: Oral BP: 125/82 Pulse: 95   Resp: 16 SpO2: 92 % O2 Device: Nasal cannula Oxygen Delivery: 2 LPM  I/O last 3 completed shifts:  In: 2003 [P.O.:200; I.V.:1803]  Out: 1425 [Urine:75; Emesis/NG output:1350]    Constitutional: healthy, alert, and mild distress   Cardiovascular: negative, PMI normal. No lifts, heaves, or thrills. RRR. No murmurs, clicks gallops or rub  Respiratory: negative, Percussion normal. Good diaphragmatic excursion. Lungs clear  Abdomen: Abdomen soft, post op tender.  No masses, organomegaly, lift sided bowel sounds none on right          ADDITIONAL COMMENTS:   I reviewed the patient's new clinical lab test results.   Recent Labs   Lab Test 01/04/24  0734 01/03/24  0731 01/02/24  0603 01/01/24  1043 01/31/23  1900 01/28/23  0619   WBC  --  10.4 13.6* 4.7   < > 4.2   HGB  --  12.5 12.9 13.5   < > 13.4   MCV  --  94 93 94   < > 98    259 262 249   < > 243   INR  --   --   --   --   --  0.97    < > = values in this interval not displayed.     Recent Labs   Lab Test 01/05/24  0815 01/04/24  0734 01/03/24  0731   POTASSIUM 3.6 4.2 3.8   CHLORIDE 103 102 102   CO2 29 28 27   BUN 18.5 14.0 8.5   ANIONGAP 9 9 10     Recent Labs   Lab Test  "01/02/24  0834 01/01/24  2131 01/01/24  1043 10/12/23  1432 10/11/23  2018 09/14/23  0652 09/01/23  1807 08/26/23  1738   ALBUMIN  --   --  4.1  --  4.5 4.5 4.8 4.4   BILITOTAL  --   --  0.3  --  0.4 0.3 0.4 0.3   ALT  --   --  17  --  14 14 16 15   AST  --   --  22  --  28 17 21 25   PROTEIN 30* Negative  --  Negative  --   --   --   --    LIPASE  --   --  20  --   --   --  25 24       I reviewed the patient's new imaging results.        CONSULTATION ASSESSMENT AND PLAN:    Peggy Jessica is a 48 year old female with complex medical history including chronic pain, fibromyalgia, breast cancer s/p  bilateral mastectomy who was admitted on 1/1/2024 for abdominal pain.      Large bowel obstruction (H)- s/p  Gastroparesis  CT of A/P with contrast  noted\"mobile cecum located in the mid abdomen, whereas previously it was in the right lower quadrant.  No upstream small bowel distention or evidence for cecal volvulus.  No acute inflammatory findings.  Moderate amount of formed stool noted in colon   Colorectal surgery consulted and underwent gastrografin enema- in IR . Findings concerning for internal hernia or cecal volvulus.. Followed by ex lap diverting loop ileostomy. Has baseline chronic pain and nausea but both were worsening likely due to developing ileus, NGT placed yesterday with improvement in symptoms.   NGT with 1350 out yesterday, for persistent pain and nausea    - Gastroparesis exacerbated by extreme constipation. Now s/p diverting ileostomy this would like improve underlying gastroparesis, however complicated by ileus  - Expect improvement in nausea once ileus resolves  - Once NGT output improved will defer diet advancement to colorectal but would not recommend advancing past soft diet  - continue current plans  - GI will continue to follow along            Shannan Castro, RIO [facility].  Office: [phone]  Cell : [phone] (Dr. [facility])  Cell: [phone] (Shannan Castro " RIO)

## 2024-01-05 NOTE — PROGRESS NOTES
COLON & RECTAL SURGERY  PROGRESS NOTE    January 5, 2024  Post-op Day # 4    SUBJECTIVE:  Feeling a little better. Pain overall ok. Nausea improved with NGT, 1350cc out yesterday. Minimal stoma output. AVSS.     OBJECTIVE:  Temp:  [98  F (36.7  C)-99.3  F (37.4  C)] 99.3  F (37.4  C)  Pulse:  [] 95  Resp:  [10-16] 16  BP: (112-143)/(76-96) 125/82  SpO2:  [87 %-96 %] 92 %    Intake/Output Summary (Last 24 hours) at 1/5/2024 0808  Last data filed at 1/5/2024 0630  Gross per 24 hour   Intake 2003 ml   Output 1425 ml   Net 578 ml       GENERAL:  Awake, alert, no acute distress  HEAD: Normocephalic atraumatic  SCLERA: Anicteric  EXTREMITIES: Warm and well perfused  ABDOMEN:  Soft, appropriately tender, mildly distended. No guarding, rigidity, or peritoneal signs. Stoma pink with minimal liquid stool in bag. NGT output bilious.  INCISION:  C/d/i    LABS:  Lab Results   Component Value Date    WBC 10.4 01/03/2024     Lab Results   Component Value Date    HGB 12.5 01/03/2024     Lab Results   Component Value Date    HCT 37.2 01/03/2024     Lab Results   Component Value Date     01/04/2024     Last Basic Metabolic Panel:  Lab Results   Component Value Date     01/04/2024      Lab Results   Component Value Date    POTASSIUM 4.2 01/04/2024    POTASSIUM 3.8 10/24/2022     Lab Results   Component Value Date    CHLORIDE 102 01/04/2024    CHLORIDE 106 10/24/2022     Lab Results   Component Value Date    ESTEBAN 9.5 01/04/2024     Lab Results   Component Value Date    CO2 28 01/04/2024    CO2 29 10/24/2022     Lab Results   Component Value Date    BUN 14.0 01/04/2024    BUN 11 10/24/2022     Lab Results   Component Value Date    CR 0.53 01/04/2024     Lab Results   Component Value Date     01/04/2024     01/04/2024    GLC 96 10/24/2022       ASSESSMENT/PLAN: 49 yo F POD#4 s/p ex lap, PAOLA, diverting loop ileostomy. Has baseline chronic pain and nausea but both were worsening likely due to developing  ileus, NGT placed yesterday with improvement in symptoms.    - NPO, ok for ice chips  - NGT for decompression, record I&Os. If no significant return of bowel function in next 1-2 days will plan for gastrografin challenge.   - Pain team consulted for assistance with pain control given chronic pain and multiple medication allergies/intolerances  - Continue IVF  - OOB, doesn't walk at baseline but should be getting into wheelchair and moving as able  - PT/OT   - WOCN for stoma teaching  -  for discharge planning - see note regarding home situation  - Lovenox for ppx    Discussed with Dr. Argueta.    For questions/paging, please contact the CRS office at [phone].    Rafa Layton PA-C  Colorectal Physician Assistant    [facility]  [address] [place] [address]  [place] [address]  T: [phone]  F: [phone]

## 2024-01-05 NOTE — PROGRESS NOTES
"   01/05/24 1100   Appointment Info   Signing Clinician's Name / Credentials (PT) Luba Escamilla, DPDILCIA   Rehab Comments (PT) Limited eval d/t pt refusing OOB mobility   Living Environment   People in Home spouse   Current Living Arrangements house   Home Accessibility stairs to enter home   Number of Stairs, Main Entrance 4   Stair Railings, Main Entrance railings safe and in good condition;railings on both sides of stairs   Transportation Anticipated family or friend will provide   Living Environment Comments Lives in house with 4STE, with spouse. See SW for complicated social situation, per pt, \" i'm not going home\".   Self-Care   Usual Activity Tolerance fair   Current Activity Tolerance poor   Regular Exercise No   Equipment Currently Used at Home wheelchair, manual;shower chair;grab bar, toilet;grab bar, tub/shower   Fall history within last six months yes   Number of times patient has fallen within last six months 5  (\"too many\")   Activity/Exercise/Self-Care Comment Pivots into w/c from lifting recliner, walks up the steps and has someone carry the w/c. Gets assist with showering, cooking, cleaning and dressing.   General Information   Onset of Illness/Injury or Date of Surgery 01/01/24   Referring Physician Avis Argueta MD   Patient/Family Therapy Goals Statement (PT) did not state   Pertinent History of Current Problem (include personal factors and/or comorbidities that impact the POC) Peggy Jessica is a 48 year old female with complex medical history including chronic pain, chronic constipation, fibromyalgia, breast cancer s/p  bilateral mastectomy who was admitted on 1/1/2024 for abdominal pain, s/p ex lap with divrting loop ileostomy. CRPS   Existing Precautions/Restrictions fall;oxygen therapy device and L/min;NPO   Cognition   Affect/Mental Status (Cognition) anxious   Orientation Status (Cognition) oriented x 4   Follows Commands (Cognition) WNL   Behavioral Issues uncooperative   Pain " Assessment   Patient Currently in Pain No   Integumentary/Edema   Integumentary/Edema Comments Pt declined PT taking blankets off   Posture    Posture Forward head position   Range of Motion (ROM)   ROM Comment Pt refused   Strength (Manual Muscle Testing)   Strength Comments Pt refused   Bed Mobility   Comment, (Bed Mobility) Pt refused   Transfers   Comment, (Transfers) Pt refused   Gait/Stairs (Locomotion)   Comment, (Gait/Stairs) Pt refused   Clinical Impression   Criteria for Skilled Therapeutic Intervention Patient/family refuse skilled intervention at this time;Yes, treatment indicated   PT Diagnosis (PT) Impaired transfers   Influenced by the following impairments participation, nausea, vomiting   Functional limitations due to impairments impaired mobility   Clinical Presentation (PT Evaluation Complexity) evolving   Clinical Presentation Rationale clinial judgement   Clinical Decision Making (Complexity) low complexity   Planned Therapy Interventions (PT) bed mobility training;gait training;stair training;transfer training   Risk & Benefits of therapy have been explained evaluation/treatment results reviewed;care plan/treatment goals reviewed;risks/benefits reviewed   PT Total Evaluation Time   PT Eval, Low Complexity Minutes (07869) 5   Physical Therapy Goals   PT Frequency 3x/week   PT Predicted Duration/Target Date for Goal Attainment 01/26/24   PT Goals Bed Mobility;Transfers;Gait;Stairs;Wheelchair Mobility   PT: Bed Mobility Independent;Supine to/from sit;Rolling;Bridging   PT: Transfers Independent;Sit to/from stand;Bed to/from chair;Assistive device   PT: Gait Independent;Assistive device;100 feet   PT: Stairs Independent;3 stairs;Rail on both sides   PT: Wheelchair Mobility Greater than 500 feet;manual wheelchair   Interventions   Interventions Quick Adds Therapeutic Activity   Therapeutic Activity   Therapeutic Activities: dynamic activities to improve functional performance Minutes (71022) 27  "  Symptoms Noted During/After Treatment   (Nausea)   Treatment Detail/Skilled Intervention Pt greeted bedside, requesting therapist to come back later. Attempted again, pt reporting signiciant nausea but initially agreeable to PT. After hx gathered, time spent reading room for OOB mobility. Attempted to ready bed for mobility, pt declined PT moving anything in bed, pt reporting signifiicant nausea from NG tube, requesting therapist to wait until nausea subsided. After rest, attempted to mobilize, pt refusing. RN called to bedside.  Pt continues to decline OOB mobility. Attempted to discuss how therapy impacts DC planning. Pt became very upset stating \" I have to leave my \" and declined further intervention.   PT Discharge Planning   PT Plan pending participation, attempt OOB mobility to complete eval   PT Discharge Recommendation (DC Rec)   (Assisted living with HCPT)   PT Rationale for DC Rec Pt declining OOB mobility so therapist unable to assess level of function. D/t unsafe home situation and lack of participation in therapy, recommend transition to long-term. If pt shows interest in participation with therapy than could consider TCU though pt will need to show consistent participation with therapy and mobilizing daily with RN. Long discussion with pt that therapy is necessary for safe DC plan, pt acknowledges this but continues to decline depsite multiple attempts from therapist.   PT Brief overview of current status PLOF stand pivot to w/c w/ FWW Ax1, pt declined to attempt so recommend attempt with RN   Total Session Time   Timed Code Treatment Minutes 27   Total Session Time (sum of timed and untimed services) 32     "

## 2024-01-05 NOTE — PLAN OF CARE
Goal Outcome Evaluation:      Plan of Care Reviewed With: patient    Overall Patient Progress: no changeOverall Patient Progress: no change    Shift: 7p-7a  Surgery/POD#: POD 4 from an ex lap diverting loop ileostomy.  Behavior & Aggression: Green  Fall Risk: Yes  Orientation: A&O x4  ABNL VS/O2: VSS, ex 2L NC overnight  Tele: NA  ABNL Labs: NA  Pain Management: 6-8/10; tylenol, IV dilaudid, IV valium   Bowel/Bladder: purewick in place, no urine output, bladder scan 300 ml; bowel sounds normoactive, +f in ostomy, minimal liquid output in ostomy  Drains: ileostomy, NGT to LIS  Lines: PIV infusing LR @ 75 ml/hr  Skin: midline incision REINALDO w/ surgical glue  Diet: NPO, ex ice chips  Activity Level: pivot to W/C at baseline, repositions self in bed   Tests/Procedures: NA  Anticipated  DC Date: pending  Significant Information: PT/OT, colorectal, WOC, & SW following.

## 2024-01-06 LAB
GLUCOSE BLDC GLUCOMTR-MCNC: 100 MG/DL (ref 70–99)
GLUCOSE BLDC GLUCOMTR-MCNC: 116 MG/DL (ref 70–99)
GLUCOSE BLDC GLUCOMTR-MCNC: 99 MG/DL (ref 70–99)

## 2024-01-06 PROCEDURE — C9113 INJ PANTOPRAZOLE SODIUM, VIA: HCPCS | Performed by: HOSPITALIST

## 2024-01-06 PROCEDURE — 250N000013 HC RX MED GY IP 250 OP 250 PS 637: Performed by: HOSPITALIST

## 2024-01-06 PROCEDURE — 250N000011 HC RX IP 250 OP 636: Performed by: NURSE PRACTITIONER

## 2024-01-06 PROCEDURE — 250N000011 HC RX IP 250 OP 636: Performed by: HOSPITALIST

## 2024-01-06 PROCEDURE — 120N000001 HC R&B MED SURG/OB

## 2024-01-06 PROCEDURE — 258N000003 HC RX IP 258 OP 636: Performed by: INTERNAL MEDICINE

## 2024-01-06 PROCEDURE — 99232 SBSQ HOSP IP/OBS MODERATE 35: CPT | Performed by: HOSPITALIST

## 2024-01-06 PROCEDURE — 250N000011 HC RX IP 250 OP 636: Performed by: COLON & RECTAL SURGERY

## 2024-01-06 RX ADMIN — HYDROMORPHONE HYDROCHLORIDE 0.5 MG: 1 INJECTION, SOLUTION INTRAMUSCULAR; INTRAVENOUS; SUBCUTANEOUS at 17:58

## 2024-01-06 RX ADMIN — HYDROMORPHONE HYDROCHLORIDE 0.5 MG: 1 INJECTION, SOLUTION INTRAMUSCULAR; INTRAVENOUS; SUBCUTANEOUS at 08:51

## 2024-01-06 RX ADMIN — ONDANSETRON 8 MG: 2 INJECTION INTRAMUSCULAR; INTRAVENOUS at 06:22

## 2024-01-06 RX ADMIN — HYDROMORPHONE HYDROCHLORIDE 0.5 MG: 1 INJECTION, SOLUTION INTRAMUSCULAR; INTRAVENOUS; SUBCUTANEOUS at 00:36

## 2024-01-06 RX ADMIN — HYDROMORPHONE HYDROCHLORIDE 0.5 MG: 1 INJECTION, SOLUTION INTRAMUSCULAR; INTRAVENOUS; SUBCUTANEOUS at 11:56

## 2024-01-06 RX ADMIN — DIAZEPAM 5 MG: 10 INJECTION, SOLUTION INTRAMUSCULAR; INTRAVENOUS at 13:29

## 2024-01-06 RX ADMIN — DIAZEPAM 5 MG: 10 INJECTION, SOLUTION INTRAMUSCULAR; INTRAVENOUS at 19:53

## 2024-01-06 RX ADMIN — ONDANSETRON 8 MG: 2 INJECTION INTRAMUSCULAR; INTRAVENOUS at 20:26

## 2024-01-06 RX ADMIN — HYDROMORPHONE HYDROCHLORIDE 0.5 MG: 1 INJECTION, SOLUTION INTRAMUSCULAR; INTRAVENOUS; SUBCUTANEOUS at 20:26

## 2024-01-06 RX ADMIN — ENOXAPARIN SODIUM 40 MG: 40 INJECTION SUBCUTANEOUS at 17:57

## 2024-01-06 RX ADMIN — DEXTROSE AND SODIUM CHLORIDE: 5; 900 INJECTION, SOLUTION INTRAVENOUS at 13:15

## 2024-01-06 RX ADMIN — METHOCARBAMOL 500 MG: 100 INJECTION, SOLUTION INTRAMUSCULAR; INTRAVENOUS at 08:51

## 2024-01-06 RX ADMIN — PANTOPRAZOLE SODIUM 40 MG: 40 INJECTION, POWDER, FOR SOLUTION INTRAVENOUS at 08:51

## 2024-01-06 RX ADMIN — HYDROMORPHONE HYDROCHLORIDE 0.5 MG: 1 INJECTION, SOLUTION INTRAMUSCULAR; INTRAVENOUS; SUBCUTANEOUS at 15:21

## 2024-01-06 RX ADMIN — SIMETHICONE 80 MG: 20 SOLUTION/ DROPS ORAL at 17:57

## 2024-01-06 RX ADMIN — HYDROMORPHONE HYDROCHLORIDE 0.5 MG: 1 INJECTION, SOLUTION INTRAMUSCULAR; INTRAVENOUS; SUBCUTANEOUS at 23:24

## 2024-01-06 RX ADMIN — HYDROMORPHONE HYDROCHLORIDE 0.5 MG: 1 INJECTION, SOLUTION INTRAMUSCULAR; INTRAVENOUS; SUBCUTANEOUS at 06:23

## 2024-01-06 ASSESSMENT — ACTIVITIES OF DAILY LIVING (ADL)
ADLS_ACUITY_SCORE: 36

## 2024-01-06 NOTE — PROGRESS NOTES
COLON & RECTAL SURGERY PROGRESS NOTE  POD# 5    S:  ostomy output 750 overnight and NGT recorded 100cc overnight, but an additional 100-200 in cannister this AM. Still nauseous.     Vitals:  Vitals:    01/05/24 0736 01/05/24 1523 01/05/24 2337 01/06/24 0815   BP: 125/82 120/84 122/81 133/80   BP Location: Right arm Right arm Right arm Right arm   Patient Position:   Semi-Andrews's    Cuff Size:       Pulse: 95 91 86 74   Resp: 16 16 17 18   Temp: 99.3  F (37.4  C) 98.4  F (36.9  C) 99  F (37.2  C) 98.7  F (37.1  C)   TempSrc: Oral Oral Oral Oral   SpO2: 92% 95% 96% 96%   Weight:       Height:         I/O:  I/O last 3 completed shifts:  In: -   Out: 1550 [Urine:500; Emesis/NG output:300; Stool:750]    PE:    COLON & RECTAL SURGERY  PROGRESS NOTE     January 5, 2024  Post-op Day # 4     SUBJECTIVE:  Feeling a little better. Pain overall ok. Nausea improved with NGT, 1350cc out yesterday. Minimal stoma output. AVSS.      OBJECTIVE:  Temp:  [98  F (36.7  C)-99.3  F (37.4  C)] 99.3  F (37.4  C)  Pulse:  [] 95  Resp:  [10-16] 16  BP: (112-143)/(76-96) 125/82  SpO2:  [87 %-96 %] 92 %     Intake/Output Summary (Last 24 hours) at 1/5/2024 0808  Last data filed at 1/5/2024 0630      Gross per 24 hour   Intake 2003 ml   Output 1425 ml   Net 578 ml         GENERAL:  Awake, alert, no acute distress  HEAD: Normocephalic atraumatic  SCLERA: Anicteric  EXTREMITIES: Warm and well perfused  ABDOMEN:  Soft, appropriately tender, mildly distended. No guarding, rigidity, or peritoneal signs. Stoma pink with minimal liquid stool in bag. NGT output bilious.  INCISION:  C/d/i       Labs:  Recent Labs   Lab 01/06/24  0622 01/06/24  0201 01/05/24  2211 01/05/24  2137 01/05/24  0815 01/04/24  0734 01/04/24  0224 01/03/24  0731 01/02/24  2358 01/02/24  0603 01/01/24  1043   WBC  --   --   --   --   --   --   --  10.4  --  13.6* 4.7   HGB  --   --   --   --   --   --   --  12.5  --  12.9 13.5   PLT  --   --   --   --   --  305  --  259  --   262 249   NA  --   --   --   --  141 139  --  139  --  139 143   POTASSIUM  --   --   --   --  3.6 4.2  --  3.8  --  3.7 4.1   CHLORIDE  --   --   --   --  103 102  --  102  --  101 107   CO2  --   --   --   --  29 28  --  27  --  28 28   BUN  --   --   --   --  18.5 14.0  --  8.5  --  10.5 8.8   CR  --   --   --   --  0.59 0.53  --  0.66  --  0.68 0.89   * 100* 83 79 82 133*   < > 104*   < > 153* 113*   MAG  --   --   --   --   --   --   --   --   --  1.7  --    PHOS  --   --   --   --   --   --   --   --   --  3.4  --    AST  --   --   --   --   --   --   --   --   --   --  22   ALT  --   --   --   --   --   --   --   --   --   --  17    < > = values in this interval not displayed.     A/P: 47 yo F POD#5 s/p ex lap, PAOLA, diverting loop ileostomy. Ileus appears to be resolving and nausea is overall at baseline. Will plan one additional day of NGT decompression and then GGC tomorrow.     Encourage OOB to chair with assistance. Continue LVX for DVT ppx.     IV medications as much as possible, if no IV formulary ok for PO with NGT clamped.     Will continue to follow.     For questions/paging, please contact the CRS office at [phone].     Cassi Mcnally MD Colorectal Surgery Fellow  [facility]  [address]  [place] [address]  T: [phone]  F: [phone]   [email or web address]

## 2024-01-06 NOTE — PROGRESS NOTES
Date & Time: 01/06/24 5008-4991  Surgery/POD#:  5 of s/p exploratory lap with diverting loop ileostomy   Behavior & Aggression: green  Fall Risk: yes  Orientation: A&O x4  ABNL VS/O2:VSS on 2L NC  ABNL Labs: BG 79, 83, 100, 116  Pain Management: Dilaudid/Robaxin/Simethicone/  Bowel/Bladder: Purewick. Ilesostomy w/ good output  Drains: Ileostomy. NJ tube to intermittent suction.  Diet:NPO. Ex Ice chips and meds. (Refusing most oral meds d/t nausea)  Activity Level: WC bound at BL. Not OOB this shift.  Tests/Procedures: None  Anticipated  DC Date: Pending   Significant Information: CRS following. SW and CM consult. Zofran given x2
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
## 2024-01-06 NOTE — CONSULTS
"CLINICAL NUTRITION SERVICES  -  ASSESSMENT NOTE      RECOMMENDATIONS FOR MD/PROVIDER TO ORDER: If unable to advance to at least FL in the next 24 hours recommend TPN    Future/Additional Recommendations: Once diet >/= CL plan for Ensure Clear with meals and Gelatein between meals    Malnutrition: % Weight Loss:  None noted  % Intake:  </= 50% for >/= 5 days (severe malnutrition)  Subcutaneous Fat Loss:  Upper arm moderate-severe  Muscle Loss:  Patellar region moderate-severe depletion, Anterior thigh region moderate-severe depletion, and Posterior calf region moderate-severe depletion  Fluid Retention:  None noted    Malnutrition Diagnosis: Severe malnutrition  In Context of:  Acute illness or injury        REASON FOR ASSESSMENT  Peggy Jessica is a 48 year old female seen by Registered Dietitian for Admission Nutrition Risk Screen for Have you recently lost weight without trying? - Yes, 2-13#  Have you eaten poorly because of a decreased appetite? - Yes       NUTRITION HISTORY  - Information obtained from patient and family at bedside.  She notes that the last meal she ate was on 12/23 which was about 2 weeks ago.  Since then she was eating very bland things like jello, mashed potatoes, and \"acceptable\" veggies.  \"My  is not good at cooking bland things so I haven't been eating much.  And it has gotten worse over time\".  Patient is only able to tolerate Ensure Clear but has trouble finding it commercially.  She does not tolerate regular Ensures well d/t texture and gag reflex with it.  \"I have 4-5 of the Ensure Clear at home on reserve for dire circumstances\".    - Admitted with the following ~  Partial large bowel obstruction   Post-op ileus   1/1:  S/p 1. Exploratory Laparotomy   2. Lysis of adhesions   3. Diverting loop ileostomy         CURRENT NUTRITION ORDERS  Diet Order:     NPO   NG in place    Current Intake/Tolerance:  Unable to eat much d/t nausea  Patient with post-op ileus     She has not " "had any measurable intake since admit (day #5-6) and for > 1 week PTA  She notes that the surgeon has mentioned she may need TPN       NUTRITION FOCUSED PHYSICAL ASSESSMENT FOR DIAGNOSING MALNUTRITION)  Yes              Observed:    Muscle wasting (refer to documentation in Malnutrition section)    Obtained from Chart/Interdisciplinary Team:  None     ANTHROPOMETRICS  Height: 5' 4\"  Weight: 76 kg (168#)(1/1)  Body mass index is 28.76 kg/m   Weight Status:  Overweight BMI 25-29.9  IBW: 54.5 kg   % IBW: 139%  Weight History:   Wt Readings from Last 10 Encounters:   01/01/24 76 kg (167 lb 8.8 oz)   10/13/23 75.4 kg (166 lb 3.6 oz)   09/13/23 74.4 kg (164 lb)   09/01/23 68 kg (150 lb)   08/26/23 72.6 kg (160 lb)   07/18/23 72.6 kg (160 lb)   06/28/23 76.7 kg (169 lb)   05/24/23 76.7 kg (169 lb)   05/12/23 76.7 kg (169 lb)   03/20/23 74.4 kg (164 lb)     No recent significant weight loss noted     LABS  Labs reviewed    MEDICATIONS  IVF D5 at 75 mL/hr       ASSESSED NUTRITION NEEDS PER APPROVED PRACTICE GUIDELINES:    Dosing Weight 60 kg (adjusted)  Estimated Energy Needs: 0724-8173 kcals (25-30 Kcal/Kg)  Justification: post-op and overweight  Estimated Protein Needs: 70-90 grams protein (1.2-1.5 g pro/Kg)  Justification: post-op and hypercatabolism with acute illness  Estimated Fluid Needs: 7294-9007 mL (1 mL/Kcal)  Justification: maintenance    MALNUTRITION:  % Weight Loss:  None noted  % Intake:  </= 50% for >/= 5 days (severe malnutrition)  Subcutaneous Fat Loss:  Upper arm moderate-severe  Muscle Loss:  Patellar region moderate-severe depletion, Anterior thigh region moderate-severe depletion, and Posterior calf region moderate-severe depletion  Fluid Retention:  None noted    Malnutrition Diagnosis: Severe malnutrition  In Context of:  Acute illness or injury    NUTRITION DIAGNOSIS:  Inadequate protein-energy intake related to NPO with post-op ileus as evidenced by minimal intake for about 2 weeks       NUTRITION " INTERVENTIONS  Recommendations / Nutrition Prescription  If unable to advance to at least FL in the next 24 hours recommend TPN     Once diet >/= CL plan for Ensure Clear with meals and Gelatein between meals     Implementation  Nutrition education: Not appropriate at this time due to patient condition    Nutrition Goals  Patient will advance diet to >/= FL vs start TPN in the next 24-48 hours     MONITORING AND EVALUATION:  Progress towards goals will be monitored and evaluated per protocol and Practice Guidelines    Ivett Saldana RD, LD, CNSC   Clinical Dietitian - [facility]

## 2024-01-06 NOTE — PROGRESS NOTES
"MD Notification    Notified Person: MD    Notified Person Name: Dr. Cam     Notification Date/Time: 01/05/2024 8424    Notification Interaction: [employer] Messaging     Purpose of Notification: \"Hello. Blood glucose was 79 and 83. Patient is not tolerating anything by mouth and is on intermittent NJ suction. Currently only running LR. Should IV fluids be switched to something else?\"    Orders Received: \"changed to d5ns\"    Comments:   "

## 2024-01-06 NOTE — SIGNIFICANT EVENT
Adjusted the patients IV fluids from LR to D5NS due to downward trend in blood glucose levels, considering the patient remains NPO.

## 2024-01-06 NOTE — PROGRESS NOTES
Date & Time: 1/5/24 at [id number]  Surgery/POD#: 3 of s/p exploratory laparotomy with diverting loop ileostomy   Behavior & Aggression: green  Fall Risk: yes  Orientation:A&O x4  ABNL VS/O2:VSS on 2 L of 2 via NC  ABNL Labs: see chart  Pain Management:valium/dilaudid/robaxin/baclofen  Bowel/Bladder: continent  Drains: ileostomy bag, NJ tube with 200 ml of green/clear output.  Diet:NPO except ice chip/meds  Activity Level: wheelchair bound. not oob this shift, refused to sit on the chair  Tests/Procedures: N/A  Anticipated  DC Date: pending TCU  Significant Information: CRS following

## 2024-01-07 ENCOUNTER — APPOINTMENT (OUTPATIENT)
Dept: GENERAL RADIOLOGY | Facility: CLINIC | Age: 49
DRG: 329 | End: 2024-01-07
Attending: COLON & RECTAL SURGERY
Payer: COMMERCIAL

## 2024-01-07 LAB
ANION GAP SERPL CALCULATED.3IONS-SCNC: 6 MMOL/L (ref 7–15)
BUN SERPL-MCNC: 11 MG/DL (ref 6–20)
CALCIUM SERPL-MCNC: 8.6 MG/DL (ref 8.6–10)
CHLORIDE SERPL-SCNC: 104 MMOL/L (ref 98–107)
CREAT SERPL-MCNC: 0.52 MG/DL (ref 0.51–0.95)
DEPRECATED HCO3 PLAS-SCNC: 29 MMOL/L (ref 22–29)
EGFRCR SERPLBLD CKD-EPI 2021: >90 ML/MIN/1.73M2
GLUCOSE BLDC GLUCOMTR-MCNC: 113 MG/DL (ref 70–99)
GLUCOSE BLDC GLUCOMTR-MCNC: 96 MG/DL (ref 70–99)
GLUCOSE SERPL-MCNC: 109 MG/DL (ref 70–99)
MAGNESIUM SERPL-MCNC: 1.9 MG/DL (ref 1.7–2.3)
PLATELET # BLD AUTO: 272 10E3/UL (ref 150–450)
POTASSIUM SERPL-SCNC: 3.1 MMOL/L (ref 3.4–5.3)
POTASSIUM SERPL-SCNC: 3.4 MMOL/L (ref 3.4–5.3)
SODIUM SERPL-SCNC: 139 MMOL/L (ref 135–145)

## 2024-01-07 PROCEDURE — 120N000001 HC R&B MED SURG/OB

## 2024-01-07 PROCEDURE — C9113 INJ PANTOPRAZOLE SODIUM, VIA: HCPCS | Performed by: HOSPITALIST

## 2024-01-07 PROCEDURE — 83735 ASSAY OF MAGNESIUM: CPT | Performed by: HOSPITALIST

## 2024-01-07 PROCEDURE — 999N000128 HC STATISTIC PERIPHERAL IV START W/O US GUIDANCE

## 2024-01-07 PROCEDURE — 80048 BASIC METABOLIC PNL TOTAL CA: CPT | Performed by: HOSPITALIST

## 2024-01-07 PROCEDURE — 250N000011 HC RX IP 250 OP 636: Performed by: INTERNAL MEDICINE

## 2024-01-07 PROCEDURE — 74018 RADEX ABDOMEN 1 VIEW: CPT

## 2024-01-07 PROCEDURE — 250N000011 HC RX IP 250 OP 636: Performed by: HOSPITALIST

## 2024-01-07 PROCEDURE — 250N000011 HC RX IP 250 OP 636: Performed by: COLON & RECTAL SURGERY

## 2024-01-07 PROCEDURE — 99232 SBSQ HOSP IP/OBS MODERATE 35: CPT | Performed by: HOSPITALIST

## 2024-01-07 PROCEDURE — 250N000009 HC RX 250: Performed by: PHYSICIAN ASSISTANT

## 2024-01-07 PROCEDURE — 258N000003 HC RX IP 258 OP 636: Performed by: INTERNAL MEDICINE

## 2024-01-07 PROCEDURE — 85049 AUTOMATED PLATELET COUNT: CPT | Performed by: COLON & RECTAL SURGERY

## 2024-01-07 PROCEDURE — 36415 COLL VENOUS BLD VENIPUNCTURE: CPT | Performed by: COLON & RECTAL SURGERY

## 2024-01-07 PROCEDURE — 84132 ASSAY OF SERUM POTASSIUM: CPT | Performed by: INTERNAL MEDICINE

## 2024-01-07 PROCEDURE — 36415 COLL VENOUS BLD VENIPUNCTURE: CPT | Performed by: INTERNAL MEDICINE

## 2024-01-07 PROCEDURE — 250N000011 HC RX IP 250 OP 636: Performed by: NURSE PRACTITIONER

## 2024-01-07 RX ORDER — POTASSIUM CHLORIDE 7.45 MG/ML
10 INJECTION INTRAVENOUS
Status: COMPLETED | OUTPATIENT
Start: 2024-01-07 | End: 2024-01-07

## 2024-01-07 RX ADMIN — HYDROMORPHONE HYDROCHLORIDE 0.5 MG: 1 INJECTION, SOLUTION INTRAMUSCULAR; INTRAVENOUS; SUBCUTANEOUS at 22:05

## 2024-01-07 RX ADMIN — HYDROMORPHONE HYDROCHLORIDE 0.5 MG: 1 INJECTION, SOLUTION INTRAMUSCULAR; INTRAVENOUS; SUBCUTANEOUS at 04:02

## 2024-01-07 RX ADMIN — ONDANSETRON 8 MG: 2 INJECTION INTRAMUSCULAR; INTRAVENOUS at 20:04

## 2024-01-07 RX ADMIN — ENOXAPARIN SODIUM 40 MG: 40 INJECTION SUBCUTANEOUS at 17:32

## 2024-01-07 RX ADMIN — ONDANSETRON 8 MG: 2 INJECTION INTRAMUSCULAR; INTRAVENOUS at 02:31

## 2024-01-07 RX ADMIN — DEXTROSE AND SODIUM CHLORIDE: 5; 900 INJECTION, SOLUTION INTRAVENOUS at 01:26

## 2024-01-07 RX ADMIN — POTASSIUM CHLORIDE 10 MEQ: 7.46 INJECTION, SOLUTION INTRAVENOUS at 19:12

## 2024-01-07 RX ADMIN — HYDROMORPHONE HYDROCHLORIDE 0.5 MG: 1 INJECTION, SOLUTION INTRAMUSCULAR; INTRAVENOUS; SUBCUTANEOUS at 06:37

## 2024-01-07 RX ADMIN — HYDROMORPHONE HYDROCHLORIDE 0.5 MG: 1 INJECTION, SOLUTION INTRAMUSCULAR; INTRAVENOUS; SUBCUTANEOUS at 15:02

## 2024-01-07 RX ADMIN — HYDROMORPHONE HYDROCHLORIDE 0.5 MG: 1 INJECTION, SOLUTION INTRAMUSCULAR; INTRAVENOUS; SUBCUTANEOUS at 11:37

## 2024-01-07 RX ADMIN — HYDROMORPHONE HYDROCHLORIDE 0.5 MG: 1 INJECTION, SOLUTION INTRAMUSCULAR; INTRAVENOUS; SUBCUTANEOUS at 19:36

## 2024-01-07 RX ADMIN — POTASSIUM CHLORIDE 10 MEQ: 7.46 INJECTION, SOLUTION INTRAVENOUS at 11:35

## 2024-01-07 RX ADMIN — DIAZEPAM 5 MG: 10 INJECTION, SOLUTION INTRAMUSCULAR; INTRAVENOUS at 20:05

## 2024-01-07 RX ADMIN — DIATRIZOATE MEGLUMINE AND DIATRIZOATE SODIUM 120 ML: 660; 100 SOLUTION ORAL; RECTAL at 18:07

## 2024-01-07 RX ADMIN — SCOPALAMINE 1 PATCH: 1 PATCH, EXTENDED RELEASE TRANSDERMAL at 11:12

## 2024-01-07 RX ADMIN — HYDROMORPHONE HYDROCHLORIDE 0.5 MG: 1 INJECTION, SOLUTION INTRAMUSCULAR; INTRAVENOUS; SUBCUTANEOUS at 17:32

## 2024-01-07 RX ADMIN — POTASSIUM CHLORIDE 10 MEQ: 7.46 INJECTION, SOLUTION INTRAVENOUS at 15:58

## 2024-01-07 RX ADMIN — DIAZEPAM 5 MG: 10 INJECTION, SOLUTION INTRAMUSCULAR; INTRAVENOUS at 10:45

## 2024-01-07 RX ADMIN — HYDROMORPHONE HYDROCHLORIDE 0.5 MG: 1 INJECTION, SOLUTION INTRAMUSCULAR; INTRAVENOUS; SUBCUTANEOUS at 01:21

## 2024-01-07 RX ADMIN — PANTOPRAZOLE SODIUM 40 MG: 40 INJECTION, POWDER, FOR SOLUTION INTRAVENOUS at 08:48

## 2024-01-07 RX ADMIN — POTASSIUM CHLORIDE 10 MEQ: 7.46 INJECTION, SOLUTION INTRAVENOUS at 20:31

## 2024-01-07 ASSESSMENT — ACTIVITIES OF DAILY LIVING (ADL)
ADLS_ACUITY_SCORE: 40
ADLS_ACUITY_SCORE: 36
ADLS_ACUITY_SCORE: 40
ADLS_ACUITY_SCORE: 36
ADLS_ACUITY_SCORE: 40
ADLS_ACUITY_SCORE: 36
ADLS_ACUITY_SCORE: 40
ADLS_ACUITY_SCORE: 36

## 2024-01-07 NOTE — PLAN OF CARE
Goal Outcome Evaluation:      Plan of Care Reviewed With: patient    Overall Patient Progress: improvingOverall Patient Progress: improving     Date & Time: 1/6/24 at [id number]  Surgery/POD#: 6 of s/p exploratory laparotomy with diverting loop ileostomy   Behavior & Aggression: green  Fall Risk: yes  Orientation:A&O x4  ABNL VS/O2:VSS on 2L via NC  ABNL Labs: , awaiting AM labs  Pain Management: dilaudid  Bowel/Bladder: continent, on external cath with adequate output.Ileostomy with adequate flatus and watery brown green output.  Drains: ileostomy bag brown watery output, NJ tube with green/clear output.  Diet: NPO except ice chip/meds  Activity Level: wheelchair bound at baseline. not oob this shift, refused repositioning despite education, pt able to shift weight.  Tests/Procedures: N/A  Anticipated  DC Date: pending TCU  Significant Information: CRS following. Pt reports near constant nausea. Some relief with zofran, aromatherapy, and quiet environment.

## 2024-01-07 NOTE — PROGRESS NOTES
Date & Time: 1/6/24 at [phone]  Surgery/POD#: 5 of s/p exploratory laparotomy with diverting loop ileostomy   Behavior & Aggression: green  Fall Risk: yes  Orientation:A&O x4  ABNL VS/O2:VSS on 2 L of 2 via NC  ABNL Labs: see chart  Pain Management:valium/dilaudid  Bowel/Bladder: continent, on external cath with adequate output.  Drains: ileostomy bag with 720 ml of output, NJ tube with 700 ml of green/clear output.  Diet:NPO except ice chip/meds  Activity Level: wheelchair bound. not oob this shift, refused to sit on the chair or to reposition her.  Tests/Procedures: N/A  Anticipated  DC Date: pending TCU  Significant Information: CRS following.

## 2024-01-07 NOTE — PROGRESS NOTES
"Colon & Rectal Surgery Progress Note             Interval History:     Postop Day #: 6  Doing better.   Stoma functioning, 600+ out, NGT with 1.5 but appears to have tacking in 600.  5 mg IV dilaudid                  Medications:   I have reviewed this patient's current medications               Physical Exam:   Blood pressure 135/84, pulse 61, temperature 97.6  F (36.4  C), temperature source Oral, resp. rate 17, height 1.626 m (5' 4\"), weight 81 kg (178 lb 9.2 oz), SpO2 96%, not currently breastfeeding.    Intake/Output Summary (Last 24 hours) at 1/7/2024 0810  Last data filed at 1/7/2024 0600  Gross per 24 hour   Intake 2932 ml   Output 2550 ml   Net 382 ml     GEN:  alert  ABD:  soft, non-distended, incision CDI, stoma red with liquid green output.          Data:        Lab Results   Component Value Date     01/07/2024    Lab Results   Component Value Date    CHLORIDE 104 01/07/2024    CHLORIDE 106 10/24/2022    Lab Results   Component Value Date    BUN 11.0 01/07/2024    BUN 11 10/24/2022      Lab Results   Component Value Date    POTASSIUM 3.1 01/07/2024    POTASSIUM 3.8 10/24/2022    Lab Results   Component Value Date    CO2 29 01/07/2024    CO2 29 10/24/2022    Lab Results   Component Value Date    CR 0.52 01/07/2024    CR 0.59 01/05/2024        Lab Results   Component Value Date    HGB 12.5 01/03/2024    HGB 12.9 01/02/2024     Lab Results   Component Value Date     01/07/2024     01/04/2024     Lab Results   Component Value Date    WBC 10.4 01/03/2024    WBC 13.6 (H) 01/02/2024            Assessment and Plan:     49 yo F POD#6 s/p ex lap, PAOLA, diverting loop ileostomy. Ileus may be resolving  GGC today, minimize PO intake during this test.   Possible removal of NGT after GGC results available if favorable.  Minimize narcotics  Encourage movement and OOB.       Germaine Macias MD  [facility]  Office Phone # [phone]    "
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
## 2024-01-08 ENCOUNTER — APPOINTMENT (OUTPATIENT)
Dept: GENERAL RADIOLOGY | Facility: CLINIC | Age: 49
DRG: 329 | End: 2024-01-08
Attending: COLON & RECTAL SURGERY
Payer: COMMERCIAL

## 2024-01-08 LAB
GLUCOSE BLDC GLUCOMTR-MCNC: 106 MG/DL (ref 70–99)
GLUCOSE BLDC GLUCOMTR-MCNC: 85 MG/DL (ref 70–99)
MAGNESIUM SERPL-MCNC: 2.1 MG/DL (ref 1.7–2.3)
POTASSIUM SERPL-SCNC: 4.7 MMOL/L (ref 3.4–5.3)

## 2024-01-08 PROCEDURE — 258N000003 HC RX IP 258 OP 636: Performed by: COLON & RECTAL SURGERY

## 2024-01-08 PROCEDURE — 250N000011 HC RX IP 250 OP 636: Performed by: HOSPITALIST

## 2024-01-08 PROCEDURE — 250N000011 HC RX IP 250 OP 636: Performed by: INTERNAL MEDICINE

## 2024-01-08 PROCEDURE — 83735 ASSAY OF MAGNESIUM: CPT | Performed by: HOSPITALIST

## 2024-01-08 PROCEDURE — C9113 INJ PANTOPRAZOLE SODIUM, VIA: HCPCS | Performed by: HOSPITALIST

## 2024-01-08 PROCEDURE — 74018 RADEX ABDOMEN 1 VIEW: CPT

## 2024-01-08 PROCEDURE — 999N000127 HC STATISTIC PERIPHERAL IV START W US GUIDANCE

## 2024-01-08 PROCEDURE — 84132 ASSAY OF SERUM POTASSIUM: CPT | Performed by: INTERNAL MEDICINE

## 2024-01-08 PROCEDURE — 120N000001 HC R&B MED SURG/OB

## 2024-01-08 PROCEDURE — 99233 SBSQ HOSP IP/OBS HIGH 50: CPT | Performed by: HOSPITALIST

## 2024-01-08 PROCEDURE — G0463 HOSPITAL OUTPT CLINIC VISIT: HCPCS

## 2024-01-08 PROCEDURE — 36415 COLL VENOUS BLD VENIPUNCTURE: CPT | Performed by: HOSPITALIST

## 2024-01-08 PROCEDURE — 250N000011 HC RX IP 250 OP 636: Performed by: NURSE PRACTITIONER

## 2024-01-08 PROCEDURE — 250N000011 HC RX IP 250 OP 636: Performed by: COLON & RECTAL SURGERY

## 2024-01-08 RX ORDER — POTASSIUM CHLORIDE 7.45 MG/ML
10 INJECTION INTRAVENOUS
Status: DISPENSED | OUTPATIENT
Start: 2024-01-08 | End: 2024-01-08

## 2024-01-08 RX ORDER — POTASSIUM CHLORIDE 7.45 MG/ML
10 INJECTION INTRAVENOUS
Qty: 200 ML | Refills: 0 | Status: COMPLETED | OUTPATIENT
Start: 2024-01-08 | End: 2024-01-08

## 2024-01-08 RX ORDER — LIDOCAINE 40 MG/G
CREAM TOPICAL
Status: DISCONTINUED | OUTPATIENT
Start: 2024-01-08 | End: 2024-01-16 | Stop reason: HOSPADM

## 2024-01-08 RX ADMIN — ONDANSETRON 8 MG: 2 INJECTION INTRAMUSCULAR; INTRAVENOUS at 20:07

## 2024-01-08 RX ADMIN — HYDROMORPHONE HYDROCHLORIDE 0.5 MG: 1 INJECTION, SOLUTION INTRAMUSCULAR; INTRAVENOUS; SUBCUTANEOUS at 07:34

## 2024-01-08 RX ADMIN — DIAZEPAM 5 MG: 10 INJECTION, SOLUTION INTRAMUSCULAR; INTRAVENOUS at 15:50

## 2024-01-08 RX ADMIN — HYDROMORPHONE HYDROCHLORIDE 0.5 MG: 1 INJECTION, SOLUTION INTRAMUSCULAR; INTRAVENOUS; SUBCUTANEOUS at 04:37

## 2024-01-08 RX ADMIN — SODIUM CHLORIDE, POTASSIUM CHLORIDE, SODIUM LACTATE AND CALCIUM CHLORIDE 1000 ML: 600; 310; 30; 20 INJECTION, SOLUTION INTRAVENOUS at 17:52

## 2024-01-08 RX ADMIN — HYDROMORPHONE HYDROCHLORIDE 0.5 MG: 1 INJECTION, SOLUTION INTRAMUSCULAR; INTRAVENOUS; SUBCUTANEOUS at 17:56

## 2024-01-08 RX ADMIN — POTASSIUM CHLORIDE 10 MEQ: 7.46 INJECTION, SOLUTION INTRAVENOUS at 09:50

## 2024-01-08 RX ADMIN — ENOXAPARIN SODIUM 40 MG: 40 INJECTION SUBCUTANEOUS at 18:03

## 2024-01-08 RX ADMIN — HYDROMORPHONE HYDROCHLORIDE 0.5 MG: 1 INJECTION, SOLUTION INTRAMUSCULAR; INTRAVENOUS; SUBCUTANEOUS at 22:04

## 2024-01-08 RX ADMIN — POTASSIUM CHLORIDE 10 MEQ: 7.46 INJECTION, SOLUTION INTRAVENOUS at 14:37

## 2024-01-08 RX ADMIN — HYDROMORPHONE HYDROCHLORIDE 0.5 MG: 1 INJECTION, SOLUTION INTRAMUSCULAR; INTRAVENOUS; SUBCUTANEOUS at 13:13

## 2024-01-08 RX ADMIN — POTASSIUM CHLORIDE 10 MEQ: 7.46 INJECTION, SOLUTION INTRAVENOUS at 06:39

## 2024-01-08 RX ADMIN — POTASSIUM CHLORIDE 10 MEQ: 7.46 INJECTION, SOLUTION INTRAVENOUS at 16:09

## 2024-01-08 RX ADMIN — HYDROMORPHONE HYDROCHLORIDE 0.5 MG: 1 INJECTION, SOLUTION INTRAMUSCULAR; INTRAVENOUS; SUBCUTANEOUS at 01:09

## 2024-01-08 RX ADMIN — ONDANSETRON 8 MG: 2 INJECTION INTRAMUSCULAR; INTRAVENOUS at 04:37

## 2024-01-08 RX ADMIN — HYDROMORPHONE HYDROCHLORIDE 0.5 MG: 1 INJECTION, SOLUTION INTRAMUSCULAR; INTRAVENOUS; SUBCUTANEOUS at 20:05

## 2024-01-08 RX ADMIN — PANTOPRAZOLE SODIUM 40 MG: 40 INJECTION, POWDER, FOR SOLUTION INTRAVENOUS at 09:47

## 2024-01-08 RX ADMIN — ONDANSETRON 8 MG: 2 INJECTION INTRAMUSCULAR; INTRAVENOUS at 13:14

## 2024-01-08 ASSESSMENT — ACTIVITIES OF DAILY LIVING (ADL)
ADLS_ACUITY_SCORE: 40
ADLS_ACUITY_SCORE: 40
ADLS_ACUITY_SCORE: 42
ADLS_ACUITY_SCORE: 40
ADLS_ACUITY_SCORE: 42
ADLS_ACUITY_SCORE: 40
ADLS_ACUITY_SCORE: 42
ADLS_ACUITY_SCORE: 40
ADLS_ACUITY_SCORE: 40
ADLS_ACUITY_SCORE: 42

## 2024-01-08 NOTE — PROGRESS NOTES
Colorectal Surgery Progress Note    POD # 7  Interval History:  No acute events overnight. Ostomy output picked up to 2.5 L. Says she is nauseous.     Physical Exam:   Temp:  [97.6  F (36.4  C)-99.1  F (37.3  C)] 98.1  F (36.7  C)  Pulse:  [76-85] 79  Resp:  [17-18] 18  BP: (109-137)/(78-86) 121/78  SpO2:  [92 %-97 %] 96 %  General: Alert, oriented, appears comfortable, NAD.  Respiratory: breathing non labored  Abdomen: Abdomen is soft, mild tender, non distended. Incision is clean, dry and intact without signs of infection, ostomy to gravity bag.     Data:   All laboratory and imaging data in the past 24 hours reviewed  I/O last 3 completed shifts:  In: 1038 [P.O.:275; I.V.:763]  Out: 3645 [Urine:650; Emesis/NG output:300; Stool:2695]  Recent Labs   Lab Test 01/07/24  0638 01/04/24  0734 01/03/24  0731 01/02/24  0603 01/01/24  1043 01/31/23  1900 01/28/23  0619   WBC  --   --  10.4 13.6* 4.7   < > 4.2   HGB  --   --  12.5 12.9 13.5   < > 13.4    305 259 262 249   < > 243   INR  --   --   --   --   --   --  0.97    < > = values in this interval not displayed.      Recent Labs   Lab Test 01/08/24  0600 01/07/24  2324 01/07/24  2206 01/07/24  0638 01/05/24  2137 01/05/24  0815 01/04/24  0734   NA  --   --   --  139  --  141 139   POTASSIUM  --  3.4  --  3.1*  --  3.6 4.2   CHLORIDE  --   --   --  104  --  103 102   CO2  --   --   --  29  --  29 28   BUN  --   --   --  11.0  --  18.5 14.0   CR  --   --   --  0.52  --  0.59 0.53   ANIONGAP  --   --   --  6*  --  9 9   ESTEBAN  --   --   --  8.6  --  8.8 9.5   *  --  96 109*   < > 82 133*    < > = values in this interval not displayed.        Recent Labs   Lab Test 01/01/24  1043   PROTTOTAL 7.3   ALBUMIN 4.1   BILITOTAL 0.3   ALKPHOS 95   AST 22   ALT 17       Assessment and Plan:     48 F hx of suspected cecal volvulus s/p ex lap PAOLA and loop ileostomy. Post op course prolonged by ileus s/p GG study. Ostomy is now functioning. Ngt is bilious but dropping in  output. Labs reviewed.     Plan     Xray this AM to see GG   Cont ngt for one more day  Cont stoma cares   Minimal narcotics  Encourage movement w/ PT and wheelchair as able     Pt d/w Dr Kendall Rojas MD  Fellow, Colon and Rectal Surgery  [facility]  Pager: [phone]

## 2024-01-08 NOTE — PLAN OF CARE
Goal Outcome Evaluation:      Plan of Care Reviewed With: patient    Overall Patient Progress: no changeOverall Patient Progress: no change     Date & Time: 1/7/24 at [phone]  Surgery/POD#: 7 of s/p exploratory laparotomy with diverting loop ileostomy   Behavior & Aggression: green  Fall Risk: yes  Orientation:A&O x4  ABNL VS/O2:VSS on RA  ABNL Labs: K+ 3.4, per replacement protocol was replaced with IV K+, Recheck scheduled for this 12pm.  Pain Management: dilaudid/valium  Bowel/Bladder: continent, on external cath with adequate output. Ileostomy with good watery brown green output.  Drains: ileostomy bag brown watery output, NJ tube with green/clear output.  Diet: NPO except ice chip/meds  Activity Level: wheelchair bound at baseline. not oob this shift, refused repositioning despite education, pt able to shift weight. .  Tests/Procedures: N/A  Anticipated  DC Date: pending TCU  Significant Information: CRS following. Zofran given x2, nausea improved.

## 2024-01-08 NOTE — PROGRESS NOTES
ate & Time: 1/7/24 at [phone]  Surgery/POD#: 6 of s/p exploratory laparotomy with diverting loop ileostomy   Behavior & Aggression: green  Fall Risk: yes  Orientation:A&O x4  ABNL VS/O2:VSS on on RA weaned from 2 L of O2 via NC  ABNL Labs: see chart  Pain Management:valium/dilaudid  Bowel/Bladder: continent, on external cath with adequate output.  Drains: ileostomy bag NJ tube.   Diet:NPO except ice chip/meds  Activity Level: wheelchair bound. not oob this shift, refused to sit on the chair.  Tests/Procedures: XR gastrografin challenge  Anticipated  DC Date: pending TCU  Significant Information: GGC was administered through NJ tube, clamped for two hours, back to low intermittent. Pt had gastrografin challenge XR 8 hours after the GGC. Pt have had BM through rectal x3, and had Ileostomy output. K+ of 3.1, replaced, recheck lab at 10 pm.  following
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
## 2024-01-08 NOTE — PROGRESS NOTES
"[facility] Nurse Inpatient Assessment     Consulted for:  New loop ileostomy    Summary:  New loop ileostomy, now functioning with high liquid output.  Pt has complex social situation, see Social Work notes.      1/8: NG remains in place, pt having high liquid output.  Pt also reports passing stool through rectum several times this morning.  Pouch from last Thurs was still intact.  Pt and mother observed pouch change for second time, no hands-on participation yet.  Pt states her  is no longer allowed to visit her or contact her.      Patient History (according to provider note(s):      48 F hx of cervical cancer s/p radiation with long standing constipation and mobile cecum. No volvulus noted at the time of OR.      S/p ex lap and loop ileostomy for diversion.     Areas Assessed:      Areas visualized during today's visit: Abdomen    Assessment of new loop Ileostomy:  Diagnosis Pertinent to Stoma:  transit issues       Surgery Date: 1-1-24  Surgeon: Dr. Smith [facility]: [facility]    Pouching system in place on assessment today: [place] 2pc 57 soft convex with ring and high output pouch, now attached to strong bag due to high watery output  Pouch barrier status: intact    Pouch last changed/wear time: post-op 1/1, 1/4, 1/8  Reason for pouch change today: ostomy education and routine schedule  Effectiveness of current pouching/ supply plan: Recommend continuing current plan  Change made with ostomy management today: Yes  Pouching system placed/ in place today: [place] 2pc 57mm soft convex with ring and high-output pouch, attached to strong  Supplies: ordered from [facility] and at bedside    Last photo: 1-8-24      Stoma location: RLQ  Stoma size: approx 1 1/4\" loop stoma  Stoma appearance: viable, pink, and round, lower loop minimally protruding  Mucocutaneous junction:  intact  Peristomal complication(s): small divots/creases in skin along inferior aspect  Output: " thin and brown  Output volume emptied during visit: n/a - emptying into strong    Abdominal assessment: Soft  Surgical site(s):  glued incision clear and intact  NG still in place? No  Pain: tender, aching  Is patient still on a PCA? No    Ostomy education assessment:  Participant of teaching session today: patient  and mother  Education completed today: Stoma assessment, Pouching system assessment , Pouch change demonstration, Ostomy accessory product use , Peristomal skin care, Pouch emptying demonstration, Odor/flatus management , and Lifestyle adjustments   Educational materials/methods: Verbal,  hand out, ARLINE [place] education hand out, and Left packet of information at bedside   Education still needed: Pouch change return demonstration, Pouch emptying demonstration, Intake and output recording, and Discharge instructions    Learning Comprehension:   Psychosocial assessment: pt states she has several friends with similar digestive issues that also have ostomies, and who may be part of her support system.  Pt and mother engaged in education; pt notes she may need assist with cares due to dexterity and mobility issues.   no longer involved in pt cares, pt has requested he not contact or visit her.  See social work notes.   Patient readiness for education today: observing and attentive  Following today's visit: patient  and mother  is able to demonstrate;         1. How to empty their pouch? Demo provided         2. How to change their pouch? Demo provided         3. How to read and record intake and output correctly? Demo provided     Preparation for discharge completed: started gathering supplies and updating AVS  Preparation for discharge still needed: Placed prescription recommendations in discharge navigator for MD to sign, Ensured patient has extra supplies for discharge, Discussed how to order supplies after discharge , Ordered samples from  after gaining consent from  "patient/caregiver, Discussed how and when to make an outpatient WOC nurse appointment after discharge, Prepared for discharge home with home care, and Discuss signs/symptoms of when to seek medical attention  Pt support system on discharge: mother, though she lives in [place]; possibly son and some friends as well; remains unclear where pt will go on discharge  WO recommend home care? Yes       Treatment Plan:     RLQ Ileostomy pouching plan:   Pouching system: ostomy supplies pouches: [place] 57 FECAL ([id number]) or high output pouch [id number]; ostomy supplies barrier: [place] 57mm SOFT CONVEX ([id number])  Accessories used: [facility] ostomy accessories: 2\" Cera Barrier Ring ([id number])   Frequency of pouch changes: PRN leakage and Three times a week  WOC follow up plan: Daily Monday-Friday (as able) and As needed   Bedside RN interventions: Change pouch PRN if leaking using the supplies above, Empty pouch when 1/3 to 1/2 full, ensure to clean pouch outlet after emptying to prevent odor, Notify WOC for ongoing pouch leakage, Document stoma appearance and output volume, color, and consistency every shift, Encourage patient to empty pouch with assist, and Assist patient to measure and record output      Orders: Reviewed    RECOMMEND PRIMARY TEAM ORDER: None, at this time  Education provided: plan of care  Discussed plan of care with: Patient, Family, and Nurse  WOC nurse follow-up plan:  daily Mon - Fri as able  Notify WOC if wound(s) deteriorate.  Nursing to notify the Provider(s) and re-consult the WOC Nurse if new skin concern.    DATA:     Current support surface: Standard  Standard gel/foam mattress (IsoFlex, Atmos air, etc)  Containment of urine/stool: Indwelling catheter and Ileostomy pouch  BMI: Body mass index is 30.65 kg/m .   Active diet order: Orders Placed This Encounter      NPO for Medical/Clinical Reasons Except for: Meds, Ice Chips     Output: I/O last 3 completed shifts:  In: 1038 [P.O.:275; I.V.:763]  Out: 3645 " [Urine:650; Emesis/NG output:300; Stool:2695]     Labs:   Recent Labs   Lab 01/03/24  0731   HGB 12.5   WBC 10.4     Pressure injury risk assessment:   Sensory Perception: 3-->slightly limited  Moisture: 3-->occasionally moist  Activity: 2-->chairfast  Mobility: 2-->very limited  Nutrition: 2-->probably inadequate  Friction and Shear: 2-->potential problem  Gabriele Score: 14    Aracelis Moya RN CWOCN  -Secure[employer] message with [employer] ([facility] [employer])   -[facility] Office Phone: [phone] (messages checked periodically Mon-Fri 8a-4p)
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
## 2024-01-09 ENCOUNTER — APPOINTMENT (OUTPATIENT)
Dept: PHYSICAL THERAPY | Facility: CLINIC | Age: 49
DRG: 329 | End: 2024-01-09
Payer: COMMERCIAL

## 2024-01-09 LAB
GLUCOSE BLDC GLUCOMTR-MCNC: 129 MG/DL (ref 70–99)
GLUCOSE BLDC GLUCOMTR-MCNC: 95 MG/DL (ref 70–99)
GLUCOSE BLDC GLUCOMTR-MCNC: 99 MG/DL (ref 70–99)
MAGNESIUM SERPL-MCNC: 1.9 MG/DL (ref 1.7–2.3)
POTASSIUM SERPL-SCNC: 3.9 MMOL/L (ref 3.4–5.3)

## 2024-01-09 PROCEDURE — 97530 THERAPEUTIC ACTIVITIES: CPT | Mod: GP

## 2024-01-09 PROCEDURE — 83735 ASSAY OF MAGNESIUM: CPT | Performed by: HOSPITALIST

## 2024-01-09 PROCEDURE — 250N000013 HC RX MED GY IP 250 OP 250 PS 637: Performed by: NURSE PRACTITIONER

## 2024-01-09 PROCEDURE — 258N000003 HC RX IP 258 OP 636: Performed by: INTERNAL MEDICINE

## 2024-01-09 PROCEDURE — 250N000011 HC RX IP 250 OP 636: Performed by: COLON & RECTAL SURGERY

## 2024-01-09 PROCEDURE — 84132 ASSAY OF SERUM POTASSIUM: CPT | Performed by: HOSPITALIST

## 2024-01-09 PROCEDURE — 120N000001 HC R&B MED SURG/OB

## 2024-01-09 PROCEDURE — C9113 INJ PANTOPRAZOLE SODIUM, VIA: HCPCS | Performed by: HOSPITALIST

## 2024-01-09 PROCEDURE — 99232 SBSQ HOSP IP/OBS MODERATE 35: CPT | Mod: VID | Performed by: NURSE PRACTITIONER

## 2024-01-09 PROCEDURE — 36415 COLL VENOUS BLD VENIPUNCTURE: CPT | Performed by: HOSPITALIST

## 2024-01-09 PROCEDURE — 99232 SBSQ HOSP IP/OBS MODERATE 35: CPT | Performed by: HOSPITALIST

## 2024-01-09 PROCEDURE — 250N000011 HC RX IP 250 OP 636: Performed by: NURSE PRACTITIONER

## 2024-01-09 PROCEDURE — G0463 HOSPITAL OUTPT CLINIC VISIT: HCPCS

## 2024-01-09 PROCEDURE — 250N000013 HC RX MED GY IP 250 OP 250 PS 637: Performed by: HOSPITALIST

## 2024-01-09 PROCEDURE — 250N000011 HC RX IP 250 OP 636: Performed by: HOSPITALIST

## 2024-01-09 PROCEDURE — 250N000009 HC RX 250: Performed by: PHYSICIAN ASSISTANT

## 2024-01-09 RX ORDER — HYDROMORPHONE HYDROCHLORIDE 1 MG/ML
2-4 SOLUTION ORAL EVERY 4 HOURS PRN
Status: DISCONTINUED | OUTPATIENT
Start: 2024-01-09 | End: 2024-01-11

## 2024-01-09 RX ORDER — HYDROMORPHONE HYDROCHLORIDE 1 MG/ML
0.5 INJECTION, SOLUTION INTRAMUSCULAR; INTRAVENOUS; SUBCUTANEOUS EVERY 4 HOURS PRN
Status: DISCONTINUED | OUTPATIENT
Start: 2024-01-09 | End: 2024-01-10

## 2024-01-09 RX ORDER — LANOLIN ALCOHOL/MO/W.PET/CERES
3-6 CREAM (GRAM) TOPICAL
Status: DISCONTINUED | OUTPATIENT
Start: 2024-01-09 | End: 2024-01-16 | Stop reason: HOSPADM

## 2024-01-09 RX ADMIN — HYDROMORPHONE HYDROCHLORIDE 0.5 MG: 1 INJECTION, SOLUTION INTRAMUSCULAR; INTRAVENOUS; SUBCUTANEOUS at 08:25

## 2024-01-09 RX ADMIN — DEXTROSE AND SODIUM CHLORIDE: 5; 900 INJECTION, SOLUTION INTRAVENOUS at 01:42

## 2024-01-09 RX ADMIN — ONDANSETRON 8 MG: 2 INJECTION INTRAMUSCULAR; INTRAVENOUS at 08:19

## 2024-01-09 RX ADMIN — ONDANSETRON 8 MG: 2 INJECTION INTRAMUSCULAR; INTRAVENOUS at 15:54

## 2024-01-09 RX ADMIN — MEMANTINE 10 MG: 10 TABLET ORAL at 21:22

## 2024-01-09 RX ADMIN — PANTOPRAZOLE SODIUM 40 MG: 40 INJECTION, POWDER, FOR SOLUTION INTRAVENOUS at 08:16

## 2024-01-09 RX ADMIN — DEXTROSE AND SODIUM CHLORIDE: 5; 900 INJECTION, SOLUTION INTRAVENOUS at 15:55

## 2024-01-09 RX ADMIN — MEMANTINE 10 MG: 10 TABLET ORAL at 08:22

## 2024-01-09 RX ADMIN — SCOPALAMINE 1 PATCH: 1 PATCH, EXTENDED RELEASE TRANSDERMAL at 18:40

## 2024-01-09 RX ADMIN — DIAZEPAM 5 MG: 10 INJECTION, SOLUTION INTRAMUSCULAR; INTRAVENOUS at 00:42

## 2024-01-09 RX ADMIN — ENOXAPARIN SODIUM 40 MG: 40 INJECTION SUBCUTANEOUS at 17:54

## 2024-01-09 RX ADMIN — HYDROMORPHONE HYDROCHLORIDE 0.5 MG: 1 INJECTION, SOLUTION INTRAMUSCULAR; INTRAVENOUS; SUBCUTANEOUS at 15:13

## 2024-01-09 RX ADMIN — BACLOFEN 5 MG: 10 TABLET ORAL at 21:22

## 2024-01-09 RX ADMIN — BACLOFEN 5 MG: 10 TABLET ORAL at 08:23

## 2024-01-09 RX ADMIN — HYDROMORPHONE HYDROCHLORIDE 4 MG: 1 SOLUTION ORAL at 22:26

## 2024-01-09 RX ADMIN — SERTRALINE HYDROCHLORIDE 100 MG: 50 TABLET ORAL at 08:23

## 2024-01-09 RX ADMIN — SCOPALAMINE 1 PATCH: 1 PATCH, EXTENDED RELEASE TRANSDERMAL at 18:41

## 2024-01-09 RX ADMIN — MELATONIN TAB 3 MG 3 MG: 3 TAB at 22:26

## 2024-01-09 RX ADMIN — HYDROMORPHONE HYDROCHLORIDE 0.5 MG: 1 INJECTION, SOLUTION INTRAMUSCULAR; INTRAVENOUS; SUBCUTANEOUS at 12:27

## 2024-01-09 RX ADMIN — HYDROMORPHONE HYDROCHLORIDE 0.5 MG: 1 INJECTION, SOLUTION INTRAMUSCULAR; INTRAVENOUS; SUBCUTANEOUS at 01:44

## 2024-01-09 RX ADMIN — HYDROMORPHONE HYDROCHLORIDE 4 MG: 1 SOLUTION ORAL at 18:34

## 2024-01-09 ASSESSMENT — ACTIVITIES OF DAILY LIVING (ADL)
ADLS_ACUITY_SCORE: 39
ADLS_ACUITY_SCORE: 42
ADLS_ACUITY_SCORE: 39
ADLS_ACUITY_SCORE: 39
ADLS_ACUITY_SCORE: 42
ADLS_ACUITY_SCORE: 39
ADLS_ACUITY_SCORE: 42
ADLS_ACUITY_SCORE: 38
ADLS_ACUITY_SCORE: 42
ADLS_ACUITY_SCORE: 39
ADLS_ACUITY_SCORE: 42
ADLS_ACUITY_SCORE: 39

## 2024-01-09 NOTE — PROGRESS NOTES
CLINICAL NUTRITION SERVICES - REASSESSMENT NOTE      RECOMMENDATIONS FOR MD/PROVIDER TO ORDER:   If central placement possible, recommend starting TPN (if not, then at least PPN) if unable to advance diet past NPO w/in the next 24 hours      Malnutrition:   % Weight Loss:  None noted (1/6)  % Intake:  </= 50% for >/= 5 days (severe malnutrition)-- x8 days  Subcutaneous Fat Loss:  Upper arm moderate-severe (1/6)  Muscle Loss:  Patellar region moderate-severe depletion, Anterior thigh region moderate-severe depletion, and Posterior calf region moderate-severe depletion (1/6)  Fluid Retention:  Does not meet criteria-- trace generalized edema    Malnutrition Diagnosis: Severe malnutrition in the context of --  Acute illness or injury         EVALUATION OF PROGRESS TOWARD GOALS   Diet: NPO for Medical/Clinical Reasons Except for: Meds, Ice Chips      Intake/Tolerance:  No intakes, remains NPO since 1/4  Visited w/ pt this AM-- still feeling a little nauseous but thinking it's from the discomfort of having the NGT. Really would like NGT out soon. RD provided coupons for Ensure clear that she drinks at home.   D5 started today. Providing 306 kcal/day (90 g dextrose/day)      ASSESSED NUTRITION NEEDS:  Dosing Weight 60 kg (adjusted)  Estimated Energy Needs: 9652-3311 kcals (25-30 Kcal/Kg)  Justification: post-op and overweight  Estimated Protein Needs: 70-90 grams protein (1.2-1.5 g pro/Kg)  Justification: post-op and hypercatabolism with acute illness  Estimated Fluid Needs: 4237-6755 mL (1 mL/Kcal)  Justification: maintenance        NEW FINDINGS:   General: POD #8 s/p ex lap, lysis of adhesions and diverting loop ileostomy --> c/b post-op ileus     Weight: wt trending up, no nutrition yet- suspect true wt loss hidden   Date/Time Weight Weight Method   01/07/24 0500 81 kg (178 lb 9.2 oz) Bed scale   01/01/24 1937 76 kg (167 lb 8.8 oz) Bed scale   01/01/24 0932 72.6 kg (160 lb)      I/O: Net IO Since Admission: -2,480.6 mL  [01/09/24 1135].  Stool output = 2900 mL ostomy + 2x BM yesterday, 1695 mL + 1x BM on 1/7, 670 mL on 1/6 1/7: GGC = enteric contrast is seen within prominent loops of small bowel throughout the abdomen, including a a few in the right lower quadrant overlying the ostomy. As before, this may represent evolving ileus and correlation with ostomy output is recommended.  NG output = 225 mL yesterday, 600 mL on 1/7, 800 mL 1/6 1/8: abd XR =  Oral contrast material is in the colon. Bowel gas pattern  nonobstructed.    Labs: K, Mg WNL. BGM <180    Medications: reviewed    pantoprazole  40 mg Intravenous Daily with breakfast    sertraline  100 mg Oral Daily      dextrose 5% and 0.9% NaCl 75 mL/hr at 01/09/24 0142     Skin: WOC following for ostomy cares        Previous Goals:   Patient will advance diet to >/= [place] vs start TPN in the next 24-48 hours    Evaluation: Not met    Previous Nutrition Diagnosis:   Inadequate protein-energy intake related to NPO with post-op ileus as evidenced by minimal intake for about 2 weeks    Evaluation: No change        MALNUTRITION  % Weight Loss:  None noted (1/6)  % Intake:  </= 50% for >/= 5 days (severe malnutrition)-- x8 days  Subcutaneous Fat Loss:  Upper arm moderate-severe (1/6)  Muscle Loss:  Patellar region moderate-severe depletion, Anterior thigh region moderate-severe depletion, and Posterior calf region moderate-severe depletion (1/6)  Fluid Retention:  Does not meet criteria-- trace generalized edema    Malnutrition Diagnosis: Severe malnutrition in the context of --  Acute illness or injury        CURRENT NUTRITION DIAGNOSIS  Inadequate protein-energy intake related to NPO 2/2 ongoing post-op ileus as evidenced by minimal intakes x9 days PTA + no PO intakes x8 days IP, D5 providing 20% of min energy needs and 0% of min protein needs x1 day        INTERVENTIONS  Recommendations / Nutrition Prescription  If central placement possible, recommend starting TPN (if not, then at  least PPN) if unable to advance diet past NPO w/in the next 24 hours       Implementation  No new interventions today     Goals  Diet advancement vs start nutrition support w/in 48 hours        MONITORING AND EVALUATION:  Progress towards goals will be monitored and evaluated per protocol and Practice Guidelines      Palak Staton RD, LD  Pager: [phone]

## 2024-01-09 NOTE — PLAN OF CARE
Goal Outcome Evaluation:  Patient is A&O X4, VSS on RA. C/o pain and nausea managed with Valium and IV dilaudid. NG tube in place at low intermittent suction with green output. Ileostomy in place with adequate output. Voiding using purewick, adequate output, marci color. NPO, wheel chair bound, assist of 2. Continue with plan of care.

## 2024-01-09 NOTE — PROGRESS NOTES
"[facility] Nurse Inpatient Assessment     Consulted for:  New loop ileostomy    Summary:  New loop ileostomy, now functioning with high liquid output.  Pt has complex social situation, see Social Work notes.      1/9: Patient \"sneezed\" out NGT. Up in chair and looking well. Asking many questions about diet with ileostomy. Long educational visit with patient and her mom. Pouch intact. Reports she is still having stool from rectum. Provided education and reassurance about this. Enrolled patient in [employer] (supplies will be mailed to mother's address). Plan is for patient to discharge and then fly to [place] to stay with family.    Patient History (according to provider note(s):      48 F hx of cervical cancer s/p radiation with long standing constipation and mobile cecum. No volvulus noted at the time of OR.      S/p ex lap and loop ileostomy for diversion.     Areas Assessed:      Areas visualized during today's visit: Abdomen    Assessment of new loop Ileostomy:  Diagnosis Pertinent to Stoma:  transit issues       Surgery Date: 1-1-24  Surgeon: Dr. Smith [facility]: [facility]    Pouching system in place on assessment today: [place] 2pc 57 soft convex with ring and high output pouch, now attached to strong bag due to high watery output  Pouch barrier status: intact    Pouch last changed/wear time: post-op 1/1, 1/4, 1/8  Reason for pouch change today: pouch not changed today  Effectiveness of current pouching/ supply plan: Recommend continuing current plan  Change made with ostomy management today: No  Pouching system placed/ in place today: [place] 2pc 57mm soft convex with ring and high-output pouch, attached to strong  Supplies: ordered from [facility] and at bedside    Last photo: 1-8-24      Stoma location: RLQ  Stoma size: approx 1 1/4\" loop stoma  Stoma appearance: viable, pink, and round, lower loop minimally protruding  Mucocutaneous junction:  " intact  Peristomal complication(s): small divots/creases in skin along inferior aspect  Output: thin, watery and brown  Output volume emptied during visit: n/a - emptying into strong    Abdominal assessment: Soft  Surgical site(s):  glued incision clear and intact  NG still in place? No  Pain: tender, aching  Is patient still on a PCA? No    Ostomy education assessment:  Participant of teaching session today: patient  and mother  Education completed today: Pouching system assessment , Fluid and electrolyte balance , Importance of hydration, Low fiber diet , Odor/flatus management , Lifestyle adjustments , and Discharge instructions  Educational materials/methods: Verbal,  hand out, FOD [place] education hand out, and Left packet of information at bedside   Education still needed: Pouch change return demonstration, Pouch emptying demonstration, Pouch emptying return demonstration, Intake and output recording, Fluid and electrolyte balance , When to seek medical attention, Infection prevention/hygiene , Hernia prevention, and Discharge instructions    Learning Comprehension:   Psychosocial assessment: pt states she has several friends with similar digestive issues that also have ostomies, and who may be part of her support system.  Pt and mother engaged in education; pt notes she may need assist with cares due to dexterity and mobility issues.   no longer involved in pt cares, pt has requested he not contact or visit her.  See social work notes.   Patient readiness for education today: observing and attentive  Following today's visit: patient  and mother  is able to demonstrate;         1. How to empty their pouch? Demo provided         2. How to change their pouch? Demo provided         3. How to read and record intake and output correctly? Demo provided     Preparation for discharge completed: started gathering supplies and updating AVS  Preparation for discharge still needed: Placed prescription  "recommendations in discharge navigator for MD to sign, Ensured patient has extra supplies for discharge, Discussed how to order supplies after discharge , Ordered samples from  after gaining consent from patient/caregiver, Discussed how and when to make an outpatient WOC nurse appointment after discharge, Prepared for discharge home with home care, and Discuss signs/symptoms of when to seek medical attention  Pt support system on discharge: patient will travel to [place] to stay with mother after discharge, patient's daughter will fly with her. Possibly son and some friends as well  WO recommend home care? Yes       Treatment Plan:     RLQ Ileostomy pouching plan:   Pouching system: ostomy supplies pouches: [place] 57 FECAL ([id number]) or high output pouch [id number]; ostomy supplies barrier: Lucio 57mm SOFT CONVEX ([id number])  Accessories used: [facility] ostomy accessories: 2\" Cera Barrier Ring ([id number])   Frequency of pouch changes: PRN leakage and Three times a week  WOC follow up plan: Daily Monday-Friday (as able) and As needed   Bedside RN interventions: Change pouch PRN if leaking using the supplies above, Empty pouch when 1/3 to 1/2 full, ensure to clean pouch outlet after emptying to prevent odor, Notify WOC for ongoing pouch leakage, Document stoma appearance and output volume, color, and consistency every shift, Encourage patient to empty pouch with assist, and Assist patient to measure and record output    Orders: Reviewed    RECOMMEND PRIMARY TEAM ORDER: None, at this time  Education provided: plan of care  Discussed plan of care with: Patient, Family, and Nurse  WOC nurse follow-up plan:  daily Mon - Fri as able  Notify WOC if wound(s) deteriorate.  Nursing to notify the Provider(s) and re-consult the WOC Nurse if new skin concern.    DATA:     Current support surface: Standard  Standard gel/foam mattress (IsoFlex, Atmos air, etc)  Containment of urine/stool: Indwelling catheter and Ileostomy pouch  BMI: " "Body mass index is 30.65 kg/m .   Active diet order: Orders Placed This Encounter      Clear Liquid Diet     Output: I/O last 3 completed shifts:  In: 573 [I.V.:573]  Out: 2125 [Urine:200; Emesis/NG output:900; Stool:1025]     Labs:   Recent Labs   Lab 01/03/24  0731   HGB 12.5   WBC 10.4     Pressure injury risk assessment:   Sensory Perception: 3-->slightly limited  Moisture: 3-->occasionally moist  Activity: 2-->chairfast  Mobility: 3-->slightly limited  Nutrition: 2-->probably inadequate  Friction and Shear: 3-->no apparent problem  Gabriele Score: 16    Nita Bianchi RN, CWOCN  Please contact via [employer] at name or group \"[facility] nurse\"- M-F 8A-4P  Leave  @ [phone] for non-urgent needs. Checked occasionally M-F.       "

## 2024-01-09 NOTE — PROGRESS NOTES
Date & Time: 0700-1930  Surgery/POD#: POD 7 Exp Lap with lysis of adhesions and diverting loop Ileostomy   Behavior & Aggression: Green  Fall Risk: Yes  Orientation:x4  ABNL VS/O2:Room air  ABNL Labs: See chart  Pain Management:IV Dilaudid  Bowel/Bladder: Incontinent   Drains: Ileostomy, PIV  Diet:Clear liquid   Activity Level: Ax2, wheelchair at baseline   Tests/Procedures: NA  Anticipated  DC Date: Pending   Significant Information: NG Tube dislodged today after we started the clamping trail, per patient she sneezed really hard that it came off. CRS team notified, ok to keep it off and put her back on NPO if nausea/vomit. Pt tolerating clears

## 2024-01-09 NOTE — PLAN OF CARE
"Goal Outcome Evaluation:      Plan of Care Reviewed With: patient        Date & Time: 1/8/2024 0700/1930  Surgery/POD#: POD #7  POD#6 s/p ex lap, PAOLA, diverting loop ileostomy.   Behavior & Aggression: calm and cooperative, anxious at times.Pt has multiple requests from staff.  Fall Risk: Pt refused to get OOB  Orientation:pt is A/O x 4.  ABNL VS/O2:VSS, pt is on RA  ABNL Labs: Pt is potassium and Mg+ protocol, potassium replaced per protocol, redraws in the am.  Pain Management:Pt is taking Valium and Dilaudid per MD orders for pain, pt rates her abdominal pain at a \"7\".  Bowel/Bladder: Pt has an ileostomy draining a moderate amount of \"watery\" brown stool, Dr. Argueta aware and pt if receiving a fluid bolus.  Drains: ileostomy to strong bag for \"high output\".  Pt has an NG in place, draining yellow drainage.  Diet:Pt is NPO with ice chips, pt refused her oral pills this shift.  Activity Level: Pt refused to get OOB  Tests/Procedures: Pt had an x-ray today per MD orders, see computer for details.  Anticipated  DC Date: unknown  Significant Information:  Patient asked to change rooms and no longer wants significant other to be able to visit her. SW following to provide discharge assistance.               "

## 2024-01-09 NOTE — PROGRESS NOTES
COLON [facility]CT[facility]  PROGRESS NOTE    January 9, 2024  Post-op Day # 8    SUBJECTIVE:  Pain stable, less nauseous. Plans to get out of bed today. 1125cc NGT output and 1775cc ileostomy output yesterday. AVSS, T100.4 last evening.    OBJECTIVE:  Temp:  [97.4  F (36.3  C)-100.4  F (38  C)] 99.1  F (37.3  C)  Pulse:  [74-88] 79  Resp:  [16-18] 16  BP: (127-138)/(87-92) 127/87  SpO2:  [93 %-95 %] 93 %    Intake/Output Summary (Last 24 hours) at 1/9/2024 0814  Last data filed at 1/9/2024 0619  Gross per 24 hour   Intake 573 ml   Output 3100 ml   Net -2527 ml       GENERAL:  Awake, alert, no acute distress  HEAD: Normocephalic atraumatic  SCLERA: Anicteric  EXTREMITIES: Warm and well perfused  ABDOMEN:  Soft, appropriately tender, non-distended. No guarding, rigidity, or peritoneal signs. Ileostomy with gas and liquid stool in bag. NGT canister currently empty  INCISION:  C/d/i,     LABS:  Lab Results   Component Value Date    WBC 10.4 01/03/2024     Lab Results   Component Value Date    HGB 12.5 01/03/2024     Lab Results   Component Value Date    HCT 37.2 01/03/2024     Lab Results   Component Value Date     01/07/2024     Last Basic Metabolic Panel:  Lab Results   Component Value Date     01/07/2024      Lab Results   Component Value Date    POTASSIUM 4.7 01/08/2024    POTASSIUM 3.8 10/24/2022     Lab Results   Component Value Date    CHLORIDE 104 01/07/2024    CHLORIDE 106 10/24/2022     Lab Results   Component Value Date    ESTEBAN 8.6 01/07/2024     Lab Results   Component Value Date    CO2 29 01/07/2024    CO2 29 10/24/2022     Lab Results   Component Value Date    BUN 11.0 01/07/2024    BUN 11 10/24/2022     Lab Results   Component Value Date    CR 0.52 01/07/2024     Lab Results   Component Value Date    GLC 95 01/09/2024    GLC 96 10/24/2022       ASSESSMENT/PLAN: 48 year old woman POD # 7 s/p ex lap, lysis of adhesions and diverting loop ileostomy. Her postoperative course has been complicated by an  ileus, now resolving.    - Clamping trial: Clamp NGT for 6 hours, replace on LIWS for 1 hour, if output <200 may remove NGT, if >200 or increased nausea, return to LIWS. Please document output and symptoms in notes. If able to remove NGT can start sips of clear liquids.  - PRN pain meds, multimodal pain control  - OOB as able, participate in PT  - WOCN  - SW for dispo planning  - Lovenox for ppx    Discussed with Dr. Damon.    For questions/paging, please contact the CRS office at [phone].    Rafa Layton PA-C  Colorectal Physician Assistant    [facility]  [address]  [place] [address]  T: [phone]  F: [phone]

## 2024-01-09 NOTE — PLAN OF CARE
"Goal Outcome Evaluation:  Plan of Care Reviewed With: patient  Date & Time: 1/8/2024 8342-2650  Surgery/POD#: POD #7 s/p ex lap, PAOLA, diverting loop ileostomy.   Behavior & Aggression: calm and cooperative, anxious at times.Pt has multiple requests from staff.  Fall Risk: Pt refused to get OOB; wheelchair bound per patient  Orientation:A/O x 4.  ABNL VS/O2: VSS on RA  ABNL Labs: Pt is potassium and Mg+ protocol, potassium replaced per protocol in previous shift, redraws in the am.  Pain Management:Pt is taking Valium and Dilaudid per MD orders for pain, pt continues to rate rates her abdominal pain 7/10  Bowel/Bladder: Ileostomy draining a moderate amount of \"watery\" brown stool, Dr. Argueta aware   Drains: ileostomy to strong bag for \"high output\". NG tube in place, draining yellow/green drainage.  Diet: NPO with ice chips, pt refused her oral pills this shift.  Activity Level: Pt refused to get OOB  Tests/Procedures: Pt had an x-ray today per MD orders, see computer for details.  Anticipated  DC Date: unknown  Significant Information:  Patient asked to change rooms and no longer wants significant other to be able to visit her. SW following to provide discharge assistance.     "
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
## 2024-01-09 NOTE — PROGRESS NOTES
[facility] ACUTE PAIN SERVICE    Daily PAIN Progress Note    Assessment/Plan:  Peggy Jessica is a 48 year old female who was admitted on 1/1/2024.  Pain team was asked to see the patient for postop pain in the setting of chronic pain and chronic opioid use with buprenorphine patch. Admitted for 3-day worsening of abdominal pain.  Abdominal CT was suspicious for cecal volvulus.  Given that the CT scan did not demonstrate obstruction it was elected to perform a GGE.  Findings on GGE were abnormal and included an obstruction of the proximal ascending colon.  There was concern for volvulus versus possible internal hernia.  Given radiologic findings and persistent abdominal pain it was recommended for an exploratory laparotomy with possible right colectomy and possible ileostomy.  Patient is now status post the below procedure on 1/1/2024.  Patient has a past medical history of chronic abdominal pain, recurrent abdominal ileostomy surgeries with chronic constipation and nausea, chronic pain on chronic opioid therapy with buprenorphine patch, arthritis, BRCA positive, breast cancer status post bilateral mastectomy, fibromyalgia, chronic headaches, wheelchair dependency, chronic urinary and fecal incontinence, polypharmacy, multiple drug allergies, chronic inflammatory demyelinating polyneuropathy, complex regional pain syndrome type one of the right lower extremity, ASHKAN III with severe dysplasia.     Post op day: 8 Days Post-Op. s/p ex lap, lysis of adhesions and diverting loop ileostomy.    Allergy list includes reactions to morphine, methadone, aspirin, risperidone, Topamax, Reglan, pregabalin, Compazine, gabapentin, dihydroergotamine, sumatriptan.     PLAN:   1) Pain is consistent with post op pain. Her postoperative course has been complicated by an ileus, now resolving.  Return of bowel function after Gastrografin challenge.  NG tube has now been removed.  Minimize narcotics as able and encourage out of  bed as tolerated.  Will work on transitioning to oral medications and tapering off of IV medications now the NG tube has been removed.Labs and imaging indicated: I have personally reviewed pertinent notes, labs, tests, and radiologic imaging in patient's chart. Treatment plan includes: multimodal pain approach, Hospital Medicine Service for medical management, CRS. Patient educated regarding: multimodal pain approach, medications as listed below,. Patient is understanding of the plan. All questions and concerns addressed to patient's satisfaction.   2)Multimodal Medication Therapy  Topical: None  NSAID'S: None  Steroids: None  Muscle Relaxants: Baclofen 5 mg daily as needed and 3 times daily  Adjuvants: Tylenol every 4 hours as needed, medical cannabis, melatonin as needed for sleep  Antidepressants/anxiolytics: Benadryl as needed, Valium 5 mg every 6 hours as needed for anxiety and nausea, sertraline 100 mg daily, Namenda 10 mg twice daily  Opioids: Discontinue oxycodone, ordered Dilaudid solution 2-4 mg every 4 hours as needed -first-line, Butrans patch.  Will attempt to try oral medications now that NG tube is removed  IV Pain medication: Decrease frequency of IV Dilaudid to Dilaudid 0.5 mg every 4 hours as needed - second line.  Will need to stop IV medications once tolerating orals.  3)Non-medication interventions: Ice, rest  4)Constipation Prophylaxis: Per surgery team    -Opioid prescriber has been [facility] pain clinic, RIO Kline  pulled from system on 1/9/2024. This indicates ongoing fills for buprenorphine patches.  Recently was increased to the 10 mcg patch in November 2023 from the 7.5 mcg patch.  There are short supplies of oxycodone for 30 tablets in October 2023 as well as tramadol 50 mg for 10 tablets in May 2023.   Discharge Recommendations - We recommend prescribing the following at the time of discharge: Patient reports that after hospital discharge she is moving to  "[place] and going through multiple stressors at home including separation from her .  She inquires if she can get a month supply of buprenorphine patches given her move.  Will have to coordinate with pain clinic for this.  Recommend Dilaudid versus oxycodone short supply for postop pain.  She will need to establish with a new pain clinic after she moves.    Subjective:  Describes pain as 4-5/10 and aching in the right abdomen.  Patient is tearful talking about stressors going on at home.  She is up in a chair.  She reports the NG tube came out after she sneezed.  She denies nausea, vomiting, chest pain, shortness of breath, fever, chills.  She follows with [facility] pain clinic for chronic pain management.    Visit/Communication Style   Visit/Communication Style   Virtual (Video) communication was used to evaluate Peggy.  Peggy consented to the use of video communication.  Video START time: 1532, 1/9/2024  Video STOP time: 1705, 1/9/2024   Patient's location: [facility] GENERAL SURGERY  Provider's location during the visit: Home              Principal Problem:    Right upper quadrant abdominal pain  Active Problems:    Large bowel obstruction (H)      Objective:  Vital signs in last 24 hours:  /87 (BP Location: Left arm)   Pulse 89   Temp 100.1  F (37.8  C) (Oral)   Resp 17   Ht 1.626 m (5' 4\")   Wt 81 kg (178 lb 9.2 oz)   SpO2 93%   BMI 30.65 kg/m    Weight:     Vitals:    01/01/24 0932 01/01/24 1937 01/07/24 0500   Weight: 72.6 kg (160 lb) 76 kg (167 lb 8.8 oz) 81 kg (178 lb 9.2 oz)      Weight change:   Body mass index is 30.65 kg/m .    Intake/Output last 3 shifts:  I/O last 3 completed shifts:  In: 573 [I.V.:573]  Out: 2125 [Urine:200; Emesis/NG output:900; Stool:1025]  Intake/Output this shift:  No intake/output data recorded.    Review of Systems:   As per subjective, all others negative.    Physical Exam:  General Appearance:  Alert, cooperative, no distress, " pleasant, up in the chair   Head:  Normocephalic, without obvious abnormality, atraumatic   Eyes:  PERRL, conjunctiva/corneas clear, EOM's intact   Nose: Nares normal, septum midline   Throat: Lips, mucosa, and tongue normal; teeth and gums normal   Neck: Supple, symmetrical, trachea midline   Lungs:   Room air, respirations unlabored   Skin: Skin warm, dry   Neurologic: Alert and oriented X 3, Moves all 4 extremities     Imaging: Reviewed I have personally reviewed pertinent labs, tests, and radiologic imaging in patient's chart.      Labs: Reviewed I have personally reviewed pertinent labs, tests, and radiologic imaging in patient's chart.      Total time spent 35 minutes with greater than 50% in consultation, education and coordination of care.   Elements of Medical Decision Making as described above. Acute or chronic illness or injury or surgery. High risk therapy including opioids, high risk drug therapy including oral and/or parenteral controlled substances.       BINA Rodriguez-HALLE  Acute Care Pain Management Program [facility]   Monday-Friday 8a-4p   Page via online paging system or [employer]

## 2024-01-09 NOTE — PLAN OF CARE
Goal Outcome Evaluation:      Plan of Care Reviewed With: patient    Overall Patient Progress: decliningOverall Patient Progress: declining    Outcome Evaluation: remains NPO. D5 started and providing ~306 kcal/day. recommend consideration of nutrition support if unable to advance diet w/in 24 hours. RD also provided coupons for ONS that pt drinks at home.    Palak Staton RD, LD

## 2024-01-10 LAB
CREAT SERPL-MCNC: 0.62 MG/DL (ref 0.51–0.95)
EGFRCR SERPLBLD CKD-EPI 2021: >90 ML/MIN/1.73M2
GLUCOSE BLDC GLUCOMTR-MCNC: 102 MG/DL (ref 70–99)
GLUCOSE BLDC GLUCOMTR-MCNC: 105 MG/DL (ref 70–99)
GLUCOSE BLDC GLUCOMTR-MCNC: 108 MG/DL (ref 70–99)
GLUCOSE BLDC GLUCOMTR-MCNC: 130 MG/DL (ref 70–99)
MAGNESIUM SERPL-MCNC: 2 MG/DL (ref 1.7–2.3)
PLATELET # BLD AUTO: 287 10E3/UL (ref 150–450)
POTASSIUM SERPL-SCNC: 3.1 MMOL/L (ref 3.4–5.3)

## 2024-01-10 PROCEDURE — 36415 COLL VENOUS BLD VENIPUNCTURE: CPT | Performed by: INTERNAL MEDICINE

## 2024-01-10 PROCEDURE — 250N000013 HC RX MED GY IP 250 OP 250 PS 637: Performed by: NURSE PRACTITIONER

## 2024-01-10 PROCEDURE — 99232 SBSQ HOSP IP/OBS MODERATE 35: CPT | Mod: VID | Performed by: NURSE PRACTITIONER

## 2024-01-10 PROCEDURE — 258N000003 HC RX IP 258 OP 636: Performed by: INTERNAL MEDICINE

## 2024-01-10 PROCEDURE — 120N000001 HC R&B MED SURG/OB

## 2024-01-10 PROCEDURE — 84132 ASSAY OF SERUM POTASSIUM: CPT | Performed by: INTERNAL MEDICINE

## 2024-01-10 PROCEDURE — 250N000011 HC RX IP 250 OP 636: Performed by: HOSPITALIST

## 2024-01-10 PROCEDURE — 99232 SBSQ HOSP IP/OBS MODERATE 35: CPT | Performed by: HOSPITALIST

## 2024-01-10 PROCEDURE — 250N000011 HC RX IP 250 OP 636: Performed by: COLON & RECTAL SURGERY

## 2024-01-10 PROCEDURE — 250N000013 HC RX MED GY IP 250 OP 250 PS 637: Performed by: HOSPITALIST

## 2024-01-10 PROCEDURE — 83735 ASSAY OF MAGNESIUM: CPT | Performed by: INTERNAL MEDICINE

## 2024-01-10 PROCEDURE — 999N000197 HC STATISTIC WOC PT EDUCATION, 0-15 MIN

## 2024-01-10 PROCEDURE — C9113 INJ PANTOPRAZOLE SODIUM, VIA: HCPCS | Performed by: HOSPITALIST

## 2024-01-10 PROCEDURE — 36415 COLL VENOUS BLD VENIPUNCTURE: CPT | Performed by: COLON & RECTAL SURGERY

## 2024-01-10 PROCEDURE — 85049 AUTOMATED PLATELET COUNT: CPT | Performed by: COLON & RECTAL SURGERY

## 2024-01-10 PROCEDURE — 82565 ASSAY OF CREATININE: CPT | Performed by: INTERNAL MEDICINE

## 2024-01-10 RX ORDER — HYDROMORPHONE HYDROCHLORIDE 1 MG/ML
0.5 INJECTION, SOLUTION INTRAMUSCULAR; INTRAVENOUS; SUBCUTANEOUS 3 TIMES DAILY PRN
Status: DISCONTINUED | OUTPATIENT
Start: 2024-01-10 | End: 2024-01-11

## 2024-01-10 RX ADMIN — BACLOFEN 5 MG: 10 TABLET ORAL at 21:02

## 2024-01-10 RX ADMIN — HYDROMORPHONE HYDROCHLORIDE 4 MG: 1 SOLUTION ORAL at 16:03

## 2024-01-10 RX ADMIN — BACLOFEN 5 MG: 10 TABLET ORAL at 15:51

## 2024-01-10 RX ADMIN — MEMANTINE 10 MG: 10 TABLET ORAL at 09:16

## 2024-01-10 RX ADMIN — BACLOFEN 5 MG: 10 TABLET ORAL at 09:17

## 2024-01-10 RX ADMIN — DEXTROSE AND SODIUM CHLORIDE: 5; 900 INJECTION, SOLUTION INTRAVENOUS at 04:36

## 2024-01-10 RX ADMIN — SERTRALINE HYDROCHLORIDE 100 MG: 50 TABLET ORAL at 09:16

## 2024-01-10 RX ADMIN — MEMANTINE 10 MG: 10 TABLET ORAL at 21:02

## 2024-01-10 RX ADMIN — DIAZEPAM 5 MG: 10 INJECTION, SOLUTION INTRAMUSCULAR; INTRAVENOUS at 01:09

## 2024-01-10 RX ADMIN — BUPRENORPHINE 1 PATCH: 5 PATCH, EXTENDED RELEASE TRANSDERMAL at 15:55

## 2024-01-10 RX ADMIN — HYDROMORPHONE HYDROCHLORIDE 4 MG: 1 SOLUTION ORAL at 21:02

## 2024-01-10 RX ADMIN — HYDROMORPHONE HYDROCHLORIDE 4 MG: 1 SOLUTION ORAL at 06:33

## 2024-01-10 RX ADMIN — DIAZEPAM 5 MG: 10 INJECTION, SOLUTION INTRAMUSCULAR; INTRAVENOUS at 23:05

## 2024-01-10 RX ADMIN — DEXTROSE AND SODIUM CHLORIDE: 5; 900 INJECTION, SOLUTION INTRAVENOUS at 17:46

## 2024-01-10 RX ADMIN — ENOXAPARIN SODIUM 40 MG: 40 INJECTION SUBCUTANEOUS at 17:46

## 2024-01-10 RX ADMIN — PANTOPRAZOLE SODIUM 40 MG: 40 INJECTION, POWDER, FOR SOLUTION INTRAVENOUS at 09:16

## 2024-01-10 RX ADMIN — ONDANSETRON 8 MG: 2 INJECTION INTRAMUSCULAR; INTRAVENOUS at 12:12

## 2024-01-10 ASSESSMENT — ACTIVITIES OF DAILY LIVING (ADL)
ADLS_ACUITY_SCORE: 38
ADLS_ACUITY_SCORE: 36
ADLS_ACUITY_SCORE: 38
ADLS_ACUITY_SCORE: 38
ADLS_ACUITY_SCORE: 34
ADLS_ACUITY_SCORE: 36
ADLS_ACUITY_SCORE: 34
ADLS_ACUITY_SCORE: 36
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
## 2024-01-10 NOTE — PROGRESS NOTES
COLON & RECTAL SURGERY  PROGRESS NOTE    January 10, 2024  Post-op Day # 9    SUBJECTIVE:  Feeling much better today. NGT came out overnight.  No nausea.  Good stoma output.    OBJECTIVE:  Temp:  [98.4  F (36.9  C)-100.1  F (37.8  C)] 98.4  F (36.9  C)  Pulse:  [64-89] 72  Resp:  [16-18] 18  BP: (127-156)/() 127/81  SpO2:  [93 %-95 %] 95 %    Intake/Output Summary (Last 24 hours) at 1/9/2024 0814  Last data filed at 1/9/2024 0619  Gross per 24 hour   Intake 573 ml   Output 3100 ml   Net -2527 ml       GENERAL:  Awake, alert, no acute distress  HEAD: Normocephalic atraumatic  SCLERA: Anicteric  EXTREMITIES: Warm and well perfused  ABDOMEN:  Soft, appropriately tender, non-distended. No guarding, rigidity, or peritoneal signs. Ileostomy with gas and liquid stool in bag.   INCISION:  C/d/i,     LABS:  Lab Results   Component Value Date    WBC 10.4 01/03/2024     Lab Results   Component Value Date    HGB 12.5 01/03/2024     Lab Results   Component Value Date    HCT 37.2 01/03/2024     Lab Results   Component Value Date     01/07/2024     Last Basic Metabolic Panel:  Lab Results   Component Value Date     01/07/2024      Lab Results   Component Value Date    POTASSIUM 4.7 01/08/2024    POTASSIUM 3.8 10/24/2022     Lab Results   Component Value Date    CHLORIDE 104 01/07/2024    CHLORIDE 106 10/24/2022     Lab Results   Component Value Date    ESTEBAN 8.6 01/07/2024     Lab Results   Component Value Date    CO2 29 01/07/2024    CO2 29 10/24/2022     Lab Results   Component Value Date    BUN 11.0 01/07/2024    BUN 11 10/24/2022     Lab Results   Component Value Date    CR 0.52 01/07/2024     Lab Results   Component Value Date    GLC 95 01/09/2024    GLC 96 10/24/2022       ASSESSMENT/PLAN: 48 year old woman POD # 9 s/p ex lap, lysis of adhesions and diverting loop ileostomy. Her postoperative course has been complicated by an ileus, now resolving.    - ok for clears, may try fulls later today  - PRN pain  meds, multimodal pain control  - OOB as able, participate in PT  - WOCN  - SW for dispo planning - planning to move to [place] with parents.    - Lovenox for ppx    Celia Rollins MD, FACS, FASCRS  Colorectal Surgery     [facility]  [address]  [place] [address]  T: [phone]  F: [phone]

## 2024-01-10 NOTE — PLAN OF CARE
Date & Time: 1900-0700  Surgery/POD#: POD 9 from exploratory lap with PAOLA and diverting loop ileostomy  Behavior & Aggression: Green. Can be anxious  Fall Risk: Yes  Orientation: A&Ox4  ABNL VS/O2:VSS on RA   Pain Management: Oral dilaudid solution x2  Bowel/Bladder: Up to bedside commode. Ileostomy in place connecting to a strong bag.   Drains: PIV infusing D5 and NS @ 75ml/hr  Diet:Tolerating clears   Activity Level: Ax1 GB. Pivot to wheelchair and commode   Anticipated  DC Date: Pending   Significant Information: Pt states she has baseline nausea. Did not get worse over night.

## 2024-01-10 NOTE — PROGRESS NOTES
[facility] ACUTE PAIN SERVICE    Daily PAIN Progress Note    Assessment/Plan:  Peggy Jessica is a 48 year old female who was admitted on 1/1/2024.  Pain team was asked to see the patient for postop pain in the setting of chronic pain and chronic opioid use with buprenorphine patch. Admitted for 3-day worsening of abdominal pain.  Abdominal CT was suspicious for cecal volvulus.  Given that the CT scan did not demonstrate obstruction it was elected to perform a GGE.  Findings on GGE were abnormal and included an obstruction of the proximal ascending colon.  There was concern for volvulus versus possible internal hernia.  Given radiologic findings and persistent abdominal pain it was recommended for an exploratory laparotomy with possible right colectomy and possible ileostomy.  Patient is now status post the below procedure on 1/1/2024.  Patient has a past medical history of chronic abdominal pain, recurrent abdominal ileostomy surgeries with chronic constipation and nausea, chronic pain on chronic opioid therapy with buprenorphine patch, arthritis, BRCA positive, breast cancer status post bilateral mastectomy, fibromyalgia, chronic headaches, wheelchair dependency, chronic urinary and fecal incontinence, polypharmacy, multiple drug allergies, chronic inflammatory demyelinating polyneuropathy, complex regional pain syndrome type one of the right lower extremity, ASHKAN III with severe dysplasia.     Post op day: 9 Days Post-Op. s/p ex lap, lysis of adhesions and diverting loop ileostomy.    Allergy list includes reactions to morphine, methadone, aspirin, risperidone, Topamax, Reglan, pregabalin, Compazine, gabapentin, dihydroergotamine, sumatriptan.     PLAN:   1) Pain is consistent with post op pain. Her postoperative course has been complicated by an ileus, now resolving.  Return of bowel function after Gastrografin challenge.  NG tube has now been removed.  Minimize narcotics as able and encourage out of  bed as tolerated.  Will work on transitioning to oral medications and tapering off of IV medications now the NG tube has been removed.Labs and imaging indicated: I have personally reviewed pertinent notes, labs, tests, and radiologic imaging in patient's chart. Treatment plan includes: multimodal pain approach, Hospital Medicine Service for medical management, CRS. Patient educated regarding: multimodal pain approach, medications as listed below. Patient is understanding of the plan. All questions and concerns addressed to patient's satisfaction.   2)Multimodal Medication Therapy  Topical: None  NSAID'S: None  Steroids: None  Muscle Relaxants: Baclofen 5 mg daily as needed and 3 times daily  Adjuvants: Tylenol every 4 hours as needed, medical cannabis, melatonin as needed for sleep  Antidepressants/anxiolytics: Benadryl as needed, Valium 5 mg every 6 hours as needed for anxiety and nausea, sertraline 100 mg daily, Namenda 10 mg twice daily  Opioids: Dilaudid solution 2-4 mg every 4 hours as needed -first-line, Butrans patch.  Would recommend transition to tablets prior to discharge IV Pain medication: Decrease frequency of IV Dilaudid to Dilaudid 0.5 mg TID prn - second line.  Will need to stop IV medications once tolerating orals.  3)Non-medication interventions: Ice, rest  4)Constipation Prophylaxis: Per surgery team    -Opioid prescriber has been [facility] pain clinic, RIO Kline  pulled from system on 1/9/2024. This indicates ongoing fills for buprenorphine patches.  Recently was increased to the 10 mcg patch in November 2023 from the 7.5 mcg patch.  There are short supplies of oxycodone for 30 tablets in October 2023 as well as tramadol 50 mg for 10 tablets in May 2023.   Discharge Recommendations - We recommend prescribing the following at the time of discharge: Patient reports that after hospital discharge she is moving to [place] and going through multiple stressors at home including  "separation from her .  She inquires if she can get a month supply of buprenorphine patches given her move.  She has a call to her pain clinic to discuss and follow up appointment with pain clinic on Monday 1/15/24. Recommend Dilaudid tabs at discharge short supply for postop pain.  She will need to establish with a new pain clinic after she moves.    Subjective:  Describes pain as 5/10 and aching in the right abdomen. She reports dilaudid helpful for pain and is appreciative of pain team involvement.   She reports the NG tube came out after she sneezed and doing well after it came out. She denies increase in baseline nausea. Good stoma output. She denies vomiting, chest pain, shortness of breath, fever, chills.  She follows with [facility] pain clinic for chronic pain management.    Visit/Communication Style   Visit/Communication Style   Virtual (Video) communication was used to evaluate Peggy.  Peggy consented to the use of video communication.  Video START time: 1000, 1/10/2024  Video STOP time: 1035, 1/10/2024   Patient's location: [facility] GENERAL SURGERY  Provider's location during the visit: Home              Principal Problem:    Right upper quadrant abdominal pain  Active Problems:    Large bowel obstruction (H)      Objective:  Vital signs in last 24 hours:  /81   Pulse 72   Temp 98.4  F (36.9  C) (Oral)   Resp 18   Ht 1.626 m (5' 4\")   Wt 81 kg (178 lb 9.2 oz)   SpO2 95%   BMI 30.65 kg/m    Weight:     Vitals:    01/01/24 0932 01/01/24 1937 01/07/24 0500   Weight: 72.6 kg (160 lb) 76 kg (167 lb 8.8 oz) 81 kg (178 lb 9.2 oz)      Weight change:   Body mass index is 30.65 kg/m .    Intake/Output last 3 shifts:  I/O last 3 completed shifts:  In: 200 [P.O.:200]  Out: 1750 [Urine:250; Stool:1500]  Intake/Output this shift:  No intake/output data recorded.    Review of Systems:   As per subjective, all others negative.    Physical Exam:  General Appearance:  Alert, " cooperative, no distress, pleasant, sitting up in bed   Head:  Normocephalic, without obvious abnormality, atraumatic   Eyes:  PERRL, conjunctiva/corneas clear, EOM's intact   Nose: Nares normal, septum midline   Throat: Lips, mucosa, and tongue normal; teeth and gums normal   Neck: Supple, symmetrical, trachea midline   Lungs:   Room air, respirations unlabored   Skin: Skin warm, dry   Neurologic: Alert and oriented X 3, Moves all 4 extremities     Imaging: Reviewed I have personally reviewed pertinent labs, tests, and radiologic imaging in patient's chart.      Labs: Reviewed I have personally reviewed pertinent labs, tests, and radiologic imaging in patient's chart.      Total time spent 35 minutes with greater than 50% in consultation, education and coordination of care.   Elements of Medical Decision Making as described above. Acute or chronic illness or injury or surgery. High risk therapy including opioids, high risk drug therapy including oral and/or parenteral controlled substances.       BINA Rodriguez-HALLE  Acute Care Pain Management Program [facility]   Monday-Friday 8a-4p   Page via online paging system or [employer]

## 2024-01-10 NOTE — PROGRESS NOTES
"[facility] Nurse Inpatient Assessment     Consulted for:  New loop ileostomy    Summary:  New loop ileostomy, now functioning with high liquid output.  Pt has complex social situation, see Social Work notes.      1/10: Stopped into patient's room. She is sleeping soundly. Spoke with bedside RN who reports her pouch is intact. Left printed info and pamphlet about ostomy accessories at bedside. [facility] nurse will see patient tomorrow.    Patient History (according to provider note(s):      48 F hx of cervical cancer s/p radiation with long standing constipation and mobile cecum. No volvulus noted at the time of OR.      S/p ex lap and loop ileostomy for diversion.     Areas Assessed:      Areas visualized during today's visit: Abdomen    Assessment of new loop Ileostomy:  Diagnosis Pertinent to Stoma:  transit issues       Surgery Date: 1-1-24  Surgeon: Dr. [facility]: [facility]    Pouching system in place on assessment today: Lucio 2pc 57 soft convex with ring and high output pouch, now attached to strong bag due to high watery output  Pouch barrier status: intact    Pouch last changed/wear time: post-op 1/1, 1/4, 1/8  Reason for pouch change today: pouch not changed today  Effectiveness of current pouching/ supply plan: Recommend continuing current plan  Change made with ostomy management today: No  Pouching system placed/ in place today: Lucio 2pc 57mm soft convex with ring and high-output pouch, attached to strong  Supplies: ordered from [facility] and at bedside    Last photo: 1-8-24      Stoma location: RLQ  Stoma size: approx 1 1/4\" loop stoma  Stoma appearance: viable, pink, and round, lower loop minimally protruding  Mucocutaneous junction:  intact  Peristomal complication(s): small divots/creases in skin along inferior aspect  Output: thin, watery and brown  Output volume emptied during visit: n/a - emptying into strong    Abdominal assessment: Soft  Surgical " site(s):  glued incision clear and intact  NG still in place? No  Pain: tender, aching  Is patient still on a PCA? No    Ostomy education assessment:  Participant of teaching session today: patient  and mother  Education completed today: Pouching system assessment , Fluid and electrolyte balance , Importance of hydration, Low fiber diet , Odor/flatus management , Lifestyle adjustments , and Discharge instructions  Educational materials/methods: Verbal,  hand out, FOD [place] education hand out, and Left packet of information at bedside   Education still needed: Pouch change return demonstration, Pouch emptying demonstration, Pouch emptying return demonstration, Intake and output recording, Fluid and electrolyte balance , When to seek medical attention, Infection prevention/hygiene , Hernia prevention, and Discharge instructions    Learning Comprehension:   Psychosocial assessment: pt states she has several friends with similar digestive issues that also have ostomies, and who may be part of her support system.  Pt and mother engaged in education; pt notes she may need assist with cares due to dexterity and mobility issues.   no longer involved in pt cares, pt has requested he not contact or visit her.  See social work notes.   Patient readiness for education today: observing and attentive  Following today's visit: patient  and mother  is able to demonstrate;         1. How to empty their pouch? Demo provided         2. How to change their pouch? Demo provided         3. How to read and record intake and output correctly? Demo provided     Preparation for discharge completed: started gathering supplies and updating AVS  Preparation for discharge still needed: Placed prescription recommendations in discharge navigator for MD to sign, Ensured patient has extra supplies for discharge, Discussed how to order supplies after discharge , Ordered samples from  after gaining consent from  "patient/caregiver, Discussed how and when to make an outpatient WOC nurse appointment after discharge, Prepared for discharge home with home care, and Discuss signs/symptoms of when to seek medical attention  Pt support system on discharge: patient will travel to [place] to stay with mother after discharge, patient's daughter will fly with her. Possibly son and some friends as well  WO recommend home care? Yes       Treatment Plan:     RLQ Ileostomy pouching plan:   Pouching system: ostomy supplies pouches: Lucio 57 FECAL ([id number]) or high output pouch [id number]; ostomy supplies barrier: Lucio 57mm SOFT CONVEX ([id number])  Accessories used: [facility] ostomy accessories: 2\" Cera Barrier Ring ([id number])   Frequency of pouch changes: PRN leakage and Three times a week  WOC follow up plan: Daily Monday-Friday (as able) and As needed   Bedside RN interventions: Change pouch PRN if leaking using the supplies above, Empty pouch when 1/3 to 1/2 full, ensure to clean pouch outlet after emptying to prevent odor, Notify WOC for ongoing pouch leakage, Document stoma appearance and output volume, color, and consistency every shift, Encourage patient to empty pouch with assist, and Assist patient to measure and record output    Orders: Reviewed    RECOMMEND PRIMARY TEAM ORDER: None, at this time  Education provided: plan of care  Discussed plan of care with: Patient, Family, and Nurse  WOC nurse follow-up plan:  daily Mon - Fri as able  Notify WOC if wound(s) deteriorate.  Nursing to notify the Provider(s) and re-consult the WOC Nurse if new skin concern.    DATA:     Current support surface: Standard  Standard gel/foam mattress (IsoFlex, Atmos air, etc)  Containment of urine/stool: Indwelling catheter and Ileostomy pouch  BMI: Body mass index is 30.65 kg/m .   Active diet order: Orders Placed This Encounter      Clear Liquid Diet     Output: I/O last 3 completed shifts:  In: 200 [P.O.:200]  Out: 1750 [Urine:250; Stool:1500]     Labs:   No " "lab results found in last 7 days.    Invalid input(s): \"GLUCOMBO\"    Pressure injury risk assessment:   Sensory Perception: 3-->slightly limited  Moisture: 3-->occasionally moist  Activity: 2-->chairfast  Mobility: 3-->slightly limited  Nutrition: 2-->probably inadequate  Friction and Shear: 3-->no apparent problem  Gabriele Score: 16    Nita Bianchi RN, CWOCN  Please contact via [employer] at name or group \"WO nurse\"- M-F 8A-4P  Leave  @ [phone] for non-urgent needs. Checked occasionally M-F.       "

## 2024-01-11 ENCOUNTER — APPOINTMENT (OUTPATIENT)
Dept: PHYSICAL THERAPY | Facility: CLINIC | Age: 49
DRG: 329 | End: 2024-01-11
Payer: COMMERCIAL

## 2024-01-11 LAB
GLUCOSE BLDC GLUCOMTR-MCNC: 102 MG/DL (ref 70–99)
GLUCOSE BLDC GLUCOMTR-MCNC: 85 MG/DL (ref 70–99)
POTASSIUM SERPL-SCNC: 3.9 MMOL/L (ref 3.4–5.3)

## 2024-01-11 PROCEDURE — 250N000011 HC RX IP 250 OP 636: Performed by: NURSE PRACTITIONER

## 2024-01-11 PROCEDURE — C9113 INJ PANTOPRAZOLE SODIUM, VIA: HCPCS | Performed by: HOSPITALIST

## 2024-01-11 PROCEDURE — 120N000001 HC R&B MED SURG/OB

## 2024-01-11 PROCEDURE — 36415 COLL VENOUS BLD VENIPUNCTURE: CPT | Performed by: HOSPITALIST

## 2024-01-11 PROCEDURE — 99233 SBSQ HOSP IP/OBS HIGH 50: CPT | Performed by: NURSE PRACTITIONER

## 2024-01-11 PROCEDURE — 99232 SBSQ HOSP IP/OBS MODERATE 35: CPT | Performed by: HOSPITALIST

## 2024-01-11 PROCEDURE — 250N000013 HC RX MED GY IP 250 OP 250 PS 637: Performed by: HOSPITALIST

## 2024-01-11 PROCEDURE — 250N000011 HC RX IP 250 OP 636: Performed by: HOSPITALIST

## 2024-01-11 PROCEDURE — 250N000011 HC RX IP 250 OP 636: Performed by: COLON & RECTAL SURGERY

## 2024-01-11 PROCEDURE — 258N000003 HC RX IP 258 OP 636: Performed by: INTERNAL MEDICINE

## 2024-01-11 PROCEDURE — G0463 HOSPITAL OUTPT CLINIC VISIT: HCPCS

## 2024-01-11 PROCEDURE — 97530 THERAPEUTIC ACTIVITIES: CPT | Mod: GP | Performed by: PHYSICAL THERAPIST

## 2024-01-11 PROCEDURE — 84132 ASSAY OF SERUM POTASSIUM: CPT | Performed by: HOSPITALIST

## 2024-01-11 PROCEDURE — 250N000013 HC RX MED GY IP 250 OP 250 PS 637: Performed by: NURSE PRACTITIONER

## 2024-01-11 PROCEDURE — 250N000013 HC RX MED GY IP 250 OP 250 PS 637: Performed by: COLON & RECTAL SURGERY

## 2024-01-11 RX ORDER — HYDROMORPHONE HYDROCHLORIDE 2 MG/1
2 TABLET ORAL EVERY 4 HOURS PRN
Status: DISCONTINUED | OUTPATIENT
Start: 2024-01-11 | End: 2024-01-16 | Stop reason: HOSPADM

## 2024-01-11 RX ORDER — POTASSIUM CHLORIDE 1.5 G/1.58G
40 POWDER, FOR SOLUTION ORAL ONCE
Status: COMPLETED | OUTPATIENT
Start: 2024-01-11 | End: 2024-01-11

## 2024-01-11 RX ORDER — HYDROMORPHONE HYDROCHLORIDE 1 MG/ML
0.3 INJECTION, SOLUTION INTRAMUSCULAR; INTRAVENOUS; SUBCUTANEOUS 3 TIMES DAILY PRN
Status: DISCONTINUED | OUTPATIENT
Start: 2024-01-11 | End: 2024-01-12

## 2024-01-11 RX ORDER — HYDROMORPHONE HYDROCHLORIDE 2 MG/1
4 TABLET ORAL EVERY 4 HOURS PRN
Status: DISCONTINUED | OUTPATIENT
Start: 2024-01-11 | End: 2024-01-16 | Stop reason: HOSPADM

## 2024-01-11 RX ADMIN — POTASSIUM CHLORIDE 40 MEQ: 1.5 POWDER, FOR SOLUTION ORAL at 03:47

## 2024-01-11 RX ADMIN — BACLOFEN 5 MG: 10 TABLET ORAL at 22:00

## 2024-01-11 RX ADMIN — DIAZEPAM 5 MG: 10 INJECTION, SOLUTION INTRAMUSCULAR; INTRAVENOUS at 22:00

## 2024-01-11 RX ADMIN — ACETAMINOPHEN 650 MG: 325 TABLET, FILM COATED ORAL at 20:32

## 2024-01-11 RX ADMIN — ONDANSETRON 8 MG: 2 INJECTION INTRAMUSCULAR; INTRAVENOUS at 01:29

## 2024-01-11 RX ADMIN — HYDROMORPHONE HYDROCHLORIDE 4 MG: 2 TABLET ORAL at 11:11

## 2024-01-11 RX ADMIN — DEXTROSE AND SODIUM CHLORIDE: 5; 900 INJECTION, SOLUTION INTRAVENOUS at 08:15

## 2024-01-11 RX ADMIN — HYDROMORPHONE HYDROCHLORIDE 0.5 MG: 1 INJECTION, SOLUTION INTRAMUSCULAR; INTRAVENOUS; SUBCUTANEOUS at 08:50

## 2024-01-11 RX ADMIN — HYDROMORPHONE HYDROCHLORIDE 4 MG: 2 TABLET ORAL at 15:42

## 2024-01-11 RX ADMIN — HYDROMORPHONE HYDROCHLORIDE 4 MG: 2 TABLET ORAL at 20:32

## 2024-01-11 RX ADMIN — ENOXAPARIN SODIUM 40 MG: 40 INJECTION SUBCUTANEOUS at 20:33

## 2024-01-11 RX ADMIN — PANTOPRAZOLE SODIUM 40 MG: 40 INJECTION, POWDER, FOR SOLUTION INTRAVENOUS at 08:04

## 2024-01-11 RX ADMIN — SERTRALINE HYDROCHLORIDE 100 MG: 50 TABLET ORAL at 08:05

## 2024-01-11 RX ADMIN — ACETAMINOPHEN 650 MG: 325 TABLET, FILM COATED ORAL at 05:38

## 2024-01-11 RX ADMIN — BACLOFEN 5 MG: 10 TABLET ORAL at 15:41

## 2024-01-11 RX ADMIN — HYDROMORPHONE HYDROCHLORIDE 0.5 MG: 1 INJECTION, SOLUTION INTRAMUSCULAR; INTRAVENOUS; SUBCUTANEOUS at 01:36

## 2024-01-11 RX ADMIN — MEMANTINE 10 MG: 10 TABLET ORAL at 08:05

## 2024-01-11 RX ADMIN — MEMANTINE 10 MG: 10 TABLET ORAL at 20:32

## 2024-01-11 RX ADMIN — BACLOFEN 5 MG: 10 TABLET ORAL at 08:05

## 2024-01-11 RX ADMIN — ONDANSETRON 8 MG: 2 INJECTION INTRAMUSCULAR; INTRAVENOUS at 08:05

## 2024-01-11 ASSESSMENT — ACTIVITIES OF DAILY LIVING (ADL)
ADLS_ACUITY_SCORE: 38
ADLS_ACUITY_SCORE: 36
ADLS_ACUITY_SCORE: 36
ADLS_ACUITY_SCORE: 39
ADLS_ACUITY_SCORE: 36
ADLS_ACUITY_SCORE: 39
ADLS_ACUITY_SCORE: 36
ADLS_ACUITY_SCORE: 36
ADLS_ACUITY_SCORE: 38
ADLS_ACUITY_SCORE: 39

## 2024-01-11 NOTE — PROGRESS NOTES
Care Management Follow Up    Length of Stay (days): 10    Expected Discharge Date: 01/12/2024     Concerns to be Addressed: basic needs, discharge planning, home safety     Patient plan of care discussed at interdisciplinary rounds: Yes    Anticipated Discharge Disposition:       Anticipated Discharge Services:    Anticipated Discharge DME:      Patient/family educated on Medicare website which has current facility and service quality ratings:    Education Provided on the Discharge Plan:    Patient/Family in Agreement with the Plan:      Referrals Placed by CM/SW:    Private pay costs discussed: Not applicable    Additional Information:  Writer follow up done with patient and daughter in room. Discussed discharge plans and therapy recommendations. Per patient plan for home to her parent's home in [place], [place]. Per patient does not need resources from writer for [facility] or agencies. Will continue to follow along with social work to help coordinate transition to [place] per patient request.       Jasmyne Calzada RN   [facility]   Phone [phone] or [phone] or Soila

## 2024-01-11 NOTE — PROGRESS NOTES
COLON & RECTAL SURGERY  PROGRESS NOTE    January 11, 2024  Post-op Day # 10    SUBJECTIVE:  Stable to slightly better. Tolerating clears, would like to try fulls.     OBJECTIVE:  Temp:  [98.4  F (36.9  C)-98.7  F (37.1  C)] 98.7  F (37.1  C)  Pulse:  [65-76] 69  Resp:  [15-17] 16  BP: (118-153)/(75-99) 118/75  SpO2:  [93 %-97 %] 97 %      Intake/Output Summary (Last 24 hours) at 1/11/2024 0856  Last data filed at 1/11/2024 0530  Gross per 24 hour   Intake 200 ml   Output 2300 ml   Net -2100 ml     Stoma: > 2L past 24h      GENERAL:  Awake, alert, no acute distress  HEAD: Normocephalic atraumatic  SCLERA: Anicteric  EXTREMITIES: Warm and well perfused  ABDOMEN:  Soft, appropriately tender, non-distended. No guarding, rigidity, or peritoneal signs. Ileostomy with gas and liquid stool in bag.   INCISION:  C/d/i,     LABS:  Lab Results   Component Value Date    WBC 10.4 01/03/2024     Lab Results   Component Value Date    HGB 12.5 01/03/2024     Lab Results   Component Value Date    HCT 37.2 01/03/2024     Lab Results   Component Value Date     01/07/2024     Last Basic Metabolic Panel:  Lab Results   Component Value Date     01/07/2024      Lab Results   Component Value Date    POTASSIUM 4.7 01/08/2024    POTASSIUM 3.8 10/24/2022     Lab Results   Component Value Date    CHLORIDE 104 01/07/2024    CHLORIDE 106 10/24/2022     Lab Results   Component Value Date    ESTEBAN 8.6 01/07/2024     Lab Results   Component Value Date    CO2 29 01/07/2024    CO2 29 10/24/2022     Lab Results   Component Value Date    BUN 11.0 01/07/2024    BUN 11 10/24/2022     Lab Results   Component Value Date    CR 0.52 01/07/2024     Lab Results   Component Value Date    GLC 95 01/09/2024    GLC 96 10/24/2022       ASSESSMENT/PLAN: 48 year old woman POD # 10 s/p ex lap, lysis of adhesions and diverting loop ileostomy. Her postoperative course has been complicated by an ileus, now resolving.    - ok for fulls  - PRN pain meds,  multimodal pain control  - OOB as able, participate in PT  - WOCN  - SW for dispo planning - planning to move to [place] with parents.    - Lovenox for ppx    Celia Rollins MD, FACS, FASCRS  Colorectal Surgery     [facility]  [address]  [place] [address]  T: [phone]  F: [phone]

## 2024-01-11 NOTE — PROGRESS NOTES
[facility] ACUTE PAIN SERVICE    [facility]   Daily PAIN Progress Note        AMCOM Paging/Directory Pain  Securely message with the [employer] Web Console (learn more here)  (When I saw the patient): 01/11/24  Assessment/Plan:  Peggy Jessica is a 48 year old female who was admitted on 1/1/2024.  Pain team was asked to see the patient for postop pain in the setting of chronic pain and chronic opioid use with buprenorphine patch. Admitted for 3-day worsening of abdominal pain.  Abdominal CT was suspicious for cecal volvulus.  Given that the CT scan did not demonstrate obstruction it was elected to perform a GGE.  Findings on GGE were abnormal and included an obstruction of the proximal ascending colon.  There was concern for volvulus versus possible internal hernia.  Given radiologic findings and persistent abdominal pain it was recommended for an exploratory laparotomy with possible right colectomy and possible ileostomy.  Patient is now status post the below procedure on 1/1/2024.  Patient has a past medical history of chronic abdominal pain, recurrent abdominal ileostomy surgeries with chronic constipation and nausea, chronic pain on chronic opioid therapy with buprenorphine patch, arthritis, BRCA positive, breast cancer status post bilateral mastectomy, fibromyalgia, chronic headaches, wheelchair dependency, chronic urinary and fecal incontinence, polypharmacy, multiple drug allergies, chronic inflammatory demyelinating polyneuropathy, complex regional pain syndrome type one of the right lower extremity, ASHKAN III with severe dysplasia.     Chronic opioid use Butrans patch 10 mcg= 18 mg MME  Opioid use past 24 hours in the form of  Butrans patch 5 mcg, Hydromorphone 0.5 mg, Hydromorphone 8 mg = 51 mg MME    Post op day: 10 Days Post-Op. s/p ex lap, lysis of adhesions and diverting loop ileostomy. Tolerating full liquids nausea improved      Allergy list includes reactions to morphine, methadone,  "aspirin, risperidone, Topamax, Reglan, pregabalin, Compazine, gabapentin, dihydroergotamine, sumatriptan.     Discussed at length opoid medications and if she is ready to increase Butrans patch to next higher dose at 7.5 mcg, tapering IV and cross over to dilaudid tablet(s). Reviewed risk and benefits   (Dilaudid may not be as effective with higher dose of Butrans.  Increasing to PTA dose is of benefit for chronic pain.   She wanted to think about it and now with reduced social stress is hopeful less Butrans would be effective.   For now plan to cross over to tablet(s) and reduce IV from 0.5 to 0.3 mg   We talked too about her move to [facility]   Establishing with new pain clinic and risk of running out pain medication before she can see a provider there. Encouraged her to partner with her current pain provider for a plan moving forward. Pain clinics in the area she is moving to entered on AVS.  She understands we have not\"vetted these clinic and should plan to see which one may meet her needs best   Also advise as medical cannabis is effective for her to seek similar type in [place].    PLAN:   1) Pain is consistent with post op pain. Her postoperative course has been complicated by an ileus, now resolving.  Return of bowel function after Gastrografin challenge.  NG tube has now been removed.  Minimize narcotics as able and encourage out of bed as tolerated.  Will work on transitioning to oral medications and tapering off of IV medications now the NG tube has been removed.Labs and imaging indicated: I have personally reviewed pertinent notes, labs, tests, and radiologic imaging in patient's chart. Treatment plan includes: multimodal pain approach, Hospital Medicine Service for medical management, CRS. Patient educated regarding: multimodal pain approach, medications as listed below. Patient is understanding of the plan. All questions and concerns addressed to patient's satisfaction.   2)Multimodal Medication " Therapy  Topical: None  NSAID'S: None  Steroids: None  Muscle Relaxants: Baclofen 5 mg daily as needed and 3 times daily  Adjuvants: Tylenol every 4 hours as needed, medical cannabis, melatonin as needed for sleep  Antidepressants/anxiolytics: Benadryl as needed, Valium 5 mg every 6 hours as needed for anxiety and nausea, sertraline 100 mg daily, Namenda 10 mg twice daily  Opioids: Dilaudid solution 2-4 mg every 4 hours as needed -first-line changed to tablet(s), Butrans patch 5 mcg.    IV Pain medication: change Dilaudid 0.5 mg TID prn to 0.3 mg tid prn - second line. Plan to stop 1/12  3)Non-medication interventions: Ice, rest  4)Constipation Prophylaxis: Per surgery team     -MN  pulled from system on 1/9/2024. This indicates ongoing fills for buprenorphine patches.  Recently was increased to the 10 mcg patch in November 2023 from the 7.5 mcg patch.  There are short supplies of oxycodone for 30 tablets in October 2023 as well as tramadol 50 mg for 10 tablets in May 2023.   Opioid prescriber has been [facility] pain clinic, Marco Antonio Carmen PA-C     Discharge Recommendations - We recommend prescribing the following at the time of discharge: Patient reports that after hospital discharge she is moving to [place] and going through multiple stressors at home including separation from her .    Recommend a month supply of buprenorphine patches given her move.    She has a call to her pain clinic to discuss and follow up appointment with pain clinic on Monday 1/15/24.   Dilaudid tabs at discharge short supply for postop pain.  She will need to establish with a new pain clinic after she moves.     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)    Complex regional pain syndrome i of right lower limb    Fibromyalgia syndrome    Diverticulitis    Chronic daily headache    Celiac disease    BRCA gene mutation positive in female    Autoimmune disorder (H24)    CIDP (chronic inflammatory demyelinating polyneuropathy) (H)    Physical deconditioning    Numbness and tingling of both legs    Bilateral leg weakness    Multiple falls    Gastroparesis    Abdominal pain    Nausea and vomiting    Falls frequently    Urinary incontinence, unspecified type    Incontinence of feces, unspecified fecal incontinence type    Unable to manage stairs without assistance    Right upper quadrant abdominal pain    Large bowel obstruction (H)        History   Drug Use    Types: Marijuana     Comment: Medical [facility] patient         Tobacco Use      Smoking status: Former        Types: Cigarettes      Smokeless tobacco: Never         baclofen  5 mg Oral TID    buprenorphine  1 patch Transdermal Weekly    And    buprenorphine   Transdermal Q8H SHERMAN    enoxaparin ANTICOAGULANT  40 mg Subcutaneous Q24H    medical cannabis  1 Dose Other See Admin Instructions    memantine  10 mg Oral BID    pantoprazole  40 mg Intravenous Daily with breakfast    scopolamine  1 patch Transdermal Q72H    And    scopolamine   Transdermal Q8H    sertraline  100 mg Oral Daily    sodium chloride (PF)  3 mL Intracatheter Q8H       Objective:  Vital signs in last 24 hours:  B/P: 118/75, T: 98.7, P: 69, R: 16   Blood pressure 118/75, pulse 69, temperature 98.7  F (37.1  C), temperature source Oral, resp. rate 16, height 1.626 m (5' 4\"), weight 81 kg (178 lb 9.2 oz), SpO2 97%, not currently breastfeeding.      Vitals:    01/01/24 0932 01/01/24 1937 01/07/24 0500   Weight: 72.6 kg (160 lb) 76 kg (167 lb 8.8 oz) 81 kg (178 lb 9.2 oz)           Intake/Output:    Intake/Output Summary (Last 24 hours) at 1/11/2024 0849  Last data filed at 1/11/2024 " 0530  Gross per 24 hour   Intake 200 ml   Output 2300 ml   Net -2100 ml        Review of Systems:   As per subjective, all others negative.    Physical Exam:     General Appearance:  Alert, cooperative, no distress. Patient is pleasant and cheeful.   Head:  Normocephalic, without obvious abnormality, atraumatic   Eyes:   Conjunctiva/corneas clear, EOM's intact   ENT/Throat: Lips, mouth moist    Lymph/Neck: Symmetrical, trachea midline, no adenopathy, thyroid: not enlarged, symmetric    Lungs:   Clear to auscultation bilaterally, respirations unlabored, even chest rise. Symmetrical movement    Chest Wall:  No tenderness or deformity   Cardiovascular/Heart:  Regular rate and rhythm, S1, S2 normal,no murmur, rub or gallop.     Abdomen:   Soft, non-tender, bowel sounds active all four quadrants,  no masses, no organomegaly. Ileotomy bag present.   Musculoskeletal: Extremities normal, atraumatic  Incision CD&L   Skin: Skin color good, lesions  none     Neurologic: Alert and oriented X 3, Moves all 4 extremities        Psych: Affect is  good  Grooming is  good            Imaging:  Personally Reviewed.    Results for orders placed or performed during the hospital encounter of 01/01/24   CT Abdomen Pelvis w Contrast    Impression    IMPRESSION:   1.  Mobile cecum which is now located in the mid abdomen and previously was in the right lower quadrant, with a swirled appearance of the junction of the cecum and ascending colon. No upstream small bowel distention or evidence for cecal volvulus. The   mobile cecum may account for intermittent abdominal pain. No acute inflammatory findings in the abdomen and pelvis.   XR Colon Water Soluble    Impression    IMPRESSION:  1.  Abrupt cut-off of the ascending colon in the right lower quadrant with no contrast flowing into the cecum for at least 12 minutes. After maneuvers and manual compression, a thin trickle of contrast is seen flowing from the ascending colon into the   cecum which  crosses midline and into the left lower quadrant. No definite contrast is seen flowing into the terminal ilium. This is concerning for an internal hernia, although differential would also include a cecal volvulus.     Findings were discussed with the ED attending, the Dr. Stacy Rojas and Dr. Sarah Rollins.    XR Abdomen 1 View    Impression    IMPRESSION: Gastric drainage tube tip and side-port projected over the  stomach.     New multiple dilated loops of small bowel (up to 3.7 cm) may reflect  ileus in the recent postoperative setting, although obstruction is  possible. Consider serial follow-up. Pneumoperitoneum, likely from  recent surgery. Residual contrast throughout the colon. Lung bases are  unremarkable.    MARY DALEY MD         SYSTEM ID:  [id number]   XR Gastrografin  Challenge    Impression    IMPRESSION:    Gastric decompressive tube tip and side-port are within the stomach.    10 hours after administration of 120 mL of Gastrografin, the enteric contrast is seen within prominent loops of small bowel throughout the abdomen, including a a few in the right lower quadrant overlying the ostomy. As before, this may represent evolving   ileus and correlation with ostomy output is recommended.    Barium contrast is again noted throughout the colon from prior enema.   XR Abdomen 1 View    Impression    IMPRESSION: Oral contrast material is in the colon. Bowel gas pattern  nonobstructed.    VANDANA BRANHAM MD         SYSTEM ID:  [id number]          Lab Results:  Personally Reviewed.   Last Comprehensive Metabolic Panel:  Sodium   Date Value Ref Range Status   01/07/2024 139 135 - 145 mmol/L Final     Comment:     Reference intervals for this test were updated on 09/26/2023 to more accurately reflect our healthy population. There may be differences in the flagging of prior results with similar values performed with this method. Interpretation of those prior results can be made in the context of the  updated reference intervals.      Potassium   Date Value Ref Range Status   01/10/2024 3.1 (L) 3.4 - 5.3 mmol/L Final   10/24/2022 3.8 3.4 - 5.3 mmol/L Final     Chloride   Date Value Ref Range Status   01/07/2024 104 98 - 107 mmol/L Final   10/24/2022 106 94 - 109 mmol/L Final     Carbon Dioxide (CO2)   Date Value Ref Range Status   01/07/2024 29 22 - 29 mmol/L Final   10/24/2022 29 20 - 32 mmol/L Final     Anion Gap   Date Value Ref Range Status   01/07/2024 6 (L) 7 - 15 mmol/L Final   10/24/2022 6 3 - 14 mmol/L Final     Glucose   Date Value Ref Range Status   10/24/2022 96 70 - 99 mg/dL Final     GLUCOSE BY METER POCT   Date Value Ref Range Status   01/11/2024 102 (H) 70 - 99 mg/dL Final     Urea Nitrogen   Date Value Ref Range Status   01/07/2024 11.0 6.0 - 20.0 mg/dL Final   10/24/2022 11 7 - 30 mg/dL Final     Creatinine   Date Value Ref Range Status   01/10/2024 0.62 0.51 - 0.95 mg/dL Final     GFR Estimate   Date Value Ref Range Status   01/10/2024 >90 >60 mL/min/1.73m2 Final     Calcium   Date Value Ref Range Status   01/07/2024 8.6 8.6 - 10.0 mg/dL Final        UA:   Amphetamines Urine   Date Value Ref Range Status   03/30/2022 Screen Negative Screen Negative Final     Comment:     Cutoff for a negative amphetamine is 500 ng/mL or less.     Barbiturates Urine   Date Value Ref Range Status   03/30/2022 Screen Negative Screen Negative Final     Comment:     Cutoff for a negative barbiturate is 200 ng/mL or less.     Cannabinoids Urine   Date Value Ref Range Status   03/30/2022 Screen Positive (A) Screen Negative Final     Comment:     Cutoff for a positive cannabinoid is greater than 50 ng/mL.   This is an unconfirmed screening result to be used for medical purposes only.     Cocaine Urine   Date Value Ref Range Status   03/30/2022 Screen Negative Screen Negative Final     Comment:     Cutoff for a negative cocaine is 300 ng/mL or less.     Opiates Urine   Date Value Ref Range Status   03/30/2022 Screen  Negative Screen Negative Final     Comment:     Cutoff for a negative opiate is 300 ng/mL or less.     PCP Urine   Date Value Ref Range Status   03/30/2022 Screen Negative Screen Negative Final     Comment:     Cutoff for a negative PCP is 25 ng/mL or less.              Please see A&P for additional details of medical decision making.  MANAGEMENT DISCUSSED with the following over the past 24 hours: Calixto Kimball RN, Tyrell Dennis MD     NOTE(S)/MEDICAL RECORDS REVIEWED over the past 24 hours: yes   Tests REVIEWED in the past 24 hours:  - See lab/imaging results included in the data section of the note  - BMP  - CBC  Medical complexity over the past 24 hours:  - Prescription DRUG MANAGEMENT performed  - see  discussion and plan   50 MINUTES SPENT BY ME on the date of service doing chart review, history, exam, documentation & further activities per the note.      Smita Flower, APRN CNP, PGMT-BC, ACHPN  [facility] Monday-Friday 8:00-4:30   No weekend coverage contact house officer  AMCOM Paging/Directory Pain  Securely message with the [employer] Web Console (learn more here)

## 2024-01-11 NOTE — PLAN OF CARE
Date & Time: 1900-0700  Surgery/POD#: POD 10 from exploratory lap with PAOLA and diverting loop ileostomy  Behavior & Aggression: Green  Fall Risk: Yes  Orientation: A&Ox4  ABNL VS/O2:VSS on RA  ABNL Labs: K+ protocol. Replaced over night  Pain Management Oral dilaudid x1, PIV dilaudid x1, tylenol  Bowel/Bladder: Voiding to bedside commode. Ileostomy in place connected to strong bag   Drains: PIV infusing  Diet:Tolerated clears overnight. Advanced to fulls this AM  Activity Level: Ax1 pivot. Wheelchair baseline  Anticipated  DC Date: Pending  Significant Information: Nauseous intermittently overnight. Zofran and peppermint oil given with relief.
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
## 2024-01-11 NOTE — PROGRESS NOTES
"[facility] Nurse Inpatient Assessment     Consulted for:  New loop ileostomy    Summary:  New loop ileostomy, now functioning with high liquid output.  Pt has complex social situation, see Social Work notes.      1/11: Pouch change completed with pt starting to participate; no other family present during [facility] visit today, mother is back in [place].  Pt reports improvement physically and emotionally.  Continues to have high watery output.  NG remains out and pt tolerating full liquids.      Patient History (according to provider note(s):      48 F hx of cervical cancer s/p radiation with long standing constipation and mobile cecum. No volvulus noted at the time of OR.      S/p ex lap and loop ileostomy for diversion.     Areas Assessed:      Areas visualized during today's visit: Abdomen    Assessment of new loop Ileostomy:  Diagnosis Pertinent to Stoma:  transit issues       Surgery Date: 1-1-24  Surgeon: Dr. Smith [facility]: [facility]    Pouching system in place on assessment today: [place] 2pc 57 soft convex with ring and high output pouch, now attached to strong bag due to high watery output  Pouch barrier status: intact    Pouch last changed/wear time: post-op 1/1, 1/4, 1/8, 1/11  Reason for pouch change today: ostomy education and routine schedule  Effectiveness of current pouching/ supply plan: Recommend continuing current plan  Change made with ostomy management today: No  Pouching system placed/ in place today: Lucio 2pc 57mm soft convex with ring and high-output pouch  Supplies: at bedside    Last photo: 1-11-24 pt semi-reclining in bed      Stoma location: RLQ  Stoma size: approx 1 1/4\" loop stoma  Stoma appearance: viable, pink, and mostly round, lower loop minimally protruding  Mucocutaneous junction:  intact  Peristomal complication(s): small divots/creases in skin along inferior aspect  Output: thin, watery and brown  Output volume emptied during visit: " n/a - emptying into strong    Abdominal assessment: Soft  Surgical site(s):  glued incision clear and intact  NG still in place? No  Pain: tender, aching  Is patient still on a PCA? No    Ostomy education assessment:  Participant of teaching session today: patient   Education completed today: Pouching system assessment , Pouch change return demonstration, Ostomy accessory product use , Peristomal skin care, Fluid and electrolyte balance , Odor/flatus management , Lifestyle adjustments , and Discharge instructions  Educational materials/methods: Verbal,  hand out, FOSANJEEV [place] education hand out, and Left packet of information at bedside   Education still needed: Pouch change return demonstration, Pouch emptying demonstration, Pouch emptying return demonstration, Intake and output recording, Fluid and electrolyte balance , When to seek medical attention, Infection prevention/hygiene , Hernia prevention, and Discharge instructions    Learning Comprehension:   Psychosocial assessment: pt states she has several friends with similar digestive issues that also have ostomies, and who may be part of her support system.  Pt and mother engaged in education; pt notes she may need assist with cares due to dexterity and mobility issues.   no longer involved in pt cares, pt has requested he not contact or visit her.  See social work notes.   Patient readiness for education today: observing and attentive  Following today's visit: patient  is able to demonstrate;         1. How to empty their pouch? Demo provided  and with minimal assist        2. How to change their pouch? Demo provided  and with moderate assist        3. How to read and record intake and output correctly? Demo provided     Preparation for discharge completed: started gathering supplies and updating AVS; signed up for [employer] [facility] 1/11, signed up for [place] [employer] 1/9  Preparation for discharge still needed: Placed prescription  "recommendations in discharge navigator for MD to sign, Ensured patient has extra supplies for discharge, Discussed how to order supplies after discharge , Ordered samples from  after gaining consent from patient/caregiver, Discussed how and when to make an outpatient WOC nurse appointment after discharge, Prepared for discharge home with home care, and Discuss signs/symptoms of when to seek medical attention  Pt support system on discharge: patient will travel to [place] to stay with mother after discharge, patient's daughter will fly with her. Pt has 2 children who are supportive but not living with pt.  Pt also has friends with ostomies that are supportive.   WOC recommend home care? Yes       Treatment Plan:     RLQ Ileostomy pouching plan:   Pouching system: ostomy supplies pouches: [place] 57 FECAL ([id number]) or high output pouch [id number]; ostomy supplies barrier: Lucio 57mm SOFT CONVEX ([id number])  Accessories used: [facility] ostomy accessories: 2\" Cera Barrier Ring ([id number])   Frequency of pouch changes: PRN leakage and Three times a week  WOC follow up plan: Daily Monday-Friday (as able) and As needed   Bedside RN interventions: Change pouch PRN if leaking using the supplies above, Empty pouch when 1/3 to 1/2 full, ensure to clean pouch outlet after emptying to prevent odor, Notify WOC for ongoing pouch leakage, Document stoma appearance and output volume, color, and consistency every shift, Encourage patient to empty pouch with assist, and Assist patient to measure and record output    Orders: Reviewed    RECOMMEND PRIMARY TEAM ORDER: None, at this time  Education provided: plan of care  Discussed plan of care with: Patient, Family, and Nurse  WOC nurse follow-up plan:  daily Mon - Fri as able  Notify WOC if wound(s) deteriorate.  Nursing to notify the Provider(s) and re-consult the WOC Nurse if new skin concern.    DATA:     Current support surface: Standard  Standard gel/foam mattress (IsoFlex, Atmos air, " "etc)  Containment of urine/stool: Indwelling catheter and Ileostomy pouch  BMI: Body mass index is 30.65 kg/m .   Active diet order: Orders Placed This Encounter      Full Liquid Diet     Output: I/O last 3 completed shifts:  In: 200 [P.O.:200]  Out: 2300 [Stool:2300]     Labs:   No lab results found in last 7 days.    Invalid input(s): \"GLUCOMBO\"    Pressure injury risk assessment:   Sensory Perception: 3-->slightly limited  Moisture: 3-->occasionally moist  Activity: 2-->chairfast  Mobility: 3-->slightly limited  Nutrition: 2-->probably inadequate  Friction and Shear: 3-->no apparent problem  Gabriele Score: 16    Aracelis Moya RN CWOCN  -Securely message with [email or web address] ([facility] [email or web address])   -[facility] Office Phone: [phone] (messages checked periodically Mon-Fri 8a-4p)        "

## 2024-01-11 NOTE — PROGRESS NOTES
Date & Time: 0700-1930  Surgery/POD#: POD 9 Exp Lap with lysis of Adhesions   Behavior & Aggression: Green  Fall Risk: Yes  Orientation:x4  ABNL VS/O2:Room air  ABNL Labs: See chart  Pain Management:Oral Dilaudid   Bowel/Bladder: Incontinent   Drains: Ileostomy, PIV  Diet:Clears   Activity Level: Ax1-2  Tests/Procedures: NA  Anticipated  DC Date: Pending  Significant Information: Improving nausea/vomit.
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
## 2024-01-12 PROCEDURE — 99232 SBSQ HOSP IP/OBS MODERATE 35: CPT | Performed by: HOSPITALIST

## 2024-01-12 PROCEDURE — C9113 INJ PANTOPRAZOLE SODIUM, VIA: HCPCS | Performed by: HOSPITALIST

## 2024-01-12 PROCEDURE — 99232 SBSQ HOSP IP/OBS MODERATE 35: CPT | Performed by: NURSE PRACTITIONER

## 2024-01-12 PROCEDURE — 120N000001 HC R&B MED SURG/OB

## 2024-01-12 PROCEDURE — 250N000013 HC RX MED GY IP 250 OP 250 PS 637: Performed by: NURSE PRACTITIONER

## 2024-01-12 PROCEDURE — 250N000013 HC RX MED GY IP 250 OP 250 PS 637: Performed by: HOSPITALIST

## 2024-01-12 PROCEDURE — 250N000011 HC RX IP 250 OP 636: Mod: JZ | Performed by: INTERNAL MEDICINE

## 2024-01-12 PROCEDURE — 250N000011 HC RX IP 250 OP 636: Performed by: COLON & RECTAL SURGERY

## 2024-01-12 PROCEDURE — 250N000011 HC RX IP 250 OP 636: Performed by: HOSPITALIST

## 2024-01-12 PROCEDURE — 250N000013 HC RX MED GY IP 250 OP 250 PS 637: Performed by: COLON & RECTAL SURGERY

## 2024-01-12 PROCEDURE — 250N000009 HC RX 250: Performed by: PHYSICIAN ASSISTANT

## 2024-01-12 PROCEDURE — 999N000197 HC STATISTIC WOC PT EDUCATION, 0-15 MIN

## 2024-01-12 RX ORDER — BUPRENORPHINE 7.5 UG/H
1 PATCH TRANSDERMAL WEEKLY
Status: DISCONTINUED | OUTPATIENT
Start: 2024-01-12 | End: 2024-01-16 | Stop reason: HOSPADM

## 2024-01-12 RX ORDER — DIPHENHYDRAMINE HYDROCHLORIDE 50 MG/ML
25 INJECTION INTRAMUSCULAR; INTRAVENOUS EVERY 6 HOURS PRN
Status: DISCONTINUED | OUTPATIENT
Start: 2024-01-12 | End: 2024-01-16 | Stop reason: HOSPADM

## 2024-01-12 RX ORDER — HYDROMORPHONE HYDROCHLORIDE 1 MG/ML
0.3 INJECTION, SOLUTION INTRAMUSCULAR; INTRAVENOUS; SUBCUTANEOUS 2 TIMES DAILY PRN
Status: ACTIVE | OUTPATIENT
Start: 2024-01-12 | End: 2024-01-13

## 2024-01-12 RX ORDER — DIPHENHYDRAMINE HCL 25 MG
25 CAPSULE ORAL EVERY 6 HOURS PRN
Status: DISCONTINUED | OUTPATIENT
Start: 2024-01-12 | End: 2024-01-16 | Stop reason: HOSPADM

## 2024-01-12 RX ORDER — PANTOPRAZOLE SODIUM 40 MG/1
40 TABLET, DELAYED RELEASE ORAL
Status: DISCONTINUED | OUTPATIENT
Start: 2024-01-13 | End: 2024-01-16 | Stop reason: HOSPADM

## 2024-01-12 RX ADMIN — HYDROMORPHONE HYDROCHLORIDE 4 MG: 2 TABLET ORAL at 13:07

## 2024-01-12 RX ADMIN — SCOPALAMINE 1 PATCH: 1 PATCH, EXTENDED RELEASE TRANSDERMAL at 17:59

## 2024-01-12 RX ADMIN — MEMANTINE 10 MG: 10 TABLET ORAL at 08:09

## 2024-01-12 RX ADMIN — ACETAMINOPHEN 650 MG: 325 TABLET, FILM COATED ORAL at 22:01

## 2024-01-12 RX ADMIN — HYDROMORPHONE HYDROCHLORIDE 4 MG: 2 TABLET ORAL at 08:08

## 2024-01-12 RX ADMIN — BACLOFEN 5 MG: 10 TABLET ORAL at 16:29

## 2024-01-12 RX ADMIN — HYDROMORPHONE HYDROCHLORIDE 2 MG: 2 TABLET ORAL at 02:41

## 2024-01-12 RX ADMIN — HYDROMORPHONE HYDROCHLORIDE 4 MG: 2 TABLET ORAL at 22:01

## 2024-01-12 RX ADMIN — HYDROMORPHONE HYDROCHLORIDE 4 MG: 2 TABLET ORAL at 17:57

## 2024-01-12 RX ADMIN — ACETAMINOPHEN 650 MG: 325 TABLET, FILM COATED ORAL at 13:07

## 2024-01-12 RX ADMIN — SERTRALINE HYDROCHLORIDE 100 MG: 50 TABLET ORAL at 08:09

## 2024-01-12 RX ADMIN — DIPHENHYDRAMINE HYDROCHLORIDE 25 MG: 25 CAPSULE ORAL at 23:28

## 2024-01-12 RX ADMIN — HYALURONIDASE (HUMAN RECOMBINANT) 150 UNITS: 150 INJECTION, SOLUTION SUBCUTANEOUS at 23:23

## 2024-01-12 RX ADMIN — MEMANTINE 10 MG: 10 TABLET ORAL at 20:26

## 2024-01-12 RX ADMIN — BACLOFEN 5 MG: 10 TABLET ORAL at 08:09

## 2024-01-12 RX ADMIN — ENOXAPARIN SODIUM 40 MG: 40 INJECTION SUBCUTANEOUS at 20:25

## 2024-01-12 RX ADMIN — ACETAMINOPHEN 650 MG: 325 TABLET, FILM COATED ORAL at 08:08

## 2024-01-12 RX ADMIN — BACLOFEN 5 MG: 10 TABLET ORAL at 20:26

## 2024-01-12 RX ADMIN — BUPRENORPHINE 1 PATCH: 7.5 PATCH, EXTENDED RELEASE TRANSDERMAL at 09:53

## 2024-01-12 RX ADMIN — ACETAMINOPHEN 650 MG: 325 TABLET, FILM COATED ORAL at 17:58

## 2024-01-12 RX ADMIN — PANTOPRAZOLE SODIUM 40 MG: 40 INJECTION, POWDER, FOR SOLUTION INTRAVENOUS at 08:09

## 2024-01-12 ASSESSMENT — ACTIVITIES OF DAILY LIVING (ADL)
ADLS_ACUITY_SCORE: 35
ADLS_ACUITY_SCORE: 34
ADLS_ACUITY_SCORE: 38
ADLS_ACUITY_SCORE: 34
ADLS_ACUITY_SCORE: 38
ADLS_ACUITY_SCORE: 38
ADLS_ACUITY_SCORE: 35
ADLS_ACUITY_SCORE: 35
ADLS_ACUITY_SCORE: 34
ADLS_ACUITY_SCORE: 35

## 2024-01-12 NOTE — PROGRESS NOTES
CLINICAL NUTRITION SERVICES - REASSESSMENT NOTE      Recommendations Ordered by Registered Dietitian (RD):   Ordered pt's lunch for her today  Provided print outs of Low fiber and Gluten-free diet  Updated pt's diet to Gluten-free and Low fiber as pt has dx of Celiac disease under allergies. Gluten is already listed as allergy    Ordered updated wt    Ordered 10 am apple Ensure Clear  Ordered 2 pm cherry Gel+     Malnutrition:   % Weight Loss:  None noted  % Intake:  </= 50% for >/= 5 days (severe malnutrition)-- x11 days  Subcutaneous Fat Loss:  Upper arm moderate-severe (1/6)  Muscle Loss:  Patellar region moderate-severe dep  Fluid Retention:  Does not meet criteria-- trace generalized edema    Malnutrition Diagnosis: Severe malnutrition in the context of --  Acute illness or injury         EVALUATION OF PROGRESS TOWARD GOALS   Diet: Low Fiber Diet      1/1: NPO  1/2: FLD --> CLD  1/4: NPO  1/9: CLD  1/11: FLD  1/12: LFD    Diet advancement per CRS    Intake/Tolerance:  Pt had not had any solid meals yet, but RD ordered lunch for pt. Pt reported she was feeling hungry for lunch today! Pt disclosed that she has Celiac disease so RD updated diet to gluten-free + low fiber. Also provided print outs of room service menus- Low Fiber and Gluten-Free. Would like the apple Ensure Clear and cherry Gel+ ordered.   Advanced to LFD this AM. Was tolerating FLD per chart review. N/V improving, still intermittent nausea.   Pt received 3 full liquid meals yesterday, minimal meal ordering prior, per health touch. % intakes documented per nursing flow sheet.    Barriers: slow diet advancement      ASSESSED NUTRITION NEEDS:  Dosing Weight 60 kg (adjusted)  Estimated Energy Needs: 8604-2988 kcals (25-30 Kcal/Kg)  Justification: post-op and overweight  Estimated Protein Needs: 70-90 grams protein (1.2-1.5 g pro/Kg)  Justification: post-op and hypercatabolism with acute illness  Estimated Fluid Needs: 9191-1324 mL (1  mL/Kcal)  Justification: maintenance        NEW FINDINGS:   Weight: no wt loss noted from admission, suspect wt loss hidden as pt has had minimal nutrition this admit x>11 days  Date/Time Weight Weight Method   01/12/24 0948 76 kg (167 lb 8 oz) Standing scale   01/07/24 0500 81 kg (178 lb 9.2 oz) Bed scale   01/01/24 1937 76 kg (167 lb 8.8 oz) Bed scale   01/01/24 0932 72.6 kg (160 lb)      I/O: Net I/O Since Admission: -7,010.6 mL [01/12/24 0905].  Ostomy output = 800 mL yesterday, 2450 mL on 1/10, 1325 mL on 1/9  Unmeasured stool output = 1x BM yesterday, 1x on 1/10  NG output = 900 mL on 1/9 1/9: NGT removed     Labs: reviewed-- lytes WNL, BGM <180    Medications: reviewed    [START ON 1/13/2024] pantoprazole  40 mg Oral QAM AC    sertraline  100 mg Oral Daily         Previous Goals:   Diet advancement vs start nutrition support w/in 48 hours  Evaluation: Not met    Previous Nutrition Diagnosis:   Inadequate protein-energy intake related to NPO 2/2 ongoing post-op ileus as evidenced by minimal intakes x9 days PTA + no PO intakes x8 days IP, D5 providing 20% of min energy needs and 0% of min protein needs x1 day   Evaluation: Improving        MALNUTRITION  % Weight Loss:  None noted  % Intake:  </= 50% for >/= 5 days (severe malnutrition)-- x11 days  Subcutaneous Fat Loss:  Upper arm moderate-severe (1/6)  Muscle Loss:  Patellar region moderate-severe dep  Fluid Retention:  Does not meet criteria-- trace generalized edema    Malnutrition Diagnosis: Severe malnutrition in the context of --  Acute illness or injury        CURRENT NUTRITION DIAGNOSIS  Inadequate oral intake related to slow diet advancement, N/V (now resolving) as evidenced by very minimal PO intakes x11 days, first day of LFD        INTERVENTIONS  Recommendations / Nutrition Prescription  Ordered pt's lunch for her today  Provided print outs of Low fiber and Gluten-free diet  Updated pt's diet to Gluten-free and Low fiber as pt has dx of Celiac  disease under allergies. Gluten is already listed as allergy  Ordered updated wt  Ordered 10 am apple Ensure Clear  Ordered 2 pm cherry Gel+    Implementation  Medical food supplement therapy  Modify composition of meals/snacks  Wt order    Goals  Pt to consume >75% of 3 meals/day       MONITORING AND EVALUATION:  Progress towards goals will be monitored and evaluated per protocol and Practice Guidelines      Palak Staton RD, LD  Pager: [phone]

## 2024-01-12 NOTE — PROGRESS NOTES
Care Management Follow Up    Length of Stay (days): 11    Expected Discharge Date: 01/13/2024     Concerns to be Addressed: basic needs, discharge planning, home safety     Patient plan of care discussed at interdisciplinary rounds: Yes    Anticipated Discharge Disposition:       Anticipated Discharge Services:    Anticipated Discharge DME:      Patient/family educated on Medicare website which has current facility and service quality ratings:    Education Provided on the Discharge Plan:    Patient/Family in Agreement with the Plan:      Referrals Placed by CM/SW:    Private pay costs discussed: Not applicable    Additional Information:  SW provided contact information for [facility] to patient to call so they can assist further with her navigating making a report.     Sridevi Akhtar, CAMISW

## 2024-01-12 NOTE — PLAN OF CARE
Goal Outcome Evaluation:      Plan of Care Reviewed With: patient    Overall Patient Progress: improvingOverall Patient Progress: improving    Outcome Evaluation: diet advanced to low fiber. RD also updated diet to reflect gluten free as pt reported she has celiac disease. provided menus. ordered supplements.    Palak Staton RD, LD

## 2024-01-12 NOTE — PLAN OF CARE
Goal Outcome Evaluation:    Patient is A&O X4, VSS on RA. Pain managed with PO Dilaudid X1. PIV SL, intact, flushed cap changed. Ileostomy in place with adequate output. Up with 1 assist pivot to commode. Voiding adequately. On Full Liquid diet. Wheel chair bound. Continue with plan of care.

## 2024-01-12 NOTE — PROGRESS NOTES
Care Management Follow Up    Length of Stay (days): 11    Expected Discharge Date: 01/15/2024     Concerns to be Addressed: basic needs, discharge planning, home safety     Patient plan of care discussed at interdisciplinary rounds: Yes    Anticipated Discharge Disposition:  Home with parents to [place], [place]     Anticipated Discharge Services:  possibly Home Care RN and PT  Anticipated Discharge DME:  new ostomy supplies as coordinated by WOC RN    Patient/family educated on Medicare website which has current facility and service quality ratings:  Yes  Education Provided on the Discharge Plan:  yes  Patient/Family in Agreement with the Plan:  Yes    Referrals Placed by CM/SW:  Home Care  Private pay costs discussed: Not applicable    Additional Information:  Writer follow up done with patient today. Writer clarified with patient regarding what services she wanted help coordinating for discharge. Patient does want to see if home care can be coordinated through her insurance in [place] not private pay yet. Ok with referrals being placed using her insurance patient states.     Writer utilized medicare.[employer] site and 4 agencies came up for the zip code. One did not answer and one stated out of service area. Referrals sent to [facility] and [facility] via [facility].       Address of Parents where patient will receive services.   [address] [place] [address].   [place] [address] Writer heard back from Laura at [facility] in [place], at phone [phone]. Per Laura, will cancel referral, patient should establish care with PCP in [place] and have the PCP send referral to Home care again. Explained this to patient and she agrees,she will work with her parents to have PCP appointment first in [place] area to provide referral for home care services.     Jasmyne Calzada RN   [facility]   Phone [phone], [place] or [phone]    .

## 2024-01-12 NOTE — PROGRESS NOTES
COLON & RECTAL SURGERY  PROGRESS NOTE    January 12, 2024  Post-op Day # 11    SUBJECTIVE:  Doing well. Wants to start solid food. Good stoma output.    OBJECTIVE:  Temp:  [98.1  F (36.7  C)-99  F (37.2  C)] 98.5  F (36.9  C)  Pulse:  [64-81] 79  Resp:  [16] 16  BP: (101-132)/(71-90) 101/71  SpO2:  [94 %-97 %] 94 %        Intake/Output Summary (Last 24 hours) at 1/12/2024 1027  Last data filed at 1/12/2024 0821  Gross per 24 hour   Intake 220 ml   Output 1100 ml   Net -880 ml         GENERAL:  Awake, alert, no acute distress  HEAD: Normocephalic atraumatic  SCLERA: Anicteric  EXTREMITIES: Warm and well perfused  ABDOMEN:  Soft, appropriately tender, non-distended. No guarding, rigidity, or peritoneal signs. Ileostomy with gas and liquid stool in bag.   INCISION:  C/d/i,     LABS:  No new labs    ASSESSMENT/PLAN: 48 year old woman POD # 10 s/p ex lap, lysis of adhesions and diverting loop ileostomy. Her postoperative course has been complicated by an ileus, now resolving.    - LFD  - PRN pain meds, multimodal pain control  - OOB as able, participate in PT  - WOCN  - SW for dispo planning - planning to move to [place] with parents.  Will have one follow-up with me post-op and then plans to transfer care to [facility] in [place] for ppx    Celia Rollins MD, FACS, FASCRS  Colorectal Surgery     [facility]  [address]  [place] [address]  T: [phone]  F: [phone]

## 2024-01-12 NOTE — PROGRESS NOTES
"[facility] Nurse Inpatient Assessment     Consulted for:  New loop ileostomy    Summary:  New loop ileostomy, now functioning with high liquid output.  Pt has complex social situation, see Social Work notes.      1/12: Writer stopped into patient's room and patient is sleeping soundly. Provided a printed copy of discharge instructions and ostomy product numbers. Ordered additional ostomy supplies for discharge.    Patient History (according to provider note(s):      48 F hx of cervical cancer s/p radiation with long standing constipation and mobile cecum. No volvulus noted at the time of OR.      S/p ex lap and loop ileostomy for diversion.     Areas Assessed:      Areas visualized during today's visit: Abdomen    Assessment of new loop Ileostomy:  Diagnosis Pertinent to Stoma:  transit issues       Surgery Date: 1-1-24  Surgeon: Dr. Smith [facility]: [facility]    Pouching system in place on assessment today: Lucio 2pc 57 soft convex with ring and high output pouch, now attached to strong bag due to high watery output  Pouch barrier status: intact    Pouch last changed/wear time: post-op 1/1, 1/4, 1/8, 1/11  Reason for pouch change today: ostomy education and routine schedule  Effectiveness of current pouching/ supply plan: Recommend continuing current plan  Change made with ostomy management today: No  Pouching system placed/ in place today: [place] 2pc 57mm soft convex with ring and high-output pouch  Supplies: at bedside    Last photo: 1-11-24 pt semi-reclining in bed      Stoma location: RLQ  Stoma size: approx 1 1/4\" loop stoma  Stoma appearance: viable, pink, and mostly round, lower loop minimally protruding  Mucocutaneous junction:  intact  Peristomal complication(s): small divots/creases in skin along inferior aspect  Output: thin, watery and brown  Output volume emptied during visit: n/a - emptying into strong    Abdominal assessment: Soft  Surgical " site(s):  glued incision clear and intact  NG still in place? No  Pain: tender, aching  Is patient still on a PCA? No    Ostomy education assessment:  Participant of teaching session today: patient   Education completed today: Pouching system assessment , Pouch change return demonstration, Ostomy accessory product use , Peristomal skin care, Fluid and electrolyte balance , Odor/flatus management , Lifestyle adjustments , and Discharge instructions  Educational materials/methods: Verbal,  hand out, FOD Hector education hand out, and Left packet of information at bedside   Education still needed: Pouch change return demonstration, Pouch emptying demonstration, Pouch emptying return demonstration, Intake and output recording, Fluid and electrolyte balance , When to seek medical attention, Infection prevention/hygiene , Hernia prevention, and Discharge instructions    Learning Comprehension:   Psychosocial assessment: pt states she has several friends with similar digestive issues that also have ostomies, and who may be part of her support system.  Pt and mother engaged in education; pt notes she may need assist with cares due to dexterity and mobility issues.   no longer involved in pt cares, pt has requested he not contact or visit her.  See social work notes.   Patient readiness for education today: observing and attentive  Following today's visit: patient  is able to demonstrate;         1. How to empty their pouch? Demo provided  and with minimal assist        2. How to change their pouch? Demo provided  and with moderate assist        3. How to read and record intake and output correctly? Demo provided     Preparation for discharge completed: started gathering supplies and updating AVS; signed up for [employer] [facility] 1/11, signed up for [employer] 1/9  ~ Patient has printed copy of discharge instructions and ostomy supplies at bedside. Writer ordered additional pouches and barriers  "to be sent home with patient at discharge.  Pt support system on discharge: patient will travel to [place] to stay with mother after discharge, patient's daughter will fly with her. Pt has 2 children who are supportive but not living with pt.  Pt also has friends with ostomies that are supportive.   WOC recommend home care? Yes       Treatment Plan:     RLQ Ileostomy pouching plan:   Pouching system: ostomy supplies pouches: Lucio 57 FECAL ([id number]) or high output pouch [id number]; ostomy supplies barrier: Lucio 57mm SOFT CONVEX (770830)  Accessories used: WO ostomy accessories: 2\" Cera Barrier Ring ([id number])   Frequency of pouch changes: PRN leakage and Three times a week  WOC follow up plan: Daily Monday-Friday (as able) and As needed   Bedside RN interventions: Change pouch PRN if leaking using the supplies above, Empty pouch when 1/3 to 1/2 full, ensure to clean pouch outlet after emptying to prevent odor, Notify WOC for ongoing pouch leakage, Document stoma appearance and output volume, color, and consistency every shift, Encourage patient to empty pouch with assist, and Assist patient to measure and record output    Orders: Reviewed    RECOMMEND PRIMARY TEAM ORDER: None, at this time  Education provided: plan of care  Discussed plan of care with: Patient, Family, and Nurse  WOC nurse follow-up plan:  daily Mon - Fri as able  Notify WOC if wound(s) deteriorate.  Nursing to notify the Provider(s) and re-consult the WOC Nurse if new skin concern.    DATA:     Current support surface: Standard  Standard gel/foam mattress (IsoFlex, Atmos air, etc)  Containment of urine/stool: Indwelling catheter and Ileostomy pouch  BMI: Body mass index is 30.65 kg/m .   Active diet order: Orders Placed This Encounter      Full Liquid Diet     Output: I/O last 3 completed shifts:  In: 220 [P.O.:220]  Out: 450 [Stool:450]     Labs:   No lab results found in last 7 days.    Invalid input(s): \"GLUCOMBO\"    Pressure injury risk " "assessment:   Sensory Perception: 3-->slightly limited  Moisture: 3-->occasionally moist  Activity: 2-->chairfast  Mobility: 3-->slightly limited  Nutrition: 2-->probably inadequate  Friction and Shear: 3-->no apparent problem  Gabriele Score: 16    Nita Bianchi RN, CWOCN  Please contact via [employer] at name or group \"[facility] nurse\"- M-F 8A-4P  Leave VM @ *[phone] for non-urgent needs. Checked occasionally M-F.         "
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
## 2024-01-12 NOTE — PROVIDER NOTIFICATION
MD made aware that patients IV went bad and was hurting so it was removed. Patient does not want another IV placed unless its needed, MD ok with this.

## 2024-01-13 ENCOUNTER — APPOINTMENT (OUTPATIENT)
Dept: PHYSICAL THERAPY | Facility: CLINIC | Age: 49
DRG: 329 | End: 2024-01-13
Payer: COMMERCIAL

## 2024-01-13 LAB
MAGNESIUM SERPL-MCNC: 2.2 MG/DL (ref 1.7–2.3)
PLATELET # BLD AUTO: 323 10E3/UL (ref 150–450)
POTASSIUM SERPL-SCNC: 4.5 MMOL/L (ref 3.4–5.3)

## 2024-01-13 PROCEDURE — 120N000001 HC R&B MED SURG/OB

## 2024-01-13 PROCEDURE — 36415 COLL VENOUS BLD VENIPUNCTURE: CPT | Performed by: COLON & RECTAL SURGERY

## 2024-01-13 PROCEDURE — 250N000011 HC RX IP 250 OP 636: Performed by: HOSPITALIST

## 2024-01-13 PROCEDURE — 36415 COLL VENOUS BLD VENIPUNCTURE: CPT | Performed by: HOSPITALIST

## 2024-01-13 PROCEDURE — 84132 ASSAY OF SERUM POTASSIUM: CPT | Performed by: HOSPITALIST

## 2024-01-13 PROCEDURE — 250N000013 HC RX MED GY IP 250 OP 250 PS 637: Performed by: COLON & RECTAL SURGERY

## 2024-01-13 PROCEDURE — 250N000013 HC RX MED GY IP 250 OP 250 PS 637: Performed by: HOSPITALIST

## 2024-01-13 PROCEDURE — 250N000013 HC RX MED GY IP 250 OP 250 PS 637: Performed by: NURSE PRACTITIONER

## 2024-01-13 PROCEDURE — 97116 GAIT TRAINING THERAPY: CPT | Mod: GP

## 2024-01-13 PROCEDURE — 83735 ASSAY OF MAGNESIUM: CPT | Performed by: HOSPITALIST

## 2024-01-13 PROCEDURE — 85049 AUTOMATED PLATELET COUNT: CPT | Performed by: COLON & RECTAL SURGERY

## 2024-01-13 PROCEDURE — 250N000011 HC RX IP 250 OP 636: Performed by: COLON & RECTAL SURGERY

## 2024-01-13 PROCEDURE — 99233 SBSQ HOSP IP/OBS HIGH 50: CPT | Performed by: HOSPITALIST

## 2024-01-13 RX ADMIN — BACLOFEN 5 MG: 10 TABLET ORAL at 08:13

## 2024-01-13 RX ADMIN — BACLOFEN 5 MG: 10 TABLET ORAL at 15:22

## 2024-01-13 RX ADMIN — HYDROMORPHONE HYDROCHLORIDE 4 MG: 2 TABLET ORAL at 02:10

## 2024-01-13 RX ADMIN — ACETAMINOPHEN 650 MG: 325 TABLET, FILM COATED ORAL at 10:46

## 2024-01-13 RX ADMIN — ONDANSETRON 8 MG: 4 TABLET, ORALLY DISINTEGRATING ORAL at 21:47

## 2024-01-13 RX ADMIN — ACETAMINOPHEN 650 MG: 325 TABLET, FILM COATED ORAL at 19:57

## 2024-01-13 RX ADMIN — BACLOFEN 5 MG: 10 TABLET ORAL at 19:57

## 2024-01-13 RX ADMIN — ACETAMINOPHEN 650 MG: 325 TABLET, FILM COATED ORAL at 06:41

## 2024-01-13 RX ADMIN — PANTOPRAZOLE SODIUM 40 MG: 40 TABLET, DELAYED RELEASE ORAL at 06:42

## 2024-01-13 RX ADMIN — HYDROMORPHONE HYDROCHLORIDE 4 MG: 2 TABLET ORAL at 15:00

## 2024-01-13 RX ADMIN — HYDROMORPHONE HYDROCHLORIDE 4 MG: 2 TABLET ORAL at 19:57

## 2024-01-13 RX ADMIN — ACETAMINOPHEN 650 MG: 325 TABLET, FILM COATED ORAL at 15:00

## 2024-01-13 RX ADMIN — MEMANTINE 10 MG: 10 TABLET ORAL at 08:12

## 2024-01-13 RX ADMIN — ACETAMINOPHEN 650 MG: 325 TABLET, FILM COATED ORAL at 02:10

## 2024-01-13 RX ADMIN — HYDROMORPHONE HYDROCHLORIDE 4 MG: 2 TABLET ORAL at 10:46

## 2024-01-13 RX ADMIN — SERTRALINE HYDROCHLORIDE 100 MG: 50 TABLET ORAL at 08:12

## 2024-01-13 RX ADMIN — HYDROMORPHONE HYDROCHLORIDE 4 MG: 2 TABLET ORAL at 06:41

## 2024-01-13 RX ADMIN — MEMANTINE 10 MG: 10 TABLET ORAL at 19:57

## 2024-01-13 RX ADMIN — ENOXAPARIN SODIUM 40 MG: 40 INJECTION SUBCUTANEOUS at 20:02

## 2024-01-13 ASSESSMENT — ACTIVITIES OF DAILY LIVING (ADL)
ADLS_ACUITY_SCORE: 35

## 2024-01-13 NOTE — PLAN OF CARE
Goal Outcome Evaluation:      Plan of Care Reviewed With: patient    Overall Patient Progress: improvingOverall Patient Progress: improving     Date & Time: January 13, 2024 [id number]   Surgery/POD#: POD 12 Exploratory lap with diverting loop ileostomy   Behavior & Aggression: Green   Fall Risk: Yes  Orientation:A&Ox4   ABNL VS/O2:VSS on RA   ABNL Labs: NA   Pain Management:PRN dilaudid and tylenol   Bowel/Bladder: Voiding adequately. Ilesotomy  Drains: Ileostomy   Diet:Low fiber/gluten free   Activity Level: A1 W/C  Tests/Procedures: NA   Anticipated  DC Date: Pending   Significant Information: Colorectal/WOC/SW following. Pain patch to R upper back.     Pt began complaining of pain in old IV site, that had infiltrated earlier today. R upper arm was red/warm to touch/raised. Pt stated she was experiencing itching as well. RN reached out to provider after ice did not help. IV infiltration protocol was started. RN has marked, measured, and taken photos of pts arm. Benadryl was given to help with itching. No changes on RUE since.

## 2024-01-13 NOTE — PROGRESS NOTES
COLON & RECTAL SURGERY  PROGRESS NOTE    January 13, 2024  Post-op Day # 12    SUBJECTIVE: Pain well-controlled.  Tolerating a low fiber diet.  Having good ileostomy function.  Plan to discharge Monday or Tuesday to [place].    OBJECTIVE:  Temp:  [97.7  F (36.5  C)-98.5  F (36.9  C)] 98.5  F (36.9  C)  Pulse:  [73-82] 82  Resp:  [15-16] 16  BP: (101-122)/(70-84) 122/84  SpO2:  [94 %-97 %] 97 %    Intake/Output Summary (Last 24 hours) at 1/13/2024 0655  Last data filed at 1/13/2024 0241  Gross per 24 hour   Intake 1320 ml   Output 1875 ml   Net -555 ml       GENERAL:  Awake, alert, no acute distress  EXTREMITIES: Warm and well perfused, no edema   ABDOMEN:  Soft, appropriately tender, non-distended. No guarding, rigidity, or peritoneal signs.  Ileostomy pink and patent with stool in appliance.  Incision intact with no evidence of infection.    LABS:  Lab Results   Component Value Date    WBC 10.4 01/03/2024     Lab Results   Component Value Date    HGB 12.5 01/03/2024     Lab Results   Component Value Date    HCT 37.2 01/03/2024     Lab Results   Component Value Date     01/10/2024     Last Basic Metabolic Panel:  Lab Results   Component Value Date     01/07/2024      Lab Results   Component Value Date    POTASSIUM 3.9 01/11/2024    POTASSIUM 3.8 10/24/2022     Lab Results   Component Value Date    CHLORIDE 104 01/07/2024    CHLORIDE 106 10/24/2022     Lab Results   Component Value Date    ESTEBAN 8.6 01/07/2024     Lab Results   Component Value Date    CO2 29 01/07/2024    CO2 29 10/24/2022     Lab Results   Component Value Date    BUN 11.0 01/07/2024    BUN 11 10/24/2022     Lab Results   Component Value Date    CR 0.62 01/10/2024     Lab Results   Component Value Date     01/11/2024    GLC 96 10/24/2022       ASSESSMENT/PLAN: Peggy Jessica is a 48 year old female POD # 12 s/p exploratory laparotomy, lysis of adhesions and creation of loop ileostomy.     Low fiber diet  Multimodal pain  control   PT and out of bed as able  WOCN for stoma teachings and cares  Lovenox for ppx  Social work for dispo planning.  Plan to discharge to [place] with her parents early next week.    For questions/paging, please contact the CRS office at [phone].    Avis Argueta MD  Colorectal Surgery    [facility]  [address]  [place] [address]  T: [phone]  F: [phone]
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
## 2024-01-13 NOTE — PLAN OF CARE
Date & Time: 1/13/24 1433-9453  Surgery/POD#: 12 Exploratory lap with diverting loop ileostomy  Fall Risk: Yes  Orientation:A&Ox4  ABNL VS/O2:VSS on RA  ABNL Labs:   Pain Management:PRN Tylenol and Dilaudid for abd pain.  Bowel/Bladder: Up to BSC, Ileostomy.  Drains: Ileostomy to strong bag.   Diet:Gluten free/Low fiber  Activity Level: Up with 1gb  Anticipated  DC Date: Possible discharge on Tuesday  Significant Information: No MD AKUA aware and ok. Colorectal/WOC/SW following. Pain patch to R upper back.

## 2024-01-13 NOTE — PLAN OF CARE
Date & Time: 1/12/24 2573-4418  Surgery/POD#: 11 Exploratory lap with diverting loop ileostomy  Fall Risk: Yes  Orientation:A&Ox4  ABNL VS/O2:VSS on RA  ABNL Labs:   Pain Management:PRN Tylenol and Dilaudid for abd pain.  Bowel/Bladder: Up to BSC, Ileostomy.  Drains: Ileostomy to strong bag.   Diet:Gluten free/Low fiber  Activity Level: Up with 1gb  Anticipated  DC Date: Pending   Significant Information: No MD AKUA aware and ok. Colorectal/WOC/SW following. Pain patch to R upper back.

## 2024-01-13 NOTE — PROGRESS NOTES
Date & Time: 7630-6872  Surgery/POD#: POD 12 Exploratory Lap with Diverting Loop Ileostomy   Behavior & Aggression: Green  Fall Risk: Yes  Orientation:A/Ox4  ABNL VS/O2:VSS on RA  ABNL Labs: NA  Pain Management:PRN Dilaudid and Tylenol   Bowel/Bladder: BSC, Ileostomy   Drains: NA  Diet:GF/LFD  Activity Level: Pivot, W/C Baseline   Anticipated  DC Date: Possible discharge Tuesday   Significant Information: Ileostomy with moderate output, tolerating diet.
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
## 2024-01-13 NOTE — PROVIDER NOTIFICATION
MD Notification    Notified Person: MD    Notified Person Name: Dr. Cam    Notification Date/Time: January 12, 2024. 2200    Notification Interaction: Soila    Purpose of Notification: Pt had IV infiltration this AM. She is c/o pain/itching/tenderness to the area. Visually, the skin is red, raised, and warm to the touch. We tried ice pack, no relief. Pt states it is getting worse. Any thoughts? Thanks!    Orders Received: We can do the IV infiltration protocol    Comments:

## 2024-01-14 ENCOUNTER — APPOINTMENT (OUTPATIENT)
Dept: OCCUPATIONAL THERAPY | Facility: CLINIC | Age: 49
DRG: 329 | End: 2024-01-14
Attending: COLON & RECTAL SURGERY
Payer: COMMERCIAL

## 2024-01-14 PROCEDURE — 99232 SBSQ HOSP IP/OBS MODERATE 35: CPT | Performed by: HOSPITALIST

## 2024-01-14 PROCEDURE — 250N000013 HC RX MED GY IP 250 OP 250 PS 637: Performed by: HOSPITALIST

## 2024-01-14 PROCEDURE — 97535 SELF CARE MNGMENT TRAINING: CPT | Mod: GO | Performed by: OCCUPATIONAL THERAPIST

## 2024-01-14 PROCEDURE — 250N000013 HC RX MED GY IP 250 OP 250 PS 637: Performed by: NURSE PRACTITIONER

## 2024-01-14 PROCEDURE — 97166 OT EVAL MOD COMPLEX 45 MIN: CPT | Mod: GO | Performed by: OCCUPATIONAL THERAPIST

## 2024-01-14 PROCEDURE — 250N000011 HC RX IP 250 OP 636: Performed by: COLON & RECTAL SURGERY

## 2024-01-14 PROCEDURE — 250N000013 HC RX MED GY IP 250 OP 250 PS 637: Performed by: COLON & RECTAL SURGERY

## 2024-01-14 PROCEDURE — 120N000001 HC R&B MED SURG/OB

## 2024-01-14 RX ADMIN — ACETAMINOPHEN 650 MG: 325 TABLET, FILM COATED ORAL at 17:30

## 2024-01-14 RX ADMIN — SERTRALINE HYDROCHLORIDE 100 MG: 50 TABLET ORAL at 08:11

## 2024-01-14 RX ADMIN — PANTOPRAZOLE SODIUM 40 MG: 40 TABLET, DELAYED RELEASE ORAL at 06:49

## 2024-01-14 RX ADMIN — BACLOFEN 5 MG: 10 TABLET ORAL at 11:30

## 2024-01-14 RX ADMIN — HYDROMORPHONE HYDROCHLORIDE 4 MG: 2 TABLET ORAL at 17:30

## 2024-01-14 RX ADMIN — BACLOFEN 5 MG: 10 TABLET ORAL at 08:11

## 2024-01-14 RX ADMIN — MEMANTINE 10 MG: 10 TABLET ORAL at 08:11

## 2024-01-14 RX ADMIN — MEMANTINE 10 MG: 10 TABLET ORAL at 21:27

## 2024-01-14 RX ADMIN — ACETAMINOPHEN 650 MG: 325 TABLET, FILM COATED ORAL at 13:18

## 2024-01-14 RX ADMIN — HYDROMORPHONE HYDROCHLORIDE 4 MG: 2 TABLET ORAL at 08:11

## 2024-01-14 RX ADMIN — SIMETHICONE 80 MG: 20 SOLUTION/ DROPS ORAL at 18:38

## 2024-01-14 RX ADMIN — SIMETHICONE 80 MG: 20 SOLUTION/ DROPS ORAL at 11:31

## 2024-01-14 RX ADMIN — ACETAMINOPHEN 650 MG: 325 TABLET, FILM COATED ORAL at 21:27

## 2024-01-14 RX ADMIN — BACLOFEN 5 MG: 10 TABLET ORAL at 21:27

## 2024-01-14 RX ADMIN — BACLOFEN 5 MG: 10 TABLET ORAL at 16:54

## 2024-01-14 RX ADMIN — HYDROMORPHONE HYDROCHLORIDE 4 MG: 2 TABLET ORAL at 21:27

## 2024-01-14 RX ADMIN — ENOXAPARIN SODIUM 40 MG: 40 INJECTION SUBCUTANEOUS at 21:27

## 2024-01-14 RX ADMIN — ACETAMINOPHEN 650 MG: 325 TABLET, FILM COATED ORAL at 08:11

## 2024-01-14 RX ADMIN — HYDROMORPHONE HYDROCHLORIDE 4 MG: 2 TABLET ORAL at 02:17

## 2024-01-14 RX ADMIN — HYDROMORPHONE HYDROCHLORIDE 4 MG: 2 TABLET ORAL at 13:18

## 2024-01-14 ASSESSMENT — ACTIVITIES OF DAILY LIVING (ADL)
ADLS_ACUITY_SCORE: 35
ADLS_ACUITY_SCORE: 37
ADLS_ACUITY_SCORE: 35
ADLS_ACUITY_SCORE: 37
ADLS_ACUITY_SCORE: 35
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
## 2024-01-14 NOTE — PLAN OF CARE
Shift: [id number]   POD#13 ex lap, lysis of adhesions and diverting loop ileostomy     Orientation: AOX4   Vital Signs: VSS, RA  Pain Management: Abdominal/surgical pain managed with tylenol, dilaudid, baclofen and ice packs.   Bowel: Stoma functioning. Brown liquid output. Took strong bag off due to thick output.   Bladder: Voiding in bedside commode.  IV: No IV  Wounds/Incisions: Midline incision CDI, healing well.   Diet: Tolerating a gluten free low fiber diet, no n/v.  Activity Level: Pivots to chair and commode  Tests/Procedures: Pt worked with OT  Anticipated Discharge Date/Plan: Pt discharging to her parents house on 1/16.  Significant Information: There is to be no contact with her  Robert as they are  and getting a divorce.
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
## 2024-01-14 NOTE — PLAN OF CARE
Goal Outcome Evaluation:  Patient is A&O X4, VSS on RA. POD 13 from Exploratory Laparotomy with Diverting Loop Ileostomy. Pain managed with PO Dilaudid X1 Ileostomy in place with adequate output. Up with 1 assist pivot to commode. Voiding adequately. On Low fiber gluten free diet. Wheel chair bound. Discharge pending.

## 2024-01-14 NOTE — PROGRESS NOTES
01/14/24 1220   Appointment Info   Signing Clinician's Name / Credentials (OT) Teresita Mcjairon OTR/L   Living Environment   People in Home parent(s)   Current Living Arrangements house   Home Accessibility stairs within home   Stair Railings, Within Home, Primary railings safe and in good condition   Transportation Anticipated family or friend will provide   Living Environment Comments Pt planning to dc to parent's home in [place].  Multi-level home, ~6 + 8 + 6 stairs to her bedroom.  Multiple options for bathroom. Parents considering installing a stair glide for stairs pt will use often.   Self-Care   Usual Activity Tolerance fair   Current Activity Tolerance fair   Regular Exercise Yes   Activity/Exercise Type other (see comments)  (HEP for EDS, incl TB/putty)   Equipment Currently Used at Home colostomy/ostomy supplies;grab bar, toilet;grab bar, tub/shower;raised toilet seat;tub bench;walker, rolling;wheelchair, manual   Fall history within last six months yes   Number of times patient has fallen within last six months 5   Activity/Exercise/Self-Care Comment Pivots into w/c from lifting recliner, walks up the steps and has someone carry the w/c. Gets assist with showering, cooking, cleaning and dressing.   Instrumental Activities of Daily Living (IADL)   IADL Comments mother can assist as needed   General Information   Onset of Illness/Injury or Date of Surgery 01/13/24   Referring Physician Avis Argueta   Patient/Family Therapy Goal Statement (OT) dc to parents' home, get stronger   Additional Occupational Profile Info/Pertinent History of Current Problem per chart: 48 year old female POD # 13 s/p exploratory laparotomy, lysis of adhesions and creation of loop ileostomy   Existing Precautions/Restrictions fall;abdominal   Left Upper Extremity (Weight-bearing Status) partial weight-bearing (PWB)  (10#)   Right Upper Extremity (Weight-bearing Status) partial weight-bearing (PWB)  (10#)   Left Lower Extremity  (Weight-bearing Status) full weight-bearing (FWB)   Right Lower Extremity (Weight-bearing Status) full weight-bearing (FWB)   General Observations and Info pt reports PMH of EDS   Cognitive Status Examination   Orientation Status orientation to person, place and time   Affect/Mental Status (Cognitive) WNL   Follows Commands follows two-step commands;over 90% accuracy   Cognitive Status Comments pt reports brain fog prior to admit, and feels back to baseline now   Visual Perception   Visual Impairment/Limitations corrective lenses full-time   Sensory   Sensory Comments numbness BLE   Pain Assessment   Patient Currently in Pain Yes, see Vital Sign flowsheet   Posture   Posture forward head position;protracted shoulders   Range of Motion Comprehensive   Comment, General Range of Motion WFL   Strength Comprehensive (MMT)   Comment, General Manual Muscle Testing (MMT) Assessment generalized weakness, EDS at baseline, able to lift all extremities against gravity, 3+ B hand grasp   Coordination   Upper Extremity Coordination No deficits were identified   Bed Mobility   Bed Mobility scooting/bridging;supine-sit;sit-supine   Scooting/Bridging [place] (Bed Mobility) modified independence   Supine-Sit [place] (Bed Mobility) modified independence   Sit-Supine [place] (Bed Mobility) modified independence   Assistive Device (Bed Mobility) bed rails   Transfers   Transfers sit-stand transfer;toilet transfer;shower transfer   Sit-Stand Transfer   Sit-Stand [place] (Transfers) modified independence   Assistive Device (Sit-Stand Transfers) walker, front-wheeled   Shower Transfer   [place] Level (Shower Transfer) supervision   Shower Transfer Comments per clinical judgement; pt has options of roll-in shower v whirlpool tub, grab bars and tub bench available   Toilet Transfer   Type (Toilet Transfer) stand pivot/stand step   [place] Level (Toilet Transfer) contact guard   Assistive Device (Toilet Transfer)  grab bars/safety frame;wheelchair   Balance   Balance Comments good seated; poor balance standing   Activities of Daily Living   BADL Assessment/Intervention lower body dressing;grooming;toileting   Lower Body Dressing Assessment/Training   [place] Level (Lower Body Dressing) contact guard assist   Grooming Assessment/Training   [place] Level (Grooming) independent   Toileting   Comment, (Toileting) ostomy   [place] Level (Toileting) minimum assist (75% patient effort)   Clinical Impression   Criteria for Skilled Therapeutic Interventions Met (OT) Yes, treatment indicated   OT Diagnosis impaired ADLs   OT Problem List-Impairments impacting ADL problems related to;activity tolerance impaired;balance;strength;pain;post-surgical precautions   Assessment of Occupational Performance 3-5 Performance Deficits   Identified Performance Deficits dressing, toilet transfer   Planned Therapy Interventions (OT) ADL retraining;strengthening;transfer training;progressive activity/exercise   Clinical Decision Making Complexity (OT) detailed assessment/moderate complexity   Risk & Benefits of therapy have been explained evaluation/treatment results reviewed;patient   OT Total Evaluation Time   OT Eval, Moderate Complexity Minutes (41646) 8   OT Goals   Therapy Frequency (OT) One time eval and treatment   OT Predicted Duration/Target Date for Goal Attainment 01/14/24   OT Goals Lower Body Dressing;Toilet Transfer/Toileting   Interventions   Interventions Quick Adds Self-Care/Home Management   Self-Care/Home Management   Self-Care/Home Mgmt/ADL, Compensatory, Meal Prep Minutes (09025) 30   Symptoms Noted During/After Treatment (Meal Preparation/Planning Training) fatigue   Treatment Detail/Skilled Intervention Pt ed on ABD prec with ADLs.  Ed pt on using figure-4 pattern for LE dressing, and pt able to easily achieve to doff/don B slipper socks, then reports she has EDS.  Pt then cautioned to complete slowlly and  carefully, pt endorsing. Pt reports difficulty retrieving objects with her clam-shell style reacher, and TH showed her claw-style reacher, pt appreciative and plans to order online. Kenn SPT bed <> w/ch and wheeling around in room incl small spaces.  Pt has been using BSC here, was challenged to attempt transfer to toilet, and she completed SPT with head-on approach, good balance. Pt reports still Remi with ostomy, hopes to be indep by tomorrow, but c/o weak  for managing. Issued yellow foam with ed on not over-exercising, on completing 3reps 2x/day for a few days prior to increasing reps (due to EDS), pt endorsing.   Reviewed bathing options at her parents' home, cautioning against soaking in tub until okayed by MD, pt endorsing.  Pt completed STS to 2WW and ambulation x8' Kenn, watching feet due to neuropathy. Pt denies further OT concerns.   OT Discharge Planning   OT Plan dc   OT Discharge Recommendation (DC Rec) home with assist   OT Rationale for DC Rec Pt progressing well, anticipate she will be able to discharge to parents' home with assist for IADLs, once medically stable.  Pt reports mom can assist as needed.   OT Brief overview of current status Kenn toilet transfer, Kenn LE dressing   Total Session Time   Timed Code Treatment Minutes 30   Total Session Time (sum of timed and untimed services) 38

## 2024-01-15 PROCEDURE — 250N000013 HC RX MED GY IP 250 OP 250 PS 637: Performed by: HOSPITALIST

## 2024-01-15 PROCEDURE — 99232 SBSQ HOSP IP/OBS MODERATE 35: CPT | Performed by: HOSPITALIST

## 2024-01-15 PROCEDURE — 250N000009 HC RX 250: Performed by: PHYSICIAN ASSISTANT

## 2024-01-15 PROCEDURE — 250N000013 HC RX MED GY IP 250 OP 250 PS 637: Performed by: NURSE PRACTITIONER

## 2024-01-15 PROCEDURE — 120N000001 HC R&B MED SURG/OB

## 2024-01-15 PROCEDURE — G0463 HOSPITAL OUTPT CLINIC VISIT: HCPCS

## 2024-01-15 PROCEDURE — 250N000013 HC RX MED GY IP 250 OP 250 PS 637: Performed by: COLON & RECTAL SURGERY

## 2024-01-15 PROCEDURE — 250N000011 HC RX IP 250 OP 636: Performed by: COLON & RECTAL SURGERY

## 2024-01-15 RX ADMIN — ENOXAPARIN SODIUM 40 MG: 40 INJECTION SUBCUTANEOUS at 19:14

## 2024-01-15 RX ADMIN — SCOPALAMINE 1 PATCH: 1 PATCH, EXTENDED RELEASE TRANSDERMAL at 17:42

## 2024-01-15 RX ADMIN — ACETAMINOPHEN 650 MG: 325 TABLET, FILM COATED ORAL at 19:14

## 2024-01-15 RX ADMIN — HYDROMORPHONE HYDROCHLORIDE 4 MG: 2 TABLET ORAL at 19:14

## 2024-01-15 RX ADMIN — ACETAMINOPHEN 650 MG: 325 TABLET, FILM COATED ORAL at 06:24

## 2024-01-15 RX ADMIN — ACETAMINOPHEN 650 MG: 325 TABLET, FILM COATED ORAL at 14:49

## 2024-01-15 RX ADMIN — HYDROMORPHONE HYDROCHLORIDE 4 MG: 2 TABLET ORAL at 14:48

## 2024-01-15 RX ADMIN — ACETAMINOPHEN 650 MG: 325 TABLET, FILM COATED ORAL at 10:42

## 2024-01-15 RX ADMIN — HYDROMORPHONE HYDROCHLORIDE 4 MG: 2 TABLET ORAL at 02:01

## 2024-01-15 RX ADMIN — HYDROMORPHONE HYDROCHLORIDE 4 MG: 2 TABLET ORAL at 06:24

## 2024-01-15 RX ADMIN — HYDROMORPHONE HYDROCHLORIDE 4 MG: 2 TABLET ORAL at 10:42

## 2024-01-15 RX ADMIN — BACLOFEN 5 MG: 10 TABLET ORAL at 21:22

## 2024-01-15 RX ADMIN — BACLOFEN 5 MG: 10 TABLET ORAL at 15:05

## 2024-01-15 RX ADMIN — PANTOPRAZOLE SODIUM 40 MG: 40 TABLET, DELAYED RELEASE ORAL at 06:24

## 2024-01-15 RX ADMIN — ACETAMINOPHEN 650 MG: 325 TABLET, FILM COATED ORAL at 02:01

## 2024-01-15 RX ADMIN — SERTRALINE HYDROCHLORIDE 100 MG: 50 TABLET ORAL at 08:13

## 2024-01-15 RX ADMIN — MEMANTINE 10 MG: 10 TABLET ORAL at 08:13

## 2024-01-15 RX ADMIN — BACLOFEN 5 MG: 10 TABLET ORAL at 08:13

## 2024-01-15 RX ADMIN — MEMANTINE 10 MG: 10 TABLET ORAL at 21:22

## 2024-01-15 ASSESSMENT — ACTIVITIES OF DAILY LIVING (ADL)
ADLS_ACUITY_SCORE: 32
ADLS_ACUITY_SCORE: 35
ADLS_ACUITY_SCORE: 31
ADLS_ACUITY_SCORE: 32
ADLS_ACUITY_SCORE: 35
ADLS_ACUITY_SCORE: 32
ADLS_ACUITY_SCORE: 35
ADLS_ACUITY_SCORE: 32
ADLS_ACUITY_SCORE: 31
ADLS_ACUITY_SCORE: 35
ADLS_ACUITY_SCORE: 32
ADLS_ACUITY_SCORE: 35

## 2024-01-15 NOTE — PLAN OF CARE
Goal Outcome Evaluation:      Plan of Care Reviewed With: patient    Overall Patient Progress: improvingOverall Patient Progress: improving     Date & Time: January 15, 2024 9784-0179   Surgery/POD#: POD 14 Exploratory Lap with Diverting Loop Ileostomy    Behavior & Aggression: green   Fall Risk: Yes   Orientation:A&Ox4   ABNL VS/O2:VSS on RA   ABNL Labs: NA   Pain Management:PRN tylenol and dilaudid   Bowel/Bladder: Voiding adequately, BS active, ileostomy.   Drains: Ileostomy   Diet:Gluten free, low fiber   Activity Level: SBA pivot to commode, W/C   Tests/Procedures: NA   Anticipated  DC Date: Tuesday   Significant Information: Midline, WDL. There is to be no contact with her  Robert as they are  and getting a divorce.

## 2024-01-15 NOTE — PROGRESS NOTES
"[facility] Nurse Inpatient Assessment     Consulted for:  New loop ileostomy    Summary:  New loop ileostomy, now functioning with high liquid output.       1/13: Pt doing well and planning to discharge tomorrow.  Pouch changed with pt participating a little.  Mild peristomal breakdown under the barrier which was fully melted.  Supplies gathered and at bedside.      Patient History (according to provider note(s):      48 F hx of cervical cancer s/p radiation with long standing constipation and mobile cecum. No volvulus noted at the time of OR.      S/p ex lap and loop ileostomy for diversion.     Areas Assessed:      Areas visualized during today's visit: Abdomen    Assessment of new loop Ileostomy:  Diagnosis Pertinent to Stoma:  transit issues       Surgery Date: 1-1-24  Surgeon: Dr. Smith [facility]: [facility]    Pouching system in place on assessment today: [place] 2pc 57 soft convex with ring and high output pouch, now attached to strong bag due to high watery output  Pouch barrier status: intact    Pouch last changed/wear time: post-op 1/1, 1/4, 1/8, 1/11, 1/15  Reason for pouch change today: ostomy education and routine schedule  Effectiveness of current pouching/ supply plan: Recommend continuing current plan  Change made with ostomy management today: No  Pouching system placed/ in place today: [place] 2pc 57mm soft convex with ring and high-output pouch  Supplies: at bedside    Last photo: 1-15-24 pt sitting up in bed      1-15-24 stoma      Stoma location: RLQ  Stoma size: approx 1 1/8\" loop stoma  Stoma appearance: viable, pink, and mostly round, lower loop minimally protruding  Mucocutaneous junction:  intact  Peristomal complication(s): small divots/creases in skin along inferior aspect and at 3 and 9 o'clock  Output: thin, watery and brown  Output volume emptied during visit: ~300    Abdominal assessment: Soft  Surgical site(s):  glued incision clear " and intact  NG still in place? No  Pain: tender, aching  Is patient still on a PCA? No    Ostomy education assessment:  Participant of teaching session today: patient   Education completed today: Pouching system assessment , Evaluate leakage issue, Pouch change demonstration, Pouch change return demonstration, Ostomy accessory product use , Peristomal skin care, Intake and output recording, Fluid and electrolyte balance , Importance of hydration, When to seek medical attention, Odor/flatus management , Lifestyle adjustments , and Discharge instructions  Educational materials/methods: Verbal,  hand out, FOD [place] education hand out, and Left packet of information at bedside   Education still needed: Discharge instructions and practice of all cares    Learning Comprehension:   Psychosocial assessment: pt states she has several friends that also have ostomies, and who may be part of her support system.  Pt (and mother at previous visits) engaged in education; pt notes she may need assist with some of the cares due to dexterity and mobility issues.   no longer involved in any pt cares, they are  and pt is discharging to parents home in [place].   Patient readiness for education today: observing and attentive  Following today's visit: patient  is able to demonstrate;         1. How to empty their pouch? Demo provided  and with minimal assist        2. How to change their pouch? Demo provided  and with moderate assist        3. How to read and record intake and output correctly? Demo provided     Preparation for discharge completed: started gathering supplies and updating AVS; signed up for [employer] 1/11, signed up for [employer] 1/9  ~ Patient has printed copy of discharge instructions and ostomy supplies at bedside.   Pt support system on discharge: patient will travel to [place] to stay with parents after discharge, patient's daughter will fly with her. Pt has 2 children who are  "supportive but not living with pt.  Pt also has friends with ostomies that are supportive.   WOC recommend home care? Yes       Treatment Plan:     RLQ Ileostomy pouching plan:   Pouching system: ostomy supplies pouches: Lucio 57 FECAL ([id number]) or high output pouch [id number]; ostomy supplies barrier: [place] 57mm SOFT CONVEX (436828)  Accessories used: WOC ostomy accessories: 2\" Cera Barrier Ring (295160)   Frequency of pouch changes: PRN leakage and Three times a week  WOC follow up plan: Daily Monday-Friday (as able) and As needed   Bedside RN interventions: Change pouch PRN if leaking using the supplies above, Empty pouch when 1/3 to 1/2 full, ensure to clean pouch outlet after emptying to prevent odor, Notify WOC for ongoing pouch leakage, Document stoma appearance and output volume, color, and consistency every shift, Encourage patient to empty pouch with assist, and Assist patient to measure and record output    Orders: Reviewed    RECOMMEND PRIMARY TEAM ORDER: None, at this time  Education provided: plan of care  Discussed plan of care with: Patient and Nurse  WOC nurse follow-up plan:  daily Mon - Fri as able  Notify WOC if wound(s) deteriorate.  Nursing to notify the Provider(s) and re-consult the WOC Nurse if new skin concern.    DATA:     Current support surface: Standard  Standard gel/foam mattress (IsoFlex, Atmos air, etc)  Containment of urine/stool: Indwelling catheter and Ileostomy pouch  BMI: Body mass index is 28.75 kg/m .   Active diet order: Orders Placed This Encounter      Combination Diet Gluten Free Diet; Low Fiber Diet     Output: I/O last 3 completed shifts:  In: 1910 [P.O.:1910]  Out: 1825 [Stool:1825]     Labs:   No lab results found in last 7 days.    Invalid input(s): \"GLUCOMBO\"    Pressure injury risk assessment:   Sensory Perception: 3-->slightly limited  Moisture: 3-->occasionally moist  Activity: 2-->chairfast  Mobility: 3-->slightly limited  Nutrition: 3-->adequate  Friction and " Shear: 3-->no apparent problem  Gabriele Score: 17    Aracelis Moya RN CWOCN  -Securely message with [employer]oc[employer] ([facility] [employer])   -[facility] Office Phone: [phone] (messages checked periodically Mon-Fri 8a-4p)
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
## 2024-01-15 NOTE — PROGRESS NOTES
Colorectal Surgery Progress Note    POD # 14  Interval History:  No acute events overnight. Doing well. Wants discharge to be tmrw. High ostomy output for the last 24 hours to 1.8L     Physical Exam:   Temp:  [98  F (36.7  C)-99  F (37.2  C)] 99  F (37.2  C)  Pulse:  [72-88] 88  Resp:  [16-17] 16  BP: (114-148)/(79-82) 117/82  SpO2:  [97 %-99 %] 99 %  General: Alert, oriented, appears comfortable, NAD.  Respiratory: breathing non labored  Abdomen: Abdomen is soft, non tender, non distended. Incision is clean, dry and intact without signs of infection    Data:   All laboratory and imaging data in the past 24 hours reviewed  I/O last 3 completed shifts:  In: 1910 [P.O.:1910]  Out: 1825 [Stool:1825]  Recent Labs   Lab Test 01/13/24  0721 01/10/24  0853 01/07/24  0638 01/04/24  0734 01/03/24  0731 01/02/24  0603 01/01/24  1043 01/31/23  1900 01/28/23  0619   WBC  --   --   --   --  10.4 13.6* 4.7   < > 4.2   HGB  --   --   --   --  12.5 12.9 13.5   < > 13.4    287 272   < > 259 262 249   < > 243   INR  --   --   --   --   --   --   --   --  0.97    < > = values in this interval not displayed.      Recent Labs   Lab Test 01/13/24  1805 01/11/24  0820 01/11/24  0608 01/11/24  0141 01/10/24  2319 01/10/24  2147 01/07/24  2206 01/07/24  0638 01/05/24  2137 01/05/24  0815 01/04/24  0734   NA  --   --   --   --   --   --   --  139  --  141 139   POTASSIUM 4.5 3.9  --   --  3.1*  --    < > 3.1*  --  3.6 4.2   CHLORIDE  --   --   --   --   --   --   --  104  --  103 102   CO2  --   --   --   --   --   --   --  29  --  29 28   BUN  --   --   --   --   --   --   --  11.0  --  18.5 14.0   CR  --   --   --   --  0.62  --   --  0.52  --  0.59 0.53   ANIONGAP  --   --   --   --   --   --   --  6*  --  9 9   ESTEBAN  --   --   --   --   --   --   --  8.6  --  8.8 9.5   GLC  --   --  102* 85  --  105*   < > 109*   < > 82 133*    < > = values in this interval not displayed.        Recent Labs   Lab Test 01/01/24  1043   PROTTOTAL 7.3    ALBUMIN 4.1   BILITOTAL 0.3   ALKPHOS 95   AST 22   ALT 17       Assessment and Plan:     Peggy Jessica is a 48 year old female POD # 13 s/p exploratory laparotomy, lysis of adhesions and creation of loop ileostomy.     -cont low fiber diet   -ostomy output is 1.8L   -will monitor and trend today. If she continues to have high output, may need to track and call us for possible imodium initiation.   THV for follow up   -cont dispo planning     Pt d/w Dr Ayo Rojas MD  Fellow, Colon and Rectal Surgery  [facility]  Pager: [phone]      Colon and Rectal Surgery Staff  I reviewed the physician assistants note and agree with the documented findings and plan of care.     Celia Rollins MD, FACS, FASCRS    [facility]  [address] [address]  T: [phone]  F: [phone]

## 2024-01-16 VITALS
SYSTOLIC BLOOD PRESSURE: 132 MMHG | OXYGEN SATURATION: 97 % | RESPIRATION RATE: 16 BRPM | HEIGHT: 64 IN | DIASTOLIC BLOOD PRESSURE: 84 MMHG | HEART RATE: 84 BPM | BODY MASS INDEX: 28.6 KG/M2 | TEMPERATURE: 98.3 F | WEIGHT: 167.5 LBS

## 2024-01-16 LAB
ANION GAP SERPL CALCULATED.3IONS-SCNC: 8 MMOL/L (ref 7–15)
BUN SERPL-MCNC: 7.9 MG/DL (ref 6–20)
CALCIUM SERPL-MCNC: 10.2 MG/DL (ref 8.6–10)
CHLORIDE SERPL-SCNC: 101 MMOL/L (ref 98–107)
CREAT SERPL-MCNC: 0.73 MG/DL (ref 0.51–0.95)
DEPRECATED HCO3 PLAS-SCNC: 29 MMOL/L (ref 22–29)
EGFRCR SERPLBLD CKD-EPI 2021: >90 ML/MIN/1.73M2
ERYTHROCYTE [DISTWIDTH] IN BLOOD BY AUTOMATED COUNT: 15 % (ref 10–15)
GLUCOSE SERPL-MCNC: 117 MG/DL (ref 70–99)
HCT VFR BLD AUTO: 46.5 % (ref 35–47)
HGB BLD-MCNC: 15.7 G/DL (ref 11.7–15.7)
MAGNESIUM SERPL-MCNC: 2.1 MG/DL (ref 1.7–2.3)
MCH RBC QN AUTO: 32.2 PG (ref 26.5–33)
MCHC RBC AUTO-ENTMCNC: 33.8 G/DL (ref 31.5–36.5)
MCV RBC AUTO: 95 FL (ref 78–100)
PLATELET # BLD AUTO: 438 10E3/UL (ref 150–450)
POTASSIUM SERPL-SCNC: 4.7 MMOL/L (ref 3.4–5.3)
RBC # BLD AUTO: 4.88 10E6/UL (ref 3.8–5.2)
SODIUM SERPL-SCNC: 138 MMOL/L (ref 135–145)
WBC # BLD AUTO: 6 10E3/UL (ref 4–11)

## 2024-01-16 PROCEDURE — 85027 COMPLETE CBC AUTOMATED: CPT | Performed by: INTERNAL MEDICINE

## 2024-01-16 PROCEDURE — 250N000013 HC RX MED GY IP 250 OP 250 PS 637: Performed by: COLON & RECTAL SURGERY

## 2024-01-16 PROCEDURE — 99232 SBSQ HOSP IP/OBS MODERATE 35: CPT | Performed by: INTERNAL MEDICINE

## 2024-01-16 PROCEDURE — 250N000013 HC RX MED GY IP 250 OP 250 PS 637: Performed by: HOSPITALIST

## 2024-01-16 PROCEDURE — 80048 BASIC METABOLIC PNL TOTAL CA: CPT | Performed by: INTERNAL MEDICINE

## 2024-01-16 PROCEDURE — 36415 COLL VENOUS BLD VENIPUNCTURE: CPT | Performed by: INTERNAL MEDICINE

## 2024-01-16 PROCEDURE — 250N000013 HC RX MED GY IP 250 OP 250 PS 637: Performed by: NURSE PRACTITIONER

## 2024-01-16 PROCEDURE — 83735 ASSAY OF MAGNESIUM: CPT | Performed by: INTERNAL MEDICINE

## 2024-01-16 RX ORDER — BUPRENORPHINE 7.5 UG/H
1 PATCH TRANSDERMAL WEEKLY
Qty: 2 PATCH | Refills: 0 | Status: SHIPPED | OUTPATIENT
Start: 2024-01-16

## 2024-01-16 RX ORDER — HYDROMORPHONE HYDROCHLORIDE 2 MG/1
2 TABLET ORAL EVERY 4 HOURS PRN
Qty: 16 TABLET | Refills: 0 | Status: SHIPPED | OUTPATIENT
Start: 2024-01-16

## 2024-01-16 RX ADMIN — MEMANTINE 10 MG: 10 TABLET ORAL at 08:36

## 2024-01-16 RX ADMIN — SERTRALINE HYDROCHLORIDE 100 MG: 50 TABLET ORAL at 08:36

## 2024-01-16 RX ADMIN — HYDROMORPHONE HYDROCHLORIDE 4 MG: 2 TABLET ORAL at 00:08

## 2024-01-16 RX ADMIN — HYDROMORPHONE HYDROCHLORIDE 2 MG: 2 TABLET ORAL at 11:09

## 2024-01-16 RX ADMIN — ACETAMINOPHEN 650 MG: 325 TABLET, FILM COATED ORAL at 10:59

## 2024-01-16 RX ADMIN — BACLOFEN 5 MG: 10 TABLET ORAL at 08:36

## 2024-01-16 RX ADMIN — ACETAMINOPHEN 650 MG: 325 TABLET, FILM COATED ORAL at 06:41

## 2024-01-16 RX ADMIN — HYDROMORPHONE HYDROCHLORIDE 2 MG: 2 TABLET ORAL at 11:04

## 2024-01-16 RX ADMIN — ACETAMINOPHEN 650 MG: 325 TABLET, FILM COATED ORAL at 00:08

## 2024-01-16 RX ADMIN — HYDROMORPHONE HYDROCHLORIDE 2 MG: 2 TABLET ORAL at 06:41

## 2024-01-16 RX ADMIN — PANTOPRAZOLE SODIUM 40 MG: 40 TABLET, DELAYED RELEASE ORAL at 06:41

## 2024-01-16 ASSESSMENT — ACTIVITIES OF DAILY LIVING (ADL)
ADLS_ACUITY_SCORE: 31

## 2024-01-16 NOTE — PLAN OF CARE
Goal Outcome Evaluation:    Patient is A&O X4, VSS on RA. POD 15 from Exploratory Laparotomy with Diverting Loop Ileostomy. Pain managed with PO Dilaudid X1 and tylenol. Ileostomy in place with adequate output. Up with 1 assist pivot to commode. Voiding adequately. On Low fiber gluten free diet. Wheel chair bound. Discharge due today.
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
## 2024-01-16 NOTE — PLAN OF CARE
Patient is pleasant, A/Ox4, VSS,RA Incision CDI.   Good intake and output. She does take care her own ileostomy bag and extra product that she need.   Patient with family present educated regarding incision care,  medication & pain management, and follow up. Both stated of understanding.   Discharge to home with the mother at 1430

## 2024-01-16 NOTE — PROGRESS NOTES
Colorectal Surgery Progress Note    POD # 15  Interval History:  No acute events overnight. Doing well. Ostomy output is down to 600cc.     Physical Exam:   Temp:  [98.3  F (36.8  C)-98.4  F (36.9  C)] 98.3  F (36.8  C)  Pulse:  [82-90] 84  Resp:  [16] 16  BP: (118-132)/(76-88) 132/84  SpO2:  [97 %-98 %] 97 %  General: Alert, oriented, appears comfortable, NAD.  Respiratory: breathing non labored  Abdomen: Abdomen is soft, non tender, non distended. Incision is clean, dry and intact without signs of infection    Data:   All laboratory and imaging data in the past 24 hours reviewed  I/O last 3 completed shifts:  In: 840 [P.O.:840]  Out: 600 [Stool:600]  Recent Labs   Lab Test 01/16/24  0745 01/13/24  0721 01/10/24  0853 01/04/24  0734 01/03/24  0731 01/02/24  0603 01/31/23  1900 01/28/23  0619   WBC 6.0  --   --   --  10.4 13.6*   < > 4.2   HGB 15.7  --   --   --  12.5 12.9   < > 13.4    323 287   < > 259 262   < > 243   INR  --   --   --   --   --   --   --  0.97    < > = values in this interval not displayed.      Recent Labs   Lab Test 01/16/24  0745 01/13/24  1805 01/11/24  0820 01/11/24  0608 01/11/24  0141 01/10/24  2319 01/07/24  2206 01/07/24  0638 01/05/24  2137 01/05/24  0815     --   --   --   --   --   --  139  --  141   POTASSIUM 4.7 4.5 3.9  --   --  3.1*   < > 3.1*  --  3.6   CHLORIDE 101  --   --   --   --   --   --  104  --  103   CO2 29  --   --   --   --   --   --  29  --  29   BUN 7.9  --   --   --   --   --   --  11.0  --  18.5   CR 0.73  --   --   --   --  0.62  --  0.52  --  0.59   ANIONGAP 8  --   --   --   --   --   --  6*  --  9   ESTEBAN 10.2*  --   --   --   --   --   --  8.6  --  8.8   *  --   --  102* 85  --    < > 109*   < > 82    < > = values in this interval not displayed.        Recent Labs   Lab Test 01/01/24  1043   PROTTOTAL 7.3   ALBUMIN 4.1   BILITOTAL 0.3   ALKPHOS 95   AST 22   ALT 17       Assessment and Plan:     Peggy Jessica is a 48 year old female  s/p exploratory laparotomy, lysis of adhesions and creation of loop ileostomy.     -cont low fiber diet   -ostomy output is 600 ml    THV for follow up with Dr chandler   Pain management for home going up to medicine team   -cont dispo planning     Pt d/w Dr Ayo Rojas MD  Fellow, Colon and Rectal Surgery  [facility]  Pager: [phone]

## 2024-01-16 NOTE — PROGRESS NOTES
Date & Time: 8017-0734  Surgery/POD#: POD 14 Exploratory Lap with Diverting Loop Ileostomy   Behavior & Aggression: Green  Fall Risk: Yes  Orientation:A/Ox4  ABNL VS/O2:VSS on RA  ABNL Labs: NA  Pain Management:PRN Dilaudid and Tylenol   Bowel/Bladder: BSC, Ileostomy   Drains: NA  Diet:GF/LFD  Activity Level: Pivot, W/C Baseline   Anticipated  DC Date: Plan to discharge tomorrow   Significant Information: Ileostomy with small output, tolerating diet.

## 2024-01-16 NOTE — PROVIDER NOTIFICATION
Patient is discharging, do you have any colostomy care/follow up recommendations? Colorectal team Notified via page

## 2024-01-18 ENCOUNTER — PATIENT OUTREACH (OUTPATIENT)
Dept: CARE COORDINATION | Facility: CLINIC | Age: 49
End: 2024-01-18
Payer: COMMERCIAL

## 2024-01-18 NOTE — PROGRESS NOTES
[facility]:   [facility] Contact  [facility]/Voicemail     Clinical Data: Post-Discharge Outreach     Outreach attempted x 2.  Left message on patient's voicemail, providing [facility]'s central phone number of [phone] ([phone]) for questions/concerns and/or to schedule an appt with an [facility] provider, if they do not have a PCP.      Plan:  [facility] will do no further outreaches at this time.       Lida Paredes  Community Health Worker  [facility], [facility]  [phone]      *Connected Care Resource Team does NOT follow patient ongoing. Referrals are identified based on internal discharge reports and the outreach is to ensure patient has an understanding of their discharge instructions.

## 2024-02-01 NOTE — NURSING NOTE
RN reviewed AVS with patient. Patient to contact clinic if any questions/concerns. Patient verbalized understanding.    Ashlyn Coulter RN   Subjective:     Chief Complaint: Jayla Loyd is a 70 y.o. female who had concerns including Pain of the Left Lower Leg and calf (Left/).    HPI    Patient presents to clinic with left calf and lower leg pain and swelling x 5 days. Patient states she turned awkwardly when she was reaching for something and felt sudden pain within her hamstrings. This pain has been present for the past several days and made worse with ambulation.  She states she has been very sedentary since the injury and has now developed significant pain and swelling in her calf and foot and ankle.  She rates the pain as 6/10 today. She has attempted multiple conservative measures that include activity modification, ice & elevation, and anti-inflammatories, with no relief. Is affecting ADLs and limiting desired level of activity. Denies numbness, tingling, radiation, and inability to bear weight. She is here today to discuss treatment options.    No previous surgeries or trauma on left leg      Review of Systems   Constitutional: Negative.   HENT: Negative.     Eyes: Negative.    Cardiovascular:  Positive for leg swelling.   Respiratory: Negative.     Endocrine: Negative.    Hematologic/Lymphatic: Negative.    Skin: Negative.    Musculoskeletal:  Positive for arthritis, falls, joint pain, muscle weakness, myalgias and stiffness. Negative for joint swelling.   Neurological: Negative.    Psychiatric/Behavioral: Negative.     Allergic/Immunologic: Negative.        Pain Related Questions  Over the past 3 days, what was your average pain during activity? (I.e. running, jogging, walking, climbing stairs, getting dressed, ect.): 6  Over the past 3 days, what was your highest pain level?: 7  Over the past 3 days, what was your lowest pain level? : 5    Other  How many nights a week are you awakened by your affected body part?: 3  Was the patient's HEIGHT measured or patient reported?: Patient Reported  Was the patient's WEIGHT measured or patient  reported?: Measured    Objective:     General: Jayla is well-developed, well-nourished, appears stated age, in no acute distress, alert and oriented to time, place and person.     General    Nursing note and vitals reviewed.  Constitutional: She is oriented to person, place, and time. She appears well-developed and well-nourished. No distress.   HENT:   Head: Normocephalic and atraumatic.   Nose: Nose normal.   Eyes: EOM are normal.   Cardiovascular:  Intact distal pulses.            Pulmonary/Chest: Effort normal. No respiratory distress.   Neurological: She is alert and oriented to person, place, and time.   Psychiatric: She has a normal mood and affect. Her behavior is normal. Judgment and thought content normal.           Right Knee Exam     Inspection   Alignment:  normal  Effusion: absent    Left Knee Exam     Inspection   Alignment:  normal  Effusion: absent    Range of Motion   Extension:  0   Flexion:  130     Other   Muscle Tightness: hamstring tightness  Sensation: normal    Right Hip Exam     Inspection   Scars: absent  Swelling: absent  Bruising: absent  No deformity of hip.  Quadriceps Atrophy:  Negative  Erythema: absent    Range of Motion   Abduction:  45   Adduction:  30   Extension:  20   Flexion:  120   External rotation:  80   Internal rotation:  30     Tests   Pain w/ forced internal rotation (COURTNEY): absent  Pain w/ forced external rotation (FADIR): absent  Mai: negative  Trendelenburg Test: negative  Circumduction test: negative  Stinchfield test: negative  Log Roll: negative    Other   Sensation: normal  Left Hip Exam     Inspection   Scars: absent  Swelling: absent  No deformity of hip.  Quadriceps Atrophy:  negative  Erythema: absent  Bruising: absent    Range of Motion   Abduction:  45 normal   Adduction:  30 normal   Extension:  20 normal   Flexion:  120 normal   External rotation:  70 normal   Internal rotation: 30 normal     Tests   Pain w/ forced internal rotation (COURTNEY):  present  Pain w/ forced external rotation (FADIR): absent  Mai: negative  Trendelenburg Test: negative  Circumduction test: negative  Stinchfield test: positive  Log Roll: negative    Other   Sensation: normal          Muscle Strength   Right Lower Extremity   Hip Abduction: 5/5   Hip Adduction: 5/5   Hip Flexion: 5/5   Ankle Dorsiflexion:  5/5   Left Lower Extremity   Hip Abduction: 4/5   Hip Adduction: 4/5   Hip Flexion: 4/5   Hamstrin/5   Ankle Dorsiflexion:  4/5     Vascular Exam     Right Pulses  Dorsalis Pedis:      2+  Posterior Tibial:      2+        Left Pulses  Dorsalis Pedis:      2+  Posterior Tibial:      2+        Capillary Refill  Right Hand: normal capillary refill  Left Hand: normal capillary refill        Edema  Right Upper Leg: absent  Left Upper Leg: present  Left Lower Leg: present    Radiographs left hip     My interpretation:    Mild DJD in the femoral acetabular joint seen bilaterally without any severe arthritic changes or joint space narrowing     Radiographs bilateral knee     My interpretation:    There is some arthritic changes in the left knee to include medial joint space narrowing     Radiographs left ankle    My interpretation:    No signs of fracture, contusion, swelling, DJD, or any other bony abnormalities noted.      Assessment:     Encounter Diagnoses   Name Primary?    Acute leg pain, left Yes    Acute left ankle pain     Left hip pain         Plan:     1. I made the decision to order new imaging of the extremity or extremities evaluated. I independently reviewed and interpreted the radiographs and/or MRIs today. These images were shown to the patient where I then discussed my findings in detail.    2. We had a lengthy discussion about different treatment options including conservative vs surgical management. These include anti-inflammatories, acetaminophen, rest, ice, heat, formal physical therapy including strengthening and stretching exercises, home exercise  programs, dry needling, and finally surgical intervention.  Explained that the likely cause of her pain is due to a hamstring strain while the swelling and pain in her lower leg developed due to being extremely sedentary the past 5 days.  Due to the severe level and localization of the pain in her calf, we will obtain a ultrasound to rule out any DVT. I recommended trying to ambulate in order to decrease the lower leg fluid retention.  She states she has Lasix which she takes intermittently.  Discussed formal physical therapy, for which she will consider in the future.  I recommended contacting her PCP in the next week if edema does not subside.    3. Ice compress to the affected area 2-3x a day for 15-20 minutes as needed for pain management.  Robaxin p.r.n. pain    4. RTC to see Donnie Summers PA-C p.r.n..  Patient will contact our clinic in the future if she changes her mind about formal physical therapy.      All of the patient's questions were answered. Patient was advised to call the clinic or contact me through the patient portal for any questions or concerns.       Medical Dictation software was used during the dictation of portions or the entirety of this medical record.  Phonetic or grammatic errors may exist due to the use of this software. For clarification, refer to the author of the document.        Patient questionnaires may have been collected.

## 2024-03-12 DIAGNOSIS — H53.40 VISUAL FIELD DEFECT: Primary | ICD-10-CM

## 2024-06-22 ENCOUNTER — HEALTH MAINTENANCE LETTER (OUTPATIENT)
Age: 49
End: 2024-06-22

## 2025-03-15 NOTE — PHARMACY-CONSULT NOTE
"Pharmacy consulted for \"patient  needs gluten free medications\"    Current Facility-Administered Medications   Medication    acetaminophen (TYLENOL) solution 500-1,000 mg ++Gluten free++    baclofen (LIORESAL) tablet 10 mg    bisacodyl (DULCOLAX) suppository 10 mg    buprenorphine (BUTRANS) 7.5 MCG/HR WK patch 1 patch    buprenorphine (BUTRANS) Patch in Place    dicyclomine (BENTYL) tablet 20 mg    diphenhydrAMINE (BENADRYL) solution 12.5 mg    enoxaparin ANTICOAGULANT (LOVENOX) injection 40 mg    folic acid (FOLVITE) tablet 400 mcg    hydrOXYzine (ATARAX) tablet 25 mg    Lidocaine (LIDOCARE) 4 % Patch 2 patch    melatonin tablet 1 mg    memantine (NAMENDA) tablet 5 mg    naloxone (NARCAN) injection 0.2 mg    Or    naloxone (NARCAN) injection 0.4 mg    Or    naloxone (NARCAN) injection 0.2 mg    Or    naloxone (NARCAN) injection 0.4 mg    ondansetron (ZOFRAN ODT) ODT tab 4 mg    Or    ondansetron (ZOFRAN) injection 4 mg    ondansetron (ZOFRAN) injection 4 mg    oxyCODONE (ROXICODONE) tablet 5-10 mg    pantoprazole (PROTONIX) EC tablet 40 mg    polyethylene glycol (MIRALAX) Packet 17 g    scopolamine (TRANSDERM) 72 hr patch 1 patch    And    scopolamine (TRANSDERM-SCOP) Patch in Place    senna-docusate (SENOKOT-S/PERICOLACE) 8.6-50 MG per tablet 1 tablet    sertraline (ZOLOFT) tablet 100 mg    [START ON 10/17/2023] vitamin D2 (ERGOCALCIFEROL) 39907 units (1250 mcg) capsule 50,000 Units       A/P: All the medications are gluten free except Acetaminophen tablets which has been changed to gluten free liquid form.   "
Medical Cannabis: Registration in the [facility] [place] Medical Cannabis Registry is confirmed.         Medical Cannabis visually inspected and does NOT appear obviously adulterated.      
Rivas Fernandes(Attending)

## 2025-04-30 NOTE — PROGRESS NOTES
Goal Outcome Evaluation:  - Diagnostic tests and consults completed and resulted: Met  - Vital signs normal or at patient baseline: /83 (BP Location: Left arm)   Pulse 86   Temp 98.1  F (36.7  C) (Oral)   Resp 16   Wt 77.1 kg (170 lb)   SpO2 98%   BMI 28.29 kg/m    - Tolerating oral intake to maintain hydration: Met  - Adequate pain control on oral analgesics: Met   - Safe disposition plan has been identified: Not met    Detail Level: Zone Per patient this was biopsied and he was told it was benign but excision was recommended for complete removal.

## 2025-07-12 ENCOUNTER — HEALTH MAINTENANCE LETTER (OUTPATIENT)
Age: 50
End: 2025-07-12

## (undated) DEVICE — SU DERMABOND ADVANCED .7ML DNX12

## (undated) DEVICE — Device

## (undated) DEVICE — SU PROLENE 2-0 SHDA 48" 8533H

## (undated) DEVICE — ESU GROUND PAD UNIVERSAL W/O CORD

## (undated) DEVICE — SOL WATER IRRIG 1000ML BOTTLE 2F7114

## (undated) DEVICE — CATH TRAY FOLEY SURESTEP 16FR W/URINE MTR STATLK LF A303416A

## (undated) DEVICE — SU MONOCRYL 4-0 PS-2 18" UND Y496G

## (undated) DEVICE — SUCTION TIP YANKAUER STR K87

## (undated) DEVICE — MANIFOLD NEPTUNE 4 PORT 700-20

## (undated) DEVICE — PREP CHLORAPREP 26ML TINTED HI-LITE ORANGE 930815

## (undated) DEVICE — DECANTER BAG 2002S

## (undated) DEVICE — GLOVE BIOGEL PI MICRO INDICATOR UNDERGLOVE SZ 6.5 48965

## (undated) DEVICE — GLOVE BIOGEL PI ULTRATOUCH SZ 6.5 41165

## (undated) DEVICE — ESU LIGASURE IMPACT OPEN SEALER/DVDR CVD LG JAW LF4418

## (undated) DEVICE — DRAPE LAP W/ARMBOARD 29410

## (undated) DEVICE — GOWN LG DISP 9515

## (undated) DEVICE — SU PDS II 0 CTX 60" Z990G

## (undated) DEVICE — SOL NACL 0.9% IRRIG 1000ML BOTTLE 2F7124

## (undated) DEVICE — OSTOMY BAG COLOSTOMY ACTIVE LIFE 1 PIECE 8651

## (undated) DEVICE — LINEN TOWEL PACK X5 5464

## (undated) DEVICE — PACK MAJOR SBA15MAFSI

## (undated) RX ORDER — VECURONIUM BROMIDE 1 MG/ML
INJECTION, POWDER, LYOPHILIZED, FOR SOLUTION INTRAVENOUS
Status: DISPENSED
Start: 2024-01-01

## (undated) RX ORDER — FENTANYL CITRATE 50 UG/ML
INJECTION, SOLUTION INTRAMUSCULAR; INTRAVENOUS
Status: DISPENSED
Start: 2024-01-01

## (undated) RX ORDER — DEXAMETHASONE SODIUM PHOSPHATE 4 MG/ML
INJECTION, SOLUTION INTRA-ARTICULAR; INTRALESIONAL; INTRAMUSCULAR; INTRAVENOUS; SOFT TISSUE
Status: DISPENSED
Start: 2024-01-01

## (undated) RX ORDER — PROPOFOL 10 MG/ML
INJECTION, EMULSION INTRAVENOUS
Status: DISPENSED
Start: 2024-01-02

## (undated) RX ORDER — ACETAMINOPHEN 325 MG/1
TABLET ORAL
Status: DISPENSED
Start: 2022-03-10

## (undated) RX ORDER — ONDANSETRON 2 MG/ML
INJECTION INTRAMUSCULAR; INTRAVENOUS
Status: DISPENSED
Start: 2024-01-01

## (undated) RX ORDER — METRONIDAZOLE 500 MG/100ML
INJECTION, SOLUTION INTRAVENOUS
Status: DISPENSED
Start: 2024-01-01

## (undated) RX ORDER — FENTANYL CITRATE 50 UG/ML
INJECTION, SOLUTION INTRAMUSCULAR; INTRAVENOUS
Status: DISPENSED
Start: 2023-07-20

## (undated) RX ORDER — DEXMEDETOMIDINE HYDROCHLORIDE 4 UG/ML
INJECTION, SOLUTION INTRAVENOUS
Status: DISPENSED
Start: 2024-01-01

## (undated) RX ORDER — PROPOFOL 10 MG/ML
INJECTION, EMULSION INTRAVENOUS
Status: DISPENSED
Start: 2024-01-01

## (undated) RX ORDER — PROPOFOL 10 MG/ML
INJECTION, EMULSION INTRAVENOUS
Status: DISPENSED
Start: 2022-07-28

## (undated) RX ORDER — FENTANYL CITRATE 50 UG/ML
INJECTION, SOLUTION INTRAMUSCULAR; INTRAVENOUS
Status: DISPENSED
Start: 2023-05-16

## (undated) RX ORDER — VANCOMYCIN HYDROCHLORIDE 1 G/200ML
INJECTION, SOLUTION INTRAVENOUS
Status: DISPENSED
Start: 2024-01-01

## (undated) RX ORDER — DIPHENHYDRAMINE HCL 25 MG
CAPSULE ORAL
Status: DISPENSED
Start: 2022-03-10

## (undated) RX ORDER — HEPARIN SODIUM 5000 [USP'U]/.5ML
INJECTION, SOLUTION INTRAVENOUS; SUBCUTANEOUS
Status: DISPENSED
Start: 2024-01-01

## (undated) RX ORDER — LIDOCAINE HYDROCHLORIDE 10 MG/ML
INJECTION, SOLUTION EPIDURAL; INFILTRATION; INTRACAUDAL; PERINEURAL
Status: DISPENSED
Start: 2023-05-17

## (undated) RX ORDER — HYDROMORPHONE HYDROCHLORIDE 1 MG/ML
INJECTION, SOLUTION INTRAMUSCULAR; INTRAVENOUS; SUBCUTANEOUS
Status: DISPENSED
Start: 2024-01-01

## (undated) RX ORDER — FENTANYL CITRATE 0.05 MG/ML
INJECTION, SOLUTION INTRAMUSCULAR; INTRAVENOUS
Status: DISPENSED
Start: 2024-01-01

## (undated) RX ORDER — LIDOCAINE HYDROCHLORIDE 10 MG/ML
INJECTION, SOLUTION EPIDURAL; INFILTRATION; INTRACAUDAL; PERINEURAL
Status: DISPENSED
Start: 2023-01-28